# Patient Record
Sex: FEMALE | Race: WHITE | Employment: OTHER | URBAN - METROPOLITAN AREA
[De-identification: names, ages, dates, MRNs, and addresses within clinical notes are randomized per-mention and may not be internally consistent; named-entity substitution may affect disease eponyms.]

---

## 2018-03-21 ENCOUNTER — HOSPITAL ENCOUNTER (OUTPATIENT)
Dept: MAMMOGRAPHY | Age: 78
Discharge: HOME | End: 2018-03-21
Attending: FAMILY MEDICINE
Payer: MEDICARE

## 2018-03-21 DIAGNOSIS — Z12.31 VISIT FOR SCREENING MAMMOGRAM: ICD-10-CM

## 2018-03-21 PROCEDURE — 77067 SCR MAMMO BI INCL CAD: CPT

## 2018-03-28 ENCOUNTER — HOSPITAL ENCOUNTER (OUTPATIENT)
Dept: MAMMOGRAPHY | Age: 78
Discharge: HOME | End: 2018-03-28
Attending: FAMILY MEDICINE
Payer: MEDICARE

## 2018-03-28 DIAGNOSIS — Z85.3 HX OF BREAST CANCER: ICD-10-CM

## 2018-03-28 DIAGNOSIS — R92.8 ABNORMAL SCREENING MAMMOGRAM: ICD-10-CM

## 2018-03-28 PROCEDURE — 77065 DX MAMMO INCL CAD UNI: CPT

## 2019-04-22 ENCOUNTER — HOSPITAL ENCOUNTER (INPATIENT)
Age: 79
LOS: 8 days | Discharge: HOME HEALTH | DRG: 194 | End: 2019-04-30
Attending: EMERGENCY MEDICINE | Admitting: INTERNAL MEDICINE
Payer: MEDICARE

## 2019-04-22 ENCOUNTER — APPOINTMENT (OUTPATIENT)
Dept: GENERAL RADIOLOGY | Age: 79
DRG: 194 | End: 2019-04-22
Attending: EMERGENCY MEDICINE
Payer: MEDICARE

## 2019-04-22 DIAGNOSIS — N17.9 ACUTE KIDNEY INJURY (HCC): ICD-10-CM

## 2019-04-22 DIAGNOSIS — J18.9 COMMUNITY ACQUIRED PNEUMONIA, UNSPECIFIED LATERALITY: Primary | ICD-10-CM

## 2019-04-22 PROBLEM — A41.9 SEPSIS (HCC): Status: ACTIVE | Noted: 2019-04-22

## 2019-04-22 PROBLEM — J15.9 COMMUNITY ACQUIRED BACTERIAL PNEUMONIA: Status: ACTIVE | Noted: 2019-04-22

## 2019-04-22 PROBLEM — R94.31 PROLONGED Q-T INTERVAL ON ECG: Status: ACTIVE | Noted: 2019-04-22

## 2019-04-22 LAB
ALBUMIN SERPL-MCNC: 2.8 G/DL (ref 3.2–4.6)
ALBUMIN/GLOB SERPL: 0.6 {RATIO} (ref 1.2–3.5)
ALP SERPL-CCNC: 78 U/L (ref 50–136)
ALT SERPL-CCNC: 27 U/L (ref 12–65)
ANION GAP SERPL CALC-SCNC: 14 MMOL/L (ref 7–16)
AST SERPL-CCNC: 29 U/L (ref 15–37)
ATRIAL RATE: 88 BPM
BASOPHILS # BLD: 0.1 K/UL (ref 0–0.2)
BASOPHILS NFR BLD: 0 % (ref 0–2)
BILIRUB SERPL-MCNC: 0.7 MG/DL (ref 0.2–1.1)
BNP SERPL-MCNC: 25 PG/ML
BUN SERPL-MCNC: 73 MG/DL (ref 8–23)
CALCIUM SERPL-MCNC: 8.9 MG/DL (ref 8.3–10.4)
CALCULATED P AXIS, ECG09: 63 DEGREES
CALCULATED R AXIS, ECG10: 69 DEGREES
CALCULATED T AXIS, ECG11: -11 DEGREES
CHLORIDE SERPL-SCNC: 93 MMOL/L (ref 98–107)
CO2 SERPL-SCNC: 27 MMOL/L (ref 21–32)
CREAT SERPL-MCNC: 3.6 MG/DL (ref 0.6–1)
DIAGNOSIS, 93000: NORMAL
DIFFERENTIAL METHOD BLD: ABNORMAL
EOSINOPHIL # BLD: 0 K/UL (ref 0–0.8)
EOSINOPHIL NFR BLD: 0 % (ref 0.5–7.8)
ERYTHROCYTE [DISTWIDTH] IN BLOOD BY AUTOMATED COUNT: 13.8 % (ref 11.9–14.6)
GLOBULIN SER CALC-MCNC: 4.6 G/DL (ref 2.3–3.5)
GLUCOSE SERPL-MCNC: 117 MG/DL (ref 65–100)
HCT VFR BLD AUTO: 40.2 % (ref 35.8–46.3)
HGB BLD-MCNC: 13.7 G/DL (ref 11.7–15.4)
IMM GRANULOCYTES # BLD AUTO: 0.2 K/UL (ref 0–0.5)
IMM GRANULOCYTES NFR BLD AUTO: 1 % (ref 0–5)
LACTATE BLD-SCNC: 2.89 MMOL/L (ref 0.5–1.9)
LACTATE SERPL-SCNC: 2.3 MMOL/L (ref 0.4–2)
LYMPHOCYTES # BLD: 1.3 K/UL (ref 0.5–4.6)
LYMPHOCYTES NFR BLD: 7 % (ref 13–44)
MAGNESIUM SERPL-MCNC: 2.2 MG/DL (ref 1.8–2.4)
MAGNESIUM SERPL-MCNC: 2.4 MG/DL (ref 1.8–2.4)
MCH RBC QN AUTO: 30.7 PG (ref 26.1–32.9)
MCHC RBC AUTO-ENTMCNC: 34.1 G/DL (ref 31.4–35)
MCV RBC AUTO: 90.1 FL (ref 79.6–97.8)
MONOCYTES # BLD: 2.1 K/UL (ref 0.1–1.3)
MONOCYTES NFR BLD: 12 % (ref 4–12)
NEUTS SEG # BLD: 13.7 K/UL (ref 1.7–8.2)
NEUTS SEG NFR BLD: 79 % (ref 43–78)
NRBC # BLD: 0 K/UL (ref 0–0.2)
P-R INTERVAL, ECG05: 126 MS
PLATELET # BLD AUTO: 284 K/UL (ref 150–450)
PMV BLD AUTO: 13.2 FL (ref 9.4–12.3)
POTASSIUM SERPL-SCNC: 2.5 MMOL/L (ref 3.5–5.1)
POTASSIUM SERPL-SCNC: 2.7 MMOL/L (ref 3.5–5.1)
POTASSIUM SERPL-SCNC: 4.3 MMOL/L (ref 3.5–5.1)
PROCALCITONIN SERPL-MCNC: 0.5 NG/ML
PROT SERPL-MCNC: 7.4 G/DL (ref 6.3–8.2)
Q-T INTERVAL, ECG07: 468 MS
QRS DURATION, ECG06: 100 MS
QTC CALCULATION (BEZET), ECG08: 566 MS
RBC # BLD AUTO: 4.46 M/UL (ref 4.05–5.2)
SODIUM SERPL-SCNC: 134 MMOL/L (ref 136–145)
VENTRICULAR RATE, ECG03: 88 BPM
WBC # BLD AUTO: 17.3 K/UL (ref 4.3–11.1)

## 2019-04-22 PROCEDURE — 74011250637 HC RX REV CODE- 250/637: Performed by: EMERGENCY MEDICINE

## 2019-04-22 PROCEDURE — 83735 ASSAY OF MAGNESIUM: CPT

## 2019-04-22 PROCEDURE — 74011250636 HC RX REV CODE- 250/636: Performed by: EMERGENCY MEDICINE

## 2019-04-22 PROCEDURE — 99285 EMERGENCY DEPT VISIT HI MDM: CPT | Performed by: EMERGENCY MEDICINE

## 2019-04-22 PROCEDURE — 74011000250 HC RX REV CODE- 250: Performed by: EMERGENCY MEDICINE

## 2019-04-22 PROCEDURE — 83605 ASSAY OF LACTIC ACID: CPT

## 2019-04-22 PROCEDURE — 77030020255 HC SOL INJ LR 1000ML BG

## 2019-04-22 PROCEDURE — 77030020254 HC SOL INJ D5LR LACTATED RINGER

## 2019-04-22 PROCEDURE — 94640 AIRWAY INHALATION TREATMENT: CPT

## 2019-04-22 PROCEDURE — 71046 X-RAY EXAM CHEST 2 VIEWS: CPT

## 2019-04-22 PROCEDURE — 74011250637 HC RX REV CODE- 250/637: Performed by: INTERNAL MEDICINE

## 2019-04-22 PROCEDURE — 74011250636 HC RX REV CODE- 250/636: Performed by: INTERNAL MEDICINE

## 2019-04-22 PROCEDURE — 36415 COLL VENOUS BLD VENIPUNCTURE: CPT

## 2019-04-22 PROCEDURE — 96360 HYDRATION IV INFUSION INIT: CPT | Performed by: EMERGENCY MEDICINE

## 2019-04-22 PROCEDURE — 87040 BLOOD CULTURE FOR BACTERIA: CPT

## 2019-04-22 PROCEDURE — 93005 ELECTROCARDIOGRAM TRACING: CPT | Performed by: EMERGENCY MEDICINE

## 2019-04-22 PROCEDURE — 80053 COMPREHEN METABOLIC PANEL: CPT

## 2019-04-22 PROCEDURE — 74011000258 HC RX REV CODE- 258: Performed by: EMERGENCY MEDICINE

## 2019-04-22 PROCEDURE — 84132 ASSAY OF SERUM POTASSIUM: CPT

## 2019-04-22 PROCEDURE — 74011000302 HC RX REV CODE- 302: Performed by: INTERNAL MEDICINE

## 2019-04-22 PROCEDURE — 77030020256 HC SOL INJ NACL 0.9%  500ML

## 2019-04-22 PROCEDURE — 85025 COMPLETE CBC W/AUTO DIFF WBC: CPT

## 2019-04-22 PROCEDURE — 65660000000 HC RM CCU STEPDOWN

## 2019-04-22 PROCEDURE — 83880 ASSAY OF NATRIURETIC PEPTIDE: CPT

## 2019-04-22 PROCEDURE — 86580 TB INTRADERMAL TEST: CPT | Performed by: INTERNAL MEDICINE

## 2019-04-22 PROCEDURE — 84145 PROCALCITONIN (PCT): CPT

## 2019-04-22 RX ORDER — SODIUM CHLORIDE 0.9 % (FLUSH) 0.9 %
5-40 SYRINGE (ML) INJECTION EVERY 8 HOURS
Status: DISCONTINUED | OUTPATIENT
Start: 2019-04-22 | End: 2019-04-30 | Stop reason: HOSPADM

## 2019-04-22 RX ORDER — ALBUTEROL SULFATE 0.83 MG/ML
5 SOLUTION RESPIRATORY (INHALATION)
Status: COMPLETED | OUTPATIENT
Start: 2019-04-22 | End: 2019-04-22

## 2019-04-22 RX ORDER — AMOXICILLIN 250 MG
1 CAPSULE ORAL DAILY
Status: DISCONTINUED | OUTPATIENT
Start: 2019-04-23 | End: 2019-04-26

## 2019-04-22 RX ORDER — ROSUVASTATIN CALCIUM 5 MG/1
10 TABLET, COATED ORAL DAILY
Status: DISCONTINUED | OUTPATIENT
Start: 2019-04-23 | End: 2019-04-26

## 2019-04-22 RX ORDER — ASPIRIN 81 MG/1
81 TABLET ORAL DAILY
Status: DISCONTINUED | OUTPATIENT
Start: 2019-04-23 | End: 2019-04-30 | Stop reason: HOSPADM

## 2019-04-22 RX ORDER — SODIUM CHLORIDE 0.9 % (FLUSH) 0.9 %
5-40 SYRINGE (ML) INJECTION AS NEEDED
Status: DISCONTINUED | OUTPATIENT
Start: 2019-04-22 | End: 2019-04-30 | Stop reason: HOSPADM

## 2019-04-22 RX ORDER — POTASSIUM CHLORIDE 14.9 MG/ML
20 INJECTION INTRAVENOUS
Status: COMPLETED | OUTPATIENT
Start: 2019-04-22 | End: 2019-04-22

## 2019-04-22 RX ORDER — UREA 10 %
800 LOTION (ML) TOPICAL DAILY
Status: DISCONTINUED | OUTPATIENT
Start: 2019-04-23 | End: 2019-04-30 | Stop reason: HOSPADM

## 2019-04-22 RX ORDER — LANOLIN ALCOHOL/MO/W.PET/CERES
400 CREAM (GRAM) TOPICAL DAILY
Status: DISCONTINUED | OUTPATIENT
Start: 2019-04-22 | End: 2019-04-30 | Stop reason: HOSPADM

## 2019-04-22 RX ORDER — METHOTREXATE 2.5 MG/1
12.5 TABLET ORAL
Status: DISCONTINUED | OUTPATIENT
Start: 2019-04-28 | End: 2019-04-30 | Stop reason: HOSPADM

## 2019-04-22 RX ORDER — LEVOTHYROXINE SODIUM 50 UG/1
25 TABLET ORAL
Status: DISCONTINUED | OUTPATIENT
Start: 2019-04-23 | End: 2019-04-30 | Stop reason: HOSPADM

## 2019-04-22 RX ORDER — POTASSIUM CHLORIDE 29.8 MG/ML
20 INJECTION INTRAVENOUS
Status: DISCONTINUED | OUTPATIENT
Start: 2019-04-22 | End: 2019-04-22 | Stop reason: SDUPTHER

## 2019-04-22 RX ORDER — POTASSIUM CHLORIDE 20 MEQ/1
20 TABLET, EXTENDED RELEASE ORAL DAILY
Status: DISCONTINUED | OUTPATIENT
Start: 2019-04-23 | End: 2019-04-23

## 2019-04-22 RX ORDER — TRAMADOL HYDROCHLORIDE 50 MG/1
50 TABLET ORAL
COMMUNITY
End: 2021-03-19

## 2019-04-22 RX ORDER — POTASSIUM CHLORIDE 20 MEQ/1
40 TABLET, EXTENDED RELEASE ORAL
Status: COMPLETED | OUTPATIENT
Start: 2019-04-22 | End: 2019-04-22

## 2019-04-22 RX ORDER — METOPROLOL SUCCINATE 25 MG/1
25 TABLET, EXTENDED RELEASE ORAL DAILY
COMMUNITY
End: 2021-03-26

## 2019-04-22 RX ORDER — MELATONIN
1000 DAILY
COMMUNITY

## 2019-04-22 RX ORDER — POTASSIUM CHLORIDE 1.5 G/1.77G
40 POWDER, FOR SOLUTION ORAL
Status: COMPLETED | OUTPATIENT
Start: 2019-04-22 | End: 2019-04-22

## 2019-04-22 RX ORDER — SODIUM CHLORIDE, SODIUM LACTATE, POTASSIUM CHLORIDE, CALCIUM CHLORIDE 600; 310; 30; 20 MG/100ML; MG/100ML; MG/100ML; MG/100ML
125 INJECTION, SOLUTION INTRAVENOUS CONTINUOUS
Status: DISCONTINUED | OUTPATIENT
Start: 2019-04-22 | End: 2019-04-23

## 2019-04-22 RX ORDER — HEPARIN SODIUM 5000 [USP'U]/ML
5000 INJECTION, SOLUTION INTRAVENOUS; SUBCUTANEOUS EVERY 8 HOURS
Status: DISCONTINUED | OUTPATIENT
Start: 2019-04-22 | End: 2019-04-26

## 2019-04-22 RX ORDER — POTASSIUM CHLORIDE 20MEQ/15ML
40 LIQUID (ML) ORAL
Status: DISCONTINUED | OUTPATIENT
Start: 2019-04-22 | End: 2019-04-22 | Stop reason: SDUPTHER

## 2019-04-22 RX ADMIN — METHYLPREDNISOLONE SODIUM SUCCINATE 125 MG: 125 INJECTION, POWDER, FOR SOLUTION INTRAMUSCULAR; INTRAVENOUS at 14:47

## 2019-04-22 RX ADMIN — ALBUTEROL SULFATE 5 MG: 2.5 SOLUTION RESPIRATORY (INHALATION) at 14:22

## 2019-04-22 RX ADMIN — CEFTRIAXONE 2 G: 2 INJECTION, POWDER, FOR SOLUTION INTRAMUSCULAR; INTRAVENOUS at 14:47

## 2019-04-22 RX ADMIN — POTASSIUM CHLORIDE 40 MEQ: 1.5 POWDER, FOR SOLUTION ORAL at 14:47

## 2019-04-22 RX ADMIN — SODIUM CHLORIDE 1000 ML: 900 INJECTION, SOLUTION INTRAVENOUS at 14:07

## 2019-04-22 RX ADMIN — TUBERCULIN PURIFIED PROTEIN DERIVATIVE 5 UNITS: 5 INJECTION, SOLUTION INTRADERMAL at 22:04

## 2019-04-22 RX ADMIN — Medication 400 MG: at 14:47

## 2019-04-22 RX ADMIN — POTASSIUM CHLORIDE 20 MEQ: 200 INJECTION, SOLUTION INTRAVENOUS at 16:58

## 2019-04-22 RX ADMIN — HEPARIN SODIUM 5000 UNITS: 5000 INJECTION INTRAVENOUS; SUBCUTANEOUS at 22:04

## 2019-04-22 RX ADMIN — POTASSIUM CHLORIDE 40 MEQ: 20 TABLET, EXTENDED RELEASE ORAL at 17:39

## 2019-04-22 RX ADMIN — Medication 5 ML: at 22:06

## 2019-04-22 RX ADMIN — SODIUM CHLORIDE, SODIUM LACTATE, POTASSIUM CHLORIDE, AND CALCIUM CHLORIDE 125 ML/HR: 600; 310; 30; 20 INJECTION, SOLUTION INTRAVENOUS at 22:03

## 2019-04-22 NOTE — ED PROVIDER NOTES
Presents with cough and shortness of breath for 2 weeks. She went to her primary care doctor in the center here. She is given 4 breathing treatments and her O2 sats aashish room air of 60% initially and per EMS 88%. She does not have any home oxygen. She quit smoking a year ago. She denies any fever or chest pain. She's had no nausea or vomiting. Reports anorexia and not taking her meds. She does not have any inhalers at home. She denies any leg swelling. The history is provided by the patient. Shortness of Breath This is a new problem. The problem occurs continuously. The current episode started more than 1 week ago. The problem has been gradually worsening. Associated symptoms include cough, sputum production and wheezing. Pertinent negatives include no fever, no chest pain and no leg pain. She has tried nothing for the symptoms. Past Medical History:  
Diagnosis Date  Acquired hypothyroidism 1/16/2016  Breast cancer ([facility] [address] 2008  Depression 8/18/2016  Endocrine disease   
 hypothyroid  Fungal dermatitis 8/18/2016  Hyperlipidemia 1/16/2016  Hypertension, essential, benign 8/18/2016  Hypokalemia 8/18/2016  Inflamed sebaceous cyst 8/18/2016  Major depressive disorder, recurrent, moderate ([address] 8/18/2016  Nicotine dependence, cigarettes, uncomplicated 8/48/8422  Osteopenia 8/18/2016  Osteoporosis 8/18/2016  Radiation therapy complication 5461  Rheumatoid arthritis ([address] 1/16/2016  Right carotid bruit 1/16/2016  TIA (transient ischemic attack) 1/16/2016  Tobacco abuse 8/18/2016 Past Surgical History:  
Procedure Laterality Date  HX ADENOIDECTOMY  HX BREAST LUMPECTOMY Right 2008  
 with lymph nodes  HX TONSILLECTOMY Family History:  
Problem Relation Age of Onset  Stroke Mother  Cancer Father Brain  HIV/AIDS Brother Social History Socioeconomic History  Marital status:   
 Spouse name: Not on file  Number of children: Not on file  Years of education: Not on file  Highest education level: Not on file Occupational History  Not on file Social Needs  Financial resource strain: Not on file  Food insecurity:  
  Worry: Not on file Inability: Not on file  Transportation needs:  
  Medical: Not on file Non-medical: Not on file Tobacco Use  Smoking status: Current Some Day Smoker  Smokeless tobacco: Never Used  Tobacco comment: Only on pay day-1/2 pack Substance and Sexual Activity  Alcohol use: No  
 Drug use: No  
 Sexual activity: Not on file Lifestyle  Physical activity:  
  Days per week: Not on file Minutes per session: Not on file  Stress: Not on file Relationships  Social connections:  
  Talks on phone: Not on file Gets together: Not on file Attends Religion service: Not on file Active member of club or organization: Not on file Attends meetings of clubs or organizations: Not on file Relationship status: Not on file  Intimate partner violence:  
  Fear of current or ex partner: Not on file Emotionally abused: Not on file Physically abused: Not on file Forced sexual activity: Not on file Other Topics Concern  Not on file Social History Narrative  Not on file ALLERGIES: Patient has no known allergies. Review of Systems Constitutional: Negative for fever. Respiratory: Positive for cough, sputum production, shortness of breath and wheezing. Cardiovascular: Negative for chest pain. All other systems reviewed and are negative. Vitals:  
 04/22/19 1305 04/22/19 1331 04/22/19 1422 04/22/19 1449 BP:  109/59  97/52 Pulse:  81  89 Resp:  14  26 Temp:      
SpO2: 98% 91% 95% 96% Weight:      
Height:      
      
 
Physical Exam  
Constitutional: She is oriented to person, place, and time.  She appears well-developed and well-nourished. She appears ill. She appears distressed (mild respiratory). HENT:  
Head: Normocephalic and atraumatic. Eyes: Conjunctivae are normal. Right eye exhibits no discharge. Left eye exhibits no discharge. Neck: Normal range of motion. Neck supple. Cardiovascular: Normal rate and regular rhythm. Pulmonary/Chest: Effort normal. No respiratory distress. She has wheezes (occasional). Abdominal: Soft. She exhibits no distension. There is no tenderness. Musculoskeletal: Normal range of motion. She exhibits no edema. Neurological: She is alert and oriented to person, place, and time. No cranial nerve deficit. Skin: Skin is warm and dry. Capillary refill takes less than 2 seconds. She is not diaphoretic. Psychiatric: She has a normal mood and affect. Her behavior is normal.  
Nursing note and vitals reviewed. MDM Number of Diagnoses or Management Options Community acquired pneumonia, unspecified laterality:  
Diagnosis management comments: Given another albuterol treatment and Solu-Medrol. She has pneumonia on her chest x-ray and is given Rocephin and nasal throat. She is also given a liter of fluids. She'll be admitted to the hospitalist. 
 
  
Amount and/or Complexity of Data Reviewed Clinical lab tests: ordered and reviewed Review and summarize past medical records: yes Discuss the patient with other providers: yes Independent visualization of images, tracings, or specimens: yes (NSR no ST elevation ) Risk of Complications, Morbidity, and/or Mortality Presenting problems: high Diagnostic procedures: moderate Management options: high Patient Progress Patient progress: stable Procedures

## 2019-04-22 NOTE — PROGRESS NOTES
CM met with patient to discuss d/c plan. Patient alert and oriented x4. Patient verified demographic information no changes needed. Patient states she lives alone in a one level home with six steps to enter into the home. States she's independent with her ADL's and do has DME's (cane) at bedside. Patient states her PCP (RN) calls and checks on her and make sure she has her medication. Patient states she do plan on returning back home with no needs identified at this time. CM will continue to monitor and watch for any needs that may occur. Care Management Interventions PCP Verified by CM: Yes(Bruce, Caryle Must, ) Mode of Transport at Discharge: Other (see comment) Transition of Care Consult (CM Consult): Discharge Planning Discharge Durable Medical Equipment: No 
Physical Therapy Consult: No 
Occupational Therapy Consult: No 
Speech Therapy Consult: No 
Current Support Network: Own Home, Lives Alone Confirm Follow Up Transport: Family Plan discussed with Pt/Family/Caregiver: Yes Freedom of Choice Offered: Yes [employer] Information Provided?: No 
Discharge Location Discharge Placement: Home

## 2019-04-22 NOTE — PROGRESS NOTES
Patient is being admitted to floor from ER Patient was calm and in good spirits She was very thankful for the visit Assured her of our support and prayers Jac Quintanilla, staff [address], [phone] [facility] [place]  /   [email or web address]

## 2019-04-22 NOTE — ED NOTES
TRANSFER - OUT REPORT: 
 
Verbal report given to [employer], RN on Zi Estes  being transferred to [phone] for routine progression of care Report consisted of patients Situation, Background, Assessment and  
Recommendations(SBAR). Information from the following report(s) SBAR was reviewed with the receiving nurse. Lines:  
Peripheral IV 04/22/19 Left Antecubital (Active) Site Assessment Clean, dry, & intact 4/22/2019 12:34 PM  
Phlebitis Assessment 0 4/22/2019 12:34 PM  
Infiltration Assessment 0 4/22/2019 12:34 PM  
Dressing Status Clean, dry, & intact 4/22/2019 12:34 PM  
Dressing Type Transparent 4/22/2019 12:34 PM  
Hub Color/Line Status Pink 4/22/2019 12:34 PM  
  
 
Opportunity for questions and clarification was provided. Patient transported with: 
 O2 @ 4 liters

## 2019-04-22 NOTE — PROGRESS NOTES
Pt in room 819. Pt is stable. Pt is axo. Pt denies pain or discomfort. Respirations are even and unlabored at rest, labored when attempting to talk. No skin breakdown noted. Dual skin assessment performed with Suzanne Cerda RN. Safety measures are in place. Door is left open when unattended. Will continue to monitor.

## 2019-04-22 NOTE — H&P
HOSPITALIST H&P/CONSULTNAME:  Zi Estes Age:  70 y.o. 
:   1940 MRN:   [id number] PCP: Rose Busch MD 
Consulting MD: Treatment Team: Primary Nurse: Abhilash Brown RN 
HPI:  
69 yo CF with past history of hypothyroidism, HLD, HTN, chronic hypokalemia (uncertain etiology), past tobacco use, and TIA presents with worsening 2 week history of shortness of breath and cough. No known sick contacts, patient lives by herself at home. No associated chest pain, but with some fevers/chills and cough. No associated abdominal pain, nausea/vomiting, diarrhea, or constipation. She has never been diagnosed with any lung disorders. ED Course: CXR showing possible COPD changes and likely superimposed pneumonia. Serum potassium of 2.5 with SCr of 3.6 (baseline around 1.0-1.1). Procalcitonin of 0.5. WBC count of 17. EKG showing NSR with prolonged QT interval at 570. Blood cultures collected x2. Ceftriaxone and azithromycin ordered. Complete ROS done and is as stated in HPI or otherwise negative Past Medical History:  
Diagnosis Date  Acquired hypothyroidism 2016  Breast cancer ([facility] [address]   Depression 2016  Endocrine disease   
 hypothyroid  Fungal dermatitis 2016  Hyperlipidemia 2016  Hypertension, essential, benign 2016  Hypokalemia 2016  Inflamed sebaceous cyst 2016  Major depressive disorder, recurrent, moderate ([address] 2016  Nicotine dependence, cigarettes, uncomplicated 3/85/6214  Osteopenia 2016  Osteoporosis 2016  Radiation therapy complication 6801  Rheumatoid arthritis ([address] 2016  Right carotid bruit 2016  TIA (transient ischemic attack) 2016  Tobacco abuse 2016 Past Surgical History:  
Procedure Laterality Date  HX ADENOIDECTOMY  HX BREAST LUMPECTOMY Right   
 with lymph nodes  HX TONSILLECTOMY Prior to Admission Medications Prescriptions Last Dose Informant Patient Reported? Taking? DULoxetine (CYMBALTA) 60 mg capsule   No No  
Sig: Take 1 Cap by mouth daily. aspirin delayed-release 81 mg tablet   Yes No  
Sig: Take  by mouth daily. azithromycin (ZITHROMAX) 250 mg tablet   No No  
Sig: Take as directed on the package. clotrimazole-betamethasone (LOTRISONE) topical cream   No No  
Sig: Apply  to affected area two (2) times a day. fo 14 days. folic acid 430 mcg tablet   Yes No  
Sig: Take 800 mcg by mouth daily. furosemide (LASIX) 40 mg tablet   No No  
Sig: Take 1 Tab by mouth daily. levothyroxine (SYNTHROID) 25 mcg tablet   No No  
Sig: Take 1 Tab by mouth Daily (before breakfast). losartan-hydroCHLOROthiazide (HYZAAR) 100-12.5 mg per tablet   No No  
Sig: Take 1 Tab by mouth daily. methotrexate (RHEUMATREX) 2.5 mg tablet   No No  
Si tabs every   
potassium chloride (K-DUR, KLOR-CON) 20 mEq tablet   No No  
Sig: Take 1 Tab by mouth daily. Please cancel RX for Potassium 10meq and fill this instead. Dosage change. rosuvastatin (CRESTOR) 10 mg tablet   No No  
Sig: Take 1 Tab by mouth daily. Facility-Administered Medications: None No Known Allergies Social History Tobacco Use  Smoking status: Current Some Day Smoker  Smokeless tobacco: Never Used  Tobacco comment: Only on pay day-1/2 pack Substance Use Topics  Alcohol use: No  
  
Family History Problem Relation Age of Onset  Stroke Mother  Cancer Father Brain  HIV/AIDS Brother Objective:  
 
Visit Vitals BP 97/52 Pulse 89 Temp 97.2 °F (36.2 °C) Resp 26 Ht 5' (1.524 m) Wt 64 kg (141 lb) SpO2 96% BMI 27.54 kg/m² Temp (24hrs), Av.2 °F (36.2 °C), Min:97.2 °F (36.2 °C), Max:97.2 °F (36.2 °C) Oxygen Therapy O2 Sat (%): 96 % (19 1449) Pulse via Oximetry: 87 beats per minute (19 1449) O2 Device: Nasal cannula (19 2277) O2 Flow Rate (L/min): 4 l/min (04/22/19 1422) Physical Exam: 
General:    Alert, cooperative, no distress, appears stated age. Head:   Normocephalic, without obvious abnormality, atraumatic. Nose:  Nares normal. No drainage or sinus tenderness. Lungs:   Clear to auscultation bilaterally. No Wheezing or Rhonchi. No rales. Heart:   Regular rate and rhythm,  no murmur, rub or gallop. Abdomen:   Soft, non-tender. Not distended. Bowel sounds normal.  
Extremities: No cyanosis. No edema. No clubbing Skin:     Texture, turgor normal. No rashes or lesions. Not Jaundiced Neurologic: Alert and oriented x 3, no focal deficits Data Review:  
Recent Results (from the past 24 hour(s)) CBC WITH AUTOMATED DIFF Collection Time: 04/22/19 12:28 PM  
Result Value Ref Range WBC 17.3 (H) 4.3 - 11.1 K/uL  
 RBC 4.46 4.05 - 5.2 M/uL  
 HGB 13.7 11.7 - 15.4 g/dL HCT 40.2 35.8 - 46.3 % MCV 90.1 79.6 - 97.8 FL  
 MCH 30.7 26.1 - 32.9 PG  
 MCHC 34.1 31.4 - 35.0 g/dL  
 RDW 13.8 11.9 - 14.6 % PLATELET 370 189 - 471 K/uL MPV 13.2 (H) 9.4 - 12.3 FL ABSOLUTE NRBC 0.00 0.0 - 0.2 K/uL  
 DF AUTOMATED NEUTROPHILS 79 (H) 43 - 78 % LYMPHOCYTES 7 (L) 13 - 44 % MONOCYTES 12 4.0 - 12.0 % EOSINOPHILS 0 (L) 0.5 - 7.8 % BASOPHILS 0 0.0 - 2.0 % IMMATURE GRANULOCYTES 1 0.0 - 5.0 %  
 ABS. NEUTROPHILS 13.7 (H) 1.7 - 8.2 K/UL  
 ABS. LYMPHOCYTES 1.3 0.5 - 4.6 K/UL  
 ABS. MONOCYTES 2.1 (H) 0.1 - 1.3 K/UL  
 ABS. EOSINOPHILS 0.0 0.0 - 0.8 K/UL  
 ABS. BASOPHILS 0.1 0.0 - 0.2 K/UL  
 ABS. IMM. GRANS. 0.2 0.0 - 0.5 K/UL METABOLIC PANEL, COMPREHENSIVE Collection Time: 04/22/19 12:28 PM  
Result Value Ref Range Sodium 134 (L) 136 - 145 mmol/L Potassium 2.5 (LL) 3.5 - 5.1 mmol/L Chloride 93 (L) 98 - 107 mmol/L  
 CO2 27 21 - 32 mmol/L Anion gap 14 7 - 16 mmol/L Glucose 117 (H) 65 - 100 mg/dL BUN 73 (H) 8 - 23 MG/DL  Creatinine 3.60 (H) 0.6 - 1.0 MG/DL  
 GFR est AA 16 (L) >60 ml/min/1.73m2 GFR est non-AA 13 (L) >60 ml/min/1.73m2 Calcium 8.9 8.3 - 10.4 MG/DL Bilirubin, total 0.7 0.2 - 1.1 MG/DL  
 ALT (SGPT) 27 12 - 65 U/L  
 AST (SGOT) 29 15 - 37 U/L Alk. phosphatase 78 50 - 136 U/L Protein, total 7.4 6.3 - 8.2 g/dL Albumin 2.8 (L) 3.2 - 4.6 g/dL Globulin 4.6 (H) 2.3 - 3.5 g/dL A-G Ratio 0.6 (L) 1.2 - 3.5 BNP Collection Time: 04/22/19 12:28 PM  
Result Value Ref Range BNP 25 (H) 0 pg/mL PROCALCITONIN Collection Time: 04/22/19 12:28 PM  
Result Value Ref Range Procalcitonin 0.5 ng/mL MAGNESIUM Collection Time: 04/22/19 12:28 PM  
Result Value Ref Range Magnesium 2.4 1.8 - 2.4 mg/dL POC LACTIC ACID Collection Time: 04/22/19 12:34 PM  
Result Value Ref Range Lactic Acid (POC) 2.89 (H) 0.5 - 1.9 mmol/L  
EKG, 12 LEAD, INITIAL Collection Time: 04/22/19  1:11 PM  
Result Value Ref Range Ventricular Rate 88 BPM  
 Atrial Rate 88 BPM  
 P-R Interval 126 ms  
 QRS Duration 100 ms Q-T Interval 468 ms QTC Calculation (Bezet) 566 ms Calculated P Axis 63 degrees Calculated R Axis 69 degrees Calculated T Axis -11 degrees Diagnosis    
  !! AGE AND GENDER SPECIFIC ECG ANALYSIS !! Normal sinus rhythm ST & T wave abnormality, consider inferolateral ischemia Prolonged QT Abnormal ECG When compared with ECG of 16-JAN-2016 19:25, 
T wave inversion now evident in Inferior leads Inverted T waves have replaced nonspecific T wave abnormality in Anterolateral leads QT has lengthened Confirmed by Alexandria Florentino MD (), Laisha Morris ([id number]) on 4/22/2019 2:27:02 PM 
  
 
Imaging Rubina Londono Assessment and Plan: Active Hospital Problems Diagnosis Date Noted  Acute renal failure (ARF) ([facility] [address] 04/22/2019  Sepsis ([facility] [address] 04/22/2019  Community acquired bacterial pneumonia 04/22/2019  Prolonged Q-T interval on ECG 04/22/2019  Hypokalemia 08/18/2016  Acquired hypothyroidism 01/16/2016 PLAN # Sepsis secondary to CAP 
- blood cultures collected x2 
- discontinue azithromycin due to QT prolongation 
- continue with ceftriaxone 
- add doxycycline 
- continue with Solumedrol and jet nebulizers # Hypoxic respiratory failure due to CAP 
- not on supplemental oxygen at home 
- wean O2 as tolerated - patient likely has undiagnosed obstructive lung disease and warrants PFTs as outpatient # Acute renal failure, likely prerenal due to poor po intake 
- will given small fluid bolus and then maintenance fluids 
- avoid nephrotoxic meds 
- serial BMPs 
- hold Lasix, thiazides, and ACE/ARB # Prolonged QT interval on EKG 
- likely due to underlying acute on chronic hypokalemia - check Mg 
- potassium repletion 
- avoid any potential precipitating meds (quinolones/azithro, antiemetics, antipsychotics, etc.) # Acute on chronic hypokalemia 
- uncertain etiology, patient without any obvious GI symptoms and takes potassium on long-term basis - will order urine lytes (K, Cl, Na, pH, osmolarity) 
- hold home Lasix # Hypothyroidism 
- continue with home levothyroxine F/E/N: fluids as above, replete electrolytes as needed, cardiac diet Ppx: heparin SQ for VTE Code Status: FULL CODE Disposition: Admit to Inpatient. Workup as above. PT/OT consults. PPD ordered. All questions answered. Signed By: Brad Valdez DO April 22, 2019

## 2019-04-22 NOTE — ED TRIAGE NOTES
Pt arrived via EMS from PCP, VIA [facility] with c/o Shob. PCP states PO2 60% on RA. EMS states 88% on RA. 95% on [facility] states she is NSR 60 but having episodes of Afib 130's at times. No hx of afib.  EMS gave 300cc of NS

## 2019-04-22 NOTE — PROGRESS NOTES
TRANSFER - IN REPORT: 
 
Verbal report received from Oli Bergman RN (name) on Colleen Cruz  being received from ED (unit) for routine progression of care Report consisted of patients Situation, Background, Assessment and  
Recommendations(SBAR). Information from the following report(s) SBAR and Kardex was reviewed with the receiving nurse. Opportunity for questions and clarification was provided. Assessment completed upon patients arrival to unit and care assumed. SBAR given to primary receiving RNJigar.

## 2019-04-23 ENCOUNTER — APPOINTMENT (OUTPATIENT)
Dept: ULTRASOUND IMAGING | Age: 79
DRG: 194 | End: 2019-04-23
Attending: INTERNAL MEDICINE
Payer: MEDICARE

## 2019-04-23 ENCOUNTER — APPOINTMENT (OUTPATIENT)
Dept: GENERAL RADIOLOGY | Age: 79
DRG: 194 | End: 2019-04-23
Attending: INTERNAL MEDICINE
Payer: MEDICARE

## 2019-04-23 LAB
ANION GAP SERPL CALC-SCNC: 9 MMOL/L (ref 7–16)
APPEARANCE UR: NORMAL
ATRIAL RATE: 73 BPM
BASOPHILS # BLD: 0.1 K/UL (ref 0–0.2)
BASOPHILS NFR BLD: 0 % (ref 0–2)
BILIRUB UR QL: NEGATIVE
BUN SERPL-MCNC: 69 MG/DL (ref 8–23)
CALCIUM SERPL-MCNC: 9.2 MG/DL (ref 8.3–10.4)
CALCULATED P AXIS, ECG09: 64 DEGREES
CALCULATED R AXIS, ECG10: 61 DEGREES
CALCULATED T AXIS, ECG11: 53 DEGREES
CHLORIDE SERPL-SCNC: 105 MMOL/L (ref 98–107)
CHLORIDE UR-SCNC: 22 MMOL/L
CO2 SERPL-SCNC: 27 MMOL/L (ref 21–32)
COLOR UR: YELLOW
CREAT SERPL-MCNC: 2.39 MG/DL (ref 0.6–1)
CREAT UR-MCNC: 125 MG/DL
CREAT UR-MCNC: 84.6 MG/DL
DIAGNOSIS, 93000: NORMAL
DIFFERENTIAL METHOD BLD: ABNORMAL
EOSINOPHIL # BLD: 0 K/UL (ref 0–0.8)
EOSINOPHIL NFR BLD: 0 % (ref 0.5–7.8)
ERYTHROCYTE [DISTWIDTH] IN BLOOD BY AUTOMATED COUNT: 14.1 % (ref 11.9–14.6)
GLUCOSE SERPL-MCNC: 185 MG/DL (ref 65–100)
GLUCOSE UR STRIP.AUTO-MCNC: NEGATIVE MG/DL
HCT VFR BLD AUTO: 39.4 % (ref 35.8–46.3)
HGB BLD-MCNC: 13.5 G/DL (ref 11.7–15.4)
HGB UR QL STRIP: NEGATIVE
IMM GRANULOCYTES # BLD AUTO: 0.2 K/UL (ref 0–0.5)
IMM GRANULOCYTES NFR BLD AUTO: 1 % (ref 0–5)
KETONES UR QL STRIP.AUTO: NEGATIVE MG/DL
LEUKOCYTE ESTERASE UR QL STRIP.AUTO: NEGATIVE
LYMPHOCYTES # BLD: 0.9 K/UL (ref 0.5–4.6)
LYMPHOCYTES NFR BLD: 5 % (ref 13–44)
MCH RBC QN AUTO: 31.8 PG (ref 26.1–32.9)
MCHC RBC AUTO-ENTMCNC: 34.3 G/DL (ref 31.4–35)
MCV RBC AUTO: 92.9 FL (ref 79.6–97.8)
MM INDURATION POC: 0 MM (ref 0–5)
MONOCYTES # BLD: 0.7 K/UL (ref 0.1–1.3)
MONOCYTES NFR BLD: 3 % (ref 4–12)
NEUTS SEG # BLD: 17.8 K/UL (ref 1.7–8.2)
NEUTS SEG NFR BLD: 91 % (ref 43–78)
NITRITE UR QL STRIP.AUTO: NEGATIVE
NRBC # BLD: 0 K/UL (ref 0–0.2)
OSMOLALITY UR: 431 MOSM/KG H2O (ref 50–1400)
P-R INTERVAL, ECG05: 156 MS
PH UR STRIP: 5.5 [PH] (ref 5–9)
PH UR STRIP: 6 [PH] (ref 5–9)
PLATELET # BLD AUTO: 273 K/UL (ref 150–450)
PMV BLD AUTO: 13 FL (ref 9.4–12.3)
POTASSIUM SERPL-SCNC: 3.3 MMOL/L (ref 3.5–5.1)
POTASSIUM UR-SCNC: 48 MMOL/L
PPD POC: NEGATIVE NEGATIVE
PROT UR STRIP-MCNC: NEGATIVE MG/DL
PROT UR-MCNC: 18 MG/DL
PROT UR-MCNC: 23 MG/DL
PROT/CREAT UR-RTO: 0.2
Q-T INTERVAL, ECG07: 450 MS
QRS DURATION, ECG06: 86 MS
QTC CALCULATION (BEZET), ECG08: 495 MS
RBC # BLD AUTO: 4.24 M/UL (ref 4.05–5.2)
SODIUM SERPL-SCNC: 141 MMOL/L (ref 136–145)
SODIUM UR-SCNC: 11 MMOL/L
SP GR UR REFRACTOMETRY: 1.02 (ref 1–1.02)
UROBILINOGEN UR QL STRIP.AUTO: 1 EU/DL (ref 0.2–1)
VENTRICULAR RATE, ECG03: 73 BPM
WBC # BLD AUTO: 19.7 K/UL (ref 4.3–11.1)

## 2019-04-23 PROCEDURE — 84133 ASSAY OF URINE POTASSIUM: CPT

## 2019-04-23 PROCEDURE — 84156 ASSAY OF PROTEIN URINE: CPT

## 2019-04-23 PROCEDURE — 97165 OT EVAL LOW COMPLEX 30 MIN: CPT

## 2019-04-23 PROCEDURE — 65270000029 HC RM PRIVATE

## 2019-04-23 PROCEDURE — 74011000258 HC RX REV CODE- 258: Performed by: INTERNAL MEDICINE

## 2019-04-23 PROCEDURE — 97530 THERAPEUTIC ACTIVITIES: CPT

## 2019-04-23 PROCEDURE — 74011250637 HC RX REV CODE- 250/637: Performed by: INTERNAL MEDICINE

## 2019-04-23 PROCEDURE — 77010033678 HC OXYGEN DAILY

## 2019-04-23 PROCEDURE — 94760 N-INVAS EAR/PLS OXIMETRY 1: CPT

## 2019-04-23 PROCEDURE — 77030020255 HC SOL INJ LR 1000ML BG

## 2019-04-23 PROCEDURE — 97161 PT EVAL LOW COMPLEX 20 MIN: CPT

## 2019-04-23 PROCEDURE — 83986 ASSAY PH BODY FLUID NOS: CPT

## 2019-04-23 PROCEDURE — 81003 URINALYSIS AUTO W/O SCOPE: CPT

## 2019-04-23 PROCEDURE — 93005 ELECTROCARDIOGRAM TRACING: CPT | Performed by: INTERNAL MEDICINE

## 2019-04-23 PROCEDURE — 74011250636 HC RX REV CODE- 250/636: Performed by: INTERNAL MEDICINE

## 2019-04-23 PROCEDURE — 83935 ASSAY OF URINE OSMOLALITY: CPT

## 2019-04-23 PROCEDURE — 71046 X-RAY EXAM CHEST 2 VIEWS: CPT

## 2019-04-23 PROCEDURE — 80048 BASIC METABOLIC PNL TOTAL CA: CPT

## 2019-04-23 PROCEDURE — 84300 ASSAY OF URINE SODIUM: CPT

## 2019-04-23 PROCEDURE — 82436 ASSAY OF URINE CHLORIDE: CPT

## 2019-04-23 PROCEDURE — 76775 US EXAM ABDO BACK WALL LIM: CPT

## 2019-04-23 PROCEDURE — 82570 ASSAY OF URINE CREATININE: CPT

## 2019-04-23 PROCEDURE — 85025 COMPLETE CBC W/AUTO DIFF WBC: CPT

## 2019-04-23 PROCEDURE — 65660000000 HC RM CCU STEPDOWN

## 2019-04-23 PROCEDURE — 36415 COLL VENOUS BLD VENIPUNCTURE: CPT

## 2019-04-23 RX ORDER — POTASSIUM CHLORIDE 20 MEQ/1
20 TABLET, EXTENDED RELEASE ORAL 2 TIMES DAILY
Status: DISCONTINUED | OUTPATIENT
Start: 2019-04-23 | End: 2019-04-26

## 2019-04-23 RX ORDER — POTASSIUM CHLORIDE 20 MEQ/1
40 TABLET, EXTENDED RELEASE ORAL DAILY
Status: DISCONTINUED | OUTPATIENT
Start: 2019-04-24 | End: 2019-04-23

## 2019-04-23 RX ADMIN — HEPARIN SODIUM 5000 UNITS: 5000 INJECTION INTRAVENOUS; SUBCUTANEOUS at 14:55

## 2019-04-23 RX ADMIN — CEFTRIAXONE 2 G: 2 INJECTION, POWDER, FOR SOLUTION INTRAMUSCULAR; INTRAVENOUS at 14:55

## 2019-04-23 RX ADMIN — Medication 10 ML: at 21:38

## 2019-04-23 RX ADMIN — DOXYCYCLINE 100 MG: 100 INJECTION, POWDER, LYOPHILIZED, FOR SOLUTION INTRAVENOUS at 03:41

## 2019-04-23 RX ADMIN — DOXYCYCLINE 100 MG: 100 INJECTION, POWDER, LYOPHILIZED, FOR SOLUTION INTRAVENOUS at 15:36

## 2019-04-23 RX ADMIN — Medication 5 ML: at 14:56

## 2019-04-23 RX ADMIN — Medication 5 ML: at 05:35

## 2019-04-23 RX ADMIN — POTASSIUM CHLORIDE 20 MEQ: 20 TABLET, EXTENDED RELEASE ORAL at 08:45

## 2019-04-23 RX ADMIN — HEPARIN SODIUM 5000 UNITS: 5000 INJECTION INTRAVENOUS; SUBCUTANEOUS at 05:35

## 2019-04-23 RX ADMIN — ASPIRIN 81 MG: 81 TABLET, COATED ORAL at 08:44

## 2019-04-23 RX ADMIN — POTASSIUM CHLORIDE 20 MEQ: 20 TABLET, EXTENDED RELEASE ORAL at 14:56

## 2019-04-23 RX ADMIN — SODIUM CHLORIDE, SODIUM LACTATE, POTASSIUM CHLORIDE, AND CALCIUM CHLORIDE 125 ML/HR: 600; 310; 30; 20 INJECTION, SOLUTION INTRAVENOUS at 08:41

## 2019-04-23 RX ADMIN — Medication 400 MG: at 08:45

## 2019-04-23 RX ADMIN — ROSUVASTATIN CALCIUM 10 MG: 5 TABLET, FILM COATED ORAL at 08:44

## 2019-04-23 RX ADMIN — LEVOTHYROXINE SODIUM 25 MCG: 50 TABLET ORAL at 05:35

## 2019-04-23 RX ADMIN — SENNOSIDES, DOCUSATE SODIUM 1 TABLET: 50; 8.6 TABLET, FILM COATED ORAL at 08:45

## 2019-04-23 RX ADMIN — HEPARIN SODIUM 5000 UNITS: 5000 INJECTION INTRAVENOUS; SUBCUTANEOUS at 21:38

## 2019-04-23 RX ADMIN — POTASSIUM CHLORIDE 20 MEQ: 20 TABLET, EXTENDED RELEASE ORAL at 21:37

## 2019-04-23 NOTE — PROGRESS NOTES
Problem: Self Care Deficits Care Plan (Adult) Goal: *Acute Goals and Plan of Care (Insert Text) Description 1. Patient will complete lower body bathing and dressing with modified independence and adaptive equipment as needed. 2. Patient will complete toileting with modified independence. 3. Patient will tolerate 25 minutes of OT treatment with 2-3 rest breaks to increase activity tolerance for ADLs. 4. Patient will complete functional transfers with modified independence and adaptive equipment as needed. 5. Patient will verbalize with independence 3 ways to complete energy conservation for ADL. 6. Patient will complete functional mobility for household distances with modified independence and good safety awareness. Timeframe: 7 visits Outcome: Progressing Towards Goal 
  
 
OCCUPATIONAL THERAPY: Initial Assessment, Daily Note and PM 4/23/2019 INPATIENT: OT Visit Days: 1 Payor: Cary Colon / Plan: [address] [place] [address] HMO / Product Type: [employer] Medicare /  
  
NAME/AGE/GENDER: Celsa Cole is a 70 y.o. female PRIMARY DIAGNOSIS:  Acute renal failure (ARF) (HCC) [N17.9] Acute renal failure (ARF) (HCC) Acute renal failure (ARF) (HCC) ICD-10: Treatment Diagnosis:  
 Generalized Muscle Weakness (M62.81) Other lack of cordination (R27.8) Precautions/Allergies: 
   Patient has no known allergies. ASSESSMENT:  
Ms. Silvano Nielson was admitted with acute renal failure and shortness of breath. Pt lives with alone in a mobile home with a tub/shower and is independent with ADL at baseline. Pt states she uses a cane for functional mobility and denies any falls in the past. Pt still drives and completes her own cooking/household chores. Pt is currently wearing nasal cannula but typically does not wear O2 at baseline. This session, pt presented supine in the bed.  Pt demonstrated deficits in strength, activity tolerance, shortness of breath, and dynamic standing balance impacting ADLs. Pt transferred to the edge of the bed with independence. Pt moves very quickly with bed mobility and needs cues for pacing with transitions. Pt reports feeling \"overheated\" in sitting with pt provided with cold cloth. Pt worked on sitting tolerance edge of the bed before finally standing with CGA and completing functional mobility in the room. Pt moves slowly and reports some dizziness. Pt is somewhat unsteady even with the use of her cane. Pt's O2 levels dropped to 86% with pt resting and working on pursed lip breathing with additional time to recover. Pt returned to supine but then reports she needed to use the bathroom. Pt completed toileting with CGA overall. At the end of the session, pt was left supine in bed with needs in reach. Pt presented below functional baseline and would benefit from skilled OT services to address deficits. This section established at most recent assessment PROBLEM LIST (Impairments causing functional limitations): 
Decreased Strength Decreased ADL/Functional Activities Decreased Transfer Abilities Decreased Ambulation Ability/Technique Decreased Balance Increased Pain Decreased Activity Tolerance Decreased Pacing Skills Decreased Work Simplification/Energy Conservation Techniques Increased Shortness of Breath Decreased Whitney with Home Exercise Program 
 INTERVENTIONS PLANNED: (Benefits and precautions of occupational therapy have been discussed with the patient.) Activities of daily living training Adaptive equipment training Balance training Clothing management Donning&doffing training Neuromuscular re-eduation Therapeutic activity Therapeutic exercise TREATMENT PLAN: Frequency/Duration: Follow patient 3 times per week to address above goals. Rehabilitation Potential For Stated Goals: Good REHAB RECOMMENDATIONS (at time of discharge pending progress):   
Placement: It is my opinion, based on this patient's performance to date, that Ms. Piero Harry may benefit from [address] after discharge due to her functional deficits (listed above) that are likely to improve with skilled rehabilitation because she has multiple medical issues that affect her functional mobility in the community. Equipment: TBD OCCUPATIONAL PROFILE AND HISTORY:  
History of Present Injury/Illness (Reason for Referral): 
See H&P Past Medical History/Comorbidities: Ms. Piero Harry  has a past medical history of Acquired hypothyroidism (1/16/2016), Breast cancer ([facility] (2008), Depression (8/18/2016), Endocrine disease, Fungal dermatitis (8/18/2016), Hyperlipidemia (1/16/2016), Hypertension, essential, benign (8/18/2016), Hypokalemia (8/18/2016), Inflamed sebaceous cyst (8/18/2016), Major depressive disorder, recurrent, moderate ([facility] (8/18/2016), Nicotine dependence, cigarettes, uncomplicated (4/40/3851), Osteopenia (8/18/2016), Osteoporosis (8/18/2016), Radiation therapy complication (1140), Rheumatoid arthritis ([facility] (1/16/2016), Right carotid bruit (1/16/2016), TIA (transient ischemic attack) (1/16/2016), and Tobacco abuse (8/18/2016). Ms. Piero Harry  has a past surgical history that includes hx tonsillectomy; hx adenoidectomy; and hx breast lumpectomy (Right, 2008). Social History/Living Environment:  
Home Environment: Trailer/mobile home # Steps to Enter: 6 Rails to Enter: Yes Hand Rails : Bilateral 
One/Two Story Residence: One story Living Alone: Yes Support Systems: None Patient Expects to be Discharged to[de-identified] Private residence Current DME Used/Available at Home: Cane, straight Tub or Shower Type: Tub/Shower combination Prior Level of Function/Work/Activity: 
Pt lives with alone in a mobile home with a tub/shower and is independent with ADL at baseline.   Pt states she uses a cane for functional mobility and denies any falls in the past. Pt still drives and completes her own cooking/household chores. Pt is currently wearing nasal cannula but typically does not wear O2 at baseline. Personal Factors:   
      Social Background:  lives alone Other factors that influence how disability is experienced by the patient:  multiple co-morbidities Number of Personal Factors/Comorbidities that affect the Plan of Care: Expanded review of therapy/medical records (1-2):  MODERATE COMPLEXITY ASSESSMENT OF OCCUPATIONAL PERFORMANCE[de-identified]  
Activities of Daily Living:  
Basic ADLs (From Assessment) Complex ADLs (From Assessment) Feeding: Setup Oral Facial Hygiene/Grooming: Stand-by assistance Bathing: Moderate assistance Upper Body Dressing: Minimum assistance Lower Body Dressing: Moderate assistance Toileting: Contact guard assistance Instrumental ADL Meal Preparation: Maximum assistance Homemaking: Total assistance Grooming/Bathing/Dressing Activities of Daily Living Cognitive Retraining Safety/Judgement: Awareness of environment; Fall prevention Functional Transfers Bathroom Mobility: Contact guard assistance Toilet Transfer : Contact guard assistance Bed/Mat Mobility Rolling: Independent Supine to Sit: Independent Sit to Supine: Independent Sit to Stand: Contact guard assistance Stand to Sit: Contact guard assistance Bed to Chair: Contact guard assistance Scooting: Independent Most Recent Physical Functioning:  
Gross Assessment: 
AROM: Within functional limits Strength: Generally decreased, functional 
         
  
Posture: 
Posture (WDL): Exceptions to [facility] Posture Assessment: Forward head, Rounded shoulders, Kyphosis Balance: 
Sitting: Intact Standing: Impaired Standing - Static: Good Standing - Dynamic : Fair Bed Mobility: 
Rolling: Independent Supine to Sit: Independent Sit to Supine: Independent Scooting: Independent Wheelchair Mobility: 
  
Transfers: 
Sit to Stand: Contact guard assistance Stand to Sit: Contact guard assistance Bed to Chair: Contact guard assistance Patient Vitals for the past 6 hrs: 
 BP SpO2 Pulse 19 1144 120/78 94 % 79  
19 1511 135/64 96 % 91 Mental Status Neurologic State: Alert Orientation Level: Oriented X4 Cognition: Follows commands Perception: Appears intact Perseveration: No perseveration noted Safety/Judgement: Awareness of environment, Fall prevention Physical Skills Involved: 
Balance Strength Activity Tolerance Cognitive Skills Affected (resulting in the inability to perform in a timely and safe manner): Executive Function Divided Attention Psychosocial Skills Affected: 
Habits/Routines Self-Awareness Number of elements that affect the Plan of Care: 5+:  HIGH COMPLEXITY CLINICAL DECISION MAKIN[address] [place] [address] AM-PAC? ?6 Clicks? Daily Activity Inpatient Short Form How much help from another person does the patient currently need. .. Total A Lot A Little None 1. Putting on and taking off regular lower body clothing? ? 1   ? 2   ? 3   ? 4  
2. Bathing (including washing, rinsing, drying)? ? 1   ? 2   ? 3   ? 4  
3. Toileting, which includes using toilet, bedpan or urinal?   ? 1   ? 2   ? 3   ? 4  
4. Putting on and taking off regular upper body clothing? ? 1   ? 2   ? 3   ? 4  
5. Taking care of personal grooming such as brushing teeth? ? 1   ? 2   ? 3   ? 4  
6. Eating meals? ? 1   ? 2   ? 3   ? 4  
© , Trustees of [address] [place] [address], under license to [employer]. All rights reserved Score:  Initial: 16 Most Recent: X (Date: -- ) Interpretation of Tool:  Represents activities that are increasingly more difficult (i.e. Bed mobility, Transfers, Gait). Medical Necessity:    
Patient demonstrates good 
 rehab potential due to higher previous functional level.  
Reason for Services/Other Comments: 
Patient continues to require skilled intervention due to decreased independence with ADL/functional transfers Farrah Galindo Use of outcome tool(s) and clinical judgement create a POC that gives a: LOW COMPLEXITY  
 
 
 
TREATMENT:  
(In addition to Assessment/Re-Assessment sessions the following treatments were rendered) Pre-treatment Symptoms/Complaints:   
Pain: Initial:  
Pain Intensity 1: 0  Post Session:  0/10 Therapeutic Activity: (    23 minutes): Therapeutic activities including Bed transfers, Chair transfers, Toilet transfers and Ambulation on level ground to improve mobility, strength, balance and coordination. Required minimal Verbal cues to promote dynamic balance in standing. N/a Braces/Orthotics/Lines/Etc:  
IV 
O2 Device: Nasal cannula Treatment/Session Assessment:   
Response to Treatment:  Pt tolerated with increased shortness of breath and dizziness. Interdisciplinary Collaboration: Occupational Therapist 
Registered Nurse After treatment position/precautions:  
Supine in bed Bed alarm/tab alert on Bed/Chair-wheels locked Call light within reach RN notified Compliance with Program/Exercises: Will assess as treatment progresses. Recommendations/Intent for next treatment session: \"Next visit will focus on advancements to more challenging activities and reduction in assistance provided\". Total Treatment Duration: OT Patient Time In/Time Out Time In: 9201 Time Out: 0916 Joycelyn Chacon OT

## 2019-04-23 NOTE — PROGRESS NOTES
Pt lying in bed on 4 L NC. Pt is alert and oriented with no complaints at this time. IV potassium running IV at this time. Fall precautions in place, call light in reach.

## 2019-04-23 NOTE — PROGRESS NOTES
Problem: Mobility Impaired (Adult and Pediatric) Goal: *Acute Goals and Plan of Care (Insert Text) Description LTG: 
(1.)Ms. Piero Harry will transfer from bed to chair and chair to bed with INDEPENDENT using the least restrictive/no device within 7 day(s). (2.)Ms. Almeida will ambulate with modified INDEPENDENCE for 300+ feet with spO2 >89% with the least restrictive/no device within 7 day(s). (3.)Ms. Piero Harry will perform standing static and dynamic balance activities x 8 minutes with SUPERVISION to improve safety within 7 day(s). (4.)Ms. Almeida will ascend and descend 4-6 stairs using one hand rail(s) with SUPERVISION to improve functional mobility and safety within 7 day(s). Outcome: Progressing Towards Goal 
  
PHYSICAL THERAPY: Initial Assessment, Daily Note and AM 4/23/2019 INPATIENT: PT Visit Days : 1 Payor: Lucius Taylor / Plan: [address] [place] [address] HMO / Product Type: [employer] Medicare /   
  
NAME/AGE/GENDER: Marifer Rutherford is a 70 y.o. female PRIMARY DIAGNOSIS: Acute renal failure (ARF) (HCC) [N17.9] Acute renal failure (ARF) (HCC) Acute renal failure (ARF) (HCC) ICD-10: Treatment Diagnosis:  
 Other abnormalities of gait and mobility (R26.89) Precaution/Allergies: 
Patient has no known allergies. ASSESSMENT:  
 
Ms. Piero Harry presents supine in bed on 4L O2 via nasal cannula, at baseline she uses none. She transitioned to sit independently in flat bed. She donned her socks independently sitting EOB. She stood with modified independence and ambulated 250' w/ RW and CGA. SpO2 77% on portable pulse ox after returning to room and patient dyspneic. Attempted to obtain reading on BP machine, but slow to obtain reading (94%) so not sure that original reading was accurate. Patient has experienced decline in functional mobility. Ms. Piero Harry would benefit from skilled physical therapy (medically necessary) to address her deficits and maximize her function. Initiated treatment to include ambulation into and out of restroom and transfers on and off commode. Patient again dyspneic and fatigued. No progress yet. This section established at most recent assessment PROBLEM LIST (Impairments causing functional limitations): 
Decreased Transfer Abilities Decreased Ambulation Ability/Technique Decreased Activity Tolerance Decreased Pacing Skills Increased Fatigue Increased Shortness of Breath INTERVENTIONS PLANNED: (Benefits and precautions of physical therapy have been discussed with the patient.) Gait Training Therapeutic Activites Therapeutic Exercise/Strengthening Transfer Training 
education TREATMENT PLAN: Frequency/Duration: 3 times a week for duration of hospital stay Rehabilitation Potential For Stated Goals: Excellent REHAB RECOMMENDATIONS (at time of discharge pending progress):   
Placement: It is my opinion, based on this patient's performance to date, that Ms. Axel Matta may benefit from [address] after discharge due to her functional deficits (listed above) that are likely to improve with skilled rehabilitation because she has multiple medical issues that affect her functional mobility in the community. May have no needs at time of discharge. Equipment:  
None at this time HISTORY:  
History of Present Injury/Illness (Reason for Referral): 
Per MD note, \"73 yo CF with past history of hypothyroidism, HLD, HTN, chronic hypokalemia (uncertain etiology), past tobacco use, and TIA presents with worsening 2 week history of shortness of breath and cough. No known sick contacts, patient lives by herself at home. No associated chest pain, but with some fevers/chills and cough. No associated abdominal pain, nausea/vomiting, diarrhea, or constipation. She has never been diagnosed with any lung disorders.   
  
ED Course: CXR showing possible COPD changes and likely superimposed pneumonia. Serum potassium of 2.5 with SCr of 3.6 (baseline around 1.0-1.1). Procalcitonin of 0.5. WBC count of 17. EKG showing NSR with prolonged QT interval at 570. Blood cultures collected x2. Ceftriaxone and azithromycin ordered. \" 
Past Medical History/Comorbidities: Ms. Chandni Alonzo  has a past medical history of Acquired hypothyroidism (1/16/2016), [facility] ([facility] (2008), Depression (8/18/2016), Endocrine disease, Fungal dermatitis (8/18/2016), Hyperlipidemia (1/16/2016), Hypertension, essential, benign (8/18/2016), Hypokalemia (8/18/2016), Inflamed sebaceous cyst (8/18/2016), Major depressive disorder, recurrent, moderate ([facility] (8/18/2016), Nicotine dependence, cigarettes, uncomplicated (2/84/1654), Osteopenia (8/18/2016), Osteoporosis (8/18/2016), Radiation therapy complication (2765), Rheumatoid arthritis ([facility] (1/16/2016), Right carotid bruit (1/16/2016), TIA (transient ischemic attack) (1/16/2016), and Tobacco abuse (8/18/2016). Ms. Chandni Alonzo  has a past surgical history that includes hx tonsillectomy; hx adenoidectomy; and hx breast lumpectomy (Right, 2008). Social History/Living Environment:  
Home Environment: Private residence # Steps to Enter: 6 Rails to Enter: Yes Hand Rails : Bilateral 
One/Two Story Residence: One story Living Alone: Yes Support Systems: None Patient Expects to be Discharged to[de-identified] Private residence Current DME Used/Available at Home: Cane, straight Prior Level of Function/Work/Activity: 
Lives alone, independent in home and community. Uses cane occasionally. Number of Personal Factors/Comorbidities that affect the Plan of Care: 3+: HIGH COMPLEXITY EXAMINATION:  
Most Recent Physical Functioning:  
Gross Assessment: 
AROM: Generally decreased, functional 
Strength: Generally decreased, functional 
         
  
Posture: 
Posture (WDL): Exceptions to [facility] Posture Assessment: Forward head, Rounded shoulders, Kyphosis Balance: 
Sitting: Intact Standing: Impaired Standing - Static: Good Standing - Dynamic : Fair Bed Mobility: 
Rolling: Independent Supine to Sit: Independent Scooting: Independent Wheelchair Mobility: 
  
Transfers: 
Sit to Stand: Modified independent Stand to Sit: Modified independent Bed to Chair: Contact guard assistance Gait: 
  
Base of Support: Widened Speed/Kelsea: Slow Step Length: Right shortened;Left shortened Gait Abnormalities: Decreased step clearance Distance (ft): 250 Feet (ft) Assistive Device: Walker, rolling Ambulation - Level of Assistance: Contact guard assistance Interventions: Verbal cues Body Structures Involved: 
Lungs Metabolic Body Functions Affected: 
Respiratory Movement Related Metobolic/Endocrine Activities and Participation Affected: Mobility Self Care Domestic Life Community, Social and Farson Sapello Number of elements that affect the Plan of Care: 4+: HIGH COMPLEXITY CLINICAL PRESENTATION:  
Presentation: Stable and uncomplicated: LOW COMPLEXITY CLINICAL DECISION MAKIN[address] [place] [id number] AM-PAC? ?6 Clicks? Basic Mobility Inpatient Short Form How much difficulty does the patient currently have. .. Unable A Lot A Little None 1. Turning over in bed (including adjusting bedclothes, sheets and blankets)? ? 1   ? 2   ? 3   ? 4  
2. Sitting down on and standing up from a chair with arms ( e.g., wheelchair, bedside commode, etc.)   ? 1   ? 2   ? 3   ? 4  
3. Moving from lying on back to sitting on the side of the bed?   ? 1   ? 2   ? 3   ? 4 How much help from another person does the patient currently need. .. Total A Lot A Little None 4. Moving to and from a bed to a chair (including a wheelchair)? ? 1   ? 2   ? 3   ? 4  
5. Need to walk in hospital room? ? 1   ? 2   ? 3   ? 4  
6. Climbing 3-5 steps with a railing? ? 1   ? 2   ? 3   ? 4  
© 2007, Trustees of [address] [place] [id number], under license to [employer]. All rights reserved Score:  Initial: 21 Most Recent: X (Date: -- ) Interpretation of Tool:  Represents activities that are increasingly more difficult (i.e. Bed mobility, Transfers, Gait). Medical Necessity:    
Patient is expected to demonstrate progress in functional technique and activity tolerance. to increase independence with   and improve safety during all functional mobility. . 
Reason for Services/Other Comments: 
Patient continues to require skilled intervention due to medical complications Ramóns Mihai Use of outcome tool(s) and clinical judgement create a POC that gives a: Clear prediction of patient's progress: LOW COMPLEXITY  
  
 
 
 
TREATMENT:  
(In addition to Assessment/Re-Assessment sessions the following treatments were rendered) Pre-treatment Symptoms/Complaints:  none. Pain: Initial:  
Pain Intensity 1: 0  Post Session:  0 Therapeutic Activity: (    10 minutes): Therapeutic activities including Toilet transfers and Ambulation on level ground to improve mobility and balance. Required minimal Verbal cues to promote correct breathing technique . Braces/Orthotics/Lines/Etc:  
IV 
O2 Device: Nasal cannula Treatment/Session Assessment:   
Response to Treatment:  pleasant and cooperative. Interdisciplinary Collaboration:  
Physical Therapist 
Registered Nurse Physician  After treatment position/precautions:  
Up in chair Bed alarm/tab alert on Bed/Chair-wheels locked Call light within reach RN notified Compliance with Program/Exercises: Will assess as treatment progresses Recommendations/Intent for next treatment session: \"Next visit will focus on advancements to more challenging activities and reduction in assistance provided\". Total Treatment Duration: PT Patient Time In/Time Out Time In: 9448 Time Out: 1005 Farhat Pulliam, PT, DPT

## 2019-04-23 NOTE — CONSULTS
[facility]        Subjective: NAZIA and Hypokalemia   Renal consult dictated # [id number]    Review of Systems -   General ROS: negative for - fever, chills  Respiratory ROS: some SOB& cough, LAI  Cardiovascular ROS: no CP, palpitations  Gastrointestinal ROS: no N&V, abdominal pain, diarrhea  Genito-Urinary ROS: no difficulty voiding, dysuria  Neurological ROS: no seizures, focal weekness        Objective:    Vitals:    04/22/19 2337 04/23/19 0404 04/23/19 0426 04/23/19 0739   BP: 111/61 114/56  155/88   Pulse: 87 72  83   Resp: 18 17  18   Temp: 98 °F (36.7 °C) 98.1 °F (36.7 °C)  98.1 °F (36.7 °C)   SpO2: 94% 95%  95%   Weight:   73.8 kg (162 lb 11.2 oz)    Height:           PE  Gen: in no acute distress  CV:reg rate  Chest:clear  Abd: soft  Ext/Access: no edema       . LAB  Recent Labs     04/23/19  0503 04/22/19  1228   WBC 19.7* 17.3*   HGB 13.5 13.7   HCT 39.4 40.2    284     Recent Labs     04/23/19  0503 04/22/19  1930 04/22/19  1633 04/22/19  1228     --   --  134*   K 3.3* 4.3 2.7* 2.5*     --   --  93*   CO2 27  --   --  27   *  --   --  117*   BUN 69*  --   --  73*   CREA 2.39*  --   --  3.60*   MG  --   --  2.2 2.4   CA 9.2  --   --  8.9   ALB  --   --   --  2.8*   TBILI  --   --   --  0.7   ALT  --   --   --  27   SGOT  --   --   --  29           Radiology    A/P:   Patient Active Problem List   Diagnosis Code    Acquired hypothyroidism E03.9    TIA (transient ischemic attack) G45.9    Hyperlipidemia E78.5    Rheumatoid arthritis ([facility] M06.9    Right carotid bruit R09.89    Hypertension, essential, benign I10    Osteopenia M85.80    Major depressive disorder, recurrent, moderate (HCC) F33.1    Nicotine dependence, cigarettes, uncomplicated C93.180    Osteoporosis M81.0    Depression F32.9    Hypokalemia E87.6    Tobacco abuse Z72.0    Acute renal failure (ARF) ([facility] N17.9    Sepsis ([facility]) A41.9    Community acquired bacterial pneumonia J15.9    Prolonged Q-T interval on ECG R94.31       A on CKD - her creatinine has been elevated since 2017, baseline arune `1.02 to 1.3. She seems to have developed a pre-renal component due to poor PO intake prior to admission. Will get a renal US and UA to help evaluate the chronic component. Hypokalemia - probably due to poor PO intake PTA along with lasix and HCTZ (Hyzaar) therapy. If she has recurrent hypokalemia after NAZIA resolves, will look for other causes at that time.     COPD/ pneumonia    Diastolic CHF        Stu Blount MD

## 2019-04-23 NOTE — CDMP QUERY
Patient admitted with pneumonia noted to have acute on chronic renal failure. If possible, please document in progress notes and d/c summary if you are evaluating and/or treating any of the following: - CKD Stage 1 GFR>90 
- CKD Stage 2 GFR 60-90 
- CKD Stage 3 GFR 30-59 
- CKD Stage 4 GFR 15-29 
- CKD Stage 5 GFR<15 
- - Other, please specify 
- Unable to Determine The medical record reflects the following: 
  Risk Factors: HTN Clinical Indicators: GFR on this admission 13-21 Thank you. Adryan Burr RN CDS Compliant Documentation Management Program

## 2019-04-23 NOTE — PROGRESS NOTES
Nutrition Reason for assessment: BPA - Malnutrition Screening Tool positive for unintended weight loss (14-23#). Assessment:  
Pt admitted with sepsis secondary to CAP, hypoxic respiratory failure, A on CKD, hypokalemia, diastolic CHF. PMH remarkable for hypothyroidism, HLD, HTN, chronic hypokalemia, TIA. Food/Nutrition Patient History:  Sitting up in chair at RD visit. Ensure in front of her with ~50% consumed. Pt c/o poor appetite and not eating at home for \"2 weeks. \"  She indicates she continued to drinks fluids to include splash, citrus diet tea and coffee. She is limited historian at best and unable to provide a quantifiable nutrition history. Her main concern is that she not be served tea as she only likes one brand. DIET CARDIAC Regular DIET NUTRITIONAL SUPPLEMENTS All Meals; Ensure [employer] Anthropometrics:Height: 5' (152.4 cm),  Weight: 73.8 kg (162 lb 11.2 oz), Weight Source: Bed, Body mass index is 31.78 kg/m². BMI class of overweight. WT / BMI 4/23/2019 2/5/2018 8/2/2017 WEIGHT 162 lb 11.2 oz 158 lb 9.6 oz 158 lb 6.4 oz Pt reports UBW of \"holding steady\" at 156#. Per EMR of weights and pt reported wt history it does not appear she has experienced any significant weight loss. Macronutrient needs: 45 kg IBW 
EER:  0266-0543 kcal /day (23-35 kcal/kg) EPR:  45-54 grams protein/day (1-1.2 grams/kg) Intake/Comparative Standards: Recorded meal(s): 75% x 1 meal. Insufficient data to assess actual intake as pt reports she hasn't eaten in 2 weeks. Nutrition Diagnosis: Predicted inadequate oral intake related to poor appetite as evidenced by self report of barrier with indication that she hasn't eaten in 2 weeks. Intervention: 
Meals and snacks: Continue current diet. Nutrition supplement therapy: Change to Ensure High Protein TID. If she consumes >50% of meals decrease frequency. Discharge Nutrition Plan: Too soon to determine.   
 
Vickie Munguia MA, RD, LD 
 [phone]

## 2019-04-23 NOTE — PROGRESS NOTES
Severe dyspnea noted on 2L N/C while ambulating to BR. Right LL crackles noted. Call placed to Dr Duncan Teixeira and new order received.

## 2019-04-23 NOTE — PROGRESS NOTES
Hospitalist Progress Note 2019 Admit Date: 2019 12:11 PM  
NAME: Yin Chow :  1940 MRN:  [id number] Attending: Rock Crowder DO 
PCP:  Pooja Pineda MD 
 
SUBJECTIVE:  
69 yo CF with past history of hypothyroidism, HLD, HTN, chronic hypokalemia (uncertain etiology), past tobacco use, and TIA presents with worsening 2 week history of shortness of breath and cough. No known sick contacts, patient lives by herself at home. No associated chest pain, but with some fevers/chills and cough. No associated abdominal pain, nausea/vomiting, diarrhea, or constipation. She has never been diagnosed with any lung disorders.  
  
ED Course: CXR showing possible COPD changes and likely superimposed pneumonia. Serum potassium of 2.5 with SCr of 3.6 (baseline around 1.0-1.1). Procalcitonin of 0.5. WBC count of 17. EKG showing NSR with prolonged QT interval at 570. Blood cultures collected x2. Ceftriaxone and azithromycin ordered. Interval History (): patient examined at bedside. Breathing doing better. No acute events overnight. Feels a little bit stronger overall. No chest pain, changes in baseline shortness of breath, nausea/vomiting, diarrhea, or abdominal pain. Wants to go home. Review of Systems negative with exception of pertinent positives noted above PHYSICAL EXAM  
 
Visit Vitals /78 Pulse 79 Temp 98.1 °F (36.7 °C) Resp 17 Ht 5' (1.524 m) Wt 73.8 kg (162 lb 11.2 oz) SpO2 94% BMI 31.78 kg/m² Temp (24hrs), Av.1 °F (36.7 °C), Min:98 °F (36.7 °C), Max:98.1 °F (36.7 °C) Oxygen Therapy O2 Sat (%): 94 % (19 1144) Pulse via Oximetry: 87 beats per minute (19 1449) O2 Device: Nasal cannula (19 1636) O2 Flow Rate (L/min): 3 l/min (19 6699) Intake/Output Summary (Last 24 hours) at 2019 1346 Last data filed at 2019 7298 Gross per 24 hour Intake 480 ml Output 700 ml Net -220 ml  
  
 General:          Alert, cooperative, no distress, appears stated age. Head:               Normocephalic, without obvious abnormality, atraumatic. Nose:               Nares normal. No drainage or sinus tenderness. Lungs:             Clear to auscultation bilaterally. No Wheezing or Rhonchi. No rales. Heart:              Regular rate and rhythm,  no murmur, rub or gallop. Abdomen:        Soft, non-tender. Not distended. Bowel sounds normal.  
Extremities:     No cyanosis. No edema. No clubbing Skin:                Texture, turgor normal. No rashes or lesions. Not Jaundiced Neurologic:      Alert and oriented x 3, no focal deficits ASSESSMENT Active Hospital Problems Diagnosis Date Noted  Acute renal failure (ARF) ([facility] 04/22/2019  Sepsis ([facility] 04/22/2019  Community acquired bacterial pneumonia 04/22/2019  Prolonged Q-T interval on ECG 04/22/2019  Hypokalemia 08/18/2016  Acquired hypothyroidism 01/16/2016 Plan: # Sepsis secondary to CAP 
- blood cultures collected x2 
- discontinue azithromycin due to QT prolongation 
- continue with ceftriaxone and doxycycline 
- continue with Solumedrol and jet nebulizers 
  
# Hypoxic respiratory failure due to CAP 
- not on supplemental oxygen at home 
- wean O2 as tolerated - patient likely has undiagnosed obstructive lung disease and warrants PFTs as outpatient 
  
# Acute renal failure, likely prerenal due to poor po intake 
- interval improvement 
- discontinue fluids 
- avoid nephrotoxic meds 
- serial BMPs 
- hold Lasix, thiazides, and ACE/ARB 
  
# Prolonged QT interval on EKG 
- continue daily Mg and K supplementation with serial checks 
- avoid any potential precipitating meds (quinolones/azithro, antiemetics, antipsychotics, etc.) 
  
# Acute on chronic hypokalemia 
- uncertain etiology, patient without any obvious GI symptoms and takes potassium on long-term basis 
- urine lytes collected yesterday - nephrology consult, appreciate recs 
- hold home Lasix and ACE/ARBs and thiazide 
  
# Hypothyroidism 
- continue with home levothyroxine 
  
F/E/N: discontinue fluids, replete electrolytes as needed, cardiac diet 
  
Ppx: heparin SQ for VTE 
  
Code Status: FULL CODE 
  
Disposition: Pending clinical improvement with plan as above, likely home after hospital discharge. PT/OT consults. PPD ordered. All questions answered. Signed By: Shirley Zelaya DO April 23, 2019

## 2019-04-24 LAB
ALBUMIN SERPL-MCNC: 2.5 G/DL (ref 3.2–4.6)
ANION GAP SERPL CALC-SCNC: 8 MMOL/L (ref 7–16)
BASOPHILS # BLD: 0.1 K/UL (ref 0–0.2)
BASOPHILS NFR BLD: 0 % (ref 0–2)
BUN SERPL-MCNC: 57 MG/DL (ref 8–23)
CALCIUM SERPL-MCNC: 9.4 MG/DL (ref 8.3–10.4)
CHLORIDE SERPL-SCNC: 109 MMOL/L (ref 98–107)
CO2 SERPL-SCNC: 29 MMOL/L (ref 21–32)
CREAT SERPL-MCNC: 1.73 MG/DL (ref 0.6–1)
DIFFERENTIAL METHOD BLD: ABNORMAL
EOSINOPHIL # BLD: 0 K/UL (ref 0–0.8)
EOSINOPHIL NFR BLD: 0 % (ref 0.5–7.8)
ERYTHROCYTE [DISTWIDTH] IN BLOOD BY AUTOMATED COUNT: 14.4 % (ref 11.9–14.6)
GLUCOSE SERPL-MCNC: 130 MG/DL (ref 65–100)
HCT VFR BLD AUTO: 39 % (ref 35.8–46.3)
HGB BLD-MCNC: 12.6 G/DL (ref 11.7–15.4)
IMM GRANULOCYTES # BLD AUTO: 0.2 K/UL (ref 0–0.5)
IMM GRANULOCYTES NFR BLD AUTO: 1 % (ref 0–5)
LYMPHOCYTES # BLD: 2.2 K/UL (ref 0.5–4.6)
LYMPHOCYTES NFR BLD: 11 % (ref 13–44)
MCH RBC QN AUTO: 30.8 PG (ref 26.1–32.9)
MCHC RBC AUTO-ENTMCNC: 32.3 G/DL (ref 31.4–35)
MCV RBC AUTO: 95.4 FL (ref 79.6–97.8)
MM INDURATION POC: 0 MM (ref 0–5)
MONOCYTES # BLD: 1.6 K/UL (ref 0.1–1.3)
MONOCYTES NFR BLD: 8 % (ref 4–12)
NEUTS SEG # BLD: 16 K/UL (ref 1.7–8.2)
NEUTS SEG NFR BLD: 80 % (ref 43–78)
NRBC # BLD: 0 K/UL (ref 0–0.2)
PHOSPHATE SERPL-MCNC: 2.4 MG/DL (ref 2.3–3.7)
PLATELET # BLD AUTO: 346 K/UL (ref 150–450)
PMV BLD AUTO: 12.3 FL (ref 9.4–12.3)
POTASSIUM SERPL-SCNC: 4.2 MMOL/L (ref 3.5–5.1)
PPD POC: NEGATIVE NEGATIVE
RBC # BLD AUTO: 4.09 M/UL (ref 4.05–5.2)
SODIUM SERPL-SCNC: 146 MMOL/L (ref 136–145)
WBC # BLD AUTO: 20.2 K/UL (ref 4.3–11.1)

## 2019-04-24 PROCEDURE — 97530 THERAPEUTIC ACTIVITIES: CPT

## 2019-04-24 PROCEDURE — 80069 RENAL FUNCTION PANEL: CPT

## 2019-04-24 PROCEDURE — 74011000258 HC RX REV CODE- 258: Performed by: INTERNAL MEDICINE

## 2019-04-24 PROCEDURE — 77010033678 HC OXYGEN DAILY

## 2019-04-24 PROCEDURE — 74011250636 HC RX REV CODE- 250/636: Performed by: INTERNAL MEDICINE

## 2019-04-24 PROCEDURE — 94760 N-INVAS EAR/PLS OXIMETRY 1: CPT

## 2019-04-24 PROCEDURE — 65270000029 HC RM PRIVATE

## 2019-04-24 PROCEDURE — 36415 COLL VENOUS BLD VENIPUNCTURE: CPT

## 2019-04-24 PROCEDURE — 74011250637 HC RX REV CODE- 250/637: Performed by: INTERNAL MEDICINE

## 2019-04-24 PROCEDURE — 85025 COMPLETE CBC W/AUTO DIFF WBC: CPT

## 2019-04-24 PROCEDURE — 65660000000 HC RM CCU STEPDOWN

## 2019-04-24 PROCEDURE — 76937 US GUIDE VASCULAR ACCESS: CPT

## 2019-04-24 RX ORDER — DOXYCYCLINE 100 MG/1
100 CAPSULE ORAL EVERY 12 HOURS
Status: DISCONTINUED | OUTPATIENT
Start: 2019-04-24 | End: 2019-04-28

## 2019-04-24 RX ADMIN — POTASSIUM CHLORIDE 20 MEQ: 20 TABLET, EXTENDED RELEASE ORAL at 17:13

## 2019-04-24 RX ADMIN — METHYLPREDNISOLONE SODIUM SUCCINATE 40 MG: 40 INJECTION, POWDER, FOR SOLUTION INTRAMUSCULAR; INTRAVENOUS at 21:28

## 2019-04-24 RX ADMIN — Medication 10 ML: at 14:19

## 2019-04-24 RX ADMIN — Medication 10 ML: at 21:29

## 2019-04-24 RX ADMIN — DOXYCYCLINE 100 MG: 100 INJECTION, POWDER, LYOPHILIZED, FOR SOLUTION INTRAVENOUS at 04:14

## 2019-04-24 RX ADMIN — METHYLPREDNISOLONE SODIUM SUCCINATE 40 MG: 40 INJECTION, POWDER, FOR SOLUTION INTRAMUSCULAR; INTRAVENOUS at 17:14

## 2019-04-24 RX ADMIN — LEVOTHYROXINE SODIUM 25 MCG: 50 TABLET ORAL at 05:45

## 2019-04-24 RX ADMIN — HEPARIN SODIUM 5000 UNITS: 5000 INJECTION INTRAVENOUS; SUBCUTANEOUS at 05:44

## 2019-04-24 RX ADMIN — Medication 400 MG: at 08:40

## 2019-04-24 RX ADMIN — POTASSIUM CHLORIDE 20 MEQ: 20 TABLET, EXTENDED RELEASE ORAL at 08:40

## 2019-04-24 RX ADMIN — DOXYCYCLINE HYCLATE 100 MG: 100 CAPSULE ORAL at 17:13

## 2019-04-24 RX ADMIN — HEPARIN SODIUM 5000 UNITS: 5000 INJECTION INTRAVENOUS; SUBCUTANEOUS at 14:15

## 2019-04-24 RX ADMIN — ROSUVASTATIN CALCIUM 10 MG: 5 TABLET, FILM COATED ORAL at 08:40

## 2019-04-24 RX ADMIN — HEPARIN SODIUM 5000 UNITS: 5000 INJECTION INTRAVENOUS; SUBCUTANEOUS at 21:28

## 2019-04-24 RX ADMIN — CEFTRIAXONE 2 G: 2 INJECTION, POWDER, FOR SOLUTION INTRAMUSCULAR; INTRAVENOUS at 14:12

## 2019-04-24 RX ADMIN — SENNOSIDES, DOCUSATE SODIUM 1 TABLET: 50; 8.6 TABLET, FILM COATED ORAL at 08:40

## 2019-04-24 RX ADMIN — ASPIRIN 81 MG: 81 TABLET, COATED ORAL at 08:40

## 2019-04-24 NOTE — PROGRESS NOTES
Problem: Mobility Impaired (Adult and Pediatric) Goal: *Acute Goals and Plan of Care (Insert Text) Description LTG: 
(1.)Ms. Piero Harry will transfer from bed to chair and chair to bed with INDEPENDENT using the least restrictive/no device within 7 day(s). (2.)Ms. Almeida will ambulate with modified INDEPENDENCE for 300+ feet with spO2 >89% with the least restrictive/no device within 7 day(s). (3.)Ms. Piero Harry will perform standing static and dynamic balance activities x 8 minutes with SUPERVISION to improve safety within 7 day(s). (4.)Ms. Almeida will ascend and descend 4-6 stairs using one hand rail(s) with SUPERVISION to improve functional mobility and safety within 7 day(s). Outcome: Progressing Towards Goal 
  
PHYSICAL THERAPY: Daily Note and AM 4/24/2019 INPATIENT: PT Visit Days : 2 Payor: Lucius Taylor / Plan: [address] [place] [address] HMO / Product Type: [employer] Medicare /   
  
NAME/AGE/GENDER: Marifer Rutherford is a 70 y.o. female PRIMARY DIAGNOSIS: Acute renal failure (ARF) (HCC) [N17.9] Acute renal failure (ARF) (HCC) Acute renal failure (ARF) (HCC) ICD-10: Treatment Diagnosis:  
 · Other abnormalities of gait and mobility (R26.89) Precaution/Allergies: 
Patient has no known allergies. ASSESSMENT:  
Ms. Piero Harry is sitting EOB upon contact and reporting need to get to bathroom to get cleaned up as pt had BM in brief. Pt performed STS with CGA-SBA and ambulated 10 ft into bathroom. Pt voided and required assist with toilet hygiene to get fully cleaned up. Pt tolerated standing for several minutes to allow for PT to assist with hygiene. Pt ambulated out of bathroom with CGA-SBA and stood at sink to wash hands. Pt then ambulated another 10 ft back to bed with CGA-SBA. Pt stood in order to alivia clean brief. Pt appears dyspneic with O2 sat reading 70% with portable O2.  Educated pt on pursed lip breathing and read O2 on vitals machine with O2 sat at 85% and quickly returning to 94%. Pt left sitting up EOB with all needs met and within reach. Pt is making slow steady progress towards goals. Mainly limited by respiratory status at this time. This section established at most recent assessment PROBLEM LIST (Impairments causing functional limitations): 1. Decreased Transfer Abilities 2. Decreased Ambulation Ability/Technique 3. Decreased Activity Tolerance 4. Decreased Pacing Skills 5. Increased Fatigue 6. Increased Shortness of Breath INTERVENTIONS PLANNED: (Benefits and precautions of physical therapy have been discussed with the patient.) 1. Gait Training 2. Therapeutic Activites 3. Therapeutic Exercise/Strengthening 4. Transfer Training 5. education TREATMENT PLAN: Frequency/Duration: 3 times a week for duration of hospital stay Rehabilitation Potential For Stated Goals: Excellent REHAB RECOMMENDATIONS (at time of discharge pending progress):   
Placement: It is my opinion, based on this patient's performance to date, that Ms. Isidoro Hilton may benefit from [address] after discharge due to her functional deficits (listed above) that are likely to improve with skilled rehabilitation because she has multiple medical issues that affect her functional mobility in the community. May have no needs at time of discharge. Equipment:  
? None at this time HISTORY:  
History of Present Injury/Illness (Reason for Referral): 
Per MD note, \"73 yo CF with past history of hypothyroidism, HLD, HTN, chronic hypokalemia (uncertain etiology), past tobacco use, and TIA presents with worsening 2 week history of shortness of breath and cough. No known sick contacts, patient lives by herself at home. No associated chest pain, but with some fevers/chills and cough. No associated abdominal pain, nausea/vomiting, diarrhea, or constipation. She has never been diagnosed with any lung disorders. ED Course: CXR showing possible COPD changes and likely superimposed pneumonia. Serum potassium of 2.5 with SCr of 3.6 (baseline around 1.0-1.1). Procalcitonin of 0.5. WBC count of 17. EKG showing NSR with prolonged QT interval at 570. Blood cultures collected x2. Ceftriaxone and azithromycin ordered. \" 
Past Medical History/Comorbidities: Ms. Derl Kayser  has a past medical history of Acquired hypothyroidism (1/16/2016), Breast cancer ([facility] (2008), Depression (8/18/2016), Endocrine disease, Fungal dermatitis (8/18/2016), Hyperlipidemia (1/16/2016), Hypertension, essential, benign (8/18/2016), Hypokalemia (8/18/2016), Inflamed sebaceous cyst (8/18/2016), Major depressive disorder, recurrent, moderate ([facility] (8/18/2016), Nicotine dependence, cigarettes, uncomplicated (5/51/7343), Osteopenia (8/18/2016), Osteoporosis (8/18/2016), Radiation therapy complication (3335), Rheumatoid arthritis ([facility] (1/16/2016), Right carotid bruit (1/16/2016), TIA (transient ischemic attack) (1/16/2016), and Tobacco abuse (8/18/2016). Ms. Derl Kayser  has a past surgical history that includes hx tonsillectomy; hx adenoidectomy; and hx breast lumpectomy (Right, 2008). Social History/Living Environment:  
Home Environment: Trailer/mobile home # Steps to Enter: 6 Rails to Enter: Yes Hand Rails : Bilateral 
One/Two Story Residence: One story Living Alone: Yes Support Systems: None Patient Expects to be Discharged to[de-identified] Private residence Current DME Used/Available at Home: Cane, straight Tub or Shower Type: Tub/Shower combination Prior Level of Function/Work/Activity: 
Lives alone, independent in home and community. Uses cane occasionally. Number of Personal Factors/Comorbidities that affect the Plan of Care: 3+: HIGH COMPLEXITY EXAMINATION:  
Most Recent Physical Functioning:  
Gross Assessment: 
  
         
  
Posture: 
  
Balance: 
Standing - Static: Good Standing - Dynamic : Fair Bed Mobility: 
  
Wheelchair Mobility: Transfers: 
Sit to Stand: Stand-by assistance Stand to Sit: Stand-by assistance Gait: 
  
Base of Support: Widened Speed/Kelsea: Shuffled; Slow Step Length: Left shortened;Right shortened Gait Abnormalities: Decreased step clearance;Shuffling gait Distance (ft): 10 Feet (ft)(X2) 
Assistive Device: (none) Ambulation - Level of Assistance: Contact guard assistance;Stand-by assistance Interventions: Safety awareness training; Tactile cues; Verbal cues Body Structures Involved: 1. Lungs 2. Metabolic Body Functions Affected: 1. Respiratory 2. Movement Related 3. Metobolic/Endocrine Activities and Participation Affected: 1. Mobility 2. Self Care 3. Domestic Life 4. Community, Social and [place] Number of elements that affect the Plan of Care: 4+: HIGH COMPLEXITY CLINICAL PRESENTATION:  
Presentation: Stable and uncomplicated: LOW COMPLEXITY CLINICAL DECISION MAKING:  
[employer] AM-PAC 6 Clicks Basic Mobility Inpatient Short Form How much difficulty does the patient currently have. .. Unable A Lot A Little None 1. Turning over in bed (including adjusting bedclothes, sheets and blankets)? ? 1   ? 2   ? 3   ? 4  
2. Sitting down on and standing up from a chair with arms ( e.g., wheelchair, bedside commode, etc.)   ? 1   ? 2   ? 3   ? 4  
3. Moving from lying on back to sitting on the side of the bed?   ? 1   ? 2   ? 3   ? 4 How much help from another person does the patient currently need. .. Total A Lot A Little None 4. Moving to and from a bed to a chair (including a wheelchair)? ? 1   ? 2   ? 3   ? 4  
5. Need to walk in hospital room? ? 1   ? 2   ? 3   ? 4  
6. Climbing 3-5 steps with a railing? ? 1   ? 2   ? 3   ? 4  
© 2007, Trustees of [facility] [place] [employer], under license to [employer]. All rights reserved Score:  Initial: 21 Most Recent: X (Date: -- ) Interpretation of Tool:  Represents activities that are increasingly more difficult (i.e. Bed mobility, Transfers, Gait). Medical Necessity:    
· Patient is expected to demonstrate progress in functional technique and activity tolerance. ·  to increase independence with   and improve safety during all functional mobility. · . Reason for Services/Other Comments: 
· Patient continues to require skilled intervention due to medical complications · . Use of outcome tool(s) and clinical judgement create a POC that gives a: Clear prediction of patient's progress: LOW COMPLEXITY  
  
 
 
 
TREATMENT:  
(In addition to Assessment/Re-Assessment sessions the following treatments were rendered) Pre-treatment Symptoms/Complaints:  none. Pain: Initial:  
Pain Intensity 1: 0  Post Session:  0 Therapeutic Activity: (    23 minutes): Therapeutic activities including Toilet transfers and Ambulation on level ground to improve mobility and balance. Required CGA- SBA dSafety awareness training; Tactile cues; Verbal cues to promote correct breathing technique . Braces/Orthotics/Lines/Etc:  
· O2 Device: Nasal cannula Treatment/Session Assessment:   
· Response to Treatment:  pleasant and cooperative. · Interdisciplinary Collaboration:  
o Physical Therapist 
o Registered Nurse · After treatment position/precautions:  
o Bed/Chair-wheels locked 
o Call light within reach 
o RN notified 
o sitting EOB · Compliance with Program/Exercises: Will assess as treatment progresses · Recommendations/Intent for next treatment session: \"Next visit will focus on advancements to more challenging activities and reduction in assistance provided\". Total Treatment Duration: PT Patient Time In/Time Out Time In: 1411 Time Out: 1013 Lorraine Van

## 2019-04-24 NOTE — PROGRESS NOTES
Interdisciplinary team rounds were held 4/24/2019 with the following team members:Care Management, Nursing, Physical Therapy, Physician and Clinical Coordinator and the patient

## 2019-04-24 NOTE — PROGRESS NOTES
Patient in bed at this time. Respirations even and unlabored. Alert and oriented. No current complaints. No distress noted. Call light within reach.

## 2019-04-24 NOTE — PROGRESS NOTES
[facility] Admission Date: 4/22/2019 Renal Daily Progress Note: 4/24/2019 The patient's chart is reviewed and the patient is discussed with the staff. Follow up NAZIA Subjective:  
Patient seen and examined on rounds friend at the bedside, pt is sitting up on edge of the bed reports SOB with exertion, good UOP and denies urinary or uremic symptoms. ROS: 
General: no fever/ chills, appetite ok Lung: + SOB 
CV: No CP 
GI: no N/V, + loose stool Ext: no edema Current Facility-Administered Medications Medication Dose Route Frequency  doxycycline (VIBRAMYCIN) capsule 100 mg  100 mg Oral Q12H  potassium chloride (K-DUR, KLOR-CON) SR tablet 20 mEq  20 mEq Oral BID  cefTRIAXone (ROCEPHIN) 2 g in 0.9% sodium chloride (MBP/ADV) 50 mL  2 g IntraVENous Q24H  
 magnesium oxide (MAG-OX) tablet 400 mg  400 mg Oral DAILY  aspirin delayed-release tablet 81 mg  81 mg Oral DAILY  folic acid tab 355 mcg (Patient Supplied)  800 mcg Oral DAILY  levothyroxine (SYNTHROID) tablet 25 mcg  25 mcg Oral ACB  [START ON 4/28/2019] methotrexate (RHEUMATREX) tablet 12.5 mg  12.5 mg Oral every Sunday  rosuvastatin (CRESTOR) tablet 10 mg  10 mg Oral DAILY  sodium chloride (NS) flush 5-40 mL  5-40 mL IntraVENous Q8H  
 sodium chloride (NS) flush 5-40 mL  5-40 mL IntraVENous PRN  
 senna-docusate (PERICOLACE) 8.6-50 mg per tablet 1 Tab  1 Tab Oral DAILY  heparin (porcine) injection 5,000 Units  5,000 Units SubCUTAneous Q8H Objective:  
 
Vitals:  
 04/24/19 0411 04/24/19 0705 04/24/19 1116 04/24/19 1223 BP:  144/74 125/70 Pulse:  82 87 Resp:  19 18 Temp:  97.6 °F (36.4 °C) 97.7 °F (36.5 °C) SpO2:  98% 98% 97% Weight: 72 kg (158 lb 11.7 oz) Height:      
 
Intake and Output:  
04/22 1901 - 04/24 0700 In: 720 [P.O.:720] Out: 1300 [Urine:1300] 04/24 0701 - 04/24 1900 In: 240 [P.O.:240] Out: - Physical Exam: Constitutional:  the patient is well developed and in no acute distress HEENT:  Sclera clear, pupils equal, oral mucosa moist 
Lungs: Diminished bilaterally with expiratory wheezes Cardiovascular:  RRR without Rub Abd/GI: soft and non-tender; with positive bowel sounds. Ext: warm without cyanosis. There is no lower leg edema. Skin:  no jaundice or rashes Neuro: no gross neuro deficits Psychiatric: Calm. LAB Recent Labs  
  04/24/19 
0403 04/23/19 
0503 04/22/19 
1228 WBC 20.2* 19.7* 17.3* HGB 12.6 13.5 13.7 HCT 39.0 39.4 40.2  273 284 Recent Labs  
  04/24/19 
0403 04/23/19 
0503 04/22/19 
1930 04/22/19 
1633 04/22/19 
1228 * 141  --   --  134* K 4.2 3.3* 4.3 2.7* 2.5*  
* 105  --   --  93* CO2 29 27  --   --  27 * 185*  --   --  117* BUN 57* 69*  --   --  73* CREA 1.73* 2.39*  --   --  3.60* MG  --   --   --  2.2 2.4 PHOS 2.4  --   --   --   --   
ALB 2.5*  --   --   --  2.8* SGOT  --   --   --   --  29 No results for input(s): PH, PCO2, PO2, HCO3 in the last 72 hours. Assessment:  (Medical Decision Making) Hospital Problems  Date Reviewed: 1/16/2016 Codes Class Noted POA * (Principal) Acute renal failure (ARF) ([facility] ICD-10-CM: N17.9 ICD-9-CM: 584.9  4/22/2019 Unknown Sepsis ([facility] ICD-10-CM: A41.9 ICD-9-CM: 038.9, 995.91  4/22/2019 Unknown Community acquired bacterial pneumonia ICD-10-CM: J15.9 ICD-9-CM: 482.9  4/22/2019 Unknown Prolonged Q-T interval on ECG ICD-10-CM: R94.31 
ICD-9-CM: 794.31  4/22/2019 Unknown Hypokalemia ICD-10-CM: E87.6 ICD-9-CM: 276.8  8/18/2016 Yes Acquired hypothyroidism ICD-10-CM: E03.9 ICD-9-CM: 244.9  1/16/2016 Yes Plan:  (Medical Decision Making) 1. NAZIA 2/2 prerenal component with low urine sodium, creatinine continues to improved with IVF, good PO intake Cr 1.73 today non oliguria. 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 UA bland 
-holding ARB, and lasix 
-continue to trend renal function and UOP 2. Hypokalemia improved 3. Pneumonia  
-on ceftriaxone IV and PO doxycycline 4. COPD 5. Leukocytosis 
-BC NTD Maci Seth NP

## 2019-04-24 NOTE — PROGRESS NOTES
Pt lying in bed on 3 L Nc. Pt is alert and oriented with no complaints at this time. Pt is instructed to call for assistance, call light in reach. Fall precautions in place.

## 2019-04-24 NOTE — CDMP QUERY
Patient admitted with pneumonia. Noted documentation of Sepsis in H&P. Please document in progress notes clinical indicators (such as the ones below) to support this diagnosis or if Sepsis may have been ruled out after study. 1. At least 2 SIRS Criteria (Systemic Inflammatory Response Syndrome) (CMS) 
-Temp > 100.9 or < 96.8 
-HR > 90 
-RR > 20  
-WBC > 12,000 or < 4,000 or > 10% bands 2. Documented suspected or confirmed source of infection. (CMS) 3. Documented Organ Dysfunction by any ONE of the following. (CMS) 
     
 the CMS guidelines 
-AMS (from baseline if known) -SBP<90 or MAP<65 
-Documentation of respiratory failure and use of either invasive mechanical ventilation (intubation) or non-invasive mechanical ventilation (CPAP/BIPAP) -Creat. > 2.0 (with no history of Chronic Kidney Disease) -Bilirubin > 2 mg / dl (with no history of liver disease) -PLT < 100,000 (with no history of thrombocytopenia) -INR > 1.5 or aPTT > 60 sec (for patients not on Warfarin therapy) -Lactic Acid > 2 mmol/ L The medical record reflects the following: 
   Risk Factors: Pneumonia Clinical Indicators: WBC 20.2. Plt count WNL. Afebrile. Pulse rate less than 90 Treatment: Rocephin and Vibramycin Thank You. Faye Goodpasture, RN CDS Compliant Documentation Management Program

## 2019-04-24 NOTE — PROGRESS NOTES
Pt back in room from xray. Pt is on 2 L NC with no acute distress. Pt is alert and oriented with no complaints at this time. Pt instructed to call for assistance. Call light in reach

## 2019-04-24 NOTE — PROGRESS NOTES
Patient in bed at this time. Respirations even and unlabored. No current complaints. No distress noted. Call light within reach.

## 2019-04-24 NOTE — PROGRESS NOTES
Hospitalist Progress Note 2019 Admit Date: 2019 12:11 PM  
NAME: Ama Murillo :  1940 MRN:  [id number] Attending: Szuy Oneal DO 
PCP:  Mian Holland MD 
 
SUBJECTIVE:  
71 yo CF with past history of hypothyroidism, HLD, HTN, chronic hypokalemia (uncertain etiology), past tobacco use, and TIA presents with worsening 2 week history of shortness of breath and cough. No known sick contacts, patient lives by herself at home. No associated chest pain, but with some fevers/chills and cough. No associated abdominal pain, nausea/vomiting, diarrhea, or constipation. She has never been diagnosed with any lung disorders.  
  
ED Course: CXR showing possible COPD changes and likely superimposed pneumonia. Serum potassium of 2.5 with SCr of 3.6 (baseline around 1.0-1.1). Procalcitonin of 0.5. WBC count of 17. EKG showing NSR with prolonged QT interval at 570. Blood cultures collected x2. Ceftriaxone and azithromycin ordered. Interval History (): patient examined at bedside. No acute overnight events. Breathing feeling \"not any better today\". No chest pain, nausea/vomiting, diarrhea, or abdominal pain. Wants to go home. Review of Systems negative with exception of pertinent positives noted above PHYSICAL EXAM  
 
Visit Vitals /73 Pulse 89 Temp 97.9 °F (36.6 °C) Resp 19 Ht 5' (1.524 m) Wt 72 kg (158 lb 11.7 oz) SpO2 97% BMI 31.00 kg/m² Temp (24hrs), Av.7 °F (36.5 °C), Min:97.6 °F (36.4 °C), Max:98 °F (36.7 °C) Oxygen Therapy O2 Sat (%): 97 % (19 1502) Pulse via Oximetry: 87 beats per minute (19 1449) O2 Device: Nasal cannula (19 1425) O2 Flow Rate (L/min): 2 l/min (19 1425) Intake/Output Summary (Last 24 hours) at 2019 1506 Last data filed at 2019 0900 Gross per 24 hour Intake 480 ml Output 600 ml Net -120 ml General:          Alert, cooperative, no distress, appears stated age. Head:               Normocephalic, without obvious abnormality, atraumatic. Nose:               Nares normal. No drainage or sinus tenderness. Lungs:             Clear to auscultation bilaterally. No Wheezing or Rhonchi. No rales. Visibly tachypneic. Heart:              Regular rate and rhythm,  no murmur, rub or gallop. Abdomen:        Soft, non-tender. Not distended. Bowel sounds normal.  
Extremities:     No cyanosis. No edema. No clubbing Skin:                Texture, turgor normal. No rashes or lesions. Not Jaundiced Neurologic:      Alert and oriented x 3, no focal deficits ASSESSMENT Active Hospital Problems Diagnosis Date Noted  Acute renal failure (ARF) ([facility] [address] 04/22/2019  Sepsis ([facility] [address] 04/22/2019  Community acquired bacterial pneumonia 04/22/2019  Prolonged Q-T interval on ECG 04/22/2019  Hypokalemia 08/18/2016  Acquired hypothyroidism 01/16/2016 Plan: # Sepsis secondary to CAP 
- blood cultures collected x2 
- discontinue and avoid azithromycin due to QT prolongation 
- continue with ceftriaxone and doxycycline 
- continue with Solumedrol and jet nebulizers 
  
# Hypoxic respiratory failure due to CAP 
- not on supplemental oxygen at home 
- wean O2 as tolerated - patient likely has undiagnosed obstructive lung disease and warrants PFTs as outpatient 
- added scheduled Solumedrol today 
  
# Acute renal failure, likely prerenal due to poor po intake 
- interval improvement 
- discontinue fluids 
- avoid nephrotoxic meds 
- serial BMPs 
- hold Lasix, thiazides, and ACE/ARB 
  
# Prolonged QT interval on EKG 
- continue daily Mg and K supplementation with serial checks 
- avoid any potential precipitating meds (quinolones/azithro, antiemetics, antipsychotics, etc.) 
- recheck EKG tomorrow 
  
# Acute on chronic hypokalemia 
- resolving 
- uncertain etiology, patient without any obvious GI symptoms and takes potassium on long-term basis (?related to thiazides?) - nephrology consult, appreciate recs 
- hold home Lasix and ACE/ARBs and thiazide 
  
# Hypothyroidism 
- continue with home levothyroxine 
  
F/E/N: discontinue fluids, replete electrolytes as needed, cardiac diet 
  
Ppx: heparin SQ for VTE 
  
Code Status: FULL CODE 
  
Disposition: Pending clinical improvement with plan as above, likely home after hospital discharge. PT/OT consults. PPD ordered. All questions answered. Signed By: Mague Cordoba DO April 24, 2019

## 2019-04-24 NOTE — PROGRESS NOTES
Oxygen Qualifier Room air: SpO2 with O2 and liter flow Resting SpO2  89%  97% on 3L, weaned to 2 Ambulating SpO2 85% 87% on 1L, 91% on 2L Completed by: 
 
Justina Bedoya, RT

## 2019-04-24 NOTE — PROGRESS NOTES
04/24/19 1223 Oxygen Therapy O2 Sat (%) 97 % O2 Device Nasal cannula O2 Flow Rate (L/min) 3 l/min 
(weaned to 2)

## 2019-04-25 LAB
ANION GAP SERPL CALC-SCNC: 8 MMOL/L (ref 7–16)
ATRIAL RATE: 84 BPM
BASOPHILS # BLD: 0 K/UL (ref 0–0.2)
BASOPHILS NFR BLD: 0 % (ref 0–2)
BUN SERPL-MCNC: 41 MG/DL (ref 8–23)
CALCIUM SERPL-MCNC: 8.8 MG/DL (ref 8.3–10.4)
CALCULATED P AXIS, ECG09: 67 DEGREES
CALCULATED R AXIS, ECG10: 63 DEGREES
CALCULATED T AXIS, ECG11: -11 DEGREES
CHLORIDE SERPL-SCNC: 109 MMOL/L (ref 98–107)
CO2 SERPL-SCNC: 27 MMOL/L (ref 21–32)
CREAT SERPL-MCNC: 1.26 MG/DL (ref 0.6–1)
DIAGNOSIS, 93000: NORMAL
DIFFERENTIAL METHOD BLD: ABNORMAL
EOSINOPHIL # BLD: 0 K/UL (ref 0–0.8)
EOSINOPHIL NFR BLD: 0 % (ref 0.5–7.8)
ERYTHROCYTE [DISTWIDTH] IN BLOOD BY AUTOMATED COUNT: 14.5 % (ref 11.9–14.6)
GLUCOSE SERPL-MCNC: 154 MG/DL (ref 65–100)
HCT VFR BLD AUTO: 40.1 % (ref 35.8–46.3)
HGB BLD-MCNC: 12.7 G/DL (ref 11.7–15.4)
IMM GRANULOCYTES # BLD AUTO: 0.1 K/UL (ref 0–0.5)
IMM GRANULOCYTES NFR BLD AUTO: 1 % (ref 0–5)
LYMPHOCYTES # BLD: 0.9 K/UL (ref 0.5–4.6)
LYMPHOCYTES NFR BLD: 8 % (ref 13–44)
MCH RBC QN AUTO: 30.7 PG (ref 26.1–32.9)
MCHC RBC AUTO-ENTMCNC: 31.7 G/DL (ref 31.4–35)
MCV RBC AUTO: 96.9 FL (ref 79.6–97.8)
MONOCYTES # BLD: 0.2 K/UL (ref 0.1–1.3)
MONOCYTES NFR BLD: 2 % (ref 4–12)
NEUTS SEG # BLD: 9.2 K/UL (ref 1.7–8.2)
NEUTS SEG NFR BLD: 89 % (ref 43–78)
NRBC # BLD: 0 K/UL (ref 0–0.2)
P-R INTERVAL, ECG05: 140 MS
PLATELET # BLD AUTO: 366 K/UL (ref 150–450)
PMV BLD AUTO: 11.9 FL (ref 9.4–12.3)
POTASSIUM SERPL-SCNC: 4.8 MMOL/L (ref 3.5–5.1)
Q-T INTERVAL, ECG07: 372 MS
QRS DURATION, ECG06: 82 MS
QTC CALCULATION (BEZET), ECG08: 439 MS
RBC # BLD AUTO: 4.14 M/UL (ref 4.05–5.2)
SODIUM SERPL-SCNC: 144 MMOL/L (ref 136–145)
VENTRICULAR RATE, ECG03: 84 BPM
WBC # BLD AUTO: 10.4 K/UL (ref 4.3–11.1)

## 2019-04-25 PROCEDURE — 74011250636 HC RX REV CODE- 250/636: Performed by: FAMILY MEDICINE

## 2019-04-25 PROCEDURE — 97530 THERAPEUTIC ACTIVITIES: CPT

## 2019-04-25 PROCEDURE — 74011250636 HC RX REV CODE- 250/636: Performed by: INTERNAL MEDICINE

## 2019-04-25 PROCEDURE — 80048 BASIC METABOLIC PNL TOTAL CA: CPT

## 2019-04-25 PROCEDURE — 97110 THERAPEUTIC EXERCISES: CPT

## 2019-04-25 PROCEDURE — 74011250637 HC RX REV CODE- 250/637: Performed by: INTERNAL MEDICINE

## 2019-04-25 PROCEDURE — 85025 COMPLETE CBC W/AUTO DIFF WBC: CPT

## 2019-04-25 PROCEDURE — 74011000258 HC RX REV CODE- 258: Performed by: INTERNAL MEDICINE

## 2019-04-25 PROCEDURE — 65660000000 HC RM CCU STEPDOWN

## 2019-04-25 PROCEDURE — 36415 COLL VENOUS BLD VENIPUNCTURE: CPT

## 2019-04-25 PROCEDURE — 65270000029 HC RM PRIVATE

## 2019-04-25 PROCEDURE — 93005 ELECTROCARDIOGRAM TRACING: CPT | Performed by: INTERNAL MEDICINE

## 2019-04-25 RX ADMIN — HEPARIN SODIUM 5000 UNITS: 5000 INJECTION INTRAVENOUS; SUBCUTANEOUS at 06:25

## 2019-04-25 RX ADMIN — DOXYCYCLINE HYCLATE 100 MG: 100 CAPSULE ORAL at 16:46

## 2019-04-25 RX ADMIN — POTASSIUM CHLORIDE 20 MEQ: 20 TABLET, EXTENDED RELEASE ORAL at 16:46

## 2019-04-25 RX ADMIN — METHYLPREDNISOLONE SODIUM SUCCINATE 40 MG: 40 INJECTION, POWDER, FOR SOLUTION INTRAMUSCULAR; INTRAVENOUS at 21:35

## 2019-04-25 RX ADMIN — DOXYCYCLINE HYCLATE 100 MG: 100 CAPSULE ORAL at 06:25

## 2019-04-25 RX ADMIN — CEFTRIAXONE 2 G: 2 INJECTION, POWDER, FOR SOLUTION INTRAMUSCULAR; INTRAVENOUS at 16:17

## 2019-04-25 RX ADMIN — Medication 10 ML: at 16:17

## 2019-04-25 RX ADMIN — ASPIRIN 81 MG: 81 TABLET, COATED ORAL at 09:51

## 2019-04-25 RX ADMIN — METHYLPREDNISOLONE SODIUM SUCCINATE 40 MG: 40 INJECTION, POWDER, FOR SOLUTION INTRAMUSCULAR; INTRAVENOUS at 06:25

## 2019-04-25 RX ADMIN — LEVOTHYROXINE SODIUM 25 MCG: 50 TABLET ORAL at 06:26

## 2019-04-25 RX ADMIN — Medication 10 ML: at 06:25

## 2019-04-25 RX ADMIN — HEPARIN SODIUM 5000 UNITS: 5000 INJECTION INTRAVENOUS; SUBCUTANEOUS at 21:34

## 2019-04-25 RX ADMIN — Medication 400 MG: at 09:51

## 2019-04-25 RX ADMIN — ROSUVASTATIN CALCIUM 10 MG: 5 TABLET, FILM COATED ORAL at 09:51

## 2019-04-25 RX ADMIN — POTASSIUM CHLORIDE 20 MEQ: 20 TABLET, EXTENDED RELEASE ORAL at 09:51

## 2019-04-25 RX ADMIN — Medication 10 ML: at 21:34

## 2019-04-25 RX ADMIN — HEPARIN SODIUM 5000 UNITS: 5000 INJECTION INTRAVENOUS; SUBCUTANEOUS at 16:17

## 2019-04-25 NOTE — PROGRESS NOTES
Pt resting quietly in bed, on 3 L NC. Pt awakens easily with no complaints at this time. Call light in reach

## 2019-04-25 NOTE — PROGRESS NOTES
Bedside shift report given to [facility]. Pt remains stable at this time with no complaints.

## 2019-04-25 NOTE — PROGRESS NOTES
Problem: Mobility Impaired (Adult and Pediatric) Goal: *Acute Goals and Plan of Care (Insert Text) Description LTG: 
(1.)Ms. Karon Diaz will transfer from bed to chair and chair to bed with INDEPENDENT using the least restrictive/no device within 7 day(s). (2.)Ms. Almeida will ambulate with modified INDEPENDENCE for 300+ feet with spO2 >89% with the least restrictive/no device within 7 day(s). (3.)Ms. Karon Diaz will perform standing static and dynamic balance activities x 8 minutes with SUPERVISION to improve safety within 7 day(s). (4.)Ms. Almeida will ascend and descend 4-6 stairs using one hand rail(s) with SUPERVISION to improve functional mobility and safety within 7 day(s). Outcome: Progressing Towards Goal 
  
PHYSICAL THERAPY: Daily Note and PM 4/25/2019 INPATIENT: PT Visit Days : 3 Payor: Sanna Rondon / Plan: [address] [place] [address] HMO / Product Type: [employer] /   
  
NAME/AGE/GENDER: Rehana Lund is a 70 y.o. female PRIMARY DIAGNOSIS: Acute renal failure (ARF) (HCC) [N17.9] Acute renal failure (ARF) (HCC) Acute renal failure (ARF) (HCC) ICD-10: Treatment Diagnosis:  
 · Other abnormalities of gait and mobility (R26.89) Precaution/Allergies: 
Patient has no known allergies. ASSESSMENT:  
Ms. Karon Diaz was supine upon contact and agreeable to PT. Patient performs supine to sit with mod I and transfer to standing with SBA. Once standing patient ambulates 250' with rolling walker, 3L O2, CGA and occasional cues for sequencing with rolling walker. Patient returns to recliner chair where she then participates in therapeutic strengthening exercises to improve functional strength for transfers, gait and overall mobility. Patient requires cues to perform exercises correctly. Overall slow progress towards physical therapy goals. Patient's goals listed above are still appropriate. Will continue skilled PT to address remaining deficits. This section established at most recent assessment PROBLEM LIST (Impairments causing functional limitations): 1. Decreased Transfer Abilities 2. Decreased Ambulation Ability/Technique 3. Decreased Activity Tolerance 4. Decreased Pacing Skills 5. Increased Fatigue 6. Increased Shortness of Breath INTERVENTIONS PLANNED: (Benefits and precautions of physical therapy have been discussed with the patient.) 1. Gait Training 2. Therapeutic Activites 3. Therapeutic Exercise/Strengthening 4. Transfer Training 5. education TREATMENT PLAN: Frequency/Duration: 3 times a week for duration of hospital stay Rehabilitation Potential For Stated Goals: Excellent REHAB RECOMMENDATIONS (at time of discharge pending progress):   
Placement: It is my opinion, based on this patient's performance to date, that Ms. Edilberto Christensen may benefit from [address] after discharge due to her functional deficits (listed above) that are likely to improve with skilled rehabilitation because she has multiple medical issues that affect her functional mobility in the community. May have no needs at time of discharge. Equipment:  
? None at this time HISTORY:  
History of Present Injury/Illness (Reason for Referral): 
Per MD note, \"73 yo CF with past history of hypothyroidism, HLD, HTN, chronic hypokalemia (uncertain etiology), past tobacco use, and TIA presents with worsening 2 week history of shortness of breath and cough. No known sick contacts, patient lives by herself at home. No associated chest pain, but with some fevers/chills and cough. No associated abdominal pain, nausea/vomiting, diarrhea, or constipation. She has never been diagnosed with any lung disorders. ED Course: CXR showing possible COPD changes and likely superimposed pneumonia. Serum potassium of 2.5 with SCr of 3.6 (baseline around 1.0-1.1). Procalcitonin of 0.5. WBC count of 17.  EKG showing NSR with prolonged QT interval at 570. Blood cultures collected x2. Ceftriaxone and azithromycin ordered. \" 
Past Medical History/Comorbidities: Ms. Nneka Garcia  has a past medical history of Acquired hypothyroidism (1/16/2016), Breast cancer ([facility] [address] (2008), Depression (8/18/2016), Endocrine disease, Fungal dermatitis (8/18/2016), Hyperlipidemia (1/16/2016), Hypertension, essential, benign (8/18/2016), Hypokalemia (8/18/2016), Inflamed sebaceous cyst (8/18/2016), Major depressive disorder, recurrent, moderate ([facility] [address] (8/18/2016), Nicotine dependence, cigarettes, uncomplicated (4/13/6525), Osteopenia (8/18/2016), Osteoporosis (8/18/2016), Radiation therapy complication (6268), Rheumatoid arthritis ([facility] [address] (1/16/2016), Right carotid bruit (1/16/2016), TIA (transient ischemic attack) (1/16/2016), and Tobacco abuse (8/18/2016). Ms. Nneka Garcia  has a past surgical history that includes hx tonsillectomy; hx adenoidectomy; and hx breast lumpectomy (Right, 2008). Social History/Living Environment:  
Home Environment: Trailer/mobile home # Steps to Enter: 6 Rails to Enter: Yes Hand Rails : Bilateral 
One/Two Story Residence: One story Living Alone: Yes Support Systems: None Patient Expects to be Discharged to[de-identified] Private residence Current DME Used/Available at Home: Cane, straight Tub or Shower Type: Tub/Shower combination Prior Level of Function/Work/Activity: 
Lives alone, independent in home and community. Uses cane occasionally. Number of Personal Factors/Comorbidities that affect the Plan of Care: 3+: HIGH COMPLEXITY EXAMINATION:  
Most Recent Physical Functioning:  
Gross Assessment: 
  
         
  
Posture: 
  
Balance: 
Sitting: Intact Standing: Impaired Standing - Static: Good Standing - Dynamic : Fair Bed Mobility: 
Supine to Sit: Modified independent Wheelchair Mobility: 
  
Transfers: 
Sit to Stand: Stand-by assistance Stand to Sit: Stand-by assistance Gait: 
  
Base of Support: Widened Speed/Kelsea: Shuffled; Slow Step Length: Left shortened;Right shortened Gait Abnormalities: Decreased step clearance;Shuffling gait Distance (ft): 250 Feet (ft) Assistive Device: (None) Ambulation - Level of Assistance: Contact guard assistance;Stand-by assistance Interventions: Safety awareness training;Verbal cues Body Structures Involved: 1. Lungs 2. Metabolic Body Functions Affected: 1. Respiratory 2. Movement Related 3. Metobolic/Endocrine Activities and Participation Affected: 1. Mobility 2. Self Care 3. Domestic Life 4. Community, Social and [place] Number of elements that affect the Plan of Care: 4+: HIGH COMPLEXITY CLINICAL PRESENTATION:  
Presentation: Stable and uncomplicated: LOW COMPLEXITY CLINICAL DECISION MAKING:  
[facility] AM-PAC 6 Clicks Basic Mobility Inpatient Short Form How much difficulty does the patient currently have. .. Unable A Lot A Little None 1. Turning over in bed (including adjusting bedclothes, sheets and blankets)? ? 1   ? 2   ? 3   ? 4  
2. Sitting down on and standing up from a chair with arms ( e.g., wheelchair, bedside commode, etc.)   ? 1   ? 2   ? 3   ? 4  
3. Moving from lying on back to sitting on the side of the bed?   ? 1   ? 2   ? 3   ? 4 How much help from another person does the patient currently need. .. Total A Lot A Little None 4. Moving to and from a bed to a chair (including a wheelchair)? ? 1   ? 2   ? 3   ? 4  
5. Need to walk in hospital room? ? 1   ? 2   ? 3   ? 4  
6. Climbing 3-5 steps with a railing? ? 1   ? 2   ? 3   ? 4  
© 2007, Trustees of [facility] [employer], under license to [employer]. All rights reserved Score:  Initial: 21 Most Recent: X (Date: -- ) Interpretation of Tool:  Represents activities that are increasingly more difficult (i.e. Bed mobility, Transfers, Gait).  
 
Medical Necessity:    
· Patient is expected to demonstrate progress in functional technique and activity tolerance. ·  to increase independence with   and improve safety during all functional mobility. · . Reason for Services/Other Comments: 
· Patient continues to require skilled intervention due to medical complications · . Use of outcome tool(s) and clinical judgement create a POC that gives a: Clear prediction of patient's progress: LOW COMPLEXITY  
  
 
 
 
TREATMENT:  
(In addition to Assessment/Re-Assessment sessions the following treatments were rendered) Pre-treatment Symptoms/Complaints:  none. Pain: Initial:  
Pain Intensity 1: 0  Post Session:  0 Therapeutic Activity: (    10 minutes): Therapeutic activities including bed mobility training, transfer training, ambulation on level ground, static/dynamic standing balance, instruction in sequencing with rolling walker, posture training, and patient education to improve mobility, strength and balance. Required minimal Safety awareness training;Verbal cues to promote static and dynamic balance in standing, promote coordination of bilateral, lower extremity(s) and to promote improved pursed lipped breathing. Therapeutic Exercise: (  15 Minutes):  Exercises per grid below to improve mobility, strength and balance. Required moderate visual and verbal cues to promote proper body alignment, promote proper body posture and promote proper body mechanics. Progressed range, repetitions and complexity of movement as indicated. Date: 
4/25/19 Date: 
 Date: 
  
ACTIVITY/EXERCISE AM PM AM PM AM PM  
Ambulation:           Distance Device Duration Seated Heel Raises 2x15B A Seated Toe Raises 2x15B A Seated Long Arc Quads 2x15B A Seated Marching 2x15B A Seated Hip Abduction 2x15B A       
        
B = bilateral; AA = active assistive; A = active; P = passive Braces/Orthotics/Lines/Etc:  
· O2 Device: Nasal cannula Treatment/Session Assessment:   
· Response to Treatment:  See above · Interdisciplinary Collaboration:  
o Physical Therapy Assistant 
o Registered Nurse · After treatment position/precautions:  
o Up in chair 
o Bed/Chair-wheels locked 
o Call light within reach 
o RN notified · Compliance with Program/Exercises: Will assess as treatment progresses · Recommendations/Intent for next treatment session: \"Next visit will focus on advancements to more challenging activities and reduction in assistance provided\". Total Treatment Duration: PT Patient Time In/Time Out Time In: 1002 Time Out: 1110 Shilpi Yung, PTA

## 2019-04-25 NOTE — PROGRESS NOTES
Discharge plans discussed during IDT rounds. Patient was out working with PT at the time of our visit. He is functioning fairly well, but still below his baseline per PT/OT. He is functioning too well for short-term rehab, but will likely benefit from home health at discharge. Case Management will continue to follow for discharge planning. Care Management Interventions PCP Verified by CM: Yes(Eleno Will, ) Mode of Transport at Discharge: Other (see comment) Transition of Care Consult (CM Consult): Discharge Planning Discharge Durable Medical Equipment: No 
Physical Therapy Consult: No 
Occupational Therapy Consult: No 
Speech Therapy Consult: No 
Current Support Network: Own Home, Lives Alone Confirm Follow Up Transport: Family Plan discussed with Pt/Family/Caregiver: Yes Freedom of Choice Offered: Yes [employer] Information Provided?: No 
Discharge Location Discharge Placement: Home with home health

## 2019-04-25 NOTE — PROGRESS NOTES
Beside shift report received from [address] [place] [address]  Patient lying in bed  Respirations present  No signs of distress  No needs expressed at this time  Safety measures in place

## 2019-04-25 NOTE — PROGRESS NOTES
Hospitalist Progress Note 2019 Admit Date: 2019 12:11 PM  
NAME: Colleen Cruz :  1940 MRN:  [id number] Attending: Jeanie Bermudez DO 
PCP:  Guero Stroud MD 
 
SUBJECTIVE:  
69 yo CF with past history of hypothyroidism, HLD, HTN, chronic hypokalemia (uncertain etiology), past tobacco use, and TIA presents with worsening 2 week history of shortness of breath and cough. No known sick contacts, patient lives by herself at home. No associated chest pain, but with some fevers/chills and cough. No associated abdominal pain, nausea/vomiting, diarrhea, or constipation. She has never been diagnosed with any lung disorders.  
  
ED Course: CXR showing possible COPD changes and likely superimposed pneumonia. Serum potassium of 2.5 with SCr of 3.6 (baseline around 1.0-1.1). Procalcitonin of 0.5. WBC count of 17. EKG showing NSR with prolonged QT interval at 570. Blood cultures collected x2. Ceftriaxone and azithromycin ordered. Interval History (): patient examined at bedside. No acute overnight events. Breathing stable, still short when moving. No chest pain, nausea/vomiting, diarrhea, or abdominal pain. Review of Systems negative with exception of pertinent positives noted above PHYSICAL EXAM  
 
Visit Vitals /86 Pulse 77 Temp 98.1 °F (36.7 °C) Resp 19 Ht 5' (1.524 m) Wt 78.9 kg (173 lb 15.1 oz) SpO2 98% BMI 33.97 kg/m² Temp (24hrs), Av.9 °F (36.6 °C), Min:97.6 °F (36.4 °C), Max:98.2 °F (36.8 °C) Oxygen Therapy O2 Sat (%): 98 % (19 0740) Pulse via Oximetry: 87 beats per minute (19 1449) O2 Device: Nasal cannula (19 1425) O2 Flow Rate (L/min): 2 l/min (19 1425) Intake/Output Summary (Last 24 hours) at 2019 1053 Last data filed at 2019 3690 Gross per 24 hour Intake 410 ml Output 500 ml Net -90 ml General:          Alert, cooperative, no distress, appears stated age. Head:               Normocephalic, without obvious abnormality, atraumatic. Nose:               Nares normal. No drainage or sinus tenderness. Lungs:             Clear to auscultation bilaterally. No Wheezing or Rhonchi. No rales. Conversational dyspnea Heart:              Regular rate and rhythm,  no murmur, rub or gallop. Abdomen:        Soft, non-tender. Not distended. Bowel sounds normal.  
Extremities:     No cyanosis. No edema. No clubbing Skin:                Texture, turgor normal. No rashes or lesions. Not Jaundiced Neurologic:      Alert and oriented x 3, no focal deficits ASSESSMENT Active Hospital Problems Diagnosis Date Noted  Acute renal failure (ARF) ([facility] [address] 04/22/2019  Sepsis ([facility] [address] 04/22/2019  Community acquired bacterial pneumonia 04/22/2019  Prolonged Q-T interval on ECG 04/22/2019  Hypokalemia 08/18/2016  Acquired hypothyroidism 01/16/2016 Plan: # Sepsis secondary to CAP 
- vitals improved, BCx NGTD 
- discontinue and avoid azithromycin due to QT prolongation 
- continue with ceftriaxone and doxycycline 
- continue with Solumedrol and jet nebulizers 
  
# Hypoxic respiratory failure due to CAP 
- not on supplemental oxygen at home 
- wean O2 as tolerated - patient likely has undiagnosed obstructive lung disease and warrants PFTs as outpatient 
- wean Solumedrol  
  
# Acute renal failure, likely prerenal due to poor po intake 
- resolved 
- avoid nephrotoxic meds 
  
# Prolonged QT interval on EKG 
- resolved 
- avoid any potential precipitating meds (quinolones/azithro, antiemetics, antipsychotics, etc.) 
  
# Acute on chronic hypokalemia 
- resolving 
- uncertain etiology, patient without any obvious GI symptoms and takes potassium on long-term basis (?related to thiazides?) - nephrology consult, appreciate recs 
- hold home Lasix and ACE/ARBs and thiazide 
  
# Hypothyroidism 
- continue with home levothyroxine 
  
 F/E/N: discontinue fluids, replete electrolytes as needed, cardiac diet 
  
Ppx: heparin SQ for VTE 
  
Code Status: FULL CODE 
  
Disposition: Pending clinical improvement with plan as above, likely home after hospital discharge. PT/OT consults. PPD ordered. All questions answered. Signed By: Marvin Armijo MD   
 April 25, 2019

## 2019-04-26 LAB
ANION GAP SERPL CALC-SCNC: 5 MMOL/L (ref 7–16)
BUN SERPL-MCNC: 43 MG/DL (ref 8–23)
CALCIUM SERPL-MCNC: 9.8 MG/DL (ref 8.3–10.4)
CHLORIDE SERPL-SCNC: 108 MMOL/L (ref 98–107)
CO2 SERPL-SCNC: 30 MMOL/L (ref 21–32)
CREAT SERPL-MCNC: 1.24 MG/DL (ref 0.6–1)
ERYTHROCYTE [DISTWIDTH] IN BLOOD BY AUTOMATED COUNT: 14.9 % (ref 11.9–14.6)
GLUCOSE SERPL-MCNC: 130 MG/DL (ref 65–100)
HCT VFR BLD AUTO: 46 % (ref 35.8–46.3)
HGB BLD-MCNC: 14.3 G/DL (ref 11.7–15.4)
MCH RBC QN AUTO: 31.3 PG (ref 26.1–32.9)
MCHC RBC AUTO-ENTMCNC: 31.1 G/DL (ref 31.4–35)
MCV RBC AUTO: 100.7 FL (ref 79.6–97.8)
NRBC # BLD: 0 K/UL (ref 0–0.2)
PLATELET # BLD AUTO: 410 K/UL (ref 150–450)
PMV BLD AUTO: 11.8 FL (ref 9.4–12.3)
POTASSIUM SERPL-SCNC: 5.5 MMOL/L (ref 3.5–5.1)
RBC # BLD AUTO: 4.57 M/UL (ref 4.05–5.2)
SODIUM SERPL-SCNC: 143 MMOL/L (ref 136–145)
WBC # BLD AUTO: 14.4 K/UL (ref 4.3–11.1)

## 2019-04-26 PROCEDURE — 74011250636 HC RX REV CODE- 250/636: Performed by: INTERNAL MEDICINE

## 2019-04-26 PROCEDURE — 65270000029 HC RM PRIVATE

## 2019-04-26 PROCEDURE — 74011250636 HC RX REV CODE- 250/636: Performed by: HOSPITALIST

## 2019-04-26 PROCEDURE — 74011250637 HC RX REV CODE- 250/637: Performed by: HOSPITALIST

## 2019-04-26 PROCEDURE — 74011250637 HC RX REV CODE- 250/637: Performed by: INTERNAL MEDICINE

## 2019-04-26 PROCEDURE — 97530 THERAPEUTIC ACTIVITIES: CPT

## 2019-04-26 PROCEDURE — 74011250636 HC RX REV CODE- 250/636: Performed by: FAMILY MEDICINE

## 2019-04-26 PROCEDURE — 74011000258 HC RX REV CODE- 258: Performed by: INTERNAL MEDICINE

## 2019-04-26 PROCEDURE — 80048 BASIC METABOLIC PNL TOTAL CA: CPT

## 2019-04-26 PROCEDURE — 85027 COMPLETE CBC AUTOMATED: CPT

## 2019-04-26 PROCEDURE — 36415 COLL VENOUS BLD VENIPUNCTURE: CPT

## 2019-04-26 PROCEDURE — 97110 THERAPEUTIC EXERCISES: CPT

## 2019-04-26 RX ORDER — ENOXAPARIN SODIUM 100 MG/ML
40 INJECTION SUBCUTANEOUS EVERY 24 HOURS
Status: DISCONTINUED | OUTPATIENT
Start: 2019-04-26 | End: 2019-04-30 | Stop reason: HOSPADM

## 2019-04-26 RX ORDER — CODEINE PHOSPHATE AND GUAIFENESIN 10; 100 MG/5ML; MG/5ML
10 SOLUTION ORAL 3 TIMES DAILY
Status: DISCONTINUED | OUTPATIENT
Start: 2019-04-26 | End: 2019-04-28

## 2019-04-26 RX ORDER — AMOXICILLIN 250 MG
1 CAPSULE ORAL
Status: DISCONTINUED | OUTPATIENT
Start: 2019-04-26 | End: 2019-04-30 | Stop reason: HOSPADM

## 2019-04-26 RX ADMIN — GUAIFENESIN AND CODEINE PHOSPHATE 10 ML: 10; 100 LIQUID ORAL at 11:44

## 2019-04-26 RX ADMIN — GUAIFENESIN AND CODEINE PHOSPHATE 10 ML: 10; 100 LIQUID ORAL at 16:19

## 2019-04-26 RX ADMIN — METHYLPREDNISOLONE SODIUM SUCCINATE 40 MG: 40 INJECTION, POWDER, FOR SOLUTION INTRAMUSCULAR; INTRAVENOUS at 10:00

## 2019-04-26 RX ADMIN — Medication 10 ML: at 05:21

## 2019-04-26 RX ADMIN — DOXYCYCLINE HYCLATE 100 MG: 100 CAPSULE ORAL at 05:20

## 2019-04-26 RX ADMIN — CEFTRIAXONE 2 G: 2 INJECTION, POWDER, FOR SOLUTION INTRAMUSCULAR; INTRAVENOUS at 14:02

## 2019-04-26 RX ADMIN — DOXYCYCLINE HYCLATE 100 MG: 100 CAPSULE ORAL at 16:19

## 2019-04-26 RX ADMIN — Medication 10 ML: at 22:19

## 2019-04-26 RX ADMIN — LEVOTHYROXINE SODIUM 25 MCG: 50 TABLET ORAL at 05:20

## 2019-04-26 RX ADMIN — ASPIRIN 81 MG: 81 TABLET, COATED ORAL at 10:00

## 2019-04-26 RX ADMIN — GUAIFENESIN AND CODEINE PHOSPHATE 10 ML: 10; 100 LIQUID ORAL at 21:22

## 2019-04-26 RX ADMIN — ENOXAPARIN SODIUM 40 MG: 40 INJECTION SUBCUTANEOUS at 14:02

## 2019-04-26 RX ADMIN — Medication 400 MG: at 10:00

## 2019-04-26 RX ADMIN — METHYLPREDNISOLONE SODIUM SUCCINATE 40 MG: 40 INJECTION, POWDER, FOR SOLUTION INTRAMUSCULAR; INTRAVENOUS at 21:21

## 2019-04-26 RX ADMIN — Medication 10 ML: at 14:03

## 2019-04-26 RX ADMIN — HEPARIN SODIUM 5000 UNITS: 5000 INJECTION INTRAVENOUS; SUBCUTANEOUS at 05:20

## 2019-04-26 NOTE — PROGRESS NOTES
Problem: Self Care Deficits Care Plan (Adult) Goal: *Acute Goals and Plan of Care (Insert Text) Description 1. Patient will complete lower body bathing and dressing with modified independence and adaptive equipment as needed. 2. Patient will complete toileting with modified independence. 3. Patient will tolerate 25 minutes of OT treatment with 2-3 rest breaks to increase activity tolerance for ADLs. 4. Patient will complete functional transfers with modified independence and adaptive equipment as needed. 5. Patient will verbalize with independence 3 ways to complete energy conservation for ADL. 6. Patient will complete functional mobility for household distances with modified independence and good safety awareness. Timeframe: 7 visits Outcome: Progressing Towards Goal 
  
 
OCCUPATIONAL THERAPY: Daily Note and AM  
 4/26/2019 INPATIENT: OT Visit Days: 2 Payor: Aly Moseley / Plan: [address] [place] [address] HMO / Product Type: Managed Care Medicare /  
  
NAME/AGE/GENDER: Mgiuel A Tadeo is a 70 y.o. female PRIMARY DIAGNOSIS:  Acute renal failure (ARF) (HCC) [N17.9] Acute renal failure (ARF) (HCC) Acute renal failure (ARF) (HCC) ICD-10: Treatment Diagnosis:  
 · Generalized Muscle Weakness (M62.81) · Other lack of cordination (R27.8) Precautions/Allergies: 
   Patient has no known allergies. ASSESSMENT:  
Ms. Mayra Dempsey was admitted with acute renal failure and shortness of breath. Pt lives with alone in a mobile home with a tub/shower and is independent with ADL at baseline. Pt states she uses a cane for functional mobility and denies any falls in the past. Pt still drives and completes her own cooking/household chores. Pt is currently wearing nasal cannula but typically does not wear O2 at baseline. 4/26/2019 Pt presents up in the chair upon arrival. Pt agreeable to treatment and completed sit to stand with SBA and a rolling walker.  Pt completed functional mobility in room and into the hallway with a rolling walker and SBA. Pt returned to her room and was given a rest break and then participated in exercises listed in the following grid. Pt left up in the chair with belongings in reach. Good effort. Continue OT POC. This section established at most recent assessment PROBLEM LIST (Impairments causing functional limitations): 1. Decreased Strength 2. Decreased ADL/Functional Activities 3. Decreased Transfer Abilities 4. Decreased Ambulation Ability/Technique 5. Decreased Balance 6. Increased Pain 7. Decreased Activity Tolerance 8. Decreased Pacing Skills 9. Decreased Work Simplification/Energy Conservation Techniques 10. Increased Shortness of Breath 11. Decreased [place] with Home Exercise Program 
 INTERVENTIONS PLANNED: (Benefits and precautions of occupational therapy have been discussed with the patient.) 1. Activities of daily living training 2. Adaptive equipment training 3. Balance training 4. Clothing management 5. Donning&doffing training 6. Neuromuscular re-eduation 7. Therapeutic activity 8. Therapeutic exercise TREATMENT PLAN: Frequency/Duration: Follow patient 3 times per week to address above goals. Rehabilitation Potential For Stated Goals: Good REHAB RECOMMENDATIONS (at time of discharge pending progress):   
Placement: It is my opinion, based on this patient's performance to date, that [facility] due to her functional deficits (listed above) that are likely to improve with skilled rehabilitation because she has multiple medical issues that affect her functional mobility in the community. Equipment: ? TBD   
    
 
 
 
OCCUPATIONAL PROFILE AND HISTORY:  
History of Present Injury/Illness (Reason for Referral): 
See H&P Past Medical History/Comorbidities: Ms. Sana Garsia  has a past medical history of Acquired hypothyroidism (1/16/2016), Breast cancer ([facility] [address] (2008), Depression (8/18/2016), Endocrine disease, Fungal dermatitis (8/18/2016), Hyperlipidemia (1/16/2016), Hypertension, essential, benign (8/18/2016), Hypokalemia (8/18/2016), Inflamed sebaceous cyst (8/18/2016), Major depressive disorder, recurrent, moderate ([facility] [address] (8/18/2016), Nicotine dependence, cigarettes, uncomplicated (0/24/4869), Osteopenia (8/18/2016), Osteoporosis (8/18/2016), Radiation therapy complication (8569), Rheumatoid arthritis ([facility] (1/16/2016), Right carotid bruit (1/16/2016), TIA (transient ischemic attack) (1/16/2016), and Tobacco abuse (8/18/2016). Ms. Benita Kam  has a past surgical history that includes hx tonsillectomy; hx adenoidectomy; and hx breast lumpectomy (Right, 2008). Social History/Living Environment:  
Home Environment: Trailer/mobile home # Steps to Enter: 6 Rails to Enter: Yes Hand Rails : Bilateral 
One/Two Story Residence: One story Living Alone: Yes Support Systems: None Patient Expects to be Discharged to[de-identified] Private residence Current DME Used/Available at Home: Cane, straight Tub or Shower Type: Tub/Shower combination Prior Level of Function/Work/Activity: 
Pt lives with alone in a mobile home with a tub/shower and is independent with ADL at baseline. Pt states she uses a cane for functional mobility and denies any falls in the past. Pt still drives and completes her own cooking/household chores. Pt is currently wearing nasal cannula but typically does not wear O2 at baseline. Personal Factors:   
      Social Background:  lives alone Other factors that influence how disability is experienced by the patient:  multiple co-morbidities Number of Personal Factors/Comorbidities that affect the Plan of Care: Expanded review of therapy/medical records (1-2):  MODERATE COMPLEXITY ASSESSMENT OF OCCUPATIONAL PERFORMANCE[de-identified]  
Activities of Daily Living:  
Basic ADLs (From Assessment) Complex ADLs (From Assessment) Feeding: Setup Oral Facial Hygiene/Grooming: Stand-by assistance Bathing: Moderate assistance Upper Body Dressing: Minimum assistance Lower Body Dressing: Moderate assistance Toileting: Contact guard assistance Instrumental ADL Meal Preparation: Maximum assistance Homemaking: Total assistance Grooming/Bathing/Dressing Activities of Daily Living Bed/Mat Mobility Sit to Stand: Stand-by assistance Stand to Sit: Stand-by assistance Most Recent Physical Functioning:  
Gross Assessment: 
  
         
  
Posture: 
Posture (WDL): Exceptions to [facility] Posture Assessment: Forward head, Rounded shoulders, Kyphosis Balance: 
Sitting: Intact Standing: Impaired Standing - Static: Good Standing - Dynamic : Fair Bed Mobility: 
  
Wheelchair Mobility: 
  
Transfers: 
Sit to Stand: Stand-by assistance Stand to Sit: Stand-by assistance Patient Vitals for the past 6 hrs: 
 BP SpO2 Pulse 19 0726 147/75 99 % 80  
19 1118 159/78 94 % (!) 111 Mental Status Neurologic State: Alert Orientation Level: Appropriate for age Cognition: Appropriate decision making Perception: Appears intact Perseveration: No perseveration noted Safety/Judgement: Awareness of environment, Fall prevention Physical Skills Involved: 
1. Balance 2. Strength 3. Activity Tolerance Cognitive Skills Affected (resulting in the inability to perform in a timely and safe manner): 1. Executive Function 2. Divided Attention Psychosocial Skills Affected: 1. Habits/Routines 2. Self-Awareness Number of elements that affect the Plan of Care: 5+:  HIGH COMPLEXITY CLINICAL DECISION MAKIN [facility] [address] [address] AM-PAC 6 Clicks Daily Activity Inpatient Short Form How much help from another person does the patient currently need. .. Total A Lot A Little None 1. Putting on and taking off regular lower body clothing?    ? 1   ? 2   ? 3   ? 4  
 2.  Bathing (including washing, rinsing, drying)? ? 1   ? 2   ? 3   ? 4  
3. Toileting, which includes using toilet, bedpan or urinal?   ? 1   ? 2   ? 3   ? 4  
4. Putting on and taking off regular upper body clothing? ? 1   ? 2   ? 3   ? 4  
5. Taking care of personal grooming such as brushing teeth? ? 1   ? 2   ? 3   ? 4  
6. Eating meals? ? 1   ? 2   ? 3   ? 4  
© 2007, Trustees of [facility] [employer], under license to [employer]. All rights reserved Score:  Initial: 16 Most Recent: X (Date: -- ) Interpretation of Tool:  Represents activities that are increasingly more difficult (i.e. Bed mobility, Transfers, Gait). Medical Necessity:    
· Patient demonstrates good ·  rehab potential due to higher previous functional level. Reason for Services/Other Comments: 
· Patient continues to require skilled intervention due to decreased independence with ADL/functional transfers · . Use of outcome tool(s) and clinical judgement create a POC that gives a: LOW COMPLEXITY  
 
 
 
TREATMENT:  
(In addition to Assessment/Re-Assessment sessions the following treatments were rendered) Pre-treatment Symptoms/Complaints:   
Pain: Initial:  
Pain Intensity 1: 0  Post Session:  0/10 Therapeutic Activity: (10 minutes): Therapeutic activities including Ambulation on level ground to improve mobility, strength and balance. Required SBA  to promote static and dynamic balance in standing. Therapeutic Exercise: (13 minutes):  Exercises per grid below to improve mobility, strength and dynamic movement of arm - bilateral to improve functional bending, lifting and reaching. Required minimal visual and verbal cues to promote proper body posture and promote proper body mechanics. Progressed resistance, range and repetitions as indicated. Date: 
4/26/19 Date: 
 Date: Activity/Exercise Parameters Parameters Parameters Shoulder flexion/extension 2 sets of 15 reps with red therbob Shoulder horizontal add/abb 2 sets of 15 reps with red theraband Punches 2 sets of 15 reps with red theraband Elbow flexion/extension 2 sets of 15 reps with red theraband Tricep extension 2 sets of 15 reps with red theraband Braces/Orthotics/Lines/Etc:  
· IV 
· O2 Device: Nasal cannula Treatment/Session Assessment:   
· Response to Treatment:  Pt tolerated well. · Interdisciplinary Collaboration:  
o Certified Occupational Therapy Assistant 
o Registered Nurse · After treatment position/precautions:  
o Up in chair 
o Bed alarm/tab alert on 
o Bed/Chair-wheels locked 
o Call light within reach 
o RN notified · Compliance with Program/Exercises: Will assess as treatment progresses. · Recommendations/Intent for next treatment session: \"Next visit will focus on advancements to more challenging activities and reduction in assistance provided\". Total Treatment Duration: OT Patient Time In/Time Out Time In: 0359 Time Out: 6175 Toma White

## 2019-04-26 NOTE — PROGRESS NOTES
Patient resting in bed, alert and oriented, breathing even and unlabored, safety measures in place, call light within reach.

## 2019-04-26 NOTE — CDMP QUERY
Patient admitted with pneumonia. Noted documentation of \" respiratory failure\" in H&P on progress notes. Please document in progress notes clinical indicators (such as the ones below) to support this diagnosis. - Respirations <12 or >25 - Air hunger/gasping - Use of accessory muscles of respiration/increased work of breathing - Sternal or intercostal retractions - Stridor - Inability to speak in full sentences - Cyanosis - Pulse ox <90% RA or <95% on O2  
- pH <7.35 or >7.45  
- pO2 < 60 mm Hg (or 10mm below COPD patient's baseline) - pCO2 >50mm Hg (or 10mm above COPD patient's baseline) The medical record reflects the following: 
   Risk Factors: 78yof with pneumonia Clinical Indicators:  No documentation of \"work of breathing\", No ABGS completed Treatment: supplemental O2 2l via NC Thank You Reuben Tolentino, HUMBERTO CDS Compliant Documentation Management Program

## 2019-04-26 NOTE — PROGRESS NOTES
Beside shift report received from [facility]  Patient lying in bed  Respirations present  No signs of distress  No needs expressed at this time  Safety measures in place

## 2019-04-26 NOTE — PROGRESS NOTES
Hospitalist Progress Note 2019 Admit Date: 2019 12:11 PM  
NAME: Yin Chow :  1940 MRN:  [id number] Attending: Rock Crowder DO 
PCP:  Pooja Pineda MD 
 
SUBJECTIVE:  
 
71 yo CF with past history of hypothyroidism, HLD, HTN, chronic hypokalemia (uncertain etiology), past tobacco use, and TIA presents with worsening 2 week history of shortness of breath and cough. No known sick contacts, patient lives by herself at home. No associated chest pain, but with some fevers/chills and cough. No associated abdominal pain, nausea/vomiting, diarrhea, or constipation. She has never been diagnosed with any lung disorders. ED Course: CXR showing possible COPD changes and likely superimposed pneumonia. Serum potassium of 2.5 with SCr of 3.6 (baseline around 1.0-1.1). Procalcitonin of 0.5. WBC count of 17. EKG showing NSR with prolonged QT interval at 570. Blood cultures collected x2. Ceftriaxone and azithromycin ordered. Today, feels some improvement, continued cough, on 3L PHYSICAL EXAM  
 
Visit Vitals /79 Pulse 90 Temp 97.7 °F (36.5 °C) Resp 18 Ht 5' (1.524 m) Wt 70.1 kg (154 lb 9.6 oz) SpO2 95% BMI 30.19 kg/m² Temp (24hrs), Av.1 °F (36.7 °C), Min:97.7 °F (36.5 °C), Max:98.6 °F (37 °C) Oxygen Therapy O2 Sat (%): 95 % (19 1516) Pulse via Oximetry: 87 beats per minute (19 1449) O2 Device: Nasal cannula (19 1425) O2 Flow Rate (L/min): 2 l/min (19 1425) Intake/Output Summary (Last 24 hours) at 2019 1711 Last data filed at 2019 1349 Gross per 24 hour Intake 1808 ml Output 300 ml Net 1508 ml General:          Alert, cooperative, moderate distress, appears stated age. Lungs:             diffusely mildly diminished Heart:              Regular rate and rhythm,  no murmur, rub or gallop. Abdomen:        Soft, non-tender. Not distended.   Bowel sounds normal.  
 Extremities:     No cyanosis. No edema. No clubbing Skin:                Texture, turgor normal. No rashes or lesions. Not Jaundiced Neurologic:      Alert and oriented x 3, no focal deficits ASSESSMENT Active Hospital Problems Diagnosis Date Noted  Acute renal failure (ARF) ([facility] [address] 04/22/2019  Sepsis ([facility] [address] 04/22/2019  Community acquired bacterial pneumonia 04/22/2019  Prolonged Q-T interval on ECG 04/22/2019  Hypokalemia 08/18/2016  Acquired hypothyroidism 01/16/2016 Plan: 
 
Cont current rx for CAP and possibly undiagnosed copd Titrate O2 to off  As tolerated Gokul resolved # Sepsis secondary to CAP 
- vitals improved, BCx NGTD 
- discontinue and avoid azithromycin due to QT prolongation 
- continue with ceftriaxone and doxycycline 
- continue with Solumedrol and jet nebulizers 
  
# Hypoxic respiratory failure due to CAP 
- not on supplemental oxygen at home 
- wean O2 as tolerated - patient likely has undiagnosed obstructive lung disease and warrants PFTs as outpatient 
- wean Solumedrol  
  
# Acute renal failure, likely prerenal due to poor po intake 
- resolved 
- avoid nephrotoxic meds 
  
# Prolonged QT interval on EKG 
- resolved 
- avoid any potential precipitating meds (quinolones/azithro, antiemetics, antipsychotics, etc.) 
  
# Acute on chronic hypokalemia 
- resolving 
- uncertain etiology, patient without any obvious GI symptoms and takes potassium on long-term basis (?related to thiazides?) - nephrology consult, appreciate recs 
- hold home Lasix and ACE/ARBs and thiazide 
  
# Hypothyroidism 
- continue with home levothyroxine 
  
F/E/N: discontinue fluids, replete electrolytes as needed, cardiac diet 
  
Ppx: heparin SQ for VTE 
  
Code Status: FULL CODE 
  
Disposition: Pending clinical improvement with plan as above, likely home after hospital discharge. PT/OT consults. PPD ordered. All questions answered.   
 
Signed By: Elijah Moya MD   
 April 26, 2019

## 2019-04-27 ENCOUNTER — APPOINTMENT (OUTPATIENT)
Dept: GENERAL RADIOLOGY | Age: 79
DRG: 194 | End: 2019-04-27
Attending: HOSPITALIST
Payer: MEDICARE

## 2019-04-27 LAB
BACTERIA SPEC CULT: NORMAL
BACTERIA SPEC CULT: NORMAL
POTASSIUM SERPL-SCNC: 5.2 MMOL/L (ref 3.5–5.1)
SERVICE CMNT-IMP: NORMAL
SERVICE CMNT-IMP: NORMAL

## 2019-04-27 PROCEDURE — 84132 ASSAY OF SERUM POTASSIUM: CPT

## 2019-04-27 PROCEDURE — 74011250636 HC RX REV CODE- 250/636: Performed by: HOSPITALIST

## 2019-04-27 PROCEDURE — 74011000258 HC RX REV CODE- 258: Performed by: INTERNAL MEDICINE

## 2019-04-27 PROCEDURE — 74011250637 HC RX REV CODE- 250/637: Performed by: HOSPITALIST

## 2019-04-27 PROCEDURE — 71045 X-RAY EXAM CHEST 1 VIEW: CPT

## 2019-04-27 PROCEDURE — 74011250637 HC RX REV CODE- 250/637: Performed by: INTERNAL MEDICINE

## 2019-04-27 PROCEDURE — 74011250637 HC RX REV CODE- 250/637: Performed by: FAMILY MEDICINE

## 2019-04-27 PROCEDURE — 65270000029 HC RM PRIVATE

## 2019-04-27 PROCEDURE — 74011250636 HC RX REV CODE- 250/636: Performed by: FAMILY MEDICINE

## 2019-04-27 PROCEDURE — 74011250636 HC RX REV CODE- 250/636: Performed by: INTERNAL MEDICINE

## 2019-04-27 PROCEDURE — 74011636637 HC RX REV CODE- 636/637: Performed by: HOSPITALIST

## 2019-04-27 PROCEDURE — 36415 COLL VENOUS BLD VENIPUNCTURE: CPT

## 2019-04-27 RX ORDER — FUROSEMIDE 10 MG/ML
40 INJECTION INTRAMUSCULAR; INTRAVENOUS ONCE
Status: COMPLETED | OUTPATIENT
Start: 2019-04-27 | End: 2019-04-27

## 2019-04-27 RX ORDER — PREDNISONE 20 MG/1
40 TABLET ORAL DAILY
Status: DISCONTINUED | OUTPATIENT
Start: 2019-04-27 | End: 2019-04-29

## 2019-04-27 RX ORDER — PREDNISONE 20 MG/1
20 TABLET ORAL DAILY
Status: DISCONTINUED | OUTPATIENT
Start: 2019-04-27 | End: 2019-04-27

## 2019-04-27 RX ADMIN — Medication 400 MG: at 08:28

## 2019-04-27 RX ADMIN — DOXYCYCLINE HYCLATE 100 MG: 100 CAPSULE ORAL at 05:28

## 2019-04-27 RX ADMIN — Medication 10 ML: at 05:29

## 2019-04-27 RX ADMIN — METHYLPREDNISOLONE SODIUM SUCCINATE 40 MG: 40 INJECTION, POWDER, FOR SOLUTION INTRAMUSCULAR; INTRAVENOUS at 08:28

## 2019-04-27 RX ADMIN — ENOXAPARIN SODIUM 40 MG: 40 INJECTION SUBCUTANEOUS at 15:27

## 2019-04-27 RX ADMIN — Medication 10 ML: at 08:28

## 2019-04-27 RX ADMIN — Medication 10 ML: at 21:15

## 2019-04-27 RX ADMIN — GUAIFENESIN AND CODEINE PHOSPHATE 10 ML: 10; 100 LIQUID ORAL at 21:15

## 2019-04-27 RX ADMIN — CEFTRIAXONE 2 G: 2 INJECTION, POWDER, FOR SOLUTION INTRAMUSCULAR; INTRAVENOUS at 15:29

## 2019-04-27 RX ADMIN — FUROSEMIDE 40 MG: 10 INJECTION, SOLUTION INTRAMUSCULAR; INTRAVENOUS at 15:28

## 2019-04-27 RX ADMIN — PREDNISONE 40 MG: 20 TABLET ORAL at 17:04

## 2019-04-27 RX ADMIN — GUAIFENESIN AND CODEINE PHOSPHATE 10 ML: 10; 100 LIQUID ORAL at 08:28

## 2019-04-27 RX ADMIN — GUAIFENESIN AND CODEINE PHOSPHATE 10 ML: 10; 100 LIQUID ORAL at 15:28

## 2019-04-27 RX ADMIN — HYDRALAZINE HYDROCHLORIDE 60 MG: 50 TABLET, FILM COATED ORAL at 04:00

## 2019-04-27 RX ADMIN — ASPIRIN 81 MG: 81 TABLET, COATED ORAL at 08:28

## 2019-04-27 RX ADMIN — LEVOTHYROXINE SODIUM 25 MCG: 50 TABLET ORAL at 05:28

## 2019-04-27 RX ADMIN — DOXYCYCLINE HYCLATE 100 MG: 100 CAPSULE ORAL at 17:04

## 2019-04-27 NOTE — PROGRESS NOTES
Problem: Pressure Injury - Risk of 
Goal: *Prevention of pressure injury Description Document Billy Scale and appropriate interventions in the flowsheet. Outcome: Progressing Towards Goal 
Note:  
Pressure Injury Interventions: Activity Interventions: Pressure redistribution bed/mattress(bed type) Mobility Interventions: Pressure redistribution bed/mattress (bed type) Nutrition Interventions: Document food/fluid/supplement intake Problem: Patient Education: Go to Patient Education Activity Goal: Patient/Family Education Outcome: Progressing Towards Goal

## 2019-04-27 NOTE — PROGRESS NOTES
Hospitalist Progress Note 2019 Admit Date: 2019 12:11 PM  
NAME: Nidia Zimmer :  1940 MRN:  [id number] Attending: Marie Fonseca DO 
PCP:  Razia Vallecillo MD 
 
SUBJECTIVE:  
 
69 yo CF with past history of hypothyroidism, HLD, HTN, chronic hypokalemia (uncertain etiology), past tobacco use, and TIA presents with worsening 2 week history of shortness of breath and cough. No known sick contacts, patient lives by herself at home. No associated chest pain, but with some fevers/chills and cough. No associated abdominal pain, nausea/vomiting, diarrhea, or constipation. She has never been diagnosed with any lung disorders. ED Course: CXR showing possible COPD changes and likely superimposed pneumonia. Serum potassium of 2.5 with SCr of 3.6 (baseline around 1.0-1.1). Procalcitonin of 0.5. WBC count of 17. EKG showing NSR with prolonged QT interval at 570. Blood cultures collected x2. Ceftriaxone and azithromycin ordered. Today, feels some improvement, less cough, still need 3L PHYSICAL EXAM  
 
Visit Vitals /73 Pulse 90 Temp 97.8 °F (36.6 °C) Resp 17 Ht 5' (1.524 m) Wt 69.8 kg (153 lb 14.1 oz) SpO2 91% BMI 30.05 kg/m² Temp (24hrs), Av.8 °F (36.6 °C), Min:97.4 °F (36.3 °C), Max:98.1 °F (36.7 °C) Oxygen Therapy O2 Sat (%): 91 % (19 1111) Pulse via Oximetry: 105 beats per minute (19 0939) O2 Device: Nasal cannula (19 09) O2 Flow Rate (L/min): 3 l/min (19 0939) Intake/Output Summary (Last 24 hours) at 2019 1446 Last data filed at 2019 1358 Gross per 24 hour Intake 1601 ml Output  Net 1601 ml General:          Alert, cooperative, moderate distress, appears stated age. Lungs:             diffusely mildly diminished Heart:              Regular rate and rhythm,  no murmur, rub or gallop. Abdomen:        Soft, non-tender. Not distended.   Bowel sounds normal.  
 Extremities:     No cyanosis. No edema. No clubbing Skin:                Texture, turgor normal. No rashes or lesions. Not Jaundiced Neurologic:      Alert and oriented x 3, no focal deficits ASSESSMENT Active Hospital Problems Diagnosis Date Noted  Acute renal failure (ARF) ([facility] [address] 04/22/2019  Sepsis ([facility] [address] 04/22/2019  Community acquired bacterial pneumonia 04/22/2019  Prolonged Q-T interval on ECG 04/22/2019  Hypokalemia 08/18/2016  Acquired hypothyroidism 01/16/2016 Plan: 
 
Cont Abx and prednisone for CAP and possibly undiagnosed copd Titrate O2 to off  As tolerated Gokul resolved Monitor chronic hypokalemia Dispo: Home w/ home health when off O2 # Sepsis secondary to CAP 
- vitals improved, BCx NGTD 
- discontinue and avoid azithromycin due to QT prolongation 
- continue with ceftriaxone and doxycycline 
- continue with Solumedrol and jet nebulizers 
  
# Hypoxic respiratory failure due to CAP 
- not on supplemental oxygen at home 
- wean O2 as tolerated - patient likely has undiagnosed obstructive lung disease and warrants PFTs as outpatient 
- wean Solumedrol  
  
# Acute renal failure, likely prerenal due to poor po intake 
- resolved 
- avoid nephrotoxic meds 
  
# Prolonged QT interval on EKG 
- resolved 
- avoid any potential precipitating meds (quinolones/azithro, antiemetics, antipsychotics, etc.) 
  
# Acute on chronic hypokalemia 
- resolving 
- uncertain etiology, patient without any obvious GI symptoms and takes potassium on long-term basis (?related to thiazides?) - nephrology consult, appreciate recs 
- hold home Lasix and ACE/ARBs and thiazide 
  
# Hypothyroidism 
- continue with home levothyroxine 
  
F/E/N: discontinue fluids, replete electrolytes as needed, cardiac diet 
  
Ppx: heparin SQ for VTE 
  
Code Status: FULL CODE 
  
Disposition: Pending clinical improvement with plan as above, likely home after hospital discharge. PT/OT consults. PPD ordered. All questions answered. Signed By: Ravinder Kuo MD   
 April 27, 2019

## 2019-04-27 NOTE — PROGRESS NOTES
Patient resting in bed, alert and oriented, cooperative with care, no visual S/S of pain or distress, eyes protruding, safety measures in place, call light within reach.

## 2019-04-27 NOTE — PROGRESS NOTES
End of shift report PM handoff RN: Hattie Councilman Pain:  No c/o pain during shift Neuro: A/Ox3. No c/o numbness or tingling Cardio:  S1/S2  WNL for specified parameters. Resp: 3LNC. Scattered wheezing bilaterally; diminshed in bases. Symmetric chest wall excursion. LAI  
 
GI/:  Voiding. Urine is yellow, clear. Last BM [id number] Diet: Cardiac regular. Tolerating diet, see I&O's for details. Ambulation:  Patient ambulates with assistance x 1. No falls during shift. Linen change: [id number] Additional info: Patient requires [facility] d/t LAI. Patient does not wear OXYGEN at home. Patient now a Daily weight and I&O's for IV lasix monitoring. Patient OOB in chair this shift.   
  
 
EOS handoff RN: Hattie Councilman

## 2019-04-27 NOTE — PROGRESS NOTES
Patient's blood pressure above normal parameters see MAR. Md Davis 18 paged and notified. Md orders \" give Hydralazine PO 60 mg for systolic blood pressure above 160'.

## 2019-04-27 NOTE — PROGRESS NOTES
Assumed care of patient. Assessment completed and documented, see docflow. Patient awake, sitting upright in bedside recliner. Patient A/Ox3, [place]. NAD noted at present. Respirations even and non labored on 3LNC. Verbalized understanding to call for needs. Call light within reach. Will continue to monitor.

## 2019-04-28 PROCEDURE — 65270000029 HC RM PRIVATE

## 2019-04-28 PROCEDURE — 74011250637 HC RX REV CODE- 250/637: Performed by: INTERNAL MEDICINE

## 2019-04-28 PROCEDURE — 77010033678 HC OXYGEN DAILY

## 2019-04-28 PROCEDURE — 74011636637 HC RX REV CODE- 636/637: Performed by: HOSPITALIST

## 2019-04-28 PROCEDURE — 74011000258 HC RX REV CODE- 258: Performed by: HOSPITALIST

## 2019-04-28 PROCEDURE — 74011250636 HC RX REV CODE- 250/636: Performed by: HOSPITALIST

## 2019-04-28 PROCEDURE — 94760 N-INVAS EAR/PLS OXIMETRY 1: CPT

## 2019-04-28 PROCEDURE — 74011250636 HC RX REV CODE- 250/636: Performed by: INTERNAL MEDICINE

## 2019-04-28 PROCEDURE — 77030027138 HC INCENT SPIROMETER -A

## 2019-04-28 PROCEDURE — 74011250637 HC RX REV CODE- 250/637: Performed by: HOSPITALIST

## 2019-04-28 RX ORDER — DOXYCYCLINE 100 MG/1
100 CAPSULE ORAL EVERY 12 HOURS
Status: DISCONTINUED | OUTPATIENT
Start: 2019-04-28 | End: 2019-04-30 | Stop reason: HOSPADM

## 2019-04-28 RX ORDER — CODEINE PHOSPHATE AND GUAIFENESIN 10; 100 MG/5ML; MG/5ML
10 SOLUTION ORAL
Status: DISCONTINUED | OUTPATIENT
Start: 2019-04-28 | End: 2019-04-30 | Stop reason: HOSPADM

## 2019-04-28 RX ADMIN — LEVOTHYROXINE SODIUM 25 MCG: 50 TABLET ORAL at 06:00

## 2019-04-28 RX ADMIN — GUAIFENESIN AND CODEINE PHOSPHATE 10 ML: 10; 100 LIQUID ORAL at 08:12

## 2019-04-28 RX ADMIN — PREDNISONE 40 MG: 20 TABLET ORAL at 08:13

## 2019-04-28 RX ADMIN — Medication 10 ML: at 22:22

## 2019-04-28 RX ADMIN — METHOTREXATE 12.5 MG: 2.5 TABLET ORAL at 08:26

## 2019-04-28 RX ADMIN — CEFTRIAXONE 2 G: 2 INJECTION, POWDER, FOR SOLUTION INTRAMUSCULAR; INTRAVENOUS at 15:12

## 2019-04-28 RX ADMIN — Medication 5 ML: at 15:13

## 2019-04-28 RX ADMIN — DOXYCYCLINE HYCLATE 100 MG: 100 CAPSULE ORAL at 17:20

## 2019-04-28 RX ADMIN — ENOXAPARIN SODIUM 40 MG: 40 INJECTION SUBCUTANEOUS at 15:12

## 2019-04-28 RX ADMIN — ASPIRIN 81 MG: 81 TABLET, COATED ORAL at 08:13

## 2019-04-28 RX ADMIN — Medication 10 ML: at 06:00

## 2019-04-28 RX ADMIN — Medication 400 MG: at 08:13

## 2019-04-28 RX ADMIN — DOXYCYCLINE HYCLATE 100 MG: 100 CAPSULE ORAL at 06:00

## 2019-04-28 NOTE — PROGRESS NOTES
Patient given Incentive Spirometer; educated and instructed. Teach back performed. Patient tolerated well.

## 2019-04-28 NOTE — PROGRESS NOTES
End of shift report PM handoff RN: Lesli Alejandra Pain:  No c/o pain during shift Neuro: A/Ox3. No c/o numbness or tingling Cardio:  S1/S2  WNL for specified parameters. Resp: 3LNC. Diminished with faint wheezing bilaterally. Symmetric chest wall excursion. LAI  
 
GI/:  Voiding. Urine is yellow, clear. Last BM [id number] Diet: Cardiac regular. Tolerating diet, see I&O's for details. Ambulation:  Patient ambulates with assistance x 1. No falls during shift. Linen change: [id number] Additional info:  Continue POC 
 
EOS handoff RN: Avril Kirkland

## 2019-04-28 NOTE — PROGRESS NOTES
Problem: Pressure Injury - Risk of 
Goal: *Prevention of pressure injury Description Document Billy Scale and appropriate interventions in the flowsheet. Outcome: Progressing Towards Goal 
Note:  
Pressure Injury Interventions: Activity Interventions: Increase time out of bed, Pressure redistribution bed/mattress(bed type), PT/OT evaluation Mobility Interventions: HOB 30 degrees or less, Pressure redistribution bed/mattress (bed type), PT/OT evaluation Nutrition Interventions: Document food/fluid/supplement intake Problem: Patient Education: Go to Patient Education Activity Goal: Patient/Family Education Outcome: Progressing Towards Goal

## 2019-04-28 NOTE — PROGRESS NOTES
Hospitalist Progress Note 2019 Admit Date: 2019 12:11 PM  
NAME: Colleen Cruz :  1940 MRN:  [id number] Attending: Jeanie Bermudez DO 
PCP:  Guero Stroud MD 
 
SUBJECTIVE:  
 
69 yo CF with past history of hypothyroidism, HLD, HTN, chronic hypokalemia (uncertain etiology), past tobacco use, and TIA presents with worsening 2 week history of shortness of breath and cough. No known sick contacts, patient lives by herself at home. No associated chest pain, but with some fevers/chills and cough. No associated abdominal pain, nausea/vomiting, diarrhea, or constipation. She has never been diagnosed with any lung disorders. ED Course: CXR showing possible COPD changes and likely superimposed pneumonia. Serum potassium of 2.5 with SCr of 3.6 (baseline around 1.0-1.1). Procalcitonin of 0.5. WBC count of 17. EKG showing NSR with prolonged QT interval at 570. Blood cultures collected x2. Ceftriaxone and azithromycin ordered. Today, feels some improvement, less cough, still need 3L, no ac events PHYSICAL EXAM  
 
Visit Vitals /73 Pulse 82 Temp 97.9 °F (36.6 °C) Resp 20 Ht 5' (1.524 m) Wt 78.9 kg (173 lb 15.1 oz) SpO2 97% BMI 33.97 kg/m² Temp (24hrs), Av.7 °F (36.5 °C), Min:97.5 °F (36.4 °C), Max:97.9 °F (36.6 °C) Oxygen Therapy O2 Sat (%): 97 % (19 1633) Pulse via Oximetry: 95 beats per minute (19 1633) O2 Device: Nasal cannula (19 1633) O2 Flow Rate (L/min): 3 l/min (19 1633) Intake/Output Summary (Last 24 hours) at 2019 1728 Last data filed at 2019 1344 Gross per 24 hour Intake 1304 ml Output 800 ml Net 504 ml General:          Alert, cooperative, moderate distress, appears stated age. Lungs:             diffusely mildly diminished Heart:              Regular rate and rhythm,  no murmur, rub or gallop. Abdomen:        Soft, non-tender. Not distended.   Bowel sounds normal.  
 Extremities:     No cyanosis. No edema. No clubbing Skin:                Texture, turgor normal. No rashes or lesions. Not Jaundiced Neurologic:      Alert and oriented x 3, no focal deficits ASSESSMENT Active Hospital Problems Diagnosis Date Noted  Acute renal failure (ARF) ([facility] 04/22/2019  Sepsis ([facility] 04/22/2019  Community acquired bacterial pneumonia 04/22/2019  Prolonged Q-T interval on ECG 04/22/2019  Hypokalemia 08/18/2016  Acquired hypothyroidism 01/16/2016 Plan: 
 
Cont Abx and prednisone for CAP and possibly undiagnosed copd Titrate O2 to off  As tolerated- do not use O2 at home, still on 3L today Gokul resolved, on CKD stage 2 Sepsis on admission, due to PNA, resolved Monitor chronic hypokalemia Dispo: Home w/ home health when off O2 Signed By: Angy Rodrigues MD   
 April 28, 2019

## 2019-04-28 NOTE — PROGRESS NOTES
Assumed care of patient. Assessment completed and documented, see docflow. Patient awake in bed. A/Ox3. LAI to bathroom. Bilateral wheezing on 3LNC. Encouraged to call for needs. Call light within reach. Will continue to monitor.

## 2019-04-28 NOTE — PROGRESS NOTES
Patient resting in bed, alert and oriented, cooperative with care, patient denies pain or distress, no SOB noted at this time, safety measures in place, call light within reach.

## 2019-04-29 LAB — POTASSIUM SERPL-SCNC: 4.4 MMOL/L (ref 3.5–5.1)

## 2019-04-29 PROCEDURE — 74011250637 HC RX REV CODE- 250/637: Performed by: INTERNAL MEDICINE

## 2019-04-29 PROCEDURE — 84132 ASSAY OF SERUM POTASSIUM: CPT

## 2019-04-29 PROCEDURE — 97110 THERAPEUTIC EXERCISES: CPT

## 2019-04-29 PROCEDURE — 97530 THERAPEUTIC ACTIVITIES: CPT

## 2019-04-29 PROCEDURE — 65270000029 HC RM PRIVATE

## 2019-04-29 PROCEDURE — 74011000258 HC RX REV CODE- 258: Performed by: HOSPITALIST

## 2019-04-29 PROCEDURE — 74011250636 HC RX REV CODE- 250/636: Performed by: HOSPITALIST

## 2019-04-29 PROCEDURE — 36415 COLL VENOUS BLD VENIPUNCTURE: CPT

## 2019-04-29 PROCEDURE — 74011636637 HC RX REV CODE- 636/637: Performed by: HOSPITALIST

## 2019-04-29 PROCEDURE — 74011250637 HC RX REV CODE- 250/637: Performed by: HOSPITALIST

## 2019-04-29 RX ORDER — PREDNISONE 20 MG/1
20 TABLET ORAL DAILY
Status: DISCONTINUED | OUTPATIENT
Start: 2019-04-30 | End: 2019-04-30 | Stop reason: HOSPADM

## 2019-04-29 RX ADMIN — Medication 10 ML: at 05:30

## 2019-04-29 RX ADMIN — ASPIRIN 81 MG: 81 TABLET, COATED ORAL at 08:38

## 2019-04-29 RX ADMIN — CEFTRIAXONE 2 G: 2 INJECTION, POWDER, FOR SOLUTION INTRAMUSCULAR; INTRAVENOUS at 15:52

## 2019-04-29 RX ADMIN — GUAIFENESIN AND CODEINE PHOSPHATE 10 ML: 10; 100 LIQUID ORAL at 08:40

## 2019-04-29 RX ADMIN — ENOXAPARIN SODIUM 40 MG: 40 INJECTION SUBCUTANEOUS at 14:46

## 2019-04-29 RX ADMIN — Medication 5 ML: at 14:46

## 2019-04-29 RX ADMIN — DOXYCYCLINE HYCLATE 100 MG: 100 CAPSULE ORAL at 05:29

## 2019-04-29 RX ADMIN — Medication 5 ML: at 22:14

## 2019-04-29 RX ADMIN — LEVOTHYROXINE SODIUM 25 MCG: 50 TABLET ORAL at 05:29

## 2019-04-29 RX ADMIN — PREDNISONE 40 MG: 20 TABLET ORAL at 08:38

## 2019-04-29 RX ADMIN — DOXYCYCLINE HYCLATE 100 MG: 100 CAPSULE ORAL at 17:23

## 2019-04-29 RX ADMIN — Medication 400 MG: at 08:38

## 2019-04-29 NOTE — PROGRESS NOTES
Pt lying in bed on 3 L NC. Pt is alert and oriented with no complaints at this time. Pt instructed to call for assistance, call light in reach

## 2019-04-29 NOTE — PROGRESS NOTES
Problem: Mobility Impaired (Adult and Pediatric) Goal: *Acute Goals and Plan of Care (Insert Text) Description LTG: 
(1.)Ms. Karon Diaz will transfer from bed to chair and chair to bed with INDEPENDENT using the least restrictive/no device within 7 day(s). (2.)Ms. Almeida will ambulate with modified INDEPENDENCE for 300+ feet with spO2 >89% with the least restrictive/no device within 7 day(s). (3.)Ms. Karon Diaz will perform standing static and dynamic balance activities x 8 minutes with SUPERVISION to improve safety within 7 day(s). (4.)Ms. Almeida will ascend and descend 4-6 stairs using one hand rail(s) with SUPERVISION to improve functional mobility and safety within 7 day(s). Outcome: Progressing Towards Goal 
  
PHYSICAL THERAPY: Daily Note and AM 4/29/2019 INPATIENT: PT Visit Days : 4 Payor: Sanna Rondon / Plan: [address] [place] [address] HMO / Product Type: [employer] Medicare /   
  
NAME/AGE/GENDER: Rehana Lund is a 70 y.o. female PRIMARY DIAGNOSIS: Acute renal failure (ARF) (HCC) [N17.9] Acute renal failure (ARF) (HCC) Acute renal failure (ARF) (HCC) ICD-10: Treatment Diagnosis:  
 · Other abnormalities of gait and mobility (R26.89) Precaution/Allergies: 
Patient has no known allergies. ASSESSMENT:  
Ms. Karon Diaz was sitting up in recliner chair upon contact and agreeable to PT. Patient transfers to standing with SBA-supervision. Patient is very talkative who jokes a lot throughout treatment almost to the point of being inappropriate (stating \"titties\" loudly in the hallway). Educated patient to lower voice as people were attempting to rest in the hospital. Once standing patient ambulates 250' with rolling walker, 3L O2, CGA and occasional cues for sequencing with rolling walker. O2 sats maintained 99% on 3L RN notified.  Patient returns to recliner chair where she then participates in therapeutic strengthening exercises to improve functional strength for transfers, gait and overall mobility. Patient requires cues to perform exercises correctly. Overall slow progress towards physical therapy goals. Patient's goals listed above are still appropriate. Will continue skilled PT to address remaining deficits. This section established at most recent assessment PROBLEM LIST (Impairments causing functional limitations): 1. Decreased Transfer Abilities 2. Decreased Ambulation Ability/Technique 3. Decreased Activity Tolerance 4. Decreased Pacing Skills 5. Increased Fatigue 6. Increased Shortness of Breath INTERVENTIONS PLANNED: (Benefits and precautions of physical therapy have been discussed with the patient.) 1. Gait Training 2. Therapeutic Activites 3. Therapeutic Exercise/Strengthening 4. Transfer Training 5. education TREATMENT PLAN: Frequency/Duration: 3 times a week for duration of hospital stay Rehabilitation Potential For Stated Goals: Excellent REHAB RECOMMENDATIONS (at time of discharge pending progress):   
Placement: It is my opinion, based on this patient's performance to date, that Ms. David Olsen may benefit from [address] after discharge due to her functional deficits (listed above) that are likely to improve with skilled rehabilitation because she has multiple medical issues that affect her functional mobility in the community. May have no needs at time of discharge. Equipment:  
? None at this time HISTORY:  
History of Present Injury/Illness (Reason for Referral): 
Per MD note, \"75 yo CF with past history of hypothyroidism, HLD, HTN, chronic hypokalemia (uncertain etiology), past tobacco use, and TIA presents with worsening 2 week history of shortness of breath and cough. No known sick contacts, patient lives by herself at home. No associated chest pain, but with some fevers/chills and cough. No associated abdominal pain, nausea/vomiting, diarrhea, or constipation.  She has never been diagnosed with any lung disorders. ED Course: CXR showing possible COPD changes and likely superimposed pneumonia. Serum potassium of 2.5 with SCr of 3.6 (baseline around 1.0-1.1). Procalcitonin of 0.5. WBC count of 17. EKG showing NSR with prolonged QT interval at 570. Blood cultures collected x2. Ceftriaxone and azithromycin ordered. \" 
Past Medical History/Comorbidities: Ms. Trina Beverly  has a past medical history of Acquired hypothyroidism (1/16/2016), [facility] (2008), Depression (8/18/2016), Endocrine disease, Fungal dermatitis (8/18/2016), Hyperlipidemia (1/16/2016), Hypertension, essential, benign (8/18/2016), Hypokalemia (8/18/2016), Inflamed sebaceous cyst (8/18/2016), Major depressive disorder, recurrent, moderate ([facility] (8/18/2016), Nicotine dependence, cigarettes, uncomplicated (8/01/0440), Osteopenia (8/18/2016), Osteoporosis (8/18/2016), Radiation therapy complication (0645), Rheumatoid arthritis ([facility] (1/16/2016), Right carotid bruit (1/16/2016), TIA (transient ischemic attack) (1/16/2016), and Tobacco abuse (8/18/2016). Ms. Trina Beverly  has a past surgical history that includes hx tonsillectomy; hx adenoidectomy; and hx breast lumpectomy (Right, 2008). Social History/Living Environment:  
Home Environment: Trailer/mobile home # Steps to Enter: 6 Rails to Enter: Yes Hand Rails : Bilateral 
One/Two Story Residence: One story Living Alone: Yes Support Systems: None Patient Expects to be Discharged to[de-identified] Private residence Current DME Used/Available at Home: Cane, straight Tub or Shower Type: Tub/Shower combination Prior Level of Function/Work/Activity: 
Lives alone, independent in home and community. Uses cane occasionally. Number of Personal Factors/Comorbidities that affect the Plan of Care: 3+: HIGH COMPLEXITY EXAMINATION:  
Most Recent Physical Functioning:  
Gross Assessment: 
  
         
  
Posture: 
  
Balance: 
Sitting: Intact Standing: Impaired Standing - Static: Good Standing - Dynamic : Fair Bed Mobility: 
  
Wheelchair Mobility: 
  
Transfers: 
Sit to Stand: Stand-by assistance;Supervision Stand to Sit: Stand-by assistance;Supervision Gait: 
  
Base of Support: Widened Speed/Kelsea: Shuffled; Slow Step Length: Left shortened;Right shortened Gait Abnormalities: Decreased step clearance Distance (ft): 250 Feet (ft) Assistive Device: Walker, rolling Ambulation - Level of Assistance: Contact guard assistance Interventions: Safety awareness training; Tactile cues; Verbal cues Body Structures Involved: 1. Lungs 2. Metabolic Body Functions Affected: 1. Respiratory 2. Movement Related 3. Metobolic/Endocrine Activities and Participation Affected: 1. Mobility 2. Self Care 3. Domestic Life 4. Community, Social and [place] Number of elements that affect the Plan of Care: 4+: HIGH COMPLEXITY CLINICAL PRESENTATION:  
Presentation: Stable and uncomplicated: LOW COMPLEXITY CLINICAL DECISION MAKING:  
[employer] 6 Clicks Basic Mobility Inpatient Short Form How much difficulty does the patient currently have. .. Unable A Lot A Little None 1. Turning over in bed (including adjusting bedclothes, sheets and blankets)? ? 1   ? 2   ? 3   ? 4  
2. Sitting down on and standing up from a chair with arms ( e.g., wheelchair, bedside commode, etc.)   ? 1   ? 2   ? 3   ? 4  
3. Moving from lying on back to sitting on the side of the bed?   ? 1   ? 2   ? 3   ? 4 How much help from another person does the patient currently need. .. Total A Lot A Little None 4. Moving to and from a bed to a chair (including a wheelchair)? ? 1   ? 2   ? 3   ? 4  
5. Need to walk in hospital room? ? 1   ? 2   ? 3   ? 4  
6. Climbing 3-5 steps with a railing? ? 1   ? 2   ? 3   ? 4  
© 2007, Trustees of [facility] [employer], under license to [employer]. All rights reserved Score:  Initial: 21 Most Recent: X (Date: -- ) Interpretation of Tool:  Represents activities that are increasingly more difficult (i.e. Bed mobility, Transfers, Gait). Medical Necessity:    
· Patient is expected to demonstrate progress in functional technique and activity tolerance. ·  to increase independence with   and improve safety during all functional mobility. · . Reason for Services/Other Comments: 
· Patient continues to require skilled intervention due to medical complications · . Use of outcome tool(s) and clinical judgement create a POC that gives a: Clear prediction of patient's progress: LOW COMPLEXITY  
  
 
 
 
TREATMENT:  
(In addition to Assessment/Re-Assessment sessions the following treatments were rendered) Pre-treatment Symptoms/Complaints:  none. Pain: Initial:  
Pain Intensity 1: 0  Post Session:  0 Therapeutic Activity: (    10 minutes): Therapeutic activities including  transfer training, ambulation on level ground, static/dynamic standing balance, instruction in sequencing with rolling walker, posture training, and patient education to improve mobility, strength and balance. Required minimal Safety awareness training; Tactile cues; Verbal cues to promote static and dynamic balance in standing, promote coordination of bilateral, lower extremity(s) and to promote improved pursed lipped breathing. Therapeutic Exercise: (  15 Minutes):  Exercises per grid below to improve mobility, strength and balance. Required moderate visual and verbal cues to promote proper body alignment, promote proper body posture and promote proper body mechanics. Progressed range, repetitions and complexity of movement as indicated. Date: 
4/25/19 Date: 
4/29/19 Date: 
  
ACTIVITY/EXERCISE AM PM AM PM AM PM  
Ambulation:           Distance Device Duration Seated Heel Raises 2x15B A  2x15B A Seated Toe Raises 2x15B A  2x15B A Seated Long Arc Quads 2x15B A  2x15B A Seated Marching 2x15B A  2x15B A     
 Seated Hip Abduction 2x15B A  2x15B A     
        
B = bilateral; AA = active assistive; A = active; P = passive Braces/Orthotics/Lines/Etc:  
· O2 Device: Nasal cannula Treatment/Session Assessment:   
· Response to Treatment:  See above · Interdisciplinary Collaboration:  
o Physical Therapy Assistant 
o Registered Nurse · After treatment position/precautions:  
o Up in chair 
o Bed/Chair-wheels locked 
o Call light within reach 
o RN notified 
o Visitors at bedside · Compliance with Program/Exercises: Will assess as treatment progresses · Recommendations/Intent for next treatment session: \"Next visit will focus on advancements to more challenging activities and reduction in assistance provided\". Total Treatment Duration: PT Patient Time In/Time Out Time In: 1109 Time Out: 1132 Lucinda Yung, PTA

## 2019-04-29 NOTE — PROGRESS NOTES
Hospitalist Progress Note 2019 Admit Date: 2019 12:11 PM  
NAME: Ivanna Rod :  1940 MRN:  [id number] Attending: Mil Baez DO 
PCP:  Marianna Castro MD 
 
SUBJECTIVE:  
 
69 yo CF with past history of hypothyroidism, HLD, HTN, chronic hypokalemia (uncertain etiology), past tobacco use, and TIA presents with worsening 2 week history of shortness of breath and cough. No known sick contacts, patient lives by herself at home. No associated chest pain, but with some fevers/chills and cough. No associated abdominal pain, nausea/vomiting, diarrhea, or constipation. She has never been diagnosed with any lung disorders. ED Course: CXR showing possible COPD changes and likely superimposed pneumonia. Serum potassium of 2.5 with SCr of 3.6 (baseline around 1.0-1.1). Procalcitonin of 0.5. WBC count of 17. EKG showing NSR with prolonged QT interval at 570. Blood cultures collected x2. Ceftriaxone and azithromycin ordered. : Joaquim Cornejo, still on 2L but oxygen levels in high 90s, no shortness of breath. Cough improving. PHYSICAL EXAM  
 
Visit Vitals /76 Pulse 80 Temp 98.3 °F (36.8 °C) Resp 19 Ht 5' (1.524 m) Wt 81.7 kg (180 lb 1.9 oz) SpO2 97% BMI 35.18 kg/m² Temp (24hrs), Av.2 °F (36.8 °C), Min:97.8 °F (36.6 °C), Max:98.4 °F (36.9 °C) Oxygen Therapy O2 Sat (%): 97 % (19 1212) Pulse via Oximetry: 95 beats per minute (19 1633) O2 Device: Nasal cannula(weaned from 3L to [facility]) (19 1212) O2 Flow Rate (L/min): 2 l/min (19 1212) Intake/Output Summary (Last 24 hours) at 2019 1221 Last data filed at 2019 6792 Gross per 24 hour Intake 1190 ml Output 600 ml Net 590 ml General:          Alert, cooperative, no distress, appears stated age. HEENT:  exophthalmos Lungs:             mildly diminished, no wheeze/crackle Heart:              Regular rate and rhythm,  no murmur, rub or gallop. Abdomen:        Soft, non-tender. Not distended. Bowel sounds normal.  
Extremities:     No cyanosis. No edema. No clubbing Skin:                Texture, turgor normal. No rashes or lesions. Not Jaundiced Neurologic:      Alert and oriented x 3, no focal deficits ASSESSMENT Active Hospital Problems Diagnosis Date Noted  Acute renal failure (ARF) ([facility] 04/22/2019  Sepsis ([facility] 04/22/2019  Community acquired bacterial pneumonia 04/22/2019  Prolonged Q-T interval on ECG 04/22/2019  Hypokalemia 08/18/2016  Acquired hypothyroidism 01/16/2016 Plan: 
 
Finished 7d doxy/rocephin for CAP Wean steroids for likely undiagnosed copd Wean oxygen as tolerated- does not use O2 at home Gokul resolved, on CKD stage 2 Sepsis on admission, due to PNA, resolved Monitor chronic hypokalemia Checking TSH for exophthalmos and hypomanic affect Dispo: Home w/ home health when off O2 Signed By: Magy Banks MD   
 April 29, 2019

## 2019-04-29 NOTE — PROGRESS NOTES
Problem: Pressure Injury - Risk of 
Goal: *Prevention of pressure injury Description Document Billy Scale and appropriate interventions in the flowsheet. Outcome: Progressing Towards Goal 
Note:  
Pressure Injury Interventions: Activity Interventions: Increase time out of bed, Pressure redistribution bed/mattress(bed type), PT/OT evaluation Mobility Interventions: HOB 30 degrees or less, Pressure redistribution bed/mattress (bed type), PT/OT evaluation Nutrition Interventions: Document food/fluid/supplement intake

## 2019-04-29 NOTE — PROGRESS NOTES
Pt up in chair, watching tv. Pt slept well throughout the night, with no complaints at this time. Pt still on 3 L NC, NAD. Call light in reach

## 2019-04-29 NOTE — INTERDISCIPLINARY ROUNDS
Interdisciplinary team rounds were held 4/29/2019 with the following team members:Care Management, Physical Therapy, Physician and Clinical Coordinator and the patient. Plan of care discussed. See clinical pathway and/or care plan for interventions and desired outcomes.

## 2019-04-29 NOTE — PROGRESS NOTES
Assumed care of patient. Assessment completed and documented, see docflow. Patient awake, sitting upright in bedside recliner, watching TV. NAD noted at present. Respirations even and non labored on 3LNC. Verbalized understanding to call for needs. Call light within reach. Will continue to monitor.

## 2019-04-29 NOTE — PROGRESS NOTES
Anticipating discharge home with home health. Case Management will continue to follow. Care Management Interventions PCP Verified by CM: Yes(Alondra Will, ) Mode of Transport at Discharge: Other (see comment) Transition of Care Consult (CM Consult): Discharge Planning Discharge Durable Medical Equipment: No 
Physical Therapy Consult: No 
Occupational Therapy Consult: No 
Speech Therapy Consult: No 
Current Support Network: Own Home, Lives Alone Confirm Follow Up Transport: Family Plan discussed with Pt/Family/Caregiver: Yes Freedom of Choice Offered: Yes [employer] Information Provided?: No 
Discharge Location Discharge Placement: Home with home health

## 2019-04-30 VITALS
SYSTOLIC BLOOD PRESSURE: 149 MMHG | OXYGEN SATURATION: 98 % | HEIGHT: 60 IN | WEIGHT: 182.32 LBS | DIASTOLIC BLOOD PRESSURE: 62 MMHG | BODY MASS INDEX: 35.79 KG/M2 | RESPIRATION RATE: 20 BRPM | HEART RATE: 99 BPM | TEMPERATURE: 98.6 F

## 2019-04-30 LAB
ANION GAP SERPL CALC-SCNC: 7 MMOL/L (ref 7–16)
BUN SERPL-MCNC: 36 MG/DL (ref 8–23)
CALCIUM SERPL-MCNC: 8.7 MG/DL (ref 8.3–10.4)
CHLORIDE SERPL-SCNC: 110 MMOL/L (ref 98–107)
CO2 SERPL-SCNC: 26 MMOL/L (ref 21–32)
CREAT SERPL-MCNC: 1.07 MG/DL (ref 0.6–1)
ERYTHROCYTE [DISTWIDTH] IN BLOOD BY AUTOMATED COUNT: 14.9 % (ref 11.9–14.6)
GLUCOSE SERPL-MCNC: 105 MG/DL (ref 65–100)
HCT VFR BLD AUTO: 42.1 % (ref 35.8–46.3)
HGB BLD-MCNC: 12.5 G/DL (ref 11.7–15.4)
MCH RBC QN AUTO: 31.2 PG (ref 26.1–32.9)
MCHC RBC AUTO-ENTMCNC: 29.7 G/DL (ref 31.4–35)
MCV RBC AUTO: 105 FL (ref 79.6–97.8)
NRBC # BLD: 0 K/UL (ref 0–0.2)
PLATELET # BLD AUTO: 263 K/UL (ref 150–450)
PMV BLD AUTO: 11.2 FL (ref 9.4–12.3)
POTASSIUM SERPL-SCNC: 4.5 MMOL/L (ref 3.5–5.1)
RBC # BLD AUTO: 4.01 M/UL (ref 4.05–5.2)
SODIUM SERPL-SCNC: 143 MMOL/L (ref 136–145)
TSH SERPL DL<=0.005 MIU/L-ACNC: 5.13 UIU/ML (ref 0.36–3.74)
WBC # BLD AUTO: 12.1 K/UL (ref 4.3–11.1)

## 2019-04-30 PROCEDURE — 80048 BASIC METABOLIC PNL TOTAL CA: CPT

## 2019-04-30 PROCEDURE — 36415 COLL VENOUS BLD VENIPUNCTURE: CPT

## 2019-04-30 PROCEDURE — 84443 ASSAY THYROID STIM HORMONE: CPT

## 2019-04-30 PROCEDURE — 94761 N-INVAS EAR/PLS OXIMETRY MLT: CPT

## 2019-04-30 PROCEDURE — 85027 COMPLETE CBC AUTOMATED: CPT

## 2019-04-30 PROCEDURE — 74011250637 HC RX REV CODE- 250/637: Performed by: INTERNAL MEDICINE

## 2019-04-30 PROCEDURE — 74011636637 HC RX REV CODE- 636/637: Performed by: FAMILY MEDICINE

## 2019-04-30 PROCEDURE — 97110 THERAPEUTIC EXERCISES: CPT

## 2019-04-30 PROCEDURE — 74011250637 HC RX REV CODE- 250/637: Performed by: HOSPITALIST

## 2019-04-30 PROCEDURE — 97530 THERAPEUTIC ACTIVITIES: CPT

## 2019-04-30 RX ORDER — PREDNISONE 10 MG/1
20 TABLET ORAL DAILY
Qty: 4 TAB | Refills: 0 | Status: SHIPPED | OUTPATIENT
Start: 2019-05-01 | End: 2021-03-19

## 2019-04-30 RX ADMIN — PREDNISONE 20 MG: 20 TABLET ORAL at 09:53

## 2019-04-30 RX ADMIN — ASPIRIN 81 MG: 81 TABLET, COATED ORAL at 09:53

## 2019-04-30 RX ADMIN — Medication 10 ML: at 06:24

## 2019-04-30 RX ADMIN — Medication 400 MG: at 09:53

## 2019-04-30 RX ADMIN — LEVOTHYROXINE SODIUM 25 MCG: 50 TABLET ORAL at 06:23

## 2019-04-30 RX ADMIN — DOXYCYCLINE HYCLATE 100 MG: 100 CAPSULE ORAL at 06:23

## 2019-04-30 NOTE — PROGRESS NOTES
Patient is discharging home today. She agrees to follow up by [facility] for RN and PT. She ambulates with a cane PRN. Patient denies any other discharge planning needs. Case Management will remain available to assist as needed. Care Management Interventions PCP Verified by CM: Yes(Emerita Will, ) Mode of Transport at Discharge: Other (see comment) Transition of Care Consult (CM Consult): Discharge Planning Discharge Durable Medical Equipment: No 
Physical Therapy Consult: No 
Occupational Therapy Consult: No 
Speech Therapy Consult: No 
Current Support Network: Own Home, Lives Alone Confirm Follow Up Transport: Family Plan discussed with Pt/Family/Caregiver: Yes Freedom of Choice Offered: Yes [employer] Information Provided?: No 
Discharge Location Discharge Placement: Home with home health

## 2019-04-30 NOTE — DISCHARGE INSTRUCTIONS
Patient Education        Pneumonia: Care Instructions  Your Care Instructions    Pneumonia is an infection of the lungs. Most cases are caused by infections from bacteria or viruses. Pneumonia may be mild or very severe. If it is caused by bacteria, you will be treated with antibiotics. It may take a few weeks to a few months to recover fully from pneumonia, depending on how sick you were and whether your overall health is good. Follow-up care is a key part of your treatment and safety. Be sure to make and go to all appointments, and call your doctor if you are having problems. It's also a good idea to know your test results and keep a list of the medicines you take. How can you care for yourself at home? · Take your antibiotics exactly as directed. Do not stop taking the medicine just because you are feeling better. You need to take the full course of antibiotics. · Take your medicines exactly as prescribed. Call your doctor if you think you are having a problem with your medicine. · Get plenty of rest and sleep. You may feel weak and tired for a while, but your energy level will improve with time. · To prevent dehydration, drink plenty of fluids, enough so that your urine is light yellow or clear like water. Choose water and other caffeine-free clear liquids until you feel better. If you have kidney, heart, or liver disease and have to limit fluids, talk with your doctor before you increase the amount of fluids you drink. · Take care of your cough so you can rest. A cough that brings up mucus from your lungs is common with pneumonia. It is one way your body gets rid of the infection. But if coughing keeps you from resting or causes severe fatigue and chest-wall pain, talk to your doctor. He or she may suggest that you take a medicine to reduce the cough. · Use a vaporizer or humidifier to add moisture to your bedroom. Follow the directions for cleaning the machine.   · Do not smoke or allow others to smoke around you. Smoke will make your cough last longer. If you need help quitting, talk to your doctor about stop-smoking programs and medicines. These can increase your chances of quitting for good. · Take an over-the-counter pain medicine, such as acetaminophen (Tylenol), ibuprofen (Advil, Motrin), or naproxen (Aleve). Read and follow all instructions on the label. · Do not take two or more pain medicines at the same time unless the doctor told you to. Many pain medicines have acetaminophen, which is Tylenol. Too much acetaminophen (Tylenol) can be harmful. · If you were given a spirometer to measure how well your lungs are working, use it as instructed. This can help your doctor tell how your recovery is going. · To prevent pneumonia in the future, talk to your doctor about getting a flu vaccine (once a year) and a pneumococcal vaccine (one time only for most people). When should you call for help? Call 911 anytime you think you may need emergency care. For example, call if:    · You have severe trouble breathing.    Call your doctor now or seek immediate medical care if:    · You cough up dark brown or bloody mucus (sputum).     · You have new or worse trouble breathing.     · You are dizzy or lightheaded, or you feel like you may faint.    Watch closely for changes in your health, and be sure to contact your doctor if:    · You have a new or higher fever.     · You are coughing more deeply or more often.     · You are not getting better after 2 days (48 hours).     · You do not get better as expected. Where can you learn more? Go to http://zachariah-betty.info/. Enter [id number] in the search box to learn more about \"Pneumonia: Care Instructions. \"  Current as of: September 5, 2018  Content Version: 11.9  © 0159-7503 [employer]. Care instructions adapted under license by [employer] (which disclaims liability or warranty for this information).  If you have questions about a medical condition or this instruction, always ask your healthcare professional. [place] [address] any warranty or liability for your use of this information. Patient Education        Acute Kidney Injury: Care Instructions  Your Care Instructions    Acute kidney injury (NAZIA) is a sudden decrease in kidney function. This can happen over a period of hours, days or, in some cases, weeks. NAZIA used to be called acute renal failure, but kidney failure doesn't always happen with NAZIA. Common causes of NAZIA are dehydration, blood loss, and medicines. When NAZIA happens, the kidneys have trouble removing waste and excess fluids from the body. The waste and fluids build up and become harmful. Kidney function may return to normal if the cause of NAZIA is treated quickly. Your chance of a full recovery depends on what caused the problem, how quickly the cause was treated, and what other medical problems you have. A machine may be used to help your kidneys remove waste and fluids for a short period of time. This is called dialysis. Follow-up care is a key part of your treatment and safety. Be sure to make and go to all appointments, and call your doctor if you are having problems. It's also a good idea to know your test results and keep a list of the medicines you take. How can you care for yourself at home? · Talk to your doctor about how much fluid you should drink. · Eat a balanced diet. Talk to your doctor or a dietitian about what type of diet may be best for you. You may need to limit sodium, potassium, and phosphorus. · If you need dialysis, follow the instructions and schedule for dialysis that your doctor gives you. · Do not smoke. Smoking can make your condition worse. If you need help quitting, talk to your doctor about stop-smoking programs and medicines. These can increase your chances of quitting for good. · Do not drink alcohol. · Review all of your medicines with your doctor.  Do not take any medicines, including nonsteroidal anti-inflammatory drugs (NSAIDs) such as ibuprofen (Advil, Motrin) or naproxen (Aleve), unless your doctor says it is safe for you to do so. · Make sure that anyone treating you for any health problem knows that you have had NAZIA. When should you call for help? Call 911 anytime you think you may need emergency care. For example, call if:    · You passed out (lost consciousness).    Call your doctor now or seek immediate medical care if:    · You have new or worse nausea and vomiting.     · You have much less urine than normal, or you have no urine.     · You are feeling confused or cannot think clearly.     · You have new or more blood in your urine.     · You have new swelling.     · You are dizzy or lightheaded, or feel like you may faint.    Watch closely for changes in your health, and be sure to contact your doctor if:    · You do not get better as expected. Where can you learn more? Go to http://zachariah-betty.info/. Enter X206 in the search box to learn more about \"Acute Kidney Injury: Care Instructions. \"  Current as of: March 14, 2018  Content Version: 11.9  © 1893-7677 [employer]. Care instructions adapted under license by [employer] (which disclaims liability or warranty for this information). If you have questions about a medical condition or this instruction, always ask your healthcare professional. [place] [address] any warranty or liability for your use of this information. Patient Education        Sepsis: Care Instructions  Your Care Instructions    Sepsis is an intense reaction to an infection. It can cause deadly damage to the body and lead to a dangerously low blood pressure. You may have inflammation across large areas of your body. It can damage tissue and even go deep into your organs. Infections that can lead to sepsis include:  · A skin infection such as from a cut.   · A lung infection like pneumonia. · A kidney infection. · A gut infection such as E. coli. It's important to care for yourself and try to avoid infections so that you don't get sepsis again. Follow-up care is a key part of your treatment and safety. Be sure to make and go to all appointments, and call your doctor if you are having problems. It's also a good idea to know your test results and keep a list of the medicines you take. How can you care for yourself at home? · If your doctor prescribed antibiotics, take them as directed. Do not stop taking them just because you feel better. You need to take the full course of antibiotics. · Help prevent infections that could lead to sepsis:  ? Try to avoid colds and flu. If you must be around people who have a cold or the flu, wash your hands often. And get a flu vaccine every year. ? Get a pneumococcal vaccine shot (to prevent pneumonia, meningitis, and other infections). If you have had one before, ask your doctor if you need another dose. ? Clean any wounds or scrapes. · Do not smoke or use other tobacco products. When you quit smoking, you are less likely to get a cold, the flu, bronchitis, and pneumonia. If you need help quitting, talk to your doctor about stop-smoking programs and medicines. These can increase your chances of quitting for good. · To prevent dehydration, drink plenty of fluids. Choose water and other caffeine-free clear liquids until you feel better. If you have kidney, heart, or liver disease and have to limit fluids, talk with your doctor before you increase the amount of fluids you drink. · Eat a healthy diet. Include fruits, vegetables, and whole grains in your diet every day. · If your doctor recommends it, try doing some physical activity. Walking is a good choice. Bit by bit, increase the amount you walk every day. When should you call for help? [id number] anytime you think you may need emergency care.  For example, call if:    · You passed out (lost consciousness).    Call your doctor now or seek immediate medical care if:    · You have symptoms of sepsis. These may include:  ? Shortness of breath. ? A fast heart rate. ? Cool, pale, or clammy skin. ? Feeling confused.     · You are dizzy or lightheaded, or you feel like you may faint.     · You have a fever or chills.    Watch closely for changes in your health, and be sure to contact your doctor if:    · You do not get better as expected. Where can you learn more? Go to http://zachariah-betty.info/. Enter I618 in the search box to learn more about \"Sepsis: Care Instructions. \"  Current as of: September 23, 2018  Content Version: 11.9  © 6069-6008 [employer]. Care instructions adapted under license by [employer] (which disclaims liability or warranty for this information). If you have questions about a medical condition or this instruction, always ask your healthcare professional. [place] [address] any warranty or liability for your use of this information. DISCHARGE SUMMARY from Nurse    PATIENT INSTRUCTIONS:    After general anesthesia or intravenous sedation, for 24 hours or while taking prescription Narcotics:  · Limit your activities  · Do not drive and operate hazardous machinery  · Do not make important personal or business decisions  · Do  not drink alcoholic beverages  · If you have not urinated within 8 hours after discharge, please contact your surgeon on call.     Report the following to your surgeon:  · Excessive pain, swelling, redness or odor of or around the surgical area  · Temperature over 100.5  · Nausea and vomiting lasting longer than 4 hours or if unable to take medications  · Any signs of decreased circulation or nerve impairment to extremity: change in color, persistent  numbness, tingling, coldness or increase pain  · Any questions    What to do at Home:        *  Please give a list of your current medications to your Primary Care Provider. *  Please update this list whenever your medications are discontinued, doses are      changed, or new medications (including over-the-counter products) are added. *  Please carry medication information at all times in case of emergency situations. These are general instructions for a healthy lifestyle:    No smoking/ No tobacco products/ Avoid exposure to second hand smoke  Surgeon General's Warning:  Quitting smoking now greatly reduces serious risk to your health. Obesity, smoking, and sedentary lifestyle greatly increases your risk for illness    A healthy diet, regular physical exercise & weight monitoring are important for maintaining a healthy lifestyle    You may be retaining fluid if you have a history of heart failure or if you experience any of the following symptoms:  Weight gain of 3 pounds or more overnight or 5 pounds in a week, increased swelling in our hands or feet or shortness of breath while lying flat in bed. Please call your doctor as soon as you notice any of these symptoms; do not wait until your next office visit. Recognize signs and symptoms of STROKE:    F-face looks uneven    A-arms unable to move or move unevenly    S-speech slurred or non-existent    T-time-call 911 as soon as signs and symptoms begin-DO NOT go       Back to bed or wait to see if you get better-TIME IS BRAIN. Warning Signs of HEART ATTACK     Call 911 if you have these symptoms:   Chest discomfort. Most heart attacks involve discomfort in the center of the chest that lasts more than a few minutes, or that goes away and comes back. It can feel like uncomfortable pressure, squeezing, fullness, or pain.  Discomfort in other areas of the upper body. Symptoms can include pain or discomfort in one or both arms, the back, neck, jaw, or stomach.  Shortness of breath with or without chest discomfort.    Other signs may include breaking out in a cold sweat, nausea, or lightheadedness. Don't wait more than five minutes to call 911 - MINUTES MATTER! Fast action can save your life. Calling 911 is almost always the fastest way to get lifesaving treatment. Emergency Medical Services staff can begin treatment when they arrive -- up to an hour sooner than if someone gets to the hospital by car. The discharge information has been reviewed with the patient. The patient verbalized understanding. Discharge medications reviewed with the patient and appropriate educational materials and side effects teaching were provided.   ___________________________________________________________________________________________________________________________________

## 2019-04-30 NOTE — DISCHARGE SUMMARY
Hospitalist Discharge Summary     Patient ID:  Garo Acuna  [id number]  34 y.o.  1940  Admit date: 4/22/2019 12:11 PM  Discharge date and time: 4/30/2019  Attending: Jac Morales DO  PCP:  Sudhir Haro MD  Treatment Team: Attending Provider: Jac Morales DO; Consulting Provider: Sara Carias MD; Utilization Review: Stephen Sales RN; Care Manager: Vinod Bruno RN; Occupational Therapy Assistant: Simone Villarreal    Principal Diagnosis Acute renal failure (ARF) [facility])   Principal Problem:    Acute renal failure (ARF) ([facility] [address] [address] (4/22/2019)    Active Problems:    Acquired hypothyroidism (1/16/2016)      Hypokalemia (8/18/2016)      Sepsis ([facility] (4/22/2019)      Community acquired bacterial pneumonia (4/22/2019)      Prolonged Q-T interval on ECG (4/22/2019)             Hospital Course:  Please refer to the admission H&P for details of presentation. In summary, the patient is a 69 yo CF with past history of HTN, HFpEF, past tobacco use, and TIA presents with worsening 2 week history of shortness of breath and cough. She denies prior pulm diagnoses. CXR showing possible COPD changes and likely superimposed pneumonia. Serum potassium of 2.5 with SCr of 3.6 (baseline around 1.0-1.1). Procalcitonin of 0.5. WBC count of 17. EKG showing NSR with prolonged QT interval at 570. Pt initially received rocephin and azithro for CAP, changed to rocephin and doxy due to prolonged qt. EKG checked serially and qt normalized. Pt received 7d abx while inpatient and symptoms improved, weaned back to room air. The patient was also started on steroids for possible undiagnosed obstructive disease and further workup should be considered when acute illness resolved. Home lasix held, AoCKD2 and hypokalemia resolved with fluids. Okay to restart home antihypertensives and diuretics at discharge.   Should follow-up with PCP to recheck labs, will follow-up with nephrology in 1 month.      Significant Diagnostic Studies:   XR CHEST SNGL V   Final Result   IMPRESSION:  NO SIGNIFICANT INTERVAL CHANGE.      US RETROPERITONEUM LTD   Final Result   IMPRESSION: Unremarkable right renal ultrasound      XR CHEST PA LAT   Final Result   IMPRESSION: No acute findings in the chest         XR CHEST PA LAT   Preliminary Result   IMPRESSION: COPD changes with what may be some superimposed infiltrates. Follow-up to assure complete clearing recommended. Labs: Results:       Chemistry Recent Labs     04/30/19  0443 04/29/19  0716   *  --      --    K 4.5 4.4   *  --    CO2 26  --    BUN 36*  --    CREA 1.07*  --    CA 8.7  --    AGAP 7  --       CBC w/Diff Recent Labs     04/30/19  0443   WBC 12.1*   RBC 4.01*   HGB 12.5   HCT 42.1         Cardiac Enzymes No results for input(s): CPK, CKND1, MELISSA in the last 72 hours. No lab exists for component: CKRMB, TROIP   Coagulation No results for input(s): PTP, INR, APTT in the last 72 hours. No lab exists for component: INREXT    Lipid Panel Lab Results   Component Value Date/Time    Cholesterol, total 140 02/05/2018 01:20 PM    HDL Cholesterol 45 02/05/2018 01:20 PM    LDL, calculated 72 02/05/2018 01:20 PM    VLDL, calculated 23 02/05/2018 01:20 PM    Triglyceride 113 02/05/2018 01:20 PM    CHOL/HDL Ratio 3.3 01/17/2016 03:54 AM      BNP No results for input(s): BNPP in the last 72 hours. Liver Enzymes No results for input(s): TP, ALB, TBIL, AP, SGOT, GPT in the last 72 hours. No lab exists for component: DBIL   Thyroid Studies Lab Results   Component Value Date/Time    TSH 5.130 (H) 04/30/2019 04:43 AM            Discharge Exam:  Visit Vitals  /70 (BP 1 Location: Left arm, BP Patient Position: Sitting)   Pulse 83   Temp 97.8 °F (36.6 °C)   Resp 18   Ht 5' (1.524 m)   Wt 82.7 kg (182 lb 5.1 oz)   SpO2 100%   BMI 35.61 kg/m²       General:          Alert, cooperative, no distress, appears stated age.     HEENT:           exophthalmos   Lungs:             mildly diminished, no wheeze/crackle  Heart:              Regular rate and rhythm,  no murmur, rub or gallop. Abdomen:        Soft, non-tender. Not distended.  Bowel sounds normal.   Extremities:     No cyanosis.  No edema. No clubbing  Skin:                Texture, turgor normal. No rashes or lesions.  Not Jaundiced  Neurologic:      Alert and oriented x 3, no focal deficits         Disposition: home  Discharge Condition: stable    Current Discharge Medication List      START taking these medications    Details   predniSONE (DELTASONE) 10 mg tablet Take 20 mg by mouth daily. Qty: 4 Tab, Refills: 0         CONTINUE these medications which have NOT CHANGED    Details   cholecalciferol (VITAMIN D3) 1,000 unit tablet Take 1,000 Units by mouth daily. traMADol (ULTRAM) 50 mg tablet Take 50 mg by mouth every six (6) hours as needed for Pain.      metoprolol succinate (TOPROL-XL) 25 mg XL tablet Take 25 mg by mouth daily. potassium chloride (K-DUR, KLOR-CON) 20 mEq tablet Take 1 Tab by mouth daily. Please cancel RX for Potassium 10meq and fill this instead. Dosage change. Qty: 90 Tab, Refills: 3    Associated Diagnoses: Hypokalemia      rosuvastatin (CRESTOR) 10 mg tablet Take 1 Tab by mouth daily. Qty: 90 Tab, Refills: 2    Associated Diagnoses: Mixed hyperlipidemia      DULoxetine (CYMBALTA) 60 mg capsule Take 1 Cap by mouth daily. Qty: 90 Cap, Refills: 2    Associated Diagnoses: Idiopathic peripheral neuropathy; Major depressive disorder with single episode, in remission ([facility])      furosemide (LASIX) 40 mg tablet Take 1 Tab by mouth daily. Qty: 90 Tab, Refills: 2    Associated Diagnoses: Hypertension, essential, benign      levothyroxine (SYNTHROID) 25 mcg tablet Take 1 Tab by mouth Daily (before breakfast).   Qty: 90 Tab, Refills: 2    Associated Diagnoses: Acquired hypothyroidism      losartan-hydroCHLOROthiazide (HYZAAR) 100-12.5 mg per tablet Take 1 Tab by mouth daily. Qty: 90 Tab, Refills: 2    Associated Diagnoses: Hypertension, essential, benign      methotrexate (RHEUMATREX) 2.5 mg tablet 5 tabs every Sunday  Qty: 60 Tab, Refills: 2    Associated Diagnoses: Rheumatoid arthritis, involving unspecified site, unspecified rheumatoid factor presence (HCC)      aspirin delayed-release 81 mg tablet Take  by mouth daily. folic acid 387 mcg tablet Take 800 mcg by mouth daily. clotrimazole-betamethasone (LOTRISONE) topical cream Apply  to affected area two (2) times a day. fo 14 days.   Qty: 30 g, Refills: 0    Associated Diagnoses: Rash             Activity: Activity as tolerated  Diet: Resume previous diet    Follow-up:     Follow-up Appointments   Procedures    FOLLOW UP VISIT Appointment in: One Month Please set up 1 month Hospital follow up with labs prior- BMP and CBC w/o diff with [facility] [phone]     Please set up 1 month Hospital follow up with labs prior- BMP and CBC w/o diff with [facility]   [phone]     Standing Status:   Standing     Number of Occurrences:   1     Order Specific Question:   Appointment in     Answer:   One Month           Time spent to discharge patient 35 minutes  Signed:  Radha Henley MD  4/30/2019  11:19 AM

## 2019-04-30 NOTE — PROGRESS NOTES
Pt sitting up in chair on 2L NC, watching tv. Pt states feels \"a lot better\" and is hopeful to get home soon. Call light in reach

## 2019-04-30 NOTE — PROGRESS NOTES
[facility] - INPATIENT Face to Face Encounter Patients Name: Samantha Martinez    YOB: 1940 Ordering Physician: Xavier Gore MD 
 
Primary Diagnosis: Acute renal [facility] ([facility]) ([facility] [N17.9] Date of Face to Face:   4/30/2019 Face to Face Encounter findings are related to primary reason for home care:   yes. 1. I certify that the patient needs intermittent care as follows: skilled nursing care:  skilled observation/assessment, patient education, complex care plan management and rehabilitative nursing 
physical therapy: strengthening, stretching/ROM, gait/stair training, balance training and pt/caregiver education 2. I certify that this patient is homebound, that is: 1) patient requires the use of a cane device, special transportation, or assistance of another to leave the home; or 2) patient's condition makes leaving the home medically contraindicated; and 3) patient has a normal inability to leave the home and leaving the home requires considerable and taxing effort. Patient may leave the home for infrequent and short duration for medical reasons, and occasional absences for non-medical reasons. Homebound status is due to the following functional limitations: Patient with increased shortness of breath and elevated heart rate with ambulation greater than 20 feet limiting patient's ability to ambulate safely within the community. Patient with strength deficits limiting the performance of all ADL's without caregiver assistance or the use of an assistive device. Patient with poor safety awareness and is at risk for falls without assistance of another person and the use of an assistive device. Patient with poor ambulation endurance limiting their safe ability to ascend/descend the required number of steps to leave the home.  
 
3. I certify that this patient is under my care and that I, or a nurse practitioner or  [id number], or clinical nurse specialist, or certified nurse midwife, working with me, had a Face-to-Face Encounter that meets the physician Face-to-Face Encounter requirements. The following are the clinical findings from the [address] [place] [address] encounter that support the need for skilled services and is a summary of the encounter: Medical Record See discharge summary and summary of the patient's illness Daly Bledsoe RN 
4/30/2019 THE FOLLOWING TO BE COMPLETED BY THE COMMUNITY PHYSICIAN: 
 
I concur with the findings described above from the F2F encounter that this patient is homebound and in need of a skilled service. Certifying Physician: _____________________________________ Printed Certifying Physician Name: _____________________________________ Date: _________________

## 2019-04-30 NOTE — PROGRESS NOTES
Pt sitting up in bed, on 2 L NC. Pt is alert and oriented with no complaints. Pt is instructed to call for assistance, call light is within reach.

## 2019-04-30 NOTE — PROGRESS NOTES
Beside shift report received from [facility]  Patient lying in bed  Respirations present  No signs of distress  No needs expressed at this time  Safety measures in place

## 2019-04-30 NOTE — PROGRESS NOTES
Pt lying in bed resting on 2 L NC. Pt is alert and oriented with no complaints of pain at this time. Pt instructed to call for assistance, call light in reach

## 2019-04-30 NOTE — PROGRESS NOTES
Oxygen Qualifier Room air: SpO2 with O2 and liter flow Resting SpO2  97% Ambulating SpO2  96% Completed by: 
 
Kaelyn Bowers, RT

## 2019-05-01 ENCOUNTER — PATIENT OUTREACH (OUTPATIENT)
Dept: CASE MANAGEMENT | Age: 79
End: 2019-05-01

## 2019-05-01 ENCOUNTER — HOME HEALTH ADMISSION (OUTPATIENT)
Dept: HOME HEALTH SERVICES | Facility: HOME HEALTH | Age: 79
End: 2019-05-01

## 2019-05-01 NOTE — PROGRESS NOTES
This note will not be viewable in [address] Transition of Care Discharge Follow-up Questionnaire Date/Time of Call: 
 5/1/19 
 1016am  
What was the patient hospitalized for? Acute renal failure (ARF) Does the patient understand his/her diagnosis and/or treatment and what happened during the hospitalization? Yes, spoke with patient, she states understanding of diagnosis and treatment; and is agreeable to call. Patient states she is doing well, cooked herself breakfast  
Did the patient receive discharge instructions? Yes   
CM Assessed Risk for Readmission:  
 
 
Patient stated Risk for Readmission:  
 
 low r/t diagnosis and/or comorbidities None stated Review any discharge instructions (see discharge instructions/AVS in [facility]). Ask patient if they understand these. Do they have any questions? Reviewed, understanding is stated, no questions at this time Were home services ordered (nursing, PT, OT, ST, etc.)? Yes, [facility] RN/PT If so, has the first visit occurred? If not, why? (Assist with coordination of services if necessary.) Waiting on call to schedule Referral not noted in system, reached out to IP CM, Emaline Boxer, RN CM, referral was resent Was any DME ordered? No  
Patient has cane if needed If so, has it been received? If not, why?  (Assist patient in obtaining DME orders &/or equipment if necessary.) N/A Complete a review of all medications (new, continued and discontinued meds per the D/C instructions and medication tab in [address] [facility]). Completed START taking: 
predniSONE 10 mg tablet (DELTASONE) Were all new prescriptions filled? If not, why?  (Assist patient in obtaining medications if necessary  escalate for CCM &/or SW if ongoing issues are verbalized by pt or anticipated) Yes Does the patient understand the purpose and dosing instructions for all medications? (If patient has questions, provide explanation and education.) Yes Does the patient have any problems in performing ADLs? (If patient is unable to perform ADLs  what is the limiting factor(s)? Do they have a support system that can assist? If no support system is present, discuss possible assistance that they may be able to obtain. Escalate for CCM/SW if ongoing issues are verbalized by pt or anticipated) Independent with ADLs Does the patient have all follow-up appointments scheduled? 7 day f/up with PCP?  
(f/up with PCP may be w/in 14 days if patient has a f/up with their specialist w/in 7 days) 7-14 day f/up with specialist?  
(or per discharge instructions) If f/up has not been made  what actions has the care coordinator made to accomplish this? Has transportation been arranged? Yes Dr. Brooke Garcia 5/3/19 [facility] 5/23/19 labs; 5/30/19 office visit Yes, no transportation needs identified at this time. Any other questions or concerns expressed by the patient? No other needs or concerns identified. Patient states her gratitude for follow up. Contact information for [facility] was given, instructed to call with new questions or concerns. Schedule next appointment with ADALBERTO MEDELLIN Coordinator or refer to RN Case Manager/ per the workflow guidelines. When is care coordinators next follow-up call scheduled? If referred for CCM  what RN care manager was the referral assigned? Community Care Coordinator will follow per workflow guidelines Within 30 days GENEVIEVE Call Completed By: Miriam Molina LPN Community Care Coordinator

## 2019-05-15 ENCOUNTER — HOSPITAL ENCOUNTER (OUTPATIENT)
Dept: MAMMOGRAPHY | Age: 79
Discharge: HOME | End: 2019-05-15
Attending: FAMILY MEDICINE
Payer: MEDICARE

## 2019-05-15 DIAGNOSIS — Z12.39 SCREENING BREAST EXAMINATION: ICD-10-CM

## 2019-05-15 PROCEDURE — 77067 SCR MAMMO BI INCL CAD: CPT

## 2019-05-29 ENCOUNTER — PATIENT OUTREACH (OUTPATIENT)
Dept: CASE MANAGEMENT | Age: 79
End: 2019-05-29

## 2019-05-29 NOTE — PROGRESS NOTES
This note will not be viewable in [address] Attempted outreach follow up without success, left message. Per [facility] patient is noted to follow given plan of care. She did attend follow up with Dr. Christophe Lee. Patient is graduated from [employer] program.  Will reopen if call is returned.

## 2020-09-05 ENCOUNTER — HOSPITAL ENCOUNTER (OUTPATIENT)
Dept: MAMMOGRAPHY | Age: 80
Discharge: HOME | End: 2020-09-05
Attending: FAMILY MEDICINE
Payer: MEDICARE

## 2020-09-05 DIAGNOSIS — Z12.31 SCREENING MAMMOGRAM, ENCOUNTER FOR: ICD-10-CM

## 2020-09-05 PROCEDURE — 77067 SCR MAMMO BI INCL CAD: CPT

## 2021-03-19 ENCOUNTER — APPOINTMENT (OUTPATIENT)
Dept: CT IMAGING | Age: 81
DRG: 871 | End: 2021-03-19
Attending: HOSPITALIST
Payer: MEDICARE

## 2021-03-19 ENCOUNTER — APPOINTMENT (OUTPATIENT)
Dept: GENERAL RADIOLOGY | Age: 81
DRG: 871 | End: 2021-03-19
Attending: EMERGENCY MEDICINE
Payer: MEDICARE

## 2021-03-19 ENCOUNTER — APPOINTMENT (OUTPATIENT)
Dept: CT IMAGING | Age: 81
DRG: 871 | End: 2021-03-19
Attending: EMERGENCY MEDICINE
Payer: MEDICARE

## 2021-03-19 ENCOUNTER — APPOINTMENT (OUTPATIENT)
Dept: ULTRASOUND IMAGING | Age: 81
DRG: 871 | End: 2021-03-19
Attending: HOSPITALIST
Payer: MEDICARE

## 2021-03-19 ENCOUNTER — HOSPITAL ENCOUNTER (INPATIENT)
Age: 81
LOS: 7 days | Discharge: HOME | DRG: 871 | End: 2021-03-26
Attending: EMERGENCY MEDICINE | Admitting: HOSPITALIST
Payer: MEDICARE

## 2021-03-19 DIAGNOSIS — D72.829 LEUKOCYTOSIS, UNSPECIFIED TYPE: ICD-10-CM

## 2021-03-19 DIAGNOSIS — I95.9 HYPOTENSION, UNSPECIFIED HYPOTENSION TYPE: ICD-10-CM

## 2021-03-19 DIAGNOSIS — N17.9 AKI (ACUTE KIDNEY INJURY) (HCC): ICD-10-CM

## 2021-03-19 DIAGNOSIS — I31.39 PERICARDIAL EFFUSION: ICD-10-CM

## 2021-03-19 DIAGNOSIS — I48.91 ATRIAL FIBRILLATION WITH RVR (HCC): Primary | ICD-10-CM

## 2021-03-19 DIAGNOSIS — J15.9 COMMUNITY ACQUIRED BACTERIAL PNEUMONIA: ICD-10-CM

## 2021-03-19 LAB
ALBUMIN SERPL-MCNC: 2.7 G/DL (ref 3.2–4.6)
ALBUMIN/GLOB SERPL: 0.6 {RATIO} (ref 1.2–3.5)
ALP SERPL-CCNC: 70 U/L (ref 50–136)
ALT SERPL-CCNC: 18 U/L (ref 12–65)
AMORPH CRY URNS QL MICRO: NORMAL
ANION GAP SERPL CALC-SCNC: 11 MMOL/L (ref 7–16)
APTT PPP: 26.8 SEC (ref 24.1–35.1)
AST SERPL-CCNC: 12 U/L (ref 15–37)
ATRIAL RATE: 340 BPM
BACTERIA URNS QL MICRO: 0 /HPF
BASOPHILS # BLD: 0.1 K/UL (ref 0–0.2)
BASOPHILS NFR BLD: 1 % (ref 0–2)
BILIRUB SERPL-MCNC: 0.5 MG/DL (ref 0.2–1.1)
BUN SERPL-MCNC: 58 MG/DL (ref 8–23)
CALCIUM SERPL-MCNC: 9.4 MG/DL (ref 8.3–10.4)
CALCULATED R AXIS, ECG10: 74 DEGREES
CALCULATED T AXIS, ECG11: 58 DEGREES
CASTS URNS QL MICRO: NORMAL /LPF
CHLORIDE SERPL-SCNC: 97 MMOL/L (ref 98–107)
CO2 SERPL-SCNC: 25 MMOL/L (ref 21–32)
COVID-19 RAPID TEST, COVR: NOT DETECTED
CREAT SERPL-MCNC: 3.31 MG/DL (ref 0.6–1)
CRYSTALS URNS QL MICRO: 0 /LPF
DIAGNOSIS, 93000: NORMAL
DIFFERENTIAL METHOD BLD: ABNORMAL
EOSINOPHIL # BLD: 0 K/UL (ref 0–0.8)
EOSINOPHIL NFR BLD: 0 % (ref 0.5–7.8)
EPI CELLS #/AREA URNS HPF: NORMAL /HPF
ERYTHROCYTE [DISTWIDTH] IN BLOOD BY AUTOMATED COUNT: 13.6 % (ref 11.9–14.6)
GLOBULIN SER CALC-MCNC: 4.2 G/DL (ref 2.3–3.5)
GLUCOSE SERPL-MCNC: 136 MG/DL (ref 65–100)
HCT VFR BLD AUTO: 35.5 % (ref 35.8–46.3)
HGB BLD-MCNC: 11.9 G/DL (ref 11.7–15.4)
IMM GRANULOCYTES # BLD AUTO: 0.1 K/UL (ref 0–0.5)
IMM GRANULOCYTES NFR BLD AUTO: 1 % (ref 0–5)
LACTATE SERPL-SCNC: 2 MMOL/L (ref 0.4–2)
LACTATE SERPL-SCNC: 3.6 MMOL/L (ref 0.4–2)
LYMPHOCYTES # BLD: 0.9 K/UL (ref 0.5–4.6)
LYMPHOCYTES NFR BLD: 7 % (ref 13–44)
MAGNESIUM SERPL-MCNC: 2.2 MG/DL (ref 1.8–2.4)
MCH RBC QN AUTO: 32.2 PG (ref 26.1–32.9)
MCHC RBC AUTO-ENTMCNC: 33.5 G/DL (ref 31.4–35)
MCV RBC AUTO: 96.2 FL (ref 79.6–97.8)
MONOCYTES # BLD: 1.1 K/UL (ref 0.1–1.3)
MONOCYTES NFR BLD: 9 % (ref 4–12)
MUCOUS THREADS URNS QL MICRO: 0 /LPF
NEUTS SEG # BLD: 9.8 K/UL (ref 1.7–8.2)
NEUTS SEG NFR BLD: 82 % (ref 43–78)
NRBC # BLD: 0 K/UL (ref 0–0.2)
OTHER OBSERVATIONS,UCOM: NORMAL
PLATELET # BLD AUTO: 361 K/UL (ref 150–450)
PMV BLD AUTO: 11.5 FL (ref 9.4–12.3)
POTASSIUM SERPL-SCNC: 3.5 MMOL/L (ref 3.5–5.1)
PROCALCITONIN SERPL-MCNC: 0.93 NG/ML
PROT SERPL-MCNC: 6.9 G/DL (ref 6.3–8.2)
Q-T INTERVAL, ECG07: 324 MS
QRS DURATION, ECG06: 114 MS
QTC CALCULATION (BEZET), ECG08: 478 MS
RBC # BLD AUTO: 3.69 M/UL (ref 4.05–5.2)
RBC #/AREA URNS HPF: NORMAL /HPF
SARS-COV-2, COV2: NORMAL
SODIUM SERPL-SCNC: 133 MMOL/L (ref 136–145)
SOURCE, COVRS: NORMAL
TROPONIN-HIGH SENSITIVITY: 14.2 PG/ML (ref 0–14)
TROPONIN-HIGH SENSITIVITY: 17.5 PG/ML (ref 0–14)
TSH SERPL DL<=0.005 MIU/L-ACNC: 2.23 UIU/ML (ref 0.36–3.74)
VENTRICULAR RATE, ECG03: 131 BPM
WBC # BLD AUTO: 11.9 K/UL (ref 4.3–11.1)
WBC URNS QL MICRO: NORMAL /HPF

## 2021-03-19 PROCEDURE — P9047 ALBUMIN (HUMAN), 25%, 50ML: HCPCS | Performed by: HOSPITALIST

## 2021-03-19 PROCEDURE — 81015 MICROSCOPIC EXAM OF URINE: CPT

## 2021-03-19 PROCEDURE — U0005 INFEC AGEN DETEC AMPLI PROBE: HCPCS

## 2021-03-19 PROCEDURE — 74011250637 HC RX REV CODE- 250/637: Performed by: HOSPITALIST

## 2021-03-19 PROCEDURE — 96367 TX/PROPH/DG ADDL SEQ IV INF: CPT

## 2021-03-19 PROCEDURE — 99223 1ST HOSP IP/OBS HIGH 75: CPT | Performed by: INTERNAL MEDICINE

## 2021-03-19 PROCEDURE — 96375 TX/PRO/DX INJ NEW DRUG ADDON: CPT

## 2021-03-19 PROCEDURE — 93005 ELECTROCARDIOGRAM TRACING: CPT | Performed by: EMERGENCY MEDICINE

## 2021-03-19 PROCEDURE — 71250 CT THORAX DX C-: CPT

## 2021-03-19 PROCEDURE — 76775 US EXAM ABDO BACK WALL LIM: CPT

## 2021-03-19 PROCEDURE — 36415 COLL VENOUS BLD VENIPUNCTURE: CPT

## 2021-03-19 PROCEDURE — 74011000258 HC RX REV CODE- 258: Performed by: EMERGENCY MEDICINE

## 2021-03-19 PROCEDURE — 96366 THER/PROPH/DIAG IV INF ADDON: CPT

## 2021-03-19 PROCEDURE — 96376 TX/PRO/DX INJ SAME DRUG ADON: CPT

## 2021-03-19 PROCEDURE — 85025 COMPLETE CBC W/AUTO DIFF WBC: CPT

## 2021-03-19 PROCEDURE — 84443 ASSAY THYROID STIM HORMONE: CPT

## 2021-03-19 PROCEDURE — 65270000029 HC RM PRIVATE

## 2021-03-19 PROCEDURE — 87040 BLOOD CULTURE FOR BACTERIA: CPT

## 2021-03-19 PROCEDURE — 81003 URINALYSIS AUTO W/O SCOPE: CPT

## 2021-03-19 PROCEDURE — 96365 THER/PROPH/DIAG IV INF INIT: CPT

## 2021-03-19 PROCEDURE — 84484 ASSAY OF TROPONIN QUANT: CPT

## 2021-03-19 PROCEDURE — 84145 PROCALCITONIN (PCT): CPT

## 2021-03-19 PROCEDURE — 85730 THROMBOPLASTIN TIME PARTIAL: CPT

## 2021-03-19 PROCEDURE — 80053 COMPREHEN METABOLIC PANEL: CPT

## 2021-03-19 PROCEDURE — 71045 X-RAY EXAM CHEST 1 VIEW: CPT

## 2021-03-19 PROCEDURE — 74176 CT ABD & PELVIS W/O CONTRAST: CPT

## 2021-03-19 PROCEDURE — 83735 ASSAY OF MAGNESIUM: CPT

## 2021-03-19 PROCEDURE — 74011250636 HC RX REV CODE- 250/636: Performed by: EMERGENCY MEDICINE

## 2021-03-19 PROCEDURE — 82306 VITAMIN D 25 HYDROXY: CPT

## 2021-03-19 PROCEDURE — 74011250636 HC RX REV CODE- 250/636: Performed by: PHYSICIAN ASSISTANT

## 2021-03-19 PROCEDURE — 74011000258 HC RX REV CODE- 258: Performed by: HOSPITALIST

## 2021-03-19 PROCEDURE — 96374 THER/PROPH/DIAG INJ IV PUSH: CPT

## 2021-03-19 PROCEDURE — 74011250636 HC RX REV CODE- 250/636: Performed by: HOSPITALIST

## 2021-03-19 PROCEDURE — 87635 SARS-COV-2 COVID-19 AMP PRB: CPT

## 2021-03-19 PROCEDURE — 99285 EMERGENCY DEPT VISIT HI MDM: CPT

## 2021-03-19 PROCEDURE — 83605 ASSAY OF LACTIC ACID: CPT

## 2021-03-19 RX ORDER — ADHESIVE BANDAGE
30 BANDAGE TOPICAL DAILY PRN
Status: DISCONTINUED | OUTPATIENT
Start: 2021-03-19 | End: 2021-03-26 | Stop reason: HOSPADM

## 2021-03-19 RX ORDER — SODIUM CHLORIDE 0.9 % (FLUSH) 0.9 %
5-40 SYRINGE (ML) INJECTION EVERY 8 HOURS
Status: DISCONTINUED | OUTPATIENT
Start: 2021-03-19 | End: 2021-03-26 | Stop reason: HOSPADM

## 2021-03-19 RX ORDER — PREDNISONE 10 MG/1
20 TABLET ORAL DAILY
Status: DISCONTINUED | OUTPATIENT
Start: 2021-03-20 | End: 2021-03-19

## 2021-03-19 RX ORDER — METOPROLOL SUCCINATE 25 MG/1
25 TABLET, EXTENDED RELEASE ORAL DAILY
Status: DISCONTINUED | OUTPATIENT
Start: 2021-03-19 | End: 2021-03-25

## 2021-03-19 RX ORDER — ROSUVASTATIN CALCIUM 10 MG/1
10 TABLET, COATED ORAL
Status: DISCONTINUED | OUTPATIENT
Start: 2021-03-19 | End: 2021-03-26 | Stop reason: HOSPADM

## 2021-03-19 RX ORDER — POLYETHYLENE GLYCOL 3350 17 G/17G
17 POWDER, FOR SOLUTION ORAL DAILY
Status: DISCONTINUED | OUTPATIENT
Start: 2021-03-20 | End: 2021-03-26 | Stop reason: HOSPADM

## 2021-03-19 RX ORDER — ONDANSETRON 2 MG/ML
4 INJECTION INTRAMUSCULAR; INTRAVENOUS
Status: DISCONTINUED | OUTPATIENT
Start: 2021-03-19 | End: 2021-03-26 | Stop reason: HOSPADM

## 2021-03-19 RX ORDER — METOPROLOL SUCCINATE 50 MG/1
25 TABLET, EXTENDED RELEASE ORAL DAILY
Status: DISCONTINUED | OUTPATIENT
Start: 2021-03-20 | End: 2021-03-19

## 2021-03-19 RX ORDER — ACETAMINOPHEN 650 MG/1
650 SUPPOSITORY RECTAL
Status: DISCONTINUED | OUTPATIENT
Start: 2021-03-19 | End: 2021-03-26 | Stop reason: HOSPADM

## 2021-03-19 RX ORDER — SPIRONOLACTONE 25 MG/1
25 TABLET ORAL DAILY
COMMUNITY
End: 2021-03-26

## 2021-03-19 RX ORDER — HEPARIN SODIUM 5000 [USP'U]/100ML
12-25 INJECTION, SOLUTION INTRAVENOUS
Status: DISCONTINUED | OUTPATIENT
Start: 2021-03-19 | End: 2021-03-20

## 2021-03-19 RX ORDER — ALBUMIN HUMAN 250 G/1000ML
25 SOLUTION INTRAVENOUS ONCE
Status: COMPLETED | OUTPATIENT
Start: 2021-03-19 | End: 2021-03-19

## 2021-03-19 RX ORDER — ASPIRIN 81 MG/1
81 TABLET ORAL DAILY
Status: DISCONTINUED | OUTPATIENT
Start: 2021-03-20 | End: 2021-03-25

## 2021-03-19 RX ORDER — TRAMADOL HYDROCHLORIDE 50 MG/1
50 TABLET ORAL
Status: DISCONTINUED | OUTPATIENT
Start: 2021-03-19 | End: 2021-03-19

## 2021-03-19 RX ORDER — FAMOTIDINE 20 MG/1
20 TABLET, FILM COATED ORAL 2 TIMES DAILY
Status: DISCONTINUED | OUTPATIENT
Start: 2021-03-19 | End: 2021-03-19 | Stop reason: DRUGHIGH

## 2021-03-19 RX ORDER — ROSUVASTATIN CALCIUM 10 MG/1
10 TABLET, COATED ORAL
COMMUNITY
End: 2021-03-26

## 2021-03-19 RX ORDER — LEVOTHYROXINE SODIUM 50 UG/1
25 TABLET ORAL
Status: DISCONTINUED | OUTPATIENT
Start: 2021-03-20 | End: 2021-03-26 | Stop reason: HOSPADM

## 2021-03-19 RX ORDER — MELATONIN
1000 DAILY
Status: DISCONTINUED | OUTPATIENT
Start: 2021-03-20 | End: 2021-03-26 | Stop reason: HOSPADM

## 2021-03-19 RX ORDER — FOLIC ACID 1 MG/1
1 TABLET ORAL DAILY
Status: DISCONTINUED | OUTPATIENT
Start: 2021-03-20 | End: 2021-03-26 | Stop reason: HOSPADM

## 2021-03-19 RX ORDER — HEPARIN SODIUM 5000 [USP'U]/ML
60 INJECTION, SOLUTION INTRAVENOUS; SUBCUTANEOUS ONCE
Status: COMPLETED | OUTPATIENT
Start: 2021-03-19 | End: 2021-03-19

## 2021-03-19 RX ORDER — METHOTREXATE 2.5 MG/1
12.5 TABLET ORAL
Status: DISCONTINUED | OUTPATIENT
Start: 2021-03-21 | End: 2021-03-26 | Stop reason: HOSPADM

## 2021-03-19 RX ORDER — ONDANSETRON 4 MG/1
4 TABLET, ORALLY DISINTEGRATING ORAL
Status: DISCONTINUED | OUTPATIENT
Start: 2021-03-19 | End: 2021-03-26 | Stop reason: HOSPADM

## 2021-03-19 RX ORDER — POTASSIUM CHLORIDE 20 MEQ/1
20 TABLET, EXTENDED RELEASE ORAL DAILY
COMMUNITY
End: 2021-03-26

## 2021-03-19 RX ORDER — ACETAMINOPHEN 325 MG/1
650 TABLET ORAL
Status: DISCONTINUED | OUTPATIENT
Start: 2021-03-19 | End: 2021-03-26 | Stop reason: HOSPADM

## 2021-03-19 RX ORDER — FACIAL-BODY WIPES
10 EACH TOPICAL DAILY PRN
Status: DISCONTINUED | OUTPATIENT
Start: 2021-03-19 | End: 2021-03-26 | Stop reason: HOSPADM

## 2021-03-19 RX ORDER — CLOTRIMAZOLE AND BETAMETHASONE DIPROPIONATE 10; .64 MG/G; MG/G
CREAM TOPICAL 2 TIMES DAILY
Status: DISCONTINUED | OUTPATIENT
Start: 2021-03-19 | End: 2021-03-19

## 2021-03-19 RX ORDER — SODIUM CHLORIDE 0.9 % (FLUSH) 0.9 %
5-40 SYRINGE (ML) INJECTION AS NEEDED
Status: DISCONTINUED | OUTPATIENT
Start: 2021-03-19 | End: 2021-03-26 | Stop reason: HOSPADM

## 2021-03-19 RX ORDER — FAMOTIDINE 20 MG/1
20 TABLET, FILM COATED ORAL DAILY
Status: DISCONTINUED | OUTPATIENT
Start: 2021-03-20 | End: 2021-03-26 | Stop reason: HOSPADM

## 2021-03-19 RX ORDER — DULOXETIN HYDROCHLORIDE 60 MG/1
60 CAPSULE, DELAYED RELEASE ORAL DAILY
Status: DISCONTINUED | OUTPATIENT
Start: 2021-03-20 | End: 2021-03-26 | Stop reason: HOSPADM

## 2021-03-19 RX ORDER — AMOXICILLIN 250 MG
1 CAPSULE ORAL 2 TIMES DAILY
Status: DISCONTINUED | OUTPATIENT
Start: 2021-03-19 | End: 2021-03-26 | Stop reason: HOSPADM

## 2021-03-19 RX ADMIN — ROSUVASTATIN 10 MG: 10 TABLET, FILM COATED ORAL at 21:31

## 2021-03-19 RX ADMIN — SENNOSIDES AND DOCUSATE SODIUM 1 TABLET: 8.6; 5 TABLET ORAL at 21:31

## 2021-03-19 RX ADMIN — SODIUM CHLORIDE 1000 ML: 900 INJECTION, SOLUTION INTRAVENOUS at 14:33

## 2021-03-19 RX ADMIN — ALBUMIN (HUMAN) 25 G: 0.25 INJECTION, SOLUTION INTRAVENOUS at 20:22

## 2021-03-19 RX ADMIN — SODIUM CHLORIDE 1000 ML: 900 INJECTION, SOLUTION INTRAVENOUS at 15:36

## 2021-03-19 RX ADMIN — CEFTRIAXONE 2 G: 2 INJECTION, POWDER, FOR SOLUTION INTRAMUSCULAR; INTRAVENOUS at 15:40

## 2021-03-19 RX ADMIN — SODIUM CHLORIDE 1000 ML: 900 INJECTION, SOLUTION INTRAVENOUS at 13:37

## 2021-03-19 RX ADMIN — METOPROLOL SUCCINATE 25 MG: 50 TABLET, EXTENDED RELEASE ORAL at 20:26

## 2021-03-19 RX ADMIN — AMIODARONE HYDROCHLORIDE 150 MG: 50 INJECTION, SOLUTION INTRAVENOUS at 20:49

## 2021-03-19 RX ADMIN — AZITHROMYCIN MONOHYDRATE 500 MG: 500 INJECTION, POWDER, LYOPHILIZED, FOR SOLUTION INTRAVENOUS at 17:50

## 2021-03-19 RX ADMIN — HEPARIN SODIUM 4200 UNITS: 5000 INJECTION INTRAVENOUS; SUBCUTANEOUS at 17:49

## 2021-03-19 RX ADMIN — AMIODARONE HYDROCHLORIDE 1 MG/MIN: 50 INJECTION, SOLUTION INTRAVENOUS at 21:15

## 2021-03-19 RX ADMIN — HEPARIN SODIUM 12 UNITS/KG/HR: 5000 INJECTION, SOLUTION INTRAVENOUS at 17:50

## 2021-03-19 NOTE — Clinical Note
Status[de-identified] INPATIENT [101]   Type of Bed: Telemetry [19]   Inpatient Hospitalization Certified Necessary for the Following Reasons: 3.  Patient receiving treatment that can only be provided in an inpatient setting (further clarification in H&P documentation)   Admitting Diagnosis: Atrial fibrillation with rapid ventricular response [facility]) [[id number]]   Admitting Physician: Bing Olivas [[id number]]   Attending Physician: Bing Olivas [[id number]]   Estimated Length of Stay: 2 Midnights   Discharge Plan[de-identified] Home with Office Follow-up

## 2021-03-19 NOTE — H&P
[facility] HOSPITALIST H&P/CONSULT  NAME:  Harsh Flores   Age:  68 y.o.  :   1940   MRN:   [id number]  PCP: Imtiaz Raza MD  Consulting MD:  Treatment Team: Attending Provider: Brooke Cordoba DO; Primary Nurse: Jose Luis Higgins RN; Consulting Provider: Gilda Ibrahim MD  HPI:   Patient 70-year-old female, history significant for breast cancer status post radiations, rheumatoid arthritis on chronic methotrexate, chronic kidney disease, former smoker,    Patient developed progressive shortness of breath, over the last 2 weeks, started close outpatient pneumonia treatments by PCP, did not improve, shortness of breath worsened this morning, patient also felt the new onset of palpitation EMS was called, patient found to have significant hypotension systolic of 69, with heart rate greater than 140, consistent with atrial fibrillation rapid medical response, seen by cardiology, amiodarone and heparin drip was started    Patient denies sick contacts, denies recent travels, denies prior cardiac history, patient was told that she has a single  kidney but denies kidney disease  Complete ROS done and is as stated in HPI or otherwise negative  Past Medical History:   Diagnosis Date    Acquired hypothyroidism 2016    Breast cancer ([facility] 2008    Depression 2016    Endocrine disease     hypothyroid    Fungal dermatitis 2016    Hyperlipidemia 2016    Hypertension, essential, benign 2016    Hypokalemia 2016    Inflamed sebaceous cyst 2016    Major depressive disorder, recurrent, moderate ([address] 2016    Nicotine dependence, cigarettes, uncomplicated     Osteopenia 2016    Osteoporosis 2016    Radiation therapy complication 6114    Rheumatoid arthritis ([address] 2016    Right carotid bruit 2016    TIA (transient ischemic attack) 2016    Tobacco abuse 2016      Past Surgical History:   Procedure Laterality Date    HX ADENOIDECTOMY  HX BREAST LUMPECTOMY Right     with lymph nodes    HX TONSILLECTOMY         Prior to Admission Medications   Prescriptions Last Dose Informant Patient Reported? Taking? DULoxetine (CYMBALTA) 60 mg capsule   No No   Sig: Take 1 Cap by mouth daily. aspirin delayed-release 81 mg tablet   Yes No   Sig: Take  by mouth daily. cholecalciferol (VITAMIN D3) 1,000 unit tablet   Yes No   Sig: Take 1,000 Units by mouth daily. clotrimazole-betamethasone (LOTRISONE) topical cream   No No   Sig: Apply  to affected area two (2) times a day. fo 14 days. folic acid 704 mcg tablet   Yes No   Sig: Take 800 mcg by mouth daily. furosemide (LASIX) 40 mg tablet   No No   Sig: Take 1 Tab by mouth daily. levothyroxine (SYNTHROID) 25 mcg tablet   No No   Sig: Take 1 Tab by mouth Daily (before breakfast). losartan-hydroCHLOROthiazide (HYZAAR) 100-12.5 mg per tablet   No No   Sig: Take 1 Tab by mouth daily. methotrexate (RHEUMATREX) 2.5 mg tablet   No No   Si tabs every    metoprolol succinate (TOPROL-XL) 25 mg XL tablet   Yes No   Sig: Take 25 mg by mouth daily. potassium chloride (K-DUR, KLOR-CON) 20 mEq tablet   No No   Sig: Take 1 Tab by mouth daily. Please cancel RX for Potassium 10meq and fill this instead. Dosage change. predniSONE (DELTASONE) 10 mg tablet   No No   Sig: Take 20 mg by mouth daily. rosuvastatin (CRESTOR) 10 mg tablet   No No   Sig: Take 1 Tab by mouth daily. traMADol (ULTRAM) 50 mg tablet   Yes No   Sig: Take 50 mg by mouth every six (6) hours as needed for Pain.       Facility-Administered Medications: None     No Known Allergies   Social History     Tobacco Use    Smoking status: Current Some Day Smoker    Smokeless tobacco: Never Used    Tobacco comment: Only on pay day-1/2 pack   Substance Use Topics    Alcohol use: No      Family History   Problem Relation Age of Onset    Stroke Mother     Cancer Father         Brain    HIV/AIDS Brother        Objective:     Visit Vitals  BP 98/72   Pulse (!) 133   Temp 97.7 °F (36.5 °C)   Resp 26   Ht 5' (1.524 m)   Wt 70.3 kg (155 lb)   SpO2 95%   BMI 30.27 kg/m²      Temp (24hrs), Av.7 °F (36.5 °C), Min:97.7 °F (36.5 °C), Max:97.7 °F (36.5 °C)    Oxygen Therapy  O2 Sat (%): 95 % (21 1354)  Pulse via Oximetry: 127 beats per minute (21 1601)  O2 Device: Room air (21 1318)  Physical Exam:  General:    Alert, cooperative, no distress, appears stated age. Head:   Normocephalic, without obvious abnormality, atraumatic. Nose:  Nares normal. No drainage or sinus tenderness. Lungs:   Clear to auscultation bilaterally. No Wheezing or Rhonchi. No rales. Heart:   IRRegular rate and rhythm,  no murmur, rub or gallop. Abdomen:   Soft, non-tender. Not distended. Bowel sounds normal.   Extremities: No cyanosis. No edema. No clubbing  Skin:     Texture, turgor normal. No rashes or lesions. Not Jaundiced  Neurologic: Alert and oriented x 3, no focal deficits   Data Review:   Recent Results (from the past 24 hour(s))   CBC WITH AUTOMATED DIFF    Collection Time: 21  1:38 PM   Result Value Ref Range    WBC 11.9 (H) 4.3 - 11.1 K/uL    RBC 3.69 (L) 4.05 - 5.2 M/uL    HGB 11.9 11.7 - 15.4 g/dL    HCT 35.5 (L) 35.8 - 46.3 %    MCV 96.2 79.6 - 97.8 FL    MCH 32.2 26.1 - 32.9 PG    MCHC 33.5 31.4 - 35.0 g/dL    RDW 13.6 11.9 - 14.6 %    PLATELET 629 384 - 136 K/uL    MPV 11.5 9.4 - 12.3 FL    ABSOLUTE NRBC 0.00 0.0 - 0.2 K/uL    DF AUTOMATED      NEUTROPHILS 82 (H) 43 - 78 %    LYMPHOCYTES 7 (L) 13 - 44 %    MONOCYTES 9 4.0 - 12.0 %    EOSINOPHILS 0 (L) 0.5 - 7.8 %    BASOPHILS 1 0.0 - 2.0 %    IMMATURE GRANULOCYTES 1 0.0 - 5.0 %    ABS. NEUTROPHILS 9.8 (H) 1.7 - 8.2 K/UL    ABS. LYMPHOCYTES 0.9 0.5 - 4.6 K/UL    ABS. MONOCYTES 1.1 0.1 - 1.3 K/UL    ABS. EOSINOPHILS 0.0 0.0 - 0.8 K/UL    ABS. BASOPHILS 0.1 0.0 - 0.2 K/UL    ABS. IMM.  GRANS. 0.1 0.0 - 0.5 K/UL   METABOLIC PANEL, COMPREHENSIVE    Collection Time: 21  1:38 PM   Result Value Ref Range    Sodium 133 (L) 136 - 145 mmol/L    Potassium 3.5 3.5 - 5.1 mmol/L    Chloride 97 (L) 98 - 107 mmol/L    CO2 25 21 - 32 mmol/L    Anion gap 11 7 - 16 mmol/L    Glucose 136 (H) 65 - 100 mg/dL    BUN 58 (H) 8 - 23 MG/DL    Creatinine 3.31 (H) 0.6 - 1.0 MG/DL    GFR est AA 17 (L) >60 ml/min/1.73m2    GFR est non-AA 14 (L) >60 ml/min/1.73m2    Calcium 9.4 8.3 - 10.4 MG/DL    Bilirubin, total 0.5 0.2 - 1.1 MG/DL    ALT (SGPT) 18 12 - 65 U/L    AST (SGOT) 12 (L) 15 - 37 U/L    Alk. phosphatase 70 50 - 136 U/L    Protein, total 6.9 6.3 - 8.2 g/dL    Albumin 2.7 (L) 3.2 - 4.6 g/dL    Globulin 4.2 (H) 2.3 - 3.5 g/dL    A-G Ratio 0.6 (L) 1.2 - 3.5     MAGNESIUM    Collection Time: 03/19/21  1:38 PM   Result Value Ref Range    Magnesium 2.2 1.8 - 2.4 mg/dL   TROPONIN-HIGH SENSITIVITY    Collection Time: 03/19/21  1:38 PM   Result Value Ref Range    Troponin-High Sensitivity 17.5 (H) 0 - 14 pg/mL   TSH 3RD GENERATION    Collection Time: 03/19/21  1:38 PM   Result Value Ref Range    TSH 2.230 0.358 - 3.740 uIU/mL   PROCALCITONIN    Collection Time: 03/19/21  1:38 PM   Result Value Ref Range    Procalcitonin 0.93 ng/mL   LACTIC ACID    Collection Time: 03/19/21  1:39 PM   Result Value Ref Range    Lactic acid 3.6 (HH) 0.4 - 2.0 MMOL/L   EKG, 12 LEAD, INITIAL    Collection Time: 03/19/21  1:39 PM   Result Value Ref Range    Ventricular Rate 131 BPM    Atrial Rate 340 BPM    QRS Duration 114 ms    Q-T Interval 324 ms    QTC Calculation (Bezet) 478 ms    Calculated R Axis 74 degrees    Calculated T Axis 58 degrees    Diagnosis       !! AGE AND GENDER SPECIFIC ECG ANALYSIS !! Atrial fibrillation with rapid ventricular response  Low voltage QRS  Incomplete right bundle branch block  Nonspecific ST and T wave abnormality , probably digitalis effect  Abnormal ECG  When compared with ECG of 25-APR-2019 06:39,  Atrial fibrillation has replaced Sinus rhythm  Vent.  rate has increased BY  47 BPM  Incomplete right bundle branch block is now Present     TROPONIN-HIGH SENSITIVITY    Collection Time: 03/19/21  3:30 PM   Result Value Ref Range    Troponin-High Sensitivity 14.2 (H) 0 - 14 pg/mL   LACTIC ACID    Collection Time: 03/19/21  3:30 PM   Result Value Ref Range    Lactic acid 2.0 0.4 - 2.0 MMOL/L   URINE MICROSCOPIC    Collection Time: 03/19/21  3:37 PM   Result Value Ref Range    WBC 0-3 0 /hpf    RBC 0-3 0 /hpf    Epithelial cells 0-3 0 /hpf    Bacteria 0 0 /hpf    Casts 10-20 0 /lpf    Crystals, urine 0 0 /LPF    Amorphous Crystals TRACE 0      Mucus 0 0 /lpf    Other observations RESULTS VERIFIED MANUALLY       Imaging /Procedures /Studies   CT UROGRAM WO CONT   Final Result   1. No evidence of urinary tract calculi or hydronephrosis. 2. Atrophic left kidney. 3. Sigmoid diverticulosis. 4. Partially imaged small left pleural effusion and adjacent   atelectasis/infiltrate. XR CHEST PORT   Final Result   Unremarkable portable chest radiograph. Assessment and Plan:      Active Hospital Problems    Diagnosis Date Noted    Atrial fibrillation with rapid ventricular response ([address] 03/19/2021   Acute hypoxic respiratory distressplaced on room air, on 2 L nasal cannula, wean as tolerated    Severe sepsisetiology unclear, given history of cough and shortness of breath, possibly pulmonary,  - follow-up CT chest without contrast, Covid screen, procalcitonin, strep pneumo and mycoplasma antigen   continue empiric Rocephin and azithromycin,    Atrial fibrillation rapid ventricular responseappreciate cardiology's consultation, continue amiodarone, will resume home low-dose beta-blocker, continue heparin drip for secondary stroke prevention  Follow-up TSH 2.3 wnnl, echocardiogram      elevated troponin- likely demand ischemia secondary to above  , continue to trend troponins, telemetry monitoring    Acute kidney injurysuspect ATN from hypotension   last known creatinine 1 6, on admission 3.3, CT venogram shows left atrophic kidney, no renal calculi or hydronephrosis, likely prerenal due to dehydration and hypotension  Status post 3 L of NS bolus in the emergency room, follow urine electrolytes, continue IV fluids, monitor volume status      Hypertensionhold home medication due to hypotension, resume if indicated    History of rheumatoid arthritis, major depressions, former smoker, hyperlipidemia, history of TIA, hypothyroidism on Synthroid, left atrophic kidney,  -resume home medications if indicated ,and monitor clinically while inpatient, currently stable co morbidities add to patient's case complexity    DVT PPx: lovenox  Code Status: full     Anticipated discharge: 2-4 days, depending on patient's progression    Signed By: Sugey Hess DO     March 19, 2021

## 2021-03-19 NOTE — H&P
[address] Cardiology Initial Cardiac Evaluation      Date of  Admission: 3/19/2021  1:13 PM     Primary Care Physician: Dr Gisell Byers  Primary Cardiologist: None  Referring Physician: Dr Zeina Seaman  Supervising Physician: Dr Levon Diamond    CC/Reason for evaluation: Atrial fibrillation    HPI:  David Gimenez is a 68 y.o. female with PMH of HTN, HLD, hypothyroidism, TIA, depression, breast cancer s/p radiation, Rheumatoid arthritis, CKD, and former smoker who presented to [facility] ED on 3/19 via EMS with complaint of SOB. Patient reports feeling unwell for the past two weeks. States she was treated for pneumonia a couple weeks ago by PCP. Has continued to feel unwell with SOB, cough, and intermittent fever and chills. Felt SOB worsened this morning therefore called EMS. Upon arrival to ED, patient was hypotensive with BP 69/46 and tachycardic at 142. EKG showed atrial fibrillation with RVR. Labs showed WBC 11.9, H/H 11.9/35, BUN/Cr 58/3.31, procalcitonin 0.93, lactic acid 3.6, and Hs-trop 17.5. CXR was unremarkable. Patient given 2L fluid bolus and started on Rocephin. Cardiology consulted for further evaluation of atrial fibrillation. Patient denies history of irregular heart rhythm.       Past Medical History:   Diagnosis Date    Acquired hypothyroidism 1/16/2016    Breast cancer ([facility] [address] 2008    Depression 8/18/2016    Endocrine disease     hypothyroid    Fungal dermatitis 8/18/2016    Hyperlipidemia 1/16/2016    Hypertension, essential, benign 8/18/2016    Hypokalemia 8/18/2016    Inflamed sebaceous cyst 8/18/2016    Major depressive disorder, recurrent, moderate ([facility] [address] 8/18/2016    Nicotine dependence, cigarettes, uncomplicated 5/47/6101    Osteopenia 8/18/2016    Osteoporosis 8/18/2016    Radiation therapy complication 5483    Rheumatoid arthritis ([facility] [address] 1/16/2016    Right carotid bruit 1/16/2016    TIA (transient ischemic attack) 1/16/2016    Tobacco abuse 8/18/2016      Past Surgical History:   Procedure Laterality Date    HX ADENOIDECTOMY      HX BREAST LUMPECTOMY Right 2008    with lymph nodes    HX TONSILLECTOMY         No Known Allergies   Social History     Socioeconomic History    Marital status:      Spouse name: Not on file    Number of children: Not on file    Years of education: Not on file    Highest education level: Not on file   Occupational History    Not on file   Social Needs    Financial resource strain: Not on file    Food insecurity     Worry: Not on file     Inability: Not on file    Transportation needs     Medical: Not on file     Non-medical: Not on file   Tobacco Use    Smoking status: Current Some Day Smoker    Smokeless tobacco: Never Used    Tobacco comment: Only on pay day-1/2 pack   Substance and Sexual Activity    Alcohol use: No    Drug use: No    Sexual activity: Not on file   Lifestyle    Physical activity     Days per week: Not on file     Minutes per session: Not on file    Stress: Not on file   Relationships    Social connections     Talks on phone: Not on file     Gets together: Not on file     Attends Tenriism service: Not on file     Active member of club or organization: Not on file     Attends meetings of clubs or organizations: Not on file     Relationship status: Not on file    Intimate partner violence     Fear of current or ex partner: Not on file     Emotionally abused: Not on file     Physically abused: Not on file     Forced sexual activity: Not on file   Other Topics Concern    Not on file   Social History Narrative    Not on file     Family History   Problem Relation Age of Onset    Stroke Mother     Cancer Father         Brain    HIV/AIDS Brother         No current facility-administered medications for this encounter. Current Outpatient Medications   Medication Sig    predniSONE (DELTASONE) 10 mg tablet Take 20 mg by mouth daily.  cholecalciferol (VITAMIN D3) 1,000 unit tablet Take 1,000 Units by mouth daily.     traMADol (ULTRAM) 50 mg tablet Take 50 mg by mouth every six (6) hours as needed for Pain.  metoprolol succinate (TOPROL-XL) 25 mg XL tablet Take 25 mg by mouth daily.  potassium chloride (K-DUR, KLOR-CON) 20 mEq tablet Take 1 Tab by mouth daily. Please cancel RX for Potassium 10meq and fill this instead. Dosage change.  rosuvastatin (CRESTOR) 10 mg tablet Take 1 Tab by mouth daily.  DULoxetine (CYMBALTA) 60 mg capsule Take 1 Cap by mouth daily.  furosemide (LASIX) 40 mg tablet Take 1 Tab by mouth daily.  levothyroxine (SYNTHROID) 25 mcg tablet Take 1 Tab by mouth Daily (before breakfast).  losartan-hydroCHLOROthiazide (HYZAAR) 100-12.5 mg per tablet Take 1 Tab by mouth daily.  methotrexate (RHEUMATREX) 2.5 mg tablet 5 tabs every Sunday    clotrimazole-betamethasone (LOTRISONE) topical cream Apply  to affected area two (2) times a day. fo 14 days.  aspirin delayed-release 81 mg tablet Take  by mouth daily.  folic acid 176 mcg tablet Take 800 mcg by mouth daily. Review of Symptoms:  General: Positive for generalized weakness and fever/chills. No weight changes  Skin: no rashes, lumps, or other skin changes  HEENT: no headache, dizziness, lightheadedness, vision changes, hearing changes, tinnitus, vertigo, sinus pressure/pain, bleeding gums, sore throat, or hoarseness  Neck: no swollen glands, goiter, pain or stiffness  Respiratory: Positive for cough, dyspnea. No sputum, hemoptysis, wheezing  Cardiovascular: + as per HPI  Gastrointestinal: no GERD, constipation, diarrhea, liver problems, or h/o GI bleed  Urinary: no frequency, urgency , hematuria, burning/pain with urination, recent flank pain, polyuria, nocturia, or difficulty urinating  Peripheral Vascular: no claudication, leg cramps, prior DVTs, swelling of calves, legs, or feet, color change, or swelling with redness or tenderness  Musculoskeletal: Positive for rheumatoid arthritis.    Psychiatric: no depression or excessive stress  Neurological: no sensory or motor loss, seizures, syncope, tremors, numbness, no dementia  Hematologic: no anemia, easy bruising or bleeding  Endocrine: Positive for thyroid problems. No heat or cold intolerance, excessive sweating, polyuria, polydipsia, diabetes. Subjective:     Physical Exam:    Vitals:    03/19/21 1521 03/19/21 1526 03/19/21 1536 03/19/21 1601   BP: (!) 101/53 (!) 102/58 (!) 93/50 90/65   Pulse: (!) 122 (!) 120 (!) 118 (!) 113   Resp: 20 16 18 17   Temp:       SpO2:       Weight:       Height:           General: Well Developed, Well Nourished, No Acute Distress  HEENT: pupils equal and round, no abnormalities noted  Neck: supple, no JVD, no carotid bruits  Heart: IRR without murmurs or gallops  Lungs: Clear throughout auscultation bilaterally without adventitious sounds  Abd: soft, nontender, nondistended, with good bowel sounds  Ext: warm, no edema, calves supple/nontender, pulses 2+ bilaterally  Skin: warm and dry  Psychiatric: Normal mood and affect  Neurologic: Alert and oriented X 3    Cardiographics    Telemetry: AFIB  ECG: atrial fibrillation, rate 131  Echocardiogram: not done    Labs:   Recent Results (from the past 24 hour(s))   CBC WITH AUTOMATED DIFF    Collection Time: 03/19/21  1:38 PM   Result Value Ref Range    WBC 11.9 (H) 4.3 - 11.1 K/uL    RBC 3.69 (L) 4.05 - 5.2 M/uL    HGB 11.9 11.7 - 15.4 g/dL    HCT 35.5 (L) 35.8 - 46.3 %    MCV 96.2 79.6 - 97.8 FL    MCH 32.2 26.1 - 32.9 PG    MCHC 33.5 31.4 - 35.0 g/dL    RDW 13.6 11.9 - 14.6 %    PLATELET 118 655 - 953 K/uL    MPV 11.5 9.4 - 12.3 FL    ABSOLUTE NRBC 0.00 0.0 - 0.2 K/uL    DF AUTOMATED      NEUTROPHILS 82 (H) 43 - 78 %    LYMPHOCYTES 7 (L) 13 - 44 %    MONOCYTES 9 4.0 - 12.0 %    EOSINOPHILS 0 (L) 0.5 - 7.8 %    BASOPHILS 1 0.0 - 2.0 %    IMMATURE GRANULOCYTES 1 0.0 - 5.0 %    ABS. NEUTROPHILS 9.8 (H) 1.7 - 8.2 K/UL    ABS. LYMPHOCYTES 0.9 0.5 - 4.6 K/UL    ABS. MONOCYTES 1.1 0.1 - 1.3 K/UL    ABS. EOSINOPHILS 0.0 0.0 - 0.8 K/UL    ABS. BASOPHILS 0.1 0.0 - 0.2 K/UL    ABS. IMM. GRANS. 0.1 0.0 - 0.5 K/UL   METABOLIC PANEL, COMPREHENSIVE    Collection Time: 03/19/21  1:38 PM   Result Value Ref Range    Sodium 133 (L) 136 - 145 mmol/L    Potassium 3.5 3.5 - 5.1 mmol/L    Chloride 97 (L) 98 - 107 mmol/L    CO2 25 21 - 32 mmol/L    Anion gap 11 7 - 16 mmol/L    Glucose 136 (H) 65 - 100 mg/dL    BUN 58 (H) 8 - 23 MG/DL    Creatinine 3.31 (H) 0.6 - 1.0 MG/DL    GFR est AA 17 (L) >60 ml/min/1.73m2    GFR est non-AA 14 (L) >60 ml/min/1.73m2    Calcium 9.4 8.3 - 10.4 MG/DL    Bilirubin, total 0.5 0.2 - 1.1 MG/DL    ALT (SGPT) 18 12 - 65 U/L    AST (SGOT) 12 (L) 15 - 37 U/L    Alk. phosphatase 70 50 - 136 U/L    Protein, total 6.9 6.3 - 8.2 g/dL    Albumin 2.7 (L) 3.2 - 4.6 g/dL    Globulin 4.2 (H) 2.3 - 3.5 g/dL    A-G Ratio 0.6 (L) 1.2 - 3.5     MAGNESIUM    Collection Time: 03/19/21  1:38 PM   Result Value Ref Range    Magnesium 2.2 1.8 - 2.4 mg/dL   TROPONIN-HIGH SENSITIVITY    Collection Time: 03/19/21  1:38 PM   Result Value Ref Range    Troponin-High Sensitivity 17.5 (H) 0 - 14 pg/mL   TSH 3RD GENERATION    Collection Time: 03/19/21  1:38 PM   Result Value Ref Range    TSH 2.230 0.358 - 3.740 uIU/mL   PROCALCITONIN    Collection Time: 03/19/21  1:38 PM   Result Value Ref Range    Procalcitonin 0.93 ng/mL   LACTIC ACID    Collection Time: 03/19/21  1:39 PM   Result Value Ref Range    Lactic acid 3.6 (HH) 0.4 - 2.0 MMOL/L   EKG, 12 LEAD, INITIAL    Collection Time: 03/19/21  1:39 PM   Result Value Ref Range    Ventricular Rate 131 BPM    Atrial Rate 340 BPM    QRS Duration 114 ms    Q-T Interval 324 ms    QTC Calculation (Bezet) 478 ms    Calculated R Axis 74 degrees    Calculated T Axis 58 degrees    Diagnosis       !! AGE AND GENDER SPECIFIC ECG ANALYSIS !!   Atrial fibrillation with rapid ventricular response  Low voltage QRS  Incomplete right bundle branch block  Nonspecific ST and T wave abnormality , probably digitalis effect  Abnormal ECG  When compared with ECG of 25-APR-2019 06:39,  Atrial fibrillation has replaced Sinus rhythm  Vent. rate has increased BY  47 BPM  Incomplete right bundle branch block is now Present     TROPONIN-HIGH SENSITIVITY    Collection Time: 03/19/21  3:30 PM   Result Value Ref Range    Troponin-High Sensitivity 14.2 (H) 0 - 14 pg/mL   LACTIC ACID    Collection Time: 03/19/21  3:30 PM   Result Value Ref Range    Lactic acid 2.0 0.4 - 2.0 MMOL/L     Pt has been seen and examined by Dr. Birder Fothergill. He agrees with the following assessment and plan. Assessment/Plan:      Atrial fibrillation with RVR, New onset-  Rate control with amiodarone secondary to hypotensive and unable to give diltiazem, start heparin gtt, consider COVID rule out    Sepsis- s/p 2L bolus, BC order, 2 g IV Rocephin given, per primary    LAI- check ECHO once COVID rule out    ARF on CKD- per primary     HLD- cont statin    Hypothryoidism- cont synthroid       Thank you for requesting cardiac evaluation and allowing us to participate in the care of this patient. We will continue to follow along with you.       Cristine Hood PA-C  Supervising Physician: Dr Birder Fothergill

## 2021-03-19 NOTE — ED PROVIDER NOTES
79-year-old female with history of TIA, tobacco abuse, hypothyroidism, hypertension, hypokalemia presents via EMS with complaint of palpitations and shortness of breath that she says has been intermittent over the past 2 weeks. States symptoms worsened today. Patient denies chest pain, cough, hemoptysis, nausea, vomiting, diarrhea, abdominal pain, dysuria, hematuria, headache. Patient denies history of atrial fibrillation. The history is provided by the patient. No  was used. Palpitations   This is a new problem. The current episode started more than 2 days ago. The problem has not changed since onset. The problem occurs daily. The problem is associated with nothing. Associated symptoms include malaise/fatigue, irregular heartbeat and shortness of breath. Pertinent negatives include no diaphoresis, no fever, no numbness, no chest pain, no chest pressure, no claudication, no exertional chest pressure, no near-syncope, no orthopnea, no PND, no syncope, no abdominal pain, no nausea, no vomiting, no headaches, no back pain, no leg pain, no lower extremity edema, no dizziness, no weakness, no cough and no sputum production. Risk factors include hypertension and obesity.         Past Medical History:   Diagnosis Date    Acquired hypothyroidism 1/16/2016    Breast cancer ([facility] [address] 2008    Depression 8/18/2016    Endocrine disease     hypothyroid    Fungal dermatitis 8/18/2016    Hyperlipidemia 1/16/2016    Hypertension, essential, benign 8/18/2016    Hypokalemia 8/18/2016    Inflamed sebaceous cyst 8/18/2016    Major depressive disorder, recurrent, moderate ([address] 8/18/2016    Nicotine dependence, cigarettes, uncomplicated 8/27/6595    Osteopenia 8/18/2016    Osteoporosis 8/18/2016    Radiation therapy complication 1760    Rheumatoid arthritis ([address] 1/16/2016    Right carotid bruit 1/16/2016    TIA (transient ischemic attack) 1/16/2016    Tobacco abuse 8/18/2016       Past Surgical History:   Procedure Laterality Date    HX ADENOIDECTOMY      HX BREAST LUMPECTOMY Right 2008    with lymph nodes    HX TONSILLECTOMY           Family History:   Problem Relation Age of Onset    Stroke Mother     Cancer Father         Brain    HIV/AIDS Brother        Social History     Socioeconomic History    Marital status:      Spouse name: Not on file    Number of children: Not on file    Years of education: Not on file    Highest education level: Not on file   Occupational History    Not on file   Social Needs    Financial resource strain: Not on file    Food insecurity     Worry: Not on file     Inability: Not on file    Transportation needs     Medical: Not on file     Non-medical: Not on file   Tobacco Use    Smoking status: Current Some Day Smoker    Smokeless tobacco: Never Used    Tobacco comment: Only on pay day-1/2 pack   Substance and Sexual Activity    Alcohol use: No    Drug use: No    Sexual activity: Not on file   Lifestyle    Physical activity     Days per week: Not on file     Minutes per session: Not on file    Stress: Not on file   Relationships    Social connections     Talks on phone: Not on file     Gets together: Not on file     Attends [employer] service: Not on file     Active member of club or organization: Not on file     Attends meetings of clubs or organizations: Not on file     Relationship status: Not on file    Intimate partner violence     Fear of current or ex partner: Not on file     Emotionally abused: Not on file     Physically abused: Not on file     Forced sexual activity: Not on file   Other Topics Concern    Not on file   Social History Narrative    Not on file         ALLERGIES: Patient has no known allergies. Review of Systems   Constitutional: Positive for fatigue and malaise/fatigue. Negative for diaphoresis and fever. HENT: Negative for congestion, rhinorrhea and sore throat. Respiratory: Positive for shortness of breath. Negative for cough, sputum production and wheezing. Cardiovascular: Positive for palpitations. Negative for chest pain, orthopnea, claudication, syncope, PND and near-syncope. Gastrointestinal: Negative for abdominal pain, diarrhea, nausea and vomiting. Genitourinary: Negative for dysuria, flank pain, pelvic pain, vaginal bleeding and vaginal discharge. Musculoskeletal: Negative for back pain and myalgias. Skin: Negative for color change and rash. Neurological: Negative for dizziness, syncope, weakness, light-headedness, numbness and headaches. Vitals:    03/19/21 1318   BP: (!) 69/46   Pulse: (!) 142   Resp: 24   Temp: 97.7 °F (36.5 °C)   Weight: 70.3 kg (155 lb)   Height: 5' (1.524 m)            Physical Exam  Vitals signs and nursing note reviewed. Constitutional:       Appearance: Normal appearance. HENT:      Head: Normocephalic. Mouth/Throat:      Mouth: Mucous membranes are moist.   Eyes:      Extraocular Movements: Extraocular movements intact. Pupils: Pupils are equal, round, and reactive to light. Cardiovascular:      Rate and Rhythm: Tachycardia present. Rhythm irregular. Pulses: Normal pulses. Heart sounds: Normal heart sounds. Pulmonary:      Effort: Pulmonary effort is normal.      Breath sounds: Normal breath sounds. Comments: CTAB. Abdominal:      General: Bowel sounds are normal.      Palpations: Abdomen is soft. Tenderness: There is no abdominal tenderness. There is no guarding or rebound. Comments: Soft, nontender, nondistended. No rebound or guarding. No peritoneal signs. Musculoskeletal: Normal range of motion. General: No swelling. Skin:     Findings: No erythema or rash. Neurological:      General: No focal deficit present. Mental Status: She is alert and oriented to person, place, and time. Motor: No weakness.           MDM  Number of Diagnoses or Management Options  NAZIA (acute kidney injury) ([facility] [address]): new and requires workup  Atrial fibrillation with RVR ([facility] [address]): new and requires workup  Hypotension, unspecified hypotension type: new and requires workup  Leukocytosis, unspecified type: new and requires workup  Diagnosis management comments: Patient initially hypotensive on arrival. BPs 69/46, 72/38, 63/40. Placed 1 L normal saline IV fluid bolus. Slightly improvement after 1L. Additional 2L NS IVF boluses required. EKG with A. fib with RVR. Heart rate in the 130s to 140s. Patient given total of 3 L NS due to hypotension. Significant NAZIA noted with creatinine today of 3.31. Mild leukocytosis. Initial lactic acid 3.6. Concern for possible UTI. Order placed for Rocephin. Lactic acid 2.0. Blood cultures obtained. Chest x-ray with no acute or concerning finding noted. CT urogram with atrophic left kidney, no evidence urinary tract calculi or hydronephrosis. Sigmoid diverticulosis. Partially imaged left pleural effusion and adjacent atelectasis/infiltrate. Zithromax 500 mg IV added on. Cards consulted and following. Recommend Amiodarone + heparin gtt. Hospitalist consulted for admission. Amount and/or Complexity of Data Reviewed  Clinical lab tests: ordered and reviewed  Tests in the radiology section of CPT®: ordered and reviewed  Tests in the medicine section of CPT®: reviewed and ordered  Review and summarize past medical records: yes (Echocardiogram 2/16/2016:  SUMMARY:     -  Left ventricle: Systolic function was vigorous. Ejection fraction was estimated in the range of 70 % to 75 %. There were no regional wall motion abnormalities. Features were consistent with a pseudonormal left ventricular filling pattern, with concomitant abnormal relaxation and increased filling pressure (grade 2 diastolic dysfunction).    -  Left atrium: The atrium was mildly to moderately dilated.     -  Atrial septum: The septum bows from left to right, consistent with  Increased left atrial pressure.  Agitated saline contrast injection (bubble study) was performed. There was no right-to-left shunt, at rest or induced by the  Valsalva maneuver.     -  Mitral valve: There was mild regurgitation.   )  Discuss the patient with other providers: yes  Independent visualization of images, tracings, or specimens: yes    Risk of Complications, Morbidity, and/or Mortality  Presenting problems: high  Diagnostic procedures: high  Management options: high    Patient Progress  Patient progress: improved    ED Course as of Mar 19 1646   Fri Mar 19, 2021   1408 CXR IMPRESSION:  Unremarkable portable chest radiograph.        [DF]   1645 CT urogram IMPRESSION  1. No evidence of urinary tract calculi or hydronephrosis. 2. Atrophic left kidney. 3. Sigmoid diverticulosis. 4. Partially imaged small left pleural effusion and adjacent atelectasis/infiltrate. [DF]      ED Course User Index  [DF] Kalin Sanchez MD       EKG    Date/Time: 3/19/2021 1:42 PM  Performed by: Kalin Sanchez MD  Authorized by: Kalin Sanchez MD     ECG reviewed by ED Physician in the absence of a cardiologist: yes    Rate:     ECG rate:  131    ECG rate assessment: tachycardic    Rhythm:     Rhythm: atrial fibrillation    Ectopy:     Ectopy: none    QRS:     QRS axis:  Normal    QRS intervals:  Normal  Conduction:     Conduction: normal    ST segments:     ST segments:  Normal  T waves:     T waves: normal      Critical Care  Performed by: Kalin Sanchez MD  Authorized by:  Kalin Sanchez MD     Critical care provider statement:     Critical care time (minutes):  35    Critical care time was exclusive of:  Separately billable procedures and treating other patients    Critical care was necessary to treat or prevent imminent or life-threatening deterioration of the following conditions:  Cardiac failure, circulatory failure, dehydration, sepsis, renal failure, respiratory failure and shock    Critical care was time spent personally by me on the following activities:  Blood draw for specimens, development of treatment plan with patient or surrogate, discussions with consultants, discussions with primary provider, evaluation of patient's response to treatment, examination of patient, obtaining history from patient or surrogate, pulse oximetry, re-evaluation of patient's condition, review of old charts, ordering and review of radiographic studies, ordering and review of laboratory studies and ordering and performing treatments and interventions    I assumed direction of critical care for this patient from another provider in my specialty: yes    Comments:      Pt critically ill with new onset afib (palpitations for ~2 weeks) w/ hypotension and NAZIA. Pt requiring 3L NS IVF with improvement in BP. Cards recommend Amiodarone gtt (as BP not stable at this time for Diltiazem) + Heparin gtt. Covered with Rocephin 2g + Zithromax 500 mg for possible PNA. Results Include:    Recent Results (from the past 24 hour(s))   CBC WITH AUTOMATED DIFF    Collection Time: 03/19/21  1:38 PM   Result Value Ref Range    WBC 11.9 (H) 4.3 - 11.1 K/uL    RBC 3.69 (L) 4.05 - 5.2 M/uL    HGB 11.9 11.7 - 15.4 g/dL    HCT 35.5 (L) 35.8 - 46.3 %    MCV 96.2 79.6 - 97.8 FL    MCH 32.2 26.1 - 32.9 PG    MCHC 33.5 31.4 - 35.0 g/dL    RDW 13.6 11.9 - 14.6 %    PLATELET 449 598 - 142 K/uL    MPV 11.5 9.4 - 12.3 FL    ABSOLUTE NRBC 0.00 0.0 - 0.2 K/uL    DF AUTOMATED      NEUTROPHILS 82 (H) 43 - 78 %    LYMPHOCYTES 7 (L) 13 - 44 %    MONOCYTES 9 4.0 - 12.0 %    EOSINOPHILS 0 (L) 0.5 - 7.8 %    BASOPHILS 1 0.0 - 2.0 %    IMMATURE GRANULOCYTES 1 0.0 - 5.0 %    ABS. NEUTROPHILS 9.8 (H) 1.7 - 8.2 K/UL    ABS. LYMPHOCYTES 0.9 0.5 - 4.6 K/UL    ABS. MONOCYTES 1.1 0.1 - 1.3 K/UL    ABS. EOSINOPHILS 0.0 0.0 - 0.8 K/UL    ABS. BASOPHILS 0.1 0.0 - 0.2 K/UL    ABS. IMM.  GRANS. 0.1 0.0 - 0.5 K/UL   METABOLIC PANEL, COMPREHENSIVE    Collection Time: 03/19/21 1:38 PM   Result Value Ref Range    Sodium 133 (L) 136 - 145 mmol/L    Potassium 3.5 3.5 - 5.1 mmol/L    Chloride 97 (L) 98 - 107 mmol/L    CO2 25 21 - 32 mmol/L    Anion gap 11 7 - 16 mmol/L    Glucose 136 (H) 65 - 100 mg/dL    BUN 58 (H) 8 - 23 MG/DL    Creatinine 3.31 (H) 0.6 - 1.0 MG/DL    GFR est AA 17 (L) >60 ml/min/1.73m2    GFR est non-AA 14 (L) >60 ml/min/1.73m2    Calcium 9.4 8.3 - 10.4 MG/DL    Bilirubin, total 0.5 0.2 - 1.1 MG/DL    ALT (SGPT) 18 12 - 65 U/L    AST (SGOT) 12 (L) 15 - 37 U/L    Alk. phosphatase 70 50 - 136 U/L    Protein, total 6.9 6.3 - 8.2 g/dL    Albumin 2.7 (L) 3.2 - 4.6 g/dL    Globulin 4.2 (H) 2.3 - 3.5 g/dL    A-G Ratio 0.6 (L) 1.2 - 3.5     MAGNESIUM    Collection Time: 03/19/21  1:38 PM   Result Value Ref Range    Magnesium 2.2 1.8 - 2.4 mg/dL   TROPONIN-HIGH SENSITIVITY    Collection Time: 03/19/21  1:38 PM   Result Value Ref Range    Troponin-High Sensitivity 17.5 (H) 0 - 14 pg/mL   TSH 3RD GENERATION    Collection Time: 03/19/21  1:38 PM   Result Value Ref Range    TSH 2.230 0.358 - 3.740 uIU/mL   PROCALCITONIN    Collection Time: 03/19/21  1:38 PM   Result Value Ref Range    Procalcitonin 0.93 ng/mL   LACTIC ACID    Collection Time: 03/19/21  1:39 PM   Result Value Ref Range    Lactic acid 3.6 (HH) 0.4 - 2.0 MMOL/L   EKG, 12 LEAD, INITIAL    Collection Time: 03/19/21  1:39 PM   Result Value Ref Range    Ventricular Rate 131 BPM    Atrial Rate 340 BPM    QRS Duration 114 ms    Q-T Interval 324 ms    QTC Calculation (Bezet) 478 ms    Calculated R Axis 74 degrees    Calculated T Axis 58 degrees    Diagnosis       !! AGE AND GENDER SPECIFIC ECG ANALYSIS !! Atrial fibrillation with rapid ventricular response  Low voltage QRS  Incomplete right bundle branch block  Nonspecific ST and T wave abnormality , probably digitalis effect  Abnormal ECG  When compared with ECG of 25-APR-2019 06:39,  Atrial fibrillation has replaced Sinus rhythm  Vent.  rate has increased BY  47 BPM  Incomplete right bundle branch block is now Present     TROPONIN-HIGH SENSITIVITY    Collection Time: 03/19/21  3:30 PM   Result Value Ref Range    Troponin-High Sensitivity 14.2 (H) 0 - 14 pg/mL   LACTIC ACID    Collection Time: 03/19/21  3:30 PM   Result Value Ref Range    Lactic acid 2.0 0.4 - 2.0 MMOL/L   URINE MICROSCOPIC    Collection Time: 03/19/21  3:37 PM   Result Value Ref Range    WBC 0-3 0 /hpf    RBC 0-3 0 /hpf    Epithelial cells 0-3 0 /hpf    Bacteria 0 0 /hpf    Casts 10-20 0 /lpf    Crystals, urine 0 0 /LPF    Amorphous Crystals TRACE 0      Mucus 0 0 /lpf    Other observations RESULTS VERIFIED MANUALLY                Johnny Sanchez MD; 3/19/2021 @1:38 PM Voice dictation software was used during the making of this note. This software is not perfect and grammatical and other typographical errors may be present.   This note has not been proofread for errors.  ===================================================================

## 2021-03-20 LAB
25(OH)D3 SERPL-MCNC: 21.5 NG/ML (ref 30–100)
ALBUMIN SERPL-MCNC: 2.9 G/DL (ref 3.2–4.6)
ALBUMIN/GLOB SERPL: 0.8 {RATIO} (ref 1.2–3.5)
ALP SERPL-CCNC: 61 U/L (ref 50–136)
ALT SERPL-CCNC: 31 U/L (ref 12–65)
ANION GAP SERPL CALC-SCNC: 7 MMOL/L (ref 7–16)
AST SERPL-CCNC: 29 U/L (ref 15–37)
ATRIAL RATE: 71 BPM
BASOPHILS # BLD: 0.1 K/UL (ref 0–0.2)
BASOPHILS # BLD: 0.1 K/UL (ref 0–0.2)
BASOPHILS NFR BLD: 1 % (ref 0–2)
BASOPHILS NFR BLD: 1 % (ref 0–2)
BILIRUB SERPL-MCNC: 0.4 MG/DL (ref 0.2–1.1)
BNP SERPL-MCNC: 3245 PG/ML
BUN SERPL-MCNC: 47 MG/DL (ref 8–23)
CALCIUM SERPL-MCNC: 8.8 MG/DL (ref 8.3–10.4)
CALCULATED P AXIS, ECG09: 67 DEGREES
CALCULATED R AXIS, ECG10: 65 DEGREES
CALCULATED T AXIS, ECG11: 67 DEGREES
CHLORIDE SERPL-SCNC: 104 MMOL/L (ref 98–107)
CO2 SERPL-SCNC: 24 MMOL/L (ref 21–32)
CREAT SERPL-MCNC: 2.13 MG/DL (ref 0.6–1)
CRP SERPL HS-MCNC: 175 MG/L
DIAGNOSIS, 93000: NORMAL
DIFFERENTIAL METHOD BLD: ABNORMAL
DIFFERENTIAL METHOD BLD: ABNORMAL
EOSINOPHIL # BLD: 0.1 K/UL (ref 0–0.8)
EOSINOPHIL # BLD: 0.1 K/UL (ref 0–0.8)
EOSINOPHIL NFR BLD: 1 % (ref 0.5–7.8)
EOSINOPHIL NFR BLD: 1 % (ref 0.5–7.8)
ERYTHROCYTE [DISTWIDTH] IN BLOOD BY AUTOMATED COUNT: 13.7 % (ref 11.9–14.6)
ERYTHROCYTE [DISTWIDTH] IN BLOOD BY AUTOMATED COUNT: 13.8 % (ref 11.9–14.6)
GLOBULIN SER CALC-MCNC: 3.5 G/DL (ref 2.3–3.5)
GLUCOSE SERPL-MCNC: 118 MG/DL (ref 65–100)
HCT VFR BLD AUTO: 29.8 % (ref 35.8–46.3)
HCT VFR BLD AUTO: 30.9 % (ref 35.8–46.3)
HGB BLD-MCNC: 10.1 G/DL (ref 11.7–15.4)
HGB BLD-MCNC: 9.9 G/DL (ref 11.7–15.4)
IMM GRANULOCYTES # BLD AUTO: 0.1 K/UL (ref 0–0.5)
IMM GRANULOCYTES # BLD AUTO: 0.1 K/UL (ref 0–0.5)
IMM GRANULOCYTES NFR BLD AUTO: 0 % (ref 0–5)
IMM GRANULOCYTES NFR BLD AUTO: 0 % (ref 0–5)
LYMPHOCYTES # BLD: 1 K/UL (ref 0.5–4.6)
LYMPHOCYTES # BLD: 1 K/UL (ref 0.5–4.6)
LYMPHOCYTES NFR BLD: 8 % (ref 13–44)
LYMPHOCYTES NFR BLD: 9 % (ref 13–44)
MAGNESIUM SERPL-MCNC: 1.9 MG/DL (ref 1.8–2.4)
MCH RBC QN AUTO: 31.6 PG (ref 26.1–32.9)
MCH RBC QN AUTO: 33.1 PG (ref 26.1–32.9)
MCHC RBC AUTO-ENTMCNC: 32 G/DL (ref 31.4–35)
MCHC RBC AUTO-ENTMCNC: 33.9 G/DL (ref 31.4–35)
MCV RBC AUTO: 97.7 FL (ref 79.6–97.8)
MCV RBC AUTO: 98.7 FL (ref 79.6–97.8)
MONOCYTES # BLD: 1.5 K/UL (ref 0.1–1.3)
MONOCYTES # BLD: 1.5 K/UL (ref 0.1–1.3)
MONOCYTES NFR BLD: 13 % (ref 4–12)
MONOCYTES NFR BLD: 13 % (ref 4–12)
NEUTS SEG # BLD: 8.7 K/UL (ref 1.7–8.2)
NEUTS SEG # BLD: 9.4 K/UL (ref 1.7–8.2)
NEUTS SEG NFR BLD: 77 % (ref 43–78)
NEUTS SEG NFR BLD: 78 % (ref 43–78)
NRBC # BLD: 0 K/UL (ref 0–0.2)
NRBC # BLD: 0 K/UL (ref 0–0.2)
P-R INTERVAL, ECG05: 168 MS
PLATELET # BLD AUTO: 304 K/UL (ref 150–450)
PLATELET # BLD AUTO: 323 K/UL (ref 150–450)
PMV BLD AUTO: 11.1 FL (ref 9.4–12.3)
PMV BLD AUTO: 11.2 FL (ref 9.4–12.3)
POTASSIUM SERPL-SCNC: 3.4 MMOL/L (ref 3.5–5.1)
PROT SERPL-MCNC: 6.4 G/DL (ref 6.3–8.2)
Q-T INTERVAL, ECG07: 384 MS
QRS DURATION, ECG06: 70 MS
QTC CALCULATION (BEZET), ECG08: 405 MS
RBC # BLD AUTO: 3.05 M/UL (ref 4.05–5.2)
RBC # BLD AUTO: 3.13 M/UL (ref 4.05–5.2)
SARS-COV-2, COV2: NOT DETECTED
SODIUM SERPL-SCNC: 135 MMOL/L (ref 136–145)
SPECIMEN SOURCE, FCOV2M: NORMAL
UFH PPP CHRO-ACNC: 0.19 IU/ML (ref 0.3–0.7)
UFH PPP CHRO-ACNC: <0.1 IU/ML (ref 0.3–0.7)
VENTRICULAR RATE, ECG03: 67 BPM
WBC # BLD AUTO: 11.4 K/UL (ref 4.3–11.1)
WBC # BLD AUTO: 12.1 K/UL (ref 4.3–11.1)

## 2021-03-20 PROCEDURE — 99233 SBSQ HOSP IP/OBS HIGH 50: CPT | Performed by: INTERNAL MEDICINE

## 2021-03-20 PROCEDURE — 83735 ASSAY OF MAGNESIUM: CPT

## 2021-03-20 PROCEDURE — 74011000258 HC RX REV CODE- 258: Performed by: HOSPITALIST

## 2021-03-20 PROCEDURE — 36415 COLL VENOUS BLD VENIPUNCTURE: CPT

## 2021-03-20 PROCEDURE — 85520 HEPARIN ASSAY: CPT

## 2021-03-20 PROCEDURE — 97165 OT EVAL LOW COMPLEX 30 MIN: CPT

## 2021-03-20 PROCEDURE — 83880 ASSAY OF NATRIURETIC PEPTIDE: CPT

## 2021-03-20 PROCEDURE — 97530 THERAPEUTIC ACTIVITIES: CPT

## 2021-03-20 PROCEDURE — 2709999900 HC NON-CHARGEABLE SUPPLY

## 2021-03-20 PROCEDURE — 97161 PT EVAL LOW COMPLEX 20 MIN: CPT

## 2021-03-20 PROCEDURE — 74011250637 HC RX REV CODE- 250/637: Performed by: INTERNAL MEDICINE

## 2021-03-20 PROCEDURE — 74011250637 HC RX REV CODE- 250/637: Performed by: HOSPITALIST

## 2021-03-20 PROCEDURE — 86141 C-REACTIVE PROTEIN HS: CPT

## 2021-03-20 PROCEDURE — 85025 COMPLETE CBC W/AUTO DIFF WBC: CPT

## 2021-03-20 PROCEDURE — 93306 TTE W/DOPPLER COMPLETE: CPT

## 2021-03-20 PROCEDURE — 80053 COMPREHEN METABOLIC PANEL: CPT

## 2021-03-20 PROCEDURE — 93005 ELECTROCARDIOGRAM TRACING: CPT | Performed by: INTERNAL MEDICINE

## 2021-03-20 PROCEDURE — 65270000029 HC RM PRIVATE

## 2021-03-20 PROCEDURE — 74011250636 HC RX REV CODE- 250/636: Performed by: HOSPITALIST

## 2021-03-20 RX ORDER — HEPARIN SODIUM 5000 [USP'U]/ML
20 INJECTION, SOLUTION INTRAVENOUS; SUBCUTANEOUS ONCE
Status: COMPLETED | OUTPATIENT
Start: 2021-03-20 | End: 2021-03-20

## 2021-03-20 RX ORDER — AMIODARONE HYDROCHLORIDE 200 MG/1
400 TABLET ORAL EVERY 8 HOURS
Status: DISCONTINUED | OUTPATIENT
Start: 2021-03-20 | End: 2021-03-25

## 2021-03-20 RX ORDER — BENZONATATE 100 MG/1
100 CAPSULE ORAL
Status: DISCONTINUED | OUTPATIENT
Start: 2021-03-20 | End: 2021-03-26 | Stop reason: HOSPADM

## 2021-03-20 RX ADMIN — Medication 10 ML: at 22:13

## 2021-03-20 RX ADMIN — SENNOSIDES AND DOCUSATE SODIUM 1 TABLET: 8.6; 5 TABLET ORAL at 22:12

## 2021-03-20 RX ADMIN — CEFTRIAXONE 2 G: 2 INJECTION, POWDER, FOR SOLUTION INTRAMUSCULAR; INTRAVENOUS at 16:35

## 2021-03-20 RX ADMIN — ROSUVASTATIN 10 MG: 10 TABLET, FILM COATED ORAL at 22:13

## 2021-03-20 RX ADMIN — HEPARIN SODIUM 1400 UNITS: 5000 INJECTION INTRAVENOUS; SUBCUTANEOUS at 01:54

## 2021-03-20 RX ADMIN — AMIODARONE HYDROCHLORIDE 400 MG: 200 TABLET ORAL at 16:35

## 2021-03-20 RX ADMIN — FAMOTIDINE 20 MG: 20 TABLET, FILM COATED ORAL at 11:35

## 2021-03-20 RX ADMIN — AZITHROMYCIN MONOHYDRATE 500 MG: 500 INJECTION, POWDER, LYOPHILIZED, FOR SOLUTION INTRAVENOUS at 17:55

## 2021-03-20 RX ADMIN — CHOLECALCIFEROL TAB 25 MCG (1000 UNIT) 1000 UNITS: 25 TAB at 11:35

## 2021-03-20 RX ADMIN — POLYETHYLENE GLYCOL 3350 17 G: 17 POWDER, FOR SOLUTION ORAL at 11:34

## 2021-03-20 RX ADMIN — BENZONATATE 100 MG: 100 CAPSULE ORAL at 22:12

## 2021-03-20 RX ADMIN — FOLIC ACID 1 MG: 1 TABLET ORAL at 11:35

## 2021-03-20 RX ADMIN — LEVOTHYROXINE SODIUM 25 MCG: 50 TABLET ORAL at 11:35

## 2021-03-20 RX ADMIN — DULOXETINE 60 MG: 60 CAPSULE, DELAYED RELEASE ORAL at 11:35

## 2021-03-20 NOTE — PROGRESS NOTES
Problem: Patient Education: Go to Patient Education Activity  Goal: Patient/Family Education  3/20/2021 1830 by Patricia Alexis RN  Outcome: Progressing Towards Goal  3/20/2021 [address] by Patricia Alexis RN  Outcome: Progressing Towards Goal     Problem: Falls - Risk of  Goal: *Absence of Falls  Description: Document Bard  Fall Risk and appropriate interventions in the flowsheet.   3/20/2021 1830 by Patricia Alexis RN  Outcome: Progressing Towards Goal  Note: Fall Risk Interventions:  Mobility Interventions: Patient to call before getting OOB                          3/20/2021 [address] by Patricia Alexis RN  Outcome: Progressing Towards Goal  Note: Fall Risk Interventions:  Mobility Interventions: Patient to call before getting OOB                             Problem: Patient Education: Go to Patient Education Activity  Goal: Patient/Family Education  3/20/2021 1830 by Patricia Alexis RN  Outcome: Progressing Towards Goal  3/20/2021 [address] by Patricia Alexis RN  Outcome: Progressing Towards Goal     Problem: Breathing Pattern - Ineffective  Goal: *Absence of hypoxia  3/20/2021 1830 by Patricia Alexis RN  Outcome: Progressing Towards Goal  3/20/2021 1829 by Patricia Alexis RN  Outcome: Progressing Towards Goal  Goal: *Use of effective breathing techniques  3/20/2021 1830 by Patricia Alexis RN  Outcome: Progressing Towards Goal  3/20/2021 [address] by Patricia Alexis RN  Outcome: Progressing Towards Goal     Problem: Patient Education: Go to Patient Education Activity  Goal: Patient/Family Education  3/20/2021 1830 by Patricia Alexis RN  Outcome: Progressing Towards Goal  3/20/2021 [address] by Patricia Alexis RN  Outcome: Progressing Towards Goal     Problem: Heart Failure: Day 1  Goal: Diagnostic Test/Procedures  Outcome: Progressing Towards Goal  Goal: Nutrition/Diet  Outcome: Progressing Towards Goal  Goal: Discharge Planning  Outcome: Progressing Towards Goal     Problem: Nutrition Deficit  Goal: *Optimize nutritional status  Outcome: Progressing Towards Goal     Problem: Patient Education: Go to Patient Education Activity  Goal: Patient/Family Education  Outcome: Progressing Towards Goal

## 2021-03-20 NOTE — PROGRESS NOTES
ACUTE PHYSICAL THERAPY GOALS:  (Developed with and agreed upon by patient and/or caregiver.)  STG:  (1.)Ms. Sandra Peterson will move from supine to sit and sit to supine , scoot up and down and roll side to side with STAND BY ASSIST within 4 treatment day(s). (2.)Ms. Sandra Peterson will transfer from bed to chair and chair to bed with STAND BY ASSIST using the least restrictive device within 4 treatment day(s). (3.)Ms. Almeida will ambulate with STAND BY ASSIST for 15 feet with the least restrictive device within 4 treatment day(s). LTG:  (1.)Ms. Sandra Peterson will move from supine to sit and sit to supine , scoot up and down and roll side to side in bed with MODIFIED INDEPENDENCE within 7 treatment day(s). (2.)Ms. Sandra Peterson will transfer from bed to chair and chair to bed with MODIFIED INDEPENDENCE using the least restrictive device within 7 treatment day(s). (3.)Ms. Almeida will ambulate with MODIFIED INDEPENDENCE for 30 feet with the least restrictive device within 7 treatment day(s). ________________________________________________________________________________________________      PHYSICAL THERAPY ASSESSMENT: Initial Assessment PT Treatment Day # 1      Marine Valdes is a 68 y.o. female   PRIMARY DIAGNOSIS: Atrial fibrillation with rapid ventricular response (HCC)  Atrial fibrillation with rapid ventricular response (HCC) [I48.91]       Reason for Referral:    ICD-10: Treatment Diagnosis: Generalized Muscle Weakness (M62.81)  Other lack of cordination (R27.8)  Difficulty in walking, Not elsewhere classified (R26.2)  Other abnormalities of gait and mobility (R26.89)  INPATIENT: Payor: [facility] MEDICARE / Plan: [address] / Product Type: Managed Care Medicare /     ASSESSMENT:     REHAB RECOMMENDATIONS:   Recommendation to date pending progress:  Setting:   to be determined. Home Health versus Short Term Rehab.      Equipment:    To Be Determined     PRIOR LEVEL OF FUNCTION:  (Prior to Hospitalization) INITIAL/CURRENT LEVEL OF FUNCTION:  (Most Recently Demonstrated)   Bed Mobility:   Independent  Sit to Stand:   Independent  Transfers:   Independent  Gait/Mobility:   Independent Bed Mobility:   Contact Guard Assistance  Sit to Stand:  [address] (see note)   Transfers:   Contact Guard Assistance  Gait/Mobility:   Contact Guard Assistance  (see note)      ASSESSMENT:  Ms. Terrance Byrne is a pleasant frail elderly female with above diagnosis who demonstrates with decreased transfers, ambulation and mobility below her prior functional baseline. All transfers are currently limited at [facility] x 1 out of bed to sit and unable to stand and she is ER and gurney is to high to step down from and no step down is available in the room. Will confirm CGA x 1 status next visit. Skilled PT is indicated for this patient's functional mobility deficits. SUBJECTIVE:   Ms. Terrance Byrne states, \"I am ready to get up, but this beds too high. \"    SOCIAL HISTORY/LIVING ENVIRONMENT:   Home Environment: Trailer/mobile home  One/Two Story Residence: One story  Living Alone: Yes  Support Systems: Friends \ neighbors  OBJECTIVE:     PAIN: VITAL SIGNS: LINES/DRAINS:   Pre Treatment: Pain Screen  Pain Scale 1: Numeric (0 - 10)  Pain Intensity 1: 0  Post Treatment: 0/10 no pain reported.            IV and Purewick  O2 Device: Nasal cannula     GROSS EVALUATION:   Within Functional Limits Abnormal/ Functional Abnormal/ Non-Functional (see comments) Not Tested Comments:   AROM [] [x] [] []    PROM [] [x] [] []    Strength [] [x] [] []    Balance [] [x] [] []    Posture [] [x] [] []    Sensation [] [x] [] []    Coordination [] [x] [] []    Tone [] [x] [] []    Edema [] [x] [] []    Activity Tolerance [] [x] [] []     [] [] [] []      COGNITION/  PERCEPTION: Intact Impaired   (see comments) Comments:   Orientation [x] []    Vision [x] []    Hearing [x] []    Command Following [x] []    Safety Awareness [x] []     [] []      MOBILITY: I Mod I S SBA CGA Min Mod Max Total  NT x2 Comments:   Bed Mobility    Rolling [] [] [] [] [x] [] [] [] [] [] []    Supine to Sit [] [] [] [] [x] [] [] [] [] [] []    Scooting [] [] [] [] [x] [] [] [] [] [] []    Sit to Supine [] [] [] [] [x] [] [] [] [] [] []    Transfers    Sit to Stand [] [] [] [] [] [] [] [] [] [x] [] Christy Kales too high    Bed to Chair [] [] [] [] [] [] [] [] [] [x] []    Stand to Sit [] [] [] [] [] [] [] [] [] [x] []    I=Independent, Mod I=Modified Independent, S=Supervision, SBA=Standby Assistance, CGA=Contact Guard Assistance,   Min=Minimal Assistance, Mod=Moderate Assistance, Max=Maximal Assistance, Total=Total Assistance, NT=Not Tested  GAIT: I Mod I S SBA CGA Min Mod Max Total  NT x2 Comments:   Level of Assistance [] [] [] [] [] [] [] [] [] [x] [] Christy Kales too high and no stepdown. Distance     DME N/A    Gait Quality none    Weightbearing Status WBAT     I=Independent, Mod I=Modified Independent, S=Supervision, SBA=Standby Assistance, CGA=Contact Guard Assistance,   Min=Minimal Assistance, Mod=Moderate Assistance, Max=Maximal Assistance, Total=Total Assistance, NT=Not Tested    [address] [facility] [address] [id number] AM-[facility] Form       How much difficulty does the patient currently have. .. Unable A Lot A Little None   1. Turning over in bed (including adjusting bedclothes, sheets and blankets)? [] 1   [] 2   [x] 3   [] 4   2. Sitting down on and standing up from a chair with arms ( e.g., wheelchair, bedside commode, etc.)   [] 1   [] 2   [x] 3   [] 4   3. Moving from lying on back to sitting on the side of the bed? [] 1   [] 2   [x] 3   [] 4   How much help from another person does the patient currently need. .. Total A Lot A Little None   4. Moving to and from a bed to a chair (including a wheelchair)? [] 1   [] 2   [x] 3   [] 4   5. Need to walk in hospital room? [] 1   [] 2   [x] 3   [] 4   6.   Climbing 3-5 steps with a railing? [] 1   [x] 2   [] 3   [] 4   © 2007, Trustees of [facility] [employer], under license to [employer]. All rights reserved     Score:  Initial: 17 Most Recent: X (Date: -- )    Interpretation of Tool:  Represents activities that are increasingly more difficult (i.e. Bed mobility, Transfers, Gait). PLAN:   FREQUENCY/DURATION: PT Plan of Care: 3 times/week for duration of hospital stay or until stated goals are met, whichever comes first.    PROBLEM LIST:   (Skilled intervention is medically necessary to address:)  1. Decreased ADL/Functional Activities  2. Decreased Activity Tolerance  3. Decreased AROM/PROM  4. Decreased Balance  5. Decreased Cognition  6. Decreased Coordination  7. Decreased Gait Ability  8. Decreased Strength   INTERVENTIONS PLANNED:   (Benefits and precautions of physical therapy have been discussed with the patient.)  1. Therapeutic Activity  2. Therapeutic Exercise/HEP  3. Neuromuscular Re-education  4. Gait Training  5. Manual Therapy  6. Education     TREATMENT:     EVALUATION: Low Complexity : (Untimed Charge)    TREATMENT:   ($$ Therapeutic Activity: 23-37 mins    )  Therapeutic Activity (23 Minutes): Therapeutic activity included Rolling, Supine to Sit, Sit to Supine, Scooting, Lateral Scooting, Transfer Training, Ambulation on level ground, Sitting balance  and Standing balance to improve functional Mobility, Strength, ROM and Activity tolerance.     TREATMENT GRID:   Date:   Date:   Date:     Activity/Exercise Parameters Parameters Parameters                                                   AFTER TREATMENT POSITION/PRECAUTIONS:  Bed, Needs within reach and RN notified    INTERDISCIPLINARY COLLABORATION:  RN/PCT    TOTAL TREATMENT DURATION:  PT Patient Time In/Time Out  Time In: 1510  Time Out: [address], [place]

## 2021-03-20 NOTE — PROGRESS NOTES
ACUTE OT GOALS:  (Developed with and agreed upon by patient and/or caregiver.)  1. Patient will complete lower body bathing and dressing with modified independence and adaptive equipment as needed. 2. Patient will complete toileting with modified independence. 3. Patient will tolerate 25 minutes of OT treatment with 1-2 rest breaks to increase activity tolerance for ADLs. 4. Patient will complete functional transfers with modified independence and adaptive equipment as needed. 5. Patient will tolerate 10 minutes BUE exercises to increase strength for safe, functional transfers.      Timeframe: 7 visits     OCCUPATIONAL THERAPY ASSESSMENT: Initial Assessment and Daily Note OT Treatment Day # 1    Adriel Burton is a 68 y.o. female   PRIMARY DIAGNOSIS: Atrial fibrillation with rapid ventricular response (HCC)  Atrial fibrillation with rapid ventricular response (HCC) [I48.91]       Reason for Referral:    ICD-10: Treatment Diagnosis: Generalized Muscle Weakness (M62.81)  Other lack of cordination (R27.8)  Difficulty in walking, Not elsewhere classified (R26.2)  INPATIENT: Payor: [facility] MEDICARE / Plan: [address] / Product Type: [employer] Medicare /   ASSESSMENT:     REHAB RECOMMENDATIONS:   Recommendation to date pending progress:  Setting:   No further skilled therapy   Equipment:    None pt has all needs at home     PRIOR LEVEL OF FUNCTION:  (Prior to Hospitalization)  INITIAL/CURRENT LEVEL OF FUNCTION:  (Based on today's evaluation)   Bathing:   Modified Independent  Dressing:   Independent  Feeding/Grooming:   Independent  Toileting:   Modified Independent  Functional Mobility:   Modified Independent Bathing:   Contact Guard Assistance  Dressing:   Contact Guard Assistance  Feeding/Grooming:   Set Up  Toileting:   Contact Guard Assistance  Functional Mobility:   Contact Guard Assistance     ASSESSMENT:  Ms. Maribell Banegas is a 67 y/o female presents with atrial fibrillation. Pt lives alone in a one story home with 3 RAMON and a tub shower with SC. Pt is mod I-I all ADLs. Pt currently on 3L O2 NC sats 89-97% with activity but resolved quickly with pursed lip breathing. Pt reported no recent falls. Pt uses a SPC for ambulation. Pt with decreased functional ADLs, mobility and strength. Pt completed bed mobility with CGA and unable to attempt sit<>stand due to ER stretcher height too tall at its lowest point for pt to attempt standing, RN notified. Pt is currently functioning close to baseline and would benefit from 1-2 skilled OT treatments to address deficits and goals. Rec HHOT vs. No d/c needs at d/c. Need a more extensive evaluation to determine need for further skilled therapy. SUBJECTIVE:   Ms. Radha Kyle states, \"I'm short of breath these days. \"    SOCIAL HISTORY/LIVING ENVIRONMENT: Pt lives alone in a one story home with 3 RAMON and a tub shower with SC. Pt is mod I-I all ADLs.        OBJECTIVE:     PAIN: VITAL SIGNS: LINES/DRAINS:   Pre Treatment: Pain Screen  Pain Scale 1: Numeric (0 - 10)  Pain Intensity 1: 0  Post Treatment: 0 Vital Signs  O2 Sat (%): 93 %  O2 Device: Nasal cannula  O2 Flow Rate (L/min): 3 l/min Purewick  O2 Device: Nasal cannula     GROSS EVALUATION:  BUE Within Functional Limits Abnormal/ Functional Abnormal/ Non-Functional (see comments) Not Tested Comments:   AROM [x] [] [] []    PROM [] [] [] []    Strength [] [x] [] []    Balance [x] [] [] []    Posture [] [x] [] [] Sitting EOB   Sensation [x] [] [] []    Coordination [] [x] [] []    Tone [x] [] [] []    Edema [x] [] [] []    Activity Tolerance [] [x] [] [] 3L O2 NC     [] [] [] []      COGNITION/  PERCEPTION: Intact Impaired   (see comments) Comments:   Orientation [x] []    Vision [] [x] Wears glasses   Hearing [x] []    Judgment/ Insight [x] []    Attention [x] []    Memory [x] []    Command Following [x] []    Emotional Regulation [x] []     [] []      ACTIVITIES OF DAILY LIVING: I Mod I S SBA CGA Min Mod Max Total NT Comments   BASIC ADLs:              Bathing/ Showering [] [] [] [] [] [] [] [] [] []    Toileting [] [] [] [] [] [] [] [] [] []    Dressing [] [] [] [] [] [] [] [] [] []    Feeding [] [] [] [] [] [] [] [] [] []    Grooming [] [] [x] [] [] [] [] [] [] [] Washing face    Personal Device Care [] [] [] [] [] [] [] [] [] []    Functional Mobility [] [] [] [] [x] [] [] [] [] []    I=Independent, Mod I=Modified Independent, S=Supervision, SBA=Standby Assistance, CGA=Contact Guard Assistance,   Min=Minimal Assistance, Mod=Moderate Assistance, Max=Maximal Assistance, Total=Total Assistance, NT=Not Tested    MOBILITY: I Mod I S SBA CGA Min Mod Max Total  NT x2 Comments:   Supine to sit [] [] [] [] [x] [] [] [] [] [] []    Sit to supine [] [] [] [] [x] [] [] [] [] [] []    Sit to stand [] [] [] [] [] [] [] [] [] [] [] Unable to be tested   Bed to chair [] [] [] [] [] [] [] [] [] [] []    I=Independent, Mod I=Modified Independent, S=Supervision, SBA=Standby Assistance, CGA=Contact Guard Assistance,   Min=Minimal Assistance, Mod=Moderate Assistance, Max=Maximal Assistance, Total=Total Assistance, NT=Not Tested    [facility] AM-PAC™ “6 Clicks”   Daily Activity Inpatient Short Form        How much help from another person does the patient currently need... Total A Lot A Little None   1.  Putting on and taking off regular lower body clothing?   [] 1   [] 2   [x] 3   [] 4   2.  Bathing (including washing, rinsing, drying)?   [] 1   [] 2   [x] 3   [] 4   3.  Toileting, which includes using toilet, bedpan or urinal?   [] 1   [] 2   [x] 3   [] 4   4.  Putting on and taking off regular upper body clothing?   [] 1   [] 2   [] 3   [x] 4   5.  Taking care of personal grooming such as brushing teeth?   [] 1   [] 2   [] 3   [x] 4   6.  Eating meals?   [] 1   [] 2   [] 3   [x] 4   © 2007, Trustees of [facility], under license to [employer]. All rights reserved     Score:  Initial: 21 Most  Recent: X (Date: -- )   Interpretation of Tool:  Represents activities that are increasingly more difficult (i.e. Bed mobility, Transfers, Gait). PLAN:   FREQUENCY/DURATION:   for duration of hospital stay or until stated goals are met, whichever comes first.    PROBLEM LIST:   (Skilled intervention is medically necessary to address:)  1. Decreased ADL/Functional Activities  2. Decreased Activity Tolerance  3. Decreased AROM/PROM  4. Decreased Balance  5. Decreased Coordination  6. Decreased Gait Ability  7. Decreased Strength  8. Decreased Transfer Abilities   INTERVENTIONS PLANNED:   (Benefits and precautions of occupational therapy have been discussed with the patient.)  1. Self Care Training  2. Therapeutic Activity  3. Therapeutic Exercise/HEP  4. Neuromuscular Re-education  5. Education     TREATMENT:     EVALUATION: Low Complexity : (Untimed Charge)    TREATMENT:   ( $$ Therapeutic Activity: 8-22 mins   )  Therapeutic Activity (11 Minutes): Therapeutic activity included Supine to Sit, Sit to Supine, Scooting and Sitting balance  to improve functional Mobility, Strength and Activity tolerance.     TREATMENT GRID:  N/A    AFTER TREATMENT POSITION/PRECAUTIONS:  Bed, Needs within reach and RN notified    INTERDISCIPLINARY COLLABORATION:  RN/PCT and OT/SCHROEDER    TOTAL TREATMENT DURATION:  OT Patient Time In/Time Out  Time In: 1300  Time Out: [address], OT

## 2021-03-20 NOTE — PROGRESS NOTES
[facility] CARDIOLOGY PROGRESS NOTE           3/20/2021 10:46 AM    Admit Date: 3/19/2021    Admit Diagnosis: Atrial fibrillation with rapid ventricular response (HCC) [I48.91]      Subjective:   Remains SOB, no chest pain, palpitations    Interval History: (History of pertinent interval events obtained from nursing staff)  In NSR this AM    ROS:  GEN:  No fever or chills  Cardiovascular:  As noted above  Pulmonary:  As noted above  Neuro:  No new focal motor or sensory loss      Objective:     Vitals:    03/20/21 0836 03/20/21 0851 03/20/21 0906 03/20/21 0935   BP: (!) 117/59 (!) 111/58 121/80 137/65   Pulse: 72 73 69 68   Resp: 18 17 13 12   Temp:       SpO2: 100% 100% 100% 94%   Weight:       Height:           Physical Exam:  General-Well Developed, Well Nourished, No Acute Distress, Alert & Oriented x 3, appropriate mood. Neck- supple, no JVD  CV- regular rate and rhythm, distant S1, S2  Lung- crackles B/L  Abd- soft, nontender, nondistended  Ext- race edema bilaterally.   Skin- warm and dry    Current Facility-Administered Medications   Medication Dose Route Frequency    amiodarone (CORDARONE) 450 mg in dextrose 5% 250 mL infusion  0.5-1 mg/min IntraVENous CONTINUOUS    [Held by provider] aspirin delayed-release tablet 81 mg  81 mg Oral DAILY    cholecalciferol (VITAMIN D3) (1000 Units /25 mcg) tablet 1,000 Units  1,000 Units Oral DAILY    DULoxetine (CYMBALTA) capsule 60 mg  60 mg Oral DAILY    folic acid (FOLVITE) tablet 1 mg  1 mg Oral DAILY    levothyroxine (SYNTHROID) tablet 25 mcg  25 mcg Oral ACB    [START ON 3/21/2021] methotrexate (RHEUMATREX) tablet 12.5 mg  12.5 mg Oral every Sunday    rosuvastatin (CRESTOR) tablet 10 mg  10 mg Oral QHS    sodium chloride (NS) flush 5-40 mL  5-40 mL IntraVENous Q8H    sodium chloride (NS) flush 5-40 mL  5-40 mL IntraVENous PRN    acetaminophen (TYLENOL) tablet 650 mg  650 mg Oral Q6H PRN    Or    acetaminophen (TYLENOL) suppository 650 mg  650 mg Rectal Q6H PRN    polyethylene glycol (MIRALAX) packet 17 g  17 g Oral DAILY    senna-docusate (PERICOLACE) 8.6-50 mg per tablet 1 Tab  1 Tab Oral BID    magnesium hydroxide (MILK OF MAGNESIA) 400 mg/5 mL oral suspension 30 mL  30 mL Oral DAILY PRN    bisacodyL (DULCOLAX) suppository 10 mg  10 mg Rectal DAILY PRN    ondansetron (ZOFRAN ODT) tablet 4 mg  4 mg Oral Q8H PRN    Or    ondansetron (ZOFRAN) injection 4 mg  4 mg IntraVENous Q6H PRN    cefTRIAXone (ROCEPHIN) 2 g in 0.9% sodium chloride (MBP/ADV) 50 mL MBP  2 g IntraVENous Q24H    azithromycin (ZITHROMAX) 500 mg in 0.9% sodium chloride 250 mL (VIAL-MATE)  500 mg IntraVENous Q24H    famotidine (PEPCID) tablet 20 mg  20 mg Oral DAILY    [Held by provider] metoprolol succinate (TOPROL-XL) XL tablet 25 mg  25 mg Oral DAILY     Current Outpatient Medications   Medication Sig    potassium chloride (K-DUR, KLOR-CON) 20 mEq tablet Take 20 mEq by mouth daily.  rosuvastatin (CRESTOR) 10 mg tablet Take 10 mg by mouth nightly.  spironolactone (ALDACTONE) 25 mg tablet Take 25 mg by mouth daily.  cholecalciferol (VITAMIN D3) 1,000 unit tablet Take 1,000 Units by mouth daily.  metoprolol succinate (TOPROL-XL) 25 mg XL tablet Take 25 mg by mouth daily.  DULoxetine (CYMBALTA) 60 mg capsule Take 1 Cap by mouth daily.  furosemide (LASIX) 40 mg tablet Take 1 Tab by mouth daily.  levothyroxine (SYNTHROID) 25 mcg tablet Take 1 Tab by mouth Daily (before breakfast).  losartan-hydroCHLOROthiazide (HYZAAR) 100-12.5 mg per tablet Take 1 Tab by mouth daily.  aspirin delayed-release 81 mg tablet Take 81 mg by mouth daily.  folic acid 132 mcg tablet Take 800 mcg by mouth daily.     methotrexate (RHEUMATREX) 2.5 mg tablet 5 tabs every Sunday     Data Review:   Recent Results (from the past 24 hour(s))   CBC WITH AUTOMATED DIFF    Collection Time: 03/19/21  1:38 PM   Result Value Ref Range    WBC 11.9 (H) 4.3 - 11.1 K/uL    RBC 3.69 (L) 4.05 - 5.2 M/uL    HGB 11.9 11.7 - 15.4 g/dL    HCT 35.5 (L) 35.8 - 46.3 %    MCV 96.2 79.6 - 97.8 FL    MCH 32.2 26.1 - 32.9 PG    MCHC 33.5 31.4 - 35.0 g/dL    RDW 13.6 11.9 - 14.6 %    PLATELET 725 011 - 814 K/uL    MPV 11.5 9.4 - 12.3 FL    ABSOLUTE NRBC 0.00 0.0 - 0.2 K/uL    DF AUTOMATED      NEUTROPHILS 82 (H) 43 - 78 %    LYMPHOCYTES 7 (L) 13 - 44 %    MONOCYTES 9 4.0 - 12.0 %    EOSINOPHILS 0 (L) 0.5 - 7.8 %    BASOPHILS 1 0.0 - 2.0 %    IMMATURE GRANULOCYTES 1 0.0 - 5.0 %    ABS. NEUTROPHILS 9.8 (H) 1.7 - 8.2 K/UL    ABS. LYMPHOCYTES 0.9 0.5 - 4.6 K/UL    ABS. MONOCYTES 1.1 0.1 - 1.3 K/UL    ABS. EOSINOPHILS 0.0 0.0 - 0.8 K/UL    ABS. BASOPHILS 0.1 0.0 - 0.2 K/UL    ABS. IMM. GRANS. 0.1 0.0 - 0.5 K/UL   METABOLIC PANEL, COMPREHENSIVE    Collection Time: 03/19/21  1:38 PM   Result Value Ref Range    Sodium 133 (L) 136 - 145 mmol/L    Potassium 3.5 3.5 - 5.1 mmol/L    Chloride 97 (L) 98 - 107 mmol/L    CO2 25 21 - 32 mmol/L    Anion gap 11 7 - 16 mmol/L    Glucose 136 (H) 65 - 100 mg/dL    BUN 58 (H) 8 - 23 MG/DL    Creatinine 3.31 (H) 0.6 - 1.0 MG/DL    GFR est AA 17 (L) >60 ml/min/1.73m2    GFR est non-AA 14 (L) >60 ml/min/1.73m2    Calcium 9.4 8.3 - 10.4 MG/DL    Bilirubin, total 0.5 0.2 - 1.1 MG/DL    ALT (SGPT) 18 12 - 65 U/L    AST (SGOT) 12 (L) 15 - 37 U/L    Alk.  phosphatase 70 50 - 136 U/L    Protein, total 6.9 6.3 - 8.2 g/dL    Albumin 2.7 (L) 3.2 - 4.6 g/dL    Globulin 4.2 (H) 2.3 - 3.5 g/dL    A-G Ratio 0.6 (L) 1.2 - 3.5     MAGNESIUM    Collection Time: 03/19/21  1:38 PM   Result Value Ref Range    Magnesium 2.2 1.8 - 2.4 mg/dL   TROPONIN-HIGH SENSITIVITY    Collection Time: 03/19/21  1:38 PM   Result Value Ref Range    Troponin-High Sensitivity 17.5 (H) 0 - 14 pg/mL   TSH 3RD GENERATION    Collection Time: 03/19/21  1:38 PM   Result Value Ref Range    TSH 2.230 0.358 - 3.740 uIU/mL   PROCALCITONIN    Collection Time: 03/19/21  1:38 PM   Result Value Ref Range    Procalcitonin 0.93 ng/mL VITAMIN D, 25 HYDROXY    Collection Time: 03/19/21  1:38 PM   Result Value Ref Range    Vitamin D 25-Hydroxy 21.5 (L) 30.0 - 100.0 ng/mL   LACTIC ACID    Collection Time: 03/19/21  1:39 PM   Result Value Ref Range    Lactic acid 3.6 (HH) 0.4 - 2.0 MMOL/L   EKG, 12 LEAD, INITIAL    Collection Time: 03/19/21  1:39 PM   Result Value Ref Range    Ventricular Rate 131 BPM    Atrial Rate 340 BPM    QRS Duration 114 ms    Q-T Interval 324 ms    QTC Calculation (Bezet) 478 ms    Calculated R Axis 74 degrees    Calculated T Axis 58 degrees    Diagnosis       !! AGE AND GENDER SPECIFIC ECG ANALYSIS !!   Atrial fibrillation with rapid ventricular response  Low voltage QRS  Incomplete right bundle branch block  Nonspecific ST and T wave abnormality  Abnormal ECG    Confirmed by ST PATTI HINDS MD (), NITIN MATHEW ([id number]) on 3/19/2021 7:22:31 PM     TROPONIN-HIGH SENSITIVITY    Collection Time: 03/19/21  3:30 PM   Result Value Ref Range    Troponin-High Sensitivity 14.2 (H) 0 - 14 pg/mL   LACTIC ACID    Collection Time: 03/19/21  3:30 PM   Result Value Ref Range    Lactic acid 2.0 0.4 - 2.0 MMOL/L   URINE MICROSCOPIC    Collection Time: 03/19/21  3:37 PM   Result Value Ref Range    WBC 0-3 0 /hpf    RBC 0-3 0 /hpf    Epithelial cells 0-3 0 /hpf    Bacteria 0 0 /hpf    Casts 10-20 0 /lpf    Crystals, urine 0 0 /LPF    Amorphous Crystals TRACE 0      Mucus 0 0 /lpf    Other observations RESULTS VERIFIED MANUALLY     PTT    Collection Time: 03/19/21  5:38 PM   Result Value Ref Range    aPTT 26.8 24.1 - 35.1 SEC   SARS-COV-2    Collection Time: 03/19/21  6:15 PM   Result Value Ref Range    SARS-CoV-2 Please find results under separate order     COVID-19 RAPID TEST    Collection Time: 03/19/21  6:15 PM   Result Value Ref Range    Specimen source Nasopharyngeal      COVID-19 rapid test Not detected NOTD     CBC WITH AUTOMATED DIFF    Collection Time: 03/20/21  1:04 AM   Result Value Ref Range    WBC 12.1 (H) 4.3 - 11.1 K/uL    RBC 3.05 (L) 4.05 - 5.2 M/uL    HGB 10.1 (L) 11.7 - 15.4 g/dL    HCT 29.8 (L) 35.8 - 46.3 %    MCV 97.7 79.6 - 97.8 FL    MCH 33.1 (H) 26.1 - 32.9 PG    MCHC 33.9 31.4 - 35.0 g/dL    RDW 13.8 11.9 - 14.6 %    PLATELET 073 128 - 083 K/uL    MPV 11.1 9.4 - 12.3 FL    ABSOLUTE NRBC 0.00 0.0 - 0.2 K/uL    DF AUTOMATED      NEUTROPHILS 78 43 - 78 %    LYMPHOCYTES 8 (L) 13 - 44 %    MONOCYTES 13 (H) 4.0 - 12.0 %    EOSINOPHILS 1 0.5 - 7.8 %    BASOPHILS 1 0.0 - 2.0 %    IMMATURE GRANULOCYTES 0 0.0 - 5.0 %    ABS. NEUTROPHILS 9.4 (H) 1.7 - 8.2 K/UL    ABS. LYMPHOCYTES 1.0 0.5 - 4.6 K/UL    ABS. MONOCYTES 1.5 (H) 0.1 - 1.3 K/UL    ABS. EOSINOPHILS 0.1 0.0 - 0.8 K/UL    ABS. BASOPHILS 0.1 0.0 - 0.2 K/UL    ABS. IMM. GRANS. 0.1 0.0 - 0.5 K/UL   HEPARIN XA UFH    Collection Time: 03/20/21  1:04 AM   Result Value Ref Range    Heparin Xa UFH 0.19 (L) 0.3 - 0.7 IU/mL   CBC WITH AUTOMATED DIFF    Collection Time: 03/20/21  3:37 AM   Result Value Ref Range    WBC 11.4 (H) 4.3 - 11.1 K/uL    RBC 3.13 (L) 4.05 - 5.2 M/uL    HGB 9.9 (L) 11.7 - 15.4 g/dL    HCT 30.9 (L) 35.8 - 46.3 %    MCV 98.7 (H) 79.6 - 97.8 FL    MCH 31.6 26.1 - 32.9 PG    MCHC 32.0 31.4 - 35.0 g/dL    RDW 13.7 11.9 - 14.6 %    PLATELET 105 117 - 027 K/uL    MPV 11.2 9.4 - 12.3 FL    ABSOLUTE NRBC 0.00 0.0 - 0.2 K/uL    DF AUTOMATED      NEUTROPHILS 77 43 - 78 %    LYMPHOCYTES 9 (L) 13 - 44 %    MONOCYTES 13 (H) 4.0 - 12.0 %    EOSINOPHILS 1 0.5 - 7.8 %    BASOPHILS 1 0.0 - 2.0 %    IMMATURE GRANULOCYTES 0 0.0 - 5.0 %    ABS. NEUTROPHILS 8.7 (H) 1.7 - 8.2 K/UL    ABS. LYMPHOCYTES 1.0 0.5 - 4.6 K/UL    ABS. MONOCYTES 1.5 (H) 0.1 - 1.3 K/UL    ABS. EOSINOPHILS 0.1 0.0 - 0.8 K/UL    ABS. BASOPHILS 0.1 0.0 - 0.2 K/UL    ABS. IMM.  GRANS. 0.1 0.0 - 0.5 K/UL   METABOLIC PANEL, COMPREHENSIVE    Collection Time: 03/20/21  3:37 AM   Result Value Ref Range    Sodium 135 (L) 136 - 145 mmol/L    Potassium 3.4 (L) 3.5 - 5.1 mmol/L    Chloride 104 98 - 107 mmol/L    CO2 24 21 - 32 mmol/L Anion gap 7 7 - 16 mmol/L    Glucose 118 (H) 65 - 100 mg/dL    BUN 47 (H) 8 - 23 MG/DL    Creatinine 2.13 (H) 0.6 - 1.0 MG/DL    GFR est AA 29 (L) >60 ml/min/1.73m2    GFR est non-AA 24 (L) >60 ml/min/1.73m2    Calcium 8.8 8.3 - 10.4 MG/DL    Bilirubin, total 0.4 0.2 - 1.1 MG/DL    ALT (SGPT) 31 12 - 65 U/L    AST (SGOT) 29 15 - 37 U/L    Alk. phosphatase 61 50 - 136 U/L    Protein, total 6.4 6.3 - 8.2 g/dL    Albumin 2.9 (L) 3.2 - 4.6 g/dL    Globulin 3.5 2.3 - 3.5 g/dL    A-G Ratio 0.8 (L) 1.2 - 3.5     MAGNESIUM    Collection Time: 03/20/21  3:37 AM   Result Value Ref Range    Magnesium 1.9 1.8 - 2.4 mg/dL   NT-PRO BNP    Collection Time: 03/20/21  3:37 AM   Result Value Ref Range    NT pro-BNP 3,245 (H) <450 PG/ML   CRP, HIGH SENSITIVITY    Collection Time: 03/20/21  3:37 AM   Result Value Ref Range    CRP, High sensitivity 175.0 mg/L   EKG, 12 LEAD, SUBSEQUENT    Collection Time: 03/20/21  8:52 AM   Result Value Ref Range    Ventricular Rate 67 BPM    Atrial Rate 71 BPM    P-R Interval 168 ms    QRS Duration 70 ms    Q-T Interval 384 ms    QTC Calculation (Bezet) 405 ms    Calculated P Axis 67 degrees    Calculated R Axis 65 degrees    Calculated T Axis 67 degrees    Diagnosis       !! AGE AND GENDER SPECIFIC ECG ANALYSIS !! Normal sinus rhythm  Low voltage QRS  Nonspecific ST and T wave abnormality  Abnormal ECG  When compared with ECG of 19-MAR-2021 13:39,  Sinus rhythm has replaced Atrial fibrillation  Vent. rate has decreased BY  64 BPM  Incomplete right bundle branch block is no longer Present         EKG:  (EKG has been independently visualized by me with interpretation below)  Assessment:     Principal Problem:    Atrial fibrillation with rapid ventricular response ([address] (3/19/2021)    80 yom presented to her with cough, fever and SOB for two weeks, found to be in afib with RVR. Has NAZIA likely dehydration. BP is low. Plan:   1.  Afib: now NSR on amiodarone, will transition to PO amiodarone, cont telemetry, echo with normal EF, moderate effusion as noted below  2. Sepsis: per primary team, broad spectrum antibiotics  3. Mod pericardial effusion: noted on chest CT, echo with evidence of moderate effusion, no clear hemodynamic compromise, cont to monitor  4. NAZIA: improving, likely 2/2 sepsis, hypotension  5. CVA protection: heparin    Dempsey Leon Jones MD  Cardiology/Electrophysiology

## 2021-03-20 NOTE — ED NOTES
TRANSFER - OUT REPORT:    Verbal report given to HUMBERTO Calvin on [employer]  being transferred to 5th floor for routine progression of care       Report consisted of patients Situation, Background, Assessment and   Recommendations(SBAR). Information from the following report(s) SBAR, ED Summary and MAR was reviewed with the receiving nurse. Lines:   Peripheral IV 03/19/21 Left Antecubital (Active)       Peripheral IV 03/19/21 Left Wrist (Active)        Opportunity for questions and clarification was provided.       Patient transported with:   O2 @ 3 liters

## 2021-03-20 NOTE — PROGRESS NOTES
03/20/21 1725   Dual Skin Pressure Injury Assessment   Dual Skin Pressure Injury Assessment WDL   Second Care Provider (Based on [address] [place] [address]) Tabatha FULLERRN   Skin Integumentary   Skin Integumentary (WDL) WDL    Pressure  Injury Documentation No Pressure Injury Noted-Pressure Ulcer Prevention Initiated

## 2021-03-20 NOTE — PROGRESS NOTES
Hospitalist Note     Admit Date:  3/19/2021  1:13 PM   Name:  Ana Castillo   Age:  68 y.o.  :  1940   MRN:  [id number]   PCP:  Didier Peterson MD  Treatment Team: Attending Provider: Thuy Mitchell MD; Consulting Provider: Lavelle Goodpasture, MD; Hospitalist: Thuy Mitchell MD; Physical Therapist: Colton Light PT; Primary Nurse: Patricia Alexis RN    HPI/Subjective:     Patient 74-year-old female, history significant for breast cancer status post radiations, rheumatoid arthritis on chronic methotrexate, chronic kidney disease, former smoker,    Patient developed progressive shortness of breath, over the last 2 weeks, started close outpatient pneumonia treatments by PCP, did not improve, shortness of breath worsened this morning, patient also felt the new onset of palpitation EMS was called, patient found to have significant hypotension systolic of 69, with heart rate greater than 140, consistent with atrial fibrillation rapid medical response, seen by cardiology, amiodarone and heparin drip was started     Patient denies sick contacts, denies recent travels, denies prior cardiac history, patient was told that she has a single  kidney but denies kidney disease     3/20  -Moderate- large pericardial effusion on CAT scan yesterday  -Echo with moderate pericardial effusion, mild RA inversion, no clear evidence of RV diastolic collapse or evidence of hemodynamic compromise.   -Heparin drip was held after discussion with cardiology  -Patient is in sinus rhythm after starting amiodarone drip  Complete ROS done and is as stated in HPI or otherwise negative  No other complaints  Objective:     Patient Vitals for the past 24 hrs:   Temp Pulse Resp BP SpO2   21 1728 97.5 °F (36.4 °C) 80 16 108/71 90 %   21 1635  78 14 124/61 (!) 89 %   21 1633  77 14  100 %   21 1621  75 11 129/63 98 %   21 1606  80 13 131/60 98 %   21 1553  78 10  100 %   21 1552    (!) 150/70    03/20/21 1537  75 13 (!) 143/63 100 %   03/20/21 1521  75 14 123/65 100 %   03/20/21 1506  77 12 128/63 100 %   03/20/21 1452  79 14 131/61 100 %   03/20/21 1436  77 18 132/70 100 %   03/20/21 1421  78 13 120/65 100 %   03/20/21 1406  76 14 118/61 100 %   03/20/21 1351  78 16 135/60 100 %   03/20/21 1337   21     03/20/21 1336  76  (!) 141/67 100 %   03/20/21 1326     100 %   03/20/21 1322  76 15 131/62    03/20/21 1306  74 18 105/61 98 %   03/20/21 1300     93 %   03/20/21 1251  75 15 122/62 93 %   03/20/21 1236  74 14 135/75 100 %   03/20/21 1221  73 12 134/68 99 %   03/20/21 0935  68 12 137/65 94 %   03/20/21 0906  69 13 121/80 100 %   03/20/21 0851  73 17 (!) 111/58 100 %   03/20/21 0836  72 18 (!) 117/59 100 %   03/20/21 0821  72 21 131/62 99 %   03/20/21 0721  72 22 (!) 129/58 100 %   03/20/21 0707  69 17 136/71 100 %   03/20/21 0651  72 15 (!) 110/56 100 %   03/20/21 0635  71 14 124/62 99 %   03/20/21 0621  72 18 131/64 100 %   03/20/21 0606  72 13 118/63 100 %   03/20/21 0551  73 14 123/67 100 %   03/20/21 0546  73 21  100 %   03/20/21 0535  71 16 120/62 100 %   03/20/21 0450  72 14 (!) 110/59 100 %   03/20/21 0435  72 24 (!) 104/52 99 %   03/20/21 0421  72 16 (!) 116/56 100 %   03/20/21 0407  67 17  99 %   03/20/21 0406  78 18 (!) 107/58    03/20/21 0340  73 16  100 %   03/20/21 0321  72 22 (!) 97/56 100 %   03/20/21 0306  78 15 (!) 97/55 100 %   03/20/21 0251  74 21 (!) 92/53 100 %   03/20/21 0250  73 16  100 %   03/20/21 0235  74 19 (!) 107/57 99 %   03/20/21 0221  75 18 (!) 109/58 100 %   03/20/21 0206  77 16 (!) 113/56 100 %   03/20/21 0150  68 15 (!) 115/57 100 %   03/20/21 0121  75 23 (!) 113/54 98 %   03/20/21 0106  75 24 (!) 91/55    03/20/21 0105  75 19  95 %   03/20/21 0051  78 18 (!) 105/55 100 %   03/20/21 0027  78 18 (!) 88/55 97 %   03/20/21 0006  76 16 (!) 89/53 98 %   03/19/21 2350  77  (!) 92/57 98 % 03/19/21 2335  78 23 (!) 84/56 98 %   03/19/21 2321  78 14 (!) 100/47 100 %   03/19/21 2252  80 19  100 %   03/19/21 2251  79 17 (!) 105/49    03/19/21 2236  82 22 (!) 118/55 94 %   03/19/21 2216  77 19  100 %   03/19/21 2206  76 16 109/63 100 %   03/19/21 2151  79 17 (!) 115/59 100 %   03/19/21 2145  78 18  100 %   03/19/21 2141  78 18  100 %   03/19/21 2136  79 15  100 %   03/19/21 2135  80  (!) 97/54 90 %   03/19/21 2121  79 18 (!) 91/53 100 %   03/19/21 2118  78 17 (!) 89/53 99 %   03/19/21 2101  83 18  99 %   03/19/21 2059  84 18  99 %   03/19/21 2022  (!) 138  114/82    03/19/21 2012  (!) 121 15  97 %   03/19/21 2008  (!) 116 17  99 %   03/19/21 2006  (!) 124 23 (!) 169/63    03/19/21 1956  (!) 120 18  98 %   03/19/21 1950  (!) 118 21       Oxygen Therapy  O2 Sat (%): 90 % (03/20/21 1728)  Pulse via Oximetry: 77 beats per minute (03/20/21 1635)  O2 Device: Nasal cannula (03/20/21 1715)  Skin Assessment: Clean, dry, & intact (03/20/21 1715)  O2 Flow Rate (L/min): 3 l/min (03/20/21 1715)    Estimated body mass index is 30.27 kg/m² as calculated from the following:    Height as of this encounter: 5' (1.524 m). Weight as of this encounter: 70.3 kg (155 lb). Intake/Output Summary (Last 24 hours) at 3/20/2021 1839  Last data filed at 3/19/2021 2114  Gross per 24 hour   Intake 1350 ml   Output    Net 1350 ml       *Note that automatically entered I/Os may not be accurate; dependent on patient compliance with collection and accurate  by techs. General:          Alert, cooperative, no distress, appears stated age. Head:               Normocephalic, without obvious abnormality, atraumatic. Nose:               Nares normal. No drainage or sinus tenderness. Lungs:             Clear to auscultation bilaterally. No Wheezing or Rhonchi. No rales. Heart:              Regular rate and rhythm,  no murmur, rub or gallop. Abdomen:        Soft, non-tender.  Not distended. Bowel sounds normal.   Extremities:     No cyanosis. No edema. No clubbing  Skin:                Texture, turgor normal. No rashes or lesions. Not Jaundiced  Neurologic:      Alert and oriented x 3, no focal deficits     Data Review:  I have reviewed all labs, meds, and studies from the last 24 hours:    Recent Results (from the past 24 hour(s))   CBC WITH AUTOMATED DIFF    Collection Time: 03/20/21  1:04 AM   Result Value Ref Range    WBC 12.1 (H) 4.3 - 11.1 K/uL    RBC 3.05 (L) 4.05 - 5.2 M/uL    HGB 10.1 (L) 11.7 - 15.4 g/dL    HCT 29.8 (L) 35.8 - 46.3 %    MCV 97.7 79.6 - 97.8 FL    MCH 33.1 (H) 26.1 - 32.9 PG    MCHC 33.9 31.4 - 35.0 g/dL    RDW 13.8 11.9 - 14.6 %    PLATELET 261 374 - 314 K/uL    MPV 11.1 9.4 - 12.3 FL    ABSOLUTE NRBC 0.00 0.0 - 0.2 K/uL    DF AUTOMATED      NEUTROPHILS 78 43 - 78 %    LYMPHOCYTES 8 (L) 13 - 44 %    MONOCYTES 13 (H) 4.0 - 12.0 %    EOSINOPHILS 1 0.5 - 7.8 %    BASOPHILS 1 0.0 - 2.0 %    IMMATURE GRANULOCYTES 0 0.0 - 5.0 %    ABS. NEUTROPHILS 9.4 (H) 1.7 - 8.2 K/UL    ABS. LYMPHOCYTES 1.0 0.5 - 4.6 K/UL    ABS. MONOCYTES 1.5 (H) 0.1 - 1.3 K/UL    ABS. EOSINOPHILS 0.1 0.0 - 0.8 K/UL    ABS. BASOPHILS 0.1 0.0 - 0.2 K/UL    ABS. IMM.  GRANS. 0.1 0.0 - 0.5 K/UL   HEPARIN XA UFH    Collection Time: 03/20/21  1:04 AM   Result Value Ref Range    Heparin Xa UFH 0.19 (L) 0.3 - 0.7 IU/mL   CBC WITH AUTOMATED DIFF    Collection Time: 03/20/21  3:37 AM   Result Value Ref Range    WBC 11.4 (H) 4.3 - 11.1 K/uL    RBC 3.13 (L) 4.05 - 5.2 M/uL    HGB 9.9 (L) 11.7 - 15.4 g/dL    HCT 30.9 (L) 35.8 - 46.3 %    MCV 98.7 (H) 79.6 - 97.8 FL    MCH 31.6 26.1 - 32.9 PG    MCHC 32.0 31.4 - 35.0 g/dL    RDW 13.7 11.9 - 14.6 %    PLATELET 899 691 - 538 K/uL    MPV 11.2 9.4 - 12.3 FL    ABSOLUTE NRBC 0.00 0.0 - 0.2 K/uL    DF AUTOMATED      NEUTROPHILS 77 43 - 78 %    LYMPHOCYTES 9 (L) 13 - 44 %    MONOCYTES 13 (H) 4.0 - 12.0 %    EOSINOPHILS 1 0.5 - 7.8 %    BASOPHILS 1 0.0 - 2.0 % IMMATURE GRANULOCYTES 0 0.0 - 5.0 %    ABS. NEUTROPHILS 8.7 (H) 1.7 - 8.2 K/UL    ABS. LYMPHOCYTES 1.0 0.5 - 4.6 K/UL    ABS. MONOCYTES 1.5 (H) 0.1 - 1.3 K/UL    ABS. EOSINOPHILS 0.1 0.0 - 0.8 K/UL    ABS. BASOPHILS 0.1 0.0 - 0.2 K/UL    ABS. IMM. GRANS. 0.1 0.0 - 0.5 K/UL   METABOLIC PANEL, COMPREHENSIVE    Collection Time: 03/20/21  3:37 AM   Result Value Ref Range    Sodium 135 (L) 136 - 145 mmol/L    Potassium 3.4 (L) 3.5 - 5.1 mmol/L    Chloride 104 98 - 107 mmol/L    CO2 24 21 - 32 mmol/L    Anion gap 7 7 - 16 mmol/L    Glucose 118 (H) 65 - 100 mg/dL    BUN 47 (H) 8 - 23 MG/DL    Creatinine 2.13 (H) 0.6 - 1.0 MG/DL    GFR est AA 29 (L) >60 ml/min/1.73m2    GFR est non-AA 24 (L) >60 ml/min/1.73m2    Calcium 8.8 8.3 - 10.4 MG/DL    Bilirubin, total 0.4 0.2 - 1.1 MG/DL    ALT (SGPT) 31 12 - 65 U/L    AST (SGOT) 29 15 - 37 U/L    Alk. phosphatase 61 50 - 136 U/L    Protein, total 6.4 6.3 - 8.2 g/dL    Albumin 2.9 (L) 3.2 - 4.6 g/dL    Globulin 3.5 2.3 - 3.5 g/dL    A-G Ratio 0.8 (L) 1.2 - 3.5     MAGNESIUM    Collection Time: 03/20/21  3:37 AM   Result Value Ref Range    Magnesium 1.9 1.8 - 2.4 mg/dL   NT-PRO BNP    Collection Time: 03/20/21  3:37 AM   Result Value Ref Range    NT pro-BNP 3,245 (H) <450 PG/ML   CRP, HIGH SENSITIVITY    Collection Time: 03/20/21  3:37 AM   Result Value Ref Range    CRP, High sensitivity 175.0 mg/L   EKG, 12 LEAD, SUBSEQUENT    Collection Time: 03/20/21  8:52 AM   Result Value Ref Range    Ventricular Rate 67 BPM    Atrial Rate 71 BPM    P-R Interval 168 ms    QRS Duration 70 ms    Q-T Interval 384 ms    QTC Calculation (Bezet) 405 ms    Calculated P Axis 67 degrees    Calculated R Axis 65 degrees    Calculated T Axis 67 degrees    Diagnosis       Normal sinus rhythm  Low voltage QRS  Nonspecific ST and T wave abnormality  When compared with ECG of 19-MAR-2021 13:39,  Sinus rhythm has replaced Atrial fibrillation  Vent.  rate has decreased BY  64 BPM  Incomplete right bundle branch block is no longer Present  Confirmed by Chelsea Hemphill ([id number]) on 3/20/2021 11:01:19 AM     HEPARIN XA UFH    Collection Time: 03/20/21 10:37 AM   Result Value Ref Range    Heparin Xa UFH <0.10 (L) 0.3 - 0.7 IU/mL        All Micro Results     Procedure Component Value Units Date/Time    SARS-COV-2, PCR [[id number]] Collected: 03/19/21 1815    Order Status: Completed Specimen: Nasopharyngeal Updated: 03/20/21 1250     Specimen source Nasopharyngeal        SARS-CoV-2 Not detected        Comment:      The specimen is NEGATIVE for SARS-CoV-2, the novel coronavirus associated with COVID-19. A negative result does not rule out COVID-19. This test has been authorized by the FDA under an Emergency Use Authorization (EUA) for use by authorized laboratories. Fact sheet for Healthcare Providers: ConventionUpdate.co.nz  Fact sheet for Patients: https://fda.gov/media/767944/download       Methodology: RT-PCR         BLOOD CULTURE [[id number]] Collected: 03/19/21 1339    Order Status: Completed Specimen: Blood Updated: 03/20/21 1119     Special Requests: --        LEFT  Antecubital       Culture result: NO GROWTH AFTER 21 HOURS       BLOOD CULTURE [[id number]] Collected: 03/19/21 1530    Order Status: Completed Specimen: Blood Updated: 03/20/21 1119     Special Requests: --        LEFT  HAND       Culture result: NO GROWTH AFTER 19 HOURS       COVID-19 RAPID TEST [[id number]] Collected: 03/19/21 1815    Order Status: Completed Specimen: Nasopharyngeal Updated: 03/19/21 1913     Specimen source Nasopharyngeal        COVID-19 rapid test Not detected        Comment:      The specimen is NEGATIVE for SARS-CoV-2, the novel coronavirus associated with COVID-19. A negative result does not rule out COVID-19. This test has been authorized by the FDA under an Emergency Use Authorization (EUA) for use by authorized laboratories.         Fact sheet for Healthcare Providers: ConventionUpdate.co.nz  Fact sheet for Patients: ConventionUpdate.co.nz       Methodology: Isothermal Nucleic Acid Amplification               Current Meds:  Current Facility-Administered Medications   Medication Dose Route Frequency    amiodarone (CORDARONE) tablet 400 mg  400 mg Oral Q8H    [Held by provider] aspirin delayed-release tablet 81 mg  81 mg Oral DAILY    cholecalciferol (VITAMIN D3) (1000 Units /25 mcg) tablet 1,000 Units  1,000 Units Oral DAILY    DULoxetine (CYMBALTA) capsule 60 mg  60 mg Oral DAILY    folic acid (FOLVITE) tablet 1 mg  1 mg Oral DAILY    levothyroxine (SYNTHROID) tablet 25 mcg  25 mcg Oral ACB    [START ON 3/21/2021] methotrexate (RHEUMATREX) tablet 12.5 mg  12.5 mg Oral every Sunday    rosuvastatin (CRESTOR) tablet 10 mg  10 mg Oral QHS    sodium chloride (NS) flush 5-40 mL  5-40 mL IntraVENous Q8H    sodium chloride (NS) flush 5-40 mL  5-40 mL IntraVENous PRN    acetaminophen (TYLENOL) tablet 650 mg  650 mg Oral Q6H PRN    Or    acetaminophen (TYLENOL) suppository 650 mg  650 mg Rectal Q6H PRN    polyethylene glycol (MIRALAX) packet 17 g  17 g Oral DAILY    senna-docusate (PERICOLACE) 8.6-50 mg per tablet 1 Tab  1 Tab Oral BID    magnesium hydroxide (MILK OF MAGNESIA) 400 mg/5 mL oral suspension 30 mL  30 mL Oral DAILY PRN    bisacodyL (DULCOLAX) suppository 10 mg  10 mg Rectal DAILY PRN    ondansetron (ZOFRAN ODT) tablet 4 mg  4 mg Oral Q8H PRN    Or    ondansetron (ZOFRAN) injection 4 mg  4 mg IntraVENous Q6H PRN    cefTRIAXone (ROCEPHIN) 2 g in 0.9% sodium chloride (MBP/ADV) 50 mL MBP  2 g IntraVENous Q24H    azithromycin (ZITHROMAX) 500 mg in 0.9% sodium chloride 250 mL (VIAL-MATE)  500 mg IntraVENous Q24H    famotidine (PEPCID) tablet 20 mg  20 mg Oral DAILY    [Held by provider] metoprolol succinate (TOPROL-XL) XL tablet 25 mg  25 mg Oral DAILY       Other Studies:  Results for orders placed or performed during the hospital encounter of 21   2D ECHO COMPLETE ADULT (TTE) W OR WO CONTR    Narrative    [facility] [address], [address] [facility]  [phone]    Transthoracic Echocardiogram  2D, M-mode, Doppler, and Color Doppler    Patient: Jillian Henson  MR #: [id number]  : [id number]  Age: 68 years  Gender: Female  Study date: 20-Mar-2021  Account #: [de-identified]  Height: 60 in  Weight: 154.7 lb  BSA: 1.67 mï¾²  Status:Stat  Location: ER3  BP: 137/ 65    Allergies: NO KNOWN ALLERGENS    Sonographer:  SIMRAN Reyes  Group:  [facility]  Reading Physician:  Tung Summers. MD Karen    INDICATIONS: Cardiac Arrhythmia, Pericardial Effusion    PROCEDURE: This was a stat study. A transthoracic echocardiogram was   performed. The study included complete 2D imaging, M-mode, complete spectral Doppler,   and  color Doppler. Image quality was adequate. LEFT VENTRICLE: Size was normal. Systolic function was normal. Ejection  fraction was estimated in the range of 55 % to 60 %. There were no regional  wall motion abnormalities. Wall thickness was normal. Left ventricular  diastolic function is indeterminate based on assessment criteria. Avg E/e':  14.2. RIGHT VENTRICLE: The size was normal. Systolic function was mildly reduced by  TAPSE assessment. The tricuspid jet envelope definition was inadequate for  estimation of RV systolic pressure. LEFT ATRIUM: Size was normal.    RIGHT ATRIUM: Size was normal.    SYSTEMIC VEINS: IVC: The inferior vena cava was normal in size and course. The  respirophasic change in diameter was more than 50%. AORTIC VALVE: The valve was trileaflet. Leaflets exhibited mild sclerosis. There was no evidence for stenosis. There was no insufficiency. MITRAL VALVE: There was mild annular calcification. There was no evidence for  stenosis. There was mild regurgitation.     TRICUSPID VALVE: The valve structure was normal. There was no evidence for  stenosis. There was mild regurgitation. PULMONIC VALVE: Not well visualized. There was no evidence for stenosis. There  was no insufficiency. PERICARDIUM: A moderate pericardial effusion was identified circumferential   to  the heart. There was mild RA inversion, no clear evidence of RV diastolic  collapse or clear evidence of hemodynamic compromise. Clinical correlation  required. AORTA: The root exhibited normal size. SUMMARY:    -  Left ventricle: Systolic function was normal. Ejection fraction was  estimated in the range of 55 % to 60 %. There were no regional wall motion  abnormalities. -  Right ventricle: Systolic function was mildly reduced by TAPSE assessment. -  Inferior vena cava, hepatic veins: The respirophasic change in diameter   was  more than 50%. -  Mitral valve: There was mild annular calcification. There was mild  regurgitation.    -  Tricuspid valve: There was mild regurgitation.    -  Pericardium: A moderate pericardial effusion was identified   circumferential  to the heart. There was mild RA inversion, no clear evidence of RV diastolic  collapse or clear evidence of hemodynamic compromise. Clinical correlation  required. SYSTEM MEASUREMENT TABLES    2D  Ao Diam: 2.6 cm  LA Diam: 4.3 cm  LAEDV Index (A-L): 26.5 ml/m2  %FS: 35.9 %  IVSd: 0.7 cm  LVIDd: 4.8 cm  LVIDs: 3.1 cm  LVOT Diam: 1.7 cm  LVPWd: 0.8 cm    PW  E/E' Av.2  E/E' Lat: 14.9  E/E' Sept: 13.6    Prepared and signed by    Neal Rosas. Tej Macedo MD  Signed 20-Mar-2021 11:10:09         Ct Chest Wo Cont    Result Date: 3/19/2021  CT OF THE CHEST WITHOUT CONTRAST HISTORY: Sepsis, hypoxia. COMPARISON: None. TECHNIQUE: A helical acquisition was performed through the chest utilizing 5.5MU slice thickness without intravenous contrast. Coronal and sagittal reformats were obtained. Dose reduction techniques used:  Automated exposure control, adjustment of the mAs and/or kVp according to patient's size, and iterative reconstruction techniques. FINDINGS: *  PLEURA/PERICARDIUM: Moderate to large pericardial effusion with Hounsfield units equal to 14. Small left pleural effusion. *  LUNGS: 5 mm nodule in the right upper lobe on image number 27. 1 mm subpleural nodule in the right major fissure on image number 26. Tree-in-bud appearing infiltrate in the right lung apex. *  LONNIE/MEDIASTINUM: Within normal limits. *  TRACHEOBRONCHIAL TREE: Within normal limits. *  AORTA/PULMONARY VESSELS: Within normal limits. *  CORONARY ARTERIES: Mild coronary artery calcification is seen. *  CHEST WALL/AXILLA: Within normal limits. *  VISUALIZED UPPER ABDOMEN: Within normal limits. *  SPINE / BONES: Within normal limits. *  ADDITIONAL COMMENTS: Aortic valvular calcification. The most prominent finding is a moderate to large pericardial effusion. Small left pleural effusion. Left lower lobe atelectasis. Benign-appearing pulmonary nodules. Infectious bronchiolitis of the right upper lobe which is mild in degree. Coronary artery disease. Aortic valvular calcification. Date of Dictation: 3/19/2021 7:45 PM     [employer]    Result Date: 3/19/2021  RENAL / BLADDER ULTRASOUND HISTORY: Acute renal failure COMPARISON: None. TECHNIQUE: Sonographic imaging of both kidneys and the urinary bladder was performed. FINDINGS: *  RIGHT KIDNEY: 11.7 cm in size. No mass, hydronephrosis or calculi. Normal cortical thickness and echogenicity. *  LEFT KIDNEY: Congenital agenesis. in size. No mass, hydronephrosis or calculi. Normal cortical thickness and echogenicity. *  INTERNAL APPEARANCE: No wall thickening, mass or calculi seen. *  OTHER FINDINGS: None     Normal right kidney. Congenital agenesis of the left kidney.  Date Of Dictation: 3/19/2021 7:34 PM      Assessment and Plan:     Hospital Problems as of 3/20/2021 Date Reviewed: 1/16/2016          Codes Class Noted - Resolved POA    * (Principal) Atrial fibrillation with rapid ventricular response (HCC) ICD-10-CM: I48.91  ICD-9-CM: 427.31  3/19/2021 - Present Unknown            Severe sepsisetiology unclear, given history of cough and shortness of breath, possibly pulmonary,  - follow-up CT chest without contrast, Covid screen, procalcitonin, strep pneumo and mycoplasma antigen   continue empiric Rocephin and azithromycin,     Atrial fibrillation rapid ventricular response  appreciate cardiology's consultation  -back to SR after amio gtt; start PO amio per cardiology  -heparin gtt and PTA eliquis on hold in light of effusion  -tele monitor       elevated troponin- likely demand ischemia secondary to above  , continue to trend troponins, telemetry monitoring    Moderate pericardial effusionunclear etiologymonitor for hypotension  -Possibly related to rheumatoid arthritis versus anticoagulation  -Rheumatology consult  -cardiology recs appreciated     Acute kidney injury?  ATN from hypotension   last known creatinine 1 6, on admission 3.3, CT urogram shows left atrophic kidney, no renal calculi or hydronephrosis, likely prerenal due to dehydration and hypotension  Status post 3 L of NS bolus in the emergency room, follow urine electrolytes, continue IV fluids, monitor volume status        Hypertensionhold home medication due to hypotension, resume if indicated     History of rheumatoid arthritis, major depressions, former smoker, hyperlipidemia, history of TIA, hypothyroidism on Synthroid, left atrophic kidney,  -resume home medications if indicated ,and monitor clinically while inpatient, currently stable co morbidities add to patient's case complexity     DVT PPx: lovenox  Code Status: full      Anticipated discharge: 2-4 days, depending on patient's progression     Signed:  Karina Rodriguez MD

## 2021-03-20 NOTE — PROGRESS NOTES
1637: TRANSFER - IN REPORT:    Verbal report received from Tenzin Elise Links  being received from ER for routine progression of care      Report consisted of patients Situation, Background, Assessment and   Recommendations(SBAR). Information from the following report(s) SBAR was reviewed with the receiving nurse. Opportunity for questions and clarification was provided. Assessment completed upon patients arrival to unit and care assumed.

## 2021-03-21 ENCOUNTER — APPOINTMENT (OUTPATIENT)
Dept: ULTRASOUND IMAGING | Age: 81
DRG: 871 | End: 2021-03-21
Attending: INTERNAL MEDICINE
Payer: MEDICARE

## 2021-03-21 LAB
ALBUMIN SERPL-MCNC: 2.6 G/DL (ref 3.2–4.6)
ALBUMIN/GLOB SERPL: 0.7 {RATIO} (ref 1.2–3.5)
ALP SERPL-CCNC: 60 U/L (ref 50–136)
ALT SERPL-CCNC: 33 U/L (ref 12–65)
ANION GAP SERPL CALC-SCNC: 5 MMOL/L (ref 7–16)
APTT PPP: 28.1 SEC (ref 24.1–35.1)
AST SERPL-CCNC: 28 U/L (ref 15–37)
BASOPHILS # BLD: 0.1 K/UL (ref 0–0.2)
BASOPHILS # BLD: 0.1 K/UL (ref 0–0.2)
BASOPHILS NFR BLD: 0 % (ref 0–2)
BASOPHILS NFR BLD: 1 % (ref 0–2)
BILIRUB SERPL-MCNC: 0.2 MG/DL (ref 0.2–1.1)
BUN SERPL-MCNC: 38 MG/DL (ref 8–23)
CALCIUM SERPL-MCNC: 9.1 MG/DL (ref 8.3–10.4)
CHLORIDE SERPL-SCNC: 103 MMOL/L (ref 98–107)
CO2 SERPL-SCNC: 28 MMOL/L (ref 21–32)
CREAT SERPL-MCNC: 1.65 MG/DL (ref 0.6–1)
CREAT UR-MCNC: 59.4 MG/DL
D DIMER PPP FEU-MCNC: 3.98 UG/ML(FEU)
DIFFERENTIAL METHOD BLD: ABNORMAL
DIFFERENTIAL METHOD BLD: ABNORMAL
EOSINOPHIL # BLD: 0.2 K/UL (ref 0–0.8)
EOSINOPHIL # BLD: 0.3 K/UL (ref 0–0.8)
EOSINOPHIL NFR BLD: 2 % (ref 0.5–7.8)
EOSINOPHIL NFR BLD: 3 % (ref 0.5–7.8)
ERYTHROCYTE [DISTWIDTH] IN BLOOD BY AUTOMATED COUNT: 13.5 % (ref 11.9–14.6)
ERYTHROCYTE [DISTWIDTH] IN BLOOD BY AUTOMATED COUNT: 13.6 % (ref 11.9–14.6)
GLOBULIN SER CALC-MCNC: 3.5 G/DL (ref 2.3–3.5)
GLUCOSE SERPL-MCNC: 87 MG/DL (ref 65–100)
HCT VFR BLD AUTO: 30 % (ref 35.8–46.3)
HCT VFR BLD AUTO: 32.3 % (ref 35.8–46.3)
HGB BLD-MCNC: 10.7 G/DL (ref 11.7–15.4)
HGB BLD-MCNC: 9.6 G/DL (ref 11.7–15.4)
IMM GRANULOCYTES # BLD AUTO: 0.1 K/UL (ref 0–0.5)
IMM GRANULOCYTES # BLD AUTO: 0.1 K/UL (ref 0–0.5)
IMM GRANULOCYTES NFR BLD AUTO: 0 % (ref 0–5)
IMM GRANULOCYTES NFR BLD AUTO: 1 % (ref 0–5)
LYMPHOCYTES # BLD: 0.9 K/UL (ref 0.5–4.6)
LYMPHOCYTES # BLD: 1 K/UL (ref 0.5–4.6)
LYMPHOCYTES NFR BLD: 7 % (ref 13–44)
LYMPHOCYTES NFR BLD: 8 % (ref 13–44)
MCH RBC QN AUTO: 31.7 PG (ref 26.1–32.9)
MCH RBC QN AUTO: 32.4 PG (ref 26.1–32.9)
MCHC RBC AUTO-ENTMCNC: 32 G/DL (ref 31.4–35)
MCHC RBC AUTO-ENTMCNC: 33.1 G/DL (ref 31.4–35)
MCV RBC AUTO: 97.9 FL (ref 79.6–97.8)
MCV RBC AUTO: 99 FL (ref 79.6–97.8)
MONOCYTES # BLD: 1.5 K/UL (ref 0.1–1.3)
MONOCYTES # BLD: 1.6 K/UL (ref 0.1–1.3)
MONOCYTES NFR BLD: 12 % (ref 4–12)
MONOCYTES NFR BLD: 12 % (ref 4–12)
NEUTS SEG # BLD: 10.4 K/UL (ref 1.7–8.2)
NEUTS SEG # BLD: 9.6 K/UL (ref 1.7–8.2)
NEUTS SEG NFR BLD: 77 % (ref 43–78)
NEUTS SEG NFR BLD: 78 % (ref 43–78)
NRBC # BLD: 0 K/UL (ref 0–0.2)
NRBC # BLD: 0 K/UL (ref 0–0.2)
PLATELET # BLD AUTO: 350 K/UL (ref 150–450)
PLATELET # BLD AUTO: 366 K/UL (ref 150–450)
PMV BLD AUTO: 11.1 FL (ref 9.4–12.3)
PMV BLD AUTO: 11.2 FL (ref 9.4–12.3)
POTASSIUM SERPL-SCNC: 3.6 MMOL/L (ref 3.5–5.1)
PROCALCITONIN SERPL-MCNC: 0.32 NG/ML
PROT SERPL-MCNC: 6.1 G/DL (ref 6.3–8.2)
PROT UR-MCNC: 31 MG/DL
RBC # BLD AUTO: 3.03 M/UL (ref 4.05–5.2)
RBC # BLD AUTO: 3.3 M/UL (ref 4.05–5.2)
SODIUM SERPL-SCNC: 136 MMOL/L (ref 136–145)
SODIUM UR-SCNC: 76 MMOL/L
WBC # BLD AUTO: 12.3 K/UL (ref 4.3–11.1)
WBC # BLD AUTO: 13.5 K/UL (ref 4.3–11.1)

## 2021-03-21 PROCEDURE — 77010033678 HC OXYGEN DAILY

## 2021-03-21 PROCEDURE — 74011250636 HC RX REV CODE- 250/636: Performed by: HOSPITALIST

## 2021-03-21 PROCEDURE — 80053 COMPREHEN METABOLIC PANEL: CPT

## 2021-03-21 PROCEDURE — 84145 PROCALCITONIN (PCT): CPT

## 2021-03-21 PROCEDURE — 74011250637 HC RX REV CODE- 250/637: Performed by: FAMILY MEDICINE

## 2021-03-21 PROCEDURE — 36415 COLL VENOUS BLD VENIPUNCTURE: CPT

## 2021-03-21 PROCEDURE — 84300 ASSAY OF URINE SODIUM: CPT

## 2021-03-21 PROCEDURE — 94760 N-INVAS EAR/PLS OXIMETRY 1: CPT

## 2021-03-21 PROCEDURE — 93970 EXTREMITY STUDY: CPT

## 2021-03-21 PROCEDURE — 85025 COMPLETE CBC W/AUTO DIFF WBC: CPT

## 2021-03-21 PROCEDURE — 85379 FIBRIN DEGRADATION QUANT: CPT

## 2021-03-21 PROCEDURE — 74011250636 HC RX REV CODE- 250/636: Performed by: INTERNAL MEDICINE

## 2021-03-21 PROCEDURE — 65270000029 HC RM PRIVATE

## 2021-03-21 PROCEDURE — 74011250637 HC RX REV CODE- 250/637: Performed by: INTERNAL MEDICINE

## 2021-03-21 PROCEDURE — 99232 SBSQ HOSP IP/OBS MODERATE 35: CPT | Performed by: INTERNAL MEDICINE

## 2021-03-21 PROCEDURE — 65660000000 HC RM CCU STEPDOWN

## 2021-03-21 PROCEDURE — 74011250637 HC RX REV CODE- 250/637: Performed by: HOSPITALIST

## 2021-03-21 PROCEDURE — 85520 HEPARIN ASSAY: CPT

## 2021-03-21 PROCEDURE — 84156 ASSAY OF PROTEIN URINE: CPT

## 2021-03-21 PROCEDURE — 82570 ASSAY OF URINE CREATININE: CPT

## 2021-03-21 PROCEDURE — 85730 THROMBOPLASTIN TIME PARTIAL: CPT

## 2021-03-21 PROCEDURE — 74011000258 HC RX REV CODE- 258: Performed by: HOSPITALIST

## 2021-03-21 RX ORDER — LEVALBUTEROL INHALATION SOLUTION 1.25 MG/3ML
1.25 SOLUTION RESPIRATORY (INHALATION)
Status: DISCONTINUED | OUTPATIENT
Start: 2021-03-21 | End: 2021-03-26 | Stop reason: HOSPADM

## 2021-03-21 RX ORDER — HYDROCODONE BITARTRATE AND HOMATROPINE METHYLBROMIDE 1.5; 5 MG/5ML; MG/5ML
5 SYRUP ORAL
Status: DISCONTINUED | OUTPATIENT
Start: 2021-03-21 | End: 2021-03-26 | Stop reason: HOSPADM

## 2021-03-21 RX ORDER — HEPARIN SODIUM 5000 [USP'U]/100ML
12-25 INJECTION, SOLUTION INTRAVENOUS
Status: DISCONTINUED | OUTPATIENT
Start: 2021-03-21 | End: 2021-03-24

## 2021-03-21 RX ADMIN — AMIODARONE HYDROCHLORIDE 400 MG: 200 TABLET ORAL at 16:30

## 2021-03-21 RX ADMIN — Medication 10 ML: at 04:20

## 2021-03-21 RX ADMIN — CHOLECALCIFEROL TAB 25 MCG (1000 UNIT) 1000 UNITS: 25 TAB at 08:03

## 2021-03-21 RX ADMIN — AMIODARONE HYDROCHLORIDE 400 MG: 200 TABLET ORAL at 23:00

## 2021-03-21 RX ADMIN — CEFTRIAXONE 2 G: 2 INJECTION, POWDER, FOR SOLUTION INTRAMUSCULAR; INTRAVENOUS at 14:42

## 2021-03-21 RX ADMIN — AMIODARONE HYDROCHLORIDE 400 MG: 200 TABLET ORAL at 00:10

## 2021-03-21 RX ADMIN — ROSUVASTATIN 10 MG: 10 TABLET, FILM COATED ORAL at 22:03

## 2021-03-21 RX ADMIN — HEPARIN SODIUM AND DEXTROSE 12 UNITS/KG/HR: 5000; 5 INJECTION INTRAVENOUS at 16:43

## 2021-03-21 RX ADMIN — SENNOSIDES AND DOCUSATE SODIUM 1 TABLET: 8.6; 5 TABLET ORAL at 08:03

## 2021-03-21 RX ADMIN — HYDROCODONE BITARTRATE AND HOMATROPINE METHYLBROMIDE 5 ML: 5; 1.5 SOLUTION ORAL at 04:00

## 2021-03-21 RX ADMIN — DULOXETINE 60 MG: 60 CAPSULE, DELAYED RELEASE ORAL at 08:03

## 2021-03-21 RX ADMIN — AZITHROMYCIN MONOHYDRATE 500 MG: 500 INJECTION, POWDER, LYOPHILIZED, FOR SOLUTION INTRAVENOUS at 16:30

## 2021-03-21 RX ADMIN — POLYETHYLENE GLYCOL 3350 17 G: 17 POWDER, FOR SOLUTION ORAL at 08:03

## 2021-03-21 RX ADMIN — SENNOSIDES AND DOCUSATE SODIUM 1 TABLET: 8.6; 5 TABLET ORAL at 22:03

## 2021-03-21 RX ADMIN — METHOTREXATE SODIUM 12.5 MG: 2.5 TABLET ORAL at 07:00

## 2021-03-21 RX ADMIN — Medication 10 ML: at 14:00

## 2021-03-21 RX ADMIN — LEVOTHYROXINE SODIUM 25 MCG: 50 TABLET ORAL at 06:08

## 2021-03-21 RX ADMIN — FAMOTIDINE 20 MG: 20 TABLET, FILM COATED ORAL at 08:02

## 2021-03-21 RX ADMIN — FOLIC ACID 1 MG: 1 TABLET ORAL at 08:02

## 2021-03-21 NOTE — PROGRESS NOTES
Awake with persistent, dry, non-productive cough; Hycodan 5ml given PO. Up to bedside commode with asst, voiding without difficulty, clear, yellow urine, to bed, specimen to lab, per orders.

## 2021-03-21 NOTE — PROGRESS NOTES
Hospitalist Note     Admit Date:  3/19/2021  1:13 PM   Name:  Edris Severs   Age:  68 y.o.  :  1940   MRN:  [id number]   PCP:  Arely Wharton MD  Treatment Team: Attending Provider: Grant Palacio MD; Consulting Provider: Jennifer Todd MD; Hospitalist: Grant Palacio MD; Consulting Provider: Tamiko Gomez MD; Consulting Provider: Carlos Higgins MD    HPI/Subjective:     Patient 74-year-old female, history significant for breast cancer status post radiations, rheumatoid arthritis on chronic methotrexate, chronic kidney disease, former smoker,    Patient developed progressive shortness of breath, over the last 2 weeks, started close outpatient pneumonia treatments by PCP, did not improve, shortness of breath worsened this morning, patient also felt the new onset of palpitation EMS was called, patient found to have significant hypotension systolic of 69, with heart rate greater than 140, consistent with atrial fibrillation rapid medical response, seen by cardiology, amiodarone and heparin drip was started     Patient denies sick contacts, denies recent travels, denies prior cardiac history, patient was told that she has a single  kidney but denies kidney disease     3/21  -Patient feeling better today, leukocytosis is increased, no evidence of worsening infection  -Urinalysis/urine cultures were not done on admission, will do now  -Blood cultures thus far negative  -Moderate- large pericardial effusion on CAT scan yesterday  -Echo with moderate pericardial effusion, mild RA inversion, no clear evidence of RV diastolic collapse or evidence of hemodynamic compromise.     Complete ROS done and is as stated in HPI or otherwise negative  No other complaints  Objective:     Patient Vitals for the past 24 hrs:   Temp Pulse Resp BP SpO2   21 1120 97.4 °F (36.3 °C) 90 18 124/67 97 %   21 0750 98.1 °F (36.7 °C) 73 18 103/89 90 %   21 0749     91 %   21 0330 98.6 °F (37 °C) 83 16 105/60 99 %   03/21/21 0010  80  110/68    03/20/21 2339 98.3 °F (36.8 °C) 80 16 110/68 98 %   03/20/21 2031 97.3 °F (36.3 °C) 80 16 105/72 98 %   03/20/21 1728 97.5 °F (36.4 °C) 80 16 108/71 90 %   03/20/21 1635  78 14 124/61 (!) 89 %   03/20/21 1633  77 14  100 %   03/20/21 1621  75 11 129/63 98 %   03/20/21 1606  80 13 131/60 98 %   03/20/21 1553  78 10  100 %   03/20/21 1552    (!) 150/70    03/20/21 1537  75 13 (!) 143/63 100 %   03/20/21 1521  75 14 123/65 100 %   03/20/21 1506  77 12 128/63 100 %   03/20/21 1452  79 14 131/61 100 %   03/20/21 1436  77 18 132/70 100 %   03/20/21 1421  78 13 120/65 100 %   03/20/21 1406  76 14 118/61 100 %   03/20/21 1351  78 16 135/60 100 %   03/20/21 1337   21     03/20/21 1336  76  (!) 141/67 100 %   03/20/21 1326     100 %   03/20/21 1322  76 15 131/62    03/20/21 1306  74 18 105/61 98 %   03/20/21 1300     93 %   03/20/21 1251  75 15 122/62 93 %   03/20/21 1236  74 14 135/75 100 %   03/20/21 1221  73 12 134/68 99 %     Oxygen Therapy  O2 Sat (%): 97 % (03/21/21 1120)  Pulse via Oximetry: 73 beats per minute (03/21/21 0749)  O2 Device: Nasal cannula (03/21/21 0749)  Skin Assessment: Clean, dry, & intact (03/20/21 1715)  O2 Flow Rate (L/min): 1.5 l/min (03/21/21 0749)    Estimated body mass index is 30.1 kg/m² as calculated from the following:    Height as of this encounter: 5' (1.524 m). Weight as of this encounter: 69.9 kg (154 lb 1.6 oz). Intake/Output Summary (Last 24 hours) at 3/21/2021 1216  Last data filed at 3/21/2021 0415  Gross per 24 hour   Intake    Output 300 ml   Net -300 ml       *Note that automatically entered I/Os may not be accurate; dependent on patient compliance with collection and accurate  by techs. General:          Alert, cooperative, no distress, appears stated age. Head:               Normocephalic, without obvious abnormality, atraumatic.   Nose:               Nares normal. No drainage or sinus tenderness. Lungs:             Clear to auscultation bilaterally. No Wheezing or Rhonchi. No rales. Heart:              Regular rate and rhythm,  no murmur, rub or gallop. Abdomen:        Soft, non-tender. Not distended. Bowel sounds normal.   Extremities:     No cyanosis. No edema. No clubbing  Skin:                Texture, turgor normal. No rashes or lesions. Not Jaundiced  Neurologic:      Alert and oriented x 3, no focal deficits     Data Review:  I have reviewed all labs, meds, and studies from the last 24 hours:    Recent Results (from the past 24 hour(s))   D DIMER    Collection Time: 03/21/21 12:44 AM   Result Value Ref Range    D DIMER 3.98 (H) <0.56 ug/ml(FEU)   CBC WITH AUTOMATED DIFF    Collection Time: 03/21/21 12:44 AM   Result Value Ref Range    WBC 13.5 (H) 4.3 - 11.1 K/uL    RBC 3.03 (L) 4.05 - 5.2 M/uL    HGB 9.6 (L) 11.7 - 15.4 g/dL    HCT 30.0 (L) 35.8 - 46.3 %    MCV 99.0 (H) 79.6 - 97.8 FL    MCH 31.7 26.1 - 32.9 PG    MCHC 32.0 31.4 - 35.0 g/dL    RDW 13.6 11.9 - 14.6 %    PLATELET 131 041 - 194 K/uL    MPV 11.2 9.4 - 12.3 FL    ABSOLUTE NRBC 0.00 0.0 - 0.2 K/uL    DF AUTOMATED      NEUTROPHILS 77 43 - 78 %    LYMPHOCYTES 8 (L) 13 - 44 %    MONOCYTES 12 4.0 - 12.0 %    EOSINOPHILS 3 0.5 - 7.8 %    BASOPHILS 1 0.0 - 2.0 %    IMMATURE GRANULOCYTES 1 0.0 - 5.0 %    ABS. NEUTROPHILS 10.4 (H) 1.7 - 8.2 K/UL    ABS. LYMPHOCYTES 1.0 0.5 - 4.6 K/UL    ABS. MONOCYTES 1.6 (H) 0.1 - 1.3 K/UL    ABS. EOSINOPHILS 0.3 0.0 - 0.8 K/UL    ABS. BASOPHILS 0.1 0.0 - 0.2 K/UL    ABS. IMM.  GRANS. 0.1 0.0 - 0.5 K/UL   METABOLIC PANEL, COMPREHENSIVE    Collection Time: 03/21/21 12:44 AM   Result Value Ref Range    Sodium 136 136 - 145 mmol/L    Potassium 3.6 3.5 - 5.1 mmol/L    Chloride 103 98 - 107 mmol/L    CO2 28 21 - 32 mmol/L    Anion gap 5 (L) 7 - 16 mmol/L    Glucose 87 65 - 100 mg/dL    BUN 38 (H) 8 - 23 MG/DL    Creatinine 1.65 (H) 0.6 - 1.0 MG/DL    GFR est AA 39 (L) >60 ml/min/1.73m2    GFR est non-AA 32 (L) >60 ml/min/1.73m2    Calcium 9.1 8.3 - 10.4 MG/DL    Bilirubin, total 0.2 0.2 - 1.1 MG/DL    ALT (SGPT) 33 12 - 65 U/L    AST (SGOT) 28 15 - 37 U/L    Alk. phosphatase 60 50 - 136 U/L    Protein, total 6.1 (L) 6.3 - 8.2 g/dL    Albumin 2.6 (L) 3.2 - 4.6 g/dL    Globulin 3.5 2.3 - 3.5 g/dL    A-G Ratio 0.7 (L) 1.2 - 3.5     CREATININE, UR, RANDOM    Collection Time: 03/21/21  4:20 AM   Result Value Ref Range    Creatinine, urine 59.40 mg/dL   SODIUM, UR, RANDOM    Collection Time: 03/21/21  4:20 AM   Result Value Ref Range    Sodium,urine random 76 MMOL/L   PROTEIN URINE, RANDOM    Collection Time: 03/21/21  4:20 AM   Result Value Ref Range    Protein, urine random 31 (H) <11.9 mg/dL        All Micro Results     Procedure Component Value Units Date/Time    CULTURE, URINE [[id number]]     Order Status: Sent Specimen: Urine from Clean catch     BLOOD CULTURE [[id number]] Collected: 03/19/21 1339    Order Status: Completed Specimen: Blood Updated: 03/21/21 1156     Special Requests: --        LEFT  Antecubital       Culture result: NO GROWTH 2 DAYS       BLOOD CULTURE [[id number]] Collected: 03/19/21 1530    Order Status: Completed Specimen: Blood Updated: 03/21/21 1156     Special Requests: --        LEFT  HAND       Culture result: NO GROWTH 2 DAYS       SARS-COV-2, PCR [[id number]] Collected: 03/19/21 1815    Order Status: Completed Specimen: Nasopharyngeal Updated: 03/20/21 1250     Specimen source Nasopharyngeal        SARS-CoV-2 Not detected        Comment:      The specimen is NEGATIVE for SARS-CoV-2, the novel coronavirus associated with COVID-19. A negative result does not rule out COVID-19. This test has been authorized by the FDA under an Emergency Use Authorization (EUA) for use by authorized laboratories.         Fact sheet for Healthcare Providers: ConventionUpdate.co.nz  Fact sheet for Patients: https://fda.gov/media/538585/download Methodology: RT-PCR         COVID-19 RAPID TEST [[id number]] Collected: 03/19/21 1815    Order Status: Completed Specimen: Nasopharyngeal Updated: 03/19/21 1913     Specimen source Nasopharyngeal        COVID-19 rapid test Not detected        Comment:      The specimen is NEGATIVE for SARS-CoV-2, the novel coronavirus associated with COVID-19. A negative result does not rule out COVID-19. This test has been authorized by the FDA under an Emergency Use Authorization (EUA) for use by authorized laboratories.         Fact sheet for Healthcare Providers: ConventionNantWorksdate.co.nz  Fact sheet for Patients: [employer].nz       Methodology: Isothermal Nucleic Acid Amplification               Current Meds:  Current Facility-Administered Medications   Medication Dose Route Frequency    HYDROcodone-homatropine (HYCODAN) 5-1.5 mg/5 mL (5 mL) oral solution 5 mL  5 mL Oral Q4H PRN    amiodarone (CORDARONE) tablet 400 mg  400 mg Oral Q8H    benzonatate (TESSALON) capsule 100 mg  100 mg Oral TID PRN    [Held by provider] aspirin delayed-release tablet 81 mg  81 mg Oral DAILY    cholecalciferol (VITAMIN D3) (1000 Units /25 mcg) tablet 1,000 Units  1,000 Units Oral DAILY    DULoxetine (CYMBALTA) capsule 60 mg  60 mg Oral DAILY    folic acid (FOLVITE) tablet 1 mg  1 mg Oral DAILY    levothyroxine (SYNTHROID) tablet 25 mcg  25 mcg Oral ACB    methotrexate (RHEUMATREX) tablet 12.5 mg  12.5 mg Oral every Sunday    rosuvastatin (CRESTOR) tablet 10 mg  10 mg Oral QHS    sodium chloride (NS) flush 5-40 mL  5-40 mL IntraVENous Q8H    sodium chloride (NS) flush 5-40 mL  5-40 mL IntraVENous PRN    acetaminophen (TYLENOL) tablet 650 mg  650 mg Oral Q6H PRN    Or    acetaminophen (TYLENOL) suppository 650 mg  650 mg Rectal Q6H PRN    polyethylene glycol (MIRALAX) packet 17 g  17 g Oral DAILY    senna-docusate (PERICOLACE) 8.6-50 mg per tablet 1 Tab  1 Tab Oral BID    magnesium hydroxide (MILK OF MAGNESIA) 400 mg/5 mL oral suspension 30 mL  30 mL Oral DAILY PRN    bisacodyL (DULCOLAX) suppository 10 mg  10 mg Rectal DAILY PRN    ondansetron (ZOFRAN ODT) tablet 4 mg  4 mg Oral Q8H PRN    Or    ondansetron (ZOFRAN) injection 4 mg  4 mg IntraVENous Q6H PRN    cefTRIAXone (ROCEPHIN) 2 g in 0.9% sodium chloride (MBP/ADV) 50 mL MBP  2 g IntraVENous Q24H    azithromycin (ZITHROMAX) 500 mg in 0.9% sodium chloride 250 mL (VIAL-MATE)  500 mg IntraVENous Q24H    famotidine (PEPCID) tablet 20 mg  20 mg Oral DAILY    [Held by provider] metoprolol succinate (TOPROL-XL) XL tablet 25 mg  25 mg Oral DAILY       Other Studies:  Results for orders placed or performed during the hospital encounter of 21   2D ECHO COMPLETE ADULT (TTE) W OR WO CONTR    Narrative    [address] [place] [address]  [phone]    Transthoracic Echocardiogram  2D, M-mode, Doppler, and Color Doppler    Patient: Issa Bustilloai  MR #: [id number]  : [id number]  Age: 68 years  Gender: Female  Study date: 20-Mar-2021  Account #: [de-identified]  Height: 60 in  Weight: 154.7 lb  BSA: 1.67 mï¾²  Status:Stat  Location: ER3  BP: 137/ 65    Allergies: NO KNOWN ALLERGENS    Sonographer:  SIMRAN Kruger  Group:  [facility]  Reading Physician:  Mio Ricks. MD Karen    INDICATIONS: Cardiac Arrhythmia, Pericardial Effusion    PROCEDURE: This was a stat study. A transthoracic echocardiogram was   performed. The study included complete 2D imaging, M-mode, complete spectral Doppler,   and  color Doppler. Image quality was adequate. LEFT VENTRICLE: Size was normal. Systolic function was normal. Ejection  fraction was estimated in the range of 55 % to 60 %. There were no regional  wall motion abnormalities. Wall thickness was normal. Left ventricular  diastolic function is indeterminate based on assessment criteria. Avg E/e':  14.2.     RIGHT VENTRICLE: The size was normal. Systolic function was mildly reduced by  TAPSE assessment. The tricuspid jet envelope definition was inadequate for  estimation of RV systolic pressure. LEFT ATRIUM: Size was normal.    RIGHT ATRIUM: Size was normal.    SYSTEMIC VEINS: IVC: The inferior vena cava was normal in size and course. The  respirophasic change in diameter was more than 50%. AORTIC VALVE: The valve was trileaflet. Leaflets exhibited mild sclerosis. There was no evidence for stenosis. There was no insufficiency. MITRAL VALVE: There was mild annular calcification. There was no evidence for  stenosis. There was mild regurgitation. TRICUSPID VALVE: The valve structure was normal. There was no evidence for  stenosis. There was mild regurgitation. PULMONIC VALVE: Not well visualized. There was no evidence for stenosis. There  was no insufficiency. PERICARDIUM: A moderate pericardial effusion was identified circumferential   to  the heart. There was mild RA inversion, no clear evidence of RV diastolic  collapse or clear evidence of hemodynamic compromise. Clinical correlation  required. AORTA: The root exhibited normal size. SUMMARY:    -  Left ventricle: Systolic function was normal. Ejection fraction was  estimated in the range of 55 % to 60 %. There were no regional wall motion  abnormalities. -  Right ventricle: Systolic function was mildly reduced by TAPSE assessment. -  Inferior vena cava, hepatic veins: The respirophasic change in diameter   was  more than 50%. -  Mitral valve: There was mild annular calcification. There was mild  regurgitation.    -  Tricuspid valve: There was mild regurgitation.    -  Pericardium: A moderate pericardial effusion was identified   circumferential  to the heart. There was mild RA inversion, no clear evidence of RV diastolic  collapse or clear evidence of hemodynamic compromise. Clinical correlation  required.     SYSTEM MEASUREMENT TABLES    2D  Ao Diam: 2.6 cm  LA Diam: 4.3 cm  LAEDV Index (A-L): 26.5 ml/m2  %FS: 35.9 %  IVSd: 0.7 cm  LVIDd: 4.8 cm  LVIDs: 3.1 cm  LVOT Diam: 1.7 cm  LVPWd: 0.8 cm    PW  E/E' Av.2  E/E' Lat: 14.9  E/E' Sept: 13.6    Prepared and signed by    Rachel Han. Klever Narayanan MD  Signed 20-Mar-2021 11:10:09         Duplex Lower Ext Venous Bilat    Result Date: 3/21/2021  BILATERAL LOWER EXTREMITY VENOUS DUPLEX ULTRASOUND. HISTORY:  Pain. TECHNIQUE: Direct skin-contact high resolution grayscale images, color-flow Doppler and duplex waveform analysis. FINDINGS: There is compressibility, color-flow assignment and augmentation of the venous waveform with calf compression in the common femoral, superficial femoral and popliteal veins. Upper calf veins are unremarkable     Negative for sonographic evidence of deep venous thrombosis bilateral lower extremity.        Assessment and Plan:     Hospital Problems as of 3/21/2021 Date Reviewed: 2016          Codes Class Noted - Resolved POA    * (Principal) Atrial fibrillation with rapid ventricular response [facility]) ICD-10-CM: I48.91  ICD-9-CM: 427.31  3/19/2021 - Present Unknown            Severe sepsisetiology unclear, likely multifactorial.  Patient may have been hypotensive in the setting of moderate pericardial effusion effusion and dehydration, which led to severe hypotension on admission  -Hypotension on admission was improved with IV hydration  -Blood cultures negative, check UA urine cultures, trend pro calcitonin  -Continue with antibiotics as ordered     Atrial fibrillation rapid ventricular response  appreciate cardiology's consultation  -back to SR after amio gtt; start PO amio per cardiology  -heparin gtt and PTA eliquis were on hold in light of effusion  -tele monitor  -Cardiology recommends restarting heparin drip and monitoring for increase in pericardial effusion size; no objections to this plan, will restart and monitor closely       elevated troponin- likely demand ischemia secondary to above  , continue to trend troponins, telemetry monitoring    Moderate pericardial effusionunclear etiologymonitor for hypotension  -Possibly related to rheumatoid arthritis versus anticoagulation  -Rheumatology consult  -cardiology recs appreciated     Acute kidney injury from hypotension   last known creatinine 1 6, on admission 3.3, CT urogram shows left atrophic kidney, no renal calculi or hydronephrosis, likely prerenal due to dehydration and hypotension  Status post 3 L of NS bolus in the emergency room, follow urine electrolytes, continue IV fluids, monitor volume status  -3/21: improving Cr        Hypertensionhold home medication due to hypotension, resume if indicated     History of rheumatoid arthritis, major depressions, former smoker, hyperlipidemia, history of TIA, hypothyroidism on Synthroid, left atrophic kidney,  -resume home medications if indicated ,and monitor clinically while inpatient, currently stable co morbidities add to patient's case complexity     DVT PPx: heparin gtt  Code Status: full      Anticipated discharge: 2-4 days, depending on patient's progression     Signed:  Maggie Alejandra MD

## 2021-03-21 NOTE — ROUTINE PROCESS
Respiratory Care Services Policy Number: [id number] Title: Oxygen Protocol Effective Date: 01/1996 Revised Date: 06/2013, 02/29/2016, 4/2018, 7/2019 Reviewed Date: 05/2014, 03/2015, 06/2017, 11/2020 I. Policy: The Oxygen Protocol will be initiated for all patients upon written order from physician for administration of oxygen therapy or if a patient is found to have an oxygen saturation of 88% or less. Special consideration: the goal of oxygen therapy for COPD patients is to maintain oxygen saturation between 88% - 92% to comply with GOLD Guidelines. II. Purpose: To provide protocol driven respiratory therapy for the administration of oxygen at concentrations greater than that in ambient air with the intent of treating or preventing the symptoms and manifestations of hypoxia. III. Responsibility: Director Respiratory Care Services, all Respiratory Care Practitioners IV. Indications: A. Implement this protocol for patients when physician orders oxygen to be administered or when patient is found to have an oxygen saturation of 88% or less. B. To assure routine monitoring of patient's oxygen saturation b.i.d. and to make appropriate adjustments in accordance with ordered oxygen saturation parameters. C. To assure continuity of respiratory care that meets [facility] Clinical Practice Guidelines and GOLD Guidelines. D. Hb < 8 
E. Sickle Cell anemia crisis V. Assessment:  Assess the following parameters to determine the need to adjust oxygen: A. Measurement of patient's oxygen saturation via pulse oximetry. B. Observation of patient's color, respiratory effort, and responsiveness. C. Measurement of heart rate and respiratory rate. D. Complete a three-step ambulatory oxygen saturation when ordered. VI. Initiation:  Upon receipt of an order for oxygen, the RCP will: A. Verify order in the patient's EMR, which should include the desired oxygen saturation to be maintained. B. The patient shall be placed on oxygen with humidity (except for those oxygen delivery devices that do not require humidity, i.e. venturi masks and non-rebreather masks) as ordered by the physician to achieve the prescribed oxygen saturation. C. In the event that no saturation is specified, a saturation of 90% will be maintained. D. Patients, who are found to have a SaO2 of 88% or less, may be started on supplemental oxygen as described above. E. Patients admitted with cardiac problems/disease shall be maintained at 92% per Chest Committee recommendation. F. The patient will be informed of the \"no smoking policy\" and instructed in the proper use of oxygen therapy. G. Once desired oxygen saturation has been achieved, the RCP will document FIO2 and oxygen saturation in the respiratory section of the patient's EMR. VII. Maintenance: A. 30-second oxygen saturation check will be taken to maintain the saturation ordered by the physician each day. B. Patients will be assessed each shift and as needed by pulse oximetry to determine if oxygen needs to be decreased, increased or discontinued. C. If changes in FIO2 are indicated, all changes will be documented in the respiratory section of the patient's EMR. D. If no changes in FIO2 are required, the patient's oxygen flow rate and saturation will be recorded in the respiratory section of the patient's EMR. E. Per [facility], patients who are receiving oxygen therapy but are not on oxygen at home, should be weaned off oxygen as soon as possible or when anticipated discharge becomes evident. Betty Hilton will be discontinued after oxygen saturation has been maintained for 24 hours on room air and documented in the patient's EMR. G. Patients on the Inpatient Rehabilitation area on 9th floor will be exempt from having their oxygen discontinued per protocol.  Oxygen may be weaned but will be changed to prn to meet the needs of the patient when exercising and participating in therapy. H. The goal of oxygen therapy is to maintain patients with a diagnosis of COPD at oxygen saturation between 88% - 92% to comply with GOLD Guidelines. VIII. Safety: RCP will address the following safety issues: A. Identify patient using the two patient identifiers name and birth date via ID bracelet. B. Perform hand hygiene per hospital policy utilizing Standard Precautions for all patients and following transmission-based isolation as indicated per hospital policy. C. Cardiac patients will be maintained at an oxygen saturation of 92%. D. If a patient's FIO2 requirements necessitate changing oxygen delivery devices to a high concentration of oxygen, documentation indicating the change must be included in the progress notes, as well as in the respiratory flowsheet. E. If a patient has a hemoglobin level <8 mg. RCP will consult physician before discontinuing oxygen. IX. Interventions: A. RCP will assess patient for signs of respiratory distress or suspicion of CO2 retention. B. An ABG may be obtained for patients exhibiting respiratory distress or sickle cell crisis. C. An order should be entered into patient's EMR for ABG under per protocol. X. Documentation A. Document assessment findings in the respiratory section of the patient's EMR. B. Document changes in therapy per protocol in the respiratory orders section and in the care plan section of the patient's EMR. C. Document patient education in the patient education section of the patient's EMR. XI. Reportable Conditions:  Report to the physician immediately: A. Acute changes in patient's respiratory status. B. An oxygen saturation <85%. C. A change in oxygen delivery device to provide a high concentration of oxygen. XII. Patient Instructions: Review with Patient A. Purpose of oxygen therapy B. Proper technique for using oxygen C. No smoking policy Approval: Pulmonary Committee (1-25-96) Revision: Chest Committee (4-28-05) L - Respiratory Care Department Policy, Procedure and Protocol Guideline Manual, 1995, DAVID Dow. L - Therapist Driven Respiratory Care Protocols  A Practitioner's Guide for Criteria-Based Respiratory Care by Maria Elena Charles M.D., and DAVID Ramos, RRT. L - The rationale for therapist-driven protocols: an update. Respiratory Care 1998. N  [facility] Clinical Practice Guidelines.

## 2021-03-21 NOTE — PROGRESS NOTES
Tessalon Perles 100 mg given PO for persistent, dry cough. AP reg, lungs sounds with faint expiratory wheezes auscultated, lower diminished. Up to bsc with asst, voiding without difficulty and back to bed. Generalized weakness noted. Resp labore on exertion with O2, improved at rest. Assessment completed. No c/o. No distress.

## 2021-03-21 NOTE — PROGRESS NOTES
[facility] CARDIOLOGY PROGRESS NOTE           3/21/2021 8:54 AM    Admit Date: 3/19/2021    Admit Diagnosis: Atrial fibrillation with rapid ventricular response (HCC) [I48.91]      Subjective:   No complaints this AM, no chest pain or shortness of breath    Interval History: (History of pertinent interval events obtained from nursing staff)    ROS:  GEN:  No fever or chills  Cardiovascular:  As noted above  Pulmonary:  As noted above  Neuro:  No new focal motor or sensory loss      Objective:     Vitals:    03/21/21 0010 03/21/21 0330 03/21/21 0749 03/21/21 0750   BP: 110/68 105/60  103/89   Pulse: 80 83  73   Resp:  16  18   Temp:  98.6 °F (37 °C)  98.1 °F (36.7 °C)   SpO2:  99% 91% 90%   Weight:       Height:           Physical Exam:  General-Well Developed, Well Nourished, No Acute Distress, Alert & Oriented x 3, appropriate mood. Neck- supple, no JVD  CV- regular rate and rhythm no MRG  Lung- clear bilaterally  Abd- soft, nontender, nondistended  Ext- no edema bilaterally.   Skin- warm and dry    Current Facility-Administered Medications   Medication Dose Route Frequency    HYDROcodone-homatropine (HYCODAN) 5-1.5 mg/5 mL (5 mL) oral solution 5 mL  5 mL Oral Q4H PRN    amiodarone (CORDARONE) tablet 400 mg  400 mg Oral Q8H    benzonatate (TESSALON) capsule 100 mg  100 mg Oral TID PRN    [Held by provider] aspirin delayed-release tablet 81 mg  81 mg Oral DAILY    cholecalciferol (VITAMIN D3) (1000 Units /25 mcg) tablet 1,000 Units  1,000 Units Oral DAILY    DULoxetine (CYMBALTA) capsule 60 mg  60 mg Oral DAILY    folic acid (FOLVITE) tablet 1 mg  1 mg Oral DAILY    levothyroxine (SYNTHROID) tablet 25 mcg  25 mcg Oral ACB    methotrexate (RHEUMATREX) tablet 12.5 mg  12.5 mg Oral every Sunday    rosuvastatin (CRESTOR) tablet 10 mg  10 mg Oral QHS    sodium chloride (NS) flush 5-40 mL  5-40 mL IntraVENous Q8H    sodium chloride (NS) flush 5-40 mL  5-40 mL IntraVENous PRN    acetaminophen (TYLENOL) tablet 650 mg  650 mg Oral Q6H PRN    Or    acetaminophen (TYLENOL) suppository 650 mg  650 mg Rectal Q6H PRN    polyethylene glycol (MIRALAX) packet 17 g  17 g Oral DAILY    senna-docusate (PERICOLACE) 8.6-50 mg per tablet 1 Tab  1 Tab Oral BID    magnesium hydroxide (MILK OF MAGNESIA) 400 mg/5 mL oral suspension 30 mL  30 mL Oral DAILY PRN    bisacodyL (DULCOLAX) suppository 10 mg  10 mg Rectal DAILY PRN    ondansetron (ZOFRAN ODT) tablet 4 mg  4 mg Oral Q8H PRN    Or    ondansetron (ZOFRAN) injection 4 mg  4 mg IntraVENous Q6H PRN    cefTRIAXone (ROCEPHIN) 2 g in 0.9% sodium chloride (MBP/ADV) 50 mL MBP  2 g IntraVENous Q24H    azithromycin (ZITHROMAX) 500 mg in 0.9% sodium chloride 250 mL (VIAL-MATE)  500 mg IntraVENous Q24H    famotidine (PEPCID) tablet 20 mg  20 mg Oral DAILY    [Held by provider] metoprolol succinate (TOPROL-XL) XL tablet 25 mg  25 mg Oral DAILY     Data Review:   Recent Results (from the past 24 hour(s))   HEPARIN XA UFH    Collection Time: 03/20/21 10:37 AM   Result Value Ref Range    Heparin Xa UFH <0.10 (L) 0.3 - 0.7 IU/mL   D DIMER    Collection Time: 03/21/21 12:44 AM   Result Value Ref Range    D DIMER 3.98 (H) <0.56 ug/ml(FEU)   CBC WITH AUTOMATED DIFF    Collection Time: 03/21/21 12:44 AM   Result Value Ref Range    WBC 13.5 (H) 4.3 - 11.1 K/uL    RBC 3.03 (L) 4.05 - 5.2 M/uL    HGB 9.6 (L) 11.7 - 15.4 g/dL    HCT 30.0 (L) 35.8 - 46.3 %    MCV 99.0 (H) 79.6 - 97.8 FL    MCH 31.7 26.1 - 32.9 PG    MCHC 32.0 31.4 - 35.0 g/dL    RDW 13.6 11.9 - 14.6 %    PLATELET 131 278 - 401 K/uL    MPV 11.2 9.4 - 12.3 FL    ABSOLUTE NRBC 0.00 0.0 - 0.2 K/uL    DF AUTOMATED      NEUTROPHILS 77 43 - 78 %    LYMPHOCYTES 8 (L) 13 - 44 %    MONOCYTES 12 4.0 - 12.0 %    EOSINOPHILS 3 0.5 - 7.8 %    BASOPHILS 1 0.0 - 2.0 %    IMMATURE GRANULOCYTES 1 0.0 - 5.0 %    ABS. NEUTROPHILS 10.4 (H) 1.7 - 8.2 K/UL    ABS. LYMPHOCYTES 1.0 0.5 - 4.6 K/UL    ABS.  MONOCYTES 1.6 (H) 0.1 - 1.3 K/UL    ABS. EOSINOPHILS 0.3 0.0 - 0.8 K/UL    ABS. BASOPHILS 0.1 0.0 - 0.2 K/UL    ABS. IMM. GRANS. 0.1 0.0 - 0.5 K/UL   METABOLIC PANEL, COMPREHENSIVE    Collection Time: 03/21/21 12:44 AM   Result Value Ref Range    Sodium 136 136 - 145 mmol/L    Potassium 3.6 3.5 - 5.1 mmol/L    Chloride 103 98 - 107 mmol/L    CO2 28 21 - 32 mmol/L    Anion gap 5 (L) 7 - 16 mmol/L    Glucose 87 65 - 100 mg/dL    BUN 38 (H) 8 - 23 MG/DL    Creatinine 1.65 (H) 0.6 - 1.0 MG/DL    GFR est AA 39 (L) >60 ml/min/1.73m2    GFR est non-AA 32 (L) >60 ml/min/1.73m2    Calcium 9.1 8.3 - 10.4 MG/DL    Bilirubin, total 0.2 0.2 - 1.1 MG/DL    ALT (SGPT) 33 12 - 65 U/L    AST (SGOT) 28 15 - 37 U/L    Alk. phosphatase 60 50 - 136 U/L    Protein, total 6.1 (L) 6.3 - 8.2 g/dL    Albumin 2.6 (L) 3.2 - 4.6 g/dL    Globulin 3.5 2.3 - 3.5 g/dL    A-G Ratio 0.7 (L) 1.2 - 3.5     CREATININE, UR, RANDOM    Collection Time: 03/21/21  4:20 AM   Result Value Ref Range    Creatinine, urine 59.40 mg/dL   SODIUM, UR, RANDOM    Collection Time: 03/21/21  4:20 AM   Result Value Ref Range    Sodium,urine random 76 MMOL/L   PROTEIN URINE, RANDOM    Collection Time: 03/21/21  4:20 AM   Result Value Ref Range    Protein, urine random 31 (H) <11.9 mg/dL       EKG:  (EKG has been independently visualized by me with interpretation below)  Assessment:     Principal Problem:    Atrial fibrillation with rapid ventricular response (HCC) (3/19/2021)      Plan:   1. Afib: now NSR on amiodarone, cont PO amiodarone, cont telemetry, echo with normal EF, moderate effusion as noted below, repeat limited echo tomorrow  2. Sepsis: per primary team, broad spectrum antibiotics  3. Mod pericardial effusion: noted on chest CT, echo with evidence of moderate effusion, no clear hemodynamic compromise, cont to monitor  4. NAZIA: improving, likely 2/2 sepsis, hypotension  5. CVA protection: recommend restarting heparin, monitor closely, repeat limited echo in AM    Andrei Jones  MD  Cardiology/Electrophysiology

## 2021-03-22 LAB
ALBUMIN SERPL-MCNC: 2.3 G/DL (ref 3.2–4.6)
ALBUMIN/GLOB SERPL: 0.6 {RATIO} (ref 1.2–3.5)
ALP SERPL-CCNC: 128 U/L (ref 50–136)
ALT SERPL-CCNC: 38 U/L (ref 12–65)
ANION GAP SERPL CALC-SCNC: 8 MMOL/L (ref 7–16)
AST SERPL-CCNC: 22 U/L (ref 15–37)
ATRIAL RATE: 90 BPM
BASOPHILS # BLD: 0.1 K/UL (ref 0–0.2)
BASOPHILS NFR BLD: 0 % (ref 0–2)
BILIRUB SERPL-MCNC: 0.3 MG/DL (ref 0.2–1.1)
BUN SERPL-MCNC: 32 MG/DL (ref 8–23)
CALCIUM SERPL-MCNC: 9 MG/DL (ref 8.3–10.4)
CALCULATED P AXIS, ECG09: 25 DEGREES
CALCULATED R AXIS, ECG10: 54 DEGREES
CALCULATED T AXIS, ECG11: 6 DEGREES
CHLORIDE SERPL-SCNC: 101 MMOL/L (ref 98–107)
CO2 SERPL-SCNC: 25 MMOL/L (ref 21–32)
CREAT SERPL-MCNC: 1.54 MG/DL (ref 0.6–1)
CRP SERPL HS-MCNC: 148 MG/L
DIAGNOSIS, 93000: NORMAL
DIFFERENTIAL METHOD BLD: ABNORMAL
EOSINOPHIL # BLD: 0.2 K/UL (ref 0–0.8)
EOSINOPHIL NFR BLD: 2 % (ref 0.5–7.8)
ERYTHROCYTE [DISTWIDTH] IN BLOOD BY AUTOMATED COUNT: 13.3 % (ref 11.9–14.6)
GLOBULIN SER CALC-MCNC: 3.9 G/DL (ref 2.3–3.5)
GLUCOSE SERPL-MCNC: 115 MG/DL (ref 65–100)
HCT VFR BLD AUTO: 32 % (ref 35.8–46.3)
HEMOCCULT STL QL: NEGATIVE
HGB BLD-MCNC: 10.4 G/DL (ref 11.7–15.4)
IMM GRANULOCYTES # BLD AUTO: 0.1 K/UL (ref 0–0.5)
IMM GRANULOCYTES NFR BLD AUTO: 0 % (ref 0–5)
LYMPHOCYTES # BLD: 1 K/UL (ref 0.5–4.6)
LYMPHOCYTES NFR BLD: 8 % (ref 13–44)
MCH RBC QN AUTO: 31.8 PG (ref 26.1–32.9)
MCHC RBC AUTO-ENTMCNC: 32.5 G/DL (ref 31.4–35)
MCV RBC AUTO: 97.9 FL (ref 79.6–97.8)
MONOCYTES # BLD: 1.2 K/UL (ref 0.1–1.3)
MONOCYTES NFR BLD: 10 % (ref 4–12)
NEUTS SEG # BLD: 9.6 K/UL (ref 1.7–8.2)
NEUTS SEG NFR BLD: 79 % (ref 43–78)
NRBC # BLD: 0 K/UL (ref 0–0.2)
P-R INTERVAL, ECG05: 154 MS
PLATELET # BLD AUTO: 375 K/UL (ref 150–450)
PMV BLD AUTO: 11.1 FL (ref 9.4–12.3)
POTASSIUM SERPL-SCNC: 3.4 MMOL/L (ref 3.5–5.1)
PROT SERPL-MCNC: 6.2 G/DL (ref 6.3–8.2)
Q-T INTERVAL, ECG07: 422 MS
QRS DURATION, ECG06: 124 MS
QTC CALCULATION (BEZET), ECG08: 516 MS
RBC # BLD AUTO: 3.27 M/UL (ref 4.05–5.2)
SODIUM SERPL-SCNC: 134 MMOL/L (ref 136–145)
UFH PPP CHRO-ACNC: 0.26 IU/ML (ref 0.3–0.7)
UFH PPP CHRO-ACNC: 0.53 IU/ML (ref 0.3–0.7)
UFH PPP CHRO-ACNC: <0.1 IU/ML (ref 0.3–0.7)
VENTRICULAR RATE, ECG03: 90 BPM
WBC # BLD AUTO: 12 K/UL (ref 4.3–11.1)

## 2021-03-22 PROCEDURE — 77010033678 HC OXYGEN DAILY

## 2021-03-22 PROCEDURE — 93308 TTE F-UP OR LMTD: CPT

## 2021-03-22 PROCEDURE — 74011250637 HC RX REV CODE- 250/637: Performed by: HOSPITALIST

## 2021-03-22 PROCEDURE — 74011250636 HC RX REV CODE- 250/636: Performed by: INTERNAL MEDICINE

## 2021-03-22 PROCEDURE — 85520 HEPARIN ASSAY: CPT

## 2021-03-22 PROCEDURE — 85025 COMPLETE CBC W/AUTO DIFF WBC: CPT

## 2021-03-22 PROCEDURE — 93005 ELECTROCARDIOGRAM TRACING: CPT | Performed by: INTERNAL MEDICINE

## 2021-03-22 PROCEDURE — 74011250637 HC RX REV CODE- 250/637: Performed by: INTERNAL MEDICINE

## 2021-03-22 PROCEDURE — 2709999900 HC NON-CHARGEABLE SUPPLY

## 2021-03-22 PROCEDURE — 74011000258 HC RX REV CODE- 258: Performed by: INTERNAL MEDICINE

## 2021-03-22 PROCEDURE — 86141 C-REACTIVE PROTEIN HS: CPT

## 2021-03-22 PROCEDURE — 74011000250 HC RX REV CODE- 250: Performed by: INTERNAL MEDICINE

## 2021-03-22 PROCEDURE — 94760 N-INVAS EAR/PLS OXIMETRY 1: CPT

## 2021-03-22 PROCEDURE — 94640 AIRWAY INHALATION TREATMENT: CPT

## 2021-03-22 PROCEDURE — 87106 FUNGI IDENTIFICATION YEAST: CPT

## 2021-03-22 PROCEDURE — 36415 COLL VENOUS BLD VENIPUNCTURE: CPT

## 2021-03-22 PROCEDURE — 65270000029 HC RM PRIVATE

## 2021-03-22 PROCEDURE — 65660000000 HC RM CCU STEPDOWN

## 2021-03-22 PROCEDURE — 87086 URINE CULTURE/COLONY COUNT: CPT

## 2021-03-22 PROCEDURE — 80053 COMPREHEN METABOLIC PANEL: CPT

## 2021-03-22 PROCEDURE — 81003 URINALYSIS AUTO W/O SCOPE: CPT

## 2021-03-22 PROCEDURE — 82272 OCCULT BLD FECES 1-3 TESTS: CPT

## 2021-03-22 RX ORDER — HEPARIN SODIUM 5000 [USP'U]/ML
40 INJECTION, SOLUTION INTRAVENOUS; SUBCUTANEOUS ONCE
Status: COMPLETED | OUTPATIENT
Start: 2021-03-22 | End: 2021-03-22

## 2021-03-22 RX ORDER — AZITHROMYCIN 250 MG/1
500 TABLET, FILM COATED ORAL EVERY 24 HOURS
Status: COMPLETED | OUTPATIENT
Start: 2021-03-22 | End: 2021-03-24

## 2021-03-22 RX ORDER — HEPARIN SODIUM 5000 [USP'U]/ML
20 INJECTION, SOLUTION INTRAVENOUS; SUBCUTANEOUS ONCE
Status: COMPLETED | OUTPATIENT
Start: 2021-03-22 | End: 2021-03-22

## 2021-03-22 RX ADMIN — FAMOTIDINE 20 MG: 20 TABLET, FILM COATED ORAL at 08:38

## 2021-03-22 RX ADMIN — CHOLECALCIFEROL TAB 25 MCG (1000 UNIT) 1000 UNITS: 25 TAB at 08:44

## 2021-03-22 RX ADMIN — AZITHROMYCIN MONOHYDRATE 500 MG: 250 TABLET ORAL at 15:51

## 2021-03-22 RX ADMIN — LEVOTHYROXINE SODIUM 25 MCG: 50 TABLET ORAL at 05:05

## 2021-03-22 RX ADMIN — FOLIC ACID 1 MG: 1 TABLET ORAL at 08:38

## 2021-03-22 RX ADMIN — HEPARIN SODIUM 1300 UNITS: 5000 INJECTION INTRAVENOUS; SUBCUTANEOUS at 22:19

## 2021-03-22 RX ADMIN — AMIODARONE HYDROCHLORIDE 400 MG: 200 TABLET ORAL at 08:36

## 2021-03-22 RX ADMIN — DULOXETINE 60 MG: 60 CAPSULE, DELAYED RELEASE ORAL at 08:37

## 2021-03-22 RX ADMIN — Medication 10 ML: at 05:04

## 2021-03-22 RX ADMIN — CEFTRIAXONE SODIUM 1 G: 1 INJECTION, POWDER, FOR SOLUTION INTRAMUSCULAR; INTRAVENOUS at 15:51

## 2021-03-22 RX ADMIN — HEPARIN SODIUM AND DEXTROSE 16 UNITS/KG/HR: 5000; 5 INJECTION INTRAVENOUS at 19:20

## 2021-03-22 RX ADMIN — ROSUVASTATIN 10 MG: 10 TABLET, FILM COATED ORAL at 21:22

## 2021-03-22 RX ADMIN — POLYETHYLENE GLYCOL 3350 17 G: 17 POWDER, FOR SOLUTION ORAL at 08:40

## 2021-03-22 RX ADMIN — HEPARIN SODIUM 2650 UNITS: 5000 INJECTION INTRAVENOUS; SUBCUTANEOUS at 11:30

## 2021-03-22 RX ADMIN — Medication 5 ML: at 14:25

## 2021-03-22 RX ADMIN — AMIODARONE HYDROCHLORIDE 400 MG: 200 TABLET ORAL at 15:51

## 2021-03-22 RX ADMIN — SENNOSIDES AND DOCUSATE SODIUM 1 TABLET: 8.6; 5 TABLET ORAL at 21:22

## 2021-03-22 RX ADMIN — Medication 10 ML: at 21:23

## 2021-03-22 RX ADMIN — LEVALBUTEROL HYDROCHLORIDE 1.25 MG: 1.25 SOLUTION RESPIRATORY (INHALATION) at 00:16

## 2021-03-22 RX ADMIN — SENNOSIDES AND DOCUSATE SODIUM 1 TABLET: 8.6; 5 TABLET ORAL at 08:39

## 2021-03-22 NOTE — PROGRESS NOTES
TRANSFER - IN REPORT:    Verbal report received from [facility] on Kimberlyn Snyder  being received from [facility] for routine progression of care      Report consisted of patients Situation, Background, Assessment and   Recommendations(SBAR). Information from the following report(s) SBAR, ED Summary, [facility] and Recent Results was reviewed with the receiving nurse. Opportunity for questions and clarification was provided. Assessment completed upon patients arrival to unit and care assumed.

## 2021-03-22 NOTE — CONSULTS
Called for consult to evaluate pt w/ h/o RA admitted w/ pericardial effusion. Echo being performed during pt eval. Pt unable to verify hx but was sitting upright in bed, appearing comfortable, nonlabored breathing, and laughing. Chart review shows complex medical h/o of RA on MTX, breast Ca, respiratory infections, w/ findings of mod-large sized pericardial effusion on CT. Recs:  1. Cont supportive care  2. Diagnostic and therapeutic percardiocentesis if hemodynamically compromised  3. R/o infectious or neoplastic etiologies  4. NSAID contraindicated due to renal insufficiency. Anti-inflammatories w/ colchicine 0.6mg/d or steroids at no more than 0.2-0.5mg/kg/d if needed for underlying pericarditis.

## 2021-03-22 NOTE — PROGRESS NOTES
Resting quietly, awake, resp labored on exertion, improved at rest with O2 at 3L N/C. Skin warm, dry. AP 96, irreg, lungs sounds with expiratory wheezes, diminished in bases. Assessment completed. No c/o. No distress.

## 2021-03-22 NOTE — PROGRESS NOTES
TRANSFER - OUT REPORT:    Verbal report given to Bell Parekh RN on Vonda Woodson  being transferred to 6th Floor, room [id number] for routine progression of care       Report consisted of patients Situation, Background, Assessment and   Recommendations(SBAR). Information from the following report(s) SBAR, Kardex, [facility] and Cardiac Rhythm A-Fib was reviewed with the receiving nurse. Lines:   Peripheral IV 03/19/21 Left Wrist (Active)   Site Assessment Clean, dry, & intact 03/21/21 0752   Phlebitis Assessment 0 03/21/21 0752   Infiltration Assessment 0 03/21/21 0752   Dressing Status Clean, dry, & intact 03/21/21 0752   Dressing Type 4 X 4;Tape;Transparent 03/21/21 0752   Hub Color/Line Status Capped 03/20/21 2212       Peripheral IV 03/21/21 Left Antecubital (Active)   Site Assessment Clean, dry, & intact 03/21/21 1943   Phlebitis Assessment 0 03/21/21 1943   Infiltration Assessment 0 03/21/21 1943   Dressing Status Clean, dry, & intact 03/21/21 1943   Dressing Type Transparent 03/21/21 1943   Hub Color/Line Status Infusing 03/21/21 1943        Opportunity for questions and clarification was provided.       Patient transported with:   O2 @ 3L N/C liters

## 2021-03-22 NOTE — PROGRESS NOTES
CM met with patient to complete assessment. Patient presented alert and oriented. Demographic information verified by the patient. The patient lives alone in a mobile home with 4 steps at the entrance. Patient confirmed prior to this hospital visit, the patient is independent with completing all ADL's and drives. DME: Cane  Patient uses cane when needed. Patient receives her prescription medications from [facility] in [place]. Patient voiced no difficulty with obtaining medications in the community. Discharge planning: PT/OT has been consulted. Patient denies any history of REHAB. Patient [facility]. Per PT/OT eval 3/20, HH vs STR. Therapy needs TBD at this time. CM will continue to monitor plan of care and therapy's recommendations, to assist further with discharge planning. No needs voiced at this time. Please consult or notify CM if any needs shall arise. Care Management Interventions  PCP Verified by CM: Yes  Mode of Transport at Discharge: Other (see comment)(TBD: based upon need. )  Transition of Care Consult (CM Consult): Discharge Planning  Discharge Durable Medical Equipment: No  Physical Therapy Consult: Yes  Occupational Therapy Consult: Yes  Speech Therapy Consult: No  Current Support Network: Lives Alone, Own Home  Confirm Follow Up Transport: Self  Name of the Patient Representative Who was Provided with a Choice of Provider and Agrees with the Discharge Plan: Patient.     Resource Information Provided?: No  Discharge Location  Discharge Placement: Unable to determine at this time

## 2021-03-22 NOTE — PROGRESS NOTES
Transported via wheelchair with asst by KRISTAN He to 6th floor, after notifying Gorge Muller in [address] of transfer, who reports current rhythm is Atrial Fib, pulse 119. Pt with no c/o. No distress.

## 2021-03-22 NOTE — PROGRESS NOTES
03/21/21 2226   EENT   Left Eye Protruding   Right Eye Protruding   Dual Skin Pressure Injury Assessment   Dual Skin Pressure Injury Assessment WDL   Second Care Provider (Based on Facility Policy) HUMBERTO Miranda   Skin Integumentary   Skin Color Ecchymosis (comment)   Skin Integrity Tattoos (comment)     Redness noted to R breast.

## 2021-03-22 NOTE — PROGRESS NOTES
Hospitalist Note     Admit Date:  3/19/2021  1:13 PM   Name:  Edris Severs   Age:  68 y.o.  :  1940   MRN:  [id number]   PCP:  Arely Wharton MD  Treatment Team: Attending Provider: Grant Palacio MD; Consulting Provider: Jennifer Todd MD; Hospitalist: Grant Palacio MD; Consulting Provider: Tamiko Gomez MD; Consulting Provider: Carlos Higgins MD; Primary Nurse: Joanna Olivarez RN; Charge Nurse: Billie Dubin; Utilization Review: Chepe Rich    HPI/Subjective:     Patient 68-year-old female, history significant for breast cancer status post radiations, rheumatoid arthritis on chronic methotrexate, chronic kidney disease, former smoker,    Patient developed progressive shortness of breath, over the last 2 weeks, started close outpatient pneumonia treatments by PCP, did not improve, shortness of breath worsened this morning, patient also felt the new onset of palpitation EMS was called, patient found to have significant hypotension systolic of 69, with heart rate greater than 140, consistent with atrial fibrillation rapid medical response, seen by cardiology, amiodarone and heparin drip was started     Patient denies sick contacts, denies recent travels, denies prior cardiac history, patient was told that she has a single  kidney but denies kidney disease     3/22  -Patient feeling better today,cr down trending 1.54 (1.07 was baseline )  -chest pain resolved  -pt told me she was getting remicade in past for RA but insurance stopped paying for this; she told me she had RT for her breast cancer in ~, now only getting screening mammograms  -Blood cultures thus far negative   -rpt echo with persistent moderate effusion  -she is still tachycardic      Complete ROS done and is as stated in HPI or otherwise negative  No other complaints  Objective:     Patient Vitals for the past 24 hrs:   Temp Pulse Resp BP SpO2   21 1124 97.4 °F (36.3 °C) (!) 107 17 120/69 97 % 03/22/21 0809 98.1 °F (36.7 °C) 84 17 115/64 94 %   03/22/21 0507 97.6 °F (36.4 °C) (!) 106 17 113/66 97 %   03/22/21 0100  (!) 114      03/22/21 0016     97 %   03/21/21 2247  (!) 120      03/21/21 2229 97.8 °F (36.6 °C) 66 18 111/68 97 %   03/21/21 1915 99 °F (37.2 °C) 100 18 120/60 91 %   03/21/21 1600 97.7 °F (36.5 °C) (!) 120 18 105/81 91 %     Oxygen Therapy  O2 Sat (%): 97 % (03/22/21 1124)  Pulse via Oximetry: 87 beats per minute (03/22/21 0016)  O2 Device: Nasal cannula (03/22/21 1124)  Skin Assessment: Clean, dry, & intact (03/20/21 1715)  O2 Flow Rate (L/min): 2 l/min (03/22/21 1124)    Estimated body mass index is 28.28 kg/m² as calculated from the following:    Height as of this encounter: 5' (1.524 m). Weight as of this encounter: 65.7 kg (144 lb 12.8 oz). Intake/Output Summary (Last 24 hours) at 3/22/2021 1445  Last data filed at 3/22/2021 2052  Gross per 24 hour   Intake 400 ml   Output 550 ml   Net -150 ml       *Note that automatically entered I/Os may not be accurate; dependent on patient compliance with collection and accurate  by hybris. General:          Alert, cooperative, no distress, appears stated age. Head:               Normocephalic, without obvious abnormality, atraumatic. +exophtlamos   Nose:               Nares normal. No drainage or sinus tenderness. Lungs:             Clear to auscultation bilaterally. No Wheezing or Rhonchi. No rales. Heart:              Regular rate and rhythm,  no murmur, rub or gallop. Abdomen:        Soft, non-tender. Not distended. Bowel sounds normal.   Extremities:     No cyanosis. No edema. No clubbing  Skin:                Texture, turgor normal. No rashes or lesions.   Not Jaundiced  Neurologic:      Alert and oriented x 3, no focal deficits     Data Review:  I have reviewed all labs, meds, and studies from the last 24 hours:    Recent Results (from the past 24 hour(s))   PROCALCITONIN    Collection Time: 03/21/21  3:01 PM   Result Value Ref Range    Procalcitonin 0.32 ng/mL   PTT    Collection Time: 03/21/21  3:01 PM   Result Value Ref Range    aPTT 28.1 24.1 - 35.1 SEC   CBC WITH AUTOMATED DIFF    Collection Time: 03/21/21  3:01 PM   Result Value Ref Range    WBC 12.3 (H) 4.3 - 11.1 K/uL    RBC 3.30 (L) 4.05 - 5.2 M/uL    HGB 10.7 (L) 11.7 - 15.4 g/dL    HCT 32.3 (L) 35.8 - 46.3 %    MCV 97.9 (H) 79.6 - 97.8 FL    MCH 32.4 26.1 - 32.9 PG    MCHC 33.1 31.4 - 35.0 g/dL    RDW 13.5 11.9 - 14.6 %    PLATELET 310 864 - 689 K/uL    MPV 11.1 9.4 - 12.3 FL    ABSOLUTE NRBC 0.00 0.0 - 0.2 K/uL    DF AUTOMATED      NEUTROPHILS 78 43 - 78 %    LYMPHOCYTES 7 (L) 13 - 44 %    MONOCYTES 12 4.0 - 12.0 %    EOSINOPHILS 2 0.5 - 7.8 %    BASOPHILS 0 0.0 - 2.0 %    IMMATURE GRANULOCYTES 0 0.0 - 5.0 %    ABS. NEUTROPHILS 9.6 (H) 1.7 - 8.2 K/UL    ABS. LYMPHOCYTES 0.9 0.5 - 4.6 K/UL    ABS. MONOCYTES 1.5 (H) 0.1 - 1.3 K/UL    ABS. EOSINOPHILS 0.2 0.0 - 0.8 K/UL    ABS. BASOPHILS 0.1 0.0 - 0.2 K/UL    ABS. IMM. GRANS. 0.1 0.0 - 0.5 K/UL   CBC WITH AUTOMATED DIFF    Collection Time: 03/22/21  5:56 AM   Result Value Ref Range    WBC 12.0 (H) 4.3 - 11.1 K/uL    RBC 3.27 (L) 4.05 - 5.2 M/uL    HGB 10.4 (L) 11.7 - 15.4 g/dL    HCT 32.0 (L) 35.8 - 46.3 %    MCV 97.9 (H) 79.6 - 97.8 FL    MCH 31.8 26.1 - 32.9 PG    MCHC 32.5 31.4 - 35.0 g/dL    RDW 13.3 11.9 - 14.6 %    PLATELET 282 253 - 086 K/uL    MPV 11.1 9.4 - 12.3 FL    ABSOLUTE NRBC 0.00 0.0 - 0.2 K/uL    DF AUTOMATED      NEUTROPHILS 79 (H) 43 - 78 %    LYMPHOCYTES 8 (L) 13 - 44 %    MONOCYTES 10 4.0 - 12.0 %    EOSINOPHILS 2 0.5 - 7.8 %    BASOPHILS 0 0.0 - 2.0 %    IMMATURE GRANULOCYTES 0 0.0 - 5.0 %    ABS. NEUTROPHILS 9.6 (H) 1.7 - 8.2 K/UL    ABS. LYMPHOCYTES 1.0 0.5 - 4.6 K/UL    ABS. MONOCYTES 1.2 0.1 - 1.3 K/UL    ABS. EOSINOPHILS 0.2 0.0 - 0.8 K/UL    ABS. BASOPHILS 0.1 0.0 - 0.2 K/UL    ABS. IMM.  GRANS. 0.1 0.0 - 0.5 K/UL   METABOLIC PANEL, COMPREHENSIVE    Collection Time: 03/22/21  5:56 AM   Result Value Ref Range    Sodium 134 (L) 136 - 145 mmol/L    Potassium 3.4 (L) 3.5 - 5.1 mmol/L    Chloride 101 98 - 107 mmol/L    CO2 25 21 - 32 mmol/L    Anion gap 8 7 - 16 mmol/L    Glucose 115 (H) 65 - 100 mg/dL    BUN 32 (H) 8 - 23 MG/DL    Creatinine 1.54 (H) 0.6 - 1.0 MG/DL    GFR est AA 42 (L) >60 ml/min/1.73m2    GFR est non-AA 35 (L) >60 ml/min/1.73m2    Calcium 9.0 8.3 - 10.4 MG/DL    Bilirubin, total 0.3 0.2 - 1.1 MG/DL    ALT (SGPT) 38 12 - 65 U/L    AST (SGOT) 22 15 - 37 U/L    Alk.  phosphatase 128 50 - 136 U/L    Protein, total 6.2 (L) 6.3 - 8.2 g/dL    Albumin 2.3 (L) 3.2 - 4.6 g/dL    Globulin 3.9 (H) 2.3 - 3.5 g/dL    A-G Ratio 0.6 (L) 1.2 - 3.5     URINALYSIS W/ RFLX MICROSCOPIC    Collection Time: 03/22/21  6:01 AM   Result Value Ref Range    Color YELLOW      Appearance CLEAR      Specific gravity 1.013 1.001 - 1.023      pH (UA) 6.0 5.0 - 9.0      Protein Negative NEG mg/dL    Glucose Negative NEG mg/dL    Ketone Negative NEG mg/dL    Bilirubin Negative NEG      Blood Negative NEG      Urobilinogen 0.2 0.2 - 1.0 EU/dL    Nitrites Negative NEG      Leukocyte Esterase Negative NEG      Bacteria 0 0 /hpf   HEPARIN XA UFH    Collection Time: 03/22/21  9:36 AM   Result Value Ref Range    Heparin Xa UFH <0.10 (L) 0.3 - 0.7 IU/mL        All Micro Results     Procedure Component Value Units Date/Time    CULTURE, URINE [[id number]] Collected: 03/22/21 0600    Order Status: Completed Specimen: Urine from Clean catch Updated: 03/22/21 0909    BLOOD CULTURE [[id number]] Collected: 03/19/21 1339    Order Status: Completed Specimen: Blood Updated: 03/21/21 1156     Special Requests: --        LEFT  Antecubital       Culture result: NO GROWTH 2 DAYS       BLOOD CULTURE [[id number]] Collected: 03/19/21 1530    Order Status: Completed Specimen: Blood Updated: 03/21/21 1156     Special Requests: --        LEFT  HAND       Culture result: NO GROWTH 2 DAYS       SARS-COV-2, PCR [[id number]] Collected: 03/19/21 1815    Order Status: Completed Specimen: Nasopharyngeal Updated: 03/20/21 1250     Specimen source Nasopharyngeal        SARS-CoV-2 Not detected        Comment:      The specimen is NEGATIVE for SARS-CoV-2, the novel coronavirus associated with COVID-19. A negative result does not rule out COVID-19. This test has been authorized by the FDA under an Emergency Use Authorization (EUA) for use by authorized laboratories. Fact sheet for Healthcare Providers: [employer]  Fact sheet for Patients: https://fda.gov/media/101560/download       Methodology: RT-PCR         COVID-19 RAPID TEST [[id number]] Collected: 03/19/21 1815    Order Status: Completed Specimen: Nasopharyngeal Updated: 03/19/21 1913     Specimen source Nasopharyngeal        COVID-19 rapid test Not detected        Comment:      The specimen is NEGATIVE for SARS-CoV-2, the novel coronavirus associated with COVID-19. A negative result does not rule out COVID-19. This test has been authorized by the FDA under an Emergency Use Authorization (EUA) for use by authorized laboratories.         Fact sheet for Healthcare Providers: [employer]  Fact sheet for Patients: [employer]       Methodology: Isothermal Nucleic Acid Amplification               Current Meds:  Current Facility-Administered Medications   Medication Dose Route Frequency    azithromycin (ZITHROMAX) tablet 500 mg  500 mg Oral Q24H    cefTRIAXone (ROCEPHIN) 1 g in 0.9% sodium chloride (MBP/ADV) 50 mL MBP  1 g IntraVENous Q24H    HYDROcodone-homatropine (HYCODAN) 5-1.5 mg/5 mL (5 mL) oral solution 5 mL  5 mL Oral Q4H PRN    heparin 25,000 units in dextrose 500 mL infusion  12-25 Units/kg/hr IntraVENous TITRATE    levalbuterol (XOPENEX) nebulizer soln 1.25 mg/3 mL  1.25 mg Nebulization Q4H PRN    amiodarone (CORDARONE) tablet 400 mg  400 mg Oral Q8H    benzonatate (TESSALON) capsule 100 mg  100 mg Oral TID PRN    [Held by provider] aspirin delayed-release tablet 81 mg  81 mg Oral DAILY    cholecalciferol (VITAMIN D3) (1000 Units /25 mcg) tablet 1,000 Units  1,000 Units Oral DAILY    DULoxetine (CYMBALTA) capsule 60 mg  60 mg Oral DAILY    folic acid (FOLVITE) tablet 1 mg  1 mg Oral DAILY    levothyroxine (SYNTHROID) tablet 25 mcg  25 mcg Oral ACB    methotrexate (RHEUMATREX) tablet 12.5 mg  12.5 mg Oral every     rosuvastatin (CRESTOR) tablet 10 mg  10 mg Oral QHS    sodium chloride (NS) flush 5-40 mL  5-40 mL IntraVENous Q8H    sodium chloride (NS) flush 5-40 mL  5-40 mL IntraVENous PRN    acetaminophen (TYLENOL) tablet 650 mg  650 mg Oral Q6H PRN    Or    acetaminophen (TYLENOL) suppository 650 mg  650 mg Rectal Q6H PRN    polyethylene glycol (MIRALAX) packet 17 g  17 g Oral DAILY    senna-docusate (PERICOLACE) 8.6-50 mg per tablet 1 Tab  1 Tab Oral BID    magnesium hydroxide (MILK OF MAGNESIA) 400 mg/5 mL oral suspension 30 mL  30 mL Oral DAILY PRN    bisacodyL (DULCOLAX) suppository 10 mg  10 mg Rectal DAILY PRN    ondansetron (ZOFRAN ODT) tablet 4 mg  4 mg Oral Q8H PRN    Or    ondansetron (ZOFRAN) injection 4 mg  4 mg IntraVENous Q6H PRN    famotidine (PEPCID) tablet 20 mg  20 mg Oral DAILY    [Held by provider] metoprolol succinate (TOPROL-XL) XL tablet 25 mg  25 mg Oral DAILY       Other Studies:  Results for orders placed or performed during the hospital encounter of 21   2D ECHO COMPLETE ADULT (TTE) W OR WO CONTR    Narrative    AliyahMidlandkatarzyna  [facility] [address] [facility], [facility] [address] [facility]  [phone]    Transthoracic Echocardiogram  2D, M-mode, Doppler, and Color Doppler    Patient: Beth Marino  MR #: [id number]  : [id number]  Age: 68 years  Gender: Female  Study date: 20-Mar-2021  Account #: [de-identified]  Height: 60 in  Weight: 154.7 lb  BSA: 1.67 mï¾²  Status:Stat  Location: 3  BP: 137/ 65    Allergies: NO KNOWN ALLERGENS    Sonographer:  Samantha Rowell, [facility]  Group:  [id number]  Reading Physician:  Maine Giles. MD Karen    INDICATIONS: Cardiac Arrhythmia, Pericardial Effusion    PROCEDURE: This was a stat study. A transthoracic echocardiogram was   performed. The study included complete 2D imaging, M-mode, complete spectral Doppler,   and  color Doppler. Image quality was adequate. LEFT VENTRICLE: Size was normal. Systolic function was normal. Ejection  fraction was estimated in the range of 55 % to 60 %. There were no regional  wall motion abnormalities. Wall thickness was normal. Left ventricular  diastolic function is indeterminate based on assessment criteria. Avg E/e':  14.2. RIGHT VENTRICLE: The size was normal. Systolic function was mildly reduced by  TAPSE assessment. The tricuspid jet envelope definition was inadequate for  estimation of RV systolic pressure. LEFT ATRIUM: Size was normal.    RIGHT ATRIUM: Size was normal.    SYSTEMIC VEINS: IVC: The inferior vena cava was normal in size and course. The  respirophasic change in diameter was more than 50%. AORTIC VALVE: The valve was trileaflet. Leaflets exhibited mild sclerosis. There was no evidence for stenosis. There was no insufficiency. MITRAL VALVE: There was mild annular calcification. There was no evidence for  stenosis. There was mild regurgitation. TRICUSPID VALVE: The valve structure was normal. There was no evidence for  stenosis. There was mild regurgitation. PULMONIC VALVE: Not well visualized. There was no evidence for stenosis. There  was no insufficiency. PERICARDIUM: A moderate pericardial effusion was identified circumferential   to  the heart. There was mild RA inversion, no clear evidence of RV diastolic  collapse or clear evidence of hemodynamic compromise. Clinical correlation  required. AORTA: The root exhibited normal size.     SUMMARY:    -  Left ventricle: Systolic function was normal. Ejection fraction was  estimated in the range of 55 % to 60 %. There were no regional wall motion  abnormalities. -  Right ventricle: Systolic function was mildly reduced by TAPSE assessment. -  Inferior vena cava, hepatic veins: The respirophasic change in diameter   was  more than 50%. -  Mitral valve: There was mild annular calcification. There was mild  regurgitation.    -  Tricuspid valve: There was mild regurgitation.    -  Pericardium: A moderate pericardial effusion was identified   circumferential  to the heart. There was mild RA inversion, no clear evidence of RV diastolic  collapse or clear evidence of hemodynamic compromise. Clinical correlation  required. SYSTEM MEASUREMENT TABLES    2D  Ao Diam: 2.6 cm  LA Diam: 4.3 cm  LAEDV Index (A-L): 26.5 ml/m2  %FS: 35.9 %  IVSd: 0.7 cm  LVIDd: 4.8 cm  LVIDs: 3.1 cm  LVOT Diam: 1.7 cm  LVPWd: 0.8 cm    PW  E/E' Av.2  E/E' Lat: 14.9  E/E' Sept: 13.6    Prepared and signed by    Maame Park. Elysia Woo MD  Signed 20-Mar-2021 11:10:09     2D ECHO LIMTED ADULT W OR WO CONTR    Narrative    [address] [place], [facility] [address] [facility]  [phone]    Transthoracic Echocardiogram  2D and Doppler    Patient: aMlika Willson  MR #: [id number]  : [id number]  Age: 68 years  Gender: Female  Study date: 22-Mar-2021  Account #: [de-identified]  Height: 60 in  Weight: 153.8 lb  BSA: 1.67 mï¾²  Status:Routine  Location: [facility]  BP: 103/ 89    Allergies: NO KNOWN ALLERGENS    Sonographer:  Beryl Quiroz [facility]  Group:  [id number] Cardiology  Referring Physician:  Maame Park. Elysia Woo MD  Reading Physician:  Molly Neff MD    INDICATIONS: Pericardial Effusion. PROCEDURE: This was a routine study. A transthoracic echocardiogram was  performed. The study included limited 2D imaging and limited spectral   Doppler. Image quality was adequate.     LEFT VENTRICLE: Size was normal. Systolic function was normal. Ejection  fraction was estimated in the range of 65 % to 70 %. SYSTEMIC VEINS: IVC: The inferior vena cava was normal in size. The  respirophasic change in diameter was more than 50%. PERICARDIUM: A moderate pericardial effusion was identified circumferential   to  the heart. There was overlying fibrinous appearing material noted on the RV  free wall. There was no evidence of hemodynamic significance. SUMMARY:    -  Left ventricle: Systolic function was normal. Ejection fraction was  estimated in the range of 65 % to 70 %. -  Pericardium: A moderate pericardial effusion was identified   circumferential  to the heart. There was overlying fibrinous appearing material noted on the   RV  free wall. There was no evidence of hemodynamic significance. Prepared and signed by    Caity Silva MD  Signed 22-Mar-2021 11:56:39         No results found.     Assessment and Plan:     Hospital Problems as of 3/22/2021 Date Reviewed: 1/16/2016          Codes Class Noted - Resolved POA    * (Principal) Atrial fibrillation with rapid ventricular response [facility]) ICD-10-CM: I48.91  ICD-9-CM: 427.31  3/19/2021 - Present Unknown            Severe sepsisetiology unclear, likely multifactorial.  Patient may have been hypotensive in the setting of moderate pericardial effusion effusion and dehydration, which led to severe hypotension on admission  -Hypotension on admission was improved with IV hydration  -Blood cultures negative, check UA urine cultures, trend pro calcitonin  -Continue with antibiotics as ordered for bronchiolitis on CT   -COVID negative     Atrial fibrillation rapid ventricular response,resolved  appreciate cardiology's consultation  -back to SR after amio gtt; start PO amio per cardiology  -heparin gtt and PTA eliquis were on hold in light of effusion  -tele monitor  -Cardiology recommends restarting heparin drip and monitoring for increase in pericardial effusion size; no objections to this plan, will restart and monitor closely       Moderate pericardial effusion  -extremely elevated high sensitivity CRP suggests this may be pericarditis effusion; her RA symptoms dont seem bad enough to explain this  -she had chest pain and afib on admission  -may benefit from cardiac MRI to clarify but probably not available  -Rheumatology consult appreciated  -cardiology recs appreciated     Acute kidney injury from hypotension   last known creatinine 1.07, on admission 3.3, CT urogram shows left atrophic kidney, no renal calculi or hydronephrosis, likely prerenal due to dehydration and hypotension  Status post 3 L of NS bolus in the emergency room, follow urine electrolytes, continue IV fluids, monitor volume status  -3/21: improving Cr  -3/22 improving cr        Hypertensionhold home medication due to severer hypotension, resume if indicated     History of rheumatoid arthritis, HTN, major depressions, former smoker, hyperlipidemia, history of TIA, Graves disease and ?hypothyroidism on Synthroid (TSH WNL), left atrophic kidney,  -resume home medications if indicated ,and monitor clinically while inpatient, currently stable co morbidities add to patient's case complexity     DVT PPx: heparin gtt  Code Status: full      Anticipated discharge: 2-4 days, depending on patient's progression     Signed:  Bruna Reyes MD

## 2021-03-22 NOTE — PROGRESS NOTES
Hourly rounds done. Pt denies pain, nausea, vomiting. UOP yellow/straw/clear. UA/UC obtained and sent. Remains on 3 L O2, sats 91-97%. Expiratory wheezing noted upon admission, MD ordered PRN Xopenex. Cardiac monitor running Afib . Converted to NSR this am 0614. Hep gtt running at 12 units/kg. All needs met at this time.

## 2021-03-23 PROBLEM — I31.9 PERICARDITIS WITH EFFUSION: Status: ACTIVE | Noted: 2021-03-23

## 2021-03-23 LAB
ALBUMIN SERPL-MCNC: 2.3 G/DL (ref 3.2–4.6)
ALBUMIN/GLOB SERPL: 0.6 {RATIO} (ref 1.2–3.5)
ALP SERPL-CCNC: 62 U/L (ref 50–136)
ALT SERPL-CCNC: 38 U/L (ref 12–65)
ANION GAP SERPL CALC-SCNC: 7 MMOL/L (ref 7–16)
APPEARANCE UR: CLEAR
AST SERPL-CCNC: 18 U/L (ref 15–37)
BACTERIA URNS QL MICRO: 0 /HPF
BASOPHILS # BLD: 0.1 K/UL (ref 0–0.2)
BASOPHILS NFR BLD: 1 % (ref 0–2)
BILIRUB SERPL-MCNC: 0.2 MG/DL (ref 0.2–1.1)
BILIRUB UR QL: NEGATIVE
BUN SERPL-MCNC: 31 MG/DL (ref 8–23)
CALCIUM SERPL-MCNC: 9.1 MG/DL (ref 8.3–10.4)
CHLORIDE SERPL-SCNC: 102 MMOL/L (ref 98–107)
CO2 SERPL-SCNC: 28 MMOL/L (ref 21–32)
COLOR UR: YELLOW
CREAT SERPL-MCNC: 1.64 MG/DL (ref 0.6–1)
CRP SERPL HS-MCNC: 128 MG/L
DIFFERENTIAL METHOD BLD: ABNORMAL
EOSINOPHIL # BLD: 0.3 K/UL (ref 0–0.8)
EOSINOPHIL NFR BLD: 3 % (ref 0.5–7.8)
ERYTHROCYTE [DISTWIDTH] IN BLOOD BY AUTOMATED COUNT: 13.7 % (ref 11.9–14.6)
GLOBULIN SER CALC-MCNC: 3.7 G/DL (ref 2.3–3.5)
GLUCOSE BLD STRIP.AUTO-MCNC: 173 MG/DL (ref 65–100)
GLUCOSE SERPL-MCNC: 106 MG/DL (ref 65–100)
GLUCOSE UR STRIP.AUTO-MCNC: NEGATIVE MG/DL
HCT VFR BLD AUTO: 29.5 % (ref 35.8–46.3)
HGB BLD-MCNC: 9.8 G/DL (ref 11.7–15.4)
HGB UR QL STRIP: NEGATIVE
IMM GRANULOCYTES # BLD AUTO: 0.1 K/UL (ref 0–0.5)
IMM GRANULOCYTES NFR BLD AUTO: 1 % (ref 0–5)
KETONES UR QL STRIP.AUTO: NEGATIVE MG/DL
LEUKOCYTE ESTERASE UR QL STRIP.AUTO: NEGATIVE
LYMPHOCYTES # BLD: 1.2 K/UL (ref 0.5–4.6)
LYMPHOCYTES NFR BLD: 11 % (ref 13–44)
MCH RBC QN AUTO: 32.2 PG (ref 26.1–32.9)
MCHC RBC AUTO-ENTMCNC: 33.2 G/DL (ref 31.4–35)
MCV RBC AUTO: 97 FL (ref 79.6–97.8)
MONOCYTES # BLD: 0.9 K/UL (ref 0.1–1.3)
MONOCYTES NFR BLD: 9 % (ref 4–12)
NEUTS SEG # BLD: 8 K/UL (ref 1.7–8.2)
NEUTS SEG NFR BLD: 76 % (ref 43–78)
NITRITE UR QL STRIP.AUTO: NEGATIVE
NRBC # BLD: 0 K/UL (ref 0–0.2)
PH UR STRIP: 6 [PH] (ref 5–9)
PLATELET # BLD AUTO: 416 K/UL (ref 150–450)
PMV BLD AUTO: 11 FL (ref 9.4–12.3)
POTASSIUM SERPL-SCNC: 3.9 MMOL/L (ref 3.5–5.1)
PROT SERPL-MCNC: 6 G/DL (ref 6.3–8.2)
PROT UR STRIP-MCNC: NEGATIVE MG/DL
RBC # BLD AUTO: 3.04 M/UL (ref 4.05–5.2)
SERVICE CMNT-IMP: ABNORMAL
SODIUM SERPL-SCNC: 137 MMOL/L (ref 136–145)
SP GR UR REFRACTOMETRY: 1.01 (ref 1–1.02)
UFH PPP CHRO-ACNC: 0.37 IU/ML (ref 0.3–0.7)
UFH PPP CHRO-ACNC: 0.45 IU/ML (ref 0.3–0.7)
UROBILINOGEN UR QL STRIP.AUTO: 0.2 EU/DL (ref 0.2–1)
WBC # BLD AUTO: 10.5 K/UL (ref 4.3–11.1)

## 2021-03-23 PROCEDURE — 65270000029 HC RM PRIVATE

## 2021-03-23 PROCEDURE — 74011250637 HC RX REV CODE- 250/637: Performed by: INTERNAL MEDICINE

## 2021-03-23 PROCEDURE — 97110 THERAPEUTIC EXERCISES: CPT

## 2021-03-23 PROCEDURE — 36415 COLL VENOUS BLD VENIPUNCTURE: CPT

## 2021-03-23 PROCEDURE — 85520 HEPARIN ASSAY: CPT

## 2021-03-23 PROCEDURE — 74011250636 HC RX REV CODE- 250/636: Performed by: INTERNAL MEDICINE

## 2021-03-23 PROCEDURE — 86141 C-REACTIVE PROTEIN HS: CPT

## 2021-03-23 PROCEDURE — 97530 THERAPEUTIC ACTIVITIES: CPT

## 2021-03-23 PROCEDURE — 2709999900 HC NON-CHARGEABLE SUPPLY

## 2021-03-23 PROCEDURE — 74011000258 HC RX REV CODE- 258: Performed by: INTERNAL MEDICINE

## 2021-03-23 PROCEDURE — 74011636637 HC RX REV CODE- 636/637: Performed by: HOSPITALIST

## 2021-03-23 PROCEDURE — 82962 GLUCOSE BLOOD TEST: CPT

## 2021-03-23 PROCEDURE — 74011250637 HC RX REV CODE- 250/637: Performed by: HOSPITALIST

## 2021-03-23 PROCEDURE — 94760 N-INVAS EAR/PLS OXIMETRY 1: CPT

## 2021-03-23 PROCEDURE — 80053 COMPREHEN METABOLIC PANEL: CPT

## 2021-03-23 PROCEDURE — 99232 SBSQ HOSP IP/OBS MODERATE 35: CPT | Performed by: INTERNAL MEDICINE

## 2021-03-23 PROCEDURE — 65660000000 HC RM CCU STEPDOWN

## 2021-03-23 PROCEDURE — 85025 COMPLETE CBC W/AUTO DIFF WBC: CPT

## 2021-03-23 RX ADMIN — DULOXETINE 60 MG: 60 CAPSULE, DELAYED RELEASE ORAL at 09:00

## 2021-03-23 RX ADMIN — LEVOTHYROXINE SODIUM 25 MCG: 50 TABLET ORAL at 05:27

## 2021-03-23 RX ADMIN — Medication 10 ML: at 14:00

## 2021-03-23 RX ADMIN — SENNOSIDES AND DOCUSATE SODIUM 1 TABLET: 8.6; 5 TABLET ORAL at 09:24

## 2021-03-23 RX ADMIN — Medication 10 ML: at 05:27

## 2021-03-23 RX ADMIN — PREDNISONE 30 MG: 20 TABLET ORAL at 09:24

## 2021-03-23 RX ADMIN — AMIODARONE HYDROCHLORIDE 400 MG: 200 TABLET ORAL at 00:42

## 2021-03-23 RX ADMIN — CEFTRIAXONE SODIUM 1 G: 1 INJECTION, POWDER, FOR SOLUTION INTRAMUSCULAR; INTRAVENOUS at 15:48

## 2021-03-23 RX ADMIN — Medication 10 ML: at 21:40

## 2021-03-23 RX ADMIN — CHOLECALCIFEROL TAB 25 MCG (1000 UNIT) 1000 UNITS: 25 TAB at 09:00

## 2021-03-23 RX ADMIN — FAMOTIDINE 20 MG: 20 TABLET, FILM COATED ORAL at 09:24

## 2021-03-23 RX ADMIN — AMIODARONE HYDROCHLORIDE 400 MG: 200 TABLET ORAL at 15:49

## 2021-03-23 RX ADMIN — ROSUVASTATIN 10 MG: 10 TABLET, FILM COATED ORAL at 21:40

## 2021-03-23 RX ADMIN — FOLIC ACID 1 MG: 1 TABLET ORAL at 09:24

## 2021-03-23 RX ADMIN — AMIODARONE HYDROCHLORIDE 400 MG: 200 TABLET ORAL at 09:24

## 2021-03-23 RX ADMIN — HEPARIN SODIUM AND DEXTROSE 18 UNITS/KG/HR: 5000; 5 INJECTION INTRAVENOUS at 15:20

## 2021-03-23 RX ADMIN — AZITHROMYCIN MONOHYDRATE 500 MG: 250 TABLET ORAL at 15:48

## 2021-03-23 RX ADMIN — SENNOSIDES AND DOCUSATE SODIUM 1 TABLET: 8.6; 5 TABLET ORAL at 21:40

## 2021-03-23 NOTE — PROGRESS NOTES
CM discussed discharge planning with patient this day. Per therapy evaluation this day, the patient has been recommended for HH therapy. Patient declined HH therapy at this time and anticipates good support upon discharge, to assist with care needs, if needed. CM was receptive. Please consult or notify CM if any needs shall arise. CM remains available.

## 2021-03-23 NOTE — PROGRESS NOTES
ACUTE PHYSICAL THERAPY GOALS:  (Developed with and agreed upon by patient and/or caregiver.)  STG:  (1.)Ms. Sandra Peterson will move from supine to sit and sit to supine , scoot up and down and roll side to side with STAND BY ASSIST within 4 treatment day(s). (2.)Ms. Sandra Peterson will transfer from bed to chair and chair to bed with STAND BY ASSIST using the least restrictive device within 4 treatment day(s). (3.)Ms. Almeida will ambulate with STAND BY ASSIST for 15 feet with the least restrictive device within 4 treatment day(s).      LTG:  (1.)Ms. Sandra Peterson will move from supine to sit and sit to supine , scoot up and down and roll side to side in bed with MODIFIED INDEPENDENCE within 7 treatment day(s). (2.)Ms. Sandra Peterson will transfer from bed to chair and chair to bed with MODIFIED INDEPENDENCE using the least restrictive device within 7 treatment day(s). (3.)Ms. Almeida will ambulate with MODIFIED INDEPENDENCE for 30 feet with the least restrictive device within 7 treatment day(s). PHYSICAL THERAPY: Daily Note Treatment Day # 2    Marine Valdes is a 68 y.o. female   PRIMARY DIAGNOSIS: Atrial fibrillation with rapid ventricular response (HCC)  Atrial fibrillation with rapid ventricular response (HCC) [I48.91]         ASSESSMENT:     REHAB RECOMMENDATIONS: CURRENT LEVEL OF FUNCTION:  (Most Recently Demonstrated)   Recommendation to date pending progress:  [address] [place] [address] Therapy  Equipment:    None Bed Mobility:   Supervision  Sit to Stand:   Standby Assistance  Transfers:   Standby Assistance  Gait/Mobility:   Contact Guard Assistance     ASSESSMENT:  Ms. Sandra Peterson has greatly improved with her ambulation distance. She was slightly unsteady but was able to correct without assist. She required 2 standing rest breaks due to SOB. Back in room O2 was checked with sats in the mid 90s after sitting for a few minutes. She was on RA during treatment. Exercises performed while sitting in chair.       SUBJECTIVE:   Ms. Jose Maura states, \"I'm determined to do this. \"    SOCIAL HISTORY/ LIVING ENVIRONMENT:   Home Environment: Trailer/mobile home  One/Two Story Residence: One story  Living Alone: Yes  Support Systems: Friends \ neighbors  OBJECTIVE:     PAIN: VITAL SIGNS: LINES/DRAINS:   Pre Treatment:   none  Post Treatment: none   IV  O2 Device: Nasal cannula     MOBILITY: I Mod I S SBA CGA Min Mod Max Total  NT x2 Comments:   Bed Mobility    Rolling [] [] [x] [] [] [] [] [] [] [] []    Supine to Sit [] [] [x] [] [] [] [] [] [] [] []    Scooting [] [] [x] [] [] [] [] [] [] [] []    Sit to Supine [] [] [] [] [] [] [] [] [] [x] []    Transfers    Sit to Stand [] [] [] [x] [] [] [] [] [] [] []    Bed to Chair [] [] [] [x] [] [] [] [] [] [] []    Stand to Sit [] [] [] [x] [] [] [] [] [] [] []    I=Independent, Mod I=Modified Independent, S=Supervision, SBA=Standby Assistance, CGA=Contact Guard Assistance,   Min=Minimal Assistance, Mod=Moderate Assistance, Max=Maximal Assistance, Total=Total Assistance, NT=Not Tested    BALANCE: Good Fair+ Fair Fair- Poor NT Comments   Sitting Static [x] [] [] [] [] []    Sitting Dynamic [x] [] [] [] [] []              Standing Static [] [x] [] [] [] []    Standing Dynamic [] [x] [] [] [] []      GAIT: I Mod I S SBA CGA Min Mod Max Total  NT x2 Comments:   Level of Assistance [] [] [] [x] [x] [] [] [] [] [] []    Distance 250'    DME SPC    Gait Quality Slightly unsteady    Weightbearing  Status N/A     I=Independent, Mod I=Modified Independent, S=Supervision, SBA=Standby Assistance, CGA=Contact Guard Assistance,   Min=Minimal Assistance, Mod=Moderate Assistance, Max=Maximal Assistance, Total=Total Assistance, NT=Not Tested    PLAN:   FREQUENCY/DURATION: PT Plan of Care: 3 times/week for duration of hospital stay or until stated goals are met, whichever comes first.  TREATMENT:     TREATMENT:   ($$ Therapeutic Activity: 8-22 mins  $$ Therapeutic Exercises: 8-22 mins    )  Therapeutic Activity (15 Minutes): Therapeutic activity included Supine to Sit, Ambulation on level ground, Sitting balance  and Standing balance to improve functional Mobility, Strength and Activity tolerance. Therapeutic Exercise (15 Minutes): Therapeutic exercises noted below to improve functional activity tolerance, AROM, strength and mobility.      TREATMENT GRID:   Date:  3/23/21 Date:   Date:     Activity/Exercise Parameters Parameters Parameters   Heel/toe taps 15     LAQs 15     marching 15     Hip ABD/ADD 15                         AFTER TREATMENT POSITION/PRECAUTIONS:  Chair and Needs within reach    INTERDISCIPLINARY COLLABORATION:  RN/PCT and RN Case Manager/     TOTAL TREATMENT DURATION:  PT Patient Time In/Time Out  Time In: 1125  Time Out: 2 [facility], [facility]

## 2021-03-23 NOTE — PROGRESS NOTES
Patient up to restroom, returned to bed with wheezing and coarse breathing. RT called for PRN nebulizer treatment.

## 2021-03-23 NOTE — PROGRESS NOTES
Hospitalist Note     Admit Date:  3/19/2021  1:13 PM   Name:  Vonda Woodson   Age:  68 y.o.  :  1940   MRN:  [id number]   PCP:  Madhuri Miller MD  Treatment Team: Attending Provider: Rosa Abdul MD; Consulting Provider: Shazia Cheema MD; Consulting Provider: Ana Doyle MD; Consulting Provider: Stephane Hawkins MD; Utilization Review: Daly Ma; Primary Nurse: Dmitriy Acuna RN; Care Manager: Joe Higgins; Physical Therapy Assistant: Blank Beltre PTA; Occupational Therapy Assistant: Mikel Graham    HPI/Subjective:     Patient 53-year-old female, history significant for breast cancer status post radiations, rheumatoid arthritis on chronic methotrexate, chronic kidney disease, former smoker,    Patient developed progressive shortness of breath, over the last 2 weeks, started close outpatient pneumonia treatments by PCP, did not improve, shortness of breath worsened this morning, patient also felt the new onset of palpitation EMS was called, patient found to have significant hypotension systolic of 69, with heart rate greater than 140, consistent with atrial fibrillation rapid medical response, seen by cardiology, amiodarone and heparin drip was started     Patient denies sick contacts, denies recent travels, denies prior cardiac history, patient was told that she has a single  kidney but denies kidney disease     3/22  -Patient feeling better today,cr down trending 1.54 (1.07 was baseline )  -chest pain resolved  -pt told me she was getting remicade in past for RA but insurance stopped paying for this; she told me she had RT for her breast cancer in ~, now only getting screening mammograms  -Blood cultures thus far negative   -rpt echo with persistent moderate effusion  -she is still tachycardic    3/23 Patient is doing well this morning. No chest pain. Tachycardia improved. No fever no chills. No nausea no vomiting. No shortness of breath.        Complete ROS done and is as stated in HPI or otherwise negative  No other complaints  Objective:     Patient Vitals for the past 24 hrs:   Temp Pulse Resp BP SpO2   03/23/21 1048 98.8 °F (37.1 °C) 80 16 119/60 95 %   03/23/21 0715 98.5 °F (36.9 °C) 75 16 113/62 93 %   03/23/21 0428 98.4 °F (36.9 °C) 77 17 110/63 92 %   03/22/21 2358 98.5 °F (36.9 °C) 82 17 104/62 98 %   03/22/21 2024 98.3 °F (36.8 °C) 88 17 104/65 96 %   03/22/21 1652 98.6 °F (37 °C) 89 17 103/61 98 %     Oxygen Therapy  O2 Sat (%): 95 % (03/23/21 1048)  Pulse via Oximetry: 87 beats per minute (03/22/21 0016)  O2 Device: Nasal cannula (03/22/21 1124)  Skin Assessment: Clean, dry, & intact (03/20/21 1715)  O2 Flow Rate (L/min): 2 l/min (03/22/21 1124)    Estimated body mass index is 28.28 kg/m² as calculated from the following:    Height as of this encounter: 5' (1.524 m). Weight as of this encounter: 65.7 kg (144 lb 12.8 oz). Intake/Output Summary (Last 24 hours) at 3/23/2021 1501  Last data filed at 3/23/2021 1259  Gross per 24 hour   Intake 720 ml   Output 600 ml   Net 120 ml       *Note that automatically entered I/Os may not be accurate; dependent on patient compliance with collection and accurate  by [employer]. General:          Alert, cooperative, no distress, appears stated age. Head:               Normocephalic, without obvious abnormality, atraumatic. Nose:               Nares normal. No drainage or sinus tenderness. Lungs:             Clear to auscultation bilaterally. No Wheezing or Rhonchi. No rales. Heart:              Regular rate and rhythm,  no murmur, rub or gallop. Abdomen:        Soft, non-tender. Not distended. Bowel sounds normal.   Extremities:     No cyanosis. No edema. No clubbing  Skin:                Texture, turgor normal. No rashes or lesions.   Not Jaundiced  Neurologic:      Alert and oriented x 3, no focal deficits     Data Review:  I have reviewed all labs, meds, and studies from the last 24 hours:    Recent Results (from the past 24 hour(s))   EKG, 12 LEAD, SUBSEQUENT    Collection Time: 03/22/21  3:08 PM   Result Value Ref Range    Ventricular Rate 90 BPM    Atrial Rate 90 BPM    P-R Interval 154 ms    QRS Duration 124 ms    Q-T Interval 422 ms    QTC Calculation (Bezet) 516 ms    Calculated P Axis 25 degrees    Calculated R Axis 54 degrees    Calculated T Axis 6 degrees    Diagnosis       Normal sinus rhythm  Right bundle branch block  Abnormal ECG  When compared with ECG of 20-MAR-2021 08:52,  Right bundle branch block is now Present  Confirmed by Dahlia Dhillon ([id number]) on 3/22/2021 8:40:44 PM     HEPARIN XA UFH    Collection Time: 03/22/21  3:21 PM   Result Value Ref Range    Heparin Xa UFH 0.53 0.3 - 0.7 IU/mL   CRP, HIGH SENSITIVITY    Collection Time: 03/22/21  3:21 PM   Result Value Ref Range    CRP, High sensitivity 148.0 mg/L   OCCULT BLOOD, STOOL    Collection Time: 03/22/21  7:20 PM   Result Value Ref Range    Occult blood, stool Negative NEG     HEPARIN XA UFH    Collection Time: 03/22/21  9:08 PM   Result Value Ref Range    Heparin Xa UFH 0.26 (L) 0.3 - 0.7 IU/mL   CBC WITH AUTOMATED DIFF    Collection Time: 03/23/21  4:42 AM   Result Value Ref Range    WBC 10.5 4.3 - 11.1 K/uL    RBC 3.04 (L) 4.05 - 5.2 M/uL    HGB 9.8 (L) 11.7 - 15.4 g/dL    HCT 29.5 (L) 35.8 - 46.3 %    MCV 97.0 79.6 - 97.8 FL    MCH 32.2 26.1 - 32.9 PG    MCHC 33.2 31.4 - 35.0 g/dL    RDW 13.7 11.9 - 14.6 %    PLATELET 870 989 - 296 K/uL    MPV 11.0 9.4 - 12.3 FL    ABSOLUTE NRBC 0.00 0.0 - 0.2 K/uL    DF AUTOMATED      NEUTROPHILS 76 43 - 78 %    LYMPHOCYTES 11 (L) 13 - 44 %    MONOCYTES 9 4.0 - 12.0 %    EOSINOPHILS 3 0.5 - 7.8 %    BASOPHILS 1 0.0 - 2.0 %    IMMATURE GRANULOCYTES 1 0.0 - 5.0 %    ABS. NEUTROPHILS 8.0 1.7 - 8.2 K/UL    ABS. LYMPHOCYTES 1.2 0.5 - 4.6 K/UL    ABS. MONOCYTES 0.9 0.1 - 1.3 K/UL    ABS. EOSINOPHILS 0.3 0.0 - 0.8 K/UL    ABS. BASOPHILS 0.1 0.0 - 0.2 K/UL    ABS. IMM.  GRANS. 0.1 0.0 - 0.5 K/UL   METABOLIC PANEL, COMPREHENSIVE    Collection Time: 03/23/21  4:42 AM   Result Value Ref Range    Sodium 137 136 - 145 mmol/L    Potassium 3.9 3.5 - 5.1 mmol/L    Chloride 102 98 - 107 mmol/L    CO2 28 21 - 32 mmol/L    Anion gap 7 7 - 16 mmol/L    Glucose 106 (H) 65 - 100 mg/dL    BUN 31 (H) 8 - 23 MG/DL    Creatinine 1.64 (H) 0.6 - 1.0 MG/DL    GFR est AA 39 (L) >60 ml/min/1.73m2    GFR est non-AA 32 (L) >60 ml/min/1.73m2    Calcium 9.1 8.3 - 10.4 MG/DL    Bilirubin, total 0.2 0.2 - 1.1 MG/DL    ALT (SGPT) 38 12 - 65 U/L    AST (SGOT) 18 15 - 37 U/L    Alk.  phosphatase 62 50 - 136 U/L    Protein, total 6.0 (L) 6.3 - 8.2 g/dL    Albumin 2.3 (L) 3.2 - 4.6 g/dL    Globulin 3.7 (H) 2.3 - 3.5 g/dL    A-G Ratio 0.6 (L) 1.2 - 3.5     CRP, HIGH SENSITIVITY    Collection Time: 03/23/21  4:42 AM   Result Value Ref Range    CRP, High sensitivity 128.0 mg/L   HEPARIN XA UFH    Collection Time: 03/23/21  4:42 AM   Result Value Ref Range    Heparin Xa UFH 0.45 0.3 - 0.7 IU/mL        All Micro Results     Procedure Component Value Units Date/Time    BLOOD CULTURE [[id number]] Collected: 03/19/21 1339    Order Status: Completed Specimen: Blood Updated: 03/23/21 1107     Special Requests: --        LEFT  Antecubital       Culture result: NO GROWTH 4 DAYS       BLOOD CULTURE [[id number]] Collected: 03/19/21 1530    Order Status: Completed Specimen: Blood Updated: 03/23/21 1107     Special Requests: --        LEFT  HAND       Culture result: NO GROWTH 4 DAYS       CULTURE, URINE [[id number]]  (Abnormal) Collected: 03/22/21 0600    Order Status: Completed Specimen: Urine from Clean catch Updated: 03/23/21 0977     Special Requests: NO SPECIAL REQUESTS        Culture result:       >100,000 COLONIES/mL YEAST SUBCULTURE IN PROGRESS          SARS-COV-2, PCR [[id number]] Collected: 03/19/21 1815    Order Status: Completed Specimen: Nasopharyngeal Updated: 03/20/21 1250     Specimen source Nasopharyngeal        SARS-CoV-2 Not detected Comment:      The specimen is NEGATIVE for SARS-CoV-2, the novel coronavirus associated with COVID-19. A negative result does not rule out COVID-19. This test has been authorized by the FDA under an Emergency Use Authorization (EUA) for use by authorized laboratories. Fact sheet for Healthcare Providers: [employer]  Fact sheet for Patients: https://fda.gov/media/766349/download       Methodology: RT-PCR         COVID-19 RAPID TEST [[id number]] Collected: 03/19/21 1815    Order Status: Completed Specimen: Nasopharyngeal Updated: 03/19/21 1913     Specimen source Nasopharyngeal        COVID-19 rapid test Not detected        Comment:      The specimen is NEGATIVE for SARS-CoV-2, the novel coronavirus associated with COVID-19. A negative result does not rule out COVID-19. This test has been authorized by the FDA under an Emergency Use Authorization (EUA) for use by authorized laboratories.         Fact sheet for Healthcare Providers: [employer]  Fact sheet for Patients: [employer]       Methodology: Isothermal Nucleic Acid Amplification               Current Meds:  Current Facility-Administered Medications   Medication Dose Route Frequency    predniSONE (DELTASONE) tablet 30 mg  30 mg Oral DAILY WITH BREAKFAST    azithromycin (ZITHROMAX) tablet 500 mg  500 mg Oral Q24H    cefTRIAXone (ROCEPHIN) 1 g in 0.9% sodium chloride (MBP/ADV) 50 mL MBP  1 g IntraVENous Q24H    HYDROcodone-homatropine (HYCODAN) 5-1.5 mg/5 mL (5 mL) oral solution 5 mL  5 mL Oral Q4H PRN    heparin 25,000 units in dextrose 500 mL infusion  12-25 Units/kg/hr IntraVENous TITRATE    levalbuterol (XOPENEX) nebulizer soln 1.25 mg/3 mL  1.25 mg Nebulization Q4H PRN    amiodarone (CORDARONE) tablet 400 mg  400 mg Oral Q8H    benzonatate (TESSALON) capsule 100 mg  100 mg Oral TID PRN    [Held by provider] aspirin delayed-release tablet 81 mg  81 mg Oral DAILY    cholecalciferol (VITAMIN D3) (1000 Units /25 mcg) tablet 1,000 Units  1,000 Units Oral DAILY    DULoxetine (CYMBALTA) capsule 60 mg  60 mg Oral DAILY    folic acid (FOLVITE) tablet 1 mg  1 mg Oral DAILY    levothyroxine (SYNTHROID) tablet 25 mcg  25 mcg Oral ACB    methotrexate (RHEUMATREX) tablet 12.5 mg  12.5 mg Oral every     rosuvastatin (CRESTOR) tablet 10 mg  10 mg Oral QHS    sodium chloride (NS) flush 5-40 mL  5-40 mL IntraVENous Q8H    sodium chloride (NS) flush 5-40 mL  5-40 mL IntraVENous PRN    acetaminophen (TYLENOL) tablet 650 mg  650 mg Oral Q6H PRN    Or    acetaminophen (TYLENOL) suppository 650 mg  650 mg Rectal Q6H PRN    polyethylene glycol (MIRALAX) packet 17 g  17 g Oral DAILY    senna-docusate (PERICOLACE) 8.6-50 mg per tablet 1 Tab  1 Tab Oral BID    magnesium hydroxide (MILK OF MAGNESIA) 400 mg/5 mL oral suspension 30 mL  30 mL Oral DAILY PRN    bisacodyL (DULCOLAX) suppository 10 mg  10 mg Rectal DAILY PRN    ondansetron (ZOFRAN ODT) tablet 4 mg  4 mg Oral Q8H PRN    Or    ondansetron (ZOFRAN) injection 4 mg  4 mg IntraVENous Q6H PRN    famotidine (PEPCID) tablet 20 mg  20 mg Oral DAILY    [Held by provider] metoprolol succinate (TOPROL-XL) XL tablet 25 mg  25 mg Oral DAILY       Other Studies:  Results for orders placed or performed during the hospital encounter of 21   2D ECHO COMPLETE ADULT (TTE) W OR WO CONTR    Narrative    [place]  [facility] [address] [facility], [facility] [address] [facility]  [phone]    Transthoracic Echocardiogram  2D, M-mode, Doppler, and Color Doppler    Patient: Beth Marino  MR #: [id number]  : [id number]  Age: 68 years  Gender: Female  Study date: 20-Mar-2021  Account #: [de-identified]  Height: 60 in  Weight: 154.7 lb  BSA: 1.67 mï¾²  Status:Stat  Location: ER3  BP: 137/ 65    Allergies: NO KNOWN ALLERGENS    Sonographer:  SIMRAN Coyle:  [address] Card[address]  Reading Physician:  Mary Pitt. MD Karen    INDICATIONS: Cardiac Arrhythmia, Pericardial Effusion    PROCEDURE: This was a stat study. A transthoracic echocardiogram was   performed. The study included complete 2D imaging, M-mode, complete spectral Doppler,   and  color Doppler. Image quality was adequate. LEFT VENTRICLE: Size was normal. Systolic function was normal. Ejection  fraction was estimated in the range of 55 % to 60 %. There were no regional  wall motion abnormalities. Wall thickness was normal. Left ventricular  diastolic function is indeterminate based on assessment criteria. Avg E/e':  14.2. RIGHT VENTRICLE: The size was normal. Systolic function was mildly reduced by  TAPSE assessment. The tricuspid jet envelope definition was inadequate for  estimation of RV systolic pressure. LEFT ATRIUM: Size was normal.    RIGHT ATRIUM: Size was normal.    SYSTEMIC VEINS: IVC: The inferior vena cava was normal in size and course. The  respirophasic change in diameter was more than 50%. AORTIC VALVE: The valve was trileaflet. Leaflets exhibited mild sclerosis. There was no evidence for stenosis. There was no insufficiency. MITRAL VALVE: There was mild annular calcification. There was no evidence for  stenosis. There was mild regurgitation. TRICUSPID VALVE: The valve structure was normal. There was no evidence for  stenosis. There was mild regurgitation. PULMONIC VALVE: Not well visualized. There was no evidence for stenosis. There  was no insufficiency. PERICARDIUM: A moderate pericardial effusion was identified circumferential   to  the heart. There was mild RA inversion, no clear evidence of RV diastolic  collapse or clear evidence of hemodynamic compromise. Clinical correlation  required. AORTA: The root exhibited normal size. SUMMARY:    -  Left ventricle: Systolic function was normal. Ejection fraction was  estimated in the range of 55 % to 60 %.  There were no regional wall motion  abnormalities. -  Right ventricle: Systolic function was mildly reduced by TAPSE assessment. -  Inferior vena cava, hepatic veins: The respirophasic change in diameter   was  more than 50%. -  Mitral valve: There was mild annular calcification. There was mild  regurgitation.    -  Tricuspid valve: There was mild regurgitation.    -  Pericardium: A moderate pericardial effusion was identified   circumferential  to the heart. There was mild RA inversion, no clear evidence of RV diastolic  collapse or clear evidence of hemodynamic compromise. Clinical correlation  required. SYSTEM MEASUREMENT TABLES    2D  Ao Diam: 2.6 cm  LA Diam: 4.3 cm  LAEDV Index (A-L): 26.5 ml/m2  %FS: 35.9 %  IVSd: 0.7 cm  LVIDd: 4.8 cm  LVIDs: 3.1 cm  LVOT Diam: 1.7 cm  LVPWd: 0.8 cm    PW  E/E' Av.2  E/E' Lat: 14.9  E/E' Sept: 13.6    Prepared and signed by    Madalyn Bell. Nu Gaming MD  Signed 20-Mar-2021 11:10:09     2D ECHO LIMTED ADULT W OR WO CONTR    Narrative    [facility]  [address] [place] [address] [address] [place], [address] [facility]  [phone]    Transthoracic Echocardiogram  2D and Doppler    Patient: Terrence De La Cruz  MR #: [id number]  : [id number]  Age: 68 years  Gender: Female  Study date: 22-Mar-2021  Account #: [de-identified]  Height: 60 in  Weight: 153.8 lb  BSA: 1.67 mï¾²  Status:Routine  Location: [facility]  BP: 103/ 89    Allergies: NO KNOWN ALLERGENS    Sonographer:  Stefania Burt RD  Group:  [id number] Cardiology  Referring Physician:  Madalyn Bell. Nu Gaming MD  Reading Physician:  Kaity Mae MD    INDICATIONS: Pericardial Effusion. PROCEDURE: This was a routine study. A transthoracic echocardiogram was  performed. The study included limited 2D imaging and limited spectral   Doppler. Image quality was adequate. LEFT VENTRICLE: Size was normal. Systolic function was normal. Ejection  fraction was estimated in the range of 65 % to 70 %.     SYSTEMIC VEINS: IVC: The inferior vena cava was normal in size. The  respirophasic change in diameter was more than 50%. PERICARDIUM: A moderate pericardial effusion was identified circumferential   to  the heart. There was overlying fibrinous appearing material noted on the RV  free wall. There was no evidence of hemodynamic significance. SUMMARY:    -  Left ventricle: Systolic function was normal. Ejection fraction was  estimated in the range of 65 % to 70 %. -  Pericardium: A moderate pericardial effusion was identified   circumferential  to the heart. There was overlying fibrinous appearing material noted on the   RV  free wall. There was no evidence of hemodynamic significance. Prepared and signed by    Flaquita Srinivasan MD  Signed 22-Mar-2021 11:56:39         No results found. Assessment and Plan:     Hospital Problems as of 3/23/2021 Date Reviewed: 1/16/2016          Codes Class Noted - Resolved POA    * (Principal) Atrial fibrillation with rapid ventricular response [facility]) ICD-10-CM: I48.91  ICD-9-CM: 427.31  3/19/2021 - Present Unknown            Severe sepsisetiology unclear, likely multifactorial.  Patient may have been hypotensive in the setting of moderate pericardial effusion effusion and dehydration, which led to severe hypotension on admission  -Hypotension on admission was improved with IV hydration  -Blood cultures negative, check UA urine cultures, trend pro calcitonin  -Continue with antibiotics as ordered for bronchiolitis on CT   -COVID negative     Atrial fibrillation rapid ventricular response,resolved  appreciate cardiology's consultation  -back to SR after amio gtt; continue PO amio per cardiology  -heparin gtt and PTA eliquis were on hold in light of effusion  -tele monitor  -Cardiology recommends restarting heparin drip and monitoring for increase in pericardial effusion size; no objections to this plan, will restart and monitor closely. Repeat ECHO shows stable findings with moderate pericardial effusion.      Moderate pericardial effusion  -extremely elevated high sensitivity CRP suggests this may be pericarditis effusion; her RA symptoms dont seem bad enough to explain this  -she had chest pain and afib on admission  -Rheumatology consult appreciated. recommends colchicine or prednisone. Due to drug interaction of colchicine with amiodarone, patient started on prednisone.   -cardiology recs appreciated     Acute kidney injury from hypotension   last known creatinine 1.07, on admission 3.3, CT urogram shows left atrophic kidney, no renal calculi or hydronephrosis, likely prerenal due to dehydration and hypotension  Status post 3 L of NS bolus in the emergency room, follow urine electrolytes, continue IV fluids, monitor volume status  -3/23 Cr slightly worse at 1.6 from 1.5 yesterday.       Hypertension  BP stable at 119/60.   hold home medication due to severer hypotension, resume if indicated     History of rheumatoid arthritis, HTN, major depressions, former smoker, hyperlipidemia, history of TIA, Graves disease and ?hypothyroidism on Synthroid (TSH WNL), left atrophic kidney,  -resume home medications if indicated ,and monitor clinically while inpatient, currently stable co morbidities add to patient's case complexity     DVT PPx: heparin gtt    Code Status: full      Anticipated discharge: 48 to 72 hours, depending on patient's progression.  PT/OT eval.      Signed:  Michel Armstrong MD

## 2021-03-23 NOTE — ACP (ADVANCE CARE PLANNING)
Advance Care Planning     Advance Care Planning Activator (Inpatient)  Conversation Note      Date of ACP Conversation: 03/23/21     Conversation Conducted with:   Patient with Decision Making Capacity    ACP Activator: Marcelo Decision Maker:    Current Designated Health Care Decision Maker:     Primary Decision Maker: Charla Gray - Other Non-relative - [phone]    Secondary Decision Maker: Andre Angel - Other Non-relative - [phone]  Click here to complete [address] including selection of the Healthcare Decision Maker Relationship (ie \"Primary\")  Today we documented desired Decision Maker(s), who is (are) NOT Legal Next of [facility] Sarahy. ACP documents are required for decision maker authority. Care Preferences    Ventilation: \"If you were in your present state of health and suddenly became very ill and were unable to breathe on your own, what would your preference be about the use of a ventilator (breathing machine) if it were available to you? \"      If patient would desire the use of a ventilator (breathing machine), answer \"yes\", if not \"no\":no    \"If your health worsens and it becomes clear that your chance of recovery is unlikely, what would your preference be about the use of a ventilator (breathing machine) if it were available to you? \"     Would the patient desire the use of a ventilator (breathing machine)? NO      Resuscitation  \"CPR works best to restart the heart when there is a sudden event, like a heart attack, in someone who is otherwise healthy. Unfortunately, CPR does not typically restart the heart for people who have serious health conditions or who are very sick. \"    \"In the event your heart stopped as a result of an underlying serious health condition, would you want attempts to be made to restart your heart (answer \"yes\" for attempt to resuscitate) or would you prefer a natural death (answer \"no\" for do not attempt to resuscitate)? \" yes      [x] Yes  [] No   Educated Patient / Florene Billing regarding differences between Advance Directives and portable DNR orders.     Length of ACP Conversation in minutes: 15    Conversation Outcomes:  [x] ACP discussion completed  [] Existing advance directive reviewed with patient; no changes to patient's previously recorded wishes     [] New Advance Directive completed   [] Portable Do Not Resuscitate prepared for Provider review and signature  [] POLST/POST/MOLST/MOST prepared for Provider review and signature      Follow-up plan:    [x] Schedule follow-up conversation to continue planning  [] Referred individual to Provider for additional questions/concerns   [] Advised patient/agent/surrogate to review completed ACP document and update if needed with changes in condition, patient preferences or care setting     [x] This note routed to one or more involved healthcare providers

## 2021-03-23 NOTE — PROGRESS NOTES
ACUTE OT GOALS:  (Developed with and agreed upon by patient and/or caregiver.)    1. Patient will complete lower body bathing and dressing with modified independence and adaptive equipment as needed. 2. Patient will complete toileting with modified independence. 3. Patient will tolerate 25 minutes of OT treatment with 1-2 rest breaks to increase activity tolerance for ADLs. 4. Patient will complete functional transfers with modified independence and adaptive equipment as needed. 5. Patient will tolerate 10 minutes BUE exercises to increase strength for safe, functional transfers. OCCUPATIONAL THERAPY: Daily Note OT Treatment Day # 2    Ashok Zuniga is a 68 y.o. female   PRIMARY DIAGNOSIS: Atrial fibrillation with rapid ventricular response (HCC)  Atrial fibrillation with rapid ventricular response (HCC) [I48.91]       Payor: UNITED HEALTHCARE MEDICARE / Plan: [address] / Product Type: Managed Care Medicare /   ASSESSMENT:     REHAB RECOMMENDATIONS: CURRENT LEVEL OF FUNCTION:  (Most Recently Demonstrated)   Recommendation to date pending progress:  Setting:   Short-term Rehab  Equipment:    Rolling Walker Bathing:   Not tested  Dressing:   Not tested  Feeding/Grooming:   Not tested  Toileting:   Not tested  Functional Mobility:   Contact Guard Assistance     ASSESSMENT:  Ms. Joy Stephens presents with decreased activity tolerance and shortness of breath during prolonged mobility. Pt returned to her room and given a rest break and then completed exercises. Good effort. Continue POC. SUBJECTIVE:   Ms. Joy Stephens states, \"I want to go home. \"    SOCIAL HISTORY/LIVING ENVIRONMENT:   Home Environment: Trailer/mobile home  One/Two Story Residence: One story  Living Alone: Yes  Support Systems: Friends \ neighbors    OBJECTIVE:     PAIN: VITAL SIGNS: LINES/DRAINS:   Pre Treatment: Pain Screen  Pain Scale 1: Numeric (0 - 10)  Pain Intensity 1: 0  Post Treatment: 0   IV  O2 Device: Nasal cannula     ACTIVITIES OF DAILY LIVING: I Mod I S SBA CGA Min Mod Max Total NT Comments   BASIC ADLs:              Bathing/ Showering [] [] [] [] [] [] [] [] [] [x]    Toileting [] [] [] [] [] [] [] [] [] [x]    Dressing [] [] [] [] [] [] [] [] [] [x]    Feeding [] [] [] [] [] [] [] [] [] [x]    Grooming [] [] [] [] [] [] [] [] [] [x]    Personal Device Care [] [] [] [] [] [] [] [] [] [x]    Functional Mobility [] [] [] [] [x] [] [] [] [] []    I=Independent, Mod I=Modified Independent, S=Supervision, SBA=Standby Assistance, CGA=Contact Guard Assistance,   Min=Minimal Assistance, Mod=Moderate Assistance, Max=Maximal Assistance, Total=Total Assistance, NT=Not Tested    MOBILITY: I Mod I S SBA CGA Min Mod Max Total  NT x2 Comments:   Supine to sit [] [] [] [] [x] [] [] [] [] [] []    Sit to supine [] [] [] [] [x] [] [] [] [] [] []    Sit to stand [] [] [] [] [x] [] [] [] [] [] []    Bed to chair [] [] [] [] [] [] [] [] [] [x] []    I=Independent, Mod I=Modified Independent, S=Supervision, SBA=Standby Assistance, CGA=Contact Guard Assistance,   Min=Minimal Assistance, Mod=Moderate Assistance, Max=Maximal Assistance, Total=Total Assistance, NT=Not Tested    BALANCE: Good Fair+ Fair Fair- Poor NT Comments   Sitting Static [x] [] [] [] [] []    Sitting Dynamic [x] [] [] [] [] []              Standing Static [x] [] [] [] [] []    Standing Dynamic [] [x] [] [] [] []      PLAN:   FREQUENCY/DURATION: OT Plan of Care: 3 times/week for duration of hospital stay or until stated goals are met, whichever comes first.    TREATMENT:   TREATMENT:   ( $$ Therapeutic Activity: 8-22 mins  $$ Therapeutic Exercises: 8-22 mins   )  Therapeutic Activity (14 Minutes): Therapeutic activity included Supine to Sit, Sit to Supine, Scooting, Lateral Scooting, Ambulation on level ground and Standing balance to improve functional Mobility, Strength, ROM and Activity tolerance. Therapeutic Exercise (10 Minutes):  Therapeutic exercises noted below to improve functional activity tolerance, AROM, strength and mobility.      TREATMENT GRID:   Date:  3/23/21 Date:   Date:     Activity/Exercise Parameters Parameters Parameters   Shoulder flexion 10/1     Elbow flexion 10/1     Tricep extensions 10/1     Horizontal add/abd 10/1                                 AFTER TREATMENT POSITION/PRECAUTIONS:  Bed, Needs within reach and RN notified    INTERDISCIPLINARY COLLABORATION:  RN/PCT and OT/SCHROEDER    TOTAL TREATMENT DURATION:  OT Patient Time In/Time Out  Time In: 1405  Time Out: 100 [employer] [facility]

## 2021-03-23 NOTE — PROGRESS NOTES
[facility] CARDIOLOGY PROGRESS NOTE           3/23/2021 5:27 PM    Admit Date: 3/19/2021         Subjective: Echo yesterday shows stable effusion no signs of tamponade phys. ROS:  Cardiovascular:  As noted above    Objective:      Vitals:    03/23/21 0428 03/23/21 0715 03/23/21 1048 03/23/21 1616   BP: 110/63 113/62 119/60 116/81   Pulse: 77 75 80 88   Resp: 17 16 16 16   Temp: 98.4 °F (36.9 °C) 98.5 °F (36.9 °C) 98.8 °F (37.1 °C) 98.1 °F (36.7 °C)   SpO2: 92% 93% 95% 95%   Weight:       Height:           Physical Exam:  General: Well Developed, Well Nourished, No Acute Distress, Alert & Oriented x 3, Appropriate mood  Neck: supple, no JVD  Heart: S1S2 with RRR without murmurs or gallops  Lungs: Clear throughout auscultation bilaterally without adventitious sounds  Abd: soft, nontender, nondistended, with good bowel sounds  Ext: no edema bilaterally  Skin: warm and dry      Data Review:   Recent Labs     03/23/21  0442 03/22/21  0556    134*   K 3.9 3.4*   BUN 31* 32*   CREA 1.64* 1.54*   * 115*   WBC 10.5 12.0*   HGB 9.8* 10.4*   HCT 29.5* 32.0*    375       No results for input(s): TNIPOC, TROIQ in the last 72 hours. Assessment/Plan:     Principal Problem:    Atrial fibrillation with rapid ventricular response ([address] (3/19/2021)    Active Problems:    Acquired hypothyroidism (1/16/2016)      Rheumatoid arthritis ([address] (1/16/2016)      Hypertension, essential, benign (8/18/2016)      Acute renal failure (ARF) ([address] (4/22/2019)      Sepsis ([address] (4/22/2019)      Pericarditis with effusion (3/23/2021)    A/P  1) Afib - in SR on po amiodarone, on heparin gtt  2) Effusion - stable, no further action  3) NAZIA - worse today ?  Related to ABX  4) RA - rheum is following    Chantelle Bowers MD  3/23/2021 5:27 PM

## 2021-03-23 NOTE — INTERDISCIPLINARY ROUNDS
Interdisciplinary team rounds were held 3/23/2021 with the following team members:Care Management, Nursing, Physical Therapy and Physician and the patient. Plan of care discussed. See clinical pathway and/or care plan for interventions and desired outcomes. OT/PT farzana recommends HH on discharge. Plan for discharge tomorrow. Pt remains on remote tele.

## 2021-03-24 LAB
ANION GAP SERPL CALC-SCNC: 6 MMOL/L (ref 7–16)
BACTERIA SPEC CULT: NORMAL
BACTERIA SPEC CULT: NORMAL
BASOPHILS # BLD: 0 K/UL (ref 0–0.2)
BASOPHILS NFR BLD: 0 % (ref 0–2)
BUN SERPL-MCNC: 33 MG/DL (ref 8–23)
CALCIUM SERPL-MCNC: 9.1 MG/DL (ref 8.3–10.4)
CHLORIDE SERPL-SCNC: 101 MMOL/L (ref 98–107)
CO2 SERPL-SCNC: 28 MMOL/L (ref 21–32)
CREAT SERPL-MCNC: 1.63 MG/DL (ref 0.6–1)
DIFFERENTIAL METHOD BLD: ABNORMAL
EOSINOPHIL # BLD: 0 K/UL (ref 0–0.8)
EOSINOPHIL NFR BLD: 0 % (ref 0.5–7.8)
ERYTHROCYTE [DISTWIDTH] IN BLOOD BY AUTOMATED COUNT: 13.5 % (ref 11.9–14.6)
GLUCOSE SERPL-MCNC: 116 MG/DL (ref 65–100)
HCT VFR BLD AUTO: 28.7 % (ref 35.8–46.3)
HGB BLD-MCNC: 9.5 G/DL (ref 11.7–15.4)
IMM GRANULOCYTES # BLD AUTO: 0.1 K/UL (ref 0–0.5)
IMM GRANULOCYTES NFR BLD AUTO: 1 % (ref 0–5)
LYMPHOCYTES # BLD: 0.9 K/UL (ref 0.5–4.6)
LYMPHOCYTES NFR BLD: 5 % (ref 13–44)
MCH RBC QN AUTO: 32.1 PG (ref 26.1–32.9)
MCHC RBC AUTO-ENTMCNC: 33.1 G/DL (ref 31.4–35)
MCV RBC AUTO: 97 FL (ref 79.6–97.8)
MONOCYTES # BLD: 1.1 K/UL (ref 0.1–1.3)
MONOCYTES NFR BLD: 6 % (ref 4–12)
NEUTS SEG # BLD: 15 K/UL (ref 1.7–8.2)
NEUTS SEG NFR BLD: 88 % (ref 43–78)
NRBC # BLD: 0 K/UL (ref 0–0.2)
PLATELET # BLD AUTO: 438 K/UL (ref 150–450)
PMV BLD AUTO: 10.3 FL (ref 9.4–12.3)
POTASSIUM SERPL-SCNC: 4.6 MMOL/L (ref 3.5–5.1)
RBC # BLD AUTO: 2.96 M/UL (ref 4.05–5.2)
SERVICE CMNT-IMP: NORMAL
SERVICE CMNT-IMP: NORMAL
SODIUM SERPL-SCNC: 135 MMOL/L (ref 136–145)
UFH PPP CHRO-ACNC: 0.55 IU/ML (ref 0.3–0.7)
UFH PPP CHRO-ACNC: 0.73 IU/ML (ref 0.3–0.7)
WBC # BLD AUTO: 17.1 K/UL (ref 4.3–11.1)

## 2021-03-24 PROCEDURE — 99231 SBSQ HOSP IP/OBS SF/LOW 25: CPT | Performed by: INTERNAL MEDICINE

## 2021-03-24 PROCEDURE — 97110 THERAPEUTIC EXERCISES: CPT | Performed by: PHYSICAL THERAPIST

## 2021-03-24 PROCEDURE — 97530 THERAPEUTIC ACTIVITIES: CPT

## 2021-03-24 PROCEDURE — 77030027138 HC INCENT SPIROMETER -A

## 2021-03-24 PROCEDURE — 85520 HEPARIN ASSAY: CPT

## 2021-03-24 PROCEDURE — 97110 THERAPEUTIC EXERCISES: CPT

## 2021-03-24 PROCEDURE — 74011636637 HC RX REV CODE- 636/637: Performed by: HOSPITALIST

## 2021-03-24 PROCEDURE — 74011250637 HC RX REV CODE- 250/637: Performed by: INTERNAL MEDICINE

## 2021-03-24 PROCEDURE — 74011000258 HC RX REV CODE- 258: Performed by: INTERNAL MEDICINE

## 2021-03-24 PROCEDURE — 74011250637 HC RX REV CODE- 250/637: Performed by: HOSPITALIST

## 2021-03-24 PROCEDURE — 85025 COMPLETE CBC W/AUTO DIFF WBC: CPT

## 2021-03-24 PROCEDURE — 80048 BASIC METABOLIC PNL TOTAL CA: CPT

## 2021-03-24 PROCEDURE — 74011250636 HC RX REV CODE- 250/636: Performed by: INTERNAL MEDICINE

## 2021-03-24 PROCEDURE — 65660000000 HC RM CCU STEPDOWN

## 2021-03-24 PROCEDURE — 36415 COLL VENOUS BLD VENIPUNCTURE: CPT

## 2021-03-24 RX ADMIN — HEPARIN SODIUM AND DEXTROSE 17 UNITS/KG/HR: 5000; 5 INJECTION INTRAVENOUS at 12:31

## 2021-03-24 RX ADMIN — Medication 10 ML: at 14:30

## 2021-03-24 RX ADMIN — CHOLECALCIFEROL TAB 25 MCG (1000 UNIT) 1000 UNITS: 25 TAB at 10:00

## 2021-03-24 RX ADMIN — ROSUVASTATIN 10 MG: 10 TABLET, FILM COATED ORAL at 21:28

## 2021-03-24 RX ADMIN — PREDNISONE 30 MG: 20 TABLET ORAL at 10:00

## 2021-03-24 RX ADMIN — CEFTRIAXONE SODIUM 1 G: 1 INJECTION, POWDER, FOR SOLUTION INTRAMUSCULAR; INTRAVENOUS at 16:54

## 2021-03-24 RX ADMIN — Medication 10 ML: at 21:29

## 2021-03-24 RX ADMIN — Medication 10 ML: at 05:31

## 2021-03-24 RX ADMIN — AMIODARONE HYDROCHLORIDE 400 MG: 200 TABLET ORAL at 10:00

## 2021-03-24 RX ADMIN — FAMOTIDINE 20 MG: 20 TABLET, FILM COATED ORAL at 10:00

## 2021-03-24 RX ADMIN — FOLIC ACID 1 MG: 1 TABLET ORAL at 10:00

## 2021-03-24 RX ADMIN — SENNOSIDES AND DOCUSATE SODIUM 1 TABLET: 8.6; 5 TABLET ORAL at 21:28

## 2021-03-24 RX ADMIN — AZITHROMYCIN MONOHYDRATE 500 MG: 250 TABLET ORAL at 16:54

## 2021-03-24 RX ADMIN — DULOXETINE 60 MG: 60 CAPSULE, DELAYED RELEASE ORAL at 10:00

## 2021-03-24 RX ADMIN — AMIODARONE HYDROCHLORIDE 400 MG: 200 TABLET ORAL at 00:15

## 2021-03-24 RX ADMIN — AMIODARONE HYDROCHLORIDE 400 MG: 200 TABLET ORAL at 16:54

## 2021-03-24 RX ADMIN — APIXABAN 5 MG: 5 TABLET, FILM COATED ORAL at 16:54

## 2021-03-24 RX ADMIN — LEVOTHYROXINE SODIUM 25 MCG: 50 TABLET ORAL at 05:31

## 2021-03-24 NOTE — PROGRESS NOTES
Problem: Falls - Risk of  Goal: *Absence of Falls  Description: Document Safia Onofre Fall Risk and appropriate interventions in the flowsheet.   Outcome: Progressing Towards Goal  Note: Fall Risk Interventions:  Mobility Interventions: Bed/chair exit alarm, Patient to call before getting OOB         Medication Interventions: Bed/chair exit alarm, Patient to call before getting OOB    Elimination Interventions: Call light in reach              Problem: Patient Education: Go to Patient Education Activity  Goal: Patient/Family Education  Outcome: Progressing Towards Goal     Problem: Breathing Pattern - Ineffective  Goal: *Absence of hypoxia  Outcome: Progressing Towards Goal  Goal: *Use of effective breathing techniques  Outcome: Progressing Towards Goal     Problem: Patient Education: Go to Patient Education Activity  Goal: Patient/Family Education  Outcome: Progressing Towards Goal

## 2021-03-24 NOTE — PROGRESS NOTES
ACUTE OT GOALS:  (Developed with and agreed upon by patient and/or caregiver.)    1. Patient will complete lower body bathing and dressing with modified independence and adaptive equipment as needed. 2. Patient will complete toileting with modified independence. 3. Patient will tolerate 25 minutes of OT treatment with 1-2 rest breaks to increase activity tolerance for ADLs. 4. Patient will complete functional transfers with modified independence and adaptive equipment as needed. 5. Patient will tolerate 10 minutes BUE exercises to increase strength for safe, functional transfers. OCCUPATIONAL THERAPY: Daily Note OT Treatment Day # 3    Gus Plascencia is a 68 y.o. female   PRIMARY DIAGNOSIS: Atrial fibrillation with rapid ventricular response (HCC)  Atrial fibrillation with rapid ventricular response (HCC) [I48.91]       Payor: UNITED HEALTHCARE MEDICARE / Plan: [address] / Product Type: Managed Care Medicare /   ASSESSMENT:     REHAB RECOMMENDATIONS: CURRENT LEVEL OF FUNCTION:  (Most Recently Demonstrated)   Recommendation to date pending progress:  Setting:   Short-term Rehab  Equipment:    Rolling Walker Bathing:   Not tested  Dressing:   Not tested  Feeding/Grooming:   Not tested  Toileting:   Not tested  Functional Mobility:   Contact Guard Assistance     ASSESSMENT:  Ms. Dany Dubose presents with decreased activity tolerance and shortness of breath during functional  Mobility and exercises. Pt practiced relaxation breathing throughout session. Good effort. Continue POC. SUBJECTIVE:   Ms. Dany Dubose states, \"I didn't sleep well last night. \"    SOCIAL HISTORY/LIVING ENVIRONMENT:   Home Environment: Trailer/mobile home  One/Two Story Residence: One story  Living Alone: Yes  Support Systems: Friends \ neighbors    OBJECTIVE:     PAIN: VITAL SIGNS: LINES/DRAINS:   Pre Treatment: Pain Screen  Pain Scale 1: Numeric (0 - 10)  Pain Intensity 1: 0  Post Treatment: 0   IV  O2 Device: Room air     ACTIVITIES OF DAILY LIVING: I Mod I S SBA CGA Min Mod Max Total NT Comments   BASIC ADLs:              Bathing/ Showering [] [] [] [] [] [] [] [] [] [x]    Toileting [] [] [] [] [] [] [] [] [] [x]    Dressing [] [] [] [] [] [] [] [] [] [x]    Feeding [] [] [] [] [] [] [] [] [] [x]    Grooming [] [] [] [] [] [] [] [] [] [x]    Personal Device Care [] [] [] [] [] [] [] [] [] [x]    Functional Mobility [] [] [] [] [x] [] [] [] [] []    I=Independent, Mod I=Modified Independent, S=Supervision, SBA=Standby Assistance, CGA=Contact Guard Assistance,   Min=Minimal Assistance, Mod=Moderate Assistance, Max=Maximal Assistance, Total=Total Assistance, NT=Not Tested    MOBILITY: I Mod I S SBA CGA Min Mod Max Total  NT x2 Comments:   Supine to sit [] [] [] [] [x] [] [] [] [] [] []    Sit to supine [] [] [] [] [x] [] [] [] [] [] []    Sit to stand [] [] [] [] [x] [] [] [] [] [] []    Bed to chair [] [] [] [] [] [] [] [] [] [x] []    I=Independent, Mod I=Modified Independent, S=Supervision, SBA=Standby Assistance, CGA=Contact Guard Assistance,   Min=Minimal Assistance, Mod=Moderate Assistance, Max=Maximal Assistance, Total=Total Assistance, NT=Not Tested    BALANCE: Good Fair+ Fair Fair- Poor NT Comments   Sitting Static [x] [] [] [] [] []    Sitting Dynamic [x] [] [] [] [] []              Standing Static [x] [] [] [] [] []    Standing Dynamic [] [x] [] [] [] []      PLAN:   FREQUENCY/DURATION: OT Plan of Care: 3 times/week for duration of hospital stay or until stated goals are met, whichever comes first.    TREATMENT:   TREATMENT:   ( $$ Therapeutic Activity: 8-22 mins  $$ Therapeutic Exercises: 8-22 mins   )  Therapeutic Activity (15 Minutes): Therapeutic activity included Supine to Sit, Sit to Supine, Scooting, Lateral Scooting, Ambulation on level ground and Standing balance to improve functional Mobility, Strength, ROM and Activity tolerance. Therapeutic Exercise (10 Minutes):  Therapeutic exercises noted below to improve functional activity tolerance, AROM, strength and mobility.      TREATMENT GRID:   Date:  3/23/21 Date:  3/24/21 Date:     Activity/Exercise Parameters Parameters Parameters   Shoulder flexion 10/1 10/1    Elbow flexion 10/1 10/1    Tricep extensions 10/1 10/1    Horizontal add/abd 10/1 10/1                                AFTER TREATMENT POSITION/PRECAUTIONS:  Bed, Needs within reach and RN notified    INTERDISCIPLINARY COLLABORATION:  RN/PCT and OT/SCHROEDER    TOTAL TREATMENT DURATION:  OT Patient Time In/Time Out  Time In: 9[address]  Time Out: 100 [facility]

## 2021-03-24 NOTE — INTERDISCIPLINARY ROUNDS
Interdisciplinary team rounds were held 3/24/2021 with the following team members:Care Management, Nursing, Physical Therapy and Physician and the patient. Plan of care discussed. See clinical pathway and/or care plan for interventions and desired outcomes. Cardiac monitoring  today. Pt walked with PT with cane today. Currently on heparin gtt and switch to eliquis when able. Pt declined [place] stating she has extensive \"Christianity family\" that can assist upon discharge.

## 2021-03-24 NOTE — PROGRESS NOTES
Patient showed run VTach per monitor room, checked on patient who was up to restroom back down to 80s NSR.

## 2021-03-24 NOTE — PROGRESS NOTES
Hourly rounds completed with bed in low/locked position and call light within reach. Patient has rested this shift this shift without any changes. Patient with Hep Drip continuous and therapeutic. Patient up to restroom without help, tele monitor in place with patient in NSR. All needs met at this time with patient resting in bed will give report to oncoming RN.

## 2021-03-24 NOTE — ACP (ADVANCE CARE PLANNING)
Late entry for visit on 3/23/2021.  assisted patient with completing [place] healthcare power of  paperwork. A copy was placed in the patient's chart.     Louis LORA

## 2021-03-24 NOTE — PROGRESS NOTES
ACUTE PHYSICAL THERAPY GOALS:  (Developed with and agreed upon by patient and/or caregiver.)  STG:  (1.)Ms. Hung Dunaway will move from supine to sit and sit to supine , scoot up and down and roll side to side with STAND BY ASSIST within 4 treatment day(s). (2.)Ms. Hung Dunaway will transfer from bed to chair and chair to bed with STAND BY ASSIST using the least restrictive device within 4 treatment day(s). (3.)Ms. Almeida will ambulate with STAND BY ASSIST for 15 feet with the least restrictive device within 4 treatment day(s).      LTG:  (1.)Ms. Hung Dunaway will move from supine to sit and sit to supine , scoot up and down and roll side to side in bed with MODIFIED INDEPENDENCE within 7 treatment day(s). (2.)Ms. Hung Dunaway will transfer from bed to chair and chair to bed with MODIFIED INDEPENDENCE using the least restrictive device within 7 treatment day(s). (3.)Ms. Almeida will ambulate with MODIFIED INDEPENDENCE for 30 feet with the least restrictive device within 7 treatment day(s). PHYSICAL THERAPY: Daily Note Treatment Day # 3    Jose Jacobson is a 68 y.o. female   PRIMARY DIAGNOSIS: Atrial fibrillation with rapid ventricular response (HCC)  Atrial fibrillation with rapid ventricular response (HCC) [I48.91]         ASSESSMENT:     REHAB RECOMMENDATIONS: CURRENT LEVEL OF FUNCTION:  (Most Recently Demonstrated)   Recommendation to date pending progress:  [address] [place] [address] Therapy  Equipment:    None Bed Mobility:   Supervision  Sit to Stand:   Standby Assistance  Transfers:   Standby Assistance  Gait/Mobility:   Contact Guard Assistance     ASSESSMENT:  Ms. Hung Dunaway   Upon entering, Pnt is supine agreeable to PT treatment. she reports no pain in her chest at rest. Pnt performed supine > sit with supervision, sitting EOB with good static sitting balance control. Sit > stand with supervision using SPC. Gait 2x 250 ft with SPC, cues for posture and step clearance.   Gait is noted to be slow and shuffled, as well as poor SPC use (pnt does not actually bear weight through cane but simply picks it up and sets is down, but was not receptive to cues to correct this). Thoughout gait, pnt required frequent multi-modal breathing mechanics cues. Stand > sit with CGA, followed by positioning for comfort. At end of session pt supine in bed (declined chair) with all needs within reach, alarm activated for safety, RN notified. Overall, great progress today as pnt improved in ambulation tolerance/distance. Pnt continues to present as functioning below her baseline, with deficits in mobility including transfers, gait, balance, and activity tolerance. Pt will continue to benefit from skilled therapy services to address stated deficits to promote return to highest level of function, independence, and safety. Will continue to follow. SUBJECTIVE:   Ms. Terrance Byrne states, \"Why do I wheeze? \"    SOCIAL HISTORY/ LIVING ENVIRONMENT:   Home Environment: Trailer/mobile home  One/Two Story Residence: One story  Living Alone: Yes  Support Systems: Friends \ neighbors  OBJECTIVE:     PAIN: VITAL SIGNS: LINES/DRAINS:   Pre Treatment:   none  Post Treatment: none   IV  O2 Device: Room air     MOBILITY: I Mod I S SBA CGA Min Mod Max Total  NT x2 Comments:   Bed Mobility    Rolling [] [] [x] [] [] [] [] [] [] [] []    Supine to Sit [] [] [x] [] [] [] [] [] [] [] []    Scooting [] [] [x] [] [] [] [] [] [] [] []    Sit to Supine [] [] [] [] [] [] [] [] [] [x] []    Transfers    Sit to Stand [] [] [] [x] [] [] [] [] [] [] []    Bed to Chair [] [] [] [x] [] [] [] [] [] [] []    Stand to Sit [] [] [] [x] [] [] [] [] [] [] []    I=Independent, Mod I=Modified Independent, S=Supervision, SBA=Standby Assistance, CGA=Contact Guard Assistance,   Min=Minimal Assistance, Mod=Moderate Assistance, Max=Maximal Assistance, Total=Total Assistance, NT=Not Tested    BALANCE: Good Fair+ Fair Fair- Poor NT Comments   Sitting Static [x] [] [] [] [] []    Sitting Dynamic [x] [] [] [] [] []              Standing Static [] [x] [] [] [] []    Standing Dynamic [] [x] [] [] [] []      GAIT: I Mod I S SBA CGA Min Mod Max Total  NT x2 Comments:   Level of Assistance [] [] [] [] [x] [] [] [] [] [] []    Distance 2x250'    DME SPC and Gait Belt    Gait Quality Slightly unsteady, flexed, shuffled, poor SPC use    Weightbearing  Status N/A     I=Independent, Mod I=Modified Independent, S=Supervision, SBA=Standby Assistance, CGA=Contact Guard Assistance,   Min=Minimal Assistance, Mod=Moderate Assistance, Max=Maximal Assistance, Total=Total Assistance, NT=Not Tested    PLAN:   FREQUENCY/DURATION: PT Plan of Care: 3 times/week for duration of hospital stay or until stated goals are met, whichever comes first.  TREATMENT:     TREATMENT:   ($$ Therapeutic Exercises: 23-37 mins    )  Therapeutic Exercise (27 Minutes): Therapeutic exercises noted below to improve functional mobility and (therex included level ground ambulation).      TREATMENT GRID:   Date:  3/23/21 Date:   Date:     Activity/Exercise Parameters Parameters Parameters   Heel/toe taps 15     LAQs 15     marching 15     Hip ABD/ADD 15                         AFTER TREATMENT POSITION/PRECAUTIONS:  Bed, Needs within reach and RN notified    INTERDISCIPLINARY COLLABORATION:  RN/PCT and PT/PTA    TOTAL TREATMENT DURATION:  PT Patient Time In/Time Out  Time In: 1110  Time Out: 283 [employer]

## 2021-03-24 NOTE — PROGRESS NOTES
[facility] CARDIOLOGY PROGRESS NOTE           3/24/2021 1:44 PM    Admit Date: 3/19/2021         Subjective: Doing well remained in SR    ROS:  Cardiovascular:  As noted above    Objective:      Vitals:    03/24/21 0016 03/24/21 0429 03/24/21 0718 03/24/21 1055   BP: 112/68 122/68 125/63 102/74   Pulse: 84 80 79 85   Resp: 16 16 18 18   Temp: 98.4 °F (36.9 °C) 98.2 °F (36.8 °C) 98 °F (36.7 °C) 98.2 °F (36.8 °C)   SpO2: 96% 94% 95% 94%   Weight:       Height:             Physical Exam:  General: Well Developed, Well Nourished, No Acute Distress, Alert & Oriented x 3, Appropriate mood  Neck: supple, no JVD  Heart: S1S2 with RRR without murmurs or gallops  Lungs: Clear throughout auscultation bilaterally without adventitious sounds  Abd: soft, nontender, nondistended, with good bowel sounds  Ext: no edema bilaterally  Skin: warm and dry      Data Review:   Recent Labs     03/24/21  0400 03/23/21  0442   * 137   K 4.6 3.9   BUN 33* 31*   CREA 1.63* 1.64*   * 106*   WBC 17.1* 10.5   HGB 9.5* 9.8*   HCT 28.7* 29.5*    416       No results for input(s): TNIPOC, TROIQ in the last 72 hours. Assessment/Plan:     Principal Problem:    Atrial fibrillation with rapid ventricular response ([address] (3/19/2021)    Active Problems:    Acquired hypothyroidism (1/16/2016)      Rheumatoid arthritis ([address] (1/16/2016)      Hypertension, essential, benign (8/18/2016)      Acute renal failure (ARF) ([address] (4/22/2019)      Sepsis ([address] (4/22/2019)      Pericarditis with effusion (3/23/2021)    A/P  1) Afib - in SR on po amiodarone, on heparin gtt, transition to [facility] once primary team feels it is appropriate.   2) Effusion - stable, no further action  3) NAZIA - stable  4) RA - rheum is following now on steroids      Kendra Lim MD  3/24/2021 1:44 PM

## 2021-03-25 LAB
ANION GAP SERPL CALC-SCNC: 5 MMOL/L (ref 7–16)
BACTERIA SPEC CULT: ABNORMAL
BASOPHILS # BLD: 0 K/UL (ref 0–0.2)
BASOPHILS NFR BLD: 0 % (ref 0–2)
BUN SERPL-MCNC: 31 MG/DL (ref 8–23)
CALCIUM SERPL-MCNC: 9.3 MG/DL (ref 8.3–10.4)
CHLORIDE SERPL-SCNC: 102 MMOL/L (ref 98–107)
CO2 SERPL-SCNC: 29 MMOL/L (ref 21–32)
CREAT SERPL-MCNC: 1.76 MG/DL (ref 0.6–1)
DIFFERENTIAL METHOD BLD: ABNORMAL
EOSINOPHIL # BLD: 0 K/UL (ref 0–0.8)
EOSINOPHIL NFR BLD: 0 % (ref 0.5–7.8)
ERYTHROCYTE [DISTWIDTH] IN BLOOD BY AUTOMATED COUNT: 13.7 % (ref 11.9–14.6)
GLUCOSE SERPL-MCNC: 103 MG/DL (ref 65–100)
HCT VFR BLD AUTO: 29.2 % (ref 35.8–46.3)
HGB BLD-MCNC: 9.8 G/DL (ref 11.7–15.4)
IMM GRANULOCYTES # BLD AUTO: 0.1 K/UL (ref 0–0.5)
IMM GRANULOCYTES NFR BLD AUTO: 1 % (ref 0–5)
LYMPHOCYTES # BLD: 1.3 K/UL (ref 0.5–4.6)
LYMPHOCYTES NFR BLD: 7 % (ref 13–44)
MCH RBC QN AUTO: 32.5 PG (ref 26.1–32.9)
MCHC RBC AUTO-ENTMCNC: 33.6 G/DL (ref 31.4–35)
MCV RBC AUTO: 96.7 FL (ref 79.6–97.8)
MONOCYTES # BLD: 1 K/UL (ref 0.1–1.3)
MONOCYTES NFR BLD: 6 % (ref 4–12)
NEUTS SEG # BLD: 15.8 K/UL (ref 1.7–8.2)
NEUTS SEG NFR BLD: 86 % (ref 43–78)
NRBC # BLD: 0 K/UL (ref 0–0.2)
PLATELET # BLD AUTO: 468 K/UL (ref 150–450)
PMV BLD AUTO: 10.1 FL (ref 9.4–12.3)
POTASSIUM SERPL-SCNC: 4.5 MMOL/L (ref 3.5–5.1)
RBC # BLD AUTO: 3.02 M/UL (ref 4.05–5.2)
SERVICE CMNT-IMP: ABNORMAL
SODIUM SERPL-SCNC: 136 MMOL/L (ref 136–145)
WBC # BLD AUTO: 18.3 K/UL (ref 4.3–11.1)

## 2021-03-25 PROCEDURE — 2709999900 HC NON-CHARGEABLE SUPPLY

## 2021-03-25 PROCEDURE — 97110 THERAPEUTIC EXERCISES: CPT

## 2021-03-25 PROCEDURE — 85025 COMPLETE CBC W/AUTO DIFF WBC: CPT

## 2021-03-25 PROCEDURE — 74011250636 HC RX REV CODE- 250/636: Performed by: INTERNAL MEDICINE

## 2021-03-25 PROCEDURE — 74011250636 HC RX REV CODE- 250/636: Performed by: HOSPITALIST

## 2021-03-25 PROCEDURE — 74011636637 HC RX REV CODE- 636/637: Performed by: HOSPITALIST

## 2021-03-25 PROCEDURE — 74011000258 HC RX REV CODE- 258: Performed by: INTERNAL MEDICINE

## 2021-03-25 PROCEDURE — 97530 THERAPEUTIC ACTIVITIES: CPT

## 2021-03-25 PROCEDURE — 94761 N-INVAS EAR/PLS OXIMETRY MLT: CPT

## 2021-03-25 PROCEDURE — 99232 SBSQ HOSP IP/OBS MODERATE 35: CPT | Performed by: INTERNAL MEDICINE

## 2021-03-25 PROCEDURE — 74011250637 HC RX REV CODE- 250/637: Performed by: INTERNAL MEDICINE

## 2021-03-25 PROCEDURE — 74011250637 HC RX REV CODE- 250/637: Performed by: HOSPITALIST

## 2021-03-25 PROCEDURE — 80048 BASIC METABOLIC PNL TOTAL CA: CPT

## 2021-03-25 PROCEDURE — 36415 COLL VENOUS BLD VENIPUNCTURE: CPT

## 2021-03-25 PROCEDURE — 65270000029 HC RM PRIVATE

## 2021-03-25 RX ORDER — AMIODARONE HYDROCHLORIDE 200 MG/1
400 TABLET ORAL DAILY
Status: DISCONTINUED | OUTPATIENT
Start: 2021-03-26 | End: 2021-03-26 | Stop reason: HOSPADM

## 2021-03-25 RX ORDER — SODIUM CHLORIDE 9 MG/ML
125 INJECTION, SOLUTION INTRAVENOUS CONTINUOUS
Status: DISPENSED | OUTPATIENT
Start: 2021-03-25 | End: 2021-03-26

## 2021-03-25 RX ADMIN — PREDNISONE 30 MG: 20 TABLET ORAL at 09:58

## 2021-03-25 RX ADMIN — LEVOTHYROXINE SODIUM 25 MCG: 50 TABLET ORAL at 05:27

## 2021-03-25 RX ADMIN — APIXABAN 5 MG: 5 TABLET, FILM COATED ORAL at 09:58

## 2021-03-25 RX ADMIN — Medication 10 ML: at 21:46

## 2021-03-25 RX ADMIN — CHOLECALCIFEROL TAB 25 MCG (1000 UNIT) 1000 UNITS: 25 TAB at 09:58

## 2021-03-25 RX ADMIN — ROSUVASTATIN 10 MG: 10 TABLET, FILM COATED ORAL at 21:46

## 2021-03-25 RX ADMIN — CEFTRIAXONE SODIUM 1 G: 1 INJECTION, POWDER, FOR SOLUTION INTRAMUSCULAR; INTRAVENOUS at 15:12

## 2021-03-25 RX ADMIN — SENNOSIDES AND DOCUSATE SODIUM 1 TABLET: 8.6; 5 TABLET ORAL at 21:46

## 2021-03-25 RX ADMIN — AMIODARONE HYDROCHLORIDE 400 MG: 200 TABLET ORAL at 09:58

## 2021-03-25 RX ADMIN — AMIODARONE HYDROCHLORIDE 400 MG: 200 TABLET ORAL at 00:21

## 2021-03-25 RX ADMIN — APIXABAN 5 MG: 5 TABLET, FILM COATED ORAL at 17:12

## 2021-03-25 RX ADMIN — Medication 10 ML: at 14:30

## 2021-03-25 RX ADMIN — Medication 10 ML: at 05:28

## 2021-03-25 RX ADMIN — FAMOTIDINE 20 MG: 20 TABLET, FILM COATED ORAL at 09:58

## 2021-03-25 RX ADMIN — FOLIC ACID 1 MG: 1 TABLET ORAL at 09:58

## 2021-03-25 RX ADMIN — SODIUM CHLORIDE 125 ML/HR: 900 INJECTION, SOLUTION INTRAVENOUS at 10:00

## 2021-03-25 RX ADMIN — DULOXETINE 60 MG: 60 CAPSULE, DELAYED RELEASE ORAL at 09:58

## 2021-03-25 RX ADMIN — AMIODARONE HYDROCHLORIDE 400 MG: 200 TABLET ORAL at 15:13

## 2021-03-25 NOTE — PROGRESS NOTES
Physician Progress Note      Erik Donahue  CSN #:                  [id number]  :                       1940  ADMIT DATE:       3/19/2021 1:13 PM  DISCH DATE:  RESPONDING  PROVIDER #:        Nirmal Duke MD          QUERY TEXT:    Pt admitted with AF with RVR and Severe Sepsis. Pt noted to have been treated for PNA as Outpatient. If possible, please document in the progress notes and discharge summary if you are evaluating and/or treating any of the following:      Note: CAP and HCAP indicate where the pneumonia was acquired, not a specific type. The medical record reflects the following:  Risk Factors: Treated OP PNA without improvement, Sever Sepsis, former smoker,  Clinical Indicators: SOB, cough, intermittent fever, chills 3/19 WBC 11.9 increased 17.1 on 3./24, Lactate 3/19 3.6---2.0, Proacal 0.93---0.32 tachycardia, hypoxemia, hypotension  3/19 HP : Has continued to feel unwell with SOB, cough, and intermittent fever and chills. 3/19 CT Chest Tree-in-bud appearing infiltrate in the right lung apex. 3/19 CT Urogram Partially imaged small left pleural effusion and adjacent  atelectasis/infilt  3/24 PN progressive shortness of breath, over the last 2 weeks, started close outpatient pneumonia treatments by PCP, did not improve, shortness of breath worsened this morning, patient also felt the new onset of palpitation  Treatment: Monitoring,  3 L NS Bolus. Zithromax IV, IV Rocephin    Thank you,  Suzette Ashraf RN,C BSN  Clinical   [email or web address]  Options provided:  -- Bacterial pneumonia  -- Viral pneumonia  -- Aspiration pneumonia  -- Other - I will add my own diagnosis  -- Disagree - Not applicable / Not valid  -- Disagree - Clinically unable to determine / Unknown  -- Refer to Clinical Documentation Reviewer    PROVIDER RESPONSE TEXT:    This patient has bacterial pneumonia. Query created by:  Odette Sharma on 3/24/2021 6:03 PM      Electronically signed by:  Leidy Madrigal MD 3/25/2021 11:39 AM

## 2021-03-25 NOTE — PROGRESS NOTES
Hourly rounds completed with bed in low/locked position and call light within reach. Patient has rested this shift, w/o acute changes. Patient with telemetry monitoring in place up ad sukhdeep to restroom. Patient currently resting in bed with all needs met at this time. Will give report to oncoming RN.

## 2021-03-25 NOTE — PROGRESS NOTES
[facility] CARDIOLOGY PROGRESS NOTE           3/25/2021 5:15 PM    Admit Date: 3/19/2021      Subjective:   No cp or inc sob      Objective:      Vitals:    03/25/21 0723 03/25/21 0811 03/25/21 1056 03/25/21 1545   BP: 127/75  96/79 125/65   Pulse: 73  74 76   Resp: 18  18 18   Temp: 97.6 °F (36.4 °C)  97.9 °F (36.6 °C) 97.9 °F (36.6 °C)   SpO2: 99% 98% 96% 96%   Weight:       Height:           Physical Exam:  General-No Acute Distress  Neck- supple, no JVD  CV- regular rate and rhythm no MRG  Lung- clear bilaterally  Abd- soft, nontender, nondistended  Ext- no edema bilaterally. Skin- warm and dry    Data Review:   Recent Labs     03/25/21  0527 03/24/21  0400    135*   K 4.5 4.6   BUN 31* 33*   CREA 1.76* 1.63*   * 116*   WBC 18.3* 17.1*   HGB 9.8* 9.5*   HCT 29.2* 28.7*   * 438       Assessment/Plan:     Principal Problem:    Atrial fibrillation with rapid ventricular response (HCC) (3/19/2021)        Active Problems:    Acquired hypothyroidism (1/16/2016)          Rheumatoid arthritis ([facility] [address] (1/16/2016)          Hypertension, essential, benign (8/18/2016)          Acute renal failure (ARF) (HCC) (4/22/2019)          Sepsis ([facility] [address] (4/22/2019)          Pericarditis with effusion (3/23/2021)      ////    Dec dose of amiodarone.           Bradley Crews MD  3/25/2021 5:15 PM

## 2021-03-25 NOTE — INTERDISCIPLINARY ROUNDS
Interdisciplinary team rounds were held 3/25/2021 with the following team members:Care Management, Nursing, Physical Therapy and Physician and the patient. Plan of care discussed. See clinical pathway and/or care plan for interventions and desired outcomes. Cardiac monitoring discontinued. Pt does not require O2 to ambulate. Plan to discharge tomorrow. Pt has declined PT / HH.

## 2021-03-25 NOTE — PROGRESS NOTES
Hospitalist Note     Admit Date:  3/19/2021  1:13 PM   Name:  Harsh Flores   Age:  68 y.o.  :  1940   MRN:  [id number]   PCP:  Imtiaz Raza MD  Treatment Team: Attending Provider: Ambrose Golden MD; Consulting Provider: Gilda Ibrahim MD; Consulting Provider: Candelaria Lewis MD; Consulting Provider: Latonya Carey MD; Utilization Review: Chata Chisholm; Care Manager: Sandi Calvo; Occupational Therapy Assistant: Lc Sterling    HPI/Subjective:     Patient 81-year-old female, history significant for breast cancer status post radiations, rheumatoid arthritis on chronic methotrexate, chronic kidney disease, former smoker,    Patient developed progressive shortness of breath, over the last 2 weeks, started close outpatient pneumonia treatments by PCP, did not improve, shortness of breath worsened this morning, patient also felt the new onset of palpitation EMS was called, patient found to have significant hypotension systolic of 69, with heart rate greater than 140, consistent with atrial fibrillation rapid medical response, seen by cardiology, amiodarone and heparin drip was started     Patient denies sick contacts, denies recent travels, denies prior cardiac history, patient was told that she has a single  kidney but denies kidney disease     3/22  -Patient feeling better today,cr down trending 1.54 (1.07 was baseline )  -chest pain resolved  -pt told me she was getting remicade in past for RA but insurance stopped paying for this; she told me she had RT for her breast cancer in ~, now only getting screening mammograms  -Blood cultures thus far negative   -rpt echo with persistent moderate effusion  -she is still tachycardic    3/23 Patient is doing well this morning. No chest pain. Tachycardia improved. No fever no chills. No nausea no vomiting. No shortness of breath.     3/24 patient reports she has shortness of breath with ambulation. No fever no chills.   No chest pain.  No bleeding per rectum or evidence of external bleeding. 3/25 Pt had home Oxygen screen done this morning and she did not qualify for Oxygen. Shortness of breath improved. No fever. No chills. No chest pain. Complete ROS done and is as stated in HPI or otherwise negative  No other complaints  Objective:     Patient Vitals for the past 24 hrs:   Temp Pulse Resp BP SpO2   03/25/21 1056 97.9 °F (36.6 °C) 74 18 96/79 96 %   03/25/21 0811     98 %   03/25/21 0723 97.6 °F (36.4 °C) 73 18 127/75 99 %   03/25/21 0351 97.6 °F (36.4 °C) 78 18 131/71 94 %   03/25/21 0009 98.2 °F (36.8 °C) 77 18 131/67 94 %   03/24/21 1937 98.4 °F (36.9 °C) 78 18 106/69 95 %   03/24/21 1542 98.1 °F (36.7 °C) 81 18 121/66 95 %     Oxygen Therapy  O2 Sat (%): 96 % (03/25/21 1056)  Pulse via Oximetry: 87 beats per minute (03/22/21 0016)  O2 Device: Room air (03/25/21 5418)  Skin Assessment: Clean, dry, & intact (03/23/21 0520)  O2 Flow Rate (L/min): 0 l/min (03/25/21 0811)    Estimated body mass index is 28.28 kg/m² as calculated from the following:    Height as of this encounter: 5' (1.524 m). Weight as of this encounter: 65.7 kg (144 lb 12.8 oz). Intake/Output Summary (Last 24 hours) at 3/25/2021 1319  Last data filed at 3/24/2021 1819  Gross per 24 hour   Intake 480 ml   Output    Net 480 ml       *Note that automatically entered I/Os may not be accurate; dependent on patient compliance with collection and accurate  by [facility]. General:          Alert, chronically ill appearing, cooperative, no distress, appears stated age. Head:               Normocephalic, without obvious abnormality, atraumatic. Nose:               Nares normal. No drainage or sinus tenderness. Lungs:             Clear to auscultation bilaterally. No Wheezing or Rhonchi. No rales. Heart:              Regular rate and rhythm,  no murmur, rub or gallop. Abdomen:        Soft, non-tender. Not distended.   Bowel sounds normal. Extremities:     No cyanosis. No edema. No clubbing  Skin:                Texture, turgor normal. No rashes or lesions. Not Jaundiced  Neurologic:      Alert and oriented x 3, no focal deficits     Data Review:  I have reviewed all labs, meds, and studies from the last 24 hours:    Recent Results (from the past 24 hour(s))   CBC WITH AUTOMATED DIFF    Collection Time: 03/25/21  5:27 AM   Result Value Ref Range    WBC 18.3 (H) 4.3 - 11.1 K/uL    RBC 3.02 (L) 4.05 - 5.2 M/uL    HGB 9.8 (L) 11.7 - 15.4 g/dL    HCT 29.2 (L) 35.8 - 46.3 %    MCV 96.7 79.6 - 97.8 FL    MCH 32.5 26.1 - 32.9 PG    MCHC 33.6 31.4 - 35.0 g/dL    RDW 13.7 11.9 - 14.6 %    PLATELET 518 (H) 527 - 450 K/uL    MPV 10.1 9.4 - 12.3 FL    ABSOLUTE NRBC 0.00 0.0 - 0.2 K/uL    DF AUTOMATED      NEUTROPHILS 86 (H) 43 - 78 %    LYMPHOCYTES 7 (L) 13 - 44 %    MONOCYTES 6 4.0 - 12.0 %    EOSINOPHILS 0 (L) 0.5 - 7.8 %    BASOPHILS 0 0.0 - 2.0 %    IMMATURE GRANULOCYTES 1 0.0 - 5.0 %    ABS. NEUTROPHILS 15.8 (H) 1.7 - 8.2 K/UL    ABS. LYMPHOCYTES 1.3 0.5 - 4.6 K/UL    ABS. MONOCYTES 1.0 0.1 - 1.3 K/UL    ABS. EOSINOPHILS 0.0 0.0 - 0.8 K/UL    ABS. BASOPHILS 0.0 0.0 - 0.2 K/UL    ABS. IMM.  GRANS. 0.1 0.0 - 0.5 K/UL   METABOLIC PANEL, BASIC    Collection Time: 03/25/21  5:27 AM   Result Value Ref Range    Sodium 136 136 - 145 mmol/L    Potassium 4.5 3.5 - 5.1 mmol/L    Chloride 102 98 - 107 mmol/L    CO2 29 21 - 32 mmol/L    Anion gap 5 (L) 7 - 16 mmol/L    Glucose 103 (H) 65 - 100 mg/dL    BUN 31 (H) 8 - 23 MG/DL    Creatinine 1.76 (H) 0.6 - 1.0 MG/DL    GFR est AA 36 (L) >60 ml/min/1.73m2    GFR est non-AA 30 (L) >60 ml/min/1.73m2    Calcium 9.3 8.3 - 10.4 MG/DL        All Micro Results     Procedure Component Value Units Date/Time    CULTURE, URINE [[id number]]  (Abnormal) Collected: 03/22/21 0600    Order Status: Completed Specimen: Urine from Clean catch Updated: 03/25/21 0986     Special Requests: NO SPECIAL REQUESTS        Culture result: >100,000 COLONIES/mL ELIF GLABRATA          BLOOD CULTURE [[id number]] Collected: 03/19/21 1339    Order Status: Completed Specimen: Blood Updated: 03/24/21 1055     Special Requests: --        LEFT  Antecubital       Culture result: NO GROWTH 5 DAYS       BLOOD CULTURE [[id number]] Collected: 03/19/21 1530    Order Status: Completed Specimen: Blood Updated: 03/24/21 1055     Special Requests: --        LEFT  HAND       Culture result: NO GROWTH 5 DAYS       SARS-COV-2, PCR [[id number]] Collected: 03/19/21 1815    Order Status: Completed Specimen: Nasopharyngeal Updated: 03/20/21 1250     Specimen source Nasopharyngeal        SARS-CoV-2 Not detected        Comment:      The specimen is NEGATIVE for SARS-CoV-2, the novel coronavirus associated with COVID-19. A negative result does not rule out COVID-19. This test has been authorized by the FDA under an Emergency Use Authorization (EUA) for use by authorized laboratories. Fact sheet for Healthcare Providers: [employer]  Fact sheet for Patients: https://fda.gov/media/270929/download       Methodology: RT-PCR         COVID-19 RAPID TEST [[id number]] Collected: 03/19/21 1815    Order Status: Completed Specimen: Nasopharyngeal Updated: 03/19/21 1913     Specimen source Nasopharyngeal        COVID-19 rapid test Not detected        Comment:      The specimen is NEGATIVE for SARS-CoV-2, the novel coronavirus associated with COVID-19. A negative result does not rule out COVID-19. This test has been authorized by the FDA under an Emergency Use Authorization (EUA) for use by authorized laboratories.         Fact sheet for Healthcare Providers: [employer]  Fact sheet for Patients: [employer]       Methodology: Isothermal Nucleic Acid Amplification               Current Meds:  Current Facility-Administered Medications   Medication Dose Route Frequency    0.9% sodium chloride infusion  125 mL/hr IntraVENous CONTINUOUS    apixaban (ELIQUIS) tablet 5 mg  5 mg Oral BID    predniSONE (DELTASONE) tablet 30 mg  30 mg Oral DAILY WITH BREAKFAST    cefTRIAXone (ROCEPHIN) 1 g in 0.9% sodium chloride (MBP/ADV) 50 mL MBP  1 g IntraVENous Q24H    HYDROcodone-homatropine (HYCODAN) 5-1.5 mg/5 mL (5 mL) oral solution 5 mL  5 mL Oral Q4H PRN    levalbuterol (XOPENEX) nebulizer soln 1.25 mg/3 mL  1.25 mg Nebulization Q4H PRN    amiodarone (CORDARONE) tablet 400 mg  400 mg Oral Q8H    benzonatate (TESSALON) capsule 100 mg  100 mg Oral TID PRN    [Held by provider] aspirin delayed-release tablet 81 mg  81 mg Oral DAILY    cholecalciferol (VITAMIN D3) (1000 Units /25 mcg) tablet 1,000 Units  1,000 Units Oral DAILY    DULoxetine (CYMBALTA) capsule 60 mg  60 mg Oral DAILY    folic acid (FOLVITE) tablet 1 mg  1 mg Oral DAILY    levothyroxine (SYNTHROID) tablet 25 mcg  25 mcg Oral ACB    methotrexate (RHEUMATREX) tablet 12.5 mg  12.5 mg Oral every Sunday    rosuvastatin (CRESTOR) tablet 10 mg  10 mg Oral QHS    sodium chloride (NS) flush 5-40 mL  5-40 mL IntraVENous Q8H    sodium chloride (NS) flush 5-40 mL  5-40 mL IntraVENous PRN    acetaminophen (TYLENOL) tablet 650 mg  650 mg Oral Q6H PRN    Or    acetaminophen (TYLENOL) suppository 650 mg  650 mg Rectal Q6H PRN    polyethylene glycol (MIRALAX) packet 17 g  17 g Oral DAILY    senna-docusate (PERICOLACE) 8.6-50 mg per tablet 1 Tab  1 Tab Oral BID    magnesium hydroxide (MILK OF MAGNESIA) 400 mg/5 mL oral suspension 30 mL  30 mL Oral DAILY PRN    bisacodyL (DULCOLAX) suppository 10 mg  10 mg Rectal DAILY PRN    ondansetron (ZOFRAN ODT) tablet 4 mg  4 mg Oral Q8H PRN    Or    ondansetron (ZOFRAN) injection 4 mg  4 mg IntraVENous Q6H PRN    famotidine (PEPCID) tablet 20 mg  20 mg Oral DAILY    [Held by provider] metoprolol succinate (TOPROL-XL) XL tablet 25 mg  25 mg Oral DAILY       Other Studies:  Results for orders placed or performed during the hospital encounter of 21   2D ECHO COMPLETE ADULT (TTE) W OR [address] [facility]  [address], [address] [facility]  [phone]    Transthoracic Echocardiogram  2D, M-mode, Doppler, and Color Doppler    Patient: Jay Hardy  MR #: [id number]  : [id number]  Age: 68 years  Gender: Female  Study date: 20-Mar-2021  Account #: [de-identified]  Height: 60 in  Weight: 154.7 lb  BSA: 1.67 mï¾²  Status:Stat  Location: ER3  BP: 137/ 65    Allergies: NO KNOWN ALLERGENS    Sonographer:  SIMRAN Noriega Ala  Group:  [facility]  Reading Physician:  Neal Rosas. MD Karen    INDICATIONS: Cardiac Arrhythmia, Pericardial Effusion    PROCEDURE: This was a stat study. A transthoracic echocardiogram was   performed. The study included complete 2D imaging, M-mode, complete spectral Doppler,   and  color Doppler. Image quality was adequate. LEFT VENTRICLE: Size was normal. Systolic function was normal. Ejection  fraction was estimated in the range of 55 % to 60 %. There were no regional  wall motion abnormalities. Wall thickness was normal. Left ventricular  diastolic function is indeterminate based on assessment criteria. Avg E/e':  14.2. RIGHT VENTRICLE: The size was normal. Systolic function was mildly reduced by  TAPSE assessment. The tricuspid jet envelope definition was inadequate for  estimation of RV systolic pressure. LEFT ATRIUM: Size was normal.    RIGHT ATRIUM: Size was normal.    SYSTEMIC VEINS: IVC: The inferior vena cava was normal in size and course. The  respirophasic change in diameter was more than 50%. AORTIC VALVE: The valve was trileaflet. Leaflets exhibited mild sclerosis. There was no evidence for stenosis. There was no insufficiency. MITRAL VALVE: There was mild annular calcification. There was no evidence for  stenosis. There was mild regurgitation.     TRICUSPID VALVE: The valve structure was normal. There was no evidence for  stenosis. There was mild regurgitation. PULMONIC VALVE: Not well visualized. There was no evidence for stenosis. There  was no insufficiency. PERICARDIUM: A moderate pericardial effusion was identified circumferential   to  the heart. There was mild RA inversion, no clear evidence of RV diastolic  collapse or clear evidence of hemodynamic compromise. Clinical correlation  required. AORTA: The root exhibited normal size. SUMMARY:    -  Left ventricle: Systolic function was normal. Ejection fraction was  estimated in the range of 55 % to 60 %. There were no regional wall motion  abnormalities. -  Right ventricle: Systolic function was mildly reduced by TAPSE assessment. -  Inferior vena cava, hepatic veins: The respirophasic change in diameter   was  more than 50%. -  Mitral valve: There was mild annular calcification. There was mild  regurgitation.    -  Tricuspid valve: There was mild regurgitation.    -  Pericardium: A moderate pericardial effusion was identified   circumferential  to the heart. There was mild RA inversion, no clear evidence of RV diastolic  collapse or clear evidence of hemodynamic compromise. Clinical correlation  required. SYSTEM MEASUREMENT TABLES    2D  Ao Diam: 2.6 cm  LA Diam: 4.3 cm  LAEDV Index (A-L): 26.5 ml/m2  %FS: 35.9 %  IVSd: 0.7 cm  LVIDd: 4.8 cm  LVIDs: 3.1 cm  LVOT Diam: 1.7 cm  LVPWd: 0.8 cm    PW  E/E' Av.2  E/E' Lat: 14.9  E/E' Sept: 13.6    Prepared and signed by    Carlyle Duverney.  Edward Hernandez MD  Signed 20-Mar-2021 11:10:09     2D ECHO LIMTED ADULT W OR WO CONTR    Narrative    [address] [place], [address] [place] [address]  [phone]    Transthoracic Echocardiogram  2D and Doppler    Patient: Beryle Brock  MR #: [id number]  : [id number]  Age: 68 years  Gender: Female  Study date: 22-Mar-2021  Account #: [de-identified]  Height: 60 in  Weight: 153.8 lb  BSA: 1.67 mï¾²  Status:Routine  Location: 625  BP: 103/ 89    Allergies: NO KNOWN ALLERGENS    Sonographer:  Lori Fitzgerald [facility]  Group:  [facility]  Referring Physician:  Mio Ricks. Hernandez Duke MD  Reading Physician:  Joanna Chopra MD    INDICATIONS: Pericardial Effusion. PROCEDURE: This was a routine study. A transthoracic echocardiogram was  performed. The study included limited 2D imaging and limited spectral   Doppler. Image quality was adequate. LEFT VENTRICLE: Size was normal. Systolic function was normal. Ejection  fraction was estimated in the range of 65 % to 70 %. SYSTEMIC VEINS: IVC: The inferior vena cava was normal in size. The  respirophasic change in diameter was more than 50%. PERICARDIUM: A moderate pericardial effusion was identified circumferential   to  the heart. There was overlying fibrinous appearing material noted on the RV  free wall. There was no evidence of hemodynamic significance. SUMMARY:    -  Left ventricle: Systolic function was normal. Ejection fraction was  estimated in the range of 65 % to 70 %. -  Pericardium: A moderate pericardial effusion was identified   circumferential  to the heart. There was overlying fibrinous appearing material noted on the   RV  free wall. There was no evidence of hemodynamic significance. Prepared and signed by    Joanna Chopra MD  Signed 22-Mar-2021 11:56:39         No results found.     Assessment and Plan:     Hospital Problems as of 3/25/2021 Date Reviewed: 1/16/2016          Codes Class Noted - Resolved POA    Pericarditis with effusion ICD-10-CM: I31.9  ICD-9-CM: 423.9  3/23/2021 - Present Unknown        * (Principal) Atrial fibrillation with rapid ventricular response (HCC) ICD-10-CM: I48.91  ICD-9-CM: 427.31  3/19/2021 - Present Unknown        Acute renal failure (ARF) (HCC) ICD-10-CM: N17.9  ICD-9-CM: 584.9  4/22/2019 - Present Yes        Sepsis ([facility] [address] ICD-10-CM: A41.9  ICD-9-CM: 038.9, 995.91  4/22/2019 - Present Yes Hypertension, essential, benign ICD-10-CM: I10  ICD-9-CM: 401.1  8/18/2016 - Present Yes        Acquired hypothyroidism ICD-10-CM: E03.9  ICD-9-CM: 244.9  1/16/2016 - Present Yes        Rheumatoid arthritis ([facility] [address] ICD-10-CM: M06.9  ICD-9-CM: 714.0  1/16/2016 - Present Yes            Hospital course  Patient is a 62-year-old female admitted for severe sepsis on admission secondary to pneumonia. She was hydrated with IV fluids and treated with ceftriaxone and azithromycin. Covid test on admission negative. She has new onset A. fib RVR and moderate pericardial effusion for which cardiology was consulted. She is currently on amiodarone and converted to normal sinus rhythm. Repeat echocardiogram was done on 3/22 which showed stable moderate pericardial effusion without any evidence of hemodynamic compromise. Patient was initially on heparin drip subsequently switched to Eliquis on 3/24. Rheumatology was also consulted and recommendations for colchicine or prednisone. However due to drug interaction with colchicine and amiodarone, she was started on steroids. She had home oxygen screen done today and did not need any oxygen with ambulation. She has acute kidney injury versus CKD stage III with creatinine of3.3 on admission. She was hydrated with IV fluids and renal function improved. Creatinine has been 1.5-1.7. Severe sepsis POA from Pneumonia  Patient may have been hypotensive in the setting of moderate pericardial effusion effusion and dehydration, which led to severe hypotension on admission  -Hypotension on admission was improved with IV hydration  -Blood cultures negative, check UA urine cultures, trend pro calcitonin  - Pt to complete Ceftriaxone today. Completed Azithromycin.    -COVID negative    CAP:  Rocephin, Azithromycin.      Atrial fibrillation rapid ventricular response,resolved  appreciate cardiology's consultation  -back to SR after amio gtt; continue PO amio per cardiology  -tele monitor  Repeat ECHO shows stable findings with moderate pericardial effusion. Heparin drip switched to Eliquis 3/24. Moderate pericardial effusion  -extremely elevated high sensitivity CRP suggests this may be pericarditis effusion;   -Rheumatology consult appreciated. recommends colchicine or prednisone. Due to drug interaction of colchicine with amiodarone, patient started on prednisone.   -cardiology recs appreciated. Repeat ECHO shows stable findings with moderate pericardial effusion.      Acute kidney injuryvs CKD 3  Pt denies any prior chronic kidney disease. ? Pre-renal  last known creatinine 1.07, on admission 3.3, CT urogram shows left atrophic kidney, no renal calculi or hydronephrosis, likely prerenal due to dehydration and hypotension  Status post 3 L of NS bolus in the emergency room, follow urine electrolytes. 3/25 Cr 1.7 this am. Gentle IV fluids today. Recheck BMP in am.      Hypertension  BP stable at 120/60.   hold home medication due to well controlled BP.      History of rheumatoid arthritis, HTN, major depressions, former smoker, hyperlipidemia, history of TIA, Graves disease and ?hypothyroidism on Synthroid (TSH WNL), left atrophic kidney,  -currently stable co morbidities add to patient's case complexity     DVT PPx: Eliquis     Code Status: Full      Anticipated discharge: Home tomorrow if renal function improved.  PT/OT eval recommends home health, but patient does not want it.     Signed:  Delia Cordoba MD

## 2021-03-25 NOTE — PROGRESS NOTES
Oxygen Qualifier       Room air: SpO2 with O2 and liter flow   Resting SpO2  98%     Ambulating SpO2  93%        Completed by:    Hari Cm, RT

## 2021-03-25 NOTE — PROGRESS NOTES
Problem: Patient Education: Go to Patient Education Activity  Goal: Patient/Family Education  Outcome: Progressing Towards Goal     Problem: Falls - Risk of  Goal: *Absence of Falls  Description: Document [place] Fall Risk and appropriate interventions in the flowsheet.   Outcome: Progressing Towards Goal  Note: Fall Risk Interventions:  Mobility Interventions: Assess mobility with egress test         Medication Interventions: Bed/chair exit alarm, Patient to call before getting OOB    Elimination Interventions: Bed/chair exit alarm              Problem: Patient Education: Go to Patient Education Activity  Goal: Patient/Family Education  Outcome: Progressing Towards Goal

## 2021-03-25 NOTE — PROGRESS NOTES
ACUTE OT GOALS:  (Developed with and agreed upon by patient and/or caregiver.)    1. Patient will complete lower body bathing and dressing with modified independence and adaptive equipment as needed. 2. Patient will complete toileting with modified independence. 3. Patient will tolerate 25 minutes of OT treatment with 1-2 rest breaks to increase activity tolerance for ADLs. 4. Patient will complete functional transfers with modified independence and adaptive equipment as needed. 5. Patient will tolerate 10 minutes BUE exercises to increase strength for safe, functional transfers. OCCUPATIONAL THERAPY: Daily Note OT Treatment Day # 4    Kimberlyn Snyder is a 68 y.o. female   PRIMARY DIAGNOSIS: Atrial fibrillation with rapid ventricular response (HCC)  Atrial fibrillation with rapid ventricular response (HCC) [I48.91]       Payor: [employer] MEDICARE / Plan: [address] / Product Type: Managed Care Medicare /   ASSESSMENT:     REHAB RECOMMENDATIONS: CURRENT LEVEL OF FUNCTION:  (Most Recently Demonstrated)   Recommendation to date pending progress:  Setting:   Short-term Rehab  Equipment:    Rolling Walker Bathing:   Not tested  Dressing:   Not tested  Feeding/Grooming:   Not tested  Toileting:   Not tested  Functional Mobility:   Contact Guard Assistance     ASSESSMENT:  Ms. Brad Jean Baptiste completed ambulation and exercises with less wheezing today. Pt's balance was better as well. Good effort as usual. Continue  POC. SUBJECTIVE:   Ms. Brad Jean Baptiste states, \"I'm not wheezing yet. \"    SOCIAL HISTORY/LIVING ENVIRONMENT:   Home Environment: Trailer/mobile home  One/Two Story Residence: One story  Living Alone: Yes  Support Systems: Friends \ neighbors    OBJECTIVE:     PAIN: VITAL SIGNS: LINES/DRAINS:   Pre Treatment: Pain Screen  Pain Scale 1: Numeric (0 - 10)  Pain Intensity 1: 0  Post Treatment: 0   IV  O2 Device: Room air     ACTIVITIES OF DAILY LIVING: I Mod I S SBA CGA Min Mod Max Total NT Comments   BASIC ADLs:              Bathing/ Showering [] [] [] [] [] [] [] [] [] [x]    Toileting [] [] [] [] [] [] [] [] [] [x]    Dressing [] [] [] [] [] [] [] [] [] [x]    Feeding [] [] [] [] [] [] [] [] [] [x]    Grooming [] [] [] [] [] [] [] [] [] [x]    Personal Device Care [] [] [] [] [] [] [] [] [] [x]    Functional Mobility [] [] [] [x] [] [] [] [] [] []    I=Independent, Mod I=Modified Independent, S=Supervision, SBA=Standby Assistance, CGA=Contact Guard Assistance,   Min=Minimal Assistance, Mod=Moderate Assistance, Max=Maximal Assistance, Total=Total Assistance, NT=Not Tested    MOBILITY: I Mod I S SBA CGA Min Mod Max Total  NT x2 Comments:   Supine to sit [] [] [] [x] [] [] [] [] [] [] []    Sit to supine [] [] [] [x] [] [] [] [] [] [] []    Sit to stand [] [] [] [x] [] [] [] [] [] [] []    Bed to chair [] [] [] [] [] [] [] [] [] [x] []    I=Independent, Mod I=Modified Independent, S=Supervision, SBA=Standby Assistance, CGA=Contact Guard Assistance,   Min=Minimal Assistance, Mod=Moderate Assistance, Max=Maximal Assistance, Total=Total Assistance, NT=Not Tested    BALANCE: Good Fair+ Fair Fair- Poor NT Comments   Sitting Static [x] [] [] [] [] []    Sitting Dynamic [x] [] [] [] [] []              Standing Static [x] [] [] [] [] []    Standing Dynamic [] [x] [] [] [] []      PLAN:   FREQUENCY/DURATION: OT Plan of Care: 3 times/week for duration of hospital stay or until stated goals are met, whichever comes first.    TREATMENT:   TREATMENT:   ( $$ Therapeutic Activity: 8-22 mins  $$ Therapeutic Exercises: 8-22 mins   )  Therapeutic Activity (15 Minutes): Therapeutic activity included Supine to Sit, Sit to Supine, Scooting, Lateral Scooting, Ambulation on level ground and Standing balance to improve functional Mobility, Strength, ROM and Activity tolerance. Therapeutic Exercise (10 Minutes):  Therapeutic exercises noted below to improve functional activity tolerance, AROM, strength and mobility.      TREATMENT GRID:   Date:  3/23/21 Date:  3/24/21 Date:  3/25/21   Activity/Exercise Parameters Parameters Parameters   Shoulder flexion 10/1 10/1 10/1   Elbow flexion 10/1 10/1 10/1   Tricep extensions 10/1 10/1 10/1   Horizontal add/abd 10/1 10/1 10/1   Straight leg raises   10/1   Knee flexion/extension   10/1                   AFTER TREATMENT POSITION/PRECAUTIONS:  Bed, Needs within reach and RN notified    INTERDISCIPLINARY COLLABORATION:  RN/PCT and OT/SCHROEDER    TOTAL TREATMENT DURATION:  OT Patient Time In/Time Out  Time In: 0940  Time Out: 1235 [facility]

## 2021-03-26 VITALS
SYSTOLIC BLOOD PRESSURE: 121 MMHG | OXYGEN SATURATION: 94 % | HEART RATE: 74 BPM | BODY MASS INDEX: 28.43 KG/M2 | TEMPERATURE: 98.3 F | HEIGHT: 60 IN | DIASTOLIC BLOOD PRESSURE: 64 MMHG | WEIGHT: 144.8 LBS | RESPIRATION RATE: 18 BRPM

## 2021-03-26 LAB
ANION GAP SERPL CALC-SCNC: 5 MMOL/L (ref 7–16)
BASOPHILS # BLD: 0 K/UL (ref 0–0.2)
BASOPHILS NFR BLD: 0 % (ref 0–2)
BUN SERPL-MCNC: 29 MG/DL (ref 8–23)
CALCIUM SERPL-MCNC: 8.9 MG/DL (ref 8.3–10.4)
CHLORIDE SERPL-SCNC: 105 MMOL/L (ref 98–107)
CO2 SERPL-SCNC: 27 MMOL/L (ref 21–32)
CREAT SERPL-MCNC: 1.68 MG/DL (ref 0.6–1)
DIFFERENTIAL METHOD BLD: ABNORMAL
EOSINOPHIL # BLD: 0 K/UL (ref 0–0.8)
EOSINOPHIL NFR BLD: 0 % (ref 0.5–7.8)
ERYTHROCYTE [DISTWIDTH] IN BLOOD BY AUTOMATED COUNT: 13.8 % (ref 11.9–14.6)
GLUCOSE SERPL-MCNC: 98 MG/DL (ref 65–100)
HCT VFR BLD AUTO: 28.2 % (ref 35.8–46.3)
HGB BLD-MCNC: 9.1 G/DL (ref 11.7–15.4)
IMM GRANULOCYTES # BLD AUTO: 0.2 K/UL (ref 0–0.5)
IMM GRANULOCYTES NFR BLD AUTO: 1 % (ref 0–5)
LYMPHOCYTES # BLD: 1.2 K/UL (ref 0.5–4.6)
LYMPHOCYTES NFR BLD: 7 % (ref 13–44)
MCH RBC QN AUTO: 31.7 PG (ref 26.1–32.9)
MCHC RBC AUTO-ENTMCNC: 32.3 G/DL (ref 31.4–35)
MCV RBC AUTO: 98.3 FL (ref 79.6–97.8)
MONOCYTES # BLD: 1.7 K/UL (ref 0.1–1.3)
MONOCYTES NFR BLD: 10 % (ref 4–12)
NEUTS SEG # BLD: 14.6 K/UL (ref 1.7–8.2)
NEUTS SEG NFR BLD: 82 % (ref 43–78)
NRBC # BLD: 0 K/UL (ref 0–0.2)
PLATELET # BLD AUTO: 454 K/UL (ref 150–450)
PMV BLD AUTO: 10.1 FL (ref 9.4–12.3)
POTASSIUM SERPL-SCNC: 4.6 MMOL/L (ref 3.5–5.1)
RBC # BLD AUTO: 2.87 M/UL (ref 4.05–5.2)
SODIUM SERPL-SCNC: 137 MMOL/L (ref 136–145)
WBC # BLD AUTO: 17.8 K/UL (ref 4.3–11.1)

## 2021-03-26 PROCEDURE — 97110 THERAPEUTIC EXERCISES: CPT | Performed by: PHYSICAL THERAPIST

## 2021-03-26 PROCEDURE — 74011636637 HC RX REV CODE- 636/637: Performed by: HOSPITALIST

## 2021-03-26 PROCEDURE — 80048 BASIC METABOLIC PNL TOTAL CA: CPT

## 2021-03-26 PROCEDURE — 99232 SBSQ HOSP IP/OBS MODERATE 35: CPT | Performed by: INTERNAL MEDICINE

## 2021-03-26 PROCEDURE — 36415 COLL VENOUS BLD VENIPUNCTURE: CPT

## 2021-03-26 PROCEDURE — 74011250637 HC RX REV CODE- 250/637: Performed by: HOSPITALIST

## 2021-03-26 PROCEDURE — 85025 COMPLETE CBC W/AUTO DIFF WBC: CPT

## 2021-03-26 PROCEDURE — 74011250636 HC RX REV CODE- 250/636: Performed by: HOSPITALIST

## 2021-03-26 PROCEDURE — 74011250637 HC RX REV CODE- 250/637: Performed by: INTERNAL MEDICINE

## 2021-03-26 RX ORDER — FLUCONAZOLE 100 MG/1
100 TABLET ORAL DAILY
Qty: 7 TAB | Refills: 0 | Status: SHIPPED | OUTPATIENT
Start: 2021-03-26 | End: 2021-04-02

## 2021-03-26 RX ORDER — AMIODARONE HYDROCHLORIDE 400 MG/1
400 TABLET ORAL DAILY
Qty: 14 TAB | Refills: 2 | Status: SHIPPED | OUTPATIENT
Start: 2021-03-27 | End: 2021-04-13 | Stop reason: SDUPTHER

## 2021-03-26 RX ORDER — ONDANSETRON 4 MG/1
4 TABLET, ORALLY DISINTEGRATING ORAL
Qty: 20 TAB | Refills: 1 | Status: SHIPPED | OUTPATIENT
Start: 2021-03-26

## 2021-03-26 RX ORDER — FAMOTIDINE 20 MG/1
20 TABLET, FILM COATED ORAL DAILY
Qty: 30 TAB | Refills: 1 | Status: SHIPPED | OUTPATIENT
Start: 2021-03-27 | End: 2021-05-24

## 2021-03-26 RX ORDER — BENZONATATE 100 MG/1
100 CAPSULE ORAL
Qty: 14 CAP | Refills: 1 | Status: SHIPPED | OUTPATIENT
Start: 2021-03-26 | End: 2021-04-02

## 2021-03-26 RX ORDER — ROSUVASTATIN CALCIUM 10 MG/1
10 TABLET, COATED ORAL
Qty: 30 TAB | Refills: 1 | Status: SHIPPED | OUTPATIENT
Start: 2021-03-26

## 2021-03-26 RX ORDER — PREDNISONE 10 MG/1
30 TABLET ORAL
Qty: 30 TAB | Refills: 0 | Status: SHIPPED | OUTPATIENT
Start: 2021-03-27 | End: 2021-05-24

## 2021-03-26 RX ADMIN — SENNOSIDES AND DOCUSATE SODIUM 1 TABLET: 8.6; 5 TABLET ORAL at 09:38

## 2021-03-26 RX ADMIN — PREDNISONE 30 MG: 20 TABLET ORAL at 09:49

## 2021-03-26 RX ADMIN — POLYETHYLENE GLYCOL 3350 17 G: 17 POWDER, FOR SOLUTION ORAL at 09:38

## 2021-03-26 RX ADMIN — DULOXETINE 60 MG: 60 CAPSULE, DELAYED RELEASE ORAL at 09:38

## 2021-03-26 RX ADMIN — FAMOTIDINE 20 MG: 20 TABLET, FILM COATED ORAL at 09:38

## 2021-03-26 RX ADMIN — AMIODARONE HYDROCHLORIDE 400 MG: 200 TABLET ORAL at 09:38

## 2021-03-26 RX ADMIN — Medication 10 ML: at 05:23

## 2021-03-26 RX ADMIN — APIXABAN 5 MG: 5 TABLET, FILM COATED ORAL at 09:38

## 2021-03-26 RX ADMIN — LEVOTHYROXINE SODIUM 25 MCG: 50 TABLET ORAL at 05:23

## 2021-03-26 RX ADMIN — CHOLECALCIFEROL TAB 25 MCG (1000 UNIT) 1000 UNITS: 25 TAB at 09:49

## 2021-03-26 RX ADMIN — SODIUM CHLORIDE 125 ML/HR: 900 INJECTION, SOLUTION INTRAVENOUS at 02:05

## 2021-03-26 RX ADMIN — FOLIC ACID 1 MG: 1 TABLET ORAL at 09:38

## 2021-03-26 NOTE — PROGRESS NOTES
Discharge instructions reviewed with patient, verbalized understanding. Denies any further questions or concerns. AVS & scripts given. Patient discharged to home with family. Haider Magallon arrived and sent with patient.

## 2021-03-26 NOTE — PROGRESS NOTES
Comprehensive Nutrition Assessment    Type and Reason for Visit: Initial, RD nutrition re-screen/LOS  LOS Day 6    Nutrition Recommendations/Plan:    Continue with current diet   Add ensure enlive twice per day     Malnutrition Assessment:  Malnutrition Status: At risk for malnutrition (specify)  Context: Acute illness  Findings of clinical characteristics of malnutrition:   Energy Intake:  Mild decrease in energy intake (specify)  Weight Loss:  (unable to confirm weight loss but possible 10 lbs over 2-3 weeks 6% significant)       Nutrition Assessment:   Nutrition History: 2 weeks prior to admission decrease in appetite and intake of only soup and peanut butter crackers, possible weight loss of 10 lbs per pt. Nutrition Background: Patient with history of HTN, acute renal failure; presented with a fib with RVR and severe sepsis. Daily Update:  Patient reports that she is eating much better now than when she first arrived. She reports feeling so sick that she had no appetite and barely could eat soup. She reports she had some mild N/V at home but none since. Her appetite she says is better. She states her normal weight at about 155 lbs, but unable to provide further details. She would appreciate ensure enlive. Nutrition Related Findings:   None observed or identified Wound Type: None    Current Nutrition Therapies:  DIET REGULAR    Current Intake:   Average Meal Intake: % Average Supplement Intake: None ordered      Anthropometric Measures:  Height: 5' (152.4 cm)  Current Body Wt: 65.7 kg (144 lb 13.5 oz)(3/22), Weight source: Bed scale  BMI: 28.3, Overweight (BMI 25.0-29. 9)     Ideal Body Weight (lbs) (Calculated): 100 lbs (45 kg), 144.8 %  Usual Body Wt: 70.3 kg (155 lb)(per patient), Percent weight change: -6.6          Edema: No data recorded   Estimated Daily Nutrient Needs:  Energy (kcal/day): 6394-8912 (Kcal/kg(20-25), Weight Used: Current(65.7 kg))  Protein (g/day): 66-82 (1-1.25 g/kg) Weight Used: (Current)  Fluid (ml/day):   (1 ml/kcal)    Nutrition Diagnosis:   · Inadequate oral intake related to (lack of appetite) as evidenced by poor intake prior to admission, weight loss    Nutrition Interventions:   Food and/or Nutrient Delivery: Continue current diet, Start oral nutrition supplement     Coordination of Nutrition Care: Continue to monitor while inpatient    Goals: Active Goal: Intake >75% of nutritional needs within 7 days    Nutrition Monitoring and Evaluation:      Food/Nutrient Intake Outcomes: Food and nutrient intake, Supplement intake  Physical Signs/Symptoms Outcomes: Biochemical data    Discharge Planning:     Too soon to determine    Electronically signed by Anusha Machado MS, RD, LD on 3/26/2021 at 10:46 AM.  Contact: [phone]

## 2021-03-26 NOTE — DISCHARGE INSTRUCTIONS
Patient Education        Learning About Atrial Fibrillation  What is atrial fibrillation? Atrial fibrillation (say \"AY-tree-idalia egk-mmxq-UPL-shun\") is the most common type of irregular heartbeat (arrhythmia). Normally, the heart beats in a strong, steady rhythm. In atrial fibrillation, a problem with the heart's electrical system causes the two upper parts of the heart (the atria) to quiver, or fibrillate. Your heart rate also may be faster than normal.  Atrial fibrillation can be dangerous because if the heartbeat isn't strong and steady, blood can collect, or pool, in the atria. And pooled blood is more likely to form clots. Clots can travel to the brain, block blood flow, and cause a stroke. Atrial fibrillation can also lead to heart failure. Treatment for atrial fibrillation helps prevent stroke and heart failure. It also helps relieve symptoms. Atrial fibrillation is often caused by another heart problem. It may happen after heart surgery. It may also be caused by other problems, such as an overactive thyroid gland or lung disease. Many people with atrial fibrillation are able to live full and active lives. What are the symptoms? Some people feel symptoms when they have episodes of atrial fibrillation. But other people don't notice any symptoms. If you have symptoms, you may feel:  · A fluttering, racing, or pounding feeling in your chest called palpitations. · Weak or tired. · Dizzy or lightheaded. · Short of breath. · Chest pain. · Confused. You may notice signs of atrial fibrillation when you check your pulse. Your pulse may seem uneven or fast.  What can you expect when you have atrial fibrillation? At first, spells of atrial fibrillation may come on suddenly and last a short time. It may go away on its own or it goes away after treatment. This is called paroxysmal atrial fibrillation. Over time, the spells may last longer and occur more often. They often don't go away on their own.   How is it treated? Treatments can help you feel better and prevent future problems, especially stroke and heart failure. The main types of treatment slow the heart rate, control the heart rhythm, and help prevent stroke. Your treatment will depend on the cause of your atrial fibrillation, your symptoms, and your risk for stroke. · Heart rate treatment. Medicine may be used to slow your heart rate. Your heartbeat may still be irregular. But these medicines keep your heart from beating too fast. They may also help relieve your symptoms. · Heart rhythm treatment. Different treatments may be used to try to stop atrial fibrillation and keep it from returning. They can also relieve symptoms. These treatments include medicine, electrical cardioversion to shock the heart back to a normal rhythm, a procedure called catheter ablation, and heart surgery. · Stroke prevention. You and your doctor can decide how to lower your risk. You may decide to take a blood-thinning medicine called an anticoagulant. How can you live well with it? You can live well and help manage atrial fibrillation by having a heart-healthy lifestyle. This lifestyle may help reduce how often you have episodes of atrial fibrillation. If you are overweight, losing weight can help relieve symptoms. To have a heart-healthy lifestyle:  · Don't smoke. · Eat heart-healthy foods. · Be active. Talk to your doctor about what type and level of exercise is safe for you. · Stay at a healthy weight. Lose weight if you need to. · Avoid alcohol if it triggers symptoms. · Manage other health problems such as high blood pressure, high cholesterol, and diabetes. · Avoid getting sick from the flu. Get a flu shot every year. · Manage stress. Where can you learn more? Go to http://www.gray.com/  Enter L274 in the search box to learn more about \"Learning About Atrial Fibrillation. \"  Current as of: December 16, 2019               Content Version: 12.6  © 2267-6482 [employer]. Care instructions adapted under license by [employer] (which disclaims liability or warranty for this information). If you have questions about a medical condition or this instruction, always ask your healthcare professional. [address] any warranty or liability for your use of this information. Patient Education        Pneumonia: Care Instructions  Your Care Instructions     Pneumonia is an infection of the lungs. Most cases are caused by infections from bacteria or viruses. Pneumonia may be mild or very severe. If it is caused by bacteria, you will be treated with antibiotics. It may take a few weeks to a few months to recover fully from pneumonia, depending on how sick you were and whether your overall health is good. Follow-up care is a key part of your treatment and safety. Be sure to make and go to all appointments, and call your doctor if you are having problems. It's also a good idea to know your test results and keep a list of the medicines you take. How can you care for yourself at home? · Take your antibiotics exactly as directed. Do not stop taking the medicine just because you are feeling better. You need to take the full course of antibiotics. · Take your medicines exactly as prescribed. Call your doctor if you think you are having a problem with your medicine. · Get plenty of rest and sleep. You may feel weak and tired for a while, but your energy level will improve with time. · To prevent dehydration, drink plenty of fluids, enough so that your urine is light yellow or clear like water. Choose water and other caffeine-free clear liquids until you feel better. If you have kidney, heart, or liver disease and have to limit fluids, talk with your doctor before you increase the amount of fluids you drink.   · Take care of your cough so you can rest. A cough that brings up mucus from your lungs is common with pneumonia. It is one way your body gets rid of the infection. But if coughing keeps you from resting or causes severe fatigue and chest-wall pain, talk to your doctor. He or she may suggest that you take a medicine to reduce the cough.  · Use a vaporizer or humidifier to add moisture to your bedroom. Follow the directions for cleaning the machine.  · Do not smoke or allow others to smoke around you. Smoke will make your cough last longer. If you need help quitting, talk to your doctor about stop-smoking programs and medicines. These can increase your chances of quitting for good.  · Take an over-the-counter pain medicine, such as acetaminophen (Tylenol), ibuprofen (Advil, Motrin), or naproxen (Aleve). Read and follow all instructions on the label.  · Do not take two or more pain medicines at the same time unless the doctor told you to. Many pain medicines have acetaminophen, which is Tylenol. Too much acetaminophen (Tylenol) can be harmful.  · If you were given a spirometer to measure how well your lungs are working, use it as instructed. This can help your doctor tell how your recovery is going.  · To prevent pneumonia in the future, talk to your doctor about getting a flu vaccine (once a year) and a pneumococcal vaccine (one time only for most people).  When should you call for help?   Call 911 anytime you think you may need emergency care. For example, call if:    · You have severe trouble breathing.   Call your doctor now or seek immediate medical care if:    · You cough up dark brown or bloody mucus (sputum).     · You have new or worse trouble breathing.     · You are dizzy or lightheaded, or you feel like you may faint.   Watch closely for changes in your health, and be sure to contact your doctor if:    · You have a new or higher fever.     · You are coughing more deeply or more often.     · You are not getting better after 2 days (48 hours).     · You do not get better as expected.   Where can you  learn more? Go to http://www.[employer].com/  Enter D336 in the search box to learn more about \"Pneumonia: Care Instructions. \"  Current as of: February 24, 2020               Content Version: 12.6  © 4160-9281 [employer]. Care instructions adapted under license by [employer] (which disclaims liability or warranty for this information). If you have questions about a medical condition or this instruction, always ask your healthcare professional. [place] [address] any warranty or liability for your use of this information. DISCHARGE SUMMARY from Nurse    PATIENT INSTRUCTIONS:    After general anesthesia or intravenous sedation, for 24 hours or while taking prescription Narcotics:  · Limit your activities  · Do not drive and operate hazardous machinery  · Do not make important personal or business decisions  · Do  not drink alcoholic beverages  · If you have not urinated within 8 hours after discharge, please contact your surgeon on call. Report the following to your surgeon:  · Excessive pain, swelling, redness or odor of or around the surgical area  · Temperature over 100.5  · Nausea and vomiting lasting longer than 4 hours or if unable to take medications  · Any signs of decreased circulation or nerve impairment to extremity: change in color, persistent  numbness, tingling, coldness or increase pain  · Any questions    What to do at Home:  Recommended activity: Activity as tolerated, use walker    If you experience any of the following symptoms increased Shortness of breath, please follow up with MD.    *  Please give a list of your current medications to your Primary Care Provider. *  Please update this list whenever your medications are discontinued, doses are      changed, or new medications (including over-the-counter products) are added. *  Please carry medication information at all times in case of emergency situations.     These are general instructions for a healthy lifestyle:    No smoking/ No tobacco products/ Avoid exposure to second hand smoke  Surgeon General's Warning:  Quitting smoking now greatly reduces serious risk to your health. Obesity, smoking, and sedentary lifestyle greatly increases your risk for illness    A healthy diet, regular physical exercise & weight monitoring are important for maintaining a healthy lifestyle    You may be retaining fluid if you have a history of heart failure or if you experience any of the following symptoms:  Weight gain of 3 pounds or more overnight or 5 pounds in a week, increased swelling in our hands or feet or shortness of breath while lying flat in bed. Please call your doctor as soon as you notice any of these symptoms; do not wait until your next office visit. The discharge information has been reviewed with the patient. The patient verbalized understanding. Discharge medications reviewed with the patient and appropriate educational materials and side effects teaching were provided.   ___________________________________________________________________________________________________________________________________

## 2021-03-26 NOTE — PROGRESS NOTES
ACUTE PHYSICAL THERAPY GOALS:  (Developed with and agreed upon by patient and/or caregiver.)  STG:  (1.)Ms. Jamil Steiner will move from supine to sit and sit to supine , scoot up and down and roll side to side with STAND BY ASSIST within 4 treatment day(s). (2.)Ms. Jamil Steiner will transfer from bed to chair and chair to bed with STAND BY ASSIST using the least restrictive device within 4 treatment day(s). (3.)Ms. Almeida will ambulate with STAND BY ASSIST for 15 feet with the least restrictive device within 4 treatment day(s).      LTG:  (1.)Ms. Jamil Steiner will move from supine to sit and sit to supine , scoot up and down and roll side to side in bed with MODIFIED INDEPENDENCE within 7 treatment day(s). (2.)Ms. Jamil Steiner will transfer from bed to chair and chair to bed with MODIFIED INDEPENDENCE using the least restrictive device within 7 treatment day(s). (3.)Ms. Almeida will ambulate with MODIFIED INDEPENDENCE for 30 feet with the least restrictive device within 7 treatment day(s). PHYSICAL THERAPY: Daily Note Treatment Day # 4    Jennifer Pate is a 68 y.o. female   PRIMARY DIAGNOSIS: Atrial fibrillation with rapid ventricular response (HCC)  Atrial fibrillation with rapid ventricular response (HCC) [I48.91]         ASSESSMENT:     REHAB RECOMMENDATIONS: CURRENT LEVEL OF FUNCTION:  (Most Recently Demonstrated)   Recommendation to date pending progress:  [address] [place] [address] Therapy  Equipment:    Rolling Walker Bed Mobility:   Supervision  Sit to Stand:  [employer] Assistance  Transfers:   Standby Assistance  Gait/Mobility:   Contact Guard Assistance     ASSESSMENT:  Ms. Jamil Steiner   Upon entering, Pnt is supine agreeable to PT treatment. she reports no pain in her chest at rest. Pnt performed supine > sit with supervision, sitting EOB with good static sitting balance control. Sit > stand with supervision using RW (pnt persuaded to try RW due to unsteady gait w/ just [facility]).  Gait 3x 250 ft with RW, cues for posture and step clearance. Gait is noted to be much faster and with a longer step length with the RW today. Thoughout gait, pnt required frequent multi-modal breathing mechanics cues. Stand > sit with CGA, followed by positioning for comfort. At end of session pt supine in bed (declined chair) with all needs within reach, alarm activated for safety, RN notified. Overall, great progress today as pnt improved in ambulation tolerance/distance. Pnt continues to present as functioning below her baseline, with deficits in mobility including transfers, gait, balance, and activity tolerance. Pt will continue to benefit from skilled therapy services to address stated deficits to promote return to highest level of function, independence, and safety. Will continue to follow.          SUBJECTIVE:   Ms. Taryn Pate states, \"I guess I need a RW\"    SOCIAL HISTORY/ LIVING ENVIRONMENT:   Home Environment: Trailer/mobile home  One/Two Story Residence: One story  Living Alone: Yes  Support Systems: Friends \ neighbors  OBJECTIVE:     PAIN: VITAL SIGNS: LINES/DRAINS:   Pre Treatment: Pain Screen  Pain Scale 1: Numeric (0 - 10)  Pain Intensity 1: 0 none  Post Treatment: none   IV  O2 Device: Room air     MOBILITY: I Mod I S SBA CGA Min Mod Max Total  NT x2 Comments:   Bed Mobility    Rolling [] [] [x] [] [] [] [] [] [] [] []    Supine to Sit [] [] [x] [] [] [] [] [] [] [] []    Scooting [] [] [x] [] [] [] [] [] [] [] []    Sit to Supine [] [] [] [] [] [] [] [] [] [x] []    Transfers    Sit to Stand [] [] [] [x] [] [] [] [] [] [] []    Bed to Chair [] [] [] [x] [] [] [] [] [] [] []    Stand to Sit [] [] [] [x] [] [] [] [] [] [] []    I=Independent, Mod I=Modified Independent, S=Supervision, SBA=Standby Assistance, CGA=Contact Guard Assistance,   Min=Minimal Assistance, Mod=Moderate Assistance, Max=Maximal Assistance, Total=Total Assistance, NT=Not Tested    BALANCE: Good Fair+ Fair Fair- Poor NT Comments   Sitting Static [x] [] [] [] [] [] Sitting Dynamic [x] [] [] [] [] []              Standing Static [] [x] [] [] [] []    Standing Dynamic [] [x] [] [] [] []      GAIT: I Mod I S SBA CGA Min Mod Max Total  NT x2 Comments:   Level of Assistance [] [] [] [] [x] [] [] [] [] [] []    Distance 3x250'    DME Rolling Walker and Gait Belt    Gait Quality Slightly unsteady, flexed, shuffled, poor SPC use    Weightbearing  Status N/A     I=Independent, Mod I=Modified Independent, S=Supervision, SBA=Standby Assistance, CGA=Contact Guard Assistance,   Min=Minimal Assistance, Mod=Moderate Assistance, Max=Maximal Assistance, Total=Total Assistance, NT=Not Tested    PLAN:   FREQUENCY/DURATION: PT Plan of Care: 3 times/week for duration of hospital stay or until stated goals are met, whichever comes first.  TREATMENT:     TREATMENT:   ($$ Therapeutic Exercises: 38-52 mins    )  Therapeutic Exercise (40 Minutes): Therapeutic exercises noted below to improve functional mobility and (therex included level ground ambulation).      TREATMENT GRID:   Date:  3/23/21 Date:   Date:     Activity/Exercise Parameters Parameters Parameters   Heel/toe taps 15     LAQs 15     marching 15     Hip ABD/ADD 15                         AFTER TREATMENT POSITION/PRECAUTIONS:  Bed, Needs within reach and RN notified    INTERDISCIPLINARY COLLABORATION:  RN/PCT and PT/PTA    TOTAL TREATMENT DURATION:  PT Patient Time In/Time Out  Time In: 0946  Time Out: [address]

## 2021-03-26 NOTE — PROGRESS NOTES
Problem: Falls - Risk of  Goal: *Absence of Falls  Description: Document Yoav Kelley Fall Risk and appropriate interventions in the flowsheet.   Outcome: Progressing Towards Goal  Note: Fall Risk Interventions:  Mobility Interventions: Patient to call before getting OOB         Medication Interventions: Patient to call before getting OOB, Teach patient to arise slowly    Elimination Interventions: Call light in reach              Problem: Patient Education: Go to Patient Education Activity  Goal: Patient/Family Education  Outcome: Progressing Towards Goal

## 2021-03-26 NOTE — DISCHARGE SUMMARY
Admit date: 3/19/2021   Admitting Provider: Chandu Hager DO    Discharge date: 3/26/2021  Discharging Provider: Nat Torres MD      * Admission Diagnoses: Atrial fibrillation with rapid ventricular response [facility]) [I48.91]    * Discharge Diagnoses:    Hospital Problems as of 3/26/2021 Date Reviewed: 1/16/2016          Codes Class Noted - Resolved POA    Pericarditis with effusion ICD-10-CM: I31.9  ICD-9-CM: 423.9  3/23/2021 - Present Unknown        * (Principal) Atrial fibrillation with rapid ventricular response ([facility] ICD-10-CM: I48.91  ICD-9-CM: 427.31  3/19/2021 - Present Unknown        Acute renal failure (ARF) ([facility] ICD-10-CM: N17.9  ICD-9-CM: 584.9  4/22/2019 - Present Yes        Sepsis ([facility] ICD-10-CM: A41.9  ICD-9-CM: 038.9, 995.91  4/22/2019 - Present Yes        Hypertension, essential, benign ICD-10-CM: I10  ICD-9-CM: 401.1  8/18/2016 - Present Yes        Acquired hypothyroidism ICD-10-CM: E03.9  ICD-9-CM: 244.9  1/16/2016 - Present Yes        Rheumatoid arthritis ([facility] ICD-10-CM: M06.9  ICD-9-CM: 714.0  1/16/2016 - Present Yes              * Hospital Course: Hospital course  Patient is a 71-year-old female admitted for severe sepsis on admission secondary to pneumonia. She was hydrated with IV fluids and treated with ceftriaxone and azithromycin. Covid test on admission negative. She has new onset A. fib RVR and moderate pericardial effusion for which cardiology was consulted. She is currently on amiodarone and converted to normal sinus rhythm. Repeat echocardiogram was done on 3/22 which showed stable moderate pericardial effusion without any evidence of hemodynamic compromise. Patient was initially on heparin drip subsequently switched to Eliquis on 3/24. Rheumatology was also consulted and recommendations for colchicine or prednisone. However due to drug interaction with colchicine and amiodarone, she was started on steroids.   She had home oxygen screen done today and did not need any oxygen with ambulation. She has acute kidney injury versus CKD stage III with creatinine of3.3 on admission. She was hydrated with IV fluids and renal function improved. Creatinine has been 1.5-1.7. Severe sepsis POA from Pneumonia  Patient may have been hypotensive in the setting of moderate pericardial effusion effusion and dehydration, which led to severe hypotension on admission  -Hypotension on admission was improved with IV hydration  -Blood cultures negative, check UA urine cultures, trend pro calcitonin  - Pt to complete Ceftriaxone today. Completed Azithromycin. -COVID negative     CAP:  Rocephin, Azithromycin. Atrial fibrillation rapid ventricular response,resolved  appreciate cardiology's consultation  -back to SR after amio gtt; continue PO amio per cardiology  -tele monitor  Repeat ECHO shows stable findings with moderate pericardial effusion. Heparin drip switched to Eliquis 3/24. Moderate pericardial effusion  -extremely elevated high sensitivity CRP suggests this may be pericarditis effusion;   -Rheumatology consult appreciated. recommends colchicine or prednisone. Due to drug interaction of colchicine with amiodarone, patient started on prednisone.   -cardiology recs appreciated. Repeat ECHO shows stable findings with moderate pericardial effusion. Acute kidney injuryvs CKD 3  Pt denies any prior chronic kidney disease. ? Pre-renal  last known creatinine 1.07, on admission 3.3, CT urogram shows left atrophic kidney, no renal calculi or hydronephrosis, likely prerenal due to dehydration and hypotension  Status post 3 L of NS bolus in the emergency room, follow urine electrolytes. 3/25 Cr 1.7 this am. Gentle IV fluids today. Recheck BMP in am.      Hypertension  BP stable at 120/60.   hold home medication due to well controlled BP.       History of rheumatoid arthritis, HTN, major depressions, former smoker, hyperlipidemia, history of TIA, Graves disease and ?hypothyroidism on Synthroid (TSH WNL), left atrophic kidney,  -currently stable co morbidities add to patient's case complexity     DVT PPx: Eliquis      Code Status: Full       * Procedures: SUMMARY:     -  Left ventricle: Systolic function was normal. Ejection fraction was  estimated in the range of 55 % to 60 %. There were no regional wall motion  abnormalities. -  Right ventricle: Systolic function was mildly reduced by TAPSE assessment. -  Inferior vena cava, hepatic veins: The respirophasic change in diameter   was  more than 50%. -  Mitral valve: There was mild annular calcification. There was mild  regurgitation.     -  Tricuspid valve: There was mild regurgitation.     -  Pericardium: A moderate pericardial effusion was identified   circumferential  to the heart. There was mild RA inversion, no clear evidence of RV diastolic  collapse or clear evidence of hemodynamic compromise. Clinical correlation  required.     * No surgery found *      Consults: cardiology    Significant Diagnostic Studies:   CULTURE, URINE [[id number]]  (Abnormal) Collected: 03/22/21 0600      Order Status: Completed Specimen: Urine from Clean catch Updated: 03/25/21 0715       Special Requests: NO SPECIAL REQUESTS           Culture result:           >100,000 COLONIES/mL CANDIDA GLABRATA             BLOOD CULTURE [[id number]] Collected: 03/19/21 1339     Order Status: Completed Specimen: Blood Updated: 03/24/21 1055       Special Requests: --           LEFT  Antecubital          Culture result: NO GROWTH 5 DAYS         BLOOD CULTURE [[id number]] Collected: 03/19/21 1530     Order Status: Completed Specimen: Blood Updated: 03/24/21 1055       Special Requests: --           LEFT  HAND          Culture result: NO GROWTH 5 DAYS         SARS-COV-2, PCR [[id number]] Collected: 03/19/21 1815     Order Status: Completed Specimen: Nasopharyngeal Updated: 03/20/21 1250       Specimen source Nasopharyngeal           SARS-CoV-2 Not detected Comment:      The specimen is NEGATIVE for SARS-CoV-2, the novel coronavirus associated with COVID-19. A negative result does not rule out COVID-19. This test has been authorized by the FDA under an Emergency Use Authorization (EUA) for use by authorized laboratories. Fact sheet for Healthcare Providers: [employer]  Fact sheet for Patients: https://fda.gov/media/896481/download       Methodology: RT-PCR           COVID-19 RAPID TEST [[id number]] Collected: 03/19/21 1815     Order Status: Completed Specimen: Nasopharyngeal Updated: 03/19/21 1913       Specimen source Nasopharyngeal           COVID-19 rapid test Not detected           Comment:      The specimen is NEGATIVE for SARS-CoV-2, the novel coronavirus associated with COVID-19. A negative result does not rule out COVID-19. This test has been authorized by the FDA under an Emergency Use Authorization (EUA) for use by authorized laboratories. Fact sheet for Healthcare Providers: [employer]  Fact sheet for Patients: [employer].       Methodology: Isothermal Nucleic Acid Amplification                   Discharge Exam:  Visit Vitals  /64 (BP 1 Location: Left upper arm, BP Patient Position: At rest)   Pulse 74   Temp 98.3 °F (36.8 °C)   Resp 18   Ht 5' (1.524 m)   Wt 65.7 kg (144 lb 12.8 oz)   SpO2 94%   BMI 28.28 kg/m²     General:  Alert, cooperative, no distress, appears stated age. Head:  Normocephalic, without obvious abnormality, atraumatic. Eyes:  Conjunctivae/corneas clear. PERRL, EOMs intact. Fundi benign. Ears:  Normal TMs and external ear canals both ears. Nose: Nares normal. Septum midline. Mucosa normal. No drainage or sinus tenderness.    Throat: Lips, mucosa, and tongue normal. Teeth and gums normal.   Neck: Supple, symmetrical, trachea midline, no adenopathy, thyroid: no enlargement/tenderness/nodules, no carotid bruit and no JVD. Back:   Symmetric, no curvature. ROM normal. No CVA tenderness. Lungs:   Clear to auscultation bilaterally. Chest wall:  No tenderness or deformity. Heart:  Regular rate and rhythm, S1, S2 normal, no murmur, click, rub or gallop. Breast Exam:  No tenderness, masses, or nipple abnormality. Abdomen:   Soft, non-tender. Bowel sounds normal. No masses,  No organomegaly. Genitalia:  Normal female without lesion, discharge or tenderness. Rectal:  Normal tone,  no masses or tenderness  Guaiac negative stool. Extremities: Extremities normal, atraumatic, no cyanosis or edema. Pulses: 2+ and symmetric all extremities. Skin: Skin color, texture, turgor normal. No rashes or lesions. Lymph nodes: Cervical, supraclavicular, and axillary nodes normal.   Neurologic: CNII-XII intact. Normal strength, sensation and reflexes throughout. * Discharge Condition: stable  * Disposition: home with home health    Discharge Medications:  Current Discharge Medication List      START taking these medications    Details   amiodarone (PACERONE) 400 mg tablet Take 1 Tab by mouth daily. Qty: 14 Tab, Refills: 2      apixaban (ELIQUIS) 5 mg tablet Take 1 Tab by mouth two (2) times a day. Qty: 90 Tab, Refills: 3      benzonatate (TESSALON) 100 mg capsule Take 1 Cap by mouth three (3) times daily as needed for Cough for up to 7 days. Qty: 14 Cap, Refills: 1      famotidine (PEPCID) 20 mg tablet Take 1 Tab by mouth daily. Qty: 30 Tab, Refills: 1      predniSONE (DELTASONE) 10 mg tablet Take 30 mg by mouth daily (with breakfast). Qty: 30 Tab, Refills: 0    Comments: 30 mg PO daily X 4 days, than 20 mg PO daily X 4 days, than 10 mg PO daily X 4 days, than STOP      ondansetron (ZOFRAN ODT) 4 mg disintegrating tablet Take 1 Tab by mouth every eight (8) hours as needed for Nausea or Vomiting.   Qty: 20 Tab, Refills: 1         CONTINUE these medications which have CHANGED    Details   rosuvastatin (Crestor) 10 mg tablet Take 1 Tab by mouth nightly. Qty: 30 Tab, Refills: 1         CONTINUE these medications which have NOT CHANGED    Details   cholecalciferol (VITAMIN D3) 1,000 unit tablet Take 1,000 Units by mouth daily. DULoxetine (CYMBALTA) 60 mg capsule Take 1 Cap by mouth daily. Qty: 90 Cap, Refills: 2    Associated Diagnoses: Idiopathic peripheral neuropathy; Major depressive disorder with single episode, in remission (HCC)      levothyroxine (SYNTHROID) 25 mcg tablet Take 1 Tab by mouth Daily (before breakfast). Qty: 90 Tab, Refills: 2    Associated Diagnoses: Acquired hypothyroidism      folic acid 879 mcg tablet Take 800 mcg by mouth daily. methotrexate (RHEUMATREX) 2.5 mg tablet 5 tabs every Sunday  Qty: 60 Tab, Refills: 2    Associated Diagnoses: Rheumatoid arthritis, involving unspecified site, unspecified rheumatoid factor presence         STOP taking these medications       potassium chloride (K-DUR, KLOR-CON) 20 mEq tablet Comments:   Reason for Stopping:         spironolactone (ALDACTONE) 25 mg tablet Comments:   Reason for Stopping:         metoprolol succinate (TOPROL-XL) 25 mg XL tablet Comments:   Reason for Stopping:         losartan-hydroCHLOROthiazide (HYZAAR) 100-12.5 mg per tablet Comments:   Reason for Stopping:         aspirin delayed-release 81 mg tablet Comments:   Reason for Stopping:               * Follow-up Care/Patient Instructions:   Activity: as tollerated  Diet: cardiac  Wound Care: none    Follow-up Information     Follow up With Specialties Details Why Contact Info    Arely Wharton, [address]   [address] [place] [address]  [phone]        patient is also to follow with Dr. Erinn Cohen, cardiology in 2 weeks to consider reduction of the Amiodaron Dose    Patient started on Diflucan 100 mg PO daily X 7 days for Candida UTI    Total time spent - 36 min    Signed:  Victor Manuel Evangelista MD  3/26/2021  12:01 PM

## 2021-03-26 NOTE — PROGRESS NOTES
Hourly rounds completed with bed in low/locked position and call light within reach. Patient has rested this shift with IVF continuous, no acute changes. Patient currently resting in bed with all needs met at this time will give report to oncoming RN.

## 2021-03-26 NOTE — PROGRESS NOTES
[facility] CARDIOLOGY PROGRESS NOTE    3/26/2021 6:01 PM    Admit Date: 3/19/2021        Subjective:   Stable overnight without angina, CHF, or palpitations. Vitals stable and controlled. No other complaints overnight. Tolerating meds well. Objective:      Vitals:    03/26/21 0412 03/26/21 0743 03/26/21 1038 03/26/21 1155   BP: 139/71 125/67  121/64   Pulse: 79 73  74   Resp: 18 18  18   Temp: 97.4 °F (36.3 °C) 97.8 °F (36.6 °C)  98.3 °F (36.8 °C)   SpO2: 99% 98%  94%   Weight:       Height:   5' (1.524 m)        Physical Exam:  Neck- supple, no JVD  CV- irregular rate and rhythm no MRG  Lung- clear bilaterally, decreased bibasilar  Abd- soft, nontender, nondistended  Ext- no edema  Skin- warm and dry    Data Review:   Recent Labs     03/26/21  0601 03/25/21  0527    136   K 4.6 4.5   BUN 29* 31*   CREA 1.68* 1.76*   GLU 98 103*   WBC 17.8* 18.3*   HGB 9.1* 9.8*   HCT 28.2* 29.2*   * 468*       Assessment and Plan:     Principal Problem:    Atrial fibrillation with rapid ventricular response (HCC) (3/19/2021)- stable, continue meds, titrate rate slowing meds as needed- continue amiodarone, follow up in office in 1-2 weeks. Active Problems:    Acquired hypothyroidism (1/16/2016)      Rheumatoid arthritis ([address] (1/16/2016)      Hypertension, essential, benign (8/18/2016)- stable, continue meds      Acute renal failure (ARF) ([address] (4/22/2019)- improving, BMP 1 week      Sepsis ([address] (4/22/2019)      Pericarditis with effusion (3/23/2021)- non-hemodynamically significant, continue to follow in outpatient setting.          Sugey Mas MD  [facility] Cardiology  Pager [phone]

## 2021-03-26 NOTE — PROGRESS NOTES
Discharge orders have been placed. CM met with patient to discuss discharge needs. Patient denies any supportive care needs at this time. [facility] Patient to discharge home with support provided by her [employer] family. CM provided assistance with DME ([facility]) as PT requested. Order faxed to [facility]  DME Order provided to patient at bedside, from [facility] No additional needs voiced. Please consult or notify CM if any needs shall arise. CM remains available. Care Management Interventions  PCP Verified by CM: Yes  Mode of Transport at Discharge: Other (see comment)(Patient's Analisa Campos. )  Transition of Care Consult (CM Consult): Discharge Planning  Discharge Durable Medical Equipment: Yes(RW ordered with [employer]. )  Physical Therapy Consult: Yes  Occupational Therapy Consult: Yes  Speech Therapy Consult: No  Current Support Network: Lives Alone, Own Home  Confirm Follow Up Transport: Self  The Plan for Transition of Care is Related to the Following Treatment Goals : Return to Baseline. The Patient and/or Patient Representative was Provided with a Choice of Provider and Agrees with the Discharge Plan?: Yes  Name of the Patient Representative Who was Provided with a Choice of Provider and Agrees with the Discharge Plan: Patient.    [place] Resource Information Provided?: No  Discharge Location  Discharge Placement: Home

## 2021-03-29 NOTE — ADT AUTH CERT NOTES
Utilization Reviews 
 
  
Atrial Fibrillation - Care Day 7 (3/25/2021) by Murali Patiño 
 
  
Review Entered Review Status 3/25/2021 12:15 Completed  
  
Criteria Review Care Day: 7 Care Date: 3/25/2021 Level of Care: Inpatient Floor Guideline Day 2 Level Of Care (X) ICU, intermediate care, or telemetry to discharge 3/25/2021 12:15:25 EDT by Fernando Ventura Clinical Status   
(X) * Hemodynamic stability 3/25/2021 12:15:25 EDT by Murali Patiño   
  127/75-  97.6-  73-  18-  99%RA (X) * Sinus rhythm or acceptable ventricular rate 3/25/2021 12:15:25 EDT by Latoya Quijano Doing well remained in SR   
(X) * No evidence of myocardial ischemia 3/25/2021 12:15:25 EDT by Murali Patiño   
  no evidence of MI   
(X) * Mental status at baseline   
(X) * Tachypnea absent 3/25/2021 12:15:25 EDT by Murali Patiño   
  tachypnea absent   
(X) * Hypoxemia absent 3/25/2021 12:15:25 EDT by Murali Patiño   
  no hypoxemia   
(X) * Anticoagulants regimen for next level of care established 3/25/2021 12:15:25 EDT by Latoya Quijano is on po AC   
( ) * Discharge plans and education understood Activity   
(X) * Ambulatory or acceptable for next level of care 3/25/2021 12:15:25 EDT by Latoya Quijano as tolerated Routes   
(X) * Oral hydration, medications, and diet 3/25/2021 12:15:25 EDT by Murali Patiño   
  Meds: Ns at 125cc/hr, cordarone 400 mg po q 8 hr, Eliquis 5 mg po qd, vit d3 1000 u qd po, Cymbalta 60 mg po qd, Pepcid 20 mg po qd, folic acid 1 mg op qd, synthorid 25 mcg po qd,  prednisone 30 mg po qd Zithromax IV, IV Rocephin Interventions (X) Cardiac monitoring 3/25/2021 12:15:25 EDT by Latoya Quijano continuous Medications (X) Anticoagulants [F]   
3/25/2021 12:15:25 EDT by Murali cuello 5 mg (X) Possible rate and rhythm control medications 3/25/2021 12:15:25 EDT by Murali tineo, * Milestone Additional Notes 3/25/21 PN progressive shortness of breath, over the last 2 weeks, started close outpatient pneumonia treatments by PCP, did not improve, shortness of breath worsened this morning, patient also felt the new onset of palpitation Treatment: Monitoring,  3 L NS Bolus. Zithromax IV, IV Rocephin  
   
Thank you, Maryam Prado RN,C BSN Clinical   
[email or web address]  
Options provided:  
-- Bacterial pneumonia -- Viral pneumonia  
-- Aspiration pneumonia  
-- Other - I will add my own diagnosis -- Disagree - Not applicable / Not valid -- Disagree - Clinically unable to determine / Unknown  
-- Refer to Clinical Documentation Reviewer  
   
PROVIDER RESPONSE TEXT:  
   
This patient has bacterial pneumonia. 127/75-  97.6-  73-  18-  99%RA Meds: Ns at 125cc/hr, cordarone 400 mg po q 8 hr, Eliquis 5 mg po qd, vit d3 1000 u qd po, Cymbalta 60 mg po qd, Pepcid 20 mg po qd, folic acid 1 mg op qd, synthorid 25 mcg po qd,  prednisone 30 mg po qd, Ref. Range 3/25/2021 05:27 WBC Latest Ref Range: 4.3 - 11.1 K/uL 18.3 (H) RBC Latest Ref Range: 4.05 - 5.2 M/uL 3.02 (L) HGB Latest Ref Range: 11.7 - 15.4 g/dL 9.8 (L) HCT Latest Ref Range: 35.8 - 46.3 % 29.2 (L) MCV Latest Ref Range: 79.6 - 97.8 FL 96.7 MCH Latest Ref Range: 26.1 - 32.9 PG 32.5 MCHC Latest Ref Range: 31.4 - 35.0 g/dL 33.6 RDW Latest Ref Range: 11.9 - 14.6 % 13.7 PLATELET Latest Ref Range: 150 - 450 K/uL 468 (H) MPV Latest Ref Range: 9.4 - 12.3 FL 10.1 NEUTROPHILS Latest Ref Range: 43 - 78 % 86 (H) LYMPHOCYTES Latest Ref Range: 13 - 44 % 7 (L) MONOCYTES Latest Ref Range: 4.0 - 12.0 % 6 EOSINOPHILS Latest Ref Range: 0.5 - 7.8 % 0 (L) BASOPHILS Latest Ref Range: 0.0 - 2.0 % 0 IMMATURE GRANULOCYTES Latest Ref Range: 0.0 - 5.0 % 1 DF Latest Units:   AUTOMATED ABSOLUTE NRBC Latest Ref Range: 0.0 - 0.2 K/uL 0.00  
ABS.  NEUTROPHILS Latest Ref Range: 1.7 - 8.2 K/UL 15.8 (H)  
ABS. IMM. GRANS. Latest Ref Range: 0.0 - 0.5 K/UL 0.1 ABS. LYMPHOCYTES Latest Ref Range: 0.5 - 4.6 K/UL 1.3  
ABS. MONOCYTES Latest Ref Range: 0.1 - 1.3 K/UL 1.0  
ABS. EOSINOPHILS Latest Ref Range: 0.0 - 0.8 K/UL 0.0  
ABS. BASOPHILS Latest Ref Range: 0.0 - 0.2 K/UL 0.0 Sodium Latest Ref Range: 136 - 145 mmol/L 136 Potassium Latest Ref Range: 3.5 - 5.1 mmol/L 4.5 Chloride Latest Ref Range: 98 - 107 mmol/L 102 CO2 Latest Ref Range: 21 - 32 mmol/L 29 Anion gap Latest Ref Range: 7 - 16 mmol/L 5 (L) Glucose Latest Ref Range: 65 - 100 mg/dL 103 (H) BUN Latest Ref Range: 8 - 23 MG/DL 31 (H) Creatinine Latest Ref Range: 0.6 - 1.0 MG/DL 1.76 (H) Calcium Latest Ref Range: 8.3 - 10.4 MG/DL 9.3 GFR est non-AA Latest Ref Range: >60 ml/min/1.73m2 30 (L) GFR est AA Latest Ref Range: >60 ml/min/1.73m2 36 (L)  
  
  
Atrial Fibrillation - Care Day 6 (3/24/2021) by Chata Chisholm 
 
  
Review Entered Review Status 3/24/2021 16:34 Completed  
  
Criteria Review Care Day: 6 Care Date: 3/24/2021 Level of Care: Inpatient Floor Guideline Day 2 Level Of Care (X) ICU, intermediate care, or telemetry to discharge 3/24/2021 16:34:13 EDT by Liza Kearney Clinical Status   
(X) * Hemodynamic stability 3/24/2021 16:34:13 EDT by [address]-  98.4-  96%   
(X) * Sinus rhythm or acceptable ventricular rate 3/24/2021 16:34:13 EDT by Chata Chisholm   
  Afib - in SR on po amiodarone, on heparin gtt, transition to [address] [place] [address] once primary team feels it is appropriate.   
(X) * No evidence of myocardial ischemia 3/24/2021 16:34:13 EDT by Chata Chisholm   
  no evidence of MI   
(X) * Mental status at baseline 3/24/2021 16:34:13 EDT by Chata Chisholm   
  aaox 3   
(X) * Tachypnea absent 3/24/2021 16:34:13 EDT by Chata Chisholm   
  16-18   
(X) * Hypoxemia absent 3/24/2021 16:34:13 EDT by Chata Chisholm   
  94-96%   
( ) * Anticoagulants regimen for next level of care established ( ) * Discharge plans and education understood Activity   
(X) * Ambulatory or acceptable for next level of care 3/24/2021 16:34:13 EDT by Sandie Lewis   
  performed supine > sit with supervision, sitting EOB with good static sitting balance control. Sit > stand with supervision using SPC. Gait 2x 250 ft with SPC, cues for posture and step clearance Routes   
(X) * Oral hydration, medications, and diet 3/24/2021 16:34:13 EDT by Sandie Lewis   
  meds in summary Interventions (X) Cardiac monitoring 3/24/2021 16:34:13 EDT by Harinder Butler (X) Possible PT/PTT if anticoagulated 3/24/2021 16:34:13 EDT by Abisai Ellington in summary Medications (X) Anticoagulants [F]   
3/24/2021 16:34:13 EDT by Sandie Lewis   
  heparin gtt,   
(X) Possible rate and rhythm control medications 3/24/2021 16:34:13 EDT by Sandie Lewis   
  amiodarone, * Milestone Additional Notes 3/24/21 Subjective: Doing well remained in SR  
   
ROS:  
Cardiovascular:  As noted above  
   
Objective:  
   
  
Vitals:  
  03/24/21 0016 03/24/21 0429 03/24/21 0718 03/24/21 1055 BP: 112/68 122/68 125/63 102/74 Pulse: 84 80 79 85 Resp: 16 16 18 18 Temp: 98.4 °F (36.9 °C) 98.2 °F (36.8 °C) 98 °F (36.7 °C) 98.2 °F (36.8 °C) SpO2: 96% 94% 95% 94% Weight:   
Height:   
   
   
   
Physical Exam:  
General: Well Developed, Well Nourished, No Acute Distress, Alert & Oriented x 3, Appropriate mood Neck: supple, no JVD Heart: S1S2 with RRR without murmurs or gallops Lungs: Clear throughout auscultation bilaterally without adventitious sounds Abd: soft, nontender, nondistended, with good bowel sounds Ext: no edema bilaterally Skin: warm and dry  
   
   
  03/24/21  
0400 * K 4.6 BUN 33* CREA 1.63* * WBC 17.1* HGB 9.5* HCT 28.7*  Assessment/Plan:  
   
Principal Problem:  
Mena Hernandez fibrillation with rapid ventricular response ([facility] (3/19/2021)  
  Active Problems:  
  Acquired hypothyroidism (1/16/2016)    
  Rheumatoid arthritis ([facility] (1/16/2016)    
  Hypertension, essential, benign (8/18/2016)    
  Acute renal failure (ARF) ([facility] (4/22/2019)    
  Sepsis ([facility] (4/22/2019)    
  Pericarditis with effusion (3/23/2021)  
  
  
   
A/P  
1) Afib - in SR on po amiodarone, on heparin gtt, transition to [address] [place] [address] once primary team feels it is appropriate. 2) Effusion - stable, no further action 3) NAZIA - stable 4) RA - rheum is following now on steroids  
   
   
o predniSONE (DELTASONE) tablet 30 mg 30 mg Oral DAILY WITH BREAKFAST  
o azithromycin (ZITHROMAX) tablet 500 mg 500 mg Oral Q24H  
o cefTRIAXone (ROCEPHIN) 1 g in 0.9% sodium chloride (MBP/ADV) 50 mL MBP 1 g IntraVENous Q24H  
o HYDROcodone-homatropine (HYCODAN) 5-1.5 mg/5 mL (5 mL) oral solution 5 mL 5 mL Oral Q4H PRN  
o heparin 25,000 units in dextrose 500 mL infusion 12-25 Units/kg/hr IntraVENous TITRATE  
o levalbuterol (XOPENEX) nebulizer soln 1.25 mg/3 mL 1.25 mg Nebulization Q4H PRN  
o amiodarone (CORDARONE) tablet 400 mg 400 mg Oral Q8H  
o benzonatate (TESSALON) capsule 100 mg 100 mg Oral TID PRN  
o [Held by provider] aspirin delayed-release tablet 81 mg 81 mg Oral DAILY  
o cholecalciferol (VITAMIN D3) (1000 Units /25 mcg) tablet 1,000 Units 1,000 Units Oral DAILY  
o DULoxetine (CYMBALTA) capsule 60 mg 60 mg Oral DAILY  
o folic acid (FOLVITE) tablet 1 mg 1 mg Oral DAILY  
o levothyroxine (SYNTHROID) tablet 25 mcg 25 mcg Oral ACB  
o methotrexate (RHEUMATREX) tablet 12.5 mg 12.5 mg Oral every Sunday  
o rosuvastatin (CRESTOR) tablet 10 mg 10 mg Oral QHS  
o sodium chloride (NS) flush 5-40 mL 5-40 mL IntraVENous Q8H  
o sodium chloride (NS) flush 5-40 mL 5-40 mL IntraVENous PRN  
o acetaminophen (TYLENOL) tablet 650 mg 650 mg Oral Q6H PRN  
  Or  
o acetaminophen (TYLENOL) suppository 650 mg 650 mg Rectal Q6H PRN o polyethylene glycol (MIRALAX) packet 17 g 17 g Oral DAILY  
o senna-docusate (PERICOLACE) 8.6-50 mg per tablet 1 Tab 1 Tab Oral BID  
o magnesium hydroxide (MILK OF MAGNESIA) 400 mg/5 mL oral suspension 30 mL 30 mL Oral DAILY PRN  
o bisacodyL (DULCOLAX) suppository 10 mg 10 mg Rectal DAILY PRN  
o ondansetron (ZOFRAN ODT) tablet 4 mg 4 mg Oral Q8H PRN  
  Or  
o ondansetron (ZOFRAN) injection 4 mg 4 mg IntraVENous Q6H PRN  
o famotidine (PEPCID) tablet 20 mg 20 mg Oral DAILY  
o [Held by provider] metoprolol succinate (TOPROL-XL) XL tablet 25 mg 25 mg Oral DAILY  
  
  
  
  
  
Atrial Fibrillation - Care Day 5 (3/23/2021) by Espinoza Conde 
 
  
Review Entered Review Status 3/24/2021 15:27 Completed  
  
Criteria Review Care Day: 5 Care Date: 3/23/2021 Level of Care: Inpatient Floor Guideline Day 2 Level Of Care (X) ICU, intermediate care, or telemetry to discharge 3/24/2021 15:27:38 EDT by Kristie Ernst Clinical Status   
(X) * Hemodynamic stability 3/24/2021 15:27:38 EDT by Naun [address] [place] [address] 101. 5-  75-  16-  93% RA   
(X) * Sinus rhythm or acceptable ventricular rate   
(X) * No evidence of myocardial ischemia   
(X) * Mental status at baseline 3/24/2021 15:27:38 EDT by Espinoza Conde   
  AAO   
(X) * Tachypnea absent 3/24/2021 15:27:38 EDT by Espinoza Conde   
  16-17   
(X) * Hypoxemia absent 3/24/2021 15:27:38 EDT by Espinoza Conde   
  93% ra ( ) * Anticoagulants regimen for next level of care established ( ) * Discharge plans and education understood Activity   
(X) * Ambulatory or acceptable for next level of care 3/24/2021 15:27:38 EDT by Francois Hendrickson She was slightly unsteady but was able to correct without assist. She required 2 standing rest breaks due to SOB. Back in room O2 was checked with sats in the mid 90s after sitting for a few minut Routes   
(X) * Oral hydration, medications, and diet 3/24/2021 15:27:38 EDT by Cody Tran in summary Interventions (X) Cardiac monitoring 3/24/2021 15:27:38 EDT by Cody Tran contiunuous cardiac monitoring (X) Possible PT/PTT if anticoagulated 3/24/2021 15:27:38 EDT by Cody Tran in summary Medications (X) Anticoagulants [F]   
3/24/2021 15:27:38 EDT by Isabela Fuentes   
  heprain gtt   
(X) Possible rate and rhythm control medications 3/24/2021 15:27:38 EDT by Isabela Fuentes   
  amiodarone po   
* Milestone Additional Notes 3/23/21  
   
3/23 Patient is doing well this morning.  No chest pain.  Tachycardia improved.  No fever no chills.  No nausea no vomiting. No shortness of breath.   
   
   
Complete ROS done and is as stated in HPI or otherwise negative No other complaints Objective:  
   
Patient Vitals for the past 24 hrs:  
  Temp Pulse Resp BP SpO2  
03/23/21 1048 98.8 °F (37.1 °C) 80 16 119/60 95 % Oxygen Therapy O2 Sat (%): 95 % (03/23/21 1048) Pulse via Oximetry: 87 beats per minute (03/22/21 0016) O2 Device: Nasal cannula (03/22/21 1124) Skin Assessment: Clean, dry, & intact (03/20/21 1715) O2 Flow Rate (L/min): 2 l/min (03/22/21 1124) Current Facility-Administered Medications Medication Dose Route Frequency  
o predniSONE (DELTASONE) tablet 30 mg 30 mg Oral DAILY WITH BREAKFAST  
o azithromycin (ZITHROMAX) tablet 500 mg 500 mg Oral Q24H  
o cefTRIAXone (ROCEPHIN) 1 g in 0.9% sodium chloride (MBP/ADV) 50 mL MBP 1 g IntraVENous Q24H  
o HYDROcodone-homatropine (HYCODAN) 5-1.5 mg/5 mL (5 mL) oral solution 5 mL 5 mL Oral Q4H PRN  
o heparin 25,000 units in dextrose 500 mL infusion 12-25 Units/kg/hr IntraVENous TITRATE  
o levalbuterol (XOPENEX) nebulizer soln 1.25 mg/3 mL 1.25 mg Nebulization Q4H PRN  
o amiodarone (CORDARONE) tablet 400 mg 400 mg Oral Q8H  
o benzonatate (TESSALON) capsule 100 mg 100 mg Oral TID PRN  
o [Held by provider] aspirin delayed-release tablet 81 mg 81 mg Oral DAILY  
o cholecalciferol (VITAMIN D3) (1000 Units /25 mcg) tablet 1,000 Units 1,000 Units Oral DAILY  
o DULoxetine (CYMBALTA) capsule 60 mg 60 mg Oral DAILY  
o folic acid (FOLVITE) tablet 1 mg 1 mg Oral DAILY  
o levothyroxine (SYNTHROID) tablet 25 mcg 25 mcg Oral ACB  
o methotrexate (RHEUMATREX) tablet 12.5 mg 12.5 mg Oral every Sunday  
o rosuvastatin (CRESTOR) tablet 10 mg 10 mg Oral QHS  
o sodium chloride (NS) flush 5-40 mL 5-40 mL IntraVENous Q8H  
o sodium chloride (NS) flush 5-40 mL 5-40 mL IntraVENous PRN  
o acetaminophen (TYLENOL) tablet 650 mg 650 mg Oral Q6H PRN  
  Or  
o acetaminophen (TYLENOL) suppository 650 mg 650 mg Rectal Q6H PRN  
o polyethylene glycol (MIRALAX) packet 17 g 17 g Oral DAILY  
o senna-docusate (PERICOLACE) 8.6-50 mg per tablet 1 Tab 1 Tab Oral BID  
o magnesium hydroxide (MILK OF MAGNESIA) 400 mg/5 mL oral suspension 30 mL 30 mL Oral DAILY PRN  
o bisacodyL (DULCOLAX) suppository 10 mg 10 mg Rectal DAILY PRN  
o ondansetron (ZOFRAN ODT) tablet 4 mg 4 mg Oral Q8H PRN  
  Or  
o ondansetron (ZOFRAN) injection 4 mg 4 mg IntraVENous Q6H PRN  
o famotidine (PEPCID) tablet 20 mg 20 mg Oral DAILY  
o [Held by provider] metoprolol succinate (TOPROL-XL) XL tablet 25 mg 25 mg Oral DAILY Severe sepsis-etiology unclear, likely multifactorial.  Patient may have been hypotensive in the setting of moderate pericardial effusion effusion and dehydration, which led to severe hypotension on admission -Hypotension on admission was improved with IV hydration  
-Blood cultures negative, check UA urine cultures, trend pro calcitonin  
-Continue with antibiotics as ordered for bronchiolitis on CT   
-COVID negative  
   
Atrial fibrillation rapid ventricular response,resolved -appreciate cardiology's consultation  
-back to SR after amio gtt; continue PO amio per cardiology  
-heparin gtt and PTA eliquis were on hold in light of effusion  
-tele monitor  
-Cardiology recommends restarting heparin drip and monitoring for increase in pericardial effusion size; no objections to this plan, will restart and monitor closely. Repeat ECHO shows stable findings with moderate pericardial effusion.   
   
   
Moderate pericardial effusion  
-extremely elevated high sensitivity CRP suggests this may be pericarditis effusion; her RA symptoms dont seem bad enough to explain this  
-she had chest pain and afib on admission  
-Rheumatology consult appreciated. recommends colchicine or prednisone.  Due to drug interaction of colchicine with amiodarone, patient started on prednisone.   
-cardiology recs appreciated  
   
Acute kidney injury- from hypotension   
last known creatinine 1.07, on admission 3.3, CT urogram shows left atrophic kidney, no renal calculi or hydronephrosis, likely prerenal due to dehydration and hypotension  
-Status post 3 L of NS bolus in the emergency room, follow urine electrolytes, continue IV fluids, monitor volume status  
-3/23 Cr slightly worse at 1.6 from 1.5 yesterday.   
   
 Hypertension-  
BP stable at 119/60.   
hold home medication due to severer hypotension, resume if indicated  
   
History of rheumatoid arthritis, HTN, major depressions, former smoker, hyperlipidemia, history of TIA, Graves disease and ?hypothyroidism on Synthroid (TSH WNL), left atrophic kidney,  
-resume home medications if indicated ,and monitor clinically while inpatient, currently stable co morbidities add to patient's case complexity Ref. Range 3/23/2021 04:42 WBC Latest Ref Range: 4.3 - 11.1 K/uL 10.5 RBC Latest Ref Range: 4.05 - 5.2 M/uL 3.04 (L) HGB Latest Ref Range: 11.7 - 15.4 g/dL 9.8 (L) HCT Latest Ref Range: 35.8 - 46.3 % 29.5 (L) MCV Latest Ref Range: 79.6 - 97.8 FL 97.0 MCH Latest Ref Range: 26.1 - 32.9 PG 32.2 MCHC Latest Ref Range: 31.4 - 35.0 g/dL 33.2 RDW Latest Ref Range: 11.9 - 14.6 % 13.7 PLATELET Latest Ref Range: 150 - 450 K/uL 416 MPV Latest Ref Range: 9.4 - 12.3 FL 11.0 NEUTROPHILS Latest Ref Range: 43 - 78 % 76 LYMPHOCYTES Latest Ref Range: 13 - 44 % 11 (L) MONOCYTES Latest Ref Range: 4.0 - 12.0 % 9 EOSINOPHILS Latest Ref Range: 0.5 - 7.8 % 3  
BASOPHILS Latest Ref Range: 0.0 - 2.0 % 1 IMMATURE GRANULOCYTES Latest Ref Range: 0.0 - 5.0 % 1 DF Latest Units:   AUTOMATED ABSOLUTE NRBC Latest Ref Range: 0.0 - 0.2 K/uL 0.00  
ABS. NEUTROPHILS Latest Ref Range: 1.7 - 8.2 K/UL 8.0  
ABS. IMM. GRANS. Latest Ref Range: 0.0 - 0.5 K/UL 0.1 ABS. LYMPHOCYTES Latest Ref Range: 0.5 - 4.6 K/UL 1.2  
ABS. MONOCYTES Latest Ref Range: 0.1 - 1.3 K/UL 0.9  
ABS. EOSINOPHILS Latest Ref Range: 0.0 - 0.8 K/UL 0.3  
ABS. BASOPHILS Latest Ref Range: 0.0 - 0.2 K/UL 0.1 Sodium Latest Ref Range: 136 - 145 mmol/L 137 Potassium Latest Ref Range: 3.5 - 5.1 mmol/L 3.9 Chloride Latest Ref Range: 98 - 107 mmol/L 102 CO2 Latest Ref Range: 21 - 32 mmol/L 28 Anion gap Latest Ref Range: 7 - 16 mmol/L 7 Glucose Latest Ref Range: 65 - 100 mg/dL 106 (H) BUN Latest Ref Range: 8 - 23 MG/DL 31 (H) Creatinine Latest Ref Range: 0.6 - 1.0 MG/DL 1.64 (H) Calcium Latest Ref Range: 8.3 - 10.4 MG/DL 9.1 GFR est non-AA Latest Ref Range: >60 ml/min/1.73m2 32 (L)  
GFR est AA Latest Ref Range: >60 ml/min/1.73m2 39 (L) Bilirubin, total Latest Ref Range: 0.2 - 1.1 MG/DL 0.2 Protein, total Latest Ref Range: 6.3 - 8.2 g/dL 6.0 (L) Albumin Latest Ref Range: 3.2 - 4.6 g/dL 2.3 (L) Globulin Latest Ref Range: 2.3 - 3.5 g/dL 3.7 (H) A-G Ratio Latest Ref Range: 1.2 - 3.5   0.6 (L) ALT Latest Ref Range: 12 - 65 U/L 38 AST Latest Ref Range: 15 - 37 U/L 18 Alk. phosphatase Latest Ref Range: 50 - 136 U/L 62 CRP, High sensitivity Latest Units: mg/L 128.0  
CRP, HIGH SENSITIVITY Unknown Rpt HEPARIN XA UFH Unknown Rpt  
  
  
  
  
  
Atrial Fibrillation - Care Day 4 (3/22/2021) by Fredis Martin 
 
  
Review Entered Review Status 3/24/2021 15:25 Completed  
  
Criteria Review Care Day: 4 Care Date: 3/22/2021 Level of Care: Inpatient Floor Guideline Day 2 Level Of Care (X) ICU, intermediate care, or telemetry to discharge 3/24/2021 15:25:12 EDT by Rodriguez Cherry   
  telemetry Clinical Status   
(X) * Hemodynamic stability 3/24/2021 15:25:12 EDT by Rodriguez Cherry   
  120/69-  114- 120s-  17-  91%  on 2L   
(X) * Sinus rhythm or acceptable ventricular rate 3/24/2021 15:25:12 EDT by Rodriguez Cherry   
  tachycardic   
(X) * No evidence of myocardial ischemia   
(X) * Mental status at baseline 3/24/2021 15:25:12 EDT by Rodriguez Cherry   
  AAO   
(X) * Tachypnea absent 3/24/2021 15:25:12 EDT by Rodriguez Cherry   
  16-18   
(X) * Hypoxemia absent 3/24/2021 15:25:12 EDT by Rodriguez Cherry   
  93% 2L   
( ) * Anticoagulants regimen for next level of care established ( ) * Discharge plans and education understood Activity   
(X) * Ambulatory or acceptable for next level of care 3/24/2021 15:25:12 EDT by Bjorn Goncalves ambulating with PT. Routes   
(X) * Oral hydration, medications, and diet 3/24/2021 15:25:12 EDT by Rodriguez Cherry   
  meds in summary Interventions (X) Cardiac monitoring 3/24/2021 15:25:12 EDT by Rodriguez Cherry   
  continuous cardia monitoring (X) Possible PT/PTT if anticoagulated 3/24/2021 15:25:12 EDT by Bjorn Goncalves labs in summary Medications (X) Anticoagulants [F]   
3/24/2021 15:25:12 EDT by Rodriguez Cherry   
  heparin gtt   
(X) Possible rate and rhythm control medications 3/24/2021 15:25:12 EDT by Rodriguez Cherry   
  amiodarone po   
* Milestone Additional Notes 3/22/21  
3/22  
-Patient feeling better today,cr down trending 1.54 (1.07 was baseline 2019)  
-chest pain resolved  
-pt told me she was getting remicade in past for RA but insurance stopped paying for this; she told me she had RT for her breast cancer in ~2005, now only getting screening mammograms  
-Blood cultures thus far negative -rpt echo with persistent moderate effusion  
-she is still tachycardic  
  
120/69-  114-  120s-   17-  91% Oxygen Therapy O2 Sat (%): 97 % (03/22/21 1124) Pulse via Oximetry: 87 beats per minute (03/22/21 0016) O2 Device: Nasal cannula (03/22/21 1124) Skin Assessment: Clean, dry, & intact (03/20/21 1715) O2 Flow Rate (L/min): 2 l/min (03/22/21 1124) Current Facility-Administered Medications Medication Dose Route Frequency  
o azithromycin (ZITHROMAX) tablet 500 mg 500 mg Oral Q24H  
o cefTRIAXone (ROCEPHIN) 1 g in 0.9% sodium chloride (MBP/ADV) 50 mL MBP 1 g IntraVENous Q24H  
o HYDROcodone-homatropine (HYCODAN) 5-1.5 mg/5 mL (5 mL) oral solution 5 mL 5 mL Oral Q4H PRN  
o heparin 25,000 units in dextrose 500 mL infusion 12-25 Units/kg/hr IntraVENous TITRATE  
o levalbuterol (XOPENEX) nebulizer soln 1.25 mg/3 mL 1.25 mg Nebulization Q4H PRN  
o amiodarone (CORDARONE) tablet 400 mg 400 mg Oral Q8H  
o benzonatate (TESSALON) capsule 100 mg 100 mg Oral TID PRN  
o [Held by provider] aspirin delayed-release tablet 81 mg 81 mg Oral DAILY  
o cholecalciferol (VITAMIN D3) (1000 Units /25 mcg) tablet 1,000 Units 1,000 Units Oral DAILY  
o DULoxetine (CYMBALTA) capsule 60 mg 60 mg Oral DAILY  
o folic acid (FOLVITE) tablet 1 mg 1 mg Oral DAILY  
o levothyroxine (SYNTHROID) tablet 25 mcg 25 mcg Oral ACB  
o methotrexate (RHEUMATREX) tablet 12.5 mg 12.5 mg Oral every Sunday  
o rosuvastatin (CRESTOR) tablet 10 mg 10 mg Oral QHS  
o sodium chloride (NS) flush 5-40 mL 5-40 mL IntraVENous Q8H  
o sodium chloride (NS) flush 5-40 mL 5-40 mL IntraVENous PRN  
o acetaminophen (TYLENOL) tablet 650 mg 650 mg Oral Q6H PRN  
  Or  
o acetaminophen (TYLENOL) suppository 650 mg 650 mg Rectal Q6H PRN  
o polyethylene glycol (MIRALAX) packet 17 g 17 g Oral DAILY  
o senna-docusate (PERICOLACE) 8.6-50 mg per tablet 1 Tab 1 Tab Oral BID  
o magnesium hydroxide (MILK OF MAGNESIA) 400 mg/5 mL oral suspension 30 mL 30 mL Oral DAILY PRN  
o bisacodyL (DULCOLAX) suppository 10 mg 10 mg Rectal DAILY PRN  
o ondansetron (ZOFRAN ODT) tablet 4 mg 4 mg Oral Q8H PRN  
  Or  
o ondansetron (ZOFRAN) injection 4 mg 4 mg IntraVENous Q6H PRN  
o famotidine (PEPCID) tablet 20 mg 20 mg Oral DAILY  
o [Held by provider] metoprolol succinate (TOPROL-XL) XL tablet 25 mg 25 mg Oral DAILY  
   
Ref. Range 3/22/2021 05:56 WBC Latest Ref Range: 4.3 - 11.1 K/uL 12.0 (H) RBC Latest Ref Range: 4.05 - 5.2 M/uL 3.27 (L) HGB Latest Ref Range: 11.7 - 15.4 g/dL 10.4 (L) HCT Latest Ref Range: 35.8 - 46.3 % 32.0 (L) MCV Latest Ref Range: 79.6 - 97.8 FL 97.9 (H) MCH Latest Ref Range: 26.1 - 32.9 PG 31.8 MCHC Latest Ref Range: 31.4 - 35.0 g/dL 32.5 RDW Latest Ref Range: 11.9 - 14.6 % 13.3 PLATELET Latest Ref Range: 150 - 450 K/uL 375 MPV Latest Ref Range: 9.4 - 12.3 FL 11.1 NEUTROPHILS Latest Ref Range: 43 - 78 % 79 (H) LYMPHOCYTES Latest Ref Range: 13 - 44 % 8 (L) MONOCYTES Latest Ref Range: 4.0 - 12.0 % 10 EOSINOPHILS Latest Ref Range: 0.5 - 7.8 % 2  
BASOPHILS Latest Ref Range: 0.0 - 2.0 % 0 IMMATURE GRANULOCYTES Latest Ref Range: 0.0 - 5.0 % 0  
DF Latest Units:   AUTOMATED ABSOLUTE NRBC Latest Ref Range: 0.0 - 0.2 K/uL 0.00  
ABS. NEUTROPHILS Latest Ref Range: 1.7 - 8.2 K/UL 9.6 (H)  
ABS. IMM. GRANS. Latest Ref Range: 0.0 - 0.5 K/UL 0.1 ABS. LYMPHOCYTES Latest Ref Range: 0.5 - 4.6 K/UL 1.0  
ABS. MONOCYTES Latest Ref Range: 0.1 - 1.3 K/UL 1.2  
ABS. EOSINOPHILS Latest Ref Range: 0.0 - 0.8 K/UL 0.2  
ABS. BASOPHILS Latest Ref Range: 0.0 - 0.2 K/UL 0.1 Sodium Latest Ref Range: 136 - 145 mmol/L 134 (L) Potassium Latest Ref Range: 3.5 - 5.1 mmol/L 3.4 (L) Chloride Latest Ref Range: 98 - 107 mmol/L 101 CO2 Latest Ref Range: 21 - 32 mmol/L 25 Anion gap Latest Ref Range: 7 - 16 mmol/L 8 Glucose Latest Ref Range: 65 - 100 mg/dL 115 (H) BUN Latest Ref Range: 8 - 23 MG/DL 32 (H) Creatinine Latest Ref Range: 0.6 - 1.0 MG/DL 1.54 (H) Calcium Latest Ref Range: 8.3 - 10.4 MG/DL 9.0  
GFR est non-AA Latest Ref Range: >60 ml/min/1.73m2 35 (L)  
GFR est AA Latest Ref Range: >60 ml/min/1.73m2 42 (L) Bilirubin, total Latest Ref Range: 0.2 - 1.1 MG/DL 0.3 Protein, total Latest Ref Range: 6.3 - 8.2 g/dL 6.2 (L) Albumin Latest Ref Range: 3.2 - 4.6 g/dL 2.3 (L) Globulin Latest Ref Range: 2.3 - 3.5 g/dL 3.9 (H) A-G Ratio Latest Ref Range: 1.2 - 3.5   0.6 (L) ALT Latest Ref Range: 12 - 65 U/L 38 AST Latest Ref Range: 15 - 37 U/L 22 Alk. phosphatase Latest Ref Range: 50 - 136 U/L 128 Normal sinus rhythm Right bundle branch block Abnormal ECG When compared Culture result: Abnormal Preliminary >100,000 COLONIES/mL YEAST IDENTIFICATION TO FOLLOW

## 2021-04-08 ENCOUNTER — HOSPITAL ENCOUNTER (EMERGENCY)
Age: 81
Discharge: HOME | End: 2021-04-08
Attending: EMERGENCY MEDICINE
Payer: MEDICARE

## 2021-04-08 VITALS
BODY MASS INDEX: 29.45 KG/M2 | HEART RATE: 78 BPM | HEIGHT: 60 IN | DIASTOLIC BLOOD PRESSURE: 69 MMHG | TEMPERATURE: 99 F | RESPIRATION RATE: 18 BRPM | SYSTOLIC BLOOD PRESSURE: 151 MMHG | OXYGEN SATURATION: 98 % | WEIGHT: 150 LBS

## 2021-04-08 DIAGNOSIS — L03.115 CELLULITIS OF RIGHT LEG: Primary | ICD-10-CM

## 2021-04-08 LAB
ALBUMIN SERPL-MCNC: 2.9 G/DL (ref 3.2–4.6)
ALBUMIN/GLOB SERPL: 0.7 {RATIO} (ref 1.2–3.5)
ALP SERPL-CCNC: 69 U/L (ref 50–136)
ALT SERPL-CCNC: 26 U/L (ref 12–65)
ANION GAP SERPL CALC-SCNC: 6 MMOL/L (ref 7–16)
AST SERPL-CCNC: 17 U/L (ref 15–37)
BASOPHILS # BLD: 0 K/UL (ref 0–0.2)
BASOPHILS NFR BLD: 0 % (ref 0–2)
BILIRUB SERPL-MCNC: 0.4 MG/DL (ref 0.2–1.1)
BNP SERPL-MCNC: 3213 PG/ML
BUN SERPL-MCNC: 33 MG/DL (ref 8–23)
CALCIUM SERPL-MCNC: 9.4 MG/DL (ref 8.3–10.4)
CHLORIDE SERPL-SCNC: 95 MMOL/L (ref 98–107)
CO2 SERPL-SCNC: 34 MMOL/L (ref 21–32)
CREAT SERPL-MCNC: 2.04 MG/DL (ref 0.6–1)
DIFFERENTIAL METHOD BLD: ABNORMAL
EOSINOPHIL # BLD: 0 K/UL (ref 0–0.8)
EOSINOPHIL NFR BLD: 0 % (ref 0.5–7.8)
ERYTHROCYTE [DISTWIDTH] IN BLOOD BY AUTOMATED COUNT: 14.8 % (ref 11.9–14.6)
GLOBULIN SER CALC-MCNC: 4 G/DL (ref 2.3–3.5)
GLUCOSE SERPL-MCNC: 97 MG/DL (ref 65–100)
HCT VFR BLD AUTO: 31.7 % (ref 35.8–46.3)
HGB BLD-MCNC: 10.3 G/DL (ref 11.7–15.4)
IMM GRANULOCYTES # BLD AUTO: 0.1 K/UL (ref 0–0.5)
IMM GRANULOCYTES NFR BLD AUTO: 1 % (ref 0–5)
LYMPHOCYTES # BLD: 1.1 K/UL (ref 0.5–4.6)
LYMPHOCYTES NFR BLD: 9 % (ref 13–44)
MCH RBC QN AUTO: 31.5 PG (ref 26.1–32.9)
MCHC RBC AUTO-ENTMCNC: 32.5 G/DL (ref 31.4–35)
MCV RBC AUTO: 96.9 FL (ref 79.6–97.8)
MONOCYTES # BLD: 0.9 K/UL (ref 0.1–1.3)
MONOCYTES NFR BLD: 8 % (ref 4–12)
NEUTS SEG # BLD: 9.9 K/UL (ref 1.7–8.2)
NEUTS SEG NFR BLD: 82 % (ref 43–78)
NRBC # BLD: 0 K/UL (ref 0–0.2)
PLATELET # BLD AUTO: 354 K/UL (ref 150–450)
PMV BLD AUTO: 11.4 FL (ref 9.4–12.3)
POTASSIUM SERPL-SCNC: 4.2 MMOL/L (ref 3.5–5.1)
PROT SERPL-MCNC: 6.9 G/DL (ref 6.3–8.2)
RBC # BLD AUTO: 3.27 M/UL (ref 4.05–5.2)
SODIUM SERPL-SCNC: 135 MMOL/L (ref 136–145)
TROPONIN-HIGH SENSITIVITY: 33.2 PG/ML (ref 0–14)
WBC # BLD AUTO: 12.1 K/UL (ref 4.3–11.1)

## 2021-04-08 PROCEDURE — 96365 THER/PROPH/DIAG IV INF INIT: CPT

## 2021-04-08 PROCEDURE — 99284 EMERGENCY DEPT VISIT MOD MDM: CPT

## 2021-04-08 PROCEDURE — 83880 ASSAY OF NATRIURETIC PEPTIDE: CPT

## 2021-04-08 PROCEDURE — 93005 ELECTROCARDIOGRAM TRACING: CPT

## 2021-04-08 PROCEDURE — 74011000258 HC RX REV CODE- 258: Performed by: EMERGENCY MEDICINE

## 2021-04-08 PROCEDURE — 74011250636 HC RX REV CODE- 250/636: Performed by: EMERGENCY MEDICINE

## 2021-04-08 PROCEDURE — 84484 ASSAY OF TROPONIN QUANT: CPT

## 2021-04-08 PROCEDURE — 80053 COMPREHEN METABOLIC PANEL: CPT

## 2021-04-08 PROCEDURE — 85025 COMPLETE CBC W/AUTO DIFF WBC: CPT

## 2021-04-08 RX ORDER — CEPHALEXIN 500 MG/1
500 CAPSULE ORAL 3 TIMES DAILY
Qty: 21 CAP | Refills: 0 | Status: SHIPPED | OUTPATIENT
Start: 2021-04-08 | End: 2021-04-15

## 2021-04-08 RX ADMIN — CEFTRIAXONE SODIUM 1 G: 1 INJECTION, POWDER, FOR SOLUTION INTRAMUSCULAR; INTRAVENOUS at 16:57

## 2021-04-08 NOTE — ED TRIAGE NOTES
Pt arrives via EMS wearing mask from home. Pt reports BLE that began 3/25 while still in the hospital from previous admission. Reports increasing edema since being home. Redness to RLE x 2 days and warm. BLE are +3 edema. During hospital stay pt was taken off of lasix, was restated on Friday without any relief. Denies SOB and CP.

## 2021-04-08 NOTE — ED PROVIDER NOTES
70-year-old white female recently admitted to the hospital due to new onset A. fib with RVR with pneumonia and sepsis. The A. fib resolved with amiodarone. She was discharged home on Eliquis. She reports that she did develop some lower extremity swelling while in the hospital and over the past 3 to 4 days has noticed redness to her right shin. No fever, vomiting, chest pain or shortness of breath. She is not currently on antibiotics. The history is provided by the patient. Leg Swelling   Pertinent negatives include no back pain.         Past Medical History:   Diagnosis Date    Acquired hypothyroidism 1/16/2016    Breast cancer ([facility] [address] 2008    Depression 8/18/2016    Endocrine disease     hypothyroid    Fungal dermatitis 8/18/2016    Hyperlipidemia 1/16/2016    Hypertension, essential, benign 8/18/2016    Hypokalemia 8/18/2016    Inflamed sebaceous cyst 8/18/2016    Major depressive disorder, recurrent, moderate ([address] 8/18/2016    Nicotine dependence, cigarettes, uncomplicated 7/32/3159    Osteopenia 8/18/2016    Osteoporosis 8/18/2016    Radiation therapy complication 3131    Rheumatoid arthritis ([facility] [address] 1/16/2016    Right carotid bruit 1/16/2016    TIA (transient ischemic attack) 1/16/2016    Tobacco abuse 8/18/2016       Past Surgical History:   Procedure Laterality Date    HX ADENOIDECTOMY      HX BREAST LUMPECTOMY Right 2008    with lymph nodes    HX TONSILLECTOMY           Family History:   Problem Relation Age of Onset    Stroke Mother     Cancer Father         Brain    HIV/AIDS Brother        Social History     Socioeconomic History    Marital status:      Spouse name: Not on file    Number of children: Not on file    Years of education: Not on file    Highest education level: Not on file   Occupational History    Not on file   Social Needs    Financial resource strain: Not on file    Food insecurity     Worry: Not on file     Inability: Not on file   [facility] Transportation needs     Medical: Not on file     Non-medical: Not on file   Tobacco Use    Smoking status: Current Some Day Smoker    Smokeless tobacco: Never Used    Tobacco comment: Only on pay day-1/2 pack   Substance and Sexual Activity    Alcohol use: No    Drug use: No    Sexual activity: Not on file   Lifestyle    Physical activity     Days per week: Not on file     Minutes per session: Not on file    Stress: Not on file   Relationships    Social connections     Talks on phone: Not on file     Gets together: Not on file     Attends Buddhism service: Not on file     Active member of club or organization: Not on file     Attends meetings of clubs or organizations: Not on file     Relationship status: Not on file    Intimate partner violence     Fear of current or ex partner: Not on file     Emotionally abused: Not on file     Physically abused: Not on file     Forced sexual activity: Not on file   Other Topics Concern    Not on file   Social History Narrative    Not on file         ALLERGIES: Patient has no known allergies. Review of Systems   Constitutional: Negative for fever. HENT: Negative for congestion. Respiratory: Negative for cough and shortness of breath. Cardiovascular: Negative for chest pain. Gastrointestinal: Negative for abdominal pain and vomiting. Genitourinary: Negative for dysuria. Musculoskeletal: Negative for back pain. Skin: Positive for rash. Neurological: Negative for headaches. Vitals:    04/08/21 1523 04/08/21 1620 04/08/21 1641   BP: 136/70 (!) 153/69 (!) 144/66   Pulse: 81 76 77   Resp: 18     Temp: 99 °F (37.2 °C)     SpO2: 99% 100% 100%   Weight: 68 kg (150 lb)     Height: 5' (1.524 m)              Physical Exam  Vitals signs and nursing note reviewed. Constitutional:       General: She is not in acute distress. Appearance: Normal appearance. She is not toxic-appearing. HENT:      Head: Normocephalic and atraumatic.       Nose: Nose normal.      Mouth/Throat:      Mouth: Mucous membranes are moist.      Pharynx: Oropharynx is clear. Eyes:      Conjunctiva/sclera: Conjunctivae normal.      Pupils: Pupils are equal, round, and reactive to light. Neck:      Musculoskeletal: Normal range of motion and neck supple. Cardiovascular:      Rate and Rhythm: Normal rate and regular rhythm. Pulmonary:      Effort: Pulmonary effort is normal.      Breath sounds: Normal breath sounds. No wheezing or rales. Abdominal:      Palpations: Abdomen is soft. Musculoskeletal:      Comments: Symmetric lower extremity swelling with diffuse erythema and warmth to the anterior aspect of her right shin. Skin:     General: Skin is warm and dry. Neurological:      Mental Status: She is alert and oriented to person, place, and time. Psychiatric:         Mood and Affect: Mood normal.         Behavior: Behavior normal.          MDM  Number of Diagnoses or Management Options  Diagnosis management comments: Lab work shows mild leukocytosis 12.1, renal insufficiency with creatinine 2.04 BUN 33. Troponin slightly elevated 33.2 likely due to renal insufficiency. BNP 3213. EKG was obtained in triage due to protocol for edema. This does show some newly inverted T waves in the anterior lateral leads. No ST elevation. EKG was reviewed with  [address] of cardiology and since patient has no complaints of chest pain or shortness of breath that the changes are not significant and likely related to recent A. fib with RVR and pericardial effusion. Patient was treated with Rocephin for cellulitis. She is nontoxic and appears safe for outpatient treatment of cellulitis.        Amount and/or Complexity of Data Reviewed  Clinical lab tests: ordered and reviewed  Tests in the medicine section of CPT®: ordered and reviewed    Risk of Complications, Morbidity, and/or Mortality  Presenting problems: moderate  Diagnostic procedures: moderate  Management options: moderate Procedures

## 2021-04-08 NOTE — ED NOTES
I have reviewed discharge instructions with the patient. The patient verbalized understanding. Patient left ED via Discharge Method: ambulatory to Home with family    Opportunity for questions and clarification provided. Patient given 1 scripts. To continue your aftercare when you leave the hospital, you may receive an automated call from our care team to check in on how you are doing. This is a free service and part of our promise to provide the best care and service to meet your aftercare needs.  If you have questions, or wish to unsubscribe from this service please call [phone]. Thank you for Choosing our [facility] Emergency Department.

## 2021-04-09 LAB
ATRIAL RATE: 83 BPM
CALCULATED P AXIS, ECG09: 53 DEGREES
CALCULATED R AXIS, ECG10: 80 DEGREES
CALCULATED T AXIS, ECG11: 124 DEGREES
DIAGNOSIS, 93000: NORMAL
P-R INTERVAL, ECG05: 156 MS
Q-T INTERVAL, ECG07: 422 MS
QRS DURATION, ECG06: 86 MS
QTC CALCULATION (BEZET), ECG08: 495 MS
VENTRICULAR RATE, ECG03: 83 BPM

## 2021-05-12 ENCOUNTER — APPOINTMENT (OUTPATIENT)
Dept: CT IMAGING | Age: 81
DRG: 808 | End: 2021-05-12
Attending: STUDENT IN AN ORGANIZED HEALTH CARE EDUCATION/TRAINING PROGRAM
Payer: MEDICARE

## 2021-05-12 ENCOUNTER — APPOINTMENT (OUTPATIENT)
Dept: GENERAL RADIOLOGY | Age: 81
DRG: 808 | End: 2021-05-12
Attending: STUDENT IN AN ORGANIZED HEALTH CARE EDUCATION/TRAINING PROGRAM
Payer: MEDICARE

## 2021-05-12 ENCOUNTER — HOSPITAL ENCOUNTER (INPATIENT)
Age: 81
LOS: 12 days | Discharge: NURSING, REHAB OR LONG-TERM CARE | DRG: 808 | End: 2021-05-24
Attending: STUDENT IN AN ORGANIZED HEALTH CARE EDUCATION/TRAINING PROGRAM | Admitting: INTERNAL MEDICINE
Payer: MEDICARE

## 2021-05-12 DIAGNOSIS — D64.9 SEVERE ANEMIA: ICD-10-CM

## 2021-05-12 DIAGNOSIS — S22.41XA CLOSED FRACTURE OF MULTIPLE RIBS OF RIGHT SIDE, INITIAL ENCOUNTER: ICD-10-CM

## 2021-05-12 DIAGNOSIS — N17.9 AKI (ACUTE KIDNEY INJURY) (HCC): ICD-10-CM

## 2021-05-12 DIAGNOSIS — D50.0 BLOOD LOSS ANEMIA: ICD-10-CM

## 2021-05-12 DIAGNOSIS — D72.819 LEUKOPENIA, UNSPECIFIED TYPE: ICD-10-CM

## 2021-05-12 DIAGNOSIS — D69.6 THROMBOCYTOPENIA (HCC): ICD-10-CM

## 2021-05-12 DIAGNOSIS — D70.9 FEBRILE NEUTROPENIA (HCC): ICD-10-CM

## 2021-05-12 DIAGNOSIS — K92.2 UGIB (UPPER GASTROINTESTINAL BLEED): Primary | ICD-10-CM

## 2021-05-12 DIAGNOSIS — D61.818 PANCYTOPENIA (HCC): ICD-10-CM

## 2021-05-12 DIAGNOSIS — R50.81 FEBRILE NEUTROPENIA (HCC): ICD-10-CM

## 2021-05-12 PROBLEM — S22.39XA RIB FRACTURE: Status: ACTIVE | Noted: 2021-05-12

## 2021-05-12 LAB
ALBUMIN SERPL-MCNC: 2.5 G/DL (ref 3.2–4.6)
ALBUMIN/GLOB SERPL: 0.8 {RATIO} (ref 1.2–3.5)
ALP SERPL-CCNC: 78 U/L (ref 50–136)
ALT SERPL-CCNC: 68 U/L (ref 12–65)
ANION GAP SERPL CALC-SCNC: 8 MMOL/L (ref 7–16)
AST SERPL-CCNC: 45 U/L (ref 15–37)
ATRIAL RATE: 78 BPM
BASOPHILS # BLD: 0 K/UL (ref 0–0.2)
BASOPHILS NFR BLD: 0 % (ref 0–2)
BILIRUB SERPL-MCNC: 0.8 MG/DL (ref 0.2–1.1)
BNP SERPL-MCNC: 1399 PG/ML
BUN SERPL-MCNC: 29 MG/DL (ref 8–23)
CALCIUM SERPL-MCNC: 8 MG/DL (ref 8.3–10.4)
CALCULATED P AXIS, ECG09: 63 DEGREES
CALCULATED R AXIS, ECG10: 76 DEGREES
CALCULATED T AXIS, ECG11: 79 DEGREES
CHLORIDE SERPL-SCNC: 101 MMOL/L (ref 98–107)
CO2 SERPL-SCNC: 27 MMOL/L (ref 21–32)
CREAT SERPL-MCNC: 2.61 MG/DL (ref 0.6–1)
DIAGNOSIS, 93000: NORMAL
DIFFERENTIAL METHOD BLD: ABNORMAL
DIFFERENTIAL METHOD BLD: NORMAL
EOSINOPHIL # BLD: 0.1 K/UL (ref 0–0.8)
EOSINOPHIL NFR BLD MANUAL: 2 % (ref 1–8)
EOSINOPHIL NFR BLD: 5 % (ref 0.5–7.8)
ERYTHROCYTE [DISTWIDTH] IN BLOOD BY AUTOMATED COUNT: 20.9 % (ref 11.9–14.6)
ERYTHROCYTE [DISTWIDTH] IN BLOOD BY AUTOMATED COUNT: 21 % (ref 11.9–14.6)
FERRITIN SERPL-MCNC: 63 NG/ML (ref 8–388)
FOLATE SERPL-MCNC: 13.6 NG/ML (ref 3.1–17.5)
GLOBULIN SER CALC-MCNC: 3.2 G/DL (ref 2.3–3.5)
GLUCOSE SERPL-MCNC: 89 MG/DL (ref 65–100)
HCT VFR BLD AUTO: 12.4 % (ref 35.8–46.3)
HCT VFR BLD AUTO: 14.7 % (ref 35.8–46.3)
HCT VFR BLD AUTO: 14.7 % (ref 35.8–46.3)
HCT VFR BLD AUTO: 20.7 % (ref 35.8–46.3)
HEMOCCULT STL QL: POSITIVE
HGB BLD-MCNC: 3.7 G/DL (ref 11.7–15.4)
HGB BLD-MCNC: 4.3 G/DL (ref 11.7–15.4)
HGB BLD-MCNC: 4.4 G/DL (ref 11.7–15.4)
HGB BLD-MCNC: 6.6 G/DL (ref 11.7–15.4)
HISTORY CHECKED?,CKHIST: NORMAL
HISTORY CHECKED?,CKHIST: NORMAL
IMM GRANULOCYTES # BLD AUTO: 0.1 K/UL (ref 0–0.5)
IMM GRANULOCYTES NFR BLD AUTO: 5 % (ref 0–5)
INR PPP: 1.4
IRON SATN MFR SERPL: 48 %
IRON SERPL-MCNC: 124 UG/DL (ref 35–150)
LYMPHOCYTES # BLD: 0.4 K/UL (ref 0.5–4.6)
LYMPHOCYTES NFR BLD MANUAL: 35 % (ref 16–44)
LYMPHOCYTES NFR BLD: 37 % (ref 13–44)
MCH RBC QN AUTO: 29.7 PG (ref 26.1–32.9)
MCH RBC QN AUTO: 29.8 PG (ref 26.1–32.9)
MCHC RBC AUTO-ENTMCNC: 29.8 G/DL (ref 31.4–35)
MCHC RBC AUTO-ENTMCNC: 29.9 G/DL (ref 31.4–35)
MCV RBC AUTO: 100 FL (ref 79.6–97.8)
MCV RBC AUTO: 99.3 FL (ref 79.6–97.8)
METAMYELOCYTES NFR BLD MANUAL: 1 %
MONOCYTES # BLD: 0.1 K/UL (ref 0.1–1.3)
MONOCYTES NFR BLD: 5 % (ref 4–12)
NEUTS BAND NFR BLD MANUAL: 1 % (ref 0–10)
NEUTS SEG # BLD: 0.4 K/UL (ref 1.7–8.2)
NEUTS SEG NFR BLD MANUAL: 61 % (ref 47–75)
NEUTS SEG NFR BLD: 48 % (ref 43–78)
NRBC # BLD: 0 K/UL (ref 0–0.2)
NRBC # BLD: 0.02 K/UL (ref 0–0.2)
P-R INTERVAL, ECG05: 166 MS
PLATELET # BLD AUTO: 206 K/UL (ref 150–450)
PLATELET # BLD AUTO: 215 K/UL (ref 150–450)
PLATELET COMMENTS,PCOM: ADEQUATE
PLATELET COMMENTS,PCOM: ADEQUATE
PMV BLD AUTO: 11.2 FL (ref 9.4–12.3)
PMV BLD AUTO: 11.3 FL (ref 9.4–12.3)
POTASSIUM SERPL-SCNC: 4 MMOL/L (ref 3.5–5.1)
PROT SERPL-MCNC: 5.7 G/DL (ref 6.3–8.2)
PROTHROMBIN TIME: 17.5 SEC (ref 12.5–14.7)
Q-T INTERVAL, ECG07: 422 MS
QRS DURATION, ECG06: 132 MS
QTC CALCULATION (BEZET), ECG08: 481 MS
RBC # BLD AUTO: 1.24 M/UL (ref 4.05–5.2)
RBC # BLD AUTO: 1.48 M/UL (ref 4.05–5.2)
RBC MORPH BLD: ABNORMAL
RBC MORPH BLD: NORMAL
SODIUM SERPL-SCNC: 136 MMOL/L (ref 136–145)
TIBC SERPL-MCNC: 260 UG/DL (ref 250–450)
TROPONIN-HIGH SENSITIVITY: 22 PG/ML (ref 0–14)
TROPONIN-HIGH SENSITIVITY: 27.3 PG/ML (ref 0–14)
TSH SERPL DL<=0.005 MIU/L-ACNC: 4.39 UIU/ML (ref 0.36–3.74)
VENTRICULAR RATE, ECG03: 78 BPM
VIT B12 SERPL-MCNC: 594 PG/ML (ref 193–986)
WBC # BLD AUTO: 0.8 K/UL (ref 4.3–11.1)
WBC # BLD AUTO: 1.1 K/UL (ref 4.3–11.1)
WBC MORPH BLD: ABNORMAL
WBC MORPH BLD: NORMAL

## 2021-05-12 PROCEDURE — 80053 COMPREHEN METABOLIC PANEL: CPT

## 2021-05-12 PROCEDURE — 30233P1 TRANSFUSION OF NONAUTOLOGOUS FROZEN RED CELLS INTO PERIPHERAL VEIN, PERCUTANEOUS APPROACH: ICD-10-PCS | Performed by: INTERNAL MEDICINE

## 2021-05-12 PROCEDURE — 99285 EMERGENCY DEPT VISIT HI MDM: CPT

## 2021-05-12 PROCEDURE — 93005 ELECTROCARDIOGRAM TRACING: CPT | Performed by: STUDENT IN AN ORGANIZED HEALTH CARE EDUCATION/TRAINING PROGRAM

## 2021-05-12 PROCEDURE — 96374 THER/PROPH/DIAG INJ IV PUSH: CPT

## 2021-05-12 PROCEDURE — 86580 TB INTRADERMAL TEST: CPT | Performed by: INTERNAL MEDICINE

## 2021-05-12 PROCEDURE — 71100 X-RAY EXAM RIBS UNI 2 VIEWS: CPT

## 2021-05-12 PROCEDURE — 74011250636 HC RX REV CODE- 250/636: Performed by: STUDENT IN AN ORGANIZED HEALTH CARE EDUCATION/TRAINING PROGRAM

## 2021-05-12 PROCEDURE — 71045 X-RAY EXAM CHEST 1 VIEW: CPT

## 2021-05-12 PROCEDURE — 83880 ASSAY OF NATRIURETIC PEPTIDE: CPT

## 2021-05-12 PROCEDURE — 36415 COLL VENOUS BLD VENIPUNCTURE: CPT

## 2021-05-12 PROCEDURE — 85610 PROTHROMBIN TIME: CPT

## 2021-05-12 PROCEDURE — 2709999900 HC NON-CHARGEABLE SUPPLY

## 2021-05-12 PROCEDURE — 86901 BLOOD TYPING SEROLOGIC RH(D): CPT

## 2021-05-12 PROCEDURE — 82607 VITAMIN B-12: CPT

## 2021-05-12 PROCEDURE — 65270000029 HC RM PRIVATE

## 2021-05-12 PROCEDURE — 74011250637 HC RX REV CODE- 250/637: Performed by: INTERNAL MEDICINE

## 2021-05-12 PROCEDURE — C9113 INJ PANTOPRAZOLE SODIUM, VIA: HCPCS | Performed by: STUDENT IN AN ORGANIZED HEALTH CARE EDUCATION/TRAINING PROGRAM

## 2021-05-12 PROCEDURE — 74011000250 HC RX REV CODE- 250: Performed by: STUDENT IN AN ORGANIZED HEALTH CARE EDUCATION/TRAINING PROGRAM

## 2021-05-12 PROCEDURE — 85027 COMPLETE CBC AUTOMATED: CPT

## 2021-05-12 PROCEDURE — 83655 ASSAY OF LEAD: CPT

## 2021-05-12 PROCEDURE — 84443 ASSAY THYROID STIM HORMONE: CPT

## 2021-05-12 PROCEDURE — 36430 TRANSFUSION BLD/BLD COMPNT: CPT

## 2021-05-12 PROCEDURE — 74011000302 HC RX REV CODE- 302: Performed by: INTERNAL MEDICINE

## 2021-05-12 PROCEDURE — 82270 OCCULT BLOOD FECES: CPT

## 2021-05-12 PROCEDURE — 85014 HEMATOCRIT: CPT

## 2021-05-12 PROCEDURE — 77030040361 HC SLV COMPR DVT MDII -B

## 2021-05-12 PROCEDURE — P9016 RBC LEUKOCYTES REDUCED: HCPCS

## 2021-05-12 PROCEDURE — 83540 ASSAY OF IRON: CPT

## 2021-05-12 PROCEDURE — 82746 ASSAY OF FOLIC ACID SERUM: CPT

## 2021-05-12 PROCEDURE — 84484 ASSAY OF TROPONIN QUANT: CPT

## 2021-05-12 PROCEDURE — 85025 COMPLETE CBC W/AUTO DIFF WBC: CPT

## 2021-05-12 PROCEDURE — 70450 CT HEAD/BRAIN W/O DYE: CPT

## 2021-05-12 PROCEDURE — 82728 ASSAY OF FERRITIN: CPT

## 2021-05-12 PROCEDURE — 86923 COMPATIBILITY TEST ELECTRIC: CPT

## 2021-05-12 RX ORDER — TRAMADOL HYDROCHLORIDE 50 MG/1
50 TABLET ORAL
Status: DISCONTINUED | OUTPATIENT
Start: 2021-05-12 | End: 2021-05-24 | Stop reason: HOSPADM

## 2021-05-12 RX ORDER — SODIUM CHLORIDE 9 MG/ML
75 INJECTION, SOLUTION INTRAVENOUS CONTINUOUS
Status: DISCONTINUED | OUTPATIENT
Start: 2021-05-12 | End: 2021-05-14

## 2021-05-12 RX ORDER — DULOXETIN HYDROCHLORIDE 60 MG/1
60 CAPSULE, DELAYED RELEASE ORAL DAILY
Status: DISCONTINUED | OUTPATIENT
Start: 2021-05-13 | End: 2021-05-24 | Stop reason: HOSPADM

## 2021-05-12 RX ORDER — ONDANSETRON 2 MG/ML
4 INJECTION INTRAMUSCULAR; INTRAVENOUS
Status: DISCONTINUED | OUTPATIENT
Start: 2021-05-12 | End: 2021-05-24 | Stop reason: HOSPADM

## 2021-05-12 RX ORDER — SODIUM CHLORIDE 9 MG/ML
250 INJECTION, SOLUTION INTRAVENOUS AS NEEDED
Status: DISCONTINUED | OUTPATIENT
Start: 2021-05-12 | End: 2021-05-21

## 2021-05-12 RX ORDER — AMIODARONE HYDROCHLORIDE 200 MG/1
200 TABLET ORAL DAILY
Status: DISCONTINUED | OUTPATIENT
Start: 2021-05-13 | End: 2021-05-24 | Stop reason: HOSPADM

## 2021-05-12 RX ORDER — ACETAMINOPHEN 325 MG/1
650 TABLET ORAL
Status: DISCONTINUED | OUTPATIENT
Start: 2021-05-12 | End: 2021-05-24 | Stop reason: HOSPADM

## 2021-05-12 RX ORDER — SODIUM CHLORIDE 0.9 % (FLUSH) 0.9 %
5-40 SYRINGE (ML) INJECTION AS NEEDED
Status: DISCONTINUED | OUTPATIENT
Start: 2021-05-12 | End: 2021-05-24 | Stop reason: HOSPADM

## 2021-05-12 RX ORDER — PROMETHAZINE HYDROCHLORIDE 25 MG/1
12.5 TABLET ORAL
Status: DISCONTINUED | OUTPATIENT
Start: 2021-05-12 | End: 2021-05-24 | Stop reason: HOSPADM

## 2021-05-12 RX ORDER — POLYETHYLENE GLYCOL 3350 17 G/17G
17 POWDER, FOR SOLUTION ORAL DAILY PRN
Status: DISCONTINUED | OUTPATIENT
Start: 2021-05-12 | End: 2021-05-24 | Stop reason: HOSPADM

## 2021-05-12 RX ORDER — ROSUVASTATIN CALCIUM 5 MG/1
10 TABLET, COATED ORAL
Status: DISCONTINUED | OUTPATIENT
Start: 2021-05-12 | End: 2021-05-24 | Stop reason: HOSPADM

## 2021-05-12 RX ORDER — ACETAMINOPHEN 650 MG/1
650 SUPPOSITORY RECTAL
Status: DISCONTINUED | OUTPATIENT
Start: 2021-05-12 | End: 2021-05-24 | Stop reason: HOSPADM

## 2021-05-12 RX ORDER — TRAMADOL HYDROCHLORIDE 50 MG/1
50 TABLET ORAL
Status: DISCONTINUED | OUTPATIENT
Start: 2021-05-12 | End: 2021-05-12 | Stop reason: SDUPTHER

## 2021-05-12 RX ORDER — SODIUM CHLORIDE 0.9 % (FLUSH) 0.9 %
5-40 SYRINGE (ML) INJECTION EVERY 8 HOURS
Status: DISCONTINUED | OUTPATIENT
Start: 2021-05-12 | End: 2021-05-24 | Stop reason: HOSPADM

## 2021-05-12 RX ORDER — DIPHENHYDRAMINE HCL 25 MG
25 CAPSULE ORAL
Status: DISCONTINUED | OUTPATIENT
Start: 2021-05-12 | End: 2021-05-24 | Stop reason: HOSPADM

## 2021-05-12 RX ORDER — LEVOTHYROXINE SODIUM 50 UG/1
25 TABLET ORAL
Status: DISCONTINUED | OUTPATIENT
Start: 2021-05-13 | End: 2021-05-24 | Stop reason: HOSPADM

## 2021-05-12 RX ADMIN — Medication 10 ML: at 21:31

## 2021-05-12 RX ADMIN — TUBERCULIN PURIFIED PROTEIN DERIVATIVE 5 UNITS: 5 INJECTION, SOLUTION INTRADERMAL at 19:20

## 2021-05-12 RX ADMIN — PANTOPRAZOLE SODIUM 40 MG: 40 INJECTION, POWDER, FOR SOLUTION INTRAVENOUS at 15:38

## 2021-05-12 RX ADMIN — SODIUM CHLORIDE 500 ML: 900 INJECTION, SOLUTION INTRAVENOUS at 13:21

## 2021-05-12 RX ADMIN — ROSUVASTATIN CALCIUM 10 MG: 5 TABLET, FILM COATED ORAL at 21:30

## 2021-05-12 NOTE — PROGRESS NOTES
TRANSFER - IN REPORT:    Verbal report received from Jeri Mars RN on Moraima Malone  being received from Emergency Department for routine progression of care. Report consisted of patients Situation, Background, Assessment and Recommendations(SBAR). Information from the following report(s) SBAR, Kardex, ED Summary, [facility] and Recent Results was reviewed with the receiving nurse. Opportunity for questions and clarification was provided. Assessment will be completed upon patients arrival to unit and care assumed.

## 2021-05-12 NOTE — ED TRIAGE NOTES
Pt arrived via EMS from home MD office. Pt reports recent dx of CHF in April. Pt reports 2 syncopal episodes. EMS reports near syncope at the MD office. Pt fell a week ago and reports right rib pain.  /50 HR 75 SpO 98% Temp 98.3

## 2021-05-12 NOTE — PROGRESS NOTES
Patient arrived to floor on stretcher via RN transport. Patient alert and oriented with respirations even and unlabored on 2L NC. Patient oriented to unit, call light, and surroundings. Instructed patient to call for assistance and to not ambulate without assistance. Patient verbalized understanding. Bed low and locked. Bedside table, personal belongings and call light within reach. Unit of pRBCs infusing at 200mL/hr, as this pRBC transfusion was started in ED.

## 2021-05-12 NOTE — ED PROVIDER NOTES
72-year-old female presents to the emergency department for recurrent episodes of lightheadedness and falls. States her most recent fall was approximately 1 week ago when she fell onto her back. States she felt lightheaded causing her to fall over backwards. Patient feels that she was presyncopal but is unsure if she complete loss conscious. States she was coherent upon hitting the ground. Reports continued pain to her right posterior back. She is on blood thinners. Patient reports continued lightheadedness and balance abnormalities which been ongoing for the last few weeks. She was seen at family physician office this morning with borderline low blood pressure was advised to come to the emergency department get checked out. Patient denies recent fever, chills, chest pain or shortness of breath. Does endorse pain to her posterior right ribs. Patient reports she has chronic pain in her lower extremities and pain with ambulation so she has not been consuming amount of fluids that she needs to.            Past Medical History:   Diagnosis Date    Acquired hypothyroidism 1/16/2016    Breast cancer ([address] 2008    Depression 8/18/2016    Endocrine disease     hypothyroid    Fungal dermatitis 8/18/2016    Hyperlipidemia 1/16/2016    Hypertension, essential, benign 8/18/2016    Hypokalemia 8/18/2016    Inflamed sebaceous cyst 8/18/2016    Major depressive disorder, recurrent, moderate ([address] 8/18/2016    Nicotine dependence, cigarettes, uncomplicated 7/10/7111    Osteopenia 8/18/2016    Osteoporosis 8/18/2016    Radiation therapy complication 6447    Rheumatoid arthritis ([address] 1/16/2016    Right carotid bruit 1/16/2016    TIA (transient ischemic attack) 1/16/2016    Tobacco abuse 8/18/2016       Past Surgical History:   Procedure Laterality Date    HX ADENOIDECTOMY      HX BREAST LUMPECTOMY Right 2008    with lymph nodes    HX TONSILLECTOMY           Family History:   Problem Relation Age of Onset    Stroke Mother     Cancer Father         Brain    HIV/AIDS Brother        Social History     Socioeconomic History    Marital status:      Spouse name: Not on file    Number of children: Not on file    Years of education: Not on file    Highest education level: Not on file   Occupational History    Not on file   Social Needs    Financial resource strain: Not on file    Food insecurity     Worry: Not on file     Inability: Not on file   [employer] needs     Medical: Not on file     Non-medical: Not on file   Tobacco Use    Smoking status: Current Some Day Smoker    Smokeless tobacco: Never Used    Tobacco comment: Only on pay day-1/2 pack   Substance and Sexual Activity    Alcohol use: No    Drug use: No    Sexual activity: Not on file   Lifestyle    Physical activity     Days per week: Not on file     Minutes per session: Not on file    Stress: Not on file   Relationships    Social connections     Talks on phone: Not on file     Gets together: Not on file     Attends [employer] service: Not on file     Active member of club or organization: Not on file     Attends meetings of clubs or organizations: Not on file     Relationship status: Not on file    Intimate partner violence     Fear of current or ex partner: Not on file     Emotionally abused: Not on file     Physically abused: Not on file     Forced sexual activity: Not on file   Other Topics Concern    Not on file   Social History Narrative    Not on file         ALLERGIES: Patient has no known allergies. Review of Systems   Constitutional: Negative for chills and fever. HENT: Negative for sinus pressure and sore throat. Eyes: Negative for visual disturbance. Respiratory: Negative for cough and shortness of breath. Cardiovascular: Negative for chest pain. Gastrointestinal: Negative for abdominal pain, diarrhea, nausea and vomiting. Endocrine: Negative for polyuria.    Genitourinary: Negative for difficulty urinating and dysuria. Musculoskeletal: Positive for back pain. Negative for neck pain and neck stiffness. Skin: Negative for rash. Neurological: Positive for light-headedness. Negative for weakness and headaches. Psychiatric/Behavioral: Negative for confusion. All other systems reviewed and are negative. Vitals:    05/12/21 1029   BP: 102/62   Pulse: 68   Resp: 16   Temp: 98.5 °F (36.9 °C)   SpO2: 100%   Weight: 68 kg (150 lb)   Height: 5' (1.524 m)            Physical Exam  Vitals signs and nursing note reviewed. Constitutional:       Appearance: Normal appearance. She is not ill-appearing or toxic-appearing. HENT:      Head: Normocephalic and atraumatic. Nose: Nose normal.      Mouth/Throat:      Mouth: Mucous membranes are moist.   Eyes:      Extraocular Movements: Extraocular movements intact. Pupils: Pupils are equal, round, and reactive to light. Neck:      Musculoskeletal: Normal range of motion. No neck rigidity. Cardiovascular:      Rate and Rhythm: Normal rate and regular rhythm. Pulses: Normal pulses. Heart sounds: Normal heart sounds. Pulmonary:      Effort: Pulmonary effort is normal. No respiratory distress. Breath sounds: Normal breath sounds. Abdominal:      General: Abdomen is flat. There is no distension. Palpations: Abdomen is soft. Tenderness: There is no abdominal tenderness. Musculoskeletal: Normal range of motion. General: Tenderness and signs of injury present. Comments: Right posterior rib: large area of ecchymosis and tenderness to right posterior lateral ribs, tender to palpation, no crepitus noted. Skin:     General: Skin is warm and dry. Neurological:      General: No focal deficit present. Mental Status: She is alert and oriented to person, place, and time.    Psychiatric:         Mood and Affect: Mood normal.          MDM  Number of Diagnoses or Management Options  Blood loss anemia  Closed fracture of multiple ribs of right side, initial encounter  UGIB (upper gastrointestinal bleed)  Diagnosis management comments: 29-year-old female presents to the ER with recent falls. Borderline low blood pressure at primary care physician's office. Will obtain orthostatic blood pressures. Labs show normal electrolytes, slightly worse than baseline kidney function, creatinine 2.61, GFR 19, patient had downtrending kidney function. Patient will be given 500 cc bolus IV fluid. BNP 1400, troponin 27.3, chest ray shows right lower rib fracture without pneumothorax. Right fifth, sixth and seventh ribs are fractured. Head CT did not reveal any emergent findings. Patient CBC eventually resulted showing hemoglobin of 3.7, white count was 1.1, is of unclear etiology, repeat H&H obtained to verify. This was 4.3 hemoglobin. Patient be typed and crossed, given 1 unit PRBC. Hemoccult positive, brown stool, will give hemograms IV Protonix. I then spoke with hospitalist who agreed to admit this patient for continued evaluation and treatment. Patient voiced understanding agreement this plan.        Amount and/or Complexity of Data Reviewed  Clinical lab tests: ordered and reviewed  Tests in the radiology section of CPT®: reviewed  Discussion of test results with the performing providers: yes  Discuss the patient with other providers: yes    Risk of Complications, Morbidity, and/or Mortality  Presenting problems: moderate  Diagnostic procedures: moderate  Management options: moderate           Procedures

## 2021-05-12 NOTE — H&P
[facility] History and Physical       Name:  Isreal Simmons  Age:80 y.o. Sex:female   :  1940    MRN:  [id number]   PCP:  Maribel Tyler MD      Admit Date:  2021 10:41 AM   Chief Complaint: falls    Reason for Admission:   Anemia [D64.9]    Assessment & Plan:     Acute anemia  Concern for GI bleed given pt's report of dark stools starting after initiation of Eliquis and nausea. May also be some contribution from R flank hematoma. Only NSAIDs are Diclofenac BID  - PPI IV BID  - transfuse total 3 units pRBCs  - check INR  - add on iron studies, B12, folate  - follow hgb q8  - GI consult  - CLD for now, NPO after midnight  - hematology consulted as below    Leukopenia  In setting of anemia with teardrop cells and basophilic stippling seen on smear  - TSH, B12, folate, peripheral smear  - hematology consult  - lead level    NAZIA on CKD 3  Baseline Cr ~1.6-1.7. Cr up to 2.61 on admit. Likely prerenal in setting of nausea, decreased PO, diuretic use. Denies NSAID use aside from Diclofenac BID. - hold aldactone, diclofenac  - IVFs  - follow BMP    Mild transaminitis  No abdominal pain, diarrhea. + mild nausea. - if uptrending, consider hepatitis panel    Rib fractures  R flank hematoma  - IS  - pain control    Frequent falls  Pt reports long standing falls related to medications per her report.  - PT/OT    Afib  - hold Eliquis  - cont Amio    RA  - MTX every     MDD  - cont home meds    Disposition/Expected LOS: > 2 midnights, pt previously living at home alone, PPD, PT/OT  Diet: DIET CLEAR LIQUID  DIET NPO  VTE ppx: SCDs  GI ppx: PPI BID  Code status: full code  Surrogate decision-maker: friend Rehan Kelly      History of Presenting Illness:     Isreal Simmons is a 68 y.o. female with medical history of recently dx'd Afib (on Eliquis), CKD3, RA, h/o breast CA , hypothyroidism who presented to ED with falls, weakness, nausea.     Pt reports not feeling well since she was discharged March 2021 after being diagnosed with Afib and started on Eliquis. She endorses increased fatigue, generalized weakness. Endorses frequent falls even prior to last hospitalization which has continued, with a fall approx 1-2 weeks ago, landing on R side and bruising R flank and face. States stools have been \"dark\" since she was started on \"that new medicine\" which I believe is the Eliquis. She also reports nausea with occasional emesis, nonbloody, for about 1 week. Decreased appetite, keeping down liquids but very little food. Denies weight loss. She ultimately saw her PCP today who directed her to the ED for further evaluation. In ED, VSS, afebrile. Hgb 3.7, confirmed on repeat. WBC 1.1, Cr 2.61. She was heme + in ED. She was transfused 1 unit pRBCs. XR revealed R 5,6,7 rib fracture. Cased discussed with Dr. Machelle Light. Review of Systems:  A 14 point review of systems was taken and pertinent positive as per HPI.         Past Medical History:   Diagnosis Date    Acquired hypothyroidism 1/16/2016    Breast cancer ([facility] [address] 2008    Depression 8/18/2016    Endocrine disease     hypothyroid    Fungal dermatitis 8/18/2016    Hyperlipidemia 1/16/2016    Hypertension, essential, benign 8/18/2016    Hypokalemia 8/18/2016    Inflamed sebaceous cyst 8/18/2016    Major depressive disorder, recurrent, moderate ([address] 8/18/2016    Nicotine dependence, cigarettes, uncomplicated 6/17/5873    Osteopenia 8/18/2016    Osteoporosis 8/18/2016    Radiation therapy complication 6528    Rheumatoid arthritis ([address] 1/16/2016    Right carotid bruit 1/16/2016    TIA (transient ischemic attack) 1/16/2016    Tobacco abuse 8/18/2016       Past Surgical History:   Procedure Laterality Date    HX ADENOIDECTOMY      HX BREAST LUMPECTOMY Right 2008    with lymph nodes    HX TONSILLECTOMY         Family History : reviewed  Family History   Problem Relation Age of Onset    Stroke Mother     Cancer Father         Brain    HIV/AIDS Brother         Social History     Tobacco Use    Smoking status: Current Some Day Smoker    Smokeless tobacco: Never Used    Tobacco comment: Only on pay day-1/2 pack   Substance Use Topics    Alcohol use: No       No Known Allergies    Immunization History   Administered Date(s) Administered    Influenza High Dose Vaccine PF 10/28/2015, 09/21/2016, 09/15/2017    Pneumococcal Polysaccharide (PPSV-23) 01/18/2016    TB Skin Test (PPD) Intradermal 04/22/2019         PTA Medications:  Current Outpatient Medications   Medication Instructions    amiodarone (CORDARONE) 200 mg, Oral, DAILY    apixaban (ELIQUIS) 5 mg, Oral, 2 TIMES DAILY    cholecalciferol (VITAMIN D3) 1,000 Units, Oral, DAILY    DULoxetine (CYMBALTA) 60 mg, Oral, DAILY    famotidine (PEPCID) 20 mg, Oral, DAILY    folic acid (FOLVITE) 936 mcg, Oral, DAILY    levothyroxine (SYNTHROID) 25 mcg, Oral, DAILY BEFORE BREAKFAST    methotrexate (RHEUMATREX) 2.5 mg tablet 5 tabs every Sunday    ondansetron (ZOFRAN ODT) 4 mg, Oral, EVERY 8 HOURS AS NEEDED    predniSONE (DELTASONE) 30 mg, Oral, DAILY WITH BREAKFAST    rosuvastatin (CRESTOR) 10 mg, Oral, EVERY BEDTIME       Objective:     Patient Vitals for the past 24 hrs:   Temp Pulse Resp BP SpO2   05/12/21 1635 98.1 °F (36.7 °C) 74 21 (!) 140/63 100 %   05/12/21 1620  80 21 (!) 111/53 100 %   05/12/21 1617 97.8 °F (36.6 °C) 74 21 (!) 111/53 98 %   05/12/21 1534  75 17  100 %   05/12/21 1507  75 20  100 %   05/12/21 1439  (!) 0 26  (!) 77 %   05/12/21 1418  79  135/63    05/12/21 1411  82   97 %   05/12/21 1404  84 (!) 66 (!) 104/51    05/12/21 1403  80 27 (!) 123/51 97 %   05/12/21 1401  82  (!) 123/51    05/12/21 1358  84  (!) 114/56 95 %   05/12/21 1357  88      05/12/21 1341    (!) 114/56    05/12/21 1339    111/61    05/12/21 1029 98.5 °F (36.9 °C) 68 16 102/62 100 %       Oxygen Therapy  O2 Sat (%): 100 % (05/12/21 1635)  Pulse via Oximetry: 81 beats per minute (05/12/21 1620)  O2 Device: None (Room air) (05/12/21 1029)    Body mass index is 29.29 kg/m². Physical Exam:    General:  No acute distress, speaking in full sentences, pale  HEENT:  Pupils equal and reactive to light and accommodation, oropharynx is clear, resolving hematoma to R eye  Neck:   Supple, no lymphadenopathy, no JVD   Lungs:  Clear to auscultation bilaterally   CV:   Distant HS, Regular rate and rhythm with normal S1 and S2   Abdomen:  Soft, nontender, nondistended, normoactive bowel sounds   Extremities:  Trace edema to BL LE, chronic per pt  Skin:  Large hematoma to R flank  Neuro:  Nonfocal, A&O x3   Psych:  Normal mood and affect       Data Reviewed: I have reviewed all labs, meds, and studies.       Recent Results (from the past 24 hour(s))   EKG, 12 LEAD, INITIAL    Collection Time: 05/12/21 10:32 AM   Result Value Ref Range    Ventricular Rate 78 BPM    Atrial Rate 78 BPM    P-R Interval 166 ms    QRS Duration 132 ms    Q-T Interval 422 ms    QTC Calculation (Bezet) 481 ms    Calculated P Axis 63 degrees    Calculated R Axis 76 degrees    Calculated T Axis 79 degrees    Diagnosis       Normal sinus rhythm  Right bundle branch block  T wave abnormality, consider inferolateral ischemia  Abnormal ECG  When compared with ECG of 08-APR-2021 15:29,  Right bundle branch block is now Present  Confirmed by [facility]  MD ()KATIA ([id number]) on 5/12/2021 1:32:48 PM     NT-PRO BNP    Collection Time: 05/12/21 10:33 AM   Result Value Ref Range    NT pro-BNP 1,399 (H) <450 PG/ML   TROPONIN-HIGH SENSITIVITY    Collection Time: 05/12/21 10:33 AM   Result Value Ref Range    Troponin-High Sensitivity 27.3 (H) 0 - 14 pg/mL   METABOLIC PANEL, COMPREHENSIVE    Collection Time: 05/12/21 10:34 AM   Result Value Ref Range    Sodium 136 136 - 145 mmol/L    Potassium 4.0 3.5 - 5.1 mmol/L    Chloride 101 98 - 107 mmol/L    CO2 27 21 - 32 mmol/L    Anion gap 8 7 - 16 mmol/L    Glucose 89 65 - 100 mg/dL    BUN 29 (H) 8 - 23 MG/DL    Creatinine 2.61 (H) 0.6 - 1.0 MG/DL    GFR est AA 23 (L) >60 ml/min/1.73m2    GFR est non-AA 19 (L) >60 ml/min/1.73m2    Calcium 8.0 (L) 8.3 - 10.4 MG/DL    Bilirubin, total 0.8 0.2 - 1.1 MG/DL    ALT (SGPT) 68 (H) 12 - 65 U/L    AST (SGOT) 45 (H) 15 - 37 U/L    Alk. phosphatase 78 50 - 136 U/L    Protein, total 5.7 (L) 6.3 - 8.2 g/dL    Albumin 2.5 (L) 3.2 - 4.6 g/dL    Globulin 3.2 2.3 - 3.5 g/dL    A-G Ratio 0.8 (L) 1.2 - 3.5     TRANSFERRIN SATURATION    Collection Time: 05/12/21  1:46 PM   Result Value Ref Range    Iron 124 35 - 150 ug/dL    TIBC 260 250 - 450 ug/dL    Transferrin Saturation 48 >20 %   CBC WITH AUTOMATED DIFF    Collection Time: 05/12/21  1:47 PM   Result Value Ref Range    WBC 1.1 (LL) 4.3 - 11.1 K/uL    RBC 1.24 (L) 4.05 - 5.2 M/uL    HGB 3.7 (LL) 11.7 - 15.4 g/dL    HCT 12.4 (L) 35.8 - 46.3 %    .0 (H) 79.6 - 97.8 FL    MCH 29.8 26.1 - 32.9 PG    MCHC 29.8 (L) 31.4 - 35.0 g/dL    RDW 20.9 (H) 11.9 - 14.6 %    PLATELET 436 670 - 309 K/uL    MPV 11.3 9.4 - 12.3 FL    ABSOLUTE NRBC 0.00 0.0 - 0.2 K/uL    NEUTROPHILS 48 43 - 78 %    LYMPHOCYTES 37 13 - 44 %    MONOCYTES 5 4.0 - 12.0 %    EOSINOPHILS 5 0.5 - 7.8 %    BASOPHILS 0 0.0 - 2.0 %    IMMATURE GRANULOCYTES 5 0.0 - 5.0 %    ABS. NEUTROPHILS 0.4 (L) 1.7 - 8.2 K/UL    ABS. LYMPHOCYTES 0.4 (L) 0.5 - 4.6 K/UL    ABS. MONOCYTES 0.1 0.1 - 1.3 K/UL    ABS. EOSINOPHILS 0.1 0.0 - 0.8 K/UL    ABS. BASOPHILS 0.0 0.0 - 0.2 K/UL    ABS. IMM.  GRANS. 0.1 0.0 - 0.5 K/UL    RBC COMMENTS SLIGHT  ANISOCYTOSIS + POIKILOCYTOSIS        RBC COMMENTS SLIGHT  MACROCYTOSIS        RBC COMMENTS OCCASIONAL  TEARDROP CELLS        RBC COMMENTS OCCASIONAL  BASOPHILIC STIPPLING        WBC COMMENTS Result Confirmed By Smear      PLATELET COMMENTS ADEQUATE      DF AUTOMATED     TROPONIN-HIGH SENSITIVITY    Collection Time: 05/12/21  1:47 PM   Result Value Ref Range    Troponin-High Sensitivity 22.0 (H) 0 - 14 pg/mL   RBC, ALLOCATE    Collection Time: 05/12/21  3:00 PM   Result Value Ref Range    HISTORY CHECKED?  Historical check performed    TYPE & SCREEN    Collection Time: 05/12/21  3:04 PM   Result Value Ref Range    Crossmatch Expiration 05/15/2021,2359     ABO/Rh(D) A POSITIVE     Antibody screen NEG     Unit number [id number]     Blood component type RC LR     Unit division 00     Status of unit ISSUED     Crossmatch result Compatible    HGB & HCT    Collection Time: 05/12/21  3:04 PM   Result Value Ref Range    HGB 4.3 (LL) 11.7 - 15.4 g/dL    HCT 14.7 (L) 35.8 - 46.3 %   CBC W/O DIFF    Collection Time: 05/12/21  3:04 PM   Result Value Ref Range    WBC 0.8 (LL) 4.3 - 11.1 K/uL    RBC 1.48 (L) 4.05 - 5.2 M/uL    HGB 4.4 (LL) 11.7 - 15.4 g/dL    HCT 14.7 (L) 35.8 - 46.3 %    MCV 99.3 (H) 79.6 - 97.8 FL    MCH 29.7 26.1 - 32.9 PG    MCHC 29.9 (L) 31.4 - 35.0 g/dL    RDW 21.0 (H) 11.9 - 14.6 %    PLATELET 360 134 - 812 K/uL    MPV 11.2 9.4 - 12.3 FL    ABSOLUTE NRBC 0.02 0.0 - 0.2 K/uL   POC FECAL OCCULT BLOOD    Collection Time: 05/12/21  3:47 PM   Result Value Ref Range    Occult blood, stool (POC) Positive (A) NEG         EKG Results     Procedure 720 Value Units Date/Time    EKG [[id number]] Collected: 05/12/21 1032    Order Status: Completed Updated: 05/12/21 1333     Ventricular Rate 78 BPM      Atrial Rate 78 BPM      P-R Interval 166 ms      QRS Duration 132 ms      Q-T Interval 422 ms      QTC Calculation (Bezet) 481 ms      Calculated P Axis 63 degrees      Calculated R Axis 76 degrees      Calculated T Axis 79 degrees      Diagnosis --     Normal sinus rhythm  Right bundle branch block  T wave abnormality, consider inferolateral ischemia  Abnormal ECG  When compared with ECG of 08-APR-2021 15:29,  Right bundle branch block is now Present  Confirmed by Cassidy Sanchez MD (KATIA Croft ([id number]) on 5/12/2021 1:32:48 PM            All Micro Results     None          Other Studies:  Xr Ribs Rt Uni 2 V    Result Date: 5/12/2021  Right ribs INDICATION: Rib pain after fall 5 views of the right ribs were obtained FINDINGS: There are mildly displaced rib fractures of the lateral right fifth, sixth, seventh ribs. No pneumothorax. Clear right lung. Surgical clips project over the right axilla. Mildly displaced fractures of the lateral right fifth, sixth, seventh ribs    Ct Head Wo Cont    Result Date: 5/12/2021  EXAMINATION: CT HEAD WO CONT 5/12/2021 1:25 PM INDICATION: 2 syncopal episodes with fall one week ago COMPARISON: CT head January 16, 2016 TECHNIQUE: Multiple-row detector helical CT examination of the head without intravenous contrast. Radiation dose reduction techniques were used for this study. Our CT scanners use one or all of the following: Automated exposure control, adjustment of the mA and/or kV according to patient size, iterative reconstruction. FINDINGS: No acute intracranial hemorrhage. Multiple incidental cortical veins are noted along the cerebral Normal size of ventricles and extra-axial spaces for age. No perceptible loss of gray-white differentiation. No space-occupying lesion. No calvarial abnormality is seen. Essentially clear paranasal sinuses and mastoid air cells. Prior bilateral lens replacements. No abnormality of extracranial soft tissues. Convexities. No acute abnormality. Xr Chest Port    Result Date: 5/12/2021  EXAMINATION: CHEST RADIOGRAPH 5/12/2021 12:18 PM INDICATION: Cough after fall COMPARISON: Chest radiograph March 19, 2021 and same day right rib films TECHNIQUE: A single view of the chest was obtained. FINDINGS: Support Devices: None Osseous : Redemonstrated lateral right lateral rib fractures. Cardiomediastinal Silhouette: Normal in size. Lungs: Clear lungs. Normal pulmonary vascularity. Pleura: No pleural fluid or pneumothorax. Upper Abdomen: No abnormality. Redemonstrated acute right lateral rib fractures. No pneumothorax or acute cardiopulmonary abnormality.          Medications:  Medications Administered Medications Administered     pantoprazole (PROTONIX) 40 mg in 0.9% sodium chloride 10 mL injection     Admin Date  05/12/2021 Action  Given Dose  40 mg Route  IntraVENous Administered By  Marty Ramirez RN          sodium chloride 0.9 % bolus infusion 500 mL     Admin Date  05/12/2021 Action  New Bag Dose  500 mL Rate  500 mL/hr Route  IntraVENous Administered By  Marty Ramirez RN                      Problem List:     Hospital Problems as of 5/12/2021 Date Reviewed: 4/13/2021          Codes Class Noted - Resolved POA    Anemia ICD-10-CM: D64.9  ICD-9-CM: 285.9  5/12/2021 - Present Unknown                 Signed By: Lenny Smalls MD   [employer] [facility]    May 12, 2021  4:22 PM

## 2021-05-12 NOTE — ED NOTES
TRANSFER - OUT REPORT: 
 
Verbal report given to Neisha(name) on Jarrell Warren  being transferred to 825(unit) for routine progression of care Report consisted of patients Situation, Background, Assessment and  
Recommendations(SBAR). Information from the following report(s) SBAR, ED Summary, Intake/Output and MAR was reviewed with the receiving nurse. Lines:  
Peripheral IV 05/12/21 Left Antecubital (Active) Peripheral IV 05/12/21 Right Antecubital (Active) Site Assessment Clean, dry, & intact 05/12/21 1507 Phlebitis Assessment 0 05/12/21 1507 Infiltration Assessment 0 05/12/21 1507 Dressing Status Clean, dry, & intact 05/12/21 1507 Alcohol Cap Used No 05/12/21 1507 Opportunity for questions and clarification was provided. Patient transported with: 
 O2 @ 2 liters Registered Nurse [place] O2 
20 bilat ACs

## 2021-05-12 NOTE — PROGRESS NOTES
Bedside shift report completed with oncoming nurse, Sade Salazar. Patient resting quietly in bed at this time, awake, respirations even and unlabored on 2L NC. Denies needs at this time. Bed low and locked. Bedside table, personal belongings and call light within reach.

## 2021-05-13 ENCOUNTER — APPOINTMENT (OUTPATIENT)
Dept: ULTRASOUND IMAGING | Age: 81
DRG: 808 | End: 2021-05-13
Attending: INTERNAL MEDICINE
Payer: MEDICARE

## 2021-05-13 LAB
ABO + RH BLD: NORMAL
ALBUMIN SERPL-MCNC: 2.5 G/DL (ref 3.2–4.6)
ALBUMIN/GLOB SERPL: 0.7 {RATIO} (ref 1.2–3.5)
ALP SERPL-CCNC: 84 U/L (ref 50–136)
ALT SERPL-CCNC: 60 U/L (ref 12–65)
ANION GAP SERPL CALC-SCNC: 6 MMOL/L (ref 7–16)
APTT PPP: 30.4 SEC (ref 24.1–35.1)
AST SERPL-CCNC: 41 U/L (ref 15–37)
BASOPHILS # BLD: 0 K/UL (ref 0–0.2)
BASOPHILS NFR BLD: 1 % (ref 0–2)
BILIRUB DIRECT SERPL-MCNC: 0.4 MG/DL
BILIRUB SERPL-MCNC: 1.6 MG/DL (ref 0.2–1.1)
BILIRUB SERPL-MCNC: 1.8 MG/DL (ref 0.2–1.1)
BLD PROD TYP BPU: NORMAL
BLOOD GROUP ANTIBODIES SERPL: NORMAL
BPU ID: NORMAL
BUN SERPL-MCNC: 24 MG/DL (ref 8–23)
CALCIUM SERPL-MCNC: 8.2 MG/DL (ref 8.3–10.4)
CHLORIDE SERPL-SCNC: 107 MMOL/L (ref 98–107)
CO2 SERPL-SCNC: 26 MMOL/L (ref 21–32)
CREAT SERPL-MCNC: 2.23 MG/DL (ref 0.6–1)
CROSSMATCH RESULT,%XM: NORMAL
D DIMER PPP FEU-MCNC: 1.14 UG/ML(FEU)
DIFFERENTIAL METHOD BLD: ABNORMAL
EOSINOPHIL # BLD: 0.1 K/UL (ref 0–0.8)
EOSINOPHIL NFR BLD: 12 % (ref 0.5–7.8)
ERYTHROCYTE [DISTWIDTH] IN BLOOD BY AUTOMATED COUNT: 17.4 % (ref 11.9–14.6)
FIBRINOGEN PPP-MCNC: 660 MG/DL (ref 190–501)
GLOBULIN SER CALC-MCNC: 3.7 G/DL (ref 2.3–3.5)
GLUCOSE SERPL-MCNC: 86 MG/DL (ref 65–100)
HAPTOGLOB SERPL-MCNC: 212 MG/DL (ref 30–200)
HCT VFR BLD AUTO: 28.3 % (ref 35.8–46.3)
HCT VFR BLD AUTO: 31.2 % (ref 35.8–46.3)
HCT VFR BLD AUTO: 32.6 % (ref 35.8–46.3)
HGB BLD-MCNC: 10.1 G/DL (ref 11.7–15.4)
HGB BLD-MCNC: 10.4 G/DL (ref 11.7–15.4)
HGB BLD-MCNC: 9.6 G/DL (ref 11.7–15.4)
HGB RETIC QN AUTO: 35 PG (ref 29–35)
IMM GRANULOCYTES # BLD AUTO: 0 K/UL (ref 0–0.5)
IMM GRANULOCYTES NFR BLD AUTO: 3 % (ref 0–5)
IMM RETICS NFR: 6.7 % (ref 3–15.9)
LDH SERPL L TO P-CCNC: 249 U/L (ref 110–210)
LYMPHOCYTES # BLD: 0.3 K/UL (ref 0.5–4.6)
LYMPHOCYTES NFR BLD: 39 % (ref 13–44)
MCH RBC QN AUTO: 30 PG (ref 26.1–32.9)
MCHC RBC AUTO-ENTMCNC: 32.4 G/DL (ref 31.4–35)
MCV RBC AUTO: 92.6 FL (ref 79.6–97.8)
MM INDURATION POC: 0 MM (ref 0–5)
MONOCYTES # BLD: 0 K/UL (ref 0.1–1.3)
MONOCYTES NFR BLD: 4 % (ref 4–12)
NEUTS SEG # BLD: 0.3 K/UL (ref 1.7–8.2)
NEUTS SEG NFR BLD: 41 % (ref 43–78)
NRBC # BLD: 0 K/UL (ref 0–0.2)
PATH REV BLD -IMP: NORMAL
PLATELET # BLD AUTO: 157 K/UL (ref 150–450)
PLATELET COMMENTS,PCOM: SLIGHT
PMV BLD AUTO: 10.8 FL (ref 9.4–12.3)
POTASSIUM SERPL-SCNC: 4.2 MMOL/L (ref 3.5–5.1)
PPD POC: NEGATIVE NEGATIVE
PROCALCITONIN SERPL-MCNC: 0.19 NG/ML
PROT SERPL-MCNC: 6.2 G/DL (ref 6.3–8.2)
RBC # BLD AUTO: 3.37 M/UL (ref 4.05–5.2)
RBC MORPH BLD: ABNORMAL
RETICS # AUTO: 0.04 M/UL (ref 0.03–0.1)
RETICS/RBC NFR AUTO: 1.3 % (ref 0.3–2)
SODIUM SERPL-SCNC: 139 MMOL/L (ref 136–145)
SPECIMEN EXP DATE BLD: NORMAL
STATUS OF UNIT,%ST: NORMAL
UNIT DIVISION, %UDIV: 0
WBC # BLD AUTO: 0.7 K/UL (ref 4.3–11.1)
WBC MORPH BLD: ABNORMAL

## 2021-05-13 PROCEDURE — 36415 COLL VENOUS BLD VENIPUNCTURE: CPT

## 2021-05-13 PROCEDURE — 77010033678 HC OXYGEN DAILY

## 2021-05-13 PROCEDURE — 76775 US EXAM ABDO BACK WALL LIM: CPT

## 2021-05-13 PROCEDURE — 83010 ASSAY OF HAPTOGLOBIN QUANT: CPT

## 2021-05-13 PROCEDURE — 74011250636 HC RX REV CODE- 250/636: Performed by: INTERNAL MEDICINE

## 2021-05-13 PROCEDURE — C9113 INJ PANTOPRAZOLE SODIUM, VIA: HCPCS | Performed by: INTERNAL MEDICINE

## 2021-05-13 PROCEDURE — 85384 FIBRINOGEN ACTIVITY: CPT

## 2021-05-13 PROCEDURE — 74011250636 HC RX REV CODE- 250/636: Performed by: EMERGENCY MEDICINE

## 2021-05-13 PROCEDURE — 99222 1ST HOSP IP/OBS MODERATE 55: CPT | Performed by: INTERNAL MEDICINE

## 2021-05-13 PROCEDURE — 97535 SELF CARE MNGMENT TRAINING: CPT

## 2021-05-13 PROCEDURE — 2709999900 HC NON-CHARGEABLE SUPPLY

## 2021-05-13 PROCEDURE — 85046 RETICYTE/HGB CONCENTRATE: CPT

## 2021-05-13 PROCEDURE — 97165 OT EVAL LOW COMPLEX 30 MIN: CPT

## 2021-05-13 PROCEDURE — 84145 PROCALCITONIN (PCT): CPT

## 2021-05-13 PROCEDURE — 83615 LACTATE (LD) (LDH) ENZYME: CPT

## 2021-05-13 PROCEDURE — 85014 HEMATOCRIT: CPT

## 2021-05-13 PROCEDURE — 85379 FIBRIN DEGRADATION QUANT: CPT

## 2021-05-13 PROCEDURE — 85730 THROMBOPLASTIN TIME PARTIAL: CPT

## 2021-05-13 PROCEDURE — 85025 COMPLETE CBC W/AUTO DIFF WBC: CPT

## 2021-05-13 PROCEDURE — 94760 N-INVAS EAR/PLS OXIMETRY 1: CPT

## 2021-05-13 PROCEDURE — 77030027138 HC INCENT SPIROMETER -A

## 2021-05-13 PROCEDURE — 80053 COMPREHEN METABOLIC PANEL: CPT

## 2021-05-13 PROCEDURE — 87040 BLOOD CULTURE FOR BACTERIA: CPT

## 2021-05-13 PROCEDURE — 97116 GAIT TRAINING THERAPY: CPT | Performed by: PHYSICAL THERAPIST

## 2021-05-13 PROCEDURE — 82248 BILIRUBIN DIRECT: CPT

## 2021-05-13 PROCEDURE — 65270000029 HC RM PRIVATE

## 2021-05-13 PROCEDURE — 97161 PT EVAL LOW COMPLEX 20 MIN: CPT | Performed by: PHYSICAL THERAPIST

## 2021-05-13 PROCEDURE — 82247 BILIRUBIN TOTAL: CPT

## 2021-05-13 PROCEDURE — 74011000250 HC RX REV CODE- 250: Performed by: INTERNAL MEDICINE

## 2021-05-13 PROCEDURE — APPSS180 APP SPLIT SHARED TIME > 60 MINUTES: Performed by: NURSE PRACTITIONER

## 2021-05-13 PROCEDURE — 74011250637 HC RX REV CODE- 250/637: Performed by: INTERNAL MEDICINE

## 2021-05-13 RX ORDER — SPIRONOLACTONE 25 MG/1
25 TABLET ORAL DAILY
COMMUNITY
End: 2021-06-24

## 2021-05-13 RX ORDER — HYDRALAZINE HYDROCHLORIDE 20 MG/ML
10 INJECTION INTRAMUSCULAR; INTRAVENOUS
Status: DISCONTINUED | OUTPATIENT
Start: 2021-05-13 | End: 2021-05-24 | Stop reason: HOSPADM

## 2021-05-13 RX ORDER — LABETALOL HYDROCHLORIDE 5 MG/ML
10 INJECTION, SOLUTION INTRAVENOUS
Status: DISCONTINUED | OUTPATIENT
Start: 2021-05-13 | End: 2021-05-24 | Stop reason: HOSPADM

## 2021-05-13 RX ORDER — FOLIC ACID 1 MG/1
1 TABLET ORAL DAILY
Status: DISCONTINUED | OUTPATIENT
Start: 2021-05-14 | End: 2021-05-24 | Stop reason: HOSPADM

## 2021-05-13 RX ORDER — LIDOCAINE 4 G/100G
1 PATCH TOPICAL EVERY 24 HOURS
Status: DISCONTINUED | OUTPATIENT
Start: 2021-05-13 | End: 2021-05-24 | Stop reason: HOSPADM

## 2021-05-13 RX ORDER — HEPARIN 100 UNIT/ML
10 SYRINGE INTRAVENOUS ONCE
Status: COMPLETED | OUTPATIENT
Start: 2021-05-13 | End: 2021-05-13

## 2021-05-13 RX ORDER — FOLIC ACID 1 MG/1
1 TABLET ORAL DAILY
COMMUNITY

## 2021-05-13 RX ORDER — DICLOFENAC SODIUM 75 MG/1
75 TABLET, DELAYED RELEASE ORAL 2 TIMES DAILY
COMMUNITY
End: 2021-05-24

## 2021-05-13 RX ADMIN — AMIODARONE HYDROCHLORIDE 200 MG: 200 TABLET ORAL at 08:50

## 2021-05-13 RX ADMIN — HEPARIN SODIUM (PORCINE) LOCK FLUSH IV SOLN 100 UNIT/ML 10 UNITS: 100 SOLUTION at 14:24

## 2021-05-13 RX ADMIN — HYDRALAZINE HYDROCHLORIDE 10 MG: 20 INJECTION, SOLUTION INTRAMUSCULAR; INTRAVENOUS at 23:35

## 2021-05-13 RX ADMIN — LEVOTHYROXINE SODIUM 25 MCG: 0.05 TABLET ORAL at 06:01

## 2021-05-13 RX ADMIN — PANTOPRAZOLE SODIUM 40 MG: 40 INJECTION, POWDER, FOR SOLUTION INTRAVENOUS at 22:09

## 2021-05-13 RX ADMIN — ROSUVASTATIN CALCIUM 10 MG: 5 TABLET, FILM COATED ORAL at 22:09

## 2021-05-13 RX ADMIN — SODIUM CHLORIDE 10 ML: 9 INJECTION, SOLUTION INTRAMUSCULAR; INTRAVENOUS; SUBCUTANEOUS at 06:01

## 2021-05-13 RX ADMIN — PANTOPRAZOLE SODIUM 40 MG: 40 INJECTION, POWDER, FOR SOLUTION INTRAVENOUS at 08:50

## 2021-05-13 RX ADMIN — SODIUM CHLORIDE 75 ML/HR: 900 INJECTION, SOLUTION INTRAVENOUS at 02:53

## 2021-05-13 RX ADMIN — SODIUM CHLORIDE 10 ML: 9 INJECTION, SOLUTION INTRAMUSCULAR; INTRAVENOUS; SUBCUTANEOUS at 22:10

## 2021-05-13 RX ADMIN — DULOXETINE HYDROCHLORIDE 60 MG: 60 CAPSULE, DELAYED RELEASE ORAL at 08:50

## 2021-05-13 RX ADMIN — PANTOPRAZOLE SODIUM 40 MG: 40 INJECTION, POWDER, FOR SOLUTION INTRAVENOUS at 02:50

## 2021-05-13 NOTE — PROGRESS NOTES
Dr. Mabel Johnson notified that multiple RN's were unable to start a second IV. As per Dr. Mabel Johnson, give IV protonix after third unit of blood has been transfused and before starting IV fluids.

## 2021-05-13 NOTE — CONSULTS
[facility] Consult Note           Referring Provider:  Najma Phillips MD    Consult Date:  5/12/2021    Admit Date:  5/12/2021    Chief Complaint:  weakness    Subjective:     History of Present Illness:  Patient is a 68 y.o. female with PMH  including but not limited to Atrial fibrillation diagnosed 4/21 , who is seen in consultation at the request of Dr. Didier Barrientos for GI bleeding for. She was started on Eliquis during her last hospitalization . Since her discharge she has endorsed a daily \"dark and tarry\" BM. She has had nausea and scant emesis as well. She denies hematemesis or CG emesis. She denies NSAIDs; although her home meds include Diclofenac and Prednisone    PMH:  Past Medical History:   Diagnosis Date    Acquired hypothyroidism 1/16/2016    Breast cancer ([facility] [address] 2008    Depression 8/18/2016    Endocrine disease     hypothyroid    Fungal dermatitis 8/18/2016    Hyperlipidemia 1/16/2016    Hypertension, essential, benign 8/18/2016    Hypokalemia 8/18/2016    Inflamed sebaceous cyst 8/18/2016    Major depressive disorder, recurrent, moderate ([address] 8/18/2016    Nicotine dependence, cigarettes, uncomplicated 5/61/6008    Osteopenia 8/18/2016    Osteoporosis 8/18/2016    Radiation therapy complication 9478    Rheumatoid arthritis ([facility] [address] 1/16/2016    Right carotid bruit 1/16/2016    TIA (transient ischemic attack) 1/16/2016    Tobacco abuse 8/18/2016       PSH:  Past Surgical History:   Procedure Laterality Date    HX ADENOIDECTOMY      HX BREAST LUMPECTOMY Right 2008    with lymph nodes    HX TONSILLECTOMY         Allergies:  No Known Allergies    Home Medications:  Prior to Admission medications    Medication Sig Start Date End Date Taking? Authorizing Provider   amiodarone (CORDARONE) 200 mg tablet Take 1 Tab by mouth daily. 4/13/21   Jennifer Goss MD   rosuvastatin (Crestor) 10 mg tablet Take 1 Tab by mouth nightly.  3/26/21   Sister, Carla Lawson MD   apixaban (ELIQUIS) 5 mg tablet Take 1 Tab by mouth two (2) times a day. 3/26/21   Sister, Jessica Natarajan MD   famotidine (PEPCID) 20 mg tablet Take 1 Tab by mouth daily. 3/27/21   Sister, Jessica Natarajan MD   predniSONE (DELTASONE) 10 mg tablet Take 30 mg by mouth daily (with breakfast). 3/27/21   SisterJessica MD   ondansetron (ZOFRAN ODT) 4 mg disintegrating tablet Take 1 Tab by mouth every eight (8) hours as needed for Nausea or Vomiting. 3/26/21   Sister, Jessica Natarajan MD   cholecalciferol (VITAMIN D3) 1,000 unit tablet Take 1,000 Units by mouth daily. Provider, Historical   DULoxetine (CYMBALTA) 60 mg capsule Take 1 Cap by mouth daily. 2/6/18   Sigrid Contreras MD   levothyroxine (SYNTHROID) 25 mcg tablet Take 1 Tab by mouth Daily (before breakfast). 2/6/18   Sigrid Contreras MD   methotrexate (RHEUMATREX) 2.5 mg tablet 5 tabs every Sunday 8/2/17   Sigrid Contreras MD   folic acid 216 mcg tablet Take 800 mcg by mouth daily.     Provider, Historical       Hospital Medications:  Current Facility-Administered Medications   Medication Dose Route Frequency    0.9% sodium chloride infusion 250 mL  250 mL IntraVENous PRN    [START ON 5/13/2021] amiodarone (CORDARONE) tablet 200 mg  200 mg Oral DAILY    [START ON 5/13/2021] DULoxetine (CYMBALTA) capsule 60 mg  60 mg Oral DAILY    [START ON 5/13/2021] levothyroxine (SYNTHROID) tablet 25 mcg  25 mcg Oral ACB    rosuvastatin (CRESTOR) tablet 10 mg  10 mg Oral QHS    sodium chloride (NS) flush 5-40 mL  5-40 mL IntraVENous Q8H    sodium chloride (NS) flush 5-40 mL  5-40 mL IntraVENous PRN    acetaminophen (TYLENOL) tablet 650 mg  650 mg Oral Q6H PRN    Or    acetaminophen (TYLENOL) suppository 650 mg  650 mg Rectal Q6H PRN    polyethylene glycol (MIRALAX) packet 17 g  17 g Oral DAILY PRN    promethazine (PHENERGAN) tablet 12.5 mg  12.5 mg Oral Q6H PRN    Or    ondansetron (ZOFRAN) injection 4 mg  4 mg IntraVENous Q6H PRN    pantoprazole (PROTONIX) 40 mg in 0.9% sodium chloride 10 mL injection  40 mg IntraVENous Q12H    0.9% sodium chloride infusion  75 mL/hr IntraVENous CONTINUOUS    0.9% sodium chloride infusion 250 mL  250 mL IntraVENous PRN    diphenhydrAMINE (BENADRYL) capsule 25 mg  25 mg Oral Q6H PRN    tuberculin injection 5 Units  5 Units IntraDERMal ONCE       Social History:  Social History     Tobacco Use    Smoking status: Current Some Day Smoker    Smokeless tobacco: Never Used    Tobacco comment: Only on pay day-1/2 pack   Substance Use Topics    Alcohol use: No       Pt denies any history of drug use, blood transfusions, or tattoos. Family History:  Family History   Problem Relation Age of Onset    Stroke Mother     Cancer Father         Brain    HIV/AIDS Brother        Review of Systems:  A detailed 10 system ROS is obtained, with pertinent positives as listed above. All others are negative. Diet:      Objective:     Physical Exam:  Vitals:  Visit Vitals  BP (!) 134/54 (BP 1 Location: Left upper arm, BP Patient Position: At rest)   Pulse 73   Temp 98.8 °F (37.1 °C)   Resp 18   Ht 5' (1.524 m)   Wt 68 kg (150 lb)   SpO2 100%   BMI 29.29 kg/m²     Gen:  Pt is alert, cooperative, no acute distress  Skin:  Extremities and face reveal no rashes. HEENT: Sclerae anicteric. Extra-occular muscles are intact. No oral ulcers. No abnormal pigmentation of the lips. The neck is supple. Cardiovascular: Regular rate and rhythm. No murmurs, gallops, or rubs. Respiratory:  Comfortable breathing with no accessory muscle use. Clear breath sounds anteriorly with no wheezes, rales, or rhonchi. GI:  Abdomen nondistended, soft, and nontender. Normal active bowel sounds. No enlargement of the liver or spleen. No masses palpable. Rectal:  Deferred  Musculoskeletal:  No pitting edema of the lower legs. Neurological:  Gross memory appears intact. Patient is alert and oriented. Psychiatric:  Mood appears appropriate with judgement intact.   Lymphatic:  No cervical or supraclavicular adenopathy. Laboratory:    Recent Labs     05/12/21  1504 05/12/21  1347 05/12/21  1034 05/12/21  1033   WBC 0.8* 1.1*  --   --    HGB 4.4*  4.3* 3.7*  --   --    HCT 14.7*  14.7* 12.4*  --   --     206  --   --    MCV 99.3* 100.0*  --   --    NA  --   --  136  --    K  --   --  4.0  --    CL  --   --  101  --    CO2  --   --  27  --    BUN  --   --  29*  --    CREA  --   --  2.61*  --    CA  --   --  8.0*  --    GLU  --   --  89  --    AP  --   --  78  --    AST  --   --  45*  --    ALT  --   --  68*  --    TBILI  --   --  0.8  --    ALB  --   --  2.5*  --    TP  --   --  5.7*  --    PTP  --   --   --  17.5*   INR  --   --   --  1.4          Assessment:     Principal Problem:    Anemia (5/12/2021)    Active Problems:    Acquired hypothyroidism (1/16/2016)      Hyperlipidemia (1/16/2016)      Rheumatoid arthritis ([facility] [address] (1/16/2016)      Hypertension, essential, benign (8/18/2016)      Major depressive disorder, recurrent, moderate (HCC) (8/18/2016)      Leukopenia (5/12/2021)      NAZIA (acute kidney injury) ([facility] [address] (5/12/2021)      Rib fracture (5/12/2021)        Plan:     Anemia- In the setting of anticoagulation, NSAIDs, and corticosteroids   I am concerned about the multiple abnormalities in her smear that suggest a hematologic etiology. We will plan for EGD in the am given the degree of her anemia as well as the NSAIDs.

## 2021-05-13 NOTE — PROGRESS NOTES
ACUTE PHYSICAL THERAPY GOALS:  (Developed with and agreed upon by patient and/or caregiver. )    LTG:  (1.)Ms. Liliana Valdivia will move from supine to sit and sit to supine , scoot up and down and roll side to side in bed with INDEPENDENT within 7 treatment day(s). (2.)Ms. Liliana Valdivia will transfer from bed to chair and chair to bed with CONTACT GUARD ASSIST using the least restrictive device within 7 treatment day(s). (3.)Ms. Almeida will ambulate with CONTACT GUARD ASSIST for 50 feet with the least restrictive device within 7 treatment day(s). (4.)Ms. Liliana Valdivia will tolerate at least 23 min of dynamic standing activity to assist standing ADLs with the least restrictive device within 7 treatment days. (5.)Ms. Liliana Valdivia will climb at least 5 steps with MODERATE ASSIST with the least restrictive device within 7 treatment days.     ________________________________________________________________________________________________        PHYSICAL THERAPY ASSESSMENT: Initial Assessment, Daily Note and AM PT Treatment Day # 1      Pavithra Piña is a 68 y.o. female   PRIMARY DIAGNOSIS: Anemia  Anemia [D64.9]       Reason for Referral:    ICD-10: Treatment Diagnosis: Generalized Muscle Weakness (M62.81)  Other lack of cordination (R27.8)  Difficulty in walking, Not elsewhere classified (R26.2)  Unspecified Lack of Coordination (R27.9)  INPATIENT: Payor: [facility] MEDICARE COMPLETE / Plan: [facility] MEDICARE COMPLETE / Product Type: Managed Care Medicare /     ASSESSMENT:     REHAB RECOMMENDATIONS:   Recommendation to date pending progress:  Setting:   HH vs STR  Equipment:    None   already has RW     PRIOR LEVEL OF FUNCTION:  (Prior to Hospitalization) INITIAL/CURRENT LEVEL OF FUNCTION:  (Most Recently Demonstrated)   Bed Mobility:   Independent  Sit to Stand:   Modified Independent  Transfers:   Modified Independent  Gait/Mobility:   Modified Independent Bed Mobility:   Standby Assistance  Sit to Stand:   Minimal Assistance  Transfers:   Minimal Assistance  Gait/Mobility:   Minimal Assistance     ASSESSMENT:  Ms. Cameron Black presents in supine. She is mod I w/ a RW at baseline w/ 2 recent falls. Upon entering, Pnt is agreeable to PT treatment. she reports no pain in her back at rest. Pnt performed supine > sit with SBA and extra time, sitting EOB with good static sitting balance control. Sit > stand with Min A using RW. Gait 4x 8 ft with min A and RW, cues for step clearance and keeping close proximity to RW. Gait is noted to be very slow and shuffled, but pnt does not de-saturate. Stand > sit with min A, followed by positioning for comfort. At end of session pt supine in bed with all needs within reach, alarm activated for safety, RN notified. Overall, pnt presents as functioning below her baseline, with deficits in mobility including transfers, gait, balance, and activity tolerance. Pt will continue to benefit from skilled therapy services to address stated deficits to promote return to highest level of function, independence, and safety. Will continue to follow. SUBJECTIVE:   Ms. Cameron Black states, \"Very nice to meet you, honey. \"    SOCIAL HISTORY/LIVING ENVIRONMENT:   Home Environment: Trailer/mobile home  # Steps to Enter: 5  One/Two Story Residence: One story  Living Alone: Yes  Support Systems: Friends \ neighbors, [employer] Specter / yao community  OBJECTIVE:     PAIN: VITAL SIGNS: LINES/DRAINS:   Pre Treatment:   0  Post Treatment: 0   IV  O2 Device: Nasal cannula     GROSS EVALUATION:   Within Functional Limits Abnormal/ Functional Abnormal/ Non-Functional (see comments) Not Tested Comments:   AROM [] [x] [] [] Decreased bilateral hip extension and dorsiflexion   PROM [] [x] [] []    Strength [] [x] [] []    Balance [] [x] [] [] Trunk sway increased   Posture [] [x] [] [] kyphotic   Sensation [] [x] [] []    Coordination [] [x] [] []    Tone [x] [] [] []    Edema [x] [] [] []    Activity Tolerance [] [x] [] [] Deconditioned     [] [] [] []      COGNITION/  PERCEPTION: Intact Impaired   (see comments) Comments:   Orientation [x] []    Vision [x] []    Hearing [x] []    Command Following [x] []    Safety Awareness [x] []     [] []      MOBILITY: I Mod I S SBA CGA Min Mod Max Total  NT x2 Comments:   Bed Mobility    Rolling [] [] [] [x] [] [] [] [] [] [] []    Supine to Sit [] [] [] [x] [] [] [] [] [] [] []    Scooting [] [] [] [x] [] [] [] [] [] [] []    Sit to Supine [] [] [] [] [] [] [] [] [] [] []    Transfers    Sit to Stand [] [] [] [] [] [x] [] [] [] [] []    Bed to Chair [] [] [] [] [] [] [] [] [] [x] []    Stand to Sit [] [] [] [] [] [x] [] [] [] [] []    I=Independent, Mod I=Modified Independent, S=Supervision, SBA=Standby Assistance, CGA=Contact Guard Assistance,   Min=Minimal Assistance, Mod=Moderate Assistance, Max=Maximal Assistance, Total=Total Assistance, NT=Not Tested  GAIT: I Mod I S SBA CGA Min Mod Max Total  NT x2 Comments:   Level of Assistance [] [] [] [] [] [x] [] [] [] [] []    Distance 4x8'    DME Rolling Walker and Gait Belt    Gait Quality Shuffled, slow    Weightbearing Status N/A     I=Independent, Mod I=Modified Independent, S=Supervision, SBA=Standby Assistance, CGA=Contact Guard Assistance,   Min=Minimal Assistance, Mod=Moderate Assistance, Max=Maximal Assistance, Total=Total Assistance, NT=Not Tested    [address] [facility] [address] [id number] AM-[facility] Form       How much difficulty does the patient currently have. .. Unable A Lot A Little None   1. Turning over in bed (including adjusting bedclothes, sheets and blankets)? [] 1   [] 2   [x] 3   [] 4   2. Sitting down on and standing up from a chair with arms ( e.g., wheelchair, bedside commode, etc.)   [] 1   [] 2   [x] 3   [] 4   3. Moving from lying on back to sitting on the side of the bed? [] 1   [] 2   [x] 3   [] 4   How much help from another person does the patient currently need. ..  Total A Lot A Little None 4. Moving to and from a bed to a chair (including a wheelchair)? [] 1   [] 2   [x] 3   [] 4   5. Need to walk in hospital room? [] 1   [x] 2   [] 3   [] 4   6. Climbing 3-5 steps with a railing? [] 1   [x] 2   [] 3   [] 4   © , Trustees of [address] [place] [address], under license to [employer]. All rights reserved     Score:  Initial: 16 Most Recent: X (Date: -- )    Interpretation of Tool:  Represents activities that are increasingly more difficult (i.e. Bed mobility, Transfers, Gait). PLAN:   FREQUENCY/DURATION: PT Plan of Care: 3 times/week for duration of hospital stay or until stated goals are met, whichever comes first.    PROBLEM LIST:   (Skilled intervention is medically necessary to address:)  1. Decreased ADL/Functional Activities  2. Decreased Activity Tolerance  3. Decreased AROM/PROM  4. Decreased Balance  5. Decreased Coordination  6. Decreased Gait Ability  7. Decreased Strength  8. Decreased Transfer Abilities   INTERVENTIONS PLANNED:   (Benefits and precautions of physical therapy have been discussed with the patient.)  1. Therapeutic Activity  2. Therapeutic Exercise/HEP  3. Neuromuscular Re-education  4. Gait Training  5. Manual Therapy  6. Education     TREATMENT:     EVALUATION: Low Complexity : (Untimed Charge)    TREATMENT:   ($$ Gait Trainin-22 mins    )  Gait Training (9 Minutes): Gait training for 4x8 feet utilizing Rolling Walker and [employer]. Patient required Manual, Tactile, Verbal and Visual cueing to improve Activity Pacing, Assistive Device Utilization, Dynamic Standing Balance and Gait Mechanics.      TREATMENT GRID:  N/A    AFTER TREATMENT POSITION/PRECAUTIONS:  Bed, Needs within reach and RN notified    INTERDISCIPLINARY COLLABORATION:  RN/PCT and PT/PTA    TOTAL TREATMENT DURATION:  PT Patient Time In/Time Out  Time In: Lisha 178  Time Out: [facility] [place]

## 2021-05-13 NOTE — PROGRESS NOTES
NI from [place], Count includes the [facility]. Patient in stable condition with resps even/unlabored. NAD noted. Patient on oxygen to nasal cannula. Patient has been NPO since MN. Safety measures noted. Will continue to monitor per policy.

## 2021-05-13 NOTE — PROGRESS NOTES
Spoke with Miesha Tatum NP via [employer]. Will not perform EGD today. Clear Liquid diet ordered. Dr. Nabeel Giang with Hem/Onc at the bedside at this time.

## 2021-05-13 NOTE — PROGRESS NOTES
Patient has excoriation and open wounds to abdominal fold with scant bloody drainage. Patient also endorses pain to this area. Notified Dr. Dorita Miller of the above stated at 2037 via [employer]. Dr. Dorita Miller informed this RN at 2052 via [employer] that she placed an order for wound consult and also ordered ultram PRN for pain. Primary RN, Dany Mario, notified by this RN of all of the above stated.

## 2021-05-13 NOTE — PROGRESS NOTES
ACUTE OT GOALS:  (Developed with and agreed upon by patient and/or caregiver.)  1. Patient will bathe and dress total body with modified independence and adaptive device as needed. 2. Patient will toilet with modified independence and adaptive device as needed. 3. Patient will tolerate 30 minutes of OT treatment with up to 2 rest breaks to increase activity tolerance for ADLs. 4. Patient will complete functional transfers with modified independence. 5. Patient will complete functional mobility for ADLs with modified independence. 6. Patient will demonstrate modified independence with therapeutic exercise HEP to increase strength in BUEs for increased safety and independence with functional tasks. Timeframe: 7 visits    OCCUPATIONAL THERAPY ASSESSMENT: Initial Assessment and Daily Note OT Treatment Day # 1    Carlos Miller is a 68 y.o. female   PRIMARY DIAGNOSIS: Anemia  Anemia [D64.9]       Reason for Referral:  Weakness  ICD-10: Treatment Diagnosis: Generalized Muscle Weakness (M62.81)  History of falling (Z91.81)  INPATIENT: Payor: [employer] MEDICARE COMPLETE / Plan: [employer] / Product Type: [employer] Care Medicare /   ASSESSMENT:     REHAB RECOMMENDATIONS:   Recommendation to date pending progress:  Setting:   Short-term Rehab  Equipment:    To Be Determined     PRIOR LEVEL OF FUNCTION:  (Prior to Hospitalization)  INITIAL/CURRENT LEVEL OF FUNCTION:  (Based on today's evaluation)   Bathing:   Modified Independent  Dressing:   Independent  Feeding/Grooming:   Independent  Toileting:   Independent  Functional Mobility:   Modified Independent -RW in home, cane out Bathing:   Moderate Assistance  Dressing:   Minimal Assistance  Feeding/Grooming:   Set Up  Toileting:   Contact Guard Assistance  Functional Mobility:   Contact Guard Assistance     ASSESSMENT:  Ms. Marissa Olivarez presents with anemia, falls with recent R rib fxs. Pt undergoing oncology work up.  At baseline pt lives alone, completes I/ADLs, drives, and ambulates with Jem using RW in home, cane out. Pt with deficits in strength, balance, and endurance impacting mobility and ADLs. Pt practiced bed mobility, functional tranfers, and ambulation with CGA/cane. Pt fatigues quickly, is dyspneic with minimal activity. Practiced grooming in sitting with set up, bathing activity with Dilcia 2° dyspnea. Pt is functioning below baseline and would benefit from continued OT to increase safety and independence. SUBJECTIVE:   Ms. Betsy Knight states, \"I'm just tired. \"    SOCIAL HISTORY/LIVING ENVIRONMENT: At baseline pt lives alone, completes I/ADLs, drives, and ambulates with Jem using RW in home, cane out. Completes own grocery shopping/cooking etc. No supplemental O2 use. 2 recent falls.   Home Environment: Trailer/mobile home  # Steps to Enter: 5  One/Two Story Residence: One story  Living Alone: Yes  Support Systems: Friends \ neighbors, [facility] / yao community    OBJECTIVE:     PAIN: VITAL SIGNS: LINES/DRAINS:   Pre Treatment: Pain Screen  Pain Scale 1: Numeric (0 - 10)  Pain Intensity 1: 0  Post Treatment: same Vital Signs  O2 Sat (%): 100 %  O2 Device: Nasal cannula  O2 Flow Rate (L/min): 2 l/min   O2 Device: Nasal cannula     GROSS EVALUATION:  BUEs Within Functional Limits Abnormal/ Functional Abnormal/ Non-Functional (see comments) Not Tested Comments:   AROM [x] [] [] []    PROM [] [] [] []    Strength [] [x] [] []    Balance [] [x] [] []    Posture [] [x] [] []    Sensation [] [] [] []    Coordination [] [x] [] []    Tone [] [] [] []    Edema [x] [] [] []    Activity Tolerance [] [x] [] []     [] [] [] []      COGNITION/  PERCEPTION: Intact Impaired   (see comments) Comments:   Orientation [x] []    Vision [x] []    Hearing [] [x] [place]   Judgment/ Insight [x] []    Attention [x] []    Memory [] []    Command Following [x] []    Emotional Regulation [x] []     [] []      ACTIVITIES OF DAILY LIVING: I Mod I S SBA CGA Min Mod Max Total NT Comments   BASIC ADLs:              Bathing/ Showering [] [] [] [] [] [x] [] [] [] [] For lower BLEs   Toileting [] [] [] [] [] [] [] [] [] [x]    Dressing [] [] [] [] [] [] [] [] [] [] TA socks   Feeding [] [] [] [] [] [] [] [] [] [x]    Grooming [] [] [x] [] [] [] [] [] [] [] Brush dentures in sitting    Personal Device Care [] [] [x] [] [] [] [] [] [] [] dentures   Functional Mobility [] [] [] [] [x] [] [] [] [] [] cane   I=Independent, Mod I=Modified Independent, S=Supervision, SBA=Standby Assistance, CGA=Contact Guard Assistance,   Min=Minimal Assistance, Mod=Moderate Assistance, Max=Maximal Assistance, Total=Total Assistance, NT=Not Tested    MOBILITY: I Mod I S SBA CGA Min Mod Max Total  NT x2 Comments:   Supine to sit [] [] [] [x] [] [] [] [] [] [] []    Sit to supine [] [] [] [x] [] [] [] [] [] [] []    Sit to stand [] [] [] [] [x] [] [] [] [] [] []    Bed to chair [] [] [] [] [x] [] [] [] [] [] [] cane   I=Independent, Mod I=Modified Independent, S=Supervision, SBA=Standby Assistance, CGA=Contact Guard Assistance,   Min=Minimal Assistance, Mod=Moderate Assistance, Max=Maximal Assistance, Total=Total Assistance, NT=Not Tested    [address] [facility] [place] [address] [id number] AM-PAC 6 Clicks   Daily Activity Inpatient Short Form        How much help from another person does the patient currently need. .. Total A Lot A Little None   1. Putting on and taking off regular lower body clothing? [] 1   [] 2   [x] 3   [] 4   2. Bathing (including washing, rinsing, drying)? [] 1   [] 2   [x] 3   [] 4   3. Toileting, which includes using toilet, bedpan or urinal?   [] 1   [] 2   [x] 3   [] 4   4. Putting on and taking off regular upper body clothing? [] 1   [] 2   [x] 3   [] 4   5. Taking care of personal grooming such as brushing teeth? [] 1   [] 2   [x] 3   [] 4   6. Eating meals? [] 1   [] 2   [] 3   [x] 4   © 2007, Trustees of [address] [place] [address] [address] [id number], under license to [facility].  All rights reserved     Score:  Initial: 19 5/13/21 Most Recent: X (Date: -- )   Interpretation of Tool:  Represents activities that are increasingly more difficult (i.e. Bed mobility, Transfers, Gait). PLAN:   FREQUENCY/DURATION: OT Plan of Care: 3 times/week for duration of hospital stay or until stated goals are met, whichever comes first.    PROBLEM LIST:   (Skilled intervention is medically necessary to address:)  1. Decreased ADL/Functional Activities  2. Decreased Activity Tolerance  3. Decreased Balance  4. Decreased Coordination  5. Decreased Gait Ability  6. Decreased Strength  7. Decreased Transfer Abilities   INTERVENTIONS PLANNED:   (Benefits and precautions of occupational therapy have been discussed with the patient.)  1. Self Care Training  2. Therapeutic Activity  3. Therapeutic Exercise/HEP  4. Neuromuscular Re-education  5. Education     TREATMENT:     EVALUATION: Low Complexity : (Untimed Charge)    TREATMENT:   ($$ Self Care/Home Management: 8-22 mins    )  Self Care (10 Minutes): Self care including Lower Body Bathing, Lower Body Dressing, Personal Device Care and Grooming to increase independence and decrease level of assistance required.     TREATMENT GRID:  N/A    AFTER TREATMENT POSITION/PRECAUTIONS:  Bed and Needs within reach    INTERDISCIPLINARY COLLABORATION:  MD/PA/NP, RN/PCT and OT/SCHROEDER    TOTAL TREATMENT DURATION:  OT Patient Time In/Time Out  Time In: 1125  Time Out: Dulce 119, OTR/L

## 2021-05-13 NOTE — PROGRESS NOTES
Erick Barrera NP with [facility] here. Requests Neutropenic Precautions due to WBC 0.7 and abs. Neutrophil of 0.3. Will reevaluate when Hem/Onc sees patient.

## 2021-05-13 NOTE — MANAGEMENT PLAN
[facility]        Inpatient Hematology / Oncology Plan of Care    Reason for Consult:  Anemia [D64.9]  Referring Physician:  Abbi Linares MD    History of Present Illness:  Ms. Liliana Valdivia is a 68 y.o. female admitted on 5/12/2021 with a primary diagnosis of The primary encounter diagnosis was UGIB (upper gastrointestinal bleed). Diagnoses of Blood loss anemia and Closed fracture of multiple ribs of right side, initial encounter were also pertinent to this visit. Rolly Piña is a 68 y.o. female with medical history of recently dx'd Afib started on Eliquis in March 2021,  CKD3, RA (MTX every Sunday), h/o breast CA 2008, hypothyroidism who presented to ED with falls, weakness, nausea. Pt reported not feeling well since she was discharged March 2021 after being diagnosed with Afib and started on Eliquis. She endorsed increased fatigue, generalized weakness. Endorsed frequent falls even prior to last hospitalization which has continued, with a fall approx 1-2 weeks ago, landing on R side and bruising R flank and face. She reports dark stools since starting Eliquis. She ultimately saw her PCP on day of admission who directed her to the ED for further evaluation.     In ED, VSS, afebrile. Hgb 3.7, confirmed on repeat. WBC 1.1, Cr 2.61. She was heme + in ED. XR revealed R 5,6,7 rib fracture. CBC also revealed occasional teardrop cells and occasional basophilic stippling. We are consulted for recommendations.          No Known Allergies  Past Medical History:   Diagnosis Date    Acquired hypothyroidism 1/16/2016    Breast cancer ([facility] 2008    Depression 8/18/2016    Endocrine disease     hypothyroid    Fungal dermatitis 8/18/2016    Hyperlipidemia 1/16/2016    Hypertension, essential, benign 8/18/2016    Hypokalemia 8/18/2016    Inflamed sebaceous cyst 8/18/2016    Major depressive disorder, recurrent, moderate ([address] 8/18/2016    Nicotine dependence, cigarettes, uncomplicated 8/18/2016    Osteopenia 8/18/2016    Osteoporosis 8/18/2016    Radiation therapy complication 3169    Rheumatoid arthritis ([facility] [address] 1/16/2016    Right carotid bruit 1/16/2016    TIA (transient ischemic attack) 1/16/2016    Tobacco abuse 8/18/2016     Past Surgical History:   Procedure Laterality Date    HX ADENOIDECTOMY      HX BREAST LUMPECTOMY Right 2008    with lymph nodes    HX TONSILLECTOMY       Family History   Problem Relation Age of Onset    Stroke Mother     Cancer Father         Brain    HIV/AIDS Brother      Social History     Socioeconomic History    Marital status:      Spouse name: Not on file    Number of children: Not on file    Years of education: Not on file    Highest education level: Not on file   Occupational History    Not on file   Social Needs    Financial resource strain: Not on file    Food insecurity     Worry: Not on file     Inability: Not on file    Transportation needs     Medical: Not on file     Non-medical: Not on file   Tobacco Use    Smoking status: Current Some Day Smoker    Smokeless tobacco: Never Used    Tobacco comment: Only on pay day-1/2 pack   Substance and Sexual Activity    Alcohol use: No    Drug use: No    Sexual activity: Not on file   Lifestyle    Physical activity     Days per week: Not on file     Minutes per session: Not on file    Stress: Not on file   Relationships    Social connections     Talks on phone: Not on file     Gets together: Not on file     Attends Rastafarian service: Not on file     Active member of club or organization: Not on file     Attends meetings of clubs or organizations: Not on file     Relationship status: Not on file    Intimate partner violence     Fear of current or ex partner: Not on file     Emotionally abused: Not on file     Physically abused: Not on file     Forced sexual activity: Not on file   Other Topics Concern    Not on file   Social History Narrative    Not on file     Current Facility-Administered Medications   Medication Dose Route Frequency Provider Last Rate Last Admin    0.9% sodium chloride infusion 250 mL  250 mL IntraVENous PRN Sam Davis DO        amiodarone (CORDARONE) tablet 200 mg  200 mg Oral DAILY Tavia Encarnacion MD   200 mg at 05/13/21 0850    DULoxetine (CYMBALTA) capsule 60 mg  60 mg Oral DAILY Tavia Encarnacion MD   60 mg at 05/13/21 0850    levothyroxine (SYNTHROID) tablet 25 mcg  25 mcg Oral ACB Tavia Encarnacion MD   25 mcg at 05/13/21 0601    rosuvastatin (CRESTOR) tablet 10 mg  10 mg Oral QHS Tavia Encarnacion MD   10 mg at 05/12/21 2130    sodium chloride (NS) flush 5-40 mL  5-40 mL IntraVENous Q8H Tavia Encarnacion MD   10 mL at 05/13/21 0601    sodium chloride (NS) flush 5-40 mL  5-40 mL IntraVENous PRN Tavia Encarnacion MD   10 mL at 05/12/21 2131    acetaminophen (TYLENOL) tablet 650 mg  650 mg Oral Q6H PRN Tavia Encarnacion MD        Or   Lorenzo Meehan acetaminophen (TYLENOL) suppository 650 mg  650 mg Rectal Q6H PRN Tavia Encarnacion MD        polyethylene glycol (MIRALAX) packet 17 g  17 g Oral DAILY PRN Tavia Encarnacion MD        promethazine (PHENERGAN) tablet 12.5 mg  12.5 mg Oral Q6H PRN Tavia Encarnacion MD        Or    ondansetron (ZOFRAN) injection 4 mg  4 mg IntraVENous Q6H PRN Tavia Encarnacion MD        pantoprazole (PROTONIX) 40 mg in 0.9% sodium chloride 10 mL injection  40 mg IntraVENous Q12H Tavia Encarnacion MD   40 mg at 05/13/21 0850    0.9% sodium chloride infusion  75 mL/hr IntraVENous CONTINUOUS Tavia Encarnacion MD 75 mL/hr at 05/13/21 0253 75 mL/hr at 05/13/21 0253    0.9% sodium chloride infusion 250 mL  250 mL IntraVENous PRN Tavia Encarnacion MD        diphenhydrAMINE (BENADRYL) capsule 25 mg  25 mg Oral Q6H PRN Tavia Encarnacion MD        tuberculin injection 5 Units  5 Units IntraDERMal ONCE Apple, Tavia Matute MD   5 Units at 05/12/21 1920    traMADoL (ULTRAM) tablet 50 mg  50 mg Oral Q6H PRN Venita Mauricio MD           OBJECTIVE:  Patient Vitals for the past 8 hrs:   BP Temp Pulse Resp SpO2   21 1151     100 %   21 1056 138/68 98 °F (36.7 °C) (!) 108 18 92 %   21 0738 134/62 98 °F (36.7 °C) 74 18 99 %     Temp (24hrs), Av.4 °F (36.9 °C), Min:97.3 °F (36.3 °C), Max:99.3 °F (37.4 °C)     07 -  190  In: 381.3 [I.V.:381.3]  Out: -     Physical Exam:  Constitutional: Well developed,female in no acute distress, sitting comfortably in the hospital bed. HEENT: Normocephalic and atraumatic. Oropharynx is clear, mucous membranes are moist.  Pupils are equal, round, and reactive to light. Extraocular muscles are intact. Sclerae anicteric. Skin Warm and dry. No bruising and no rash noted. No erythema. No pallor. Hematoma right flank. Respiratory Lungs are clear to auscultation bilaterally without wheezes, rales or rhonchi, normal air exchange without accessory muscle use. CVS Normal rate, regular rhythm and normal S1 and S2. No murmurs, gallops, or rubs. Abdomen Soft, nontender and nondistended, normoactive bowel sounds. No palpable mass. No hepatosplenomegaly. Neuro Grossly nonfocal with no obvious sensory or motor deficits. MSK Normal range of motion in general.  Trace BLE   Psych Appropriate mood and affect.         Labs:    Recent Results (from the past 24 hour(s))   TRANSFERRIN SATURATION    Collection Time: 21  1:46 PM   Result Value Ref Range    Iron 124 35 - 150 ug/dL    TIBC 260 250 - 450 ug/dL    Transferrin Saturation 48 >20 %   FERRITIN    Collection Time: 21  1:46 PM   Result Value Ref Range    Ferritin 63 8 - 388 NG/ML   VITAMIN B12    Collection Time: 21  1:46 PM   Result Value Ref Range    Vitamin B12 594 193 - 986 pg/mL   FOLATE    Collection Time: 21  1:46 PM   Result Value Ref Range    Folate 13.6 3.1 - 17.5 ng/mL   TSH 3RD GENERATION    Collection Time: 21  1:46 PM   Result Value Ref Range    TSH 4.390 (H) 0.358 - 3.740 uIU/mL   CBC WITH AUTOMATED DIFF Collection Time: 05/12/21  1:47 PM   Result Value Ref Range    WBC 1.1 (LL) 4.3 - 11.1 K/uL    RBC 1.24 (L) 4.05 - 5.2 M/uL    HGB 3.7 (LL) 11.7 - 15.4 g/dL    HCT 12.4 (L) 35.8 - 46.3 %    .0 (H) 79.6 - 97.8 FL    MCH 29.8 26.1 - 32.9 PG    MCHC 29.8 (L) 31.4 - 35.0 g/dL    RDW 20.9 (H) 11.9 - 14.6 %    PLATELET 944 642 - 384 K/uL    MPV 11.3 9.4 - 12.3 FL    ABSOLUTE NRBC 0.00 0.0 - 0.2 K/uL    NEUTROPHILS 48 43 - 78 %    LYMPHOCYTES 37 13 - 44 %    MONOCYTES 5 4.0 - 12.0 %    EOSINOPHILS 5 0.5 - 7.8 %    BASOPHILS 0 0.0 - 2.0 %    IMMATURE GRANULOCYTES 5 0.0 - 5.0 %    ABS. NEUTROPHILS 0.4 (L) 1.7 - 8.2 K/UL    ABS. LYMPHOCYTES 0.4 (L) 0.5 - 4.6 K/UL    ABS. MONOCYTES 0.1 0.1 - 1.3 K/UL    ABS. EOSINOPHILS 0.1 0.0 - 0.8 K/UL    ABS. BASOPHILS 0.0 0.0 - 0.2 K/UL    ABS. IMM. GRANS. 0.1 0.0 - 0.5 K/UL    RBC COMMENTS SLIGHT  ANISOCYTOSIS + POIKILOCYTOSIS        RBC COMMENTS SLIGHT  MACROCYTOSIS        RBC COMMENTS OCCASIONAL  TEARDROP CELLS        RBC COMMENTS OCCASIONAL  BASOPHILIC STIPPLING        WBC COMMENTS Result Confirmed By Smear      PLATELET COMMENTS ADEQUATE      DF AUTOMATED     TROPONIN-HIGH SENSITIVITY    Collection Time: 05/12/21  1:47 PM   Result Value Ref Range    Troponin-High Sensitivity 22.0 (H) 0 - 14 pg/mL   RBC, ALLOCATE    Collection Time: 05/12/21  3:00 PM   Result Value Ref Range    HISTORY CHECKED?  Historical check performed    TYPE & SCREEN    Collection Time: 05/12/21  3:04 PM   Result Value Ref Range    Crossmatch Expiration 05/15/2021,2359     ABO/Rh(D) A POSITIVE     Antibody screen NEG     Unit number [id number]     Blood component type  LR     Unit division 00     Status of unit TRANSFUSED     Crossmatch result Compatible     Unit number [id number]     Blood component type  LR     Unit division 00     Status of unit TRANSFUSED     Crossmatch result Compatible     Unit number [id number]     Blood component type  LR     Unit division 00     Status of unit TRANSFUSED     Crossmatch result Compatible    HGB & HCT    Collection Time: 05/12/21  3:04 PM   Result Value Ref Range    HGB 4.3 (LL) 11.7 - 15.4 g/dL    HCT 14.7 (L) 35.8 - 46.3 %   CBC W/O DIFF    Collection Time: 05/12/21  3:04 PM   Result Value Ref Range    WBC 0.8 (LL) 4.3 - 11.1 K/uL    RBC 1.48 (L) 4.05 - 5.2 M/uL    HGB 4.4 (LL) 11.7 - 15.4 g/dL    HCT 14.7 (L) 35.8 - 46.3 %    MCV 99.3 (H) 79.6 - 97.8 FL    MCH 29.7 26.1 - 32.9 PG    MCHC 29.9 (L) 31.4 - 35.0 g/dL    RDW 21.0 (H) 11.9 - 14.6 %    PLATELET 599 405 - 789 K/uL    MPV 11.2 9.4 - 12.3 FL    ABSOLUTE NRBC 0.02 0.0 - 0.2 K/uL   PATHOLOGIST REVIEW SMEARS    Collection Time: 05/12/21  3:04 PM   Result Value Ref Range    PATHOLOGIST REVIEW PENDING    DIFFERENTIAL, AUTO    Collection Time: 05/12/21  3:04 PM   Result Value Ref Range    NEUTROPHILS 61 47 - 75 %    BAND NEUTROPHILS 1 0 - 10 %    LYMPHOCYTES 35 16 - 44 %    EOSINOPHILS 2 1 - 8 %    METAMYELOCYTES 1 %    RBC COMMENTS SLIGHT  ANISOCYTOSIS + POIKILOCYTOSIS        RBC COMMENTS SLIGHT  MACROCYTOSIS        RBC COMMENTS SLIGHT  HYPOCHROMIA        RBC COMMENTS OCCASIONAL  BASOPHILIC STIPPLING        RBC COMMENTS OCCASIONAL  TARGET CELLS        RBC COMMENTS OCCASIONAL  TEARDROP CELLS        WBC COMMENTS Result Confirmed By Smear      PLATELET COMMENTS ADEQUATE      DF MANUAL     POC FECAL OCCULT BLOOD    Collection Time: 05/12/21  3:47 PM   Result Value Ref Range    Occult blood, stool (POC) Positive (A) NEG     RBC, ALLOCATE    Collection Time: 05/12/21  4:45 PM   Result Value Ref Range    HISTORY CHECKED?  Historical check performed    HGB & HCT    Collection Time: 05/12/21  8:12 PM   Result Value Ref Range    HGB 6.6 (LL) 11.7 - 15.4 g/dL    HCT 20.7 (L) 35.8 - 46.3 %   CBC WITH AUTOMATED DIFF    Collection Time: 05/13/21  4:34 AM   Result Value Ref Range    WBC 0.7 (LL) 4.3 - 11.1 K/uL    RBC 3.37 (L) 4.05 - 5.2 M/uL    HGB 10.1 (L) 11.7 - 15.4 g/dL    HCT 31.2 (L) 35.8 - 46.3 %    MCV 92.6 79.6 - 97.8 FL    MCH 30.0 26.1 - 32.9 PG    MCHC 32.4 31.4 - 35.0 g/dL    RDW 17.4 (H) 11.9 - 14.6 %    PLATELET 484 308 - 747 K/uL    MPV 10.8 9.4 - 12.3 FL    ABSOLUTE NRBC 0.00 0.0 - 0.2 K/uL    NEUTROPHILS 41 (L) 43 - 78 %    LYMPHOCYTES 39 13 - 44 %    MONOCYTES 4 4.0 - 12.0 %    EOSINOPHILS 12 (H) 0.5 - 7.8 %    BASOPHILS 1 0.0 - 2.0 %    IMMATURE GRANULOCYTES 3 0.0 - 5.0 %    ABS. NEUTROPHILS 0.3 (L) 1.7 - 8.2 K/UL    ABS. LYMPHOCYTES 0.3 (L) 0.5 - 4.6 K/UL    ABS. MONOCYTES 0.0 (L) 0.1 - 1.3 K/UL    ABS. EOSINOPHILS 0.1 0.0 - 0.8 K/UL    ABS. BASOPHILS 0.0 0.0 - 0.2 K/UL    ABS. IMM. GRANS. 0.0 0.0 - 0.5 K/UL    RBC COMMENTS MODERATE  ANISOCYTOSIS + POIKILOCYTOSIS        RBC COMMENTS MODERATE  HYPOCHROMIA        RBC COMMENTS DIMORPHIC POPULATION      WBC COMMENTS Result Confirmed By Smear      PLATELET COMMENTS SLIGHT      DF AUTOMATED     METABOLIC PANEL, COMPREHENSIVE    Collection Time: 05/13/21  4:34 AM   Result Value Ref Range    Sodium 139 136 - 145 mmol/L    Potassium 4.2 3.5 - 5.1 mmol/L    Chloride 107 98 - 107 mmol/L    CO2 26 21 - 32 mmol/L    Anion gap 6 (L) 7 - 16 mmol/L    Glucose 86 65 - 100 mg/dL    BUN 24 (H) 8 - 23 MG/DL    Creatinine 2.23 (H) 0.6 - 1.0 MG/DL    GFR est AA 27 (L) >60 ml/min/1.73m2    GFR est non-AA 23 (L) >60 ml/min/1.73m2    Calcium 8.2 (L) 8.3 - 10.4 MG/DL    Bilirubin, total 1.8 (H) 0.2 - 1.1 MG/DL    ALT (SGPT) 60 12 - 65 U/L    AST (SGOT) 41 (H) 15 - 37 U/L    Alk.  phosphatase 84 50 - 136 U/L    Protein, total 6.2 (L) 6.3 - 8.2 g/dL    Albumin 2.5 (L) 3.2 - 4.6 g/dL    Globulin 3.7 (H) 2.3 - 3.5 g/dL    A-G Ratio 0.7 (L) 1.2 - 3.5     LD    Collection Time: 05/13/21  7:19 AM   Result Value Ref Range     (H) 110 - 210 U/L   BILIRUBIN, TOTAL    Collection Time: 05/13/21  7:19 AM   Result Value Ref Range    Bilirubin, total 1.6 (H) 0.2 - 1.1 MG/DL   PTT    Collection Time: 05/13/21  8:25 AM   Result Value Ref Range    aPTT 30.4 24.1 - 35.1 SEC   FIBRINOGEN Collection Time: 05/13/21  8:25 AM   Result Value Ref Range    Fibrinogen 660 (H) 190 - 501 mg/dL   HAPTOGLOBIN    Collection Time: 05/13/21  8:25 AM   Result Value Ref Range    Haptoglobin 212 (H) 30 - 200 mg/dL   D DIMER    Collection Time: 05/13/21  8:25 AM   Result Value Ref Range    D DIMER 1.14 (H) <0.56 ug/ml(FEU)   RETICULOCYTE COUNT    Collection Time: 05/13/21  8:25 AM   Result Value Ref Range    Reticulocyte count 1.3 0.3 - 2.0 %    Absolute Retic Cnt. 0.0402 0.026 - 0.095 M/ul    Immature Retic Fraction 6.7 3.0 - 15.9 %    Retic Hgb Conc. 35 29 - 35 pg       Imaging:  [unfilled]    ASSESSMENT:  Problem List  Date Reviewed: 4/13/2021          Codes Class Noted    * (Principal) Anemia ICD-10-CM: D64.9  ICD-9-CM: 285.9  5/12/2021        Leukopenia ICD-10-CM: H63.360  ICD-9-CM: 288.50  5/12/2021        NAZIA (acute kidney injury) ([facility] ICD-10-CM: N17.9  ICD-9-CM: 584.9  5/12/2021        Rib fracture ICD-10-CM: S22.39XA  ICD-9-CM: 807.00  5/12/2021        Pericarditis with effusion ICD-10-CM: I31.9  ICD-9-CM: 423.9  3/23/2021        Atrial fibrillation with rapid ventricular response (HCC) ICD-10-CM: I48.91  ICD-9-CM: 427.31  3/19/2021        Acute renal failure (ARF) ([facility] ICD-10-CM: N17.9  ICD-9-CM: 584.9  4/22/2019        Sepsis ([facility] ICD-10-CM: A41.9  ICD-9-CM: 038.9, 995.91  4/22/2019        Community acquired bacterial pneumonia ICD-10-CM: J15.9  ICD-9-CM: 482.9  4/22/2019        Prolonged Q-T interval on ECG ICD-10-CM: R94.31  ICD-9-CM: 794.31  4/22/2019        Hypertension, essential, benign ICD-10-CM: I10  ICD-9-CM: 401.1  8/18/2016        Osteopenia ICD-10-CM: M85.80  ICD-9-CM: 733.90  8/18/2016        Major depressive disorder, recurrent, moderate (HCC) ICD-10-CM: F33.1  ICD-9-CM: 296.32  8/18/2016        Nicotine dependence, cigarettes, uncomplicated EPS-05-QW: S13.069  ICD-9-CM: 305.1  8/18/2016        Osteoporosis ICD-10-CM: M81.0  ICD-9-CM: 733.00  8/18/2016        Depression ICD-10-CM: F32.9  ICD-9-CM: 564  8/18/2016        Hypokalemia ICD-10-CM: E87.6  ICD-9-CM: 276.8  8/18/2016        Tobacco abuse ICD-10-CM: Z72.0  ICD-9-CM: 305.1  8/18/2016        Acquired hypothyroidism ICD-10-CM: E03.9  ICD-9-CM: 244.9  1/16/2016        TIA (transient ischemic attack) ICD-10-CM: G45.9  ICD-9-CM: 435.9  1/16/2016        Hyperlipidemia ICD-10-CM: E78.5  ICD-9-CM: 272.4  1/16/2016        Rheumatoid arthritis ([facility] [address] ICD-10-CM: M06.9  ICD-9-CM: 714.0  1/16/2016        Right carotid bruit ICD-10-CM: R09.89  ICD-9-CM: 785.9  1/16/2016                RECOMMENDATIONS:  Anemia/Leukopenia  -Occasion teardrop cells/basophilic stippling-lead level pending  -Smear pending  -Check DIC, Hemolysis labs  -IR consulted for BMbx  -Medications reviewed     Stool heme positive  -GI consulted    RA  -Methotrexate every Sunday    CKD  -Renal US: Increased cortical echogenicity involving the single right kidney compatible with chronic medical renal disease. No hydronephrosis. Absent left kidney? .    Afib  -new diagnosis recently started Eliquis currently on hold      Lab studies and imaging studies were personally reviewed. Pertinent old records were reviewed. Patient has not received COVID vaccine. Thank you for allowing us to participate in the care of Ms. Justyn Bynum. Formal consult note by Dr. Bernadette Bustamante to follow.          Mark Molina NP   [facility]  [address] [address]  Office : [phone]  Fax : [phone]

## 2021-05-13 NOTE — PROGRESS NOTES
[facility] Progress Note         Admit Date:  5/12/2021    Today's Date:  5/13/2021    CC: Anemia    Subjective:     Patient admitted by the hospitalist service with dizziness and falls and found to have severe anemia, leukocytopenia. She denies stool since admission. Denies overt bleeding or melena. Morning labs pending after PRBC transfusion in the night. Has bruising to right flank but not extensive. Medications:   Current Facility-Administered Medications   Medication Dose Route Frequency    0.9% sodium chloride infusion 250 mL  250 mL IntraVENous PRN    amiodarone (CORDARONE) tablet 200 mg  200 mg Oral DAILY    DULoxetine (CYMBALTA) capsule 60 mg  60 mg Oral DAILY    levothyroxine (SYNTHROID) tablet 25 mcg  25 mcg Oral ACB    rosuvastatin (CRESTOR) tablet 10 mg  10 mg Oral QHS    sodium chloride (NS) flush 5-40 mL  5-40 mL IntraVENous Q8H    sodium chloride (NS) flush 5-40 mL  5-40 mL IntraVENous PRN    acetaminophen (TYLENOL) tablet 650 mg  650 mg Oral Q6H PRN    Or    acetaminophen (TYLENOL) suppository 650 mg  650 mg Rectal Q6H PRN    polyethylene glycol (MIRALAX) packet 17 g  17 g Oral DAILY PRN    promethazine (PHENERGAN) tablet 12.5 mg  12.5 mg Oral Q6H PRN    Or    ondansetron (ZOFRAN) injection 4 mg  4 mg IntraVENous Q6H PRN    pantoprazole (PROTONIX) 40 mg in 0.9% sodium chloride 10 mL injection  40 mg IntraVENous Q12H    0.9% sodium chloride infusion  75 mL/hr IntraVENous CONTINUOUS    0.9% sodium chloride infusion 250 mL  250 mL IntraVENous PRN    diphenhydrAMINE (BENADRYL) capsule 25 mg  25 mg Oral Q6H PRN    tuberculin injection 5 Units  5 Units IntraDERMal ONCE    traMADoL (ULTRAM) tablet 50 mg  50 mg Oral Q6H PRN       Review of Systems:  ROS was obtained, with pertinent positives as listed above. No chest pain or SOB.     Diet:  NPO    Objective:   Vitals:  Visit Vitals  /62 (BP 1 Location: Left upper arm, BP Patient Position: At rest)   Pulse 74   Temp 98 °F (36.7 °C)   Resp 18   Ht 5' (1.524 m)   Wt 71.1 kg (156 lb 11.2 oz)   SpO2 99%   Breastfeeding No   BMI 30.60 kg/m²     Intake/Output:  No intake/output data recorded. 05/11 1901 - 05/13 0700  In: 1080 [P.O.:210]  Out: 350 [Urine:350]  Exam:  General appearance: alert, cooperative, no distress  Skin: bruise to right flank, under right eye  Lungs: clear to auscultation bilaterally anteriorly  Heart: regular rate and rhythm  Abdomen: soft, non-tender.  Bowel sounds normal. No masses, no organomegaly  Extremities: extremities normal, atraumatic, no cyanosis or edema  Neuro:  alert and oriented    Data Review (Labs):    Recent Labs     05/13/21  0719 05/13/21  0434 05/12/21 2012 05/12/21  1504 05/12/21  1347 05/12/21  1034 05/12/21  1033   WBC  --  0.7*  --  0.8* 1.1*  --   --    HGB  --  10.1* 6.6* 4.4*  4.3* 3.7*  --   --    HCT  --  31.2* 20.7* 14.7*  14.7* 12.4*  --   --    PLT  --  157  --  215 206  --   --    MCV  --  92.6  --  99.3* 100.0*  --   --    NA  --  139  --   --   --  136  --    K  --  4.2  --   --   --  4.0  --    CL  --  107  --   --   --  101  --    CO2  --  26  --   --   --  27  --    BUN  --  24*  --   --   --  29*  --    CREA  --  2.23*  --   --   --  2.61*  --    CA  --  8.2*  --   --   --  8.0*  --    GLU  --  86  --   --   --  89  --    AP  --  84  --   --   --  78  --    AST  --  41*  --   --   --  45*  --    ALT  --  60  --   --   --  68*  --    TBILI 1.6* 1.8*  --   --   --  0.8  --    ALB  --  2.5*  --   --   --  2.5*  --    TP  --  6.2*  --   --   --  5.7*  --    PTP  --   --   --   --   --   --  17.5*   INR  --   --   --   --   --   --  1.4       Assessment:     Principal Problem:    Anemia (5/12/2021)    Active Problems:    Acquired hypothyroidism (1/16/2016)      Hyperlipidemia (1/16/2016)      Rheumatoid arthritis ([facility] (1/16/2016)      Hypertension, essential, benign (8/18/2016)      Major depressive disorder, recurrent, moderate (HCC) (8/18/2016)      Leukopenia (5/12/2021)      NAZIA (acute kidney injury) ([facility] (5/12/2021)      Rib fracture (5/12/2021)        Plan:     69 yo female with atrial fibrillation on Eliquis admitted after presenting to the ED with dizziness and falls. Noted severely anemic and leukopenic in the ED. Recent fall with bruising and rib fractures while on anti-coagulation. Stool heme + in the ED but without gross blood. She has been transfused 3 units PRBC and hematology consulted is pending for leukopenia. Morning labs pending. 1.  Protonix IV  2. Follow hgb, transfuse per admitting service  3. Retroperitoneal U/S pending  4. Heme/Onc consult pending  5. Eliquis on hold  6. Plan endoscopic evaluation when stable for sedation; NPO for now     Patient is seen and examined in collaboration with Dr. Blake Moreno. Assessment and plan as per Dr. Blake Moreno.   Mya Gentile NP

## 2021-05-13 NOTE — PROGRESS NOTES
Problem: Falls - Risk of  Goal: *Absence of Falls  Description: Document Prasanth Pretty Fall Risk and appropriate interventions in the flowsheet.   Outcome: Progressing Towards Goal  Note: Fall Risk Interventions:  Mobility Interventions: Bed/chair exit alarm, Communicate number of staff needed for ambulation/transfer, Patient to call before getting OOB         Medication Interventions: Bed/chair exit alarm, Patient to call before getting OOB, Teach patient to arise slowly    Elimination Interventions: Patient to call for help with toileting needs, Call light in reach, Bed/chair exit alarm, Stay With Me (per policy), Toilet paper/wipes in reach    History of Falls Interventions: Bed/chair exit alarm, Door open when patient unattended, Room close to nurse's station         Problem: Patient Education: Go to Patient Education Activity  Goal: Patient/Family Education  Outcome: Progressing Towards Goal     Problem: Anemia Care Plan (Adult and Pediatric)  Goal: *Labs within defined limits  Outcome: Progressing Towards Goal  Goal: *Tolerates increased activity  Outcome: Progressing Towards Goal     Problem: Patient Education: Go to Patient Education Activity  Goal: Patient/Family Education  Outcome: Progressing Towards Goal

## 2021-05-13 NOTE — PROGRESS NOTES
Care Management Interventions  PCP Verified by CM: Yes  Mode of Transport at Discharge: Other (see comment)  Discharge Durable Medical Equipment: No(patient has walker and cane. )  Current Support Network: Lives Alone  Confirm Follow Up Transport: Friends  Case Management assessment completed with patient at bedside. Patient states she lives alone in a mobile home, who friends at [employer] owns. She does not have children but her friends Taisha Carl and Jyotsna Benavides check on her and assist her in her care. Her [employer] family checks on her and assist her. She did state that she still drives. She said she does her own cooking when her legs have the strength to stand at the counter/stove. She confirmed her PCP is Dr. Elle Hansen and she uses [facility] in [place]. Patient states her goal is to go home at discharge. CM will continue to follow and provide assistance with discharge planning.

## 2021-05-13 NOTE — WOUND CARE
Patient seen for left side of panus fold. Area 10x5 cm of redness and peeling skin (denuded in areas). Fold is moist and patient is incontinent. Consistent with intertrigo but may have a fungal component. No satellite lesions but has fungal odor. Started antifungal cream and ABD to tuck into fold. Discussed with patient and primary nurse. Wound team will monitor and adjust treatment as needed.

## 2021-05-13 NOTE — PROGRESS NOTES
Comprehensive Nutrition Assessment    Type and Reason for Visit: Initial, Positive nutrition screen  Best Practice Alert for Malnutrition Screening Tool: Recently Lost Weight Without Trying: Unsure, If Yes, How Much Weight Loss: Unsure, Eating Poorly Due to Decreased Appetite: Yes    Nutrition Recommendations/Plan:    Advance diet as tolerated/medically appropriate   Start Ensure Clear TID while on CLQ diet     Malnutrition Assessment:  Malnutrition Status: At risk for malnutrition (specify)(pt reports poor po intake PTA)    Nutrition Assessment:   Nutrition History: Pt reports eating 3 bowls of chicken noodle soup for the past 2 weeks due to not feeling hungry. Pt reports 150lbs 1 month ago. Nutrition Background: Pt presents after fall, weakness, and nausea. She reports dark stools since starting Eliquis. Pt noted to be severely anemic, leukopenic, and stool heme + in ED. Pt admitted for anemia with concerns for GI bleed. PMH notable for a.fib, CKD III, RA, hx of breast cancer 2008, an dhypothyroidism. Daily Update:  Observed all of lunch tray consumed except tea. EGD deferred to tomorrow per GI note. IR consulted for BMbx per oncology note. Noted congenital agensis of left kidney per MD note. She is agreeable to drink Ensure Clear TID.     Nutrition Related Findings:   No muscle or subcutaneous fat loss noted     Current Nutrition Therapies:  DIET CLEAR LIQUID  DIET NPO    Current Intake:   Average Meal Intake: %(CLQ diet) Average Supplement Intake: None ordered      Anthropometric Measures:  Height: 5' (152.4 cm)  Current Body Wt: 71.1 kg (156 lb 12 oz)(5/13), Weight source: Not specified  BMI: 30.6, Obese class 1 (BMI 30.0-34.9)     Ideal Body Weight (lbs) (Calculated): 100 lbs (45 kg), 156.7 %          Edema: LLE: Trace (5/13/2021  7:58 AM)  RLE: Trace (5/13/2021  7:58 AM)     Estimated Daily Nutrient Needs:  Energy (kcal/day): 9116-5237 (Kcal/kg(16-20), Weight Used: Current(71.1kg))  Protein (g/day): 57-71 Weight Used: (Other (specify)(20% kcal))  Fluid (ml/day):   (1 ml/kcal)    Nutrition Diagnosis:   · Inadequate oral intake related to altered GI function(loss of appetite) as evidenced by NPO or clear liquid status due to medical condition(pt reported barrier to intake)    Nutrition Interventions:   Food and/or Nutrient Delivery: Continue current diet, Start oral nutrition supplement     Coordination of Nutrition Care: Continue to monitor while inpatient  Plan of Care discussed with Rika Perry RN    Goals: Active Goal: Meet >75% estimated nutrition needs within 5 days    Nutrition Monitoring and Evaluation:      Food/Nutrient Intake Outcomes: Food and nutrient intake, Supplement intake       Discharge Planning:     Too soon to determine    Electronically signed by Lala Norton MS, RDN, LD 5/13/2021 at 4:30 PM  Contact: [phone]

## 2021-05-13 NOTE — PROGRESS NOTES
Patient remains in stable condition with respirations even/unlabored. No acute distress noted. No needs noted or voiced at this time. Safety measures in place. Patient on oxygen to nasal cannula. IVF infusing. Call light remains within reach. Preparing to give report to oncoming shift.

## 2021-05-13 NOTE — PROGRESS NOTES
Name:  Melissa Koehler  Age:80 y.o. Sex:female   :  1940    MRN:  [id number]   PCP:  Marj Blanca MD      Admit Date:  2021 10:41 AM   Chief Complaint: falls    Reason for Admission:   Anemia [D64.9]    Assessment & Plan:     Acute anemia  -Significant drop in hemoglobin and WBC in last 1 month. Status post 1 unit PRBC. Holding anticoagulation and antiplatelet therapy. Gastroenterology and hematology on board. Work-up for anemia and leukopenia ordered. Appreciate subspecialty recommendation. Neutropenic precaution and fall precautions. Leukopenia  In setting of anemia with teardrop cells and basophilic stippling seen on smear  -As patient is on methotrexate at home so started patient on folic acid. Appreciate hematology-recommendation.  -We will check procalcitonin and blood cultures. NAZIA on CKD 3  Baseline Cr ~1.6-1.7. Cr up to 2.61 on adm. avoid nephrotoxic agent. Will monitor. Patient has congenital agenesis of left kidney. Mild transaminitis  -improving. Rib fractures  R flank hematoma  - IS  - pain control    Frequent falls  Pt reports long standing falls related to medications per her report.  - PT/OT    Afib  - hold Eliquis  - cont Amio    RA  - MTX every     MDD  - cont home meds    Disposition/Expected LOS: > 2 midnights, pt previously living at home alone, PPD, PT/OT  Diet: DIET CLEAR LIQUID  DIET NPO  VTE ppx: SCDs  GI ppx: PPI BID  Code status: full code  Surrogate decision-maker: friend Richelle Schmidt    Have called patient friend Richelle Schmidt and updated him about patient condition. Both patient and her friend who is power of  verbalized understanding that patient condition may deteriorate in spite of treatment. History of Presenting Illness:     Melissa Koehler is a 68 y.o. female with medical history of recently dx'd Afib (on Eliquis), CKD3, RA, h/o breast CA , hypothyroidism who presented to ED with falls, weakness, nausea.     Pt reports not feeling well since she was discharged March 2021 after being diagnosed with Afib and started on Eliquis. She endorses increased fatigue, generalized weakness. Endorses frequent falls even prior to last hospitalization which has continued, with a fall approx 1-2 weeks ago, landing on R side and bruising R flank and face. States stools have been \"dark\" since she was started on \"that new medicine\" which I believe is the Eliquis. She also reports nausea with occasional emesis, nonbloody, for about 1 week. Decreased appetite, keeping down liquids but very little food. Denies weight loss. She ultimately saw her PCP today who directed her to the ED for further evaluation. In ED, VSS, afebrile. Hgb 3.7, confirmed on repeat. WBC 1.1, Cr 2.61. She was heme + in ED. She was transfused 1 unit pRBCs. XR revealed R 5,6,7 rib fracture. Cased discussed with Dr. Eula Alfaro. May 13: As per patient she is feeling all right. Denies any pain anywhere. Denies any shortness of breath, palpitations. She is feeling generalized weakness. Review of Systems:  A 14 point review of systems was taken and pertinent positive as per HPI.         Past Medical History:   Diagnosis Date    Acquired hypothyroidism 1/16/2016    Breast cancer ([facility] 2008    Depression 8/18/2016    Endocrine disease     hypothyroid    Fungal dermatitis 8/18/2016    Hyperlipidemia 1/16/2016    Hypertension, essential, benign 8/18/2016    Hypokalemia 8/18/2016    Inflamed sebaceous cyst 8/18/2016    Major depressive disorder, recurrent, moderate ([address] 8/18/2016    Nicotine dependence, cigarettes, uncomplicated 7/33/6524    Osteopenia 8/18/2016    Osteoporosis 8/18/2016    Radiation therapy complication 4504    Rheumatoid arthritis ([address] 1/16/2016    Right carotid bruit 1/16/2016    TIA (transient ischemic attack) 1/16/2016    Tobacco abuse 8/18/2016       Past Surgical History:   Procedure Laterality Date    HX ADENOIDECTOMY      HX BREAST LUMPECTOMY Right 2008    with lymph nodes    HX TONSILLECTOMY         Family History : reviewed  Family History   Problem Relation Age of Onset    Stroke Mother     Cancer Father         Brain    HIV/AIDS Brother         Social History     Tobacco Use    Smoking status: Current Some Day Smoker    Smokeless tobacco: Never Used    Tobacco comment: Only on pay day-1/2 pack   Substance Use Topics    Alcohol use: No       No Known Allergies    Immunization History   Administered Date(s) Administered    Influenza High Dose Vaccine PF 10/28/2015, 09/21/2016, 09/15/2017    Pneumococcal Polysaccharide (PPSV-23) 01/18/2016    TB Skin Test (PPD) Intradermal 04/22/2019, 05/12/2021         PTA Medications:  Current Outpatient Medications   Medication Instructions    amiodarone (CORDARONE) 200 mg, Oral, DAILY    apixaban (ELIQUIS) 5 mg, Oral, 2 TIMES DAILY    cholecalciferol (VITAMIN D3) 1,000 Units, Oral, DAILY    DULoxetine (CYMBALTA) 60 mg, Oral, DAILY    famotidine (PEPCID) 20 mg, Oral, DAILY    folic acid (FOLVITE) 055 mcg, Oral, DAILY    levothyroxine (SYNTHROID) 25 mcg, Oral, DAILY BEFORE BREAKFAST    methotrexate (RHEUMATREX) 2.5 mg tablet 5 tabs every Sunday    ondansetron (ZOFRAN ODT) 4 mg, Oral, EVERY 8 HOURS AS NEEDED    predniSONE (DELTASONE) 30 mg, Oral, DAILY WITH BREAKFAST    rosuvastatin (CRESTOR) 10 mg, Oral, EVERY BEDTIME       Objective:     Patient Vitals for the past 24 hrs:   Temp Pulse Resp BP SpO2   05/13/21 1151     100 %   05/13/21 1056 98 °F (36.7 °C) (!) 108 18 138/68 92 %   05/13/21 0738 98 °F (36.7 °C) 74 18 134/62 99 %   05/13/21 0412 98.4 °F (36.9 °C) 75 18 (!) 152/67 98 %   05/13/21 0231 98.6 °F (37 °C) 76 20 (!) 146/81 100 %   05/13/21 0156 97.3 °F (36.3 °C) 79 20 (!) 144/71 100 %   05/13/21 0056 98.8 °F (37.1 °C) 82 20 133/66 99 %   05/12/21 2356 98.4 °F (36.9 °C) 78 18 (!) 145/70 100 %   05/12/21 2341 98.5 °F (36.9 °C) 82 18 139/63 100 %   05/12/21 2339 98.5 °F (36.9 °C) 82 18 139/63 100 %   05/12/21 2258 98.7 °F (37.1 °C)  18 (!) 142/59 100 %   05/12/21 2238 98.7 °F (37.1 °C)  18 (!) 142/59 100 %   05/12/21 2231 99.3 °F (37.4 °C) 79 18 (!) 144/66 98 %   05/12/21 2138 98.6 °F (37 °C) 78 18 131/71 100 %   05/12/21 2038 98.8 °F (37.1 °C) 73 18 (!) 134/54 100 %   05/12/21 2019 98.3 °F (36.8 °C) 77 17 136/65 97 %   05/12/21 1925 98.6 °F (37 °C) 76 16 134/66 99 %   05/12/21 1839 98.3 °F (36.8 °C) 81 16 132/65 100 %   05/12/21 1800  71 14 (!) 160/70 100 %   05/12/21 1729  76 14 (!) 154/67 100 %   05/12/21 1700  79 16 (!) 150/63 100 %   05/12/21 1636  75 16 (!) 140/63 100 %   05/12/21 1635 98.1 °F (36.7 °C) 74 21 (!) 140/63 100 %   05/12/21 1629  77 19 (!) 164/61 100 %   05/12/21 1620  80 21 (!) 111/53 100 %   05/12/21 1617 97.8 °F (36.6 °C) 74 21 (!) 111/53 98 %   05/12/21 1534  75 17  100 %   05/12/21 1507  75 20  100 %   05/12/21 1439  (!) 0 26  (!) 77 %       Oxygen Therapy  O2 Sat (%): 100 % (05/13/21 1151)  Pulse via Oximetry: 72 beats per minute (05/12/21 1800)  O2 Device: Nasal cannula (05/13/21 1353)  Skin Assessment: Clean, dry, & intact (05/12/21 2019)  Skin Protection for O2 Device: No (05/12/21 2019)  O2 Flow Rate (L/min): 2 l/min (05/13/21 1353)    Body mass index is 30.6 kg/m². Physical Exam:    General:  No acute distress, speaking in full sentences,   HEENT:  EOMI, oropharynx is clear, resolving hematoma to R eye  Neck:   Supple, no lymphadenopathy, no JVD   Lungs:  Clear to auscultation bilaterally   CV:   Distant HS, Regular rate and rhythm with normal S1 and S2   Abdomen:  Soft, nontender, nondistended, normoactive bowel sounds   Extremities:  Trace edema to BL LE, chronic per pt  Skin:  Hematoma face and abdomen  Neuro:  AOx3, generalized weakness, moving all 4 extremities, speech normal  Psych:  Normal mood and affect       Data Reviewed: I have reviewed all labs, meds, and studies.       Recent Results (from the past 24 hour(s))   RBC, ALLOCATE    Collection Time: 05/12/21  3:00 PM   Result Value Ref Range    HISTORY CHECKED?  Historical check performed    TYPE & SCREEN    Collection Time: 05/12/21  3:04 PM   Result Value Ref Range    Crossmatch Expiration 05/15/2021,2359     ABO/Rh(D) A POSITIVE     Antibody screen NEG     Unit number [id number]     Blood component type  LR     Unit division 00     Status of unit TRANSFUSED     Crossmatch result Compatible     Unit number [id number]     Blood component type  LR     Unit division 00     Status of unit TRANSFUSED     Crossmatch result Compatible     Unit number [id number]     Blood component type  LR     Unit division 00     Status of unit TRANSFUSED     Crossmatch result Compatible    HGB & HCT    Collection Time: 05/12/21  3:04 PM   Result Value Ref Range    HGB 4.3 (LL) 11.7 - 15.4 g/dL    HCT 14.7 (L) 35.8 - 46.3 %   CBC W/O DIFF    Collection Time: 05/12/21  3:04 PM   Result Value Ref Range    WBC 0.8 (LL) 4.3 - 11.1 K/uL    RBC 1.48 (L) 4.05 - 5.2 M/uL    HGB 4.4 (LL) 11.7 - 15.4 g/dL    HCT 14.7 (L) 35.8 - 46.3 %    MCV 99.3 (H) 79.6 - 97.8 FL    MCH 29.7 26.1 - 32.9 PG    MCHC 29.9 (L) 31.4 - 35.0 g/dL    RDW 21.0 (H) 11.9 - 14.6 %    PLATELET 616 814 - 963 K/uL    MPV 11.2 9.4 - 12.3 FL    ABSOLUTE NRBC 0.02 0.0 - 0.2 K/uL   PATHOLOGIST REVIEW SMEARS    Collection Time: 05/12/21  3:04 PM   Result Value Ref Range    PATHOLOGIST REVIEW PENDING    DIFFERENTIAL, AUTO    Collection Time: 05/12/21  3:04 PM   Result Value Ref Range    NEUTROPHILS 61 47 - 75 %    BAND NEUTROPHILS 1 0 - 10 %    LYMPHOCYTES 35 16 - 44 %    EOSINOPHILS 2 1 - 8 %    METAMYELOCYTES 1 %    RBC COMMENTS SLIGHT  ANISOCYTOSIS + POIKILOCYTOSIS        RBC COMMENTS SLIGHT  MACROCYTOSIS        RBC COMMENTS SLIGHT  HYPOCHROMIA        RBC COMMENTS OCCASIONAL  BASOPHILIC STIPPLING        RBC COMMENTS OCCASIONAL  TARGET CELLS        RBC COMMENTS OCCASIONAL  TEARDROP CELLS        WBC COMMENTS Result Confirmed By Smear PLATELET COMMENTS ADEQUATE      DF MANUAL     POC FECAL OCCULT BLOOD    Collection Time: 05/12/21  3:47 PM   Result Value Ref Range    Occult blood, stool (POC) Positive (A) NEG     RBC, ALLOCATE    Collection Time: 05/12/21  4:45 PM   Result Value Ref Range    HISTORY CHECKED? Historical check performed    HGB & HCT    Collection Time: 05/12/21  8:12 PM   Result Value Ref Range    HGB 6.6 (LL) 11.7 - 15.4 g/dL    HCT 20.7 (L) 35.8 - 46.3 %   CBC WITH AUTOMATED DIFF    Collection Time: 05/13/21  4:34 AM   Result Value Ref Range    WBC 0.7 (LL) 4.3 - 11.1 K/uL    RBC 3.37 (L) 4.05 - 5.2 M/uL    HGB 10.1 (L) 11.7 - 15.4 g/dL    HCT 31.2 (L) 35.8 - 46.3 %    MCV 92.6 79.6 - 97.8 FL    MCH 30.0 26.1 - 32.9 PG    MCHC 32.4 31.4 - 35.0 g/dL    RDW 17.4 (H) 11.9 - 14.6 %    PLATELET 985 760 - 697 K/uL    MPV 10.8 9.4 - 12.3 FL    ABSOLUTE NRBC 0.00 0.0 - 0.2 K/uL    NEUTROPHILS 41 (L) 43 - 78 %    LYMPHOCYTES 39 13 - 44 %    MONOCYTES 4 4.0 - 12.0 %    EOSINOPHILS 12 (H) 0.5 - 7.8 %    BASOPHILS 1 0.0 - 2.0 %    IMMATURE GRANULOCYTES 3 0.0 - 5.0 %    ABS. NEUTROPHILS 0.3 (L) 1.7 - 8.2 K/UL    ABS. LYMPHOCYTES 0.3 (L) 0.5 - 4.6 K/UL    ABS. MONOCYTES 0.0 (L) 0.1 - 1.3 K/UL    ABS. EOSINOPHILS 0.1 0.0 - 0.8 K/UL    ABS. BASOPHILS 0.0 0.0 - 0.2 K/UL    ABS. IMM.  GRANS. 0.0 0.0 - 0.5 K/UL    RBC COMMENTS MODERATE  ANISOCYTOSIS + POIKILOCYTOSIS        RBC COMMENTS MODERATE  HYPOCHROMIA        RBC COMMENTS DIMORPHIC POPULATION      WBC COMMENTS Result Confirmed By Smear      PLATELET COMMENTS SLIGHT      DF AUTOMATED     METABOLIC PANEL, COMPREHENSIVE    Collection Time: 05/13/21  4:34 AM   Result Value Ref Range    Sodium 139 136 - 145 mmol/L    Potassium 4.2 3.5 - 5.1 mmol/L    Chloride 107 98 - 107 mmol/L    CO2 26 21 - 32 mmol/L    Anion gap 6 (L) 7 - 16 mmol/L    Glucose 86 65 - 100 mg/dL    BUN 24 (H) 8 - 23 MG/DL    Creatinine 2.23 (H) 0.6 - 1.0 MG/DL    GFR est AA 27 (L) >60 ml/min/1.73m2    GFR est non-AA 23 (L) >60 ml/min/1.73m2    Calcium 8.2 (L) 8.3 - 10.4 MG/DL    Bilirubin, total 1.8 (H) 0.2 - 1.1 MG/DL    ALT (SGPT) 60 12 - 65 U/L    AST (SGOT) 41 (H) 15 - 37 U/L    Alk.  phosphatase 84 50 - 136 U/L    Protein, total 6.2 (L) 6.3 - 8.2 g/dL    Albumin 2.5 (L) 3.2 - 4.6 g/dL    Globulin 3.7 (H) 2.3 - 3.5 g/dL    A-G Ratio 0.7 (L) 1.2 - 3.5     LD    Collection Time: 05/13/21  7:19 AM   Result Value Ref Range     (H) 110 - 210 U/L   BILIRUBIN, TOTAL    Collection Time: 05/13/21  7:19 AM   Result Value Ref Range    Bilirubin, total 1.6 (H) 0.2 - 1.1 MG/DL   PTT    Collection Time: 05/13/21  8:25 AM   Result Value Ref Range    aPTT 30.4 24.1 - 35.1 SEC   FIBRINOGEN    Collection Time: 05/13/21  8:25 AM   Result Value Ref Range    Fibrinogen 660 (H) 190 - 501 mg/dL   HAPTOGLOBIN    Collection Time: 05/13/21  8:25 AM   Result Value Ref Range    Haptoglobin 212 (H) 30 - 200 mg/dL   D DIMER    Collection Time: 05/13/21  8:25 AM   Result Value Ref Range    D DIMER 1.14 (H) <0.56 ug/ml(FEU)   RETICULOCYTE COUNT    Collection Time: 05/13/21  8:25 AM   Result Value Ref Range    Reticulocyte count 1.3 0.3 - 2.0 %    Absolute Retic Cnt. 0.0402 0.026 - 0.095 M/ul    Immature Retic Fraction 6.7 3.0 - 15.9 %    Retic Hgb Conc. 35 29 - 35 pg       EKG Results     Procedure 720 Value Units Date/Time    EKG [[id number]] Collected: 05/12/21 1032    Order Status: Completed Updated: 05/12/21 1333     Ventricular Rate 78 BPM      Atrial Rate 78 BPM      P-R Interval 166 ms      QRS Duration 132 ms      Q-T Interval 422 ms      QTC Calculation (Bezet) 481 ms      Calculated P Axis 63 degrees      Calculated R Axis 76 degrees      Calculated T Axis 79 degrees      Diagnosis --     Normal sinus rhythm  Right bundle branch block  T wave abnormality, consider inferolateral ischemia  Abnormal ECG  When compared with ECG of 08-APR-2021 15:29,  Right bundle branch block is now Present  Confirmed by Ruth Patel MD (), [place] ([id number]) on 5/12/2021 1:32:48 PM            All Micro Results     None          Other Studies:  [employer]    Result Date: 5/13/2021  Exam: Renal ultrasound. INDICATION: Acute kidney injury. Comparison: Left renal ultrasound, 3/19/2021 Real-time sonography of the kidneys, retroperitoneum and bladder was performed with multiple static images obtained. Findings: Right kidney: The right kidney is increased in cortical echogenicity, measuring 11.9 cm. No contour deforming mass. No hydronephrosis or perinephric fluid. Left kidney: Not visualized Bladder: The bladder is normal. Aorta: No evidence of aneurysm in the imaged portion. IVC: Patent. 1.  Increased cortical echogenicity involving the single right kidney compatible with chronic medical renal disease. No hydronephrosis. 2. Absent left kidney.          Medications:  Medications Administered      Medications Administered     pantoprazole (PROTONIX) 40 mg in 0.9% sodium chloride 10 mL injection     Admin Date  05/12/2021 Action  Given Dose  40 mg Route  IntraVENous Administered By  Shira Concepcion RN          sodium chloride 0.9 % bolus infusion 500 mL     Admin Date  05/12/2021 Action  New Bag Dose  500 mL Rate  500 mL/hr Route  IntraVENous Administered By  Shira Concepcion RN                      Problem List:     Hospital Problems as of 5/13/2021 Date Reviewed: 4/13/2021          Codes Class Noted - Resolved POA    * (Principal) Anemia ICD-10-CM: D64.9  ICD-9-CM: 285.9  5/12/2021 - Present Unknown        Leukopenia ICD-10-CM: D72.819  ICD-9-CM: 288.50  5/12/2021 - Present Unknown        NAZIA (acute kidney injury) ([facility] ICD-10-CM: N17.9  ICD-9-CM: 584.9  5/12/2021 - Present Unknown        Rib fracture ICD-10-CM: S22.39XA  ICD-9-CM: 807.00  5/12/2021 - Present Unknown        Hypertension, essential, benign ICD-10-CM: I10  ICD-9-CM: 401.1  8/18/2016 - Present Yes        Major depressive disorder, recurrent, moderate ([facility] ICD-10-CM: F33.1  ICD-9-CM: 296.32 8/18/2016 - Present Yes        Acquired hypothyroidism ICD-10-CM: E03.9  ICD-9-CM: 244.9  1/16/2016 - Present Yes        Hyperlipidemia ICD-10-CM: E78.5  ICD-9-CM: 272.4  1/16/2016 - Present Yes        Rheumatoid arthritis ([facility] ICD-10-CM: M06.9  ICD-9-CM: 714.0  1/16/2016 - Present Yes                 Signed By: Lex Schmidt MD   [employer] [facility]    May 13, 2021

## 2021-05-13 NOTE — CONSULTS
[facility] Hematology & Oncology: In Patient Hematology / Oncology Consult Note    Reason for Consult: Acute pancytopenia acute pancytopenia  Referring Physician:  Minal Jacob MD    History of Present Illness:  32-year-old female admitted on 5/12/2021 for upper GI bleeding and acute anemia. Her past medical history include recently diagnosed atrial fibrillation and started Eliquis in March 2021, chronic renal insufficiency stage III, rheumatoid arthritis with weekly methotrexate, remote history of breast cancer in 2008 that was reportedly treated with surgery and radiation but no systemic therapy, hypothyroidism. She presented to ER with falls, weakness, nausea, reportedly had not been feeling well since she was discharged in March 2021 on Eliquis. She reports dark stools since starting Eliquis and saw PCP then directed to the ER to be admitted for hemoglobin of 3.7, WBC 1.1, platelet 016 nonetheless all had dramatic drop from a month ago, x-ray showed right rib fracture of the 5, 6 and 7. CBC reported occasional teardrop cells and basophilic stippling. Hematology is consulted. Review of Systems:  Constitutional  week. Denies fever or chills. Denies weight loss or appetite changes. HEENT  very hard of hearing. Denies trauma, bluring vision, ear pain, nosebleeds, sore throat, neck pain and ear discharge. Skin Denies lesions or rashes. Lungs Denies shortness of breath, cough, sputum production or hemoptysis. Cardiovascular Denies chest pain, palpitations, orthopnea, claudication and leg swelling. Gastrointestinal  dark stool. Denies nausea, vomiting, bowel changes. Denies abdominal pain.  Denies dysuria, frequency or hesitancy of urination   Neuro Denies headaches, visual changes or ataxia. Denies dizziness, tingling, tremors, sensory change, speech change, focal weakness and headaches.      Hematology Denies nasal/gum bleeding, denies easy bruise   Endo Denies heat/cold intolerance, denies diabetes. MSK Denies back pain, swollen legs, myalgias and falls. Psychiatric/Behavioral Denies depression and substance abuse. The patient is not nervous/anxious.        No Known Allergies  Past Medical History:   Diagnosis Date    Acquired hypothyroidism 1/16/2016    Breast cancer ([facility] 2008    Depression 8/18/2016    Endocrine disease     hypothyroid    Fungal dermatitis 8/18/2016    Hyperlipidemia 1/16/2016    Hypertension, essential, benign 8/18/2016    Hypokalemia 8/18/2016    Inflamed sebaceous cyst 8/18/2016    Major depressive disorder, recurrent, moderate ([facility] 8/18/2016    Nicotine dependence, cigarettes, uncomplicated 8/11/6394    Osteopenia 8/18/2016    Osteoporosis 8/18/2016    Radiation therapy complication 8900    Rheumatoid arthritis ([facility] 1/16/2016    Right carotid bruit 1/16/2016    TIA (transient ischemic attack) 1/16/2016    Tobacco abuse 8/18/2016     Past Surgical History:   Procedure Laterality Date    HX ADENOIDECTOMY      HX BREAST LUMPECTOMY Right 2008    with lymph nodes    HX TONSILLECTOMY       Family History   Problem Relation Age of Onset    Stroke Mother     Cancer Father         Brain    HIV/AIDS Brother      Social History     Socioeconomic History    Marital status:      Spouse name: Not on file    Number of children: Not on file    Years of education: Not on file    Highest education level: Not on file   Occupational History    Not on file   Social Needs    Financial resource strain: Not on file    Food insecurity     Worry: Not on file     Inability: Not on file    Transportation needs     Medical: Not on file     Non-medical: Not on file   Tobacco Use    Smoking status: Current Some Day Smoker    Smokeless tobacco: Never Used    Tobacco comment: Only on pay day-1/2 pack   Substance and Sexual Activity    Alcohol use: No    Drug use: No    Sexual activity: Not on file   Lifestyle    Physical activity     Days per week: Not on file     Minutes per session: Not on file    Stress: Not on file   Relationships    Social connections     Talks on phone: Not on file     Gets together: Not on file     Attends Scientologist service: Not on file     Active member of club or organization: Not on file     Attends meetings of clubs or organizations: Not on file     Relationship status: Not on file    Intimate partner violence     Fear of current or ex partner: Not on file     Emotionally abused: Not on file     Physically abused: Not on file     Forced sexual activity: Not on file   Other Topics Concern    Not on file   Social History Narrative    Not on file     Current Facility-Administered Medications   Medication Dose Route Frequency Provider Last Rate Last Hemanth Baca ON 3/45/6710] folic acid (FOLVITE) tablet 1 mg  1 mg Oral DAILY Moody Rubio MD        labetaloL (NORMODYNE;TRANDATE) injection 10 mg  10 mg IntraVENous Q4H PRN Moody Rubio MD        lidocaine 4 % patch 1 Patch  1 Patch TransDERmal Q24H Moody Rubio MD   1 Patch at 05/13/21 1549    0.9% sodium chloride infusion 250 mL  250 mL IntraVENous PRN Sam Davis DO        amiodarone (CORDARONE) tablet 200 mg  200 mg Oral DAILY Jalyn Encarnacion MD   200 mg at 05/13/21 0850    DULoxetine (CYMBALTA) capsule 60 mg  60 mg Oral DAILY Jalyn Encarnacion MD   60 mg at 05/13/21 0850    levothyroxine (SYNTHROID) tablet 25 mcg  25 mcg Oral ACB Jalyn Encarnacion MD   25 mcg at 05/13/21 0601    rosuvastatin (CRESTOR) tablet 10 mg  10 mg Oral QHS Jalyn Encarnacion MD   10 mg at 05/12/21 2130    sodium chloride (NS) flush 5-40 mL  5-40 mL IntraVENous Q8H Jalyn Encarnacion MD   Stopped at 05/13/21 1353    sodium chloride (NS) flush 5-40 mL  5-40 mL IntraVENous PRN Jalyn Encarnacion MD   10 mL at 05/12/21 2131    acetaminophen (TYLENOL) tablet 650 mg  650 mg Oral Q6H PRN Jalyn Encarnacion MD        Or    acetaminophen (TYLENOL) suppository 650 mg  650 mg Rectal Q6H PRN Jalyn Encarnacion Shaheed, MD  polyethylene glycol (MIRALAX) packet 17 g  17 g Oral DAILY PRN Augustus Encarnacion MD        promethazine (PHENERGAN) tablet 12.5 mg  12.5 mg Oral Q6H PRN Augustus Encarnacion MD        Or    ondansetron (ZOFRAN) injection 4 mg  4 mg IntraVENous Q6H PRN Augustus Encarnacion MD        pantoprazole (PROTONIX) 40 mg in 0.9% sodium chloride 10 mL injection  40 mg IntraVENous Q12H Augustus Encarnacion MD   40 mg at 21 0850    0.9% sodium chloride infusion  75 mL/hr IntraVENous CONTINUOUS Augustus Encarnacion MD 75 mL/hr at 21 1700 75 mL/hr at 21 1700    0.9% sodium chloride infusion 250 mL  250 mL IntraVENous PRN Augustus Encarnacion MD        diphenhydrAMINE (BENADRYL) capsule 25 mg  25 mg Oral Q6H PRN Augustus Encarnacion MD        tuberculin injection 5 Units  5 Units IntraDERMal ONCE Augustus Encarnacion MD   5 Units at 21 1920    traMADoL (ULTRAM) tablet 50 mg  50 mg Oral Q6H PRN Chen Alva MD           OBJECTIVE:  Patient Vitals for the past 8 hrs:   BP Temp Pulse Resp SpO2 Height   21 1606      5' (1.524 m)   21 1508 132/63 98.4 °F (36.9 °C) 77 18 100 %    21 1151     100 %    21 1056 138/68 98 °F (36.7 °C) (!) 108 18 92 %      Temp (24hrs), Av.5 °F (36.9 °C), Min:97.3 °F (36.3 °C), Max:99.3 °F (37.4 °C)     07 -  1900  In: 1065 [P.O.:240; I.V.:825]  Out: -     Physical Exam:  Constitutional: Oriented to person, place, and time. Well-developed and well-nourished. HEENT:  Very hard of hearing. Normocephalic and atraumatic. Oropharynx is clear and moist.   Conjunctivae and EOM are normal. Pupils are equal, round, and reactive to light. No scleral icterus. Neck supple. No JVD present. No tracheal deviation present. No thyromegaly present. Lymph node   No palpable submandibular, cervical, supraclavicular, axillary and inguinal lymph nodes. Skin Warm and dry. No bruising and no rash noted. No erythema. No pallor.     Respiratory Effort normal and breath sounds normal.  No respiratory distress. No wheezes. No rales. No tenderness. CVS Normal rate, regular rhythm and normal heart sounds. Exam reveals no gallop, no friction and no rub. No murmur heard. Abdomen Soft. Bowel sounds are normal. Exhibits no distension. There is no tenderness. There is no rebound and no guarding. Neuro Normal reflexes. No cranial nerve deficit. Exhibits normal muscle tone, 5 of 5 strength of all extremities. MSK Normal range of motion. No edema and no tenderness.    Psych Normal mood, affect, behavior, judgment and thought content      Labs:    Recent Labs     05/13/21  1338 05/13/21  0434 05/12/21 2012 05/12/21  1504 05/12/21  1347   WBC  --  0.7*  --  0.8* 1.1*   RBC  --  3.37*  --  1.48* 1.24*   HGB 9.6* 10.1* 6.6* 4.4*  4.3* 3.7*   HCT 28.3* 31.2* 20.7* 14.7*  14.7* 12.4*   MCV  --  92.6  --  99.3* 100.0*   MCH  --  30.0  --  29.7 29.8   MCHC  --  32.4  --  29.9* 29.8*   RDW  --  17.4*  --  21.0* 20.9*   PLT  --  157  --  215 206   GRANS  --  41*  --  61 48   LYMPH  --  39  --  35 37   MONOS  --  4  --   --  5   EOS  --  12*  --  2 5   BASOS  --  1  --   --  0   IG  --  3  --   --  5   DF  --  AUTOMATED  --  MANUAL AUTOMATED   ANEU  --  0.3*  --   --  0.4*   ABL  --  0.3*  --   --  0.4*   ABM  --  0.0*  --   --  0.1   JOSE G  --  0.1  --   --  0.1   ABB  --  0.0  --   --  0.0   AIG  --  0.0  --   --  0.1      Recent Labs     05/13/21  0434 05/12/21  1034    136   K 4.2 4.0    101   CO2 26 27   AGAP 6* 8   GLU 86 89   BUN 24* 29*   CREA 2.23* 2.61*   GFRAA 27* 23*   GFRNA 23* 19*   CA 8.2* 8.0*   AP 84 78   TP 6.2* 5.7*   ALB 2.5* 2.5*   GLOB 3.7* 3.2   AGRAT 0.7* 0.8*       ASSESSMENT/RECOMMENDATION:    Principal Problem:    Anemia (5/12/2021)    Active Problems:    Acquired hypothyroidism (1/16/2016)      Hyperlipidemia (1/16/2016)      Rheumatoid arthritis ([facility] (1/16/2016)      Hypertension, essential, benign (8/18/2016)      Major depressive disorder, recurrent, moderate ([facility] (8/18/2016)      Leukopenia (5/12/2021)      NAZIA (acute kidney injury) ([facility] (5/12/2021)      Rib fracture (5/12/2021)    68 y.o. F with a recent diagnosis of A. fib and a started Eliquis since March 2021 with prolonged melena and severe anemia of hemoglobin 3.7, responded to blood transfusion, however the acute pancytopenia would not be typical for dilutional effect as such, will arrange systemic anemia work-up and blood smear however would pursue a bone marrow biopsy to rule out blood malignancy, will follow. Thank you for allowing me to participate in the care of Ms. Aarti Lozano. Ciera Leblanc M.D.   [address]  Office : [phone]  Fax : [phone]

## 2021-05-14 ENCOUNTER — APPOINTMENT (OUTPATIENT)
Dept: OTHER | Age: 81
DRG: 808 | End: 2021-05-14
Attending: INTERNAL MEDICINE
Payer: MEDICARE

## 2021-05-14 ENCOUNTER — APPOINTMENT (OUTPATIENT)
Dept: OTHER | Age: 81
DRG: 808 | End: 2021-05-14
Attending: NURSE PRACTITIONER
Payer: MEDICARE

## 2021-05-14 LAB
ALBUMIN SERPL-MCNC: 1.9 G/DL (ref 3.2–4.6)
ALBUMIN/GLOB SERPL: 0.6 {RATIO} (ref 1.2–3.5)
ALP SERPL-CCNC: 65 U/L (ref 50–136)
ALT SERPL-CCNC: 40 U/L (ref 12–65)
ANION GAP SERPL CALC-SCNC: 8 MMOL/L (ref 7–16)
AST SERPL-CCNC: 31 U/L (ref 15–37)
BASOPHILS # BLD: 0 K/UL (ref 0–0.2)
BASOPHILS NFR BLD: 2 % (ref 0–2)
BILIRUB SERPL-MCNC: 1 MG/DL (ref 0.2–1.1)
BONE MARROW PREP & W,BMA: NORMAL
BUN SERPL-MCNC: 17 MG/DL (ref 8–23)
CALCIUM SERPL-MCNC: 7.8 MG/DL (ref 8.3–10.4)
CHLORIDE SERPL-SCNC: 111 MMOL/L (ref 98–107)
CO2 SERPL-SCNC: 21 MMOL/L (ref 21–32)
CREAT SERPL-MCNC: 1.44 MG/DL (ref 0.6–1)
DIFFERENTIAL METHOD BLD: ABNORMAL
EOSINOPHIL # BLD: 0 K/UL (ref 0–0.8)
EOSINOPHIL NFR BLD: 8 % (ref 0.5–7.8)
ERYTHROCYTE [DISTWIDTH] IN BLOOD BY AUTOMATED COUNT: 17.1 % (ref 11.9–14.6)
GLOBULIN SER CALC-MCNC: 3.1 G/DL (ref 2.3–3.5)
GLUCOSE SERPL-MCNC: 86 MG/DL (ref 65–100)
HCT VFR BLD AUTO: 27.3 % (ref 35.8–46.3)
HCT VFR BLD AUTO: 27.6 % (ref 35.8–46.3)
HGB BLD-MCNC: 8.7 G/DL (ref 11.7–15.4)
HGB BLD-MCNC: 9.1 G/DL (ref 11.7–15.4)
HISPANIC, LDP2T: NO
IMM GRANULOCYTES # BLD AUTO: 0.1 K/UL (ref 0–0.5)
IMM GRANULOCYTES NFR BLD AUTO: 10 % (ref 0–5)
LEAD BLD-MCNC: <1 UG/DL (ref 0–4)
LYMPHOCYTES # BLD: 0.3 K/UL (ref 0.5–4.6)
LYMPHOCYTES NFR BLD: 51 % (ref 13–44)
MCH RBC QN AUTO: 30.7 PG (ref 26.1–32.9)
MCHC RBC AUTO-ENTMCNC: 33 G/DL (ref 31.4–35)
MCV RBC AUTO: 93.2 FL (ref 79.6–97.8)
MONOCYTES # BLD: 0 K/UL (ref 0.1–1.3)
MONOCYTES NFR BLD: 6 % (ref 4–12)
NEUTS SEG # BLD: 0.1 K/UL (ref 1.7–8.2)
NEUTS SEG NFR BLD: 23 % (ref 43–78)
NRBC # BLD: 0.02 K/UL (ref 0–0.2)
PLATELET # BLD AUTO: 119 K/UL (ref 150–450)
PLATELET COMMENTS,PCOM: SLIGHT
PMV BLD AUTO: 11.6 FL (ref 9.4–12.3)
POTASSIUM SERPL-SCNC: 4.3 MMOL/L (ref 3.5–5.1)
PROT SERPL-MCNC: 5 G/DL (ref 6.3–8.2)
RACE, 017371: NORMAL
RBC # BLD AUTO: 2.96 M/UL (ref 4.05–5.2)
RBC MORPH BLD: ABNORMAL
RBC MORPH BLD: ABNORMAL
SODIUM SERPL-SCNC: 140 MMOL/L (ref 136–145)
SPECIMEN SOURCE: NORMAL
TEST PURPOSE, LDP4T: NORMAL
WBC # BLD AUTO: 0.5 K/UL (ref 4.3–11.1)
WBC MORPH BLD: ABNORMAL

## 2021-05-14 PROCEDURE — 77030010507 HC ADH SKN DERMBND J&J -B

## 2021-05-14 PROCEDURE — 74011250637 HC RX REV CODE- 250/637: Performed by: EMERGENCY MEDICINE

## 2021-05-14 PROCEDURE — C1894 INTRO/SHEATH, NON-LASER: HCPCS

## 2021-05-14 PROCEDURE — 83883 ASSAY NEPHELOMETRY NOT SPEC: CPT

## 2021-05-14 PROCEDURE — 85025 COMPLETE CBC W/AUTO DIFF WBC: CPT

## 2021-05-14 PROCEDURE — 07DR3ZX EXTRACTION OF ILIAC BONE MARROW, PERCUTANEOUS APPROACH, DIAGNOSTIC: ICD-10-PCS | Performed by: RADIOLOGY

## 2021-05-14 PROCEDURE — 88185 FLOWCYTOMETRY/TC ADD-ON: CPT

## 2021-05-14 PROCEDURE — 88264 CHROMOSOME ANALYSIS 20-25: CPT

## 2021-05-14 PROCEDURE — 77030002916 HC SUT ETHLN J&J -A

## 2021-05-14 PROCEDURE — 82784 ASSAY IGA/IGD/IGG/IGM EACH: CPT

## 2021-05-14 PROCEDURE — 88237 TISSUE CULTURE BONE MARROW: CPT

## 2021-05-14 PROCEDURE — 80053 COMPREHEN METABOLIC PANEL: CPT

## 2021-05-14 PROCEDURE — 88313 SPECIAL STAINS GROUP 2: CPT

## 2021-05-14 PROCEDURE — C1751 CATH, INF, PER/CENT/MIDLINE: HCPCS

## 2021-05-14 PROCEDURE — 74011250637 HC RX REV CODE- 250/637: Performed by: INTERNAL MEDICINE

## 2021-05-14 PROCEDURE — 74011250636 HC RX REV CODE- 250/636: Performed by: RADIOLOGY

## 2021-05-14 PROCEDURE — 74011000250 HC RX REV CODE- 250: Performed by: INTERNAL MEDICINE

## 2021-05-14 PROCEDURE — 85018 HEMOGLOBIN: CPT

## 2021-05-14 PROCEDURE — 88342 IMHCHEM/IMCYTCHM 1ST ANTB: CPT

## 2021-05-14 PROCEDURE — 77030028872 HC BN BIOP NDL ON CNTRL TY TELE -C

## 2021-05-14 PROCEDURE — 88374 M/PHMTRC ALYS ISHQUANT/SEMIQ: CPT

## 2021-05-14 PROCEDURE — 77010033678 HC OXYGEN DAILY

## 2021-05-14 PROCEDURE — 36415 COLL VENOUS BLD VENIPUNCTURE: CPT

## 2021-05-14 PROCEDURE — 77030003666 HC NDL SPINAL BD -A

## 2021-05-14 PROCEDURE — 36556 INSERT NON-TUNNEL CV CATH: CPT

## 2021-05-14 PROCEDURE — C9113 INJ PANTOPRAZOLE SODIUM, VIA: HCPCS | Performed by: INTERNAL MEDICINE

## 2021-05-14 PROCEDURE — 88184 FLOWCYTOMETRY/ TC 1 MARKER: CPT

## 2021-05-14 PROCEDURE — 74011250637 HC RX REV CODE- 250/637: Performed by: HOSPITALIST

## 2021-05-14 PROCEDURE — 38221 DX BONE MARROW BIOPSIES: CPT

## 2021-05-14 PROCEDURE — 2709999900 HC NON-CHARGEABLE SUPPLY

## 2021-05-14 PROCEDURE — 86334 IMMUNOFIX E-PHORESIS SERUM: CPT

## 2021-05-14 PROCEDURE — 74011000250 HC RX REV CODE- 250: Performed by: PHYSICIAN ASSISTANT

## 2021-05-14 PROCEDURE — 02H633Z INSERTION OF INFUSION DEVICE INTO RIGHT ATRIUM, PERCUTANEOUS APPROACH: ICD-10-PCS | Performed by: RADIOLOGY

## 2021-05-14 PROCEDURE — 65270000029 HC RM PRIVATE

## 2021-05-14 PROCEDURE — 88341 IMHCHEM/IMCYTCHM EA ADD ANTB: CPT

## 2021-05-14 PROCEDURE — 81450 HL NEO GSAP 5-50DNA/DNA&RNA: CPT

## 2021-05-14 PROCEDURE — 88305 TISSUE EXAM BY PATHOLOGIST: CPT

## 2021-05-14 PROCEDURE — 74011250636 HC RX REV CODE- 250/636: Performed by: INTERNAL MEDICINE

## 2021-05-14 PROCEDURE — 88311 DECALCIFY TISSUE: CPT

## 2021-05-14 PROCEDURE — 94760 N-INVAS EAR/PLS OXIMETRY 1: CPT

## 2021-05-14 RX ORDER — LIDOCAINE HYDROCHLORIDE 20 MG/ML
2-20 INJECTION, SOLUTION INFILTRATION; PERINEURAL ONCE
Status: COMPLETED | OUTPATIENT
Start: 2021-05-14 | End: 2021-05-14

## 2021-05-14 RX ORDER — SODIUM CHLORIDE 9 MG/ML
25 INJECTION, SOLUTION INTRAVENOUS CONTINUOUS
Status: DISCONTINUED | OUTPATIENT
Start: 2021-05-14 | End: 2021-05-14

## 2021-05-14 RX ORDER — FENTANYL CITRATE 50 UG/ML
25-50 INJECTION, SOLUTION INTRAMUSCULAR; INTRAVENOUS
Status: DISCONTINUED | OUTPATIENT
Start: 2021-05-14 | End: 2021-05-14

## 2021-05-14 RX ORDER — MIDAZOLAM HYDROCHLORIDE 1 MG/ML
.5-2 INJECTION, SOLUTION INTRAMUSCULAR; INTRAVENOUS
Status: DISCONTINUED | OUTPATIENT
Start: 2021-05-14 | End: 2021-05-14

## 2021-05-14 RX ADMIN — MIDAZOLAM 0.5 MG: 1 INJECTION INTRAMUSCULAR; INTRAVENOUS at 15:29

## 2021-05-14 RX ADMIN — SODIUM CHLORIDE 10 ML: 9 INJECTION, SOLUTION INTRAMUSCULAR; INTRAVENOUS; SUBCUTANEOUS at 22:00

## 2021-05-14 RX ADMIN — LEVOTHYROXINE SODIUM 25 MCG: 0.05 TABLET ORAL at 05:57

## 2021-05-14 RX ADMIN — FENTANYL CITRATE 25 MCG: 50 INJECTION INTRAMUSCULAR; INTRAVENOUS at 15:29

## 2021-05-14 RX ADMIN — DULOXETINE HYDROCHLORIDE 60 MG: 60 CAPSULE, DELAYED RELEASE ORAL at 08:39

## 2021-05-14 RX ADMIN — ROSUVASTATIN CALCIUM 10 MG: 5 TABLET, FILM COATED ORAL at 22:05

## 2021-05-14 RX ADMIN — LIDOCAINE HYDROCHLORIDE 150 MG: 20 INJECTION, SOLUTION INFILTRATION; PERINEURAL at 15:30

## 2021-05-14 RX ADMIN — TRAMADOL HYDROCHLORIDE 50 MG: 50 TABLET, FILM COATED ORAL at 01:45

## 2021-05-14 RX ADMIN — SODIUM CHLORIDE 5 ML: 9 INJECTION, SOLUTION INTRAMUSCULAR; INTRAVENOUS; SUBCUTANEOUS at 16:44

## 2021-05-14 RX ADMIN — LIDOCAINE HYDROCHLORIDE 150 MG: 20 INJECTION, SOLUTION INFILTRATION; PERINEURAL at 15:10

## 2021-05-14 RX ADMIN — SODIUM CHLORIDE 5 ML: 9 INJECTION, SOLUTION INTRAMUSCULAR; INTRAVENOUS; SUBCUTANEOUS at 08:40

## 2021-05-14 RX ADMIN — AMIODARONE HYDROCHLORIDE 200 MG: 200 TABLET ORAL at 08:39

## 2021-05-14 RX ADMIN — FOLIC ACID 1 MG: 1 TABLET ORAL at 08:39

## 2021-05-14 RX ADMIN — TRAMADOL HYDROCHLORIDE 50 MG: 50 TABLET, FILM COATED ORAL at 22:06

## 2021-05-14 RX ADMIN — PANTOPRAZOLE SODIUM 40 MG: 40 INJECTION, POWDER, FOR SOLUTION INTRAVENOUS at 22:04

## 2021-05-14 RX ADMIN — SODIUM CHLORIDE 75 ML/HR: 900 INJECTION, SOLUTION INTRAVENOUS at 03:26

## 2021-05-14 RX ADMIN — PANTOPRAZOLE SODIUM 40 MG: 40 INJECTION, POWDER, FOR SOLUTION INTRAVENOUS at 09:00

## 2021-05-14 RX ADMIN — Medication 1 EACH: at 00:57

## 2021-05-14 RX ADMIN — Medication 10 ML: at 05:57

## 2021-05-14 NOTE — PROGRESS NOTES
Occupational Therapy Note:    Patient off the floor at time of attempt. Will return for OT treatment as schedule permits and patient is able.      Tara Darnell, OTR/L

## 2021-05-14 NOTE — PROGRESS NOTES
SBAR from [place], [facility]. Patient in stable condition with resps even/unlabored. NAD noted. Patient on oxygen to nasal cannula. Safety measures noted. Will continue to monitor per policy.

## 2021-05-14 NOTE — PROGRESS NOTES
TRANSFER - IN REPORT:    Verbal report received from Kianna Gerard RN on Carlos Miller  being received from IR for routine progression of care      Report consisted of patients Situation, Background, Assessment and   Recommendations(SBAR). Information from the following report(s) Procedure Summary and MAR was reviewed with the receiving nurse. Opportunity for questions and clarification was provided. Assessment completed upon patients arrival to unit and care assumed.

## 2021-05-14 NOTE — PROGRESS NOTES
Pt in bed resting on 1 L NC. Pt in no apparent distress, all needs addressed. Bed in lowest, locked position call light in reach. SBAR report given to oncoming nurse.

## 2021-05-14 NOTE — PROGRESS NOTES
Physician Progress Note      Glenys Griffin  CSN #:                  [id number]  :                       1940  ADMIT DATE:       2021 10:41 AM  DISCH DATE:  RESPONDING  PROVIDER #:        Miguel Rojas MD          QUERY TEXT:    Pt admitted with HGB of 4.4 and has Blood loss  anemia documented in ER. If possible, please document in progress notes and discharge summary further specificity regarding the acuity and type of anemia:    The medical record reflects the following:  Risk Factors: GI bleeding  Clinical Indicators: HGB 4.4 GI bleeding documented in H&P, R flank hematoma , stool occult blood positive  Treatment: Blood transfusion      Thank you. Robbie Coyle RN CDI, CCS  My office phone [phone]  Options provided:  -- Anemia due to acute blood loss  -- Anemia due to chronic blood loss  -- Anemia due to acute on chronic blood loss  -- Anemia due to iron deficiency  -- Anemia due to CKD  -- Dilutional anemia  -- Other - I will add my own diagnosis  -- Disagree - Not applicable / Not valid  -- Disagree - Clinically unable to determine / Unknown  -- Refer to Clinical Documentation Reviewer    PROVIDER RESPONSE TEXT:    This patient has acute blood loss anemia.     Query created by: Eleni Sykes on 2021 10:39 AM      Electronically signed by:  Miguel Rojas MD 2021 12:24 PM

## 2021-05-14 NOTE — PROGRESS NOTES
TRANSFER - OUT REPORT:    Verbal report given to Dorota Antoine RN on [facility]  being transferred to 8th floor for routine post - op       Report consisted of patients Situation, Background, Assessment and   Recommendations(SBAR). Information from the following report(s) SBAR, Kardex, Procedure Summary and MAR was reviewed with the receiving nurse. Lines:   Triple Lumen 05/14/21 Anterior;Right Neck (Active)       Peripheral IV 05/13/21 Anterior; Left Other(comment) (Active)   Site Assessment Clean, dry, & intact 05/14/21 0725   Phlebitis Assessment 0 05/14/21 0725   Infiltration Assessment 0 05/14/21 0725   Dressing Status Clean, dry, & intact 05/14/21 0725   Dressing Type Transparent;Tape 05/14/21 0725   Hub Color/Line Status Patent 05/14/21 0725   Alcohol Cap Used No 05/14/21 0725       Peripheral IV 05/14/21 Anterior; Left Antecubital (Active)   Site Assessment Clean, dry, & intact 05/14/21 0557   Phlebitis Assessment 0 05/14/21 0557   Infiltration Assessment 0 05/14/21 0557   Dressing Status Clean, dry, & intact 05/14/21 0557   Dressing Type Transparent;Tape 05/14/21 0557   Hub Color/Line Status Infusing 05/14/21 0557   Alcohol Cap Used No 05/14/21 0557        Opportunity for questions and clarification was provided. Central line is OK to be used.

## 2021-05-14 NOTE — PROGRESS NOTES
Notified MD via [employer] and face to face of critical WBC count of 0.5. No new orders. GI also aware and will not perform EGD today. Okay, per Zandra Skiff, NP to place patient on Clear Liquid diet but will wait for IR to confirm bone marrow biopsy.

## 2021-05-14 NOTE — PROGRESS NOTES
PT Note    Attempted to see pnt this PM for therapy, but she is currently off the floor. Will continue to follow.      Thank you,  Ysabel Figueroa, PT, DPT

## 2021-05-14 NOTE — PROGRESS NOTES
[facility] Progress Note         Admit Date:  5/12/2021    Today's Date:  5/14/2021    CC: Anemia    Subjective:     Patient with stable hgb overnight. Hematology plans for bone marrow bx noted. No overt bleeding or melena reported. One stool documented yesterday. Medications:   Current Facility-Administered Medications   Medication Dose Route Frequency    folic acid (FOLVITE) tablet 1 mg  1 mg Oral DAILY    labetaloL (NORMODYNE;TRANDATE) injection 10 mg  10 mg IntraVENous Q4H PRN    lidocaine 4 % patch 1 Patch  1 Patch TransDERmal Q24H    hydrALAZINE (APRESOLINE) 20 mg/mL injection 10 mg  10 mg IntraVENous Q6H PRN    lip protectant (BLISTEX) ointment 1 Each  1 Each Topical PRN    0.9% sodium chloride infusion 250 mL  250 mL IntraVENous PRN    amiodarone (CORDARONE) tablet 200 mg  200 mg Oral DAILY    DULoxetine (CYMBALTA) capsule 60 mg  60 mg Oral DAILY    levothyroxine (SYNTHROID) tablet 25 mcg  25 mcg Oral ACB    rosuvastatin (CRESTOR) tablet 10 mg  10 mg Oral QHS    sodium chloride (NS) flush 5-40 mL  5-40 mL IntraVENous Q8H    sodium chloride (NS) flush 5-40 mL  5-40 mL IntraVENous PRN    acetaminophen (TYLENOL) tablet 650 mg  650 mg Oral Q6H PRN    Or    acetaminophen (TYLENOL) suppository 650 mg  650 mg Rectal Q6H PRN    polyethylene glycol (MIRALAX) packet 17 g  17 g Oral DAILY PRN    promethazine (PHENERGAN) tablet 12.5 mg  12.5 mg Oral Q6H PRN    Or    ondansetron (ZOFRAN) injection 4 mg  4 mg IntraVENous Q6H PRN    pantoprazole (PROTONIX) 40 mg in 0.9% sodium chloride 10 mL injection  40 mg IntraVENous Q12H    0.9% sodium chloride infusion  75 mL/hr IntraVENous CONTINUOUS    0.9% sodium chloride infusion 250 mL  250 mL IntraVENous PRN    diphenhydrAMINE (BENADRYL) capsule 25 mg  25 mg Oral Q6H PRN    traMADoL (ULTRAM) tablet 50 mg  50 mg Oral Q6H PRN       Review of Systems:  ROS was obtained, with pertinent positives as listed above.   No chest pain or SOB.    Diet:  NPO for biopsy    Objective:   Vitals:  Visit Vitals  BP (!) 126/53 (BP 1 Location: Right upper arm, BP Patient Position: At rest)   Pulse 77   Temp 98.4 °F (36.9 °C)   Resp 18   Ht 5' (1.524 m)   Wt 69.2 kg (152 lb 9.6 oz)   SpO2 94%   Breastfeeding No   BMI 29.80 kg/m²     Intake/Output:  No intake/output data recorded. 05/12 1901 - 05/14 0700  In: 3116.3 [P.O.:450; I.V.:1796.3]  Out: 350 [Urine:350]  Exam:  General appearance: alert, cooperative, no distress  Skin: bruise to right flank, under right eye  Lungs: clear to auscultation bilaterally anteriorly  Heart: regular rate and rhythm  Abdomen: soft, non-tender.  Bowel sounds normal. No masses, no organomegaly  Extremities: extremities normal, atraumatic, no cyanosis or edema  Neuro:  alert and oriented    Data Review (Labs):    Recent Labs     05/14/21  0705 05/13/21  2202 05/13/21  1957 05/13/21  1338 05/13/21  0825 05/13/21  0719 05/13/21  0434 05/12/21  2012 05/12/21  1504 05/12/21  1347 05/12/21  1034 05/12/21  1033   WBC 0.5*  --   --   --   --   --  0.7*  --  0.8* 1.1*  --   --    HGB 9.1* 10.4*  --  9.6*  --   --  10.1* 6.6* 4.4*  4.3* 3.7*  --   --    HCT 27.6* 32.6*  --  28.3*  --   --  31.2* 20.7* 14.7*  14.7* 12.4*  --   --    *  --   --   --   --   --  157  --  215 206  --   --    MCV 93.2  --   --   --   --   --  92.6  --  99.3* 100.0*  --   --      --   --   --   --   --  139  --   --   --  136  --    K 4.3  --   --   --   --   --  4.2  --   --   --  4.0  --    *  --   --   --   --   --  107  --   --   --  101  --    CO2 21  --   --   --   --   --  26  --   --   --  27  --    BUN 17  --   --   --   --   --  24*  --   --   --  29*  --    CREA 1.44*  --   --   --   --   --  2.23*  --   --   --  2.61*  --    CA 7.8*  --   --   --   --   --  8.2*  --   --   --  8.0*  --    GLU 86  --   --   --   --   --  86  --   --   --  89  --    AP 65  --   --   --   --   --  84  --   --   --  78  --    AST 31  --   --   --   -- --  41*  --   --   --  45*  --    ALT 40  --   --   --   --   --  60  --   --   --  68*  --    TBILI 1.0  --   --   --   --  1.6* 1.8*  --   --   --  0.8  --    CBIL  --   --  0.4*  --   --   --   --   --   --   --   --   --    ALB 1.9*  --   --   --   --   --  2.5*  --   --   --  2.5*  --    TP 5.0*  --   --   --   --   --  6.2*  --   --   --  5.7*  --    PTP  --   --   --   --   --   --   --   --   --   --   --  17.5*   INR  --   --   --   --   --   --   --   --   --   --   --  1.4   APTT  --   --   --   --  30.4  --   --   --   --   --   --   --      Renal U/S 5/13/21  IMPRESSION  1. Increased cortical echogenicity involving the single right kidney compatible  with chronic medical renal disease. No hydronephrosis. 2. Absent left kidney. Assessment:     Principal Problem:    Anemia (5/12/2021)    Active Problems:    Acquired hypothyroidism (1/16/2016)      Hyperlipidemia (1/16/2016)      Rheumatoid arthritis ([facility] (1/16/2016)      Hypertension, essential, benign (8/18/2016)      Major depressive disorder, recurrent, moderate (HCC) (8/18/2016)      Leukopenia (5/12/2021)      NAZIA (acute kidney injury) ([facility] (5/12/2021)      Rib fracture (5/12/2021)        Plan:     67 yo female with atrial fibrillation on Eliquis admitted after presenting to the ED with dizziness and falls. Noted severely anemic and leukopenic in the ED. Recent fall with bruising and rib fractures while on anti-coagulation. Stool heme + in the ED but without gross blood. She has been transfused 3 units PRBC and hematology consulted is pending for leukopenia. Morning labs with persistent leukopenia but improvement in hgb. 1.  Protonix IV  2. Follow hgb, stable for now with only one stool reported yesterday - unclear if blood or melena noted service  3. Heme/Onc with plans for bone marrow bx in IR today  4. Eliquis on hold  5.   Plan endoscopic evaluation when leukopenia improved (as hgb has improved and she is without overt bleeding/melena)  6. [id number] Sheri Downing for diet from gi standpoint    Patient is seen and examined in collaboration with Dr. Lori Painting. Assessment and plan as per Dr. Lori Painting.   Karie De Jesus NP

## 2021-05-14 NOTE — PROGRESS NOTES
Attempted to see pt today--however off of the floor for bone marrow biopsy. Chart reviewed with Dr. Melinda Bright and given increased rouleaux noted on peripheral smear will order SPEP/FLC, okay to draw with next routine blood draw. We will continue to follow.

## 2021-05-14 NOTE — PROCEDURES
Department of Interventional Radiology  [phone]        Interventional Radiology Brief Procedure Note    Patient: Tess Quiroz MRN: [id number]  SSN: [id number]    YOB: 1940  Age: 68 y.o. Sex: female      Date of Procedure: 5/14/2021    Pre-Procedure Diagnosis: anemia, poor IV access, NAZIA    Post-Procedure Diagnosis: SAME    Procedure(s): Image Guided Biopsy  Temporary Central Venous Catheter    Brief Description of Procedure: triple lumen CVC placed, right iliac bone marrow biopsy performed. Performed By: Livier Knight PA-C     Assistants: None    Anesthesia:Moderate Sedation per MIGUEL Head MD    Estimated Blood Loss: Less than 10ml    Specimens:  core and aspirate    Implants:  Temporary Central Venous Catheter    Findings: catheter tip in right atrium.   No post bx bleeding    Complications: None    Recommendations: ok to use catheter     Follow Up: prn    Signed By: Livier Knight PA-C     May 14, 2021

## 2021-05-14 NOTE — PROGRESS NOTES
CM went to talk to patient about rehab options but she's in a procedure. CM will meet with the patient when she returns.

## 2021-05-15 ENCOUNTER — APPOINTMENT (OUTPATIENT)
Dept: GENERAL RADIOLOGY | Age: 81
DRG: 808 | End: 2021-05-15
Attending: INTERNAL MEDICINE
Payer: MEDICARE

## 2021-05-15 PROBLEM — D70.9 FEBRILE NEUTROPENIA (HCC): Status: ACTIVE | Noted: 2021-05-15

## 2021-05-15 PROBLEM — R50.81 FEBRILE NEUTROPENIA (HCC): Status: ACTIVE | Noted: 2021-05-15

## 2021-05-15 PROBLEM — J18.9 RIGHT UPPER LOBE PNEUMONIA: Status: ACTIVE | Noted: 2021-05-15

## 2021-05-15 LAB
ALBUMIN SERPL-MCNC: 1.9 G/DL (ref 3.2–4.6)
ALBUMIN/GLOB SERPL: 0.6 {RATIO} (ref 1.2–3.5)
ALP SERPL-CCNC: 65 U/L (ref 50–136)
ALT SERPL-CCNC: 29 U/L (ref 12–65)
ANION GAP SERPL CALC-SCNC: 8 MMOL/L (ref 7–16)
AST SERPL-CCNC: 15 U/L (ref 15–37)
BILIRUB SERPL-MCNC: 1 MG/DL (ref 0.2–1.1)
BUN SERPL-MCNC: 15 MG/DL (ref 8–23)
CALCIUM SERPL-MCNC: 7.9 MG/DL (ref 8.3–10.4)
CHLORIDE SERPL-SCNC: 109 MMOL/L (ref 98–107)
CO2 SERPL-SCNC: 22 MMOL/L (ref 21–32)
CREAT SERPL-MCNC: 1.55 MG/DL (ref 0.6–1)
ERYTHROCYTE [DISTWIDTH] IN BLOOD BY AUTOMATED COUNT: 16.5 % (ref 11.9–14.6)
GLOBULIN SER CALC-MCNC: 3.1 G/DL (ref 2.3–3.5)
GLUCOSE SERPL-MCNC: 131 MG/DL (ref 65–100)
HCT VFR BLD AUTO: 25.1 % (ref 35.8–46.3)
HGB BLD-MCNC: 8 G/DL (ref 11.7–15.4)
MCH RBC QN AUTO: 30.2 PG (ref 26.1–32.9)
MCHC RBC AUTO-ENTMCNC: 31.9 G/DL (ref 31.4–35)
MCV RBC AUTO: 94.7 FL (ref 79.6–97.8)
NRBC # BLD: 0 K/UL (ref 0–0.2)
PLATELET # BLD AUTO: 88 K/UL (ref 150–450)
PMV BLD AUTO: 11.1 FL (ref 9.4–12.3)
POTASSIUM SERPL-SCNC: 3.8 MMOL/L (ref 3.5–5.1)
PROCALCITONIN SERPL-MCNC: 0.47 NG/ML
PROT SERPL-MCNC: 5 G/DL (ref 6.3–8.2)
RBC # BLD AUTO: 2.65 M/UL (ref 4.05–5.2)
SODIUM SERPL-SCNC: 139 MMOL/L (ref 136–145)
WBC # BLD AUTO: 0.3 K/UL (ref 4.3–11.1)

## 2021-05-15 PROCEDURE — 87040 BLOOD CULTURE FOR BACTERIA: CPT

## 2021-05-15 PROCEDURE — 74011250637 HC RX REV CODE- 250/637: Performed by: INTERNAL MEDICINE

## 2021-05-15 PROCEDURE — 80053 COMPREHEN METABOLIC PANEL: CPT

## 2021-05-15 PROCEDURE — 74011250636 HC RX REV CODE- 250/636: Performed by: INTERNAL MEDICINE

## 2021-05-15 PROCEDURE — C9113 INJ PANTOPRAZOLE SODIUM, VIA: HCPCS | Performed by: INTERNAL MEDICINE

## 2021-05-15 PROCEDURE — 99232 SBSQ HOSP IP/OBS MODERATE 35: CPT | Performed by: INTERNAL MEDICINE

## 2021-05-15 PROCEDURE — 74011000250 HC RX REV CODE- 250: Performed by: INTERNAL MEDICINE

## 2021-05-15 PROCEDURE — 74011000258 HC RX REV CODE- 258: Performed by: INTERNAL MEDICINE

## 2021-05-15 PROCEDURE — 97535 SELF CARE MNGMENT TRAINING: CPT

## 2021-05-15 PROCEDURE — 65270000029 HC RM PRIVATE

## 2021-05-15 PROCEDURE — 74011250637 HC RX REV CODE- 250/637: Performed by: FAMILY MEDICINE

## 2021-05-15 PROCEDURE — 36415 COLL VENOUS BLD VENIPUNCTURE: CPT

## 2021-05-15 PROCEDURE — 97530 THERAPEUTIC ACTIVITIES: CPT

## 2021-05-15 PROCEDURE — 85025 COMPLETE CBC W/AUTO DIFF WBC: CPT

## 2021-05-15 PROCEDURE — 84145 PROCALCITONIN (PCT): CPT

## 2021-05-15 PROCEDURE — 71045 X-RAY EXAM CHEST 1 VIEW: CPT

## 2021-05-15 PROCEDURE — 2709999900 HC NON-CHARGEABLE SUPPLY

## 2021-05-15 RX ORDER — VANCOMYCIN 1.75 GRAM/500 ML IN 0.9 % SODIUM CHLORIDE INTRAVENOUS
1750 ONCE
Status: COMPLETED | OUTPATIENT
Start: 2021-05-15 | End: 2021-05-15

## 2021-05-15 RX ORDER — NYSTATIN 100000 [USP'U]/ML
500000 SUSPENSION ORAL 4 TIMES DAILY
Status: DISCONTINUED | OUTPATIENT
Start: 2021-05-15 | End: 2021-05-24 | Stop reason: HOSPADM

## 2021-05-15 RX ORDER — SODIUM CHLORIDE, SODIUM LACTATE, POTASSIUM CHLORIDE, CALCIUM CHLORIDE 600; 310; 30; 20 MG/100ML; MG/100ML; MG/100ML; MG/100ML
75 INJECTION, SOLUTION INTRAVENOUS CONTINUOUS
Status: DISCONTINUED | OUTPATIENT
Start: 2021-05-15 | End: 2021-05-19

## 2021-05-15 RX ADMIN — PIPERACILLIN AND TAZOBACTAM 4.5 G: 4; .5 INJECTION, POWDER, FOR SOLUTION INTRAVENOUS at 15:41

## 2021-05-15 RX ADMIN — AMIODARONE HYDROCHLORIDE 200 MG: 200 TABLET ORAL at 08:36

## 2021-05-15 RX ADMIN — NYSTATIN 500000 UNITS: 100000 SUSPENSION ORAL at 22:00

## 2021-05-15 RX ADMIN — SODIUM CHLORIDE 10 ML: 9 INJECTION, SOLUTION INTRAMUSCULAR; INTRAVENOUS; SUBCUTANEOUS at 22:00

## 2021-05-15 RX ADMIN — DOXYCYCLINE 100 MG: 100 INJECTION, POWDER, LYOPHILIZED, FOR SOLUTION INTRAVENOUS at 08:24

## 2021-05-15 RX ADMIN — DULOXETINE HYDROCHLORIDE 60 MG: 60 CAPSULE, DELAYED RELEASE ORAL at 08:36

## 2021-05-15 RX ADMIN — NYSTATIN 500000 UNITS: 100000 SUSPENSION ORAL at 08:44

## 2021-05-15 RX ADMIN — PANTOPRAZOLE SODIUM 40 MG: 40 INJECTION, POWDER, FOR SOLUTION INTRAVENOUS at 08:44

## 2021-05-15 RX ADMIN — FOLIC ACID 1 MG: 1 TABLET ORAL at 08:36

## 2021-05-15 RX ADMIN — NYSTATIN 500000 UNITS: 100000 SUSPENSION ORAL at 12:13

## 2021-05-15 RX ADMIN — ROSUVASTATIN CALCIUM 10 MG: 5 TABLET, FILM COATED ORAL at 22:00

## 2021-05-15 RX ADMIN — SODIUM CHLORIDE 5 ML: 9 INJECTION, SOLUTION INTRAMUSCULAR; INTRAVENOUS; SUBCUTANEOUS at 15:18

## 2021-05-15 RX ADMIN — SODIUM CHLORIDE, SODIUM LACTATE, POTASSIUM CHLORIDE, AND CALCIUM CHLORIDE 75 ML/HR: 600; 310; 30; 20 INJECTION, SOLUTION INTRAVENOUS at 08:23

## 2021-05-15 RX ADMIN — PANTOPRAZOLE SODIUM 40 MG: 40 INJECTION, POWDER, FOR SOLUTION INTRAVENOUS at 20:44

## 2021-05-15 RX ADMIN — VANCOMYCIN HYDROCHLORIDE 1750 MG: 10 INJECTION, POWDER, LYOPHILIZED, FOR SOLUTION INTRAVENOUS at 09:40

## 2021-05-15 RX ADMIN — LEVOTHYROXINE SODIUM 25 MCG: 0.05 TABLET ORAL at 06:25

## 2021-05-15 RX ADMIN — SODIUM CHLORIDE 10 ML: 9 INJECTION, SOLUTION INTRAMUSCULAR; INTRAVENOUS; SUBCUTANEOUS at 06:00

## 2021-05-15 RX ADMIN — DOXYCYCLINE 100 MG: 100 INJECTION, POWDER, LYOPHILIZED, FOR SOLUTION INTRAVENOUS at 20:43

## 2021-05-15 RX ADMIN — NYSTATIN 500000 UNITS: 100000 SUSPENSION ORAL at 17:40

## 2021-05-15 RX ADMIN — PIPERACILLIN AND TAZOBACTAM 4.5 G: 4; .5 INJECTION, POWDER, FOR SOLUTION INTRAVENOUS at 08:23

## 2021-05-15 NOTE — PROGRESS NOTES
This nurse took old CVC off and cleansed area with cleanser and patient has her mask on with head turned away from site. With sterile gloves this nurse applied adhesive solution and then applied bio-patch to the CVC lines then applied tegaderm to area. This nurse put date and initial to dressing. Patient bed alarm on with call light in reach.

## 2021-05-15 NOTE — PROGRESS NOTES
Name:  Fernanda Justice  Age:80 y.o. Sex:female   :  1940    MRN:  [id number]   PCP:  Gm Woods MD      Admit Date:  2021 10:41 AM   Chief Complaint: falls    Reason for Admission:   Anemia [D64.9]    Assessment & Plan:     Fever in setting of leukopenia/RUL pneumonia: We will treat patient has febrile neutropenia. Started patient on vancomycin, Zosyn and doxycycline to cover a MRSA, Pseudomonas and atypical infection. Panculture ordered. Slow IV hydration. Will monitor. X-ray showed right upper lobe pneumonia. Acute anemia  -Significant drop in hemoglobin and WBC in last 1 month. Status post 1 unit PRBC. Holding anticoagulation and antiplatelet therapy. Gastroenterology and hematology on board. Work-up for anemia and leukopenia ordered. Appreciate subspecialty recommendation. Neutropenic precaution and fall precautions. May 14: Hemoglobin stable. May 15: No active bleeding. Will monitor. On clear liquid diet. GI on board. Leukopenia  In setting of anemia with teardrop cells and basophilic stippling seen on smear  -As patient is on methotrexate at home so started patient on folic acid. Appreciate hematology-recommendation.  -We will check procalcitonin and blood cultures. May 14: Procalcitonin negative. Follow-up culture. Appreciate hematology recommendation. Plan for bone marrow biopsy today. NAZIA on CKD 3  Baseline Cr ~1.6-1.7. Cr up to 2.61 on adm. avoid nephrotoxic agent. Will monitor. Patient has congenital agenesis of left kidney. May 14: Creatinine 1.4. Will monitor. Mild transaminitis  -improving. May 14: Resolved.       Rib fractures  R flank hematoma  - IS  - pain control    Frequent falls  Pt reports long standing falls related to medications per her report.  - PT/OT    Afib  - hold Eliquis  - cont Amio    RA  - MTX every     MDD  - cont home meds    Disposition/Expected LOS: > 2 midnights, pt previously living at home alone, PPD, PT/OT  Diet: DIET NUTRITIONAL SUPPLEMENTS  DIET CLEAR LIQUID  VTE ppx: SCDs  GI ppx: PPI BID  Code status: full code  Surrogate decision-maker: friend Hamilton Beck    patient is AOx3. Called Hamilton Beck who is power of . Updated patient's status. Patient and his PA both verbalized understanding that patient condition meditated in spite of treatment. History of Presenting Illness:     Ronda Martínez is a 68 y.o. female with medical history of recently dx'd Afib (on Eliquis), CKD3, RA, h/o breast CA 2008, hypothyroidism who presented to ED with falls, weakness, nausea. Pt reports not feeling well since she was discharged March 2021 after being diagnosed with Afib and started on Eliquis. She endorses increased fatigue, generalized weakness. Endorses frequent falls even prior to last hospitalization which has continued, with a fall approx 1-2 weeks ago, landing on R side and bruising R flank and face. States stools have been \"dark\" since she was started on \"that new medicine\" which I believe is the Eliquis. She also reports nausea with occasional emesis, nonbloody, for about 1 week. Decreased appetite, keeping down liquids but very little food. Denies weight loss. She ultimately saw her PCP today who directed her to the ED for further evaluation. In ED, VSS, afebrile. Hgb 3.7, confirmed on repeat. WBC 1.1, Cr 2.61. She was heme + in ED. She was transfused 1 unit pRBCs. XR revealed R 5,6,7 rib fracture. Cased discussed with Dr. Anna Buckner. May 13: As per patient she is feeling all right. Denies any pain anywhere. Denies any shortness of breath, palpitations. She is feeling generalized weakness. May 14: Patient is AOx3. As per patient she is feeling better and wants to know what test she will get it done today. As any chest pain, shortness of breath, nausea or vomiting. May 15: Patient had fever overnight. Patient denies any chest pain, shortness of breath.   Denies any nausea or vomiting. I have started patient on vancomycin, Zosyn and azithromycin given fever in setting of neutropenia and also panculture ordered. Review of Systems:  A 14 point review of systems was taken and pertinent positive as per HPI.         Past Medical History:   Diagnosis Date    Acquired hypothyroidism 1/16/2016    [facility] 2008    Depression 8/18/2016    Endocrine disease     hypothyroid    Fungal dermatitis 8/18/2016    Hyperlipidemia 1/16/2016    Hypertension, essential, benign 8/18/2016    Hypokalemia 8/18/2016    Inflamed sebaceous cyst 8/18/2016    Major depressive disorder, recurrent, moderate (HCC) 8/18/2016    Nicotine dependence, cigarettes, uncomplicated 1/14/5952    Osteopenia 8/18/2016    Osteoporosis 8/18/2016    Radiation therapy complication 9179    Rheumatoid arthritis ([facility] 1/16/2016    Right carotid bruit 1/16/2016    TIA (transient ischemic attack) 1/16/2016    Tobacco abuse 8/18/2016       Past Surgical History:   Procedure Laterality Date    HX ADENOIDECTOMY      HX BREAST LUMPECTOMY Right 2008    with lymph nodes    HX TONSILLECTOMY         Family History : reviewed  Family History   Problem Relation Age of Onset    Stroke Mother     Cancer Father         Brain    HIV/AIDS Brother         Social History     Tobacco Use    Smoking status: Current Some Day Smoker    Smokeless tobacco: Never Used    Tobacco comment: Only on pay day-1/2 pack   Substance Use Topics    Alcohol use: No       No Known Allergies    Immunization History   Administered Date(s) Administered    Influenza High Dose Vaccine PF 10/28/2015, 09/21/2016, 09/15/2017    Pneumococcal Polysaccharide (PPSV-23) 01/18/2016    TB Skin Test (PPD) Intradermal 04/22/2019, 05/12/2021         PTA Medications:  Current Outpatient Medications   Medication Instructions    amiodarone (CORDARONE) 200 mg, Oral, DAILY    apixaban (ELIQUIS) 5 mg, Oral, 2 TIMES DAILY    cholecalciferol (VITAMIN D3) 1,000 Units, Oral, DAILY    diclofenac EC (VOLTAREN) 75 mg, Oral, 2 TIMES DAILY    DULoxetine (CYMBALTA) 60 mg, Oral, DAILY    famotidine (PEPCID) 20 mg, Oral, DAILY    folic acid (FOLVITE) 1 mg, Oral, DAILY    levothyroxine (SYNTHROID) 25 mcg, Oral, DAILY BEFORE BREAKFAST    methotrexate (RHEUMATREX) 2.5 mg tablet 5 tabs every Sunday    ondansetron (ZOFRAN ODT) 4 mg, Oral, EVERY 8 HOURS AS NEEDED    predniSONE (DELTASONE) 30 mg, Oral, DAILY WITH BREAKFAST    rosuvastatin (CRESTOR) 10 mg, Oral, EVERY BEDTIME    spironolactone (ALDACTONE) 25 mg, Oral, DAILY       Objective:     Patient Vitals for the past 24 hrs:   Temp Pulse Resp BP SpO2   05/15/21 1100 99.6 °F (37.6 °C) 87 18 (!) 115/57 98 %   05/15/21 0700 100 °F (37.8 °C) 88 18 (!) 123/53 98 %   05/15/21 0407 99.7 °F (37.6 °C) 84 17 (!) 115/55 96 %   05/14/21 2314 (!) 100.5 °F (38.1 °C) 85 18 118/61 100 %   05/14/21 1941 99 °F (37.2 °C) 86 18 120/61 100 %   05/14/21 1655 98.9 °F (37.2 °C) 83 16 (!) 140/65 97 %   05/14/21 1620   16 (!) 173/73 100 %   05/14/21 1605   16 (!) 161/69 100 %   05/14/21 1550   16 (!) 161/71 99 %   05/14/21 1532  83 14 (!) 146/65 98 %   05/14/21 1531  84 14 (!) 156/70 98 %   05/14/21 1528  86 14 (!) 167/73 100 %   05/14/21 1514  82      05/14/21 1353 98.2 °F (36.8 °C) 81 16 (!) 147/65 100 %       Oxygen Therapy  O2 Sat (%): 98 % (05/15/21 1100)  Pulse via Oximetry: 81 beats per minute (05/14/21 1620)  O2 Device: None (Room air) (05/15/21 2740)  Skin Assessment: Clean, dry, & intact (05/14/21 3508)  Skin Protection for O2 Device: No (05/14/21 5271)  O2 Flow Rate (L/min): 3 l/min (05/14/21 1532)    Body mass index is 29.69 kg/m². Physical Exam:    General:  No acute distress, speaking in full sentences,   HEENT:  EOMI, oropharynx is clear, resolving hematoma to R eye  Neck:   Supple, no lymphadenopathy, no JVD   Lungs:  Clear to auscultation bilaterally with crackles right upper lobe.   CV:   Distant HS, Regular rate and rhythm with normal S1 and S2   Abdomen:  Soft, nontender, nondistended, normoactive bowel sounds   Extremities:  Trace edema to BL LE, chronic per pt  Skin:  Hematoma face and abdomen/back  Neuro:  AOx3, generalized weakness, moving all 4 extremities, speech normal  Psych:  Normal mood and affect       Data Reviewed: I have reviewed all labs, meds, and studies. Recent Results (from the past 24 hour(s))   BONE MARROW PREP & COTO STAIN    Collection Time: 05/14/21  3:29 PM   Result Value Ref Range    Bone Marrow Prep & Genella Bees Stain FOR HEMATOLOGY SERVICES RENDERED     HGB & HCT    Collection Time: 05/14/21  4:58 PM   Result Value Ref Range    HGB 8.7 (L) 11.7 - 15.4 g/dL    HCT 27.3 (L) 35.8 - 46.3 %   PROTEIN ELEC WITH ALEXANDRU, SERUM    Collection Time: 05/14/21  4:58 PM   Result Value Ref Range    Protein, total 4.7 (L) 6.3 - 8.2 g/dL    A-G Ratio PENDING      ALBUMIN PENDING g/dL    Alpha-1 globulin PENDING g/dL    ALPHA 2 PENDING g/dL    Beta-globulin PENDING g/dL    Gamma-globulin PENDING 10 - 12 g/dL    M-Kameron PENDING 0 g/dL    Immunoglobulin G PENDING mg/dL    Immunoglobulin A PENDING mg/dL    Immunoglobulin M PENDING mg/dL    PEP Interpretation PENDING     ALEXANDRU Interpretation PENDING    METABOLIC PANEL, COMPREHENSIVE    Collection Time: 05/15/21  4:37 AM   Result Value Ref Range    Sodium 139 136 - 145 mmol/L    Potassium 3.8 3.5 - 5.1 mmol/L    Chloride 109 (H) 98 - 107 mmol/L    CO2 22 21 - 32 mmol/L    Anion gap 8 7 - 16 mmol/L    Glucose 131 (H) 65 - 100 mg/dL    BUN 15 8 - 23 MG/DL    Creatinine 1.55 (H) 0.6 - 1.0 MG/DL    GFR est AA 42 (L) >60 ml/min/1.73m2    GFR est non-AA 34 (L) >60 ml/min/1.73m2    Calcium 7.9 (L) 8.3 - 10.4 MG/DL    Bilirubin, total 1.0 0.2 - 1.1 MG/DL    ALT (SGPT) 29 12 - 65 U/L    AST (SGOT) 15 15 - 37 U/L    Alk.  phosphatase 65 50 - 136 U/L    Protein, total 5.0 (L) 6.3 - 8.2 g/dL    Albumin 1.9 (L) 3.2 - 4.6 g/dL    Globulin 3.1 2.3 - 3.5 g/dL    A-G Ratio 0.6 (L) 1.2 - 3.5     CBC WITH AUTOMATED DIFF    Collection Time: 05/15/21  4:37 AM   Result Value Ref Range    WBC 0.3 (LL) 4.3 - 11.1 K/uL    RBC 2.65 (L) 4.05 - 5.2 M/uL    HGB 8.0 (L) 11.7 - 15.4 g/dL    HCT 25.1 (L) 35.8 - 46.3 %    MCV 94.7 79.6 - 97.8 FL    MCH 30.2 26.1 - 32.9 PG    MCHC 31.9 31.4 - 35.0 g/dL    RDW 16.5 (H) 11.9 - 14.6 %    PLATELET 88 (L) 357 - 450 K/uL    MPV 11.1 9.4 - 12.3 FL    ABSOLUTE NRBC 0.00 0.0 - 0.2 K/uL       EKG Results     Procedure 720 Value Units Date/Time    EKG [[id number]] Collected: 05/12/21 1032    Order Status: Completed Updated: 05/12/21 1333     Ventricular Rate 78 BPM      Atrial Rate 78 BPM      P-R Interval 166 ms      QRS Duration 132 ms      Q-T Interval 422 ms      QTC Calculation (Bezet) 481 ms      Calculated P Axis 63 degrees      Calculated R Axis 76 degrees      Calculated T Axis 79 degrees      Diagnosis --     Normal sinus rhythm  Right bundle branch block  T wave abnormality, consider inferolateral ischemia  Abnormal ECG  When compared with ECG of 08-APR-2021 15:29,  Right bundle branch block is now Present  Confirmed by Shena Lira MD (), KATIA HILL ([id number]) on 5/12/2021 1:32:48 PM            All Micro Results     Procedure Component Value Units Date/Time    CULTURE, BLOOD [[id number]] Collected: 05/15/21 0937    Order Status: Completed Specimen: Blood Updated: 05/15/21 1038    CULTURE, BLOOD [[id number]] Collected: 05/15/21 0756    Order Status: Completed Specimen: Blood Updated: 05/15/21 1038    CULTURE, URINE [[id number]]     Order Status: Sent Specimen: Urine from Clean catch     CULTURE, BLOOD [[id number]] Collected: 05/13/21 1613    Order Status: Completed Specimen: Blood Updated: 05/15/21 0646     Special Requests: --        LEFT  Antecubital       Culture result: NO GROWTH 2 DAYS       CULTURE, BLOOD [[id number]] Collected: 05/13/21 1616    Order Status: Completed Specimen: Blood Updated: 05/15/21 0646     Special Requests: --        LEFT  HAND       Culture result: NO GROWTH 2 DAYS             Other Studies:  Xr Chest Sngl V    Result Date: 5/15/2021  Portable AP upright view  Date:  5/15/2021  at 0757 hours comparison chest x-ray : 5/12/2021 Clinical Information: Fever Findings: Heart is enlarged and mediastinum unremarkable. Vascularity does not appear congested. There is ill-defined density right upper lobe near the apex suspicious for infiltrate. No pleural effusion.      Right upper lobe infiltrate         Medications:  Medications Administered      Medications Administered     pantoprazole (PROTONIX) 40 mg in 0.9% sodium chloride 10 mL injection     Admin Date  05/12/2021 Action  Given Dose  40 mg Route  IntraVENous Administered By  Lydia Gillespie RN          sodium chloride 0.9 % bolus infusion 500 mL     Admin Date  05/12/2021 Action  New Bag Dose  500 mL Rate  500 mL/hr Route  IntraVENous Administered By  Lydia Gillespie RN                      Problem List:     Hospital Problems as of 5/15/2021 Date Reviewed: 4/13/2021          Codes Class Noted - Resolved POA    * (Principal) Anemia ICD-10-CM: D64.9  ICD-9-CM: 285.9  5/12/2021 - Present Unknown        Leukopenia ICD-10-CM: D72.819  ICD-9-CM: 288.50  5/12/2021 - Present Unknown        NAZIA (acute kidney [facility]) [facility] ICD-10-CM: N17.9  ICD-9-CM: 584.9  5/12/2021 - Present Unknown        Rib fracture ICD-10-CM: S22.39XA  ICD-9-CM: 807.00  5/12/2021 - Present Unknown        Hypertension, essential, benign ICD-10-CM: I10  ICD-9-CM: 401.1  8/18/2016 - Present Yes        Major depressive disorder, recurrent, moderate ([facility] ICD-10-CM: F33.1  ICD-9-CM: 296.32  8/18/2016 - Present Yes        Acquired hypothyroidism ICD-10-CM: E03.9  ICD-9-CM: 244.9  1/16/2016 - Present Yes        Hyperlipidemia ICD-10-CM: E78.5  ICD-9-CM: 272.4  1/16/2016 - Present Yes        Rheumatoid arthritis ([facility] ICD-10-CM: M06.9  ICD-9-CM: 714.0  1/16/2016 - Present Yes                 Signed By: Celi Gore MD   [facility] Hospitalist Service May 15, 2021

## 2021-05-15 NOTE — PROGRESS NOTES
Laboratory notified this nurse that patient WBC is 0.3 and this nurse notified Hospitalist (Dr. Ubaldo Uriostegui) at this time. Patient coughing blood tinged mucus and this nurse also notified MD about the blood. Will await for response.  Patient has bed alarm on and call light in reach

## 2021-05-15 NOTE — PROGRESS NOTES
Pharmacokinetic Consult to Pharmacist    Flora Elizondo is a 68 y.o. female being treated for febrile neutropenia with vancomycin. Height: 5' (152.4 cm)  Weight: 68.9 kg (152 lb)  Lab Results   Component Value Date/Time    BUN 15 05/15/2021 04:37 AM    Creatinine 1.55 (H) 05/15/2021 04:37 AM    WBC 0.3 (LL) 05/15/2021 04:37 AM    Procalcitonin 0.19 05/13/2021 07:57 PM    Lactic acid 2.0 03/19/2021 03:30 PM    Lactic Acid (POC) 2.89 (H) 04/22/2019 12:34 PM      Estimated Creatinine Clearance: 25.9 mL/min (A) (based on SCr of 1.55 mg/dL (H)). CULTURES:  Results     Procedure Component Value Units Date/Time    CULTURE, URINE [[id number]]     Order Status: Sent Specimen: Urine from Clean catch     CULTURE, BLOOD [[id number]]     Order Status: Sent Specimen: Blood     CULTURE, BLOOD [[id number]]     Order Status: Sent Specimen: Blood     CULTURE, BLOOD [[id number]] Collected: 05/13/21 1616    Order Status: Completed Specimen: Blood Updated: 05/15/21 0646     Special Requests: --        LEFT  HAND       Culture result: NO GROWTH 2 DAYS       CULTURE, BLOOD [[id number]] Collected: 05/13/21 1613    Order Status: Completed Specimen: Blood Updated: 05/15/21 0646     Special Requests: --        LEFT  Antecubital       Culture result: NO GROWTH 2 DAYS               Day 1 of vancomycin. Goal trough is 15-20. Vancomycin dose initiated at 1750mg x 1 dose. Will dose per random levels for now. Will check a 24 hour random level tomorrow and redose if necessary. Will continue to follow patient and order levels when clinically indicated. Thank you,  Jaron Feliciano.  Sol Hull, PharmD, [facility]  Clinical Pharmacist

## 2021-05-15 NOTE — PROGRESS NOTES
[facility]: In Patient Hematology / Oncology Progress Note    Reason for Consult: Acute pancytopenia acute pancytopenia  Referring Physician:  Aminta Wright MD    History of Present Illness:  24-year-old female admitted on 5/12/2021 for upper GI bleeding and acute anemia. Her past medical history include recently diagnosed atrial fibrillation and started Eliquis in March 2021, chronic renal insufficiency stage III, rheumatoid arthritis with weekly methotrexate, remote history of breast cancer in 2008 that was reportedly treated with surgery and radiation but no systemic therapy, hypothyroidism. She presented to ER with falls, weakness, nausea, reportedly had not been feeling well since she was discharged in March 2021 on Eliquis. She reports dark stools since starting Eliquis and saw PCP then directed to the ER to be admitted for hemoglobin of 3.7, WBC 1.1, platelet 991 nonetheless all had dramatic drop from a month ago, x-ray showed right rib fracture of the 5, 6 and 7. CBC reported occasional teardrop cells and basophilic stippling. Hematology is consulted. 24 hours event: Received a bone marrow biopsy, CBC still trends down      Review of Systems:  Constitutional  week. Denies fever or chills. Denies weight loss or appetite changes. HEENT  very hard of hearing. Denies trauma, bluring vision, ear pain, nosebleeds, sore throat, neck pain and ear discharge. Skin Denies lesions or rashes. Lungs Denies shortness of breath, cough, sputum production or hemoptysis. Cardiovascular Denies chest pain, palpitations, orthopnea, claudication and leg swelling. Gastrointestinal  dark stool. Denies nausea, vomiting, bowel changes. Denies abdominal pain.  Denies dysuria, frequency or hesitancy of urination   Neuro Denies headaches, visual changes or ataxia. Denies dizziness, tingling, tremors, sensory change, speech change, focal weakness and headaches.      Hematology Denies nasal/gum bleeding, denies easy bruise   Endo Denies heat/cold intolerance, denies diabetes. MSK Denies back pain, swollen legs, myalgias and falls. Psychiatric/Behavioral Denies depression and substance abuse. The patient is not nervous/anxious.        No Known Allergies  Past Medical History:   Diagnosis Date    Acquired hypothyroidism 1/16/2016    Breast cancer ([facility] 2008    Depression 8/18/2016    Endocrine disease     hypothyroid    Fungal dermatitis 8/18/2016    Hyperlipidemia 1/16/2016    Hypertension, essential, benign 8/18/2016    Hypokalemia 8/18/2016    Inflamed sebaceous cyst 8/18/2016    Major depressive disorder, recurrent, moderate ([facility] 8/18/2016    Nicotine dependence, cigarettes, uncomplicated 7/18/7243    Osteopenia 8/18/2016    Osteoporosis 8/18/2016    Radiation therapy complication 4486    [facility] 1/16/2016    Right carotid bruit 1/16/2016    TIA (transient ischemic attack) 1/16/2016    Tobacco abuse 8/18/2016     Past Surgical History:   Procedure Laterality Date    HX ADENOIDECTOMY      HX BREAST LUMPECTOMY Right 2008    with lymph nodes    HX TONSILLECTOMY       Family History   Problem Relation Age of Onset    Stroke Mother     Cancer Father         Brain    HIV/AIDS Brother      Social History     Socioeconomic History    Marital status:      Spouse name: Not on file    Number of children: Not on file    Years of education: Not on file    Highest education level: Not on file   Occupational History    Not on file   Social Needs    Financial resource strain: Not on file    Food insecurity     Worry: Not on file     Inability: Not on file    Transportation needs     Medical: Not on file     Non-medical: Not on file   Tobacco Use    Smoking status: Current Some Day Smoker    Smokeless tobacco: Never Used    Tobacco comment: Only on pay day-1/2 pack   Substance and Sexual Activity    Alcohol use: No    Drug use: No    Sexual activity: Not on file   Lifestyle    Physical activity     Days per week: Not on file     Minutes per session: Not on file    Stress: Not on file   Relationships    Social connections     Talks on phone: Not on file     Gets together: Not on file     Attends [employer] service: Not on file     Active member of club or organization: Not on file     Attends meetings of clubs or organizations: Not on file     Relationship status: Not on file    Intimate partner violence     Fear of current or ex partner: Not on file     Emotionally abused: Not on file     Physically abused: Not on file     Forced sexual activity: Not on file   Other Topics Concern    Not on file   Social History Narrative    Not on file     Current Facility-Administered Medications   Medication Dose Route Frequency Provider Last Rate Last Admin    nystatin (MYCOSTATIN) 100,000 unit/mL oral suspension 500,000 Units  500,000 Units Oral QID Luis Osborn MD   500,000 Units at 05/15/21 1213    piperacillin-tazobactam (ZOSYN) 4.5 g in 0.9% sodium chloride (MBP/ADV) 100 mL MBP  4.5 g IntraVENous Q8H Esvin Mott MD 25 mL/hr at 05/15/21 0823 4.5 g at 05/15/21 0823    doxycycline (VIBRAMYCIN) 100 mg in 0.9% sodium chloride (MBP/ADV) 100 mL MBP  100 mg IntraVENous Q12H Esvin Mott  mL/hr at 05/15/21 0824 100 mg at 05/15/21 5680    lactated Ringers infusion  75 mL/hr IntraVENous CONTINUOUS Moris Jensen MD 75 mL/hr at 05/15/21 0823 75 mL/hr at 05/15/21 [address] [facility] [facility] [address] [facility] [address]   Other Rx Dosing/Monitoring Esvin Mott MD        folic acid (FOLVITE) tablet 1 mg  1 mg Oral DAILY Esvin Mott MD   1 mg at 05/15/21 0836    labetaloL (NORMODYNE;TRANDATE) injection 10 mg  10 mg IntraVENous Q4H PRN Moris Jensen MD        lidocaine 4 % patch 1 Patch  1 Patch TransDERmal Q24H Moris Jensen MD   1 Patch at 05/15/21 1517    hydrALAZINE (APRESOLINE) 20 mg/mL injection 10 mg  10 mg IntraVENous Q6H PRN Osbaldo Leigh MD   10 mg at 21 2335    lip protectant (BLISTEX) ointment 1 Each  1 Each Topical PRN Felix Leblanc MD   1 Each at 21 0057    0.9% sodium chloride infusion 250 mL  250 mL IntraVENous PRN Sam Davis DO        amiodarone (CORDARONE) tablet 200 mg  200 mg Oral DAILY Evelyn Encarnacion MD   200 mg at 05/15/21 0836    DULoxetine (CYMBALTA) capsule 60 mg  60 mg Oral DAILY Evelyn Encarnacion MD   60 mg at 05/15/21 0836    levothyroxine (SYNTHROID) tablet 25 mcg  25 mcg Oral ACB Evelyn Encarnacion MD   25 mcg at 05/15/21 8004    rosuvastatin (CRESTOR) tablet 10 mg  10 mg Oral QHS Evelyn Encarnacion MD   10 mg at 21 2205    sodium chloride (NS) flush 5-40 mL  5-40 mL IntraVENous Q8H Evelyn Encarnacion MD   5 mL at 05/15/21 1518    sodium chloride (NS) flush 5-40 mL  5-40 mL IntraVENous PRN Evelyn Encarnacion MD   10 mL at 21 0557    acetaminophen (TYLENOL) tablet 650 mg  650 mg Oral Q6H PRN Evelyn Encarnacion MD        Or   Chapo Jimenez acetaminophen (TYLENOL) suppository 650 mg  650 mg Rectal Q6H PRN Evelyn Encarnacion MD        polyethylene glycol (MIRALAX) packet 17 g  17 g Oral DAILY PRN Evelyn Encarnacion MD        promethazine (PHENERGAN) tablet 12.5 mg  12.5 mg Oral Q6H PRN Evelyn Encarnacion MD        Or    ondansetron (ZOFRAN) injection 4 mg  4 mg IntraVENous Q6H PRN Evelyn Encarnacion MD        pantoprazole (PROTONIX) 40 mg in 0.9% sodium chloride 10 mL injection  40 mg IntraVENous Q12H Evelyn Encarnacion MD   40 mg at 05/15/21 0844    0.9% sodium chloride infusion 250 mL  250 mL IntraVENous PRN Evelyn Encarnacion MD        diphenhydrAMINE (BENADRYL) capsule 25 mg  25 mg Oral Q6H PRN Evelyn Encarnacion MD        traMADoL Prabha Alexis) tablet 50 mg  50 mg Oral Q6H PRN Peg MD Rajani   50 mg at 21 7131       OBJECTIVE:  Patient Vitals for the past 8 hrs:   BP Temp Pulse Resp SpO2   05/15/21 1518 104/83 98 °F (36.7 °C) 89 18 98 %   05/15/21 1100 (!) 115/57 99.6 °F (37.6 °C) 87 18 98 %     Temp (24hrs), Av.4 °F (37.4 °C), Min:98 °F (36.7 °C), Max:100.5 °F (38.1 °C)    05/15 0701 - 05/15 1900  In: 360 [P.O.:360]  Out: -     Physical Exam:  Constitutional: Oriented to person, place, and time. Well-developed and well-nourished. HEENT:  Very hard of hearing. Normocephalic and atraumatic. Oropharynx is clear and moist.   Conjunctivae and EOM are normal. Pupils are equal, round, and reactive to light. No scleral icterus. Neck supple. No JVD present. No tracheal deviation present. No thyromegaly present. Lymph node   No palpable submandibular, cervical, supraclavicular, axillary and inguinal lymph nodes. Skin Warm and dry. No bruising and no rash noted. No erythema. No pallor. Respiratory Effort normal and breath sounds normal.  No respiratory distress. No wheezes. No rales. No tenderness. CVS Normal rate, regular rhythm and normal heart sounds. Exam reveals no gallop, no friction and no rub. No murmur heard. Abdomen Soft. Bowel sounds are normal. Exhibits no distension. There is no tenderness. There is no rebound and no guarding. Neuro Normal reflexes. No cranial nerve deficit. Exhibits normal muscle tone, 5 of 5 strength of all extremities. MSK Normal range of motion. No edema and no tenderness.    Psych Normal mood, affect, behavior, judgment and thought content      Labs:    Recent Labs     05/15/21  0437 05/14/21  1658 05/14/21  0705 05/13/21  0434 05/13/21  0434   WBC 0.3*  --  0.5*  --  0.7*   RBC 2.65*  --  2.96*  --  3.37*   HGB 8.0* 8.7* 9.1*   < > 10.1*   HCT 25.1* 27.3* 27.6*   < > 31.2*   MCV 94.7  --  93.2  --  92.6   MCH 30.2  --  30.7  --  30.0   MCHC 31.9  --  33.0  --  32.4   RDW 16.5*  --  17.1*  --  17.4*   PLT 88*  --  119*  --  157   GRANS  --   --  23*  --  41*   LYMPH  --   --  51*  --  39   MONOS  --   --  6  --  4   EOS  --   --  8*  --  12*   BASOS  --   --  2  --  1   IG  --   --  10*  --  3   DF  --   --  AUTOMATED  --  AUTOMATED   ANEU  --   --  0.1*  --  0.3*   ABL  -- --  0.3*  --  0.3*   ABM  --   --  0.0*  --  0.0*   JOSE G  --   --  0.0  --  0.1   ABB  --   --  0.0  --  0.0   AIG  --   --  0.1  --  0.0    < > = values in this interval not displayed. Recent Labs     05/15/21  0437 05/14/21  1658 05/14/21  0705 05/13/21  0434     --  140 139   K 3.8  --  4.3 4.2   *  --  111* 107   CO2 22  --  21 26   AGAP 8  --  8 6*   *  --  86 86   BUN 15  --  17 24*   CREA 1.55*  --  1.44* 2.23*   GFRAA 42*  --  45* 27*   GFRNA 34*  --  37* 23*   CA 7.9*  --  7.8* 8.2*   AP 65  --  65 84   TP 5.0* 4.7* 5.0* 6.2*   ALB 1.9*  --  1.9* 2.5*   GLOB 3.1  --  3.1 3.7*   AGRAT 0.6* PENDING 0.6* 0.7*       ASSESSMENT/RECOMMENDATION:    Principal Problem:    Anemia (5/12/2021)    Active Problems:    Acquired hypothyroidism (1/16/2016)      Hyperlipidemia (1/16/2016)      R[facility]umat[facility] ([facility] (1/16/2016)      Hypertension, essential, benign (8/18/2016)      Major depressive disorder, recurrent, moderate (HCC) (8/18/2016)      Leukopenia (5/12/2021)      NAZIA (acute kidney injury) ([facility] (5/12/2021)      Rib fracture (5/12/2021)      Febrile neutropenia (HCC) (5/15/2021)      Right upper lobe pneumonia (5/15/2021)    68 y.o. F with a recent diagnosis of A. fib and a started Eliquis since March 2021 with prolonged melena and severe anemia of hemoglobin 3.7, responded to blood transfusion, however the acute pancytopenia would not be typical for dilutional effect as such, will arrange systemic anemia work-up unrevealing and blood smear showed rouleaux, pursue a bone marrow biopsy 5/14/2021 to rule out blood malignancy, report pending, CBC slowly trends down persistently, clinically stable no questions answered, will follow. Thank you for allowing me to participate in the care of Ms. Marissa Olivarez. Darline Sarkar M.D.   [facility] [address]  Office : [phone]  Fax : [phone]

## 2021-05-15 NOTE — PROGRESS NOTES
Assessment completed and patient having right flank pain with level of 6 and will provide pain management. Patient remains on RA with lungs even/unlabored.   Bed alarm on with call light in reach and will continue to assess

## 2021-05-15 NOTE — PROGRESS NOTES
Patient resting in bed with no pain or distress noted. Patient remains on RA with lungs even/unlabored. Bed alarm on with call light in reach. Will prepare bedside shift report for oncoming nurse.

## 2021-05-15 NOTE — PROGRESS NOTES
Patient resting in bed with no pain at this time. Bed in lowest with bed alarm on and call light in reach.

## 2021-05-15 NOTE — PROGRESS NOTES
Tramadol 50 mg po given for right flank pain with pain level at 7. Will assess for effectiveness.  Bed alarm on with call light in reach

## 2021-05-15 NOTE — PROGRESS NOTES
ACUTE OT GOALS:  (Developed with and agreed upon by patient and/or caregiver.)    1. Patient will bathe and dress total body with modified independence and adaptive device as needed. 2. Patient will toilet with modified independence and adaptive device as needed. 3. Patient will tolerate 30 minutes of OT treatment with up to 2 rest breaks to increase activity tolerance for ADLs. 4. Patient will complete functional transfers with modified independence. 5. Patient will complete functional mobility for ADLs with modified independence. 6. Patient will demonstrate modified independence with therapeutic exercise HEP to increase strength in BUEs for increased safety and independence with functional tasks. Timeframe: 7 visits  OCCUPATIONAL THERAPY: Daily Note OT Treatment Day # 2    Mary Bautista is a 68 y.o. female   PRIMARY DIAGNOSIS: Anemia  Anemia [D64.9]       Payor: [employer] MEDICARE COMPLETE / Plan: Charles Ying / Product Type: Managed Care Medicare /   ASSESSMENT:     REHAB RECOMMENDATIONS: CURRENT LEVEL OF FUNCTION:  (Most Recently Demonstrated)   Recommendation to date pending progress:  [address] [place] [address] Therapy  Equipment:    Rolling Walker Bathing:   Not tested  Dressing:   Not tested  Feeding/Grooming:  Castellano Omero Standby Assistance  Toileting:   Standby Assistance  Functional Mobility:   Contact Guard Assistance     ASSESSMENT:  Ms. Radha Buck presents with decreased activity tolerance and generalized weakness. Pt participated in transfers and mobility with SBA/CGA. Pt fatigues really fast. Continue POC to address above goals. SUBJECTIVE:   Ms. Radha Buck states, \"How was your wedding. \"    SOCIAL HISTORY/LIVING ENVIRONMENT: Home Environment: Trailer/mobile home  # Steps to Enter: 5  One/Two Story Residence: One story  Living Alone: Yes  Support Systems: Friends \ neighbors, Hinduism / yao community    OBJECTIVE:     PAIN: VITAL SIGNS: LINES/DRAINS:   Pre Treatment: Pain Screen  Pain Scale 1: Numeric (0 - 10)  Pain Intensity 1: 0  Post Treatment: none   none  O2 Device: None (Room air)     ACTIVITIES OF DAILY LIVING: I Mod I S SBA CGA Min Mod Max Total NT Comments   BASIC ADLs:              Bathing/ Showering [] [] [] [] [] [] [] [] [] []    Toileting [] [] [] [] [] [] [] [] [] []    Dressing [] [] [] [] [] [] [] [] [] []    Feeding [] [] [] [] [] [] [] [] [] []    Grooming [] [] [] [] [] [] [] [] [] []    Personal Device Care [] [] [] [] [] [] [] [] [] []    Functional Mobility [] [] [] [] [] [] [] [] [] []    I=Independent, Mod I=Modified Independent, S=Supervision, SBA=Standby Assistance, CGA=Contact Guard Assistance,   Min=Minimal Assistance, Mod=Moderate Assistance, Max=Maximal Assistance, Total=Total Assistance, NT=Not Tested    MOBILITY: I Mod I S SBA CGA Min Mod Max Total  NT x2 Comments:   Supine to sit [] [] [] [] [] [] [] [] [] [] []    Sit to supine [] [] [] [] [] [] [] [] [] [] []    Sit to stand [] [] [] [] [] [] [] [] [] [] []    Bed to chair [] [] [] [] [] [] [] [] [] [] []    I=Independent, Mod I=Modified Independent, S=Supervision, SBA=Standby Assistance, CGA=Contact Guard Assistance,   Min=Minimal Assistance, Mod=Moderate Assistance, Max=Maximal Assistance, Total=Total Assistance, NT=Not Tested    BALANCE: Good Fair+ Fair Fair- Poor NT Comments   Sitting Static [] [] [] [] [] []    Sitting Dynamic [] [] [] [] [] []              Standing Static [] [] [] [] [] []    Standing Dynamic [] [] [] [] [] []      PLAN:   FREQUENCY/DURATION: OT Plan of Care: 3 times/week for duration of hospital stay or until stated goals are met, whichever comes first.    TREATMENT:   TREATMENT:   ($$ Self Care/Home Management: 8-22 mins$$ Therapeutic Activity: 8-22 mins   )  Therapeutic Activity (14 Minutes):  Therapeutic activity included Rolling, Supine to Sit, Scooting, Transfer Training, Ambulation on level ground, Sitting balance  and Standing balance to improve functional Mobility, Strength, ROM and Activity tolerance. Self Care (10 minutes Minutes): Self care including Toileting and Grooming to increase independence and decrease level of assistance required.     TREATMENT GRID:  N/A    AFTER TREATMENT POSITION/PRECAUTIONS:  Chair, Needs within reach and RN notified    INTERDISCIPLINARY COLLABORATION:  RN/PCT and OT/SCHROEDER    TOTAL TREATMENT DURATION:  OT Patient Time In/Time Out  Time In: 0698  Time Out: [address] [employer] Eulogio Acuna

## 2021-05-16 LAB
ALBUMIN SERPL-MCNC: 1.7 G/DL (ref 3.2–4.6)
ALBUMIN/GLOB SERPL: 0.5 {RATIO} (ref 1.2–3.5)
ALP SERPL-CCNC: 62 U/L (ref 50–136)
ALT SERPL-CCNC: 25 U/L (ref 12–65)
ANION GAP SERPL CALC-SCNC: 8 MMOL/L (ref 7–16)
AST SERPL-CCNC: 16 U/L (ref 15–37)
BASOPHILS # BLD: 0 K/UL (ref 0–0.2)
BASOPHILS NFR BLD: 0 % (ref 0–2)
BILIRUB SERPL-MCNC: 1 MG/DL (ref 0.2–1.1)
BUN SERPL-MCNC: 13 MG/DL (ref 8–23)
CALCIUM SERPL-MCNC: 7.8 MG/DL (ref 8.3–10.4)
CHLORIDE SERPL-SCNC: 105 MMOL/L (ref 98–107)
CO2 SERPL-SCNC: 23 MMOL/L (ref 21–32)
CREAT SERPL-MCNC: 1.44 MG/DL (ref 0.6–1)
DIFFERENTIAL METHOD BLD: ABNORMAL
EOSINOPHIL # BLD: 0.1 K/UL (ref 0–0.8)
EOSINOPHIL NFR BLD: 14 % (ref 0.5–7.8)
ERYTHROCYTE [DISTWIDTH] IN BLOOD BY AUTOMATED COUNT: 15.9 % (ref 11.9–14.6)
GLOBULIN SER CALC-MCNC: 3.1 G/DL (ref 2.3–3.5)
GLUCOSE BLD STRIP.AUTO-MCNC: 121 MG/DL (ref 65–100)
GLUCOSE SERPL-MCNC: 102 MG/DL (ref 65–100)
HCT VFR BLD AUTO: 22.6 % (ref 35.8–46.3)
HGB BLD-MCNC: 7.2 G/DL (ref 11.7–15.4)
IMM GRANULOCYTES # BLD AUTO: 0 K/UL (ref 0–0.5)
IMM GRANULOCYTES NFR BLD AUTO: 0 % (ref 0–5)
LYMPHOCYTES # BLD: 0.2 K/UL (ref 0.5–4.6)
LYMPHOCYTES NFR BLD: 50 % (ref 13–44)
MCH RBC QN AUTO: 30 PG (ref 26.1–32.9)
MCHC RBC AUTO-ENTMCNC: 31.9 G/DL (ref 31.4–35)
MCV RBC AUTO: 94.2 FL (ref 79.6–97.8)
MONOCYTES # BLD: 0 K/UL (ref 0.1–1.3)
MONOCYTES NFR BLD: 11 % (ref 4–12)
NEUTS SEG # BLD: 0.1 K/UL (ref 1.7–8.2)
NEUTS SEG NFR BLD: 25 % (ref 43–78)
NRBC # BLD: 0.03 K/UL (ref 0–0.2)
PLATELET # BLD AUTO: 41 K/UL (ref 150–450)
PLATELET COMMENTS,PCOM: ABNORMAL
PMV BLD AUTO: 11.1 FL (ref 9.4–12.3)
POTASSIUM SERPL-SCNC: 3.4 MMOL/L (ref 3.5–5.1)
PROT SERPL-MCNC: 4.8 G/DL (ref 6.3–8.2)
RBC # BLD AUTO: 2.4 M/UL (ref 4.05–5.2)
RBC MORPH BLD: ABNORMAL
RBC MORPH BLD: ABNORMAL
SERVICE CMNT-IMP: ABNORMAL
SODIUM SERPL-SCNC: 136 MMOL/L (ref 136–145)
VANCOMYCIN SERPL-MCNC: 12.3 UG/ML
WBC # BLD AUTO: 0.4 K/UL (ref 4.3–11.1)
WBC MORPH BLD: ABNORMAL

## 2021-05-16 PROCEDURE — 74011000258 HC RX REV CODE- 258: Performed by: INTERNAL MEDICINE

## 2021-05-16 PROCEDURE — C9113 INJ PANTOPRAZOLE SODIUM, VIA: HCPCS | Performed by: INTERNAL MEDICINE

## 2021-05-16 PROCEDURE — 99232 SBSQ HOSP IP/OBS MODERATE 35: CPT | Performed by: INTERNAL MEDICINE

## 2021-05-16 PROCEDURE — 2709999900 HC NON-CHARGEABLE SUPPLY

## 2021-05-16 PROCEDURE — 74011250637 HC RX REV CODE- 250/637: Performed by: FAMILY MEDICINE

## 2021-05-16 PROCEDURE — 85025 COMPLETE CBC W/AUTO DIFF WBC: CPT

## 2021-05-16 PROCEDURE — 74011250636 HC RX REV CODE- 250/636: Performed by: INTERNAL MEDICINE

## 2021-05-16 PROCEDURE — 74011000250 HC RX REV CODE- 250: Performed by: INTERNAL MEDICINE

## 2021-05-16 PROCEDURE — 74011250637 HC RX REV CODE- 250/637: Performed by: INTERNAL MEDICINE

## 2021-05-16 PROCEDURE — 65270000029 HC RM PRIVATE

## 2021-05-16 PROCEDURE — 80202 ASSAY OF VANCOMYCIN: CPT

## 2021-05-16 PROCEDURE — 82962 GLUCOSE BLOOD TEST: CPT

## 2021-05-16 PROCEDURE — 80053 COMPREHEN METABOLIC PANEL: CPT

## 2021-05-16 RX ORDER — UREA 10 %
1 LOTION (ML) TOPICAL DAILY
Status: DISCONTINUED | OUTPATIENT
Start: 2021-05-16 | End: 2021-05-24 | Stop reason: HOSPADM

## 2021-05-16 RX ADMIN — NYSTATIN 500000 UNITS: 100000 SUSPENSION ORAL at 10:35

## 2021-05-16 RX ADMIN — NYSTATIN 500000 UNITS: 100000 SUSPENSION ORAL at 17:40

## 2021-05-16 RX ADMIN — SODIUM CHLORIDE, SODIUM LACTATE, POTASSIUM CHLORIDE, AND CALCIUM CHLORIDE 75 ML/HR: 600; 310; 30; 20 INJECTION, SOLUTION INTRAVENOUS at 11:05

## 2021-05-16 RX ADMIN — DOXYCYCLINE 100 MG: 100 INJECTION, POWDER, LYOPHILIZED, FOR SOLUTION INTRAVENOUS at 11:08

## 2021-05-16 RX ADMIN — AMIODARONE HYDROCHLORIDE 200 MG: 200 TABLET ORAL at 10:34

## 2021-05-16 RX ADMIN — PIPERACILLIN AND TAZOBACTAM 4.5 G: 4; .5 INJECTION, POWDER, FOR SOLUTION INTRAVENOUS at 00:00

## 2021-05-16 RX ADMIN — PANTOPRAZOLE SODIUM 40 MG: 40 INJECTION, POWDER, FOR SOLUTION INTRAVENOUS at 10:36

## 2021-05-16 RX ADMIN — VANCOMYCIN HYDROCHLORIDE 500 MG: 500 INJECTION, POWDER, LYOPHILIZED, FOR SOLUTION INTRAVENOUS at 14:10

## 2021-05-16 RX ADMIN — SODIUM CHLORIDE, SODIUM LACTATE, POTASSIUM CHLORIDE, AND CALCIUM CHLORIDE 75 ML/HR: 600; 310; 30; 20 INJECTION, SOLUTION INTRAVENOUS at 01:52

## 2021-05-16 RX ADMIN — DULOXETINE HYDROCHLORIDE 60 MG: 60 CAPSULE, DELAYED RELEASE ORAL at 10:34

## 2021-05-16 RX ADMIN — NYSTATIN 500000 UNITS: 100000 SUSPENSION ORAL at 22:20

## 2021-05-16 RX ADMIN — DOXYCYCLINE 100 MG: 100 INJECTION, POWDER, LYOPHILIZED, FOR SOLUTION INTRAVENOUS at 22:05

## 2021-05-16 RX ADMIN — ROSUVASTATIN CALCIUM 10 MG: 5 TABLET, FILM COATED ORAL at 22:04

## 2021-05-16 RX ADMIN — SODIUM CHLORIDE 10 ML: 9 INJECTION, SOLUTION INTRAMUSCULAR; INTRAVENOUS; SUBCUTANEOUS at 06:18

## 2021-05-16 RX ADMIN — NYSTATIN 500000 UNITS: 100000 SUSPENSION ORAL at 14:10

## 2021-05-16 RX ADMIN — LEVOTHYROXINE SODIUM 25 MCG: 0.05 TABLET ORAL at 06:19

## 2021-05-16 RX ADMIN — FOLIC ACID 1 MG: 1 TABLET ORAL at 10:34

## 2021-05-16 RX ADMIN — SODIUM CHLORIDE 5 ML: 9 INJECTION, SOLUTION INTRAMUSCULAR; INTRAVENOUS; SUBCUTANEOUS at 13:57

## 2021-05-16 RX ADMIN — LACTOBACILLUS TAB 1 TABLET: TAB at 17:40

## 2021-05-16 RX ADMIN — PIPERACILLIN AND TAZOBACTAM 4.5 G: 4; .5 INJECTION, POWDER, FOR SOLUTION INTRAVENOUS at 11:07

## 2021-05-16 RX ADMIN — PIPERACILLIN AND TAZOBACTAM 4.5 G: 4; .5 INJECTION, POWDER, FOR SOLUTION INTRAVENOUS at 18:00

## 2021-05-16 RX ADMIN — SODIUM CHLORIDE, SODIUM LACTATE, POTASSIUM CHLORIDE, AND CALCIUM CHLORIDE 75 ML/HR: 600; 310; 30; 20 INJECTION, SOLUTION INTRAVENOUS at 23:28

## 2021-05-16 RX ADMIN — PANTOPRAZOLE SODIUM 40 MG: 40 INJECTION, POWDER, FOR SOLUTION INTRAVENOUS at 22:05

## 2021-05-16 RX ADMIN — SODIUM CHLORIDE 10 ML: 9 INJECTION, SOLUTION INTRAMUSCULAR; INTRAVENOUS; SUBCUTANEOUS at 22:06

## 2021-05-16 NOTE — PROGRESS NOTES
Reviewed notes for special concerns   noted bone biopsy done  Will continue to follow family closely

## 2021-05-16 NOTE — PROGRESS NOTES
Name:  Ronda Martínez  Age:80 y.o. Sex:female   :  1940    MRN:  [id number]   PCP:  Ashly Crews MD      Admit Date:  2021 10:41 AM   Chief Complaint: falls    Reason for Admission:   Anemia [D64.9]    Assessment & Plan:     Fever in setting of leukopenia/RUL pneumonia: We will treat patient has febrile neutropenia. Started patient on vancomycin, Zosyn and doxycycline to cover a MRSA, Pseudomonas and atypical infection. Panculture ordered. Slow IV hydration. Will monitor. X-ray showed right upper lobe pneumonia. May 16: Still have some fever but patient looking and feeling much better. Continue current antibiotics. Acute anemia  -Significant drop in hemoglobin and WBC in last 1 month. Status post 1 unit PRBC. Holding anticoagulation and antiplatelet therapy. Gastroenterology and hematology on board. Work-up for anemia and leukopenia ordered. Appreciate subspecialty recommendation. Neutropenic precaution and fall precautions. May 14: Hemoglobin stable. May 15: No active bleeding. Will monitor. On clear liquid diet. GI on board. May 16: Denies any active bleeding. Trending hemoglobin. Leukopenia  In setting of anemia with teardrop cells and basophilic stippling seen on smear  -As patient is on methotrexate at home so started patient on folic acid. Appreciate hematology-recommendation.  -We will check procalcitonin and blood cultures. May 14: Procalcitonin negative. Follow-up culture. Appreciate hematology recommendation. Plan for bone marrow biopsy today. NAZIA on CKD 3  Baseline Cr ~1.6-1.7. Cr up to 2.61 on adm. avoid nephrotoxic agent. Will monitor. Patient has congenital agenesis of left kidney. May 14: Creatinine 1.4. Will monitor. Mild transaminitis  -improving. May 14: Resolved.       Rib fractures  R flank hematoma  - IS  - pain control    Frequent falls  Pt reports long standing falls related to medications per her report.  - PT/OT    Afib  - hold Eliquis  - cont Amio    RA  - MTX every Sunday    MDD  - cont home meds    Disposition/Expected LOS: > 2 midnights, pt previously living at home alone, PPD, PT/OT  Diet: DIET NUTRITIONAL SUPPLEMENTS  DIET CLEAR LIQUID  VTE ppx: SCDs given anemia and GI bleed. GI ppx: PPI BID  Code status: full code  Surrogate decision-maker: friend Frida Rodríguez    patient is AOx3. I have updated patient's power of  Frida Rodríguez yesterday in detail. Today I offered but patient would like to talk to Frida Rodríguez by herself. I encouraged her to ask the nurse to notify me if she or her POA is any questions. Patient verbalized understanding that her condition meditated in spite of treatment. History of Presenting Illness:     Juanis Bobo is a 68 y.o. female with medical history of recently dx'd Afib (on Eliquis), CKD3, RA, h/o breast CA 2008, hypothyroidism who presented to ED with falls, weakness, nausea. Pt reports not feeling well since she was discharged March 2021 after being diagnosed with Afib and started on Eliquis. She endorses increased fatigue, generalized weakness. Endorses frequent falls even prior to last hospitalization which has continued, with a fall approx 1-2 weeks ago, landing on R side and bruising R flank and face. States stools have been \"dark\" since she was started on \"that new medicine\" which I believe is the Eliquis. She also reports nausea with occasional emesis, nonbloody, for about 1 week. Decreased appetite, keeping down liquids but very little food. Denies weight loss. She ultimately saw her PCP today who directed her to the ED for further evaluation. In ED, VSS, afebrile. Hgb 3.7, confirmed on repeat. WBC 1.1, Cr 2.61. She was heme + in ED. She was transfused 1 unit pRBCs. XR revealed R 5,6,7 rib fracture. Cased discussed with Dr. Janice Nageotte. May 13: As per patient she is feeling all right. Denies any pain anywhere.   Denies any shortness of breath, palpitations. She is feeling generalized weakness. May 14: Patient is AOx3. As per patient she is feeling better and wants to know what test she will get it done today. As any chest pain, shortness of breath, nausea or vomiting. May 15: Patient had fever overnight. Patient denies any chest pain, shortness of breath. Denies any nausea or vomiting. I have started patient on vancomycin, Zosyn and azithromycin given fever in setting of neutropenia and also panculture ordered. May 16: Patient looking much better. As per patient she is feeling better. Denies any shortness of breath, chest pain, nausea or vomiting. Complaining of weakness which is ongoing for a long time. Review of Systems:  A 14 point review of systems was taken and pertinent positive as per HPI.         Past Medical History:   Diagnosis Date    Acquired hypothyroidism 1/16/2016    Breast cancer ([facility] 2008    Depression 8/18/2016    Endocrine disease     hypothyroid    Fungal dermatitis 8/18/2016    Hyperlipidemia 1/16/2016    Hypertension, essential, benign 8/18/2016    Hypokalemia 8/18/2016    Inflamed sebaceous cyst 8/18/2016    Major depressive disorder, recurrent, moderate ([address] 8/18/2016    Nicotine dependence, cigarettes, uncomplicated 7/14/6064    Osteopenia 8/18/2016    Osteoporosis 8/18/2016    Radiation therapy complication 4230    Rheumatoid arthritis ([facility] [address] 1/16/2016    Right carotid bruit 1/16/2016    TIA (transient ischemic attack) 1/16/2016    Tobacco abuse 8/18/2016       Past Surgical History:   Procedure Laterality Date    HX ADENOIDECTOMY      HX BREAST LUMPECTOMY Right 2008    with lymph nodes    HX TONSILLECTOMY         Family History : reviewed  Family History   Problem Relation Age of Onset    Stroke Mother     Cancer Father         Brain    HIV/AIDS Brother         Social History     Tobacco Use    Smoking status: Current Some Day Smoker    Smokeless tobacco: Never Used    Tobacco comment: Only on pay day-1/2 pack   Substance Use Topics    Alcohol use: No       No Known Allergies    Immunization History   Administered Date(s) Administered    Influenza High Dose Vaccine PF 10/28/2015, 09/21/2016, 09/15/2017    Pneumococcal Polysaccharide (PPSV-23) 01/18/2016    TB Skin Test (PPD) Intradermal 04/22/2019, 05/12/2021         PTA Medications:  Current Outpatient Medications   Medication Instructions    amiodarone (CORDARONE) 200 mg, Oral, DAILY    apixaban (ELIQUIS) 5 mg, Oral, 2 TIMES DAILY    cholecalciferol (VITAMIN D3) 1,000 Units, Oral, DAILY    diclofenac EC (VOLTAREN) 75 mg, Oral, 2 TIMES DAILY    DULoxetine (CYMBALTA) 60 mg, Oral, DAILY    famotidine (PEPCID) 20 mg, Oral, DAILY    folic acid (FOLVITE) 1 mg, Oral, DAILY    levothyroxine (SYNTHROID) 25 mcg, Oral, DAILY BEFORE BREAKFAST    methotrexate (RHEUMATREX) 2.5 mg tablet 5 tabs every Sunday    ondansetron (ZOFRAN ODT) 4 mg, Oral, EVERY 8 HOURS AS NEEDED    predniSONE (DELTASONE) 30 mg, Oral, DAILY WITH BREAKFAST    rosuvastatin (CRESTOR) 10 mg, Oral, EVERY BEDTIME    spironolactone (ALDACTONE) 25 mg, Oral, DAILY       Objective:     Patient Vitals for the past 24 hrs:   Temp Pulse Resp BP SpO2   05/16/21 0721 100.3 °F (37.9 °C) 91 16 134/63 96 %   05/16/21 0343 (!) 100.7 °F (38.2 °C) 92 18 132/68 98 %   05/15/21 2303 99.3 °F (37.4 °C) 87 18 122/63 95 %   05/15/21 1951 99.4 °F (37.4 °C) 89 18 132/63 97 %   05/15/21 1518 98 °F (36.7 °C) 89 18 104/83 98 %   05/15/21 1100 99.6 °F (37.6 °C) 87 18 (!) 115/57 98 %       Oxygen Therapy  O2 Sat (%): 96 % (05/16/21 0721)  Pulse via Oximetry: 81 beats per minute (05/14/21 1620)  O2 Device: None (Room air) (05/15/21 0754)  Skin Assessment: Clean, dry, & intact (05/14/21 2928)  Skin Protection for O2 Device: No (05/14/21 7345)  O2 Flow Rate (L/min): 3 l/min (05/14/21 0252)    Body mass index is 29.69 kg/m².     Physical Exam:    General:  No acute distress, speaking in full sentences,   HEENT:  EOMI, oropharynx is clear, resolving hematoma to R eye  Neck:   Supple, no lymphadenopathy, no JVD   Lungs:  Clear to auscultation bilaterally with crackles right upper lobe. CV:   Distant HS, Regular rate and rhythm with normal S1 and S2   Abdomen:  Soft, nontender, nondistended, normoactive bowel sounds   Extremities:  Trace edema to BL LE, chronic per pt  Skin:  Hematoma face and abdomen/back  Neuro:  AOx3, generalized weakness, moving all 4 extremities, speech normal  Psych:  Normal mood and affect       Data Reviewed: I have reviewed all labs, meds, and studies.       Recent Results (from the past 24 hour(s))   PROCALCITONIN    Collection Time: 05/15/21  9:52 PM   Result Value Ref Range    Procalcitonin 0.47 ng/mL   GLUCOSE, POC    Collection Time: 05/16/21  6:00 AM   Result Value Ref Range    Glucose (POC) 121 (H) 65 - 100 mg/dL    [facility] by [facility]        EKG Results     Procedure 720 Value Units Date/Time    EKG [[id number]] Collected: 05/12/21 1032    Order Status: Completed Updated: 05/12/21 1333     Ventricular Rate 78 BPM      Atrial Rate 78 BPM      P-R Interval 166 ms      QRS Duration 132 ms      Q-T Interval 422 ms      QTC Calculation (Bezet) 481 ms      Calculated P Axis 63 degrees      Calculated R Axis 76 degrees      Calculated T Axis 79 degrees      Diagnosis --     Normal sinus rhythm  Right bundle branch block  T wave abnormality, consider inferolateral ischemia  Abnormal ECG  When compared with ECG of 08-APR-2021 15:29,  Right bundle branch block is now Present  Confirmed by [facility]  MD ()KATIA ([id number]) on 5/12/2021 1:32:48 PM            All Micro Results     Procedure Component Value Units Date/Time    CULTURE, BLOOD [[id number]] Collected: 05/15/21 0756    Order Status: Completed Specimen: Blood Updated: 05/16/21 0705     Special Requests: --        LEFT  HAND       Culture result: NO GROWTH AFTER 20 HOURS       CULTURE, BLOOD [[id number]] Collected: 05/15/21 2612    Order Status: Completed Specimen: Blood Updated: 05/16/21 0705     Special Requests: --        LEFT  HAND       Culture result: NO GROWTH AFTER 20 HOURS       CULTURE, BLOOD [[id number]] Collected: 05/13/21 1613    Order Status: Completed Specimen: Blood Updated: 05/16/21 0705     Special Requests: --        LEFT  Antecubital       Culture result: NO GROWTH 3 DAYS       CULTURE, BLOOD [[id number]] Collected: 05/13/21 1616    Order Status: Completed Specimen: Blood Updated: 05/16/21 0705     Special Requests: --        LEFT  HAND       Culture result: NO GROWTH 3 DAYS       CULTURE, URINE [[id number]]     Order Status: Sent Specimen: Urine from Clean catch           Other Studies:  No results found.       Medications:  Medications Administered      Medications Administered     pantoprazole (PROTONIX) 40 mg in 0.9% sodium chloride 10 mL injection     Admin Date  05/12/2021 Action  Given Dose  40 mg Route  IntraVENous Administered By  Brie Kong RN          sodium chloride 0.9 % bolus infusion 500 mL     Admin Date  05/12/2021 Action  New Bag Dose  500 mL Rate  500 mL/hr Route  IntraVENous Administered By  Brie Kong RN                      Problem List:     Hospital Problems as of 5/16/2021 Date Reviewed: 4/13/2021          Codes Class Noted - Resolved POA    Febrile neutropenia ([facility] ICD-10-CM: D70.9, R50.81  ICD-9-CM: 288.00, 780.61  5/15/2021 - Present Unknown        Right upper lobe pneumonia ICD-10-CM: J18.9  ICD-9-CM: 186  5/15/2021 - Present Unknown        * (Principal) Anemia ICD-10-CM: D64.9  ICD-9-CM: 285.9  5/12/2021 - Present Unknown        Leukopenia ICD-10-CM: D72.819  ICD-9-CM: 288.50  5/12/2021 - Present Unknown        NAZIA (acute kidney injury) ([facility] ICD-10-CM: N17.9  ICD-9-CM: 584.9  5/12/2021 - Present Unknown        Rib fracture ICD-10-CM: S22.39XA  ICD-9-CM: 807.00  5/12/2021 - Present Unknown        Hypertension, essential, benign ICD-10-CM: I10  ICD-9-CM: 401.1  8/18/2016 - Present Yes        Major depressive disorder, recurrent, moderate (HCC) ICD-10-CM: F33.1  ICD-9-CM: 296.32  8/18/2016 - Present Yes        Acquired hypothyroidism ICD-10-CM: E03.9  ICD-9-CM: 244.9  1/16/2016 - Present Yes        Hyperlipidemia ICD-10-CM: E78.5  ICD-9-CM: 272.4  1/16/2016 - Present Yes        Rheumatoid arthritis ([facility] ICD-10-CM: M06.9  ICD-9-CM: 714.0  1/16/2016 - Present Yes                 Signed By: Ira Hood MD   [employer] [facility]    May 16, 2021

## 2021-05-16 NOTE — PROGRESS NOTES
Pharmacokinetic Consult to Pharmacist    Batsheva Wright is a 68 y.o. female being treated with Vancomycin. Height: 5' (152.4 cm)  Weight: 68.9 kg (152 lb)  Lab Results   Component Value Date/Time    BUN 13 05/16/2021 10:54 AM    Creatinine 1.44 (H) 05/16/2021 10:54 AM    WBC 0.4 (LL) 05/16/2021 10:54 AM    Procalcitonin 0.47 05/15/2021 09:52 PM    Lactic acid 2.0 03/19/2021 03:30 PM    Lactic Acid (POC) 2.89 (H) 04/22/2019 12:34 PM      Estimated Creatinine Clearance: 27.9 mL/min (A) (based on SCr of 1.44 mg/dL (H)). Lab Results   Component Value Date/Time    Vancomycin, random 12.3 05/16/2021 10:54 AM           Day 2 of Vancomycin. Goal Trough 15-20. Patient with therapeutic random ~26h post load. Will order dose of 500mg IV x1 today. Will continue to follow patient and order levels when clinically indicated.     Thanks,   Neisha Nolan, PharmD  PGY1 Pharmacy Resident  [phone]

## 2021-05-16 NOTE — PROGRESS NOTES
Resting quietly at present. NAD noted. Safety maintained through out the shift. Appetite fair. Remains on CLD. Bouts of nausea at interval. No emesis noted. Loose stools X3 during this. Probiotics started per new order per Dr. Shaun Renteria. To report off to on coming nurse.

## 2021-05-16 NOTE — PROGRESS NOTES
NOTED:    NO FRANCINE PREFERENCE    FULL CODE    ACP ON FILE    MAJOR DEPRESSION    MEDIUM RISK FOR DETERIORATION THIS ADMISSION    HIGH RISK FOR READMISSION X 12 MONTHS    MEDIUM RISK FOR EOL  X  12 MONTHS      Will continue to assess how to best serve this family

## 2021-05-16 NOTE — PROGRESS NOTES
[facility] Hematology & Oncology: In Patient Hematology / Oncology Progress Note    Reason for Consult: Acute pancytopenia acute pancytopenia  Referring Physician:  Ted Rubio MD    History of Present Illness:  21-year-old female admitted on 5/12/2021 for upper GI bleeding and acute anemia. Her past medical history include recently diagnosed atrial fibrillation and started Eliquis in March 2021, chronic renal insufficiency stage III, rheumatoid arthritis with weekly methotrexate, remote history of breast cancer in 2008 that was reportedly treated with surgery and radiation but no systemic therapy, hypothyroidism. She presented to ER with falls, weakness, nausea, reportedly had not been feeling well since she was discharged in March 2021 on Eliquis. She reports dark stools since starting Eliquis and saw PCP then directed to the ER to be admitted for hemoglobin of 3.7, WBC 1.1, platelet 905 nonetheless all had dramatic drop from a month ago, x-ray showed right rib fracture of the 5, 6 and 7. CBC reported occasional teardrop cells and basophilic stippling. Hematology is consulted. 24 hours event: Received a bone marrow biopsy, CBC still trends down      Review of Systems:  Constitutional  weak. Denies fever or chills. Denies weight loss or appetite changes. HEENT  very hard of hearing. Denies trauma, bluring vision, ear pain, nosebleeds, sore throat, neck pain and ear discharge. Skin Denies lesions or rashes. Lungs Denies shortness of breath, cough, sputum production or hemoptysis. Cardiovascular Denies chest pain, palpitations, orthopnea, claudication and leg swelling. Gastrointestinal  dark stool. Denies nausea, vomiting, bowel changes. Denies abdominal pain.  Denies dysuria, frequency or hesitancy of urination   Neuro Denies headaches, visual changes or ataxia. Denies dizziness, tingling, tremors, sensory change, speech change, focal weakness and headaches.      Hematology Denies nasal/gum bleeding, denies easy bruise   Endo Denies heat/cold intolerance, denies diabetes. MSK Denies back pain, swollen legs, myalgias and falls. Psychiatric/Behavioral Denies depression and substance abuse. The patient is not nervous/anxious.        No Known Allergies  Past Medical History:   Diagnosis Date    Acquired hypothyroidism 1/16/2016    Breast cancer ([facility] 2008    Depression 8/18/2016    Endocrine disease     hypothyroid    Fungal dermatitis 8/18/2016    Hyperlipidemia 1/16/2016    Hypertension, essential, benign 8/18/2016    Hypokalemia 8/18/2016    Inflamed sebaceous cyst 8/18/2016    Major depressive disorder, recurrent, moderate ([facility] 8/18/2016    Nicotine dependence, cigarettes, uncomplicated 1/77/8603    Osteopenia 8/18/2016    Osteoporosis 8/18/2016    Radiation therapy complication 7935    [facility] 1/16/2016    Right carotid bruit 1/16/2016    TIA (transient ischemic attack) 1/16/2016    Tobacco abuse 8/18/2016     Past Surgical History:   Procedure Laterality Date    HX ADENOIDECTOMY      HX BREAST LUMPECTOMY Right 2008    with lymph nodes    HX TONSILLECTOMY       Family History   Problem Relation Age of Onset    Stroke Mother     Cancer Father         Brain    HIV/AIDS Brother      Social History     Socioeconomic History    Marital status:      Spouse name: Not on file    Number of children: Not on file    Years of education: Not on file    Highest education level: Not on file   Occupational History    Not on file   Social Needs    Financial resource strain: Not on file    Food insecurity     Worry: Not on file     Inability: Not on file    Transportation needs     Medical: Not on file     Non-medical: Not on file   Tobacco Use    Smoking status: Current Some Day Smoker    Smokeless tobacco: Never Used    Tobacco comment: Only on pay day-1/2 pack   Substance and Sexual Activity    Alcohol use: No    Drug use: No    Sexual activity: Not on file   Lifestyle    Physical activity     Days per week: Not on file     Minutes per session: Not on file    Stress: Not on file   Relationships    Social connections     Talks on phone: Not on file     Gets together: Not on file     Attends Restoration service: Not on file     Active member of club or organization: Not on file     Attends meetings of clubs or organizations: Not on file     Relationship status: Not on file    Intimate partner violence     Fear of current or ex partner: Not on file     Emotionally abused: Not on file     Physically abused: Not on file     Forced sexual activity: Not on file   Other Topics Concern    Not on file   Social History Narrative    Not on file     Current Facility-Administered Medications   Medication Dose Route Frequency Provider Last Rate Last Admin    vancomycin (VANCOCIN) 500 mg in 0.9% sodium chloride (MBP/ADV) 100 mL MBP  500 mg IntraVENous NOW Karine Rosenthal MD        nystatin (MYCOSTATIN) 100,000 unit/mL oral suspension 500,000 Units  500,000 Units Oral QID Roxy Khan MD   500,000 Units at 05/16/21 1035    piperacillin-tazobactam (ZOSYN) 4.5 g in 0.9% sodium chloride (MBP/ADV) 100 mL MBP  4.5 g IntraVENous Q8H Karine Rosenthal MD 25 mL/hr at 05/16/21 1107 4.5 g at 05/16/21 1107    doxycycline (VIBRAMYCIN) 100 mg in 0.9% sodium chloride (MBP/ADV) 100 mL MBP  100 mg IntraVENous Q12H Moris Simms  mL/hr at 05/16/21 1108 100 mg at 05/16/21 1108    lactated Ringers infusion  75 mL/hr IntraVENous CONTINUOUS Moris Blanchard MD 75 mL/hr at 05/16/21 1105 75 mL/hr at 05/16/21 1015 Dimple Sanders   Other Rx Dosing/Monitoring Karine Rosenthal MD        folic acid (FOLVITE) tablet 1 mg  1 mg Oral DAILY Karine Rosenthal MD   1 mg at 05/16/21 1034    labetaloL (NORMODYNE;TRANDATE) injection 10 mg  10 mg IntraVENous Q4H PRN Moris Blanchard MD        lidocaine 4 % patch 1 Patch  1 Patch TransDERmal Q24H Karine Rosenthal MD   1 Patch at 05/15/21 1517    hydrALAZINE (APRESOLINE) 20 mg/mL injection 10 mg  10 mg IntraVENous Q6H PRN Shelly Price MD   10 mg at 05/13/21 2335    lip protectant (BLISTEX) ointment 1 Each  1 Each Topical PRN Shelly Price MD   1 Each at 05/14/21 0057    0.9% sodium chloride infusion 250 mL  250 mL IntraVENous PRN Sam Davis DO        amiodarone (CORDARONE) tablet 200 mg  200 mg Oral DAILY Apple, Leonora Harada, MD   200 mg at 05/16/21 1034    DULoxetine (CYMBALTA) capsule 60 mg  60 mg Oral DAILY Apple, Leonora Harada, MD   60 mg at 05/16/21 1034    levothyroxine (SYNTHROID) tablet 25 mcg  25 mcg Oral ACB Apple, Leonora Harada, MD   25 mcg at 05/16/21 2064    rosuvastatin (CRESTOR) tablet 10 mg  10 mg Oral QHS Apple, Leonora Harada, MD   10 mg at 05/15/21 2200    sodium chloride (NS) flush 5-40 mL  5-40 mL IntraVENous Q8H Apple, Leonora Harada, MD   10 mL at 05/16/21 0618    sodium chloride (NS) flush 5-40 mL  5-40 mL IntraVENous PRN Apple, Leonora Harada, MD   10 mL at 05/14/21 0557    acetaminophen (TYLENOL) tablet 650 mg  650 mg Oral Q6H PRN Apple, Leonora Harada, MD        Or   Malu Bustamante acetaminophen (TYLENOL) suppository 650 mg  650 mg Rectal Q6H PRN Apple, Leonora Harada, MD        polyethylene glycol (MIRALAX) packet 17 g  17 g Oral DAILY PRN Apple, Leonora Harada, MD        promethazine (PHENERGAN) tablet 12.5 mg  12.5 mg Oral Q6H PRN Apple, Leonora Harada, MD        Or    ondansetron (ZOFRAN) injection 4 mg  4 mg IntraVENous Q6H PRN Apple, Leonora Harada, MD        pantoprazole (PROTONIX) 40 mg in 0.9% sodium chloride 10 mL injection  40 mg IntraVENous Q12H Apple, Leonora Harada, MD   40 mg at 05/16/21 1036    0.9% sodium chloride infusion 250 mL  250 mL IntraVENous PRN Apple, Leonora Harada, MD        diphenhydrAMINE (BENADRYL) capsule 25 mg  25 mg Oral Q6H PRN Apple, Leonora Harada, MD        traMADoL Ova Dawes) tablet 50 mg  50 mg Oral Q6H PRN Alexys Daniel MD   50 mg at 05/14/21 2206       OBJECTIVE:  Patient Vitals for the past 8 hrs:   BP Temp Pulse Resp SpO2   05/16/21 1118 (!) 132/59 99.6 °F (37.6 °C) 87 16 95 %   21 0721 134/63 100.3 °F (37.9 °C) 91 16 96 %     Temp (24hrs), Av.6 °F (37.6 °C), Min:98 °F (36.7 °C), Max:100.7 °F (38.2 °C)     07 -  1900  In: 240 [P.O.:240]  Out: 300 [Urine:300]    Physical Exam:  Constitutional: Oriented to person, place, and time. Well-developed and well-nourished. HEENT:  Very hard of hearing. Normocephalic and atraumatic. Oropharynx is clear and moist.   Conjunctivae and EOM are normal. Pupils are equal, round, and reactive to light. No scleral icterus. Neck supple. No JVD present. No tracheal deviation present. No thyromegaly present. Lymph node   No palpable submandibular, cervical, supraclavicular, axillary and inguinal lymph nodes. Skin Warm and dry. No bruising and no rash noted. No erythema. No pallor. Respiratory Effort normal and breath sounds normal.  No respiratory distress. No wheezes. No rales. No tenderness. CVS Normal rate, regular rhythm and normal heart sounds. Exam reveals no gallop, no friction and no rub. No murmur heard. Abdomen Soft. Bowel sounds are normal. Exhibits no distension. There is no tenderness. There is no rebound and no guarding. Neuro Normal reflexes. No cranial nerve deficit. Exhibits normal muscle tone, 5 of 5 strength of all extremities. MSK Normal range of motion. No edema and no tenderness.    Psych Normal mood, affect, behavior, judgment and thought content      Labs:    Recent Labs     21  1054 05/15/21  0437 21  1658 21  0705   WBC 0.4* 0.3*  --  0.5*   RBC 2.40* 2.65*  --  2.96*   HGB 7.2* 8.0* 8.7* 9.1*   HCT 22.6* 25.1* 27.3* 27.6*   MCV 94.2 94.7  --  93.2   MCH 30.0 30.2  --  30.7   MCHC 31.9 31.9  --  33.0   RDW 15.9* 16.5*  --  17.1*   PLT 41* 88*  --  119*   GRANS 25*  --   --  23*   LYMPH 50*  --   --  51*   MONOS 11  --   --  6   EOS 14*  --   --  8*   BASOS 0  --   --  2   IG 0  --   --  10*   DF AUTOMATED  --   -- AUTOMATED   ANEU 0.1*  --   --  0.1*   ABL 0.2*  --   --  0.3*   ABM 0.0*  --   --  0.0*   JOSE G 0.1  --   --  0.0   ABB 0.0  --   --  0.0   AIG 0.0  --   --  0.1      Recent Labs     05/16/21  1054 05/15/21  0437 05/14/21  1658 05/14/21  0705    139  --  140   K 3.4* 3.8  --  4.3    109*  --  111*   CO2 23 22  --  21   AGAP 8 8  --  8   * 131*  --  86   BUN 13 15  --  17   CREA 1.44* 1.55*  --  1.44*   GFRAA 45* 42*  --  45*   GFRNA 37* 34*  --  37*   CA 7.8* 7.9*  --  7.8*   AP 62 65  --  65   TP 4.8* 5.0* 4.7* 5.0*   ALB 1.7* 1.9*  --  1.9*   GLOB 3.1 3.1  --  3.1   AGRAT 0.5* 0.6* PENDING 0.6*       ASSESSMENT/RECOMMENDATION:    Principal Problem:    Anemia (5/12/2021)    Active Problems:    Acquired hypothyroidism (1/16/2016)      Hyperlipidemia (1/16/2016)      Rheumatoid arthritis ([facility] (1/16/2016)      Hypertension, essential, benign (8/18/2016)      Major depressive disorder, recurrent, moderate (HCC) (8/18/2016)      Leukopenia (5/12/2021)      NAZIA (acute kidney injury) ([facility] (5/12/2021)      Rib fracture (5/12/2021)      Febrile neutropenia (HCC) (5/15/2021)      Right upper lobe pneumonia (5/15/2021)    68 y.o. F with a recent diagnosis of A. fib and a started Eliquis since March 2021 with prolonged melena and severe anemia of hemoglobin 3.7, responded to blood transfusion, however the acute pancytopenia would not be typical for dilutional effect as such, will arrange systemic anemia work-up unrevealing and blood smear showed rouleaux, pursue a bone marrow biopsy 5/14/2021 to rule out blood malignancy, report pending, CBC still trending down persistently, clinically stable, probably need blood transfusion tomorrow, all questions answered, will follow. Thank you for allowing me to participate in the care of Ms. Stephanie Ortiz. Gregory Heard M.D.   [place] [address]  Office : [phone]  Fax : [phone]

## 2021-05-17 LAB
ALBUMIN SERPL ELPH-MCNC: 2.16 G/DL (ref 3.2–5.6)
ALBUMIN/GLOB SERPL: 0.9 {RATIO}
ALPHA1 GLOB SERPL ELPH-MCNC: 0.54 G/DL (ref 0.1–0.4)
ALPHA2 GLOB SERPL ELPH-MCNC: 0.83 G/DL (ref 0.4–1.2)
ANION GAP SERPL CALC-SCNC: 8 MMOL/L (ref 7–16)
APPEARANCE UR: ABNORMAL
B-GLOBULIN SERPL QL ELPH: 0.76 G/DL (ref 0.6–1.3)
BACTERIA URNS QL MICRO: ABNORMAL /HPF
BASOPHILS # BLD: 0 K/UL (ref 0–0.2)
BASOPHILS NFR BLD: 0 % (ref 0–2)
BILIRUB UR QL: NEGATIVE
BUN SERPL-MCNC: 11 MG/DL (ref 8–23)
CALCIUM SERPL-MCNC: 7.6 MG/DL (ref 8.3–10.4)
CHLORIDE SERPL-SCNC: 107 MMOL/L (ref 98–107)
CO2 SERPL-SCNC: 21 MMOL/L (ref 21–32)
COLOR UR: ABNORMAL
CREAT SERPL-MCNC: 1.34 MG/DL (ref 0.6–1)
DIFFERENTIAL METHOD BLD: ABNORMAL
EOSINOPHIL # BLD: 0.3 K/UL (ref 0–0.8)
EOSINOPHIL NFR BLD: 35 % (ref 0.5–7.8)
EPI CELLS #/AREA URNS HPF: ABNORMAL /HPF
ERYTHROCYTE [DISTWIDTH] IN BLOOD BY AUTOMATED COUNT: 15.7 % (ref 11.9–14.6)
GAMMA GLOB MFR SERPL ELPH: 0.4 G/DL (ref 0.5–1.6)
GLUCOSE SERPL-MCNC: 81 MG/DL (ref 65–100)
GLUCOSE UR STRIP.AUTO-MCNC: NEGATIVE MG/DL
HCT VFR BLD AUTO: 20.8 % (ref 35.8–46.3)
HGB BLD-MCNC: 6.7 G/DL (ref 11.7–15.4)
HGB UR QL STRIP: NEGATIVE
HISTORY CHECKED?,CKHIST: NORMAL
IGA SERPL-MCNC: 125 MG/DL (ref 85–499)
IGG SERPL-MCNC: 411 MG/DL (ref 610–1616)
IGM SERPL-MCNC: 49 MG/DL (ref 35–242)
IMM GRANULOCYTES # BLD AUTO: 0 K/UL (ref 0–0.5)
IMM GRANULOCYTES NFR BLD AUTO: 0 % (ref 0–5)
KAPPA LC FREE SER-MCNC: 35.46 MG/L (ref 3.3–19.4)
KAPPA LC FREE/LAMBDA FREE SER: 1.42 {RATIO} (ref 0.26–1.65)
KETONES UR QL STRIP.AUTO: NEGATIVE MG/DL
LAMBDA LC FREE SERPL-MCNC: 25.05 MG/L (ref 5.71–26.3)
LEUKOCYTE ESTERASE UR QL STRIP.AUTO: ABNORMAL
LYMPHOCYTES # BLD: 0.3 K/UL (ref 0.5–4.6)
LYMPHOCYTES NFR BLD: 44 % (ref 13–44)
M PROTEIN SERPL ELPH-MCNC: ABNORMAL G/DL
MAGNESIUM SERPL-MCNC: 1.4 MG/DL (ref 1.8–2.4)
MCH RBC QN AUTO: 30 PG (ref 26.1–32.9)
MCHC RBC AUTO-ENTMCNC: 32.2 G/DL (ref 31.4–35)
MCV RBC AUTO: 93.3 FL (ref 79.6–97.8)
MONOCYTES # BLD: 0.1 K/UL (ref 0.1–1.3)
MONOCYTES NFR BLD: 8 % (ref 4–12)
NEUTS SEG # BLD: 0.1 K/UL (ref 1.7–8.2)
NEUTS SEG NFR BLD: 13 % (ref 43–78)
NITRITE UR QL STRIP.AUTO: NEGATIVE
NRBC # BLD: 0.02 K/UL (ref 0–0.2)
OTHER OBSERVATIONS,UCOM: ABNORMAL
PH UR STRIP: 5.5 [PH] (ref 5–9)
PHOSPHATE SERPL-MCNC: 2.2 MG/DL (ref 2.3–3.7)
PLATELET # BLD AUTO: 28 K/UL (ref 150–450)
PLATELET COMMENTS,PCOM: ABNORMAL
PMV BLD AUTO: 12 FL (ref 9.4–12.3)
POTASSIUM SERPL-SCNC: 3 MMOL/L (ref 3.5–5.1)
PROT PATTERN SERPL ELPH-IMP: ABNORMAL
PROT PATTERN SPEC IFE-IMP: ABNORMAL
PROT SERPL-MCNC: 4.7 G/DL (ref 6.3–8.2)
PROT UR STRIP-MCNC: 100 MG/DL
RBC # BLD AUTO: 2.23 M/UL (ref 4.05–5.2)
RBC #/AREA URNS HPF: ABNORMAL /HPF
RBC MORPH BLD: ABNORMAL
RBC MORPH BLD: ABNORMAL
SODIUM SERPL-SCNC: 136 MMOL/L (ref 136–145)
SP GR UR REFRACTOMETRY: 1.02 (ref 1–1.02)
UROBILINOGEN UR QL STRIP.AUTO: 1 EU/DL (ref 0.2–1)
VANCOMYCIN SERPL-MCNC: 9.5 UG/ML
WBC # BLD AUTO: 0.8 K/UL (ref 4.3–11.1)
WBC MORPH BLD: ABNORMAL
WBC URNS QL MICRO: ABNORMAL /HPF
YEAST URNS QL MICRO: ABNORMAL

## 2021-05-17 PROCEDURE — 97110 THERAPEUTIC EXERCISES: CPT

## 2021-05-17 PROCEDURE — 80048 BASIC METABOLIC PNL TOTAL CA: CPT

## 2021-05-17 PROCEDURE — 2709999900 HC NON-CHARGEABLE SUPPLY

## 2021-05-17 PROCEDURE — 74011250636 HC RX REV CODE- 250/636: Performed by: INTERNAL MEDICINE

## 2021-05-17 PROCEDURE — 74011250637 HC RX REV CODE- 250/637: Performed by: INTERNAL MEDICINE

## 2021-05-17 PROCEDURE — 74011250637 HC RX REV CODE- 250/637: Performed by: FAMILY MEDICINE

## 2021-05-17 PROCEDURE — 84100 ASSAY OF PHOSPHORUS: CPT

## 2021-05-17 PROCEDURE — 85025 COMPLETE CBC W/AUTO DIFF WBC: CPT

## 2021-05-17 PROCEDURE — C9113 INJ PANTOPRAZOLE SODIUM, VIA: HCPCS | Performed by: INTERNAL MEDICINE

## 2021-05-17 PROCEDURE — P9016 RBC LEUKOCYTES REDUCED: HCPCS

## 2021-05-17 PROCEDURE — 87106 FUNGI IDENTIFICATION YEAST: CPT

## 2021-05-17 PROCEDURE — 87186 SC STD MICRODIL/AGAR DIL: CPT

## 2021-05-17 PROCEDURE — P9040 RBC LEUKOREDUCED IRRADIATED: HCPCS

## 2021-05-17 PROCEDURE — 80202 ASSAY OF VANCOMYCIN: CPT

## 2021-05-17 PROCEDURE — 86901 BLOOD TYPING SEROLOGIC RH(D): CPT

## 2021-05-17 PROCEDURE — 74011000250 HC RX REV CODE- 250: Performed by: INTERNAL MEDICINE

## 2021-05-17 PROCEDURE — 99233 SBSQ HOSP IP/OBS HIGH 50: CPT | Performed by: INTERNAL MEDICINE

## 2021-05-17 PROCEDURE — 36415 COLL VENOUS BLD VENIPUNCTURE: CPT

## 2021-05-17 PROCEDURE — 86923 COMPATIBILITY TEST ELECTRIC: CPT

## 2021-05-17 PROCEDURE — 36430 TRANSFUSION BLD/BLD COMPNT: CPT

## 2021-05-17 PROCEDURE — 83735 ASSAY OF MAGNESIUM: CPT

## 2021-05-17 PROCEDURE — 87088 URINE BACTERIA CULTURE: CPT

## 2021-05-17 PROCEDURE — APPSS45 APP SPLIT SHARED TIME 31-45 MINUTES: Performed by: NURSE PRACTITIONER

## 2021-05-17 PROCEDURE — 65270000029 HC RM PRIVATE

## 2021-05-17 PROCEDURE — 81001 URINALYSIS AUTO W/SCOPE: CPT

## 2021-05-17 PROCEDURE — 97530 THERAPEUTIC ACTIVITIES: CPT

## 2021-05-17 PROCEDURE — 74011000258 HC RX REV CODE- 258: Performed by: INTERNAL MEDICINE

## 2021-05-17 PROCEDURE — 87086 URINE CULTURE/COLONY COUNT: CPT

## 2021-05-17 RX ORDER — SODIUM CHLORIDE 9 MG/ML
250 INJECTION, SOLUTION INTRAVENOUS AS NEEDED
Status: DISCONTINUED | OUTPATIENT
Start: 2021-05-17 | End: 2021-05-24 | Stop reason: HOSPADM

## 2021-05-17 RX ORDER — FUROSEMIDE 10 MG/ML
20 INJECTION INTRAMUSCULAR; INTRAVENOUS
Status: COMPLETED | OUTPATIENT
Start: 2021-05-17 | End: 2021-05-17

## 2021-05-17 RX ORDER — IPRATROPIUM BROMIDE AND ALBUTEROL SULFATE 2.5; .5 MG/3ML; MG/3ML
3 SOLUTION RESPIRATORY (INHALATION)
Status: DISCONTINUED | OUTPATIENT
Start: 2021-05-17 | End: 2021-05-24 | Stop reason: HOSPADM

## 2021-05-17 RX ORDER — MAGNESIUM SULFATE HEPTAHYDRATE 40 MG/ML
2 INJECTION, SOLUTION INTRAVENOUS ONCE
Status: COMPLETED | OUTPATIENT
Start: 2021-05-17 | End: 2021-05-17

## 2021-05-17 RX ORDER — POTASSIUM CHLORIDE 14.9 MG/ML
20 INJECTION INTRAVENOUS
Status: DISCONTINUED | OUTPATIENT
Start: 2021-05-17 | End: 2021-05-17

## 2021-05-17 RX ORDER — POTASSIUM CHLORIDE 14.9 MG/ML
20 INJECTION INTRAVENOUS
Status: COMPLETED | OUTPATIENT
Start: 2021-05-17 | End: 2021-05-17

## 2021-05-17 RX ADMIN — PIPERACILLIN AND TAZOBACTAM 4.5 G: 4; .5 INJECTION, POWDER, FOR SOLUTION INTRAVENOUS at 02:54

## 2021-05-17 RX ADMIN — SODIUM CHLORIDE 10 ML: 9 INJECTION, SOLUTION INTRAMUSCULAR; INTRAVENOUS; SUBCUTANEOUS at 06:12

## 2021-05-17 RX ADMIN — DOXYCYCLINE 100 MG: 100 INJECTION, POWDER, LYOPHILIZED, FOR SOLUTION INTRAVENOUS at 10:45

## 2021-05-17 RX ADMIN — FUROSEMIDE 20 MG: 10 INJECTION, SOLUTION INTRAMUSCULAR; INTRAVENOUS at 20:17

## 2021-05-17 RX ADMIN — NYSTATIN 500000 UNITS: 100000 SUSPENSION ORAL at 11:00

## 2021-05-17 RX ADMIN — FOLIC ACID 1 MG: 1 TABLET ORAL at 10:46

## 2021-05-17 RX ADMIN — SODIUM CHLORIDE 10 ML: 9 INJECTION, SOLUTION INTRAMUSCULAR; INTRAVENOUS; SUBCUTANEOUS at 18:43

## 2021-05-17 RX ADMIN — PANTOPRAZOLE SODIUM 40 MG: 40 INJECTION, POWDER, FOR SOLUTION INTRAVENOUS at 10:46

## 2021-05-17 RX ADMIN — ROSUVASTATIN CALCIUM 10 MG: 5 TABLET, FILM COATED ORAL at 23:36

## 2021-05-17 RX ADMIN — POTASSIUM CHLORIDE 20 MEQ: 14.9 INJECTION, SOLUTION INTRAVENOUS at 14:03

## 2021-05-17 RX ADMIN — VANCOMYCIN HYDROCHLORIDE 1000 MG: 1 INJECTION, POWDER, LYOPHILIZED, FOR SOLUTION INTRAVENOUS at 14:17

## 2021-05-17 RX ADMIN — NYSTATIN 500000 UNITS: 100000 SUSPENSION ORAL at 23:36

## 2021-05-17 RX ADMIN — AMIODARONE HYDROCHLORIDE 200 MG: 200 TABLET ORAL at 10:46

## 2021-05-17 RX ADMIN — MAGNESIUM SULFATE HEPTAHYDRATE 2 G: 40 INJECTION, SOLUTION INTRAVENOUS at 11:58

## 2021-05-17 RX ADMIN — SODIUM CHLORIDE 10 ML: 9 INJECTION, SOLUTION INTRAMUSCULAR; INTRAVENOUS; SUBCUTANEOUS at 23:35

## 2021-05-17 RX ADMIN — LEVOTHYROXINE SODIUM 25 MCG: 0.05 TABLET ORAL at 06:12

## 2021-05-17 RX ADMIN — DULOXETINE HYDROCHLORIDE 60 MG: 60 CAPSULE, DELAYED RELEASE ORAL at 10:46

## 2021-05-17 RX ADMIN — POTASSIUM CHLORIDE 20 MEQ: 14.9 INJECTION, SOLUTION INTRAVENOUS at 11:59

## 2021-05-17 RX ADMIN — LACTOBACILLUS TAB 1 TABLET: TAB at 10:46

## 2021-05-17 RX ADMIN — SODIUM CHLORIDE, SODIUM LACTATE, POTASSIUM CHLORIDE, AND CALCIUM CHLORIDE 75 ML/HR: 600; 310; 30; 20 INJECTION, SOLUTION INTRAVENOUS at 10:49

## 2021-05-17 RX ADMIN — PIPERACILLIN AND TAZOBACTAM 4.5 G: 4; .5 INJECTION, POWDER, FOR SOLUTION INTRAVENOUS at 14:04

## 2021-05-17 RX ADMIN — SODIUM CHLORIDE 250 ML: 900 INJECTION, SOLUTION INTRAVENOUS at 10:49

## 2021-05-17 RX ADMIN — NYSTATIN 500000 UNITS: 100000 SUSPENSION ORAL at 17:22

## 2021-05-17 NOTE — PROGRESS NOTES
Pharmacokinetic Consult to Pharmacist    Mary Michele is a 68 y.o. female being treated for febrile neutropenia with vancomycin. Height: 5' (152.4 cm)  Weight: 68.9 kg (152 lb)  Lab Results   Component Value Date/Time    BUN 11 05/17/2021 05:35 AM    Creatinine 1.34 (H) 05/17/2021 05:35 AM    WBC 0.8 (LL) 05/17/2021 05:35 AM    Procalcitonin 0.47 05/15/2021 09:52 PM    Lactic acid 2.0 03/19/2021 03:30 PM    Lactic Acid (POC) 2.89 (H) 04/22/2019 12:34 PM      Estimated Creatinine Clearance: 30 mL/min (A) (based on SCr of 1.34 mg/dL (H)). CULTURES:  Results     Procedure Component Value Units Date/Time    CULTURE, URINE [[id number]] Collected: 05/17/21 0934    Order Status: Completed Specimen: Urine from Clean catch Updated: 05/17/21 1003    CULTURE, BLOOD [[id number]] Collected: 05/15/21 0937    Order Status: Completed Specimen: Blood Updated: 05/17/21 1222     Special Requests: --        LEFT  HAND       Culture result: NO GROWTH 2 DAYS       CULTURE, BLOOD [[id number]] Collected: 05/15/21 0756    Order Status: Completed Specimen: Blood Updated: 05/17/21 1222     Special Requests: --        LEFT  HAND       Culture result: NO GROWTH 2 DAYS       CULTURE, BLOOD [[id number]] Collected: 05/13/21 1616    Order Status: Completed Specimen: Blood Updated: 05/17/21 1221     Special Requests: --        LEFT  HAND       Culture result: NO GROWTH 4 DAYS       CULTURE, BLOOD [[id number]] Collected: 05/13/21 1613    Order Status: Completed Specimen: Blood Updated: 05/17/21 1221     Special Requests: --        LEFT  Antecubital       Culture result: NO GROWTH 4 DAYS               Day 3 of vancomycin. Goal trough is 15-20. Vancomycin random obtained this AM was subtherapeutic ~22 hours after the last dose. Will schedule 1000 mg every 24 hours for now. Will continue to follow patient and order levels when clinically indicated.     Thank you,  Renetta Marie, PharmD, [address] [facility]  Clinical Pharmacy Specialist  [phone]

## 2021-05-17 NOTE — PROGRESS NOTES
Bedside report received from night nurse Dio Dove. Assessment done as noted  Respiration even and unlabored 20/min; denies pain or nausea at present. Reports loose stools continues. Encouraged to call with needs.

## 2021-05-17 NOTE — PROGRESS NOTES
[facility] Hematology & Oncology: In Patient Hematology / Oncology Progress Note    Reason for Consult: Acute pancytopenia acute pancytopenia  Referring Physician:  Rasheeda Ty MD    24 Hour Events:  VSS, afebrile  Feeling ok - mouth sores and diarrhea  BMBx 5/14 - path pending  PRBCs today for Hgb of 6.7       ROS:  Constitutional: +fatigue. Negative for fever, chills, weakness, malaise. CV: Megative for chest pain, palpitations, edema. Respiratory: Negative for dyspnea, cough, wheezing. GI: +diarrhea. Negative for nausea, abdominal pain. 10 point review of systems is otherwise negative with the exception of the elements mentioned above in the HPI.            No Known Allergies  Past Medical History:   Diagnosis Date    Acquired hypothyroidism 1/16/2016    Breast cancer ([facility] [address] 2008    Depression 8/18/2016    Endocrine disease     hypothyroid    Fungal dermatitis 8/18/2016    Hyperlipidemia 1/16/2016    Hypertension, essential, benign 8/18/2016    Hypokalemia 8/18/2016    Inflamed sebaceous cyst 8/18/2016    Major depressive disorder, recurrent, moderate (HCC) 8/18/2016    Nicotine dependence, cigarettes, uncomplicated 3/93/9071    Osteopenia 8/18/2016    Osteoporosis 8/18/2016    Radiation therapy complication 5052    Rheumatoid arthritis ([facility] [address] 1/16/2016    Right carotid bruit 1/16/2016    TIA (transient ischemic attack) 1/16/2016    Tobacco abuse 8/18/2016     Past Surgical History:   Procedure Laterality Date    HX ADENOIDECTOMY      HX BREAST LUMPECTOMY Right 2008    with lymph nodes    HX TONSILLECTOMY       Family History   Problem Relation Age of Onset    Stroke Mother     Cancer Father         Brain    HIV/AIDS Brother      Social History     Socioeconomic History    Marital status:      Spouse name: Not on file    Number of children: Not on file    Years of education: Not on file    Highest education level: Not on file   Occupational History    Not on file   Social Needs    Financial resource strain: Not on file    Food insecurity     Worry: Not on file     Inability: Not on file    Transportation needs     Medical: Not on file     Non-medical: Not on file   Tobacco Use    Smoking status: Current Some Day Smoker    Smokeless tobacco: Never Used    Tobacco comment: Only on pay day-1/2 pack   Substance and Sexual Activity    Alcohol use: No    Drug use: No    Sexual activity: Not on file   Lifestyle    Physical activity     Days per week: Not on file     Minutes per session: Not on file    Stress: Not on file   Relationships    Social connections     Talks on phone: Not on file     Gets together: Not on file     Attends Congregation service: Not on file     Active member of club or organization: Not on file     Attends meetings of clubs or organizations: Not on file     Relationship status: Not on file    Intimate partner violence     Fear of current or ex partner: Not on file     Emotionally abused: Not on file     Physically abused: Not on file     Forced sexual activity: Not on file   Other Topics Concern    Not on file   Social History Narrative    Not on file     Current Facility-Administered Medications   Medication Dose Route Frequency Provider Last Rate Last Admin    0.9% sodium chloride infusion 250 mL  250 mL IntraVENous PRN Mi Llanes MD        furosemide (LASIX) injection 20 mg  20 mg IntraVENous ONCE PRN Mi Llanes MD        potassium chloride 20 mEq in 100 ml IVPB  20 mEq IntraVENous Q2H Mi Llanes MD        magnesium sulfate 2 g/50 ml IVPB (premix or compounded)  2 g IntraVENous ONCE Mi Llanes MD        Lactobacillus Acidoph & Bulame CRE[facility]WO[facility]) tablet 1 Tab  1 Tab Oral DAILY Mi Llanes MD   1 Tab at 05/16/21 1740    nystatin (MYCOSTATIN) 100,000 unit/mL oral suspension 500,000 Units  500,000 Units Oral QID Rupal Lazo MD   500,000 Units at 05/16/21 2220    piperacillin-tazobactam (ZOSYN) 4.5 g in 0.9% sodium chloride (MBP/ADV) 100 mL MBP  4.5 g IntraVENous Q8H Moris Simms MD 25 mL/hr at 05/17/21 0254 4.5 g at 05/17/21 0254    doxycycline (VIBRAMYCIN) 100 mg in 0.9% sodium chloride (MBP/ADV) 100 mL MBP  100 mg IntraVENous Q12H Moris Simms  mL/hr at 05/16/21 2205 100 mg at 05/16/21 2205    lactated Ringers infusion  75 mL/hr IntraVENous CONTINUOUS Moris Morales MD 75 mL/hr at 05/16/21 2328 75 mL/hr at 05/16/21 500 [facility]   Other Rx Dosing/Monitoring Mee Roland MD        folic acid (FOLVITE) tablet 1 mg  1 mg Oral DAILY Moris Morales MD   1 mg at 05/16/21 1034    labetaloL (NORMODYNE;TRANDATE) injection 10 mg  10 mg IntraVENous Q4H PRN Moris Morales MD        lidocaine 4 % patch 1 Patch  1 Patch TransDERmal Q24H Moris Morales MD   1 Patch at 05/16/21 1409    hydrALAZINE (APRESOLINE) 20 mg/mL injection 10 mg  10 mg IntraVENous Q6H PRN Juany De Oliveira MD   10 mg at 05/13/21 2335    lip protectant (BLISTEX) ointment 1 Each  1 Each Topical PRN uJany De Oliveira MD   1 Each at 05/14/21 0057    0.9% sodium chloride infusion 250 mL  250 mL IntraVENous PRN Sam Davis DO        amiodarone (CORDARONE) tablet 200 mg  200 mg Oral DAILY Kei Encarnacion MD   200 mg at 05/16/21 1034    DULoxetine (CYMBALTA) capsule 60 mg  60 mg Oral DAILY Kei Encarnacion MD   60 mg at 05/16/21 1034    levothyroxine (SYNTHROID) tablet 25 mcg  25 mcg Oral ACB Kei Encarnacion MD   25 mcg at 05/17/21 0612    rosuvastatin (CRESTOR) tablet 10 mg  10 mg Oral QHS Kei Encarnacion MD   10 mg at 05/16/21 2204    sodium chloride (NS) flush 5-40 mL  5-40 mL IntraVENous Q8H Kei Encarnacion MD   10 mL at 05/17/21 0612    sodium chloride (NS) flush 5-40 mL  5-40 mL IntraVENous PRN Kei Encarnacion MD   10 mL at 05/14/21 0557    acetaminophen (TYLENOL) tablet 650 mg  650 mg Oral Q6H PRN Kei Encarnacion MD        Or    acetaminophen (TYLENOL) suppository 650 mg  650 mg Rectal Q6H PRN Alysha, Kei Webster MD       [employer] polyethylene glycol (MIRALAX) packet 17 g  17 g Oral DAILY PRN Andre Encarnacion MD        promethazine (PHENERGAN) tablet 12.5 mg  12.5 mg Oral Q6H PRN Andre Encarnacion MD        Or    ondansetron (ZOFRAN) injection 4 mg  4 mg IntraVENous Q6H PRN Andre Encarnacion MD        pantoprazole (PROTONIX) 40 mg in 0.9% sodium chloride 10 mL injection  40 mg IntraVENous Q12H Andre Encarnacion MD   40 mg at 21 220    0.9% sodium chloride infusion 250 mL  250 mL IntraVENous PRN Andre Encarnacion MD        diphenhydrAMINE (BENADRYL) capsule 25 mg  25 mg Oral Q6H PRN Andre Encarnacion MD        traMADoL Venita Oneida) tablet 50 mg  50 mg Oral Q6H PRN Jori Vallecillo MD   50 mg at 21       OBJECTIVE:  Patient Vitals for the past 8 hrs:   BP Temp Pulse Resp SpO2   21 0746 120/67 98.7 °F (37.1 °C) 80 20 97 %   21 0336 129/60 98.9 °F (37.2 °C) 86 16 98 %     Temp (24hrs), Av.1 °F (37.3 °C), Min:98.5 °F (36.9 °C), Max:99.6 °F (37.6 °C)     0701 -  1900  In: 400 [P.O.:400]  Out: -     Physical Exam:  Constitutional: Oriented to person, place, and time. Well-developed and well-nourished. HEENT: Very hard of hearing. Normocephalic and atraumatic. Oropharynx is clear and moist +buccal lesion. +Exophthalmos. Conjunctivae and EOM are normal. No scleral icterus. Neck supple. No JVD present. No tracheal deviation present. No thyromegaly present. Skin Warm and dry. No bruising and no rash noted. No erythema. No pallor. Respiratory Effort normal and breath sounds normal.  No respiratory distress. No wheezes. No rales. No tenderness. CVS Normal rate, regular rhythm and normal heart sounds. Exam reveals no gallop, no friction and no rub. No murmur heard. Abdomen Soft. Bowel sounds are normal. Exhibits no distension. There is no tenderness. There is no rebound and no guarding. Neuro Normal reflexes. No cranial nerve deficit.   Exhibits normal muscle tone, 5 of 5 strength of all extremities. MSK Normal range of motion. No edema and no tenderness.    Psych Normal mood, affect, behavior, judgment and thought content      Labs:    Recent Labs     05/17/21  0535 05/16/21  1054 05/15/21  0437   WBC 0.8* 0.4* 0.3*   RBC 2.23* 2.40* 2.65*   HGB 6.7* 7.2* 8.0*   HCT 20.8* 22.6* 25.1*   MCV 93.3 94.2 94.7   MCH 30.0 30.0 30.2   MCHC 32.2 31.9 31.9   RDW 15.7* 15.9* 16.5*   PLT 28* 41* 88*   GRANS 13* 25*  --    LYMPH 44 50*  --    MONOS 8 11  --    EOS 35* 14*  --    BASOS 0 0  --    IG 0 0  --    DF AUTOMATED AUTOMATED  --    ANEU 0.1* 0.1*  --    ABL 0.3* 0.2*  --    ABM 0.1 0.0*  --    JOSE G 0.3 0.1  --    ABB 0.0 0.0  --    AIG 0.0 0.0  --       Recent Labs     05/17/21  0535 05/16/21  1054 05/15/21  0437 05/14/21  1658    136 139  --    K 3.0* 3.4* 3.8  --     105 109*  --    CO2 21 23 22  --    AGAP 8 8 8  --    GLU 81 102* 131*  --    BUN 11 13 15  --    CREA 1.34* 1.44* 1.55*  --    GFRAA 49* 45* 42*  --    GFRNA 41* 37* 34*  --    CA 7.6* 7.8* 7.9*  --    AP  --  62 65  --    TP  --  4.8* 5.0* 4.7*   ALB  --  1.7* 1.9*  --    GLOB  --  3.1 3.1  --    AGRAT  --  0.5* 0.6* PENDING   MG 1.4*  --   --   --    PHOS 2.2*  --   --   --        ASSESSMENT/RECOMMENDATION:    Principal Problem:    Anemia (5/12/2021)    Active Problems:    Acquired hypothyroidism (1/16/2016)      Hyperlipidemia (1/16/2016)      Rheumatoid arthritis ([facility] [address] (1/16/2016)      Hypertension, essential, benign (8/18/2016)      Major depressive disorder, recurrent, moderate (HCC) (8/18/2016)      Leukopenia (5/12/2021)      [facility] (5/12/2021)      Rib fracture (5/12/2021)      Febrile neutropenia (HCC) (5/15/2021)      Right upper lobe pneumonia (5/15/2021)    68 y.o. F with a recent diagnosis of A. fib and a started Eliquis since March 2021 with prolonged melena and severe anemia of hemoglobin 3.7, responded to blood transfusion, however the acute pancytopenia would not be typical for dilutional effect as such, will arrange systemic anemia work-up unrevealing and blood smear showed rouleaux, pursue a bone marrow biopsy 5/14/2021 to rule out blood malignancy, report pending, CBC still trending down persistently, clinically stable, probably need blood transfusion tomorrow, all questions answered, will follow. Pancytopenia  - Occasion teardrop cells/basophilic stippling-lead level pending  - Smear with increased rouleaux  - Check DIC, Hemolysis labs  - IR consulted for BMbx  - Medications reviewed   5/17 BMBx 5/14 pending. SPEP/FLC pending. Counts continue to trend down. PRBCs today for Hgb of 6.7. Reviewed medications with Dr Almas Collins - has been on MTX for 10-15 years and recently started amiodarone at previous admission for Afib (around 3/2021). Possible interaction between MTX and amiodarone causing myelosuppression (also c/o mouth sores and diarrhea). May need to discuss alternative to amiodarone with cardiology if BMbx results are unrevealing. Febrile neutropenia  5/17 Tmax 100.7 - D3 Zosyn/Doxycycline per primary. CXR with RUL infiltrate. BC NGTD. Will start Granix.      Stool heme positive  - GI consulted  5/17 Pancytopenia w/u in progress prior to GI eval.      RA  - Methotrexate every Sunday (held for admission)     CKD  - Renal US: Increased cortical echogenicity involving the single right kidney compatible with chronic medical renal disease. No hydronephrosis. Absent left kidney? 5/17 Cr stable at 1.34     Afib  - New diagnosis recently started amiodarone and Eliquis (currently on hold)        Lab studies and imaging studies were personally reviewed. Pertinent old records were reviewed. Patient has not received COVID vaccine.     Thank you for allowing me to participate in the care of Ms. Antoni Dalal.              NYLA Snider. [address] Hematology & Oncology  [id number] [place] [address]  Office : [phone]  Fax : [phone]

## 2021-05-17 NOTE — PROGRESS NOTES
Mag and Potassium boluses and Vanco IV completed. Zosyn transfusing at present. Will start blood transfusion when Zosyn is completed. Denies needs at present. Will continue to monitor.

## 2021-05-17 NOTE — PROGRESS NOTES
Resting quietly at present. NAD noted. Safety maintained through out the shift. Zosyn continues to transfuse. To report off to on coming nurse.

## 2021-05-17 NOTE — PROGRESS NOTES
ACUTE OT GOALS:  (Developed with and agreed upon by patient and/or caregiver.)    1. Patient will bathe and dress total body with modified independence and adaptive device as needed. 2. Patient will toilet with modified independence and adaptive device as needed. 3. Patient will tolerate 30 minutes of OT treatment with up to 2 rest breaks to increase activity tolerance for ADLs. 4. Patient will complete functional transfers with modified independence. 5. Patient will complete functional mobility for ADLs with modified independence. 6. Patient will demonstrate modified independence with therapeutic exercise HEP to increase strength in BUEs for increased safety and independence with functional tasks. Timeframe: 7 visits  OCCUPATIONAL THERAPY: Daily Note OT Treatment Day # 3    Juanis Bobo is a 68 y.o. female   PRIMARY DIAGNOSIS: Anemia  Anemia [D64.9]       Payor: [employer] MEDICARE COMPLETE / Plan: Leidy Estrada MEDICARE COMPLETE / Product Type: Managed Care Medicare /   ASSESSMENT:     REHAB RECOMMENDATIONS: CURRENT LEVEL OF FUNCTION:  (Most Recently Demonstrated)   Recommendation to date pending progress:  Setting:   Short-term Rehab  Equipment:    Rolling Walker Bathing:   Not tested  Dressing:   Not tested  Feeding/Grooming:   Not tested  Toileting:   Not tested  Functional Mobility:   Not tested     ASSESSMENT:  Ms. Fannie Watts is doing fair today. Pt HGB was low today was only performed UE exercises while sitting up in bed. Pt tolerated exercises well with rest breaks in between. Pt educated on energy conservation techniques as well. Pt verbalized understanding. Pt making some progress with goals. Will continue to benefit from skilled OT during stay.      SUBJECTIVE:   Ms. Fannie Watts states, \"[facility] has a bad reputation\"    SOCIAL HISTORY/LIVING ENVIRONMENT: Home Environment: Trailer/mobile home  # Steps to Enter: 5  One/Two Story Residence: One story  Living Alone: Yes  Support Systems: Friends \ [facility] / [facility]    OBJECTIVE:     PAIN: VITAL SIGNS: LINES/DRAINS:   Pre Treatment: Pain Screen  Pain Scale 1: Numeric (0 - 10)  Pain Intensity 1: 0  Post Treatment: none   none  O2 Device: None (Room air)     ACTIVITIES OF DAILY LIVING: I Mod I S SBA CGA Min Mod Max Total NT Comments   BASIC ADLs:              Bathing/ Showering [] [] [] [] [] [] [] [] [] [x]    Toileting [] [] [] [] [] [] [] [] [] [x]    Dressing [] [] [] [] [] [] [] [] [] [x]    Feeding [] [] [] [] [] [] [] [] [] [x]    Grooming [] [] [] [] [] [] [] [] [] [x]    Personal Device Care [] [] [] [] [] [] [] [] [] [x]    Functional Mobility [] [] [] [] [] [] [] [] [] [x]    I=Independent, Mod I=Modified Independent, S=Supervision, SBA=Standby Assistance, CGA=Contact Guard Assistance,   Min=Minimal Assistance, Mod=Moderate Assistance, Max=Maximal Assistance, Total=Total Assistance, NT=Not Tested    MOBILITY: I Mod I S SBA CGA Min Mod Max Total  NT x2 Comments:   Supine to sit [] [] [] [] [] [] [] [] [] [x] []    Sit to supine [] [] [] [] [] [] [] [] [] [x] []    Sit to stand [] [] [] [] [] [] [] [] [] [x] []    Bed to chair [] [] [] [] [] [] [] [] [] [x] []    I=Independent, Mod I=Modified Independent, S=Supervision, SBA=Standby Assistance, CGA=Contact Guard Assistance,   Min=Minimal Assistance, Mod=Moderate Assistance, Max=Maximal Assistance, Total=Total Assistance, NT=Not Tested    BALANCE: Good Fair+ Fair Fair- Poor NT Comments   Sitting Static [] [] [x] [] [] []    Sitting Dynamic [] [] [x] [] [] []              Standing Static [] [] [] [] [] [x]    Standing Dynamic [] [] [] [] [] [x]      PLAN:   FREQUENCY/DURATION: OT Plan of Care: 3 times/week for duration of hospital stay or until stated goals are met, whichever comes first.    TREATMENT:   TREATMENT:   ( $$ Therapeutic Exercises: 8-22 mins   )  Therapeutic Exercise (15 Minutes):  Therapeutic exercises noted below to improve functional activity tolerance, strength and mobility.      TREATMENT GRID:   Date:  5/17/21 Date:   Date:     Activity/Exercise Parameters Parameters Parameters   Shoulder Abd/Adduction 10 reps     Shoulder Flexion (AROM) 10 reps     Elbow Flexion 10 reps     Punches 10 reps     Shoulder Shrugs 10 reps                   AFTER TREATMENT POSITION/PRECAUTIONS:  Bed, Needs within reach and RN notified    INTERDISCIPLINARY COLLABORATION:  RN/PCT and OT/SCHROEDER    TOTAL TREATMENT DURATION:  OT Patient Time In/Time Out  Time In: 1340  Time Out: 61 [facility]

## 2021-05-17 NOTE — PROGRESS NOTES
Pt in be resting on RA. Pt in no apparent distress, all needs addressed, bed in lowest, locked position, call light in reach. SBAR report given to oncoming RN.

## 2021-05-17 NOTE — PROGRESS NOTES
Name:  Lorenzo Harris  Age:80 y.o. Sex:female   :  1940    MRN:  [id number]   PCP:  Bozena Munoz MD      Admit Date:  2021 10:41 AM   Chief Complaint: falls    Reason for Admission:   Anemia [D64.9]    Assessment & Plan:     Fever in setting of leukopenia/RUL pneumonia: We will treat patient has febrile neutropenia. Started patient on vancomycin, Zosyn and doxycycline to cover a MRSA, Pseudomonas and atypical infection. Panculture ordered. Slow IV hydration. Will monitor. X-ray showed right upper lobe pneumonia. May 16: Still have some fever but patient looking and feeling much better. Continue current antibiotics. May 17: Continue antibiotics for now. Cultures negative for 20 hours. Once reported for 48 hours then we will stop vancomycin. Acute anemia  -Significant drop in hemoglobin and WBC in last 1 month. Status post 3 unit PRBC. Holding anticoagulation and antiplatelet therapy. Gastroenterology and hematology on board. Work-up for anemia and leukopenia ordered. Appreciate subspecialty recommendation. Neutropenic precaution and fall precautions. May 14: Hemoglobin stable. May 15: No active bleeding. Will monitor. On clear liquid diet. GI on board. May 16: Denies any active bleeding. Trending hemoglobin. May 17: No acute bleeding. Hemoglobin less than 7.  1 unit PRBC ordered with Lasix. Leukopenia  In setting of anemia with teardrop cells and basophilic stippling seen on smear  -As patient is on methotrexate at home so started patient on folic acid. Appreciate hematology-recommendation.  -We will check procalcitonin and blood cultures. May 14: Procalcitonin negative. Follow-up culture. Appreciate hematology recommendation. Plan for bone marrow biopsy today. NAZIA on CKD 3  Baseline Cr ~1.6-1.7. Cr up to 2.61 on adm. avoid nephrotoxic agent. Will monitor. Patient has congenital agenesis of left kidney. May 14: Creatinine 1.4. Will monitor. May 17: Potassium repleted. Mild transaminitis  -improving. May 14: Resolved. Rib fractures  R flank hematoma  - IS  - pain control    Frequent falls  Pt reports long standing falls related to medications per her report.  - PT/OT    Afib  - hold Eliquis  - cont Amio    RA  - MTX every Sunday    MDD  - cont home meds    Disposition/Expected LOS: > 2 midnights, pt previously living at home alone, PPD, PT/OT  Diet: DIET NUTRITIONAL SUPPLEMENTS  DIET CLEAR LIQUID  VTE ppx: SCDs given anemia and GI bleed. GI ppx: PPI BID  Code status: full code  Surrogate decision-maker: friend Jeri Mars    Again, patient is AOx3. I have updated patient's power of  Jeri Mars multiple times. Today I offered but patient would like to talk to Jeri Mars by herself. I encouraged her to ask the nurse to notify me if she or her POA is any questions. Patient verbalized understanding that her condition meditated in spite of treatment. History of Presenting Illness:     Moraima Malone is a 68 y.o. female with medical history of recently dx'd Afib (on Eliquis), CKD3, RA, h/o breast CA 2008, hypothyroidism who presented to ED with falls, weakness, nausea. Pt reports not feeling well since she was discharged March 2021 after being diagnosed with Afib and started on Eliquis. Patient presented with fatigue and fall. Found to have rib fracture with some ecchymosis. Her hemoglobin was low and she received 3 unit of PRBC. She had significant leukopenia and ultimately underwent bone marrow biopsy with results pending as of May 17. Also, she developed fever during hospitalization started on vancomycin, Zosyn and doxycycline. Her chest x-ray showed pneumonia. There is also concern that she had melena and GI is on board but given leukopenia, EGD is on hold. Her hemoglobin dropped again on May 17 and she was given 1 unit of PRBC. May 17: Patient looking much better and as per her feeling better.   Denies any shortness of breath, chest pain, nausea or vomiting. Complaining of weakness which is ongoing for a long time. Review of Systems:  A 14 point review of systems was taken and pertinent positive as per HPI.         Past Medical History:   Diagnosis Date    Acquired hypothyroidism 1/16/2016    Breast cancer ([facility] 2008    Depression 8/18/2016    Endocrine disease     hypothyroid    Fungal dermatitis 8/18/2016    Hyperlipidemia 1/16/2016    Hypertension, essential, benign 8/18/2016    Hypokalemia 8/18/2016    Inflamed sebaceous cyst 8/18/2016    Major depressive disorder, recurrent, moderate (HCC) 8/18/2016    Nicotine dependence, cigarettes, uncomplicated 4/90/4175    Osteopenia 8/18/2016    Osteoporosis 8/18/2016    Radiation therapy complication 2347    Rheumatoid arthritis ([facility] 1/16/2016    Right carotid bruit 1/16/2016    TIA (transient ischemic attack) 1/16/2016    Tobacco abuse 8/18/2016       Past Surgical History:   Procedure Laterality Date    HX ADENOIDECTOMY      HX BREAST LUMPECTOMY Right 2008    with lymph nodes    HX TONSILLECTOMY         Family History : reviewed  Family History   Problem Relation Age of Onset    Stroke Mother     Cancer Father         Brain    HIV/AIDS Brother         Social History     Tobacco Use    Smoking status: Current Some Day Smoker    Smokeless tobacco: Never Used    Tobacco comment: Only on pay day-1/2 pack   Substance Use Topics    Alcohol use: No       No Known Allergies    Immunization History   Administered Date(s) Administered    Influenza High Dose Vaccine PF 10/28/2015, 09/21/2016, 09/15/2017    Pneumococcal Polysaccharide (PPSV-23) 01/18/2016    TB Skin Test (PPD) Intradermal 04/22/2019, 05/12/2021         PTA Medications:  Current Outpatient Medications   Medication Instructions    amiodarone (CORDARONE) 200 mg, Oral, DAILY    apixaban (ELIQUIS) 5 mg, Oral, 2 TIMES DAILY    cholecalciferol (VITAMIN D3) 1,000 Units, Oral, DAILY  diclofenac EC (VOLTAREN) 75 mg, Oral, 2 TIMES DAILY    DULoxetine (CYMBALTA) 60 mg, Oral, DAILY    famotidine (PEPCID) 20 mg, Oral, DAILY    folic acid (FOLVITE) 1 mg, Oral, DAILY    levothyroxine (SYNTHROID) 25 mcg, Oral, DAILY BEFORE BREAKFAST    methotrexate (RHEUMATREX) 2.5 mg tablet 5 tabs every Sunday    ondansetron (ZOFRAN ODT) 4 mg, Oral, EVERY 8 HOURS AS NEEDED    predniSONE (DELTASONE) 30 mg, Oral, DAILY WITH BREAKFAST    rosuvastatin (CRESTOR) 10 mg, Oral, EVERY BEDTIME    spironolactone (ALDACTONE) 25 mg, Oral, DAILY       Objective:     Patient Vitals for the past 24 hrs:   Temp Pulse Resp BP SpO2   05/17/21 0746 98.7 °F (37.1 °C) 80 20 120/67 97 %   05/17/21 0336 98.9 °F (37.2 °C) 86 16 129/60 98 %   05/16/21 2309 98.5 °F (36.9 °C) 88 16 112/61 98 %   05/16/21 2038 99.3 °F (37.4 °C) 90 18 138/66 94 %   05/16/21 1519 99.4 °F (37.4 °C) 89 16 129/63 100 %   05/16/21 1118 99.6 °F (37.6 °C) 87 16 (!) 132/59 95 %       Oxygen Therapy  O2 Sat (%): 97 % (05/17/21 0746)  Pulse via Oximetry: 81 beats per minute (05/14/21 1620)  O2 Device: None (Room air) (05/17/21 0340)  Skin Assessment: Clean, dry, & intact (05/14/21 8639)  Skin Protection for O2 Device: No (05/14/21 9936)  O2 Flow Rate (L/min): 3 l/min (05/14/21 1532)    Body mass index is 29.69 kg/m². Physical Exam:    General:  No acute distress, speaking in full sentences,   HEENT:  EOMI, oropharynx is clear, resolving hematoma to R eye  Neck:   Supple, no lymphadenopathy, no JVD   Lungs:  Clear to auscultation bilaterally with crackles right upper lobe.   CV:   Distant HS, Regular rate and rhythm with normal S1 and S2   Abdomen:  Soft, nontender, nondistended, normoactive bowel sounds   Extremities:  Trace edema to BL LE, chronic per pt  Skin:  Hematoma face and abdomen/back  Neuro:  AOx3, generalized weakness, moving all 4 extremities, speech normal  Psych:  Normal mood and affect       Data Reviewed: I have reviewed all labs, meds, and studies.   Procedures done this admission:  * No surgery found *    All Micro Results     Procedure Component Value Units Date/Time    CULTURE, URINE [[id number]] Collected: 05/17/21 0934    Order Status: Completed Specimen: Urine from Clean catch Updated: 05/17/21 1003    CULTURE, BLOOD [[id number]] Collected: 05/15/21 0756    Order Status: Completed Specimen: Blood Updated: 05/16/21 0705     Special Requests: --        LEFT  HAND       Culture result: NO GROWTH AFTER 20 HOURS       CULTURE, BLOOD [[id number]] Collected: 05/15/21 0937    Order Status: Completed Specimen: Blood Updated: 05/16/21 0705     Special Requests: --        LEFT  HAND       Culture result: NO GROWTH AFTER 20 HOURS       CULTURE, BLOOD [[id number]] Collected: 05/13/21 1613    Order Status: Completed Specimen: Blood Updated: 05/16/21 0705     Special Requests: --        LEFT  Antecubital       Culture result: NO GROWTH 3 DAYS       CULTURE, BLOOD [[id number]] Collected: 05/13/21 1616    Order Status: Completed Specimen: Blood Updated: 05/16/21 0705     Special Requests: --        LEFT  HAND       Culture result: NO GROWTH 3 DAYS             SARS-CoV-2 Lab Results  \"Novel Coronavirus\" Test: No results found for: COV2NT   \"Emergent Disease\" Test: No results found for: EDPR  \"SARS-COV-2\" Test: No results found for: XGCOVT  \"Precision Labs\" Test: No results found for: RSLT  Rapid Test:   Lab Results   Component Value Date/Time    COVR Not detected 03/19/2021 06:15 PM            Labs: Results:       BMP, Mg, Phos Recent Labs     05/17/21  0535 05/16/21  1054 05/15/21  0437    136 139   K 3.0* 3.4* 3.8    105 109*   CO2 21 23 22   AGAP 8 8 8   BUN 11 13 15   CREA 1.34* 1.44* 1.55*   CA 7.6* 7.8* 7.9*   GLU 81 102* 131*   MG 1.4*  --   --    PHOS 2.2*  --   --       CBC Recent Labs     05/17/21 0535 05/16/21  1054 05/15/21  0437   WBC 0.8* 0.4* 0.3*   RBC 2.23* 2.40* 2.65*   HGB 6.7* 7.2* 8.0*   HCT 20.8* 22.6* 25.1*   PLT 28* 41* 88*   JOHNNIE 13* 25*  --    LYMPH 44 50*  --    EOS 35* 14*  --    MONOS 8 11  --    BASOS 0 0  --    IG 0 0  --    ANEU 0.1* 0.1*  --    ABL 0.3* 0.2*  --    JOSE G 0.3 0.1  --    ABM 0.1 0.0*  --    ABB 0.0 0.0  --    AIG 0.0 0.0  --       LFT Recent Labs     05/16/21  1054 05/15/21  0437 05/14/21  1658   ALT 25 29  --    AP 62 65  --    TP 4.8* 5.0* 4.7*   ALB 1.7* 1.9*  --    GLOB 3.1 3.1  --    AGRAT 0.5* 0.6* PENDING      Cardiac Testing Lab Results   Component Value Date/Time    BNP 25 (H) 04/22/2019 12:28 PM    Troponin-I, Qt. <0.02 (L) 01/16/2016 06:55 PM      Coagulation Tests Lab Results   Component Value Date/Time    Prothrombin time 17.5 (H) 05/12/2021 10:33 AM    Prothrombin time 9.9 01/16/2016 06:55 PM    INR 1.4 05/12/2021 10:33 AM    INR 0.9 01/16/2016 06:55 PM    aPTT 30.4 05/13/2021 08:25 AM    aPTT 28.1 03/21/2021 03:01 PM    aPTT 26.8 03/19/2021 05:38 PM      A1c Lab Results   Component Value Date/Time    Hemoglobin A1c 5.2 01/17/2016 03:54 AM      Lipid Panel Lab Results   Component Value Date/Time    Cholesterol, total 140 02/05/2018 01:20 PM    HDL Cholesterol 45 02/05/2018 01:20 PM    LDL, calculated 72 02/05/2018 01:20 PM    VLDL, calculated 23 02/05/2018 01:20 PM    Triglyceride 113 02/05/2018 01:20 PM    CHOL/HDL Ratio 3.3 01/17/2016 03:54 AM      Thyroid Panel Lab Results   Component Value Date/Time    TSH 4.390 (H) 05/12/2021 01:46 PM    TSH 2.230 03/19/2021 01:38 PM        Most Recent UA Lab Results   Component Value Date/Time    Color GENNY 05/17/2021 09:46 AM    Appearance CLOUDY 05/17/2021 09:46 AM    Specific gravity 1.016 04/23/2019 08:35 PM    pH (UA) 5.5 05/17/2021 09:46 AM    Protein 100 (A) 05/17/2021 09:46 AM    Glucose Negative 05/17/2021 09:46 AM    Ketone Negative 05/17/2021 09:46 AM    Bilirubin Negative 05/17/2021 09:46 AM    Blood Negative 05/17/2021 09:46 AM    Urobilinogen 1.0 05/17/2021 09:46 AM    Nitrites Negative 05/17/2021 09:46 AM    Leukocyte Esterase SMALL (A) 05/17/2021 09:46 AM    WBC 0-3 05/17/2021 09:46 AM    RBC 0-3 05/17/2021 09:46 AM    Epithelial cells 0-3 05/17/2021 09:46 AM    Bacteria TRACE 05/17/2021 09:46 AM    Casts 10-20 03/19/2021 03:37 PM    Crystals, urine 0 03/19/2021 03:37 PM    Mucus 0 03/19/2021 03:37 PM    Other observations RESULTS VERIFIED MANUALLY 05/17/2021 09:46 AM                Problem List:     Hospital Problems as of 5/17/2021 Date Reviewed: 4/13/2021          Codes Class Noted - Resolved POA    Febrile neutropenia (HCC) ICD-10-CM: D70.9, R50.81  ICD-9-CM: 288.00, 780.61  5/15/2021 - Present Unknown        Right upper lobe pneumonia ICD-10-CM: J18.9  ICD-9-CM: 433  5/15/2021 - Present Unknown        * (Principal) Anemia ICD-10-CM: D64.9  ICD-9-CM: 285.9  5/12/2021 - Present Unknown        Leukopenia ICD-10-CM: D72.819  ICD-9-CM: 288.50  5/12/2021 - Present Unknown        NAZIA (acute kidney injury) ([facility] ICD-10-CM: N17.9  ICD-9-CM: 584.9  5/12/2021 - Present Unknown        Rib fracture ICD-10-CM: S22.39XA  ICD-9-CM: 807.00  5/12/2021 - Present Unknown        Hypertension, essential, benign ICD-10-CM: I10  ICD-9-CM: 401.1  8/18/2016 - Present Yes        Major depressive disorder, recurrent, moderate ([facility] ICD-10-CM: F33.1  ICD-9-CM: 296.32  8/18/2016 - Present Yes        Acquired hypothyroidism ICD-10-CM: E03.9  ICD-9-CM: 244.9  1/16/2016 - Present Yes        Hyperlipidemia ICD-10-CM: E78.5  ICD-9-CM: 272.4  1/16/2016 - Present Yes        Rheumatoid arthritis ([facility] ICD-10-CM: M06.9  ICD-9-CM: 714.0  1/16/2016 - Present Yes                 Signed By: Esperanza Lopez MD   [facility]    May 17, 2021

## 2021-05-17 NOTE — PROGRESS NOTES
Critical labs received at 0619: WBC 0.8, Hbg 6.7 and platelets of 28. Messaged Dr. Leonie Saenz via [employer] at [phone], message read at [phone]. No new orders received. Critical labs reported to oncoming nurse.

## 2021-05-17 NOTE — PROGRESS NOTES
CM met with the patient to discuss discharge plans. Patient uses a walker and cane at home. She has had [facility] services in the past but has never been to rehab. Patient lives alone. CM talked to patient about discharging to rehab. Patient isn't sure if she wants to go to rehab just yet. She said she would see how she was doing later and then decide. CM left a list of rehabs with patient.

## 2021-05-17 NOTE — PROGRESS NOTES
ACUTE PHYSICAL THERAPY GOALS:  (Developed with and agreed upon by patient and/or caregiver. )  LTG:  (1.)Ms. Betsy Knight will move from supine to sit and sit to supine , scoot up and down and roll side to side in bed with INDEPENDENT within 7 treatment day(s). (2.)Ms. Betsy Knight will transfer from bed to chair and chair to bed with CONTACT GUARD ASSIST using the least restrictive device within 7 treatment day(s). (3.)Ms. Almeida will ambulate with CONTACT GUARD ASSIST for 50 feet with the least restrictive device within 7 treatment day(s). (4.)Ms. Betsy Knight will tolerate at least 23 min of dynamic standing activity to assist standing ADLs with the least restrictive device within 7 treatment days. (5.)Ms. Betsy Knight will climb at least 5 steps with MODERATE ASSIST with the least restrictive device within 7 treatment days. PHYSICAL THERAPY: Daily Note and AM Treatment Day # 2    Arley Pacheco is a 68 y.o. female   PRIMARY DIAGNOSIS: Anemia  Anemia [D64.9]         ASSESSMENT:     REHAB RECOMMENDATIONS: CURRENT LEVEL OF FUNCTION:  (Most Recently Demonstrated)   Recommendation to date pending progress:  Setting:   Short-term Rehab  Equipment:    To Be Determined Bed Mobility:   Minimal Assistance  Sit to Stand:   Minimal Assistance  Transfers:   Minimal Assistance  Gait/Mobility:   Minimal Assistance     ASSESSMENT:  Ms. Betsy Knight is making slow, steady progress towards PT goals. She needed min assist for bed mobility, transfers and ambulation. Patient ambulated 8' x 2 with HHA and unsteady gait noted. She felt too short of breath to attempt further ambulation and kept asking for a \"patch\" on her side. RN notified. Patient then participated in exercises with good ability. Will continue efforts. SUBJECTIVE:   Ms. Btesy Knight states, \"I need to breath better. \"    SOCIAL HISTORY/ LIVING ENVIRONMENT: see initial evaluation  Home Environment: Trailer/mobile home  # Steps to Enter: 5  One/Two Story Residence: One story  Living Alone: Yes  Support Systems: Friends \ neighbors, Herb  / yao community  OBJECTIVE:     PAIN: VITAL SIGNS: LINES/DRAINS:   Pre Treatment: Pain Screen  Pain Scale 1: FLACC  Pain Intensity 1: 0  Post Treatment: 0   None  O2 Device: None (Room air)     MOBILITY: I Mod I S SBA CGA Min Mod Max Total  NT x2 Comments:   Bed Mobility    Rolling [] [] [] [] [] [] [] [] [] [] []    Supine to Sit [] [] [] [] [] [x] [] [] [] [] []    Scooting [] [] [] [] [] [] [] [] [] [] []    Sit to Supine [] [] [] [] [] [] [] [] [] [] []    Transfers    Sit to Stand [] [] [] [] [] [x] [] [] [] [] []    Bed to Chair [] [] [] [] [] [x] [] [] [] [] []    Stand to Sit [] [] [] [] [] [x] [] [] [] [] []    I=Independent, Mod I=Modified Independent, S=Supervision, SBA=Standby Assistance, CGA=Contact Guard Assistance,   Min=Minimal Assistance, Mod=Moderate Assistance, Max=Maximal Assistance, Total=Total Assistance, NT=Not Tested    BALANCE: Good Fair+ Fair Fair- Poor NT Comments   Sitting Static [x] [] [] [] [] []    Sitting Dynamic [] [x] [] [] [] []              Standing Static [] [] [x] [] [] []    Standing Dynamic [] [] [] [] [] []      GAIT: I Mod I S SBA CGA Min Mod Max Total  NT x2 Comments:   Level of Assistance [] [] [] [] [] [x] [] [] [] [] []    Distance 8' x 2    DME HHA    Gait Quality Unsteady    Weightbearing  Status N/A     I=Independent, Mod I=Modified Independent, S=Supervision, SBA=Standby Assistance, CGA=Contact Guard Assistance,   Min=Minimal Assistance, Mod=Moderate Assistance, Max=Maximal Assistance, Total=Total Assistance, NT=Not Tested    PLAN:   FREQUENCY/DURATION: PT Plan of Care: 3 times/week for duration of hospital stay or until stated goals are met, whichever comes first.  TREATMENT:     TREATMENT:   ($$ Therapeutic Activity: 23-37 mins    )  Therapeutic Activity (24 Minutes):  Therapeutic activity included Supine to Sit, Scooting, Transfer Training, Ambulation on level ground, Sitting balance , Standing balance and LE exercises to improve functional Mobility, Strength and Activity tolerance.     TREATMENT GRID:  N/A    AFTER TREATMENT POSITION/PRECAUTIONS:  Chair, Needs within reach and RN notified    INTERDISCIPLINARY COLLABORATION:  RN/PCT and PT/PTA    TOTAL TREATMENT DURATION:  PT Patient Time In/Time Out  Time In: 0845  Time Out: 23500  [address] CHEMO Yung

## 2021-05-18 LAB
25(OH)D3 SERPL-MCNC: 19.4 NG/ML (ref 30–100)
ABO + RH BLD: NORMAL
ANION GAP SERPL CALC-SCNC: 8 MMOL/L (ref 7–16)
ARTERIAL PATENCY WRIST A: POSITIVE
BACTERIA SPEC CULT: NORMAL
BACTERIA SPEC CULT: NORMAL
BASE DEFICIT BLD-SCNC: 7.3 MMOL/L
BASOPHILS # BLD: 0 K/UL (ref 0–0.2)
BASOPHILS NFR BLD: 0 % (ref 0–2)
BDY SITE: ABNORMAL
BLD PROD TYP BPU: NORMAL
BLOOD GROUP ANTIBODIES SERPL: NORMAL
BPU ID: NORMAL
BUN SERPL-MCNC: 10 MG/DL (ref 8–23)
CALCIUM SERPL-MCNC: 7.3 MG/DL (ref 8.3–10.4)
CHLORIDE SERPL-SCNC: 107 MMOL/L (ref 98–107)
CO2 SERPL-SCNC: 23 MMOL/L (ref 21–32)
CREAT SERPL-MCNC: 1.34 MG/DL (ref 0.6–1)
CROSSMATCH RESULT,%XM: NORMAL
DIFFERENTIAL METHOD BLD: ABNORMAL
EOSINOPHIL # BLD: 0.2 K/UL (ref 0–0.8)
EOSINOPHIL NFR BLD: 23 % (ref 0.5–7.8)
ERYTHROCYTE [DISTWIDTH] IN BLOOD BY AUTOMATED COUNT: 15.4 % (ref 11.9–14.6)
FIO2, L/MIN - FIO2P: 15
GAS FLOW.O2 O2 DELIVERY SYS: ABNORMAL L/MIN
GLUCOSE SERPL-MCNC: 97 MG/DL (ref 65–100)
HCO3 BLD-SCNC: 16.9 MMOL/L (ref 22–26)
HCT VFR BLD AUTO: 23.6 % (ref 35.8–46.3)
HCT VFR BLD AUTO: 24.3 % (ref 35.8–46.3)
HGB BLD-MCNC: 7.7 G/DL (ref 11.7–15.4)
HGB BLD-MCNC: 7.9 G/DL (ref 11.7–15.4)
IMM GRANULOCYTES # BLD AUTO: 0 K/UL (ref 0–0.5)
IMM GRANULOCYTES NFR BLD AUTO: 1 % (ref 0–5)
LYMPHOCYTES # BLD: 0.4 K/UL (ref 0.5–4.6)
LYMPHOCYTES NFR BLD: 38 % (ref 13–44)
MCH RBC QN AUTO: 30 PG (ref 26.1–32.9)
MCHC RBC AUTO-ENTMCNC: 32.6 G/DL (ref 31.4–35)
MCV RBC AUTO: 91.8 FL (ref 79.6–97.8)
MONOCYTES # BLD: 0.3 K/UL (ref 0.1–1.3)
MONOCYTES NFR BLD: 26 % (ref 4–12)
NEUTS SEG # BLD: 0.1 K/UL (ref 1.7–8.2)
NEUTS SEG NFR BLD: 12 % (ref 43–78)
NRBC # BLD: 0.04 K/UL (ref 0–0.2)
PCO2 BLD: 28.6 MMHG (ref 35–45)
PH BLD: 7.38 [PH] (ref 7.35–7.45)
PLATELET # BLD AUTO: 15 K/UL (ref 150–450)
PLATELET COMMENTS,PCOM: ABNORMAL
PMV BLD AUTO: 13.3 FL (ref 9.4–12.3)
PO2 BLD: 109 MMHG (ref 75–100)
POTASSIUM SERPL-SCNC: 2.8 MMOL/L (ref 3.5–5.1)
RBC # BLD AUTO: 2.57 M/UL (ref 4.05–5.2)
RBC MORPH BLD: ABNORMAL
RBC MORPH BLD: ABNORMAL
SAO2 % BLD: 98.3 % (ref 95–98)
SERVICE CMNT-IMP: ABNORMAL
SERVICE CMNT-IMP: NORMAL
SERVICE CMNT-IMP: NORMAL
SODIUM SERPL-SCNC: 138 MMOL/L (ref 136–145)
SPECIMEN EXP DATE BLD: NORMAL
SPECIMEN TYPE: ABNORMAL
STATUS OF UNIT,%ST: NORMAL
UNIT DIVISION, %UDIV: 0
WBC # BLD AUTO: 1 K/UL (ref 4.3–11.1)
WBC MORPH BLD: ABNORMAL

## 2021-05-18 PROCEDURE — 85025 COMPLETE CBC W/AUTO DIFF WBC: CPT

## 2021-05-18 PROCEDURE — 94760 N-INVAS EAR/PLS OXIMETRY 1: CPT

## 2021-05-18 PROCEDURE — 74011250637 HC RX REV CODE- 250/637: Performed by: INTERNAL MEDICINE

## 2021-05-18 PROCEDURE — 36600 WITHDRAWAL OF ARTERIAL BLOOD: CPT

## 2021-05-18 PROCEDURE — C9113 INJ PANTOPRAZOLE SODIUM, VIA: HCPCS | Performed by: INTERNAL MEDICINE

## 2021-05-18 PROCEDURE — 74011250636 HC RX REV CODE- 250/636: Performed by: INTERNAL MEDICINE

## 2021-05-18 PROCEDURE — 94640 AIRWAY INHALATION TREATMENT: CPT

## 2021-05-18 PROCEDURE — 74011250636 HC RX REV CODE- 250/636: Performed by: NURSE PRACTITIONER

## 2021-05-18 PROCEDURE — 2709999900 HC NON-CHARGEABLE SUPPLY

## 2021-05-18 PROCEDURE — 74011000250 HC RX REV CODE- 250: Performed by: INTERNAL MEDICINE

## 2021-05-18 PROCEDURE — 94664 DEMO&/EVAL PT USE INHALER: CPT

## 2021-05-18 PROCEDURE — 80048 BASIC METABOLIC PNL TOTAL CA: CPT

## 2021-05-18 PROCEDURE — 77010033678 HC OXYGEN DAILY

## 2021-05-18 PROCEDURE — 97530 THERAPEUTIC ACTIVITIES: CPT

## 2021-05-18 PROCEDURE — APPSS45 APP SPLIT SHARED TIME 31-45 MINUTES: Performed by: NURSE PRACTITIONER

## 2021-05-18 PROCEDURE — 99232 SBSQ HOSP IP/OBS MODERATE 35: CPT | Performed by: INTERNAL MEDICINE

## 2021-05-18 PROCEDURE — 74011000250 HC RX REV CODE- 250: Performed by: HOSPITALIST

## 2021-05-18 PROCEDURE — 82306 VITAMIN D 25 HYDROXY: CPT

## 2021-05-18 PROCEDURE — 88185 FLOWCYTOMETRY/TC ADD-ON: CPT

## 2021-05-18 PROCEDURE — 65270000029 HC RM PRIVATE

## 2021-05-18 PROCEDURE — 97110 THERAPEUTIC EXERCISES: CPT

## 2021-05-18 PROCEDURE — 74011250637 HC RX REV CODE- 250/637: Performed by: FAMILY MEDICINE

## 2021-05-18 PROCEDURE — 74011000258 HC RX REV CODE- 258: Performed by: INTERNAL MEDICINE

## 2021-05-18 PROCEDURE — 85018 HEMOGLOBIN: CPT

## 2021-05-18 PROCEDURE — 82803 BLOOD GASES ANY COMBINATION: CPT

## 2021-05-18 RX ORDER — POTASSIUM CHLORIDE 14.9 MG/ML
20 INJECTION INTRAVENOUS
Status: COMPLETED | OUTPATIENT
Start: 2021-05-18 | End: 2021-05-18

## 2021-05-18 RX ADMIN — SODIUM CHLORIDE 5 ML: 9 INJECTION, SOLUTION INTRAMUSCULAR; INTRAVENOUS; SUBCUTANEOUS at 13:00

## 2021-05-18 RX ADMIN — PANTOPRAZOLE SODIUM 40 MG: 40 INJECTION, POWDER, FOR SOLUTION INTRAVENOUS at 23:31

## 2021-05-18 RX ADMIN — DULOXETINE HYDROCHLORIDE 60 MG: 60 CAPSULE, DELAYED RELEASE ORAL at 09:17

## 2021-05-18 RX ADMIN — SODIUM CHLORIDE, SODIUM LACTATE, POTASSIUM CHLORIDE, AND CALCIUM CHLORIDE 75 ML/HR: 600; 310; 30; 20 INJECTION, SOLUTION INTRAVENOUS at 04:52

## 2021-05-18 RX ADMIN — PANTOPRAZOLE SODIUM 40 MG: 40 INJECTION, POWDER, FOR SOLUTION INTRAVENOUS at 09:18

## 2021-05-18 RX ADMIN — ROSUVASTATIN CALCIUM 10 MG: 5 TABLET, FILM COATED ORAL at 23:29

## 2021-05-18 RX ADMIN — NYSTATIN 500000 UNITS: 100000 SUSPENSION ORAL at 12:58

## 2021-05-18 RX ADMIN — DOXYCYCLINE 100 MG: 100 INJECTION, POWDER, LYOPHILIZED, FOR SOLUTION INTRAVENOUS at 01:06

## 2021-05-18 RX ADMIN — SODIUM CHLORIDE 20 ML: 9 INJECTION, SOLUTION INTRAMUSCULAR; INTRAVENOUS; SUBCUTANEOUS at 23:30

## 2021-05-18 RX ADMIN — AMIODARONE HYDROCHLORIDE 200 MG: 200 TABLET ORAL at 09:17

## 2021-05-18 RX ADMIN — PANTOPRAZOLE SODIUM 40 MG: 40 INJECTION, POWDER, FOR SOLUTION INTRAVENOUS at 01:07

## 2021-05-18 RX ADMIN — LEVOTHYROXINE SODIUM 25 MCG: 0.05 TABLET ORAL at 06:21

## 2021-05-18 RX ADMIN — NYSTATIN 500000 UNITS: 100000 SUSPENSION ORAL at 09:17

## 2021-05-18 RX ADMIN — SODIUM CHLORIDE 10 ML: 9 INJECTION, SOLUTION INTRAMUSCULAR; INTRAVENOUS; SUBCUTANEOUS at 06:21

## 2021-05-18 RX ADMIN — TBO-FILGRASTIM 300 MCG: 300 INJECTION, SOLUTION SUBCUTANEOUS at 23:29

## 2021-05-18 RX ADMIN — POTASSIUM CHLORIDE 20 MEQ: 14.9 INJECTION, SOLUTION INTRAVENOUS at 12:50

## 2021-05-18 RX ADMIN — TBO-FILGRASTIM 300 MCG: 300 INJECTION, SOLUTION SUBCUTANEOUS at 01:32

## 2021-05-18 RX ADMIN — NYSTATIN 500000 UNITS: 100000 SUSPENSION ORAL at 23:31

## 2021-05-18 RX ADMIN — NYSTATIN 500000 UNITS: 100000 SUSPENSION ORAL at 17:16

## 2021-05-18 RX ADMIN — POTASSIUM CHLORIDE 20 MEQ: 14.9 INJECTION, SOLUTION INTRAVENOUS at 10:44

## 2021-05-18 RX ADMIN — DOXYCYCLINE 100 MG: 100 INJECTION, POWDER, LYOPHILIZED, FOR SOLUTION INTRAVENOUS at 09:19

## 2021-05-18 RX ADMIN — LACTOBACILLUS TAB 1 TABLET: TAB at 09:17

## 2021-05-18 RX ADMIN — SODIUM CHLORIDE, SODIUM LACTATE, POTASSIUM CHLORIDE, AND CALCIUM CHLORIDE 75 ML/HR: 600; 310; 30; 20 INJECTION, SOLUTION INTRAVENOUS at 17:19

## 2021-05-18 RX ADMIN — IPRATROPIUM BROMIDE AND ALBUTEROL SULFATE 3 ML: .5; 3 SOLUTION RESPIRATORY (INHALATION) at 22:35

## 2021-05-18 RX ADMIN — POTASSIUM CHLORIDE 20 MEQ: 14.9 INJECTION, SOLUTION INTRAVENOUS at 09:18

## 2021-05-18 RX ADMIN — IPRATROPIUM BROMIDE AND ALBUTEROL SULFATE 3 ML: .5; 3 SOLUTION RESPIRATORY (INHALATION) at 00:09

## 2021-05-18 RX ADMIN — FOLIC ACID 1 MG: 1 TABLET ORAL at 09:17

## 2021-05-18 NOTE — PROGRESS NOTES
Critical received from Jenny Jimenes from the lab at [place] [address]. WBC count 1.0, platelets of 15. Messaged Dr. Luna Stinson at [phone], read at [phone]. No new orders received.

## 2021-05-18 NOTE — PROGRESS NOTES
Comprehensive Nutrition Assessment    Type and Reason for Visit: Reassess    Nutrition Recommendations/Plan:    Advance diet as tolerated: Starting with full liquids. If pt tolerates diet at dinner ok to advance to GI soft.  Add Ensure Enlive TID with meals. Malnutrition Assessment:  Malnutrition Status: At risk for malnutrition (specify)(pt reports poor po intake PTA)    Nutrition Assessment:   Nutrition History: Pt reports eating 3 bowls of chicken noodle soup for the past 2 weeks due to not feeling hungry. Pt reports 150lbs 1 month ago. Nutrition Background: Pt presents after fall, weakness, and nausea. She reports dark stools since starting Eliquis. Pt noted to be severely anemic, leukopenic, and stool heme + in ED. Pt admitted for anemia with concerns for GI bleed. PMH notable for a.fib, CKD III, RA, hx of breast cancer 2008, an dhypothyroidism. Daily Update:  Pt seen with lunch tray on bedside table. Pt reports eating most to all of meals, but does feel nausea sometimes while eating. Pt denies vomiting. Noted pt's diet is still CLQ. GI note on 5/14 states if she has signs of bleeding then recommend EGD. Ok to be on diet from GI standpoint per note. MD is agreeable to have diet advanced as tolerated. Pt is very excited about diet advancement. Discussed advancing with RN after dinner. Nutrition Related Findings:   No muscle or subcutaneous fat loss noted      Current Nutrition Therapies:  DIET NUTRITIONAL SUPPLEMENTS All Meals; Ensure Clear ( )  DIET CLEAR LIQUID    Current Intake:   Average Meal Intake: % Average Supplement Intake: 1-25%      Anthropometric Measures:  Height: 5' (152.4 cm)  Current Body Wt: 68.9 kg (151 lb 14.4 oz)(5/14), Weight source: Not specified  BMI: 29.7, Overweight (BMI 25.0-29. 9)     Ideal Body Weight (lbs) (Calculated): 100 lbs (45 kg), 156.7 %          Edema: LLE: Trace (5/18/2021  7:54 AM)  RLE: Trace (5/18/2021  7:54 AM)     Estimated Daily Nutrient Needs:  Energy (kcal/day): 9100-5718 (Kcal/kg(16-20), Weight Used: Current(71.1kg))  Protein (g/day): 57-71 Weight Used: (Other (specify)(20% kcal))  Fluid (ml/day):   (1 ml/kcal)    Nutrition Diagnosis:   · Inadequate oral intake related to altered GI function(loss of appetite) as evidenced by NPO or clear liquid status due to medical condition(pt reported barrier to intake)    Nutrition Interventions:   Food and/or Nutrient Delivery: Modify current diet, Start oral nutrition supplement     Coordination of Nutrition Care: Continue to monitor while inpatient  Plan of Care discussed with Dr. Estella Lewis via [employer] RN    Goals:   Previous Goal Met: Progressing toward goal(s)  Active Goal: Meet >75% estimated nutrition needs wtihin 7 days    Nutrition Monitoring and Evaluation:      Food/Nutrient Intake Outcomes: Food and nutrient intake, Supplement intake, Diet advancement/tolerance       Discharge Planning:     Too soon to determine    Electronically signed by Anthony Paz MS, RDN, LD 5/18/2021 at 3:23 PM  Contact: [phone]

## 2021-05-18 NOTE — PROGRESS NOTES
Pt in bed resting on RA. Pt in no apparent distress, call light in reach, bed in lowest, locked position. Report given to oncoming RN.

## 2021-05-18 NOTE — PROGRESS NOTES
ACUTE OT GOALS:  (Developed with and agreed upon by patient and/or caregiver.)    1. Patient will bathe and dress total body with modified independence and adaptive device as needed. 2. Patient will toilet with modified independence and adaptive device as needed. 3. Patient will tolerate 30 minutes of OT treatment with up to 2 rest breaks to increase activity tolerance for ADLs. 4. Patient will complete functional transfers with modified independence. 5. Patient will complete functional mobility for ADLs with modified independence. 6. Patient will demonstrate modified independence with therapeutic exercise HEP to increase strength in BUEs for increased safety and independence with functional tasks. Timeframe: 7 visits  OCCUPATIONAL THERAPY: Daily Note OT Treatment Day # 4    Pavithra Piña is a 68 y.o. female   PRIMARY DIAGNOSIS: Anemia  Anemia [D64.9]       Payor: [employer] MEDICARE COMPLETE / Plan: Isabel Nguyen MEDICARE COMPLETE / Product Type: Managed Care Medicare /   ASSESSMENT:     REHAB RECOMMENDATIONS: CURRENT LEVEL OF FUNCTION:  (Most Recently Demonstrated)   Recommendation to date pending progress:  Setting:   Short-term Rehab  Equipment:    Rolling Walker Bathing:   Not tested  Dressing:   Not tested  Feeding/Grooming:   Not tested  Toileting:   Not tested  Functional Mobility:   Not tested     ASSESSMENT:  Ms. Liliana Valdivia is doing fair today. Pt performed UE exercises while sitting up in bed. Pt tolerated exercises well with rest breaks in between. Pt continue to be educated on energy conservation techniques as well. Pt verbalized understanding. Pt making some progress with goals. Will continue to benefit from skilled OT during stay.      SUBJECTIVE:   Ms. Liliana Valdivia states, \"I been up in that chair today\"    SOCIAL HISTORY/LIVING ENVIRONMENT: Home Environment: Trailer/mobile home  # Steps to Enter: 5  One/Two Story Residence: One story  Living Alone: Yes  Support Systems: Friends \ neighbors, Theta Norm / [facility]    OBJECTIVE:     PAIN: VITAL SIGNS: LINES/DRAINS:   Pre Treatment:    Post Treatment: none   none  O2 Device: None (Room air)     ACTIVITIES OF DAILY LIVING: I Mod I S SBA CGA Min Mod Max Total NT Comments   BASIC ADLs:              Bathing/ Showering [] [] [] [] [] [] [] [] [] [x]    Toileting [] [] [] [] [] [] [] [] [] [x]    Dressing [] [] [] [] [] [] [] [] [] [x]    Feeding [] [] [] [] [] [] [] [] [] [x]    Grooming [] [] [] [] [] [] [] [] [] [x]    Personal Device Care [] [] [] [] [] [] [] [] [] [x]    Functional Mobility [] [] [] [] [] [] [] [] [] [x]    I=Independent, Mod I=Modified Independent, S=Supervision, SBA=Standby Assistance, CGA=Contact Guard Assistance,   Min=Minimal Assistance, Mod=Moderate Assistance, Max=Maximal Assistance, Total=Total Assistance, NT=Not Tested    MOBILITY: I Mod I S SBA CGA Min Mod Max Total  NT x2 Comments:   Supine to sit [] [] [] [] [] [] [] [] [] [x] []    Sit to supine [] [] [] [] [] [] [] [] [] [x] []    Sit to stand [] [] [] [] [] [] [] [] [] [x] []    Bed to chair [] [] [] [] [] [] [] [] [] [x] []    I=Independent, Mod I=Modified Independent, S=Supervision, SBA=Standby Assistance, CGA=Contact Guard Assistance,   Min=Minimal Assistance, Mod=Moderate Assistance, Max=Maximal Assistance, Total=Total Assistance, NT=Not Tested    BALANCE: Good Fair+ Fair Fair- Poor NT Comments   Sitting Static [] [] [x] [] [] []    Sitting Dynamic [] [] [x] [] [] []              Standing Static [] [] [] [] [] [x]    Standing Dynamic [] [] [] [] [] [x]      PLAN:   FREQUENCY/DURATION: OT Plan of Care: 3 times/week for duration of hospital stay or until stated goals are met, whichever comes first.    TREATMENT:   TREATMENT:   ( $$ Therapeutic Exercises: 8-22 mins   )  Therapeutic Exercise (15 Minutes): Therapeutic exercises noted below to improve functional activity tolerance, strength and mobility.      TREATMENT GRID:   Date:  5/17/21 Date:  5/18/21  AROM exercises only today Date:     Activity/Exercise Parameters Parameters Parameters   Shoulder Abd/Adduction 10 reps 5 reps    Shoulder Flexion (AROM) 10 reps 2 sets 10 reps with cane    Elbow Flexion 10 reps 15 reps with cane    Punches 10 reps 10 reps chest press    Shoulder Shrugs 10 reps 10 reps    Pro/Supination  15 reps            AFTER TREATMENT POSITION/PRECAUTIONS:  Bed, Needs within reach and RN notified    INTERDISCIPLINARY COLLABORATION:  RN/PCT and OT/SCHROEDER    TOTAL TREATMENT DURATION:  OT Patient Time In/Time Out  Time In: 9238  Time Out: 201 [facility]    Sascha De Los Santos

## 2021-05-18 NOTE — PROGRESS NOTES
Met with the patient to discuss rehab. Patient requested a bed at [facility]. Referral placed in CC. Awaiting response.

## 2021-05-18 NOTE — WOUND CARE
Patient seen for panus follow up today. Noted area that was denuded is 70-75% resurfaced with epithelial cells. Recommend use of the soft ABD pads in fold to help manage moisture and less abrasive then the elastic disposable brief rubbing on area. Patient updated. New ABD placed at this time. Will follow loosely, please call if needed.

## 2021-05-18 NOTE — PROGRESS NOTES
ACUTE PHYSICAL THERAPY GOALS:  (Developed with and agreed upon by patient and/or caregiver. )  LTG:  (1.)Ms. Radha Blue will move from supine to sit and sit to supine , scoot up and down and roll side to side in bed with INDEPENDENT within 7 treatment day(s). (2.)Ms. Radha Blue will transfer from bed to chair and chair to bed with CONTACT GUARD ASSIST using the least restrictive device within 7 treatment day(s). (3.)Ms. Almeida will ambulate with CONTACT GUARD ASSIST for 50 feet with the least restrictive device within 7 treatment day(s). (4.)Ms. Radha Blue will tolerate at least 23 min of dynamic standing activity to assist standing ADLs with the least restrictive device within 7 treatment days. (5.)Ms. Radha Blue will climb at least 5 steps with MODERATE ASSIST with the least restrictive device within 7 treatment days. PHYSICAL THERAPY: Daily Note and AM Treatment Day # 3    Alvarado Watson is a 68 y.o. female   PRIMARY DIAGNOSIS: Anemia  Anemia [D64.9]         ASSESSMENT:     REHAB RECOMMENDATIONS: CURRENT LEVEL OF FUNCTION:  (Most Recently Demonstrated)   Recommendation to date pending progress:  Setting:   Short-term Rehab  Equipment:    To Be Determined Bed Mobility:   Minimal Assistance  Sit to Stand:   Minimal Assistance  Transfers:   Minimal Assistance  Gait/Mobility:   Minimal Assistance     ASSESSMENT:  Ms. Radha Blue is making slow, steady progress towards PT goals. She needed min assist for bed mobility, transfers and ambulation. Patient ambulated 125' with rolling walker, chair follow and several short standing rest breaks due to fatigue/shortness of breath. Patient returns to recliner chair where she participates in LE exercises with good ability. Patient lives alone and would benefit from rehab prior to d/c home. Will continue efforts. SUBJECTIVE:   Ms. Radha Blue states, \"I'm used to this swelling. \"    SOCIAL HISTORY/ LIVING ENVIRONMENT: see initial evaluation  Home Environment: Trailer/mobile home  # Steps to Enter: 5  One/Two Story Residence: One story  Living Alone: Yes  Support Systems: Friends \ neighbors, [facility] / [facility] [facility]  OBJECTIVE:     PAIN: VITAL SIGNS: LINES/DRAINS:   Pre Treatment: Pain Screen  Pain Scale 1: FLACC  Pain Intensity 1: 0  Post Treatment: 0   None  O2 Device: None (Room air)     MOBILITY: I Mod I S SBA CGA Min Mod Max Total  NT x2 Comments:   Bed Mobility    Rolling [] [] [] [] [] [] [] [] [] [] []    Supine to Sit [] [] [] [] [] [x] [] [] [] [] []    Scooting [] [] [] [] [] [] [] [] [] [] []    Sit to Supine [] [] [] [] [] [x] [] [] [] [] []    Transfers    Sit to Stand [] [] [] [] [] [x] [] [] [] [] []    Bed to Chair [] [] [] [] [] [x] [] [] [] [] []    Stand to Sit [] [] [] [] [] [x] [] [] [] [] []    I=Independent, Mod I=Modified Independent, S=Supervision, SBA=Standby Assistance, CGA=Contact Guard Assistance,   Min=Minimal Assistance, Mod=Moderate Assistance, Max=Maximal Assistance, Total=Total Assistance, NT=Not Tested    BALANCE: Good Fair+ Fair Fair- Poor NT Comments   Sitting Static [x] [] [] [] [] []    Sitting Dynamic [] [x] [] [] [] []              Standing Static [] [x] [] [] [] [] With support from RW   Standing Dynamic [] [x] [] [] [] [] With support from RW     GAIT: I Mod I S SBA CGA Min Mod Max Total  NT x2 Comments:   Level of Assistance [] [] [] [] [] [x] [] [] [] [] []    Distance 125'    DME Rolling Walker    Gait Quality Chair follow for safety    Weightbearing  Status N/A     I=Independent, Mod I=Modified Independent, S=Supervision, SBA=Standby Assistance, CGA=Contact Guard Assistance,   Min=Minimal Assistance, Mod=Moderate Assistance, Max=Maximal Assistance, Total=Total Assistance, NT=Not Tested    PLAN:   FREQUENCY/DURATION: PT Plan of Care: 3 times/week for duration of hospital stay or until stated goals are met, whichever comes first.  TREATMENT:     TREATMENT:   ($$ Therapeutic Activity: 23-37 mins    )  Therapeutic Activity (23 Minutes):  Therapeutic activity included Supine to Sit, Sit to Supine, Scooting, Transfer Training, Ambulation on level ground, Sitting balance , Standing balance and LE exercises to improve functional Mobility, Strength and Activity tolerance.     TREATMENT GRID:  N/A    AFTER TREATMENT POSITION/PRECAUTIONS:  Bed, Needs within reach and RN notified    INTERDISCIPLINARY COLLABORATION:  RN/PCT, PT/PTA and Rehab Attendant     TOTAL TREATMENT DURATION:  PT Patient Time In/Time Out  Time In: 1017  Time Out: 1800 [facility]

## 2021-05-18 NOTE — PROGRESS NOTES
[facility]: In Patient Hematology / Oncology Progress Note    Reason for Consult: Acute pancytopenia acute pancytopenia  Referring Physician:  Elroy Bumpers, MD    24 Hour Events:  VSS, afebrile  Feeling improved  BMBx 5/14 - path pending but prelim suggestive of hypocellularity  Counts stable       ROS:  Constitutional: +fatigue. Negative for fever, chills, weakness, malaise. CV: Megative for chest pain, palpitations, edema. Respiratory: Negative for dyspnea, cough, wheezing. GI: +diarrhea (resolved). Negative for nausea, abdominal pain. 10 point review of systems is otherwise negative with the exception of the elements mentioned above in the HPI.            No Known Allergies  Past Medical History:   Diagnosis Date    Acquired hypothyroidism 1/16/2016    Breast cancer ([facility] [address] 2008    Depression 8/18/2016    Endocrine disease     hypothyroid    Fungal dermatitis 8/18/2016    Hyperlipidemia 1/16/2016    Hypertension, essential, benign 8/18/2016    Hypokalemia 8/18/2016    Inflamed sebaceous cyst 8/18/2016    Major depressive disorder, recurrent, moderate (HCC) 8/18/2016    Nicotine dependence, cigarettes, uncomplicated 9/17/4872    Osteopenia 8/18/2016    Osteoporosis 8/18/2016    Radiation therapy complication 5331    Rheumatoid arthritis ([facility] [address] 1/16/2016    Right carotid bruit 1/16/2016    TIA (transient ischemic attack) 1/16/2016    Tobacco abuse 8/18/2016     Past Surgical History:   Procedure Laterality Date    HX ADENOIDECTOMY      HX BREAST LUMPECTOMY Right 2008    with lymph nodes    HX TONSILLECTOMY      IR BX BONE MARROW DIAGNOSTIC  5/14/2021    IR INSERT NON TUNL CVC OVER 5 YRS  5/14/2021     Family History   Problem Relation Age of Onset    Stroke Mother     Cancer Father         Brain    HIV/AIDS Brother      Social History     Socioeconomic History    Marital status:      Spouse name: Not on file    Number of children: Not on file    Years of education: Not on file    Highest education level: Not on file   Occupational History    Not on file   Social Needs    Financial resource strain: Not on file    Food insecurity     Worry: Not on file     Inability: Not on file    Transportation needs     Medical: Not on file     Non-medical: Not on file   Tobacco Use    Smoking status: Current Some Day Smoker    Smokeless tobacco: Never Used    Tobacco comment: Only on pay day-1/2 pack   Substance and Sexual Activity    Alcohol use: No    Drug use: No    Sexual activity: Not on file   Lifestyle    Physical activity     Days per week: Not on file     Minutes per session: Not on file    Stress: Not on file   Relationships    Social connections     Talks on phone: Not on file     Gets together: Not on file     Attends Anabaptist service: Not on file     Active member of club or organization: Not on file     Attends meetings of clubs or organizations: Not on file     Relationship status: Not on file    Intimate partner violence     Fear of current or ex partner: Not on file     Emotionally abused: Not on file     Physically abused: Not on file     Forced sexual activity: Not on file   Other Topics Concern    Not on file   Social History Narrative    Not on file     Current Facility-Administered Medications   Medication Dose Route Frequency Provider Last Rate Last Admin    potassium chloride 20 mEq in 100 ml IVPB  20 mEq IntraVENous Q2H Levi Cerda MD 50 mL/hr at 05/18/21 1250 20 mEq at 05/18/21 1250    tbo-filgrastim (GRANIX) injection 300 mcg  300 mcg SubCUTAneous QHS Rai Gelnelson, FNP        0.9% sodium chloride infusion 250 mL  250 mL IntraVENous PRN Levi Cerda MD 15 mL/hr at 05/17/21 1049 250 mL at 05/17/21 1049    piperacillin-tazobactam (ZOSYN) 4.5 g in 0.9% sodium chloride (MBP/ADV) 100 mL MBP  4.5 g IntraVENous Q8H Moris Simms MD        albuterol-ipratropium (DUO-NEB) 2.5 MG-0.5 MG/3 ML  3 mL Nebulization Q4H PRN Susan Tracy MD   3 mL at 05/18/21 0009    Lactobacillus Acidoph & Marygar CRESTWOOD [facility]) tablet 1 Tab  1 Tab Oral DAILY Mee Roland MD   1 Tab at 05/18/21 0917    nystatin (MYCOSTATIN) 100,000 unit/mL oral suspension 500,000 Units  500,000 Units Oral QID Gladys Hendrix MD   500,000 Units at 05/18/21 1258    doxycycline (VIBRAMYCIN) 100 mg in 0.9% sodium chloride (MBP/ADV) 100 mL MBP  100 mg IntraVENous Q12H Moris Simms  mL/hr at 05/18/21 0919 100 mg at 05/18/21 0919    lactated Ringers infusion  75 mL/hr IntraVENous CONTINUOUS Moris Morales MD 75 mL/hr at 05/18/21 0452 75 mL/hr at 12/53/31 7809    folic acid (FOLVITE) tablet 1 mg  1 mg Oral DAILY Moris Morales MD   1 mg at 05/18/21 0917    labetaloL (NORMODYNE;TRANDATE) injection 10 mg  10 mg IntraVENous Q4H PRN Moris Morales MD        lidocaine 4 % patch 1 Patch  1 Patch TransDERmal Q24H Mee Roland MD   1 Patch at 05/17/21 1404    hydrALAZINE (APRESOLINE) 20 mg/mL injection 10 mg  10 mg IntraVENous Q6H PRN Juany De Oliveira MD   10 mg at 05/13/21 2335    lip protectant (BLISTEX) ointment 1 Each  1 Each Topical PRN Juany De Oliveira MD   1 Each at 05/14/21 0057    0.9% sodium chloride infusion 250 mL  250 mL IntraVENous PRN Sam Davis DO        amiodarone (CORDARONE) tablet 200 mg  200 mg Oral DAILY Kei Encarnacion MD   200 mg at 05/18/21 0917    DULoxetine (CYMBALTA) capsule 60 mg  60 mg Oral DAILY Kei Encarnacion MD   60 mg at 05/18/21 0917    levothyroxine (SYNTHROID) tablet 25 mcg  25 mcg Oral ACB Kei Encarnacion MD   25 mcg at 05/18/21 3014    rosuvastatin (CRESTOR) tablet 10 mg  10 mg Oral QHS Kei Encarnacion MD   10 mg at 05/17/21 2336    sodium chloride (NS) flush 5-40 mL  5-40 mL IntraVENous Q8H Kei Encarnacion MD   5 mL at 05/18/21 1300    sodium chloride (NS) flush 5-40 mL  5-40 mL IntraVENous PRN Kei Encarnacion MD   10 mL at 05/14/21 0557    acetaminophen (TYLENOL) tablet 650 mg  650 mg Oral Q6H PRN Kei Encarnacion MD Or    acetaminophen (TYLENOL) suppository 650 mg  650 mg Rectal Q6H PRN Tatiana Encarnacion MD        polyethylene glycol (MIRALAX) packet 17 g  17 g Oral DAILY PRN Tatiana Encarnacion MD        promethazine (PHENERGAN) tablet 12.5 mg  12.5 mg Oral Q6H PRN Tatiana Encarnacion MD        Or    ondansetron (ZOFRAN) injection 4 mg  4 mg IntraVENous Q6H PRN Tatiana Encarnacion MD        pantoprazole (PROTONIX) 40 mg in 0.9% sodium chloride 10 mL injection  40 mg IntraVENous Q12H Tatiana Encarnacion MD   40 mg at 21 0918    0.9% sodium chloride infusion 250 mL  250 mL IntraVENous PRN Tatiana Encarnacion MD        diphenhydrAMINE (BENADRYL) capsule 25 mg  25 mg Oral Q6H PRN Tatiana Encarnacion MD        traMADoL Erby Damme) tablet 50 mg  50 mg Oral Q6H PRN Ravi Hartman MD   50 mg at 21 2206       OBJECTIVE:  Patient Vitals for the past 8 hrs:   BP Temp Pulse Resp SpO2   21 1132 (!) 151/75 98.9 °F (37.2 °C) 85 18 99 %   21 0741 110/62 99 °F (37.2 °C) 83 19 96 %     Temp (24hrs), Av.7 °F (37.1 °C), Min:98.2 °F (36.8 °C), Max:99 °F (37.2 °C)     07 -  1900  In: 6722 [I.V.:7392]  Out: -     Physical Exam:  Constitutional: Oriented to person, place, and time. Well-developed and well-nourished. HEENT: Very hard of hearing. Normocephalic and atraumatic. Oropharynx is clear and moist +buccal lesion. +Exophthalmos. Conjunctivae and EOM are normal. No scleral icterus. Neck supple. No JVD present. No tracheal deviation present. No thyromegaly present. Skin Warm and dry. No bruising and no rash noted. No erythema. No pallor. Respiratory Effort normal and breath sounds normal.  No respiratory distress. No wheezes. No rales. No tenderness. CVS Normal rate, regular rhythm and normal heart sounds. Exam reveals no gallop, no friction and no rub. No murmur heard. Abdomen Soft. Bowel sounds are normal. Exhibits no distension. There is no tenderness. There is no rebound and no guarding. Neuro Normal reflexes. No cranial nerve deficit. Exhibits normal muscle tone, 5 of 5 strength of all extremities. MSK Normal range of motion. No edema and no tenderness.    Psych Normal mood, affect, behavior, judgment and thought content      Labs:    Recent Labs     05/18/21 0522 05/18/21  0147 05/17/21  0535 05/16/21  1054   WBC 1.0*  --  0.8* 0.4*   RBC 2.57*  --  2.23* 2.40*   HGB 7.7* 7.9* 6.7* 7.2*   HCT 23.6* 24.3* 20.8* 22.6*   MCV 91.8  --  93.3 94.2   MCH 30.0  --  30.0 30.0   MCHC 32.6  --  32.2 31.9   RDW 15.4*  --  15.7* 15.9*   PLT 15*  --  28* 41*   GRANS 12*  --  13* 25*   LYMPH 38  --  44 50*   MONOS 26*  --  8 11   EOS 23*  --  35* 14*   BASOS 0  --  0 0   IG 1  --  0 0   DF AUTOMATED  --  AUTOMATED AUTOMATED   ANEU 0.1*  --  0.1* 0.1*   ABL 0.4*  --  0.3* 0.2*   ABM 0.3  --  0.1 0.0*   JOSE G 0.2  --  0.3 0.1   ABB 0.0  --  0.0 0.0   AIG 0.0  --  0.0 0.0      Recent Labs     05/18/21 0522 05/17/21  0535 05/16/21  1054    136 136   K 2.8* 3.0* 3.4*    107 105   CO2 23 21 23   AGAP 8 8 8   GLU 97 81 102*   BUN 10 11 13   CREA 1.34* 1.34* 1.44*   GFRAA 49* 49* 45*   GFRNA 41* 41* 37*   CA 7.3* 7.6* 7.8*   AP  --   --  62   TP  --   --  4.8*   ALB  --   --  1.7*   GLOB  --   --  3.1   AGRAT  --   --  0.5*   MG  --  1.4*  --    PHOS  --  2.2*  --        ASSESSMENT/RECOMMENDATION:    Principal Problem:    Anemia (5/12/2021)    Active Problems:    Acquired hypothyroidism (1/16/2016)      Hyperlipidemia (1/16/2016)      Rheumatoid arthritis ([facility] [address] (1/16/2016)      Hypertension, essential, benign (8/18/2016)      Major depressive disorder, recurrent, moderate (HCC) (8/18/2016)      Leukopenia (5/12/2021)      NAZIA (acute kidney injury) ([facility] [address] (5/12/2021)      Rib fracture (5/12/2021)      Febrile neutropenia ([facility] [address] (5/15/2021)      Right upper lobe pneumonia (5/15/2021)    68 y.o. F with a recent diagnosis of A. fib and a started Eliquis since March 2021 with prolonged melena and severe anemia of hemoglobin 3.7, responded to blood transfusion, however the acute pancytopenia would not be typical for dilutional effect as such, will arrange systemic anemia work-up unrevealing and blood smear showed rouleaux, pursue a bone marrow biopsy 5/14/2021 to rule out blood malignancy, report pending, CBC still trending down persistently, clinically stable, probably need blood transfusion tomorrow, all questions answered, will follow. Pancytopenia  - Occasion teardrop cells/basophilic stippling-lead level pending  - Smear with increased rouleaux  - Check DIC, Hemolysis labs  - IR consulted for BMbx  - Medications reviewed   5/17 BMBx 5/14 pending. SPEP/FLC pending. Counts continue to trend down. PRBCs today for Hgb of 6.7. Reviewed medications with Dr Oh Sanchez - has been on MTX for 10-15 years and recently started amiodarone at previous admission for Afib (around 3/2021). Possible interaction between MTX and amiodarone causing myelosuppression (also c/o mouth sores and diarrhea). May need to discuss alternative to amiodarone with cardiology if BMbx results are unrevealing. 5/18 Counts stable after PRBC transfusion. BMBx prelim shows hypocellularity - adding on PNH to flow cytometry. Febrile neutropenia  5/17 Tmax 100.7 - D3 Zosyn/Doxycycline per primary. CXR with RUL infiltrate. BC NGTD. Will start Granix. 5/18 Afebrile. D4 antibiotics. ANC remains at 100 - continue Granix.      Stool heme positive  - GI consulted  5/17 Pancytopenia w/u in progress prior to GI eval.      RA  - Methotrexate every Sunday (held for admission)     CKD  - Renal US: Increased cortical echogenicity involving the single right kidney compatible with chronic medical renal disease. No hydronephrosis. Absent left kidney?   5/17 Cr stable at 1.34     Afib  - New diagnosis recently started amiodarone and Eliquis (currently on hold)  5/18 Will discuss with cardiology if amiodarone can be switched to another agent in light of potential interaction with MTX.        Lab studies and imaging studies were personally reviewed. Pertinent old records were reviewed. Patient has not received COVID vaccine.     Thank you for allowing me to participate in the care of Ms. Antoni Dalal.              NYLA Snider. [address] Hematology & Oncology  [id number] [place] [address]  Office : [phone]  Fax : [phone]

## 2021-05-18 NOTE — PROGRESS NOTES
Name:  Pavithra Piña  Age:80 y.o. Sex:female   :  1940    MRN:  [id number]   PCP:  Laay Nogueira MD      Admit Date:  2021 10:41 AM   Chief Complaint: falls    Reason for Admission:   Anemia [D64.9]    Assessment & Plan:     Fever in setting of leukopenia/RUL pneumonia: We will treat patient has febrile neutropenia. Started patient on vancomycin, Zosyn and doxycycline to cover a MRSA, Pseudomonas and atypical infection. Panculture ordered. Slow IV hydration. Will monitor. X-ray showed right upper lobe pneumonia. May 16: Still have some fever but patient looking and feeling much better. Continue current antibiotics. May 17: Continue antibiotics for now. Cultures negative for 20 hours. Once reported for 48 hours then we will stop vancomycin. May 17: We will DC vancomycin as cultures negative for 48 hours. Continue Zosyn and doxycycline. Some abrasion on abdomen secondary to moisture. Wound care. Acute anemia  -Significant drop in hemoglobin and WBC in last 1 month. Status post 3 unit PRBC. Holding anticoagulation and antiplatelet therapy. Gastroenterology and hematology on board. Work-up for anemia and leukopenia ordered. Appreciate subspecialty recommendation. Neutropenic precaution and fall precautions. May 14: Hemoglobin stable. May 15: No active bleeding. Will monitor. On clear liquid diet. GI on board. May 16: Denies any active bleeding. Trending hemoglobin. May 17: No acute bleeding. Hemoglobin less than 7.  1 unit PRBC ordered with Lasix. May 18: Hemoglobin stable now. Leukopenia  In setting of anemia with teardrop cells and basophilic stippling seen on smear  -As patient is on methotrexate at home so started patient on folic acid. Appreciate hematology-recommendation.  -We will check procalcitonin and blood cultures. May 14: Procalcitonin negative. Follow-up culture. Appreciate hematology recommendation.   Plan for bone marrow biopsy today. NAZIA on CKD 3  Baseline Cr ~1.6-1.7. Cr up to 2.61 on adm. avoid nephrotoxic agent. Will monitor. Patient has congenital agenesis of left kidney. May 14: Creatinine 1.4. Will monitor. May 17: Potassium repleted. May 18: IV potassium repleted. Mild transaminitis  -improving. May 14: Resolved. Rib fractures  R flank hematoma  - IS  - pain control    Frequent falls  Pt reports long standing falls related to medications per her report.  - PT/OT    Afib  - hold Eliquis  - cont Amio    RA  - MTX every Sunday    MDD  - cont home meds    Disposition/Expected LOS: > 2 midnights, pt previously living at home alone, PPD, PT/OT  Diet: DIET NUTRITIONAL SUPPLEMENTS  DIET CLEAR LIQUID  VTE ppx: SCDs given anemia and GI bleed. GI ppx: PPI BID  Code status: full code  Surrogate decision-maker: friend Marge Anamika    Again, patient is AOx3. I have updated patient's power of  Marge Willson multiple times. Today I offered but patient would like to talk to Marge Willson by herself. I encouraged her to ask the nurse to notify me if she or her POA is any questions. Patient verbalized understanding that her condition meditated in spite of treatment. History of Presenting Illness:     Carlos Miller is a 68 y.o. female with medical history of recently dx'd Afib (on Eliquis), CKD3, RA, h/o breast CA 2008, hypothyroidism who presented to ED with falls, weakness, nausea. Pt reports not feeling well since she was discharged March 2021 after being diagnosed with Afib and started on Eliquis. Patient presented with fatigue and fall. Found to have rib fracture with some ecchymosis. Her hemoglobin was low and she received 3 unit of PRBC. She had significant leukopenia and ultimately underwent bone marrow biopsy with results pending as of May 17. Also, she developed fever during hospitalization started on vancomycin, Zosyn and doxycycline. Her chest x-ray showed pneumonia.   There is also concern that she had melena and GI is on board but given leukopenia, EGD is on hold. Her hemoglobin dropped again on May 17 and she was given 1 unit of PRBC. May 17: Patient looking much better and as per her feeling better. Denies any shortness of breath, chest pain, nausea or vomiting. Complaining of weakness which is ongoing for a long time. May 18: As per patient she is feeling much better. Her voice is stronger. Per patient she has some shortness of breath last night which was momentarily and she is back to baseline. Denies any nausea, vomiting, chest pain palpitations. Review of Systems:  A 14 point review of systems was taken and pertinent positive as per HPI.         Past Medical History:   Diagnosis Date    Acquired hypothyroidism 1/16/2016    Breast cancer ([facility] [address] 2008    Depression 8/18/2016    Endocrine disease     hypothyroid    Fungal dermatitis 8/18/2016    Hyperlipidemia 1/16/2016    Hypertension, essential, benign 8/18/2016    Hypokalemia 8/18/2016    Inflamed sebaceous cyst 8/18/2016    Major depressive disorder, recurrent, moderate (HCC) 8/18/2016    Nicotine dependence, cigarettes, uncomplicated 2/85/9065    Osteopenia 8/18/2016    Osteoporosis 8/18/2016    Radiation therapy complication 3903    Rheumatoid arthritis ([facility] [address] 1/16/2016    Right carotid bruit 1/16/2016    TIA (transient ischemic attack) 1/16/2016    Tobacco abuse 8/18/2016       Past Surgical History:   Procedure Laterality Date    HX ADENOIDECTOMY      HX BREAST LUMPECTOMY Right 2008    with lymph nodes    HX TONSILLECTOMY      IR BX BONE MARROW DIAGNOSTIC  5/14/2021    IR INSERT NON TUNL CVC OVER 5 YRS  5/14/2021       Family History : reviewed  Family History   Problem Relation Age of Onset    Stroke Mother     Cancer Father         Brain    HIV/AIDS Brother         Social History     Tobacco Use    Smoking status: Current Some Day Smoker    Smokeless tobacco: Never Used    Tobacco comment: Only on pay day-1/2 pack   Substance Use Topics    Alcohol use: No       No Known Allergies    Immunization History   Administered Date(s) Administered    Influenza High Dose Vaccine PF 10/28/2015, 09/21/2016, 09/15/2017    Pneumococcal Polysaccharide (PPSV-23) 01/18/2016    TB Skin Test (PPD) Intradermal 04/22/2019, 05/12/2021         PTA Medications:  Current Outpatient Medications   Medication Instructions    amiodarone (CORDARONE) 200 mg, Oral, DAILY    apixaban (ELIQUIS) 5 mg, Oral, 2 TIMES DAILY    cholecalciferol (VITAMIN D3) 1,000 Units, Oral, DAILY    diclofenac EC (VOLTAREN) 75 mg, Oral, 2 TIMES DAILY    DULoxetine (CYMBALTA) 60 mg, Oral, DAILY    famotidine (PEPCID) 20 mg, Oral, DAILY    folic acid (FOLVITE) 1 mg, Oral, DAILY    levothyroxine (SYNTHROID) 25 mcg, Oral, DAILY BEFORE BREAKFAST    methotrexate (RHEUMATREX) 2.5 mg tablet 5 tabs every Sunday    ondansetron (ZOFRAN ODT) 4 mg, Oral, EVERY 8 HOURS AS NEEDED    predniSONE (DELTASONE) 30 mg, Oral, DAILY WITH BREAKFAST    rosuvastatin (CRESTOR) 10 mg, Oral, EVERY BEDTIME    spironolactone (ALDACTONE) 25 mg, Oral, DAILY       Objective:     Patient Vitals for the past 24 hrs:   Temp Pulse Resp BP SpO2   05/18/21 1132 98.9 °F (37.2 °C) 85 18 (!) 151/75 99 %   05/18/21 0741 99 °F (37.2 °C) 83 19 110/62 96 %   05/18/21 0352 98.2 °F (36.8 °C) 81 20 (!) 114/54 96 %   05/18/21 0040 98.7 °F (37.1 °C) 90  (!) 124/56 96 %   05/18/21 0020 98.8 °F (37.1 °C) 91 22 137/73 97 %   05/18/21 0009     98 %   05/17/21 2224 99 °F (37.2 °C) 82 20 (!) 157/78 97 %   05/17/21 2123 99 °F (37.2 °C) 83 20 135/69 97 %   05/17/21 2108 98.5 °F (36.9 °C) 88 20 (!) 145/75 99 %   05/17/21 2037 98.4 °F (36.9 °C) 86 19 132/74 96 %   05/17/21 1953 98.6 °F (37 °C) 87 20 127/76 97 %   05/17/21 1454 98.2 °F (36.8 °C) 87 20 136/67 98 %       Oxygen Therapy  O2 Sat (%): 99 % (05/18/21 1132)  Pulse via Oximetry: 82 beats per minute (05/18/21 0009)  O2 Device: None (Room air) (05/18/21 0754)  Skin Assessment: Clean, dry, & intact (05/18/21 0009)  Skin Protection for O2 Device: No (05/14/21 0339)  O2 Flow Rate (L/min): 3 l/min (05/14/21 1532)    Body mass index is 29.69 kg/m². Physical Exam:    General:  No acute distress, speaking in full sentences,   HEENT:  EOMI, oropharynx is clear, resolving hematoma to R eye  Neck:   Supple, no lymphadenopathy, no JVD   Lungs:  Clear to auscultation bilaterally with crackles right upper lobe. CV:   Distant HS, Regular rate and rhythm with normal S1 and S2   Abdomen:  Soft, nontender, nondistended, normoactive bowel sounds. Area of superficial abrasion in the area of moisture    extremities:  Trace edema to BL LE, chronic per pt  Skin:  Hematoma face and abdomen/back  Neuro:  AOx3, generalized weakness, moving all 4 extremities, speech normal  Psych:  Normal mood and affect       Data Reviewed: I have reviewed all labs, meds, and studies.   Procedures done this admission:  * No surgery found *    All Micro Results     Procedure Component Value Units Date/Time    CULTURE, URINE [[id number]]  (Abnormal) Collected: 05/17/21 0934    Order Status: Completed Specimen: Urine from Clean catch Updated: 05/18/21 0812     Special Requests: NO SPECIAL REQUESTS        Culture result:       >100,000 COLONIES/mL YEAST SUBCULTURE IN PROGRESS                  10,000 to 50,000 COLONIES/mL MIXED SKIN MARC ISOLATED                  CULTURE IN PROGRESS,FURTHER UPDATES TO FOLLOW          CULTURE, BLOOD [[id number]] Collected: 05/15/21 0756    Order Status: Completed Specimen: Blood Updated: 05/18/21 0749     Special Requests: --        LEFT  HAND       Culture result: NO GROWTH 3 DAYS       CULTURE, BLOOD [[id number]] Collected: 05/15/21 0937    Order Status: Completed Specimen: Blood Updated: 05/18/21 0749     Special Requests: --        LEFT  HAND       Culture result: NO GROWTH 3 DAYS       CULTURE, BLOOD [[id number]] Collected: 05/13/21 1616    Order Status: Completed Specimen: Blood Updated: 05/18/21 0749     Special Requests: --        LEFT  HAND       Culture result: NO GROWTH 5 DAYS       CULTURE, BLOOD [[id number]] Collected: 05/13/21 1613    Order Status: Completed Specimen: Blood Updated: 05/18/21 0749     Special Requests: --        LEFT  Antecubital       Culture result: NO GROWTH 5 DAYS             SARS-CoV-2 Lab Results  \"Novel Coronavirus\" Test: No results found for: COV2NT   \"Emergent Disease\" Test: No results found for: EDPR  \"SARS-COV-2\" Test: No results found for: XGCOVT  \"Precision Labs\" Test: No results found for: RSLT  Rapid Test:   Lab Results   Component Value Date/Time    COVR Not detected 03/19/2021 06:15 PM            Labs: Results:       BMP, Mg, Phos Recent Labs     05/18/21 0522 05/17/21  0535 05/16/21  1054    136 136   K 2.8* 3.0* 3.4*    107 105   CO2 23 21 23   AGAP 8 8 8   BUN 10 11 13   CREA 1.34* 1.34* 1.44*   CA 7.3* 7.6* 7.8*   GLU 97 81 102*   MG  --  1.4*  --    PHOS  --  2.2*  --       CBC Recent Labs     05/18/21 0522 05/18/21 0147 05/17/21 0535 05/16/21  1054   WBC 1.0*  --  0.8* 0.4*   RBC 2.57*  --  2.23* 2.40*   HGB 7.7* 7.9* 6.7* 7.2*   HCT 23.6* 24.3* 20.8* 22.6*   PLT 15*  --  28* 41*   GRANS 12*  --  13* 25*   LYMPH 38  --  44 50*   EOS 23*  --  35* 14*   MONOS 26*  --  8 11   BASOS 0  --  0 0   IG 1  --  0 0   ANEU 0.1*  --  0.1* 0.1*   ABL 0.4*  --  0.3* 0.2*   JOSE G 0.2  --  0.3 0.1   ABM 0.3  --  0.1 0.0*   ABB 0.0  --  0.0 0.0   AIG 0.0  --  0.0 0.0      LFT Recent Labs     05/16/21  1054   ALT 25   AP 62   TP 4.8*   ALB 1.7*   GLOB 3.1   AGRAT 0.5*      Cardiac Testing Lab Results   Component Value Date/Time    BNP 25 (H) 04/22/2019 12:28 PM    Troponin-I, Qt. <0.02 (L) 01/16/2016 06:55 PM      Coagulation Tests Lab Results   Component Value Date/Time    Prothrombin time 17.5 (H) 05/12/2021 10:33 AM    Prothrombin time 9.9 01/16/2016 06:55 PM    INR 1.4 05/12/2021 10:33 AM    INR 0.9 01/16/2016 06:55 PM    aPTT 30.4 05/13/2021 08:25 AM    aPTT 28.1 03/21/2021 03:01 PM    aPTT 26.8 03/19/2021 05:38 PM      A1c Lab Results   Component Value Date/Time    Hemoglobin A1c 5.2 01/17/2016 03:54 AM      Lipid Panel Lab Results   Component Value Date/Time    Cholesterol, total 140 02/05/2018 01:20 PM    HDL Cholesterol 45 02/05/2018 01:20 PM    LDL, calculated 72 02/05/2018 01:20 PM    VLDL, calculated 23 02/05/2018 01:20 PM    Triglyceride 113 02/05/2018 01:20 PM    CHOL/HDL Ratio 3.3 01/17/2016 03:54 AM      Thyroid Panel Lab Results   Component Value Date/Time    TSH 4.390 (H) 05/12/2021 01:46 PM    TSH 2.230 03/19/2021 01:38 PM        Most Recent UA Lab Results   Component Value Date/Time    Color GENNY 05/17/2021 09:46 AM    Appearance CLOUDY 05/17/2021 09:46 AM    Specific gravity 1.016 04/23/2019 08:35 PM    pH (UA) 5.5 05/17/2021 09:46 AM    Protein 100 (A) 05/17/2021 09:46 AM    Glucose Negative 05/17/2021 09:46 AM    Ketone Negative 05/17/2021 09:46 AM    Bilirubin Negative 05/17/2021 09:46 AM    Blood Negative 05/17/2021 09:46 AM    Urobilinogen 1.0 05/17/2021 09:46 AM    Nitrites Negative 05/17/2021 09:46 AM    Leukocyte Esterase SMALL (A) 05/17/2021 09:46 AM    WBC 0-3 05/17/2021 09:46 AM    RBC 0-3 05/17/2021 09:46 AM    Epithelial cells 0-3 05/17/2021 09:46 AM    Bacteria TRACE 05/17/2021 09:46 AM    Casts 10-20 03/19/2021 03:37 PM    Crystals, urine 0 03/19/2021 03:37 PM    Mucus 0 03/19/2021 03:37 PM    Other observations RESULTS VERIFIED MANUALLY 05/17/2021 09:46 AM                Problem List:     Hospital Problems as of 5/18/2021 Date Reviewed: 4/13/2021          Codes Class Noted - Resolved POA    Febrile neutropenia ([facility] [address] ICD-10-CM: D70.9, R50.81  ICD-9-CM: 288.00, 780.61  5/15/2021 - Present Unknown        Right upper lobe pneumonia ICD-10-CM: J18.9  ICD-9-CM: 296  5/15/2021 - Present Unknown        * (Principal) Anemia ICD-10-CM: D64.9  ICD-9-CM: 285.9  5/12/2021 - Present Unknown Leukopenia ICD-10-CM: D72.819  ICD-9-CM: 288.50  5/12/2021 - Present Unknown        NAZIA (acute kidney injury) ([facility] ICD-10-CM: N17.9  ICD-9-CM: 584.9  5/12/2021 - Present Unknown        Rib fracture ICD-10-CM: S22.39XA  ICD-9-CM: 807.00  5/12/2021 - Present Unknown        Hypertension, essential, benign ICD-10-CM: I10  ICD-9-CM: 401.1  8/18/2016 - Present Yes        Major depressive disorder, recurrent, moderate (HCC) ICD-10-CM: F33.1  ICD-9-CM: 296.32  8/18/2016 - Present Yes        Acquired hypothyroidism ICD-10-CM: E03.9  ICD-9-CM: 244.9  1/16/2016 - Present Yes        Hyperlipidemia ICD-10-CM: E78.5  ICD-9-CM: 272.4  1/16/2016 - Present Yes        Rheumatoid arthritis ([facility] ICD-10-CM: M06.9  ICD-9-CM: 714.0  1/16/2016 - Present Yes                 Signed By: Hanas Sanchez MD   [employer] [facility]    May 18, 2021

## 2021-05-19 LAB
ANION GAP SERPL CALC-SCNC: 8 MMOL/L (ref 7–16)
BASOPHILS # BLD: 0 K/UL (ref 0–0.2)
BASOPHILS NFR BLD: 1 % (ref 0–2)
BUN SERPL-MCNC: 11 MG/DL (ref 8–23)
CALCIUM SERPL-MCNC: 7.7 MG/DL (ref 8.3–10.4)
CHLORIDE SERPL-SCNC: 112 MMOL/L (ref 98–107)
CO2 SERPL-SCNC: 21 MMOL/L (ref 21–32)
CREAT SERPL-MCNC: 1.35 MG/DL (ref 0.6–1)
DIFFERENTIAL METHOD BLD: ABNORMAL
EOSINOPHIL # BLD: 0.4 K/UL (ref 0–0.8)
EOSINOPHIL NFR BLD: 9 % (ref 0.5–7.8)
ERYTHROCYTE [DISTWIDTH] IN BLOOD BY AUTOMATED COUNT: 16.2 % (ref 11.9–14.6)
GLUCOSE BLD STRIP.AUTO-MCNC: 106 MG/DL (ref 65–100)
GLUCOSE SERPL-MCNC: 119 MG/DL (ref 65–100)
HCT VFR BLD AUTO: 24.7 % (ref 35.8–46.3)
HGB BLD-MCNC: 8 G/DL (ref 11.7–15.4)
IMM GRANULOCYTES # BLD AUTO: 1 K/UL (ref 0–0.5)
IMM GRANULOCYTES NFR BLD AUTO: 25 % (ref 0–5)
LYMPHOCYTES # BLD: 0.9 K/UL (ref 0.5–4.6)
LYMPHOCYTES NFR BLD: 23 % (ref 13–44)
MCH RBC QN AUTO: 29.7 PG (ref 26.1–32.9)
MCHC RBC AUTO-ENTMCNC: 32.4 G/DL (ref 31.4–35)
MCV RBC AUTO: 91.8 FL (ref 79.6–97.8)
MONOCYTES # BLD: 0.7 K/UL (ref 0.1–1.3)
MONOCYTES NFR BLD: 18 % (ref 4–12)
NEUTS SEG # BLD: 1.1 K/UL (ref 1.7–8.2)
NEUTS SEG NFR BLD: 24 % (ref 43–78)
NRBC # BLD: 0.16 K/UL (ref 0–0.2)
PLATELET # BLD AUTO: 15 K/UL (ref 150–450)
PLATELET COMMENTS,PCOM: ABNORMAL
PMV BLD AUTO: ABNORMAL FL (ref 9.4–12.3)
POTASSIUM SERPL-SCNC: 3.3 MMOL/L (ref 3.5–5.1)
RBC # BLD AUTO: 2.69 M/UL (ref 4.05–5.2)
RBC MORPH BLD: ABNORMAL
RBC MORPH BLD: ABNORMAL
SERVICE CMNT-IMP: ABNORMAL
SODIUM SERPL-SCNC: 141 MMOL/L (ref 136–145)
WBC # BLD AUTO: 4.1 K/UL (ref 4.3–11.1)
WBC MORPH BLD: ABNORMAL

## 2021-05-19 PROCEDURE — 74011250636 HC RX REV CODE- 250/636: Performed by: NURSE PRACTITIONER

## 2021-05-19 PROCEDURE — 80048 BASIC METABOLIC PNL TOTAL CA: CPT

## 2021-05-19 PROCEDURE — 74011250637 HC RX REV CODE- 250/637: Performed by: INTERNAL MEDICINE

## 2021-05-19 PROCEDURE — 97530 THERAPEUTIC ACTIVITIES: CPT

## 2021-05-19 PROCEDURE — 77010033678 HC OXYGEN DAILY

## 2021-05-19 PROCEDURE — 65270000029 HC RM PRIVATE

## 2021-05-19 PROCEDURE — C9113 INJ PANTOPRAZOLE SODIUM, VIA: HCPCS | Performed by: INTERNAL MEDICINE

## 2021-05-19 PROCEDURE — 74011250637 HC RX REV CODE- 250/637: Performed by: FAMILY MEDICINE

## 2021-05-19 PROCEDURE — 82962 GLUCOSE BLOOD TEST: CPT

## 2021-05-19 PROCEDURE — 85025 COMPLETE CBC W/AUTO DIFF WBC: CPT

## 2021-05-19 PROCEDURE — 74011250636 HC RX REV CODE- 250/636: Performed by: INTERNAL MEDICINE

## 2021-05-19 PROCEDURE — 74011000250 HC RX REV CODE- 250: Performed by: INTERNAL MEDICINE

## 2021-05-19 PROCEDURE — 94760 N-INVAS EAR/PLS OXIMETRY 1: CPT

## 2021-05-19 PROCEDURE — 74011000258 HC RX REV CODE- 258: Performed by: INTERNAL MEDICINE

## 2021-05-19 RX ORDER — POTASSIUM CHLORIDE 20 MEQ/1
40 TABLET, EXTENDED RELEASE ORAL
Status: COMPLETED | OUTPATIENT
Start: 2021-05-19 | End: 2021-05-19

## 2021-05-19 RX ORDER — HYDROXYZINE 25 MG/1
25 TABLET, FILM COATED ORAL
Status: DISCONTINUED | OUTPATIENT
Start: 2021-05-19 | End: 2021-05-24 | Stop reason: HOSPADM

## 2021-05-19 RX ADMIN — LEVOTHYROXINE SODIUM 25 MCG: 0.05 TABLET ORAL at 06:49

## 2021-05-19 RX ADMIN — DOXYCYCLINE 100 MG: 100 INJECTION, POWDER, LYOPHILIZED, FOR SOLUTION INTRAVENOUS at 10:46

## 2021-05-19 RX ADMIN — DOXYCYCLINE 100 MG: 100 INJECTION, POWDER, LYOPHILIZED, FOR SOLUTION INTRAVENOUS at 00:04

## 2021-05-19 RX ADMIN — PANTOPRAZOLE SODIUM 40 MG: 40 INJECTION, POWDER, FOR SOLUTION INTRAVENOUS at 10:50

## 2021-05-19 RX ADMIN — NYSTATIN 500000 UNITS: 100000 SUSPENSION ORAL at 09:52

## 2021-05-19 RX ADMIN — NYSTATIN 500000 UNITS: 100000 SUSPENSION ORAL at 22:31

## 2021-05-19 RX ADMIN — SODIUM CHLORIDE 5 ML: 9 INJECTION, SOLUTION INTRAMUSCULAR; INTRAVENOUS; SUBCUTANEOUS at 13:06

## 2021-05-19 RX ADMIN — SODIUM CHLORIDE 5 ML: 9 INJECTION, SOLUTION INTRAMUSCULAR; INTRAVENOUS; SUBCUTANEOUS at 22:51

## 2021-05-19 RX ADMIN — PIPERACILLIN SODIUM AND TAZOBACTAM SODIUM 4.5 G: 4; .5 INJECTION, POWDER, LYOPHILIZED, FOR SOLUTION INTRAVENOUS at 06:53

## 2021-05-19 RX ADMIN — POTASSIUM CHLORIDE 40 MEQ: 20 TABLET, EXTENDED RELEASE ORAL at 16:45

## 2021-05-19 RX ADMIN — FOLIC ACID 1 MG: 1 TABLET ORAL at 09:51

## 2021-05-19 RX ADMIN — TBO-FILGRASTIM 300 MCG: 300 INJECTION, SOLUTION SUBCUTANEOUS at 23:02

## 2021-05-19 RX ADMIN — DULOXETINE HYDROCHLORIDE 60 MG: 60 CAPSULE, DELAYED RELEASE ORAL at 09:51

## 2021-05-19 RX ADMIN — ROSUVASTATIN CALCIUM 10 MG: 5 TABLET, FILM COATED ORAL at 22:28

## 2021-05-19 RX ADMIN — PIPERACILLIN SODIUM AND TAZOBACTAM SODIUM 4.5 G: 4; .5 INJECTION, POWDER, LYOPHILIZED, FOR SOLUTION INTRAVENOUS at 22:49

## 2021-05-19 RX ADMIN — SODIUM CHLORIDE 5 ML: 9 INJECTION, SOLUTION INTRAMUSCULAR; INTRAVENOUS; SUBCUTANEOUS at 06:48

## 2021-05-19 RX ADMIN — PIPERACILLIN SODIUM AND TAZOBACTAM SODIUM 4.5 G: 4; .5 INJECTION, POWDER, LYOPHILIZED, FOR SOLUTION INTRAVENOUS at 00:20

## 2021-05-19 RX ADMIN — PANTOPRAZOLE SODIUM 40 MG: 40 INJECTION, POWDER, FOR SOLUTION INTRAVENOUS at 22:00

## 2021-05-19 RX ADMIN — DOXYCYCLINE 100 MG: 100 INJECTION, POWDER, LYOPHILIZED, FOR SOLUTION INTRAVENOUS at 21:50

## 2021-05-19 RX ADMIN — LACTOBACILLUS TAB 1 TABLET: TAB at 09:51

## 2021-05-19 RX ADMIN — NYSTATIN 500000 UNITS: 100000 SUSPENSION ORAL at 16:47

## 2021-05-19 RX ADMIN — AMIODARONE HYDROCHLORIDE 200 MG: 200 TABLET ORAL at 09:51

## 2021-05-19 RX ADMIN — NYSTATIN 500000 UNITS: 100000 SUSPENSION ORAL at 12:50

## 2021-05-19 RX ADMIN — PIPERACILLIN SODIUM AND TAZOBACTAM SODIUM 4.5 G: 4; .5 INJECTION, POWDER, LYOPHILIZED, FOR SOLUTION INTRAVENOUS at 13:03

## 2021-05-19 NOTE — PROGRESS NOTES
Report received from April, [facility]    Pt alert resting in bed w/ stable respirations on RA. No pain or distress expressed at this time. LR infusing at 75ml/h.

## 2021-05-19 NOTE — PROGRESS NOTES
Name:  Dong Issa  Age:80 y.o. Sex:female   :  1940    MRN:  [id number]   PCP:  Carol Ambrose MD      Admit Date:  2021 10:41 AM   Chief Complaint: falls    Reason for Admission:   Anemia [D64.9]    Assessment & Plan:     Fever in setting of leukopenia/RUL pneumonia: We will treat patient has febrile neutropenia. Started patient on vancomycin, Zosyn and doxycycline to cover a MRSA, Pseudomonas and atypical infection. Panculture ordered. Slow IV hydration. Will monitor. X-ray showed right upper lobe pneumonia. May 16: Still have some fever but patient looking and feeling much better. Continue current antibiotics. May 17: Continue antibiotics for now. Cultures negative for 20 hours. Once reported for 48 hours then we will stop vancomycin. May 18: We will DC vancomycin as cultures negative for 48 hours. Continue Zosyn and doxycycline. Some abrasion on abdomen secondary to moisture. Wound care. May 19: Continue Zosyn and doxycycline for possible gram-negative pneumonia. Will treat for total 7 days with end date on May 22, 2021    Acute anemia  -Significant drop in hemoglobin and WBC in last 1 month. Status post 3 unit PRBC. Holding anticoagulation and antiplatelet therapy. Gastroenterology and hematology on board. Work-up for anemia and leukopenia ordered. Appreciate subspecialty recommendation. Neutropenic precaution and fall precautions. May 14: Hemoglobin stable. May 15: No active bleeding. Will monitor. On clear liquid diet. GI on board. May 16: Denies any active bleeding. Trending hemoglobin. May 17: No acute bleeding. Hemoglobin less than 7.  1 unit PRBC ordered with Lasix. May 18: Hemoglobin stable now. May 19: Labs pending. Nurse to page on-call provider once labs resulted. No active bleeding.     Leukopenia   In setting of anemia with teardrop cells and basophilic stippling seen on smear  -As patient is on methotrexate at home so started patient on folic acid. Appreciate hematology-recommendation.  -We will check procalcitonin and blood cultures. May 14: Procalcitonin negative. Follow-up culture. Appreciate hematology recommendation. Plan for bone marrow biopsy today. NAZIA on CKD 3  Baseline Cr ~1.6-1.7. Cr up to 2.61 on adm. avoid nephrotoxic agent. Will monitor. Patient has congenital agenesis of left kidney. May 14: Creatinine 1.4. Will monitor. May 17: Potassium repleted. May 18: IV potassium repleted. Mild transaminitis  -improving. May 14: Resolved. Rib fractures  R flank hematoma  - IS  - pain control    Frequent falls  Pt reports long standing falls related to medications per her report.  - PT/OT    Afib  - hold Eliquis  - cont Amio    RA  - MTX every Sunday    MDD  - cont home meds    Disposition/Expected LOS: > 2 midnights, pt previously living at home alone, PPD, PT/OT  Diet: DIET NUTRITIONAL SUPPLEMENTS  DIET GI SOFT  VTE ppx: SCDs given anemia and GI bleed. GI ppx: PPI BID  Code status: full code  Surrogate decision-maker: friend Dhruv Kelly    Again, patient is AOx3. I have updated patient's power of  Dhruv Robin multiple times. Today I offered but patient would like to talk to Dhruv Kelly by herself. I encouraged her to ask the nurse to notify me if she or her POA is any questions. Patient verbalized understanding that her condition meditated in spite of treatment. Possible discharge in next 24 to 48 hours based on clinical course and availability of rehab bed. History of Presenting Illness:     Batsheva Wright is a 68 y.o. female with medical history of recently dx'd Afib (on Eliquis), CKD3, RA, h/o breast CA 2008, hypothyroidism who presented to ED with falls, weakness, nausea. Pt reports not feeling well since she was discharged March 2021 after being diagnosed with Afib and started on Eliquis. Patient presented with fatigue and fall.   Found to have rib fracture with some ecchymosis. Her hemoglobin was low and she received 3 unit of PRBC. She had significant leukopenia and ultimately underwent bone marrow biopsy with results pending as of May 17. Also, she developed fever during hospitalization started on vancomycin, Zosyn and doxycycline. Her chest x-ray showed pneumonia. There is also concern that she had melena and GI is on board but given leukopenia, EGD is on hold. Her hemoglobin dropped again on May 17 and she was given 1 unit of PRBC. May 17: Patient looking much better and as per her feeling better. Denies any shortness of breath, chest pain, nausea or vomiting. Complaining of weakness which is ongoing for a long time. May 18: As per patient she is feeling much better. Her voice is stronger. Per patient she has some shortness of breath last night which was momentarily and she is back to baseline. Denies any nausea, vomiting, chest pain palpitations. Review of Systems:  A 14 point review of systems was taken and pertinent positive as per HPI.         Past Medical History:   Diagnosis Date    Acquired hypothyroidism 1/16/2016    Breast cancer ([facility] 2008    Depression 8/18/2016    Endocrine disease     hypothyroid    Fungal dermatitis 8/18/2016    Hyperlipidemia 1/16/2016    Hypertension, essential, benign 8/18/2016    Hypokalemia 8/18/2016    Inflamed sebaceous cyst 8/18/2016    Major depressive disorder, recurrent, moderate ([address] 8/18/2016    Nicotine dependence, cigarettes, uncomplicated 9/72/0171    Osteopenia 8/18/2016    Osteoporosis 8/18/2016    Radiation therapy complication 9763    Rheumatoid arthritis ([facility] 1/16/2016    Right carotid bruit 1/16/2016    TIA (transient ischemic attack) 1/16/2016    Tobacco abuse 8/18/2016       Past Surgical History:   Procedure Laterality Date    HX ADENOIDECTOMY      HX BREAST LUMPECTOMY Right 2008    with lymph nodes    HX TONSILLECTOMY      IR BX BONE MARROW DIAGNOSTIC  5/14/2021    IR INSERT NON TUNL CVC OVER 5 YRS  5/14/2021       Family History : reviewed  Family History   Problem Relation Age of Onset    Stroke Mother     Cancer Father         Brain    HIV/AIDS Brother         Social History     Tobacco Use    Smoking status: Current Some Day Smoker    Smokeless tobacco: Never Used    Tobacco comment: Only on pay day-1/2 pack   Substance Use Topics    Alcohol use: No       No Known Allergies    Immunization History   Administered Date(s) Administered    Influenza High Dose Vaccine PF 10/28/2015, 09/21/2016, 09/15/2017    Pneumococcal Polysaccharide (PPSV-23) 01/18/2016    TB Skin Test (PPD) Intradermal 04/22/2019, 05/12/2021         PTA Medications:  Current Outpatient Medications   Medication Instructions    amiodarone (CORDARONE) 200 mg, Oral, DAILY    apixaban (ELIQUIS) 5 mg, Oral, 2 TIMES DAILY    cholecalciferol (VITAMIN D3) 1,000 Units, Oral, DAILY    diclofenac EC (VOLTAREN) 75 mg, Oral, 2 TIMES DAILY    DULoxetine (CYMBALTA) 60 mg, Oral, DAILY    famotidine (PEPCID) 20 mg, Oral, DAILY    folic acid (FOLVITE) 1 mg, Oral, DAILY    levothyroxine (SYNTHROID) 25 mcg, Oral, DAILY BEFORE BREAKFAST    methotrexate (RHEUMATREX) 2.5 mg tablet 5 tabs every Sunday    ondansetron (ZOFRAN ODT) 4 mg, Oral, EVERY 8 HOURS AS NEEDED    predniSONE (DELTASONE) 30 mg, Oral, DAILY WITH BREAKFAST    rosuvastatin (CRESTOR) 10 mg, Oral, EVERY BEDTIME    spironolactone (ALDACTONE) 25 mg, Oral, DAILY       Objective:     Patient Vitals for the past 24 hrs:   Temp Pulse Resp BP SpO2   05/19/21 1151     100 %   05/19/21 1107 98.6 °F (37 °C) 77 18 131/70 100 %   05/19/21 0738 98.6 °F (37 °C) 82 18 (!) 143/67 100 %   05/18/21 2300 98.6 °F (37 °C) 81 18 138/76 100 %   05/18/21 2042     96 %   05/18/21 1820 98.4 °F (36.9 °C) 84 18 (!) 144/73 98 %   05/18/21 1514 98.8 °F (37.1 °C) 85 18 131/60 100 %       Oxygen Therapy  O2 Sat (%): 100 % (05/19/21 1151)  Pulse via Oximetry: 84 beats per minute (05/19/21 1151)  O2 Device: Hi flow nasal cannula (05/19/21 1151)  Skin Assessment: Clean, dry, & intact (05/19/21 0744)  Skin Protection for O2 Device: No (05/19/21 0744)  O2 Flow Rate (L/min): 5 l/min (05/19/21 1152)    Body mass index is 29.69 kg/m². Physical Exam:    General:  No acute distress, speaking in full sentences,   HEENT:  EOMI, oropharynx is clear, resolving hematoma to R eye  Neck:   Supple, no lymphadenopathy, no JVD   Lungs:  Clear to auscultation bilaterally with crackles right upper lobe. CV:   Distant HS, Regular rate and rhythm with normal S1 and S2   Abdomen:  Soft, nontender, nondistended, normoactive bowel sounds. Area of superficial abrasion in the area of moisture    extremities:  Trace edema to BL LE, chronic per pt  Skin:  Hematoma face and abdomen/back  Neuro:  AOx3, generalized weakness, moving all 4 extremities, speech normal  Psych:  Normal mood and affect       Data Reviewed: I have reviewed all labs, meds, and studies.   Procedures done this admission:  * No surgery found *    All Micro Results     Procedure Component Value Units Date/Time    CULTURE, URINE [[id number]]  (Abnormal) Collected: 05/17/21 0934    Order Status: Completed Specimen: Urine from Clean catch Updated: 05/19/21 0731     Special Requests: NO SPECIAL REQUESTS        Culture result:       >100,000 COLONIES/mL YEAST IDENTIFICATION TO FOLLOW                  10,000 to 50,000 COLONIES/mL PRESUMPTIVE ENTEROCOCCUS SPECIES IDENTIFICATION AND SUSCEPTIBILITY TO FOLLOW                  <10,000 COLONIES/mL NORMAL SKIN MARC ISOLATED          CULTURE, BLOOD [[id number]] Collected: 05/15/21 0756    Order Status: Completed Specimen: Blood Updated: 05/19/21 0709     Special Requests: --        LEFT  HAND       Culture result: NO GROWTH 4 DAYS       CULTURE, BLOOD [[id number]] Collected: 05/15/21 0937    Order Status: Completed Specimen: Blood Updated: 05/19/21 0709     Special Requests: --        LEFT  HAND Culture result: NO GROWTH 4 DAYS       CULTURE, BLOOD [[id number]] Collected: 05/13/21 1616    Order Status: Completed Specimen: Blood Updated: 05/18/21 0749     Special Requests: --        LEFT  HAND       Culture result: NO GROWTH 5 DAYS       CULTURE, BLOOD [[id number]] Collected: 05/13/21 1613    Order Status: Completed Specimen: Blood Updated: 05/18/21 0749     Special Requests: --        LEFT  Antecubital       Culture result: NO GROWTH 5 DAYS             SARS-CoV-2 Lab Results  \"Novel Coronavirus\" Test: No results found for: COV2NT   \"Emergent Disease\" Test: No results found for: EDPR  \"SARS-COV-2\" Test: No results found for: XGCOVT  \"Precision Labs\" Test: No results found for: RSLT  Rapid Test:   Lab Results   Component Value Date/Time    COVR Not detected 03/19/2021 06:15 PM            Labs: Results:       BMP, Mg, Phos Recent Labs     05/18/21  0522 05/17/21  0535    136   K 2.8* 3.0*    107   CO2 23 21   AGAP 8 8   BUN 10 11   CREA 1.34* 1.34*   CA 7.3* 7.6*   GLU 97 81   MG  --  1.4*   PHOS  --  2.2*      CBC Recent Labs     05/18/21  0522 05/18/21  0147 05/17/21  0535   WBC 1.0*  --  0.8*   RBC 2.57*  --  2.23*   HGB 7.7* 7.9* 6.7*   HCT 23.6* 24.3* 20.8*   PLT 15*  --  28*   GRANS 12*  --  13*   LYMPH 38  --  44   EOS 23*  --  35*   MONOS 26*  --  8   BASOS 0  --  0   IG 1  --  0   ANEU 0.1*  --  0.1*   ABL 0.4*  --  0.3*   JOSE G 0.2  --  0.3   ABM 0.3  --  0.1   ABB 0.0  --  0.0   AIG 0.0  --  0.0      LFT No results for input(s): ALT, TBIL, AP, TP, ALB, GLOB, AGRAT in the last 72 hours.     No lab exists for component: SGOT, GPT   Cardiac Testing Lab Results   Component Value Date/Time    BNP 25 (H) 04/22/2019 12:28 PM    Troponin-I, Qt. <0.02 (L) 01/16/2016 06:55 PM      Coagulation Tests Lab Results   Component Value Date/Time    Prothrombin time 17.5 (H) 05/12/2021 10:33 AM    Prothrombin time 9.9 01/16/2016 06:55 PM    INR 1.4 05/12/2021 10:33 AM    INR 0.9 01/16/2016 06:55 PM    aPTT 30.4 05/13/2021 08:25 AM    aPTT 28.1 03/21/2021 03:01 PM    aPTT 26.8 03/19/2021 05:38 PM      A1c Lab Results   Component Value Date/Time    Hemoglobin A1c 5.2 01/17/2016 03:54 AM      Lipid Panel Lab Results   Component Value Date/Time    Cholesterol, total 140 02/05/2018 01:20 PM    HDL Cholesterol 45 02/05/2018 01:20 PM    LDL, calculated 72 02/05/2018 01:20 PM    VLDL, calculated 23 02/05/2018 01:20 PM    Triglyceride 113 02/05/2018 01:20 PM    CHOL/HDL Ratio 3.3 01/17/2016 03:54 AM      Thyroid Panel Lab Results   Component Value Date/Time    TSH 4.390 (H) 05/12/2021 01:46 PM    TSH 2.230 03/19/2021 01:38 PM        Most Recent UA Lab Results   Component Value Date/Time    Color GENNY 05/17/2021 09:46 AM    Appearance CLOUDY 05/17/2021 09:46 AM    Specific gravity 1.016 04/23/2019 08:35 PM    pH (UA) 5.5 05/17/2021 09:46 AM    Protein 100 (A) 05/17/2021 09:46 AM    Glucose Negative 05/17/2021 09:46 AM    Ketone Negative 05/17/2021 09:46 AM    Bilirubin Negative 05/17/2021 09:46 AM    Blood Negative 05/17/2021 09:46 AM    Urobilinogen 1.0 05/17/2021 09:46 AM    Nitrites Negative 05/17/2021 09:46 AM    Leukocyte Esterase SMALL (A) 05/17/2021 09:46 AM    WBC 0-3 05/17/2021 09:46 AM    RBC 0-3 05/17/2021 09:46 AM    Epithelial cells 0-3 05/17/2021 09:46 AM    Bacteria TRACE 05/17/2021 09:46 AM    Casts 10-20 03/19/2021 03:37 PM    Crystals, urine 0 03/19/2021 03:37 PM    Mucus 0 03/19/2021 03:37 PM    Other observations RESULTS VERIFIED MANUALLY 05/17/2021 09:46 AM                Problem List:     Hospital Problems as of 5/19/2021 Date Reviewed: 4/13/2021        Codes Class Noted - Resolved POA    Febrile neutropenia ([address] ICD-10-CM: D70.9, R50.81  ICD-9-CM: 288.00, 780.61  5/15/2021 - Present Unknown        Right upper lobe pneumonia ICD-10-CM: J18.9  ICD-9-CM: 818  5/15/2021 - Present Unknown        * (Principal) Anemia ICD-10-CM: D64.9  ICD-9-CM: 285.9  5/12/2021 - Present Unknown        Leukopenia ICD-10-CM: F56.937  ICD-9-CM: 288.50  5/12/2021 - Present Unknown        NAZIA (acute kidney injury) ([facility] ICD-10-CM: N17.9  ICD-9-CM: 584.9  5/12/2021 - Present Unknown        Rib fracture ICD-10-CM: S22.39XA  ICD-9-CM: 807.00  5/12/2021 - Present Unknown        Hypertension, essential, benign ICD-10-CM: I10  ICD-9-CM: 401.1  8/18/2016 - Present Yes        Major depressive disorder, recurrent, moderate (HCC) ICD-10-CM: F33.1  ICD-9-CM: 296.32  8/18/2016 - Present Yes        Acquired hypothyroidism ICD-10-CM: E03.9  ICD-9-CM: 244.9  1/16/2016 - Present Yes        Hyperlipidemia ICD-10-CM: E78.5  ICD-9-CM: 272.4  1/16/2016 - Present Yes        Rheumatoid arthritis ([facility] ICD-10-CM: M06.9  ICD-9-CM: 714.0  1/16/2016 - Present Yes                 Signed By: Sadaf Whiteside MD   [employer] [facility]    May 19, 2021

## 2021-05-19 NOTE — PROGRESS NOTES
ACUTE PHYSICAL THERAPY GOALS:  (Developed with and agreed upon by patient and/or caregiver. )  LTG:  (1.)Ms. Roge Coleman will move from supine to sit and sit to supine , scoot up and down and roll side to side in bed with INDEPENDENT within 7 treatment day(s). (2.)Ms. Roge Coleman will transfer from bed to chair and chair to bed with CONTACT GUARD ASSIST using the least restrictive device within 7 treatment day(s). (3.)Ms. Almeida will ambulate with CONTACT GUARD ASSIST for 50 feet with the least restrictive device within 7 treatment day(s). (4.)Ms. Roge Coleman will tolerate at least 23 min of dynamic standing activity to assist standing ADLs with the least restrictive device within 7 treatment days. (5.)Ms. Roge Coleman will climb at least 5 steps with MODERATE ASSIST with the least restrictive device within 7 treatment days. PHYSICAL THERAPY: Daily Note and AM Treatment Day # 4    Tyler Tran is a 68 y.o. female   PRIMARY DIAGNOSIS: Anemia  Anemia [D64.9]         ASSESSMENT:     REHAB RECOMMENDATIONS: CURRENT LEVEL OF FUNCTION:  (Most Recently Demonstrated)   Recommendation to date pending progress:  Setting:   Short-term Rehab  Equipment:    To Be Determined Bed Mobility:   Minimal Assistance  Sit to Stand:   Minimal Assistance  Transfers:   Minimal Assistance  Gait/Mobility:   Minimal Assistance     ASSESSMENT:  Ms. Roge Coleman is making slow, steady progress towards PT goals. Patient presents on 10L and reports feeling more short of breath. O2 sats maintained above 90%. Patient ambulated 48' with rolling walker, CGA and chair follow for safety. Patient then participates in LE exercises with good ability. Patient lives alone and would benefit from rehab prior to d/c home. Will continue efforts. SUBJECTIVE:   Ms. Roge Coleman states, \"Oh man. \"    SOCIAL HISTORY/ LIVING ENVIRONMENT: see initial evaluation  Home Environment: Trailer/mobile home  # Steps to Enter: 5  One/Two Story Residence: One story  Living Alone: Yes  Support Systems: Friends \ neighbors, [employer] / [employer] community  OBJECTIVE:     PAIN: VITAL SIGNS: LINES/DRAINS:   Pre Treatment: Pain Screen  Pain Scale 1: FLACC  Pain Intensity 1: 0  Post Treatment: 0   None  O2 Device: Hi flow nasal cannula     MOBILITY: I Mod I S SBA CGA Min Mod Max Total  NT x2 Comments:   Bed Mobility    Rolling [] [] [] [] [] [] [] [] [] [] []    Supine to Sit [] [] [] [] [] [x] [] [] [] [] []    Scooting [] [] [] [] [] [] [] [] [] [] []    Sit to Supine [] [] [] [] [] [x] [] [] [] [] []    Transfers    Sit to Stand [] [] [] [] [] [x] [] [] [] [] []    Bed to Chair [] [] [] [] [] [x] [] [] [] [] []    Stand to Sit [] [] [] [] [] [x] [] [] [] [] []    I=Independent, Mod I=Modified Independent, S=Supervision, SBA=Standby Assistance, CGA=Contact Guard Assistance,   Min=Minimal Assistance, Mod=Moderate Assistance, Max=Maximal Assistance, Total=Total Assistance, NT=Not Tested    BALANCE: Good Fair+ Fair Fair- Poor NT Comments   Sitting Static [x] [] [] [] [] []    Sitting Dynamic [] [x] [] [] [] []              Standing Static [] [x] [] [] [] [] With support from RW   Standing Dynamic [] [x] [] [] [] [] With support from RW     GAIT: I Mod I S SBA CGA Min Mod Max Total  NT x2 Comments:   Level of Assistance [] [] [] [] [] [x] [] [] [] [] []    Distance 48'    DME Rolling Walker    Gait Quality Chair follow for safety    Weightbearing  Status N/A     I=Independent, Mod I=Modified Independent, S=Supervision, SBA=Standby Assistance, CGA=Contact Guard Assistance,   Min=Minimal Assistance, Mod=Moderate Assistance, Max=Maximal Assistance, Total=Total Assistance, NT=Not Tested    PLAN:   FREQUENCY/DURATION: PT Plan of Care: 3 times/week for duration of hospital stay or until stated goals are met, whichever comes first.  TREATMENT:     TREATMENT:   ($$ Therapeutic Activity: 23-37 mins    )  Therapeutic Activity (25 Minutes):  Therapeutic activity included Supine to Sit, Scooting, Transfer Training, Ambulation on level ground, Sitting balance , Standing balance and LE exercises to improve functional Mobility, Strength and Activity tolerance.     TREATMENT GRID:  N/A    AFTER TREATMENT POSITION/PRECAUTIONS:  Bed, Needs within reach and RN notified    INTERDISCIPLINARY COLLABORATION:  RN/PCT, PT/PTA and Rehab Attendant     TOTAL TREATMENT DURATION:  PT Patient Time In/Time Out  Time In: 1045  Time Out: [address] Osborne PTA

## 2021-05-19 NOTE — PROGRESS NOTES
05/19/21 1431   Oxygen Therapy   O2 Sat (%) 100 %   Pulse via Oximetry 84 beats per minute   O2 Device Hi flow nasal cannula   O2 Flow Rate (L/min) 7 l/min  (Weaned to 5L)

## 2021-05-20 PROBLEM — D61.818 PANCYTOPENIA (HCC): Status: ACTIVE | Noted: 2021-05-12

## 2021-05-20 PROBLEM — R50.81 FEBRILE NEUTROPENIA (HCC): Status: RESOLVED | Noted: 2021-05-15 | Resolved: 2021-05-20

## 2021-05-20 PROBLEM — N17.9 AKI (ACUTE KIDNEY INJURY) (HCC): Status: RESOLVED | Noted: 2021-05-12 | Resolved: 2021-05-20

## 2021-05-20 PROBLEM — I48.0 PAROXYSMAL ATRIAL FIBRILLATION (HCC): Chronic | Status: ACTIVE | Noted: 2021-03-19

## 2021-05-20 PROBLEM — N18.30 STAGE 3 CHRONIC KIDNEY DISEASE (HCC): Status: ACTIVE | Noted: 2021-05-20

## 2021-05-20 PROBLEM — D70.9 FEBRILE NEUTROPENIA (HCC): Status: RESOLVED | Noted: 2021-05-15 | Resolved: 2021-05-20

## 2021-05-20 PROBLEM — D72.819 LEUKOPENIA: Status: RESOLVED | Noted: 2021-05-12 | Resolved: 2021-05-20

## 2021-05-20 LAB
ANION GAP SERPL CALC-SCNC: 6 MMOL/L (ref 7–16)
BACTERIA SPEC CULT: ABNORMAL
BACTERIA SPEC CULT: NORMAL
BACTERIA SPEC CULT: NORMAL
BASOPHILS # BLD: 0.2 K/UL (ref 0–0.2)
BASOPHILS NFR BLD: 1 % (ref 0–2)
BUN SERPL-MCNC: 11 MG/DL (ref 8–23)
CALCIUM SERPL-MCNC: 7.9 MG/DL (ref 8.3–10.4)
CHLORIDE SERPL-SCNC: 109 MMOL/L (ref 98–107)
CO2 SERPL-SCNC: 24 MMOL/L (ref 21–32)
CREAT SERPL-MCNC: 1.32 MG/DL (ref 0.6–1)
DIFFERENTIAL METHOD BLD: ABNORMAL
EOSINOPHIL # BLD: 0.6 K/UL (ref 0–0.8)
EOSINOPHIL NFR BLD: 4 % (ref 0.5–7.8)
ERYTHROCYTE [DISTWIDTH] IN BLOOD BY AUTOMATED COUNT: 16.4 % (ref 11.9–14.6)
GLUCOSE BLD STRIP.AUTO-MCNC: 111 MG/DL (ref 65–100)
GLUCOSE BLD STRIP.AUTO-MCNC: 117 MG/DL (ref 65–100)
GLUCOSE SERPL-MCNC: 96 MG/DL (ref 65–100)
HCT VFR BLD AUTO: 23.6 % (ref 35.8–46.3)
HGB BLD-MCNC: 7.6 G/DL (ref 11.7–15.4)
IMM GRANULOCYTES # BLD AUTO: 1.7 K/UL (ref 0–0.5)
IMM GRANULOCYTES NFR BLD AUTO: 11 % (ref 0–5)
LIPASE SERPL-CCNC: 67 U/L (ref 73–393)
LYMPHOCYTES # BLD: 2 K/UL (ref 0.5–4.6)
LYMPHOCYTES NFR BLD: 13 % (ref 13–44)
MCH RBC QN AUTO: 29.8 PG (ref 26.1–32.9)
MCHC RBC AUTO-ENTMCNC: 32.2 G/DL (ref 31.4–35)
MCV RBC AUTO: 92.5 FL (ref 79.6–97.8)
MONOCYTES # BLD: 1.8 K/UL (ref 0.1–1.3)
MONOCYTES NFR BLD: 12 % (ref 4–12)
NEUTS SEG # BLD: 8.8 K/UL (ref 1.7–8.2)
NEUTS SEG NFR BLD: 59 % (ref 43–78)
NRBC # BLD: 0.7 K/UL (ref 0–0.2)
PLATELET # BLD AUTO: 32 K/UL (ref 150–450)
PLATELET COMMENTS,PCOM: ABNORMAL
PMV BLD AUTO: ABNORMAL FL (ref 9.4–12.3)
POTASSIUM SERPL-SCNC: 3.5 MMOL/L (ref 3.5–5.1)
RBC # BLD AUTO: 2.55 M/UL (ref 4.05–5.2)
RBC MORPH BLD: ABNORMAL
RBC MORPH BLD: ABNORMAL
SERVICE CMNT-IMP: ABNORMAL
SERVICE CMNT-IMP: NORMAL
SERVICE CMNT-IMP: NORMAL
SODIUM SERPL-SCNC: 139 MMOL/L (ref 136–145)
WBC # BLD AUTO: 15.1 K/UL (ref 4.3–11.1)
WBC MORPH BLD: ABNORMAL

## 2021-05-20 PROCEDURE — APPSS45 APP SPLIT SHARED TIME 31-45 MINUTES: Performed by: NURSE PRACTITIONER

## 2021-05-20 PROCEDURE — 74011250636 HC RX REV CODE- 250/636: Performed by: INTERNAL MEDICINE

## 2021-05-20 PROCEDURE — C9113 INJ PANTOPRAZOLE SODIUM, VIA: HCPCS | Performed by: INTERNAL MEDICINE

## 2021-05-20 PROCEDURE — 80048 BASIC METABOLIC PNL TOTAL CA: CPT

## 2021-05-20 PROCEDURE — 99223 1ST HOSP IP/OBS HIGH 75: CPT | Performed by: INTERNAL MEDICINE

## 2021-05-20 PROCEDURE — 99232 SBSQ HOSP IP/OBS MODERATE 35: CPT | Performed by: INTERNAL MEDICINE

## 2021-05-20 PROCEDURE — 74011250637 HC RX REV CODE- 250/637: Performed by: INTERNAL MEDICINE

## 2021-05-20 PROCEDURE — 94640 AIRWAY INHALATION TREATMENT: CPT

## 2021-05-20 PROCEDURE — 74011250637 HC RX REV CODE- 250/637: Performed by: HOSPITALIST

## 2021-05-20 PROCEDURE — 74011000250 HC RX REV CODE- 250: Performed by: INTERNAL MEDICINE

## 2021-05-20 PROCEDURE — 74011000250 HC RX REV CODE- 250: Performed by: HOSPITALIST

## 2021-05-20 PROCEDURE — 65270000029 HC RM PRIVATE

## 2021-05-20 PROCEDURE — 94760 N-INVAS EAR/PLS OXIMETRY 1: CPT

## 2021-05-20 PROCEDURE — 82962 GLUCOSE BLOOD TEST: CPT

## 2021-05-20 PROCEDURE — 93308 TTE F-UP OR LMTD: CPT

## 2021-05-20 PROCEDURE — 74011000258 HC RX REV CODE- 258: Performed by: INTERNAL MEDICINE

## 2021-05-20 PROCEDURE — 77010033678 HC OXYGEN DAILY

## 2021-05-20 PROCEDURE — 74011250637 HC RX REV CODE- 250/637: Performed by: FAMILY MEDICINE

## 2021-05-20 PROCEDURE — 83690 ASSAY OF LIPASE: CPT

## 2021-05-20 PROCEDURE — 85025 COMPLETE CBC W/AUTO DIFF WBC: CPT

## 2021-05-20 RX ORDER — PANTOPRAZOLE SODIUM 40 MG/1
40 TABLET, DELAYED RELEASE ORAL
Status: DISCONTINUED | OUTPATIENT
Start: 2021-05-20 | End: 2021-05-20

## 2021-05-20 RX ORDER — PANTOPRAZOLE SODIUM 40 MG/1
40 TABLET, DELAYED RELEASE ORAL
Status: DISCONTINUED | OUTPATIENT
Start: 2021-05-20 | End: 2021-05-24 | Stop reason: HOSPADM

## 2021-05-20 RX ORDER — MAG HYDROX/ALUMINUM HYD/SIMETH 200-200-20
30 SUSPENSION, ORAL (FINAL DOSE FORM) ORAL
Status: DISCONTINUED | OUTPATIENT
Start: 2021-05-20 | End: 2021-05-24 | Stop reason: HOSPADM

## 2021-05-20 RX ORDER — DOXYCYCLINE 100 MG/1
100 CAPSULE ORAL EVERY 12 HOURS
Status: DISCONTINUED | OUTPATIENT
Start: 2021-05-20 | End: 2021-05-24 | Stop reason: HOSPADM

## 2021-05-20 RX ORDER — FUROSEMIDE 10 MG/ML
40 INJECTION INTRAMUSCULAR; INTRAVENOUS ONCE
Status: COMPLETED | OUTPATIENT
Start: 2021-05-20 | End: 2021-05-20

## 2021-05-20 RX ORDER — AMOXICILLIN AND CLAVULANATE POTASSIUM 875; 125 MG/1; MG/1
1 TABLET, FILM COATED ORAL EVERY 12 HOURS
Status: DISCONTINUED | OUTPATIENT
Start: 2021-05-20 | End: 2021-05-21

## 2021-05-20 RX ORDER — SPIRONOLACTONE 25 MG/1
25 TABLET ORAL DAILY
Status: DISCONTINUED | OUTPATIENT
Start: 2021-05-20 | End: 2021-05-24 | Stop reason: HOSPADM

## 2021-05-20 RX ADMIN — AMIODARONE HYDROCHLORIDE 200 MG: 200 TABLET ORAL at 08:17

## 2021-05-20 RX ADMIN — SODIUM CHLORIDE 10 ML: 9 INJECTION, SOLUTION INTRAMUSCULAR; INTRAVENOUS; SUBCUTANEOUS at 22:39

## 2021-05-20 RX ADMIN — NYSTATIN 500000 UNITS: 100000 SUSPENSION ORAL at 15:25

## 2021-05-20 RX ADMIN — DOXYCYCLINE HYCLATE 100 MG: 100 CAPSULE ORAL at 21:56

## 2021-05-20 RX ADMIN — FUROSEMIDE 40 MG: 10 INJECTION INTRAMUSCULAR; INTRAVENOUS at 09:31

## 2021-05-20 RX ADMIN — SPIRONOLACTONE 25 MG: 25 TABLET ORAL at 09:31

## 2021-05-20 RX ADMIN — AMOXICILLIN AND CLAVULANATE POTASSIUM 1 TABLET: 875; 125 TABLET, FILM COATED ORAL at 21:56

## 2021-05-20 RX ADMIN — NYSTATIN 500000 UNITS: 100000 SUSPENSION ORAL at 08:18

## 2021-05-20 RX ADMIN — SODIUM CHLORIDE 10 ML: 9 INJECTION, SOLUTION INTRAMUSCULAR; INTRAVENOUS; SUBCUTANEOUS at 14:00

## 2021-05-20 RX ADMIN — NYSTATIN 500000 UNITS: 100000 SUSPENSION ORAL at 17:18

## 2021-05-20 RX ADMIN — SODIUM CHLORIDE 10 ML: 9 INJECTION, SOLUTION INTRAMUSCULAR; INTRAVENOUS; SUBCUTANEOUS at 06:22

## 2021-05-20 RX ADMIN — FOLIC ACID 1 MG: 1 TABLET ORAL at 08:17

## 2021-05-20 RX ADMIN — TRAMADOL HYDROCHLORIDE 50 MG: 50 TABLET, FILM COATED ORAL at 09:31

## 2021-05-20 RX ADMIN — PANTOPRAZOLE SODIUM 40 MG: 40 INJECTION, POWDER, FOR SOLUTION INTRAVENOUS at 08:18

## 2021-05-20 RX ADMIN — DOXYCYCLINE 100 MG: 100 INJECTION, POWDER, LYOPHILIZED, FOR SOLUTION INTRAVENOUS at 08:17

## 2021-05-20 RX ADMIN — LEVOTHYROXINE SODIUM 25 MCG: 0.05 TABLET ORAL at 06:09

## 2021-05-20 RX ADMIN — AMOXICILLIN AND CLAVULANATE POTASSIUM 1 TABLET: 875; 125 TABLET, FILM COATED ORAL at 09:31

## 2021-05-20 RX ADMIN — PANTOPRAZOLE SODIUM 40 MG: 40 TABLET, DELAYED RELEASE ORAL at 15:28

## 2021-05-20 RX ADMIN — ROSUVASTATIN CALCIUM 10 MG: 5 TABLET, FILM COATED ORAL at 21:56

## 2021-05-20 RX ADMIN — IPRATROPIUM BROMIDE AND ALBUTEROL SULFATE 3 ML: .5; 3 SOLUTION RESPIRATORY (INHALATION) at 01:04

## 2021-05-20 RX ADMIN — PIPERACILLIN SODIUM AND TAZOBACTAM SODIUM 4.5 G: 4; .5 INJECTION, POWDER, LYOPHILIZED, FOR SOLUTION INTRAVENOUS at 06:09

## 2021-05-20 RX ADMIN — DULOXETINE HYDROCHLORIDE 60 MG: 60 CAPSULE, DELAYED RELEASE ORAL at 08:17

## 2021-05-20 RX ADMIN — LACTOBACILLUS TAB 1 TABLET: TAB at 08:18

## 2021-05-20 RX ADMIN — NYSTATIN 500000 UNITS: 100000 SUSPENSION ORAL at 21:56

## 2021-05-20 NOTE — DISCHARGE INSTRUCTIONS
DISCHARGE SUMMARY from Nurse    PATIENT INSTRUCTIONS:    After general anesthesia or intravenous sedation, for 24 hours or while taking prescription Narcotics:  · Limit your activities  · Do not drive and operate hazardous machinery  · Do not make important personal or business decisions  · Do  not drink alcoholic beverages  · If you have not urinated within 8 hours after discharge, please contact your surgeon on call. What to do at Home:  Recommended activity: Activity as tolerated. If you experience any of the following symptoms worsening cough or wheezing, increased swelling of extremities, shortness of breath or fatigue not relieved with rest, please follow up with MD.    *  Please give a list of your current medications to your Primary Care Provider. *  Please update this list whenever your medications are discontinued, doses are      changed, or new medications (including over-the-counter products) are added. *  Please carry medication information at all times in case of emergency situations. These are general instructions for a healthy lifestyle:    No smoking/ No tobacco products/ Avoid exposure to second hand smoke  Surgeon General's Warning:  Quitting smoking now greatly reduces serious risk to your health. Obesity, smoking, and sedentary lifestyle greatly increases your risk for illness    A healthy diet, regular physical exercise & weight monitoring are important for maintaining a healthy lifestyle    You may be retaining fluid if you have a history of heart failure or if you experience any of the following symptoms:  Weight gain of 3 pounds or more overnight or 5 pounds in a week, increased swelling in our hands or feet or shortness of breath while lying flat in bed. Please call your doctor as soon as you notice any of these symptoms; do not wait until your next office visit. The discharge information has been reviewed with the patient and caregiver.   The patient and caregiver verbalized understanding. Discharge medications reviewed with the patient and caregiver and appropriate educational materials and side effects teaching were provided. Patient Education        Pneumonia: Care Instructions  Overview     Pneumonia is an infection of the lungs. Most cases are caused by infections from bacteria or viruses. Pneumonia may be mild or very severe. If it is caused by bacteria, you will be treated with antibiotics. It may take a few weeks to a few months to recover fully from pneumonia, depending on how sick you were and whether your overall health is good. Follow-up care is a key part of your treatment and safety. Be sure to make and go to all appointments, and call your doctor if you are having problems. It's also a good idea to know your test results and keep a list of the medicines you take. How can you care for yourself at home? · Take your antibiotics exactly as directed. Do not stop taking the medicine just because you are feeling better. You need to take the full course of antibiotics. · Take your medicines exactly as prescribed. Call your doctor if you think you are having a problem with your medicine. · Get plenty of rest and sleep. You may feel weak and tired for a while, but your energy level will improve with time. · To prevent dehydration, drink plenty of fluids, enough so that your urine is light yellow or clear like water. Choose water and other caffeine-free clear liquids until you feel better. If you have kidney, heart, or liver disease and have to limit fluids, talk with your doctor before you increase the amount of fluids you drink. · Take care of your cough so you can rest. A cough that brings up mucus from your lungs is common with pneumonia. It is one way your body gets rid of the infection. But if coughing keeps you from resting or causes severe fatigue and chest-wall pain, talk to your doctor.  Your doctor may suggest that you take a medicine to reduce the cough. · Use a vaporizer or humidifier to add moisture to your bedroom. Follow the directions for cleaning the machine. · Do not smoke or allow others to smoke around you. Smoke will make your cough last longer. If you need help quitting, talk to your doctor about stop-smoking programs and medicines. These can increase your chances of quitting for good. · Take an over-the-counter pain medicine, such as acetaminophen (Tylenol), ibuprofen (Advil, Motrin), or naproxen (Aleve). Read and follow all instructions on the label. · Do not take two or more pain medicines at the same time unless the doctor told you to. Many pain medicines have acetaminophen, which is Tylenol. Too much acetaminophen (Tylenol) can be harmful. · If you were given a spirometer to measure how well your lungs are working, use it as instructed. This can help your doctor tell how your recovery is going. · To prevent pneumonia in the future, talk to your doctor about getting a flu vaccine (once a year) and a pneumococcal vaccine (one time only for most people). When should you call for help? Call 911 anytime you think you may need emergency care. For example, call if:    · You have severe trouble breathing. Call your doctor now or seek immediate medical care if:    · You cough up dark brown or bloody mucus (sputum).     · You have new or worse trouble breathing.     · You are dizzy or lightheaded, or you feel like you may faint. Watch closely for changes in your health, and be sure to contact your doctor if:    · You have a new or higher fever.     · You are coughing more deeply or more often.     · You are not getting better after 2 days (48 hours).     · You do not get better as expected. Where can you learn more? Go to http://www.[employer].com/  Enter D336 in the search box to learn more about \"Pneumonia: Care Instructions. \"  Current as of: October 26, 2020               Content Version: 12.8  © 6419-6896 [employer]. Care instructions adapted under license by [employer] (which disclaims liability or warranty for this information). If you have questions about a medical condition or this instruction, always ask your healthcare professional. [facility] [address] any warranty or liability for your use of this information. Patient Education        Urinary Tract Infection (UTI) in Women: Care Instructions  Overview     A urinary tract infection, or UTI, is a general term for an infection anywhere between the kidneys and the urethra (where urine comes out). Most UTIs are bladder infections. They often cause pain or burning when you urinate. UTIs are caused by bacteria and can be cured with antibiotics. Be sure to complete your treatment so that the infection does not get worse. Follow-up care is a key part of your treatment and safety. Be sure to make and go to all appointments, and call your doctor if you are having problems. It's also a good idea to know your test results and keep a list of the medicines you take. How can you care for yourself at home? · Take your antibiotics as directed. Do not stop taking them just because you feel better. You need to take the full course of antibiotics. · Drink extra water and other fluids for the next day or two. This will help make the urine less concentrated and help wash out the bacteria that are causing the infection. (If you have kidney, heart, or liver disease and have to limit fluids, talk with your doctor before you increase the amount of fluids you drink.)  · Avoid drinks that are carbonated or have caffeine. They can irritate the bladder. · Urinate often. Try to empty your bladder each time. · To relieve pain, take a hot bath or lay a heating pad set on low over your lower belly or genital area. Never go to sleep with a heating pad in place. To prevent UTIs  · Drink plenty of water each day.  This helps you urinate often, which clears bacteria from your system. (If you have kidney, heart, or liver disease and have to limit fluids, talk with your doctor before you increase the amount of fluids you drink.)  · Urinate when you need to. · If you are sexually active, urinate right after you have sex. · Change sanitary pads often. · Avoid douches, bubble baths, feminine hygiene sprays, and other feminine hygiene products that have deodorants. · After going to the bathroom, wipe from front to back. When should you call for help? Call your doctor now or seek immediate medical care if:    · Symptoms such as fever, chills, nausea, or vomiting get worse or appear for the first time.     · You have new pain in your back just below your rib cage. This is called flank pain.     · There is new blood or pus in your urine.     · You have any problems with your antibiotic medicine. Watch closely for changes in your health, and be sure to contact your doctor if:    · You are not getting better after taking an antibiotic for 2 days.     · Your symptoms go away but then come back. Where can you learn more? Go to http://www.gray.com/  Enter B395 in the search box to learn more about \"Urinary Tract Infection (UTI) in Women: Care Instructions. \"  Current as of: June 29, 2020               Content Version: 12.8  © 2006-2021 [employer]. Care instructions adapted under license by [employer] (which disclaims liability or warranty for this information). If you have questions about a medical condition or this instruction, always ask your healthcare professional. [address] any warranty or liability for your use of this information.        ___________________________________________________________________________________________________________________________________

## 2021-05-20 NOTE — PROGRESS NOTES
OT Note:    OT attempted to see patient this afternoon for therapy. Pt having procedure at bedside done at this time. OT will re-attempt to see patient at a later date/time.     Thanks,  Sascha Salas

## 2021-05-20 NOTE — PROGRESS NOTES
Patient received insurance auth for rehab. Patient is currently on 5 liters of 02 and not medically stable for discharge.

## 2021-05-20 NOTE — PROGRESS NOTES
Pt in bed semi sleeping. 0600 am IV Zosyn infusing. Pt respirations stable on 5L high flow NC. Pt alert and oriented x4. Slept through the night w/ out distress, except a mild SOB attack during perineal care. No pain of discomfort verbally expressed. No concerns at this time. . Preparing to report off to oncoming RN.

## 2021-05-20 NOTE — PROGRESS NOTES
Problem: Falls - Risk of  Goal: *Absence of Falls  Description: Document Vladislav Dean Fall Risk and appropriate interventions in the flowsheet. Outcome: Progressing Towards Goal  Note: Fall Risk Interventions:  Mobility Interventions: Patient to call before getting OOB         Medication Interventions: Teach patient to arise slowly    Elimination Interventions: Patient to call for help with toileting needs    History of Falls Interventions: Bed/chair exit alarm         Problem: Patient Education: Go to Patient Education Activity  Goal: Patient/Family Education  Outcome: Progressing Towards Goal     Problem: Anemia Care Plan (Adult and Pediatric)  Goal: *Labs within defined limits  Outcome: Progressing Towards Goal  Goal: *Tolerates increased activity  Outcome: Progressing Towards Goal     Problem: Patient Education: Go to Patient Education Activity  Goal: Patient/Family Education  Outcome: Progressing Towards Goal     Problem: Patient Education: Go to Patient Education Activity  Goal: Patient/Family Education  Outcome: Progressing Towards Goal     Problem: Pressure Injury - Risk of  Goal: *Prevention of pressure injury  Description: Document Billy Scale and appropriate interventions in the flowsheet.   Outcome: Progressing Towards Goal  Note: Pressure Injury Interventions:  Sensory Interventions: Assess changes in LOC    Moisture Interventions: Absorbent underpads    Activity Interventions: Pressure redistribution bed/mattress(bed type)    Mobility Interventions: Pressure redistribution bed/mattress (bed type)    Nutrition Interventions: Document food/fluid/supplement intake    Friction and Shear Interventions: Lift sheet, Foam dressings/transparent film/skin sealants                Problem: Patient Education: Go to Patient Education Activity  Goal: Patient/Family Education  Outcome: Progressing Towards Goal

## 2021-05-20 NOTE — H&P
[facility] Cardiology Initial Cardiac Evaluation      Date of  Admission: 5/12/2021 10:41 AM     Primary Care Physician: Robbie Lane MD  Primary Cardiologist: Dr Josr Ruiz  Referring Physician: Dr Susan Mac  Supervising Physician: Dr Josr Ruiz    CC/Reason for evaluation: methotrexate toxicity, suspect interaction between amiodarone and methotrexate    HPI:  Arley Pacheco is a 68 y.o. female with PMH of paroxysmal atrial fibrillation on amiodarone and Eliquis (dx 3/2021), pericardial effusion, HTN, HLD, hypothyroidism, TIA, depression, breast cancer s/p radiation, rheumatoid arthritis, CKD, and former smoker who presented to [facility] ED via EMS on 5/12 from MD office with complaints of near syncope. Pt reports not feeling well since she was discharged March 2021 after being diagnosed with Afib and started on Eliquis. Upon evaluation in ED, labs showed WBC 1.4, H/H 3.7/12.4, ptl 206, , K 4.0, BUN/Cr 29/2.61, hs trop 27.3-22.0, and pBNP 1399. Found to have rib fracture with some ecchymosis. Pt was admitted to hospitalist [facility] for acute anemia, leukopenia, and NAZIA/CKD. There was concern for melena and GI consulted but given leukopenia, EGD held. She received 4 unit of PRBC.  She had significant leukopenia and ultimately underwent bone marrow biopsy May 17 showing hypocellularity. Pt  developed fever during hospitalization started on abx for RUL PNA on CXR.  Cardiology was consulted for consideration of alternative therapy due to suspected interaction between methotrexate and amiodarone.      Past Medical History:   Diagnosis Date    Acquired hypothyroidism 1/16/2016    Breast cancer ([facility] [address] 2008    Depression 8/18/2016    Endocrine disease     hypothyroid    Fungal dermatitis 8/18/2016    Hyperlipidemia 1/16/2016    Hypertension, essential, benign 8/18/2016    Hypokalemia 8/18/2016    Inflamed sebaceous cyst 8/18/2016    Major depressive disorder, recurrent, moderate ([address] 8/18/2016    Nicotine dependence, cigarettes, uncomplicated 2/59/1519    Osteopenia 8/18/2016    Osteoporosis 8/18/2016    Radiation therapy complication 7839    Rheumatoid arthritis ([facility] [address] 1/16/2016    Right carotid bruit 1/16/2016    TIA (transient ischemic attack) 1/16/2016    Tobacco abuse 8/18/2016      Past Surgical History:   Procedure Laterality Date    HX ADENOIDECTOMY      HX BREAST LUMPECTOMY Right 2008    with lymph nodes    HX TONSILLECTOMY      IR BX BONE MARROW DIAGNOSTIC  5/14/2021    IR INSERT NON TUNL CVC OVER 5 YRS  5/14/2021       No Known Allergies   Social History     Socioeconomic History    Marital status:      Spouse name: Not on file    Number of children: Not on file    Years of education: Not on file    Highest education level: Not on file   Occupational History    Not on file   Tobacco Use    Smoking status: Current Some Day Smoker    Smokeless tobacco: Never Used    Tobacco comment: Only on pay day-1/2 pack   Substance and Sexual Activity    Alcohol use: No    Drug use: No    Sexual activity: Not on file   Other Topics Concern    Not on file   Social History Narrative    Not on file     Social Determinants of Health     Financial Resource Strain:     Difficulty of Paying Living Expenses:    Food Insecurity:     Worried About Running Out of Food in the Last Year:     Ran Out of Food in the Last Year:    Transportation Needs:     Lack of Transportation (Medical):      Lack of Transportation (Non-Medical):    Physical Activity:     Days of Exercise per Week:     Minutes of Exercise per Session:    Stress:     Feeling of Stress :    Social Connections:     Frequency of Communication with Friends and Family:     Frequency of Social Gatherings with Friends and Family:     Attends [employer] Services:     Active Member of Clubs or Organizations:     Attends Club or Organization Meetings:     Marital Status:    Intimate Partner Violence:     Fear of Current or Ex-Partner:     Emotionally Abused:     Physically Abused:     Sexually Abused:      Family History   Problem Relation Age of Onset    Stroke Mother     Cancer Father         Brain    HIV/AIDS Brother         Current Facility-Administered Medications   Medication Dose Route Frequency    amoxicillin-clavulanate (AUGMENTIN) 875-125 mg per tablet 1 Tablet  1 Tablet Oral Q12H    doxycycline (VIBRAMYCIN) capsule 100 mg  100 mg Oral Q12H    pantoprazole (PROTONIX) tablet 40 mg  40 mg Oral ACB&D    spironolactone (ALDACTONE) tablet 25 mg  25 mg Oral DAILY    alum-mag hydroxide-simeth (MYLANTA) oral suspension 30 mL  30 mL Oral Q4H PRN    hydrOXYzine HCL (ATARAX) tablet 25 mg  25 mg Oral TID PRN    0.9% sodium chloride infusion 250 mL  250 mL IntraVENous PRN    albuterol-ipratropium (DUO-NEB) 2.5 MG-0.5 MG/3 ML  3 mL Nebulization Q4H PRN    Lactobacillus Acidoph & Bulgar (FLORANEX) tablet 1 Tab  1 Tablet Oral DAILY    nystatin (MYCOSTATIN) 100,000 unit/mL oral suspension 500,000 Units  500,000 Units Oral QID    folic acid (FOLVITE) tablet 1 mg  1 mg Oral DAILY    labetaloL (NORMODYNE;TRANDATE) injection 10 mg  10 mg IntraVENous Q4H PRN    lidocaine 4 % patch 1 Patch  1 Patch TransDERmal Q24H    hydrALAZINE (APRESOLINE) 20 mg/mL injection 10 mg  10 mg IntraVENous Q6H PRN    lip protectant (BLISTEX) ointment 1 Each  1 Each Topical PRN    0.9% sodium chloride infusion 250 mL  250 mL IntraVENous PRN    amiodarone (CORDARONE) tablet 200 mg  200 mg Oral DAILY    DULoxetine (CYMBALTA) capsule 60 mg  60 mg Oral DAILY    levothyroxine (SYNTHROID) tablet 25 mcg  25 mcg Oral ACB    rosuvastatin (CRESTOR) tablet 10 mg  10 mg Oral QHS    sodium chloride (NS) flush 5-40 mL  5-40 mL IntraVENous Q8H    sodium chloride (NS) flush 5-40 mL  5-40 mL IntraVENous PRN    acetaminophen (TYLENOL) tablet 650 mg  650 mg Oral Q6H PRN    Or    acetaminophen (TYLENOL) suppository 650 mg  650 mg Rectal Q6H PRN    polyethylene glycol (MIRALAX) packet 17 g  17 g Oral DAILY PRN    promethazine (PHENERGAN) tablet 12.5 mg  12.5 mg Oral Q6H PRN    Or    ondansetron (ZOFRAN) injection 4 mg  4 mg IntraVENous Q6H PRN    0.9% sodium chloride infusion 250 mL  250 mL IntraVENous PRN    diphenhydrAMINE (BENADRYL) capsule 25 mg  25 mg Oral Q6H PRN    traMADoL (ULTRAM) tablet 50 mg  50 mg Oral Q6H PRN       Review of Symptoms:    General: Positive for increased fatigue/weakness and decreased appetite. No weight changes, fever or chills  Skin: no rashes, lumps, or other skin changes  HEENT: no headache, dizziness, lightheadedness, vision changes, hearing changes, tinnitus, vertigo, sinus pressure/pain, bleeding gums, sore throat, or hoarseness  Neck: no swollen glands, goiter, pain or stiffness  Respiratory: Nocough, sputum, hemoptysis, dyspnea, wheezing  Cardiovascular: + as per HPI  Gastrointestinal: Positive for nausea/vomtiing, melena. No constipation, diarrhea, liver problems, or h/o GI bleed  Urinary: no frequency, urgency , hematuria, burning/pain with urination, recent flank pain, polyuria, nocturia, or difficulty urinating  Peripheral Vascular: no claudication, leg cramps, prior DVTs, swelling of calves, legs, or feet, color change, or swelling with redness or tenderness  Musculoskeletal: Positive for RA  Psychiatric: no depression or excessive stress  Neurological: no sensory or motor loss, seizures, syncope, tremors, numbness, no dementia  Hematologic: no anemia, easy bruising or bleeding  Endocrine: No thyroid problems, heat or cold intolerance, excessive sweating, polyuria, polydipsia, diabetes.        Subjective:     Physical Exam:    Vitals:    05/20/21 0104 05/20/21 0324 05/20/21 0719 05/20/21 0908   BP:  120/67  128/60   Pulse:  90  90   Resp:  18  20   Temp:  98.3 °F (36.8 °C)  96.8 °F (36 °C)   SpO2: 99% 98% 94% 95%   Weight:       Height:           General: Well Developed, Well Nourished, No Acute Distress  HEENT: pupils equal and round, right eye hematoma  Neck: supple, no JVD, no carotid bruits  Heart: S1S2 with RRR without murmurs or gallops  Lungs: Clear throughout auscultation bilaterally without adventitious sounds  Abd: soft, nontender, nondistended, with good bowel sounds  Ext: warm, trace edema, calves supple/nontender, pulses 2+ bilaterally  Skin: warm and dry  Psychiatric: Normal mood and affect  Neurologic: Alert and oriented X 3    Cardiographics    Telemetry: not on telemetetry  ECG: normal sinus rhythm  Echocardiogram:   3/20/21  LEFT VENTRICLE: Size was normal. Systolic function was normal. Ejection fraction was estimated in the range of 55 % to 60 %. There were no regional wall motion abnormalities. Wall thickness was normal. Left ventricular diastolic function is indeterminate based on assessment criteria. Avg E/e': 14.2. RIGHT VENTRICLE: The size was normal. Systolic function was mildly reduced by TAPSE assessment. The tricuspid jet envelope definition was inadequate for estimation of RV systolic pressure. LEFT ATRIUM: Size was normal.  RIGHT ATRIUM: Size was normal.  SYSTEMIC VEINS: IVC: The inferior vena cava was normal in size and course. The respirophasic change in diameter was more than 50%. AORTIC VALVE: The valve was trileaflet. Leaflets exhibited mild sclerosis. There was no evidence for stenosis. There was no insufficiency. MITRAL VALVE: There was mild annular calcification. There was no evidence for  stenosis. There was mild regurgitation. TRICUSPID VALVE: The valve structure was normal. There was no evidence for  stenosis. There was mild regurgitation. PULMONIC VALVE: Not well visualized. There was no evidence for stenosis. There was no insufficiency. PERICARDIUM: A moderate pericardial effusion was identified circumferential to the heart. There was mild RA inversion, no clear evidence of RV diastolic collapse or clear evidence of hemodynamic compromise.  Clinical correlation required   AORTA: The root exhibited normal size. Limited ECHO 5/5/2021  Preserved LV systolic function   No pericardial effusion        Labs:   Recent Results (from the past 24 hour(s))   CBC WITH AUTOMATED DIFF    Collection Time: 05/19/21  1:45 PM   Result Value Ref Range    WBC 4.1 (L) 4.3 - 11.1 K/uL    RBC 2.69 (L) 4.05 - 5.2 M/uL    HGB 8.0 (L) 11.7 - 15.4 g/dL    HCT 24.7 (L) 35.8 - 46.3 %    MCV 91.8 79.6 - 97.8 FL    MCH 29.7 26.1 - 32.9 PG    MCHC 32.4 31.4 - 35.0 g/dL    RDW 16.2 (H) 11.9 - 14.6 %    PLATELET 15 (LL) 381 - 450 K/uL    MPV Unable to calculate. Recommend adding IPF. 9.4 - 12.3 FL    ABSOLUTE NRBC 0.16 0.0 - 0.2 K/uL    NEUTROPHILS 24 (L) 43 - 78 %    LYMPHOCYTES 23 13 - 44 %    MONOCYTES 18 (H) 4.0 - 12.0 %    EOSINOPHILS 9 (H) 0.5 - 7.8 %    BASOPHILS 1 0.0 - 2.0 %    IMMATURE GRANULOCYTES 25 (H) 0.0 - 5.0 %    ABS. NEUTROPHILS 1.1 (L) 1.7 - 8.2 K/UL    ABS. LYMPHOCYTES 0.9 0.5 - 4.6 K/UL    ABS. MONOCYTES 0.7 0.1 - 1.3 K/UL    ABS. EOSINOPHILS 0.4 0.0 - 0.8 K/UL    ABS. BASOPHILS 0.0 0.0 - 0.2 K/UL    ABS. IMM. GRANS.  1.0 (H) 0.0 - 0.5 K/UL    RBC COMMENTS SLIGHT  ANISOCYTOSIS + POIKILOCYTOSIS        RBC COMMENTS OCCASIONAL  MACROCYTOSIS        WBC COMMENTS Result Confirmed By Smear      PLATELET COMMENTS MARKED      DF AUTOMATED     METABOLIC PANEL, BASIC    Collection Time: 05/19/21  1:45 PM   Result Value Ref Range    Sodium 141 136 - 145 mmol/L    Potassium 3.3 (L) 3.5 - 5.1 mmol/L    Chloride 112 (H) 98 - 107 mmol/L    CO2 21 21 - 32 mmol/L    Anion gap 8 7 - 16 mmol/L    Glucose 119 (H) 65 - 100 mg/dL    BUN 11 8 - 23 MG/DL    Creatinine 1.35 (H) 0.6 - 1.0 MG/DL    GFR est AA 49 (L) >60 ml/min/1.73m2    GFR est non-AA 40 (L) >60 ml/min/1.73m2    Calcium 7.7 (L) 8.3 - 10.4 MG/DL   GLUCOSE, POC    Collection Time: 05/19/21  8:38 PM   Result Value Ref Range    Glucose (POC) 106 (H) 65 - 100 mg/dL    Performed by Robert    GLUCOSE, POC    Collection Time: 05/20/21  5:26 AM   Result Value Ref Range Glucose (POC) 117 (H) 65 - 100 mg/dL    [facility]    CBC WITH AUTOMATED DIFF    Collection Time: 05/20/21  8:27 AM   Result Value Ref Range    WBC 15.1 (H) 4.3 - 11.1 K/uL    RBC 2.55 (L) 4.05 - 5.2 M/uL    HGB 7.6 (L) 11.7 - 15.4 g/dL    HCT 23.6 (L) 35.8 - 46.3 %    MCV 92.5 79.6 - 97.8 FL    MCH 29.8 26.1 - 32.9 PG    MCHC 32.2 31.4 - 35.0 g/dL    RDW 16.4 (H) 11.9 - 14.6 %    PLATELET 32 (L) 483 - 450 K/uL    MPV Unable to calculate. Recommend adding IPF. 9.4 - 12.3 FL    ABSOLUTE NRBC 0.70 (H) 0.0 - 0.2 K/uL    NEUTROPHILS 59 43 - 78 %    LYMPHOCYTES 13 13 - 44 %    MONOCYTES 12 4.0 - 12.0 %    EOSINOPHILS 4 0.5 - 7.8 %    BASOPHILS 1 0.0 - 2.0 %    IMMATURE GRANULOCYTES 11 (H) 0.0 - 5.0 %    ABS. NEUTROPHILS 8.8 (H) 1.7 - 8.2 K/UL    ABS. LYMPHOCYTES 2.0 0.5 - 4.6 K/UL    ABS. MONOCYTES 1.8 (H) 0.1 - 1.3 K/UL    ABS. EOSINOPHILS 0.6 0.0 - 0.8 K/UL    ABS. BASOPHILS 0.2 0.0 - 0.2 K/UL    ABS. IMM. GRANS. 1.7 (H) 0.0 - 0.5 K/UL    RBC COMMENTS SLIGHT  ANISOCYTOSIS + POIKILOCYTOSIS        RBC COMMENTS SLIGHT  HYPOCHROMIA        WBC COMMENTS Result Confirmed By Smear      PLATELET COMMENTS MARKED      DF AUTOMATED     METABOLIC PANEL, BASIC    Collection Time: 05/20/21  8:27 AM   Result Value Ref Range    Sodium 139 136 - 145 mmol/L    Potassium 3.5 3.5 - 5.1 mmol/L    Chloride 109 (H) 98 - 107 mmol/L    CO2 24 21 - 32 mmol/L    Anion gap 6 (L) 7 - 16 mmol/L    Glucose 96 65 - 100 mg/dL    BUN 11 8 - 23 MG/DL    Creatinine 1.32 (H) 0.6 - 1.0 MG/DL    GFR est AA 50 (L) >60 ml/min/1.73m2    GFR est non-AA 41 (L) >60 ml/min/1.73m2    Calcium 7.9 (L) 8.3 - 10.4 MG/DL   LIPASE    Collection Time: 05/20/21  8:27 AM   Result Value Ref Range    Lipase 67 (L) 73 - 393 U/L     Pt has been seen and eamined by Dr. Peyman Lynn. He agrees with the following assessment and plan.      Assessment/Plan:          Acute Anemia (5/12/2021)  - s/p transfusion PRBC x 4 units  - GI consulted- deferred EGD secondary to leukopenia  -  Per primary       Acquired hypothyroidism (1/16/2016)  - on synthroid       Hyperlipidemia (1/16/2016)  - on statin       Rheumatoid arthritis ([address] (1/16/2016)  - per primary       Hypertension, essential, benign (8/18/2016)  - controlled      Major depressive disorder, recurrent, moderate ([address] (8/18/2016)  - home meds per primary       Paroxysmal atrial fibrillation ([address] (3/19/2021)  - Eliquis on held secondary to severe anemia s/p multiple transfusions/?GI bleed, would recommend restarting when ok with oncology and GI  - unknown interaction between methotrexate and amiodarone, according to note there is no plan to restart methotrexate on discharge. Recommend continuing amiodarone at this time. Pancytopenia ([address] (5/12/2021)  - improved   - heme/onc following       Rib fracture (5/12/2021)  - per primary       Right upper lobe pneumonia (5/15/2021)  - on antibiotics  - per primary       Stage 3 chronic kidney disease ([address] (5/20/2021)  - per primary       Thank you for requesting cardiac evaluation and allowing us to participate in the care of this patient. We will continue to follow along with you.       Marylee Abed, PA-C  Supervising Physician: Dr Aline Ferris

## 2021-05-20 NOTE — PROGRESS NOTES
[facility] Hematology & Oncology: In Patient Hematology / Oncology Progress Note    Reason for Consult: Acute pancytopenia acute pancytopenia  Referring Physician:  Stone Cuevas MD    Interval hx:  VSS, afebrile  Feeling improved  BMBx 5/14 - path pending but prelim suggestive of hypocellularity  Hgb stable  Plts and WBC improved - DC Granix      ROS:  Constitutional: +fatigue. Negative for fever, chills, weakness, malaise. CV: Megative for chest pain, palpitations, edema. Respiratory: Negative for dyspnea, cough, wheezing. GI: +diarrhea (resolved). Negative for nausea, abdominal pain. 10 point review of systems is otherwise negative with the exception of the elements mentioned above in the HPI.            No Known Allergies  Past Medical History:   Diagnosis Date    Acquired hypothyroidism 1/16/2016    Breast cancer ([facility] [address] 2008    Depression 8/18/2016    Endocrine disease     hypothyroid    Fungal dermatitis 8/18/2016    Hyperlipidemia 1/16/2016    Hypertension, essential, benign 8/18/2016    Hypokalemia 8/18/2016    Inflamed sebaceous cyst 8/18/2016    Major depressive disorder, recurrent, moderate (HCC) 8/18/2016    Nicotine dependence, cigarettes, uncomplicated 3/72/5865    Osteopenia 8/18/2016    Osteoporosis 8/18/2016    Radiation therapy complication 7378    Rheumatoid arthritis ([facility] [address] 1/16/2016    Right carotid bruit 1/16/2016    TIA (transient ischemic attack) 1/16/2016    Tobacco abuse 8/18/2016     Past Surgical History:   Procedure Laterality Date    HX ADENOIDECTOMY      HX BREAST LUMPECTOMY Right 2008    with lymph nodes    HX TONSILLECTOMY      IR BX BONE MARROW DIAGNOSTIC  5/14/2021    IR INSERT NON TUNL CVC OVER 5 YRS  5/14/2021     Family History   Problem Relation Age of Onset    Stroke Mother     Cancer Father         Brain    HIV/AIDS Brother      Social History     Socioeconomic History    Marital status:      Spouse name: Not on file    Number of children: Not on file    Years of education: Not on file    Highest education level: Not on file   Occupational History    Not on file   Tobacco Use    Smoking status: Current Some Day Smoker    Smokeless tobacco: Never Used    Tobacco comment: Only on pay day-1/2 pack   Substance and Sexual Activity    Alcohol use: No    Drug use: No    Sexual activity: Not on file   Other Topics Concern    Not on file   Social History Narrative    Not on file     Social Determinants of Health     Financial Resource Strain:     Difficulty of Paying Living Expenses:    Food Insecurity:     Worried About Running Out of Food in the Last Year:     [address] in the Last Year:    Transportation Needs:     Lack of Transportation (Medical):      Lack of Transportation (Non-Medical):    Physical Activity:     Days of Exercise per Week:     Minutes of Exercise per Session:    Stress:     Feeling of Stress :    Social Connections:     Frequency of Communication with Friends and Family:     Frequency of Social Gatherings with Friends and Family:     Attends Congregation Services:     Active Member of Clubs or Organizations:     Attends Club or Organization Meetings:     Marital Status:    Intimate Partner Violence:     Fear of Current or Ex-Partner:     Emotionally Abused:     Physically Abused:     Sexually Abused:      Current Facility-Administered Medications   Medication Dose Route Frequency Provider Last Rate Last Admin    amoxicillin-clavulanate (AUGMENTIN) 875-125 mg per tablet 1 Tablet  1 Tablet Oral Q12H Viet Richmond MD   1 Tablet at 05/20/21 0931    doxycycline (VIBRAMYCIN) capsule 100 mg  100 mg Oral Q12H Viet Richmond MD        pantoprazole (PROTONIX) tablet 40 mg  40 mg Oral ACB&D Viet Richmond MD        spironolactone (ALDACTONE) tablet 25 mg  25 mg Oral DAILY Viet Richmond MD   25 mg at 05/20/21 0931    alum-mag hydroxide-simeth (MYLANTA) oral suspension 30 mL 30 mL Oral Q4H PRN Gay Phillip MD        hydrOXYzine HCL (ATARAX) tablet 25 mg  25 mg Oral TID PRN Aminta Wright MD        0.9% sodium chloride infusion 250 mL  250 mL IntraVENous PRN Aminta Wright MD 15 mL/hr at 05/17/21 1049 250 mL at 05/17/21 1049    albuterol-ipratropium (DUO-NEB) 2.5 MG-0.5 MG/3 ML  3 mL Nebulization Q4H PRN Luana Magallon MD   3 mL at 05/20/21 0104    Lactobacillus Acidoph & Bulgar [facility]) tablet 1 Tab  1 Tablet Oral DAILY Aminta Wright MD   1 Tablet at 05/20/21 0818    nystatin (MYCOSTATIN) 100,000 unit/mL oral suspension 500,000 Units  500,000 Units Oral QID Emily Saab MD   500,000 Units at 66/46/68 8674    folic acid (FOLVITE) tablet 1 mg  1 mg Oral DAILY Moris Fischer MD   1 mg at 05/20/21 0817    labetaloL (NORMODYNE;TRANDATE) injection 10 mg  10 mg IntraVENous Q4H PRN Aminta Wright MD        lidocaine 4 % patch 1 Patch  1 Patch TransDERmal Q24H Aminta Wright MD   1 Patch at 05/19/21 1501    hydrALAZINE (APRESOLINE) 20 mg/mL injection 10 mg  10 mg IntraVENous Q6H PRN Tameka Cr MD   10 mg at 05/13/21 2335    lip protectant (BLISTEX) ointment 1 Each  1 Each Topical PRN Tameka Cr MD   1 Each at 05/14/21 0057    0.9% sodium chloride infusion 250 mL  250 mL IntraVENous PRN Sam Davis DO        amiodarone (CORDARONE) tablet 200 mg  200 mg Oral DAILY Radha Encarnacion MD   200 mg at 05/20/21 0817    DULoxetine (CYMBALTA) capsule 60 mg  60 mg Oral DAILY Radha Encarnacion MD   60 mg at 05/20/21 0817    levothyroxine (SYNTHROID) tablet 25 mcg  25 mcg Oral ACB Radha Encarnacion MD   25 mcg at 05/20/21 6021    rosuvastatin (CRESTOR) tablet 10 mg  10 mg Oral QHS Radha Encarnacion MD   10 mg at 05/19/21 2228    sodium chloride (NS) flush 5-40 mL  5-40 mL IntraVENous Q8H Radha Encarnacion MD   10 mL at 05/20/21 1400    sodium chloride (NS) flush 5-40 mL  5-40 mL IntraVENous PRN Radha Encarnacion MD   10 mL at 05/14/21 0557    acetaminophen (TYLENOL) tablet 650 mg  650 mg Oral Q6H PRN Apple, Leonora Harada, MD        Or    acetaminophen (TYLENOL) suppository 650 mg  650 mg Rectal Q6H PRN Apple, Leonora Harada, MD        polyethylene glycol (MIRALAX) packet 17 g  17 g Oral DAILY PRN Apple, Leonora Harada, MD        promethazine (PHENERGAN) tablet 12.5 mg  12.5 mg Oral Q6H PRN Apple, Leonora Harada, MD        Or    ondansetron (ZOFRAN) injection 4 mg  4 mg IntraVENous Q6H PRN Apple, Leonora Harada, MD        0.9% sodium chloride infusion 250 mL  250 mL IntraVENous PRN Apple, Leonora Harada, MD        diphenhydrAMINE (BENADRYL) capsule 25 mg  25 mg Oral Q6H PRN Apple, Leonora Harada, MD        traMADoL Ova Octavio) tablet 50 mg  50 mg Oral Q6H PRN Alexys Daniel MD   50 mg at 21 0931       OBJECTIVE:  Patient Vitals for the past 8 hrs:   BP Temp Pulse Resp SpO2   21 0908 128/60 96.8 °F (36 °C) 90 20 95 %   21 0719     94 %     Temp (24hrs), Av.1 °F (36.7 °C), Min:96.8 °F (36 °C), Max:98.5 °F (36.9 °C)    No intake/output data recorded. Physical Exam:  Constitutional: Oriented to person, place, and time. Well-developed and well-nourished. HEENT: Very hard of hearing. Normocephalic and atraumatic. Oropharynx is clear and moist +buccal lesion. +Exophthalmos. Conjunctivae and EOM are normal. No scleral icterus. Neck supple. No JVD present. No tracheal deviation present. No thyromegaly present. Skin Warm and dry. No bruising and no rash noted. No erythema. No pallor. Respiratory Effort normal and breath sounds normal.  No respiratory distress. No wheezes. No rales. No tenderness. CVS Normal rate, regular rhythm and normal heart sounds. Exam reveals no gallop, no friction and no rub. No murmur heard. Abdomen Soft. Bowel sounds are normal. Exhibits no distension. There is no tenderness. There is no rebound and no guarding. Neuro Normal reflexes. No cranial nerve deficit. Exhibits normal muscle tone, 5 of 5 strength of all extremities.    MSK Normal range of motion. No edema and no tenderness.    Psych Normal mood, affect, behavior, judgment and thought content      Labs:    Recent Labs     05/20/21  0827 05/19/21  1345 05/18/21  0522   WBC 15.1* 4.1* 1.0*   RBC 2.55* 2.69* 2.57*   HGB 7.6* 8.0* 7.7*   HCT 23.6* 24.7* 23.6*   MCV 92.5 91.8 91.8   MCH 29.8 29.7 30.0   MCHC 32.2 32.4 32.6   RDW 16.4* 16.2* 15.4*   PLT 32* 15* 15*   GRANS 59 24* 12*   LYMPH 13 23 38   MONOS 12 18* 26*   EOS 4 9* 23*   BASOS 1 1 0   IG 11* 25* 1   DF AUTOMATED AUTOMATED AUTOMATED   ANEU 8.8* 1.1* 0.1*   ABL 2.0 0.9 0.4*   ABM 1.8* 0.7 0.3   JSOE G 0.6 0.4 0.2   ABB 0.2 0.0 0.0   AIG 1.7* 1.0* 0.0      Recent Labs     05/20/21  0827 05/19/21  1345 05/18/21  0522    141 138   K 3.5 3.3* 2.8*   * 112* 107   CO2 24 21 23   AGAP 6* 8 8   GLU 96 119* 97   BUN 11 11 10   CREA 1.32* 1.35* 1.34*   GFRAA 50* 49* 49*   GFRNA 41* 40* 41*   CA 7.9* 7.7* 7.3*       ASSESSMENT/RECOMMENDATION:    Principal Problem:    Anemia (5/12/2021)    Active Problems:    Acquired hypothyroidism (1/16/2016)      Hyperlipidemia (1/16/2016)      Rheumatoid arthritis ([facility] (1/16/2016)      Hypertension, essential, benign (8/18/2016)      Major depressive disorder, recurrent, moderate (HCC) (8/18/2016)      Paroxysmal atrial fibrillation (HCC) (3/19/2021)      Pancytopenia (HCC) (5/12/2021)      Rib fracture (5/12/2021)      Right upper lobe pneumonia (5/15/2021)      Stage 3 chronic kidney disease ([address] (5/20/2021)    68 y.o. F with a recent diagnosis of A. fib and a started Eliquis since March 2021 with prolonged melena and severe anemia of hemoglobin 3.7, responded to blood transfusion, however the acute pancytopenia would not be typical for dilutional effect as such, will arrange systemic anemia work-up unrevealing and blood smear showed rouleaux, pursue a bone marrow biopsy 5/14/2021 to rule out blood malignancy, report pending, CBC still trending down persistently, clinically stable, probably need blood transfusion tomorrow, all questions answered, will follow. Pancytopenia  - Occasion teardrop cells/basophilic stippling-lead level pending  - Smear with increased rouleaux  - Check DIC, Hemolysis labs  - IR consulted for BMbx  - Medications reviewed   5/17 BMBx 5/14 pending. SPEP/FLC pending. Counts continue to trend down. PRBCs today for Hgb of 6.7. Reviewed medications with Dr Michael Bowman - has been on MTX for 10-15 years and recently started amiodarone at previous admission for Afib (around 3/2021). Possible interaction between MTX and amiodarone causing myelosuppression (also c/o mouth sores and diarrhea). May need to discuss alternative to amiodarone with cardiology if BMbx results are unrevealing. 5/18 Counts stable after PRBC transfusion. BMBx prelim shows hypocellularity - adding on PNH to flow cytometry. 5/20 WBC up to 15.1 - DC Granix. Plts up to 32k from 15k yesterday. Hgb stable. Awaiting final path from BMBx and PNH but possibly related to MTX toxicity from MTX and amiodarone as counts appear to be slowly improving. Febrile neutropenia  5/17 Tmax 100.7 - D3 Zosyn/Doxycycline per primary. CXR with RUL infiltrate. BC NGTD. Will start Granix. 5/18 Afebrile. D4 antibiotics. ANC remains at 100 - continue Granix. 5/20 Afebrile. ANC improved - DC Granix. RESOLVED     Stool heme positive  - GI consulted  5/17 Pancytopenia w/u in progress prior to GI eval.      RA  - Methotrexate every Sunday (held for admission)  5/20 Reports she has seen rheumatology in the past but was prescribed by Dr Lis Morse and now she reports she \"just keeps getting refills from my online pharmacy. \"      CKD  - Renal US: Increased cortical echogenicity involving the single right kidney compatible with chronic medical renal disease. No hydronephrosis. Absent left kidney?   5/17 Cr stable at 1.34     Afib  - New diagnosis recently started amiodarone and Eliquis (currently on hold)  5/18 Will discuss with cardiology if amiodarone can be switched to another agent in light of potential interaction with MTX.        Lab studies and imaging studies were personally reviewed. Pertinent old records were reviewed.     Thank you for allowing me to participate in the care of Ms. Joy Gonzalez.              NYLA Marion. [address] Hematology & Oncology  [id number] [place] [address]  Office : [phone]  Fax : [phone]

## 2021-05-20 NOTE — PROGRESS NOTES
Pt resting in bed w/ no distress or complains at this time. Report received from Roberto Sadler [facility]. Pt's respirations stable on 5L high flow NC. Pt alert and oriented.

## 2021-05-20 NOTE — PROGRESS NOTES
Problem: Falls - Risk of  Goal: *Absence of Falls  Description: Document Grahambobo Horner Fall Risk and appropriate interventions in the flowsheet.   Outcome: Progressing Towards Goal  Note: Fall Risk Interventions:  Mobility Interventions: Patient to call before getting OOB         Medication Interventions: Patient to call before getting OOB    Elimination Interventions: Patient to call for help with toileting needs    History of Falls Interventions: Bed/chair exit alarm

## 2021-05-20 NOTE — PROGRESS NOTES
Hospitalist Progress Note     Admit Date:  2021 10:41 AM   Name:  Carlos Miller   Age:  68 y.o.  :  1940   MRN:  [id number]   PCP:  Tyler Colorado MD  Presenting Complaint: Fall    Initial Admission Diagnosis: Anemia [D64.9]     Assessment and Plan:     Hospital Problems as of 2021 Date Reviewed: 2021        Codes Class Noted - Resolved POA    Stage 3 chronic kidney disease ([facility] ICD-10-CM: N18.30  ICD-9-CM: 585.3  2021 - Present Yes        Febrile neutropenia ([facility] ICD-10-CM: D70.9, R50.81  ICD-9-CM: 288.00, 780.61  5/15/2021 - Present Yes        Right upper lobe pneumonia ICD-10-CM: J18.9  ICD-9-CM: 460  5/15/2021 - Present Yes        * (Principal) Anemia ICD-10-CM: D64.9  ICD-9-CM: 285.9  2021 - Present Yes        Pancytopenia ([facility] ICD-10-CM: U75.349  ICD-9-CM: 284.19  2021 - Present Yes        Rib fracture ICD-10-CM: S22.39XA  ICD-9-CM: 807.00  2021 - Present Yes        Hypertension, essential, benign (Chronic) ICD-10-CM: I10  ICD-9-CM: 401.1  2016 - Present Yes        Major depressive disorder, recurrent, moderate (HCC) (Chronic) ICD-10-CM: F33.1  ICD-9-CM: 296.32  2016 - Present Yes        Acquired hypothyroidism ICD-10-CM: E03.9  ICD-9-CM: 244.9  2016 - Present Yes        Hyperlipidemia (Chronic) ICD-10-CM: E78.5  ICD-9-CM: 272.4  2016 - Present Yes        Rheumatoid arthritis ([facility] (Chronic) ICD-10-CM: M06.9  ICD-9-CM: 714.0  2016 - Present Yes        RESOLVED: NAZIA (acute kidney injury) ([address] ICD-10-CM: N17.9  ICD-9-CM: 584.9  2021 - 2021 Yes              Plan:  RUL bacterial PNA  21 -change abx to augmentin and doxy  -blood cx negative    Edema  21 - prob from fluid and PRBCs. give lasix 40mg x1 today. Resumed home aldactone.  Monitor BMP and BP.    -normal echo 21    Urine cx with candida and enterococcal UTI  -not clear candida is true infection  -on augmentin    Resp failure with hypoxia  -wean o2 as able    Pancytopenia  05/20/21 - awaiting labs today  -mostly improved. Platelets still low  -appreciate heme recs  -do not plan to resume methotrexate at discharge    Melena, anemia  -GI deferred EGD. Can follow up outpt as doesn't appear to be actively bleeding  -PPI BID    CKD  -appears to be stable at baseline  -monitor BMP    DC planning:    -therapy rec SNF. CM working on placement    Other listed chronic conditions stable, continue current management. Diet:  DIET NUTRITIONAL SUPPLEMENTS  DIET GI SOFT  DVT ppx:  SCDs    Hospital Course:   Jarrell Warren is a 68 y.o. female with medical history of recently dx'd Afib (on Eliquis), CKD3, RA, h/o breast CA 2008, hypothyroidism who presented to ED with falls, weakness, nausea.   Pt reports not feeling well since she was discharged March 2021 after being diagnosed with Afib and started on Eliquis. Patient presented with fatigue and fall. Found to have rib fracture with some ecchymosis. Her hemoglobin was low and she received 3 unit of PRBC. She had significant leukopenia and ultimately underwent bone marrow biopsy May 17 showing hypocellularity. Suspected methotrexate toxicity from amiodarone interaction. she developed fever during hospitalization started on abx for RUL PNA on CXR. There is also concern for melena and GI consulted but given leukopenia, EGD held. Her hemoglobin dropped again on May 17 and she was given 1 unit of PRBC. 24hr Events/Subjective:   Feeling well, better. Less SOB but still on 5L.   No pain, melena, BRBPR, cough, fevers    No other complaints  Objective:     Patient Vitals for the past 24 hrs:   Temp Pulse Resp BP SpO2   05/20/21 0719     94 %   05/20/21 0324 98.3 °F (36.8 °C) 90 18 120/67 98 %   05/20/21 0104     99 %   05/19/21 2322 98.5 °F (36.9 °C) 90 18 136/69 100 %   05/19/21 1912 98.5 °F (36.9 °C) 86 18 (!) 158/74 97 %   05/19/21 1510 98.2 °F (36.8 °C) 82 18 118/62 100 %   05/19/21 1431     100 % 05/19/21 1151     100 %   05/19/21 1107 98.6 °F (37 °C) 77 18 131/70 100 %     Oxygen Therapy  O2 Sat (%): 94 % (05/20/21 0719)  Pulse via Oximetry: 113 beats per minute (05/20/21 0719)  O2 Device: Hi flow nasal cannula (05/20/21 0719)  Skin Assessment: Clean, dry, & intact (05/19/21 1956)  Skin Protection for O2 Device: No (05/19/21 1956)  O2 Flow Rate (L/min): 5 l/min (05/20/21 0719)    Estimated body mass index is 33.2 kg/m² as calculated from the following:    Height as of this encounter: 5' (1.524 m). Weight as of this encounter: 77.1 kg (170 lb). Intake/Output Summary (Last 24 hours) at 5/20/2021 0843  Last data filed at 5/19/2021 2322  Gross per 24 hour   Intake 480 ml   Output 1150 ml   Net -670 ml       *Note that automatically entered I/Os may not be accurate; dependent on patient compliance with collection and accurate  by [employer]. General:    Well nourished. No overt distress  CV:   RRR. Generalized edema 2-3+ all extremities. No JVD  Lungs:   CTAB, diminished R base. Unlabored  Abdomen:   nondistended. Extremities: Warm and dry. No cyanosis   Skin:     No rashes. Normal coloration  Neuro:  No gross focal deficits. I have reviewed all labs, meds, and other studies shown below:  Last 24hr Labs:  Recent Results (from the past 24 hour(s))   CBC WITH AUTOMATED DIFF    Collection Time: 05/19/21  1:45 PM   Result Value Ref Range    WBC 4.1 (L) 4.3 - 11.1 K/uL    RBC 2.69 (L) 4.05 - 5.2 M/uL    HGB 8.0 (L) 11.7 - 15.4 g/dL    HCT 24.7 (L) 35.8 - 46.3 %    MCV 91.8 79.6 - 97.8 FL    MCH 29.7 26.1 - 32.9 PG    MCHC 32.4 31.4 - 35.0 g/dL    RDW 16.2 (H) 11.9 - 14.6 %    PLATELET 15 (LL) 874 - 450 K/uL    MPV Unable to calculate. Recommend adding IPF.  9.4 - 12.3 FL    ABSOLUTE NRBC 0.16 0.0 - 0.2 K/uL    NEUTROPHILS 24 (L) 43 - 78 %    LYMPHOCYTES 23 13 - 44 %    MONOCYTES 18 (H) 4.0 - 12.0 %    EOSINOPHILS 9 (H) 0.5 - 7.8 %    BASOPHILS 1 0.0 - 2.0 %    IMMATURE GRANULOCYTES 25 (H) 0.0 - 5.0 %    ABS. NEUTROPHILS 1.1 (L) 1.7 - 8.2 K/UL    ABS. LYMPHOCYTES 0.9 0.5 - 4.6 K/UL    ABS. MONOCYTES 0.7 0.1 - 1.3 K/UL    ABS. EOSINOPHILS 0.4 0.0 - 0.8 K/UL    ABS. BASOPHILS 0.0 0.0 - 0.2 K/UL    ABS. IMM. GRANS.  1.0 (H) 0.0 - 0.5 K/UL    RBC COMMENTS SLIGHT  ANISOCYTOSIS + POIKILOCYTOSIS        RBC COMMENTS OCCASIONAL  MACROCYTOSIS        WBC COMMENTS Result Confirmed By Smear      PLATELET COMMENTS MARKED      DF AUTOMATED     METABOLIC PANEL, BASIC    Collection Time: 05/19/21  1:45 PM   Result Value Ref Range    Sodium 141 136 - 145 mmol/L    Potassium 3.3 (L) 3.5 - 5.1 mmol/L    Chloride 112 (H) 98 - 107 mmol/L    CO2 21 21 - 32 mmol/L    Anion gap 8 7 - 16 mmol/L    Glucose 119 (H) 65 - 100 mg/dL    BUN 11 8 - 23 MG/DL    Creatinine 1.35 (H) 0.6 - 1.0 MG/DL    GFR est AA 49 (L) >60 ml/min/1.73m2    GFR est non-AA 40 (L) >60 ml/min/1.73m2    Calcium 7.7 (L) 8.3 - 10.4 MG/DL   GLUCOSE, POC    Collection Time: 05/19/21  8:38 PM   Result Value Ref Range    Glucose (POC) 106 (H) 65 - 100 mg/dL    Performed by [facility]    GLUCOSE, POC    Collection Time: 05/20/21  5:26 AM   Result Value Ref Range    Glucose (POC) 117 (H) 65 - 100 mg/dL    Performed by [facility]        All Micro Results     Procedure Component Value Units Date/Time    CULTURE, BLOOD [[id number]] Collected: 05/15/21 0937    Order Status: Completed Specimen: Blood Updated: 05/20/21 0654     Special Requests: --        LEFT  HAND       Culture result: NO GROWTH 5 DAYS       CULTURE, BLOOD [[id number]] Collected: 05/15/21 0756    Order Status: Completed Specimen: Blood Updated: 05/20/21 0654     Special Requests: --        LEFT  HAND       Culture result: NO GROWTH 5 DAYS       CULTURE, URINE [[id number]]  (Abnormal)  (Susceptibility) Collected: 05/17/21 0934    Order Status: Completed Specimen: Urine from Clean catch Updated: 05/20/21 0639     Special Requests: NO SPECIAL REQUESTS        Culture result:       >100,000 COLONIES/mL ELIF GLABRATA                  10,000 to 50,000 COLONIES/mL ENTEROCOCCUS FAECIUM GROUP D                  <10,000 COLONIES/mL NORMAL SKIN MARC ISOLATED          CULTURE, BLOOD [[id number]] Collected: 05/13/21 1616    Order Status: Completed Specimen: Blood Updated: 05/18/21 0749     Special Requests: --        LEFT  HAND       Culture result: NO GROWTH 5 DAYS       CULTURE, BLOOD [[id number]] Collected: 05/13/21 1613    Order Status: Completed Specimen: Blood Updated: 05/18/21 0749     Special Requests: --        LEFT  Antecubital       Culture result: NO GROWTH 5 DAYS             SARS-CoV-2 Lab Results  \"Novel Coronavirus\" Test: No results found for: COV2NT   \"Emergent Disease\" Test: No results found for: EDPR  \"SARS-COV-2\" Test: No results found for: XGCOVT  Rapid Test:   Lab Results   Component Value Date/Time    COVR Not detected 03/19/2021 06:15 PM            Current Meds:  Current Facility-Administered Medications   Medication Dose Route Frequency    amoxicillin-clavulanate (AUGMENTIN) 875-125 mg per tablet 1 Tablet  1 Tablet Oral Q12H    doxycycline (VIBRAMYCIN) capsule 100 mg  100 mg Oral Q12H    pantoprazole (PROTONIX) tablet 40 mg  40 mg Oral ACB    hydrOXYzine HCL (ATARAX) tablet 25 mg  25 mg Oral TID PRN    tbo-filgrastim (GRANIX) injection 300 mcg  300 mcg SubCUTAneous QHS    0.9% sodium chloride infusion 250 mL  250 mL IntraVENous PRN    albuterol-ipratropium (DUO-NEB) 2.5 MG-0.5 MG/3 ML  3 mL Nebulization Q4H PRN    Lactobacillus Acidoph & Bulgar (FLORANEX) tablet 1 Tab  1 Tablet Oral DAILY    nystatin (MYCOSTATIN) 100,000 unit/mL oral suspension 500,000 Units  500,000 Units Oral QID    folic acid (FOLVITE) tablet 1 mg  1 mg Oral DAILY    labetaloL (NORMODYNE;TRANDATE) injection 10 mg  10 mg IntraVENous Q4H PRN    lidocaine 4 % patch 1 Patch  1 Patch TransDERmal Q24H    hydrALAZINE (APRESOLINE) 20 mg/mL injection 10 mg  10 mg IntraVENous Q6H PRN    lip protectant (BLISTEX) ointment 1 Each  1 Each Topical PRN    0.9% sodium chloride infusion 250 mL  250 mL IntraVENous PRN    amiodarone (CORDARONE) tablet 200 mg  200 mg Oral DAILY    DULoxetine (CYMBALTA) capsule 60 mg  60 mg Oral DAILY    levothyroxine (SYNTHROID) tablet 25 mcg  25 mcg Oral ACB    rosuvastatin (CRESTOR) tablet 10 mg  10 mg Oral QHS    sodium chloride (NS) flush 5-40 mL  5-40 mL IntraVENous Q8H    sodium chloride (NS) flush 5-40 mL  5-40 mL IntraVENous PRN    acetaminophen (TYLENOL) tablet 650 mg  650 mg Oral Q6H PRN    Or    acetaminophen (TYLENOL) suppository 650 mg  650 mg Rectal Q6H PRN    polyethylene glycol (MIRALAX) packet 17 g  17 g Oral DAILY PRN    promethazine (PHENERGAN) tablet 12.5 mg  12.5 mg Oral Q6H PRN    Or    ondansetron (ZOFRAN) injection 4 mg  4 mg IntraVENous Q6H PRN    0.9% sodium chloride infusion 250 mL  250 mL IntraVENous PRN    diphenhydrAMINE (BENADRYL) capsule 25 mg  25 mg Oral Q6H PRN    traMADoL (ULTRAM) tablet 50 mg  50 mg Oral Q6H PRN       Other Studies:  No results found for this visit on 05/12/21. No results found.     Signed:  Anila Lewis MD

## 2021-05-20 NOTE — PROGRESS NOTES
PT Daily Note:  Attempted to see patient for physical therapy this morning but she presents moaning stating she doesn't feel good and requested to rest at this time. Will check back on patient at a later date/time if schedule permits.   Thank you,  Savi Frey, PTA

## 2021-05-21 ENCOUNTER — APPOINTMENT (OUTPATIENT)
Dept: GENERAL RADIOLOGY | Age: 81
DRG: 808 | End: 2021-05-21
Attending: INTERNAL MEDICINE
Payer: MEDICARE

## 2021-05-21 PROBLEM — N18.30 STAGE 3 CHRONIC KIDNEY DISEASE (HCC): Chronic | Status: ACTIVE | Noted: 2021-05-20

## 2021-05-21 LAB
ANION GAP SERPL CALC-SCNC: 7 MMOL/L (ref 7–16)
BASOPHILS # BLD: 0 K/UL (ref 0–0.2)
BASOPHILS NFR BLD: 0 % (ref 0–2)
BNP SERPL-MCNC: 3025 PG/ML
BUN SERPL-MCNC: 16 MG/DL (ref 8–23)
CALCIUM SERPL-MCNC: 8.5 MG/DL (ref 8.3–10.4)
CD59 CELLS BLD QL: NORMAL
CHLORIDE SERPL-SCNC: 106 MMOL/L (ref 98–107)
CO2 SERPL-SCNC: 24 MMOL/L (ref 21–32)
CREAT SERPL-MCNC: 1.69 MG/DL (ref 0.6–1)
DIFFERENTIAL METHOD BLD: ABNORMAL
EOSINOPHIL # BLD: 0.3 K/UL (ref 0–0.8)
EOSINOPHIL NFR BLD: 1 % (ref 0.5–7.8)
ERYTHROCYTE [DISTWIDTH] IN BLOOD BY AUTOMATED COUNT: 16.5 % (ref 11.9–14.6)
FLOW CYTOMETRY PNH, 502265: NORMAL
GLUCOSE SERPL-MCNC: 127 MG/DL (ref 65–100)
HCT VFR BLD AUTO: 25.3 % (ref 35.8–46.3)
HGB BLD-MCNC: 8.1 G/DL (ref 11.7–15.4)
IMM GRANULOCYTES # BLD AUTO: 6.6 K/UL (ref 0–0.5)
IMM GRANULOCYTES NFR BLD AUTO: 21 % (ref 0–5)
LYMPHOCYTES # BLD: 2.8 K/UL (ref 0.5–4.6)
LYMPHOCYTES NFR BLD: 9 % (ref 13–44)
MAGNESIUM SERPL-MCNC: 1.5 MG/DL (ref 1.8–2.4)
MCH RBC QN AUTO: 29.5 PG (ref 26.1–32.9)
MCHC RBC AUTO-ENTMCNC: 32 G/DL (ref 31.4–35)
MCV RBC AUTO: 92 FL (ref 79.6–97.8)
MONOCYTES # BLD: 2.5 K/UL (ref 0.1–1.3)
MONOCYTES NFR BLD: 8 % (ref 4–12)
NEUTS SEG # BLD: 19 K/UL (ref 1.7–8.2)
NEUTS SEG NFR BLD: 61 % (ref 43–78)
NRBC # BLD: 1 K/UL (ref 0–0.2)
PHOSPHATE SERPL-MCNC: 2.8 MG/DL (ref 2.3–3.7)
PLATELET # BLD AUTO: 67 K/UL (ref 150–450)
PLATELET COMMENTS,PCOM: ABNORMAL
PMV BLD AUTO: 13.7 FL (ref 9.4–12.3)
POTASSIUM SERPL-SCNC: 3.3 MMOL/L (ref 3.5–5.1)
RBC # BLD AUTO: 2.75 M/UL (ref 4.05–5.2)
RBC MORPH BLD: ABNORMAL
SODIUM SERPL-SCNC: 137 MMOL/L (ref 136–145)
WBC # BLD AUTO: 31.2 K/UL (ref 4.3–11.1)
WBC MORPH BLD: ABNORMAL

## 2021-05-21 PROCEDURE — 74011250637 HC RX REV CODE- 250/637: Performed by: INTERNAL MEDICINE

## 2021-05-21 PROCEDURE — 74011250636 HC RX REV CODE- 250/636: Performed by: INTERNAL MEDICINE

## 2021-05-21 PROCEDURE — 65270000029 HC RM PRIVATE

## 2021-05-21 PROCEDURE — 83735 ASSAY OF MAGNESIUM: CPT

## 2021-05-21 PROCEDURE — 2709999900 HC NON-CHARGEABLE SUPPLY

## 2021-05-21 PROCEDURE — 71045 X-RAY EXAM CHEST 1 VIEW: CPT

## 2021-05-21 PROCEDURE — 97530 THERAPEUTIC ACTIVITIES: CPT

## 2021-05-21 PROCEDURE — 83880 ASSAY OF NATRIURETIC PEPTIDE: CPT

## 2021-05-21 PROCEDURE — 74011250637 HC RX REV CODE- 250/637: Performed by: FAMILY MEDICINE

## 2021-05-21 PROCEDURE — 85025 COMPLETE CBC W/AUTO DIFF WBC: CPT

## 2021-05-21 PROCEDURE — 99232 SBSQ HOSP IP/OBS MODERATE 35: CPT | Performed by: INTERNAL MEDICINE

## 2021-05-21 PROCEDURE — 97535 SELF CARE MNGMENT TRAINING: CPT

## 2021-05-21 PROCEDURE — 80048 BASIC METABOLIC PNL TOTAL CA: CPT

## 2021-05-21 PROCEDURE — 74011000250 HC RX REV CODE- 250: Performed by: INTERNAL MEDICINE

## 2021-05-21 PROCEDURE — 84100 ASSAY OF PHOSPHORUS: CPT

## 2021-05-21 PROCEDURE — 77030038269 HC DRN EXT URIN PURWCK BARD -A

## 2021-05-21 RX ORDER — AMOXICILLIN AND CLAVULANATE POTASSIUM 500; 125 MG/1; MG/1
1 TABLET, FILM COATED ORAL EVERY 12 HOURS
Status: DISCONTINUED | OUTPATIENT
Start: 2021-05-21 | End: 2021-05-24 | Stop reason: HOSPADM

## 2021-05-21 RX ORDER — POTASSIUM CHLORIDE 20 MEQ/1
40 TABLET, EXTENDED RELEASE ORAL
Status: COMPLETED | OUTPATIENT
Start: 2021-05-21 | End: 2021-05-21

## 2021-05-21 RX ORDER — AMOXICILLIN AND CLAVULANATE POTASSIUM 875; 125 MG/1; MG/1
1 TABLET, FILM COATED ORAL EVERY 12 HOURS
Status: DISCONTINUED | OUTPATIENT
Start: 2021-05-21 | End: 2021-05-21 | Stop reason: DRUGHIGH

## 2021-05-21 RX ORDER — MAGNESIUM SULFATE HEPTAHYDRATE 40 MG/ML
2 INJECTION, SOLUTION INTRAVENOUS ONCE
Status: COMPLETED | OUTPATIENT
Start: 2021-05-21 | End: 2021-05-21

## 2021-05-21 RX ORDER — SODIUM,POTASSIUM PHOSPHATES 280-250MG
1 POWDER IN PACKET (EA) ORAL AS NEEDED
Status: DISCONTINUED | OUTPATIENT
Start: 2021-05-21 | End: 2021-05-24 | Stop reason: HOSPADM

## 2021-05-21 RX ORDER — AMOXICILLIN AND CLAVULANATE POTASSIUM 500; 125 MG/1; MG/1
1 TABLET, FILM COATED ORAL 2 TIMES DAILY WITH MEALS
Status: DISCONTINUED | OUTPATIENT
Start: 2021-05-21 | End: 2021-05-21

## 2021-05-21 RX ORDER — POTASSIUM CHLORIDE 14.9 MG/ML
20 INJECTION INTRAVENOUS
Status: DISCONTINUED | OUTPATIENT
Start: 2021-05-21 | End: 2021-05-24 | Stop reason: HOSPADM

## 2021-05-21 RX ORDER — MAGNESIUM SULFATE HEPTAHYDRATE 40 MG/ML
2 INJECTION, SOLUTION INTRAVENOUS
Status: DISCONTINUED | OUTPATIENT
Start: 2021-05-21 | End: 2021-05-24 | Stop reason: HOSPADM

## 2021-05-21 RX ORDER — MAGNESIUM SULFATE HEPTAHYDRATE 40 MG/ML
4 INJECTION, SOLUTION INTRAVENOUS
Status: DISCONTINUED | OUTPATIENT
Start: 2021-05-21 | End: 2021-05-24 | Stop reason: HOSPADM

## 2021-05-21 RX ORDER — GUAIFENESIN 600 MG/1
1200 TABLET, EXTENDED RELEASE ORAL 2 TIMES DAILY
Status: DISCONTINUED | OUTPATIENT
Start: 2021-05-21 | End: 2021-05-23

## 2021-05-21 RX ADMIN — ROSUVASTATIN CALCIUM 10 MG: 5 TABLET, FILM COATED ORAL at 21:32

## 2021-05-21 RX ADMIN — ONDANSETRON 4 MG: 2 INJECTION INTRAMUSCULAR; INTRAVENOUS at 09:54

## 2021-05-21 RX ADMIN — PANTOPRAZOLE SODIUM 40 MG: 40 TABLET, DELAYED RELEASE ORAL at 06:20

## 2021-05-21 RX ADMIN — SODIUM CHLORIDE 10 ML: 9 INJECTION, SOLUTION INTRAMUSCULAR; INTRAVENOUS; SUBCUTANEOUS at 21:32

## 2021-05-21 RX ADMIN — AMIODARONE HYDROCHLORIDE 200 MG: 200 TABLET ORAL at 08:52

## 2021-05-21 RX ADMIN — NYSTATIN 500000 UNITS: 100000 SUSPENSION ORAL at 21:32

## 2021-05-21 RX ADMIN — LEVOTHYROXINE SODIUM 25 MCG: 0.05 TABLET ORAL at 06:20

## 2021-05-21 RX ADMIN — MAGNESIUM SULFATE HEPTAHYDRATE 2 G: 40 INJECTION, SOLUTION INTRAVENOUS at 13:28

## 2021-05-21 RX ADMIN — NYSTATIN 500000 UNITS: 100000 SUSPENSION ORAL at 08:53

## 2021-05-21 RX ADMIN — GUAIFENESIN 1200 MG: 600 TABLET ORAL at 17:03

## 2021-05-21 RX ADMIN — DOXYCYCLINE HYCLATE 100 MG: 100 CAPSULE ORAL at 08:52

## 2021-05-21 RX ADMIN — DOXYCYCLINE HYCLATE 100 MG: 100 CAPSULE ORAL at 21:32

## 2021-05-21 RX ADMIN — POTASSIUM CHLORIDE 40 MEQ: 1500 TABLET, EXTENDED RELEASE ORAL at 13:29

## 2021-05-21 RX ADMIN — AMOXICILLIN AND CLAVULANATE POTASSIUM 1 TABLET: 875; 125 TABLET, FILM COATED ORAL at 08:53

## 2021-05-21 RX ADMIN — NYSTATIN 500000 UNITS: 100000 SUSPENSION ORAL at 17:04

## 2021-05-21 RX ADMIN — AMOXICILLIN AND CLAVULANATE POTASSIUM 1 TABLET: 500; 125 TABLET, FILM COATED ORAL at 21:32

## 2021-05-21 RX ADMIN — NYSTATIN 500000 UNITS: 100000 SUSPENSION ORAL at 12:45

## 2021-05-21 RX ADMIN — SPIRONOLACTONE 25 MG: 25 TABLET ORAL at 08:52

## 2021-05-21 RX ADMIN — FOLIC ACID 1 MG: 1 TABLET ORAL at 08:53

## 2021-05-21 RX ADMIN — SODIUM CHLORIDE 10 ML: 9 INJECTION, SOLUTION INTRAMUSCULAR; INTRAVENOUS; SUBCUTANEOUS at 05:12

## 2021-05-21 RX ADMIN — PANTOPRAZOLE SODIUM 40 MG: 40 TABLET, DELAYED RELEASE ORAL at 17:04

## 2021-05-21 RX ADMIN — DULOXETINE HYDROCHLORIDE 60 MG: 60 CAPSULE, DELAYED RELEASE ORAL at 08:53

## 2021-05-21 RX ADMIN — GUAIFENESIN 1200 MG: 600 TABLET ORAL at 13:29

## 2021-05-21 RX ADMIN — SODIUM CHLORIDE 5 ML: 9 INJECTION, SOLUTION INTRAMUSCULAR; INTRAVENOUS; SUBCUTANEOUS at 12:48

## 2021-05-21 RX ADMIN — LACTOBACILLUS TAB 1 TABLET: TAB at 08:52

## 2021-05-21 NOTE — PROGRESS NOTES
Received report from April, [facility]. Patient resting quietly in bed. Respirations present. On 7L NC. Purewick in place and draining. No signs of distress. No needs expressed. Bed low and locked. Call light within reach. Will continue to monitor.

## 2021-05-21 NOTE — PROGRESS NOTES
Pt was sleeping. Awoken for 0600 scheduled meds. Returned to sleep. Slept w/ out distress through the night. Respirations stable on 7L. Preparing to report off to oncoming RN.

## 2021-05-21 NOTE — PROGRESS NOTES
Hospitalist Progress Note     Admit Date:  2021 10:41 AM   Name:  Omar Kowalski   Age:  68 y.o.  :  1940   MRN:  [id number]   PCP:  August Gomes MD  Presenting Complaint: Fall    Initial Admission Diagnosis: Anemia [D64.9]     Assessment and Plan:     Hospital Problems as of 2021 Date Reviewed: 2021        Codes Class Noted - Resolved POA    Stage 3 chronic kidney disease ([facility] (Chronic) ICD-10-CM: N18.30  ICD-9-CM: 585.3  2021 - Present Yes        Right upper lobe pneumonia ICD-10-CM: J18.9  ICD-9-CM: 860  5/15/2021 - Present Yes        Anemia ICD-10-CM: D64.9  ICD-9-CM: 285.9  2021 - Present Yes        * (Principal) Pancytopenia ([facility] ICD-10-CM: G56.555  ICD-9-CM: 284.19  2021 - Present Yes        Rib fracture ICD-10-CM: S22.39XA  ICD-9-CM: 807.00  2021 - Present Yes        Paroxysmal atrial fibrillation (HCC) (Chronic) ICD-10-CM: I48.0  ICD-9-CM: 427.31  3/19/2021 - Present Yes        Hypertension, essential, benign (Chronic) ICD-10-CM: I10  ICD-9-CM: 401.1  2016 - Present Yes        Major depressive disorder, recurrent, moderate (HCC) (Chronic) ICD-10-CM: F33.1  ICD-9-CM: 296.32  2016 - Present Yes        Acquired hypothyroidism (Chronic) ICD-10-CM: E03.9  ICD-9-CM: 244.9  2016 - Present Yes        Hyperlipidemia (Chronic) ICD-10-CM: E78.5  ICD-9-CM: 272.4  2016 - Present Yes        Rheumatoid arthritis ([facility] (Chronic) ICD-10-CM: M06.9  ICD-9-CM: 714.0  2016 - Present Yes        RESOLVED: Febrile neutropenia ([facility] ICD-10-CM: D70.9, R50.81  ICD-9-CM: 288.00, 780.61  5/15/2021 - 2021 Yes        RESOLVED: NAZIA (acute kidney injury) ([facility] ICD-10-CM: N17.9  ICD-9-CM: 584.9  2021 - 2021 Yes              Plan:  Bacterial PNA  21 -WBC worse. No fevers though. Contrary to what chart says, pt is on room air now so I doubt worsening PNA. Keep abx augmentin and doxy.   Added muxinex today  -blood cx were negative    Edema, pleural effusions  05/21/21 - CXR reviewed, Right pleural effusion looks worse than previous. May need to give lasix again today but noted that her Cr went up after lasix yesterday so check BNP first  -normal echo 5/5/21    Pancytopenia  05/21/21 - WBC elevated. Stopped granix yesterday. Recheck tomorrow. Platelets improving also  -daily CBC  -appreciate heme recs    Urine cx with candida and enterococcal UTI  -not clear candida is true infection  -completed course of abx for enterococcus    Resp failure with hypoxia  -wean o2 as able    RA  --do not plan to resume methotrexate at discharge. Will order follow up with rheumatology    Melena, anemia  -GI deferred EGD. Can follow up outpt as doesn't appear to be actively bleeding  -PPI BID    CKD  -baseline Cr 1.3-1.5  -daily BMP    DC planning:    -therapy rec SNF. CM working on placement    Other listed chronic conditions stable, continue current management. Diet:  DIET NUTRITIONAL SUPPLEMENTS  DIET GI SOFT  DVT ppx:  SCDs    Hospital Course:   Hugh Lorenzana is a 68 y.o. female with medical history of recently dx'd Afib (on Eliquis), CKD3, RA, h/o breast CA 2008, hypothyroidism who presented to ED with falls, weakness, nausea.   Pt reports not feeling well since she was discharged March 2021 after being diagnosed with Afib and started on Eliquis. Patient presented with fatigue and fall. Found to have rib fracture with some ecchymosis. Her hemoglobin was low and she received 3 unit of PRBC. She had significant leukopenia and ultimately underwent bone marrow biopsy May 17 showing hypocellularity. Suspected methotrexate toxicity from amiodarone interaction. she developed fever during hospitalization started on abx for RUL PNA on CXR. There is also concern for melena and GI consulted but given leukopenia, EGD held. Her hemoglobin dropped again on May 17 and she was given 1 unit of PRBC. 24hr Events/Subjective:   Feeling well, better.   Still with significant extremity edema x4. Still with productive cough. No fevers, SOB, CP    No other complaints  Objective:     Patient Vitals for the past 24 hrs:   Temp Pulse Resp BP SpO2   05/21/21 0756 98.4 °F (36.9 °C) 82 20 (!) 140/91 100 %   05/21/21 0344 97.3 °F (36.3 °C) 84 18 (!) 149/70 99 %   05/20/21 2337 97.7 °F (36.5 °C) 88 20 (!) 142/70 98 %   05/20/21 1937 98.5 °F (36.9 °C) 89 20 126/77 100 %   05/20/21 1814 98.5 °F (36.9 °C) 90 20 124/64 99 %   05/20/21 1400 98.3 °F (36.8 °C) 91 20 124/61 93 %     Oxygen Therapy  O2 Sat (%): 100 % (05/21/21 0756)  Pulse via Oximetry: 113 beats per minute (05/20/21 0719)  O2 Device: Hi flow nasal cannula (05/21/21 0920)  Skin Assessment: Clean, dry, & intact (05/21/21 0745)  Skin Protection for O2 Device: No (05/21/21 0745)  O2 Flow Rate (L/min): 7 l/min (05/21/21 0920)    Estimated body mass index is 34.37 kg/m² as calculated from the following:    Height as of this encounter: 5' (1.524 m). Weight as of this encounter: 79.8 kg (176 lb). Intake/Output Summary (Last 24 hours) at 5/21/2021 1226  Last data filed at 5/21/2021 0745  Gross per 24 hour   Intake 360 ml   Output 1350 ml   Net -990 ml       *Note that automatically entered I/Os may not be accurate; dependent on patient compliance with collection and accurate  by [employer]. General:    Well nourished. No overt distress  CV:   RRR. Generalized edema 2-3+ all extremities. No JVD  Lungs:   CTAB, diminished R base. Unlabored  Abdomen:   nondistended. Extremities: Warm and dry. No cyanosis   Skin:     No rashes. Normal coloration  Neuro:  No gross focal deficits.      I have reviewed all labs, meds, and other studies shown below:  Last 24hr Labs:  Recent Results (from the past 24 hour(s))   GLUCOSE, POC    Collection Time: 05/20/21  8:44 PM   Result Value Ref Range    Glucose (POC) 111 (H) 65 - 100 mg/dL    Performed by Robert    CBC WITH AUTOMATED DIFF    Collection Time: 05/21/21  9:49 AM   Result Value Ref Range    WBC 31.2 (H) 4.3 - 11.1 K/uL    RBC 2.75 (L) 4.05 - 5.2 M/uL    HGB 8.1 (L) 11.7 - 15.4 g/dL    HCT 25.3 (L) 35.8 - 46.3 %    MCV 92.0 79.6 - 97.8 FL    MCH 29.5 26.1 - 32.9 PG    MCHC 32.0 31.4 - 35.0 g/dL    RDW 16.5 (H) 11.9 - 14.6 %    PLATELET 67 (L) 849 - 450 K/uL    MPV 13.7 (H) 9.4 - 12.3 FL    ABSOLUTE NRBC 1.00 (H) 0.0 - 0.2 K/uL    NEUTROPHILS 61 43 - 78 %    LYMPHOCYTES 9 (L) 13 - 44 %    MONOCYTES 8 4.0 - 12.0 %    EOSINOPHILS 1 0.5 - 7.8 %    BASOPHILS 0 0.0 - 2.0 %    IMMATURE GRANULOCYTES 21 (H) 0.0 - 5.0 %    ABS. NEUTROPHILS 19.0 (H) 1.7 - 8.2 K/UL    ABS. LYMPHOCYTES 2.8 0.5 - 4.6 K/UL    ABS. MONOCYTES 2.5 (H) 0.1 - 1.3 K/UL    ABS. EOSINOPHILS 0.3 0.0 - 0.8 K/UL    ABS. BASOPHILS 0.0 0.0 - 0.2 K/UL    ABS. IMM. GRANS.  6.6 (H) 0.0 - 0.5 K/UL    RBC COMMENTS SLIGHT  ANISOCYTOSIS + POIKILOCYTOSIS        RBC COMMENTS OCCASIONAL  BASOPHILIC STIPPLING        RBC COMMENTS OCCASIONAL  POLYCHROMASIA        WBC COMMENTS Result Confirmed By Smear      PLATELET COMMENTS DECREASED      DF AUTOMATED     METABOLIC PANEL, BASIC    Collection Time: 05/21/21  9:49 AM   Result Value Ref Range    Sodium 137 136 - 145 mmol/L    Potassium 3.3 (L) 3.5 - 5.1 mmol/L    Chloride 106 98 - 107 mmol/L    CO2 24 21 - 32 mmol/L    Anion gap 7 7 - 16 mmol/L    Glucose 127 (H) 65 - 100 mg/dL    BUN 16 8 - 23 MG/DL    Creatinine 1.69 (H) 0.6 - 1.0 MG/DL    GFR est AA 38 (L) >60 ml/min/1.73m2    GFR est non-AA 31 (L) >60 ml/min/1.73m2    Calcium 8.5 8.3 - 10.4 MG/DL   MAGNESIUM    Collection Time: 05/21/21  9:49 AM   Result Value Ref Range    Magnesium 1.5 (L) 1.8 - 2.4 mg/dL   PHOSPHORUS    Collection Time: 05/21/21  9:49 AM   Result Value Ref Range    Phosphorus 2.8 2.3 - 3.7 MG/DL   PATHOLOGIST REVIEW SMEARS    Collection Time: 05/21/21  9:49 AM   Result Value Ref Range    PATHOLOGIST REVIEW PENDING        All Micro Results     Procedure Component Value Units Date/Time    CULTURE, BLOOD [[id number]] Collected: 05/15/21 0937    Order Status: Completed Specimen: Blood Updated: 05/20/21 0654     Special Requests: --        LEFT  HAND       Culture result: NO GROWTH 5 DAYS       CULTURE, BLOOD [[id number]] Collected: 05/15/21 0756    Order Status: Completed Specimen: Blood Updated: 05/20/21 0654     Special Requests: --        LEFT  HAND       Culture result: NO GROWTH 5 DAYS       CULTURE, URINE [[id number]]  (Abnormal)  (Susceptibility) Collected: 05/17/21 0934    Order Status: Completed Specimen: Urine from Clean catch Updated: 05/20/21 0639     Special Requests: NO SPECIAL REQUESTS        Culture result:       >100,000 COLONIES/mL CANDIDA GLABRATA                  10,000 to 50,000 COLONIES/mL ENTEROCOCCUS FAECIUM GROUP D                  <10,000 COLONIES/mL NORMAL SKIN MARC ISOLATED          CULTURE, BLOOD [[id number]] Collected: 05/13/21 1616    Order Status: Completed Specimen: Blood Updated: 05/18/21 0749     Special Requests: --        LEFT  HAND       Culture result: NO GROWTH 5 DAYS       CULTURE, BLOOD [[id number]] Collected: 05/13/21 1613    Order Status: Completed Specimen: Blood Updated: 05/18/21 0749     Special Requests: --        LEFT  Antecubital       Culture result: NO GROWTH 5 DAYS             SARS-CoV-2 Lab Results  \"Novel Coronavirus\" Test: No results found for: COV2NT   \"Emergent Disease\" Test: No results found for: EDPR  \"SARS-COV-2\" Test: No results found for: XGCOVT  Rapid Test:   Lab Results   Component Value Date/Time    COVR Not detected 03/19/2021 06:15 PM            Current Meds:  Current Facility-Administered Medications   Medication Dose Route Frequency    amoxicillin-clavulanate (AUGMENTIN) 500-125 mg per tablet 1 Tablet  1 Tablet Oral BID WITH MEALS    doxycycline (VIBRAMYCIN) capsule 100 mg  100 mg Oral Q12H    pantoprazole (PROTONIX) tablet 40 mg  40 mg Oral ACB&D    spironolactone (ALDACTONE) tablet 25 mg  25 mg Oral DAILY    alum-mag hydroxide-simeth (MYLANTA) oral suspension 30 mL  30 mL Oral Q4H PRN    hydrOXYzine HCL (ATARAX) tablet 25 mg  25 mg Oral TID PRN    0.9% sodium chloride infusion 250 mL  250 mL IntraVENous PRN    albuterol-ipratropium (DUO-NEB) 2.5 MG-0.5 MG/3 ML  3 mL Nebulization Q4H PRN    Lactobacillus Acidoph & Bulgar (FLORANEX) tablet 1 Tab  1 Tablet Oral DAILY    nystatin (MYCOSTATIN) 100,000 unit/mL oral suspension 500,000 Units  500,000 Units Oral QID    folic acid (FOLVITE) tablet 1 mg  1 mg Oral DAILY    labetaloL (NORMODYNE;TRANDATE) injection 10 mg  10 mg IntraVENous Q4H PRN    lidocaine 4 % patch 1 Patch  1 Patch TransDERmal Q24H    hydrALAZINE (APRESOLINE) 20 mg/mL injection 10 mg  10 mg IntraVENous Q6H PRN    lip protectant (BLISTEX) ointment 1 Each  1 Each Topical PRN    amiodarone (CORDARONE) tablet 200 mg  200 mg Oral DAILY    DULoxetine (CYMBALTA) capsule 60 mg  60 mg Oral DAILY    levothyroxine (SYNTHROID) tablet 25 mcg  25 mcg Oral ACB    rosuvastatin (CRESTOR) tablet 10 mg  10 mg Oral QHS    sodium chloride (NS) flush 5-40 mL  5-40 mL IntraVENous Q8H    sodium chloride (NS) flush 5-40 mL  5-40 mL IntraVENous PRN    acetaminophen (TYLENOL) tablet 650 mg  650 mg Oral Q6H PRN    Or    acetaminophen (TYLENOL) suppository 650 mg  650 mg Rectal Q6H PRN    polyethylene glycol (MIRALAX) packet 17 g  17 g Oral DAILY PRN    promethazine (PHENERGAN) tablet 12.5 mg  12.5 mg Oral Q6H PRN    Or    ondansetron (ZOFRAN) injection 4 mg  4 mg IntraVENous Q6H PRN    diphenhydrAMINE (BENADRYL) capsule 25 mg  25 mg Oral Q6H PRN    traMADoL (ULTRAM) tablet 50 mg  50 mg Oral Q6H PRN       Other Studies:  Results for orders placed or performed during the hospital encounter of 21   2D ECHO LIMTED ADULT W OR WO CONTR    Narrative    [facility] [address] [facility], [facility] [address] [facility]  [phone]    Transthoracic Echocardiogram  2D and Doppler    Patient: Lucinda Ramos  MR #: [id number]  : [address]  Age: 68 years  Gender: Female  Study date: 20-May-2021  Account #: [de-identified]  Height: 60 in  Weight: 169.6 lb  BSA: 1.74 mï¾²  Status:Routine  Location: 825  BP: 128/ 60    Allergies: NO KNOWN ALLERGENS    Sonographer:  Rebecca Conley Technologist  Group:  [facility]  Referring Physician:  Royce Fothergill, MD  Reading Physician:  Royce Fothergill, MD    INDICATIONS: R/O effusion. PROCEDURE: This was a routine study. A transthoracic echocardiogram was  performed. The study included limited 2D imaging and limited spectral   Doppler. Images were limited from the parasternal and apical acoustic windows were off  axis. Technically difficult study due to respiratory interference. This was a  technically difficult study. LEFT VENTRICLE: Size was normal. Systolic function was normal. Ejection  fraction was estimated in the range of 55 % to 60 %. There were no regional  wall motion abnormalities. Wall thickness was normal.    RIGHT VENTRICLE: The size was normal. Systolic function was normal.    LEFT ATRIUM: The atrium was mildly dilated. RIGHT ATRIUM: Size was normal.    SYSTEMIC VEINS: IVC: The inferior vena cava was normal in size and course. AORTIC VALVE: The valve was trileaflet. Leaflets exhibited slight  calcification. There was no evidence for stenosis. MITRAL VALVE: There was mild annular calcification. There was no evidence for  stenosis. TRICUSPID VALVE: The valve structure was normal. There was no evidence for  stenosis. PULMONIC VALVE: Not well visualized. PERICARDIUM: There was no pericardial effusion. A pericardial fat pad was  present. AORTA: The root exhibited normal size. SUMMARY:    -  Left ventricle: Systolic function was normal. Ejection fraction was  estimated in the range of 55 % to 60 %. There were no regional wall motion  abnormalities. -  Left atrium: The atrium was mildly dilated. -  Mitral valve: There was mild annular calcification.     - Pericardium: There was no pericardial effusion. SYSTEM MEASUREMENT TABLES    2D mode  LA Dimension (2D): 4.3 cm    Unspecified Scan Mode  Peak Grad; Mean; Antegrade Flow: 16 mm[Hg]  Vmax; Antegrade Flow: 201 cm/s    Prepared and signed by    Christina Dove MD  Signed 20-May-2021 16:34:24         XR CHEST SNGL V    Result Date: 5/21/2021  Portable chest x-ray CLINICAL INDICATION: Worsening hypoxia FINDINGS: Single AP view of the chest compared to a similar exam dated 5/15/2021 shows airspace opacity in the bilateral lower lungs with larger bilateral pleural effusions. The cardiac silhouette and mediastinum are stable. There is no pneumothorax. Bilateral pleural effusions with bibasilar airspace opacity.       Signed:  Omar Grant MD

## 2021-05-21 NOTE — PROGRESS NOTES
ACUTE OT GOALS:  (Developed with and agreed upon by patient and/or caregiver.)    1. Patient will bathe and dress total body with modified independence and adaptive device as needed. 2. Patient will toilet with modified independence and adaptive device as needed. 3. Patient will tolerate 30 minutes of OT treatment with up to 2 rest breaks to increase activity tolerance for ADLs. 4. Patient will complete functional transfers with modified independence. 5. Patient will complete functional mobility for ADLs with modified independence. 6. Patient will demonstrate modified independence with therapeutic exercise HEP to increase strength in BUEs for increased safety and independence with functional tasks. Timeframe: 7 visits  OCCUPATIONAL THERAPY: Daily Note OT Treatment Day # 5    Carlos Miller is a 68 y.o. female   PRIMARY DIAGNOSIS: Pancytopenia ([facility]  Anemia [D64.9]       Payor: [employer] MEDICARE COMPLETE / Plan: Cathryn Reas / Product Type: [employer] Medicare /   ASSESSMENT:     REHAB RECOMMENDATIONS: CURRENT LEVEL OF FUNCTION:  (Most Recently Demonstrated)   Recommendation to date pending progress:  Setting:   Short-term Rehab  Equipment:    Rolling Walker Bathing:   Not tested  Dressing:   Not tested  Feeding/Grooming:   Not tested  Toileting:   Total Assistance  Functional Mobility:   Contact Guard Assistance     ASSESSMENT:  Ms. Marissa Olivarez is doing well today. Pt assisted back to bed with CGA and found to have soiled brief. Pt is dependent for bowel hygiene and brief change. Pt did well with rolling and bridging for brief change. Pt making some progress with goals. Will continue to benefit from skilled OT during stay.      SUBJECTIVE:   Ms. Marissa Olivarez states, \"My behind is sore\"    SOCIAL HISTORY/LIVING ENVIRONMENT: Home Environment: Trailer/mobile home  # Steps to Enter: 5  One/Two Story Residence: One story  Living Alone: Yes  Support Systems: Friends \ neighbors, [place] / [place] community    OBJECTIVE:     PAIN: VITAL SIGNS: LINES/DRAINS:   Pre Treatment: Pain Screen  Pain Scale 1: Numeric (0 - 10)  Pain Intensity 1: 0  Post Treatment: none   none  O2 Device: None (Room air)     ACTIVITIES OF DAILY LIVING: I Mod I S SBA CGA Min Mod Max Total NT Comments   BASIC ADLs:              Bathing/ Showering [] [] [] [] [] [] [] [] [] [x]    Toileting [] [] [] [] [] [] [] [] [x] []    Dressing [] [] [] [] [] [] [] [] [] [x]    Feeding [] [] [] [] [] [] [] [] [] [x]    Grooming [] [] [] [] [] [] [] [] [] [x]    Personal Device Care [] [] [] [] [] [] [] [] [] [x]    Functional Mobility [] [] [] [] [x] [] [] [] [] []    I=Independent, Mod I=Modified Independent, S=Supervision, SBA=Standby Assistance, CGA=Contact Guard Assistance,   Min=Minimal Assistance, Mod=Moderate Assistance, Max=Maximal Assistance, Total=Total Assistance, NT=Not Tested    MOBILITY: I Mod I S SBA CGA Min Mod Max Total  NT x2 Comments:   Supine to sit [] [] [] [] [] [] [] [] [] [x] []    Sit to supine [] [] [] [] [] [x] [] [] [] [] []    Sit to stand [] [] [] [x] [x] [] [] [] [] [x] []    Bed to chair [] [] [] [] [x] [] [] [] [] [] [] Chair to bed   I=Independent, Mod I=Modified Independent, S=Supervision, SBA=Standby Assistance, CGA=Contact Guard Assistance,   Min=Minimal Assistance, Mod=Moderate Assistance, Max=Maximal Assistance, Total=Total Assistance, NT=Not Tested    BALANCE: Good Fair+ Fair Fair- Poor NT Comments   Sitting Static [] [] [x] [] [] []    Sitting Dynamic [] [] [x] [] [] []              Standing Static [] [] [x] [] [] []    Standing Dynamic [] [] [x] [] [] []      PLAN:   FREQUENCY/DURATION: OT Plan of Care: 3 times/week for duration of hospital stay or until stated goals are met, whichever comes first.    TREATMENT:   TREATMENT:   ($$ Self Care/Home Management: 8-22 mins$$ Therapeutic Activity: 8-22 mins   )  Therapeutic Activity (15 Minutes):  Therapeutic activity included Rolling, Sit to Supine, Transfer Training, Sitting balance  and Standing balance to improve functional Mobility, Strength and Activity tolerance. Self Care (10 Minutes): Self care including Toileting to increase independence and decrease level of assistance required.     TREATMENT GRID:   Date:  5/17/21 Date:  5/18/21  AROM exercises only today Date:     Activity/Exercise Parameters Parameters Parameters   Shoulder Abd/Adduction 10 reps 5 reps    Shoulder Flexion (AROM) 10 reps 2 sets 10 reps with cane    Elbow Flexion 10 reps 15 reps with cane    Punches 10 reps 10 reps chest press    Shoulder Shrugs 10 reps 10 reps    Pro/Supination  15 reps            AFTER TREATMENT POSITION/PRECAUTIONS:  Bed, Needs within reach and RN notified    INTERDISCIPLINARY COLLABORATION:  RN/PCT and OT/SCHROEDER    TOTAL TREATMENT DURATION:  OT Patient Time In/Time Out  Time In: 1515  Time Out: 1540    HENRI Arriola

## 2021-05-21 NOTE — PROGRESS NOTES
ACUTE PHYSICAL THERAPY GOALS:  (Developed with and agreed upon by patient and/or caregiver. )  LTG:  (1.)Ms. Bonnie Go will move from supine to sit and sit to supine , scoot up and down and roll side to side in bed with INDEPENDENT within 7 treatment day(s). (2.)Ms. Bonnie Go will transfer from bed to chair and chair to bed with CONTACT GUARD ASSIST using the least restrictive device within 7 treatment day(s). (3.)Ms. Almeida will ambulate with CONTACT GUARD ASSIST for 50 feet with the least restrictive device within 7 treatment day(s). (4.)Ms. Bonnie Go will tolerate at least 23 min of dynamic standing activity to assist standing ADLs with the least restrictive device within 7 treatment days. (5.)Ms. Bonnie Go will climb at least 5 steps with MODERATE ASSIST with the least restrictive device within 7 treatment days. PHYSICAL THERAPY: Daily Note and AM Treatment Day # 5    Hugh Lorenzana is a 68 y.o. female   PRIMARY DIAGNOSIS: Pancytopenia ([facility] [address]  Anemia [D64.9]         ASSESSMENT:     REHAB RECOMMENDATIONS: CURRENT LEVEL OF FUNCTION:  (Most Recently Demonstrated)   Recommendation to date pending progress:  Setting:   Short-term Rehab  Equipment:    To Be Determined Bed Mobility:   Minimal Assistance x 2  Sit to Stand:   Minimal Assistance  Transfers:   Minimal Assistance  Gait/Mobility:   Contact Guard Assistance - minimal assistance     ASSESSMENT:  Ms. Bonnie Go continues to make good progress towards therapy goals; was able to greatly increase gait distance compared to prior session and requiring less oxygen. Performs ambulation in hallway slowly and with cueing for breathing. Returned to room and up to chair after activity for short break, then performs below LE exercises with good participation. Patient lives alone and would benefit from rehab prior to d/c home. Will continue efforts.      SUBJECTIVE:   Ms. Bonnie Go states, \"I'll just go slowly\"    SOCIAL HISTORY/ LIVING ENVIRONMENT: see initial evaluation  Home Environment: Trailer/mobile home  # Steps to Enter: 5  One/Two Story Residence: One story  Living Alone: Yes  Support Systems: Friends \ neighbors, [employer] / [employer] community  OBJECTIVE:     PAIN: VITAL SIGNS: LINES/DRAINS:   Pre Treatment: Pain Screen  Pain Scale 1: Numeric (0 - 10)  Pain Intensity 1: 0  Post Treatment: 0 Vital Signs  O2 Device: Hi flow nasal cannula  O2 Flow Rate (L/min): 7 l/min None  O2 Device: Hi flow nasal cannula     MOBILITY: I Mod I S SBA CGA Min Mod Max Total  NT x2 Comments:   Bed Mobility    Rolling [] [] [] [] [] [] [] [] [] [] []    Supine to Sit [] [] [] [] [] [x] [] [] [] [] [x]    Scooting [] [] [] [] [] [] [] [] [] [] []    Sit to Supine [] [] [] [] [] [x] [] [] [] [] [x]    Transfers    Sit to Stand [] [] [] [] [] [x] [] [] [] [] []    Bed to Chair [] [] [] [] [] [x] [] [] [] [] []    Stand to Sit [] [] [] [] [] [x] [] [] [] [] []    I=Independent, Mod I=Modified Independent, S=Supervision, SBA=Standby Assistance, CGA=Contact Guard Assistance,   Min=Minimal Assistance, Mod=Moderate Assistance, Max=Maximal Assistance, Total=Total Assistance, NT=Not Tested    BALANCE: Good Fair+ Fair Fair- Poor NT Comments   Sitting Static [x] [] [] [] [] []    Sitting Dynamic [] [x] [] [] [] []              Standing Static [] [x] [] [] [] [] With support from RW   Standing Dynamic [] [x] [] [] [] [] With support from RW     GAIT: I Mod I S SBA CGA Min Mod Max Total  NT x2 Comments:   Level of Assistance [] [] [] [] [x] [x] [] [] [] [] []    Distance 175'    DME Rolling Walker    Gait Quality Chair follow for safety, slow pace    Weightbearing  Status N/A     I=Independent, Mod I=Modified Independent, S=Supervision, SBA=Standby Assistance, CGA=Contact Guard Assistance,   Min=Minimal Assistance, Mod=Moderate Assistance, Max=Maximal Assistance, Total=Total Assistance, NT=Not Tested    PLAN:   FREQUENCY/DURATION: PT Plan of Care: 3 times/week for duration of hospital stay or until stated goals are met, whichever comes first.  TREATMENT:     TREATMENT:   ($$ Therapeutic Activity: 23-37 mins    )  Therapeutic Activity (27 Minutes): Therapeutic activity included Supine to Sit, Scooting, Transfer Training, Ambulation on level ground, Sitting balance , Standing balance and LE exercises to improve functional Mobility, Strength, Activity tolerance and balance, transfer technique, breathing.     TREATMENT GRID:  N/A    AFTER TREATMENT POSITION/PRECAUTIONS:  Chair, Needs within reach and RN notified    INTERDISCIPLINARY COLLABORATION:  RN/PCT, PT/PTA and Rehab Attendant     TOTAL TREATMENT DURATION:  PT Patient Time In/Time Out  Time In: 0920  Time Out: 671 Meghana Espinoza DPT

## 2021-05-21 NOTE — PROGRESS NOTES
am  5/21/2021 7:09 AM    Admit Date: 5/12/2021    Admit Diagnosis: Anemia [D64.9]      Subjective:    Patient : Remains stable does not appear to have any acute or dynamic cardiac issues at this point    Objective:      Visit Vitals  BP (!) 149/70 (BP 1 Location: Left upper arm, BP Patient Position: At rest)   Pulse 84   Temp 97.3 °F (36.3 °C)   Resp 18   Ht 5' (1.524 m)   Wt 176 lb (79.8 kg)   SpO2 99%   Breastfeeding No   BMI 34.37 kg/m²       ROS:  General ROS: negative for - chills  Hematological and Lymphatic ROS: negative for - blood clots or jaundice  Respiratory ROS: no cough, shortness of breath, or wheezing  Cardiovascular ROS: no chest pain or dyspnea on exertion  Gastrointestinal ROS: no abdominal pain, change in bowel habits, or black or bloody stools  Neurological ROS: no TIA or stroke symptoms    Physical Exam:      Physical Examination: General appearance - Appearance: alert, well appearing, and in no distress.    Neck/lymph - supple, no significant adenopathy  Chest/CV - clear to auscultation, no wheezes, rales or rhonchi, symmetric air entry  Heart - normal rate, regular rhythm, normal S1, S2, no murmurs, rubs, clicks or gallops  Abdomen/GI - soft, nontender, nondistended, no masses or organomegaly   Musculoskeletal - no joint tenderness, deformity or swelling  Extremities - peripheral pulses normal, no pedal edema, no clubbing or cyanosis  Skin - normal coloration and turgor, no rashes, no suspicious skin lesions noted    Current Facility-Administered Medications   Medication Dose Route Frequency    amoxicillin-clavulanate (AUGMENTIN) 875-125 mg per tablet 1 Tablet  1 Tablet Oral Q12H    doxycycline (VIBRAMYCIN) capsule 100 mg  100 mg Oral Q12H    pantoprazole (PROTONIX) tablet 40 mg  40 mg Oral ACB&D    spironolactone (ALDACTONE) tablet 25 mg  25 mg Oral DAILY    alum-mag hydroxide-simeth (MYLANTA) oral suspension 30 mL  30 mL Oral Q4H PRN    hydrOXYzine HCL (ATARAX) tablet 25 mg  25 mg Oral TID PRN    0.9% sodium chloride infusion 250 mL  250 mL IntraVENous PRN    albuterol-ipratropium (DUO-NEB) 2.5 MG-0.5 MG/3 ML  3 mL Nebulization Q4H PRN    Lactobacillus Acidoph & Bulgar (FLORANEX) tablet 1 Tab  1 Tablet Oral DAILY    nystatin (MYCOSTATIN) 100,000 unit/mL oral suspension 500,000 Units  500,000 Units Oral QID    folic acid (FOLVITE) tablet 1 mg  1 mg Oral DAILY    labetaloL (NORMODYNE;TRANDATE) injection 10 mg  10 mg IntraVENous Q4H PRN    lidocaine 4 % patch 1 Patch  1 Patch TransDERmal Q24H    hydrALAZINE (APRESOLINE) 20 mg/mL injection 10 mg  10 mg IntraVENous Q6H PRN    lip protectant (BLISTEX) ointment 1 Each  1 Each Topical PRN    0.9% sodium chloride infusion 250 mL  250 mL IntraVENous PRN    amiodarone (CORDARONE) tablet 200 mg  200 mg Oral DAILY    DULoxetine (CYMBALTA) capsule 60 mg  60 mg Oral DAILY    levothyroxine (SYNTHROID) tablet 25 mcg  25 mcg Oral ACB    rosuvastatin (CRESTOR) tablet 10 mg  10 mg Oral QHS    sodium chloride (NS) flush 5-40 mL  5-40 mL IntraVENous Q8H    sodium chloride (NS) flush 5-40 mL  5-40 mL IntraVENous PRN    acetaminophen (TYLENOL) tablet 650 mg  650 mg Oral Q6H PRN    Or    acetaminophen (TYLENOL) suppository 650 mg  650 mg Rectal Q6H PRN    polyethylene glycol (MIRALAX) packet 17 g  17 g Oral DAILY PRN    promethazine (PHENERGAN) tablet 12.5 mg  12.5 mg Oral Q6H PRN    Or    ondansetron (ZOFRAN) injection 4 mg  4 mg IntraVENous Q6H PRN    0.9% sodium chloride infusion 250 mL  250 mL IntraVENous PRN    diphenhydrAMINE (BENADRYL) capsule 25 mg  25 mg Oral Q6H PRN    traMADoL (ULTRAM) tablet 50 mg  50 mg Oral Q6H PRN       Data Review: data included in this note has been independently reviewed by the author   CMP:   Lab Results   Component Value Date/Time     05/20/2021 08:27 AM    K 3.5 05/20/2021 08:27 AM     (H) 05/20/2021 08:27 AM    CO2 24 05/20/2021 08:27 AM    AGAP 6 (L) 05/20/2021 08:27 AM    GLU 96 05/20/2021 08:27 AM    BUN 11 05/20/2021 08:27 AM    CREA 1.32 (H) 05/20/2021 08:27 AM    GFRAA 50 (L) 05/20/2021 08:27 AM    GFRNA 41 (L) 05/20/2021 08:27 AM    CA 7.9 (L) 05/20/2021 08:27 AM     CBC:   Lab Results   Component Value Date/Time    WBC 15.1 (H) 05/20/2021 08:27 AM    HGB 7.6 (L) 05/20/2021 08:27 AM    HCT 23.6 (L) 05/20/2021 08:27 AM    PLT 32 (L) 05/20/2021 08:27 AM        TELEMETRY: nsr    Assessment/Plan:     Principal Problem:    Anemia (5/12/2021)  Work-up per primary team    Active Problems:    Acquired hypothyroidism (1/16/2016)  Continue medication      Hyperlipidemia (1/16/2016)  Continue medications      Rheumatoid arthritis ([address] (1/16/2016)           Hypertension, essential, benign (8/18/2016)  Controlled continue current regimen      Major depressive disorder, recurrent, moderate ([address] (8/18/2016)           Paroxysmal atrial fibrillation ([address] (3/19/2021)  Continue Pacerone      Pancytopenia ([address] (5/12/2021)           Rib fracture (5/12/2021)           Right upper lobe pneumonia (5/15/2021)           Stage 3 chronic kidney disease ([address] (5/20/2021)      Estimated Creatinine Clearance: 32.8 mL/min (A) (based on SCr of 1.32 mg/dL (H)).            We will be available on standby if any further questions arise thank you    Tommie Preston MD

## 2021-05-21 NOTE — PROGRESS NOTES
Comprehensive Nutrition Assessment    Type and Reason for Visit: Reassess    Nutrition Recommendations/Plan:    Continue current diet   Continue Ensure Enlive TID with meals     Malnutrition Assessment:  Malnutrition Status: At risk for malnutrition (specify) (pt reports poor po intake PTA)    Nutrition Assessment:   Nutrition History: Pt reports eating 3 bowls of chicken noodle soup for the past 2 weeks due to not feeling hungry. Pt reports 150lbs 1 month ago. Nutrition Background: Pt presents after fall, weakness, and nausea. She reports dark stools since starting Eliquis. Pt noted to be severely anemic, leukopenic, and stool heme + in ED. Pt admitted for anemia with concerns for GI bleed. PMH notable for a.fib, CKD III, RA, hx of breast cancer 2008, an dhypothyroidism. Daily Update:  Pt seen watching TV in chair at time of visit. She reports enjoying having regular food now, but does get foods she doesn't care for and will usually eat 25-50% due to food preferences. She reports having one occurrence of emesis after a meals. She states she drinks 2 Ensure Enlive per day. Encouraged pt to drink Ensure between meals and try to eat what she can from her meals first. Discussed asking for different food options with dining assistants as well in order to get foods she prefers at meal times. Nutrition Related Findings:   No muscle or subcutaneous fat loss noted      Current Nutrition Therapies:  DIET NUTRITIONAL SUPPLEMENTS All Meals; Ensure Enlive ( )  DIET GI SOFT No options chosen    Current Intake:   Average Meal Intake: 26-50% Average Supplement Intake: 51-75%      Anthropometric Measures:  Height: 5' (152.4 cm)  Current Body Wt: 79.8 kg (175 lb 14.8 oz) (5/21), Weight source: Bed scale  BMI: 34.4, Overweight (BMI 25.0-29. 9)  Ideal Body Weight (lbs) (Calculated): 100 lbs (45 kg), 156.7 %          Edema: LLE: Trace (5/21/2021  7:45 AM)  RLE: Trace (5/21/2021  7:45 AM)     Estimated Daily Nutrient Needs:  Energy (kcal/day): 1347-0016 (Kcal/kg (16-20), Weight Used: Current (71.1kg))  Protein (g/day): 57-71 Weight Used: (Other (specify) (20% kcal))  Fluid (ml/day):   (1 ml/kcal)    Nutrition Diagnosis:   · Inadequate oral intake related to  (food preferences) as evidenced by  (pt states barrier to intake)    Nutrition Interventions:   Food and/or Nutrient Delivery: Continue current diet, Continue oral nutrition supplement     Coordination of Nutrition Care: Continue to monitor while inpatient    Goals:   Previous Goal Met: Progressing toward goal(s)  Active Goal: Meet >75% estimated nutrition needs wtihin 7 days    Nutrition Monitoring and Evaluation:      Food/Nutrient Intake Outcomes: Food and nutrient intake, Supplement intake       Discharge Planning:     Too soon to determine    Electronically signed by Quyen Brumfield MS, RDN, LD 5/21/2021 at 3:06 PM  Contact: [phone]

## 2021-05-22 PROBLEM — E88.09 HYPOALBUMINEMIA DUE TO PROTEIN-CALORIE MALNUTRITION (HCC): Status: ACTIVE | Noted: 2021-05-22

## 2021-05-22 PROBLEM — J15.9 BACTERIAL PNEUMONIA: Status: ACTIVE | Noted: 2021-05-22

## 2021-05-22 PROBLEM — E46 HYPOALBUMINEMIA DUE TO PROTEIN-CALORIE MALNUTRITION (HCC): Status: ACTIVE | Noted: 2021-05-22

## 2021-05-22 LAB
ANION GAP SERPL CALC-SCNC: 5 MMOL/L (ref 7–16)
BASOPHILS # BLD: 0 K/UL (ref 0–0.2)
BASOPHILS NFR BLD: 0 % (ref 0–2)
BUN SERPL-MCNC: 19 MG/DL (ref 8–23)
CALCIUM SERPL-MCNC: 8.2 MG/DL (ref 8.3–10.4)
CHLORIDE SERPL-SCNC: 108 MMOL/L (ref 98–107)
CO2 SERPL-SCNC: 25 MMOL/L (ref 21–32)
CREAT SERPL-MCNC: 1.52 MG/DL (ref 0.6–1)
DIFFERENTIAL METHOD BLD: ABNORMAL
EOSINOPHIL # BLD: 0.4 K/UL (ref 0–0.8)
EOSINOPHIL NFR BLD: 1 % (ref 0.5–7.8)
ERYTHROCYTE [DISTWIDTH] IN BLOOD BY AUTOMATED COUNT: 16.4 % (ref 11.9–14.6)
GLUCOSE SERPL-MCNC: 133 MG/DL (ref 65–100)
HCT VFR BLD AUTO: 23.5 % (ref 35.8–46.3)
HGB BLD-MCNC: 7.5 G/DL (ref 11.7–15.4)
IMM GRANULOCYTES # BLD AUTO: 8.9 K/UL (ref 0–0.5)
IMM GRANULOCYTES NFR BLD AUTO: 22 % (ref 0–5)
LYMPHOCYTES # BLD: 3.6 K/UL (ref 0.5–4.6)
LYMPHOCYTES NFR BLD: 9 % (ref 13–44)
MCH RBC QN AUTO: 29.8 PG (ref 26.1–32.9)
MCHC RBC AUTO-ENTMCNC: 31.9 G/DL (ref 31.4–35)
MCV RBC AUTO: 93.3 FL (ref 79.6–97.8)
MONOCYTES # BLD: 3.6 K/UL (ref 0.1–1.3)
MONOCYTES NFR BLD: 9 % (ref 4–12)
NEUTS SEG # BLD: 23.9 K/UL (ref 1.7–8.2)
NEUTS SEG NFR BLD: 59 % (ref 43–78)
NRBC # BLD: 1.25 K/UL (ref 0–0.2)
PLATELET # BLD AUTO: 78 K/UL (ref 150–450)
PLATELET COMMENTS,PCOM: ABNORMAL
PMV BLD AUTO: 13.3 FL (ref 9.4–12.3)
POTASSIUM SERPL-SCNC: 3.7 MMOL/L (ref 3.5–5.1)
RBC # BLD AUTO: 2.52 M/UL (ref 4.05–5.2)
RBC MORPH BLD: ABNORMAL
SODIUM SERPL-SCNC: 138 MMOL/L (ref 136–145)
WBC # BLD AUTO: 40.4 K/UL (ref 4.3–11.1)
WBC MORPH BLD: ABNORMAL

## 2021-05-22 PROCEDURE — 2709999900 HC NON-CHARGEABLE SUPPLY

## 2021-05-22 PROCEDURE — 74011000250 HC RX REV CODE- 250: Performed by: INTERNAL MEDICINE

## 2021-05-22 PROCEDURE — 80048 BASIC METABOLIC PNL TOTAL CA: CPT

## 2021-05-22 PROCEDURE — 65270000029 HC RM PRIVATE

## 2021-05-22 PROCEDURE — 74011250636 HC RX REV CODE- 250/636: Performed by: INTERNAL MEDICINE

## 2021-05-22 PROCEDURE — 99231 SBSQ HOSP IP/OBS SF/LOW 25: CPT | Performed by: INTERNAL MEDICINE

## 2021-05-22 PROCEDURE — 85025 COMPLETE CBC W/AUTO DIFF WBC: CPT

## 2021-05-22 PROCEDURE — 74011250637 HC RX REV CODE- 250/637: Performed by: INTERNAL MEDICINE

## 2021-05-22 PROCEDURE — 74011250637 HC RX REV CODE- 250/637: Performed by: FAMILY MEDICINE

## 2021-05-22 RX ORDER — FUROSEMIDE 10 MG/ML
40 INJECTION INTRAMUSCULAR; INTRAVENOUS DAILY
Status: DISCONTINUED | OUTPATIENT
Start: 2021-05-22 | End: 2021-05-23

## 2021-05-22 RX ADMIN — FUROSEMIDE 40 MG: 10 INJECTION INTRAMUSCULAR; INTRAVENOUS at 08:54

## 2021-05-22 RX ADMIN — GUAIFENESIN 1200 MG: 600 TABLET ORAL at 17:54

## 2021-05-22 RX ADMIN — SODIUM CHLORIDE 10 ML: 9 INJECTION, SOLUTION INTRAMUSCULAR; INTRAVENOUS; SUBCUTANEOUS at 21:26

## 2021-05-22 RX ADMIN — NYSTATIN 500000 UNITS: 100000 SUSPENSION ORAL at 13:31

## 2021-05-22 RX ADMIN — NYSTATIN 500000 UNITS: 100000 SUSPENSION ORAL at 21:24

## 2021-05-22 RX ADMIN — AMOXICILLIN AND CLAVULANATE POTASSIUM 1 TABLET: 500; 125 TABLET, FILM COATED ORAL at 21:24

## 2021-05-22 RX ADMIN — LEVOTHYROXINE SODIUM 25 MCG: 0.05 TABLET ORAL at 06:08

## 2021-05-22 RX ADMIN — NYSTATIN 500000 UNITS: 100000 SUSPENSION ORAL at 17:54

## 2021-05-22 RX ADMIN — DOXYCYCLINE HYCLATE 100 MG: 100 CAPSULE ORAL at 08:26

## 2021-05-22 RX ADMIN — DULOXETINE HYDROCHLORIDE 60 MG: 60 CAPSULE, DELAYED RELEASE ORAL at 08:26

## 2021-05-22 RX ADMIN — DOXYCYCLINE HYCLATE 100 MG: 100 CAPSULE ORAL at 21:25

## 2021-05-22 RX ADMIN — SODIUM CHLORIDE 5 ML: 9 INJECTION, SOLUTION INTRAMUSCULAR; INTRAVENOUS; SUBCUTANEOUS at 13:31

## 2021-05-22 RX ADMIN — NYSTATIN 500000 UNITS: 100000 SUSPENSION ORAL at 08:26

## 2021-05-22 RX ADMIN — FOLIC ACID 1 MG: 1 TABLET ORAL at 08:27

## 2021-05-22 RX ADMIN — PANTOPRAZOLE SODIUM 40 MG: 40 TABLET, DELAYED RELEASE ORAL at 15:48

## 2021-05-22 RX ADMIN — PANTOPRAZOLE SODIUM 40 MG: 40 TABLET, DELAYED RELEASE ORAL at 06:08

## 2021-05-22 RX ADMIN — AMOXICILLIN AND CLAVULANATE POTASSIUM 1 TABLET: 500; 125 TABLET, FILM COATED ORAL at 08:26

## 2021-05-22 RX ADMIN — SODIUM CHLORIDE 10 ML: 9 INJECTION, SOLUTION INTRAMUSCULAR; INTRAVENOUS; SUBCUTANEOUS at 06:07

## 2021-05-22 RX ADMIN — AMIODARONE HYDROCHLORIDE 200 MG: 200 TABLET ORAL at 08:27

## 2021-05-22 RX ADMIN — GUAIFENESIN 1200 MG: 600 TABLET ORAL at 08:27

## 2021-05-22 RX ADMIN — SPIRONOLACTONE 25 MG: 25 TABLET ORAL at 08:26

## 2021-05-22 RX ADMIN — ROSUVASTATIN CALCIUM 10 MG: 5 TABLET, FILM COATED ORAL at 21:26

## 2021-05-22 RX ADMIN — LACTOBACILLUS TAB 1 TABLET: TAB at 08:26

## 2021-05-22 NOTE — PROGRESS NOTES
[facility] Hematology & Oncology: In Patient Hematology / Oncology Progress Note    Reason for Consult: Acute pancytopenia acute pancytopenia  Referring Physician:  Radha Charles MD    Interval hx:  VSS, afebrile  Feeling slowly improved  BMBx 5/14 - path pending but prelim suggestive of hypocellularity  Her WBC are now quite high and Granix discontinued recently. Platelets continue to increase. HGB trailing behind but this is expected. ROS:  Constitutional: +fatigue. Negative for fever, chills, weakness, malaise. CV: Megative for chest pain, palpitations, edema. Respiratory: Negative for dyspnea, cough, wheezing. GI: +diarrhea (resolved). Negative for nausea, abdominal pain. 10 point review of systems is otherwise negative with the exception of the elements mentioned above in the HPI.            No Known Allergies  Past Medical History:   Diagnosis Date    Acquired hypothyroidism 1/16/2016    Breast cancer ([facility] [address] 2008    Depression 8/18/2016    Endocrine disease     hypothyroid    Fungal dermatitis 8/18/2016    Hyperlipidemia 1/16/2016    Hypertension, essential, benign 8/18/2016    Hypokalemia 8/18/2016    Inflamed sebaceous cyst 8/18/2016    Major depressive disorder, recurrent, moderate ([address] 8/18/2016    Nicotine dependence, cigarettes, uncomplicated 4/52/3431    Osteopenia 8/18/2016    Osteoporosis 8/18/2016    Radiation therapy complication 2630    Rheumatoid arthritis ([address] 1/16/2016    Right carotid bruit 1/16/2016    TIA (transient ischemic attack) 1/16/2016    Tobacco abuse 8/18/2016     Past Surgical History:   Procedure Laterality Date    HX ADENOIDECTOMY      HX BREAST LUMPECTOMY Right 2008    with lymph nodes    HX TONSILLECTOMY      IR BX BONE MARROW DIAGNOSTIC  5/14/2021    IR INSERT NON TUNL CVC OVER 5 YRS  5/14/2021     Family History   Problem Relation Age of Onset    Stroke Mother     Cancer Father         Brain    HIV/AIDS Brother      Social History     Socioeconomic History    Marital status:      Spouse name: Not on file    Number of children: Not on file    Years of education: Not on file    Highest education level: Not on file   Occupational History    Not on file   Tobacco Use    Smoking status: Current Some Day Smoker    Smokeless tobacco: Never Used    Tobacco comment: Only on pay day-1/2 pack   Substance and Sexual Activity    Alcohol use: No    Drug use: No    Sexual activity: Not on file   Other Topics Concern    Not on file   Social History Narrative    Not on file     Social Determinants of Health     Financial Resource Strain:     Difficulty of Paying Living Expenses:    Food Insecurity:     Worried About Running Out of Food in the Last Year:     [address] in the Last Year:    Transportation Needs:     Lack of Transportation (Medical):      Lack of Transportation (Non-Medical):    Physical Activity:     Days of Exercise per Week:     Minutes of Exercise per Session:    Stress:     Feeling of Stress :    Social Connections:     Frequency of Communication with Friends and Family:     Frequency of Social Gatherings with Friends and Family:     Attends [employer] Services:     Active Member of Clubs or Organizations:     Attends Club or Organization Meetings:     Marital Status:    Intimate Partner Violence:     Fear of Current or Ex-Partner:     Emotionally Abused:     Physically Abused:     Sexually Abused:      Current Facility-Administered Medications   Medication Dose Route Frequency Provider Last Rate Last Admin    furosemide (LASIX) injection 40 mg  40 mg IntraVENous DAILY Richelle Morales MD   40 mg at 05/22/21 0854    guaiFENesin ER ([address]) tablet 1,200 mg  1,200 mg Oral BID Richelle Morales MD   1,200 mg at 05/22/21 0827    amoxicillin-clavulanate (AUGMENTIN) 500-125 mg per tablet 1 Tablet  1 Tablet Oral Q12H Richelle Morales MD   1 Tablet at 05/22/21 0014    NUTRITIONAL SUPPORT ELECTROLYTE PRN ORDERS   Does Not Apply PRN Sohan Craig MD        potassium, sodium phosphates (NEUTRA-PHOS) packet 1 Packet  1 Packet Oral PRN Sohan Craig MD        potassium, sodium phosphates (NEUTRA-PHOS) packet 1 Packet  1 Packet Oral PRN Sohan Craig MD        magnesium sulfate 2 g/50 ml IVPB (premix or compounded)  2 g IntraVENous DAILY PRN Sohan Craig MD        magnesium sulfate 4 g/100 mL IVPB  4 g IntraVENous DAILY PRN Sohan Craig MD        potassium bicarb-citric acid (EFFER-K) tablet 40 mEq  40 mEq Oral DAILY PRN Sohan Craig MD        potassium chloride 20 mEq in 100 ml IVPB  20 mEq IntraVENous Q2H PRN Sohan Craig MD        doxycycline (VIBRAMYCIN) capsule 100 mg  100 mg Oral Q12H Sohan Craig MD   100 mg at 05/22/21 0272    pantoprazole (PROTONIX) tablet 40 mg  40 mg Oral ACB&D Sohan Craig MD   40 mg at 05/22/21 2437    spironolactone (ALDACTONE) tablet 25 mg  25 mg Oral DAILY Sohan Craig MD   25 mg at 05/22/21 0814    alum-mag hydroxide-simeth (MYLANTA) oral suspension 30 mL  30 mL Oral Q4H PRN Sohan Craig MD        hydrOXYzine HCL (ATARAX) tablet 25 mg  25 mg Oral TID PRN Maribell Singh MD        0.9% sodium chloride infusion 250 mL  250 mL IntraVENous PRN Maribell Singh MD 15 mL/hr at 05/17/21 1049 250 mL at 05/17/21 1049    albuterol-ipratropium (DUO-NEB) 2.5 MG-0.5 MG/3 ML  3 mL Nebulization Q4H PRN Janina Gaucher, MD   3 mL at 05/20/21 0104    Lactobacillus Acidoph & Bulgar [facility]) tablet 1 Tab  1 Tablet Oral DAILY Maribell Singh MD   1 Tablet at 05/22/21 0826    nystatin (MYCOSTATIN) 100,000 unit/mL oral suspension 500,000 Units  500,000 Units Oral QID Henderson Opitz, MD   500,000 Units at 29/61/84 1379    folic acid (FOLVITE) tablet 1 mg  1 mg Oral DAILY Moris Qureshi MD   1 mg at 05/22/21 0827    labetaloL (NORMODYNE;TRANDATE) injection 10 mg  10 mg IntraVENous Q4H PRN Levi Cerda MD        lidocaine 4 % patch 1 Patch  1 Patch TransDERmal Q24H Moris Santiago MD   1 Patch at 05/21/21 1401    hydrALAZINE (APRESOLINE) 20 mg/mL injection 10 mg  10 mg IntraVENous Q6H PRN Franci Nava MD   10 mg at 05/13/21 2335    lip protectant (BLISTEX) ointment 1 Each  1 Each Topical PRN Franci Nava MD   1 Each at 05/14/21 0057    amiodarone (CORDARONE) tablet 200 mg  200 mg Oral DAILY Willa Encarnacion MD   200 mg at 05/22/21 0827    DULoxetine (CYMBALTA) capsule 60 mg  60 mg Oral DAILY Willa Encarnacion MD   60 mg at 05/22/21 0826    levothyroxine (SYNTHROID) tablet 25 mcg  25 mcg Oral ACB Willa Encarnacion MD   25 mcg at 05/22/21 1010    rosuvastatin (CRESTOR) tablet 10 mg  10 mg Oral QHS Willa Encarnacion MD   10 mg at 05/21/21 2132    sodium chloride (NS) flush 5-40 mL  5-40 mL IntraVENous Q8H Willa Encarnacion MD   10 mL at 05/22/21 6427    sodium chloride (NS) flush 5-40 mL  5-40 mL IntraVENous PRN Willa Encarnacion MD   10 mL at 05/14/21 0557    acetaminophen (TYLENOL) tablet 650 mg  650 mg Oral Q6H PRN Willa Encarnacion MD        Or   Nic Aguirre acetaminophen (TYLENOL) suppository 650 mg  650 mg Rectal Q6H PRN Willa Encarnacion MD        polyethylene glycol (MIRALAX) packet 17 g  17 g Oral DAILY PRN Willa Encarnacion MD        promethazine (PHENERGAN) tablet 12.5 mg  12.5 mg Oral Q6H PRN Willa Encarnacion MD        Or    ondansetron (ZOFRAN) injection 4 mg  4 mg IntraVENous Q6H PRN Willa Encarnacion MD   4 mg at 05/21/21 0954    diphenhydrAMINE (BENADRYL) capsule 25 mg  25 mg Oral Q6H PRN Willa Encarnacion MD        traMADoL Lexuna Valdes) tablet 50 mg  50 mg Oral Q6H PRN Susan Tracy MD   50 mg at 05/20/21 0931       OBJECTIVE:  Patient Vitals for the past 8 hrs:   BP Temp Pulse Resp SpO2 Weight   05/22/21 0942     96 %    05/22/21 0739     94 %    05/22/21 0714 133/65 97.5 °F (36.4 °C) 86 20 100 %    05/22/21 0543      181 lb 4.8 oz (82.2 kg)   05/22/21 0304 (!) 145/70 97.9 °F (36.6 °C) 80 18 97 %      Temp (24hrs), Av °F (36.7 °C), Min:97.5 °F (36.4 °C), Max:98.2 °F (36.8 °C)    No intake/output data recorded. Physical Exam:  Constitutional: Oriented to person, place, and time. Well-developed and well-nourished. HEENT: Very hard of hearing. Normocephalic and atraumatic. Oropharynx is clear and moist +buccal lesion. +Exophthalmos. Conjunctivae and EOM are normal. No scleral icterus. Neck supple. No JVD present. No tracheal deviation present. No thyromegaly present. Skin Warm and dry. No bruising and no rash noted. No erythema. No pallor. Respiratory Effort normal and breath sounds normal.  No respiratory distress. No wheezes. No rales. No tenderness. CVS Normal rate, regular rhythm and normal heart sounds. Exam reveals no gallop, no friction and no rub. No murmur heard. Abdomen Soft. Bowel sounds are normal. Exhibits no distension. There is no tenderness. There is no rebound and no guarding. Neuro Normal reflexes. No cranial nerve deficit. Exhibits normal muscle tone, 5 of 5 strength of all extremities. MSK Normal range of motion. No edema and no tenderness.    Psych Normal mood, affect, behavior, judgment and thought content      Labs:    Recent Labs     21  0534 21  0949 21  0827 21  1345   WBC 40.4* 31.2* 15.1* 4.1*   RBC 2.52* 2.75* 2.55* 2.69*   HGB 7.5* 8.1* 7.6* 8.0*   HCT 23.5* 25.3* 23.6* 24.7*   MCV 93.3 92.0 92.5 91.8   MCH 29.8 29.5 29.8 29.7   MCHC 31.9 32.0 32.2 32.4   RDW 16.4* 16.5* 16.4* 16.2*   PLT 78* 67* 32* 15*   GRANS  --  61 59 24*   LYMPH  --  9* 13 23   MONOS  --  8 12 18*   EOS  --  1 4 9*   BASOS  --  0 1 1   IG  --  21* 11* 25*   DF PENDING AUTOMATED AUTOMATED AUTOMATED   ANEU  --  19.0* 8.8* 1.1*   ABL  --  2.8 2.0 0.9   ABM  --  2.5* 1.8* 0.7   JOSE G  --  0.3 0.6 0.4   ABB  --  0.0 0.2 0.0   AIG  --  6.6* 1.7* 1.0*      [facility] Labs     21  0534 21  8937 05/20/21  0827    137 139   K 3.7 3.3* 3.5   * 106 109*   CO2 25 24 24   AGAP 5* 7 6*   * 127* 96   BUN 19 16 11   CREA 1.52* 1.69* 1.32*   GFRAA 43* 38* 50*   GFRNA 35* 31* 41*   CA 8.2* 8.5 7.9*   MG  --  1.5*  --    PHOS  --  2.8  --        ASSESSMENT/RECOMMENDATION:    Principal Problem:    Pancytopenia ([facility] (5/12/2021)    Active Problems:    Acquired hypothyroidism (1/16/2016)      Hyperlipidemia (1/16/2016)      Rheumatoid arthritis ([facility] [address] (1/16/2016)      Hypertension, essential, benign (8/18/2016)      Major depressive disorder, recurrent, moderate (HCC) (8/18/2016)      Paroxysmal atrial fibrillation (HCC) (3/19/2021)      Anemia (5/12/2021)      Rib fracture (5/12/2021)      Right upper lobe pneumonia (5/15/2021)      Stage 3 chronic kidney disease ([facility] [address] (5/20/2021)    68 y.o. F with a recent diagnosis of A. fib and a started Eliquis since March 2021 with prolonged melena and severe anemia of hemoglobin 3.7, responded to blood transfusion, however the acute pancytopenia would not be typical for dilutional effect as such, will arrange systemic anemia work-up unrevealing and blood smear showed rouleaux, pursue a bone marrow biopsy 5/14/2021 to rule out blood malignancy, report pending, CBC still trending down persistently, clinically stable, probably need blood transfusion tomorrow, all questions answered, will follow. Pancytopenia  - Occasion teardrop cells/basophilic stippling-lead level pending  - Smear with increased rouleaux  - Check DIC, Hemolysis labs  - IR consulted for BMbx  - Medications reviewed   5/17 BMBx 5/14 pending. SPEP/FLC pending. Counts continue to trend down. PRBCs today for Hgb of 6.7. Reviewed medications with Dr Kelli Red - has been on MTX for 10-15 years and recently started amiodarone at previous admission for Afib (around 3/2021). Possible interaction between MTX and amiodarone causing myelosuppression (also c/o mouth sores and diarrhea).  May need to discuss alternative to amiodarone with cardiology if BMbx results are unrevealing. 5/18 Counts stable after PRBC transfusion. BMBx prelim shows hypocellularity - adding on PNH to flow cytometry. 5/20 WBC up to 15.1 - DC Granix. Plts up to 32k from 15k yesterday. Hgb stable. Awaiting final path from BMBx and PNH but possibly related to MTX toxicity from MTX and amiodarone as counts appear to be slowly improving.  5/22-She clearly has marrow recovery but awaiting HGB response which should lag a few days. Febrile neutropenia  5/17 Tmax 100.7 - D3 Zosyn/Doxycycline per primary. CXR with RUL infiltrate. BC NGTD. Will start Granix. 5/18 Afebrile. D4 antibiotics. ANC remains at 100 - continue Granix. 5/20 Afebrile. ANC improved - DC Granix. RESOLVED     Stool heme positive  - GI consulted  5/17 Pancytopenia w/u in progress prior to GI eval.      RA  - Methotrexate every Sunday (held for admission)  5/20 Reports she has seen rheumatology in the past but was prescribed by Dr Paris Bob and now she reports she \"just keeps getting refills from my online pharmacy. \"   5/22 Hold methotrexate at discharge     CKD  - Renal US: Increased cortical echogenicity involving the single right kidney compatible with chronic medical renal disease. No hydronephrosis. Absent left kidney? 5/17 Cr stable at 1.34     Afib  - New diagnosis recently started amiodarone and Eliquis (currently on hold)  5/18 Will discuss with cardiology if amiodarone can be switched to another agent in light of potential interaction with MTX.        Lab studies and imaging studies were personally reviewed. Pertinent old records were reviewed.  Terry Connell appears to be recovering sufficiently from our perspective to sign off. Please call if needed. Thank you for allowing me to participate in the care of Ms. Justyn Bynum.              Jude Burnham MD  [facility] [place] [facility]  [id number]  Office : [phone]  Fax : [phone]

## 2021-05-22 NOTE — PROGRESS NOTES
Hospitalist Progress Note     Admit Date:  2021 10:41 AM   Name:  Angie Ca   Age:  68 y.o.  :  1940   MRN:  [id number]   PCP:  Chaz Méndez MD  Presenting Complaint: Fall    Initial Admission Diagnosis: Anemia [D64.9]     Assessment and Plan:     Hospital Problems as of 2021 Date Reviewed: 2021        Codes Class Noted - Resolved POA    Solitary kidney (Chronic) ICD-10-CM: Q60.0  ICD-9-CM: 753.0  2021 - Present Yes        Bacterial pneumonia ICD-10-CM: J15.9  ICD-9-CM: 482.9  2021 - Present Yes        Hypoalbuminemia due to protein-calorie malnutrition ([facility] ICD-10-CM: E88.09, E46  ICD-9-CM: 273.8, 263.9  2021 - Present Yes        Stage 3 chronic kidney disease ([facility] (Chronic) ICD-10-CM: N18.30  ICD-9-CM: 585.3  2021 - Present Yes        Right upper lobe pneumonia ICD-10-CM: J18.9  ICD-9-CM: 186  5/15/2021 - Present Yes        Anemia ICD-10-CM: D64.9  ICD-9-CM: 285.9  2021 - Present Yes        * (Principal) Pancytopenia ([facility] ICD-10-CM: H11.577  ICD-9-CM: 284.19  2021 - Present Yes        Rib fracture ICD-10-CM: S22.39XA  ICD-9-CM: 807.00  2021 - Present Yes        Paroxysmal atrial fibrillation (HCC) (Chronic) ICD-10-CM: I48.0  ICD-9-CM: 427.31  3/19/2021 - Present Yes        Hypertension, essential, benign (Chronic) ICD-10-CM: I10  ICD-9-CM: 401.1  2016 - Present Yes        Major depressive disorder, recurrent, moderate (HCC) (Chronic) ICD-10-CM: F33.1  ICD-9-CM: 296.32  2016 - Present Yes        Acquired hypothyroidism (Chronic) ICD-10-CM: E03.9  ICD-9-CM: 244.9  2016 - Present Yes        Hyperlipidemia (Chronic) ICD-10-CM: E78.5  ICD-9-CM: 272.4  2016 - Present Yes        Rheumatoid arthritis ([facility] (Chronic) ICD-10-CM: M06.9  ICD-9-CM: 714.0  2016 - Present Yes        RESOLVED: Febrile neutropenia ([facility] ICD-10-CM: D70.9, R50.81  ICD-9-CM: 288.00, 780.61  5/15/2021 - 2021 Yes        RESOLVED: NAZIA (acute kidney injury) [facility]) ICD-10-CM: N17.9  ICD-9-CM: 584.9  5/12/2021 - 5/20/2021 Yes              Plan:  Bacterial PNA  05/22/21 -WBC worse. Suspect granix. No fevers or other symptoms. Pt on RA. Unclear why notes say she was on oxygen last night; nurse today said it wasn't needed, may have been applied in error.    -cont augmentin and doxy  -blood cx were negative    Edema, pleural effusions  05/22/21 - BNP elevated. Start IV lasix daily. Suspect some overload from IVF and transfusions. Albumin also low 1.7. CXRs reviewed, right pleural effusion looks worse than previous CXR  -normal echo 5/5/21    Pancytopenia  05/22/21 - improved. Trend CBC. Stopped granix 5/20. -methotrexate on hold; dont plan to resume. Rheum follow up  -appreciate heme recs    Urine cx with candida and enterococcal UTI  -not clear candida is true infection  -s/p course of abx for enterococcus    Resp failure with hypoxia  -wean o2 as able    RA  --do not plan to resume methotrexate at discharge. Will order follow up with rheumatology    Melena, anemia  -GI deferred EGD. Can follow up outpt as doesn't appear to be actively bleeding  -PPI BID    CKD  -baseline Cr 1.3-1.5  -daily BMP    DC planning:    -therapy rec SNF. CM working on placement    Other listed chronic conditions stable, continue current management. Diet:  DIET NUTRITIONAL SUPPLEMENTS  DIET GI SOFT  DVT ppx:  SCDs    Hospital Course:   Mary Bautista is a 68 y.o. female with medical history of recently dx'd Afib (on Eliquis), CKD3, RA, h/o breast CA 2008, hypothyroidism who presented to ED with falls, weakness, nausea.   Pt reports not feeling well since she was discharged March 2021 after being diagnosed with Afib and started on Eliquis. Patient presented with fatigue and fall. Found to have rib fracture with some ecchymosis. Her hemoglobin was low and she received 3 unit of PRBC.   She had significant leukopenia and ultimately underwent bone marrow biopsy May 17 showing hypocellularity. Suspected methotrexate toxicity from amiodarone interaction. she developed fever during hospitalization started on abx for RUL PNA on CXR. There is also concern for melena and GI consulted but given leukopenia, EGD held. Her hemoglobin dropped again on May 17 and she was given 1 unit of PRBC. 24hr Events/Subjective:   Extremity edema improving. No SOB, CP, fevers. On room air same as yesterday. No cough. No other complaints  Objective:     Patient Vitals for the past 24 hrs:   Temp Pulse Resp BP SpO2   05/22/21 0942     96 %   05/22/21 0739     94 %   05/22/21 0714 97.5 °F (36.4 °C) 86 20 133/65 100 %   05/22/21 0304 97.9 °F (36.6 °C) 80 18 (!) 145/70 97 %   05/22/21 0032 98.2 °F (36.8 °C) 77 18 (!) 151/73 100 %   05/21/21 1916 98 °F (36.7 °C) 78 19 (!) 150/70 100 %   05/21/21 1735 98.2 °F (36.8 °C) 80 20 133/65 95 %   05/21/21 1447 98.1 °F (36.7 °C) 85 20 113/80 95 %   05/21/21 1246     94 %     Oxygen Therapy  O2 Sat (%): 96 % (05/22/21 0942)  Pulse via Oximetry: 113 beats per minute (05/20/21 0719)  O2 Device: None (Room air) (05/22/21 6197)  Skin Assessment: Clean, dry, & intact (05/21/21 0745)  Skin Protection for O2 Device: No (05/21/21 0745)  O2 Flow Rate (L/min): 7 l/min (05/21/21 1953)    Estimated body mass index is 35.41 kg/m² as calculated from the following:    Height as of this encounter: 5' (1.524 m). Weight as of this encounter: 82.2 kg (181 lb 4.8 oz). Intake/Output Summary (Last 24 hours) at 5/22/2021 1056  Last data filed at 5/21/2021 1848  Gross per 24 hour   Intake 480 ml   Output 450 ml   Net 30 ml       *Note that automatically entered I/Os may not be accurate; dependent on patient compliance with collection and accurate  by techs. General:    Well nourished. No overt distress  CV:   RRR. Generalized edema 2-3+ all extremities. No JVD  Lungs:   CTABe. Unlabored  Abdomen:   nondistended. Extremities: Warm and dry.   No cyanosis   Skin:     No rashes. Normal coloration  Neuro:  No gross focal deficits.      I have reviewed all labs, meds, and other studies shown below:  Last 24hr Labs:  Recent Results (from the past 24 hour(s))   NT-PRO BNP    Collection Time: 05/21/21  5:07 PM   Result Value Ref Range    NT pro-BNP 3,025 (H) <450 PG/ML   CBC WITH AUTOMATED DIFF    Collection Time: 05/22/21  5:34 AM   Result Value Ref Range    WBC 40.4 (H) 4.3 - 11.1 K/uL    RBC 2.52 (L) 4.05 - 5.2 M/uL    HGB 7.5 (L) 11.7 - 15.4 g/dL    HCT 23.5 (L) 35.8 - 46.3 %    MCV 93.3 79.6 - 97.8 FL    MCH 29.8 26.1 - 32.9 PG    MCHC 31.9 31.4 - 35.0 g/dL    RDW 16.4 (H) 11.9 - 14.6 %    PLATELET 78 (L) 074 - 450 K/uL    MPV 13.3 (H) 9.4 - 12.3 FL    ABSOLUTE NRBC 1.25 (H) 0.0 - 0.2 K/uL    DF PENDING    METABOLIC PANEL, BASIC    Collection Time: 05/22/21  5:34 AM   Result Value Ref Range    Sodium 138 136 - 145 mmol/L    Potassium 3.7 3.5 - 5.1 mmol/L    Chloride 108 (H) 98 - 107 mmol/L    CO2 25 21 - 32 mmol/L    Anion gap 5 (L) 7 - 16 mmol/L    Glucose 133 (H) 65 - 100 mg/dL    BUN 19 8 - 23 MG/DL    Creatinine 1.52 (H) 0.6 - 1.0 MG/DL    GFR est AA 43 (L) >60 ml/min/1.73m2    GFR est non-AA 35 (L) >60 ml/min/1.73m2    Calcium 8.2 (L) 8.3 - 10.4 MG/DL       All Micro Results     Procedure Component Value Units Date/Time    CULTURE, BLOOD [[id number]] Collected: 05/15/21 0937    Order Status: Completed Specimen: Blood Updated: 05/20/21 0685     Special Requests: --        LEFT  HAND       Culture result: NO GROWTH 5 DAYS       CULTURE, BLOOD [[id number]] Collected: 05/15/21 0756    Order Status: Completed Specimen: Blood Updated: 05/20/21 0654     Special Requests: --        LEFT  HAND       Culture result: NO GROWTH 5 DAYS       CULTURE, URINE [[id number]]  (Abnormal)  (Susceptibility) Collected: 05/17/21 0934    Order Status: Completed Specimen: Urine from Clean catch Updated: 05/20/21 0639     Special Requests: NO SPECIAL REQUESTS        Culture result: >100,000 COLONIES/mL ELIF GLABRATA                  10,000 to 50,000 COLONIES/mL ENTEROCOCCUS FAECIUM GROUP D                  <10,000 COLONIES/mL NORMAL SKIN MARC ISOLATED          CULTURE, BLOOD [[id number]] Collected: 05/13/21 1616    Order Status: Completed Specimen: Blood Updated: 05/18/21 0749     Special Requests: --        LEFT  HAND       Culture result: NO GROWTH 5 DAYS       CULTURE, BLOOD [[id number]] Collected: 05/13/21 1613    Order Status: Completed Specimen: Blood Updated: 05/18/21 0749     Special Requests: --        LEFT  Antecubital       Culture result: NO GROWTH 5 DAYS             SARS-CoV-2 Lab Results  \"Novel Coronavirus\" Test: No results found for: COV2NT   \"Emergent Disease\" Test: No results found for: EDPR  \"SARS-COV-2\" Test: No results found for: XGCOVT  Rapid Test:   Lab Results   Component Value Date/Time    COVR Not detected 03/19/2021 06:15 PM            Current Meds:  Current Facility-Administered Medications   Medication Dose Route Frequency    furosemide (LASIX) injection 40 mg  40 mg IntraVENous DAILY    guaiFENesin ER (MUCINEX) tablet 1,200 mg  1,200 mg Oral BID    amoxicillin-clavulanate (AUGMENTIN) 500-125 mg per tablet 1 Tablet  1 Tablet Oral Q12H    NUTRITIONAL SUPPORT ELECTROLYTE PRN ORDERS   Does Not Apply PRN    potassium, sodium phosphates (NEUTRA-PHOS) packet 1 Packet  1 Packet Oral PRN    potassium, sodium phosphates (NEUTRA-PHOS) packet 1 Packet  1 Packet Oral PRN    magnesium sulfate 2 g/50 ml IVPB (premix or compounded)  2 g IntraVENous DAILY PRN    magnesium sulfate 4 g/100 mL IVPB  4 g IntraVENous DAILY PRN    potassium bicarb-citric acid (EFFER-K) tablet 40 mEq  40 mEq Oral DAILY PRN    potassium chloride 20 mEq in 100 ml IVPB  20 mEq IntraVENous Q2H PRN    doxycycline (VIBRAMYCIN) capsule 100 mg  100 mg Oral Q12H    pantoprazole (PROTONIX) tablet 40 mg  40 mg Oral ACB&D    spironolactone (ALDACTONE) tablet 25 mg  25 mg Oral DAILY    alum-mag hydroxide-simeth (MYLANTA) oral suspension 30 mL  30 mL Oral Q4H PRN    hydrOXYzine HCL (ATARAX) tablet 25 mg  25 mg Oral TID PRN    0.9% sodium chloride infusion 250 mL  250 mL IntraVENous PRN    albuterol-ipratropium (DUO-NEB) 2.5 MG-0.5 MG/3 ML  3 mL Nebulization Q4H PRN    Lactobacillus Acidoph & Bulgar (FLORANEX) tablet 1 Tab  1 Tablet Oral DAILY    nystatin (MYCOSTATIN) 100,000 unit/mL oral suspension 500,000 Units  500,000 Units Oral QID    folic acid (FOLVITE) tablet 1 mg  1 mg Oral DAILY    labetaloL (NORMODYNE;TRANDATE) injection 10 mg  10 mg IntraVENous Q4H PRN    lidocaine 4 % patch 1 Patch  1 Patch TransDERmal Q24H    hydrALAZINE (APRESOLINE) 20 mg/mL injection 10 mg  10 mg IntraVENous Q6H PRN    lip protectant (BLISTEX) ointment 1 Each  1 Each Topical PRN    amiodarone (CORDARONE) tablet 200 mg  200 mg Oral DAILY    DULoxetine (CYMBALTA) capsule 60 mg  60 mg Oral DAILY    levothyroxine (SYNTHROID) tablet 25 mcg  25 mcg Oral ACB    rosuvastatin (CRESTOR) tablet 10 mg  10 mg Oral QHS    sodium chloride (NS) flush 5-40 mL  5-40 mL IntraVENous Q8H    sodium chloride (NS) flush 5-40 mL  5-40 mL IntraVENous PRN    acetaminophen (TYLENOL) tablet 650 mg  650 mg Oral Q6H PRN    Or    acetaminophen (TYLENOL) suppository 650 mg  650 mg Rectal Q6H PRN    polyethylene glycol (MIRALAX) packet 17 g  17 g Oral DAILY PRN    promethazine (PHENERGAN) tablet 12.5 mg  12.5 mg Oral Q6H PRN    Or    ondansetron (ZOFRAN) injection 4 mg  4 mg IntraVENous Q6H PRN    diphenhydrAMINE (BENADRYL) capsule 25 mg  25 mg Oral Q6H PRN    traMADoL (ULTRAM) tablet 50 mg  50 mg Oral Q6H PRN       Other Studies:  Results for orders placed or performed during the hospital encounter of 05/12/21   2D ECHO LIMTED ADULT W OR WO CONTR    Narrative    Thanh  [facility] [address] [facility], [place] [facility] [address] [facility]  [phone]    Transthoracic Echocardiogram  2D and Doppler    Patient: Uma Ca Goe Mcadams  MR #: [id number]  : 1940  Age: 68 years  Gender: Female  Study date: 20-May-2021  Account #: [de-identified]  Height: 60 in  Weight: 169.6 lb  BSA: 1.74 mï¾²  Status:Routine  Location: 825  BP: 128/ 60    Allergies: NO KNOWN ALLERGENS    Sonographer:  Rekha Kelley, Technologist  Group:  [facility]  Referring Physician:  Marty Novoa MD  Reading Physician:  Marty Novoa MD    INDICATIONS: R/O effusion. PROCEDURE: This was a routine study. A transthoracic echocardiogram was  performed. The study included limited 2D imaging and limited spectral   Doppler. Images were limited from the parasternal and apical acoustic windows were off  axis. Technically difficult study due to respiratory interference. This was a  technically difficult study. LEFT VENTRICLE: Size was normal. Systolic function was normal. Ejection  fraction was estimated in the range of 55 % to 60 %. There were no regional  wall motion abnormalities. Wall thickness was normal.    RIGHT VENTRICLE: The size was normal. Systolic function was normal.    LEFT ATRIUM: The atrium was mildly dilated. RIGHT ATRIUM: Size was normal.    SYSTEMIC VEINS: IVC: The inferior vena cava was normal in size and course. AORTIC VALVE: The valve was trileaflet. Leaflets exhibited slight  calcification. There was no evidence for stenosis. MITRAL VALVE: There was mild annular calcification. There was no evidence for  stenosis. TRICUSPID VALVE: The valve structure was normal. There was no evidence for  stenosis. PULMONIC VALVE: Not well visualized. PERICARDIUM: There was no pericardial effusion. A pericardial fat pad was  present. AORTA: The root exhibited normal size. SUMMARY:    -  Left ventricle: Systolic function was normal. Ejection fraction was  estimated in the range of 55 % to 60 %. There were no regional wall motion  abnormalities. -  Left atrium: The atrium was mildly dilated. -  Mitral valve:  There was mild annular calcification.    -  Pericardium: There was no pericardial effusion. SYSTEM MEASUREMENT TABLES    2D mode  LA Dimension (2D): 4.3 cm    Unspecified Scan Mode  Peak Grad; Mean; Antegrade Flow: 16 mm[Hg]  Vmax; Antegrade Flow: 201 cm/s    Prepared and signed by    Vijaya Whitman MD  Signed [id number] 16:34:24         No results found.     Signed:  Andrea Bang MD

## 2021-05-22 NOTE — PROGRESS NOTES
Patient resting quietly in bed. Respirations even and unlabored. On 7L NC. No signs of distress. No needs expressed. To give report to oncoming RN.

## 2021-05-23 LAB
ANION GAP SERPL CALC-SCNC: 6 MMOL/L (ref 7–16)
BUN SERPL-MCNC: 22 MG/DL (ref 8–23)
CALCIUM SERPL-MCNC: 8.4 MG/DL (ref 8.3–10.4)
CHLORIDE SERPL-SCNC: 107 MMOL/L (ref 98–107)
CO2 SERPL-SCNC: 27 MMOL/L (ref 21–32)
CREAT SERPL-MCNC: 1.52 MG/DL (ref 0.6–1)
DIFFERENTIAL METHOD BLD: ABNORMAL
ERYTHROCYTE [DISTWIDTH] IN BLOOD BY AUTOMATED COUNT: 16.7 % (ref 11.9–14.6)
GLUCOSE SERPL-MCNC: 89 MG/DL (ref 65–100)
HCT VFR BLD AUTO: 24 % (ref 35.8–46.3)
HGB BLD-MCNC: 7.8 G/DL (ref 11.7–15.4)
LYMPHOCYTES # BLD: 2.9 K/UL (ref 0.5–4.6)
LYMPHOCYTES NFR BLD MANUAL: 6 % (ref 16–44)
MCH RBC QN AUTO: 29.5 PG (ref 26.1–32.9)
MCHC RBC AUTO-ENTMCNC: 32.5 G/DL (ref 31.4–35)
MCV RBC AUTO: 90.9 FL (ref 79.6–97.8)
METAMYELOCYTES NFR BLD MANUAL: 7 %
MONOCYTES # BLD: 3.4 K/UL (ref 0.1–1.3)
MONOCYTES NFR BLD MANUAL: 7 % (ref 3–9)
MYELOCYTES NFR BLD MANUAL: 5 %
NEUTS BAND NFR BLD MANUAL: 4 % (ref 0–10)
NEUTS SEG # BLD: 42.3 K/UL (ref 1.7–8.2)
NEUTS SEG NFR BLD MANUAL: 65 % (ref 47–75)
NRBC # BLD: 0.98 K/UL (ref 0–0.2)
NRBC BLD-RTO: 2 PER 100 WBC
PLATELET # BLD AUTO: 124 K/UL (ref 150–450)
PLATELET COMMENTS,PCOM: SLIGHT
PMV BLD AUTO: 13.1 FL (ref 9.4–12.3)
POTASSIUM SERPL-SCNC: 3.6 MMOL/L (ref 3.5–5.1)
PROMYELOCYTES NFR BLD MANUAL: 6 %
RBC # BLD AUTO: 2.64 M/UL (ref 4.05–5.2)
RBC MORPH BLD: ABNORMAL
SODIUM SERPL-SCNC: 140 MMOL/L (ref 136–145)
WBC # BLD AUTO: 48.6 K/UL (ref 4.3–11.1)
WBC MORPH BLD: ABNORMAL

## 2021-05-23 PROCEDURE — 2709999900 HC NON-CHARGEABLE SUPPLY

## 2021-05-23 PROCEDURE — 77030038269 HC DRN EXT URIN PURWCK BARD -A

## 2021-05-23 PROCEDURE — 74011000250 HC RX REV CODE- 250: Performed by: INTERNAL MEDICINE

## 2021-05-23 PROCEDURE — 65270000029 HC RM PRIVATE

## 2021-05-23 PROCEDURE — 74011250637 HC RX REV CODE- 250/637: Performed by: INTERNAL MEDICINE

## 2021-05-23 PROCEDURE — 74011250637 HC RX REV CODE- 250/637: Performed by: FAMILY MEDICINE

## 2021-05-23 PROCEDURE — 74011250636 HC RX REV CODE- 250/636: Performed by: INTERNAL MEDICINE

## 2021-05-23 PROCEDURE — 80048 BASIC METABOLIC PNL TOTAL CA: CPT

## 2021-05-23 PROCEDURE — 85025 COMPLETE CBC W/AUTO DIFF WBC: CPT

## 2021-05-23 RX ORDER — POTASSIUM CHLORIDE 20 MEQ/1
20 TABLET, EXTENDED RELEASE ORAL
Status: COMPLETED | OUTPATIENT
Start: 2021-05-23 | End: 2021-05-23

## 2021-05-23 RX ORDER — FUROSEMIDE 10 MG/ML
40 INJECTION INTRAMUSCULAR; INTRAVENOUS ONCE
Status: COMPLETED | OUTPATIENT
Start: 2021-05-23 | End: 2021-05-23

## 2021-05-23 RX ADMIN — NYSTATIN 500000 UNITS: 100000 SUSPENSION ORAL at 21:21

## 2021-05-23 RX ADMIN — LACTOBACILLUS TAB 1 TABLET: TAB at 10:42

## 2021-05-23 RX ADMIN — ROSUVASTATIN CALCIUM 10 MG: 5 TABLET, FILM COATED ORAL at 21:20

## 2021-05-23 RX ADMIN — PANTOPRAZOLE SODIUM 40 MG: 40 TABLET, DELAYED RELEASE ORAL at 16:27

## 2021-05-23 RX ADMIN — SODIUM CHLORIDE 30 ML: 9 INJECTION, SOLUTION INTRAMUSCULAR; INTRAVENOUS; SUBCUTANEOUS at 05:57

## 2021-05-23 RX ADMIN — AMOXICILLIN AND CLAVULANATE POTASSIUM 1 TABLET: 500; 125 TABLET, FILM COATED ORAL at 21:21

## 2021-05-23 RX ADMIN — FOLIC ACID 1 MG: 1 TABLET ORAL at 10:43

## 2021-05-23 RX ADMIN — LEVOTHYROXINE SODIUM 25 MCG: 0.05 TABLET ORAL at 05:56

## 2021-05-23 RX ADMIN — FUROSEMIDE 40 MG: 10 INJECTION INTRAMUSCULAR; INTRAVENOUS at 14:30

## 2021-05-23 RX ADMIN — NYSTATIN 500000 UNITS: 100000 SUSPENSION ORAL at 18:20

## 2021-05-23 RX ADMIN — AMOXICILLIN AND CLAVULANATE POTASSIUM 1 TABLET: 500; 125 TABLET, FILM COATED ORAL at 10:42

## 2021-05-23 RX ADMIN — SODIUM CHLORIDE 5 ML: 9 INJECTION, SOLUTION INTRAMUSCULAR; INTRAVENOUS; SUBCUTANEOUS at 21:25

## 2021-05-23 RX ADMIN — DOXYCYCLINE HYCLATE 100 MG: 100 CAPSULE ORAL at 10:43

## 2021-05-23 RX ADMIN — DULOXETINE HYDROCHLORIDE 60 MG: 60 CAPSULE, DELAYED RELEASE ORAL at 10:43

## 2021-05-23 RX ADMIN — NYSTATIN 500000 UNITS: 100000 SUSPENSION ORAL at 10:40

## 2021-05-23 RX ADMIN — NYSTATIN 500000 UNITS: 100000 SUSPENSION ORAL at 14:31

## 2021-05-23 RX ADMIN — SODIUM CHLORIDE 10 ML: 9 INJECTION, SOLUTION INTRAMUSCULAR; INTRAVENOUS; SUBCUTANEOUS at 14:00

## 2021-05-23 RX ADMIN — POTASSIUM CHLORIDE 20 MEQ: 1500 TABLET, EXTENDED RELEASE ORAL at 14:31

## 2021-05-23 RX ADMIN — SPIRONOLACTONE 25 MG: 25 TABLET ORAL at 09:00

## 2021-05-23 RX ADMIN — DOXYCYCLINE HYCLATE 100 MG: 100 CAPSULE ORAL at 21:21

## 2021-05-23 RX ADMIN — PANTOPRAZOLE SODIUM 40 MG: 40 TABLET, DELAYED RELEASE ORAL at 05:56

## 2021-05-23 RX ADMIN — AMIODARONE HYDROCHLORIDE 200 MG: 200 TABLET ORAL at 10:43

## 2021-05-23 NOTE — PROGRESS NOTES
Hospitalist Progress Note     Admit Date:  2021 10:41 AM   Name:  Angie Ca   Age:  68 y.o.  :  1940   MRN:  [id number]   PCP:  Chaz Méndez MD  Presenting Complaint: Fall    Initial Admission Diagnosis: Anemia [D64.9]     Assessment and Plan:     Hospital Problems as of 2021 Date Reviewed: 2021        Codes Class Noted - Resolved POA    Solitary kidney (Chronic) ICD-10-CM: Q60.0  ICD-9-CM: 753.0  2021 - Present Yes        Bacterial pneumonia ICD-10-CM: J15.9  ICD-9-CM: 482.9  2021 - Present Yes        Hypoalbuminemia due to protein-calorie malnutrition ([facility] ICD-10-CM: E88.09, E46  ICD-9-CM: 273.8, 263.9  2021 - Present Yes        Stage 3 chronic kidney disease ([facility] (Chronic) ICD-10-CM: N18.30  ICD-9-CM: 585.3  2021 - Present Yes        Right upper lobe pneumonia ICD-10-CM: J18.9  ICD-9-CM: 495  5/15/2021 - Present Yes        Anemia ICD-10-CM: D64.9  ICD-9-CM: 285.9  2021 - Present Yes        * (Principal) Pancytopenia ([facility] ICD-10-CM: P05.605  ICD-9-CM: 284.19  2021 - Present Yes        Rib fracture ICD-10-CM: S22.39XA  ICD-9-CM: 807.00  2021 - Present Yes        Paroxysmal atrial fibrillation (HCC) (Chronic) ICD-10-CM: I48.0  ICD-9-CM: 427.31  3/19/2021 - Present Yes        Hypertension, essential, benign (Chronic) ICD-10-CM: I10  ICD-9-CM: 401.1  2016 - Present Yes        Major depressive disorder, recurrent, moderate (HCC) (Chronic) ICD-10-CM: F33.1  ICD-9-CM: 296.32  2016 - Present Yes        Acquired hypothyroidism (Chronic) ICD-10-CM: E03.9  ICD-9-CM: 244.9  2016 - Present Yes        Hyperlipidemia (Chronic) ICD-10-CM: E78.5  ICD-9-CM: 272.4  2016 - Present Yes        Rheumatoid arthritis ([facility] (Chronic) ICD-10-CM: M06.9  ICD-9-CM: 714.0  2016 - Present Yes        RESOLVED: Febrile neutropenia ([facility] ICD-10-CM: D70.9, R50.81  ICD-9-CM: 288.00, 780.61  5/15/2021 - 2021 Yes        RESOLVED: NAZIA (acute kidney injury) [facility]) ICD-10-CM: N17.9  ICD-9-CM: 584.9  5/12/2021 - 5/20/2021 Yes              Plan:  Bacterial PNA  - No changes to plan today  -augmentin and doxy EOT 5/25  -blood cx were negative    Edema, pleural effusions  05/23/21 - give another dose of IV lasix 40mg today. Good response to dose yesterday. BMP stable, monitor  -normal echo 5/5/21    Pancytopenia  05/23/21 - improved. Stopped granix 5/20. -methotrexate on hold; dont plan to resume. Rheum follow up  -appreciate heme recs    Urine cx with candida and enterococcal UTI  -not clear candida is true infection. No symptoms  -s/p course of abx for enterococcus    Resp failure with hypoxia  -wean o2 as able    RA  --do not plan to resume methotrexate at discharge. Will order follow up with rheumatology    Melena, anemia  -GI deferred EGD. Can follow up outpt as doesn't appear to be actively bleeding  -daily hb  -PPI BID    CKD  -baseline Cr 1.3-1.5  -daily BMP    DC planning:    -therapy rec SNF. CM working on placement    Other listed chronic conditions stable, continue current management. Diet:  DIET NUTRITIONAL SUPPLEMENTS  DIET GI SOFT  DVT ppx:  SCDs    Hospital Course:   Reg Ulloa is a 68 y.o. female with medical history of recently dx'd Afib (on Eliquis), CKD3, RA, h/o breast CA 2008, hypothyroidism who presented to ED with falls, weakness, nausea.   Pt reports not feeling well since she was discharged March 2021 after being diagnosed with Afib and started on Eliquis. Patient presented with fatigue and fall. Found to have rib fracture with some ecchymosis. Her hemoglobin was low and she received 3 unit of PRBC. She had significant leukopenia and ultimately underwent bone marrow biopsy May 17 showing hypocellularity. Suspected methotrexate toxicity from amiodarone interaction. she developed fever during hospitalization started on abx for RUL PNA on CXR.  There is also concern for melena and GI consulted but given leukopenia, EGD held.  Her hemoglobin dropped again on May 17 and she was given 1 unit of PRBC. 24hr Events/Subjective:   Edema continues to improve after lasix. No SOB, CP, fevers. On room air same as yesterday. No cough. No other complaints  Objective:     Patient Vitals for the past 24 hrs:   Temp Pulse Resp BP SpO2   05/23/21 1141 98.1 °F (36.7 °C) 80 20 136/63 98 %   05/23/21 0751 97.9 °F (36.6 °C) 79 20 (!) 126/58 94 %   05/23/21 0404 97.9 °F (36.6 °C) 83 20 129/64 94 %   05/22/21 2256 98.5 °F (36.9 °C) 81 20 130/79 94 %   05/22/21 2012 98.4 °F (36.9 °C) 86 20 138/70 94 %   05/22/21 1559 98.3 °F (36.8 °C) 90 20 131/65 95 %   05/22/21 1202 98.2 °F (36.8 °C) 88 20 129/64 94 %     Oxygen Therapy  O2 Sat (%): 98 % (05/23/21 1141)  Pulse via Oximetry: 113 beats per minute (05/20/21 0719)  O2 Device: None (Room air) (05/23/21 0531)  Skin Assessment: Clean, dry, & intact (05/21/21 0745)  Skin Protection for O2 Device: No (05/21/21 0745)  O2 Flow Rate (L/min): 7 l/min (05/21/21 1953)    Estimated body mass index is 35.39 kg/m² as calculated from the following:    Height as of this encounter: 5' (1.524 m). Weight as of this encounter: 82.2 kg (181 lb 3.5 oz). Intake/Output Summary (Last 24 hours) at 5/23/2021 1156  Last data filed at 5/23/2021 0950  Gross per 24 hour   Intake 960 ml   Output 2200 ml   Net -1240 ml       *Note that automatically entered I/Os may not be accurate; dependent on patient compliance with collection and accurate  by techs. General:    Well nourished. No overt distress  CV:   RRR. Generalized edema 2+ all extremities. No JVD  Lungs:   Even, Unlabored  Abdomen:   nondistended. Extremities: Warm and dry. No cyanosis   Skin:     No rashes. Normal coloration  Neuro:  No gross focal deficits.      I have reviewed all labs, meds, and other studies shown below:  Last 24hr Labs:  Recent Results (from the past 24 hour(s))   CBC WITH AUTOMATED DIFF    Collection Time: 05/23/21  6:04 AM   Result Value Ref Range    WBC 48.6 (H) 4.3 - 11.1 K/uL    RBC 2.64 (L) 4.05 - 5.2 M/uL    HGB 7.8 (L) 11.7 - 15.4 g/dL    HCT 24.0 (L) 35.8 - 46.3 %    MCV 90.9 79.6 - 97.8 FL    MCH 29.5 26.1 - 32.9 PG    MCHC 32.5 31.4 - 35.0 g/dL    RDW 16.7 (H) 11.9 - 14.6 %    PLATELET 517 (L) 010 - 450 K/uL    MPV 13.1 (H) 9.4 - 12.3 FL    ABSOLUTE NRBC 0.98 (H) 0.0 - 0.2 K/uL    NEUTROPHILS 57 43 - 78 %    LYMPHOCYTES 7 (L) 13 - 44 %    MONOCYTES 9 4.0 - 12.0 %    EOSINOPHILS 0 (L) 0.5 - 7.8 %    BASOPHILS 0 0.0 - 2.0 %    IMMATURE GRANULOCYTES 27 (H) 0.0 - 5.0 %    ABS. NEUTROPHILS 27.7 (H) 1.7 - 8.2 K/UL    ABS. LYMPHOCYTES 3.4 0.5 - 4.6 K/UL    ABS. MONOCYTES 4.4 (H) 0.1 - 1.3 K/UL    ABS. EOSINOPHILS 0.0 0.0 - 0.8 K/UL    ABS. BASOPHILS 0.0 0.0 - 0.2 K/UL    ABS. IMM.  GRANS. 13.1 (H) 0.0 - 0.5 K/UL    RBC COMMENTS SLIGHT  ANISOCYTOSIS + POIKILOCYTOSIS        RBC COMMENTS SLIGHT  POLYCHROMASIA        RBC COMMENTS OCCASIONAL  BASOPHILIC STIPPLING        WBC COMMENTS SMUDGE CELLS      PLATELET COMMENTS ADEQUATE      DF AUTOMATED     METABOLIC PANEL, BASIC    Collection Time: 05/23/21  6:04 AM   Result Value Ref Range    Sodium 140 136 - 145 mmol/L    Potassium 3.6 3.5 - 5.1 mmol/L    Chloride 107 98 - 107 mmol/L    CO2 27 21 - 32 mmol/L    Anion gap 6 (L) 7 - 16 mmol/L    Glucose 89 65 - 100 mg/dL    BUN 22 8 - 23 MG/DL    Creatinine 1.52 (H) 0.6 - 1.0 MG/DL    GFR est AA 43 (L) >60 ml/min/1.73m2    GFR est non-AA 35 (L) >60 ml/min/1.73m2    Calcium 8.4 8.3 - 10.4 MG/DL       All Micro Results     Procedure Component Value Units Date/Time    CULTURE, BLOOD [[id number]] Collected: 05/15/21 0937    Order Status: Completed Specimen: Blood Updated: 05/20/21 0654     Special Requests: --        LEFT  HAND       Culture result: NO GROWTH 5 DAYS       CULTURE, BLOOD [[id number]] Collected: 05/15/21 0756    Order Status: Completed Specimen: Blood Updated: 05/20/21 0654     Special Requests: --        LEFT  HAND       Culture result: NO GROWTH 5 DAYS       CULTURE, URINE [[id number]]  (Abnormal)  (Susceptibility) Collected: 05/17/21 0934    Order Status: Completed Specimen: Urine from Clean catch Updated: 05/20/21 0639     Special Requests: NO SPECIAL REQUESTS        Culture result:       >100,000 COLONIES/mL CANDIDA GLABRATA                  10,000 to 50,000 COLONIES/mL ENTEROCOCCUS FAECIUM GROUP D                  <10,000 COLONIES/mL NORMAL SKIN MARC ISOLATED          CULTURE, BLOOD [[id number]] Collected: 05/13/21 1616    Order Status: Completed Specimen: Blood Updated: 05/18/21 0749     Special Requests: --        LEFT  HAND       Culture result: NO GROWTH 5 DAYS       CULTURE, BLOOD [[id number]] Collected: 05/13/21 1613    Order Status: Completed Specimen: Blood Updated: 05/18/21 0749     Special Requests: --        LEFT  Antecubital       Culture result: NO GROWTH 5 DAYS             SARS-CoV-2 Lab Results  \"Novel Coronavirus\" Test: No results found for: COV2NT   \"Emergent Disease\" Test: No results found for: EDPR  \"SARS-COV-2\" Test: No results found for: XGCOVT  Rapid Test:   Lab Results   Component Value Date/Time    COVR Not detected 03/19/2021 06:15 PM            Current Meds:  Current Facility-Administered Medications   Medication Dose Route Frequency    furosemide (LASIX) injection 40 mg  40 mg IntraVENous ONCE    amoxicillin-clavulanate (AUGMENTIN) 500-125 mg per tablet 1 Tablet  1 Tablet Oral Q12H    NUTRITIONAL SUPPORT ELECTROLYTE PRN ORDERS   Does Not Apply PRN    potassium, sodium phosphates (NEUTRA-PHOS) packet 1 Packet  1 Packet Oral PRN    potassium, sodium phosphates (NEUTRA-PHOS) packet 1 Packet  1 Packet Oral PRN    magnesium sulfate 2 g/50 ml IVPB (premix or compounded)  2 g IntraVENous DAILY PRN    magnesium sulfate 4 g/100 mL IVPB  4 g IntraVENous DAILY PRN    potassium bicarb-citric acid (EFFER-K) tablet 40 mEq  40 mEq Oral DAILY PRN    potassium chloride 20 mEq in 100 ml IVPB  20 mEq IntraVENous Q2H PRN  doxycycline (VIBRAMYCIN) capsule 100 mg  100 mg Oral Q12H    pantoprazole (PROTONIX) tablet 40 mg  40 mg Oral ACB&D    spironolactone (ALDACTONE) tablet 25 mg  25 mg Oral DAILY    alum-mag hydroxide-simeth (MYLANTA) oral suspension 30 mL  30 mL Oral Q4H PRN    hydrOXYzine HCL (ATARAX) tablet 25 mg  25 mg Oral TID PRN    0.9% sodium chloride infusion 250 mL  250 mL IntraVENous PRN    albuterol-ipratropium (DUO-NEB) 2.5 MG-0.5 MG/3 ML  3 mL Nebulization Q4H PRN    Lactobacillus Acidoph & Bulgar (FLORANEX) tablet 1 Tab  1 Tablet Oral DAILY    nystatin (MYCOSTATIN) 100,000 unit/mL oral suspension 500,000 Units  500,000 Units Oral QID    folic acid (FOLVITE) tablet 1 mg  1 mg Oral DAILY    labetaloL (NORMODYNE;TRANDATE) injection 10 mg  10 mg IntraVENous Q4H PRN    lidocaine 4 % patch 1 Patch  1 Patch TransDERmal Q24H    hydrALAZINE (APRESOLINE) 20 mg/mL injection 10 mg  10 mg IntraVENous Q6H PRN    lip protectant (BLISTEX) ointment 1 Each  1 Each Topical PRN    amiodarone (CORDARONE) tablet 200 mg  200 mg Oral DAILY    DULoxetine (CYMBALTA) capsule 60 mg  60 mg Oral DAILY    levothyroxine (SYNTHROID) tablet 25 mcg  25 mcg Oral ACB    rosuvastatin (CRESTOR) tablet 10 mg  10 mg Oral QHS    sodium chloride (NS) flush 5-40 mL  5-40 mL IntraVENous Q8H    sodium chloride (NS) flush 5-40 mL  5-40 mL IntraVENous PRN    acetaminophen (TYLENOL) tablet 650 mg  650 mg Oral Q6H PRN    Or    acetaminophen (TYLENOL) suppository 650 mg  650 mg Rectal Q6H PRN    polyethylene glycol (MIRALAX) packet 17 g  17 g Oral DAILY PRN    promethazine (PHENERGAN) tablet 12.5 mg  12.5 mg Oral Q6H PRN    Or    ondansetron (ZOFRAN) injection 4 mg  4 mg IntraVENous Q6H PRN    diphenhydrAMINE (BENADRYL) capsule 25 mg  25 mg Oral Q6H PRN    traMADoL (ULTRAM) tablet 50 mg  50 mg Oral Q6H PRN       Other Studies:  Results for orders placed or performed during the hospital encounter of 05/12/21   2D ECHO LIMTED ADULT W OR WO [address] [facility], [address] [facility]  [phone]    Transthoracic Echocardiogram  2D and Doppler    Patient: Good Mulligan  MR #: [id number]  : [id number]  Age: 68 years  Gender: Female  Study date: 20-May-2021  Account #: [de-identified]  Height: 60 in  Weight: 169.6 lb  BSA: 1.74 mï¾²  Status:Routine  Location: 825  BP: 128/ 60    Allergies: NO KNOWN ALLERGENS    Sonographer:  Katarina Dave, Technologist  Group:  [facility]  Referring Physician:  Margarita Ramirez MD  Reading Physician:  Margarita Ramirez MD    INDICATIONS: R/O effusion. PROCEDURE: This was a routine study. A transthoracic echocardiogram was  performed. The study included limited 2D imaging and limited spectral   Doppler. Images were limited from the parasternal and apical acoustic windows were off  axis. Technically difficult study due to respiratory interference. This was a  technically difficult study. LEFT VENTRICLE: Size was normal. Systolic function was normal. Ejection  fraction was estimated in the range of 55 % to 60 %. There were no regional  wall motion abnormalities. Wall thickness was normal.    RIGHT VENTRICLE: The size was normal. Systolic function was normal.    LEFT ATRIUM: The atrium was mildly dilated. RIGHT ATRIUM: Size was normal.    SYSTEMIC VEINS: IVC: The inferior vena cava was normal in size and course. AORTIC VALVE: The valve was trileaflet. Leaflets exhibited slight  calcification. There was no evidence for stenosis. MITRAL VALVE: There was mild annular calcification. There was no evidence for  stenosis. TRICUSPID VALVE: The valve structure was normal. There was no evidence for  stenosis. PULMONIC VALVE: Not well visualized. PERICARDIUM: There was no pericardial effusion. A pericardial fat pad was  present. AORTA: The root exhibited normal size.     SUMMARY:    -  Left ventricle: Systolic function was normal. Ejection fraction was  estimated in the range of 55 % to 60 %. There were no regional wall motion  abnormalities. -  Left atrium: The atrium was mildly dilated. -  Mitral valve: There was mild annular calcification.    -  Pericardium: There was no pericardial effusion. SYSTEM MEASUREMENT TABLES    2D mode  LA Dimension (2D): 4.3 cm    Unspecified Scan Mode  Peak Grad; Mean; Antegrade Flow: 16 mm[Hg]  Vmax; Antegrade Flow: 201 cm/s    Prepared and signed by    Siri Pineda MD  Signed [id number] 16:34:24         No results found.     Signed:  Rubi Funez MD

## 2021-05-23 NOTE — PROGRESS NOTES
Received bedside shift report from Rob Artis RN. Pt lying in bed. No apparent distress. Respirations even and unlabored. Instructed to call for assistance with needs, as they arise. Pt voiced understanding.

## 2021-05-23 NOTE — PROGRESS NOTES
No acute events overnight. Patient resting quietly in bed. Respirations even and unlabored on room air. No s/sx of distress. Safety measures in place, call light in reach.  BSR given to Raj Frazier

## 2021-05-24 VITALS
HEART RATE: 82 BPM | TEMPERATURE: 97.7 F | SYSTOLIC BLOOD PRESSURE: 146 MMHG | WEIGHT: 181.22 LBS | BODY MASS INDEX: 35.58 KG/M2 | RESPIRATION RATE: 18 BRPM | DIASTOLIC BLOOD PRESSURE: 74 MMHG | OXYGEN SATURATION: 95 % | HEIGHT: 60 IN

## 2021-05-24 PROBLEM — J18.9 RIGHT UPPER LOBE PNEUMONIA: Status: RESOLVED | Noted: 2021-05-15 | Resolved: 2021-05-24

## 2021-05-24 PROBLEM — D61.818 PANCYTOPENIA (HCC): Status: RESOLVED | Noted: 2021-05-12 | Resolved: 2021-05-24

## 2021-05-24 PROBLEM — J15.9 BACTERIAL PNEUMONIA: Status: RESOLVED | Noted: 2021-05-22 | Resolved: 2021-05-24

## 2021-05-24 PROBLEM — K92.1 MELENA: Status: ACTIVE | Noted: 2021-05-24

## 2021-05-24 PROBLEM — K92.1 MELENA: Status: RESOLVED | Noted: 2021-05-24 | Resolved: 2021-05-24

## 2021-05-24 LAB
ANION GAP SERPL CALC-SCNC: 7 MMOL/L (ref 7–16)
BASOPHILS # BLD: 0 K/UL (ref 0–0.2)
BASOPHILS NFR BLD: 0 % (ref 0–2)
BUN SERPL-MCNC: 25 MG/DL (ref 8–23)
CALCIUM SERPL-MCNC: 8.1 MG/DL (ref 8.3–10.4)
CHLORIDE SERPL-SCNC: 105 MMOL/L (ref 98–107)
CO2 SERPL-SCNC: 27 MMOL/L (ref 21–32)
COVID-19 RAPID TEST, COVR: NOT DETECTED
CREAT SERPL-MCNC: 1.55 MG/DL (ref 0.6–1)
DIFFERENTIAL METHOD BLD: ABNORMAL
EOSINOPHIL # BLD: 0 K/UL (ref 0–0.8)
EOSINOPHIL NFR BLD: 0 % (ref 0.5–7.8)
ERYTHROCYTE [DISTWIDTH] IN BLOOD BY AUTOMATED COUNT: 16.8 % (ref 11.9–14.6)
GLUCOSE SERPL-MCNC: 109 MG/DL (ref 65–100)
HCT VFR BLD AUTO: 23.7 % (ref 35.8–46.3)
HGB BLD-MCNC: 7.7 G/DL (ref 11.7–15.4)
IMM GRANULOCYTES # BLD AUTO: 14.4 K/UL (ref 0–0.5)
IMM GRANULOCYTES NFR BLD AUTO: 29 % (ref 0–5)
LYMPHOCYTES # BLD: 3.5 K/UL (ref 0.5–4.6)
LYMPHOCYTES NFR BLD: 7 % (ref 13–44)
MCH RBC QN AUTO: 29.7 PG (ref 26.1–32.9)
MCHC RBC AUTO-ENTMCNC: 32.5 G/DL (ref 31.4–35)
MCV RBC AUTO: 91.5 FL (ref 79.6–97.8)
MM INDURATION POC: 0 MM (ref 0–5)
MONOCYTES # BLD: 4.5 K/UL (ref 0.1–1.3)
MONOCYTES NFR BLD: 9 % (ref 4–12)
NEUTS SEG # BLD: 27.1 K/UL (ref 1.7–8.2)
NEUTS SEG NFR BLD: 55 % (ref 43–78)
NRBC # BLD: 0.82 K/UL (ref 0–0.2)
PLATELET # BLD AUTO: 153 K/UL (ref 150–450)
PLATELET COMMENTS,PCOM: ABNORMAL
PMV BLD AUTO: 11.9 FL (ref 9.4–12.3)
POTASSIUM SERPL-SCNC: 3.7 MMOL/L (ref 3.5–5.1)
PPD POC: NORMAL
RBC # BLD AUTO: 2.59 M/UL (ref 4.05–5.2)
RBC MORPH BLD: ABNORMAL
RBC MORPH BLD: ABNORMAL
SODIUM SERPL-SCNC: 139 MMOL/L (ref 136–145)
SOURCE, COVRS: NORMAL
WBC # BLD AUTO: 49.5 K/UL (ref 4.3–11.1)
WBC MORPH BLD: ABNORMAL

## 2021-05-24 PROCEDURE — 97530 THERAPEUTIC ACTIVITIES: CPT

## 2021-05-24 PROCEDURE — 74011250637 HC RX REV CODE- 250/637: Performed by: INTERNAL MEDICINE

## 2021-05-24 PROCEDURE — 80048 BASIC METABOLIC PNL TOTAL CA: CPT

## 2021-05-24 PROCEDURE — 87635 SARS-COV-2 COVID-19 AMP PRB: CPT

## 2021-05-24 PROCEDURE — 2709999900 HC NON-CHARGEABLE SUPPLY

## 2021-05-24 PROCEDURE — 85025 COMPLETE CBC W/AUTO DIFF WBC: CPT

## 2021-05-24 PROCEDURE — 74011250636 HC RX REV CODE- 250/636: Performed by: INTERNAL MEDICINE

## 2021-05-24 PROCEDURE — 74011250637 HC RX REV CODE- 250/637: Performed by: FAMILY MEDICINE

## 2021-05-24 PROCEDURE — 97535 SELF CARE MNGMENT TRAINING: CPT

## 2021-05-24 RX ORDER — FUROSEMIDE 10 MG/ML
40 INJECTION INTRAMUSCULAR; INTRAVENOUS ONCE
Status: COMPLETED | OUTPATIENT
Start: 2021-05-24 | End: 2021-05-24

## 2021-05-24 RX ORDER — DOXYCYCLINE 100 MG/1
100 CAPSULE ORAL EVERY 12 HOURS
Qty: 2 CAPSULE | Refills: 0 | Status: SHIPPED
Start: 2021-05-24 | End: 2021-05-25

## 2021-05-24 RX ORDER — AMOXICILLIN AND CLAVULANATE POTASSIUM 500; 125 MG/1; MG/1
1 TABLET, FILM COATED ORAL EVERY 12 HOURS
Qty: 2 TABLET | Refills: 0 | Status: SHIPPED
Start: 2021-05-24 | End: 2021-05-25

## 2021-05-24 RX ORDER — PANTOPRAZOLE SODIUM 40 MG/1
40 TABLET, DELAYED RELEASE ORAL
Qty: 60 TABLET | Refills: 0 | Status: SHIPPED | OUTPATIENT
Start: 2021-05-24

## 2021-05-24 RX ADMIN — NYSTATIN 500000 UNITS: 100000 SUSPENSION ORAL at 13:48

## 2021-05-24 RX ADMIN — NYSTATIN 500000 UNITS: 100000 SUSPENSION ORAL at 08:27

## 2021-05-24 RX ADMIN — LEVOTHYROXINE SODIUM 25 MCG: 0.05 TABLET ORAL at 05:45

## 2021-05-24 RX ADMIN — PANTOPRAZOLE SODIUM 40 MG: 40 TABLET, DELAYED RELEASE ORAL at 05:45

## 2021-05-24 RX ADMIN — SODIUM CHLORIDE 10 ML: 9 INJECTION, SOLUTION INTRAMUSCULAR; INTRAVENOUS; SUBCUTANEOUS at 05:46

## 2021-05-24 RX ADMIN — DULOXETINE HYDROCHLORIDE 60 MG: 60 CAPSULE, DELAYED RELEASE ORAL at 08:26

## 2021-05-24 RX ADMIN — FUROSEMIDE 40 MG: 10 INJECTION INTRAMUSCULAR; INTRAVENOUS at 10:09

## 2021-05-24 RX ADMIN — AMIODARONE HYDROCHLORIDE 200 MG: 200 TABLET ORAL at 08:26

## 2021-05-24 RX ADMIN — AMOXICILLIN AND CLAVULANATE POTASSIUM 1 TABLET: 500; 125 TABLET, FILM COATED ORAL at 08:27

## 2021-05-24 RX ADMIN — LACTOBACILLUS TAB 1 TABLET: TAB at 08:27

## 2021-05-24 RX ADMIN — SPIRONOLACTONE 25 MG: 25 TABLET ORAL at 08:27

## 2021-05-24 RX ADMIN — FOLIC ACID 1 MG: 1 TABLET ORAL at 08:27

## 2021-05-24 RX ADMIN — DOXYCYCLINE HYCLATE 100 MG: 100 CAPSULE ORAL at 08:27

## 2021-05-24 NOTE — PROGRESS NOTES
Patient has been accepted and approved by [facility]. Patient agreeable to d/c and provided room#216W / report# [phone]. [facility] will transport patient at 3pm. Patient has met all treatment goals / milestones. CM will continue to monitor and remain available for any needs or concerns that may occur. Care Management Interventions  PCP Verified by CM:  Yes Vu Wong MD)  Mode of Transport at Discharge: BLS ([facility])  Transition of Care Consult (CM Consult): Discharge Planning, SNF (SNF referrals made)  Discharge Durable Medical Equipment: No (patient has walker and cane. )  Physical Therapy Consult: Yes  Occupational Therapy Consult: Yes  Speech Therapy Consult: No  Current Support Network: Lives Alone, Own Home  Confirm Follow Up Transport: Other (see comment) [facility] )  The Patient and/or Patient Representative was Provided with a Choice of Provider and Agrees with the Discharge Plan?: Yes  Freedom of Choice List was Provided with Basic Dialogue that Supports the Patient's Individualized Plan of Care/Goals, Treatment Preferences and Shares the Quality Data Associated with the Providers?: Yes   Resource Information Provided?: No  Discharge Location  Discharge Placement: Skilled nursing facility (patient has been accepted and approved for [facility] )

## 2021-05-24 NOTE — PROGRESS NOTES
No acute events overnight. Patient resting quietly in bed. Respirations even and unlabored on room air. No s/sx of distress. No needs expressed at this time. Safety measures in place, call light in reach.  Report given to Erik Garza

## 2021-05-24 NOTE — PROGRESS NOTES
Attempted to call report to [facility] 3 times. 2 times phone rang without answer, third time was transferred and cut off. Pt discharged in stable condition, [employer] transporting.

## 2021-05-24 NOTE — PROGRESS NOTES
ACUTE OT GOALS:  (Developed with and agreed upon by patient and/or caregiver.)    1. Patient will bathe and dress total body with modified independence and adaptive device as needed. 2. Patient will toilet with modified independence and adaptive device as needed. 3. Patient will tolerate 30 minutes of OT treatment with up to 2 rest breaks to increase activity tolerance for ADLs. 4. Patient will complete functional transfers with modified independence. 5. Patient will complete functional mobility for ADLs with modified independence. 6. Patient will demonstrate modified independence with therapeutic exercise HEP to increase strength in BUEs for increased safety and independence with functional tasks. Timeframe: 7 visits  OCCUPATIONAL THERAPY: Daily Note OT Treatment Day # 6    Steven Paul is a 68 y.o. female   PRIMARY DIAGNOSIS: Pancytopenia ([facility]  Anemia [D64.9]       Payor: [employer] MEDICARE COMPLETE / Plan: Locately / Product Type: [employer] Care Medicare /   ASSESSMENT:     REHAB RECOMMENDATIONS: CURRENT LEVEL OF FUNCTION:  (Most Recently Demonstrated)   Recommendation to date pending progress:  Setting:   Short-term Rehab  Equipment:    Rolling Walker Bathing:   Not tested  Dressing:   Not tested  Feeding/Grooming:   Supervision for washing hair with shampoo cap  Toileting:   Maximal Assistance for bowel hygiene  Functional Mobility:   Contact Guard Assistance     ASSESSMENT:  Ms. Dave Pratt is doing well today. Pt found to have soiled brief. Pt required max assist for bowel hygiene and brief change. Pt did well with rolling and bridging for brief change and attempted to perform some pericare this session. Pt performed grooming at EOB with supervision. Pt assisted back to supine. Pt making some progress with goals. Pt is to be discharged to SNF today.      SUBJECTIVE:   Ms. Dave Pratt states, \"My right side is hurting\"    SOCIAL HISTORY/LIVING ENVIRONMENT: Home Environment: Trailer/mobile home  # Steps to Enter: 5  One/Two Story Residence: One story  Living Alone: Yes  Support Systems: Friends \ neighbors, [facility] / [employer] community    OBJECTIVE:     PAIN: VITAL SIGNS: LINES/DRAINS:   Pre Treatment: Pain Screen  Pain Scale 1: Numeric (0 - 10)  Pain Intensity 1: 0  Post Treatment: none   none  O2 Device: None (Room air)     ACTIVITIES OF DAILY LIVING: I Mod I S SBA CGA Min Mod Max Total NT Comments   BASIC ADLs:              Bathing/ Showering [] [] [] [] [] [] [] [] [] [x]    Toileting [] [] [] [] [] [] [] [x] [] []    Dressing [] [] [] [] [] [] [] [] [] [x]    Feeding [] [] [] [] [] [] [] [] [] [x]    Grooming [] [] [x] [] [] [] [] [] [] []    Personal Device Care [] [] [] [] [] [] [] [] [] [x]    Functional Mobility [] [] [] [] [x] [] [] [] [] []    I=Independent, Mod I=Modified Independent, S=Supervision, SBA=Standby Assistance, CGA=Contact Guard Assistance,   Min=Minimal Assistance, Mod=Moderate Assistance, Max=Maximal Assistance, Total=Total Assistance, NT=Not Tested    MOBILITY: I Mod I S SBA CGA Min Mod Max Total  NT x2 Comments:   Supine to sit [] [] [] [] [] [x] [] [] [] [] []    Sit to supine [] [] [] [] [] [x] [] [] [] [] []    Sit to stand [] [] [] [] [x] [] [] [] [] [x] []    Bed to chair [] [] [] [] [x] [] [] [] [] [] [] Chair to bed   I=Independent, Mod I=Modified Independent, S=Supervision, SBA=Standby Assistance, CGA=Contact Guard Assistance,   Min=Minimal Assistance, Mod=Moderate Assistance, Max=Maximal Assistance, Total=Total Assistance, NT=Not Tested    BALANCE: Good Fair+ Fair Fair- Poor NT Comments   Sitting Static [] [] [x] [] [] []    Sitting Dynamic [] [] [x] [] [] []              Standing Static [] [] [x] [] [] []    Standing Dynamic [] [] [x] [] [] []      PLAN:   FREQUENCY/DURATION: OT Plan of Care: 3 times/week for duration of hospital stay or until stated goals are met, whichever comes first.    TREATMENT:   TREATMENT:   ($$ Self Care/Home Management: 8-22 mins$$ Therapeutic Activity: 23-37 mins   )  Therapeutic Activity (25 Minutes): Therapeutic activity included Rolling, Supine to Sit, Sit to Supine, Transfer Training, Ambulation on level ground, Sitting balance  and Standing balance to improve functional Mobility, Strength and Activity tolerance. Self Care (15 Minutes): Self care including Toileting and Grooming to increase independence and decrease level of assistance required.     TREATMENT GRID:   Date:  5/17/21 Date:  5/18/21  AROM exercises only today Date:     Activity/Exercise Parameters Parameters Parameters   Shoulder Abd/Adduction 10 reps 5 reps    Shoulder Flexion (AROM) 10 reps 2 sets 10 reps with cane    Elbow Flexion 10 reps 15 reps with cane    Punches 10 reps 10 reps chest press    Shoulder Shrugs 10 reps 10 reps    Pro/Supination  15 reps            AFTER TREATMENT POSITION/PRECAUTIONS:  Bed, Needs within reach and RN notified    INTERDISCIPLINARY COLLABORATION:  RN/PCT and OT/SCHROEDER    TOTAL TREATMENT DURATION:  OT Patient Time In/Time Out  Time In: 1125  Time Out: HENRI Gama

## 2021-05-24 NOTE — DISCHARGE SUMMARY
Hospitalist Discharge Summary     Admit Date:  2021 10:41 AM   DC note date: 2021  Name:  Frederick Cast   Age:  68 y.o.  :  1940   MRN:  [id number]   PCP:  Suzy Sosa MD  Treatment Team: Attending Provider: Pedrito Whipple MD; Utilization Review: Ryan Boss; Care Manager: Loraine Marshall.; Occupational Therapy Assistant: HENRI Olmeod; Physical Therapist: Joselo Macias DPT  Presenting Complaint: Fall    Initial Admission Diagnosis: Anemia [D64.9]     Problem List for this Hospitalization:  Hospital Problems as of 2021 Date Reviewed: 2021        Codes Class Noted - Resolved POA    Solitary kidney (Chronic) ICD-10-CM: Q60.0  ICD-9-CM: 753.0  2021 - Present Yes        Hypoalbuminemia due to protein-calorie malnutrition ([facility] ICD-10-CM: E88.09, E46  ICD-9-CM: 273.8, 263.9  2021 - Present Yes        Stage 3 chronic kidney disease ([facility] (Chronic) ICD-10-CM: N18.30  ICD-9-CM: 585.3  2021 - Present Yes        Anemia ICD-10-CM: D64.9  ICD-9-CM: 285.9  2021 - Present Yes        Rib fracture ICD-10-CM: S22.39XA  ICD-9-CM: 807.00  2021 - Present Yes        Paroxysmal atrial fibrillation (HCC) (Chronic) ICD-10-CM: I48.0  ICD-9-CM: 427.31  3/19/2021 - Present Yes        Hypertension, essential, benign (Chronic) ICD-10-CM: I10  ICD-9-CM: 401.1  2016 - Present Yes        Major depressive disorder, recurrent, moderate (HCC) (Chronic) ICD-10-CM: F33.1  ICD-9-CM: 296.32  2016 - Present Yes        Acquired hypothyroidism (Chronic) ICD-10-CM: E03.9  ICD-9-CM: 244.9  2016 - Present Yes        Hyperlipidemia (Chronic) ICD-10-CM: E78.5  ICD-9-CM: 272.4  2016 - Present Yes        Rheumatoid arthritis ([facility] (Chronic) ICD-10-CM: M06.9  ICD-9-CM: 714.0  2016 - Present Yes        RESOLVED: Melena ICD-10-CM: K92.1  ICD-9-CM: 578.1  2021 - 2021 Yes        RESOLVED: Bacterial pneumonia ICD-10-CM: J15.9  ICD-9-CM: 482.9 5/22/2021 - 5/24/2021 Yes        RESOLVED: Febrile neutropenia (HCC) ICD-10-CM: D70.9, R50.81  ICD-9-CM: 288.00, 780.61  5/15/2021 - 5/20/2021 Yes        RESOLVED: Right upper lobe pneumonia ICD-10-CM: J18.9  ICD-9-CM: 165  5/15/2021 - 5/24/2021 Yes        * (Principal) RESOLVED: Pancytopenia ([facility] ICD-10-CM: E92.482  ICD-9-CM: 284.19  5/12/2021 - 5/24/2021 Yes        RESOLVED: NAZIA (acute kidney injury) ([facility] ICD-10-CM: N17.9  ICD-9-CM: 584.9  5/12/2021 - 5/20/2021 Yes                Admission HPI from 5/12/2021:    \" Jarrell Warren is a 68 y.o. female with medical history of recently dx'd Afib (on Eliquis), CKD3, RA, h/o breast CA 2008, hypothyroidism who presented to ED with falls, weakness, nausea.     Pt reports not feeling well since she was discharged March 2021 after being diagnosed with Afib and started on Eliquis. She endorses increased fatigue, generalized weakness. Endorses frequent falls even prior to last hospitalization which has continued, with a fall approx 1-2 weeks ago, landing on R side and bruising R flank and face. States stools have been \"dark\" since she was started on \"that new medicine\" which I believe is the Eliquis. She also reports nausea with occasional emesis, nonbloody, for about 1 week. Decreased appetite, keeping down liquids but very little food. Denies weight loss. She ultimately saw her PCP today who directed her to the ED for further evaluation.     In ED, VSS, afebrile. Hgb 3.7, confirmed on repeat. WBC 1.1, Cr 2.61. She was heme + in ED. She was transfused 1 unit pRBCs. XR revealed R 5,6,7 rib fracture. Cased discussed with Dr. Fox Ramesh. \"    Hospital Course:  Found to have rib fracture with some ecchymosis.  Her hemoglobin was low and she received 3 unit of PRBC.  She had significant leukopenia and ultimately underwent bone marrow biopsy May 17 showing hypocellularity. Suspected methotrexate toxicity.   She was given granix with improvement and heme signed off.     she developed fever during hospitalization started on abx for RUL PNA on CXR.   She is better and has 1 more day of abx. There is also concern for melena and GI consulted but given leukopenia, EGD held. May have had gastritis or other bleeding from thrombocytopenia, AC, and NSAID use.   Her hemoglobin dropped again on May 17 and she was given 1 unit of PRBC. Her hb has been stable. Heme expects hb improvement to lag behind platelets and WBC. She is not iron deficient on studies so no oral iron started. Given Hb stabilization, rising platelets, risk of CVA,  will resume eliquis cautiously and order GI follow up in 1-2 weeks. Monitor for bleednig and cont PPI BID. She had some overload, edema from IVF and transfusions. Better with IV lasix daily. The rest of her edema should resolve on its own with time but can consider giving PO lasix if needed, and monitoring kidney function. Melena, anemia  -GI deferred EGD. Can follow up outpt as doesn't appear to be actively bleeding. May have had gastritis or other bleeding from thrombocytopenia, AC, and NSAID use  -daily hb  -PPI BID    Aspiration vs other bacterial PNA  -augmentin and doxy EOT 5/25  -blood cx were negative    Arm and leg edema, pleural effusions  -improved with lasix.  give another dose of IV lasix 40mg today. Got 4u blood and IVF here, likely cause of overload. BMP stable, monitor  -cont home aldactone  -normal echo 5/5/21  -has been on RA last 4-5 days     Pancytopenia  -improved, appears to be leveling off. .  Stopped granix 5/20. Heme signed off 5/22. -methotrexate on hold; dont plan to resume. Rheum follow up     Urine cx with candida and enterococcal UTI  -not clear candida is true infection. No symptoms  -s/p course of abx for enterococcus     Resp failure with hypoxia  -has been on RA for days     RA  --do not resume methotrexate at discharge.   Pt says insurance dropped her rheumatologist. PCP can manage or she needs to find one in network.     CKD  -baseline Cr 1.3-1.5  -daily BMP    Disposition: Skilled Nursing Facility  Activity: Activity as tolerated  Diet: DIET NUTRITIONAL SUPPLEMENTS All Meals; Ensure Enlive ( )  DIET GI SOFT No options chosen  Code Status: Full Code    Follow Up Orders: Follow-up Appointments   Procedures    FOLLOW UP VISIT Appointment in: Other (Specify) 1-2 weeks with Gastroenterology to follow up GI bleednig     1-2 weeks with Gastroenterology to follow up GI bleednig     Standing Status:   Standing     Number of Occurrences:   1     Order Specific Question:   Appointment in     Answer: Other (Specify)       Follow-up Information     Follow up With Specialties Details Why Contact Info    [address] [facility] [address] [facility] today rehab [address] [address]  [phone]    [facility]   1-2 weeks for follow up. repeat hemoglobin and consider endoscopy to investigate possible bleed. [address] [facility] [address] [facility]  [address] [place] [address], [address] [facility]   [address] [place] [address]  [phone]            Time spent in patient discharge and coordination 33 minutes. Plan was discussed with pt. All questions answered. Patient was stable at time of discharge. Given instructions to call a physician or return if any concerns. Discharge summary and encounter summary was sent to PCP electronically via \"Comm Mgt\" link in [facility], if possible. Discharge Info:   Current Discharge Medication List      START taking these medications    Details   amoxicillin-clavulanate (AUGMENTIN) 500-125 mg per tablet Take 1 Tablet by mouth every twelve (12) hours for 1 day. Qty: 2 Tablet, Refills: 0  Start date: 5/24/2021, End date: 5/25/2021      doxycycline (VIBRAMYCIN) 100 mg capsule Take 1 Capsule by mouth every twelve (12) hours for 1 day.   Qty: 2 Capsule, Refills: 0  Start date: 5/24/2021, End date: 5/25/2021      pantoprazole (PROTONIX) 40 mg tablet Take 1 Tablet by mouth Before breakfast and dinner. Qty: 60 Tablet, Refills: 0  Start date: 5/24/2021         CONTINUE these medications which have NOT CHANGED    Details   folic acid (FOLVITE) 1 mg tablet Take 1 mg by mouth daily. spironolactone (Aldactone) 25 mg tablet Take 25 mg by mouth daily. amiodarone (CORDARONE) 200 mg tablet Take 1 Tab by mouth daily. Qty: 30 Tab, Refills: 11      rosuvastatin (Crestor) 10 mg tablet Take 1 Tab by mouth nightly. Qty: 30 Tab, Refills: 1      apixaban (ELIQUIS) 5 mg tablet Take 1 Tab by mouth two (2) times a day. Qty: 90 Tab, Refills: 3      ondansetron (ZOFRAN ODT) 4 mg disintegrating tablet Take 1 Tab by mouth every eight (8) hours as needed for Nausea or Vomiting. Qty: 20 Tab, Refills: 1      cholecalciferol (VITAMIN D3) 1,000 unit tablet Take 1,000 Units by mouth daily. DULoxetine (CYMBALTA) 60 mg capsule Take 1 Cap by mouth daily. Qty: 90 Cap, Refills: 2    Associated Diagnoses: Idiopathic peripheral neuropathy; Major depressive disorder with single episode, in remission (HCC)      levothyroxine (SYNTHROID) 25 mcg tablet Take 1 Tab by mouth Daily (before breakfast).   Qty: 90 Tab, Refills: 2    Associated Diagnoses: Acquired hypothyroidism         STOP taking these medications       diclofenac EC (VOLTAREN) 75 mg EC tablet Comments:   Reason for Stopping:         famotidine (PEPCID) 20 mg tablet Comments:   Reason for Stopping:         predniSONE (DELTASONE) 10 mg tablet Comments:   Reason for Stopping:         methotrexate (RHEUMATREX) 2.5 mg tablet Comments:   Reason for Stopping:               Procedures done this admission:  * No surgery found *    Consults this admission:  IP CONSULT TO GASTROENTEROLOGY  IP CONSULT TO HEMATOLOGY  IP CONSULT TO INTERVENTIONAL RADIOLOGY  IP CONSULT TO INTERVENTIONAL RADIOLOGY    Echocardiogram/EKG results:  Results for orders placed or performed during the hospital encounter of 21   2D ECHO LIMTED ADULT W OR WO CONTR    Narrative    [address] [address], [address] [facility]  [phone]    Transthoracic Echocardiogram  2D and Doppler    Patient: Augustina Heimlich  MR #: [id number]  : [id number]  Age: 68 years  Gender: Female  Study date: 20-May-2021  Account #: [de-identified]  Height: 60 in  Weight: 169.6 lb  BSA: 1.74 mï¾²  Status:Routine  Location: 825  BP: 128/ 60    Allergies: NO KNOWN ALLERGENS    Sonographer:  Joseph Roberson, Technologist  Group:  [facility]  Referring Physician:  Mary Young MD  Reading Physician:  Mary Young MD    INDICATIONS: R/O effusion. PROCEDURE: This was a routine study. A transthoracic echocardiogram was  performed. The study included limited 2D imaging and limited spectral   Doppler. Images were limited from the parasternal and apical acoustic windows were off  axis. Technically difficult study due to respiratory interference. This was a  technically difficult study. LEFT VENTRICLE: Size was normal. Systolic function was normal. Ejection  fraction was estimated in the range of 55 % to 60 %. There were no regional  wall motion abnormalities. Wall thickness was normal.    RIGHT VENTRICLE: The size was normal. Systolic function was normal.    LEFT ATRIUM: The atrium was mildly dilated. RIGHT ATRIUM: Size was normal.    SYSTEMIC VEINS: IVC: The inferior vena cava was normal in size and course. AORTIC VALVE: The valve was trileaflet. Leaflets exhibited slight  calcification. There was no evidence for stenosis. MITRAL VALVE: There was mild annular calcification. There was no evidence for  stenosis. TRICUSPID VALVE: The valve structure was normal. There was no evidence for  stenosis. PULMONIC VALVE: Not well visualized. PERICARDIUM: There was no pericardial effusion.  A pericardial fat pad was  present. AORTA: The root exhibited normal size. SUMMARY:    -  Left ventricle: Systolic function was normal. Ejection fraction was  estimated in the range of 55 % to 60 %. There were no regional wall motion  abnormalities. -  Left atrium: The atrium was mildly dilated. -  Mitral valve: There was mild annular calcification.    -  Pericardium: There was no pericardial effusion. SYSTEM MEASUREMENT TABLES    2D mode  LA Dimension (2D): 4.3 cm    Unspecified Scan Mode  Peak Grad; Mean; Antegrade Flow: 16 mm[Hg]  Vmax; Antegrade Flow: 201 cm/s    Prepared and signed by    Vijaya Whitman MD  Signed [id number] 16:34:24         Results for orders placed or performed during the hospital encounter of 05/12/21   EKG, 12 LEAD, INITIAL   Result Value Ref Range    Ventricular Rate 78 BPM    Atrial Rate 78 BPM    P-R Interval 166 ms    QRS Duration 132 ms    Q-T Interval 422 ms    QTC Calculation (Bezet) 481 ms    Calculated P Axis 63 degrees    Calculated R Axis 76 degrees    Calculated T Axis 79 degrees    Diagnosis       Normal sinus rhythm  Right bundle branch block  T wave abnormality, consider inferolateral ischemia  Abnormal ECG  When compared with ECG of 08-APR-2021 15:29,  Right bundle branch block is now Present  Confirmed by [facility]  MD ()KATIA ([id number]) on 5/12/2021 1:32:48 PM         Diagnostic Imaging/Tests:   XR CHEST SNGL V    Result Date: 5/21/2021  Portable chest x-ray CLINICAL INDICATION: Worsening hypoxia FINDINGS: Single AP view of the chest compared to a similar exam dated 5/15/2021 shows airspace opacity in the bilateral lower lungs with larger bilateral pleural effusions. The cardiac silhouette and mediastinum are stable. There is no pneumothorax. Bilateral pleural effusions with bibasilar airspace opacity.     XR CHEST SNGL V    Result Date: 5/15/2021  Portable AP upright view  Date:  5/15/2021  at 0757 hours comparison chest x-ray : 5/12/2021 Clinical Information: Fever Findings: Heart is enlarged and mediastinum unremarkable. Vascularity does not appear congested. There is ill-defined density right upper lobe near the apex suspicious for infiltrate. No pleural effusion. Right upper lobe infiltrate     XR RIBS RT UNI 2 V    Result Date: 5/12/2021  Right ribs INDICATION: Rib pain after fall 5 views of the right ribs were obtained FINDINGS: There are mildly displaced rib fractures of the lateral right fifth, sixth, seventh ribs. No pneumothorax. Clear right lung. Surgical clips project over the right axilla. Mildly displaced fractures of the lateral right fifth, sixth, seventh ribs    CT HEAD WO CONT    Result Date: 5/12/2021  EXAMINATION: CT HEAD WO CONT 5/12/2021 1:25 PM INDICATION: 2 syncopal episodes with fall one week ago COMPARISON: CT head January 16, 2016 TECHNIQUE: Multiple-row detector helical CT examination of the head without intravenous contrast. Radiation dose reduction techniques were used for this study. Our CT scanners use one or all of the following: Automated exposure control, adjustment of the mA and/or kV according to patient size, iterative reconstruction. FINDINGS: No acute intracranial hemorrhage. Multiple incidental cortical veins are noted along the cerebral Normal size of ventricles and extra-axial spaces for age. No perceptible loss of gray-white differentiation. No space-occupying lesion. No calvarial abnormality is seen. Essentially clear paranasal sinuses and mastoid air cells. Prior bilateral lens replacements. No abnormality of extracranial soft tissues. Convexities. No acute abnormality. US RETROPERITONEUM LTD    Result Date: 5/13/2021  Exam: Renal ultrasound. INDICATION: Acute kidney injury. Comparison: Left renal ultrasound, 3/19/2021 Real-time sonography of the kidneys, retroperitoneum and bladder was performed with multiple static images obtained.  Findings: Right kidney: The right kidney is increased in cortical echogenicity, measuring 11.9 cm. No contour deforming mass. No hydronephrosis or perinephric fluid. Left kidney: Not visualized Bladder: The bladder is normal. Aorta: No evidence of aneurysm in the imaged portion. IVC: Patent. 1.  Increased cortical echogenicity involving the single right kidney compatible with chronic medical renal disease. No hydronephrosis. 2. Absent left kidney. XR CHEST PORT    Result Date: 5/12/2021  EXAMINATION: CHEST RADIOGRAPH 5/12/2021 12:18 PM INDICATION: Cough after fall COMPARISON: Chest radiograph March 19, 2021 and same day right rib films TECHNIQUE: A single view of the chest was obtained. FINDINGS: Support Devices: None Osseous : Redemonstrated lateral right lateral rib fractures. Cardiomediastinal Silhouette: Normal in size. Lungs: Clear lungs. Normal pulmonary vascularity. Pleura: No pleural fluid or pneumothorax. Upper Abdomen: No abnormality. Redemonstrated acute right lateral rib fractures. No pneumothorax or acute cardiopulmonary abnormality. IR INSERT NON TUNL CVC OVER 5 YRS    Result Date: 5/17/2021  Title:  Temporary central venous catheter placement. Indication:  Anemia, poor IV access, acute kidney injury A temporary central venous catheter is placed for this patient through the right internal jugular vein using ultrasound and fluoroscopic guidance with maximum sterile barrier appropriately documented and fluoro time and images documented in the report :  Duane Alexandria, PA-C Supervising Physician: Ortega العلي M.D. Consent: Informed written and oral consent was obtained from the patient after explanation of benefits and risks (including, but not limited to: Infection, hemorrhage, pneumothorax). The patient's questions were answered to their satisfaction. The patient stated understanding and requested that we proceed. Procedure:  This central venous catheter was inserted with all elements of maximal sterile barrier technique and cap and mask and sterile gown and sterile gloves and sterile full-body drape and hand hygiene and 2% chlorhexidine for cutaneous antisepsis and sterile ultrasound gel and sterile ultrasound probe cover. Following routine prep and drape of the RIGHT neck, a local field block with lidocaine was achieved. Ultrasound evaluation of all potential access sites was performed due to lack of a palpable vein. No veins were palpable due to overlying adipose tissue. Using real-time ultrasound guidance, with appropriate image recording and visualization of vascular needle entry, the patent RIGHT internal jugular vein was accessed using micropuncture technique. Using fluoroscopy, a triple-lumen central venous catheter was advanced over the wire positioning the tip in the right atrium. All lumens aspirated easily and were filled with heparinized saline. The catheter was secured with nonabsorbable suture. Sterile dressings were applied. Complications: None. Radiation dose: Fluoroscopy time: 12 seconds. Reference air kerma (mGy): 2 Kerma area product (cGy.cm2):  0.25 Fluoroscopic images: 1 Contrast:  0 ml. Medications:  Lidocaine Findings:  Patent right internal jugular vein. Catheter tip in the mid right atrium. Technically successful temporary central venous catheter placement. Plan:  Catheter is ready for use. IR BX BONE MARROW DIAGNOSTIC    Result Date: 5/17/2021  Title: Fluoroscopic guided bone marrow biopsy. History: 68year old female with anemia, leukopenia. :  Shelia Vaughn PA-C Supervising Physician: Demetria Mcdonald M.D. Consent: Informed written and oral consent was obtained from the patient after explanation of benefits and risks (including, but not limited to: Infection, Hemorrhage, Visceral Injury). The patient's questions were answered to their satisfaction. The patient stated understanding and requested that we proceed. Procedure: Maximal sterile barrier technique was used.   With the patient prone the lower back/buttock was studied with fluoroscopy. Following routine prep and drape of the right buttock, a local field block with lidocaine was achieved. Using intermittent CT technique, a marrow aspirate followed by a core biopsy was obtained with the 11 gauge On Control biopsy device. The needle was removed. Hemostasis was achieved with manual compression. A bandage was applied. Radiation dose: Fluoroscopy time: 6 seconds. Reference air kerma (mGy): 1 Kerma area product (cGy.cm2):  0.25 Fluoroscopic images: 1 Complications: None. Medications: 16 minutes intraservice time during moderate sedation was provided under the direction and supervision of Jam Urena M.D. Using Versed 0.5 mg and fentanyl 25 mcg Continuous cardiopulmonary monitoring was provided by trained independent observer present. Findings: No post biopsy hemorrhage. Uncomplicated fluoroscopic guided bone marrow aspiration and core biopsy. Plan:  Bedrest observation for 30 minutes.         All Micro Results     Procedure Component Value Units Date/Time    COVID-19 RAPID TEST [[id number]] Collected: 05/24/21 1015    Order Status: Completed Updated: 05/24/21 1030    CULTURE, BLOOD [[id number]] Collected: 05/15/21 0937    Order Status: Completed Specimen: Blood Updated: 05/20/21 0654     Special Requests: --        LEFT  HAND       Culture result: NO GROWTH 5 DAYS       CULTURE, BLOOD [[id number]] Collected: 05/15/21 0756    Order Status: Completed Specimen: Blood Updated: 05/20/21 0654     Special Requests: --        LEFT  HAND       Culture result: NO GROWTH 5 DAYS       CULTURE, URINE [[id number]]  (Abnormal)  (Susceptibility) Collected: 05/17/21 0934    Order Status: Completed Specimen: Urine from Clean catch Updated: 05/20/21 0639     Special Requests: NO SPECIAL REQUESTS        Culture result:       >100,000 COLONIES/mL CANDIDA GLABRATA                  10,000 to 50,000 COLONIES/mL ENTEROCOCCUS FAECIUM GROUP D                  <10,000 COLONIES/mL NORMAL SKIN MARC ISOLATED          CULTURE, BLOOD [[id number]] Collected: 05/13/21 1616    Order Status: Completed Specimen: Blood Updated: 05/18/21 0749     Special Requests: --        LEFT  HAND       Culture result: NO GROWTH 5 DAYS       CULTURE, BLOOD [[id number]] Collected: 05/13/21 1613    Order Status: Completed Specimen: Blood Updated: 05/18/21 0749     Special Requests: --        LEFT  Antecubital       Culture result: NO GROWTH 5 DAYS             SARS-CoV-2 Lab Results  \"Novel Coronavirus\" Test: No results found for: COV2NT   \"Emergent Disease\" Test: No results found for: EDPR  \"SARS-COV-2\" Test: No results found for: XGCOVT  Rapid Test:   Lab Results   Component Value Date/Time    COVR Not detected 03/19/2021 06:15 PM            Labs: Results:       BMP, Mg, Phos Recent Labs     05/24/21  0554 05/23/21  0604 05/22/21  0534    140 138   K 3.7 3.6 3.7    107 108*   CO2 27 27 25   AGAP 7 6* 5*   BUN 25* 22 19   CREA 1.55* 1.52* 1.52*   CA 8.1* 8.4 8.2*   * 89 133*      CBC Recent Labs     05/24/21  0554 05/23/21  0604 05/22/21  0534   WBC 49.5* 48.6* 40.4*   RBC 2.59* 2.64* 2.52*   HGB 7.7* 7.8* 7.5*   HCT 23.7* 24.0* 23.5*    124* 78*   GRANS 55 65 59   LYMPH 7* 6* 9*   EOS 0*  --  1   MONOS 9 7 9   BASOS 0  --  0   IG 29*  --  22*   ANEU 27.1* 42.3* 23.9*   ABL 3.5 2.9 3.6   JOSE G 0.0  --  0.4   ABM 4.5* 3.4* 3.6*   ABB 0.0  --  0.0   AIG 14.4*  --  8.9*      LFT No results for input(s): ALT, TBIL, AP, TP, ALB, GLOB, AGRAT in the last 72 hours.     No lab exists for component: SGOT, GPT   Cardiac Testing Lab Results   Component Value Date/Time    BNP 25 (H) 04/22/2019 12:28 PM    Troponin-I, Qt. <0.02 (L) 01/16/2016 06:55 PM      Coagulation Tests Lab Results   Component Value Date/Time    Prothrombin time 17.5 (H) 05/12/2021 10:33 AM    Prothrombin time 9.9 01/16/2016 06:55 PM    INR 1.4 05/12/2021 10:33 AM    INR 0.9 01/16/2016 06:55 PM    aPTT 30.4 05/13/2021 08:25 AM aPTT 28.1 03/21/2021 03:01 PM    aPTT 26.8 03/19/2021 05:38 PM      A1c Lab Results   Component Value Date/Time    Hemoglobin A1c 5.2 01/17/2016 03:54 AM      Lipid Panel Lab Results   Component Value Date/Time    Cholesterol, total 140 02/05/2018 01:20 PM    HDL Cholesterol 45 02/05/2018 01:20 PM    LDL, calculated 72 02/05/2018 01:20 PM    VLDL, calculated 23 02/05/2018 01:20 PM    Triglyceride 113 02/05/2018 01:20 PM    CHOL/HDL Ratio 3.3 01/17/2016 03:54 AM      Thyroid Panel Lab Results   Component Value Date/Time    TSH 4.390 (H) 05/12/2021 01:46 PM    TSH 2.230 03/19/2021 01:38 PM        Most Recent UA Lab Results   Component Value Date/Time    Color GENNY 05/17/2021 09:46 AM    Appearance CLOUDY 05/17/2021 09:46 AM    Specific gravity 1.016 04/23/2019 08:35 PM    pH (UA) 5.5 05/17/2021 09:46 AM    Protein 100 (A) 05/17/2021 09:46 AM    Glucose Negative 05/17/2021 09:46 AM    Ketone Negative 05/17/2021 09:46 AM    Bilirubin Negative 05/17/2021 09:46 AM    Blood Negative 05/17/2021 09:46 AM    Urobilinogen 1.0 05/17/2021 09:46 AM    Nitrites Negative 05/17/2021 09:46 AM    Leukocyte Esterase SMALL (A) 05/17/2021 09:46 AM    WBC 0-3 05/17/2021 09:46 AM    RBC 0-3 05/17/2021 09:46 AM    Epithelial cells 0-3 05/17/2021 09:46 AM    Bacteria TRACE 05/17/2021 09:46 AM    Casts 10-20 03/19/2021 03:37 PM    Crystals, urine 0 03/19/2021 03:37 PM    Mucus 0 03/19/2021 03:37 PM    Other observations RESULTS VERIFIED MANUALLY 05/17/2021 09:46 AM          All Labs from Last 24 Hrs:  Recent Results (from the past 24 hour(s))   CBC WITH AUTOMATED DIFF    Collection Time: 05/24/21  5:54 AM   Result Value Ref Range    WBC 49.5 (H) 4.3 - 11.1 K/uL    RBC 2.59 (L) 4.05 - 5.2 M/uL    HGB 7.7 (L) 11.7 - 15.4 g/dL    HCT 23.7 (L) 35.8 - 46.3 %    MCV 91.5 79.6 - 97.8 FL    MCH 29.7 26.1 - 32.9 PG    MCHC 32.5 31.4 - 35.0 g/dL    RDW 16.8 (H) 11.9 - 14.6 %    PLATELET 758 333 - 734 K/uL    MPV 11.9 9.4 - 12.3 FL    ABSOLUTE NRBC 0.82 (H) 0.0 - 0.2 K/uL    NEUTROPHILS 55 43 - 78 %    LYMPHOCYTES 7 (L) 13 - 44 %    MONOCYTES 9 4.0 - 12.0 %    EOSINOPHILS 0 (L) 0.5 - 7.8 %    BASOPHILS 0 0.0 - 2.0 %    IMMATURE GRANULOCYTES 29 (H) 0.0 - 5.0 %    ABS. NEUTROPHILS 27.1 (H) 1.7 - 8.2 K/UL    ABS. LYMPHOCYTES 3.5 0.5 - 4.6 K/UL    ABS. MONOCYTES 4.5 (H) 0.1 - 1.3 K/UL    ABS. EOSINOPHILS 0.0 0.0 - 0.8 K/UL    ABS. BASOPHILS 0.0 0.0 - 0.2 K/UL    ABS. IMM.  GRANS. 14.4 (H) 0.0 - 0.5 K/UL    RBC COMMENTS SLIGHT  MICROCYTOSIS        RBC COMMENTS MODERATE  ANISOCYTOSIS + POIKILOCYTOSIS        WBC COMMENTS WBC'S APPEAR ABNORMAL/IMMATURE/ATYPICAL      PLATELET COMMENTS LARGE FORMS PRESENT      DF AUTOMATED     METABOLIC PANEL, BASIC    Collection Time: 05/24/21  5:54 AM   Result Value Ref Range    Sodium 139 136 - 145 mmol/L    Potassium 3.7 3.5 - 5.1 mmol/L    Chloride 105 98 - 107 mmol/L    CO2 27 21 - 32 mmol/L    Anion gap 7 7 - 16 mmol/L    Glucose 109 (H) 65 - 100 mg/dL    BUN 25 (H) 8 - 23 MG/DL    Creatinine 1.55 (H) 0.6 - 1.0 MG/DL    GFR est AA 42 (L) >60 ml/min/1.73m2    GFR est non-AA 34 (L) >60 ml/min/1.73m2    Calcium 8.1 (L) 8.3 - 10.4 MG/DL       Discharge Exam:  Patient Vitals for the past 24 hrs:   Temp Pulse Resp BP SpO2   05/24/21 0753 97.3 °F (36.3 °C) 83 14 122/62 94 %   05/24/21 0321 97.9 °F (36.6 °C) 83 20 138/61 93 %   05/24/21 0047 97.9 °F (36.6 °C) 85 20 (!) 129/57 93 %   05/23/21 2036 98.1 °F (36.7 °C) 86 20 137/62 99 %   05/23/21 1525 98.2 °F (36.8 °C) 86 20 139/63 99 %   05/23/21 1141 98.1 °F (36.7 °C) 80 20 136/63 98 %     Oxygen Therapy  O2 Sat (%): 94 % (05/24/21 0753)  Pulse via Oximetry: 113 beats per minute (05/20/21 0719)  O2 Device: None (Room air) (05/24/21 0753)  Skin Assessment: Clean, dry, & intact (05/21/21 0745)  Skin Protection for O2 Device: No (05/21/21 0745)  O2 Flow Rate (L/min): 7 l/min (05/21/21 1953)    Estimated body mass index is 35.39 kg/m² as calculated from the following:    Height as of this encounter: 5' (1.524 m). Weight as of this encounter: 82.2 kg (181 lb 3.5 oz). Intake/Output Summary (Last 24 hours) at 5/24/2021 1039  Last data filed at 5/24/2021 0900  Gross per 24 hour   Intake 480 ml   Output 900 ml   Net -420 ml       *Note that automatically entered I/Os may not be accurate; dependent on patient compliance with collection and accurate  by assistants. General:    Well nourished. No overt distress  Eyes:   Normal sclerae. Extraocular movements intact. ENT:  Normocephalic, atraumatic. Moist mucous membranes  CV:   Regular rate and rhythm. No m/r/g. Generalized edema 1-2+ all extremities. Lungs:  CTAB. No wheezing, rhonchi, or rales. Unlabored  Abdomen: Soft, nontender, nondistended. Extremities: Warm and dry. No cyanosis or clubbing  Neurologic: CN II-XII grossly intact. No gross focal deficits. Alert. Skin:     No rashes. No jaundice. Psych:  Normal mood and affect.     Current Med List in Hospital:   Current Facility-Administered Medications   Medication Dose Route Frequency    amoxicillin-clavulanate (AUGMENTIN) 500-125 mg per tablet 1 Tablet  1 Tablet Oral Q12H    NUTRITIONAL SUPPORT ELECTROLYTE PRN ORDERS   Does Not Apply PRN    potassium, sodium phosphates (NEUTRA-PHOS) packet 1 Packet  1 Packet Oral PRN    potassium, sodium phosphates (NEUTRA-PHOS) packet 1 Packet  1 Packet Oral PRN    magnesium sulfate 2 g/50 ml IVPB (premix or compounded)  2 g IntraVENous DAILY PRN    magnesium sulfate 4 g/100 mL IVPB  4 g IntraVENous DAILY PRN    potassium bicarb-citric acid (EFFER-K) tablet 40 mEq  40 mEq Oral DAILY PRN    potassium chloride 20 mEq in 100 ml IVPB  20 mEq IntraVENous Q2H PRN    doxycycline (VIBRAMYCIN) capsule 100 mg  100 mg Oral Q12H    pantoprazole (PROTONIX) tablet 40 mg  40 mg Oral ACB&D    spironolactone (ALDACTONE) tablet 25 mg  25 mg Oral DAILY    alum-mag hydroxide-simeth (MYLANTA) oral suspension 30 mL  30 mL Oral Q4H PRN    hydrOXYzine HCL (ATARAX) tablet 25 mg  25 mg Oral TID PRN    0.9% sodium chloride infusion 250 mL  250 mL IntraVENous PRN    albuterol-ipratropium (DUO-NEB) 2.5 MG-0.5 MG/3 ML  3 mL Nebulization Q4H PRN    Lactobacillus Acidoph & Bulgar (FLORANEX) tablet 1 Tab  1 Tablet Oral DAILY    nystatin (MYCOSTATIN) 100,000 unit/mL oral suspension 500,000 Units  500,000 Units Oral QID    folic acid (FOLVITE) tablet 1 mg  1 mg Oral DAILY    labetaloL (NORMODYNE;TRANDATE) injection 10 mg  10 mg IntraVENous Q4H PRN    lidocaine 4 % patch 1 Patch  1 Patch TransDERmal Q24H    hydrALAZINE (APRESOLINE) 20 mg/mL injection 10 mg  10 mg IntraVENous Q6H PRN    lip protectant (BLISTEX) ointment 1 Each  1 Each Topical PRN    amiodarone (CORDARONE) tablet 200 mg  200 mg Oral DAILY    DULoxetine (CYMBALTA) capsule 60 mg  60 mg Oral DAILY    levothyroxine (SYNTHROID) tablet 25 mcg  25 mcg Oral ACB    rosuvastatin (CRESTOR) tablet 10 mg  10 mg Oral QHS    sodium chloride (NS) flush 5-40 mL  5-40 mL IntraVENous Q8H    sodium chloride (NS) flush 5-40 mL  5-40 mL IntraVENous PRN    acetaminophen (TYLENOL) tablet 650 mg  650 mg Oral Q6H PRN    Or    acetaminophen (TYLENOL) suppository 650 mg  650 mg Rectal Q6H PRN    polyethylene glycol (MIRALAX) packet 17 g  17 g Oral DAILY PRN    promethazine (PHENERGAN) tablet 12.5 mg  12.5 mg Oral Q6H PRN    Or    ondansetron (ZOFRAN) injection 4 mg  4 mg IntraVENous Q6H PRN    diphenhydrAMINE (BENADRYL) capsule 25 mg  25 mg Oral Q6H PRN    traMADoL (ULTRAM) tablet 50 mg  50 mg Oral Q6H PRN       No Known Allergies  Immunization History   Administered Date(s) Administered    Influenza High Dose Vaccine PF 10/28/2015, 09/21/2016, 09/15/2017    Pneumococcal Polysaccharide (PPSV-23) 01/18/2016    TB Skin Test (PPD) Intradermal 04/22/2019, 05/12/2021       Signed:  Jamir Echeverria MD

## 2021-05-26 LAB
FLOW CYTOMETRY, FBTC1: NORMAL
PATH REV BLD -IMP: NORMAL
SPECIMEN SOURCE: NORMAL
TEST ORDERED:: NORMAL

## 2021-06-10 LAB
FLOW CYTOMETRY, FBTC1: NORMAL
SPECIMEN SOURCE: NORMAL
TEST ORDERED:: NORMAL

## 2021-06-13 ENCOUNTER — APPOINTMENT (OUTPATIENT)
Dept: GENERAL RADIOLOGY | Age: 81
DRG: 291 | End: 2021-06-13
Attending: EMERGENCY MEDICINE
Payer: MEDICARE

## 2021-06-13 ENCOUNTER — HOSPITAL ENCOUNTER (INPATIENT)
Age: 81
LOS: 11 days | Discharge: HOME HEALTH | DRG: 291 | End: 2021-06-24
Attending: STUDENT IN AN ORGANIZED HEALTH CARE EDUCATION/TRAINING PROGRAM | Admitting: HOSPITALIST
Payer: MEDICARE

## 2021-06-13 DIAGNOSIS — I50.30 HEART FAILURE WITH PRESERVED EJECTION FRACTION, UNSPECIFIED HF CHRONICITY (HCC): ICD-10-CM

## 2021-06-13 DIAGNOSIS — N17.9 AKI (ACUTE KIDNEY INJURY) (HCC): ICD-10-CM

## 2021-06-13 DIAGNOSIS — I50.9 CONGESTIVE HEART FAILURE, UNSPECIFIED HF CHRONICITY, UNSPECIFIED HEART FAILURE TYPE (HCC): ICD-10-CM

## 2021-06-13 DIAGNOSIS — N18.32 STAGE 3B CHRONIC KIDNEY DISEASE (HCC): Chronic | ICD-10-CM

## 2021-06-13 DIAGNOSIS — E78.00 PURE HYPERCHOLESTEROLEMIA: Chronic | ICD-10-CM

## 2021-06-13 DIAGNOSIS — I50.33 ACUTE ON CHRONIC DIASTOLIC CONGESTIVE HEART FAILURE (HCC): ICD-10-CM

## 2021-06-13 DIAGNOSIS — I10 HYPERTENSION, UNSPECIFIED TYPE: ICD-10-CM

## 2021-06-13 DIAGNOSIS — J96.91 RESPIRATORY FAILURE WITH HYPOXIA, UNSPECIFIED CHRONICITY (HCC): ICD-10-CM

## 2021-06-13 DIAGNOSIS — R09.02 HYPOXIA: Primary | ICD-10-CM

## 2021-06-13 DIAGNOSIS — I48.0 PAROXYSMAL ATRIAL FIBRILLATION (HCC): Chronic | ICD-10-CM

## 2021-06-13 LAB
ALBUMIN SERPL-MCNC: 2.4 G/DL (ref 3.2–4.6)
ALBUMIN/GLOB SERPL: 0.6 {RATIO} (ref 1.2–3.5)
ALP SERPL-CCNC: 87 U/L (ref 50–136)
ALT SERPL-CCNC: 15 U/L (ref 12–65)
ANION GAP SERPL CALC-SCNC: 8 MMOL/L (ref 7–16)
AST SERPL-CCNC: 19 U/L (ref 15–37)
BASOPHILS # BLD: 0.2 K/UL (ref 0–0.2)
BASOPHILS NFR BLD: 1 % (ref 0–2)
BILIRUB SERPL-MCNC: 0.7 MG/DL (ref 0.2–1.1)
BNP SERPL-MCNC: ABNORMAL PG/ML
BUN SERPL-MCNC: 13 MG/DL (ref 8–23)
CALCIUM SERPL-MCNC: 8.2 MG/DL (ref 8.3–10.4)
CHLORIDE SERPL-SCNC: 111 MMOL/L (ref 98–107)
CO2 SERPL-SCNC: 24 MMOL/L (ref 21–32)
CREAT SERPL-MCNC: 1.25 MG/DL (ref 0.6–1)
DIFFERENTIAL METHOD BLD: ABNORMAL
EOSINOPHIL # BLD: 0.2 K/UL (ref 0–0.8)
EOSINOPHIL NFR BLD: 1 % (ref 0.5–7.8)
ERYTHROCYTE [DISTWIDTH] IN BLOOD BY AUTOMATED COUNT: 18.6 % (ref 11.9–14.6)
GLOBULIN SER CALC-MCNC: 4.1 G/DL (ref 2.3–3.5)
GLUCOSE SERPL-MCNC: 95 MG/DL (ref 65–100)
HCT VFR BLD AUTO: 28.5 % (ref 35.8–46.3)
HGB BLD-MCNC: 9 G/DL (ref 11.7–15.4)
IMM GRANULOCYTES # BLD AUTO: 0.1 K/UL (ref 0–0.5)
IMM GRANULOCYTES NFR BLD AUTO: 1 % (ref 0–5)
LACTATE SERPL-SCNC: 2.7 MMOL/L (ref 0.4–2)
LYMPHOCYTES # BLD: 1.5 K/UL (ref 0.5–4.6)
LYMPHOCYTES NFR BLD: 9 % (ref 13–44)
MAGNESIUM SERPL-MCNC: 1.8 MG/DL (ref 1.8–2.4)
MCH RBC QN AUTO: 30.4 PG (ref 26.1–32.9)
MCHC RBC AUTO-ENTMCNC: 31.6 G/DL (ref 31.4–35)
MCV RBC AUTO: 96.3 FL (ref 79.6–97.8)
MONOCYTES # BLD: 2 K/UL (ref 0.1–1.3)
MONOCYTES NFR BLD: 12 % (ref 4–12)
NEUTS SEG # BLD: 13.3 K/UL (ref 1.7–8.2)
NEUTS SEG NFR BLD: 77 % (ref 43–78)
NRBC # BLD: 0 K/UL (ref 0–0.2)
PLATELET # BLD AUTO: 326 K/UL (ref 150–450)
PMV BLD AUTO: 10.8 FL (ref 9.4–12.3)
POTASSIUM SERPL-SCNC: 3.8 MMOL/L (ref 3.5–5.1)
PROCALCITONIN SERPL-MCNC: <0.05 NG/ML
PROT SERPL-MCNC: 6.5 G/DL (ref 6.3–8.2)
RBC # BLD AUTO: 2.96 M/UL (ref 4.05–5.2)
SODIUM SERPL-SCNC: 143 MMOL/L (ref 136–145)
TROPONIN-HIGH SENSITIVITY: 45.3 PG/ML (ref 0–14)
TROPONIN-HIGH SENSITIVITY: 56.9 PG/ML (ref 0–14)
WBC # BLD AUTO: 17.3 K/UL (ref 4.3–11.1)

## 2021-06-13 PROCEDURE — 74011250636 HC RX REV CODE- 250/636: Performed by: STUDENT IN AN ORGANIZED HEALTH CARE EDUCATION/TRAINING PROGRAM

## 2021-06-13 PROCEDURE — 80053 COMPREHEN METABOLIC PANEL: CPT

## 2021-06-13 PROCEDURE — 84145 PROCALCITONIN (PCT): CPT

## 2021-06-13 PROCEDURE — 96374 THER/PROPH/DIAG INJ IV PUSH: CPT

## 2021-06-13 PROCEDURE — 93005 ELECTROCARDIOGRAM TRACING: CPT | Performed by: STUDENT IN AN ORGANIZED HEALTH CARE EDUCATION/TRAINING PROGRAM

## 2021-06-13 PROCEDURE — 85025 COMPLETE CBC W/AUTO DIFF WBC: CPT

## 2021-06-13 PROCEDURE — 74011250637 HC RX REV CODE- 250/637: Performed by: HOSPITALIST

## 2021-06-13 PROCEDURE — 99285 EMERGENCY DEPT VISIT HI MDM: CPT

## 2021-06-13 PROCEDURE — 83880 ASSAY OF NATRIURETIC PEPTIDE: CPT

## 2021-06-13 PROCEDURE — 83605 ASSAY OF LACTIC ACID: CPT

## 2021-06-13 PROCEDURE — 94762 N-INVAS EAR/PLS OXIMTRY CONT: CPT

## 2021-06-13 PROCEDURE — 36415 COLL VENOUS BLD VENIPUNCTURE: CPT

## 2021-06-13 PROCEDURE — 2709999900 HC NON-CHARGEABLE SUPPLY

## 2021-06-13 PROCEDURE — 87040 BLOOD CULTURE FOR BACTERIA: CPT

## 2021-06-13 PROCEDURE — 71045 X-RAY EXAM CHEST 1 VIEW: CPT

## 2021-06-13 PROCEDURE — 65660000000 HC RM CCU STEPDOWN

## 2021-06-13 PROCEDURE — 83735 ASSAY OF MAGNESIUM: CPT

## 2021-06-13 PROCEDURE — 84484 ASSAY OF TROPONIN QUANT: CPT

## 2021-06-13 RX ORDER — ADHESIVE BANDAGE
30 BANDAGE TOPICAL DAILY PRN
Status: DISCONTINUED | OUTPATIENT
Start: 2021-06-13 | End: 2021-06-24 | Stop reason: HOSPADM

## 2021-06-13 RX ORDER — CAPTOPRIL 12.5 MG/1
12.5 TABLET ORAL
Status: DISCONTINUED | OUTPATIENT
Start: 2021-06-13 | End: 2021-06-24 | Stop reason: HOSPADM

## 2021-06-13 RX ORDER — FUROSEMIDE 10 MG/ML
40 INJECTION INTRAMUSCULAR; INTRAVENOUS 2 TIMES DAILY
Status: DISCONTINUED | OUTPATIENT
Start: 2021-06-14 | End: 2021-06-22

## 2021-06-13 RX ORDER — SPIRONOLACTONE 25 MG/1
25 TABLET ORAL DAILY
Status: DISCONTINUED | OUTPATIENT
Start: 2021-06-14 | End: 2021-06-24 | Stop reason: HOSPADM

## 2021-06-13 RX ORDER — POLYETHYLENE GLYCOL 3350 17 G/17G
17 POWDER, FOR SOLUTION ORAL DAILY
Status: DISCONTINUED | OUTPATIENT
Start: 2021-06-14 | End: 2021-06-24 | Stop reason: HOSPADM

## 2021-06-13 RX ORDER — FUROSEMIDE 10 MG/ML
60 INJECTION INTRAMUSCULAR; INTRAVENOUS
Status: COMPLETED | OUTPATIENT
Start: 2021-06-13 | End: 2021-06-13

## 2021-06-13 RX ORDER — SODIUM CHLORIDE 0.9 % (FLUSH) 0.9 %
5-40 SYRINGE (ML) INJECTION AS NEEDED
Status: DISCONTINUED | OUTPATIENT
Start: 2021-06-13 | End: 2021-06-24 | Stop reason: HOSPADM

## 2021-06-13 RX ORDER — SODIUM CHLORIDE 0.9 % (FLUSH) 0.9 %
5-10 SYRINGE (ML) INJECTION AS NEEDED
Status: DISCONTINUED | OUTPATIENT
Start: 2021-06-13 | End: 2021-06-24 | Stop reason: HOSPADM

## 2021-06-13 RX ORDER — LEVOTHYROXINE SODIUM 50 UG/1
25 TABLET ORAL
Status: DISCONTINUED | OUTPATIENT
Start: 2021-06-14 | End: 2021-06-24 | Stop reason: HOSPADM

## 2021-06-13 RX ORDER — ONDANSETRON 8 MG/1
4 TABLET, ORALLY DISINTEGRATING ORAL
Status: DISCONTINUED | OUTPATIENT
Start: 2021-06-13 | End: 2021-06-24 | Stop reason: HOSPADM

## 2021-06-13 RX ORDER — FACIAL-BODY WIPES
10 EACH TOPICAL DAILY PRN
Status: DISCONTINUED | OUTPATIENT
Start: 2021-06-13 | End: 2021-06-24 | Stop reason: HOSPADM

## 2021-06-13 RX ORDER — AMOXICILLIN 250 MG
1 CAPSULE ORAL 2 TIMES DAILY
Status: DISCONTINUED | OUTPATIENT
Start: 2021-06-13 | End: 2021-06-24 | Stop reason: HOSPADM

## 2021-06-13 RX ORDER — PANTOPRAZOLE SODIUM 40 MG/1
40 TABLET, DELAYED RELEASE ORAL
Status: DISCONTINUED | OUTPATIENT
Start: 2021-06-13 | End: 2021-06-24 | Stop reason: HOSPADM

## 2021-06-13 RX ORDER — FOLIC ACID 1 MG/1
1 TABLET ORAL DAILY
Status: DISCONTINUED | OUTPATIENT
Start: 2021-06-14 | End: 2021-06-24 | Stop reason: HOSPADM

## 2021-06-13 RX ORDER — ROSUVASTATIN CALCIUM 5 MG/1
10 TABLET, COATED ORAL
Status: DISCONTINUED | OUTPATIENT
Start: 2021-06-13 | End: 2021-06-24 | Stop reason: HOSPADM

## 2021-06-13 RX ORDER — SODIUM CHLORIDE 0.9 % (FLUSH) 0.9 %
5-40 SYRINGE (ML) INJECTION EVERY 8 HOURS
Status: DISCONTINUED | OUTPATIENT
Start: 2021-06-13 | End: 2021-06-24 | Stop reason: HOSPADM

## 2021-06-13 RX ORDER — MELATONIN
1000 DAILY
Status: DISCONTINUED | OUTPATIENT
Start: 2021-06-14 | End: 2021-06-24 | Stop reason: HOSPADM

## 2021-06-13 RX ORDER — ACETAMINOPHEN 325 MG/1
650 TABLET ORAL
Status: DISCONTINUED | OUTPATIENT
Start: 2021-06-13 | End: 2021-06-24 | Stop reason: HOSPADM

## 2021-06-13 RX ORDER — AMIODARONE HYDROCHLORIDE 200 MG/1
200 TABLET ORAL DAILY
Status: DISCONTINUED | OUTPATIENT
Start: 2021-06-14 | End: 2021-06-24 | Stop reason: HOSPADM

## 2021-06-13 RX ORDER — ACETAMINOPHEN 650 MG/1
650 SUPPOSITORY RECTAL
Status: DISCONTINUED | OUTPATIENT
Start: 2021-06-13 | End: 2021-06-24 | Stop reason: HOSPADM

## 2021-06-13 RX ORDER — DULOXETIN HYDROCHLORIDE 60 MG/1
60 CAPSULE, DELAYED RELEASE ORAL DAILY
Status: DISCONTINUED | OUTPATIENT
Start: 2021-06-14 | End: 2021-06-24 | Stop reason: HOSPADM

## 2021-06-13 RX ORDER — SODIUM CHLORIDE 0.9 % (FLUSH) 0.9 %
5-10 SYRINGE (ML) INJECTION EVERY 8 HOURS
Status: DISCONTINUED | OUTPATIENT
Start: 2021-06-13 | End: 2021-06-16

## 2021-06-13 RX ADMIN — Medication 10 ML: at 21:56

## 2021-06-13 RX ADMIN — FUROSEMIDE 60 MG: 10 INJECTION INTRAMUSCULAR; INTRAVENOUS at 15:54

## 2021-06-13 RX ADMIN — ROSUVASTATIN CALCIUM 10 MG: 5 TABLET, COATED ORAL at 21:23

## 2021-06-13 RX ADMIN — SENNOSIDES AND DOCUSATE SODIUM 1 TABLET: 8.6; 5 TABLET ORAL at 21:23

## 2021-06-13 RX ADMIN — Medication 5 ML: at 15:54

## 2021-06-13 RX ADMIN — PANTOPRAZOLE SODIUM 40 MG: 40 TABLET, DELAYED RELEASE ORAL at 21:23

## 2021-06-13 NOTE — PROGRESS NOTES
Pt resting quietly in bed. No complaints at this time. Will monitor and report to oncoming. Rounds completed. Bed low/locked, call light in reach, side rails up x2.

## 2021-06-13 NOTE — ED PROVIDER NOTES
73-year-old female patient presents to the ER via EMS with reports of worsening shortness of breath. Patient is noted worsening exertional dyspnea and specifically increased shortness of breath when lying flat at night to sleep. She states she was recently discharged from the hospital to rehab and has now returned home. Shortly after her return home, symptoms began. She does not normally require oxygen. She denies fever or chills and reports no chest pain pressure or tightness. Recent admission was secondary to a fall and multiple rib fractures. She has improved but continued pain over the right side of her chest wall consistent with her injury. Patient denies history of known heart failure. She reports lower extremity edema that has been present for several weeks. This is worsened per report.            Past Medical History:   Diagnosis Date    Acquired hypothyroidism 1/16/2016    Breast cancer ([facility] [address] 2008    Depression 8/18/2016    Endocrine disease     hypothyroid    Fungal dermatitis 8/18/2016    Hyperlipidemia 1/16/2016    Hypertension, essential, benign 8/18/2016    Hypokalemia 8/18/2016    Inflamed sebaceous cyst 8/18/2016    Major depressive disorder, recurrent, moderate ([address] 8/18/2016    Nicotine dependence, cigarettes, uncomplicated 6/75/6187    Osteopenia 8/18/2016    Osteoporosis 8/18/2016    Radiation therapy complication 9588    Rheumatoid arthritis ([address] 1/16/2016    Right carotid bruit 1/16/2016    TIA (transient ischemic attack) 1/16/2016    Tobacco abuse 8/18/2016       Past Surgical History:   Procedure Laterality Date    HX ADENOIDECTOMY      HX BREAST LUMPECTOMY Right 2008    with lymph nodes    HX TONSILLECTOMY      IR BX BONE MARROW DIAGNOSTIC  5/14/2021    IR INSERT NON TUNL CVC OVER 5 YRS  5/14/2021         Family History:   Problem Relation Age of Onset    Stroke Mother     Cancer Father         Brain    HIV/AIDS Brother        Social History Socioeconomic History    Marital status:      Spouse name: Not on file    Number of children: Not on file    Years of education: Not on file    Highest education level: Not on file   Occupational History    Not on file   Tobacco Use    Smoking status: Current Some Day Smoker    Smokeless tobacco: Never Used    Tobacco comment: Only on pay day-1/2 pack   Substance and Sexual Activity    Alcohol use: No    Drug use: No    Sexual activity: Not on file   Other Topics Concern    Not on file   Social History Narrative    Not on file     Social Determinants of Health     Financial Resource Strain:     Difficulty of Paying Living Expenses:    Food Insecurity:     Worried About Running Out of Food in the Last Year:     [address] in the Last Year:    Transportation Needs:     Lack of Transportation (Medical):  Lack of Transportation (Non-Medical):    Physical Activity:     Days of Exercise per Week:     Minutes of Exercise per Session:    Stress:     Feeling of Stress :    Social Connections:     Frequency of Communication with Friends and Family:     Frequency of Social Gatherings with Friends and Family:     Attends Confucianism Services:     Active Member of Clubs or Organizations:     Attends Club or Organization Meetings:     Marital Status:    Intimate Partner Violence:     Fear of Current or Ex-Partner:     Emotionally Abused:     Physically Abused:     Sexually Abused: ALLERGIES: Patient has no known allergies. Review of Systems   Constitutional: Negative for chills, diaphoresis and fever. HENT: Negative for congestion, sneezing and sore throat. Eyes: Negative for visual disturbance. Respiratory: Positive for shortness of breath. Negative for cough, chest tightness and wheezing. Cardiovascular: Positive for leg swelling. Negative for chest pain. Gastrointestinal: Negative for abdominal pain, blood in stool, diarrhea, nausea and vomiting. Endocrine: Negative for polyuria. Genitourinary: Negative for difficulty urinating, dysuria, flank pain, hematuria and urgency. Musculoskeletal: Negative for back pain, myalgias, neck pain and neck stiffness. Skin: Negative for color change and rash. Neurological: Negative for dizziness, syncope, speech difficulty, weakness, light-headedness, numbness and headaches. Psychiatric/Behavioral: Negative for behavioral problems. All other systems reviewed and are negative. Vitals:    06/13/21 1417   BP: (!) 145/74   Resp: 18   Temp: 97.8 °F (36.6 °C)   SpO2: 100%            Physical Exam  Vitals and nursing note reviewed. Constitutional:       General: She is not in acute distress. Appearance: She is well-developed. She is not diaphoretic. Comments: Alert and oriented to person place and time. No acute distress, speaks in clear, fluid sentences. HENT:      Head: Normocephalic and atraumatic. Right Ear: External ear normal.      Left Ear: External ear normal.      Nose: Nose normal.   Eyes:      Pupils: Pupils are equal, round, and reactive to light. Cardiovascular:      Rate and Rhythm: Normal rate and regular rhythm. Heart sounds: Normal heart sounds. No murmur heard. No friction rub. No gallop. Pulmonary:      Effort: Pulmonary effort is normal. Tachypnea present. No respiratory distress. Breath sounds: No stridor. Examination of the right-lower field reveals rales. Examination of the left-lower field reveals rales. Decreased breath sounds and rales present. No wheezing or rhonchi. Comments: Mild tachypnea, diminished breath sounds bilaterally with audible rales bilaterally in the lower lung fields. Chest:      Chest wall: No tenderness. Abdominal:      General: There is no distension. Palpations: Abdomen is soft. There is no mass. Tenderness: There is no abdominal tenderness. There is no guarding or rebound. Hernia: No hernia is present. Musculoskeletal:         General: No tenderness or deformity. Normal range of motion. Cervical back: Normal range of motion. Comments: Letter lower extremity edema legs just above the knee extending to the feet bilaterally. 3+ pitting   Skin:     General: Skin is warm and dry. Neurological:      Mental Status: She is alert and oriented to person, place, and time. Cranial Nerves: No cranial nerve deficit. MDM  Number of Diagnoses or Management Options  Congestive heart failure, unspecified HF chronicity, unspecified heart failure type [facility]): new and requires workup  Hypoxia: new and requires workup  Diagnosis management comments: X-ray imaging shows worsening pulmonary vascular congestion with bilateral pleural effusions. This is progressed from previous x-ray on file. Patient's BNP is over 10,000. Exam is consistent with fluid overload. Patient is afebrile, vital signs do not suggest sepsis. There is a leukocytosis present though x-ray imaging is more consistent with fluid overload. Lasix administered in this department. Patient is yet to begin diuresis. Exam and imaging consistent with fluid overload. Discussed case with hospitalist who agrees to evaluate for admission given patient's ongoing oxygen demand. Voice dictation software was used during the making of this note. This software is not perfect and grammatical and other typographical errors may be present. This note has been proofread, but may still contain errors.   [address] Doctor Patel Huerta [facility]; 6/13/2021 @6:38 PM   ===================================================================         Amount and/or Complexity of Data Reviewed  Clinical lab tests: ordered and reviewed  Tests in the radiology section of CPT®: ordered and reviewed  Tests in the medicine section of CPT®: ordered and reviewed  Review and summarize past medical records: yes ([place]  [address] [place] [id number]  [phone]     Transthoracic Echocardiogram  2D and Doppler     Patient: Jaycee Elizabeth  MR #: [id number]  : [id number]  Age: 68 years  Gender: Female  Study date: 20-May-2021  Account #: [de-identified]  Height: 60 in  Weight: 169.6 lb  BSA: 1.74 mï¾²  Status:Routine  Location: 825  BP: 128/ 60     Allergies: NO KNOWN ALLERGENS     Sonographer:  Yulia Lou, Technologist  Group:  [id number] [facility] [address] [facility]  Referring Physician:  Alessandra Wrigth MD  Reading Physician:  Alessandra Wright MD     INDICATIONS: R/O effusion.     PROCEDURE: This was a routine study. A transthoracic echocardiogram was  performed. The study included limited 2D imaging and limited spectral   Doppler. Images were limited from the parasternal and apical acoustic windows were off  axis. Technically difficult study due to respiratory interference. This was a  technically difficult study.     LEFT VENTRICLE: Size was normal. Systolic function was normal. Ejection  fraction was estimated in the range of 55 % to 60 %. There were no regional  wall motion abnormalities. Wall thickness was normal.     RIGHT VENTRICLE: The size was normal. Systolic function was normal.     LEFT ATRIUM: The atrium was mildly dilated.     RIGHT ATRIUM: Size was normal.     SYSTEMIC VEINS: IVC: The inferior vena cava was normal in size and course.     AORTIC VALVE: The valve was trileaflet. Leaflets exhibited slight  calcification. There was no evidence for stenosis.     MITRAL VALVE: There was mild annular calcification. There was no evidence for  stenosis.     TRICUSPID VALVE: The valve structure was normal. There was no evidence for  stenosis.     PULMONIC VALVE: Not well visualized.     PERICARDIUM: There was no pericardial effusion. A pericardial fat pad was  present.     AORTA: The root exhibited normal size.     SUMMARY:     -  Left ventricle: Systolic function was normal. Ejection fraction was  estimated in the range of 55 % to 60 %.  There were no regional wall motion  abnormalities.     -  Left atrium: The atrium was mildly dilated.     -  Mitral valve: There was mild annular calcification.     -  Pericardium: There was no pericardial effusion.     SYSTEM MEASUREMENT TABLES     2D mode  LA Dimension (2D): 4.3 cm     Unspecified Scan Mode  Peak Grad; Mean; Antegrade Flow: 16 mm(Hg)  Vmax;  Antegrade Flow: 201 cm/s     Prepared and signed by  Margot Kim MD  Signed [id number] 16:34:24)  Discuss the patient with other providers: yes  Independent visualization of images, tracings, or specimens: yes    Risk of Complications, Morbidity, and/or Mortality  Presenting problems: moderate  Diagnostic procedures: low  Management options: moderate    Patient Progress  Patient progress: stable         Procedures

## 2021-06-13 NOTE — H&P
[facility]  History and Physical    Patient ID:  Radha Magallanes  female  1940  [id number]    Admission Date: 6/13/2021  Chief Complaint: Shortness of breath and orthopnea  Reason for Admission: Congestive heart failure exacerbation    ASSESSMENT & PLAN:    Acute hypoxic respite distress, requiring 3 L nasal cannula on admissions  likely secondary to congestive heart failure exacerbation  Previous echocardiogram reasons, preserved EF, likely associated patient's atrial fibrillation's, will order limited echo to reevaluate LV functions,  Start Lasix IV twice daily, strict input and output, monitor renal function blood pressures,    History of melena and anemiahemoglobin night, stable compared to last admissions, EGD was deferred due to acute illness to last admissions, continue home PPI twice daily, monitor hemoglobin while admitted    Elevated troponinsmild, no evidence of ACS, likely due to demand ischemia secondary to congestive heart failure, repeat troponin therapy, rheumatology monitoring, monitor clinically    Leukocytosishistory of leukopenia, status post Granix May 20,  Awaiting procalcitonin's and UA, empiric Rocephin due to SIRS, follow-up cultures, de-escalate if indicated    Rheumatoid arthritishistory of suspected methotrexate toxicity, monitor clinically, pain control    Chronic kidney disease stage II baseline creatinine 1.3-1.5, at baseline, continue monitoring renal function while diuresis    History of generalized deconditioning frequent falls/follow-up physical therapy and Occupational Therapy    Obesity BMI of 35 due to excess calorieslifestyle education regarding diet given    Disposition: Mostly telemetry  Diet: Cardiac  VTE ppx: Home Eliquis  GI ppx: Home PPI  CODE STATUS: Full code  Surrogate decision-maker: toan Pete    HISTORY OF PRESENT ILLNESS:  Patient is a 68 y.o. female with medical h/o atrial fibrillation's, preserved EF, on Eliquis, and admissions last month for traumatic fall with rib fractures and ecchymosis, status post multiple units of transfusions, also has bone marrow biopsy for leukopenia suspected methotrexate toxicity, patient also has some volume overload, during her hospital course, status post IV Lasix, and was deemed euvolemic prior to discharge, with plans of Lasix as needed given impaired kidney function     Patient presented to [facility] ED with worsening shortness of breath, exertional dyspnea, orthopnea, was recently discharged from the rehab during the last hospital course, shortly arriving to home, all symptoms above occurred, patient also endorsed new onset of lower extremity edema that has been \"going on for several weeks, \"    Patient denies cough, denies fever, denies muscle aches, denies dysuria, denies abdominal pain, denies bowel movement changes,. No Known Allergies    Prior to Admission Medications   Prescriptions Last Dose Informant Patient Reported? Taking? DULoxetine (CYMBALTA) 60 mg capsule   No No   Sig: Take 1 Cap by mouth daily. amiodarone (CORDARONE) 200 mg tablet   No No   Sig: Take 1 Tab by mouth daily. apixaban (ELIQUIS) 5 mg tablet   No No   Sig: Take 1 Tab by mouth two (2) times a day. cholecalciferol (VITAMIN D3) 1,000 unit tablet   Yes No   Sig: Take 1,000 Units by mouth daily. folic acid (FOLVITE) 1 mg tablet   Yes No   Sig: Take 1 mg by mouth daily. levothyroxine (SYNTHROID) 25 mcg tablet   No No   Sig: Take 1 Tab by mouth Daily (before breakfast). ondansetron (ZOFRAN ODT) 4 mg disintegrating tablet   No No   Sig: Take 1 Tab by mouth every eight (8) hours as needed for Nausea or Vomiting. pantoprazole (PROTONIX) 40 mg tablet   No No   Sig: Take 1 Tablet by mouth Before breakfast and dinner. rosuvastatin (Crestor) 10 mg tablet   No No   Sig: Take 1 Tab by mouth nightly. spironolactone (Aldactone) 25 mg tablet   Yes No   Sig: Take 25 mg by mouth daily.       Facility-Administered Medications: None Past Medical History:   Diagnosis Date    Acquired hypothyroidism 1/16/2016    Breast cancer ([facility] 2008    Depression 8/18/2016    Endocrine disease     hypothyroid    Fungal dermatitis 8/18/2016    Hyperlipidemia 1/16/2016    Hypertension, essential, benign 8/18/2016    Hypokalemia 8/18/2016    Inflamed sebaceous cyst 8/18/2016    Major depressive disorder, recurrent, moderate ([facility] 8/18/2016    Nicotine dependence, cigarettes, uncomplicated 0/55/7852    Osteopenia 8/18/2016    Osteoporosis 8/18/2016    Radiation therapy complication 7709    Rheumatoid arthritis ([facility] 1/16/2016    Right carotid bruit 1/16/2016    TIA (transient ischemic attack) 1/16/2016    Tobacco abuse 8/18/2016     Past Surgical History:   Procedure Laterality Date    HX ADENOIDECTOMY      HX BREAST LUMPECTOMY Right 2008    with lymph nodes    HX TONSILLECTOMY      IR BX BONE MARROW DIAGNOSTIC  5/14/2021    IR INSERT NON TUNL CVC OVER 5 YRS  5/14/2021       Social History     Tobacco Use    Smoking status: Current Some Day Smoker    Smokeless tobacco: Never Used    Tobacco comment: Only on pay day-1/2 pack   Substance Use Topics    Alcohol use: No       FAMILY HISTORY:     Family History   Problem Relation Age of Onset    Stroke Mother     Cancer Father         Brain    HIV/AIDS Brother        REVIEW OF SYSTEMS:  A 14 point review of systems was taken and pertinent positive as per HPI.       PHYSICAL EXAM:    Visit Vitals  BP (!) 182/72   Pulse 90   Temp 97.8 °F (36.6 °C)   Resp 18   SpO2 100%       General: No acute distress, speaking in full sentences, no use of accessory muscles   HEENT: Pupils equal and reactive to light and accommodation, oropharynx is clear   Neck: Supple, no lymphadenopathy, no JVD   Lungs: Faint rales diffusely   cardiovascular: Regular rate and rhythm with normal S1 and S2   Abdomen: Soft, nontender, nondistended, normoactive bowel sounds   Extremities: 3+ pitting edema equally in lower extremities bilaterally   neuro: Nonfocal, A&O x3   Psych: Normal mood and affect     Intake/Output last 3 shifts:        Labs:  CMP:   Lab Results   Component Value Date/Time     06/13/2021 02:24 PM    K 3.8 06/13/2021 02:24 PM     (H) 06/13/2021 02:24 PM    CO2 24 06/13/2021 02:24 PM    AGAP 8 06/13/2021 02:24 PM    GLU 95 06/13/2021 02:24 PM    BUN 13 06/13/2021 02:24 PM    CREA 1.25 (H) 06/13/2021 02:24 PM    GFRAA 53 (L) 06/13/2021 02:24 PM    GFRNA 44 (L) 06/13/2021 02:24 PM    CA 8.2 (L) 06/13/2021 02:24 PM    MG 1.8 06/13/2021 02:24 PM    ALB 2.4 (L) 06/13/2021 02:24 PM    TBILI 0.7 06/13/2021 02:24 PM    TP 6.5 06/13/2021 02:24 PM    GLOB 4.1 (H) 06/13/2021 02:24 PM    AGRAT 0.6 (L) 06/13/2021 02:24 PM    ALT 15 06/13/2021 02:24 PM         CBC:    Lab Results   Component Value Date/Time    WBC 17.3 (H) 06/13/2021 02:24 PM    HGB 9.0 (L) 06/13/2021 02:24 PM    HCT 28.5 (L) 06/13/2021 02:24 PM     06/13/2021 02:24 PM       Lab Results   Component Value Date/Time    INR 1.4 05/12/2021 10:33 AM    INR 0.9 01/16/2016 06:55 PM    Prothrombin time 17.5 (H) 05/12/2021 10:33 AM    Prothrombin time 9.9 01/16/2016 06:55 PM       ABG:  No results found for: PH, PHI, PCO2, PCO2I, PO2, PO2I, HCO3, HCO3I, FIO2, FIO2I        Lab Results   Component Value Date/Time    Troponin-I, Qt. <0.02 (L) 01/16/2016 06:55 PM    BNP 25 (H) 04/22/2019 12:28 PM         Imaging & Other Studies:    XR Results (maximum last 3): Results from [facility] encounter on 06/13/21    XR CHEST PORT    Narrative  1 View portable chest x-ray  6/13/2021 2:42 PM    Indication: History of pneumonia, shortness of breath. Syncopal episode. Comparison: Prior studies-most recent 5/21/2021    Findings: This portable upright AP chest at 1437 shows the patient again rotated  somewhat toward the left. Cardiomediastinal silhouette stable. Vascular pedicle appearance probably slightly more pronounced today.   Mildly more progressed bibasilar hazy/veiling opacities which may represent  layering effusions compared to the most recent study. Compared to older studies  these are indolently progressing. Old right rib fractures again seen. Impression  Increased vascular congestion today, patient probably has  progressing basilar effusions-now of small/moderate volume. Appearance most  suggestive of progressing vascular congestion. Correlate correlate clinically,  consider serum BNP. Results from Hospital Encounter encounter on 05/12/21    XR CHEST SNGL V    Narrative  Portable chest x-ray    CLINICAL INDICATION: Worsening hypoxia    FINDINGS: Single AP view of the chest compared to a similar exam dated 5/15/2021  shows airspace opacity in the bilateral lower lungs with larger bilateral  pleural effusions. The cardiac silhouette and mediastinum are stable. There is  no pneumothorax. Impression  Bilateral pleural effusions with bibasilar airspace opacity. XR CHEST SNGL V    Narrative  Portable AP upright view    Date:  5/15/2021  at 0757 hours    comparison chest x-ray : 5/12/2021    Clinical Information: Fever    Findings: Heart is enlarged and mediastinum unremarkable. Vascularity does not  appear congested. There is ill-defined density right upper lobe near the apex  suspicious for infiltrate. No pleural effusion. Impression  Right upper lobe infiltrate      CT Results (maximum last 3): Results from [place] encounter on 05/12/21    CT HEAD WO CONT    Narrative  EXAMINATION: CT HEAD WO CONT 5/12/2021 1:25 PM    INDICATION: 2 syncopal episodes with fall one week ago    COMPARISON: CT head January 16, 2016    TECHNIQUE: Multiple-row detector helical CT examination of the head without  intravenous contrast.    Radiation dose reduction techniques were used for this study.  Our CT scanners  use one or all of the following: Automated exposure control, adjustment of the  mA and/or kV according to patient size, iterative reconstruction. FINDINGS:  No acute intracranial hemorrhage. Multiple incidental cortical veins are noted  along the cerebral Normal size of ventricles and extra-axial spaces for age. No  perceptible loss of gray-white differentiation. No space-occupying lesion. No  calvarial abnormality is seen. Essentially clear paranasal sinuses and mastoid  air cells. Prior bilateral lens replacements. No abnormality of extracranial  soft tissues. Convexities. Impression  No acute abnormality. Results from [place] encounter on 03/19/21    CT CHEST WO CONT    Narrative  CT OF THE CHEST WITHOUT CONTRAST    HISTORY: Sepsis, hypoxia. COMPARISON: None. TECHNIQUE: A helical acquisition was performed through the chest utilizing 0.5YW  slice thickness without intravenous contrast. Coronal and sagittal reformats  were obtained. Dose reduction techniques used: Automated exposure control, adjustment of the  mAs and/or kVp according to patient's size, and iterative reconstruction  techniques. FINDINGS:  *  PLEURA/PERICARDIUM: Moderate to large pericardial effusion with Hounsfield  units equal to 14. Small left pleural effusion. *  LUNGS: 5 mm nodule in the right upper lobe on image number 27. 1 mm  subpleural nodule in the right major fissure on image number 26. Tree-in-bud  appearing infiltrate in the right lung apex. *  LONNIE/MEDIASTINUM: Within normal limits. *  TRACHEOBRONCHIAL TREE: Within normal limits. *  AORTA/PULMONARY VESSELS: Within normal limits. *  CORONARY ARTERIES: Mild coronary artery calcification is seen. *  CHEST WALL/AXILLA: Within normal limits. *  VISUALIZED UPPER ABDOMEN: Within normal limits. *  SPINE / BONES: Within normal limits. *  ADDITIONAL COMMENTS: Aortic valvular calcification. Impression  The most prominent finding is a moderate to large pericardial effusion. Small left pleural effusion. Left lower lobe atelectasis.     Benign-appearing pulmonary nodules. Infectious bronchiolitis of the right upper lobe which is mild in degree. Coronary artery disease. Aortic valvular calcification. Date of Dictation: 3/19/2021 7:45 PM      CT UROGRAM WO CONT    Narrative  CT abdomen and pelvis without contrast (renal stone protocol)    History: Fatigue-NAZIA    Technique: Helically acquired images were obtained from the upper abdomen to the  ischial tuberosities reconstructed at 2.5mm intervals without oral or IV  contrast. Coronal reformatted images were submitted. Radiation dose reduction techniques were used for this study:  Our CT scanners  use one or all of the following: Automated exposure control, adjustment of the  mA and/or kVp according to patient's size, iterative reconstruction. Comparison: None    CT ABDOMEN: The lack of oral and IV contrast results in an incomplete assessment  of the solid and hollow abdominal viscera. There is partial imaged left basilar  opacity suggesting small left pleural effusion and atelectasis/infiltrate. There  is exclusion of the superior portions of the abdomen including portions of the  liver and spleen. The visualized portions of the liver and spleen  are  unremarkable on this noncontrast study. The pancreas and adrenal glands are  unremarkable on this noncontrast study. The left kidney is significantly atrophic. There is no hydronephrosis or renal  calculi. Atherosclerotic changes are present. No adenopathy or ascites is  present. There is moderate lumbar spondylosis and mild levocurvature. CT PELVIS: Bladder is moderately distended. There are several sigmoid  diverticula. No adenopathy or ascites is present. There are no inflammatory  changes. Impression  1. No evidence of urinary tract calculi or hydronephrosis. 2. Atrophic left kidney. 3. Sigmoid diverticulosis. 4. Partially imaged small left pleural effusion and adjacent  atelectasis/infiltrate. MRI Results (maximum last 3):   Results from Hospital Encounter encounter on 01/16/16    MRA BRAIN WO CONT    Narrative  Head MRA    History: TIA. Sequences: Contiguous axial 3D time-of-flight images of the head were used to  generate maximum intensity projection images of the [place] and  vertebrobasilar systems. Comparison: None    Findings: The signal within the petrous, cavernous and supraclinoid internal carotid  arteries is equal and symmetric. Normal signal is noted within the anterior  cerebral arteries and proximal middle cerebral arteries bilaterally. Some mild  luminal regularity within the middle cerebral artery bilaterally. Tiny outpouching of signal within the right cavernous internal carotid artery. This may represent a tiny aneurysm. Imaging of the posterior fossa vasculature demonstrates normal signal within the  vertebral arteries, basilar artery and posterior cerebral arteries. Summary:  1. Questionable tiny aneurysm within the right cavernous portion of the internal  carotid artery. 2. Mild luminal regularity within the distal middle cerebral artery suggestive  of atherosclerotic disease      MRI BRAIN WO CONT    Narrative  MRI BRAIN WITHOUT CONTRAST. HISTORY: Acute onset left upper extremity weakness. COMPARISON:  None. Recent head CT is reviewed. TECHNIQUE:  Sagittal T1, axial T1, T2, FLAIR, gradient echo, diffusion with ADC  map were and coronal FLAIR. Lawrance Potter FINDINGS:      Diffusion images do not demonstrate any areas of restricted  diffusion to suggest acute infarction. No midline shift, mass or mass effect. Gradient echo images are unremarkable. No evidence of acute hemorrhage. The  lateral ventricles are normal size. The pituitary, parasellar and midline  structures are unremarkable on the sagittal T1 images. There are normal T2  flow-voids in the major vessels. Posterior fossa structures are unremarkable. The basal ganglia appear symmetric.   Orbits are grossly normal.  Paranasal  sinuses are clear. Impression  : Negative. Nuclear Medicine Results (maximum last 3): No results found for this or any previous visit. US Results (maximum last 3): Results from [place] encounter on 05/12/21     [facility]    Narrative  Exam: Renal ultrasound. INDICATION: Acute kidney injury. Comparison: Left renal ultrasound, 3/19/2021    Real-time sonography of the kidneys, retroperitoneum and bladder was performed  with multiple static images obtained. Findings:  Right kidney: The right kidney is increased in cortical echogenicity, measuring  11.9 cm. No contour deforming mass. No hydronephrosis or perinephric fluid. Left kidney: Not visualized    Bladder: The bladder is normal.    Aorta: No evidence of aneurysm in the imaged portion. IVC: Patent. Impression  1. Increased cortical echogenicity involving the single right kidney compatible  with chronic medical renal disease. No hydronephrosis. 2. Absent left kidney. Results from [place] encounter on 03/19/21     [facility]    Narrative  RENAL / BLADDER ULTRASOUND    HISTORY: Acute renal failure    COMPARISON: None. TECHNIQUE: Sonographic imaging of both kidneys and the urinary bladder was  performed. FINDINGS:  *  RIGHT KIDNEY: 11.7 cm in size. No mass, hydronephrosis or calculi. Normal  cortical thickness and echogenicity. *  LEFT KIDNEY: Congenital agenesis. in size. No mass, hydronephrosis or  calculi. Normal cortical thickness and echogenicity. *  INTERNAL APPEARANCE: No wall thickening, mass or calculi seen. *  OTHER FINDINGS: None    Impression  Normal right kidney. Congenital agenesis of the left kidney. Date Of Dictation: 3/19/2021 7:34 PM      Results from Hospital Encounter encounter on 04/22/19     [facility]    Narrative  Renal ultrasound    INDICATION: Renal failure    FINDINGS: Right kidney measures 12.4 cm in length.   Echotexture is normal.  There is no hydronephrosis and no definite renal mass. Left kidney is never identified and is probably congenitally absent based on  history. Urinary bladder is unremarkable. There is no ascites. Impression  IMPRESSION: Unremarkable right renal ultrasound      DEXA Results (maximum last 3): No results found for this or any previous visit. [facility] Results (maximum last 3): Results from [place] encounter on 09/05/20    [facility] MAMMO BI SCREENING INCL CAD    Narrative  BILATERAL DIGITAL SCREENING MAMMOGRAM    INDICATION:  Screening;  right lumpectomy    TECHNIQUE:  Bilateral digital screening mammography was performed, and is  interpreted in conjunction with a computer assisted detection (CAD) system. COMPARISON:  May 15, 2019 and May 28, 2018    FINDINGS: The breast parenchyma is heterogeneously dense, which may limit  detection of small masses. A lumpectomy bed in the posterior upper right breast  is unchanged. There is a stable nodular asymmetry in the superior anterior left  breast. No suspicious masses, calcifications, or architectural distortion are  identified. There is no skin thickening or nipple retraction. Impression  IMPRESSION:  No mammographic evidence of malignancy. Recommend annual mammogram in one year. A reminder letter will be scheduled. BI-RADS Assessment Category 2: Benign finding. We are mailing breast density information to the patient along with the  mammogram report. BD3      Results from [place] encounter on 05/15/19    [facility] MAMMO BI SCREENING INCL CAD    Narrative  STUDY:  Bilateral digital screening mammogram    CLINICAL INDICATION:  Screening;  previous right lumpectomy for breast cancer in  2008 and radiation therapy    COMPARISON:  3/28/2018 and others back to February 15, 2010    FINDINGS: Bilateral digital screening mammography was performed, and is  interpreted in conjunction with a computer assisted detection (CAD) system.     The breast parenchyma is heterogeneously dense, which may limit detection of  small masses. No suspicious developing masses, calcifications, or architectural distortion are  identified. There is no increased skin thickening or nipple retraction. Impression  IMPRESSION:  No mammographic evidence of malignancy. Recommend annual mammogram in one year. A reminder letter will be scheduled. BI-RADS Assessment Category 1: Negative. BD3  We are mailing breast density information to the patient along with the  mammogram report. Results from [place] encounter on 03/28/18    [facility] MAMMO RT DX INCL CAD    Narrative  RIGHT DIAGNOSTIC DIGITAL MAMMOGRAPHY, March 28, 2028 2    CLINICAL HISTORY: Followup abnormal screening mammogram status post right  lumpectomy in 2008 with radiation therapy. FINDINGS: Magnification spot compression views are compared with March 21, 2018  and earlier studies. They demonstrate change in configuration of the focal  asymmetric density at the anterior margin of the lumpectomy bed, suggesting that  they represented summation shadows of the heterogeneously breast parenchyma and  postoperative scarring. Calcified fat necrosis is again noted just posteriorly  in the lumpectomy bed. No new discrete soft tissue mass, persistent  architectural distortion, or other definite evidence for malignancy is seen. Impression  IMPRESSION:    1. ADDITIONAL VIEWS FAIL TO CONFIRM A DISCRETE ABNORMALITY SUSPICIOUS FOR  MALIGNANCY. FOLLOWUP BILATERAL SCREENING MAMMOGRAPHY IS RECOMMENDED IN ONE  YEAR. 2.  HIGH -RISK SCREENING BREAST MRI SHOULD BE CONSIDERED AT THIS TIME FOR THIS  PATIENT WITH HETEROGENEOUSLY DENSE BREAST PARENCHYMA AND A PERSONAL HISTORY OF  BREAST CANCER. BI-RADS Assessment Category 2: Benign finding. IR Results (maximum last 3):   Results from [place] encounter on 05/12/21    IR INSERT NON TUNL CVC OVER 5 YRS    Narrative  Title:  Temporary central venous catheter placement. Indication:  Anemia, poor IV access, acute kidney injury    A temporary central venous catheter is placed for this patient through the right  internal jugular vein using ultrasound and fluoroscopic guidance with maximum  sterile barrier appropriately documented and fluoro time and images documented  in the report    :  Daniel Monae PA-C    Supervising Physician: Belle Bell M.D. Consent: Informed written and oral consent was obtained from the patient after  explanation of benefits and risks (including, but not limited to: Infection,  hemorrhage, pneumothorax). The patient's questions were answered to their  satisfaction. The patient stated understanding and requested that we proceed. Procedure: This central venous catheter was inserted with all elements of  maximal sterile barrier technique and cap and mask and sterile gown and sterile  gloves and sterile full-body drape and hand hygiene and 2% chlorhexidine for  cutaneous antisepsis and sterile ultrasound gel and sterile ultrasound probe  cover. Following routine prep and drape of the RIGHT neck, a local field block with  lidocaine was achieved. Ultrasound evaluation of all potential access sites was  performed due to lack of a palpable vein. No veins were palpable due to  overlying adipose tissue. Using real-time ultrasound guidance, with appropriate  image recording and visualization of vascular needle entry, the patent RIGHT  internal jugular vein was accessed using micropuncture technique. Using fluoroscopy, a triple-lumen central venous catheter was advanced over the  wire positioning the tip in the right atrium. All lumens aspirated easily and were filled with heparinized saline. The  catheter was secured with nonabsorbable suture. Sterile dressings were applied. Complications: None. Radiation dose:  Fluoroscopy time: 12 seconds.   Reference air kerma (mGy): 2  Kerma area product (cGy.cm2): 0.25  Fluoroscopic images: 1    Contrast:  0 ml. Medications:  Lidocaine    Findings:  Patent right internal jugular vein. Catheter tip in the mid right  atrium. Impression  Technically successful temporary central venous catheter placement. Plan:  Catheter is ready for use. IR BX BONE MARROW DIAGNOSTIC    Narrative  Title: Fluoroscopic guided bone marrow biopsy. History: 68year old female with anemia, leukopenia. :  Tay Villa PA-C    Supervising Physician: Selena Yañez M.D. Consent: Informed written and oral consent was obtained from the patient after  explanation of benefits and risks (including, but not limited to: Infection,  Hemorrhage, Visceral Injury). The patient's questions were answered to their  satisfaction. The patient stated understanding and requested that we proceed. Procedure: Maximal sterile barrier technique was used. With the patient prone  the lower back/buttock was studied with fluoroscopy. Following routine prep and drape of the right buttock, a local field block with  lidocaine was achieved. Using intermittent CT technique, a marrow aspirate followed by a core biopsy was  obtained with the 11 gauge On Control biopsy device. The needle was removed. Hemostasis was achieved with manual compression. A  bandage was applied. Radiation dose:  Fluoroscopy time: 6 seconds. Reference air kerma (mGy): 1  Kerma area product (cGy.cm2):  0.25  Fluoroscopic images: 1      Complications: None. Medications: 16 minutes intraservice time during moderate sedation was provided  under the direction and supervision of Selena Yañez M.D. Using Versed 0.5 mg  and fentanyl 25 mcg Continuous cardiopulmonary monitoring was provided by  trained independent observer present. Findings: No post biopsy hemorrhage. Impression  Uncomplicated fluoroscopic guided bone marrow aspiration and core  biopsy.     Plan:  Bedrest observation for 30 minutes. VAS/US Results (maximum last 3): Results from [place] encounter on 03/19/21    DUPLEX LOWER EXT VENOUS BILAT    Narrative  BILATERAL LOWER EXTREMITY VENOUS DUPLEX ULTRASOUND. HISTORY:  Pain. TECHNIQUE: Direct skin-contact high resolution grayscale images, color-flow  Doppler and duplex waveform analysis. FINDINGS: There is compressibility, color-flow assignment and augmentation of  the venous waveform with calf compression in the common femoral, superficial  femoral and popliteal veins. Upper calf veins are unremarkable    Impression  Negative for sonographic evidence of deep venous thrombosis  bilateral lower extremity. Results from [place] encounter on 01/16/16    DUPLEX CAROTID BILATERAL    Narrative  History: TIA and right carotid artery bruit    Sonographic evaluation of the carotid arteries was performed bilaterally      Grayscale imaging on the right demonstrates moderate to significant plaque  disease at the carotid bulb. The velocities and ratios are unremarkable. The  right vertebral artery demonstrates antegrade flow without evidence of steal.    Grayscale imaging on the left demonstrates mild plaque disease at the carotid  bulb. The velocities and ratios are unremarkable. The left vertebral artery  demonstrates antegrade flow without evidence of steal.    Impression:  1. Velocities are suggestive of a stenosis less than 50% however there does  appear to be fairly significant plaque disease at the right carotid bulb. A CT  angiogram of the neck could be performed to further evaluate. 2. Less than 50% stenosis of the left internal carotid artery. PET Results (maximum last 3): No results found for this or any previous visit.          EKG Results     Procedure 720 Value Units Date/Time    EKG [[id number]] Collected: 06/13/21 1426    Order Status: Completed Updated: 06/13/21 1619     Ventricular Rate 90 BPM      Atrial Rate 90 BPM      P-R Interval 160 ms      QRS Duration 90 ms      Q-T Interval 316 ms      QTC Calculation (Bezet) 386 ms      Calculated P Axis 55 degrees      Calculated R Axis 81 degrees      Calculated T Axis 24 degrees      Diagnosis --     Normal sinus rhythm  Nonspecific T wave abnormality  Abnormal ECG  When compared with ECG of 12-MAY-2021 10:32,  Right bundle branch block is no longer Present            Active Problems:  Patient Active Problem List    Diagnosis Date Noted    CHF (congestive heart failure) ([address] 06/13/2021    Solitary kidney 05/22/2021    Hypoalbuminemia due to protein-calorie malnutrition ([address] 05/22/2021    Stage 3 chronic kidney disease ([address] 05/20/2021    Anemia 05/12/2021    Rib fracture 05/12/2021    Pericarditis with effusion 03/23/2021    Paroxysmal atrial fibrillation ([address] 03/19/2021    Acute renal failure (ARF) ([address] 04/22/2019    Sepsis ([address] 04/22/2019    Community acquired bacterial pneumonia 04/22/2019    Prolonged Q-T interval on ECG 04/22/2019    Hypertension, essential, benign 08/18/2016    Osteopenia 08/18/2016    Major depressive disorder, recurrent, moderate (HCC) 08/18/2016    Nicotine dependence, cigarettes, uncomplicated 38/17/0470    Osteoporosis 08/18/2016    Depression 08/18/2016    Hypokalemia 08/18/2016    Tobacco abuse 08/18/2016    Acquired hypothyroidism 01/16/2016    TIA (transient ischemic attack) 01/16/2016    Hyperlipidemia 01/16/2016    Rheumatoid arthritis ([address] 01/16/2016    Right carotid bruit 01/16/2016         DO [facility]  6/13/2021 4:40 PM

## 2021-06-13 NOTE — PROGRESS NOTES
06/13/21 1800   Dual Skin Pressure Injury Assessment   Dual Skin Pressure Injury Assessment WDL   Second Care Provider (Based on [address] [place] [address]) Cassia PAUL   Skin Integumentary   Skin Integumentary (WDL) X    Pressure  Injury Documentation No Pressure Injury Noted-Pressure Ulcer Prevention Initiated   Skin Color Appropriate for ethnicity   Skin Condition/Temp Warm   Skin Integrity Intact; Other (comment)  (scattered bruising on BUE)   Turgor Epidermis thin w/ loss of subcut tissue   Hair Growth Sparce   Nails X   Varicosities Absent   Exceptions to WDL Thick

## 2021-06-13 NOTE — ED TRIAGE NOTES
Pt arrives via EMS from home. Pt seen 2 weeks ago after fall, broken ribs and pna. Pt went to rehab- d/c from rehab this week. Pt developed cellulitis while in rehab. Pt now has SOB on exertion and when lying flat. Pt states feels like SOB targeted to right side. Pt 88% RA- albuterol in route increased to 98% and is on 3LNC right now. VSS otherwise in route.

## 2021-06-13 NOTE — PROGRESS NOTES
TRANSFER - IN REPORT:    Verbal report received from [facility] on Tees Toh Neat  being received from ED(unit) for routine progression of care      Report consisted of patients Situation, Background, Assessment and   Recommendations(SBAR). Information from the following report(s) SBAR, Kardex, ED Summary, [facility] and Recent Results was reviewed with the receiving nurse. Opportunity for questions and clarification was provided. Assessment will be completed upon patients arrival to unit and care assumed. Waiting for patient to arrive with tech.

## 2021-06-13 NOTE — ED NOTES
TRANSFER - OUT REPORT:    Verbal report given to Tequila Rollins (name) on Pavithra Piña  being transferred to  (unit) for routine progression of care       Report consisted of patients Situation, Background, Assessment and   Recommendations(SBAR). Information from the following report(s) ED Summary was reviewed with the receiving nurse. Lines:   Peripheral IV 06/13/21 Right Hand (Active)        Opportunity for questions and clarification was provided.       Patient transported with:   O2 @ 3 liters

## 2021-06-13 NOTE — ACP (ADVANCE CARE PLANNING)
Advance care planninginitial encounter      Face to face time - 16 minutes face-to-face time spent discussion the care       Pt's decision making capacity   [x] Yes  [ ] NO    Names of POA/surrogate decision maker when patient is altered  : Marsha Will    Other members present in the meeting :      Patient / surrogate decision-maker ultimately chose. .. [x] Full code- full aggressive medical and surgical interventions, including intubations, resuscitations, pressors, artificial tube feeding  [ ] DO NOT RESUSCITATE -okay to intubate,  and other aggressive medical and surgical intervention   [ ] Not resuscitate, DO NOT INTUBATE, but okay for other aggressive medical and surgical intervention   [ ] DNR/DNI, hospice, comfort focus care to maximize patient's quality of life  [ ] DNR/DNI, strict comfort care ONLY        Further discussion regarding principle disease process. prognosis, plans of care, and treatment goals  : Diagnostic finding discussed, patient is being admitted for congestive heart failure exacerbation.   Therapeutic options discussed, full code confirmed, power of  /designated surrogate decision-maker confirmed, all questions answered

## 2021-06-14 PROBLEM — I50.33 ACUTE ON CHRONIC DIASTOLIC CONGESTIVE HEART FAILURE (HCC): Status: ACTIVE | Noted: 2021-06-13

## 2021-06-14 PROBLEM — J96.01 ACUTE RESPIRATORY FAILURE WITH HYPOXIA (HCC): Status: ACTIVE | Noted: 2021-06-14

## 2021-06-14 PROBLEM — I24.8 DEMAND ISCHEMIA (HCC): Status: ACTIVE | Noted: 2021-06-14

## 2021-06-14 PROBLEM — D72.829 LEUKOCYTOSIS: Status: ACTIVE | Noted: 2021-06-14

## 2021-06-14 PROBLEM — N18.2 CKD (CHRONIC KIDNEY DISEASE) STAGE 2, GFR 60-89 ML/MIN: Status: ACTIVE | Noted: 2021-06-14

## 2021-06-14 LAB
ALBUMIN SERPL-MCNC: 2.5 G/DL (ref 3.2–4.6)
ALBUMIN/GLOB SERPL: 0.7 {RATIO} (ref 1.2–3.5)
ALP SERPL-CCNC: 78 U/L (ref 50–136)
ALT SERPL-CCNC: 17 U/L (ref 12–65)
ANION GAP SERPL CALC-SCNC: 6 MMOL/L (ref 7–16)
AST SERPL-CCNC: 9 U/L (ref 15–37)
ATRIAL RATE: 90 BPM
BASOPHILS # BLD: 0.2 K/UL (ref 0–0.2)
BASOPHILS NFR BLD: 2 % (ref 0–2)
BILIRUB SERPL-MCNC: 0.9 MG/DL (ref 0.2–1.1)
BUN SERPL-MCNC: 14 MG/DL (ref 8–23)
CALCIUM SERPL-MCNC: 8.6 MG/DL (ref 8.3–10.4)
CALCULATED P AXIS, ECG09: 55 DEGREES
CALCULATED R AXIS, ECG10: 81 DEGREES
CALCULATED T AXIS, ECG11: 24 DEGREES
CHLORIDE SERPL-SCNC: 109 MMOL/L (ref 98–107)
CO2 SERPL-SCNC: 27 MMOL/L (ref 21–32)
CREAT SERPL-MCNC: 1.25 MG/DL (ref 0.6–1)
DIAGNOSIS, 93000: NORMAL
DIFFERENTIAL METHOD BLD: ABNORMAL
EOSINOPHIL # BLD: 0.6 K/UL (ref 0–0.8)
EOSINOPHIL NFR BLD: 5 % (ref 0.5–7.8)
ERYTHROCYTE [DISTWIDTH] IN BLOOD BY AUTOMATED COUNT: 18.3 % (ref 11.9–14.6)
GLOBULIN SER CALC-MCNC: 3.6 G/DL (ref 2.3–3.5)
GLUCOSE SERPL-MCNC: 76 MG/DL (ref 65–100)
HCT VFR BLD AUTO: 28.5 % (ref 35.8–46.3)
HGB BLD-MCNC: 9.1 G/DL (ref 11.7–15.4)
IMM GRANULOCYTES # BLD AUTO: 0 K/UL (ref 0–0.5)
IMM GRANULOCYTES NFR BLD AUTO: 0 % (ref 0–5)
LYMPHOCYTES # BLD: 1.9 K/UL (ref 0.5–4.6)
LYMPHOCYTES NFR BLD: 15 % (ref 13–44)
MAGNESIUM SERPL-MCNC: 1.8 MG/DL (ref 1.8–2.4)
MCH RBC QN AUTO: 30.5 PG (ref 26.1–32.9)
MCHC RBC AUTO-ENTMCNC: 31.9 G/DL (ref 31.4–35)
MCV RBC AUTO: 95.6 FL (ref 79.6–97.8)
MONOCYTES # BLD: 1.6 K/UL (ref 0.1–1.3)
MONOCYTES NFR BLD: 12 % (ref 4–12)
NEUTS SEG # BLD: 8.5 K/UL (ref 1.7–8.2)
NEUTS SEG NFR BLD: 66 % (ref 43–78)
NRBC # BLD: 0 K/UL (ref 0–0.2)
P-R INTERVAL, ECG05: 160 MS
PLATELET # BLD AUTO: 312 K/UL (ref 150–450)
PMV BLD AUTO: 11.3 FL (ref 9.4–12.3)
POTASSIUM SERPL-SCNC: 3.6 MMOL/L (ref 3.5–5.1)
PROT SERPL-MCNC: 6.1 G/DL (ref 6.3–8.2)
Q-T INTERVAL, ECG07: 316 MS
QRS DURATION, ECG06: 90 MS
QTC CALCULATION (BEZET), ECG08: 386 MS
RBC # BLD AUTO: 2.98 M/UL (ref 4.05–5.2)
SODIUM SERPL-SCNC: 142 MMOL/L (ref 136–145)
TROPONIN-HIGH SENSITIVITY: 33.1 PG/ML (ref 0–14)
VENTRICULAR RATE, ECG03: 90 BPM
WBC # BLD AUTO: 12.8 K/UL (ref 4.3–11.1)

## 2021-06-14 PROCEDURE — 36415 COLL VENOUS BLD VENIPUNCTURE: CPT

## 2021-06-14 PROCEDURE — 83735 ASSAY OF MAGNESIUM: CPT

## 2021-06-14 PROCEDURE — 97161 PT EVAL LOW COMPLEX 20 MIN: CPT

## 2021-06-14 PROCEDURE — 84484 ASSAY OF TROPONIN QUANT: CPT

## 2021-06-14 PROCEDURE — 65660000000 HC RM CCU STEPDOWN

## 2021-06-14 PROCEDURE — 74011250636 HC RX REV CODE- 250/636: Performed by: HOSPITALIST

## 2021-06-14 PROCEDURE — 85025 COMPLETE CBC W/AUTO DIFF WBC: CPT

## 2021-06-14 PROCEDURE — 97530 THERAPEUTIC ACTIVITIES: CPT

## 2021-06-14 PROCEDURE — 74011000258 HC RX REV CODE- 258: Performed by: HOSPITALIST

## 2021-06-14 PROCEDURE — 80053 COMPREHEN METABOLIC PANEL: CPT

## 2021-06-14 PROCEDURE — 99223 1ST HOSP IP/OBS HIGH 75: CPT | Performed by: INTERNAL MEDICINE

## 2021-06-14 PROCEDURE — 74011250637 HC RX REV CODE- 250/637: Performed by: HOSPITALIST

## 2021-06-14 RX ADMIN — Medication 10 ML: at 14:58

## 2021-06-14 RX ADMIN — CEFTRIAXONE 2 G: 2 INJECTION, POWDER, FOR SOLUTION INTRAMUSCULAR; INTRAVENOUS at 12:03

## 2021-06-14 RX ADMIN — Medication 10 ML: at 06:08

## 2021-06-14 RX ADMIN — Medication 10 ML: at 06:09

## 2021-06-14 RX ADMIN — POLYETHYLENE GLYCOL 3350 17 G: 17 POWDER, FOR SOLUTION ORAL at 09:08

## 2021-06-14 RX ADMIN — Medication 10 ML: at 14:59

## 2021-06-14 RX ADMIN — FOLIC ACID 1 MG: 1 TABLET ORAL at 09:08

## 2021-06-14 RX ADMIN — PANTOPRAZOLE SODIUM 40 MG: 40 TABLET, DELAYED RELEASE ORAL at 17:00

## 2021-06-14 RX ADMIN — LEVOTHYROXINE SODIUM 25 MCG: 0.05 TABLET ORAL at 06:08

## 2021-06-14 RX ADMIN — FUROSEMIDE 40 MG: 10 INJECTION INTRAMUSCULAR; INTRAVENOUS at 09:08

## 2021-06-14 RX ADMIN — ROSUVASTATIN CALCIUM 10 MG: 5 TABLET, COATED ORAL at 21:50

## 2021-06-14 RX ADMIN — VITAMIN D, TAB 1000IU (100/BT) 1000 UNITS: 25 TAB at 09:08

## 2021-06-14 RX ADMIN — SENNOSIDES AND DOCUSATE SODIUM 1 TABLET: 8.6; 5 TABLET ORAL at 09:08

## 2021-06-14 RX ADMIN — PANTOPRAZOLE SODIUM 40 MG: 40 TABLET, DELAYED RELEASE ORAL at 06:08

## 2021-06-14 RX ADMIN — DULOXETINE HYDROCHLORIDE 60 MG: 60 CAPSULE, DELAYED RELEASE ORAL at 09:08

## 2021-06-14 RX ADMIN — AMIODARONE HYDROCHLORIDE 200 MG: 200 TABLET ORAL at 09:08

## 2021-06-14 RX ADMIN — FUROSEMIDE 40 MG: 10 INJECTION INTRAMUSCULAR; INTRAVENOUS at 17:52

## 2021-06-14 RX ADMIN — Medication 10 ML: at 22:05

## 2021-06-14 RX ADMIN — SPIRONOLACTONE 25 MG: 25 TABLET ORAL at 09:08

## 2021-06-14 RX ADMIN — SENNOSIDES AND DOCUSATE SODIUM 1 TABLET: 8.6; 5 TABLET ORAL at 21:50

## 2021-06-14 NOTE — PROGRESS NOTES
Pt resting in bed watching TV. All needs met. Hourly rounds completed. Bed low/locked, side rails up x2 with call bell in reach. Report given to oncoming shift. All questions answered.

## 2021-06-14 NOTE — H&P
[facility] Initial Cardiac Evaluation                 Date of  Admission: 6/13/2021  2:50 PM     Primary Care Physician: Robbie Lane MD  Primary Cardiologist: Dr Josr Ruiz  Referring Physician: Dr Cherry Donovan  Supervising Physician: Dr Josr Ruiz    CC/Reason for consult:  Heart failure management      Arley Pacheco is a 68 y.o. female with PMH of paroxysmal atrial fibrillation on amiodarone and Eliquis (dx 3/2021), pericardial effusion, HTN, HLD, hypothyroidism, TIA, depression, breast cancer s/p radiation, rheumatoid arthritis, CKD, and former smoker who presented to [facility] ED with c/o shortness of breath. Patient was recently discharge to [facility] after being hospitalized with anemia, fractured ribs, and leukopenia. BMB on 5/17 showed hypocellularity. Methotrexate was stopped in light of likely methotrexate toxicity. During that hospital stay she received 4 U PRBCs and IVF, requiring IVP lasix for volume overload. Once stable she was discharged to [facility]. Patient notes acute onset of shortness of breath on Saturday. In the ED, labs showed WBC 17.3, H&H 9/28.5, plt 326, , K 3.6, bun 14, creatinine 1.25, hs trop 33/56/45, and pro BNP 17408. CXR with pulmonary edema. BP has been elevated with some as high as 182/72.       Past Medical History:   Diagnosis Date    Acquired hypothyroidism 1/16/2016    Breast cancer [facility] 2008    Depression 8/18/2016    Endocrine disease     hypothyroid    Fungal dermatitis 8/18/2016    Hyperlipidemia 1/16/2016    Hypertension, essential, benign 8/18/2016    Hypokalemia 8/18/2016    Inflamed sebaceous cyst 8/18/2016    Major depressive disorder, recurrent, moderate ([address] 8/18/2016    Nicotine dependence, cigarettes, uncomplicated 9/58/1096    Osteopenia 8/18/2016    Osteoporosis 8/18/2016    Radiation therapy complication 6801    Rheumatoid arthritis ([facility] 1/16/2016    Right carotid bruit 1/16/2016    TIA (transient ischemic attack) 1/16/2016    Tobacco abuse 8/18/2016 Past Surgical History:   Procedure Laterality Date    HX ADENOIDECTOMY      HX BREAST LUMPECTOMY Right 2008    with lymph nodes    HX TONSILLECTOMY      IR BX BONE MARROW DIAGNOSTIC  5/14/2021    IR INSERT NON TUNL CVC OVER 5 YRS  5/14/2021     No Known Allergies   Family History   Problem Relation Age of Onset    Stroke Mother     Cancer Father         Brain    HIV/AIDS Brother         Current Facility-Administered Medications   Medication Dose Route Frequency    sodium chloride (NS) flush 5-10 mL  5-10 mL IntraVENous Q8H    sodium chloride (NS) flush 5-10 mL  5-10 mL IntraVENous PRN    amiodarone (CORDARONE) tablet 200 mg  200 mg Oral DAILY    apixaban (ELIQUIS) tablet 5 mg  5 mg Oral BID    cholecalciferol (VITAMIN D3) (1000 Units /25 mcg) tablet 1,000 Units  1,000 Units Oral DAILY    DULoxetine (CYMBALTA) capsule 60 mg  60 mg Oral DAILY    levothyroxine (SYNTHROID) tablet 25 mcg  25 mcg Oral ACB    folic acid (FOLVITE) tablet 1 mg  1 mg Oral DAILY    ondansetron (ZOFRAN ODT) tablet 4 mg  4 mg Oral Q8H PRN    pantoprazole (PROTONIX) tablet 40 mg  40 mg Oral ACB&D    rosuvastatin (CRESTOR) tablet 10 mg  10 mg Oral QHS    spironolactone (ALDACTONE) tablet 25 mg  25 mg Oral DAILY    sodium chloride (NS) flush 5-40 mL  5-40 mL IntraVENous Q8H    sodium chloride (NS) flush 5-40 mL  5-40 mL IntraVENous PRN    acetaminophen (TYLENOL) tablet 650 mg  650 mg Oral Q6H PRN    Or    acetaminophen (TYLENOL) suppository 650 mg  650 mg Rectal Q6H PRN    polyethylene glycol (MIRALAX) packet 17 g  17 g Oral DAILY    senna-docusate (PERICOLACE) 8.6-50 mg per tablet 1 Tablet  1 Tablet Oral BID    magnesium hydroxide (MILK OF MAGNESIA) 400 mg/5 mL oral suspension 30 mL  30 mL Oral DAILY PRN    bisacodyL (DULCOLAX) suppository 10 mg  10 mg Rectal DAILY PRN    furosemide (LASIX) injection 40 mg  40 mg IntraVENous BID    cefTRIAXone (ROCEPHIN) 2 g in 0.9% sodium chloride (MBP/ADV) 50 mL MBP  2 g IntraVENous Q24H    captopriL (CAPOTEN) tablet 12.5 mg  12.5 mg Oral TID PRN       Review of Systems   Constitutional: Negative. HENT: Negative. Eyes: Negative. Respiratory: Positive for shortness of breath. Cardiovascular: Negative. Gastrointestinal: Negative. Genitourinary: Negative. Musculoskeletal: Negative. Skin: Negative. Neurological: Negative. Endo/Heme/Allergies: Negative. Psychiatric/Behavioral: Negative. Physical Exam  Vitals:    06/14/21 0355 06/14/21 0502 06/14/21 0706 06/14/21 0925   BP:  (!) 151/76 128/75    Pulse: (!) 114 (!) 102 82    Resp:  20 18    Temp:  98.1 °F (36.7 °C) 98.1 °F (36.7 °C)    SpO2:  94% 94% 98%   Weight:       Height:           Physical Exam:  Physical Exam  Eyes:      Pupils: Pupils are equal, round, and reactive to light. Cardiovascular:      Rate and Rhythm: Normal rate and regular rhythm. Pulmonary:      Effort: Pulmonary effort is normal.      Breath sounds: Normal breath sounds. Abdominal:      General: Abdomen is flat. Palpations: Abdomen is soft. Musculoskeletal:      Right lower leg: Edema present. Left lower leg: Edema present. Skin:     General: Skin is warm and dry. Neurological:      Mental Status: She is alert. Cardiographics    Telemetry: normal sinus rhythm  ECG: normal sinus rhythm, nonspecific ST and T waves changes  Echocardiogram:  5/2021    -  Left ventricle: Systolic function was normal. Ejection fraction was  estimated in the range of 55 % to 60 %. There were no regional wall motion  abnormalities.     -  Left atrium: The atrium was mildly dilated.     -  Mitral valve:  There was mild annular calcification.     -  Pericardium: There was no pericardial effusion    Labs:   Recent Labs     06/14/21  0443 06/13/21  1424    143   K 3.6 3.8   MG 1.8 1.8   BUN 14 13   CREA 1.25* 1.25*   GLU 76 95   WBC 12.8* 17.3*   HGB 9.1* 9.0*   HCT 28.5* 28.5*    326        Assessment/Plan: Assessment:      Principal Problem:    Acute on chronic diastolic congestive heart failure-- patient notes improved symptoms. Continue IVP lasix and aldactone. Transition to PO and plan to continue at discharge. Strict I/O and daily weights. Active Problems:    Hyperlipidemia -- on crestor       Hypertension, essential, benign -- improved. Continue to monitor      Depression -- on home meds      Paroxysmal atrial fibrillation -- in SR, refuses Eliquis, states severe side effects, feels it made her almost pass out. Stage 3 chronic kidney disease-- monitor renal function closely with IV diuresis. Thank you very much for this referral. We appreciate the opportunity to participate in this patient's care. We will follow along with above stated plan.     Krys Erwin NP  Attending MD: Diana Latham

## 2021-06-14 NOTE — PROGRESS NOTES
[facility] Hospitalist Progress Note     Name: Isreal Simmons   Age: 68 y.o. Sex: female  : 1940    MRN:     [id number]    Admit Date:  2021    Reason for Admission:  CHF (congestive heart failure) ([facility] [I50.9]    Assessment & Plan   Acute respiratory failure with hypoxia due to acute on chronic CHF exacerbation  Patient was noted to be saturating 88% on room air and requiring 3 L nasal cannula on arrival to the ED. Echocardiogram in 2021 with preserved EF of 55 to 60%. pBNP of 10k. CXR with progressive vascular congestion. Lasix 40 mg IV twice daily  Strict I's and O  Daily weights    Elevated troponins likely due to demand ischemia  Troponin has peaked at 56.9. Patient without any chest pains. EKG with normal sinus rhythm without any specific T wave and ST changes. Hypertension//hyperlipidemia//paroxysmal atrial fibrillation  On amiodarone, apixaban, Crestor, spironolactone. Leukocytosis with history of leukopenia status post Granix (in May 20)  Procalcitonin of less than 0.05, UA without any evidence of UTI. Chest x-ray without any definitive infiltrate or consolidation. WBC has improved. Monitor tomorrow with CBC    Hypothyroidism: synthroid    Anemia with Hx of melena  Hemoglobin appears to be stable at 9.0 on admission. Appears to be around 8-7 in prior admission. EGD was deferred during last admission due to acute illness  PPI twice daily  Monitor hemoglobin and transfuse as needed. CKD stage II   Baseline creatinine 1.3-1.5, at baseline  - continue monitoring renal function while diuresis    Depression: Continue with Cymbalta  GERD: PPI    Diet:  DIET ADULT  DVT PPx: eliquis  GI Ppx: PPI  Code: Full Code    Dispo / Discharge Planning: pending clinical course and PT/ [address] Course/Subjective:     Please refer to the admission H&P for details of presentation.     In summary, Isreal Simmons is a 68 y.o. 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 Patient was recently discharged from rehab after hospitalization in May for fall with rib fractures. Subjective/24 hr Events (06/14/21) :  Patient is seen and examined at bedside. No acute events reported overnight by nursing staff. Reports breaking has improved. On 3L O2 Nc without any respiratory distress. Patient denies fever, chills, chest pains, shortness of breath, n/v, abdominal pain. Tolerating diet and having BM. Review of Systems: 14 point review of systems is otherwise negative with the exception of the elements mentioned above.     Objective:     Patient Vitals for the past 24 hrs:   Temp Pulse Resp BP SpO2   06/14/21 1135 98.5 °F (36.9 °C) 62 18 122/62 96 %   06/14/21 0925     98 %   06/14/21 0706 98.1 °F (36.7 °C) 82 18 128/75 94 %   06/14/21 0502 98.1 °F (36.7 °C) (!) 102 20 (!) 151/76 94 %   06/14/21 0355  (!) 114      06/14/21 0000  83      06/13/21 2356 97.7 °F (36.5 °C) 83 20 132/73 96 %   06/13/21 2240 98.1 °F (36.7 °C) 87 20 (!) 149/82    06/13/21 2226     100 %   06/13/21 2000  87      06/13/21 1944 98.1 °F (36.7 °C) 87 20 (!) 149/82 100 %   06/13/21 1733 98.2 °F (36.8 °C) 97 24 131/77 100 %   06/13/21 1629  90 18 (!) 151/74 100 %   06/13/21 1559  89 14 (!) 166/88 100 %   06/13/21 1554  90  (!) 182/72    06/13/21 1528  89  (!) 182/72 100 %   06/13/21 1515  93  (!) 164/70 100 %   06/13/21 1417 97.8 °F (36.6 °C)  18 (!) 145/74 100 %     Oxygen Therapy  O2 Sat (%): 96 % (06/14/21 1135)  Pulse via Oximetry: 81 beats per minute (06/13/21 1629)  O2 Device: Nasal cannula (placed on RA for mobility, O2 89-90% p mob; 2 L in chair) (06/14/21 6761)  Skin Assessment: Clean, dry, & intact (06/13/21 2226)  O2 Flow Rate (L/min): 3 l/min (06/14/21 0925)    Body mass index is 29.98 kg/m². Physical Exam:   General:     alert, awake, no acute distress. HENT:   normocephalic, atraumatic. Eyes:   anicteric sclerae, normal conjunctiva, EOMI  Neck:    supple, non-tender. Trachea midline. Lungs:   CTAB, no wheezing, rhonchi, rales  Cardiac:   RRR, Normal S1 and S2  Abdomen:   Soft, non distended, nontender, +BS, no guarding/rebound  Extremities:   +2 pitting edema , pedal pulses present  Skin:   Warn, dry, normnal turgor and texture; no rash, ulcers   Neuro:  AAOx3. No gross focal neurological deficit  Psychiatric:  No anxiety, calm, cooperative    Data Review:  I have reviewed all labs, meds, and studies from the last 24 hours:    Labs:    Recent Results (from the past 24 hour(s))   CBC WITH AUTOMATED DIFF    Collection Time: 06/13/21  2:24 PM   Result Value Ref Range    WBC 17.3 (H) 4.3 - 11.1 K/uL    RBC 2.96 (L) 4.05 - 5.2 M/uL    HGB 9.0 (L) 11.7 - 15.4 g/dL    HCT 28.5 (L) 35.8 - 46.3 %    MCV 96.3 79.6 - 97.8 FL    MCH 30.4 26.1 - 32.9 PG    MCHC 31.6 31.4 - 35.0 g/dL    RDW 18.6 (H) 11.9 - 14.6 %    PLATELET 341 986 - 582 K/uL    MPV 10.8 9.4 - 12.3 FL    ABSOLUTE NRBC 0.00 0.0 - 0.2 K/uL    DF AUTOMATED      NEUTROPHILS 77 43 - 78 %    LYMPHOCYTES 9 (L) 13 - 44 %    MONOCYTES 12 4.0 - 12.0 %    EOSINOPHILS 1 0.5 - 7.8 %    BASOPHILS 1 0.0 - 2.0 %    IMMATURE GRANULOCYTES 1 0.0 - 5.0 %    ABS. NEUTROPHILS 13.3 (H) 1.7 - 8.2 K/UL    ABS. LYMPHOCYTES 1.5 0.5 - 4.6 K/UL    ABS. MONOCYTES 2.0 (H) 0.1 - 1.3 K/UL    ABS. EOSINOPHILS 0.2 0.0 - 0.8 K/UL    ABS. BASOPHILS 0.2 0.0 - 0.2 K/UL    ABS. IMM.  GRANS. 0.1 0.0 - 0.5 K/UL   METABOLIC PANEL, COMPREHENSIVE    Collection Time: 06/13/21  2:24 PM   Result Value Ref Range    Sodium 143 136 - 145 mmol/L    Potassium 3.8 3.5 - 5.1 mmol/L    Chloride 111 (H) 98 - 107 mmol/L    CO2 24 21 - 32 mmol/L    Anion gap 8 7 - 16 mmol/L    Glucose 95 65 - 100 mg/dL    BUN 13 8 - 23 MG/DL    Creatinine 1.25 (H) 0.6 - 1.0 MG/DL    GFR est AA 53 (L) >60 ml/min/1.73m2    GFR est non-AA 44 (L) >60 ml/min/1.73m2    Calcium 8.2 (L) 8.3 - 10.4 MG/DL    Bilirubin, total 0.7 0.2 - 1.1 MG/DL    ALT (SGPT) 15 12 - 65 U/L    AST (SGOT) 19 15 - 37 U/L    Alk.  phosphatase 87 50 - 136 U/L    Protein, total 6.5 6.3 - 8.2 g/dL    Albumin 2.4 (L) 3.2 - 4.6 g/dL    Globulin 4.1 (H) 2.3 - 3.5 g/dL    A-G Ratio 0.6 (L) 1.2 - 3.5     MAGNESIUM    Collection Time: 06/13/21  2:24 PM   Result Value Ref Range    Magnesium 1.8 1.8 - 2.4 mg/dL   NT-PRO BNP    Collection Time: 06/13/21  2:24 PM   Result Value Ref Range    NT pro-BNP 10,892 (H) <450 PG/ML   EKG, 12 LEAD, INITIAL    Collection Time: 06/13/21  2:26 PM   Result Value Ref Range    Ventricular Rate 90 BPM    Atrial Rate 90 BPM    P-R Interval 160 ms    QRS Duration 90 ms    Q-T Interval 316 ms    QTC Calculation (Bezet) 386 ms    Calculated P Axis 55 degrees    Calculated R Axis 81 degrees    Calculated T Axis 24 degrees    Diagnosis       Normal sinus rhythm  Nonspecific T wave abnormality  Abnormal ECG  When compared with ECG of 12-MAY-2021 10:32,  Right bundle branch block is no longer Present  Confirmed by SINDHU BENITEZ (), LIZETTE CROWE ([id number]) on 6/14/2021 7:05:02 AM     TROPONIN-HIGH SENSITIVITY    Collection Time: 06/13/21  3:43 PM   Result Value Ref Range    Troponin-High Sensitivity 45.3 (H) 0 - 14 pg/mL   TROPONIN-HIGH SENSITIVITY    Collection Time: 06/13/21  5:45 PM   Result Value Ref Range    Troponin-High Sensitivity 56.9 (H) 0 - 14 pg/mL   PROCALCITONIN    Collection Time: 06/13/21  5:46 PM   Result Value Ref Range    Procalcitonin <0.05 ng/mL   CULTURE, BLOOD    Collection Time: 06/13/21  5:46 PM    Specimen: Blood   Result Value Ref Range    Special Requests: LEFT  Antecubital        Culture result: NO GROWTH AFTER 12 HOURS     LACTIC ACID    Collection Time: 06/13/21  5:46 PM   Result Value Ref Range    Lactic acid 2.7 (H) 0.4 - 2.0 MMOL/L CULTURE, BLOOD    Collection Time: 06/13/21  5:49 PM    Specimen: Blood   Result Value Ref Range    Special Requests: LEFT  HAND        Culture result: NO GROWTH AFTER 12 HOURS     METABOLIC PANEL, COMPREHENSIVE    Collection Time: 06/14/21  4:43 AM   Result Value Ref Range    Sodium 142 136 - 145 mmol/L    Potassium 3.6 3.5 - 5.1 mmol/L    Chloride 109 (H) 98 - 107 mmol/L    CO2 27 21 - 32 mmol/L    Anion gap 6 (L) 7 - 16 mmol/L    Glucose 76 65 - 100 mg/dL    BUN 14 8 - 23 MG/DL    Creatinine 1.25 (H) 0.6 - 1.0 MG/DL    GFR est AA 53 (L) >60 ml/min/1.73m2    GFR est non-AA 44 (L) >60 ml/min/1.73m2    Calcium 8.6 8.3 - 10.4 MG/DL    Bilirubin, total 0.9 0.2 - 1.1 MG/DL    ALT (SGPT) 17 12 - 65 U/L    AST (SGOT) 9 (L) 15 - 37 U/L    Alk. phosphatase 78 50 - 136 U/L    Protein, total 6.1 (L) 6.3 - 8.2 g/dL    Albumin 2.5 (L) 3.2 - 4.6 g/dL    Globulin 3.6 (H) 2.3 - 3.5 g/dL    A-G Ratio 0.7 (L) 1.2 - 3.5     MAGNESIUM    Collection Time: 06/14/21  4:43 AM   Result Value Ref Range    Magnesium 1.8 1.8 - 2.4 mg/dL   CBC WITH AUTOMATED DIFF    Collection Time: 06/14/21  4:43 AM   Result Value Ref Range    WBC 12.8 (H) 4.3 - 11.1 K/uL    RBC 2.98 (L) 4.05 - 5.2 M/uL    HGB 9.1 (L) 11.7 - 15.4 g/dL    HCT 28.5 (L) 35.8 - 46.3 %    MCV 95.6 79.6 - 97.8 FL    MCH 30.5 26.1 - 32.9 PG    MCHC 31.9 31.4 - 35.0 g/dL    RDW 18.3 (H) 11.9 - 14.6 %    PLATELET 229 544 - 744 K/uL    MPV 11.3 9.4 - 12.3 FL    ABSOLUTE NRBC 0.00 0.0 - 0.2 K/uL    DF AUTOMATED      NEUTROPHILS 66 43 - 78 %    LYMPHOCYTES 15 13 - 44 %    MONOCYTES 12 4.0 - 12.0 %    EOSINOPHILS 5 0.5 - 7.8 %    BASOPHILS 2 0.0 - 2.0 %    IMMATURE GRANULOCYTES 0 0.0 - 5.0 %    ABS. NEUTROPHILS 8.5 (H) 1.7 - 8.2 K/UL    ABS. LYMPHOCYTES 1.9 0.5 - 4.6 K/UL    ABS. MONOCYTES 1.6 (H) 0.1 - 1.3 K/UL    ABS. EOSINOPHILS 0.6 0.0 - 0.8 K/UL    ABS. BASOPHILS 0.2 0.0 - 0.2 K/UL    ABS. IMM.  GRANS. 0.0 0.0 - 0.5 K/UL   TROPONIN-HIGH SENSITIVITY    Collection Time: 06/14/21 7:47 AM   Result Value Ref Range    Troponin-High Sensitivity 33.1 (H) 0 - 14 pg/mL       All Micro Results     Procedure Component Value Units Date/Time    CULTURE, BLOOD [[id number]] Collected: 06/13/21 1749    Order Status: Completed Specimen: Blood Updated: 06/14/21 0752     Special Requests: --        LEFT  HAND       Culture result: NO GROWTH AFTER 12 HOURS       CULTURE, BLOOD [[id number]] Collected: 06/13/21 1746    Order Status: Completed Specimen: Blood Updated: 06/14/21 0752     Special Requests: --        LEFT  Antecubital       Culture result: NO GROWTH AFTER 12 HOURS             EKG Results     Procedure 720 Value Units Date/Time    EKG [[id number]] Collected: 06/13/21 1426    Order Status: Completed Updated: 06/14/21 0705     Ventricular Rate 90 BPM      Atrial Rate 90 BPM      P-R Interval 160 ms      QRS Duration 90 ms      Q-T Interval 316 ms      QTC Calculation (Bezet) 386 ms      Calculated P Axis 55 degrees      Calculated R Axis 81 degrees      Calculated T Axis 24 degrees      Diagnosis --     Normal sinus rhythm  Nonspecific T wave abnormality  Abnormal ECG  When compared with ECG of 12-MAY-2021 10:32,  Right bundle branch block is no longer Present  Confirmed by Stacey Young MD (), Jorge Armendariz ([id number]) on 6/14/2021 7:05:02 AM            Other Studies:  XR CHEST PORT    Result Date: 6/13/2021  1 View portable chest x-ray 6/13/2021 2:42 PM Indication: History of pneumonia, shortness of breath. Syncopal episode. Comparison: Prior studies-most recent 5/21/2021 Findings: This portable upright AP chest at 1437 shows the patient again rotated somewhat toward the left. Cardiomediastinal silhouette stable. Vascular pedicle appearance probably slightly more pronounced today. Mildly more progressed bibasilar hazy/veiling opacities which may represent layering effusions compared to the most recent study. Compared to older studies these are indolently progressing. Old right rib fractures again seen.      Increased vascular congestion today, patient probably has progressing basilar effusions-now of small/moderate volume. Appearance most suggestive of progressing vascular congestion. Correlate correlate clinically, consider serum BNP.           Current Meds:   Current Facility-Administered Medications   Medication Dose Route Frequency    sodium chloride (NS) flush 5-10 mL  5-10 mL IntraVENous Q8H    sodium chloride (NS) flush 5-10 mL  5-10 mL IntraVENous PRN    amiodarone (CORDARONE) tablet 200 mg  200 mg Oral DAILY    apixaban (ELIQUIS) tablet 5 mg  5 mg Oral BID    cholecalciferol (VITAMIN D3) (1000 Units /25 mcg) tablet 1,000 Units  1,000 Units Oral DAILY    DULoxetine (CYMBALTA) capsule 60 mg  60 mg Oral DAILY    levothyroxine (SYNTHROID) tablet 25 mcg  25 mcg Oral ACB    folic acid (FOLVITE) tablet 1 mg  1 mg Oral DAILY    ondansetron (ZOFRAN ODT) tablet 4 mg  4 mg Oral Q8H PRN    pantoprazole (PROTONIX) tablet 40 mg  40 mg Oral ACB&D    rosuvastatin (CRESTOR) tablet 10 mg  10 mg Oral QHS    spironolactone (ALDACTONE) tablet 25 mg  25 mg Oral DAILY    sodium chloride (NS) flush 5-40 mL  5-40 mL IntraVENous Q8H    sodium chloride (NS) flush 5-40 mL  5-40 mL IntraVENous PRN    acetaminophen (TYLENOL) tablet 650 mg  650 mg Oral Q6H PRN    Or    acetaminophen (TYLENOL) suppository 650 mg  650 mg Rectal Q6H PRN    polyethylene glycol (MIRALAX) packet 17 g  17 g Oral DAILY    senna-docusate (PERICOLACE) 8.6-50 mg per tablet 1 Tablet  1 Tablet Oral BID    magnesium hydroxide (MILK OF MAGNESIA) 400 mg/5 mL oral suspension 30 mL  30 mL Oral DAILY PRN    bisacodyL (DULCOLAX) suppository 10 mg  10 mg Rectal DAILY PRN    furosemide (LASIX) injection 40 mg  40 mg IntraVENous BID    cefTRIAXone (ROCEPHIN) 2 g in 0.9% sodium chloride (MBP/ADV) 50 mL MBP  2 g IntraVENous Q24H    captopriL (CAPOTEN) tablet 12.5 mg  12.5 mg Oral TID PRN       Problem List:  Hospital Problems as of 6/14/2021 Date Reviewed: 4/13/2021 Codes Class Noted - Resolved POA    Acute respiratory failure with hypoxia (HCC) ICD-10-CM: J96.01  ICD-9-CM: 518.81  6/14/2021 - Present Yes        Demand ischemia ([facility] ICD-10-CM: I24.8  ICD-9-CM: 411.89  6/14/2021 - Present Yes        Leukocytosis ICD-10-CM: R77.665  ICD-9-CM: 288.60  6/14/2021 - Present Yes        * (Principal) Acute on chronic diastolic congestive heart failure (HCC) ICD-10-CM: I50.33  ICD-9-CM: 428.33, 428.0  6/13/2021 - Present Yes        Stage 3 chronic kidney disease ([facility] (Chronic) ICD-10-CM: N18.30  ICD-9-CM: 585.3  5/20/2021 - Present Yes        Paroxysmal atrial fibrillation (HCC) (Chronic) ICD-10-CM: I48.0  ICD-9-CM: 427.31  3/19/2021 - Present Yes        Hypertension, essential, benign (Chronic) ICD-10-CM: I10  ICD-9-CM: 401.1  8/18/2016 - Present Yes        Depression ICD-10-CM: F32.9  ICD-9-CM: 312  8/18/2016 - Present Yes        Hyperlipidemia (Chronic) ICD-10-CM: E78.5  ICD-9-CM: 272.4  1/16/2016 - Present Yes        Rheumatoid arthritis ([facility] (Chronic) ICD-10-CM: M06.9  ICD-9-CM: 714.0  1/16/2016 - Present Yes               Part of this note was written by using a voice dictation software and the note has been proof read but may still contain some grammatical/other typographical errors.     Signed By: Alexis Hutchinson MD   [employer] [facility]    June 14, 2021  12:57 PM

## 2021-06-14 NOTE — ROUTINE PROCESS
CHF teaching/ introduction held sleeping soundly, will follow post Cardiology assess ment. Cardiac diet/ FR Palliaitive care: ACP on file

## 2021-06-14 NOTE — PROGRESS NOTES
ACUTE PHYSICAL THERAPY GOALS:  (Developed with and agreed upon by patient and/or caregiver. )  LTG:  (1.)Ms. Marissa Olivarez will move from supine to sit and sit to supine , scoot up and down and roll side to side in bed with INDEPENDENT within 7 treatment day(s). (2.)Ms. Marissa Olivarez will transfer from bed to chair and chair to bed with MODIFIED INDEPENDENCE using the least restrictive device within 7 treatment day(s). (3.)Ms. Almeida will ambulate with MODIFIED INDEPENDENCE for 250+ feet with the least restrictive device within 7 treatment day(s) while maintaining normal vital signs. (4.)Ms. Marissa Olivarez will perform 5 stairs with HR and SBA within 7 treatment days for ascending and descending stairs for home.   ________________________________________________________________________________________________       PHYSICAL THERAPY ASSESSMENT: Initial Assessment and AM PT Treatment Day # 1      Carlos Miller is a 68 y.o. female   PRIMARY DIAGNOSIS: CHF (congestive heart failure) ([facility])  [facility]congest[facility] [I50.9]       Reason for Referral:    ICD-10: Treatment Diagnosis: Generalized Muscle Weakness (M62.81)  Difficulty in walking, Not elsewhere classified (R26.2)  History of falling (Z91.81)  INPATIENT: Payor: [employer] MEDICARE COMPLETE / Plan: Cathryn Godinez / Product Type: Managed Care Medicare /     ASSESSMENT:     REHAB RECOMMENDATIONS:   Recommendation to date pending progress:  [address] [place] [address]  Equipment:    To Be Determined     PRIOR LEVEL OF FUNCTION:  (Prior to Hospitalization) INITIAL/CURRENT LEVEL OF FUNCTION:  (Most Recently Demonstrated)   Bed Mobility:   Independent  Sit to Stand:   Independent  Transfers:   Modified Independent  Gait/Mobility:   Modified Independent Bed Mobility:   Contact Guard Assistance  Sit to Stand:   Contact Guard Assistance  Transfers:   Contact Guard Assistance  Gait/Mobility:   Contact Guard Assistance     ASSESSMENT:  Ms. Marissa Olivarez presents to hospital 6/13/21 from home with SOB. Pt with history of fall s/p 2 weeks prior, R rib fx and PNA, to STR from hospital, then home. Pt home x 4-5 days prior to this admission. Pt currently on 3 L O2 stats 99%, placed on RA, stats 94% at rest. Pt reports feeling better. Pt required CGA for bed mobility, transfers, ambulation with RW in hallway. Pt with 2 rest breaks with ambulation, one standing, one seated due to SOB/wheezing noted, O2 stats >90% with activity. Pt returned to room, seated in chair, O2 89-90%, placed on 2 L for comfort, stats 95%. Pt overall demonstrates decreased ambulation, transfers, general functional mobility. PT to follow for acute care needs. SUBJECTIVE:   Ms. Rolando Armenta states, \"I am feeling better. \"    SOCIAL HISTORY/LIVING ENVIRONMENT: lives alone, MOD I at baseline, friends assist as needed  Home Environment: Private residence  # Steps to Enter: 5  One/Two Story Residence: One story  Living Alone: Yes  Support Systems: Friends \ neighbors  OBJECTIVE:     PAIN: VITAL SIGNS: LINES/DRAINS:   Pre Treatment: Pain Screen  Pain Scale 1: Numeric (0 - 10)  Pain Intensity 1: 0  Post Treatment: 0 Vital Signs  O2 Sat (%): 98 %  O2 Device: Nasal cannula (placed on RA for mobility, O2 89-90% p mob; 2 L in chair)  O2 Flow Rate (L/min): 3 l/min none  O2 Device: Nasal cannula (placed on RA for mobility, O2 89-90% p mob; 2 L in chair)     GROSS EVALUATION:  B LE Within Functional Limits Abnormal/ Functional Abnormal/ Non-Functional (see comments) Not Tested Comments:   AROM [x] [] [] []    PROM [x] [] [] []    Strength [] [x] [] [] Grossly 4/5   Balance [x] [] [] []    Posture [] [x] [] [] Rounded shoulders   Sensation [x] [] [] []    Coordination [x] [] [] []    Tone [x] [] [] []    Edema [x] [] [] []    Activity Tolerance [] [x] [] []     [] [] [] []      COGNITION/  PERCEPTION: Intact Impaired   (see comments) Comments:   Orientation [x] []    Vision [x] []    Hearing [x] []    Command Following [x] []    Safety Awareness [x] []     [] []      MOBILITY: I Mod I S SBA CGA Min Mod Max Total  NT x2 Comments:   Bed Mobility    Rolling [] [] [] [] [x] [] [] [] [] [] []    Supine to Sit [] [] [] [] [x] [] [] [] [] [] []    Scooting [] [] [] [x] [] [] [] [] [] [] []    Sit to Supine [] [] [] [] [] [] [] [] [] [x] [] In chair   Transfers    Sit to Stand [] [] [] [] [x] [] [] [] [] [] []    Bed to Chair [] [] [] [] [x] [] [] [] [] [] []    Stand to Sit [] [] [] [] [x] [] [] [] [] [] []    I=Independent, Mod I=Modified Independent, S=Supervision, SBA=Standby Assistance, CGA=Contact Guard Assistance,   Min=Minimal Assistance, Mod=Moderate Assistance, Max=Maximal Assistance, Total=Total Assistance, NT=Not Tested  GAIT: I Mod I S SBA CGA Min Mod Max Total  NT x2 Comments:   Level of Assistance [] [] [] [] [x] [] [] [] [] [] [] Wheezing with activity   Distance 250    DME Rolling Walker    Gait Quality Slow tera, 2 rest breaks    Weightbearing Status N/A     I=Independent, Mod I=Modified Independent, S=Supervision, SBA=Standby Assistance, CGA=Contact Guard Assistance,   Min=Minimal Assistance, Mod=Moderate Assistance, Max=Maximal Assistance, Total=Total Assistance, NT=Not Tested    [facility] AM-PAC 6 Clicks   Basic Mobility Inpatient Short Form       How much difficulty does the patient currently have. .. Unable A Lot A Little None   1. Turning over in bed (including adjusting bedclothes, sheets and blankets)? [] 1   [] 2   [] 3   [x] 4   2. Sitting down on and standing up from a chair with arms ( e.g., wheelchair, bedside commode, etc.)   [] 1   [] 2   [x] 3   [] 4   3. Moving from lying on back to sitting on the side of the bed? [] 1   [] 2   [x] 3   [] 4   How much help from another person does the patient currently need. .. Total A Lot A Little None   4. Moving to and from a bed to a chair (including a wheelchair)? [] 1   [] 2   [x] 3   [] 4   5. Need to walk in hospital room?    [] 1   [] 2   [x] 3 [] 4   6. Climbing 3-5 steps with a railing? [] 1   [] 2   [x] 3   [] 4   © 2007, Trustees of [address] [place] [address] [address] [id number], under license to [employer]. All rights reserved     Score:  Initial: 19 Most Recent: X (Date: -- )    Interpretation of Tool:  Represents activities that are increasingly more difficult (i.e. Bed mobility, Transfers, Gait). PLAN:   FREQUENCY/DURATION: PT Plan of Care: 3 times/week for duration of hospital stay or until stated goals are met, whichever comes first.    PROBLEM LIST:   (Skilled intervention is medically necessary to address:)  1. Decreased ADL/Functional Activities  2. Decreased Activity Tolerance  3. Decreased Balance  4. Decreased Gait Ability  5. Decreased Strength  6. Decreased Transfer Abilities   INTERVENTIONS PLANNED:   (Benefits and precautions of physical therapy have been discussed with the patient.)  1. Therapeutic Activity  2. Therapeutic Exercise/HEP  3. Neuromuscular Re-education  4. Gait Training  5. Education     TREATMENT:     EVALUATION: Low Complexity : (Untimed Charge)    TREATMENT:   ($$ Therapeutic Activity: 8-22 mins    )  Therapeutic Activity (10 Minutes): Therapeutic activity included Supine to Sit, Scooting, Transfer Training, Ambulation on level ground, Sitting balance , Standing balance and walker safety, activity pacing to improve functional Mobility, Strength and Activity tolerance.     TREATMENT GRID:  N/A    AFTER TREATMENT POSITION/PRECAUTIONS:  Chair, Needs within reach and RN notified    INTERDISCIPLINARY COLLABORATION:  RN/PCT and PT/PTA    TOTAL TREATMENT DURATION:  PT Patient Time In/Time Out  Time In: 0896  Time Out: [address] PT

## 2021-06-14 NOTE — PROGRESS NOTES
CM spoke with patient at bedside for discharge planning. Demographics verified. Patient lives alone in mobile home with 5 steps to enter. She reports that she is independent with ADLs and ambulates with a RW. She has [place] friends that assist her with meals and transportation. Patient was recently discharged to [facility] after last admission and states she was discharged home last week. She is current with [facility] for RN/PT services. DME: cane, RW    Rx: [facility] [place]    Discharge plan: Patient currently planning to return home with [facility] at discharge. Resumption of care orders faxed to [facility]; will send discharge summary when available. Care Management Interventions  PCP Verified by CM:  Yes  Mode of Transport at Discharge: Self  Transition of Care Consult (CM Consult): [address]: No  Reason Outside [place]: Patient already serviced by other home care/hospice agency ([address])  Discharge Durable Medical Equipment: No  Physical Therapy Consult: Yes  Occupational Therapy Consult: No  Current Support Network: Own Home, Lives Alone  Confirm Follow Up Transport: Friends  Discharge Location  Discharge Placement: Home with home health

## 2021-06-14 NOTE — DISCHARGE INSTRUCTIONS
Patient Education        Heart Failure: Care Instructions  Your Care Instructions     Heart failure occurs when your heart does not pump as much blood as the body needs. Failure does not mean that the heart has stopped pumping but rather that it is not pumping as well as it should. Over time, this causes fluid buildup in your lungs and other parts of your body. Fluid buildup can cause shortness of breath, fatigue, swollen ankles, and other problems. By taking medicines regularly, reducing sodium (salt) in your diet, checking your weight every day, and making lifestyle changes, you can feel better and live longer. Follow-up care is a key part of your treatment and safety. Be sure to make and go to all appointments, and call your doctor if you are having problems. It's also a good idea to know your test results and keep a list of the medicines you take. How can you care for yourself at home? Medicines    · Be safe with medicines. Take your medicines exactly as prescribed. Call your doctor if you think you are having a problem with your medicine.     · Do not take any vitamins, over-the-counter medicine, or herbal products without talking to your doctor first. Reynaldo Sale not take ibuprofen (Advil or Motrin) and naproxen (Aleve) without talking to your doctor first. They could make your heart failure worse.     · You may take some of the following medicine. ? Angiotensin-converting enzyme inhibitors (ACEIs) or angiotensin II receptor blockers (ARBs) reduce the heart's workload, lower blood pressure, and reduce swelling. Taking an ACEI or ARB may lower your chance of needing to be hospitalized. ? Beta-blockers can slow heart rate, decrease blood pressure, and improve your condition. Taking a beta-blocker may lower your chance of needing to be hospitalized. ? Diuretics, also called water pills, reduce swelling. You will get more details on the specific medicines your doctor prescribes.   Diet    · Your doctor may suggest that you limit sodium. Your doctor can tell you how much sodium is right for you. An example is less than 3,000 mg a day. This includes all the salt you eat in cooking or in packaged foods. People get most of their sodium from processed foods. Fast food and restaurant meals also tend to be very high in sodium.     · Ask your doctor how much liquid you can drink each day. You may have to limit liquids. Weight    · Weigh yourself without clothing at the same time each day. Record your weight. Call your doctor if you have a sudden weight gain, such as more than 2 to 3 pounds in a day or 5 pounds in a week. (Your doctor may suggest a different range of weight gain.) A sudden weight gain may mean that your heart failure is getting worse. Activity level    · Start light exercise (if your doctor says it is okay). Even if you can only do a small amount, exercise will help you get stronger, have more energy, and manage your weight and your stress. Walking is an easy way to get exercise. Start out by walking a little more than you did before. Bit by bit, increase the amount you walk.     · When you exercise, watch for signs that your heart is working too hard. You are pushing yourself too hard if you cannot talk while you are exercising. If you become short of breath or dizzy or have chest pain, stop, sit down, and rest.     · If you feel \"wiped out\" the day after you exercise, walk slower or for a shorter distance until you can work up to a better pace.     · Get enough rest at night. Sleeping with 1 or 2 pillows under your upper body and head may help you breathe easier. Lifestyle changes    · Do not smoke. Smoking can make a heart condition worse. If you need help quitting, talk to your doctor about stop-smoking programs and medicines. These can increase your chances of quitting for good.  Quitting smoking may be the most important step you can take to protect your heart.     · Limit alcohol to 2 drinks a day for men and 1 drink a day for women. Too much alcohol can cause health problems.     · Avoid getting sick from colds and the flu. Get a pneumococcal vaccine shot. If you have had one before, ask your doctor whether you need another dose. Get a flu shot each year. If you must be around people with colds or the flu, wash your hands often. When should you call for help? Call 911 if you have symptoms of sudden heart failure such as:    · You have severe trouble breathing.     · You cough up pink, foamy mucus.     · You have a new irregular or rapid heartbeat. Call your doctor now or seek immediate medical care if:    · You have new or increased shortness of breath.     · You are dizzy or lightheaded, or you feel like you may faint.     · You have sudden weight gain, such as more than 2 to 3 pounds in a day or 5 pounds in a week. (Your doctor may suggest a different range of weight gain.)     · You have increased swelling in your legs, ankles, or feet.     · You are suddenly so tired or weak that you cannot do your usual activities. Watch closely for changes in your health, and be sure to contact your doctor if you develop new symptoms. Where can you learn more? Go to http://www.gray.com/  Enter C853 in the search box to learn more about \"Heart Failure: Care Instructions. \"  Current as of: August 31, 2020               Content Version: 12.8  © 3196-0365 [employer]. Care instructions adapted under license by [employer] (which disclaims liability or warranty for this information). If you have questions about a medical condition or this instruction, always ask your healthcare professional. [place] [address] any warranty or liability for your use of this information.

## 2021-06-15 LAB
ALBUMIN SERPL-MCNC: 2.5 G/DL (ref 3.2–4.6)
ALBUMIN/GLOB SERPL: 0.7 {RATIO} (ref 1.2–3.5)
ALP SERPL-CCNC: 77 U/L (ref 50–136)
ALT SERPL-CCNC: 18 U/L (ref 12–65)
ANION GAP SERPL CALC-SCNC: 7 MMOL/L (ref 7–16)
AST SERPL-CCNC: 11 U/L (ref 15–37)
BASOPHILS # BLD: 0.3 K/UL (ref 0–0.2)
BASOPHILS NFR BLD: 2 % (ref 0–2)
BILIRUB SERPL-MCNC: 0.4 MG/DL (ref 0.2–1.1)
BUN SERPL-MCNC: 17 MG/DL (ref 8–23)
CALCIUM SERPL-MCNC: 8.6 MG/DL (ref 8.3–10.4)
CHLORIDE SERPL-SCNC: 104 MMOL/L (ref 98–107)
CO2 SERPL-SCNC: 30 MMOL/L (ref 21–32)
CREAT SERPL-MCNC: 1.67 MG/DL (ref 0.6–1)
DIFFERENTIAL METHOD BLD: ABNORMAL
EOSINOPHIL # BLD: 1.2 K/UL (ref 0–0.8)
EOSINOPHIL NFR BLD: 9 % (ref 0.5–7.8)
ERYTHROCYTE [DISTWIDTH] IN BLOOD BY AUTOMATED COUNT: 18.1 % (ref 11.9–14.6)
GLOBULIN SER CALC-MCNC: 3.8 G/DL (ref 2.3–3.5)
GLUCOSE SERPL-MCNC: 91 MG/DL (ref 65–100)
HCT VFR BLD AUTO: 30.5 % (ref 35.8–46.3)
HGB BLD-MCNC: 9.4 G/DL (ref 11.7–15.4)
IMM GRANULOCYTES # BLD AUTO: 0 K/UL (ref 0–0.5)
IMM GRANULOCYTES NFR BLD AUTO: 0 % (ref 0–5)
LYMPHOCYTES # BLD: 2.1 K/UL (ref 0.5–4.6)
LYMPHOCYTES NFR BLD: 16 % (ref 13–44)
MCH RBC QN AUTO: 29.7 PG (ref 26.1–32.9)
MCHC RBC AUTO-ENTMCNC: 30.8 G/DL (ref 31.4–35)
MCV RBC AUTO: 96.5 FL (ref 79.6–97.8)
MONOCYTES # BLD: 1.6 K/UL (ref 0.1–1.3)
MONOCYTES NFR BLD: 12 % (ref 4–12)
NEUTS SEG # BLD: 7.9 K/UL (ref 1.7–8.2)
NEUTS SEG NFR BLD: 60 % (ref 43–78)
NRBC # BLD: 0 K/UL (ref 0–0.2)
PLATELET # BLD AUTO: 313 K/UL (ref 150–450)
PMV BLD AUTO: 11.7 FL (ref 9.4–12.3)
POTASSIUM SERPL-SCNC: 3.5 MMOL/L (ref 3.5–5.1)
PROT SERPL-MCNC: 6.3 G/DL (ref 6.3–8.2)
RBC # BLD AUTO: 3.16 M/UL (ref 4.05–5.2)
SODIUM SERPL-SCNC: 141 MMOL/L (ref 136–145)
WBC # BLD AUTO: 13.1 K/UL (ref 4.3–11.1)

## 2021-06-15 PROCEDURE — 97110 THERAPEUTIC EXERCISES: CPT

## 2021-06-15 PROCEDURE — 36415 COLL VENOUS BLD VENIPUNCTURE: CPT

## 2021-06-15 PROCEDURE — 97530 THERAPEUTIC ACTIVITIES: CPT

## 2021-06-15 PROCEDURE — 74011250637 HC RX REV CODE- 250/637: Performed by: HOSPITALIST

## 2021-06-15 PROCEDURE — 99232 SBSQ HOSP IP/OBS MODERATE 35: CPT | Performed by: INTERNAL MEDICINE

## 2021-06-15 PROCEDURE — 77010033678 HC OXYGEN DAILY

## 2021-06-15 PROCEDURE — 94760 N-INVAS EAR/PLS OXIMETRY 1: CPT

## 2021-06-15 PROCEDURE — 65660000000 HC RM CCU STEPDOWN

## 2021-06-15 PROCEDURE — 80053 COMPREHEN METABOLIC PANEL: CPT

## 2021-06-15 PROCEDURE — 85025 COMPLETE CBC W/AUTO DIFF WBC: CPT

## 2021-06-15 RX ADMIN — FOLIC ACID 1 MG: 1 TABLET ORAL at 09:21

## 2021-06-15 RX ADMIN — PANTOPRAZOLE SODIUM 40 MG: 40 TABLET, DELAYED RELEASE ORAL at 09:21

## 2021-06-15 RX ADMIN — Medication 10 ML: at 09:29

## 2021-06-15 RX ADMIN — ROSUVASTATIN CALCIUM 10 MG: 5 TABLET, COATED ORAL at 21:28

## 2021-06-15 RX ADMIN — Medication 10 ML: at 21:28

## 2021-06-15 RX ADMIN — AMIODARONE HYDROCHLORIDE 200 MG: 200 TABLET ORAL at 09:21

## 2021-06-15 RX ADMIN — DULOXETINE HYDROCHLORIDE 60 MG: 60 CAPSULE, DELAYED RELEASE ORAL at 09:21

## 2021-06-15 RX ADMIN — PANTOPRAZOLE SODIUM 40 MG: 40 TABLET, DELAYED RELEASE ORAL at 16:00

## 2021-06-15 RX ADMIN — LEVOTHYROXINE SODIUM 25 MCG: 0.05 TABLET ORAL at 09:21

## 2021-06-15 RX ADMIN — Medication 10 ML: at 06:00

## 2021-06-15 RX ADMIN — VITAMIN D, TAB 1000IU (100/BT) 1000 UNITS: 25 TAB at 09:21

## 2021-06-15 RX ADMIN — SENNOSIDES AND DOCUSATE SODIUM 1 TABLET: 8.6; 5 TABLET ORAL at 21:28

## 2021-06-15 RX ADMIN — SENNOSIDES AND DOCUSATE SODIUM 1 TABLET: 8.6; 5 TABLET ORAL at 09:21

## 2021-06-15 RX ADMIN — SPIRONOLACTONE 25 MG: 25 TABLET ORAL at 09:21

## 2021-06-15 RX ADMIN — POLYETHYLENE GLYCOL 3350 17 G: 17 POWDER, FOR SOLUTION ORAL at 09:20

## 2021-06-15 NOTE — PROGRESS NOTES
Pt resting in bed with eyes closed. All needs met. Hourly rounds completed. Bed low/locked, side rails up x2 with call bell in reach. Report will be given to oncoming shift.

## 2021-06-15 NOTE — PROGRESS NOTES
Pt resting in bed watching TV. All needs met. Hourly rounds completed. Bed low/locked, call bell in reach, side rails up x2. Report given to oncoming shift.

## 2021-06-15 NOTE — ROUTINE PROCESS
CHF teaching started post introduction to pt/family; aware of diagnosis. Planner/scale @ BS and will follow. Smoking/ ETOH/Illicit drug use cessation and maintain a healthy weight covered. Pt/family aware that I can not prescribe nor adjust  medications: 15mins Palliative Care score: 
Refused ACP on admission Start 2L/D Fluid restriction/ cardiac diet CHF teaching continues to pt/family. Emphasis on taking prescription meds as ordered, to keep F/U appts and to call MD STAT if any of the following occur: 
  short of breath.  If you can not lay flat without developing short of breath or rapid breathing at night; or if it wakes you up. Develop a cough or wheezing. Pt/family verbalizes understanding, will follow to reinforce teaching skills: 20 mins VM to FM

## 2021-06-15 NOTE — PROGRESS NOTES
ACUTE PHYSICAL THERAPY GOALS:  (Developed with and agreed upon by patient and/or caregiver. )  LTG:  (1.)Ms. Ruthell Gottron will move from supine to sit and sit to supine , scoot up and down and roll side to side in bed with INDEPENDENT within 7 treatment day(s). (2.)Ms. Ruthell Gottron will transfer from bed to chair and chair to bed with MODIFIED INDEPENDENCE using the least restrictive device within 7 treatment day(s). (3.)Ms. Almeida will ambulate with MODIFIED INDEPENDENCE for 250+ feet with the least restrictive device within 7 treatment day(s) while maintaining normal vital signs. (4.)Ms. Almeida will perform 5 stairs with HR and SBA within 7 treatment days for ascending and descending stairs for home. PHYSICAL THERAPY: Daily Note and AM Treatment Day # 2    Lorenzo Harris is a 68 y.o. female   PRIMARY DIAGNOSIS: Acute on chronic diastolic congestive heart failure (HCC)  CHF (congestive heart failure) ([facility] [address] [I50.9]         ASSESSMENT:     REHAB RECOMMENDATIONS: CURRENT LEVEL OF FUNCTION:  (Most Recently Demonstrated)   Recommendation to date pending progress:  [address] [place] [address] Therapy  Equipment:    To Be Determined Bed Mobility:   Contact Guard Assistance  Sit to Stand:   Contact Guard Assistance  Transfers:   Contact Guard Assistance  Gait/Mobility:   Contact Guard Assistance     ASSESSMENT:  Ms. Ruthell Gottron is pleasant and agreeable to therapy. She worked on bed mobility, ambulation, and LE exercises. She amb on RA with sats 89-90%, returned to room and placed back on 2L O2. She performed below LE exercises and was left up in chair. Good progress. Will continue with POC. SUBJECTIVE:   Ms. Ruthell Gottron states, \"Oh yeah, let's walk. \"    SOCIAL HISTORY/ LIVING ENVIRONMENT: lives alone, MOD I at baseline, friends assist as needed  Home Environment: Private residence  # Steps to Enter: 5  One/Two Story Residence: One story  Living Alone: Yes  Support Systems: Friends \ neighbors  OBJECTIVE:     PAIN: VITAL SIGNS: LINES/DRAINS:   Pre Treatment:    Post Treatment: not rated   IV  O2 Device: Nasal cannula     MOBILITY: I Mod I S SBA CGA Min Mod Max Total  NT x2 Comments:   Bed Mobility    Rolling [] [] [] [] [] [] [] [] [] [] []    Supine to Sit [] [] [] [] [] [] [] [] [] [] []    Scooting [] [] [] [] [] [] [] [] [] [] []    Sit to Supine [] [] [] [] [] [] [] [] [] [] []    Transfers    Sit to Stand [] [] [] [] [] [] [] [] [] [] []    Bed to Chair [] [] [] [] [] [] [] [] [] [] []    Stand to Sit [] [] [] [] [] [] [] [] [] [] []    I=Independent, Mod I=Modified Independent, S=Supervision, SBA=Standby Assistance, CGA=Contact Guard Assistance,   Min=Minimal Assistance, Mod=Moderate Assistance, Max=Maximal Assistance, Total=Total Assistance, NT=Not Tested    BALANCE: Good Fair+ Fair Fair- Poor NT Comments   Sitting Static [] [] [] [] [] []    Sitting Dynamic [] [] [] [] [] []              Standing Static [] [] [] [] [] []    Standing Dynamic [] [] [] [] [] []      GAIT: I Mod I S SBA CGA Min Mod Max Total  NT x2 Comments:   Level of Assistance [] [] [] [] [x] [] [] [] [] [] []    Distance 300' with 2 standing rest breaks    DME Rolling Walker    Gait Quality Slow tera    Weightbearing  Status N/A     I=Independent, Mod I=Modified Independent, S=Supervision, SBA=Standby Assistance, CGA=Contact Guard Assistance,   Min=Minimal Assistance, Mod=Moderate Assistance, Max=Maximal Assistance, Total=Total Assistance, NT=Not Tested    PLAN:   FREQUENCY/DURATION: PT Plan of Care: 3 times/week for duration of hospital stay or until stated goals are met, whichever comes first.  TREATMENT:     TREATMENT:   ($$ Therapeutic Activity: 23-37 mins  $$ Therapeutic Exercises: 8-22 mins    )  Therapeutic Activity (25 Minutes): Therapeutic activity included Supine to Sit, Scooting, Transfer Training, Ambulation on level ground, Sitting balance  and Standing balance to improve functional Mobility, Strength and Activity tolerance.   Therapeutic Exercise (13 Minutes): Therapeutic exercises noted below to improve functional activity tolerance, AROM and strength.      TREATMENT GRID:   Date:  6/15/21 Date:   Date:     Activity/Exercise Parameters Parameters Parameters   Ankle pumps X 15 B     LAQ X 15 B     Hip flexion X 15 B     Hip abd X 15 B                             AFTER TREATMENT POSITION/PRECAUTIONS:  Chair, Needs within reach and RN notified    INTERDISCIPLINARY COLLABORATION:  RN/PCT and PT/PTA    TOTAL TREATMENT DURATION:  PT Patient Time In/Time Out  Time In: 1113  Time Out: [facility]

## 2021-06-15 NOTE — INTERDISCIPLINARY ROUNDS
Interdisciplinary Rounds completed with Nursing, Case Management, Dietician, Pharmacy,  Physician and PT/OT present. Plan of care reviewed and updated. Consults include Cardiology, PT/OT Remote tele  Continue Expected discharge date: TBD Location: Home with home health

## 2021-06-15 NOTE — PROGRESS NOTES
6/15/2021 2:24 PM    Admit Date: 6/13/2021    Admit Diagnosis: CHF (congestive heart failure) ([address] [I50.9]      Subjective:   Breathing better but still some short of breath. No chest pain      Objective:      Visit Vitals  BP (!) 150/74 (BP 1 Location: Left upper arm, BP Patient Position: At rest)   Pulse 76   Temp 97.6 °F (36.4 °C)   Resp 17   Ht 5' (1.524 m)   Wt 147 lb 12.8 oz (67 kg)   SpO2 99%   BMI 28.87 kg/m²       Physical Exam:  Yovanny Buoy, Well Nourished, No Acute Distress, Alert & Oriented x 3, appropriate mood. Neck- supple, no JVD  CV- regular rate and rhythm no MRG  Lung- clear bilaterally  Abd- soft, nontender, nondistended  Ext- no edema bilaterally. Skin- warm and dry        Data Review:   Recent Labs     06/15/21  0458      K 3.5   BUN 17   CREA 1.67*   WBC 13.1*   HGB 9.4*   HCT 30.5*          Assessment/Plan:     Principal Problem:    Acute on chronic diastolic congestive heart failure ([address] (6/13/2021) improved. Creatinine is worsening and agree with holding Lasix. Check BMP in the morning. Active Problems:    Hyperlipidemia (1/16/2016)      Rheumatoid arthritis ([address] (1/16/2016)      Hypertension, essential, benign (8/18/2016)      Depression (8/18/2016)      Paroxysmal atrial fibrillation ([address] (3/19/2021) and normal sinus rhythm on amiodarone      Stage 3 chronic kidney disease ([address] (5/20/2021) worse.   Serial creatinine      Acute respiratory failure with hypoxia ([address] (6/14/2021)      Demand ischemia ([address] (6/14/2021)      Leukocytosis (6/14/2021)

## 2021-06-15 NOTE — PROGRESS NOTES
Problem: Falls - Risk of  Goal: *Absence of Falls  Description: Document Lolis Villavicencio Fall Risk and appropriate interventions in the flowsheet.   Outcome: Progressing Towards Goal  Note: Fall Risk Interventions:  Mobility Interventions: Communicate number of staff needed for ambulation/transfer, Patient to call before getting OOB, Utilize walker, cane, or other assistive device              Elimination Interventions: Call light in reach, Patient to call for help with toileting needs, Toileting schedule/hourly rounds    History of Falls Interventions: Evaluate medications/consider consulting pharmacy, Investigate reason for fall         Problem: Patient Education: Go to Patient Education Activity  Goal: Patient/Family Education  Outcome: Progressing Towards Goal     Problem: Patient Education: Go to Patient Education Activity  Goal: Patient/Family Education  Outcome: Progressing Towards Goal     Problem: Patient Education: Go to Patient Education Activity  Goal: Patient/Family Education  Outcome: Progressing Towards Goal

## 2021-06-16 ENCOUNTER — APPOINTMENT (OUTPATIENT)
Dept: GENERAL RADIOLOGY | Age: 81
DRG: 291 | End: 2021-06-16
Attending: FAMILY MEDICINE
Payer: MEDICARE

## 2021-06-16 LAB
ALBUMIN SERPL-MCNC: 2.5 G/DL (ref 3.2–4.6)
ALBUMIN/GLOB SERPL: 0.6 {RATIO} (ref 1.2–3.5)
ALP SERPL-CCNC: 75 U/L (ref 50–136)
ALT SERPL-CCNC: 15 U/L (ref 12–65)
ANION GAP SERPL CALC-SCNC: 6 MMOL/L (ref 7–16)
AST SERPL-CCNC: 12 U/L (ref 15–37)
BASOPHILS # BLD: 0.2 K/UL (ref 0–0.2)
BASOPHILS NFR BLD: 2 % (ref 0–2)
BILIRUB SERPL-MCNC: 0.4 MG/DL (ref 0.2–1.1)
BUN SERPL-MCNC: 19 MG/DL (ref 8–23)
CALCIUM SERPL-MCNC: 8.3 MG/DL (ref 8.3–10.4)
CHLORIDE SERPL-SCNC: 105 MMOL/L (ref 98–107)
CO2 SERPL-SCNC: 30 MMOL/L (ref 21–32)
CREAT SERPL-MCNC: 1.44 MG/DL (ref 0.6–1)
DIFFERENTIAL METHOD BLD: ABNORMAL
EOSINOPHIL # BLD: 1.1 K/UL (ref 0–0.8)
EOSINOPHIL NFR BLD: 9 % (ref 0.5–7.8)
ERYTHROCYTE [DISTWIDTH] IN BLOOD BY AUTOMATED COUNT: 17.8 % (ref 11.9–14.6)
GLOBULIN SER CALC-MCNC: 3.9 G/DL (ref 2.3–3.5)
GLUCOSE SERPL-MCNC: 81 MG/DL (ref 65–100)
HCT VFR BLD AUTO: 30.5 % (ref 35.8–46.3)
HGB BLD-MCNC: 9.3 G/DL (ref 11.7–15.4)
IMM GRANULOCYTES # BLD AUTO: 0 K/UL (ref 0–0.5)
IMM GRANULOCYTES NFR BLD AUTO: 0 % (ref 0–5)
LYMPHOCYTES # BLD: 2 K/UL (ref 0.5–4.6)
LYMPHOCYTES NFR BLD: 16 % (ref 13–44)
MCH RBC QN AUTO: 29.6 PG (ref 26.1–32.9)
MCHC RBC AUTO-ENTMCNC: 30.5 G/DL (ref 31.4–35)
MCV RBC AUTO: 97.1 FL (ref 79.6–97.8)
MONOCYTES # BLD: 1.3 K/UL (ref 0.1–1.3)
MONOCYTES NFR BLD: 11 % (ref 4–12)
NEUTS SEG # BLD: 7.3 K/UL (ref 1.7–8.2)
NEUTS SEG NFR BLD: 62 % (ref 43–78)
NRBC # BLD: 0 K/UL (ref 0–0.2)
PLATELET # BLD AUTO: 306 K/UL (ref 150–450)
PMV BLD AUTO: 11.6 FL (ref 9.4–12.3)
POTASSIUM SERPL-SCNC: 3.7 MMOL/L (ref 3.5–5.1)
PROT SERPL-MCNC: 6.4 G/DL (ref 6.3–8.2)
RBC # BLD AUTO: 3.14 M/UL (ref 4.05–5.2)
SODIUM SERPL-SCNC: 141 MMOL/L (ref 136–145)
WBC # BLD AUTO: 11.9 K/UL (ref 4.3–11.1)

## 2021-06-16 PROCEDURE — 97530 THERAPEUTIC ACTIVITIES: CPT

## 2021-06-16 PROCEDURE — 80053 COMPREHEN METABOLIC PANEL: CPT

## 2021-06-16 PROCEDURE — 74011250637 HC RX REV CODE- 250/637: Performed by: HOSPITALIST

## 2021-06-16 PROCEDURE — 85025 COMPLETE CBC W/AUTO DIFF WBC: CPT

## 2021-06-16 PROCEDURE — 36415 COLL VENOUS BLD VENIPUNCTURE: CPT

## 2021-06-16 PROCEDURE — 74011250637 HC RX REV CODE- 250/637: Performed by: INTERNAL MEDICINE

## 2021-06-16 PROCEDURE — 65660000000 HC RM CCU STEPDOWN

## 2021-06-16 PROCEDURE — 74011250636 HC RX REV CODE- 250/636: Performed by: HOSPITALIST

## 2021-06-16 PROCEDURE — 71045 X-RAY EXAM CHEST 1 VIEW: CPT

## 2021-06-16 PROCEDURE — 99232 SBSQ HOSP IP/OBS MODERATE 35: CPT | Performed by: INTERNAL MEDICINE

## 2021-06-16 RX ORDER — METOPROLOL SUCCINATE 25 MG/1
25 TABLET, EXTENDED RELEASE ORAL DAILY
Status: DISCONTINUED | OUTPATIENT
Start: 2021-06-17 | End: 2021-06-16

## 2021-06-16 RX ORDER — METOPROLOL SUCCINATE 50 MG/1
50 TABLET, EXTENDED RELEASE ORAL DAILY
Status: DISCONTINUED | OUTPATIENT
Start: 2021-06-17 | End: 2021-06-24 | Stop reason: HOSPADM

## 2021-06-16 RX ORDER — METOPROLOL SUCCINATE 25 MG/1
25 TABLET, EXTENDED RELEASE ORAL ONCE
Status: COMPLETED | OUTPATIENT
Start: 2021-06-16 | End: 2021-06-16

## 2021-06-16 RX ADMIN — SPIRONOLACTONE 25 MG: 25 TABLET ORAL at 08:25

## 2021-06-16 RX ADMIN — LEVOTHYROXINE SODIUM 25 MCG: 0.05 TABLET ORAL at 05:50

## 2021-06-16 RX ADMIN — AMIODARONE HYDROCHLORIDE 200 MG: 200 TABLET ORAL at 08:25

## 2021-06-16 RX ADMIN — DULOXETINE HYDROCHLORIDE 60 MG: 60 CAPSULE, DELAYED RELEASE ORAL at 08:24

## 2021-06-16 RX ADMIN — Medication 10 ML: at 21:20

## 2021-06-16 RX ADMIN — Medication 10 ML: at 05:49

## 2021-06-16 RX ADMIN — PANTOPRAZOLE SODIUM 40 MG: 40 TABLET, DELAYED RELEASE ORAL at 05:50

## 2021-06-16 RX ADMIN — FOLIC ACID 1 MG: 1 TABLET ORAL at 08:26

## 2021-06-16 RX ADMIN — Medication 10 ML: at 12:34

## 2021-06-16 RX ADMIN — SENNOSIDES AND DOCUSATE SODIUM 1 TABLET: 8.6; 5 TABLET ORAL at 08:26

## 2021-06-16 RX ADMIN — FUROSEMIDE 40 MG: 10 INJECTION INTRAMUSCULAR; INTRAVENOUS at 16:34

## 2021-06-16 RX ADMIN — POLYETHYLENE GLYCOL 3350 17 G: 17 POWDER, FOR SOLUTION ORAL at 08:28

## 2021-06-16 RX ADMIN — METOPROLOL SUCCINATE 25 MG: 25 TABLET, EXTENDED RELEASE ORAL at 15:05

## 2021-06-16 RX ADMIN — PANTOPRAZOLE SODIUM 40 MG: 40 TABLET, DELAYED RELEASE ORAL at 16:34

## 2021-06-16 RX ADMIN — FUROSEMIDE 40 MG: 10 INJECTION INTRAMUSCULAR; INTRAVENOUS at 08:26

## 2021-06-16 RX ADMIN — SENNOSIDES AND DOCUSATE SODIUM 1 TABLET: 8.6; 5 TABLET ORAL at 21:19

## 2021-06-16 RX ADMIN — VITAMIN D, TAB 1000IU (100/BT) 1000 UNITS: 25 TAB at 08:24

## 2021-06-16 RX ADMIN — ROSUVASTATIN CALCIUM 10 MG: 5 TABLET, COATED ORAL at 21:19

## 2021-06-16 NOTE — PROGRESS NOTES
6/16/2021 1:29 PM    Admit Date: 6/13/2021    Admit Diagnosis: CHF (congestive heart failure) ([address] [I50.9]      Subjective:   Breathing better. No chest pain      Objective:      Visit Vitals  BP (!) 155/72 (BP 1 Location: Left upper arm, BP Patient Position: Sitting)   Pulse 80   Temp 98.2 °F (36.8 °C)   Resp 18   Ht 5' (1.524 m)   Wt 150 lb 6.4 oz (68.2 kg)   SpO2 99%   BMI 29.37 kg/m²       Physical Exam:  Mira Albe, Well Nourished, No Acute Distress, Alert & Oriented x 3, appropriate mood. Neck- supple, no JVD  CV- regular rate and rhythm no MRG  Lung- clear bilaterally  Abd- soft, nontender, nondistended  Ext- no edema bilaterally. Skin- warm and dry        Data Review:   Recent Labs     06/16/21  0501      K 3.7   BUN 19   CREA 1.44*   WBC 11.9*   HGB 9.3*   HCT 30.5*          Assessment/Plan:     Principal Problem:    Acute on chronic diastolic congestive heart failure ([address] (6/13/2021) creatinine has improved. Will resume IV Lasix with close monitoring of the creatinine level    Active Problems:    Hyperlipidemia (1/16/2016)      Rheumatoid arthritis ([address] (1/16/2016)      Hypertension, essential, benign (8/18/2016) blood pressure is elevated will increase Toprol to 50 mg a day      Depression (8/18/2016)      Paroxysmal atrial fibrillation ([address] (3/19/2021)      Stage 3 chronic kidney disease ([address] (5/20/2021) proved serial creatinines.       Acute respiratory failure with hypoxia ([address] (6/14/2021)      Demand ischemia ([address] (6/14/2021)      Leukocytosis (6/14/2021)

## 2021-06-16 NOTE — PROGRESS NOTES
ACUTE PHYSICAL THERAPY GOALS:  (Developed with and agreed upon by patient and/or caregiver. )  LTG:  (1.)Ms. Elli Madera will move from supine to sit and sit to supine , scoot up and down and roll side to side in bed with INDEPENDENT within 7 treatment day(s). (2.)Ms. Elli Madera will transfer from bed to chair and chair to bed with MODIFIED INDEPENDENCE using the least restrictive device within 7 treatment day(s). (3.)Ms. Almeida will ambulate with MODIFIED INDEPENDENCE for 250+ feet with the least restrictive device within 7 treatment day(s) while maintaining normal vital signs. (4.)Ms. Almeida will perform 5 stairs with HR and SBA within 7 treatment days for ascending and descending stairs for home. PHYSICAL THERAPY: Daily Note and AM Treatment Day # 3    Flora Elizondo is a 68 y.o. female   PRIMARY DIAGNOSIS: Acute on chronic diastolic congestive heart failure (HCC)  CHF (congestive heart failure) ([facility] [address] [I50.9]         ASSESSMENT:     REHAB RECOMMENDATIONS: CURRENT LEVEL OF FUNCTION:  (Most Recently Demonstrated)   Recommendation to date pending progress:  [address] [place] [address] Therapy  Equipment:    To Be Determined Bed Mobility:   Not tested  Sit to Stand:   Standby Assistance  Transfers:   Contact Guard Assistance to [facility]  Gait/Mobility:  [employer] Assistance     ASSESSMENT:  Ms. Elli Madera is pleasant and agreeable to therapy, on 2L O2. She is up in chair on contact and ready to ambulate. She ambulated about 110' on RA with rolling walker when her sats decreased to 87%. O2 placed on at 2L to complete ambulation. Her sats then ranged 91-95% with ambulation. She returned to room and discussed activity pacing and not trying to do too much. She performs LE exercises ad sukhdeep. Good progress towards goals. Will continue with POC. SUBJECTIVE:   Ms. Elli Madera states, \"I've been waiting on you. \"    SOCIAL HISTORY/ LIVING ENVIRONMENT: lives alone, MOD I at baseline, friends assist as needed  Home Environment: Private residence  # Steps to Enter: 5  One/Two Story Residence: One story  Living Alone: Yes  Support Systems: Friends \ neighbors  OBJECTIVE:     PAIN: VITAL SIGNS: LINES/DRAINS:   Pre Treatment:    Post Treatment: not rated   IV  O2 Device: Nasal cannula     MOBILITY: I Mod I S SBA CGA Min Mod Max Total  NT x2 Comments:   Bed Mobility    Rolling [] [] [] [] [] [] [] [] [] [] []    Supine to Sit [] [] [] [] [] [] [] [] [] [] []    Scooting [] [] [] [] [] [] [] [] [] [] []    Sit to Supine [] [] [] [] [] [] [] [] [] [] []    Transfers    Sit to Stand [] [] [] [x] [] [] [] [] [] [] []    Bed to Chair [] [] [] [] [] [] [] [] [] [] []    Stand to Sit [] [] [] [x] [] [] [] [] [] [] []    I=Independent, Mod I=Modified Independent, S=Supervision, SBA=Standby Assistance, CGA=Contact Guard Assistance,   Min=Minimal Assistance, Mod=Moderate Assistance, Max=Maximal Assistance, Total=Total Assistance, NT=Not Tested    BALANCE: Good Fair+ Fair Fair- Poor NT Comments   Sitting Static [x] [] [] [] [] []    Sitting Dynamic [x] [] [] [] [] []              Standing Static [] [x] [] [] [] []    Standing Dynamic [] [x] [x] [] [] []      GAIT: I Mod I S SBA CGA Min Mod Max Total  NT x2 Comments:   Level of Assistance [] [] [] [] [x] [] [] [] [] [] [] After 110' RA, sats decreased to 87%. Placed on 2L, sats 91-95% for rest of amb.    Distance 300' with 2 standing rest breaks    DME Rolling Walker    Gait Quality Slow tera    Weightbearing  Status N/A     I=Independent, Mod I=Modified Independent, S=Supervision, SBA=Standby Assistance, CGA=Contact Guard Assistance,   Min=Minimal Assistance, Mod=Moderate Assistance, Max=Maximal Assistance, Total=Total Assistance, NT=Not Tested    PLAN:   FREQUENCY/DURATION: PT Plan of Care: 3 times/week for duration of hospital stay or until stated goals are met, whichever comes first.  TREATMENT:     TREATMENT:   ($$ Therapeutic Activity: 23-37 mins    )  Therapeutic Activity (23 Minutes): Therapeutic activity included Supine to Sit, Scooting, Transfer Training, Ambulation on level ground and Standing balance to improve functional Mobility, Strength and Activity tolerance.     TREATMENT GRID:   Date:  6/15/21 Date:   Date:     Activity/Exercise Parameters Parameters Parameters   Ankle pumps X 15 B     LAQ X 15 B     Hip flexion X 15 B     Hip abd X 15 B                             AFTER TREATMENT POSITION/PRECAUTIONS:  Chair, Needs within reach and RN notified    INTERDISCIPLINARY COLLABORATION:  RN/PCT and PT/PTA    TOTAL TREATMENT DURATION:  PT Patient Time In/Time Out  Time In: 1022  Time Out: [address], PTA

## 2021-06-16 NOTE — PROGRESS NOTES
initial visit. Met with patient at bedside, offered support, assurance of spiritual care staff's prayers.     Sarai Greenelain PRN

## 2021-06-16 NOTE — PROGRESS NOTES
Problem: Falls - Risk of  Goal: *Absence of Falls  Description: Document Susan Aaliyah Fall Risk and appropriate interventions in the flowsheet.   Outcome: Progressing Towards Goal  Note: Fall Risk Interventions:  Mobility Interventions: Bed/chair exit alarm, Patient to call before getting OOB         Medication Interventions: Patient to call before getting OOB, Teach patient to arise slowly    Elimination Interventions: Call light in reach    History of Falls Interventions: Bed/chair exit alarm, Door open when patient unattended

## 2021-06-16 NOTE — PROGRESS NOTES
Epi Hospitalist Progress Note     Name: Jarrell Warren   Age: 68 y.o. Sex: female  : 1940    MRN:     [id number]    Admit Date:  2021    Reason for Admission:  CHF (congestive heart failure) ([facility] [address] [I50.9]    Assessment & Plan     Acute respiratory failure with hypoxia due to acute on chronic CHF exacerbation  Patient was noted to be saturating 88% on room air and requiring 3 L nasal cannula on arrival to the ED. Echocardiogram in 2021 with preserved EF of 55 to 60%. pBNP of 10k. CXR with progressive vascular congestion. Resume Lasix 40 mg IV twice daily  Strict I's and O  Daily weights  - Cardiology on board, appreciate recs  - CXR this AM. On 2LNC, wean as tolerated. Elevated troponins likely due to demand ischemia  Troponin has peaked at 56.9. Patient without any chest pains. EKG with normal sinus rhythm without any specific T wave and ST changes. Hypertension//hyperlipidemia//paroxysmal atrial fibrillation  On amiodarone, apixaban, Crestor, spironolactone. Leukocytosis with history of leukopenia status post Granix (in May 20)  Procalcitonin of less than 0.05, UA without any evidence of UTI. Chest x-ray without any definitive infiltrate or consolidation. WBC has improved    Hypothyroidism: synthroid    Anemia with Hx of melena  Hemoglobin appears to be stable at 9.0 on admission. Appears to be around 8-7 in prior admission. EGD was deferred during last admission due to acute illness  PPI twice daily  Monitor hemoglobin and transfuse as needed. CKD stage II   Baseline creatinine 1.3-1.5, at baseline  - continue monitoring renal function while diuresis    Depression: Continue with Cymbalta  GERD: PPI    Diet:  DIET ADULT  DVT PPx: eliquis  GI Ppx: PPI  Code: Full Code    Dispo / Discharge Planning:  Pending weaning O2 and Cardiology recs. PT/OT Eval--HHPT/OT    Hospital Course/Subjective:     Please refer to the admission H&P for details of presentation.     In summary, Janith Saint is a 68 y.o. 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 Patient was recently discharged from rehab after hospitalization in May for fall with rib fractures. She is being admitted for acute on chronic CHF exacerbation. Subjective/24 hr Events (06/16/21) : Patient is seen and examined at bedside. No acute events reported overnight by nursing staff. Improvement in breathing. On 2LNC now. Lasix held yesterday with Cr elevated. Patient denies fever, chills, chest pains, shortness of breath, n/v, abdominal pain. Tolerating diet and having BM. Review of Systems: 14 point review of systems is otherwise negative with the exception of the elements mentioned above. Objective:     Patient Vitals for the past 24 hrs:   Temp Pulse Resp BP SpO2   06/16/21 1108 98.2 °F (36.8 °C) 80 18 (!) 155/72 99 %   06/16/21 0954     96 %   06/16/21 0720 98.6 °F (37 °C) 84 17 136/68 (!) 84 %   06/16/21 0524 97.6 °F (36.4 °C) 79 18 (!) 157/71 94 %   06/16/21 0032 98 °F (36.7 °C) 83 18 (!) 158/75 97 %   06/15/21 1940 98.4 °F (36.9 °C) 82 18 (!) 148/74 100 %   06/15/21 1555 98.1 °F (36.7 °C) 78 18 136/74 100 %     Oxygen Therapy  O2 Sat (%): 99 % (06/16/21 1108)  Pulse via Oximetry: 81 beats per minute (06/15/21 1016)  O2 Device: Nasal cannula (06/16/21 0720)  Skin Assessment: Clean, dry, & intact (06/16/21 0720)  O2 Flow Rate (L/min): 2 l/min (06/16/21 0720)    Body mass index is 29.37 kg/m². Physical Exam:   General:     alert, awake, no acute distress. Neck:    supple, non-tender. Trachea midline. Lungs:   Mild crackles on RLL, no wheezing.   Cardiac:   RRR, Normal S1 and S2  Abdomen:   Soft, non distended, nontender, +BS, no guarding/rebound  Extremities:   +2 pitting edema , pedal pulses present  Skin:   Warn, dry, normnal turgor and texture; no rash, ulcers   Neuro:  AAOx3. No gross focal neurological deficit  Psychiatric:  No anxiety, calm, cooperative    Data Review:  I have reviewed all labs, meds, and studies from the last 24 hours:    Labs:    Recent Results (from the past 24 hour(s))   CBC WITH AUTOMATED DIFF    Collection Time: 06/16/21  5:01 AM   Result Value Ref Range    WBC 11.9 (H) 4.3 - 11.1 K/uL    RBC 3.14 (L) 4.05 - 5.2 M/uL    HGB 9.3 (L) 11.7 - 15.4 g/dL    HCT 30.5 (L) 35.8 - 46.3 %    MCV 97.1 79.6 - 97.8 FL    MCH 29.6 26.1 - 32.9 PG    MCHC 30.5 (L) 31.4 - 35.0 g/dL    RDW 17.8 (H) 11.9 - 14.6 %    PLATELET 856 580 - 177 K/uL    MPV 11.6 9.4 - 12.3 FL    ABSOLUTE NRBC 0.00 0.0 - 0.2 K/uL    DF AUTOMATED      NEUTROPHILS 62 43 - 78 %    LYMPHOCYTES 16 13 - 44 %    MONOCYTES 11 4.0 - 12.0 %    EOSINOPHILS 9 (H) 0.5 - 7.8 %    BASOPHILS 2 0.0 - 2.0 %    IMMATURE GRANULOCYTES 0 0.0 - 5.0 %    ABS. NEUTROPHILS 7.3 1.7 - 8.2 K/UL    ABS. LYMPHOCYTES 2.0 0.5 - 4.6 K/UL    ABS. MONOCYTES 1.3 0.1 - 1.3 K/UL    ABS. EOSINOPHILS 1.1 (H) 0.0 - 0.8 K/UL    ABS. BASOPHILS 0.2 0.0 - 0.2 K/UL    ABS. IMM. GRANS. 0.0 0.0 - 0.5 K/UL   METABOLIC PANEL, COMPREHENSIVE    Collection Time: 06/16/21  5:01 AM   Result Value Ref Range    Sodium 141 136 - 145 mmol/L    Potassium 3.7 3.5 - 5.1 mmol/L    Chloride 105 98 - 107 mmol/L    CO2 30 21 - 32 mmol/L    Anion gap 6 (L) 7 - 16 mmol/L    Glucose 81 65 - 100 mg/dL    BUN 19 8 - 23 MG/DL    Creatinine 1.44 (H) 0.6 - 1.0 MG/DL    GFR est AA 45 (L) >60 ml/min/1.73m2    GFR est non-AA 37 (L) >60 ml/min/1.73m2    Calcium 8.3 8.3 - 10.4 MG/DL    Bilirubin, total 0.4 0.2 - 1.1 MG/DL    ALT (SGPT) 15 12 - 65 U/L    AST (SGOT) 12 (L) 15 - 37 U/L    Alk.  phosphatase 75 50 - 136 U/L    Protein, total 6.4 6.3 - 8.2 g/dL    Albumin 2.5 (L) 3.2 - 4.6 g/dL    Globulin 3.9 (H) 2.3 - 3.5 g/dL    A-G Ratio 0.6 (L) 1.2 - 3.5         All Micro Results     Procedure Component Value Units Date/Time    CULTURE, BLOOD [[id number]] Collected: 06/13/21 1746    Order Status: Completed Specimen: Blood Updated: 06/16/21 0642     Special Requests: --        LEFT  Antecubital       Culture result: NO GROWTH 3 DAYS       CULTURE, BLOOD [[id number]] Collected: 06/13/21 1749    Order Status: Completed Specimen: Blood Updated: 06/16/21 0642     Special Requests: --        LEFT  HAND       Culture result: NO GROWTH 3 DAYS             EKG Results     Procedure 720 Value Units Date/Time    EKG [[id number]] Collected: 06/13/21 1426    Order Status: Completed Updated: 06/14/21 0705     Ventricular Rate 90 BPM      Atrial Rate 90 BPM      P-R Interval 160 ms      QRS Duration 90 ms      Q-T Interval 316 ms      QTC Calculation (Bezet) 386 ms      Calculated P Axis 55 degrees      Calculated R Axis 81 degrees      Calculated T Axis 24 degrees      Diagnosis --     Normal sinus rhythm  Nonspecific T wave abnormality  Abnormal ECG  When compared with ECG of 12-MAY-2021 10:32,  Right bundle branch block is no longer Present  Confirmed by [facility] LAVINIA & JOVITA [facility]  MD (), Tad Standard ([id number]) on 6/14/2021 7:05:02 AM            Other Studies:  XR CHEST PORT    Result Date: 6/13/2021  1 View portable chest x-ray 6/13/2021 2:42 PM Indication: History of pneumonia, shortness of breath. Syncopal episode. Comparison: Prior studies-most recent 5/21/2021 Findings: This portable upright AP chest at 1437 shows the patient again rotated somewhat toward the left. Cardiomediastinal silhouette stable. Vascular pedicle appearance probably slightly more pronounced today. Mildly more progressed bibasilar hazy/veiling opacities which may represent layering effusions compared to the most recent study. Compared to older studies these are indolently progressing. Old right rib fractures again seen.      Increased vascular congestion today, patient probably has progressing basilar effusions-now of small/moderate volume. Appearance most suggestive of progressing vascular congestion. Correlate correlate clinically, consider serum BNP.           Current Meds:   Current Facility-Administered Medications   Medication Dose Route Frequency    [START ON 6/17/2021] metoprolol succinate (TOPROL-XL) XL tablet 25 mg  25 mg Oral DAILY    sodium chloride (NS) flush 5-10 mL  5-10 mL IntraVENous PRN    amiodarone (CORDARONE) tablet 200 mg  200 mg Oral DAILY    apixaban (ELIQUIS) tablet 5 mg  5 mg Oral BID    cholecalciferol (VITAMIN D3) (1000 Units /25 mcg) tablet 1,000 Units  1,000 Units Oral DAILY    DULoxetine (CYMBALTA) capsule 60 mg  60 mg Oral DAILY    levothyroxine (SYNTHROID) tablet 25 mcg  25 mcg Oral ACB    folic acid (FOLVITE) tablet 1 mg  1 mg Oral DAILY    ondansetron (ZOFRAN ODT) tablet 4 mg  4 mg Oral Q8H PRN    pantoprazole (PROTONIX) tablet 40 mg  40 mg Oral ACB&D    rosuvastatin (CRESTOR) tablet 10 mg  10 mg Oral QHS    spironolactone (ALDACTONE) tablet 25 mg  25 mg Oral DAILY    sodium chloride (NS) flush 5-40 mL  5-40 mL IntraVENous Q8H    sodium chloride (NS) flush 5-40 mL  5-40 mL IntraVENous PRN    acetaminophen (TYLENOL) tablet 650 mg  650 mg Oral Q6H PRN    Or    acetaminophen (TYLENOL) suppository 650 mg  650 mg Rectal Q6H PRN    polyethylene glycol (MIRALAX) packet 17 g  17 g Oral DAILY    senna-docusate (PERICOLACE) 8.6-50 mg per tablet 1 Tablet  1 Tablet Oral BID    magnesium hydroxide (MILK OF MAGNESIA) 400 mg/5 mL oral suspension 30 mL  30 mL Oral DAILY PRN    bisacodyL (DULCOLAX) suppository 10 mg  10 mg Rectal DAILY PRN    furosemide (LASIX) injection 40 mg  40 mg IntraVENous BID    captopriL (CAPOTEN) tablet 12.5 mg  12.5 mg Oral TID PRN       Problem List:  Hospital Problems as of 6/16/2021 Date Reviewed: 4/13/2021        Codes Class Noted - Resolved POA    Acute respiratory failure with hypoxia ([facility] ICD-10-CM: J96.01  ICD-9-CM: 518.81  6/14/2021 - Present Yes        Demand ischemia ([facility] ICD-10-CM: I24.8  ICD-9-CM: 411.89  6/14/2021 - Present Yes        Leukocytosis ICD-10-CM: M93.235  ICD-9-CM: 288.60  6/14/2021 - Present Yes        * (Principal) Acute on chronic diastolic congestive heart failure (HCC) ICD-10-CM: I50.33  ICD-9-CM: 428.33, 428.0  6/13/2021 - Present Yes        Stage 3 chronic kidney disease ([facility] (Chronic) ICD-10-CM: N18.30  ICD-9-CM: 585.3  5/20/2021 - Present Yes        Paroxysmal atrial fibrillation (HCC) (Chronic) ICD-10-CM: I48.0  ICD-9-CM: 427.31  3/19/2021 - Present Yes        Hypertension, essential, benign (Chronic) ICD-10-CM: I10  ICD-9-CM: 401.1  8/18/2016 - Present Yes        Depression ICD-10-CM: F32.9  ICD-9-CM: 966  8/18/2016 - Present Yes        Hyperlipidemia (Chronic) ICD-10-CM: E78.5  ICD-9-CM: 272.4  1/16/2016 - Present Yes        Rheumatoid arthritis ([facility] (Chronic) ICD-10-CM: M06.9  ICD-9-CM: 714.0  1/16/2016 - Present Yes               Part of this note was written by using a voice dictation software and the note has been proof read but may still contain some grammatical/other typographical errors.     Signed By: Melissa Beltrán DO   [employer] [facility]    June 16, 2021  12:57 PM

## 2021-06-17 LAB
ALBUMIN SERPL-MCNC: 2.4 G/DL (ref 3.2–4.6)
ALBUMIN/GLOB SERPL: 0.6 {RATIO} (ref 1.2–3.5)
ALP SERPL-CCNC: 72 U/L (ref 50–136)
ALT SERPL-CCNC: 14 U/L (ref 12–65)
ANION GAP SERPL CALC-SCNC: 4 MMOL/L (ref 7–16)
AST SERPL-CCNC: 10 U/L (ref 15–37)
BASOPHILS # BLD: 0.2 K/UL (ref 0–0.2)
BASOPHILS NFR BLD: 2 % (ref 0–2)
BILIRUB SERPL-MCNC: 0.3 MG/DL (ref 0.2–1.1)
BUN SERPL-MCNC: 21 MG/DL (ref 8–23)
CALCIUM SERPL-MCNC: 8.2 MG/DL (ref 8.3–10.4)
CHLORIDE SERPL-SCNC: 101 MMOL/L (ref 98–107)
CO2 SERPL-SCNC: 34 MMOL/L (ref 21–32)
CREAT SERPL-MCNC: 1.49 MG/DL (ref 0.6–1)
DIFFERENTIAL METHOD BLD: ABNORMAL
EOSINOPHIL # BLD: 0.9 K/UL (ref 0–0.8)
EOSINOPHIL NFR BLD: 8 % (ref 0.5–7.8)
ERYTHROCYTE [DISTWIDTH] IN BLOOD BY AUTOMATED COUNT: 17.2 % (ref 11.9–14.6)
GLOBULIN SER CALC-MCNC: 3.8 G/DL (ref 2.3–3.5)
GLUCOSE SERPL-MCNC: 91 MG/DL (ref 65–100)
HCT VFR BLD AUTO: 31.1 % (ref 35.8–46.3)
HGB BLD-MCNC: 9.6 G/DL (ref 11.7–15.4)
IMM GRANULOCYTES # BLD AUTO: 0 K/UL (ref 0–0.5)
IMM GRANULOCYTES NFR BLD AUTO: 0 % (ref 0–5)
LYMPHOCYTES # BLD: 1.9 K/UL (ref 0.5–4.6)
LYMPHOCYTES NFR BLD: 17 % (ref 13–44)
MCH RBC QN AUTO: 29.6 PG (ref 26.1–32.9)
MCHC RBC AUTO-ENTMCNC: 30.9 G/DL (ref 31.4–35)
MCV RBC AUTO: 96 FL (ref 79.6–97.8)
MONOCYTES # BLD: 1.3 K/UL (ref 0.1–1.3)
MONOCYTES NFR BLD: 12 % (ref 4–12)
NEUTS SEG # BLD: 7 K/UL (ref 1.7–8.2)
NEUTS SEG NFR BLD: 62 % (ref 43–78)
NRBC # BLD: 0 K/UL (ref 0–0.2)
PLATELET # BLD AUTO: 296 K/UL (ref 150–450)
PMV BLD AUTO: 11.5 FL (ref 9.4–12.3)
POTASSIUM SERPL-SCNC: 3.7 MMOL/L (ref 3.5–5.1)
PROT SERPL-MCNC: 6.2 G/DL (ref 6.3–8.2)
RBC # BLD AUTO: 3.24 M/UL (ref 4.05–5.2)
SODIUM SERPL-SCNC: 139 MMOL/L (ref 136–145)
WBC # BLD AUTO: 11.3 K/UL (ref 4.3–11.1)

## 2021-06-17 PROCEDURE — 65660000000 HC RM CCU STEPDOWN

## 2021-06-17 PROCEDURE — 99232 SBSQ HOSP IP/OBS MODERATE 35: CPT | Performed by: INTERNAL MEDICINE

## 2021-06-17 PROCEDURE — 97530 THERAPEUTIC ACTIVITIES: CPT

## 2021-06-17 PROCEDURE — 74011250637 HC RX REV CODE- 250/637: Performed by: INTERNAL MEDICINE

## 2021-06-17 PROCEDURE — 74011250637 HC RX REV CODE- 250/637: Performed by: HOSPITALIST

## 2021-06-17 PROCEDURE — 85025 COMPLETE CBC W/AUTO DIFF WBC: CPT

## 2021-06-17 PROCEDURE — 80053 COMPREHEN METABOLIC PANEL: CPT

## 2021-06-17 PROCEDURE — 36415 COLL VENOUS BLD VENIPUNCTURE: CPT

## 2021-06-17 PROCEDURE — 74011250636 HC RX REV CODE- 250/636: Performed by: HOSPITALIST

## 2021-06-17 RX ADMIN — FUROSEMIDE 40 MG: 10 INJECTION INTRAMUSCULAR; INTRAVENOUS at 08:25

## 2021-06-17 RX ADMIN — FOLIC ACID 1 MG: 1 TABLET ORAL at 08:25

## 2021-06-17 RX ADMIN — PANTOPRAZOLE SODIUM 40 MG: 40 TABLET, DELAYED RELEASE ORAL at 17:08

## 2021-06-17 RX ADMIN — PANTOPRAZOLE SODIUM 40 MG: 40 TABLET, DELAYED RELEASE ORAL at 04:52

## 2021-06-17 RX ADMIN — Medication 10 ML: at 14:46

## 2021-06-17 RX ADMIN — Medication 10 ML: at 22:28

## 2021-06-17 RX ADMIN — DULOXETINE HYDROCHLORIDE 60 MG: 60 CAPSULE, DELAYED RELEASE ORAL at 08:25

## 2021-06-17 RX ADMIN — FUROSEMIDE 40 MG: 10 INJECTION INTRAMUSCULAR; INTRAVENOUS at 17:08

## 2021-06-17 RX ADMIN — POLYETHYLENE GLYCOL 3350 17 G: 17 POWDER, FOR SOLUTION ORAL at 08:26

## 2021-06-17 RX ADMIN — SENNOSIDES AND DOCUSATE SODIUM 1 TABLET: 8.6; 5 TABLET ORAL at 08:26

## 2021-06-17 RX ADMIN — METOPROLOL SUCCINATE 50 MG: 50 TABLET, EXTENDED RELEASE ORAL at 08:26

## 2021-06-17 RX ADMIN — AMIODARONE HYDROCHLORIDE 200 MG: 200 TABLET ORAL at 08:26

## 2021-06-17 RX ADMIN — SENNOSIDES AND DOCUSATE SODIUM 1 TABLET: 8.6; 5 TABLET ORAL at 22:28

## 2021-06-17 RX ADMIN — LEVOTHYROXINE SODIUM 25 MCG: 0.05 TABLET ORAL at 04:52

## 2021-06-17 RX ADMIN — VITAMIN D, TAB 1000IU (100/BT) 1000 UNITS: 25 TAB at 08:26

## 2021-06-17 RX ADMIN — ROSUVASTATIN CALCIUM 10 MG: 5 TABLET, COATED ORAL at 22:28

## 2021-06-17 RX ADMIN — SPIRONOLACTONE 25 MG: 25 TABLET ORAL at 08:26

## 2021-06-17 NOTE — PROGRESS NOTES
Epi Hospitalist Progress Note     Name: Tess Quiroz   Age: 68 y.o. Sex: female  : 1940    MRN:     [id number]    Admit Date:  2021    Reason for Admission:  CHF (congestive heart failure) ([facility] [address] [I50.9]    Assessment & Plan     Acute respiratory failure with hypoxia due to acute on chronic CHF exacerbation  Patient was noted to be saturating 88% on room air and requiring 3 L nasal cannula on arrival to the ED. Echocardiogram in 2021 with preserved EF of 55 to 60%. pBNP of 10k. CXR with progressive vascular congestion. Strict I's and O. Daily weights  Cont Lasix 40 mg IV for 24-36 more hours then transition to po per Cards res  Cardiology on board, appreciate recs  On 2LNC, wean as tolerated. Elevated troponins likely due to demand ischemia  Troponin has peaked at 56.9. Patient without any chest pains. EKG with normal sinus rhythm without any specific T wave and ST changes. Hypertension//hyperlipidemia//paroxysmal atrial fibrillation  Cont amiodarone, apixaban, Crestor, spironolactone. Leukocytosis with history of leukopenia status post Granix (in May 20)  Procalcitonin of less than 0.05, UA without any evidence of UTI. Chest x-ray without any definitive infiltrate or consolidation. WBC has improved    Hypothyroidism: synthroid    Anemia with Hx of melena  Hemoglobin appears to be stable at 9.0 on admission. Appears to be around 8-7 in prior admission. EGD was deferred during last admission due to acute illness  Cont PPI twice daily  Monitor hemoglobin and transfuse as needed  Hgb stable    CKD stage II   Baseline creatinine 1.3-1.5, at baseline  Continue monitoring renal function while diuresis    Depression: Continue with Cymbalta  GERD: PPI    Diet:  DIET ADULT  DVT PPx: eliquis  GI Ppx: PPI  Code: Full Code    Dispo / Discharge Planning:  Pending weaning O2 and Cardiology recs.  PT/OT Eval--HHPT/OT    Hospital Course/Subjective:     Please refer to the admission H&P for details of presentation. In summary, Arley Pacheco is a 68 y.o. 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 Patient was recently discharged from rehab after hospitalization in May for fall with rib fractures. She is being admitted for acute on chronic CHF exacerbation. Cardiology was consulted. Subjective/24 hr Events (06/17/21) : Patient is seen and examined at bedside. No acute events reported overnight by nursing staff. Improvement in SOB. Lasix resumed yesterday. No complaints. Patient denies fever, chills, chest pains, shortness of breath, n/v, abdominal pain. Tolerating diet and having BM. Review of Systems: 14 point review of systems is otherwise negative with the exception of the elements mentioned above. Objective:     Patient Vitals for the past 24 hrs:   Temp Pulse Resp BP SpO2   06/17/21 0742 97.6 °F (36.4 °C) 66 18 130/67 98 %   06/17/21 0430 98.5 °F (36.9 °C) 69 18 134/73 94 %   06/16/21 2323 97.9 °F (36.6 °C) 72 18 134/71 97 %   06/16/21 1936 98.5 °F (36.9 °C) 74 17 136/68 99 %   06/16/21 1525 98.4 °F (36.9 °C) 79 17 137/75 97 %   06/16/21 1108 98.2 °F (36.8 °C) 80 18 (!) 155/72 99 %   06/16/21 0954     96 %     Oxygen Therapy  O2 Sat (%): 98 % (06/17/21 0742)  Pulse via Oximetry: 81 beats per minute (06/15/21 1016)  O2 Device: Nasal cannula (06/16/21 0720)  Skin Assessment: Clean, dry, & intact (06/16/21 0720)  O2 Flow Rate (L/min): 2 l/min (06/16/21 0720)    Body mass index is 28.65 kg/m². Physical Exam:   General:     alert, awake, no acute distress. Neck:    supple, non-tender. Trachea midline.    Lungs:   CTA b/l  Cardiac:   RRR, Normal S1 and S2  Abdomen:   Soft, non distended, nontender, +BS, no guarding/rebound  Extremities:   +1 pitting edema , pedal pulses present  Skin:   Warm, dry, normal turgor and texture; no rash, ulcers   Neuro:  AAOx3. No gross focal neurological deficit  Psychiatric:  No anxiety, calm, cooperative    Data Review:  I have reviewed all labs, meds, and studies from the last 24 hours:    Labs:    Recent Results (from the past 24 hour(s))   CBC WITH AUTOMATED DIFF    Collection Time: 06/17/21  5:12 AM   Result Value Ref Range    WBC 11.3 (H) 4.3 - 11.1 K/uL    RBC 3.24 (L) 4.05 - 5.2 M/uL    HGB 9.6 (L) 11.7 - 15.4 g/dL    HCT 31.1 (L) 35.8 - 46.3 %    MCV 96.0 79.6 - 97.8 FL    MCH 29.6 26.1 - 32.9 PG    MCHC 30.9 (L) 31.4 - 35.0 g/dL    RDW 17.2 (H) 11.9 - 14.6 %    PLATELET 741 508 - 134 K/uL    MPV 11.5 9.4 - 12.3 FL    ABSOLUTE NRBC 0.00 0.0 - 0.2 K/uL    DF AUTOMATED      NEUTROPHILS 62 43 - 78 %    LYMPHOCYTES 17 13 - 44 %    MONOCYTES 12 4.0 - 12.0 %    EOSINOPHILS 8 (H) 0.5 - 7.8 %    BASOPHILS 2 0.0 - 2.0 %    IMMATURE GRANULOCYTES 0 0.0 - 5.0 %    ABS. NEUTROPHILS 7.0 1.7 - 8.2 K/UL    ABS. LYMPHOCYTES 1.9 0.5 - 4.6 K/UL    ABS. MONOCYTES 1.3 0.1 - 1.3 K/UL    ABS. EOSINOPHILS 0.9 (H) 0.0 - 0.8 K/UL    ABS. BASOPHILS 0.2 0.0 - 0.2 K/UL    ABS. IMM. GRANS. 0.0 0.0 - 0.5 K/UL   METABOLIC PANEL, COMPREHENSIVE    Collection Time: 06/17/21  5:12 AM   Result Value Ref Range    Sodium 139 136 - 145 mmol/L    Potassium 3.7 3.5 - 5.1 mmol/L    Chloride 101 98 - 107 mmol/L    CO2 34 (H) 21 - 32 mmol/L    Anion gap 4 (L) 7 - 16 mmol/L    Glucose 91 65 - 100 mg/dL    BUN 21 8 - 23 MG/DL    Creatinine 1.49 (H) 0.6 - 1.0 MG/DL    GFR est AA 44 (L) >60 ml/min/1.73m2    GFR est non-AA 36 (L) >60 ml/min/1.73m2    Calcium 8.2 (L) 8.3 - 10.4 MG/DL    Bilirubin, total 0.3 0.2 - 1.1 MG/DL    ALT (SGPT) 14 12 - 65 U/L    AST (SGOT) 10 (L) 15 - 37 U/L    Alk.  phosphatase 72 50 - 136 U/L    Protein, total 6.2 (L) 6.3 - 8.2 g/dL    Albumin 2.4 (L) 3.2 - 4.6 g/dL    Globulin 3.8 (H) 2.3 - 3.5 g/dL A-G Ratio 0.6 (L) 1.2 - 3.5         All Micro Results     Procedure Component Value Units Date/Time    CULTURE, BLOOD [[id number]] Collected: 06/13/21 1746    Order Status: Completed Specimen: Blood Updated: 06/16/21 0642     Special Requests: --        LEFT  Antecubital       Culture result: NO GROWTH 3 DAYS       CULTURE, BLOOD [[id number]] Collected: 06/13/21 1749    Order Status: Completed Specimen: Blood Updated: 06/16/21 0642     Special Requests: --        LEFT  HAND       Culture result: NO GROWTH 3 DAYS             EKG Results     Procedure 720 Value Units Date/Time    EKG [[id number]] Collected: 06/13/21 1426    Order Status: Completed Updated: 06/14/21 0705     Ventricular Rate 90 BPM      Atrial Rate 90 BPM      P-R Interval 160 ms      QRS Duration 90 ms      Q-T Interval 316 ms      QTC Calculation (Bezet) 386 ms      Calculated P Axis 55 degrees      Calculated R Axis 81 degrees      Calculated T Axis 24 degrees      Diagnosis --     Normal sinus rhythm  Nonspecific T wave abnormality  Abnormal ECG  When compared with ECG of 12-MAY-2021 10:32,  Right bundle branch block is no longer Present  Confirmed by [facility] LAVINIA & JOVITA [facility]  MD (), Kyung Crump ([id number]) on 6/14/2021 7:05:02 AM            Other Studies:  XR CHEST PORT    Result Date: 6/13/2021  1 View portable chest x-ray 6/13/2021 2:42 PM Indication: History of pneumonia, shortness of breath. Syncopal episode. Comparison: Prior studies-most recent 5/21/2021 Findings: This portable upright AP chest at 1437 shows the patient again rotated somewhat toward the left. Cardiomediastinal silhouette stable. Vascular pedicle appearance probably slightly more pronounced today. Mildly more progressed bibasilar hazy/veiling opacities which may represent layering effusions compared to the most recent study. Compared to older studies these are indolently progressing. Old right rib fractures again seen.      Increased vascular congestion today, patient probably has progressing basilar effusions-now of small/moderate volume. Appearance most suggestive of progressing vascular congestion. Correlate correlate clinically, consider serum BNP.           Current Meds:   Current Facility-Administered Medications   Medication Dose Route Frequency    metoprolol succinate (TOPROL-XL) XL tablet 50 mg  50 mg Oral DAILY    sodium chloride (NS) flush 5-10 mL  5-10 mL IntraVENous PRN    amiodarone (CORDARONE) tablet 200 mg  200 mg Oral DAILY    apixaban (ELIQUIS) tablet 5 mg  5 mg Oral BID    cholecalciferol (VITAMIN D3) (1000 Units /25 mcg) tablet 1,000 Units  1,000 Units Oral DAILY    DULoxetine (CYMBALTA) capsule 60 mg  60 mg Oral DAILY    levothyroxine (SYNTHROID) tablet 25 mcg  25 mcg Oral ACB    folic acid (FOLVITE) tablet 1 mg  1 mg Oral DAILY    ondansetron (ZOFRAN ODT) tablet 4 mg  4 mg Oral Q8H PRN    pantoprazole (PROTONIX) tablet 40 mg  40 mg Oral ACB&D    rosuvastatin (CRESTOR) tablet 10 mg  10 mg Oral QHS    spironolactone (ALDACTONE) tablet 25 mg  25 mg Oral DAILY    sodium chloride (NS) flush 5-40 mL  5-40 mL IntraVENous Q8H    sodium chloride (NS) flush 5-40 mL  5-40 mL IntraVENous PRN    acetaminophen (TYLENOL) tablet 650 mg  650 mg Oral Q6H PRN    Or    acetaminophen (TYLENOL) suppository 650 mg  650 mg Rectal Q6H PRN    polyethylene glycol (MIRALAX) packet 17 g  17 g Oral DAILY    senna-docusate (PERICOLACE) 8.6-50 mg per tablet 1 Tablet  1 Tablet Oral BID    magnesium hydroxide (MILK OF MAGNESIA) 400 mg/5 mL oral suspension 30 mL  30 mL Oral DAILY PRN    bisacodyL (DULCOLAX) suppository 10 mg  10 mg Rectal DAILY PRN    furosemide (LASIX) injection 40 mg  40 mg IntraVENous BID    captopriL (CAPOTEN) tablet 12.5 mg  12.5 mg Oral TID PRN       Problem List:  Hospital Problems as of 6/17/2021 Date Reviewed: 4/13/2021        Codes Class Noted - Resolved POA    Acute respiratory failure with hypoxia (HCC) ICD-10-CM: J96.01  ICD-9-CM: 518.81  6/14/2021 - Present Yes Demand ischemia [facility]) ICD-10-CM: I24.8  ICD-9-CM: 411.89  6/14/2021 - Present Yes        Leukocytosis ICD-10-CM: A54.278  ICD-9-CM: 288.60  6/14/2021 - Present Yes        * (Principal) Acute on chronic diastolic congestive heart failure (HCC) ICD-10-CM: I50.33  ICD-9-CM: 428.33, 428.0  6/13/2021 - Present Yes        Stage 3 chronic kidney disease ([facility] (Chronic) ICD-10-CM: N18.30  ICD-9-CM: 585.3  5/20/2021 - Present Yes        Paroxysmal atrial fibrillation (HCC) (Chronic) ICD-10-CM: I48.0  ICD-9-CM: 427.31  3/19/2021 - Present Yes        Hypertension, essential, benign (Chronic) ICD-10-CM: I10  ICD-9-CM: 401.1  8/18/2016 - Present Yes        Depression ICD-10-CM: F32.9  ICD-9-CM: 164  8/18/2016 - Present Yes        Hyperlipidemia (Chronic) ICD-10-CM: E78.5  ICD-9-CM: 272.4  1/16/2016 - Present Yes        Rheumatoid arthritis ([facility] (Chronic) ICD-10-CM: M06.9  ICD-9-CM: 714.0  1/16/2016 - Present Yes               Part of this note was written by using a voice dictation software and the note has been proof read but may still contain some grammatical/other typographical errors.     Signed By: DO [facility]    June 17, 2021  12:57 PM

## 2021-06-17 NOTE — PROGRESS NOTES
am  6/17/2021 7:09 AM    Admit Date: 6/13/2021    Admit Diagnosis: CHF (congestive heart failure) ([facility] [I50.9]      Subjective:    Patient : Seems to be improving somewhat she is now getting IV Lasix and is on other appropriate medicines blood pressure is better controlled pulse is controlled well    Objective:      Visit Vitals  /73   Pulse 69   Temp 98.5 °F (36.9 °C)   Resp 18   Ht 5' (1.524 m)   Wt 146 lb 11.2 oz (66.5 kg)   SpO2 94%   BMI 28.65 kg/m²       ROS:  General ROS: negative for - chills  Hematological and Lymphatic ROS: negative for - blood clots or jaundice  Respiratory ROS: no cough, shortness of breath, or wheezing  Cardiovascular ROS: no chest pain or dyspnea on exertion  Gastrointestinal ROS: no abdominal pain, change in bowel habits, or black or bloody stools  Neurological ROS: no TIA or stroke symptoms    Physical Exam:      Physical Examination: General appearance - Appearance: chronically ill appearing.    Neck/lymph - supple, no significant adenopathy  Chest/CV - clear to auscultation, no wheezes, rales or rhonchi, symmetric air entry  Heart - normal rate, regular rhythm, normal S1, S2, no murmurs, rubs, clicks or gallops  Abdomen/GI - soft, nontender, nondistended, no masses or organomegaly   Musculoskeletal - no joint tenderness, deformity or swelling  Extremities - peripheral pulses normal, no pedal edema, no clubbing or cyanosis  Skin - normal coloration and turgor, no rashes, no suspicious skin lesions noted    Current Facility-Administered Medications   Medication Dose Route Frequency    metoprolol succinate (TOPROL-XL) XL tablet 50 mg  50 mg Oral DAILY    sodium chloride (NS) flush 5-10 mL  5-10 mL IntraVENous PRN    amiodarone (CORDARONE) tablet 200 mg  200 mg Oral DAILY    apixaban (ELIQUIS) tablet 5 mg  5 mg Oral BID    cholecalciferol (VITAMIN D3) (1000 Units /25 mcg) tablet 1,000 Units  1,000 Units Oral DAILY    DULoxetine (CYMBALTA) capsule 60 mg  60 mg Oral DAILY    levothyroxine (SYNTHROID) tablet 25 mcg  25 mcg Oral ACB    folic acid (FOLVITE) tablet 1 mg  1 mg Oral DAILY    ondansetron (ZOFRAN ODT) tablet 4 mg  4 mg Oral Q8H PRN    pantoprazole (PROTONIX) tablet 40 mg  40 mg Oral ACB&D    rosuvastatin (CRESTOR) tablet 10 mg  10 mg Oral QHS    spironolactone (ALDACTONE) tablet 25 mg  25 mg Oral DAILY    sodium chloride (NS) flush 5-40 mL  5-40 mL IntraVENous Q8H    sodium chloride (NS) flush 5-40 mL  5-40 mL IntraVENous PRN    acetaminophen (TYLENOL) tablet 650 mg  650 mg Oral Q6H PRN    Or    acetaminophen (TYLENOL) suppository 650 mg  650 mg Rectal Q6H PRN    polyethylene glycol (MIRALAX) packet 17 g  17 g Oral DAILY    senna-docusate (PERICOLACE) 8.6-50 mg per tablet 1 Tablet  1 Tablet Oral BID    magnesium hydroxide (MILK OF MAGNESIA) 400 mg/5 mL oral suspension 30 mL  30 mL Oral DAILY PRN    bisacodyL (DULCOLAX) suppository 10 mg  10 mg Rectal DAILY PRN    furosemide (LASIX) injection 40 mg  40 mg IntraVENous BID    captopriL (CAPOTEN) tablet 12.5 mg  12.5 mg Oral TID PRN       Data Review: data included in this note has been independently reviewed by the author   CMP:   Lab Results   Component Value Date/Time     06/17/2021 05:12 AM    K 3.7 06/17/2021 05:12 AM     06/17/2021 05:12 AM    CO2 34 (H) 06/17/2021 05:12 AM    AGAP 4 (L) 06/17/2021 05:12 AM    GLU 91 06/17/2021 05:12 AM    BUN 21 06/17/2021 05:12 AM    CREA 1.49 (H) 06/17/2021 05:12 AM    GFRAA 44 (L) 06/17/2021 05:12 AM    GFRNA 36 (L) 06/17/2021 05:12 AM    CA 8.2 (L) 06/17/2021 05:12 AM    ALB 2.4 (L) 06/17/2021 05:12 AM    TP 6.2 (L) 06/17/2021 05:12 AM    GLOB 3.8 (H) 06/17/2021 05:12 AM    AGRAT 0.6 (L) 06/17/2021 05:12 AM    ALT 14 06/17/2021 05:12 AM     CBC:   Lab Results   Component Value Date/Time    WBC 11.3 (H) 06/17/2021 05:12 AM    HGB 9.6 (L) 06/17/2021 05:12 AM    HCT 31.1 (L) 06/17/2021 05:12 AM     06/17/2021 05:12 AM        TELEMETRY: nsr    Assessment/Plan:     Principal Problem:    Acute on chronic diastolic congestive heart failure ([address] (6/13/2021)  Improving continue IV Lasix for 24-36 more hours then transition to p.o. Active Problems:    Hyperlipidemia (1/16/2016)    The current medical regimen is effective;  continue present plan and medications. Rheumatoid arthritis ([address] (1/16/2016)           Hypertension, essential, benign (8/18/2016)    The current medical regimen is effective;  continue present plan and medications. Depression (8/18/2016)           Paroxysmal atrial fibrillation ([address] (3/19/2021)  Controlled continue current meds      Stage 3 chronic kidney disease ([address] (5/20/2021)    Estimated Creatinine Clearance: 26.5 mL/min (A) (based on SCr of 1.49 mg/dL (H)).         Acute respiratory failure with hypoxia ([address] (6/14/2021)           Demand ischemia ([address] (6/14/2021)  No ischemic work-up planned      Leukocytosis (6/14/2021)                  Tommie Preston MD

## 2021-06-17 NOTE — PROGRESS NOTES
ACUTE PHYSICAL THERAPY GOALS:  (Developed with and agreed upon by patient and/or caregiver. )  LTG:  (1.)Ms. Rolando Armenta will move from supine to sit and sit to supine , scoot up and down and roll side to side in bed with INDEPENDENT within 7 treatment day(s). (2.)Ms. Rolando Armenta will transfer from bed to chair and chair to bed with MODIFIED INDEPENDENCE using the least restrictive device within 7 treatment day(s). (3.)Ms. Almeida will ambulate with MODIFIED INDEPENDENCE for 250+ feet with the least restrictive device within 7 treatment day(s) while maintaining normal vital signs. (4.)Ms. Almeida will perform 5 stairs with HR and SBA within 7 treatment days for ascending and descending stairs for home. PHYSICAL THERAPY: Daily Note and AM Treatment Day # 4    Jorge Kim is a 68 y.o. female   PRIMARY DIAGNOSIS: Acute on chronic diastolic congestive heart failure (HCC)  CHF (congestive heart failure) ([facility] [I50.9]         ASSESSMENT:     REHAB RECOMMENDATIONS: CURRENT LEVEL OF FUNCTION:  (Most Recently Demonstrated)   Recommendation to date pending progress:  [address] [place] [address] Therapy  Equipment:    To Be Determined Bed Mobility:   Not tested  Sit to Stand:   Standby Assistance  Transfers:   Standby Assistance   Gait/Mobility:   Standby Assistance to [facility] 5:  Ms. Rolando Armenta is sitting up in chair and is very pleasant and agreeable to therapy, on 2L O2. She stood and ambulated to the stairs and managed 2 steps with rails and close CGA. She did need a couple minutes to recover and then ambulated as below. She reports she has 5 steps at home and normally goes up 3, takes a rest, and then completes the other 2. She was on 2L O2 throughout session with sats 93-97%. She returned to chair and was left sitting up with needs in reach. Again discussed activity pacing and not trying to do too much, giving herself time to recover. Good progress towards goals. Will continue with POC.       SUBJECTIVE: Ms. Elli Madera states, Ze Nevarez you come see me in the morning? \"    SOCIAL HISTORY/ LIVING ENVIRONMENT: lives alone, MOD I at baseline, friends assist as needed  Home Environment: Private residence  # Steps to Enter: 5  One/Two Story Residence: One story  Living Alone: Yes  Support Systems: Friends \ neighbors  OBJECTIVE:     PAIN: VITAL SIGNS: LINES/DRAINS:   Pre Treatment: Pain Screen  Pain Scale 1: Numeric (0 - 10)  Pain Intensity 1: 0  Post Treatment: not rated   IV  O2 Device: Nasal cannula     MOBILITY: I Mod I S SBA CGA Min Mod Max Total  NT x2 Comments:   Bed Mobility    Rolling [] [] [] [] [] [] [] [] [] [] []    Supine to Sit [] [] [] [] [] [] [] [] [] [] []    Scooting [] [] [] [] [] [] [] [] [] [] []    Sit to Supine [] [] [] [] [] [] [] [] [] [] []    Transfers    Sit to Stand [] [] [] [x] [] [] [] [] [] [] []    Bed to Chair [] [] [] [] [] [] [] [] [] [] []    Stand to Sit [] [] [] [x] [] [] [] [] [] [] []    I=Independent, Mod I=Modified Independent, S=Supervision, SBA=Standby Assistance, CGA=Contact Guard Assistance,   Min=Minimal Assistance, Mod=Moderate Assistance, Max=Maximal Assistance, Total=Total Assistance, NT=Not Tested    BALANCE: Good Fair+ Fair Fair- Poor NT Comments   Sitting Static [x] [] [] [] [] []    Sitting Dynamic [x] [] [] [] [] []              Standing Static [] [x] [] [] [] []    Standing Dynamic [] [x] [x] [] [] []      GAIT: I Mod I S SBA CGA Min Mod Max Total  NT x2 Comments:   Level of Assistance [] [] [] [] [x] [] [] [] [] [] [] sats 93-97% throughout session on 2L O2.     Distance Up and down 2 steps, 300' with 3 standing rest breaks    DME Rolling Walker    Gait Quality Slow tera    Weightbearing  Status N/A     I=Independent, Mod I=Modified Independent, S=Supervision, SBA=Standby Assistance, CGA=Contact Guard Assistance,   Min=Minimal Assistance, Mod=Moderate Assistance, Max=Maximal Assistance, Total=Total Assistance, NT=Not Tested    PLAN:   FREQUENCY/DURATION: PT Plan of Care: 3 times/week for duration of hospital stay or until stated goals are met, whichever comes first.  TREATMENT:     TREATMENT:   ($$ Therapeutic Activity: 23-37 mins    )  Therapeutic Activity (28 Minutes): Therapeutic activity included Scooting, Transfer Training, Ambulation on level ground, Standing balance and stair training to improve functional Mobility, Strength and Activity tolerance.     TREATMENT GRID:   Date:  6/15/21 Date:   Date:     Activity/Exercise Parameters Parameters Parameters   Ankle pumps X 15 B     LAQ X 15 B     Hip flexion X 15 B     Hip abd X 15 B                             AFTER TREATMENT POSITION/PRECAUTIONS:  Chair, Needs within reach and RN notified    INTERDISCIPLINARY COLLABORATION:  RN/PCT and PT/PTA    TOTAL TREATMENT DURATION:  PT Patient Time In/Time Out  Time In: 0935  Time Out: CHEMO [place]

## 2021-06-17 NOTE — ROUTINE PROCESS
CHF teaching completed, verbalize emphasis on monitoring self and report to MD: 
 If you gain 2 lbs in one day or 5 lbs in a week, and short of breath.  If you can not lay flat without developing short of breath or rapid breathing at night; or if it wakes you up. Develop a cough or wheezing.  If you notice swollen hands/feet/ankles or stomach with a bloated/ full feeling.  If you are  more confused or mentally fuzzy or dizzy.  If you notice a rapid or change in your heart rate.  If you become more exhausted all the time and unable to do the same level of activity without stopping to catch your breath. Drink no more than 8 cups a day in 8 oz. cups. Limit Cola Drinks. Your Heart can not handle any more. Stay away from salt (limit anything with salt or sodium in it). Limit to 250mg per serving. Exercise needs to be started with your Doctors approval. 
Reduce stress; Call myself or Provider if assistance is needed. Pass post test via teach back, will make self available post DC ,if an questions arise. Diabetic teaching completed.  Planner/scale @ BS:  60 mins total 
 
HH/PT

## 2021-06-18 LAB
ALBUMIN SERPL-MCNC: 2.6 G/DL (ref 3.2–4.6)
ALBUMIN/GLOB SERPL: 0.6 {RATIO} (ref 1.2–3.5)
ALP SERPL-CCNC: 81 U/L (ref 50–136)
ALT SERPL-CCNC: 14 U/L (ref 12–65)
ANION GAP SERPL CALC-SCNC: 2 MMOL/L (ref 7–16)
AST SERPL-CCNC: 11 U/L (ref 15–37)
BACTERIA SPEC CULT: NORMAL
BACTERIA SPEC CULT: NORMAL
BASOPHILS # BLD: 0.2 K/UL (ref 0–0.2)
BASOPHILS NFR BLD: 1 % (ref 0–2)
BILIRUB SERPL-MCNC: 0.4 MG/DL (ref 0.2–1.1)
BUN SERPL-MCNC: 25 MG/DL (ref 8–23)
CALCIUM SERPL-MCNC: 8.5 MG/DL (ref 8.3–10.4)
CHLORIDE SERPL-SCNC: 98 MMOL/L (ref 98–107)
CO2 SERPL-SCNC: 36 MMOL/L (ref 21–32)
CREAT SERPL-MCNC: 1.68 MG/DL (ref 0.6–1)
DIFFERENTIAL METHOD BLD: ABNORMAL
EOSINOPHIL # BLD: 0.8 K/UL (ref 0–0.8)
EOSINOPHIL NFR BLD: 5 % (ref 0.5–7.8)
ERYTHROCYTE [DISTWIDTH] IN BLOOD BY AUTOMATED COUNT: 17.1 % (ref 11.9–14.6)
GLOBULIN SER CALC-MCNC: 4.1 G/DL (ref 2.3–3.5)
GLUCOSE SERPL-MCNC: 98 MG/DL (ref 65–100)
HCT VFR BLD AUTO: 31.7 % (ref 35.8–46.3)
HGB BLD-MCNC: 9.6 G/DL (ref 11.7–15.4)
IMM GRANULOCYTES # BLD AUTO: 0.1 K/UL (ref 0–0.5)
IMM GRANULOCYTES NFR BLD AUTO: 0 % (ref 0–5)
LYMPHOCYTES # BLD: 2.3 K/UL (ref 0.5–4.6)
LYMPHOCYTES NFR BLD: 15 % (ref 13–44)
MCH RBC QN AUTO: 29.4 PG (ref 26.1–32.9)
MCHC RBC AUTO-ENTMCNC: 30.3 G/DL (ref 31.4–35)
MCV RBC AUTO: 96.9 FL (ref 79.6–97.8)
MONOCYTES # BLD: 1.7 K/UL (ref 0.1–1.3)
MONOCYTES NFR BLD: 11 % (ref 4–12)
NEUTS SEG # BLD: 10.4 K/UL (ref 1.7–8.2)
NEUTS SEG NFR BLD: 68 % (ref 43–78)
NRBC # BLD: 0 K/UL (ref 0–0.2)
PLATELET # BLD AUTO: 298 K/UL (ref 150–450)
PMV BLD AUTO: 11.5 FL (ref 9.4–12.3)
POTASSIUM SERPL-SCNC: 3.7 MMOL/L (ref 3.5–5.1)
PROT SERPL-MCNC: 6.7 G/DL (ref 6.3–8.2)
RBC # BLD AUTO: 3.27 M/UL (ref 4.05–5.2)
SERVICE CMNT-IMP: NORMAL
SERVICE CMNT-IMP: NORMAL
SODIUM SERPL-SCNC: 136 MMOL/L (ref 136–145)
WBC # BLD AUTO: 15.4 K/UL (ref 4.3–11.1)

## 2021-06-18 PROCEDURE — 74011250637 HC RX REV CODE- 250/637: Performed by: HOSPITALIST

## 2021-06-18 PROCEDURE — 97110 THERAPEUTIC EXERCISES: CPT

## 2021-06-18 PROCEDURE — 74011250636 HC RX REV CODE- 250/636: Performed by: HOSPITALIST

## 2021-06-18 PROCEDURE — 94761 N-INVAS EAR/PLS OXIMETRY MLT: CPT

## 2021-06-18 PROCEDURE — 74011250637 HC RX REV CODE- 250/637: Performed by: INTERNAL MEDICINE

## 2021-06-18 PROCEDURE — 65660000000 HC RM CCU STEPDOWN

## 2021-06-18 PROCEDURE — 97530 THERAPEUTIC ACTIVITIES: CPT

## 2021-06-18 PROCEDURE — 99231 SBSQ HOSP IP/OBS SF/LOW 25: CPT | Performed by: INTERNAL MEDICINE

## 2021-06-18 PROCEDURE — 85025 COMPLETE CBC W/AUTO DIFF WBC: CPT

## 2021-06-18 PROCEDURE — 65270000029 HC RM PRIVATE

## 2021-06-18 PROCEDURE — 36415 COLL VENOUS BLD VENIPUNCTURE: CPT

## 2021-06-18 PROCEDURE — 80053 COMPREHEN METABOLIC PANEL: CPT

## 2021-06-18 RX ADMIN — FUROSEMIDE 40 MG: 10 INJECTION INTRAMUSCULAR; INTRAVENOUS at 17:32

## 2021-06-18 RX ADMIN — Medication 5 ML: at 14:00

## 2021-06-18 RX ADMIN — SPIRONOLACTONE 25 MG: 25 TABLET ORAL at 09:02

## 2021-06-18 RX ADMIN — SENNOSIDES AND DOCUSATE SODIUM 1 TABLET: 8.6; 5 TABLET ORAL at 09:02

## 2021-06-18 RX ADMIN — PANTOPRAZOLE SODIUM 40 MG: 40 TABLET, DELAYED RELEASE ORAL at 17:32

## 2021-06-18 RX ADMIN — Medication 10 ML: at 21:13

## 2021-06-18 RX ADMIN — ROSUVASTATIN CALCIUM 10 MG: 5 TABLET, COATED ORAL at 21:13

## 2021-06-18 RX ADMIN — PANTOPRAZOLE SODIUM 40 MG: 40 TABLET, DELAYED RELEASE ORAL at 06:24

## 2021-06-18 RX ADMIN — FOLIC ACID 1 MG: 1 TABLET ORAL at 09:02

## 2021-06-18 RX ADMIN — AMIODARONE HYDROCHLORIDE 200 MG: 200 TABLET ORAL at 09:02

## 2021-06-18 RX ADMIN — Medication 10 ML: at 06:27

## 2021-06-18 RX ADMIN — DULOXETINE HYDROCHLORIDE 60 MG: 60 CAPSULE, DELAYED RELEASE ORAL at 09:02

## 2021-06-18 RX ADMIN — LEVOTHYROXINE SODIUM 25 MCG: 0.05 TABLET ORAL at 06:24

## 2021-06-18 RX ADMIN — FUROSEMIDE 40 MG: 10 INJECTION INTRAMUSCULAR; INTRAVENOUS at 09:01

## 2021-06-18 RX ADMIN — METOPROLOL SUCCINATE 50 MG: 50 TABLET, EXTENDED RELEASE ORAL at 09:02

## 2021-06-18 RX ADMIN — VITAMIN D, TAB 1000IU (100/BT) 1000 UNITS: 25 TAB at 09:02

## 2021-06-18 RX ADMIN — SENNOSIDES AND DOCUSATE SODIUM 1 TABLET: 8.6; 5 TABLET ORAL at 20:23

## 2021-06-18 NOTE — PROGRESS NOTES
Hourly rounds performed through shift, pt denies needs at this time. Pt up in chair, locked and call light/personal items within reach. Will continue to monitor and report to night shift nurse.

## 2021-06-18 NOTE — PROGRESS NOTES
Oxygen Qualifier       Room air: SpO2 with O2 and liter flow   Resting SpO2  94%  no oxygen required   Ambulating SpO2  96%  no oxygen required       Completed by:    Santy Kimball

## 2021-06-18 NOTE — PROGRESS NOTES
Problem: Falls - Risk of  Goal: *Absence of Falls  Description: Document Aman Rodriguez Fall Risk and appropriate interventions in the flowsheet.   Outcome: Progressing Towards Goal  Note: Fall Risk Interventions:  Mobility Interventions: Patient to call before getting OOB         Medication Interventions: Patient to call before getting OOB    Elimination Interventions: Call light in reach    History of Falls Interventions: Door open when patient unattended         Problem: Pain  Goal: *Control of Pain  Outcome: Progressing Towards Goal

## 2021-06-18 NOTE — PROGRESS NOTES
Physician Progress Note      Latrell Godinez  [facility] #:                  [id number]  :                       1940  ADMIT DATE:       2021 2:50 PM  [address] [place] DATE:  RESPONDING  PROVIDER #:        THU BAIRD DO          QUERY TEXT:    Patient admitted with exacerbation of diastolic heart failure. Noted documentation of acute respiratory failure in progress notes. In order to support the diagnosis of acute respiratory failure, please include additional clinical indicators in your documentation. Or please document if the diagnosis of acute respiratory failure has been ruled out after further study. The medical record reflects the following:  Risk Factors: HF exacerbation  Clinical Indicators: No ABGs. Documentation on H&P on physical exam, General: No acute distress, speaking in full sentences, no use of accessory muscles. As per progress note on , Patient was noted to be saturating 88 percent  on room air and requiring 3 L nasal cannula on arrival to the ED. Treatment: Supplemental oxygen 3 to 4l    Thank You,  Earl Rg RN CDI  [phone]    Acute Respiratory Failure Clinical Indicators per [employer] [employer] Training Guide and Quick Reference Guide:  pO2 < 60 mmHg or SpO2 (pulse oximetry) < 91% breathing room air  pCO2 > 50 and pH < 7.35  P/F ratio (pO2 / FIO2) < 300  pO2 decrease or pCO2 increase by 10 mmHg from baseline (if known)  Supplemental oxygen of 40% or more  Presence of respiratory distress, tachypnea, dyspnea, shortness of breath, wheezing  Unable to speak in complete sentences  Use of accessory muscles to breathe  Extreme anxiety and feeling of impending doom  Tripod position  Confusion/altered mental status/obtunded  Options provided:  -- Acute Respiratory Failure as evidenced by, Please document evidence.   -- Acute Respiratory Failure ruled out after study  -- Other - I will add my own diagnosis  -- Disagree - Not applicable / Not valid  -- Disagree - Clinically unable to determine / Unknown  -- Refer to Clinical Documentation Reviewer    PROVIDER RESPONSE TEXT:    This patient is in acute respiratory failure as evidenced by saturating 88 percent on room air and requiring 3 L nasal cannula on arrival to the ED.     Query created by: Vanda Crews on 6/18/2021 3:19 PM      Electronically signed by:  Mora Ojeda DO 6/18/2021 3:23 PM

## 2021-06-18 NOTE — PROGRESS NOTES
IDT rounds: IV lasix; weaning O2.  Patient will return home with [facility] at discharge

## 2021-06-18 NOTE — PROGRESS NOTES
[facility] CARDIOLOGY PROGRESS NOTE           6/18/2021 2:47 PM    Admit Date: 6/13/2021         Subjective: Doing well diuresing on IV lasix. ROS:  Cardiovascular:  As noted above    Objective:      Vitals:    06/17/21 2309 06/18/21 0406 06/18/21 0737 06/18/21 1157   BP: (!) 122/58 (!) 144/68 (!) 155/66 (!) 147/63   Pulse: 68 67 66 64   Resp: 20 20 20 20   Temp: 98.4 °F (36.9 °C) 98 °F (36.7 °C) 98.1 °F (36.7 °C) 97.9 °F (36.6 °C)   SpO2: 96% 96% 97% 99%   Weight:  146 lb 1.6 oz (66.3 kg)     Height:           On telemetry:      Physical Exam:  General: Well Developed, Well Nourished, No Acute Distress, Alert & Oriented x 3, Appropriate mood  Neck: supple, no JVD  Heart: S1S2 with RRR without murmurs or gallops  Lungs: Clear throughout auscultation bilaterally without adventitious sounds  Abd: soft, nontender, nondistended, with good bowel sounds  Ext: no edema bilaterally  Skin: warm and dry      Data Review:   Recent Labs     06/18/21  0513 06/17/21  0512    139   K 3.7 3.7   BUN 25* 21   CREA 1.68* 1.49*   GLU 98 91   WBC 15.4* 11.3*   HGB 9.6* 9.6*   HCT 31.7* 31.1*    296       No results for input(s): TNIPOC, TROIQ in the last 72 hours. Assessment/Plan:     Principal Problem:    Acute on chronic diastolic congestive heart failure ([address] (6/13/2021)    Active Problems:    Hyperlipidemia (1/16/2016)      Rheumatoid arthritis ([address] (1/16/2016)      Hypertension, essential, benign (8/18/2016)      Depression (8/18/2016)      Paroxysmal atrial fibrillation ([address] (3/19/2021)      Stage 3 chronic kidney disease ([address] (5/20/2021)      Acute respiratory failure with hypoxia ([address] (6/14/2021)      Demand ischemia ([address] (6/14/2021)      Leukocytosis (6/14/2021)      A/P  1) dCHF - in acute exacerbation - continue IV diuresis 24 more hrs then change to oral therapy.   BMP tomorrow  2) lipids - statin  3) HTN - controlled  4) pAFIB - eliquis Dewain Lesches, MD  6/18/2021 2:47 PM

## 2021-06-18 NOTE — PROGRESS NOTES
Hourly rounds completed this shift. Denies complaints of pain. Patient rested well. Bed in low position and call light within reach of patient. Will report off to oncoming nurse.

## 2021-06-18 NOTE — PROGRESS NOTES
CM chart review; on IV lasix; weaning O2. Patient will return home with [facility] at discharge; will fax discharge summary when available. CM will follow for need for home O2 if unable to wean. normal...

## 2021-06-18 NOTE — PROGRESS NOTES
[facility] Hospitalist Progress Note     Name: Ankur Boles   Age: 68 y.o. Sex: female  : 1940    MRN:     [id number]    Admit Date:  2021    Reason for Admission:  CHF (congestive heart failure) ([facility] [address] [I50.9]    Assessment & Plan     Acute respiratory failure with hypoxia due to acute on chronic CHF exacerbation  Patient was noted to be saturating 88% on room air and requiring 3 L nasal cannula on arrival to the ED. Echocardiogram in 2021 with preserved EF of 55 to 60%. pBNP of 10k. CXR with progressive vascular congestion. Strict I's and O. Daily weights  Cont Lasix 40 mg IV   Cardiology on board, appreciate recs  On 1-2L O2NC, wean as tolerated. Anticipate d/c in 1-2 day spending cardiology recs if she continues to feel well. PT/OT rec'd HHT    Elevated troponins likely due to demand ischemia  Troponin has peaked at 56.9. Patient without any chest pains. EKG with normal sinus rhythm without any specific T wave and ST changes. Hypertension//hyperlipidemia//paroxysmal atrial fibrillation  Cont amiodarone, apixaban, Crestor, spironolactone. Leukocytosis with history of leukopenia status post Granix (in May 20)  Procalcitonin of less than 0.05, UA without any evidence of UTI. Chest x-ray without any definitive infiltrate or consolidation. WBC stable    Hypothyroidism: synthroid    Anemia with Hx of melena  Hemoglobin appears to be stable at 9.0 on admission. Appears to be around 8-7 in prior admission. EGD was deferred during last admission due to acute illness  Cont PPI twice daily  Monitor hemoglobin and transfuse as needed  Hgb stable    CKD stage II   Baseline creatinine 1.3-1.5, at baseline  Continue monitoring renal function while diuresis    Depression: Continue with Cymbalta  GERD: PPI    Diet:  DIET ADULT  DVT PPx: eliquis  GI Ppx: PPI  Code: Full Code    Dispo / Discharge Planning:  Anticipate 1-2 days Pending weaning O2 and Cardiology recs.  PT/OT Eval--HHPT/OT    SAMY ZARATE - HUMACAO Course/Subjective:     Please refer to the admission H&P for details of presentation. In summary, Edwin Pop is a 68 y.o. 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 Patient was recently discharged from rehab after hospitalization in May for fall with rib fractures. She is being admitted for acute on chronic CHF exacerbation. Cardiology was consulted. Subjective/24 hr Events (06/18/21) :    Pt stated she is doing well. Still on 1-2L O2NC. Still has some trace peripheral edema. Patient denies fever, chills, chest pains, shortness of breath, n/v, abdominal pain. Review of Systems: 14 point review of systems is otherwise negative with the exception of the elements mentioned above. Objective:     Patient Vitals for the past 24 hrs:   Temp Pulse Resp BP SpO2   06/18/21 0737 98.1 °F (36.7 °C) 66 20 (!) 155/66 97 %   06/18/21 0406 98 °F (36.7 °C) 67 20 (!) 144/68 96 %   06/17/21 2309 98.4 °F (36.9 °C) 68 20 (!) 122/58 96 %   06/17/21 2004 99.1 °F (37.3 °C) 65 19 130/70 95 %   06/17/21 1534 98.6 °F (37 °C) 66 18 133/79 100 %   06/17/21 1051 98.7 °F (37.1 °C) 69 18 109/64 94 %     Oxygen Therapy  O2 Sat (%): 97 % (06/18/21 0737)  Pulse via Oximetry: 81 beats per minute (06/15/21 1016)  O2 Device: None (Room air) (06/17/21 1951)  Skin Assessment: Clean, dry, & intact (06/16/21 0720)  O2 Flow Rate (L/min): 2 l/min (06/16/21 0720)    Body mass index is 28.53 kg/m². Physical Exam:   General:     alert, awake, no acute distress. Neck:    supple, non-tender. Trachea midline.    Lungs:   CTA b/l  Cardiac:   RRR, Normal S1 and S2  Abdomen:   Soft, non distended, nontender, +BS, no guarding/rebound  Extremities:   Trace pitting edema , pedal pulses present  Skin:   Warm, dry, normal turgor and texture; no rash, ulcers   Neuro:  AAOx3. No gross focal neurological deficit  Psychiatric:  No anxiety, calm, cooperative    Data Review:  I have reviewed all labs, meds, and studies from the last 24 hours:    Labs:    Recent Results (from the past 24 hour(s))   METABOLIC PANEL, COMPREHENSIVE    Collection Time: 06/18/21  5:13 AM   Result Value Ref Range    Sodium 136 136 - 145 mmol/L    Potassium 3.7 3.5 - 5.1 mmol/L    Chloride 98 98 - 107 mmol/L    CO2 36 (H) 21 - 32 mmol/L    Anion gap 2 (L) 7 - 16 mmol/L    Glucose 98 65 - 100 mg/dL    BUN 25 (H) 8 - 23 MG/DL    Creatinine 1.68 (H) 0.6 - 1.0 MG/DL    GFR est AA 38 (L) >60 ml/min/1.73m2    GFR est non-AA 31 (L) >60 ml/min/1.73m2    Calcium 8.5 8.3 - 10.4 MG/DL    Bilirubin, total 0.4 0.2 - 1.1 MG/DL    ALT (SGPT) 14 12 - 65 U/L    AST (SGOT) 11 (L) 15 - 37 U/L    Alk. phosphatase 81 50 - 136 U/L    Protein, total 6.7 6.3 - 8.2 g/dL    Albumin 2.6 (L) 3.2 - 4.6 g/dL    Globulin 4.1 (H) 2.3 - 3.5 g/dL    A-G Ratio 0.6 (L) 1.2 - 3.5     CBC WITH AUTOMATED DIFF    Collection Time: 06/18/21  5:13 AM   Result Value Ref Range    WBC 15.4 (H) 4.3 - 11.1 K/uL    RBC 3.27 (L) 4.05 - 5.2 M/uL    HGB 9.6 (L) 11.7 - 15.4 g/dL    HCT 31.7 (L) 35.8 - 46.3 %    MCV 96.9 79.6 - 97.8 FL    MCH 29.4 26.1 - 32.9 PG    MCHC 30.3 (L) 31.4 - 35.0 g/dL    RDW 17.1 (H) 11.9 - 14.6 %    PLATELET 115 165 - 873 K/uL    MPV 11.5 9.4 - 12.3 FL    ABSOLUTE NRBC 0.00 0.0 - 0.2 K/uL    DF AUTOMATED      NEUTROPHILS 68 43 - 78 %    LYMPHOCYTES 15 13 - 44 %    MONOCYTES 11 4.0 - 12.0 %    EOSINOPHILS 5 0.5 - 7.8 %    BASOPHILS 1 0.0 - 2.0 %    IMMATURE GRANULOCYTES 0 0.0 - 5.0 %    ABS. NEUTROPHILS 10.4 (H) 1.7 - 8.2 K/UL    ABS. LYMPHOCYTES 2.3 0.5 - 4.6 K/UL    ABS. MONOCYTES 1.7 (H) 0.1 - 1.3 K/UL    ABS. EOSINOPHILS 0.8 0.0 - 0.8 K/UL    ABS. BASOPHILS 0.2 0.0 - 0.2 K/UL    ABS. IMM.  GRANS. 0.1 0.0 - 0.5 K/UL       All Micro Results Procedure Component Value Units Date/Time    CULTURE, BLOOD [[id number]] Collected: 06/13/21 1749    Order Status: Completed Specimen: Blood Updated: 06/18/21 0704     Special Requests: --        LEFT  HAND       Culture result: NO GROWTH 5 DAYS       CULTURE, BLOOD [[id number]] Collected: 06/13/21 1746    Order Status: Completed Specimen: Blood Updated: 06/18/21 0704     Special Requests: --        LEFT  Antecubital       Culture result: NO GROWTH 5 DAYS             EKG Results     Procedure 720 Value Units Date/Time    EKG [[id number]] Collected: 06/13/21 1426    Order Status: Completed Updated: 06/14/21 0705     Ventricular Rate 90 BPM      Atrial Rate 90 BPM      P-R Interval 160 ms      QRS Duration 90 ms      Q-T Interval 316 ms      QTC Calculation (Bezet) 386 ms      Calculated P Axis 55 degrees      Calculated R Axis 81 degrees      Calculated T Axis 24 degrees      Diagnosis --     Normal sinus rhythm  Nonspecific T wave abnormality  Abnormal ECG  When compared with ECG of 12-MAY-2021 10:32,  Right bundle branch block is no longer Present  Confirmed by Jesús Villa MD (), Tala López ([id number]) on 6/14/2021 7:05:02 AM            Other Studies:  XR CHEST PORT    Result Date: 6/13/2021  1 View portable chest x-ray 6/13/2021 2:42 PM Indication: History of pneumonia, shortness of breath. Syncopal episode. Comparison: Prior studies-most recent 5/21/2021 Findings: This portable upright AP chest at 1437 shows the patient again rotated somewhat toward the left. Cardiomediastinal silhouette stable. Vascular pedicle appearance probably slightly more pronounced today. Mildly more progressed bibasilar hazy/veiling opacities which may represent layering effusions compared to the most recent study. Compared to older studies these are indolently progressing. Old right rib fractures again seen. Increased vascular congestion today, patient probably has progressing basilar effusions-now of small/moderate volume.  Appearance most suggestive of progressing vascular congestion. Correlate correlate clinically, consider serum BNP.           Current Meds:   Current Facility-Administered Medications   Medication Dose Route Frequency    metoprolol succinate (TOPROL-XL) XL tablet 50 mg  50 mg Oral DAILY    sodium chloride (NS) flush 5-10 mL  5-10 mL IntraVENous PRN    amiodarone (CORDARONE) tablet 200 mg  200 mg Oral DAILY    apixaban (ELIQUIS) tablet 5 mg  5 mg Oral BID    cholecalciferol (VITAMIN D3) (1000 Units /25 mcg) tablet 1,000 Units  1,000 Units Oral DAILY    DULoxetine (CYMBALTA) capsule 60 mg  60 mg Oral DAILY    levothyroxine (SYNTHROID) tablet 25 mcg  25 mcg Oral ACB    folic acid (FOLVITE) tablet 1 mg  1 mg Oral DAILY    ondansetron (ZOFRAN ODT) tablet 4 mg  4 mg Oral Q8H PRN    pantoprazole (PROTONIX) tablet 40 mg  40 mg Oral ACB&D    rosuvastatin (CRESTOR) tablet 10 mg  10 mg Oral QHS    spironolactone (ALDACTONE) tablet 25 mg  25 mg Oral DAILY    sodium chloride (NS) flush 5-40 mL  5-40 mL IntraVENous Q8H    sodium chloride (NS) flush 5-40 mL  5-40 mL IntraVENous PRN    acetaminophen (TYLENOL) tablet 650 mg  650 mg Oral Q6H PRN    Or    acetaminophen (TYLENOL) suppository 650 mg  650 mg Rectal Q6H PRN    polyethylene glycol (MIRALAX) packet 17 g  17 g Oral DAILY    senna-docusate (PERICOLACE) 8.6-50 mg per tablet 1 Tablet  1 Tablet Oral BID    magnesium hydroxide (MILK OF MAGNESIA) 400 mg/5 mL oral suspension 30 mL  30 mL Oral DAILY PRN    bisacodyL (DULCOLAX) suppository 10 mg  10 mg Rectal DAILY PRN    furosemide (LASIX) injection 40 mg  40 mg IntraVENous BID    captopriL (CAPOTEN) tablet 12.5 mg  12.5 mg Oral TID PRN       Problem List:  Hospital Problems as of 6/18/2021 Date Reviewed: 4/13/2021        Codes Class Noted - Resolved POA    Acute respiratory failure with hypoxia (HCC) ICD-10-CM: J96.01  ICD-9-CM: 518.81  6/14/2021 - Present Yes        Demand ischemia (HCC) ICD-10-CM: I24.8  ICD-9-CM: 411.89 6/14/2021 - Present Yes        Leukocytosis ICD-10-CM: Q47.352  ICD-9-CM: 288.60  6/14/2021 - Present Yes        * (Principal) Acute on chronic diastolic congestive heart failure (HCC) ICD-10-CM: I50.33  ICD-9-CM: 428.33, 428.0  6/13/2021 - Present Yes        Stage 3 chronic kidney disease ([facility] [address] (Chronic) ICD-10-CM: N18.30  ICD-9-CM: 585.3  5/20/2021 - Present Yes        Paroxysmal atrial fibrillation (HCC) (Chronic) ICD-10-CM: I48.0  ICD-9-CM: 427.31  3/19/2021 - Present Yes        Hypertension, essential, benign (Chronic) ICD-10-CM: I10  ICD-9-CM: 401.1  8/18/2016 - Present Yes        Depression ICD-10-CM: F32.9  ICD-9-CM: 267  8/18/2016 - Present Yes        Hyperlipidemia (Chronic) ICD-10-CM: E78.5  ICD-9-CM: 272.4  1/16/2016 - Present Yes        [facility] (Chronic) ICD-10-CM: M06.9  ICD-9-CM: 714.0  1/16/2016 - Present Yes               Part of this note was written by using a voice dictation software and the note has been proof read but may still contain some grammatical/other typographical errors.     Signed By: Neal Horne DO   [facility] [place] [facility]    June 18, 2021  12:57 PM

## 2021-06-18 NOTE — PROGRESS NOTES
ACUTE PHYSICAL THERAPY GOALS:  (Developed with and agreed upon by patient and/or caregiver. )  LTG:  (1.)Ms. Radha Buck will move from supine to sit and sit to supine , scoot up and down and roll side to side in bed with INDEPENDENT within 7 treatment day(s). (2.)Ms. Radha Buck will transfer from bed to chair and chair to bed with MODIFIED INDEPENDENCE using the least restrictive device within 7 treatment day(s). (3.)Ms. Almeida will ambulate with MODIFIED INDEPENDENCE for 250+ feet with the least restrictive device within 7 treatment day(s) while maintaining normal vital signs. (4.)Ms. Almeida will perform 5 stairs with HR and SBA within 7 treatment days for ascending and descending stairs for home. PHYSICAL THERAPY: Daily Note and AM Treatment Day # 5    Mary Bautista is a 68 y.o. female   PRIMARY DIAGNOSIS: Acute on chronic diastolic congestive heart failure (HCC)  [facility] [I50.9]         ASSESSMENT:     REHAB RECOMMENDATIONS: CURRENT LEVEL OF FUNCTION:  (Most Recently Demonstrated)   Recommendation to date pending progress:  [address] [place] [address] Therapy  Equipment:    To Be Determined Bed Mobility:   Not tested  Sit to Stand:   Standby Assistance  Transfers:   Standby Assistance   Gait/Mobility:   Standby Assistance      ASSESSMENT:  Ms. Radha Buck is sitting up in chair and is very pleasant and agreeable to therapy, on 2L O2. She ambulated in fofana with rolling walker and SBA on 1L O2 HFNC. Her sats were 90-96% throughout ambulation on 1L with 3 standing rest breaks. She returned to room and performed standing LE exercises. She was left up in chair with needs in reach. Good progress today towards goals. Will continue with POC. SUBJECTIVE:   Ms. Radha Buck states, \"oh yeah\" when asked to go walking.     SOCIAL HISTORY/ LIVING ENVIRONMENT: lives alone, MOD I at baseline, friends assist as needed  Home Environment: Private residence  # Steps to Enter: 5  One/Two Story Residence: One story  Living Alone: Yes  Support Systems: Friends \ neighbors  OBJECTIVE:     PAIN: VITAL SIGNS: LINES/DRAINS:   Pre Treatment: Pain Screen  Pain Scale 1: Numeric (0 - 10)  Pain Intensity 1: 0  Post Treatment: not rated   IV  O2 Device: None (Room air)     MOBILITY: I Mod I S SBA CGA Min Mod Max Total  NT x2 Comments:   Bed Mobility    Rolling [] [] [] [] [] [] [] [] [] [] []    Supine to Sit [] [] [] [] [] [] [] [] [] [] []    Scooting [] [] [] [] [] [] [] [] [] [] []    Sit to Supine [] [] [] [] [] [] [] [] [] [] []    Transfers    Sit to Stand [] [] [] [x] [] [] [] [] [] [] []    Bed to Chair [] [] [] [] [] [] [] [] [] [] []    Stand to Sit [] [] [] [x] [] [] [] [] [] [] []    I=Independent, Mod I=Modified Independent, S=Supervision, SBA=Standby Assistance, CGA=Contact Guard Assistance,   Min=Minimal Assistance, Mod=Moderate Assistance, Max=Maximal Assistance, Total=Total Assistance, NT=Not Tested    BALANCE: Good Fair+ Fair Fair- Poor NT Comments   Sitting Static [x] [] [] [] [] []    Sitting Dynamic [x] [] [] [] [] []              Standing Static [] [x] [] [] [] []    Standing Dynamic [] [x] [x] [] [] []      GAIT: I Mod I S SBA CGA Min Mod Max Total  NT x2 Comments:   Level of Assistance [] [] [] [x] [] [] [] [] [] [] [] sats 90-96% throughout session on 1L O2, HFNC    Distance 450' with 3 standing rest breaks    DME Rolling Walker    Gait Quality Slow tera    Weightbearing  Status N/A     I=Independent, Mod I=Modified Independent, S=Supervision, SBA=Standby Assistance, CGA=Contact Guard Assistance,   Min=Minimal Assistance, Mod=Moderate Assistance, Max=Maximal Assistance, Total=Total Assistance, NT=Not Tested    PLAN:   FREQUENCY/DURATION: PT Plan of Care: 3 times/week for duration of hospital stay or until stated goals are met, whichever comes first.  TREATMENT:     TREATMENT:   ($$ Therapeutic Activity: 8-22 mins  $$ Therapeutic Exercises: 8-22 mins    )  Therapeutic Activity (20 Minutes): Therapeutic activity included Scooting, Transfer Training, Ambulation on level ground and Standing balance to improve functional Mobility, Strength and Activity tolerance. Therapeutic Exercise (10 Minutes): Therapeutic exercises noted below to improve functional activity tolerance, AROM and strength.      TREATMENT GRID:   Date:  6/15/21 Date:  6/18/21 Date:     Activity/Exercise Parameters Parameters Parameters   Ankle pumps X 15 B     LAQ X 15 B X 10 B    Hip flexion X 15 B     Hip abd X 15 B     Standing heel raises  X 12 B    Standing hip flexion  X 12 B    Standing hip abd  X 12 B          AFTER TREATMENT POSITION/PRECAUTIONS:  Chair, Needs within reach and RN notified    INTERDISCIPLINARY COLLABORATION:  RN/PCT and PT/PTA    TOTAL TREATMENT DURATION:  PT Patient Time In/Time Out  Time In: 0955  Time Out: CHEMO [place]

## 2021-06-19 LAB
ANION GAP SERPL CALC-SCNC: 7 MMOL/L (ref 7–16)
BASOPHILS # BLD: 0.2 K/UL (ref 0–0.2)
BASOPHILS NFR BLD: 1 % (ref 0–2)
BUN SERPL-MCNC: 31 MG/DL (ref 8–23)
CALCIUM SERPL-MCNC: 8.5 MG/DL (ref 8.3–10.4)
CHLORIDE SERPL-SCNC: 96 MMOL/L (ref 98–107)
CO2 SERPL-SCNC: 31 MMOL/L (ref 21–32)
CREAT SERPL-MCNC: 1.72 MG/DL (ref 0.6–1)
DIFFERENTIAL METHOD BLD: ABNORMAL
EOSINOPHIL # BLD: 0.4 K/UL (ref 0–0.8)
EOSINOPHIL NFR BLD: 2 % (ref 0.5–7.8)
ERYTHROCYTE [DISTWIDTH] IN BLOOD BY AUTOMATED COUNT: 17.2 % (ref 11.9–14.6)
GLUCOSE SERPL-MCNC: 65 MG/DL (ref 65–100)
HCT VFR BLD AUTO: 33.2 % (ref 35.8–46.3)
HGB BLD-MCNC: 10.2 G/DL (ref 11.7–15.4)
IMM GRANULOCYTES # BLD AUTO: 0.1 K/UL (ref 0–0.5)
IMM GRANULOCYTES NFR BLD AUTO: 0 % (ref 0–5)
LYMPHOCYTES # BLD: 2 K/UL (ref 0.5–4.6)
LYMPHOCYTES NFR BLD: 12 % (ref 13–44)
MCH RBC QN AUTO: 29.7 PG (ref 26.1–32.9)
MCHC RBC AUTO-ENTMCNC: 30.7 G/DL (ref 31.4–35)
MCV RBC AUTO: 96.8 FL (ref 79.6–97.8)
MONOCYTES # BLD: 2.2 K/UL (ref 0.1–1.3)
MONOCYTES NFR BLD: 13 % (ref 4–12)
NEUTS SEG # BLD: 12.1 K/UL (ref 1.7–8.2)
NEUTS SEG NFR BLD: 72 % (ref 43–78)
NRBC # BLD: 0 K/UL (ref 0–0.2)
PLATELET # BLD AUTO: 321 K/UL (ref 150–450)
PMV BLD AUTO: 12 FL (ref 9.4–12.3)
POTASSIUM SERPL-SCNC: 4.7 MMOL/L (ref 3.5–5.1)
RBC # BLD AUTO: 3.43 M/UL (ref 4.05–5.2)
SODIUM SERPL-SCNC: 134 MMOL/L (ref 136–145)
WBC # BLD AUTO: 16.9 K/UL (ref 4.3–11.1)

## 2021-06-19 PROCEDURE — P9047 ALBUMIN (HUMAN), 25%, 50ML: HCPCS | Performed by: FAMILY MEDICINE

## 2021-06-19 PROCEDURE — 80048 BASIC METABOLIC PNL TOTAL CA: CPT

## 2021-06-19 PROCEDURE — 74011250637 HC RX REV CODE- 250/637: Performed by: INTERNAL MEDICINE

## 2021-06-19 PROCEDURE — 65660000000 HC RM CCU STEPDOWN

## 2021-06-19 PROCEDURE — 36415 COLL VENOUS BLD VENIPUNCTURE: CPT

## 2021-06-19 PROCEDURE — 74011250637 HC RX REV CODE- 250/637: Performed by: HOSPITALIST

## 2021-06-19 PROCEDURE — 85025 COMPLETE CBC W/AUTO DIFF WBC: CPT

## 2021-06-19 PROCEDURE — 74011250636 HC RX REV CODE- 250/636: Performed by: FAMILY MEDICINE

## 2021-06-19 PROCEDURE — 74011250636 HC RX REV CODE- 250/636: Performed by: HOSPITALIST

## 2021-06-19 PROCEDURE — 65270000029 HC RM PRIVATE

## 2021-06-19 PROCEDURE — 99233 SBSQ HOSP IP/OBS HIGH 50: CPT | Performed by: INTERNAL MEDICINE

## 2021-06-19 RX ORDER — ALBUMIN HUMAN 250 G/1000ML
12.5 SOLUTION INTRAVENOUS 2 TIMES DAILY
Status: COMPLETED | OUTPATIENT
Start: 2021-06-19 | End: 2021-06-20

## 2021-06-19 RX ORDER — MORPHINE SULFATE 2 MG/ML
2 INJECTION, SOLUTION INTRAMUSCULAR; INTRAVENOUS ONCE
Status: COMPLETED | OUTPATIENT
Start: 2021-06-19 | End: 2021-06-20

## 2021-06-19 RX ADMIN — PANTOPRAZOLE SODIUM 40 MG: 40 TABLET, DELAYED RELEASE ORAL at 17:08

## 2021-06-19 RX ADMIN — LEVOTHYROXINE SODIUM 25 MCG: 0.05 TABLET ORAL at 05:16

## 2021-06-19 RX ADMIN — ACETAMINOPHEN 650 MG: 325 TABLET ORAL at 21:48

## 2021-06-19 RX ADMIN — FOLIC ACID 1 MG: 1 TABLET ORAL at 09:06

## 2021-06-19 RX ADMIN — SENNOSIDES AND DOCUSATE SODIUM 1 TABLET: 8.6; 5 TABLET ORAL at 09:07

## 2021-06-19 RX ADMIN — PANTOPRAZOLE SODIUM 40 MG: 40 TABLET, DELAYED RELEASE ORAL at 05:15

## 2021-06-19 RX ADMIN — Medication 10 ML: at 05:17

## 2021-06-19 RX ADMIN — DULOXETINE HYDROCHLORIDE 60 MG: 60 CAPSULE, DELAYED RELEASE ORAL at 09:06

## 2021-06-19 RX ADMIN — APIXABAN 5 MG: 5 TABLET, FILM COATED ORAL at 09:06

## 2021-06-19 RX ADMIN — Medication 10 ML: at 14:57

## 2021-06-19 RX ADMIN — POLYETHYLENE GLYCOL 3350 17 G: 17 POWDER, FOR SOLUTION ORAL at 09:06

## 2021-06-19 RX ADMIN — Medication 10 ML: at 22:01

## 2021-06-19 RX ADMIN — VITAMIN D, TAB 1000IU (100/BT) 1000 UNITS: 25 TAB at 09:06

## 2021-06-19 RX ADMIN — AMIODARONE HYDROCHLORIDE 200 MG: 200 TABLET ORAL at 09:06

## 2021-06-19 RX ADMIN — SPIRONOLACTONE 25 MG: 25 TABLET ORAL at 09:07

## 2021-06-19 RX ADMIN — SENNOSIDES AND DOCUSATE SODIUM 1 TABLET: 8.6; 5 TABLET ORAL at 22:00

## 2021-06-19 RX ADMIN — ROSUVASTATIN CALCIUM 10 MG: 5 TABLET, COATED ORAL at 22:00

## 2021-06-19 RX ADMIN — ACETAMINOPHEN 650 MG: 325 TABLET ORAL at 03:47

## 2021-06-19 RX ADMIN — METOPROLOL SUCCINATE 50 MG: 50 TABLET, EXTENDED RELEASE ORAL at 09:07

## 2021-06-19 RX ADMIN — APIXABAN 5 MG: 5 TABLET, FILM COATED ORAL at 17:08

## 2021-06-19 RX ADMIN — ALBUMIN (HUMAN) 12.5 G: 0.25 INJECTION, SOLUTION INTRAVENOUS at 17:08

## 2021-06-19 NOTE — PROGRESS NOTES
Hourly rounds completed. Pt remained on room air,no SOB noted. Complained of left side pain to abdomen,medicated per [facility], expressed relief. Still with slight BLE edema, encouraged to elevate legs. No other needs at this time. Sitting in recliner,call light within reach, will give report to oncoming RN.

## 2021-06-19 NOTE — PROGRESS NOTES
[facility] CARDIOLOGY PROGRESS NOTE           6/19/2021 12:18 PM    Admit Date: 6/13/2021      Subjective: The patient does not report angina, shortness of breath at rest, or palpitations. ROS:  Constitutional:   Negative unexplained weight loss. Eyes:   Negative for unexplained blindness. ENT:   Negative for unexplained hearing loss. Respiratory:   Negative for unexplained hemoptysis. Cardiovascular:   Negative except as noted in HPI. Gastrointestinal:   Negative for unexplained vomiting. Genitourinary:   Negative for unexplained hematuria. Integumentary:   Negative for unexplained rash. Hematologic/Lymphatic:   Negative for unexplained excessive bleeding. Musculoskeletal:  Negative for unexplained joint pain. Neurological:   Negative for stroke. Behavioral/Psych:   Negative for suicidal ideations. Endocrine:   Negative for uncontrolled diabetic symptoms including polyuria, polydipsia. Objective:      Vitals:    06/18/21 2254 06/19/21 0407 06/19/21 0742 06/19/21 1119   BP: 124/61 (!) 103/53 (!) 128/58 (!) 132/54   Pulse: 70 70 63 62   Resp: 21 20 20 20   Temp: 98.4 °F (36.9 °C) 98.2 °F (36.8 °C) 98.2 °F (36.8 °C) 98.5 °F (36.9 °C)   SpO2: 91% 94% 93% 94%   Weight:       Height:           Physical Exam:  General-No Acute Distress  Neck- supple, no JVD  CV- regular rate and rhythm no RG  Lung- clear bilaterally  Abd- soft, nontender, nondistended  Ext- no edema bilaterally.   Skin- warm and dry    Data Review:   Recent Labs     06/19/21  0633 06/18/21  0513   * 136   K 4.7 3.7   BUN 31* 25*   CREA 1.72* 1.68*   GLU 65 98   WBC 16.9* 15.4*   HGB 10.2* 9.6*   HCT 33.2* 31.7*    298       Assessment/Plan:     HFpEF  - No e/o of hypervolemia on exam:  Withhold IVP Lasix with sCr 1.25 on 6/14 and now 1.72  - Follow up BMP in the AM to determine diuretic direction    Paroxysmal atrial fibrillation  - Continue with amiodarone, Toprol XL and Eliquis    Hypertension  - Continue with Toprol PAULINA Zeng MD

## 2021-06-19 NOTE — PROGRESS NOTES
[facility] Hospitalist Progress Note     Name: Jesus Tran   Age: 68 y.o. Sex: female  : 1940    MRN:     [id number]    Admit Date:  2021    Reason for Admission:  CHF (congestive heart failure) ([facility] [address] [I50.9]    Assessment & Plan     Acute respiratory failure with hypoxia due to acute on chronic CHF exacerbation  Patient was noted to be saturating 88% on room air and requiring 3 L nasal cannula on arrival to the ED. Echocardiogram in 2021 with preserved EF of 55 to 60%. pBNP of 10k. CXR with progressive vascular congestion. Strict I's and O. Daily weights  Cont Lasix 40 mg IV   Cardiology on board, appreciate recs  Weaned down to RA. Walking pulse Ox with RT stable on RA. Anticipate d/c to home maybe today or tomorrow pending Cardiology recs. Repeat BMP pending. PT/OT rec'd HHT    Elevated troponins likely due to demand ischemia  Troponin has peaked at 56.9. Patient without any chest pains. EKG with normal sinus rhythm without any specific T wave and ST changes. Hypertension//hyperlipidemia//paroxysmal atrial fibrillation  Cont amiodarone, apixaban, Crestor, spironolactone. Leukocytosis with history of leukopenia status post Granix (in May 20)  Procalcitonin of less than 0.05, UA without any evidence of UTI. Chest x-ray without any definitive infiltrate or consolidation. Hypothyroidism: synthroid    Anemia with Hx of melena  Hemoglobin appears to be stable at 9.0 on admission. Appears to be around 8-7 in prior admission. EGD was deferred during last admission due to acute illness  Cont PPI twice daily  Monitor hemoglobin and transfuse as needed  Hgb stable    CKD stage II   Baseline creatinine 1.3-1.5, at baseline  Continue monitoring renal function while diuresis    Depression: Continue with Cymbalta  GERD: PPI    Diet:  DIET ADULT  DVT PPx: eliquis  GI Ppx: PPI  Code: Full Code    Dispo / Discharge Planning:  Anticipate today or tomorrow pending cardiology recs and repeat BMP. PT/OT Eval--HHPT/OT    Hospital Course/Subjective:     Please refer to the admission H&P for details of presentation. In summary, Edwin Pop is a 68 y.o. 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 Patient was recently discharged from rehab after hospitalization in May for fall with rib fractures. She is being admitted for acute on chronic CHF exacerbation. Cardiology was consulted. Subjective/24 hr Events (06/19/21) : Pt breathing well on RA. No complaints. [place] to go home. Patient denies fever, chills, chest pains, shortness of breath, n/v, abdominal pain. Review of Systems: 14 point review of systems is otherwise negative with the exception of the elements mentioned above. Objective:     Patient Vitals for the past 24 hrs:   Temp Pulse Resp BP SpO2   06/19/21 0742 98.2 °F (36.8 °C) 63 20 (!) 128/58 93 %   06/19/21 0407 98.2 °F (36.8 °C) 70 20 (!) 103/53 94 %   06/18/21 2254 98.4 °F (36.9 °C) 70 21 124/61 91 %   06/18/21 1924 98.3 °F (36.8 °C) 65 22 134/66 92 %   06/18/21 1603 98 °F (36.7 °C) 62 20 (!) 149/58 93 %   06/18/21 1157 97.9 °F (36.6 °C) 64 20 (!) 147/63 99 %     Oxygen Therapy  O2 Sat (%): 93 % (06/19/21 0742)  Pulse via Oximetry: 81 beats per minute (06/15/21 1016)  O2 Device: None (Room air) (06/17/21 1951)  Skin Assessment: Clean, dry, & intact (06/16/21 0720)  O2 Flow Rate (L/min): 2 l/min (06/16/21 0720)    Body mass index is 28.53 kg/m². Physical Exam:   General:     alert, awake, no acute distress. Neck:    supple, non-tender. Trachea midline.    Lungs:   CTA b/l  Cardiac:   RRR, Normal S1 and S2  Abdomen:   Soft, non distended, nontender, +BS, no guarding/rebound  Extremities:   Trace pitting edema much improved, pedal pulses present  Skin:   Warm, dry, normal turgor and texture; no rash, ulcers   Neuro:  AAOx3. No gross focal neurological deficit  Psychiatric:  No anxiety, calm, cooperative    Data Review:  I have reviewed all labs, meds, and studies from the last 24 hours:    Labs:    Recent Results (from the past 24 hour(s))   CBC WITH AUTOMATED DIFF    Collection Time: 06/19/21  6:33 AM   Result Value Ref Range    WBC 16.9 (H) 4.3 - 11.1 K/uL    RBC 3.43 (L) 4.05 - 5.2 M/uL    HGB 10.2 (L) 11.7 - 15.4 g/dL    HCT 33.2 (L) 35.8 - 46.3 %    MCV 96.8 79.6 - 97.8 FL    MCH 29.7 26.1 - 32.9 PG    MCHC 30.7 (L) 31.4 - 35.0 g/dL    RDW 17.2 (H) 11.9 - 14.6 %    PLATELET 196 194 - 692 K/uL    MPV 12.0 9.4 - 12.3 FL    ABSOLUTE NRBC 0.00 0.0 - 0.2 K/uL    DF AUTOMATED      NEUTROPHILS 72 43 - 78 %    LYMPHOCYTES 12 (L) 13 - 44 %    MONOCYTES 13 (H) 4.0 - 12.0 %    EOSINOPHILS 2 0.5 - 7.8 %    BASOPHILS 1 0.0 - 2.0 %    IMMATURE GRANULOCYTES 0 0.0 - 5.0 %    ABS. NEUTROPHILS 12.1 (H) 1.7 - 8.2 K/UL    ABS. LYMPHOCYTES 2.0 0.5 - 4.6 K/UL    ABS. MONOCYTES 2.2 (H) 0.1 - 1.3 K/UL    ABS. EOSINOPHILS 0.4 0.0 - 0.8 K/UL    ABS. BASOPHILS 0.2 0.0 - 0.2 K/UL    ABS. IMM.  GRANS. 0.1 0.0 - 0.5 K/UL       All Micro Results     Procedure Component Value Units Date/Time    CULTURE, BLOOD [[id number]] Collected: 06/13/21 1749    Order Status: Completed Specimen: Blood Updated: 06/18/21 0704     Special Requests: --        LEFT  HAND       Culture result: NO GROWTH 5 DAYS       CULTURE, BLOOD [[id number]] Collected: 06/13/21 1746    Order Status: Completed Specimen: Blood Updated: 06/18/21 0704     Special Requests: --        LEFT  Antecubital       Culture result: NO GROWTH 5 DAYS             EKG Results     Procedure 720 Value Units Date/Time    EKG [[id number]] Collected: 06/13/21 1426    Order Status: Completed Updated: 06/14/21 0705     Ventricular Rate 90 BPM      Atrial Rate 90 BPM      P-R Interval 160 ms      QRS Duration 90 ms      Q-T Interval 316 ms      QTC Calculation (Bezet) 386 ms      Calculated P Axis 55 degrees      Calculated R Axis 81 degrees      Calculated T Axis 24 degrees      Diagnosis --     Normal sinus rhythm  Nonspecific T wave abnormality  Abnormal ECG  When compared with ECG of 12-MAY-2021 10:32,  Right bundle branch block is no longer Present  Confirmed by [facility] LAVINIA & JOVITA [facility] [place]  MD (), Lavonne Dubin ([id number]) on 6/14/2021 7:05:02 AM            Other Studies:  XR CHEST PORT    Result Date: 6/13/2021  1 View portable chest x-ray 6/13/2021 2:42 PM Indication: History of pneumonia, shortness of breath. Syncopal episode. Comparison: Prior studies-most recent 5/21/2021 Findings: This portable upright AP chest at 1437 shows the patient again rotated somewhat toward the left. Cardiomediastinal silhouette stable. Vascular pedicle appearance probably slightly more pronounced today. Mildly more progressed bibasilar hazy/veiling opacities which may represent layering effusions compared to the most recent study. Compared to older studies these are indolently progressing. Old right rib fractures again seen. Increased vascular congestion today, patient probably has progressing basilar effusions-now of small/moderate volume. Appearance most suggestive of progressing vascular congestion. Correlate correlate clinically, consider serum BNP.           Current Meds:   Current Facility-Administered Medications   Medication Dose Route Frequency    metoprolol succinate (TOPROL-XL) XL tablet 50 mg  50 mg Oral DAILY    sodium chloride (NS) flush 5-10 mL  5-10 mL IntraVENous PRN    amiodarone (CORDARONE) tablet 200 mg  200 mg Oral DAILY    apixaban (ELIQUIS) tablet 5 mg  5 mg Oral BID    cholecalciferol (VITAMIN D3) (1000 Units /25 mcg) tablet 1,000 Units  1,000 Units Oral DAILY    DULoxetine (CYMBALTA) capsule 60 mg  60 mg Oral DAILY    levothyroxine (SYNTHROID) tablet 25 mcg  25 mcg Oral ACB    folic acid (FOLVITE) tablet 1 mg  1 mg Oral DAILY  ondansetron (ZOFRAN ODT) tablet 4 mg  4 mg Oral Q8H PRN    pantoprazole (PROTONIX) tablet 40 mg  40 mg Oral ACB&D    rosuvastatin (CRESTOR) tablet 10 mg  10 mg Oral QHS    spironolactone (ALDACTONE) tablet 25 mg  25 mg Oral DAILY    sodium chloride (NS) flush 5-40 mL  5-40 mL IntraVENous Q8H    sodium chloride (NS) flush 5-40 mL  5-40 mL IntraVENous PRN    acetaminophen (TYLENOL) tablet 650 mg  650 mg Oral Q6H PRN    Or    acetaminophen (TYLENOL) suppository 650 mg  650 mg Rectal Q6H PRN    polyethylene glycol (MIRALAX) packet 17 g  17 g Oral DAILY    senna-docusate (PERICOLACE) 8.6-50 mg per tablet 1 Tablet  1 Tablet Oral BID    magnesium hydroxide (MILK OF MAGNESIA) 400 mg/5 mL oral suspension 30 mL  30 mL Oral DAILY PRN    bisacodyL (DULCOLAX) suppository 10 mg  10 mg Rectal DAILY PRN    furosemide (LASIX) injection 40 mg  40 mg IntraVENous BID    captopriL (CAPOTEN) tablet 12.5 mg  12.5 mg Oral TID PRN       Problem List:  Hospital Problems as of 6/19/2021 Date Reviewed: 4/13/2021        Codes Class Noted - Resolved POA    Acute respiratory failure with hypoxia (HCC) ICD-10-CM: J96.01  ICD-9-CM: 518.81  6/14/2021 - Present Yes        Demand ischemia ([facility] [address] ICD-10-CM: I24.8  ICD-9-CM: 411.89  6/14/2021 - Present Yes        Leukocytosis ICD-10-CM: I11.515  ICD-9-CM: 288.60  6/14/2021 - Present Yes        * (Principal) Acute on chronic diastolic congestive heart failure (HCC) ICD-10-CM: I50.33  ICD-9-CM: 428.33, 428.0  6/13/2021 - Present Yes        Stage 3 chronic kidney disease (HCC) (Chronic) ICD-10-CM: N18.30  ICD-9-CM: 585.3  5/20/2021 - Present Yes        Paroxysmal atrial fibrillation (HCC) (Chronic) ICD-10-CM: I48.0  ICD-9-CM: 427.31  3/19/2021 - Present Yes        Hypertension, essential, benign (Chronic) ICD-10-CM: I10  ICD-9-CM: 401.1  8/18/2016 - Present Yes        Depression ICD-10-CM: F32.9  ICD-9-CM: 263  8/18/2016 - Present Yes        Hyperlipidemia (Chronic) ICD-10-CM: E78.5  ICD-9-CM: 272.4  1/16/2016 - Present Yes        Rheumatoid arthritis ([facility] (Chronic) ICD-10-CM: M06.9  ICD-9-CM: 714.0  1/16/2016 - Present Yes               Part of this note was written by using a voice dictation software and the note has been proof read but may still contain some grammatical/other typographical errors.     Signed By: DO [facility]    June 19, 2021  12:57 PM

## 2021-06-19 NOTE — PROGRESS NOTES
Problem: Falls - Risk of  Goal: *Absence of Falls  Description: Document [place] Fall Risk and appropriate interventions in the flowsheet.   Outcome: Progressing Towards Goal  Note: Fall Risk Interventions:  Mobility Interventions: Patient to call before getting OOB         Medication Interventions: Patient to call before getting OOB    Elimination Interventions: Call light in reach    History of Falls Interventions: Door open when patient unattended         Problem: Patient Education: Go to Patient Education Activity  Goal: Patient/Family Education  Outcome: Progressing Towards Goal     Problem: Pain  Goal: *Control of Pain  Outcome: Progressing Towards Goal     Problem: Patient Education: Go to Patient Education Activity  Goal: Patient/Family Education  Outcome: Progressing Towards Goal     Problem: Patient Education: Go to Patient Education Activity  Goal: Patient/Family Education  Outcome: Progressing Towards Goal

## 2021-06-20 ENCOUNTER — APPOINTMENT (OUTPATIENT)
Dept: GENERAL RADIOLOGY | Age: 81
DRG: 291 | End: 2021-06-20
Attending: INTERNAL MEDICINE
Payer: MEDICARE

## 2021-06-20 LAB
ANION GAP SERPL CALC-SCNC: 7 MMOL/L (ref 7–16)
APPEARANCE UR: CLEAR
ARTERIAL PATENCY WRIST A: POSITIVE
ATRIAL RATE: 71 BPM
B PERT DNA SPEC QL NAA+PROBE: NOT DETECTED
BASE EXCESS BLD CALC-SCNC: 7.4 MMOL/L
BASOPHILS # BLD: 0.2 K/UL (ref 0–0.2)
BASOPHILS NFR BLD: 1 % (ref 0–2)
BDY SITE: ABNORMAL
BILIRUB UR QL: NEGATIVE
BNP SERPL-MCNC: 2873 PG/ML
BORDETELLA PARAPERTUSSIS PCR, BORPAR: NOT DETECTED
BUN SERPL-MCNC: 28 MG/DL (ref 8–23)
C PNEUM DNA SPEC QL NAA+PROBE: NOT DETECTED
CALCIUM SERPL-MCNC: 9 MG/DL (ref 8.3–10.4)
CALCULATED P AXIS, ECG09: 15 DEGREES
CALCULATED R AXIS, ECG10: 71 DEGREES
CALCULATED T AXIS, ECG11: 26 DEGREES
CHLORIDE SERPL-SCNC: 96 MMOL/L (ref 98–107)
CO2 SERPL-SCNC: 31 MMOL/L (ref 21–32)
COLOR UR: YELLOW
COVID-19 RAPID TEST, COVR: NOT DETECTED
CREAT SERPL-MCNC: 1.76 MG/DL (ref 0.6–1)
CREAT UR-MCNC: 55.3 MG/DL
DIAGNOSIS, 93000: NORMAL
DIFFERENTIAL METHOD BLD: ABNORMAL
EOSINOPHIL # BLD: 0.2 K/UL (ref 0–0.8)
EOSINOPHIL NFR BLD: 1 % (ref 0.5–7.8)
ERYTHROCYTE [DISTWIDTH] IN BLOOD BY AUTOMATED COUNT: 17 % (ref 11.9–14.6)
FIO2, L/MIN - FIO2P: 8
FLUAV SUBTYP SPEC NAA+PROBE: NOT DETECTED
FLUBV RNA SPEC QL NAA+PROBE: NOT DETECTED
GAS FLOW.O2 O2 DELIVERY SYS: ABNORMAL L/MIN
GLUCOSE SERPL-MCNC: 113 MG/DL (ref 65–100)
GLUCOSE UR STRIP.AUTO-MCNC: NEGATIVE MG/DL
HADV DNA SPEC QL NAA+PROBE: NOT DETECTED
HCO3 BLD-SCNC: 31.7 MMOL/L (ref 22–26)
HCOV 229E RNA SPEC QL NAA+PROBE: NOT DETECTED
HCOV HKU1 RNA SPEC QL NAA+PROBE: NOT DETECTED
HCOV NL63 RNA SPEC QL NAA+PROBE: NOT DETECTED
HCOV OC43 RNA SPEC QL NAA+PROBE: NOT DETECTED
HCT VFR BLD AUTO: 34.6 % (ref 35.8–46.3)
HGB BLD-MCNC: 10.7 G/DL (ref 11.7–15.4)
HGB UR QL STRIP: NEGATIVE
HMPV RNA SPEC QL NAA+PROBE: NOT DETECTED
HPIV1 RNA SPEC QL NAA+PROBE: NOT DETECTED
HPIV2 RNA SPEC QL NAA+PROBE: NOT DETECTED
HPIV3 RNA SPEC QL NAA+PROBE: NOT DETECTED
HPIV4 RNA SPEC QL NAA+PROBE: NOT DETECTED
IMM GRANULOCYTES # BLD AUTO: 0.1 K/UL (ref 0–0.5)
IMM GRANULOCYTES NFR BLD AUTO: 1 % (ref 0–5)
KETONES UR QL STRIP.AUTO: NEGATIVE MG/DL
LACTATE SERPL-SCNC: 1.5 MMOL/L (ref 0.4–2)
LEUKOCYTE ESTERASE UR QL STRIP.AUTO: NEGATIVE
LYMPHOCYTES # BLD: 2.5 K/UL (ref 0.5–4.6)
LYMPHOCYTES NFR BLD: 12 % (ref 13–44)
M PNEUMO DNA SPEC QL NAA+PROBE: NOT DETECTED
MAGNESIUM SERPL-MCNC: 2.2 MG/DL (ref 1.8–2.4)
MCH RBC QN AUTO: 29.2 PG (ref 26.1–32.9)
MCHC RBC AUTO-ENTMCNC: 30.9 G/DL (ref 31.4–35)
MCV RBC AUTO: 94.3 FL (ref 79.6–97.8)
MONOCYTES # BLD: 2.7 K/UL (ref 0.1–1.3)
MONOCYTES NFR BLD: 13 % (ref 4–12)
NEUTS SEG # BLD: 14.9 K/UL (ref 1.7–8.2)
NEUTS SEG NFR BLD: 73 % (ref 43–78)
NITRITE UR QL STRIP.AUTO: NEGATIVE
NRBC # BLD: 0 K/UL (ref 0–0.2)
P-R INTERVAL, ECG05: 158 MS
PCO2 BLD: 42.3 MMHG (ref 35–45)
PH BLD: 7.48 [PH] (ref 7.35–7.45)
PH UR STRIP: 5.5 [PH] (ref 5–9)
PLATELET # BLD AUTO: 315 K/UL (ref 150–450)
PMV BLD AUTO: 11.7 FL (ref 9.4–12.3)
PO2 BLD: 68 MMHG (ref 75–100)
POTASSIUM SERPL-SCNC: 4 MMOL/L (ref 3.5–5.1)
PROCALCITONIN SERPL-MCNC: 0.13 NG/ML
PROT UR STRIP-MCNC: NEGATIVE MG/DL
PROT UR-MCNC: 21 MG/DL
PROT/CREAT UR-RTO: 0.4
Q-T INTERVAL, ECG07: 470 MS
QRS DURATION, ECG06: 126 MS
QTC CALCULATION (BEZET), ECG08: 510 MS
RBC # BLD AUTO: 3.67 M/UL (ref 4.05–5.2)
RSV RNA SPEC QL NAA+PROBE: NOT DETECTED
RV+EV RNA SPEC QL NAA+PROBE: NOT DETECTED
SAO2 % BLD: 94.4 % (ref 95–98)
SARS-COV-2 PCR, COVPCR: NOT DETECTED
SARS-COV-2, COV2: NORMAL
SERVICE CMNT-IMP: ABNORMAL
SODIUM SERPL-SCNC: 134 MMOL/L (ref 136–145)
SODIUM UR-SCNC: 22 MMOL/L
SOURCE, COVRS: NORMAL
SP GR UR REFRACTOMETRY: 1.01 (ref 1–1.02)
SPECIMEN TYPE: ABNORMAL
TROPONIN-HIGH SENSITIVITY: 22.6 PG/ML (ref 0–14)
UROBILINOGEN UR QL STRIP.AUTO: 0.2 EU/DL (ref 0.2–1)
VENTRICULAR RATE, ECG03: 71 BPM
WBC # BLD AUTO: 20.5 K/UL (ref 4.3–11.1)

## 2021-06-20 PROCEDURE — 0202U NFCT DS 22 TRGT SARS-COV-2: CPT

## 2021-06-20 PROCEDURE — 74011000250 HC RX REV CODE- 250: Performed by: INTERNAL MEDICINE

## 2021-06-20 PROCEDURE — 87635 SARS-COV-2 COVID-19 AMP PRB: CPT

## 2021-06-20 PROCEDURE — 84484 ASSAY OF TROPONIN QUANT: CPT

## 2021-06-20 PROCEDURE — 94640 AIRWAY INHALATION TREATMENT: CPT

## 2021-06-20 PROCEDURE — 74011250636 HC RX REV CODE- 250/636: Performed by: INTERNAL MEDICINE

## 2021-06-20 PROCEDURE — 74011000258 HC RX REV CODE- 258: Performed by: INTERNAL MEDICINE

## 2021-06-20 PROCEDURE — 71045 X-RAY EXAM CHEST 1 VIEW: CPT

## 2021-06-20 PROCEDURE — 77010033678 HC OXYGEN DAILY

## 2021-06-20 PROCEDURE — 51798 US URINE CAPACITY MEASURE: CPT

## 2021-06-20 PROCEDURE — P9047 ALBUMIN (HUMAN), 25%, 50ML: HCPCS | Performed by: FAMILY MEDICINE

## 2021-06-20 PROCEDURE — 94760 N-INVAS EAR/PLS OXIMETRY 1: CPT

## 2021-06-20 PROCEDURE — 83880 ASSAY OF NATRIURETIC PEPTIDE: CPT

## 2021-06-20 PROCEDURE — 74011250636 HC RX REV CODE- 250/636: Performed by: FAMILY MEDICINE

## 2021-06-20 PROCEDURE — 76937 US GUIDE VASCULAR ACCESS: CPT

## 2021-06-20 PROCEDURE — 36600 WITHDRAWAL OF ARTERIAL BLOOD: CPT

## 2021-06-20 PROCEDURE — 87070 CULTURE OTHR SPECIMN AEROBIC: CPT

## 2021-06-20 PROCEDURE — 74011250636 HC RX REV CODE- 250/636

## 2021-06-20 PROCEDURE — 83605 ASSAY OF LACTIC ACID: CPT

## 2021-06-20 PROCEDURE — 94762 N-INVAS EAR/PLS OXIMTRY CONT: CPT

## 2021-06-20 PROCEDURE — 65270000029 HC RM PRIVATE

## 2021-06-20 PROCEDURE — 94664 DEMO&/EVAL PT USE INHALER: CPT

## 2021-06-20 PROCEDURE — 83735 ASSAY OF MAGNESIUM: CPT

## 2021-06-20 PROCEDURE — 87040 BLOOD CULTURE FOR BACTERIA: CPT

## 2021-06-20 PROCEDURE — 74011250637 HC RX REV CODE- 250/637: Performed by: INTERNAL MEDICINE

## 2021-06-20 PROCEDURE — 74011250637 HC RX REV CODE- 250/637: Performed by: HOSPITALIST

## 2021-06-20 PROCEDURE — 82803 BLOOD GASES ANY COMBINATION: CPT

## 2021-06-20 PROCEDURE — 65660000000 HC RM CCU STEPDOWN

## 2021-06-20 PROCEDURE — 84300 ASSAY OF URINE SODIUM: CPT

## 2021-06-20 PROCEDURE — 82570 ASSAY OF URINE CREATININE: CPT

## 2021-06-20 PROCEDURE — 74011250636 HC RX REV CODE- 250/636: Performed by: HOSPITALIST

## 2021-06-20 PROCEDURE — 81003 URINALYSIS AUTO W/O SCOPE: CPT

## 2021-06-20 PROCEDURE — 77010033711 HC HIGH FLOW OXYGEN

## 2021-06-20 PROCEDURE — 85025 COMPLETE CBC W/AUTO DIFF WBC: CPT

## 2021-06-20 PROCEDURE — 80048 BASIC METABOLIC PNL TOTAL CA: CPT

## 2021-06-20 PROCEDURE — 36415 COLL VENOUS BLD VENIPUNCTURE: CPT

## 2021-06-20 PROCEDURE — 99233 SBSQ HOSP IP/OBS HIGH 50: CPT | Performed by: INTERNAL MEDICINE

## 2021-06-20 PROCEDURE — 84145 PROCALCITONIN (PCT): CPT

## 2021-06-20 RX ORDER — HYDROMORPHONE HYDROCHLORIDE 1 MG/ML
0.5 INJECTION, SOLUTION INTRAMUSCULAR; INTRAVENOUS; SUBCUTANEOUS ONCE
Status: DISCONTINUED | OUTPATIENT
Start: 2021-06-20 | End: 2021-06-20

## 2021-06-20 RX ORDER — BUDESONIDE 0.5 MG/2ML
500 INHALANT ORAL
Status: DISCONTINUED | OUTPATIENT
Start: 2021-06-20 | End: 2021-06-24 | Stop reason: HOSPADM

## 2021-06-20 RX ORDER — ALBUTEROL SULFATE 0.83 MG/ML
2.5 SOLUTION RESPIRATORY (INHALATION)
Status: COMPLETED | OUTPATIENT
Start: 2021-06-20 | End: 2021-06-20

## 2021-06-20 RX ORDER — MORPHINE SULFATE 2 MG/ML
2 INJECTION, SOLUTION INTRAMUSCULAR; INTRAVENOUS ONCE
Status: COMPLETED | OUTPATIENT
Start: 2021-06-20 | End: 2021-06-20

## 2021-06-20 RX ORDER — BUDESONIDE 0.5 MG/2ML
500 INHALANT ORAL
Status: DISCONTINUED | OUTPATIENT
Start: 2021-06-20 | End: 2021-06-20

## 2021-06-20 RX ORDER — FUROSEMIDE 10 MG/ML
40 INJECTION INTRAMUSCULAR; INTRAVENOUS ONCE
Status: COMPLETED | OUTPATIENT
Start: 2021-06-20 | End: 2021-06-20

## 2021-06-20 RX ORDER — MORPHINE SULFATE 2 MG/ML
INJECTION, SOLUTION INTRAMUSCULAR; INTRAVENOUS
Status: COMPLETED
Start: 2021-06-20 | End: 2021-06-20

## 2021-06-20 RX ORDER — ALBUTEROL SULFATE 0.83 MG/ML
SOLUTION RESPIRATORY (INHALATION)
Status: ACTIVE
Start: 2021-06-20 | End: 2021-06-20

## 2021-06-20 RX ORDER — ALBUTEROL SULFATE 0.83 MG/ML
2.5 SOLUTION RESPIRATORY (INHALATION)
Status: DISCONTINUED | OUTPATIENT
Start: 2021-06-20 | End: 2021-06-24 | Stop reason: HOSPADM

## 2021-06-20 RX ORDER — DOXYCYCLINE 100 MG/1
100 CAPSULE ORAL EVERY 12 HOURS
Status: DISCONTINUED | OUTPATIENT
Start: 2021-06-20 | End: 2021-06-24 | Stop reason: HOSPADM

## 2021-06-20 RX ADMIN — FOLIC ACID 1 MG: 1 TABLET ORAL at 09:17

## 2021-06-20 RX ADMIN — ALBUTEROL SULFATE 2.5 MG: 2.5 SOLUTION RESPIRATORY (INHALATION) at 02:10

## 2021-06-20 RX ADMIN — ACETAMINOPHEN 650 MG: 325 TABLET ORAL at 03:26

## 2021-06-20 RX ADMIN — MORPHINE SULFATE 2 MG: 2 INJECTION, SOLUTION INTRAMUSCULAR; INTRAVENOUS at 02:42

## 2021-06-20 RX ADMIN — AMIODARONE HYDROCHLORIDE 200 MG: 200 TABLET ORAL at 09:17

## 2021-06-20 RX ADMIN — FUROSEMIDE 40 MG: 10 INJECTION INTRAMUSCULAR; INTRAVENOUS at 16:36

## 2021-06-20 RX ADMIN — DOXYCYCLINE HYCLATE 100 MG: 100 CAPSULE ORAL at 09:17

## 2021-06-20 RX ADMIN — POLYETHYLENE GLYCOL 3350 17 G: 17 POWDER, FOR SOLUTION ORAL at 09:18

## 2021-06-20 RX ADMIN — Medication 10 ML: at 07:10

## 2021-06-20 RX ADMIN — VITAMIN D, TAB 1000IU (100/BT) 1000 UNITS: 25 TAB at 09:17

## 2021-06-20 RX ADMIN — Medication 10 ML: at 21:25

## 2021-06-20 RX ADMIN — PANTOPRAZOLE SODIUM 40 MG: 40 TABLET, DELAYED RELEASE ORAL at 07:08

## 2021-06-20 RX ADMIN — METOPROLOL SUCCINATE 50 MG: 50 TABLET, EXTENDED RELEASE ORAL at 09:17

## 2021-06-20 RX ADMIN — MORPHINE SULFATE 2 MG: 2 INJECTION, SOLUTION INTRAMUSCULAR; INTRAVENOUS at 01:27

## 2021-06-20 RX ADMIN — AMPICILLIN SODIUM AND SULBACTAM SODIUM 3 G: 2; 1 INJECTION, POWDER, FOR SOLUTION INTRAMUSCULAR; INTRAVENOUS at 02:31

## 2021-06-20 RX ADMIN — AMPICILLIN SODIUM AND SULBACTAM SODIUM 3 G: 2; 1 INJECTION, POWDER, FOR SOLUTION INTRAMUSCULAR; INTRAVENOUS at 16:36

## 2021-06-20 RX ADMIN — ALBUMIN (HUMAN) 12.5 G: 0.25 INJECTION, SOLUTION INTRAVENOUS at 09:17

## 2021-06-20 RX ADMIN — ACETAMINOPHEN 650 MG: 325 TABLET ORAL at 09:17

## 2021-06-20 RX ADMIN — BUDESONIDE 500 MCG: 0.5 INHALANT RESPIRATORY (INHALATION) at 08:57

## 2021-06-20 RX ADMIN — METHYLPREDNISOLONE SODIUM SUCCINATE 40 MG: 40 INJECTION, POWDER, FOR SOLUTION INTRAMUSCULAR; INTRAVENOUS at 09:18

## 2021-06-20 RX ADMIN — METHYLPREDNISOLONE SODIUM SUCCINATE 125 MG: 125 INJECTION, POWDER, FOR SOLUTION INTRAMUSCULAR; INTRAVENOUS at 02:30

## 2021-06-20 RX ADMIN — FUROSEMIDE 40 MG: 10 INJECTION INTRAMUSCULAR; INTRAVENOUS at 02:41

## 2021-06-20 RX ADMIN — ALBUTEROL SULFATE 2.5 MG: 2.5 SOLUTION RESPIRATORY (INHALATION) at 20:32

## 2021-06-20 RX ADMIN — BUDESONIDE 500 MCG: 0.5 INHALANT RESPIRATORY (INHALATION) at 20:32

## 2021-06-20 RX ADMIN — METHYLPREDNISOLONE SODIUM SUCCINATE 40 MG: 40 INJECTION, POWDER, FOR SOLUTION INTRAMUSCULAR; INTRAVENOUS at 16:36

## 2021-06-20 RX ADMIN — APIXABAN 5 MG: 5 TABLET, FILM COATED ORAL at 09:17

## 2021-06-20 RX ADMIN — ROSUVASTATIN CALCIUM 10 MG: 5 TABLET, COATED ORAL at 21:23

## 2021-06-20 RX ADMIN — DULOXETINE HYDROCHLORIDE 60 MG: 60 CAPSULE, DELAYED RELEASE ORAL at 09:17

## 2021-06-20 RX ADMIN — Medication 10 ML: at 13:18

## 2021-06-20 RX ADMIN — SENNOSIDES AND DOCUSATE SODIUM 1 TABLET: 8.6; 5 TABLET ORAL at 09:17

## 2021-06-20 RX ADMIN — LEVOTHYROXINE SODIUM 25 MCG: 0.05 TABLET ORAL at 07:07

## 2021-06-20 RX ADMIN — ALBUTEROL SULFATE 2.5 MG: 2.5 SOLUTION RESPIRATORY (INHALATION) at 08:57

## 2021-06-20 RX ADMIN — PANTOPRAZOLE SODIUM 40 MG: 40 TABLET, DELAYED RELEASE ORAL at 16:36

## 2021-06-20 RX ADMIN — APIXABAN 5 MG: 5 TABLET, FILM COATED ORAL at 16:36

## 2021-06-20 RX ADMIN — SENNOSIDES AND DOCUSATE SODIUM 1 TABLET: 8.6; 5 TABLET ORAL at 21:23

## 2021-06-20 RX ADMIN — ALBUTEROL SULFATE 2.5 MG: 2.5 SOLUTION RESPIRATORY (INHALATION) at 14:04

## 2021-06-20 RX ADMIN — DOXYCYCLINE HYCLATE 100 MG: 100 CAPSULE ORAL at 21:23

## 2021-06-20 NOTE — PROGRESS NOTES
Date of Outreach Update:  Arley Pacheco was seen and assessed. MEWS Score: 2 (06/20/21 1531)  Vitals:    06/20/21 1149 06/20/21 1343 06/20/21 1404 06/20/21 1531   BP: 132/63   119/63   Pulse: 71 68  72   Resp: 24 26  24   Temp: 97.9 °F (36.6 °C)   97.8 °F (36.6 °C)   SpO2: 100% 99% 98% 95%   Weight:       Height:             Pain Assessment  Pain Intensity 1: 0 (06/20/21 0725)  Pain Location 1: Abdomen  Pain Intervention(s) 1: Medication (see MAR)  Patient Stated Pain Goal: 0    Previous Outreach assessment has been reviewed. There have been no significant clinical changes since the completion of the last dated Outreach assessment. *pt doing well through out today pt now weaned to 3L %     Will continue to follow up per outreach protocol.     Signed By:   Tremaine Funes RN    June 20, 2021 5:32 PM

## 2021-06-20 NOTE — PROGRESS NOTES
[facility] CARDIOLOGY PROGRESS NOTE           6/20/2021 2:58 PM    Admit Date: 6/13/2021      Subjective:   Rapid response overnight. SpO2 noted to be low and supplemental O2 increased. CXR concerning for pulmonary edema and PNA. Leukocytosis worsening. Patient reports no unde dyspnea at the moment and does not report palpitations or angina. ROS:  Constitutional:   Negative unexplained weight loss. Eyes:   Negative for unexplained blindness. ENT:   Negative for unexplained hearing loss. Respiratory:   Negative for unexplained hemoptysis. Cardiovascular:   Negative except as noted in HPI. Gastrointestinal:   Negative for unexplained vomiting. Genitourinary:   Negative for unexplained hematuria. Integumentary:   Negative for unexplained rash. Hematologic/Lymphatic:   Negative for unexplained excessive bleeding. Musculoskeletal:  Negative for unexplained joint pain. Neurological:   Negative for stroke. Behavioral/Psych:   Negative for suicidal ideations. Endocrine:   Negative for uncontrolled diabetic symptoms including polyuria, polydipsia. Objective:      Vitals:    06/20/21 0857 06/20/21 1149 06/20/21 1343 06/20/21 1404   BP:  132/63     Pulse:  71 68    Resp:  24 26    Temp:  97.9 °F (36.6 °C)     SpO2: 96% 100% 99% 98%   Weight:       Height:           Physical Exam:  General-No Acute Distress  Neck- supple, no JVD  CV- regular rate and rhythm no RG  Lung- poor air movement bilaterally  Abd- soft, nontender, nondistended  Ext- no edema bilaterally.   Skin- warm and dry    Data Review:   Recent Labs     06/20/21  0237 06/19/21  0633   * 134*   K 4.0 4.7   MG 2.2  --    BUN 28* 31*   CREA 1.76* 1.72*   * 65   WBC 20.5* 16.9*   HGB 10.7* 10.2*   HCT 34.6* 33.2*    321       Assessment/Plan:     Hypoxic respiratory failure  - EF noted to be 55-60% on 5/20/21  - CXR overnight concerning for pulmonary edema and possibly PNA: Worsening leukocytosis (present before steroid initiation) concerning for infection   - sCr increasing on IV Lasix: sCr 1.25 about 1 week ago; now 1.76 with patient minus 9.2L since admission  - IV Lasix resumed by hospital medicine overnight after withholding yesterday      NAZIA  - Nephrology note reviewed:  Rising sCr in the setting of diuresis, but they favor continued diuresis to achieve euvolemia  - Continue to monitor BMP and Mg daily on diuretics    Paroxysmal atrial fibrillation  - Continue with amiodarone, Toprol XL and Eliquis     Hypertension  - Continue with Toprol XL        Florencio Pandey MD

## 2021-06-20 NOTE — PROGRESS NOTES
Problem: Falls - Risk of  Goal: *Absence of Falls  Description: Document [place] Fall Risk and appropriate interventions in the flowsheet.   Outcome: Progressing Towards Goal  Note: Fall Risk Interventions:  Mobility Interventions: Bed/chair exit alarm, Patient to call before getting OOB, PT Consult for mobility concerns, PT Consult for assist device competence, OT consult for ADLs         Medication Interventions: Bed/chair exit alarm, Patient to call before getting OOB, Teach patient to arise slowly    Elimination Interventions: Bed/chair exit alarm, Call light in reach, Elevated toilet seat, Patient to call for help with toileting needs, Stay With Me (per policy), Toilet paper/wipes in reach, Toileting schedule/hourly rounds    History of Falls Interventions: Bed/chair exit alarm         Problem: Patient Education: Go to Patient Education Activity  Goal: Patient/Family Education  Outcome: Progressing Towards Goal     Problem: Pain  Goal: *Control of Pain  Outcome: Progressing Towards Goal     Problem: Patient Education: Go to Patient Education Activity  Goal: Patient/Family Education  Outcome: Progressing Towards Goal     Problem: Patient Education: Go to Patient Education Activity  Goal: Patient/Family Education  Outcome: Progressing Towards Goal

## 2021-06-20 NOTE — PROGRESS NOTES
Pt not getting any relief, in fact feeling worse. Sitting on side of bed to breath. Resp 32 and labored. PS MD on call, not in house. Rapid response called.

## 2021-06-20 NOTE — PROGRESS NOTES
Asked to start a PIV. Using US assistance, attempted to placed a 20g 1 3/4\" in left outer forearm without success. Using US assistance again, placed a 20g 1\" PIV in the left inner forearm. Primary RN informed.   Rod Talamantes RN, VAT

## 2021-06-20 NOTE — PROGRESS NOTES
Epi Hospitalist Progress Note     Name: Pavithra Piña   Age: 68 y.o. Sex: female  : 1940    MRN:     [id number]    Admit Date:  2021    Reason for Admission:  CHF (congestive heart failure) ([facility] [address] [I50.9]    Assessment & Plan     Acute respiratory failure with hypoxia due to acute on chronic CHF exacerbation  Patient was noted to be saturating 88% on room air and requiring 3 L nasal cannula on arrival to the ED. Echocardiogram in 2021 with preserved EF of 55 to 60%. pBNP of 10k on admission. CXR with progressive vascular congestion. ABG with hypoxia but no acidosis or hypercapnia. Pt on Eliquis so lower concern for PE. Strict I's and O. Daily weights  Cont telemetry  Cont Lasix 40 mg IV, holding Spironolactone d/t NAZIA  Cont breathing treatments and Solumedrol IV for now  On 6LNC, wean O2 as tolerated  Cardiology on board, appreciate recs  Repeat pro-BNP      NAZIA on CKD stage III   Baseline creatinine 1.3-1.5, at baseline. ? Cardiorenal syndrome  D/t SOB with concerns for worsen pulmonary edema, resume Lasix and hold Spironolactone  Continue monitoring renal function while diuresis  Obtain urine studies and UA  Consult Nephrology, appreciate recs      Leukocytosis with history of leukopenia status post Granix (in May 20)  On admission, Procal < 0.05, UA without any evidence of UTI. Rapid response called . CXR shows vascular congestion, LLL opacity atelectasis or consolidation w/o significant change compared to  studies. Covid NEG. Viral PCR panel neg. Abx: Unasyn/Doxycycline (-. ..) empirically for aspiration PNA  Repeat procal, UA, BCx this AM. De-escalate abx if afebrile, procal and BCx neg. Elevated troponins likely due to demand ischemia  Troponin has peaked at 56.9. Patient without any chest pains. EKG with normal sinus rhythm without any specific T wave and ST changes.       Hypertension//hyperlipidemia//paroxysmal atrial fibrillation  Cont amiodarone, apixaban, Crestor, spironolactone. Hypothyroidism: synthroid      Anemia with Hx of melena  Hemoglobin appears to be stable at 9.0 on admission. Appears to be around 8-7 in prior admission. EGD was deferred during last admission due to acute illness  Cont PPI twice daily  Monitor hemoglobin and transfuse as needed  Hgb stable      Depression: Continue with Cymbalta  GERD: PPI    Diet:  DIET ADULT  DVT PPx: eliquis  GI Ppx: PPI  Code: Full Code    Dispo / Discharge Plannin-3 days pending improvement in respiratory status. PT/OT Eval--HHPT/OT    Hospital Course/Subjective:     Please refer to the admission H&P for details of presentation. In summary, Arnav March is a 68 y.o. 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 Patient was recently discharged from rehab after hospitalization in May for fall with rib fractures. She is being admitted for acute on chronic CHF exacerbation. Cardiology was consulted. Subjective/24 hr Events (21) :    Discharge was held yesterday  as pt's Cr was rising and Lasix was held. Early this AM RR was called d/t her having chest pain, SOB, tachypnea associated with wheezing. Troponin improved from previous. EKG shows sinus rhythm and RBBB consistent with CXR shows concerns for worsening pulmonary edema and possible PNA. She was given antibiotics, Lasix, breathing treatments and started on Solumedrol. On 6LHFNC this AM. Still with some SOB but improved. L sided chest pain intermittent. Patient denies fever, chills, n/v, abdominal pain. Review of Systems: 14 point review of systems is otherwise negative with the exception of the elements mentioned above.     Objective:     Patient Vitals for the past 24 hrs:   Temp Pulse Resp BP SpO2   06/20/21 0857     96 %   06/20/21 0734 97.6 °F (36.4 °C) 66 28 (!) 142/70 97 %   06/20/21 0420     (!) 89 %   06/20/21 0350     94 %   06/20/21 0340 97.6 °F (36.4 °C) 69 (!) 42 133/70 (!) 85 %   06/20/21 0210     99 %   06/20/21 0208   (!) 36 125/67 90 %   06/20/21 0206  68 (!) 36 125/67 90 %   06/20/21 0158   (!) 36  92 %   06/19/21 2350 98.6 °F (37 °C) 70 20 (!) 125/56 93 %   06/19/21 1948 98.7 °F (37.1 °C) 69 20 (!) 131/59 91 %   06/19/21 1558 98.4 °F (36.9 °C) 67 20 (!) 128/55 95 %   06/19/21 1119 98.5 °F (36.9 °C) 62 20 (!) 132/54 94 %     Oxygen Therapy  O2 Sat (%): 96 % (06/20/21 0857)  Pulse via Oximetry: 71 beats per minute (06/20/21 0857)  O2 Device: Hi flow nasal cannula (06/20/21 0857)  Skin Assessment: Clean, dry, & intact (06/20/21 0857)  O2 Flow Rate (L/min): 6 l/min (06/20/21 0857)    Body mass index is 29.04 kg/m². Physical Exam:   General:     alert, awake, no acute distress. Neck:    supple, non-tender. Trachea midline. Lungs:   Rales and rhonchi on lung bases  Cardiac:   RRR, Normal S1 and S2  Abdomen:   Soft, non distended, nontender, +BS, no guarding/rebound  Extremities:   Trace pitting edema improved, pedal pulses present  Skin:   Warm, dry, normal turgor and texture; no rash, ulcers   Neuro:  AAOx3.  No gross focal neurological deficit  Psychiatric:  No anxiety, calm, cooperative    Data Review:  I have reviewed all labs, meds, and studies from the last 24 hours:    Labs:    Recent Results (from the past 24 hour(s))   EKG, 12 LEAD, INITIAL    Collection Time: 06/20/21  1:20 AM   Result Value Ref Range    Ventricular Rate 71 BPM    Atrial Rate 71 BPM    P-R Interval 158 ms    QRS Duration 126 ms    Q-T Interval 470 ms    QTC Calculation (Bezet) 510 ms    Calculated P Axis 15 degrees    Calculated R Axis 71 degrees    Calculated T Axis 26 degrees    Diagnosis       Normal sinus rhythm  Right bundle branch block  Abnormal ECG  When compared with ECG of 13-JUN-2021 14:26,  Right bundle branch block is now Present  Confirmed by [facility] LAVINIA & JOVITA [facility]  MD (), Mary CROWE ([id number]) on 6/20/2021 8:19:32 AM     CBC WITH AUTOMATED DIFF    Collection Time: 06/20/21  2:37 AM   Result Value Ref Range    WBC 20.5 (H) 4.3 - 11.1 K/uL    RBC 3.67 (L) 4.05 - 5.2 M/uL    HGB 10.7 (L) 11.7 - 15.4 g/dL    HCT 34.6 (L) 35.8 - 46.3 %    MCV 94.3 79.6 - 97.8 FL    MCH 29.2 26.1 - 32.9 PG    MCHC 30.9 (L) 31.4 - 35.0 g/dL    RDW 17.0 (H) 11.9 - 14.6 %    PLATELET 079 946 - 411 K/uL    MPV 11.7 9.4 - 12.3 FL    ABSOLUTE NRBC 0.00 0.0 - 0.2 K/uL    DF AUTOMATED      NEUTROPHILS 73 43 - 78 %    LYMPHOCYTES 12 (L) 13 - 44 %    MONOCYTES 13 (H) 4.0 - 12.0 %    EOSINOPHILS 1 0.5 - 7.8 %    BASOPHILS 1 0.0 - 2.0 %    IMMATURE GRANULOCYTES 1 0.0 - 5.0 %    ABS. NEUTROPHILS 14.9 (H) 1.7 - 8.2 K/UL    ABS. LYMPHOCYTES 2.5 0.5 - 4.6 K/UL    ABS. MONOCYTES 2.7 (H) 0.1 - 1.3 K/UL    ABS. EOSINOPHILS 0.2 0.0 - 0.8 K/UL    ABS. BASOPHILS 0.2 0.0 - 0.2 K/UL    ABS. IMM.  GRANS. 0.1 0.0 - 0.5 K/UL   METABOLIC PANEL, BASIC    Collection Time: 06/20/21  2:37 AM   Result Value Ref Range    Sodium 134 (L) 136 - 145 mmol/L    Potassium 4.0 3.5 - 5.1 mmol/L    Chloride 96 (L) 98 - 107 mmol/L    CO2 31 21 - 32 mmol/L    Anion gap 7 7 - 16 mmol/L    Glucose 113 (H) 65 - 100 mg/dL    BUN 28 (H) 8 - 23 MG/DL    Creatinine 1.76 (H) 0.6 - 1.0 MG/DL    GFR est AA 36 (L) >60 ml/min/1.73m2    GFR est non-AA 30 (L) >60 ml/min/1.73m2    Calcium 9.0 8.3 - 10.4 MG/DL   MAGNESIUM    Collection Time: 06/20/21  2:37 AM   Result Value Ref Range    Magnesium 2.2 1.8 - 2.4 mg/dL   LACTIC ACID    Collection Time: 06/20/21  2:37 AM   Result Value Ref Range    Lactic acid 1.5 0.4 - 2.0 MMOL/L   TROPONIN-HIGH SENSITIVITY    Collection Time: 06/20/21  2:37 AM   Result Value Ref Range    Troponin-High Sensitivity 22.6 (H) 0 - 14 pg/mL   BLOOD GAS, ARTERIAL POC    Collection Time: 06/20/21  4:18 AM   Result Value Ref Range Device: NASAL CANNULA      pH (POC) 7.48 (H) 7.35 - 7.45      pCO2 (POC) 42.3 35 - 45 MMHG    pO2 (POC) 68 (L) 75 - 100 MMHG    HCO3 (POC) 31.7 (H) 22 - 26 MMOL/L    sO2 (POC) 94.4 (L) 95 - 98 %    Base excess (POC) 7.4 mmol/L    Allens test (POC) Positive      Site LEFT RADIAL      Specimen type (POC) ARTERIAL      Performed by [facility]     FIO2, L/min 8     RESPIRATORY VIRUS PANEL W/COVID-19, PCR    Collection Time: 06/20/21  4:38 AM    Specimen: Nasopharyngeal   Result Value Ref Range    Adenovirus NOT DETECTED NOTDET      Coronavirus 229E NOT DETECTED NOTDET      Coronavirus HKU1 NOT DETECTED NOTDET      Coronavirus CVNL63 NOT DETECTED NOTDET      Coronavirus OC43 NOT DETECTED NOTDET      SARS-CoV-2, PCR NOT DETECTED NOTDET      Metapneumovirus NOT DETECTED NOTDET      Rhinovirus and Enterovirus NOT DETECTED NOTDET      Influenza A NOT DETECTED NOTDET      Influenza B NOT DETECTED NOTDET      Parainfluenza 1 NOT DETECTED NOTDET      Parainfluenza 2 NOT DETECTED NOTDET      Parainfluenza 3 NOT DETECTED NOTDET      Parainfluenza virus 4 NOT DETECTED NOTDET      RSV by PCR NOT DETECTED NOTDET      B. parapertussis, PCR NOT DETECTED NOTDET      Bordetella pertussis - PCR NOT DETECTED NOTDET      Chlamydophila pneumoniae DNA, QL, PCR NOT DETECTED NOTDET      Mycoplasma pneumoniae DNA, QL, PCR NOT DETECTED NOTDET     COVID-19 RAPID TEST    Collection Time: 06/20/21  4:39 AM   Result Value Ref Range    Specimen source NASAL      COVID-19 rapid test Not detected NOTD     SARS-COV-2    Collection Time: 06/20/21  4:39 AM   Result Value Ref Range    SARS-CoV-2 Please find results under separate order         All Micro Results     Procedure Component Value Units Date/Time    RESPIRATORY VIRUS PANEL W/COVID-19, PCR [[id number]] Collected: 06/20/21 8578    Order Status: Completed Specimen: Nasopharyngeal Updated: 06/20/21 0794     Adenovirus NOT DETECTED        Coronavirus 229E NOT DETECTED        Coronavirus HKU1 NOT DETECTED        Coronavirus CVNL63 NOT DETECTED        Coronavirus OC43 NOT DETECTED        SARS-CoV-2, PCR NOT DETECTED        Comment: This test has been authorized by the FDA under an Emergency Use Authorization (EUA) for use by authorized laboratories. Metapneumovirus NOT DETECTED        Rhinovirus and Enterovirus NOT DETECTED        Influenza A NOT DETECTED        Influenza B NOT DETECTED        Parainfluenza 1 NOT DETECTED        Parainfluenza 2 NOT DETECTED        Parainfluenza 3 NOT DETECTED        Parainfluenza virus 4 NOT DETECTED        RSV by PCR NOT DETECTED        B. parapertussis, PCR NOT DETECTED        Bordetella pertussis - PCR NOT DETECTED        Chlamydophila pneumoniae DNA, QL, PCR NOT DETECTED        Mycoplasma pneumoniae DNA, QL, PCR NOT DETECTED       COVID-19 RAPID TEST [[id number]] Collected: 06/20/21 0439    Order Status: Completed Specimen: Nasopharyngeal Updated: 06/20/21 0514     Specimen source NASAL        COVID-19 rapid test Not detected        Comment:      The specimen is NEGATIVE for SARS-CoV-2, the novel coronavirus associated with COVID-19. A negative result does not rule out COVID-19. This test has been authorized by the FDA under an Emergency Use Authorization (EUA) for use by authorized laboratories.         Fact sheet for Healthcare Providers: ConventionUpdate.co.nz  Fact sheet for Patients: ConventionUpdate.co.nz       Methodology: Isothermal Nucleic Acid Amplification         CULTURE, BLOOD [[id number]] Collected: 06/13/21 1749    Order Status: Completed Specimen: Blood Updated: 06/18/21 0704     Special Requests: --        LEFT  HAND       Culture result: NO GROWTH 5 DAYS       CULTURE, BLOOD [[id number]] Collected: 06/13/21 1746    Order Status: Completed Specimen: Blood Updated: 06/18/21 0704     Special Requests: --        LEFT  Antecubital       Culture result: NO GROWTH 5 DAYS             EKG Results     Procedure 720 Value Units Date/Time    EKG, 12 LEAD, INITIAL [[id number]] Collected: 06/20/21 0120    Order Status: Completed Updated: 06/20/21 0819     Ventricular Rate 71 BPM      Atrial Rate 71 BPM      P-R Interval 158 ms      QRS Duration 126 ms      Q-T Interval 470 ms      QTC Calculation (Bezet) 510 ms      Calculated P Axis 15 degrees      Calculated R Axis 71 degrees      Calculated T Axis 26 degrees      Diagnosis --     Normal sinus rhythm  Right bundle branch block  Abnormal ECG  When compared with ECG of 13-JUN-2021 14:26,  Right bundle branch block is now Present  Confirmed by Adrienne Taylor MD ()LIZETTE ([id number]) on 6/20/2021 8:19:32 AM      EKG [[id number]] Collected: 06/13/21 1426    Order Status: Completed Updated: 06/14/21 0705     Ventricular Rate 90 BPM      Atrial Rate 90 BPM      P-R Interval 160 ms      QRS Duration 90 ms      Q-T Interval 316 ms      QTC Calculation (Bezet) 386 ms      Calculated P Axis 55 degrees      Calculated R Axis 81 degrees      Calculated T Axis 24 degrees      Diagnosis --     Normal sinus rhythm  Nonspecific T wave abnormality  Abnormal ECG  When compared with ECG of 12-MAY-2021 10:32,  Right bundle branch block is no longer Present  Confirmed by Adrienne Taylor MD (), Chau Che ([id number]) on 6/14/2021 7:05:02 AM            Other Studies:  XR CHEST PORT    Result Date: 6/13/2021  1 View portable chest x-ray 6/13/2021 2:42 PM Indication: History of pneumonia, shortness of breath. Syncopal episode. Comparison: Prior studies-most recent 5/21/2021 Findings: This portable upright AP chest at 1437 shows the patient again rotated somewhat toward the left. Cardiomediastinal silhouette stable. Vascular pedicle appearance probably slightly more pronounced today. Mildly more progressed bibasilar hazy/veiling opacities which may represent layering effusions compared to the most recent study. Compared to older studies these are indolently progressing. Old right rib fractures again seen.      Increased vascular congestion today, patient probably has progressing basilar effusions-now of small/moderate volume. Appearance most suggestive of progressing vascular congestion. Correlate correlate clinically, consider serum BNP.           Current Meds:   Current Facility-Administered Medications   Medication Dose Route Frequency    ampicillin-sulbactam (UNASYN) 3 g in 0.9% sodium chloride (MBP/ADV) 100 mL MBP  3 g IntraVENous Q12H    albuterol (PROVENTIL VENTOLIN) nebulizer solution 2.5 mg  2.5 mg Nebulization Q6HWA RT    budesonide (PULMICORT) 500 mcg/2 ml nebulizer suspension  500 mcg Nebulization BID RT    methylPREDNISolone (PF) (SOLU-MEDROL) injection 40 mg  40 mg IntraVENous Q8H    HYDROmorphone (DILAUDID) injection 0.5 mg  0.5 mg IntraVENous ONCE    doxycycline (VIBRAMYCIN) capsule 100 mg  100 mg Oral Q12H    albumin human 25% (BUMINATE) solution 12.5 g  12.5 g IntraVENous BID    metoprolol succinate (TOPROL-XL) XL tablet 50 mg  50 mg Oral DAILY    sodium chloride (NS) flush 5-10 mL  5-10 mL IntraVENous PRN    amiodarone (CORDARONE) tablet 200 mg  200 mg Oral DAILY    apixaban (ELIQUIS) tablet 5 mg  5 mg Oral BID    cholecalciferol (VITAMIN D3) (1000 Units /25 mcg) tablet 1,000 Units  1,000 Units Oral DAILY    DULoxetine (CYMBALTA) capsule 60 mg  60 mg Oral DAILY    levothyroxine (SYNTHROID) tablet 25 mcg  25 mcg Oral ACB    folic acid (FOLVITE) tablet 1 mg  1 mg Oral DAILY    ondansetron (ZOFRAN ODT) tablet 4 mg  4 mg Oral Q8H PRN    pantoprazole (PROTONIX) tablet 40 mg  40 mg Oral ACB&D    rosuvastatin (CRESTOR) tablet 10 mg  10 mg Oral QHS    spironolactone (ALDACTONE) tablet 25 mg  25 mg Oral DAILY    sodium chloride (NS) flush 5-40 mL  5-40 mL IntraVENous Q8H    sodium chloride (NS) flush 5-40 mL  5-40 mL IntraVENous PRN    acetaminophen (TYLENOL) tablet 650 mg  650 mg Oral Q6H PRN    Or    acetaminophen (TYLENOL) suppository 650 mg  650 mg Rectal Q6H PRN    polyethylene glycol (MIRALAX) packet 17 g  17 g Oral DAILY    senna-docusate (PERICOLACE) 8.6-50 mg per tablet 1 Tablet  1 Tablet Oral BID    magnesium hydroxide (MILK OF MAGNESIA) 400 mg/5 mL oral suspension 30 mL  30 mL Oral DAILY PRN    bisacodyL (DULCOLAX) suppository 10 mg  10 mg Rectal DAILY PRN    [Held by provider] furosemide (LASIX) injection 40 mg  40 mg IntraVENous BID    captopriL (CAPOTEN) tablet 12.5 mg  12.5 mg Oral TID PRN       Problem List:  Hospital Problems as of 6/20/2021 Date Reviewed: 4/13/2021        Codes Class Noted - Resolved POA    Acute respiratory failure with hypoxia (HCC) ICD-10-CM: J96.01  ICD-9-CM: 518.81  6/14/2021 - Present Yes        Demand ischemia ([facility] ICD-10-CM: I24.8  ICD-9-CM: 411.89  6/14/2021 - Present Yes        Leukocytosis ICD-10-CM: J56.769  ICD-9-CM: 288.60  6/14/2021 - Present Yes        * (Principal) Acute on chronic diastolic congestive heart failure (HCC) ICD-10-CM: I50.33  ICD-9-CM: 428.33, 428.0  6/13/2021 - Present Yes        Stage 3 chronic kidney disease (HCC) (Chronic) ICD-10-CM: N18.30  ICD-9-CM: 585.3  5/20/2021 - Present Yes        Paroxysmal atrial fibrillation (HCC) (Chronic) ICD-10-CM: I48.0  ICD-9-CM: 427.31  3/19/2021 - Present Yes        Hypertension, essential, benign (Chronic) ICD-10-CM: I10  ICD-9-CM: 401.1  8/18/2016 - Present Yes        Depression ICD-10-CM: F32.9  ICD-9-CM: 995  8/18/2016 - Present Yes        Hyperlipidemia (Chronic) ICD-10-CM: E78.5  ICD-9-CM: 272.4  1/16/2016 - Present Yes        Rheumatoid arthritis ([facility] (Chronic) ICD-10-CM: M06.9  ICD-9-CM: 714.0  1/16/2016 - Present Yes               Part of this note was written by using a voice dictation software and the note has been proof read but may still contain some grammatical/other typographical errors.     Signed By: DO [facility]    June 20, 2021  12:57 PM

## 2021-06-20 NOTE — PROGRESS NOTES
Pt finally resting this morning without being Short of breath. O2 sat 97%. Denied need for the one time dose of Dilaudid.

## 2021-06-20 NOTE — PROGRESS NOTES
Rapid response called due to left costal pain and respiratory distress. At bedside patient tachypneic with significant inspiratory and expiratory wheezes. Hemodynamically stable with O2 Sat >92% on 2L by NC. EKG without new ischemic changes. Blood work noted for worsening leukocytosis. CXR obtained with concerns of pulm edema and pna. Given lasix IV 40mg, started on solumedrol, albuterol and pulmicort, and unsayn. Ordered BMP, CBC, troponin, lactic acid.

## 2021-06-20 NOTE — PROGRESS NOTES
Pt woke up with severe pain and unable to catch her breath. O2 sat 89% on room air. O2 at 2l placed. Morphine 2 mg given IV.

## 2021-06-20 NOTE — PROGRESS NOTES
Date of Outreach Update:  Tyler Tran was seen and assessed. MEWS Score: 1 (06/19/21 2350)  Vitals:    06/20/21 0210 06/20/21 0340 06/20/21 0350 06/20/21 0420   BP:  133/70     Pulse:  69     Resp:  (!) 42     Temp:  97.6 °F (36.4 °C)     SpO2: 99% (!) 85% 94% (!) 89%   Weight:  67.4 kg (148 lb 11.2 oz)     Height:          Called to bedside d/t nurse concern. Patient's O2 demands are increasing. Was placed on HFNC @5L, RT called to bedside. ABG done, ordered continuous pulse ox. O2 sats now maintaining 96% on 6L HFNC. Will continue to follow. Will continue to follow up per outreach protocol.     Signed By:   Martina Cotter    June 20, 2021 5:20 AM

## 2021-06-20 NOTE — PROGRESS NOTES
Date of Outreach Update:  Ronda Martínez was seen and assessed. MEWS Score: 2 (06/20/21 0734)  Vitals:    06/20/21 0340 06/20/21 0350 06/20/21 0420 06/20/21 0734   BP: 133/70   (!) 142/70   Pulse: 69   66   Resp: (!) 42   28   Temp: 97.6 °F (36.4 °C)   97.6 °F (36.4 °C)   SpO2: (!) 85% 94% (!) 89% 97%   Weight: 67.4 kg (148 lb 11.2 oz)      Height:             Pain Assessment  Pain Intensity 1: 0 (06/20/21 0725)  Pain Location 1: Abdomen  Pain Intervention(s) 1: Medication (see MAR)  Patient Stated Pain Goal: 0    Previous Outreach assessment has been reviewed. There have been no significant clinical changes since the completion of the last dated Outreach assessment.    - RAPID LAST NIGHT FOR CP/ AND INC O2 DEMANDS   -breathing treatment and labs drawn   - ordered continuous pulse ox  - O2 sats now maintaining 97% on 6L HFNC. RR 28  -pt a/ox3 following commands/ denies pain at this time/ primary RN at bedside   - Will continue to follow. -TROP ELEVATED   Results for Antonia Gilbert (MRN [id number]) as of 6/20/2021 08:43   Ref. Range 6/20/2021 02:37   Troponin-High Sensitivity Latest Ref Range: 0 - 14 pg/mL 22.6 (H)     - ABG   Results for Antonia Gilbert (MRN [id number]) as of 6/20/2021 08:43   Ref. Range 6/20/2021 04:18   pH (POC) Latest Ref Range: 7.35 - 7.45   7.48 (H)   pCO2 (POC) Latest Ref Range: 35 - 45 MMHG 42.3   pO2 (POC) Latest Ref Range: 75 - 100 MMHG 68 (L)   HCO3 (POC) Latest Ref Range: 22 - 26 MMOL/L 31.7 (H)   sO2 (POC) Latest Ref Range: 95 - 98 % 94.4 (L)   Base excess (POC) Latest Units: mmol/L 7.4   FIO2, L/min Latest Units:   8   Specimen type (POC) Latest Units:   ARTERIAL   Site Latest Units:   LEFT RADIAL   Device: Latest Units:   NASAL CANNULA   Allens test (POC) Latest Units:   Positive       Will continue to follow up per outreach protocol.     Signed By:   Samaria De Leon RN    June 20, 2021 8:44 AM

## 2021-06-20 NOTE — CONSULTS
[facility] CONSULT NOTE    Admission Date:  6/13/2021    Admission Diagnosis:  CHF (congestive heart failure) ([facility] [I50.9]    Consulting physician:  Saba Pedersen  Reason for consult:  NAZIA    Subjective:   History of Present Illness:    Ms. Roge Coleman is a 69 yo F with a PMH of HTN, atrial fibrillation and HFpEF who was admitted with decompensated heart failure and has been aggressively diuresed since admission on the 13th. Patint is net negative 9.2L since admission. She had a Cr of 1.25 that has steadily climbe to 1.76 today. Nephrology consulted for evaluation today. On my assessment, patient states that she is breathing easier and edema has resolved, but she remains on oxygen which she has not required previously.     Past Medical History:   Diagnosis Date    Acquired hypothyroidism 1/16/2016    Breast cancer ([facility] 2008    Depression 8/18/2016    Endocrine disease     hypothyroid    Fungal dermatitis 8/18/2016    Hyperlipidemia 1/16/2016    Hypertension, essential, benign 8/18/2016    Hypokalemia 8/18/2016    Inflamed sebaceous cyst 8/18/2016    Major depressive disorder, recurrent, moderate ([facility] 8/18/2016    Nicotine dependence, cigarettes, uncomplicated 5/17/6280    Osteopenia 8/18/2016    Osteoporosis 8/18/2016    Radiation therapy complication 2529    Rheumatoid arthritis ([facility] 1/16/2016    Right carotid bruit 1/16/2016    TIA (transient ischemic attack) 1/16/2016    Tobacco abuse 8/18/2016      Past Surgical History:   Procedure Laterality Date    HX ADENOIDECTOMY      HX BREAST LUMPECTOMY Right 2008    with lymph nodes    HX TONSILLECTOMY      IR BX BONE MARROW DIAGNOSTIC  5/14/2021    IR INSERT NON TUNL CVC OVER 5 YRS  5/14/2021      Current Facility-Administered Medications   Medication Dose Route Frequency    ampicillin-sulbactam (UNASYN) 3 g in 0.9% sodium chloride (MBP/ADV) 100 mL MBP  3 g IntraVENous Q12H    albuterol (PROVENTIL VENTOLIN) nebulizer solution 2.5 mg  2.5 mg Nebulization Q6HWA RT    budesonide (PULMICORT) 500 mcg/2 ml nebulizer suspension  500 mcg Nebulization BID RT    methylPREDNISolone (PF) (SOLU-MEDROL) injection 40 mg  40 mg IntraVENous Q8H    doxycycline (VIBRAMYCIN) capsule 100 mg  100 mg Oral Q12H    metoprolol succinate (TOPROL-XL) XL tablet 50 mg  50 mg Oral DAILY    sodium chloride (NS) flush 5-10 mL  5-10 mL IntraVENous PRN    amiodarone (CORDARONE) tablet 200 mg  200 mg Oral DAILY    apixaban (ELIQUIS) tablet 5 mg  5 mg Oral BID    cholecalciferol (VITAMIN D3) (1000 Units /25 mcg) tablet 1,000 Units  1,000 Units Oral DAILY    DULoxetine (CYMBALTA) capsule 60 mg  60 mg Oral DAILY    levothyroxine (SYNTHROID) tablet 25 mcg  25 mcg Oral ACB    folic acid (FOLVITE) tablet 1 mg  1 mg Oral DAILY    ondansetron (ZOFRAN ODT) tablet 4 mg  4 mg Oral Q8H PRN    pantoprazole (PROTONIX) tablet 40 mg  40 mg Oral ACB&D    rosuvastatin (CRESTOR) tablet 10 mg  10 mg Oral QHS    [Held by provider] spironolactone (ALDACTONE) tablet 25 mg  25 mg Oral DAILY    sodium chloride (NS) flush 5-40 mL  5-40 mL IntraVENous Q8H    sodium chloride (NS) flush 5-40 mL  5-40 mL IntraVENous PRN    acetaminophen (TYLENOL) tablet 650 mg  650 mg Oral Q6H PRN    Or    acetaminophen (TYLENOL) suppository 650 mg  650 mg Rectal Q6H PRN    polyethylene glycol (MIRALAX) packet 17 g  17 g Oral DAILY    senna-docusate (PERICOLACE) 8.6-50 mg per tablet 1 Tablet  1 Tablet Oral BID    magnesium hydroxide (MILK OF MAGNESIA) 400 mg/5 mL oral suspension 30 mL  30 mL Oral DAILY PRN    bisacodyL (DULCOLAX) suppository 10 mg  10 mg Rectal DAILY PRN    furosemide (LASIX) injection 40 mg  40 mg IntraVENous BID    captopriL (CAPOTEN) tablet 12.5 mg  12.5 mg Oral TID PRN     Allergies   Allergen Reactions    Eliquis [Apixaban] Other (comments)     Patient states she had hallucinations from Eliquis      Social History     Tobacco Use    Smoking status: Current Some Day Smoker    Smokeless tobacco: Never Used    Tobacco comment: Only on pay day-1/2 pack   Substance Use Topics    Alcohol use: No      Family History   Problem Relation Age of Onset    Stroke Mother     Cancer Father         Brain    HIV/AIDS Brother         Review of Systems- per HPI    Objective:   Vitals:    06/20/21 0420 06/20/21 0734 06/20/21 0857 06/20/21 1149   BP:  (!) 142/70  132/63   Pulse:  66  71   Resp:  28  24   Temp:  97.6 °F (36.4 °C)  97.9 °F (36.6 °C)   SpO2: (!) 89% 97% 96% 100%   Weight:       Height:           Intake/Output Summary (Last 24 hours) at 6/20/2021 1333  Last data filed at 6/20/2021 1315  Gross per 24 hour   Intake 660 ml   Output 700 ml   Net -40 ml       Physical Exam  GEN :in no distress, alert and oriented  HEENT: anicteric sclerae, eomi. Oropharynx without lesions. Neck - supple without JVD, no thyromegaly. No lymphadenopathy. CV - regular rate and rhythm, no murmur, no rub  Lung - diminished at bases   Chest wall - normal appearance  Abd - soft, nontender, bowel sounds present, no hepatosplenomegaly  Ext - no clubbing, no cyanosis, trace edema  Neurologic - nonfocal  Genitourinary - bladder nonpalpable  Skin - no rashes, no purpura, no ecchymoses  Psychiatric: Normal mood and affect. Data Review:   Recent Labs     06/20/21  0237 06/19/21  0633 06/18/21  0513   WBC 20.5* 16.9* 15.4*   HGB 10.7* 10.2* 9.6*   HCT 34.6* 33.2* 31.7*    321 298     Recent Labs     06/20/21  0237 06/19/21  0633 06/18/21  0513   * 134* 136   K 4.0 4.7 3.7   CL 96* 96* 98   CO2 31 31 36*   BUN 28* 31* 25*   CREA 1.76* 1.72* 1.68*   * 65 98   CA 9.0 8.5 8.5   MG 2.2  --   --      No results for input(s): PH, PCO2, PO2, PCO2 in the last 72 hours.     Problem List:     Patient Active Problem List    Diagnosis Date Noted    Acute respiratory failure with hypoxia ([facility] 06/14/2021    Demand ischemia ([facility] 06/14/2021    Leukocytosis 06/14/2021    Acute on chronic diastolic congestive heart failure ([facility] 06/13/2021    Solitary kidney 05/22/2021    Hypoalbuminemia due to protein-calorie malnutrition ([address] 05/22/2021    Stage 3 chronic kidney disease ([address] 05/20/2021    Anemia 05/12/2021    Rib fracture 05/12/2021    Pericarditis with effusion 03/23/2021    Paroxysmal atrial fibrillation ([address] 03/19/2021    Acute renal failure (ARF) ([address] 04/22/2019    Sepsis ([facility] 04/22/2019    Community acquired bacterial pneumonia 04/22/2019    Prolonged Q-T interval on ECG 04/22/2019    Hypertension, essential, benign 08/18/2016    Osteopenia 08/18/2016    Major depressive disorder, recurrent, moderate (HCC) 08/18/2016    Nicotine dependence, cigarettes, uncomplicated 58/09/0252    Osteoporosis 08/18/2016    Depression 08/18/2016    Hypokalemia 08/18/2016    Tobacco abuse 08/18/2016    Acquired hypothyroidism 01/16/2016    TIA (transient ischemic attack) 01/16/2016    Hyperlipidemia 01/16/2016    Rheumatoid arthritis ([facility] 01/16/2016    Right carotid bruit 01/16/2016       Assessment and Plan:    1) NAZIA- Rising Cr in the setting of diuresis. She is non-oliguric. Normal UA today. Renal US last month showed absent L kidney and increased echogenicity of the R.  CXR continues to show some congestion. Cr likely to continue to slowly rise with diuresis, but would favor euvolumia even at the expense of slightly worsening azotemia.     Geeta No MD

## 2021-06-20 NOTE — PROGRESS NOTES
Pt is alert and oriented x4. Pt in bed, resting. Bed in low position, wheels locked, call light within reach, instructed to call with any needs. Hourly rounds completed this shift. NAD noted. VSS. Pt has not c/o pain. IV is SL and dressing is clean, dry, and intact. Pt weaned to 3L NC, LAI noted. Hilton placed for urinary retention. Report to be given to night shift nurse.

## 2021-06-20 NOTE — PROGRESS NOTES
Pt c/o pain 10/10 in left lower lobe. Short of breath, O2 sat 95 % room air. MD notified. Tylenol 650 given pt.

## 2021-06-21 PROBLEM — J69.0 ASPIRATION PNEUMONIA (HCC): Status: ACTIVE | Noted: 2021-06-21

## 2021-06-21 LAB
ANION GAP SERPL CALC-SCNC: 6 MMOL/L (ref 7–16)
BASOPHILS # BLD: 0 K/UL (ref 0–0.2)
BASOPHILS NFR BLD: 0 % (ref 0–2)
BUN SERPL-MCNC: 45 MG/DL (ref 8–23)
CALCIUM SERPL-MCNC: 8.7 MG/DL (ref 8.3–10.4)
CHLORIDE SERPL-SCNC: 92 MMOL/L (ref 98–107)
CO2 SERPL-SCNC: 31 MMOL/L (ref 21–32)
CREAT SERPL-MCNC: 2.06 MG/DL (ref 0.6–1)
DIFFERENTIAL METHOD BLD: ABNORMAL
EOSINOPHIL # BLD: 0.1 K/UL (ref 0–0.8)
EOSINOPHIL NFR BLD: 0 % (ref 0.5–7.8)
ERYTHROCYTE [DISTWIDTH] IN BLOOD BY AUTOMATED COUNT: 16.9 % (ref 11.9–14.6)
GLUCOSE SERPL-MCNC: 218 MG/DL (ref 65–100)
HCT VFR BLD AUTO: 29 % (ref 35.8–46.3)
HGB BLD-MCNC: 9 G/DL (ref 11.7–15.4)
IMM GRANULOCYTES # BLD AUTO: 0.3 K/UL (ref 0–0.5)
IMM GRANULOCYTES NFR BLD AUTO: 1 % (ref 0–5)
LYMPHOCYTES # BLD: 0.6 K/UL (ref 0.5–4.6)
LYMPHOCYTES NFR BLD: 2 % (ref 13–44)
MAGNESIUM SERPL-MCNC: 2.1 MG/DL (ref 1.8–2.4)
MCH RBC QN AUTO: 29.2 PG (ref 26.1–32.9)
MCHC RBC AUTO-ENTMCNC: 31 G/DL (ref 31.4–35)
MCV RBC AUTO: 94.2 FL (ref 79.6–97.8)
MONOCYTES # BLD: 1.3 K/UL (ref 0.1–1.3)
MONOCYTES NFR BLD: 5 % (ref 4–12)
NEUTS SEG # BLD: 26.5 K/UL (ref 1.7–8.2)
NEUTS SEG NFR BLD: 92 % (ref 43–78)
NRBC # BLD: 0 K/UL (ref 0–0.2)
PLATELET # BLD AUTO: 298 K/UL (ref 150–450)
PMV BLD AUTO: 12.3 FL (ref 9.4–12.3)
POTASSIUM SERPL-SCNC: 3.5 MMOL/L (ref 3.5–5.1)
RBC # BLD AUTO: 3.08 M/UL (ref 4.05–5.2)
SODIUM SERPL-SCNC: 129 MMOL/L (ref 136–145)
WBC # BLD AUTO: 28.9 K/UL (ref 4.3–11.1)

## 2021-06-21 PROCEDURE — 65270000029 HC RM PRIVATE

## 2021-06-21 PROCEDURE — 74011250637 HC RX REV CODE- 250/637: Performed by: HOSPITALIST

## 2021-06-21 PROCEDURE — 94762 N-INVAS EAR/PLS OXIMTRY CONT: CPT

## 2021-06-21 PROCEDURE — 74011250637 HC RX REV CODE- 250/637: Performed by: INTERNAL MEDICINE

## 2021-06-21 PROCEDURE — 97530 THERAPEUTIC ACTIVITIES: CPT

## 2021-06-21 PROCEDURE — 99232 SBSQ HOSP IP/OBS MODERATE 35: CPT | Performed by: INTERNAL MEDICINE

## 2021-06-21 PROCEDURE — 74011000250 HC RX REV CODE- 250: Performed by: INTERNAL MEDICINE

## 2021-06-21 PROCEDURE — P9047 ALBUMIN (HUMAN), 25%, 50ML: HCPCS | Performed by: FAMILY MEDICINE

## 2021-06-21 PROCEDURE — 74011000258 HC RX REV CODE- 258: Performed by: INTERNAL MEDICINE

## 2021-06-21 PROCEDURE — 80048 BASIC METABOLIC PNL TOTAL CA: CPT

## 2021-06-21 PROCEDURE — 36415 COLL VENOUS BLD VENIPUNCTURE: CPT

## 2021-06-21 PROCEDURE — 74011250636 HC RX REV CODE- 250/636: Performed by: INTERNAL MEDICINE

## 2021-06-21 PROCEDURE — 94640 AIRWAY INHALATION TREATMENT: CPT

## 2021-06-21 PROCEDURE — 77030038269 HC DRN EXT URIN PURWCK BARD -A

## 2021-06-21 PROCEDURE — 74011250636 HC RX REV CODE- 250/636: Performed by: FAMILY MEDICINE

## 2021-06-21 PROCEDURE — 65660000000 HC RM CCU STEPDOWN

## 2021-06-21 PROCEDURE — 85025 COMPLETE CBC W/AUTO DIFF WBC: CPT

## 2021-06-21 PROCEDURE — 83735 ASSAY OF MAGNESIUM: CPT

## 2021-06-21 RX ORDER — ALBUMIN HUMAN 250 G/1000ML
25 SOLUTION INTRAVENOUS EVERY 6 HOURS
Status: COMPLETED | OUTPATIENT
Start: 2021-06-21 | End: 2021-06-23

## 2021-06-21 RX ADMIN — BUDESONIDE 500 MCG: 0.5 INHALANT RESPIRATORY (INHALATION) at 08:12

## 2021-06-21 RX ADMIN — SENNOSIDES AND DOCUSATE SODIUM 1 TABLET: 8.6; 5 TABLET ORAL at 08:56

## 2021-06-21 RX ADMIN — ROSUVASTATIN CALCIUM 10 MG: 5 TABLET, COATED ORAL at 21:13

## 2021-06-21 RX ADMIN — PANTOPRAZOLE SODIUM 40 MG: 40 TABLET, DELAYED RELEASE ORAL at 06:10

## 2021-06-21 RX ADMIN — BUDESONIDE 500 MCG: 0.5 INHALANT RESPIRATORY (INHALATION) at 19:36

## 2021-06-21 RX ADMIN — VITAMIN D, TAB 1000IU (100/BT) 1000 UNITS: 25 TAB at 08:56

## 2021-06-21 RX ADMIN — ALBUTEROL SULFATE 2.5 MG: 2.5 SOLUTION RESPIRATORY (INHALATION) at 14:53

## 2021-06-21 RX ADMIN — Medication 10 ML: at 13:20

## 2021-06-21 RX ADMIN — ALBUMIN (HUMAN) 25 G: 0.25 INJECTION, SOLUTION INTRAVENOUS at 08:58

## 2021-06-21 RX ADMIN — DULOXETINE HYDROCHLORIDE 60 MG: 60 CAPSULE, DELAYED RELEASE ORAL at 08:57

## 2021-06-21 RX ADMIN — PANTOPRAZOLE SODIUM 40 MG: 40 TABLET, DELAYED RELEASE ORAL at 16:31

## 2021-06-21 RX ADMIN — AMIODARONE HYDROCHLORIDE 200 MG: 200 TABLET ORAL at 08:58

## 2021-06-21 RX ADMIN — DOXYCYCLINE HYCLATE 100 MG: 100 CAPSULE ORAL at 08:56

## 2021-06-21 RX ADMIN — Medication 10 ML: at 06:13

## 2021-06-21 RX ADMIN — ALBUMIN (HUMAN) 25 G: 0.25 INJECTION, SOLUTION INTRAVENOUS at 11:18

## 2021-06-21 RX ADMIN — DOXYCYCLINE HYCLATE 100 MG: 100 CAPSULE ORAL at 21:13

## 2021-06-21 RX ADMIN — APIXABAN 5 MG: 5 TABLET, FILM COATED ORAL at 08:57

## 2021-06-21 RX ADMIN — ALBUTEROL SULFATE 2.5 MG: 2.5 SOLUTION RESPIRATORY (INHALATION) at 08:12

## 2021-06-21 RX ADMIN — Medication 10 ML: at 21:14

## 2021-06-21 RX ADMIN — FOLIC ACID 1 MG: 1 TABLET ORAL at 08:55

## 2021-06-21 RX ADMIN — AMPICILLIN SODIUM AND SULBACTAM SODIUM 3 G: 2; 1 INJECTION, POWDER, FOR SOLUTION INTRAMUSCULAR; INTRAVENOUS at 06:10

## 2021-06-21 RX ADMIN — ALBUTEROL SULFATE 2.5 MG: 2.5 SOLUTION RESPIRATORY (INHALATION) at 19:36

## 2021-06-21 RX ADMIN — LEVOTHYROXINE SODIUM 25 MCG: 0.05 TABLET ORAL at 06:10

## 2021-06-21 RX ADMIN — METHYLPREDNISOLONE SODIUM SUCCINATE 40 MG: 40 INJECTION, POWDER, FOR SOLUTION INTRAMUSCULAR; INTRAVENOUS at 00:24

## 2021-06-21 RX ADMIN — AMPICILLIN SODIUM AND SULBACTAM SODIUM 3 G: 2; 1 INJECTION, POWDER, FOR SOLUTION INTRAMUSCULAR; INTRAVENOUS at 17:40

## 2021-06-21 RX ADMIN — METOPROLOL SUCCINATE 50 MG: 50 TABLET, EXTENDED RELEASE ORAL at 08:58

## 2021-06-21 RX ADMIN — ACETAMINOPHEN 650 MG: 325 TABLET ORAL at 01:13

## 2021-06-21 RX ADMIN — SENNOSIDES AND DOCUSATE SODIUM 1 TABLET: 8.6; 5 TABLET ORAL at 21:13

## 2021-06-21 RX ADMIN — ALBUMIN (HUMAN) 25 G: 0.25 INJECTION, SOLUTION INTRAVENOUS at 16:31

## 2021-06-21 NOTE — PROGRESS NOTES
[facility]        Subjective: A on CKD  Breathing is better today. Off of O2. Review of Systems -   General ROS: negative for - fever, chills  Respiratory ROS: no SOB, cough, LAI  Cardiovascular ROS: no CP, palpitations  Gastrointestinal ROS: no N&V, abdominal pain, diarrhea  Genito-Urinary ROS: no difficulty voiding, dysuria  Neurological ROS: no seizures, focal weekness        Objective:    Vitals:    06/21/21 0039 06/21/21 0346 06/21/21 0742 06/21/21 0813   BP:  123/72 (!) 126/57    Pulse:  64 68    Resp:  20 20    Temp:  97.6 °F (36.4 °C) 97.7 °F (36.5 °C)    SpO2: 95% 95% 94% 96%   Weight:  68.5 kg (151 lb)     Height:           PE  Gen: in no acute distress  CV:reg rate  Chest:clear  Abd: soft  Ext/Access: no edema       . LAB  Recent Labs     06/21/21  0508 06/20/21  0237 06/19/21  0633   WBC 28.9* 20.5* 16.9*   HGB 9.0* 10.7* 10.2*   HCT 29.0* 34.6* 33.2*    315 321     Recent Labs     06/21/21  0508 06/20/21  0237 06/19/21  0633   * 134* 134*   K 3.5 4.0 4.7   CL 92* 96* 96*   CO2 31 31 31   * 113* 65   BUN 45* 28* 31*   CREA 2.06* 1.76* 1.72*   MG 2.1 2.2  --    CA 8.7 9.0 8.5           Radiology    A/P:   Patient Active Problem List   Diagnosis Code    Acquired hypothyroidism E03.9    TIA (transient ischemic attack) G45.9    Hyperlipidemia E78.5    Rheumatoid arthritis ([facility] M06.9    Right carotid bruit R09.89    Hypertension, essential, benign I10    Osteopenia M85.80    Major depressive disorder, recurrent, moderate (HCC) F33.1    Nicotine dependence, cigarettes, uncomplicated F71.711    Osteoporosis M81.0    Depression F32.9    Hypokalemia E87.6    Tobacco abuse Z72.0    Acute renal failure (ARF) (HCC) N17.9    Sepsis (HCC) A41.9    Community acquired bacterial pneumonia J15.9    Prolonged Q-T interval on ECG R94.31    Paroxysmal atrial fibrillation ([facility] I48.0    Pericarditis with effusion I31.9    Anemia D64.9    Rib fracture S22.39XA    Stage 3 chronic kidney disease (HCC) N18.30    Solitary kidney Q60.0    Hypoalbuminemia due to protein-calorie malnutrition (HCC) E88.09, E46    Acute on chronic diastolic congestive heart failure (HCC) I50.33    Acute respiratory failure with hypoxia (HCC) J96.01    Demand ischemia (HCC) I24.8    Leukocytosis D72.829    Aspiration pneumonia ([facility]) J69.0     A on CKD - creatinine is up to 2.06, Lasix and aldactone are on hold. Trying t ofind balance between dry lungs and wet kidneys. Agree with Dr Diana Latham, she will need some lasix on discharge.      Diastolic CHF    A Fib    Pneumomnia                    Pat Frank MD

## 2021-06-21 NOTE — PROGRESS NOTES
Date of Outreach Update:  Batsheva Wright was seen and assessed. MEWS Score: 1 (06/21/21 1621)  Vitals:    06/21/21 1209 06/21/21 1453 06/21/21 1505 06/21/21 1621   BP: (!) 112/59   (!) 116/54   Pulse: 70   77   Resp: 20   20   Temp: 98.2 °F (36.8 °C)   97.8 °F (36.6 °C)   SpO2: 92% 97% 100% 93%   Weight:       Height:             Pain Assessment  Pain Intensity 1: 0 (06/21/21 1640)  Pain Location 1: Abdomen  Pain Intervention(s) 1: Medication (see MAR)  Patient Stated Pain Goal: 0      Previous Outreach assessment has been reviewed. There have been no significant clinical changes since the completion of the last dated Outreach assessment. Will continue to follow up per outreach protocol.     Signed By:   Mason Claire RN    June 21, 2021 5:49 PM

## 2021-06-21 NOTE — PROGRESS NOTES
[facility] CARDIOLOGY PROGRESS NOTE           6/21/2021 8:33 AM    Admit Date: 6/13/2021         Subjective: Cr continues to rise. Holding lasix/spironolactone. Breathing improved. ROS:  Cardiovascular:  As noted above    Objective:      Vitals:    06/21/21 0039 06/21/21 0346 06/21/21 0742 06/21/21 0813   BP:  123/72 (!) 126/57    Pulse:  64 68    Resp:  20 20    Temp:  97.6 °F (36.4 °C) 97.7 °F (36.5 °C)    SpO2: 95% 95% 94% 96%   Weight:  151 lb (68.5 kg)     Height:           On telemetry:      Physical Exam:  General: Well Developed, Well Nourished, No Acute Distress, Alert & Oriented x 3, Appropriate mood  Neck: supple, no JVD  Heart: S1S2 with RRR without murmurs or gallops  Lungs: Clear throughout auscultation bilaterally without adventitious sounds  Abd: soft, nontender, nondistended, with good bowel sounds  Ext: no edema bilaterally  Skin: warm and dry      Data Review:   Recent Labs     06/21/21  0508 06/20/21  0237   * 134*   K 3.5 4.0   MG 2.1 2.2   BUN 45* 28*   CREA 2.06* 1.76*   * 113*   WBC 28.9* 20.5*   HGB 9.0* 10.7*   HCT 29.0* 34.6*    315       No results for input(s): TNIPOC, TROIQ in the last 72 hours.         Assessment/Plan:     Principal Problem:    Acute on chronic diastolic congestive heart failure ([address] (6/13/2021)    Active Problems:    Hyperlipidemia (1/16/2016)      Rheumatoid arthritis ([address] (1/16/2016)      Hypertension, essential, benign (8/18/2016)      Depression (8/18/2016)      Paroxysmal atrial fibrillation ([address] (3/19/2021)      Stage 3 chronic kidney disease ([address] (5/20/2021)      Acute respiratory failure with hypoxia ([address] (6/14/2021)      Demand ischemia ([address] (6/14/2021)      Leukocytosis (6/14/2021)      Aspiration pneumonia ([facility] (6/21/2021)    A/P  1) dCHF - holding spironolactone, holding lasix, will need lasix 20 mg oral daily on discharge  2) AoCKD - worse today, daily bmp, holding diuretics  3) Afib - amiodarone, toprol and cuate Singh MD  6/21/2021 8:33 AM

## 2021-06-21 NOTE — PROGRESS NOTES
ACUTE PHYSICAL THERAPY GOALS:  (Developed with and agreed upon by patient and/or caregiver. )  LTG:  (1.)Ms. Roge Coleman will move from supine to sit and sit to supine , scoot up and down and roll side to side in bed with INDEPENDENT within 7 treatment day(s). (2.)Ms. Roge Coleman will transfer from bed to chair and chair to bed with MODIFIED INDEPENDENCE using the least restrictive device within 7 treatment day(s). (3.)Ms. Almeida will ambulate with MODIFIED INDEPENDENCE for 250+ feet with the least restrictive device within 7 treatment day(s) while maintaining normal vital signs. (4.)Ms. Almeida will perform 5 stairs with HR and SBA within 7 treatment days for ascending and descending stairs for home. PHYSICAL THERAPY: Daily Note and PM Treatment Day # 6    Tyler Tran is a 68 y.o. female   PRIMARY DIAGNOSIS: Acute on chronic diastolic congestive heart failure (HCC)  CHF (congestive heart failure) ([facility] [I50.9]     ASSESSMENT:     REHAB RECOMMENDATIONS: CURRENT LEVEL OF FUNCTION:  (Most Recently Demonstrated)   Recommendation to date pending progress:  [address] [place] [address] Therapy  Equipment:    To Be Determined Bed Mobility:   Not tested  Sit to Stand:   Standby Assistance  Transfers:   Standby Assistance   Gait/Mobility:   Standby Assistance      ASSESSMENT:  Ms. Roge Coleman is sitting up in chair and is very pleasant and agreeable to therapy, on room air. She ambulated in fofana with rolling walker, SBA. SpO2 maintained, 100% upon reading at end of gait distance. Cues for pacing and breathing technique. 1 standing rest break to chat with RN. She returned to room and performed standing LE exercises. She was left up in chair with needs in reach. Good progress with activity tolerance today. Will continue therapy efforts. SUBJECTIVE:   Ms. Roge Coleman states, \"this is the first day in a few I have been up, this feels good. \"    SOCIAL HISTORY/ LIVING ENVIRONMENT: lives alone, MOD I at baseline, friends assist as needed  Home Environment: Private residence  # Steps to Enter: 5  One/Two Story Residence: One story  Living Alone: Yes  Support Systems: Friends \ neighbors  OBJECTIVE:     PAIN: VITAL SIGNS: LINES/DRAINS:   Pre Treatment: Pain Screen  Pain Scale 1: Numeric (0 - 10)  Pain Intensity 1: 0  Post Treatment: 0/10 Vital Signs  O2 Sat (%): 100 %  O2 Device: None (Room air) None  O2 Device: None (Room air)     MOBILITY: I Mod I S SBA CGA Min Mod Max Total  NT x2 Comments:   Bed Mobility    Rolling [] [] [] [] [] [] [] [] [] [] []    Supine to Sit [] [] [] [] [] [] [] [] [] [] []    Scooting [] [] [] [] [] [] [] [] [] [] []    Sit to Supine [] [] [] [] [] [] [] [] [] [] []    Transfers    Sit to Stand [] [] [] [x] [] [] [] [] [] [] []    Bed to Chair [] [] [] [x] [] [] [] [] [] [] []    Stand to Sit [] [] [] [x] [] [] [] [] [] [] []    I=Independent, Mod I=Modified Independent, S=Supervision, SBA=Standby Assistance, CGA=Contact Guard Assistance,   Min=Minimal Assistance, Mod=Moderate Assistance, Max=Maximal Assistance, Total=Total Assistance, NT=Not Tested    BALANCE: Good Fair+ Fair Fair- Poor NT Comments   Sitting Static [x] [] [] [] [] []    Sitting Dynamic [x] [] [] [] [] []              Standing Static [] [x] [] [] [] []    Standing Dynamic [] [x] [] [] [] []      GAIT: I Mod I S SBA CGA Min Mod Max Total  NT x2 Comments:   Level of Assistance [] [] [] [x] [] [] [] [] [] [] [] On room air   Distance 450' with 1 standing rest breaks    DME Rolling Walker    Gait Quality Slow, steady    Weightbearing  Status N/A     I=Independent, Mod I=Modified Independent, S=Supervision, SBA=Standby Assistance, CGA=Contact Guard Assistance,   Min=Minimal Assistance, Mod=Moderate Assistance, Max=Maximal Assistance, Total=Total Assistance, NT=Not Tested    PLAN:   FREQUENCY/DURATION: PT Plan of Care: 3 times/week for duration of hospital stay or until stated goals are met, whichever comes first.  TREATMENT:     TREATMENT:   ($$ Therapeutic Activity: 8-22 mins    )  Therapeutic Activity (17 Minutes): Therapeutic activity included Scooting, Transfer Training, Ambulation on level ground, Standing balance and seated BLE exercises to improve functional Mobility, Strength and Activity tolerance.     TREATMENT GRID:   Date:  6/15/21 Date:  6/18/21 Date:  6/21/21   Activity/Exercise Parameters Parameters Parameters   Ankle pumps X 15 B     LAQ X 15 B X 10 B 1 x 10 AB   Hip flexion X 15 B  1 x 10 AB   Hip abd X 15 B  1 x 10 AB   Standing heel raises  X 12 B    Standing hip flexion  X 12 B    Standing hip abd  X 12 B      AFTER TREATMENT POSITION/PRECAUTIONS:  Chair, Needs within reach and RN notified    INTERDISCIPLINARY COLLABORATION:  RN/PCT and PT/PTA    TOTAL TREATMENT DURATION:  PT Patient Time In/Time Out  Time In: 1505  Time Out: Kira 20, PT

## 2021-06-21 NOTE — PROGRESS NOTES
Pt had a much better night than last.  Able to wean oxygen off to room air. 500 cc's output in king. Slept very little tonight (probably due to the steroids), tylenol given without much effect.

## 2021-06-21 NOTE — PROGRESS NOTES
Date of Outreach Update:  Jose Roberto Estrada was seen and assessed. MEWS Score: 1 (06/21/21 8909)  Vitals:    06/21/21 0039 06/21/21 0346 06/21/21 0742 06/21/21 0813   BP:  123/72 (!) 126/57    Pulse:  64 68    Resp:  20 20    Temp:  97.6 °F (36.4 °C) 97.7 °F (36.5 °C)    SpO2: 95% 95% 94% 96%   Weight:  68.5 kg (151 lb)     Height:             Pain Assessment  Pain Intensity 1: 0 (06/21/21 0742)  Pain Location 1: Abdomen  Pain Intervention(s) 1: Medication (see MAR)  Patient Stated Pain Goal: 0      Previous Outreach assessment has been reviewed. There have been no significant clinical changes since the completion of the last dated Outreach assessment. Patient alert and oriented. Respirations unlabored on RA. SaO2 95%. VS, labs, and progress notes reviewed. Will continue to follow up per outreach protocol.     Signed By:   Kelle Dillard RN    June 21, 2021 11:39 AM

## 2021-06-21 NOTE — PROGRESS NOTES
Date of Outreach Update:  Juancarlos Dutta was seen and assessed. MEWS Score: 1 (06/21/21 0346)  Vitals:    06/20/21 2253 06/21/21 0015 06/21/21 0039 06/21/21 0346   BP: (!) 109/55   123/72   Pulse: 72   64   Resp: 24   20   Temp: 97.7 °F (36.5 °C)   97.6 °F (36.4 °C)   SpO2: 96% 100% 95% 95%   Weight:    68.5 kg (151 lb)   Height:             Pain Assessment  Pain Intensity 1: 0 (06/21/21 0346)  Pain Location 1: Abdomen  Pain Intervention(s) 1: Medication (see MAR)  Patient Stated Pain Goal: 0      Previous Outreach assessment has been reviewed. There have been no significant clinical changes since the completion of the last dated Outreach assessment. Pt found awake in bed. States she is feeling much better. Breathe sounds clear, diminished. Sats 95% on RA. Pt receiving Lasix. Diuresing well. Creatinine with bump this AM.    Will continue to follow up per outreach protocol.     Signed By:   Bakari Rutherford RN    June 21, 2021 6:42 AM

## 2021-06-21 NOTE — PROGRESS NOTES
Problem: Falls - Risk of  Goal: *Absence of Falls  Description: Document Karissa Trejo Fall Risk and appropriate interventions in the flowsheet.   Outcome: Progressing Towards Goal  Note: Fall Risk Interventions:  Mobility Interventions: Bed/chair exit alarm, Patient to call before getting OOB         Medication Interventions: Bed/chair exit alarm, Patient to call before getting OOB, Teach patient to arise slowly    Elimination Interventions: Bed/chair exit alarm, Call light in reach    History of Falls Interventions: Bed/chair exit alarm, Vital signs minimum Q4HRs X 24 hrs (comment for end date)

## 2021-06-21 NOTE — PROGRESS NOTES
[facility] Hospitalist Progress Note     Name: Omar Kowalski   Age: 68 y.o. Sex: female  : 1940    MRN:     [id number]    Admit Date:  2021    Reason for Admission:  CHF (congestive heart failure) ([facility] [address] [I50.9]    Assessment & Plan     Acute respiratory failure with hypoxia due to acute on chronic CHF exacerbation  Patient was noted to be saturating 88% on room air and requiring 3 L nasal cannula on arrival to the ED. Echocardiogram in 2021 with preserved EF of 55 to 60%. pBNP of 10k on admission. CXR with progressive vascular congestion. ABG with hypoxia but no acidosis or hypercapnia. Pt on Eliquis so lower concern for PE. Strict I's and O. Daily weights  Cont telemetry  D/c steroids, no hx of COPD  Repeated pro-BNP improves 10K>2K. Holding Lasix 40 mg IV and Spironolactone for now d/t NAZIA. Cardiology on board, appreciate recs  Weaned down to RA this AM    NAZIA on CKD stage III   Baseline creatinine 1.3-1.5, at baseline. ? Cardiorenal syndrome  Urine studies c/w pre-renal. UA unremarkable  Holding Lasix and Spironolactone for now as Cr 2.06. Albumin QID for 8 doses  Nephrology on board, appreciate recs    Aspiration PNA  Rapid response called . CXR shows pulmonary edema and PNA. Covid NEG. Viral PCR panel neg. Procal low 0.13. Abx: Unasyn/Doxycycline (-. ..) empirically  BCx: : NGTD  Sputum Cx : Pending   Worsening leukocytosis could be 2/2 steroid use as well. Pt weaned down to RA  Once improvement in leukocytosis, consider transition abx to Augmentin prior to discharge    Leukocytosis with history of leukopenia status post Granix (in May 20)  On admission, Procal < 0.05, UA without any evidence of UTI  Treatment for PNA per above  Worsening could also be 2/2 Solumedrol as well    Elevated troponins likely due to demand ischemia  Troponin has peaked at 56.9. Patient without any chest pains.   EKG with normal sinus rhythm without any specific T wave and ST changes. Hypertension//hyperlipidemia//paroxysmal atrial fibrillation  Cont amiodarone, apixaban, Crestor, spironolactone. Hypothyroidism: synthroid    Anemia with Hx of melena  Hemoglobin appears to be stable at 9.0 on admission. Appears to be around 8-7 in prior admission. EGD was deferred during last admission due to acute illness  Cont PPI twice daily  Monitor hemoglobin and transfuse as needed  Hgb stable    Depression: Continue with Cymbalta    GERD: PPI    Diet:  DIET ADULT  DVT PPx: eliquis  GI Ppx: PPI  Code: Full Code    Dispo / Discharge Plannin-3 days pending improvement in respiratory status. PT/OT Eval--HHPT/OT    Hospital Course/Subjective:     Please refer to the admission H&P for details of presentation. In summary, Radha Magallanes is a 68 y.o. 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 Patient was recently discharged from rehab after hospitalization in May for fall with rib fractures. She is being admitted for acute on chronic CHF exacerbation. Cardiology was consulted. She has been diuresing well and was weaned down to room air. However discharge was held due to elevated Cr and Lasix was held. Early  AM, RR was called d/t her having chest pain, SOB, tachypnea associated with wheezing. Troponin improved from previous. EKG shows sinus rhythm and RBBB consistent with CXR shows concerns for worsening pulmonary edema and possible PNA. She was given antibiotics, Lasix, breathing treatments and started on Solumedrol. Subjective/24 hr Events (21) : This AM breathing much better and has been weaned down to RA. Cr rising. Patient denies fever, chills, n/v, abdominal pain.       Review of Systems: 14 point review of systems is otherwise negative with the exception of the elements mentioned above. Objective:     Patient Vitals for the past 24 hrs:   Temp Pulse Resp BP SpO2   06/21/21 0742 97.7 °F (36.5 °C) 68 20 (!) 126/57 94 %   06/21/21 0346 97.6 °F (36.4 °C) 64 20 123/72 95 %   06/21/21 0039     95 %   06/21/21 0015     100 %   06/20/21 2253 97.7 °F (36.5 °C) 72 24 (!) 109/55 96 %   06/20/21 2032     95 %   06/20/21 1908 98.5 °F (36.9 °C) 86 24 128/65 99 %   06/20/21 1531 97.8 °F (36.6 °C) 72 24 119/63 95 %   06/20/21 1404     98 %   06/20/21 1343  68 26  99 %   06/20/21 1149 97.9 °F (36.6 °C) 71 24 132/63 100 %   06/20/21 0857     96 %     Oxygen Therapy  O2 Sat (%): 94 % (06/21/21 0742)  Pulse via Oximetry: 72 beats per minute (06/20/21 2032)  O2 Device: None (Room air) (06/21/21 0346)  Skin Assessment: Clean, dry, & intact (06/20/21 1404)  O2 Flow Rate (L/min): 2 l/min (06/21/21 0015)    Body mass index is 29.49 kg/m². Physical Exam:   General:     alert, awake, no acute distress. On RA. In good spirit. Neck:    supple, non-tender. Trachea midline. Lungs:   Rales and rhonchi on lung bases  Cardiac:   RRR, Normal S1 and S2  Abdomen:   Soft, non distended, nontender, +BS, no guarding/rebound  Extremities:   Edema resolved, pedal pulses present  Skin:   Warm, dry, normal turgor and texture; no rash, ulcers   Neuro:  AAOx3. No gross focal neurological deficit  Psychiatric:  No anxiety, calm, cooperative    Data Review:  I have reviewed all labs, meds, and studies from the last 24 hours:    Labs:    Recent Results (from the past 24 hour(s))   SODIUM, UR, RANDOM    Collection Time: 06/20/21  9:41 AM   Result Value Ref Range    Sodium,urine random 22 MMOL/L   PROTEIN/CREATININE RATIO, URINE    Collection Time: 06/20/21  9:41 AM   Result Value Ref Range    Protein, urine random 21 (H) <11.9 mg/dL    Creatinine, urine 55.30 mg/dL    Protein/Creat.  urine Ratio 0.4     URINALYSIS W/ RFLX MICROSCOPIC    Collection Time: 06/20/21  9:41 AM   Result Value Ref Range    Color YELLOW      Appearance CLEAR      Specific gravity 1.010 1.001 - 1.023      pH (UA) 5.5 5.0 - 9.0      Protein Negative NEG mg/dL    Glucose Negative mg/dL    Ketone Negative NEG mg/dL    Bilirubin Negative NEG      Blood Negative NEG      Urobilinogen 0.2 0.2 - 1.0 EU/dL    Nitrites Negative NEG      Leukocyte Esterase Negative NEG     PROCALCITONIN    Collection Time: 06/20/21 10:10 AM   Result Value Ref Range    Procalcitonin 0.13 ng/mL   CULTURE, BLOOD    Collection Time: 06/20/21 10:10 AM    Specimen: Blood   Result Value Ref Range    Special Requests: LEFT  HAND        Culture result: NO GROWTH AFTER 19 HOURS     NT-PRO BNP    Collection Time: 06/20/21 10:10 AM   Result Value Ref Range    NT pro-BNP 2,873 (H) <450 PG/ML   CULTURE, BLOOD    Collection Time: 06/20/21 11:02 AM    Specimen: Blood   Result Value Ref Range    Special Requests: LEFT  ARM        Culture result: NO GROWTH AFTER 19 HOURS     CBC WITH AUTOMATED DIFF    Collection Time: 06/21/21  5:08 AM   Result Value Ref Range    WBC 28.9 (H) 4.3 - 11.1 K/uL    RBC 3.08 (L) 4.05 - 5.2 M/uL    HGB 9.0 (L) 11.7 - 15.4 g/dL    HCT 29.0 (L) 35.8 - 46.3 %    MCV 94.2 79.6 - 97.8 FL    MCH 29.2 26.1 - 32.9 PG    MCHC 31.0 (L) 31.4 - 35.0 g/dL    RDW 16.9 (H) 11.9 - 14.6 %    PLATELET 037 260 - 179 K/uL    MPV 12.3 9.4 - 12.3 FL    ABSOLUTE NRBC 0.00 0.0 - 0.2 K/uL    DF AUTOMATED      NEUTROPHILS 92 (H) 43 - 78 %    LYMPHOCYTES 2 (L) 13 - 44 %    MONOCYTES 5 4.0 - 12.0 %    EOSINOPHILS 0 (L) 0.5 - 7.8 %    BASOPHILS 0 0.0 - 2.0 %    IMMATURE GRANULOCYTES 1 0.0 - 5.0 %    ABS. NEUTROPHILS 26.5 (H) 1.7 - 8.2 K/UL    ABS. LYMPHOCYTES 0.6 0.5 - 4.6 K/UL    ABS. MONOCYTES 1.3 0.1 - 1.3 K/UL    ABS. EOSINOPHILS 0.1 0.0 - 0.8 K/UL    ABS. BASOPHILS 0.0 0.0 - 0.2 K/UL    ABS. IMM.  GRANS. 0.3 0.0 - 0.5 K/UL   METABOLIC PANEL, BASIC    Collection Time: 06/21/21  5:08 AM   Result Value Ref Range    Sodium 129 (L) 136 - 145 mmol/L    Potassium 3.5 3.5 - 5.1 mmol/L    Chloride 92 (L) 98 - 107 mmol/L    CO2 31 21 - 32 mmol/L    Anion gap 6 (L) 7 - 16 mmol/L    Glucose 218 (H) 65 - 100 mg/dL    BUN 45 (H) 8 - 23 MG/DL    Creatinine 2.06 (H) 0.6 - 1.0 MG/DL    GFR est AA 30 (L) >60 ml/min/1.73m2    GFR est non-AA 25 (L) >60 ml/min/1.73m2    Calcium 8.7 8.3 - 10.4 MG/DL   MAGNESIUM    Collection Time: 06/21/21  5:08 AM   Result Value Ref Range    Magnesium 2.1 1.8 - 2.4 mg/dL       All Micro Results     Procedure Component Value Units Date/Time    CULTURE, BLOOD [[id number]] Collected: 06/20/21 1010    Order Status: Completed Specimen: Blood Updated: 06/21/21 0733     Special Requests: --        LEFT  HAND       Culture result: NO GROWTH AFTER 19 HOURS       CULTURE, BLOOD [[id number]] Collected: 06/20/21 1102    Order Status: Completed Specimen: Blood Updated: 06/21/21 0733     Special Requests: --        LEFT  ARM       Culture result: NO GROWTH AFTER 19 HOURS       CULTURE, RESPIRATORY/SPUTUM/BRONCH Shane Necessary STAIN [[id number]] Collected: 06/20/21 2129    Order Status: Completed Specimen: Sputum Updated: 06/20/21 2200    RESPIRATORY VIRUS PANEL W/COVID-19, PCR [[id number]] Collected: 06/20/21 0438    Order Status: Completed Specimen: Nasopharyngeal Updated: 06/20/21 0729     Adenovirus NOT DETECTED        Coronavirus 229E NOT DETECTED        Coronavirus HKU1 NOT DETECTED        Coronavirus CVNL63 NOT DETECTED        Coronavirus OC43 NOT DETECTED        SARS-CoV-2, PCR NOT DETECTED        Comment: This test has been authorized by the FDA under an Emergency Use Authorization (EUA) for use by authorized laboratories.          Metapneumovirus NOT DETECTED        Rhinovirus and Enterovirus NOT DETECTED        Influenza A NOT DETECTED        Influenza B NOT DETECTED        Parainfluenza 1 NOT DETECTED        Parainfluenza 2 NOT DETECTED        Parainfluenza 3 NOT DETECTED        Parainfluenza virus 4 NOT DETECTED RSV by PCR NOT DETECTED        B. parapertussis, PCR NOT DETECTED        Bordetella pertussis - PCR NOT DETECTED        Chlamydophila pneumoniae DNA, QL, PCR NOT DETECTED        Mycoplasma pneumoniae DNA, QL, PCR NOT DETECTED       COVID-19 RAPID TEST [[id number]] Collected: 06/20/21 0439    Order Status: Completed Specimen: Nasopharyngeal Updated: 06/20/21 0514     Specimen source NASAL        COVID-19 rapid test Not detected        Comment:      The specimen is NEGATIVE for SARS-CoV-2, the novel coronavirus associated with COVID-19. A negative result does not rule out COVID-19. This test has been authorized by the FDA under an Emergency Use Authorization (EUA) for use by authorized laboratories.         Fact sheet for Healthcare Providers: [facility]  Fact sheet for Patients: [employer]       Methodology: Isothermal Nucleic Acid Amplification         CULTURE, BLOOD [[id number]] Collected: 06/13/21 1749    Order Status: Completed Specimen: Blood Updated: 06/18/21 0704     Special Requests: --        LEFT  HAND       Culture result: NO GROWTH 5 DAYS       CULTURE, BLOOD [[id number]] Collected: 06/13/21 1746    Order Status: Completed Specimen: Blood Updated: 06/18/21 0704     Special Requests: --        LEFT  Antecubital       Culture result: NO GROWTH 5 DAYS             EKG Results     Procedure 720 Value Units Date/Time    EKG, 12 LEAD, INITIAL [[id number]] Collected: 06/20/21 0120    Order Status: Completed Updated: 06/20/21 0819     Ventricular Rate 71 BPM      Atrial Rate 71 BPM      P-R Interval 158 ms      QRS Duration 126 ms      Q-T Interval 470 ms      QTC Calculation (Bezet) 510 ms      Calculated P Axis 15 degrees      Calculated R Axis 71 degrees      Calculated T Axis 26 degrees      Diagnosis --     Normal sinus rhythm  Right bundle branch block  Abnormal ECG  When compared with ECG of 13-JUN-2021 14:26,  Right bundle branch block is now Present  Confirmed by [facility] ALVINIA HUSSEIN [facility]  MD ()eLla ([id number]) on 6/20/2021 8:19:32 AM      EKG [[id number]] Collected: 06/13/21 1426    Order Status: Completed Updated: 06/14/21 0705     Ventricular Rate 90 BPM      Atrial Rate 90 BPM      P-R Interval 160 ms      QRS Duration 90 ms      Q-T Interval 316 ms      QTC Calculation (Bezet) 386 ms      Calculated P Axis 55 degrees      Calculated R Axis 81 degrees      Calculated T Axis 24 degrees      Diagnosis --     Normal sinus rhythm  Nonspecific T wave abnormality  Abnormal ECG  When compared with ECG of 12-MAY-2021 10:32,  Right bundle branch block is no longer Present  Confirmed by [facility] JOVITA [facility]  MD ()Lela ([id number]) on 6/14/2021 7:05:02 AM            Other Studies:  XR CHEST PORT    Result Date: 6/13/2021  1 View portable chest x-ray 6/13/2021 2:42 PM Indication: History of pneumonia, shortness of breath. Syncopal episode. Comparison: Prior studies-most recent 5/21/2021 Findings: This portable upright AP chest at 1437 shows the patient again rotated somewhat toward the left. Cardiomediastinal silhouette stable. Vascular pedicle appearance probably slightly more pronounced today. Mildly more progressed bibasilar hazy/veiling opacities which may represent layering effusions compared to the most recent study. Compared to older studies these are indolently progressing. Old right rib fractures again seen. Increased vascular congestion today, patient probably has progressing basilar effusions-now of small/moderate volume. Appearance most suggestive of progressing vascular congestion. Correlate correlate clinically, consider serum BNP.           Current Meds:   Current Facility-Administered Medications   Medication Dose Route Frequency    albumin human 25% (BUMINATE) solution 25 g  25 g IntraVENous Q6H    ampicillin-sulbactam (UNASYN) 3 g in 0.9% sodium chloride (MBP/ADV) 100 mL MBP  3 g IntraVENous Q12H    albuterol (PROVENTIL VENTOLIN) nebulizer solution 2.5 mg 2.5 mg Nebulization Q6HWA RT    budesonide (PULMICORT) 500 mcg/2 ml nebulizer suspension  500 mcg Nebulization BID RT    doxycycline (VIBRAMYCIN) capsule 100 mg  100 mg Oral Q12H    metoprolol succinate (TOPROL-XL) XL tablet 50 mg  50 mg Oral DAILY    sodium chloride (NS) flush 5-10 mL  5-10 mL IntraVENous PRN    amiodarone (CORDARONE) tablet 200 mg  200 mg Oral DAILY    apixaban (ELIQUIS) tablet 5 mg  5 mg Oral BID    cholecalciferol (VITAMIN D3) (1000 Units /25 mcg) tablet 1,000 Units  1,000 Units Oral DAILY    DULoxetine (CYMBALTA) capsule 60 mg  60 mg Oral DAILY    levothyroxine (SYNTHROID) tablet 25 mcg  25 mcg Oral ACB    folic acid (FOLVITE) tablet 1 mg  1 mg Oral DAILY    ondansetron (ZOFRAN ODT) tablet 4 mg  4 mg Oral Q8H PRN    pantoprazole (PROTONIX) tablet 40 mg  40 mg Oral ACB&D    rosuvastatin (CRESTOR) tablet 10 mg  10 mg Oral QHS    [Held by provider] spironolactone (ALDACTONE) tablet 25 mg  25 mg Oral DAILY    sodium chloride (NS) flush 5-40 mL  5-40 mL IntraVENous Q8H    sodium chloride (NS) flush 5-40 mL  5-40 mL IntraVENous PRN    acetaminophen (TYLENOL) tablet 650 mg  650 mg Oral Q6H PRN    Or    acetaminophen (TYLENOL) suppository 650 mg  650 mg Rectal Q6H PRN    polyethylene glycol (MIRALAX) packet 17 g  17 g Oral DAILY    senna-docusate (PERICOLACE) 8.6-50 mg per tablet 1 Tablet  1 Tablet Oral BID    magnesium hydroxide (MILK OF MAGNESIA) 400 mg/5 mL oral suspension 30 mL  30 mL Oral DAILY PRN    bisacodyL (DULCOLAX) suppository 10 mg  10 mg Rectal DAILY PRN    [Held by provider] furosemide (LASIX) injection 40 mg  40 mg IntraVENous BID    captopriL (CAPOTEN) tablet 12.5 mg  12.5 mg Oral TID PRN       Problem List:  Hospital Problems as of 6/21/2021 Date Reviewed: 4/13/2021        Codes Class Noted - Resolved POA    Acute respiratory failure with hypoxia (HCC) ICD-10-CM: J96.01  ICD-9-CM: 518.81  6/14/2021 - Present Yes        Demand ischemia ([facility] [address] ICD-10-CM: I24.8  ICD-9-CM: 411.89  6/14/2021 - Present Yes        Leukocytosis ICD-10-CM: D72.829  ICD-9-CM: 288.60  6/14/2021 - Present Yes        * (Principal) Acute on chronic diastolic congestive heart failure (HCC) ICD-10-CM: I50.33  ICD-9-CM: 428.33, 428.0  6/13/2021 - Present Yes        Stage 3 chronic kidney disease ([facility] (Chronic) ICD-10-CM: N18.30  ICD-9-CM: 585.3  5/20/2021 - Present Yes        Paroxysmal atrial fibrillation (HCC) (Chronic) ICD-10-CM: I48.0  ICD-9-CM: 427.31  3/19/2021 - Present Yes        Hypertension, essential, benign (Chronic) ICD-10-CM: I10  ICD-9-CM: 401.1  8/18/2016 - Present Yes        Depression ICD-10-CM: F32.9  ICD-9-CM: 390  8/18/2016 - Present Yes        Hyperlipidemia (Chronic) ICD-10-CM: E78.5  ICD-9-CM: 272.4  1/16/2016 - Present Yes        Rheumatoid arthritis ([facility] (Chronic) ICD-10-CM: M06.9  ICD-9-CM: 714.0  1/16/2016 - Present Yes               Part of this note was written by using a voice dictation software and the note has been proof read but may still contain some grammatical/other typographical errors.     Signed By: DO Lydia [facility]    June 21, 2021  12:57 PM

## 2021-06-22 LAB
ANION GAP SERPL CALC-SCNC: 6 MMOL/L (ref 7–16)
BASOPHILS # BLD: 0 K/UL (ref 0–0.2)
BASOPHILS NFR BLD: 0 % (ref 0–2)
BUN SERPL-MCNC: 49 MG/DL (ref 8–23)
CALCIUM SERPL-MCNC: 8.8 MG/DL (ref 8.3–10.4)
CHLORIDE SERPL-SCNC: 97 MMOL/L (ref 98–107)
CO2 SERPL-SCNC: 30 MMOL/L (ref 21–32)
CREAT SERPL-MCNC: 1.98 MG/DL (ref 0.6–1)
DIFFERENTIAL METHOD BLD: ABNORMAL
EOSINOPHIL # BLD: 0 K/UL (ref 0–0.8)
EOSINOPHIL NFR BLD: 0 % (ref 0.5–7.8)
ERYTHROCYTE [DISTWIDTH] IN BLOOD BY AUTOMATED COUNT: 16.8 % (ref 11.9–14.6)
GLUCOSE SERPL-MCNC: 130 MG/DL (ref 65–100)
HCT VFR BLD AUTO: 24.8 % (ref 35.8–46.3)
HGB BLD-MCNC: 8.2 G/DL (ref 11.7–15.4)
IMM GRANULOCYTES # BLD AUTO: 0.2 K/UL (ref 0–0.5)
IMM GRANULOCYTES NFR BLD AUTO: 1 % (ref 0–5)
LYMPHOCYTES # BLD: 1.4 K/UL (ref 0.5–4.6)
LYMPHOCYTES NFR BLD: 6 % (ref 13–44)
MAGNESIUM SERPL-MCNC: 2.4 MG/DL (ref 1.8–2.4)
MCH RBC QN AUTO: 29.5 PG (ref 26.1–32.9)
MCHC RBC AUTO-ENTMCNC: 33.1 G/DL (ref 31.4–35)
MCV RBC AUTO: 89.2 FL (ref 79.6–97.8)
MONOCYTES # BLD: 1.7 K/UL (ref 0.1–1.3)
MONOCYTES NFR BLD: 8 % (ref 4–12)
NEUTS SEG # BLD: 19.7 K/UL (ref 1.7–8.2)
NEUTS SEG NFR BLD: 86 % (ref 43–78)
NRBC # BLD: 0 K/UL (ref 0–0.2)
PLATELET # BLD AUTO: 321 K/UL (ref 150–450)
PMV BLD AUTO: 12.1 FL (ref 9.4–12.3)
POTASSIUM SERPL-SCNC: 3.6 MMOL/L (ref 3.5–5.1)
RBC # BLD AUTO: 2.78 M/UL (ref 4.05–5.2)
SODIUM SERPL-SCNC: 133 MMOL/L (ref 136–145)
WBC # BLD AUTO: 23 K/UL (ref 4.3–11.1)

## 2021-06-22 PROCEDURE — 74011250636 HC RX REV CODE- 250/636: Performed by: INTERNAL MEDICINE

## 2021-06-22 PROCEDURE — 97530 THERAPEUTIC ACTIVITIES: CPT

## 2021-06-22 PROCEDURE — 36415 COLL VENOUS BLD VENIPUNCTURE: CPT

## 2021-06-22 PROCEDURE — 74011000250 HC RX REV CODE- 250: Performed by: INTERNAL MEDICINE

## 2021-06-22 PROCEDURE — 74011250637 HC RX REV CODE- 250/637: Performed by: HOSPITALIST

## 2021-06-22 PROCEDURE — 99232 SBSQ HOSP IP/OBS MODERATE 35: CPT | Performed by: INTERNAL MEDICINE

## 2021-06-22 PROCEDURE — 97110 THERAPEUTIC EXERCISES: CPT

## 2021-06-22 PROCEDURE — 65270000029 HC RM PRIVATE

## 2021-06-22 PROCEDURE — 74011250637 HC RX REV CODE- 250/637: Performed by: INTERNAL MEDICINE

## 2021-06-22 PROCEDURE — 85025 COMPLETE CBC W/AUTO DIFF WBC: CPT

## 2021-06-22 PROCEDURE — 94762 N-INVAS EAR/PLS OXIMTRY CONT: CPT

## 2021-06-22 PROCEDURE — 80048 BASIC METABOLIC PNL TOTAL CA: CPT

## 2021-06-22 PROCEDURE — P9047 ALBUMIN (HUMAN), 25%, 50ML: HCPCS | Performed by: FAMILY MEDICINE

## 2021-06-22 PROCEDURE — 74011000258 HC RX REV CODE- 258: Performed by: INTERNAL MEDICINE

## 2021-06-22 PROCEDURE — 97164 PT RE-EVAL EST PLAN CARE: CPT

## 2021-06-22 PROCEDURE — 83735 ASSAY OF MAGNESIUM: CPT

## 2021-06-22 PROCEDURE — 94640 AIRWAY INHALATION TREATMENT: CPT

## 2021-06-22 PROCEDURE — 74011250636 HC RX REV CODE- 250/636: Performed by: FAMILY MEDICINE

## 2021-06-22 RX ORDER — FUROSEMIDE 20 MG/1
20 TABLET ORAL DAILY
Status: DISCONTINUED | OUTPATIENT
Start: 2021-06-22 | End: 2021-06-24 | Stop reason: HOSPADM

## 2021-06-22 RX ADMIN — APIXABAN 5 MG: 5 TABLET, FILM COATED ORAL at 20:52

## 2021-06-22 RX ADMIN — AMPICILLIN SODIUM AND SULBACTAM SODIUM 3 G: 2; 1 INJECTION, POWDER, FOR SOLUTION INTRAMUSCULAR; INTRAVENOUS at 06:04

## 2021-06-22 RX ADMIN — AMIODARONE HYDROCHLORIDE 200 MG: 200 TABLET ORAL at 08:53

## 2021-06-22 RX ADMIN — PANTOPRAZOLE SODIUM 40 MG: 40 TABLET, DELAYED RELEASE ORAL at 16:40

## 2021-06-22 RX ADMIN — PANTOPRAZOLE SODIUM 40 MG: 40 TABLET, DELAYED RELEASE ORAL at 06:04

## 2021-06-22 RX ADMIN — ALBUMIN (HUMAN) 25 G: 0.25 INJECTION, SOLUTION INTRAVENOUS at 06:04

## 2021-06-22 RX ADMIN — AMPICILLIN SODIUM AND SULBACTAM SODIUM 3 G: 2; 1 INJECTION, POWDER, FOR SOLUTION INTRAMUSCULAR; INTRAVENOUS at 17:05

## 2021-06-22 RX ADMIN — ALBUMIN (HUMAN) 25 G: 0.25 INJECTION, SOLUTION INTRAVENOUS at 17:47

## 2021-06-22 RX ADMIN — DOXYCYCLINE HYCLATE 100 MG: 100 CAPSULE ORAL at 20:52

## 2021-06-22 RX ADMIN — ALBUTEROL SULFATE 2.5 MG: 2.5 SOLUTION RESPIRATORY (INHALATION) at 07:08

## 2021-06-22 RX ADMIN — FOLIC ACID 1 MG: 1 TABLET ORAL at 08:52

## 2021-06-22 RX ADMIN — VITAMIN D, TAB 1000IU (100/BT) 1000 UNITS: 25 TAB at 08:52

## 2021-06-22 RX ADMIN — LEVOTHYROXINE SODIUM 25 MCG: 0.05 TABLET ORAL at 06:04

## 2021-06-22 RX ADMIN — DOXYCYCLINE HYCLATE 100 MG: 100 CAPSULE ORAL at 08:52

## 2021-06-22 RX ADMIN — BUDESONIDE 500 MCG: 0.5 INHALANT RESPIRATORY (INHALATION) at 07:08

## 2021-06-22 RX ADMIN — ROSUVASTATIN CALCIUM 10 MG: 5 TABLET, COATED ORAL at 20:53

## 2021-06-22 RX ADMIN — METOPROLOL SUCCINATE 50 MG: 50 TABLET, EXTENDED RELEASE ORAL at 08:53

## 2021-06-22 RX ADMIN — DULOXETINE HYDROCHLORIDE 60 MG: 60 CAPSULE, DELAYED RELEASE ORAL at 08:52

## 2021-06-22 RX ADMIN — ALBUTEROL SULFATE 2.5 MG: 2.5 SOLUTION RESPIRATORY (INHALATION) at 14:58

## 2021-06-22 RX ADMIN — BUDESONIDE 500 MCG: 0.5 INHALANT RESPIRATORY (INHALATION) at 22:14

## 2021-06-22 RX ADMIN — ALBUTEROL SULFATE 2.5 MG: 2.5 SOLUTION RESPIRATORY (INHALATION) at 22:14

## 2021-06-22 RX ADMIN — Medication 10 ML: at 13:04

## 2021-06-22 RX ADMIN — FUROSEMIDE 20 MG: 20 TABLET ORAL at 09:04

## 2021-06-22 RX ADMIN — Medication 10 ML: at 06:04

## 2021-06-22 RX ADMIN — ALBUMIN (HUMAN) 25 G: 0.25 INJECTION, SOLUTION INTRAVENOUS at 11:00

## 2021-06-22 RX ADMIN — APIXABAN 5 MG: 5 TABLET, FILM COATED ORAL at 09:39

## 2021-06-22 RX ADMIN — ALBUMIN (HUMAN) 25 G: 0.25 INJECTION, SOLUTION INTRAVENOUS at 00:45

## 2021-06-22 RX ADMIN — Medication 5 ML: at 20:53

## 2021-06-22 NOTE — PROGRESS NOTES
Date of Outreach Update:  Edwin Pop was seen and assessed. Patient sleeping, respirations even and unlabored, no distress noted at this time. MEWS Score: 1 (06/21/21 1621)  Vitals:    06/21/21 1936 06/21/21 2317 06/22/21 0320 06/22/21 0406   BP:  126/62 126/66    Pulse:  82 78    Resp:  20 20    Temp:  98.4 °F (36.9 °C) 98.3 °F (36.8 °C)    SpO2: 93% 98% 96%    Weight:    68.7 kg (151 lb 8 oz)   Height:             Pain Assessment  Pain Intensity 1: 0 (06/21/21 1940)  Pain Location 1: Abdomen  Pain Intervention(s) 1: Medication (see MAR)  Patient Stated Pain Goal: 0    Previous Outreach assessment has been reviewed. There have been no significant clinical changes since the completion of the last dated Outreach assessment. Will continue to follow up per outreach protocol.     Signed By:   Lorri Davis RN    June 22, 2021 6:05 AM

## 2021-06-22 NOTE — INTERDISCIPLINARY ROUNDS
Interdisciplinary Rounds completed with Nursing, Case Management, Dietician, Pharmacy,  Physician and PT/OT present. Plan of care reviewed and updated.     Consults include Cardiology, Nephrology, PT/OT      Expected discharge date: 6/23/21     Location: Home with home health

## 2021-06-22 NOTE — PROGRESS NOTES
CM chart review. Patient was current with [facility] prior to admission; resumption of care orders have been faxed. CM will send discharge summary when available. Patient has been weaned to room air; no home oxygen needed. CM will continue to follow to assist with discharge needs when medically stable.

## 2021-06-22 NOTE — PROGRESS NOTES
Epi Hospitalist Progress Note     Name: Fernanda Justice   Age: 68 y.o. Sex: female  : 1940    MRN:     [id number]    Admit Date:  2021    Reason for Admission:  CHF (congestive heart failure) ([facility] [address] [I50.9]    Assessment & Plan     Acute respiratory failure with hypoxia due to acute on chronic CHF exacerbation  Patient was noted to be saturating 88% on room air and requiring 3 L nasal cannula on arrival to the ED. Echocardiogram in 2021 with preserved EF of 55 to 60%. pBNP of 10k on admission. CXR with progressive vascular congestion. ABG with hypoxia but no acidosis or hypercapnia. Pt on Eliquis so lower concern for PE. Strict I's and O. Daily weights  Cont telemetry  D/c steroids, no hx of COPD  Repeated pro-BNP improves 10K>2K. Holding Spironolactone for now d/t NAZIA. Cardiology on board, appreciate recs  Weaned down to RA   D/c IV lasix and Initiating Lasix 20mg po and monitor BMP    NAZAI on CKD stage III   Baseline creatinine 1.3-1.5, at baseline. ? Cardiorenal syndrome  Urine studies c/w pre-renal. UA unremarkable  Holding Spironolactone. Albumin QID for 8 doses  Nephrology on board, appreciate recs  D/c IV Lasix and trial of po today and monitor BMP    Aspiration PNA  Rapid response called . CXR shows pulmonary edema and PNA. Covid NEG. Viral PCR panel neg. Procal low 0.13. Abx: Unasyn/Doxycycline (-. ..) empirically  BCx: : NGTD  Sputum Cx : Pending   Worsening leukocytosis could be 2/2 steroid use as well. Pt weaned down to RA  Consider changing Unasyn to Augmentin prior to discharge    Leukocytosis with history of leukopenia status post Granix (in May 20)  On admission, Procal < 0.05, UA without any evidence of UTI  Treatment for PNA per above  Worsening could also be 2/2 Solumedrol as well    Elevated troponins likely due to demand ischemia  Troponin has peaked at 56.9. Patient without any chest pains.   EKG with normal sinus rhythm without any specific T wave and ST changes. Hypertension//hyperlipidemia//paroxysmal atrial fibrillation  Cont amiodarone, apixaban, Crestor, spironolactone. Hypothyroidism: synthroid    Anemia with Hx of melena  Hemoglobin appears to be stable at 9.0 on admission. Appears to be around 8-7 in prior admission. EGD was deferred during last admission due to acute illness  Cont PPI twice daily  Monitor hemoglobin and transfuse as needed  Hgb stable    Depression: Continue with Cymbalta    GERD: PPI    Diet:  DIET ADULT  DVT PPx: eliquis  GI Ppx: PPI  Code: Full Code    Dispo / Discharge Planning:  Anticipate tomorrow if renal function stable on Lasix. PT/OT Eval--HHPT/OT    Hospital Course/Subjective:     Please refer to the admission H&P for details of presentation. In summary, Jesus Tran is a 68 y.o. 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 Patient was recently discharged from rehab after hospitalization in May for fall with rib fractures. She is being admitted for acute on chronic CHF exacerbation. Cardiology was consulted. She has been diuresing well and was weaned down to room air. However discharge was held due to elevated Cr and Lasix was held. Early 6/20 AM, RR was called d/t her having chest pain, SOB, tachypnea associated with wheezing. Troponin improved from previous. EKG shows sinus rhythm and RBBB consistent with CXR shows concerns for worsening pulmonary edema and possible PNA. She was given antibiotics, Lasix, breathing treatments and started on Solumedrol. Pt was continued with antibiotics for presumed aspiration PNA. She was able to be weaned down to RA and Solumedrol discontinued as low suspicion for COPD exacerbation with no history.  Her IV Lasix was held with NAZIA and Nephrology was consulted. Pt has been initiated on po Lasix. Subjective/24 hr Events (06/22/21) : Cr better. In good spirit. Asking about going home. Patient denies fever, chills, SOB, chest pain, n/v, abdominal pain. Review of Systems: 14 point review of systems is otherwise negative with the exception of the elements mentioned above. Objective:     Patient Vitals for the past 24 hrs:   Temp Pulse Resp BP SpO2   06/22/21 0735 97.4 °F (36.3 °C) 67 19 133/60 98 %   06/22/21 0708     97 %   06/22/21 0320 98.3 °F (36.8 °C) 78 20 126/66 96 %   06/21/21 2317 98.4 °F (36.9 °C) 82 20 126/62 98 %   06/21/21 1936     93 %   06/21/21 1922 98.3 °F (36.8 °C) 60 20 123/63 92 %   06/21/21 1621 97.8 °F (36.6 °C) 77 20 (!) 116/54 93 %   06/21/21 1505     100 %   06/21/21 1453     97 %   06/21/21 1209 98.2 °F (36.8 °C) 70 20 (!) 112/59 92 %     Oxygen Therapy  O2 Sat (%): 98 % (06/22/21 0735)  Pulse via Oximetry: 68 beats per minute (06/22/21 0708)  O2 Device: None (Room air) (06/22/21 0708)  Skin Assessment: Clean, dry, & intact (06/20/21 1404)  O2 Flow Rate (L/min): 0 l/min (06/21/21 0813)    Body mass index is 29.59 kg/m². Physical Exam:   General:     alert, awake, no acute distress. On RA. In good spirit. Neck:    supple, non-tender. Trachea midline. Lungs:   Rales and rhonchi on lung bases  Cardiac:   RRR, Normal S1 and S2  Abdomen:   Soft, non distended, nontender, +BS, no guarding/rebound  Extremities:   Edema resolved, pedal pulses present  Skin:   Warm, dry, normal turgor and texture; no rash, ulcers   Neuro:  AAOx3.  No gross focal neurological deficit  Psychiatric:  No anxiety, calm, cooperative    Data Review:  I have reviewed all labs, meds, and studies from the last 24 hours:    Labs:    Recent Results (from the past 24 hour(s))   METABOLIC PANEL, BASIC    Collection Time: 06/22/21  5:02 AM   Result Value Ref Range    Sodium 133 (L) 136 - 145 mmol/L Potassium 3.6 3.5 - 5.1 mmol/L    Chloride 97 (L) 98 - 107 mmol/L    CO2 30 21 - 32 mmol/L    Anion gap 6 (L) 7 - 16 mmol/L    Glucose 130 (H) 65 - 100 mg/dL    BUN 49 (H) 8 - 23 MG/DL    Creatinine 1.98 (H) 0.6 - 1.0 MG/DL    GFR est AA 31 (L) >60 ml/min/1.73m2    GFR est non-AA 26 (L) >60 ml/min/1.73m2    Calcium 8.8 8.3 - 10.4 MG/DL   CBC WITH AUTOMATED DIFF    Collection Time: 06/22/21  5:02 AM   Result Value Ref Range    WBC 23.0 (H) 4.3 - 11.1 K/uL    RBC 2.78 (L) 4.05 - 5.2 M/uL    HGB 8.2 (L) 11.7 - 15.4 g/dL    HCT 24.8 (L) 35.8 - 46.3 %    MCV 89.2 79.6 - 97.8 FL    MCH 29.5 26.1 - 32.9 PG    MCHC 33.1 31.4 - 35.0 g/dL    RDW 16.8 (H) 11.9 - 14.6 %    PLATELET 093 206 - 992 K/uL    MPV 12.1 9.4 - 12.3 FL    ABSOLUTE NRBC 0.00 0.0 - 0.2 K/uL    DF AUTOMATED      NEUTROPHILS 86 (H) 43 - 78 %    LYMPHOCYTES 6 (L) 13 - 44 %    MONOCYTES 8 4.0 - 12.0 %    EOSINOPHILS 0 (L) 0.5 - 7.8 %    BASOPHILS 0 0.0 - 2.0 %    IMMATURE GRANULOCYTES 1 0.0 - 5.0 %    ABS. NEUTROPHILS 19.7 (H) 1.7 - 8.2 K/UL    ABS. LYMPHOCYTES 1.4 0.5 - 4.6 K/UL    ABS. MONOCYTES 1.7 (H) 0.1 - 1.3 K/UL    ABS. EOSINOPHILS 0.0 0.0 - 0.8 K/UL    ABS. BASOPHILS 0.0 0.0 - 0.2 K/UL    ABS. IMM.  GRANS. 0.2 0.0 - 0.5 K/UL   MAGNESIUM    Collection Time: 06/22/21  5:02 AM   Result Value Ref Range    Magnesium 2.4 1.8 - 2.4 mg/dL       All Micro Results     Procedure Component Value Units Date/Time    CULTURE, BLOOD [[id number]] Collected: 06/20/21 1010    Order Status: Completed Specimen: Blood Updated: 06/22/21 0659     Special Requests: --        LEFT  HAND       Culture result: NO GROWTH 2 DAYS       CULTURE, BLOOD [[id number]] Collected: 06/20/21 1102    Order Status: Completed Specimen: Blood Updated: 06/22/21 0659     Special Requests: --        LEFT  ARM       Culture result: NO GROWTH 2 DAYS       CULTURE, RESPIRATORY/SPUTUM/BRONCH Helen Arm STAIN [[id number]] Collected: 06/20/21 2129    Order Status: Completed Specimen: Sputum Updated: 06/21/21 1240     Special Requests: NO SPECIAL REQUESTS        GRAM STAIN 0 TO 4 WBCS/OIF      0 TO 1 EPITHELIAL CELLS/OIF      FEW GRAM POSITIVE COCCI         FEW GRAM NEGATIVE RODS         2+ MUCUS PRESENT        Culture result:       NO GROWTH AFTER SHORT PERIOD OF INCUBATION. FURTHER RESULTS TO FOLLOW AFTER OVERNIGHT INCUBATION. RESPIRATORY VIRUS PANEL W/COVID-19, PCR [[id number]] Collected: 06/20/21 0438    Order Status: Completed Specimen: Nasopharyngeal Updated: 06/20/21 0729     Adenovirus NOT DETECTED        Coronavirus 229E NOT DETECTED        Coronavirus HKU1 NOT DETECTED        Coronavirus CVNL63 NOT DETECTED        Coronavirus OC43 NOT DETECTED        SARS-CoV-2, PCR NOT DETECTED        Comment: This test has been authorized by the FDA under an Emergency Use Authorization (EUA) for use by authorized laboratories. Metapneumovirus NOT DETECTED        Rhinovirus and Enterovirus NOT DETECTED        Influenza A NOT DETECTED        Influenza B NOT DETECTED        Parainfluenza 1 NOT DETECTED        Parainfluenza 2 NOT DETECTED        Parainfluenza 3 NOT DETECTED        Parainfluenza virus 4 NOT DETECTED        RSV by PCR NOT DETECTED        B. parapertussis, PCR NOT DETECTED        Bordetella pertussis - PCR NOT DETECTED        Chlamydophila pneumoniae DNA, QL, PCR NOT DETECTED        Mycoplasma pneumoniae DNA, QL, PCR NOT DETECTED       COVID-19 RAPID TEST [[id number]] Collected: 06/20/21 0439    Order Status: Completed Specimen: Nasopharyngeal Updated: 06/20/21 0514     Specimen source NASAL        COVID-19 rapid test Not detected        Comment:      The specimen is NEGATIVE for SARS-CoV-2, the novel coronavirus associated with COVID-19. A negative result does not rule out COVID-19. This test has been authorized by the FDA under an Emergency Use Authorization (EUA) for use by authorized laboratories.         Fact sheet for Healthcare Providers: ConventionUpdate.co.nz  Fact sheet for Patients: [email or web address]       Methodology: Isothermal Nucleic Acid Amplification         CULTURE, BLOOD [[id number]] Collected: 06/13/21 1749    Order Status: Completed Specimen: Blood Updated: 06/18/21 0704     Special Requests: --        LEFT  HAND       Culture result: NO GROWTH 5 DAYS       CULTURE, BLOOD [[id number]] Collected: 06/13/21 1746    Order Status: Completed Specimen: Blood Updated: 06/18/21 0704     Special Requests: --        LEFT  Antecubital       Culture result: NO GROWTH 5 DAYS             EKG Results     Procedure 720 Value Units Date/Time    EKG, 12 LEAD, INITIAL [[id number]] Collected: 06/20/21 0120    Order Status: Completed Updated: 06/20/21 0819     Ventricular Rate 71 BPM      Atrial Rate 71 BPM      P-R Interval 158 ms      QRS Duration 126 ms      Q-T Interval 470 ms      QTC Calculation (Bezet) 510 ms      Calculated P Axis 15 degrees      Calculated R Axis 71 degrees      Calculated T Axis 26 degrees      Diagnosis --     Normal sinus rhythm  Right bundle branch block  Abnormal ECG  When compared with ECG of 13-JUN-2021 14:26,  Right bundle branch block is now Present  Confirmed by Stacey Young MD ()LIZETTE ([id number]) on 6/20/2021 8:19:32 AM      EKG [[id number]] Collected: 06/13/21 1426    Order Status: Completed Updated: 06/14/21 0705     Ventricular Rate 90 BPM      Atrial Rate 90 BPM      P-R Interval 160 ms      QRS Duration 90 ms      Q-T Interval 316 ms      QTC Calculation (Bezet) 386 ms      Calculated P Axis 55 degrees      Calculated R Axis 81 degrees      Calculated T Axis 24 degrees      Diagnosis --     Normal sinus rhythm  Nonspecific T wave abnormality  Abnormal ECG  When compared with ECG of 12-MAY-2021 10:32,  Right bundle branch block is no longer Present  Confirmed by Stacey Young MD ()LIZETTE ([id number]) on 6/14/2021 7:05:02 AM            Other Studies:  XR CHEST PORT    Result Date: 6/13/2021  1 View portable chest x-ray 6/13/2021 2:42 PM Indication: History of pneumonia, shortness of breath. Syncopal episode. Comparison: Prior studies-most recent 5/21/2021 Findings: This portable upright AP chest at 1437 shows the patient again rotated somewhat toward the left. Cardiomediastinal silhouette stable. Vascular pedicle appearance probably slightly more pronounced today. Mildly more progressed bibasilar hazy/veiling opacities which may represent layering effusions compared to the most recent study. Compared to older studies these are indolently progressing. Old right rib fractures again seen. Increased vascular congestion today, patient probably has progressing basilar effusions-now of small/moderate volume. Appearance most suggestive of progressing vascular congestion. Correlate correlate clinically, consider serum BNP.           Current Meds:   Current Facility-Administered Medications   Medication Dose Route Frequency    furosemide (LASIX) tablet 20 mg  20 mg Oral DAILY    albumin human 25% (BUMINATE) solution 25 g  25 g IntraVENous Q6H    ampicillin-sulbactam (UNASYN) 3 g in 0.9% sodium chloride (MBP/ADV) 100 mL MBP  3 g IntraVENous Q12H    albuterol (PROVENTIL VENTOLIN) nebulizer solution 2.5 mg  2.5 mg Nebulization Q6HWA RT    budesonide (PULMICORT) 500 mcg/2 ml nebulizer suspension  500 mcg Nebulization BID RT    doxycycline (VIBRAMYCIN) capsule 100 mg  100 mg Oral Q12H    metoprolol succinate (TOPROL-XL) XL tablet 50 mg  50 mg Oral DAILY    sodium chloride (NS) flush 5-10 mL  5-10 mL IntraVENous PRN    amiodarone (CORDARONE) tablet 200 mg  200 mg Oral DAILY    apixaban (ELIQUIS) tablet 5 mg  5 mg Oral BID    cholecalciferol (VITAMIN D3) (1000 Units /25 mcg) tablet 1,000 Units  1,000 Units Oral DAILY    DULoxetine (CYMBALTA) capsule 60 mg  60 mg Oral DAILY    levothyroxine (SYNTHROID) tablet 25 mcg  25 mcg Oral ACB    folic acid (FOLVITE) tablet 1 mg  1 mg Oral DAILY    ondansetron (ZOFRAN ODT) tablet 4 mg  4 mg Oral Q8H PRN    pantoprazole (PROTONIX) tablet 40 mg  40 mg Oral ACB&D    rosuvastatin (CRESTOR) tablet 10 mg  10 mg Oral QHS    [Held by provider] spironolactone (ALDACTONE) tablet 25 mg  25 mg Oral DAILY    sodium chloride (NS) flush 5-40 mL  5-40 mL IntraVENous Q8H    sodium chloride (NS) flush 5-40 mL  5-40 mL IntraVENous PRN    acetaminophen (TYLENOL) tablet 650 mg  650 mg Oral Q6H PRN    Or    acetaminophen (TYLENOL) suppository 650 mg  650 mg Rectal Q6H PRN    polyethylene glycol (MIRALAX) packet 17 g  17 g Oral DAILY    senna-docusate (PERICOLACE) 8.6-50 mg per tablet 1 Tablet  1 Tablet Oral BID    magnesium hydroxide (MILK OF MAGNESIA) 400 mg/5 mL oral suspension 30 mL  30 mL Oral DAILY PRN    bisacodyL (DULCOLAX) suppository 10 mg  10 mg Rectal DAILY PRN    captopriL (CAPOTEN) tablet 12.5 mg  12.5 mg Oral TID PRN       Problem List:  Hospital Problems as of 6/22/2021 Date Reviewed: 4/13/2021        Codes Class Noted - Resolved POA    Aspiration pneumonia ([facility] [address] ICD-10-CM: J69.0  ICD-9-CM: 507.0  6/21/2021 - Present No        Acute respiratory failure with hypoxia (HCC) ICD-10-CM: J96.01  ICD-9-CM: 518.81  6/14/2021 - Present Yes        Demand ischemia (HCC) ICD-10-CM: I24.8  ICD-9-CM: 411.89  6/14/2021 - Present Yes        Leukocytosis ICD-10-CM: C70.572  ICD-9-CM: 288.60  6/14/2021 - Present Yes        * (Principal) Acute on chronic diastolic congestive heart failure (HCC) ICD-10-CM: I50.33  ICD-9-CM: 428.33, 428.0  6/13/2021 - Present Yes        Stage 3 chronic kidney disease (HCC) (Chronic) ICD-10-CM: N18.30  ICD-9-CM: 585.3  5/20/2021 - Present Yes        Paroxysmal atrial fibrillation (HCC) (Chronic) ICD-10-CM: I48.0  ICD-9-CM: 427.31  3/19/2021 - Present Yes        Hypertension, essential, benign (Chronic) ICD-10-CM: I10  ICD-9-CM: 401.1  8/18/2016 - Present Yes        Depression ICD-10-CM: F32.9  ICD-9-CM: 037  8/18/2016 - Present Yes        Hyperlipidemia (Chronic) ICD-10-CM: E78.5  ICD-9-CM: 272.4 1/16/2016 - Present Yes        Rheumatoid arthritis ([facility] [address] (Chronic) ICD-10-CM: M06.9  ICD-9-CM: 714.0  1/16/2016 - Present Yes               Part of this note was written by using a voice dictation software and the note has been proof read but may still contain some grammatical/other typographical errors.     Signed By: DO Lydia [facility]    June 22, 2021  12:57 PM

## 2021-06-22 NOTE — PROGRESS NOTES
[facility] CARDIOLOGY PROGRESS NOTE           6/22/2021 9:32 AM    Admit Date: 6/13/2021         Subjective: Cr has improved today. Breathing fine. She has refused eliquis. ROS:  Cardiovascular:  As noted above    Objective:      Vitals:    06/22/21 0320 06/22/21 0406 06/22/21 0708 06/22/21 0735   BP: 126/66   133/60   Pulse: 78   67   Resp: 20   19   Temp: 98.3 °F (36.8 °C)   97.4 °F (36.3 °C)   SpO2: 96%  97% 98%   Weight:  151 lb 8 oz (68.7 kg)     Height:           On telemetry:      Physical Exam:  General: Well Developed, Well Nourished, No Acute Distress, Alert & Oriented x 3, Appropriate mood  Neck: supple, no JVD  Heart: S1S2 with RRR without murmurs or gallops  Lungs: Clear throughout auscultation bilaterally without adventitious sounds  Abd: soft, nontender, nondistended, with good bowel sounds  Ext: no edema bilaterally  Skin: warm and dry      Data Review:   Recent Labs     06/22/21  0502 06/21/21  0508   * 129*   K 3.6 3.5   MG 2.4 2.1   BUN 49* 45*   CREA 1.98* 2.06*   * 218*   WBC 23.0* 28.9*   HGB 8.2* 9.0*   HCT 24.8* 29.0*    298       No results for input(s): TNIPOC, TROIQ in the last 72 hours.         Assessment/Plan:     Principal Problem:    Acute on chronic diastolic congestive heart failure ([address] (6/13/2021)    Active Problems:    Hyperlipidemia (1/16/2016)      Rheumatoid arthritis ([address] (1/16/2016)      Hypertension, essential, benign (8/18/2016)      Depression (8/18/2016)      Paroxysmal atrial fibrillation ([address] (3/19/2021)      Stage 3 chronic kidney disease ([address] (5/20/2021)      Acute respiratory failure with hypoxia ([address] (6/14/2021)      Demand ischemia ([address] (6/14/2021)      Leukocytosis (6/14/2021)      Aspiration pneumonia ([facility] [address] (6/21/2021)    A/P  1) Afib -discussed Eliquis with her she has been receiving it ever since she has been here in the hospital.  She has not had any hallucinations I do not feel she has hallucinations with Eliquis she has agreed to take it today.   2) dCHF - hold lasix for one more day, resume oral 20 mg daily tomorrow  3) AoCKD - improving today  4) HTN - controlled      Carla Blevins MD  6/22/2021 9:32 AM

## 2021-06-22 NOTE — PROGRESS NOTES
ACUTE PHYSICAL THERAPY GOALS:  (Developed with and agreed upon by patient and/or caregiver. )  LTG (reviewed and updated upon reassessment on 21):  (1.)Ms. Antoni Dalal will move from supine to sit and sit to supine , scoot up and down and roll side to side in bed with INDEPENDENT within 7 treatment day(s). (2.)Ms. Antoni Dalal will transfer from bed to chair and chair to bed with MODIFIED INDEPENDENCE using the least restrictive device within 7 treatment day(s). (3.)Ms. Alemida will ambulate with MODIFIED INDEPENDENCE for 600 feet with the least restrictive device within 7 treatment day(s) while maintaining normal vital signs. (4.)Ms. Almeida will perform 5 stairs with HR and SBA within 7 treatment days for ascending and descending stairs for home. (5.) Reg Ulloa will perform standing static and dynamic balance activities x 20 minutes with MODIFIED INDEPENDENCE to improve safety within 7 treatment day(s). (6.) Reg Ulloa will perform bilateral lower extremity exercises x 15 min for HEP with INDEPENDENCE to improve strength, endurance, and functional mobility within 7 treatment day(s).    ________________________________________________________________________________________________       PHYSICAL THERAPY ASSESSMENT: Re-evaluation PT Treatment Day # 1      Reg Ulloa is a 68 y.o. female   PRIMARY DIAGNOSIS: Acute on chronic diastolic congestive heart failure (HCC)  CHF (congestive heart failure) ([facility] [I50.9]       Reason for Referral:    ICD-10: Treatment Diagnosis: Generalized Muscle Weakness (M62.81)  Difficulty in walking, Not elsewhere classified (R26.2)  History of falling (Z91.81)  INPATIENT: Payor: [facility] MEDICARE COMPLETE / Plan: [address] / Product Type: Managed Care Medicare /     ASSESSMENT:     REHAB RECOMMENDATIONS:   Recommendation to date pending progress:  [address] [place] [address] Therapy  Equipment:    To Be Determined (pt has RW)     PRIOR LEVEL OF FUNCTION:  (Prior to Hospitalization) INITIAL/CURRENT LEVEL OF FUNCTION:  (Most Recently Demonstrated)   Bed Mobility:   Independent  Sit to Stand:   Independent  Transfers:   Modified Independent  Gait/Mobility:   Modified Independent Bed Mobility:   Supervision  Sit to Stand:  [employer] Assistance  Transfers:   Standby Assistance  Gait/Mobility:   Standby Assistance     ASSESSMENT:  PT reassessment performed this date due to 7th visit. PT POC and goals have been updated to reflect pt's current level of function and mobility. She has made progress w/ activity tolerance and ambulation distance. She is tolerating mobility on room air. Overall today pt requiring standby assist for mobility; progressed therapeutic exercises as indicated. Pt will benefit from skilled therapy services to address stated deficits to promote return to highest level of function, independence, and safety. Will continue to follow. SUBJECTIVE:   Ms. Teja Fairbanks states, \"I am doing good today. \"    SOCIAL HISTORY/LIVING ENVIRONMENT: lives alone, MOD I at baseline, friends assist as needed  Home Environment: Private residence  # Steps to Enter: 5  One/Two Story Residence: One story  Living Alone: Yes  Support Systems: Friends \ neighbors  OBJECTIVE:     PAIN: VITAL SIGNS: LINES/DRAINS:   Pre Treatment: Pain Screen  Pain Scale 1: Numeric (0 - 10)  Pain Intensity 1: 0  Post Treatment: 0 Vital Signs  O2 Device: None (Room air) none  O2 Device: None (Room air)     GROSS EVALUATION:  B LE Within Functional Limits Abnormal/ Functional Abnormal/ Non-Functional (see comments) Not Tested Comments:   AROM [x] [] [] []    PROM [x] [] [] []    Strength [] [x] [] [] Grossly 4/5   Balance [x] [] [] []    Posture [] [x] [] [] Rounded shoulders   Sensation [x] [] [] []    Coordination [x] [] [] []    Tone [x] [] [] []    Edema [x] [] [] []    Activity Tolerance [] [x] [] [] Tolerating room air; still wheezing w/ mobility but SpO2 WNL    [] [] [] [] COGNITION/  PERCEPTION: Intact Impaired   (see comments) Comments:   Orientation [x] []    Vision [x] []    Hearing [x] []    Command Following [x] []    Safety Awareness [x] []     [] []      MOBILITY: I Mod I S SBA CGA Min Mod Max Total  NT x2 Comments:   Bed Mobility    Rolling [] [] [x] [] [] [] [] [] [] [] []    Supine to Sit [] [] [x] [] [] [] [] [] [] [] []    Scooting [] [] [x] [] [] [] [] [] [] [] []    Sit to Supine [] [] [] [] [] [] [] [] [] [x] [] In chair   Transfers    Sit to Stand [] [] [] [x] [] [] [] [] [] [] []    Bed to Chair [] [] [] [x] [] [] [] [] [] [] []    Stand to Sit [] [] [] [x] [] [] [] [] [] [] []    I=Independent, Mod I=Modified Independent, S=Supervision, SBA=Standby Assistance, CGA=Contact Guard Assistance,   Min=Minimal Assistance, Mod=Moderate Assistance, Max=Maximal Assistance, Total=Total Assistance, NT=Not Tested  GAIT: I Mod I S SBA CGA Min Mod Max Total  NT x2 Comments:   Level of Assistance [] [] [] [x] [] [] [] [] [] [] [] Wheezing with activity   Distance 400    DME Rolling Walker    Gait Quality Verbal Cues regarding breathing provided often    Weightbearing Status N/A     I=Independent, Mod I=Modified Independent, S=Supervision, SBA=Standby Assistance, CGA=Contact Guard Assistance,   Min=Minimal Assistance, Mod=Moderate Assistance, Max=Maximal Assistance, Total=Total Assistance, NT=Not Tested    [facility] AM-PAC 6 Clicks   Basic Mobility Inpatient Short Form       How much difficulty does the patient currently have. .. Unable A Lot A Little None   1. Turning over in bed (including adjusting bedclothes, sheets and blankets)? [] 1   [] 2   [] 3   [x] 4   2. Sitting down on and standing up from a chair with arms ( e.g., wheelchair, bedside commode, etc.)   [] 1   [] 2   [] 3   [x] 4   3. Moving from lying on back to sitting on the side of the bed? [] 1   [] 2   [] 3   [x] 4   How much help from another person does the patient currently need. ..  Total A Lot A Little None   4. Moving to and from a bed to a chair (including a wheelchair)? [] 1   [] 2   [] 3   [x] 4   5. Need to walk in hospital room? [] 1   [] 2   [x] 3   [] 4   6. Climbing 3-5 steps with a railing? [] 1   [] 2   [x] 3   [] 4   © 2007, Trustees of [address] [place] [id number], under license to [employer]. All rights reserved     Score:  Initial: 19 Most Recent: 22 (Date: 06-22-21 )    Interpretation of Tool:  Represents activities that are increasingly more difficult (i.e. Bed mobility, Transfers, Gait). PLAN:   FREQUENCY/DURATION: PT Plan of Care: 3 times/week for duration of hospital stay or until stated goals are met, whichever comes first.    PROBLEM LIST:   (Skilled intervention is medically necessary to address:)  1. Decreased ADL/Functional Activities  2. Decreased Activity Tolerance  3. Decreased Balance  4. Decreased Gait Ability  5. Decreased Strength  6. Decreased Transfer Abilities   INTERVENTIONS PLANNED:   (Benefits and precautions of physical therapy have been discussed with the patient.)  1. Therapeutic Activity  2. Therapeutic Exercise/HEP  3. Neuromuscular Re-education  4. Gait Training  5. Education     TREATMENT:     PT Re-EVALUATION: (Untimed Charge)    TREATMENT:   ($$ Therapeutic Activity: 8-22 mins  $$ Therapeutic Exercises: 8-22 mins    )  Therapeutic Activity (15 Minutes): Therapeutic activity included Supine to Sit, Scooting, Transfer Training, Ambulation on level ground, Sitting balance  and Standing balance to improve functional Mobility, Strength and Activity tolerance. Therapeutic Exercise (8 Minutes): Therapeutic exercises noted below to improve functional activity tolerance, strength and mobility.      TREATMENT GRID:    Date:  6/15/21 Date:  6/18/21 Date:  6/21/21 Date:  6/22/21   Activity/Exercise Parameters Parameters Parameters Parameters   Ankle pumps X 15 B        LAQ X 15 B X 10 B 1 x 10 AB 1 x 15   Hip flexion X 15 B   1 x 10 AB 1 x 15   Hip abd X 15 B   1 x 10 AB 1 x 15   Standing heel raises   X 12 B   1 x 15 (seated)   Standing hip flexion   X 12 B      Standing hip abd   X 12 B             AFTER TREATMENT POSITION/PRECAUTIONS:  Chair, Needs within reach and RN notified    INTERDISCIPLINARY COLLABORATION:  RN/PCT, PT/PTA and SPT    TOTAL TREATMENT DURATION:  PT Patient Time In/Time Out  Time In: 1348  Time Out: Shyam Moreno 70, PT

## 2021-06-22 NOTE — PROGRESS NOTES
[facility]        Subjective: A on CKD  Breathing is ok today. Off of O2. Review of Systems -   General ROS: negative for - fever, chills  Respiratory ROS: no SOB, cough, LAI  Cardiovascular ROS: no CP, palpitations  Gastrointestinal ROS: no N&V, abdominal pain, diarrhea  Genito-Urinary ROS: no difficulty voiding, dysuria  Neurological ROS: no seizures, focal weekness        Objective:    Vitals:    06/22/21 0320 06/22/21 0406 06/22/21 0708 06/22/21 0735   BP: 126/66   133/60   Pulse: 78   67   Resp: 20   19   Temp: 98.3 °F (36.8 °C)   97.4 °F (36.3 °C)   SpO2: 96%  97% 98%   Weight:  68.7 kg (151 lb 8 oz)     Height:           PE  Gen: in no acute distress  CV:reg rate  Chest:clear  Abd: soft  Ext/Access: no edema       . LAB  Recent Labs     06/22/21  0502 06/21/21  0508 06/20/21  0237   WBC 23.0* 28.9* 20.5*   HGB 8.2* 9.0* 10.7*   HCT 24.8* 29.0* 34.6*    298 315     Recent Labs     06/22/21  0502 06/21/21  0508 06/20/21  0237   * 129* 134*   K 3.6 3.5 4.0   CL 97* 92* 96*   CO2 30 31 31   * 218* 113*   BUN 49* 45* 28*   CREA 1.98* 2.06* 1.76*   MG 2.4 2.1 2.2   CA 8.8 8.7 9.0           Radiology    A/P:   Patient Active Problem List   Diagnosis Code    Acquired hypothyroidism E03.9    TIA (transient ischemic attack) G45.9    Hyperlipidemia E78.5    Rheumatoid arthritis ([facility] M06.9    Right carotid bruit R09.89    Hypertension, essential, benign I10    Osteopenia M85.80    Major depressive disorder, recurrent, moderate (HCC) F33.1    Nicotine dependence, cigarettes, uncomplicated R95.931    Osteoporosis M81.0    Depression F32.9    Hypokalemia E87.6    Tobacco abuse Z72.0    Acute renal failure (ARF) (HCC) N17.9    Sepsis (HCC) A41.9    Community acquired bacterial pneumonia J15.9    Prolonged Q-T interval on ECG R94.31    Paroxysmal atrial fibrillation (HCC) I48.0    Pericarditis with effusion I31.9    Anemia D64.9    Rib fracture S22.39XA    Stage 3 chronic kidney disease ([facility] N18.30    Solitary kidney Q60.0    Hypoalbuminemia due to protein-calorie malnutrition (HCC) E88.09, E46    Acute on chronic diastolic congestive heart failure (HCC) I50.33    Acute respiratory failure with hypoxia ([facility]) J96.01    Demand ischemia ([facility]) I24.8    Leukocytosis D72.829    Aspiration pneumonia ([facility]) J69.0     A on CKD - creatinine is 1.98, Lasix and aldactone are on hold. Trying to find balance between dry lungs and wet kidneys. Will add Lasix 20 PO every day and see how she does.     Diastolic CHF    A Fib    Pneumomnia                    Thomas Cary MD

## 2021-06-23 PROBLEM — J96.01 ACUTE RESPIRATORY FAILURE WITH HYPOXIA (HCC): Status: RESOLVED | Noted: 2021-06-14 | Resolved: 2021-06-23

## 2021-06-23 PROBLEM — J69.0 ASPIRATION PNEUMONIA (HCC): Status: RESOLVED | Noted: 2021-06-21 | Resolved: 2021-06-23

## 2021-06-23 PROBLEM — R77.8 ELEVATED TROPONIN: Status: RESOLVED | Noted: 2021-06-23 | Resolved: 2021-06-23

## 2021-06-23 PROBLEM — R77.8 ELEVATED TROPONIN: Status: ACTIVE | Noted: 2021-06-23

## 2021-06-23 PROBLEM — I24.8 DEMAND ISCHEMIA (HCC): Status: RESOLVED | Noted: 2021-06-14 | Resolved: 2021-06-23

## 2021-06-23 LAB
ANION GAP SERPL CALC-SCNC: 9 MMOL/L (ref 7–16)
BACTERIA SPEC CULT: NORMAL
BASOPHILS # BLD: 0 K/UL (ref 0–0.2)
BASOPHILS NFR BLD: 0 % (ref 0–2)
BUN SERPL-MCNC: 47 MG/DL (ref 8–23)
CALCIUM SERPL-MCNC: 8.9 MG/DL (ref 8.3–10.4)
CHLORIDE SERPL-SCNC: 103 MMOL/L (ref 98–107)
CO2 SERPL-SCNC: 27 MMOL/L (ref 21–32)
CREAT SERPL-MCNC: 1.58 MG/DL (ref 0.6–1)
DIFFERENTIAL METHOD BLD: ABNORMAL
EOSINOPHIL # BLD: 0.3 K/UL (ref 0–0.8)
EOSINOPHIL NFR BLD: 2 % (ref 0.5–7.8)
ERYTHROCYTE [DISTWIDTH] IN BLOOD BY AUTOMATED COUNT: 17.5 % (ref 11.9–14.6)
GLUCOSE SERPL-MCNC: 85 MG/DL (ref 65–100)
GRAM STN SPEC: NORMAL
HCT VFR BLD AUTO: 28.7 % (ref 35.8–46.3)
HGB BLD-MCNC: 8.5 G/DL (ref 11.7–15.4)
IMM GRANULOCYTES # BLD AUTO: 0 K/UL (ref 0–0.5)
IMM GRANULOCYTES NFR BLD AUTO: 0 % (ref 0–5)
LYMPHOCYTES # BLD: 1.7 K/UL (ref 0.5–4.6)
LYMPHOCYTES NFR BLD: 14 % (ref 13–44)
MAGNESIUM SERPL-MCNC: 2.3 MG/DL (ref 1.8–2.4)
MCH RBC QN AUTO: 29.1 PG (ref 26.1–32.9)
MCHC RBC AUTO-ENTMCNC: 29.6 G/DL (ref 31.4–35)
MCV RBC AUTO: 98.3 FL (ref 79.6–97.8)
MONOCYTES # BLD: 1.8 K/UL (ref 0.1–1.3)
MONOCYTES NFR BLD: 15 % (ref 4–12)
NEUTS SEG # BLD: 8.6 K/UL (ref 1.7–8.2)
NEUTS SEG NFR BLD: 69 % (ref 43–78)
NRBC # BLD: 0 K/UL (ref 0–0.2)
PLATELET # BLD AUTO: 314 K/UL (ref 150–450)
PMV BLD AUTO: 12 FL (ref 9.4–12.3)
POTASSIUM SERPL-SCNC: 4 MMOL/L (ref 3.5–5.1)
RBC # BLD AUTO: 2.92 M/UL (ref 4.05–5.2)
SERVICE CMNT-IMP: NORMAL
SODIUM SERPL-SCNC: 139 MMOL/L (ref 136–145)
WBC # BLD AUTO: 12.4 K/UL (ref 4.3–11.1)

## 2021-06-23 PROCEDURE — 74011000250 HC RX REV CODE- 250: Performed by: INTERNAL MEDICINE

## 2021-06-23 PROCEDURE — 74011250636 HC RX REV CODE- 250/636: Performed by: FAMILY MEDICINE

## 2021-06-23 PROCEDURE — 85025 COMPLETE CBC W/AUTO DIFF WBC: CPT

## 2021-06-23 PROCEDURE — 74011250637 HC RX REV CODE- 250/637: Performed by: INTERNAL MEDICINE

## 2021-06-23 PROCEDURE — P9047 ALBUMIN (HUMAN), 25%, 50ML: HCPCS | Performed by: FAMILY MEDICINE

## 2021-06-23 PROCEDURE — 83735 ASSAY OF MAGNESIUM: CPT

## 2021-06-23 PROCEDURE — 74011250636 HC RX REV CODE- 250/636: Performed by: INTERNAL MEDICINE

## 2021-06-23 PROCEDURE — 36415 COLL VENOUS BLD VENIPUNCTURE: CPT

## 2021-06-23 PROCEDURE — 94762 N-INVAS EAR/PLS OXIMTRY CONT: CPT

## 2021-06-23 PROCEDURE — 94760 N-INVAS EAR/PLS OXIMETRY 1: CPT

## 2021-06-23 PROCEDURE — 74011000258 HC RX REV CODE- 258: Performed by: INTERNAL MEDICINE

## 2021-06-23 PROCEDURE — 80048 BASIC METABOLIC PNL TOTAL CA: CPT

## 2021-06-23 PROCEDURE — 74011250637 HC RX REV CODE- 250/637: Performed by: HOSPITALIST

## 2021-06-23 PROCEDURE — 65270000029 HC RM PRIVATE

## 2021-06-23 PROCEDURE — 99231 SBSQ HOSP IP/OBS SF/LOW 25: CPT | Performed by: INTERNAL MEDICINE

## 2021-06-23 PROCEDURE — 94640 AIRWAY INHALATION TREATMENT: CPT

## 2021-06-23 RX ORDER — AMOXICILLIN AND CLAVULANATE POTASSIUM 875; 125 MG/1; MG/1
1 TABLET, FILM COATED ORAL EVERY 12 HOURS
Status: DISCONTINUED | OUTPATIENT
Start: 2021-06-24 | End: 2021-06-23 | Stop reason: DRUGHIGH

## 2021-06-23 RX ORDER — AMOXICILLIN AND CLAVULANATE POTASSIUM 500; 125 MG/1; MG/1
1 TABLET, FILM COATED ORAL EVERY 12 HOURS
Status: DISCONTINUED | OUTPATIENT
Start: 2021-06-24 | End: 2021-06-24 | Stop reason: HOSPADM

## 2021-06-23 RX ADMIN — BUDESONIDE 500 MCG: 0.5 INHALANT RESPIRATORY (INHALATION) at 20:43

## 2021-06-23 RX ADMIN — LEVOTHYROXINE SODIUM 25 MCG: 0.05 TABLET ORAL at 05:03

## 2021-06-23 RX ADMIN — ALBUMIN (HUMAN) 25 G: 0.25 INJECTION, SOLUTION INTRAVENOUS at 00:18

## 2021-06-23 RX ADMIN — VITAMIN D, TAB 1000IU (100/BT) 1000 UNITS: 25 TAB at 08:04

## 2021-06-23 RX ADMIN — BUDESONIDE 500 MCG: 0.5 INHALANT RESPIRATORY (INHALATION) at 07:53

## 2021-06-23 RX ADMIN — POLYETHYLENE GLYCOL 3350 17 G: 17 POWDER, FOR SOLUTION ORAL at 08:03

## 2021-06-23 RX ADMIN — Medication 10 ML: at 21:42

## 2021-06-23 RX ADMIN — SENNOSIDES AND DOCUSATE SODIUM 1 TABLET: 8.6; 5 TABLET ORAL at 21:42

## 2021-06-23 RX ADMIN — ALBUTEROL SULFATE 2.5 MG: 2.5 SOLUTION RESPIRATORY (INHALATION) at 07:53

## 2021-06-23 RX ADMIN — DOXYCYCLINE HYCLATE 100 MG: 100 CAPSULE ORAL at 08:04

## 2021-06-23 RX ADMIN — FUROSEMIDE 20 MG: 20 TABLET ORAL at 08:09

## 2021-06-23 RX ADMIN — DULOXETINE HYDROCHLORIDE 60 MG: 60 CAPSULE, DELAYED RELEASE ORAL at 08:09

## 2021-06-23 RX ADMIN — ROSUVASTATIN CALCIUM 10 MG: 5 TABLET, COATED ORAL at 21:42

## 2021-06-23 RX ADMIN — FOLIC ACID 1 MG: 1 TABLET ORAL at 08:09

## 2021-06-23 RX ADMIN — ALBUTEROL SULFATE 2.5 MG: 2.5 SOLUTION RESPIRATORY (INHALATION) at 20:43

## 2021-06-23 RX ADMIN — AMPICILLIN SODIUM AND SULBACTAM SODIUM 3 G: 2; 1 INJECTION, POWDER, FOR SOLUTION INTRAMUSCULAR; INTRAVENOUS at 17:20

## 2021-06-23 RX ADMIN — DOXYCYCLINE HYCLATE 100 MG: 100 CAPSULE ORAL at 21:42

## 2021-06-23 RX ADMIN — AMPICILLIN SODIUM AND SULBACTAM SODIUM 3 G: 2; 1 INJECTION, POWDER, FOR SOLUTION INTRAMUSCULAR; INTRAVENOUS at 05:08

## 2021-06-23 RX ADMIN — SENNOSIDES AND DOCUSATE SODIUM 1 TABLET: 8.6; 5 TABLET ORAL at 08:03

## 2021-06-23 RX ADMIN — Medication 5 ML: at 05:08

## 2021-06-23 RX ADMIN — PANTOPRAZOLE SODIUM 40 MG: 40 TABLET, DELAYED RELEASE ORAL at 16:40

## 2021-06-23 RX ADMIN — METOPROLOL SUCCINATE 50 MG: 50 TABLET, EXTENDED RELEASE ORAL at 08:04

## 2021-06-23 RX ADMIN — AMIODARONE HYDROCHLORIDE 200 MG: 200 TABLET ORAL at 08:09

## 2021-06-23 RX ADMIN — APIXABAN 5 MG: 5 TABLET, FILM COATED ORAL at 08:09

## 2021-06-23 RX ADMIN — PANTOPRAZOLE SODIUM 40 MG: 40 TABLET, DELAYED RELEASE ORAL at 05:04

## 2021-06-23 RX ADMIN — Medication 10 ML: at 13:15

## 2021-06-23 NOTE — ADT AUTH CERT NOTES
Heart Failure - Care Day 9 (6/21/2021) by Ritesh Oas       Review Status Review Entered   Completed 6/21/2021 14:19      Criteria Review      Care Day: 9 Care Date: 6/21/2021 Level of Care: Telemetry    Guideline Day 2    Level Of Care    (X) Floor    6/21/2021 14:19:29 EDT by Ritesh Oas      [id number]    Clinical Status    (X) * Hemodynamic stability    6/21/2021 14:19:29 EDT by Ritesh Oas      132/63-  71-  24-  97.9-  100%    (X) * Mental status at baseline    6/21/2021 14:19:29 EDT by Ritesh Oas      aaox3    (X) * No evidence of myocardial ischemia    6/21/2021 14:19:29 EDT by Ritesh Oas      no evidence of MI    (X) * Cardiac rate and rhythm acceptable    6/21/2021 14:19:29 EDT by Ritesh Oas      cardiac rate and rhythm acceptable    (X) * Oxygenation at baseline or improved    6/21/2021 14:19:29 EDT by Ritesh Oas      4-6 L NC    (X) * Pulmonary edema absent or improved    6/21/2021 14:19:29 EDT by Ritesh Oas      pulmonary edema absent    Activity    (X) Advance activity as tolerated    Routes    (X) Oral diet    Interventions    (X) * Pulmonary catheter absent    6/21/2021 14:19:29 EDT by Ritesh Oas      pulmonary catheter    (X) Weigh    6/21/2021 14:19:29 EDT by Ritesh Oas      daily    (X) Oxygen    6/21/2021 14:19:29 EDT by Ritesh Oas      4-6L    Medications    (X) Diuretics    6/21/2021 14:19:29 EDT by Ritesh Oas      lasix 40 iv bid    (X) Possible aldosterone antagonist    * Milestone   Additional Notes   6/20/21      Rapid response overnight. SpO2 noted to be low and supplemental O2 increased. CXR concerning for pulmonary edema and PNA.  Leukocytosis worsening.  Patient reports no unde dyspnea at the moment and does not report palpitations or angina.        BP: 132/63    Pulse: 71 68    Resp: 24 26    Temp: 97.9 °F (36.6 °C)    SpO2: 96% 100% 99% 98%   Weight:    Height:           Recent Labs     06/20/21   0237 06/19/21   0633   * 134*   K 4.0 4.7 MG 2.2 --    BUN 28* 31*   CREA 1.76* 1.72*   * 65   WBC 20.5* 16.9*   HGB 10.7* 10.2*   HCT 34.6* 33.2*    321           Assessment/Plan:       Hypoxic respiratory failure   - EF noted to be 55-60% on 5/20/21   - CXR overnight concerning for pulmonary edema and possibly PNA: Worsening leukocytosis (present before steroid initiation) concerning for infection    - sCr increasing on IV Lasix: sCr 1.25 about 1 week ago; now 1.76 with patient minus 9.2L since admission   - IV Lasix resumed by hospital medicine overnight after withholding yesterday        NAZIA   - Nephrology note reviewed:  Rising sCr in the setting of diuresis, but they favor continued diuresis to achieve euvolemia   - Continue to monitor BMP and Mg daily on diuretics       Paroxysmal atrial fibrillation   - Continue with amiodarone, Toprol XL and Eliquis       Hypertension   - Continue with Toprol XL           ampicillin-sulbactam (UNASYN) 3 g in 0.9% sodium chloride (MBP/ADV) 100 mL MBP 3 g IntraVENous Q12H    o albuterol (PROVENTIL VENTOLIN) nebulizer solution 2.5 mg 2.5 mg Nebulization Q6HWA RT   o budesonide (PULMICORT) 500 mcg/2 ml nebulizer suspension 500 mcg Nebulization BID RT   o methylPREDNISolone (PF) (SOLU-MEDROL) injection 40 mg 40 mg IntraVENous Q8H   o doxycycline (VIBRAMYCIN) capsule 100 mg 100 mg Oral Q12H   o metoprolol succinate (TOPROL-XL) XL tablet 50 mg 50 mg Oral DAILY   o sodium chloride (NS) flush 5-10 mL 5-10 mL IntraVENous PRN   o amiodarone (CORDARONE) tablet 200 mg 200 mg Oral DAILY   o apixaban (ELIQUIS) tablet 5 mg 5 mg Oral BID   o cholecalciferol (VITAMIN D3) (1000 Units /25 mcg) tablet 1,000 Units 1,000 Units Oral DAILY   o DULoxetine (CYMBALTA) capsule 60 mg 60 mg Oral DAILY   o levothyroxine (SYNTHROID) tablet 25 mcg 25 mcg Oral ACB   o folic acid (FOLVITE) tablet 1 mg 1 mg Oral DAILY   o ondansetron (ZOFRAN ODT) tablet 4 mg 4 mg Oral Q8H PRN   o pantoprazole (PROTONIX) tablet 40 mg 40 mg Oral ACB&D o rosuvastatin (CRESTOR) tablet 10 mg 10 mg Oral QHS   o [Held by provider] spironolactone (ALDACTONE) tablet 25 mg 25 mg Oral DAILY   o sodium chloride (NS) flush 5-40 mL 5-40 mL IntraVENous Q8H   o sodium chloride (NS) flush 5-40 mL 5-40 mL IntraVENous PRN   o acetaminophen (TYLENOL) tablet 650 mg 650 mg Oral Q6H PRN     Or   o acetaminophen (TYLENOL) suppository 650 mg 650 mg Rectal Q6H PRN   o polyethylene glycol (MIRALAX) packet 17 g 17 g Oral DAILY   o senna-docusate (PERICOLACE) 8.6-50 mg per tablet 1 Tablet 1 Tablet Oral BID   o magnesium hydroxide (MILK OF MAGNESIA) 400 mg/5 mL oral suspension 30 mL 30 mL Oral DAILY PRN   o bisacodyL (DULCOLAX) suppository 10 mg 10 mg Rectal DAILY PRN   o furosemide (LASIX) injection 40 mg 40 mg IntraVENous BID   o captopriL (CAPOTEN) tablet 12.5 mg 12.5 mg Oral TID PRN          ] Assessment and Plan:      1) NAZIA- Rising Cr in the setting of diuresis.  She is non-oliguric.   Normal UA today.  Renal US last month showed absent L kidney and increased echogenicity of the R. Mathew Jimenes continues to show so         Heart Failure - Care Day 5 (6/17/2021) by Kim Orozco       Review Status Review Entered   Completed 6/17/2021 14:47      Criteria Review      Care Day: 5 Care Date: 6/17/2021 Level of Care: Telemetry    Guideline Day 2    Level Of Care    (X) Floor    Clinical Status    (X) * Hemodynamic stability    6/17/2021 14:47:07 EDT by Kim Orozco      130/67- [place]. 6-  66-  18-  98% 2L    (X) * Mental status at baseline    6/17/2021 14:47:07 EDT by Janina Libman aaox3    (X) * No evidence of myocardial ischemia    6/17/2021 14:47:07 EDT by Kim Orozco      demand ischemia    (X) * Cardiac rate and rhythm acceptable    6/17/2021 14:47:07 EDT by [address] [place] [address]    (X) * Oxygenation at baseline or improved    6/17/2021 14:47:07 EDT by Lark Stands      94% 2L    ( ) * Pulmonary edema absent or improved    Activity    (X) Advance activity as tolerated 6/17/2021 14:47:07 EDT by Anne Marie Dumas ambulated about 110' on RA with rolling walker when her sats decreased to 87%.  O2 placed on at 2L to complete ambulation. Routes    (X) Oral diet    6/17/2021 14:47:07 EDT by Anne Marie Dumas regular low na    Interventions    (X) * Pulmonary catheter absent    6/17/2021 14:47:07 EDT by Jones Fee      no pulmonary catheter    (X) Weigh    6/17/2021 14:47:07 EDT by Jones Fee      daily    (X) Oxygen    6/17/2021 14:47:07 EDT by Jones Fee      2L    Medications    (X) Diuretics    6/17/2021 14:47:07 EDT by Jones Fee      lasix iv    (X) Possible aldosterone antagonist    6/17/2021 14:47:07 EDT by Jones Fee      aldactone 25 mg po qd    * Milestone   Additional Notes   6/17/21      Subjective/24 hr Events (06/17/21) : Patient is seen and examined at bedside.  No acute events reported overnight by nursing staff. Improvement in SOB. Lasix resumed yesterday. No complaints. Patient denies fever, chills, chest pains, shortness of breath, n/v, abdominal pain.    Tolerating diet and having BM.        06/17/21 0742 97.6 °F (36.4 °C) 66 18 130/67 98 %      O2 Flow Rate (L/min): 2 l/min (06/16/21 0720)       metoprolol succinate (TOPROL-XL) XL tablet 50 mg 50 mg Oral DAILY   o sodium chloride (NS) flush 5-10 mL 5-10 mL IntraVENous PRN   o amiodarone (CORDARONE) tablet 200 mg 200 mg Oral DAILY   o apixaban (ELIQUIS) tablet 5 mg 5 mg Oral BID   o cholecalciferol (VITAMIN D3) (1000 Units /25 mcg) tablet 1,000 Units 1,000 Units Oral DAILY   o DULoxetine (CYMBALTA) capsule 60 mg 60 mg Oral DAILY   o levothyroxine (SYNTHROID) tablet 25 mcg 25 mcg Oral ACB   o folic acid (FOLVITE) tablet 1 mg 1 mg Oral DAILY   o ondansetron (ZOFRAN ODT) tablet 4 mg 4 mg Oral Q8H PRN   o pantoprazole (PROTONIX) tablet 40 mg 40 mg Oral ACB&D   o rosuvastatin (CRESTOR) tablet 10 mg 10 mg Oral QHS   o spironolactone (ALDACTONE) tablet 25 mg 25 mg Oral DAILY   o sodium chloride (NS) flush 5-40 mL 5-40 mL IntraVENous Q8H   o sodium chloride (NS) flush 5-40 mL 5-40 mL IntraVENous PRN   o acetaminophen (TYLENOL) tablet 650 mg 650 mg Oral Q6H PRN     Or   o acetaminophen (TYLENOL) suppository 650 mg 650 mg Rectal Q6H PRN   o polyethylene glycol (MIRALAX) packet 17 g 17 g Oral DAILY   o senna-docusate (PERICOLACE) 8.6-50 mg per tablet 1 Tablet 1 Tablet Oral BID   o magnesium hydroxide (MILK OF MAGNESIA) 400 mg/5 mL oral suspension 30 mL 30 mL Oral DAILY PRN   o bisacodyL (DULCOLAX) suppository 10 mg 10 mg Rectal DAILY PRN   o furosemide (LASIX) injection 40 mg 40 mg IntraVENous BID   o captopriL (CAPOTEN) tablet 12.5 mg 12.5 mg Oral TID PRN       Cards note   Seems to be improving somewhat she is now getting IV Lasix and is on other appropriate medicines blood pressure is better controlled pulse is controlled well            K 3.7          CO2 34 (H)     AGAP 4 (L)     GLU 91     BUN 21     CREA 1.49 (H)     GFRAA 44 (L)     GFRNA 36 (L)     CA 8.2 (L)     ALB 2.4 (L)     TP 6.2 (L)     GLOB 3.8 (H)     AGRAT 0.6 (L)     ALT 14       CBC:       Component Value     WBC 11.3 (H)     HGB 9.6 (L)     HCT 31.1 (L)            Principal Problem:     Acute on chronic diastolic congestive heart failure (HCC) (6/13/2021)   Improving continue IV Lasix for 24-36 more hours then transition to p.o.       Active Problems:     Hyperlipidemia (1/16/2016)     The current medical regimen is effective;  continue present plan and medications.          Rheumatoid arthritis ([facility] (1/16/2016)               Hypertension, essential, benign (8/18/2016)     The current medical regimen is effective;  continue present plan and medications.             Depression (8/18/2016)               Paroxysmal atrial fibrillation ([facility] (3/19/2021)   Controlled continue current meds         Stage 3 chronic kidney disease ([facility] (5/20/2021)     Estimated Creatinine Clearance: 26.5 mL/min (A) (based on SCr of 1.49 mg/dL (H)).

## 2021-06-23 NOTE — PROGRESS NOTES
Epi Hospitalist Progress Note     Name: Edwin Pop   Age: 68 y.o. Sex: female  : 1940    MRN:     [id number]    Admit Date:  2021    Reason for Admission:  CHF (congestive heart failure) ([facility] [address] [I50.9]    Assessment & Plan     Acute respiratory failure with hypoxia due to acute on chronic CHF exacerbation  Patient was noted to be saturating 88% on room air and requiring 3 L nasal cannula on arrival to the ED. Echocardiogram in 2021 with preserved EF of 55 to 60%. pBNP of 10k on admission. CXR with progressive vascular congestion. ABG with hypoxia but no acidosis or hypercapnia. Pt on Eliquis so lower concern for PE.  - Cardiology on board, appreciate recs  - Weaned down to RA   - Now on Lasix 20mg po and monitor BMP     CKD stage III   Baseline creatinine 1.3-1.5, at baseline. ? Cardiorenal syndrome  Urine studies c/w pre-renal. UA unremarkable  Nephrology on board, appreciate recs    Aspiration PNA  Rapid response called . CXR shows pulmonary edema and PNA. Covid NEG. Viral PCR panel neg. Procal low 0.13. Abx: Unasyn/Doxycycline (-. ..) empirically  BCx: : NGTD  Sputum Cx : normal amy. - now weaned down to RA  - will change unasyn to augmentin starting tomorrow.     Leukocytosis with history of leukopenia status post Granix (in May 20)  On admission, Procal < 0.05, UA without any evidence of UTI  Treatment for PNA per above. Leukocystosis has been improving with treatment of Aspiration pna     Elevated troponins likely due to demand ischemia  Troponin has peaked at 56.9. Patient without any chest pains. EKG with normal sinus rhythm without any specific T wave and ST changes.     Hypertension//hyperlipidemia//paroxysmal atrial fibrillation  Cont amiodarone, apixaban, Crestor, spironolactone.     Hypothyroidism: synthroid     Anemia with Hx of melena  Hemoglobin appears to be stable at 9.0 on admission. Appears to be around 8-7 in prior admission.   EGD was deferred during last admission due to acute illness  Cont PPI twice daily  Monitor hemoglobin and transfuse as needed  Hgb stable     Depression: Continue with Cymbalta     GERD: PPI     Diet:  DIET ADULT  DVT PPx: eliquis  GI Ppx: PPI  Code: Full Code    Dispo / Discharge Planning: HHT    pending Cardio clearance for dc. Hospital Course/Subjective:     Please refer to the admission H&P for details of presentation. In summary, Isreal Simmons is a 68 y.o. 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 Patient was recently discharged from rehab after hospitalization in May for fall with rib fractures. She is being admitted for acute on chronic CHF exacerbation. Cardiology was consulted. She has been diuresing well and was weaned down to room air. However discharge was held due to elevated Cr and Lasix was held. Early 6/20 AM, RR was called d/t her having chest pain, SOB, tachypnea associated with wheezing. Troponin improved from previous. EKG shows sinus rhythm and RBBB consistent with CXR shows concerns for worsening pulmonary edema and possible PNA. She was given antibiotics, Lasix, breathing treatments and started on Solumedrol. Pt was continued with antibiotics for presumed aspiration PNA. She was able to be weaned down to RA and Solumedrol discontinued as low suspicion for COPD exacerbation with no history. Her IV Lasix was held with NAZIA and Nephrology was consulted. Pt has been initiated on po Lasix.         Subjective/24 hr Events (06/23/21) : Patient is seen and examined at bedside. No acute events reported overnight by nursing staff. Sitting in a recliner, on room air without any respiratory distress.   She reports that she has been feeling better. Able to participate with PT and walk in the hallway without any respiratory issues. Patient denies fever, chills, chest pains, shortness of breath, n/v, abdominal pain. Review of Systems: 14 point review of systems is otherwise negative with the exception of the elements mentioned above. Objective:     Patient Vitals for the past 24 hrs:   Temp Pulse Resp BP SpO2   06/23/21 0814 98 °F (36.7 °C)  18  97 %   06/23/21 0804  71  (!) 147/68    06/23/21 0753     93 %   06/23/21 0409 98.2 °F (36.8 °C) 93 26 113/60 94 %   06/22/21 2318 97.8 °F (36.6 °C) 68 25 133/69 92 %   06/22/21 1931 97.9 °F (36.6 °C) 69 26 (!) 124/56 94 %   06/22/21 1658 97.7 °F (36.5 °C) 69 19 (!) 115/47 97 %   06/22/21 1559     95 %     Oxygen Therapy  O2 Sat (%): 97 % (06/23/21 0814)  Pulse via Oximetry: 70 beats per minute (06/23/21 0753)  O2 Device: None (Room air) (06/23/21 0753)  Skin Assessment: Clean, dry, & intact (06/20/21 1404)  O2 Flow Rate (L/min): 0 l/min (06/21/21 0813)    Body mass index is 29.06 kg/m². Physical Exam:   General:     alert, awake, no acute distress. HENT:   normocephalic, atraumatic. Eyes:   anicteric sclerae, normal conjunctiva, EOMI  Neck:    supple, non-tender. Trachea midline. Lungs:   CTAB, no wheezing, rhonchi, rales  Cardiac:   RRR, Normal S1 and S2. Abdomen:   Soft, non distended, nontender, +BS, no guarding/rebound  Extremities:   No edema , pedal pulses present  Skin:   Warn, dry, normal turgor and texture  Neuro:  AAOx3.  No gross focal neurological deficit  Psychiatric:  No anxiety, calm, cooperative    Data Review:  I have reviewed all labs, meds, and studies from the last 24 hours:    Labs:    Recent Results (from the past 24 hour(s))   METABOLIC PANEL, BASIC    Collection Time: 06/23/21  5:34 AM   Result Value Ref Range    Sodium 139 136 - 145 mmol/L    Potassium 4.0 3.5 - 5.1 mmol/L    Chloride 103 98 - 107 mmol/L    CO2 27 21 - 32 mmol/L    Anion gap 9 7 - 16 mmol/L    Glucose 85 65 - 100 mg/dL    BUN 47 (H) 8 - 23 MG/DL    Creatinine 1.58 (H) 0.6 - 1.0 MG/DL    GFR est AA 41 (L) >60 ml/min/1.73m2    GFR est non-AA 34 (L) >60 ml/min/1.73m2    Calcium 8.9 8.3 - 10.4 MG/DL   CBC WITH AUTOMATED DIFF    Collection Time: 06/23/21  5:34 AM   Result Value Ref Range    WBC 12.4 (H) 4.3 - 11.1 K/uL    RBC 2.92 (L) 4.05 - 5.2 M/uL    HGB 8.5 (L) 11.7 - 15.4 g/dL    HCT 28.7 (L) 35.8 - 46.3 %    MCV 98.3 (H) 79.6 - 97.8 FL    MCH 29.1 26.1 - 32.9 PG    MCHC 29.6 (L) 31.4 - 35.0 g/dL    RDW 17.5 (H) 11.9 - 14.6 %    PLATELET 040 043 - 977 K/uL    MPV 12.0 9.4 - 12.3 FL    ABSOLUTE NRBC 0.00 0.0 - 0.2 K/uL    DF AUTOMATED      NEUTROPHILS 69 43 - 78 %    LYMPHOCYTES 14 13 - 44 %    MONOCYTES 15 (H) 4.0 - 12.0 %    EOSINOPHILS 2 0.5 - 7.8 %    BASOPHILS 0 0.0 - 2.0 %    IMMATURE GRANULOCYTES 0 0.0 - 5.0 %    ABS. NEUTROPHILS 8.6 (H) 1.7 - 8.2 K/UL    ABS. LYMPHOCYTES 1.7 0.5 - 4.6 K/UL    ABS. MONOCYTES 1.8 (H) 0.1 - 1.3 K/UL    ABS. EOSINOPHILS 0.3 0.0 - 0.8 K/UL    ABS. BASOPHILS 0.0 0.0 - 0.2 K/UL    ABS. IMM.  GRANS. 0.0 0.0 - 0.5 K/UL   MAGNESIUM    Collection Time: 06/23/21  5:34 AM   Result Value Ref Range    Magnesium 2.3 1.8 - 2.4 mg/dL       All Micro Results     Procedure Component Value Units Date/Time    CULTURE, RESPIRATORY/SPUTUM/BRONCH Shirrubina Milianel [[id number]] Collected: 06/20/21 2129    Order Status: Completed Specimen: Sputum Updated: 06/23/21 0905     Special Requests: NO SPECIAL REQUESTS        GRAM STAIN 0 TO 4 WBCS/OIF      0 TO 1 EPITHELIAL CELLS/OIF      FEW GRAM POSITIVE COCCI         FEW GRAM NEGATIVE RODS         2+ MUCUS PRESENT        Culture result:       LIGHT NORMAL RESPIRATORY MARC          CULTURE, BLOOD [[id number]] Collected: 06/20/21 1010    Order Status: Completed Specimen: Blood Updated: 06/23/21 0724     Special Requests: --        LEFT  HAND       Culture result: NO GROWTH 3 DAYS       CULTURE, BLOOD [[id number]] Collected: 06/20/21 1102    Order Status: Completed Specimen: Blood Updated: 06/23/21 0724     Special Requests: --        LEFT  ARM       Culture result: NO GROWTH 3 DAYS       RESPIRATORY VIRUS PANEL W/COVID-19, PCR [[id number]] Collected: 06/20/21 0438    Order Status: Completed Specimen: Nasopharyngeal Updated: 06/20/21 0729     Adenovirus NOT DETECTED        Coronavirus 229E NOT DETECTED        Coronavirus HKU1 NOT DETECTED        Coronavirus CVNL63 NOT DETECTED        Coronavirus OC43 NOT DETECTED        SARS-CoV-2, PCR NOT DETECTED        Comment: This test has been authorized by the FDA under an Emergency Use Authorization (EUA) for use by authorized laboratories. Metapneumovirus NOT DETECTED        Rhinovirus and Enterovirus NOT DETECTED        Influenza A NOT DETECTED        Influenza B NOT DETECTED        Parainfluenza 1 NOT DETECTED        Parainfluenza 2 NOT DETECTED        Parainfluenza 3 NOT DETECTED        Parainfluenza virus 4 NOT DETECTED        RSV by PCR NOT DETECTED        B. parapertussis, PCR NOT DETECTED        Bordetella pertussis - PCR NOT DETECTED        Chlamydophila pneumoniae DNA, QL, PCR NOT DETECTED        Mycoplasma pneumoniae DNA, QL, PCR NOT DETECTED       COVID-19 RAPID TEST [[id number]] Collected: 06/20/21 0439    Order Status: Completed Specimen: Nasopharyngeal Updated: 06/20/21 0514     Specimen source NASAL        COVID-19 rapid test Not detected        Comment:      The specimen is NEGATIVE for SARS-CoV-2, the novel coronavirus associated with COVID-19. A negative result does not rule out COVID-19. This test has been authorized by the FDA under an Emergency Use Authorization (EUA) for use by authorized laboratories.         Fact sheet for Healthcare Providers: ConventionUpdate.co.nz  Fact sheet for Patients: ConventionUpdate.co.nz       Methodology: Isothermal Nucleic Acid Amplification         CULTURE, BLOOD [[id number]] Collected: 06/13/21 4060    Order Status: Completed Specimen: Blood Updated: 06/18/21 0704     Special Requests: --        LEFT  HAND       Culture result: NO GROWTH 5 DAYS       CULTURE, BLOOD [[id number]] Collected: 06/13/21 1746    Order Status: Completed Specimen: Blood Updated: 06/18/21 0704     Special Requests: --        LEFT  Antecubital       Culture result: NO GROWTH 5 DAYS             EKG Results     Procedure 720 Value Units Date/Time    EKG, 12 LEAD, INITIAL [[id number]] Collected: 06/20/21 0120    Order Status: Completed Updated: 06/20/21 0819     Ventricular Rate 71 BPM      Atrial Rate 71 BPM      P-R Interval 158 ms      QRS Duration 126 ms      Q-T Interval 470 ms      QTC Calculation (Bezet) 510 ms      Calculated P Axis 15 degrees      Calculated R Axis 71 degrees      Calculated T Axis 26 degrees      Diagnosis --     Normal sinus rhythm  Right bundle branch block  Abnormal ECG  When compared with ECG of 13-JUN-2021 14:26,  Right bundle branch block is now Present  Confirmed by Viji Carrington MD ()LIZETTE ([id number]) on 6/20/2021 8:19:32 AM      EKG [[id number]] Collected: 06/13/21 1426    Order Status: Completed Updated: 06/14/21 0705     Ventricular Rate 90 BPM      Atrial Rate 90 BPM      P-R Interval 160 ms      QRS Duration 90 ms      Q-T Interval 316 ms      QTC Calculation (Bezet) 386 ms      Calculated P Axis 55 degrees      Calculated R Axis 81 degrees      Calculated T Axis 24 degrees      Diagnosis --     Normal sinus rhythm  Nonspecific T wave abnormality  Abnormal ECG  When compared with ECG of 12-MAY-2021 10:32,  Right bundle branch block is no longer Present  Confirmed by Viji Carrington MD ()LIZETTE ([id number]) on 6/14/2021 7:05:02 AM            Other Studies:  XR CHEST PORT    Result Date: 6/13/2021  1 View portable chest x-ray 6/13/2021 2:42 PM Indication: History of pneumonia, shortness of breath. Syncopal episode. Comparison: Prior studies-most recent 5/21/2021 Findings:  This portable upright AP chest at 1437 shows the patient again rotated somewhat toward the left. Cardiomediastinal silhouette stable. Vascular pedicle appearance probably slightly more pronounced today. Mildly more progressed bibasilar hazy/veiling opacities which may represent layering effusions compared to the most recent study. Compared to older studies these are indolently progressing. Old right rib fractures again seen. Increased vascular congestion today, patient probably has progressing basilar effusions-now of small/moderate volume. Appearance most suggestive of progressing vascular congestion. Correlate correlate clinically, consider serum BNP.           Current Meds:   Current Facility-Administered Medications   Medication Dose Route Frequency    [START ON 6/24/2021] amoxicillin-clavulanate (AUGMENTIN) 875-125 mg per tablet 1 Tablet  1 Tablet Oral Q12H    furosemide (LASIX) tablet 20 mg  20 mg Oral DAILY    ampicillin-sulbactam (UNASYN) 3 g in 0.9% sodium chloride (MBP/ADV) 100 mL MBP  3 g IntraVENous Q12H    albuterol (PROVENTIL VENTOLIN) nebulizer solution 2.5 mg  2.5 mg Nebulization Q6HWA RT    budesonide (PULMICORT) 500 mcg/2 ml nebulizer suspension  500 mcg Nebulization BID RT    doxycycline (VIBRAMYCIN) capsule 100 mg  100 mg Oral Q12H    metoprolol succinate (TOPROL-XL) XL tablet 50 mg  50 mg Oral DAILY    sodium chloride (NS) flush 5-10 mL  5-10 mL IntraVENous PRN    amiodarone (CORDARONE) tablet 200 mg  200 mg Oral DAILY    apixaban (ELIQUIS) tablet 5 mg  5 mg Oral BID    cholecalciferol (VITAMIN D3) (1000 Units /25 mcg) tablet 1,000 Units  1,000 Units Oral DAILY    DULoxetine (CYMBALTA) capsule 60 mg  60 mg Oral DAILY    levothyroxine (SYNTHROID) tablet 25 mcg  25 mcg Oral ACB    folic acid (FOLVITE) tablet 1 mg  1 mg Oral DAILY    ondansetron (ZOFRAN ODT) tablet 4 mg  4 mg Oral Q8H PRN    pantoprazole (PROTONIX) tablet 40 mg  40 mg Oral ACB&D    rosuvastatin (CRESTOR) tablet 10 mg  10 mg Oral QHS    [Held by provider] spironolactone (ALDACTONE) tablet 25 mg  25 mg Oral DAILY    sodium chloride (NS) flush 5-40 mL  5-40 mL IntraVENous Q8H    sodium chloride (NS) flush 5-40 mL  5-40 mL IntraVENous PRN    acetaminophen (TYLENOL) tablet 650 mg  650 mg Oral Q6H PRN    Or    acetaminophen (TYLENOL) suppository 650 mg  650 mg Rectal Q6H PRN    polyethylene glycol (MIRALAX) packet 17 g  17 g Oral DAILY    senna-docusate (PERICOLACE) 8.6-50 mg per tablet 1 Tablet  1 Tablet Oral BID    magnesium hydroxide (MILK OF MAGNESIA) 400 mg/5 mL oral suspension 30 mL  30 mL Oral DAILY PRN    bisacodyL (DULCOLAX) suppository 10 mg  10 mg Rectal DAILY PRN    captopriL (CAPOTEN) tablet 12.5 mg  12.5 mg Oral TID PRN       Problem List:  Hospital Problems as of 6/23/2021 Date Reviewed: 4/13/2021        Codes Class Noted - Resolved POA    Leukocytosis ICD-10-CM: D72.829  ICD-9-CM: 288.60  6/14/2021 - Present Yes        * (Principal) Acute on chronic diastolic congestive heart failure (HCC) ICD-10-CM: I50.33  ICD-9-CM: 428.33, 428.0  6/13/2021 - Present Yes        Stage 3 chronic kidney disease (HCC) (Chronic) ICD-10-CM: N18.30  ICD-9-CM: 585.3  5/20/2021 - Present Yes        Paroxysmal atrial fibrillation (HCC) (Chronic) ICD-10-CM: I48.0  ICD-9-CM: 427.31  3/19/2021 - Present Yes        Hypertension, essential, benign (Chronic) ICD-10-CM: I10  ICD-9-CM: 401.1  8/18/2016 - Present Yes        Depression ICD-10-CM: F32.9  ICD-9-CM: 103  8/18/2016 - Present Yes        Acquired hypothyroidism (Chronic) ICD-10-CM: E03.9  ICD-9-CM: 244.9  1/16/2016 - Present Yes        Hyperlipidemia (Chronic) ICD-10-CM: E78.5  ICD-9-CM: 272.4  1/16/2016 - Present Yes        Rheumatoid arthritis (HCC) (Chronic) ICD-10-CM: M06.9  ICD-9-CM: 714.0  1/16/2016 - Present Yes        RESOLVED: Elevated troponin ICD-10-CM: R77.8  ICD-9-CM: 790.6  6/23/2021 - 6/23/2021 No        RESOLVED: [facility] ICD-10-CM: J69.0  ICD-9-CM: 507.0  6/21/2021 - 6/23/2021 No RESOLVED: Acute respiratory failure with hypoxia (HCC) ICD-10-CM: J96.01  ICD-9-CM: 518.81  6/14/2021 - 6/23/2021 Yes        RESOLVED: Demand ischemia [facility]) ICD-10-CM: I24.8  ICD-9-CM: 411.89  6/14/2021 - 6/23/2021 Yes               Part of this note was written by using a voice dictation software and the note has been proof read but may still contain some grammatical/other typographical errors.     Signed By: Carolee Mary MD   [facility]    June 23, 2021  11:46 AM

## 2021-06-23 NOTE — PROGRESS NOTES
[address] Nephrology        Subjective: A on CKD    Patient seen and examined on rounds sitting up in bed, SOB improving, good uop, no new complaints. Review of Systems -   General ROS: negative for - fever, chills  Respiratory ROS: no SOB, cough, LAI  Cardiovascular ROS: no CP, palpitations  Gastrointestinal ROS: no N&V, abdominal pain, diarrhea  Genito-Urinary ROS: no difficulty voiding, dysuria  Neurological ROS: no seizures, focal weekness        Objective:    Vitals:    06/23/21 0409 06/23/21 0753 06/23/21 0804 06/23/21 0814   BP: 113/60  (!) 147/68    Pulse: 93  71    Resp: 26   18   Temp: 98.2 °F (36.8 °C)   98 °F (36.7 °C)   SpO2: 94% 93%  97%   Weight: 67.5 kg (148 lb 12.8 oz)      Height:           PE  Gen: in no acute distress, alert and oriented   CV:reg rate, S1, S2, no Rub   Chest:clear bilaterally  Abd: soft, non tender, + BS  Ext/Access: no edema       . LAB  Recent Labs     06/23/21  0534 06/22/21  0502 06/21/21  0508   WBC 12.4* 23.0* 28.9*   HGB 8.5* 8.2* 9.0*   HCT 28.7* 24.8* 29.0*    321 298     Recent Labs     06/23/21  0534 06/22/21  0502 06/21/21  0508    133* 129*   K 4.0 3.6 3.5    97* 92*   CO2 27 30 31   GLU 85 130* 218*   BUN 47* 49* 45*   CREA 1.58* 1.98* 2.06*   MG 2.3 2.4 2.1   CA 8.9 8.8 8.7           Radiology    A/P:   Patient Active Problem List   Diagnosis Code    Acquired hypothyroidism E03.9    TIA (transient ischemic attack) G45.9    Hyperlipidemia E78.5    Rheumatoid arthritis ([facility] M06.9    Right carotid bruit R09.89    Hypertension, essential, benign I10    Osteopenia M85.80    Major depressive disorder, recurrent, moderate (HCC) F33.1    Nicotine dependence, cigarettes, uncomplicated W72.190    Osteoporosis M81.0    Depression F32.9    Hypokalemia E87.6    Tobacco abuse Z72.0    Acute renal failure (ARF) (HCC) N17.9    Sepsis (HCC) A41.9    Community acquired bacterial pneumonia J15.9    Prolonged Q-T interval on ECG R94.31    Paroxysmal atrial fibrillation (HCC) I48.0    Pericarditis with effusion I31.9    Anemia D64.9    Rib fracture S22.39XA    Stage 3 chronic kidney disease (HCC) N18.30    Solitary kidney Q60.0    Hypoalbuminemia due to protein-calorie malnutrition ([facility]) E88.09, E46    Acute on chronic diastolic congestive heart failure ([facility]) I50.33    Acute respiratory failure with hypoxia ([facility]) J96.01    Demand ischemia ([facility]) I24.8    Leukocytosis D72.829    Aspiration pneumonia ([facility]) J69.0     A on CKD - creatinine is down to 1.58, Lasix and aldactone are on hold. Trying to find balance between dry lungs and wet kidneys tolerating lasix      Diastolic CHF- daily weights    A Fib- rate controlled. Pneumonia Abx per primary     Will sign off with improving renal function, ordered outpt Nephrology follow up. Please call with additional nephrology questions, thanks for this consultation.                      Subha Leal NP

## 2021-06-23 NOTE — PROGRESS NOTES
Comprehensive Nutrition Assessment    Type and Reason for Visit: Initial, RD nutrition re-screen/LOS    Nutrition Recommendations/Plan:   Meals and Snacks:  Continue current diet. Nutrition Supplement Therapy:  Medical food supplement therapy:  Initiate Ensure Enlive three times per day (this provides 350 kcal and 20 grams protein per bottle)     Malnutrition Assessment:  Malnutrition Status: No malnutrition    Nutrition Assessment:   Nutrition History: Pt denies any changes in appetite and reported fluctuations in weight are due to changes in fluid status, stated her UBW is 145-150lb. Pt reported drinking 1-2 Ensure Enlive at home in between meals throughout the last several months. Nutrition Background: Pt with PMH of HTN, atrial fibrillation, and CKD3 who was admitted with decompensated heart failure and has been aggressively diuresed since admission on 6/13. Patient is net negative 9.2L since admission. Edema has resolved and breathing status has improved. Daily Update:  Pt seen sitting in chair, alert and oriented. Pt stated her appetite was previously returning to baseline as fluid was being removed. Pt stated she was eating 60-80% of meals. However pt expressed dislike for new menu and stated she refused to most of dinner 6/22 as well as breakfast this AM as she \"was holding out for lunch. \" Pt reported she would routinely drink 1-2 Ensure per day as snacks in between meals at home and is agreeable to starting Ensure again as her appetite is almost \"back to normal.\" RD encouraged pt to try different items from the menu and use Ensure as a supplement to her nutrition, rather than a meal replacement. Nutrition Related Findings:   Patient with no presence of fat or muscle wasting. Current Nutrition Therapies:  ADULT DIET Regular;  Low Sodium (2 gm); 1800 ml    Current Intake:   Average Meal Intake: 51-75% Average Supplement Intake: None ordered      Anthropometric Measures:  Height: 5' (152.4 cm)  Current Body Wt: 67.5 kg (148 lb 13 oz) (6/23), Weight source: Bed scale  BMI: 29.1, Overweight (BMI 25.0-29. 9)  Ideal Body Weight (lbs) (Calculated): 100 lbs (45 kg), 148.8 %          Edema: LLE: Trace (6/23/2021  7:20 AM)  RLE: Trace (6/23/2021  7:20 AM)    WT / BMI WEIGHT BODY MASS INDEX   6/23/2021 148 lb 12.8 oz 29.06 kg/m2   5/24/2021 181 lb 3.5 oz 35.39 kg/m2   4/13/2021 149 lb 29.1 kg/m2   4/8/2021 150 lb 29.29 kg/m2   3/22/2021 144 lb 12.8 oz 28.28 kg/m2   4/30/2019 182 lb 5.1 oz 35.61 kg/m2   2/5/2018 158 lb 9.6 oz 32.86 kg/m2   Severe weight fluctuations due to fluid status and edema, pt has had 9.2L of fluid removed since admission. Estimated Daily Nutrient Needs:  Energy (kcal/day): 2154-6354 (25-30kcal/kg) (Kcal/kg, Weight Used: Ideal (45.5kg))  Protein (g/day): 46-55 (1-1.2g/kg) Weight Used: (Ideal)  Fluid (ml/day):   (1 ml/kcal (or Per MD))    Nutrition Diagnosis:   · Inadequate oral intake related to  (taste preferences ) as evidenced by  (pt reported poor PO intake due to dislike of food)     Nutrition Interventions:   Food and/or Nutrient Delivery: Continue current diet, Start oral nutrition supplement     Coordination of Nutrition Care: Continue to monitor while inpatient  Plan of Care discussed with Daksha Molina RN    Goals: Active Goal: Meet >75% of estimated nutrition needs by next RD follow up.     Nutrition Monitoring and Evaluation:      Food/Nutrient Intake Outcomes: Food and nutrient intake, Supplement intake       Discharge Planning:    Continue oral nutrition supplement, Continue current diet    Van Lee RD, LD  [phone]

## 2021-06-23 NOTE — PROGRESS NOTES
[facility] CARDIOLOGY PROGRESS NOTE           6/23/2021 4:10 PM    Admit Date: 6/13/2021      Subjective:   No sob. Feeling better. Objective:      Vitals:    06/23/21 0804 06/23/21 0814 06/23/21 1156 06/23/21 1233   BP: (!) 147/68   (!) 140/77   Pulse: 71   69   Resp:  18  20   Temp:  98 °F (36.7 °C)  98.1 °F (36.7 °C)   SpO2:  97%  94%   Weight:       Height:   5' (1.524 m)        Physical Exam:  General-No Acute Distress  Neck- supple, no JVD  CV- regular rate and rhythm no MRG  Lung- clear few wheezes, crackles  Abd- soft, nontender, nondistended  Ext- no edema bilaterally.   Skin- warm and dry    Data Review:   Recent Labs     06/23/21  0534 06/22/21  0502    133*   K 4.0 3.6   MG 2.3 2.4   BUN 47* 49*   CREA 1.58* 1.98*   GLU 85 130*   WBC 12.4* 23.0*   HGB 8.5* 8.2*   HCT 28.7* 24.8*    321       Assessment/Plan:     Principal Problem:    Acute on chronic diastolic congestive heart failure (HCC) (6/13/2021)        Active Problems:    Acquired hypothyroidism (1/16/2016)          Hyperlipidemia (1/16/2016)          Rheumatoid arthritis ([facility] (1/16/2016)          Hypertension, essential, benign (8/18/2016)          Depression (8/18/2016)          Paroxysmal atrial fibrillation ([facility] (3/19/2021)          Stage 3 chronic kidney disease ([facility] (5/20/2021)          Leukocytosis (6/14/2021)      /////    Agree w/ current management   No new recommendation  On standby          Julisa Cardenas MD  6/23/2021 4:10 PM

## 2021-06-24 VITALS
RESPIRATION RATE: 18 BRPM | HEIGHT: 60 IN | HEART RATE: 66 BPM | DIASTOLIC BLOOD PRESSURE: 64 MMHG | TEMPERATURE: 98.1 F | OXYGEN SATURATION: 95 % | BODY MASS INDEX: 29.49 KG/M2 | WEIGHT: 150.2 LBS | SYSTOLIC BLOOD PRESSURE: 146 MMHG

## 2021-06-24 PROBLEM — I50.33 ACUTE ON CHRONIC DIASTOLIC CONGESTIVE HEART FAILURE (HCC): Status: RESOLVED | Noted: 2021-06-13 | Resolved: 2021-06-24

## 2021-06-24 LAB
ANION GAP SERPL CALC-SCNC: 6 MMOL/L (ref 7–16)
BASOPHILS # BLD: 0.1 K/UL (ref 0–0.2)
BASOPHILS NFR BLD: 1 % (ref 0–2)
BUN SERPL-MCNC: 42 MG/DL (ref 8–23)
CALCIUM SERPL-MCNC: 9 MG/DL (ref 8.3–10.4)
CHLORIDE SERPL-SCNC: 103 MMOL/L (ref 98–107)
CO2 SERPL-SCNC: 31 MMOL/L (ref 21–32)
CREAT SERPL-MCNC: 1.53 MG/DL (ref 0.6–1)
DIFFERENTIAL METHOD BLD: ABNORMAL
EOSINOPHIL # BLD: 0.6 K/UL (ref 0–0.8)
EOSINOPHIL NFR BLD: 6 % (ref 0.5–7.8)
ERYTHROCYTE [DISTWIDTH] IN BLOOD BY AUTOMATED COUNT: 17.6 % (ref 11.9–14.6)
GLUCOSE SERPL-MCNC: 112 MG/DL (ref 65–100)
HCT VFR BLD AUTO: 27.2 % (ref 35.8–46.3)
HGB BLD-MCNC: 8.5 G/DL (ref 11.7–15.4)
IMM GRANULOCYTES # BLD AUTO: 0 K/UL (ref 0–0.5)
IMM GRANULOCYTES NFR BLD AUTO: 0 % (ref 0–5)
LYMPHOCYTES # BLD: 2.3 K/UL (ref 0.5–4.6)
LYMPHOCYTES NFR BLD: 21 % (ref 13–44)
MAGNESIUM SERPL-MCNC: 2.4 MG/DL (ref 1.8–2.4)
MCH RBC QN AUTO: 28.6 PG (ref 26.1–32.9)
MCHC RBC AUTO-ENTMCNC: 31.3 G/DL (ref 31.4–35)
MCV RBC AUTO: 91.6 FL (ref 79.6–97.8)
MONOCYTES # BLD: 1.7 K/UL (ref 0.1–1.3)
MONOCYTES NFR BLD: 16 % (ref 4–12)
NEUTS SEG # BLD: 6 K/UL (ref 1.7–8.2)
NEUTS SEG NFR BLD: 56 % (ref 43–78)
NRBC # BLD: 0 K/UL (ref 0–0.2)
PLATELET # BLD AUTO: 378 K/UL (ref 150–450)
PMV BLD AUTO: 11.7 FL (ref 9.4–12.3)
POTASSIUM SERPL-SCNC: 3.7 MMOL/L (ref 3.5–5.1)
RBC # BLD AUTO: 2.97 M/UL (ref 4.05–5.2)
SODIUM SERPL-SCNC: 140 MMOL/L (ref 136–145)
WBC # BLD AUTO: 10.7 K/UL (ref 4.3–11.1)

## 2021-06-24 PROCEDURE — 74011250637 HC RX REV CODE- 250/637: Performed by: INTERNAL MEDICINE

## 2021-06-24 PROCEDURE — 74011000250 HC RX REV CODE- 250: Performed by: INTERNAL MEDICINE

## 2021-06-24 PROCEDURE — 80048 BASIC METABOLIC PNL TOTAL CA: CPT

## 2021-06-24 PROCEDURE — 83735 ASSAY OF MAGNESIUM: CPT

## 2021-06-24 PROCEDURE — 36415 COLL VENOUS BLD VENIPUNCTURE: CPT

## 2021-06-24 PROCEDURE — 85025 COMPLETE CBC W/AUTO DIFF WBC: CPT

## 2021-06-24 PROCEDURE — 94761 N-INVAS EAR/PLS OXIMETRY MLT: CPT

## 2021-06-24 PROCEDURE — 74011250637 HC RX REV CODE- 250/637: Performed by: HOSPITALIST

## 2021-06-24 PROCEDURE — 94640 AIRWAY INHALATION TREATMENT: CPT

## 2021-06-24 PROCEDURE — 94760 N-INVAS EAR/PLS OXIMETRY 1: CPT

## 2021-06-24 RX ORDER — AMOXICILLIN AND CLAVULANATE POTASSIUM 500; 125 MG/1; MG/1
1 TABLET, FILM COATED ORAL EVERY 12 HOURS
Qty: 6 TABLET | Refills: 0 | Status: SHIPPED | OUTPATIENT
Start: 2021-06-24 | End: 2021-06-27

## 2021-06-24 RX ORDER — FUROSEMIDE 20 MG/1
20 TABLET ORAL DAILY
Qty: 30 TABLET | Refills: 1 | Status: SHIPPED | OUTPATIENT
Start: 2021-06-24

## 2021-06-24 RX ORDER — METOPROLOL SUCCINATE 50 MG/1
50 TABLET, EXTENDED RELEASE ORAL DAILY
Qty: 30 TABLET | Refills: 1 | Status: SHIPPED | OUTPATIENT
Start: 2021-06-25

## 2021-06-24 RX ADMIN — AMOXICILLIN AND CLAVULANATE POTASSIUM 1 TABLET: 500; 125 TABLET, FILM COATED ORAL at 08:05

## 2021-06-24 RX ADMIN — SENNOSIDES AND DOCUSATE SODIUM 1 TABLET: 8.6; 5 TABLET ORAL at 08:05

## 2021-06-24 RX ADMIN — DULOXETINE HYDROCHLORIDE 60 MG: 60 CAPSULE, DELAYED RELEASE ORAL at 08:05

## 2021-06-24 RX ADMIN — POLYETHYLENE GLYCOL 3350 17 G: 17 POWDER, FOR SOLUTION ORAL at 08:05

## 2021-06-24 RX ADMIN — VITAMIN D, TAB 1000IU (100/BT) 1000 UNITS: 25 TAB at 08:05

## 2021-06-24 RX ADMIN — DOXYCYCLINE HYCLATE 100 MG: 100 CAPSULE ORAL at 08:05

## 2021-06-24 RX ADMIN — Medication 10 ML: at 05:45

## 2021-06-24 RX ADMIN — AMIODARONE HYDROCHLORIDE 200 MG: 200 TABLET ORAL at 08:05

## 2021-06-24 RX ADMIN — METOPROLOL SUCCINATE 50 MG: 50 TABLET, EXTENDED RELEASE ORAL at 08:05

## 2021-06-24 RX ADMIN — LEVOTHYROXINE SODIUM 25 MCG: 0.05 TABLET ORAL at 05:44

## 2021-06-24 RX ADMIN — PANTOPRAZOLE SODIUM 40 MG: 40 TABLET, DELAYED RELEASE ORAL at 05:44

## 2021-06-24 RX ADMIN — FOLIC ACID 1 MG: 1 TABLET ORAL at 08:05

## 2021-06-24 RX ADMIN — FUROSEMIDE 20 MG: 20 TABLET ORAL at 08:05

## 2021-06-24 RX ADMIN — APIXABAN 5 MG: 5 TABLET, FILM COATED ORAL at 08:05

## 2021-06-24 RX ADMIN — ALBUTEROL SULFATE 2.5 MG: 2.5 SOLUTION RESPIRATORY (INHALATION) at 07:24

## 2021-06-24 RX ADMIN — BUDESONIDE 500 MCG: 0.5 INHALANT RESPIRATORY (INHALATION) at 07:24

## 2021-06-24 NOTE — PROGRESS NOTES
Problem: Falls - Risk of  Goal: *Absence of Falls  Description: Document Pawan Charles Fall Risk and appropriate interventions in the flowsheet.   Outcome: Resolved/Met     Problem: Patient Education: Go to Patient Education Activity  Goal: Patient/Family Education  Outcome: Resolved/Met     Problem: Pain  Goal: *Control of Pain  Outcome: Resolved/Met     Problem: Patient Education: Go to Patient Education Activity  Goal: Patient/Family Education  Outcome: Resolved/Met     Problem: Patient Education: Go to Patient Education Activity  Goal: Patient/Family Education  Outcome: Resolved/Met

## 2021-06-24 NOTE — PROGRESS NOTES
Pt is alert and oriented at time of discharge. AVS reviewed with pt, pt voiced understanding. Opportunity given to voice questions/concerns. IV removed with no complications, VSS at this time. Pt provided with heart failure education. Pt taken to front lobby via wheelchair at this time.

## 2021-06-24 NOTE — PROGRESS NOTES
Pt is for discharge home today with [place] family/friends for support and resume [facility] care orders called/faxed to [facility]. No additional needs/supportive care orders recieved for CM at this time. Care Management Interventions  PCP Verified by CM:  Yes  Mode of Transport at Discharge: Self  Transition of Care Consult (CM Consult): [address]: No  Reason Outside [place]: Patient already serviced by other home care/hospice agency ([address] [place] [address] [phone]/[facility] [phone])  Discharge Durable Medical Equipment: No  Physical Therapy Consult: Yes  Occupational Therapy Consult: No  Current Support Network: Own Home, Lives Alone  Confirm Follow Up Transport: Friends  Discharge Location  Discharge Placement: Home with home health

## 2021-06-24 NOTE — DISCHARGE SUMMARY
[facility]     Name:    Omar Kowalski  68 y.o.  female  :    1940     MRN:   [id number]       Admitting Physician: Nikkie Peters DO Admit Date:  2021  2:50 PM   Attending Physician: Jan Tipton MD  Primary Care Physician: August Gomes MD       Discharge Physician: Armando Hope MD  Discharge date: 21   Discharged Condition: Stable    Indication for Admission:   Chief Complaint   Patient presents with    Shortness of Breath        Reasons for hospitalization:  Hospital Problems as of 2021 Date Reviewed: 2021        Codes Class Noted - Resolved POA    Leukocytosis ICD-10-CM: D72.829  ICD-9-CM: 288.60  2021 - Present Yes        Stage 3 chronic kidney disease ([facility] (Chronic) ICD-10-CM: N18.30  ICD-9-CM: 585.3  2021 - Present Yes        Paroxysmal atrial fibrillation (HCC) (Chronic) ICD-10-CM: I48.0  ICD-9-CM: 427.31  3/19/2021 - Present Yes        Hypertension, essential, benign (Chronic) ICD-10-CM: I10  ICD-9-CM: 401.1  2016 - Present Yes        Depression ICD-10-CM: F32.9  ICD-9-CM: 454  2016 - Present Yes        Acquired hypothyroidism (Chronic) ICD-10-CM: E03.9  ICD-9-CM: 244.9  2016 - Present Yes        Hyperlipidemia (Chronic) ICD-10-CM: E78.5  ICD-9-CM: 272.4  2016 - Present Yes        Rheumatoid arthritis ([facility] (Chronic) ICD-10-CM: M06.9  ICD-9-CM: 714.0  2016 - Present Yes        RESOLVED: Elevated troponin ICD-10-CM: R77.8  ICD-9-CM: 790.6  2021 - 2021 No        RESOLVED: Aspiration pneumonia ([facility] ICD-10-CM: J69.0  ICD-9-CM: 507.0  2021 - 2021 No        RESOLVED: Acute respiratory failure with hypoxia ([facility] ICD-10-CM: J96.01  ICD-9-CM: 518.81  2021 - 2021 Yes        RESOLVED: Demand ischemia ([facility] ICD-10-CM: I24.8  ICD-9-CM: 411.89  2021 - 2021 Yes        * (Principal) RESOLVED: Acute on chronic diastolic congestive heart failure ([facility] ICD-10-CM: I50.33  ICD-9-CM: 428.33, 428.0  6/13/2021 - 6/24/2021 Yes                 Discharge Diagnosis: Acute respiratory failure with hypoxia due to acute on chronic CHF exacerbation    Did Patient have Sepsis (YES OR NO): No      Hospital Course :  Please refer to the admission H&P for details of presentation. In summary, Tyler Tran is a 68 y.o. 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  Patient was recently discharged from rehab after hospitalization in May for fall with rib fractures. She is being admitted for acute on chronic CHF exacerbation. Cardiology was consulted. She has been diuresing well and was weaned down to room air. However discharge was held due to elevated Cr and Lasix was held. Early 6/20 AM, RR was called d/t her having chest pain, SOB, tachypnea associated with wheezing. Troponin improved from previous. EKG shows sinus rhythm and RBBB consistent with CXR shows concerns for worsening pulmonary edema and possible PNA. She was given antibiotics, Lasix, breathing treatments and started on Solumedrol. Pt was continued with antibiotics for presumed aspiration PNA. She was able to be weaned down to RA and Solumedrol discontinued as low suspicion for COPD exacerbation with no history. Her IV Lasix was held with NAZIA and Nephrology was consulted. Pt has been initiated on po Lasix. Patient is medically stable for discharge. Patient is to continue taking medications as prescribed and to follow up with PCP on discharge. Patient is instructed to to call a physician or return to ED if any concerns/symptoms worsened. Discharge summary and encounter summary was sent to PCP electronically via \"Comm Mgt\" link in [facility], if possible.     Consults: IP CONSULT TO CARDIOLOGY  IP CONSULT TO NEPHROLOGY     Disposition: [facility] Health [facility]  Diet: DIET ADULT  DIET ADULT ORAL NUTRITION SUPPLEMENT   Code Status: Full Code    Discharge Info:     Current Discharge Medication List      START taking these medications    Details   amoxicillin-clavulanate (AUGMENTIN) 500-125 mg per tablet Take 1 Tablet by mouth every twelve (12) hours for 3 days. Qty: 6 Tablet, Refills: 0  Start date: 6/24/2021, End date: 6/27/2021      metoprolol succinate (TOPROL-XL) 50 mg XL tablet Take 1 Tablet by mouth daily. Qty: 30 Tablet, Refills: 1  Start date: 6/25/2021      furosemide (LASIX) 20 mg tablet Take 1 Tablet by mouth daily. Qty: 30 Tablet, Refills: 1  Start date: 6/24/2021         CONTINUE these medications which have NOT CHANGED    Details   pantoprazole (PROTONIX) 40 mg tablet Take 1 Tablet by mouth Before breakfast and dinner. Qty: 60 Tablet, Refills: 0      folic acid (FOLVITE) 1 mg tablet Take 1 mg by mouth daily. amiodarone (CORDARONE) 200 mg tablet Take 1 Tab by mouth daily. Qty: 30 Tab, Refills: 11      rosuvastatin (Crestor) 10 mg tablet Take 1 Tab by mouth nightly. Qty: 30 Tab, Refills: 1      cholecalciferol (VITAMIN D3) 1,000 unit tablet Take 1,000 Units by mouth daily. DULoxetine (CYMBALTA) 60 mg capsule Take 1 Cap by mouth daily. Qty: 90 Cap, Refills: 2    Associated Diagnoses: Idiopathic peripheral neuropathy; Major depressive disorder with single episode, in remission ([facility])      levothyroxine (SYNTHROID) 25 mcg tablet Take 1 Tab by mouth Daily (before breakfast). Qty: 90 Tab, Refills: 2    Associated Diagnoses: Acquired hypothyroidism      apixaban (ELIQUIS) 5 mg tablet Take 1 Tab by mouth two (2) times a day. Qty: 90 Tab, Refills: 3      ondansetron (ZOFRAN ODT) 4 mg disintegrating tablet Take 1 Tab by mouth every eight (8) hours as needed for Nausea or Vomiting.   Qty: 20 Tab, Refills: 1         STOP taking these medications       spironolactone (Aldactone) 25 mg tablet Comments:   Reason for Stopping:               Medications Discontinued During This Encounter   Medication Reason    cefTRIAXone (ROCEPHIN) 2 g in 0.9% sodium chloride (MBP/ADV) 50 mL MBP     sodium chloride (NS) flush 5-10 mL     metoprolol succinate (TOPROL-XL) XL tablet 25 mg     budesonide (PULMICORT) 500 mcg/2 ml nebulizer suspension     HYDROmorphone (DILAUDID) injection 0.5 mg     methylPREDNISolone (PF) (SOLU-MEDROL) injection 40 mg     furosemide (LASIX) injection 40 mg     amoxicillin-clavulanate (AUGMENTIN) 875-125 mg per tablet 1 Tablet DOSE ADJUSTMENT         Follow Up Orders: Follow-up Appointments   Procedures    FOLLOW UP VISIT Appointment in: Two Weeks Please set up [address] with [facility] upon hosp d/c with renal panel and cbc w/o diff prior to appointment. [phone] Thanks     Please set up hosp FU with [facility] upon hosp d/c with renal panel and cbc w/o diff prior to appointment. [phone]  Thanks     Standing Status:   Standing     Number of Occurrences:   1     Order Specific Question:   Appointment in     Answer: Two Weeks       Follow-up Information     Follow up With Specialties Details Why Contact Info    [facility]  On 6/28/2021 Dr. Jon Williamson  at 2:15PM. [address] Austen Grant [address] [place] [phone]  [address] [place] [address]  Will contact you to schedule home visit.  [facility]  [phone]    [facility], [address] [address]  [phone]      [facility]  On 7/7/2021 at 11:00 Bl[address] [id number]  [facility]  On 7/9/2021 at 9:00 TO SEE MD Nilda [address]val [address] [id number]  [phone]  [address]  They will resume your home care services. [place] [address]  [phone]            Discharge Exam:    Patient Vitals for the past 24 hrs:   Temp Pulse Resp BP SpO2   06/24/21 0724     95 %   06/24/21 0646 98.1 °F (36.7 °C) 66 18 (!) 146/64 94 %   06/24/21 0350 98.6 °F (37 °C) 69 20 (!) 132/55 92 %   06/23/21 2318 98 °F (36.7 °C) 71 20 (!) 123/54 94 %   06/23/21 2043     96 %   06/23/21 1943 98.1 °F (36.7 °C) 70 20 133/62 95 %   06/23/21 1633 98 °F (36.7 °C) 66 20 (!) 122/57 95 %   06/23/21 1233 98.1 °F (36.7 °C) 69 20 (!) 140/77 94 %     Oxygen Therapy  O2 Sat (%): 95 % (06/24/21 0724)  Pulse via Oximetry: 75 beats per minute (06/23/21 2043)  O2 Device: None (Room air) (06/24/21 0724)  Skin Assessment: Clean, dry, & intact (06/20/21 1404)  O2 Flow Rate (L/min): 0 l/min (06/21/21 0813)    Estimated body mass index is 29.33 kg/m² as calculated from the following:    Height as of this encounter: 5' (1.524 m). Weight as of this encounter: 68.1 kg (150 lb 3.2 oz). Intake/Output Summary (Last 24 hours) at 6/24/2021 1153  Last data filed at 6/23/2021 1800  Gross per 24 hour   Intake 240 ml   Output    Net 240 ml       *Note that automatically entered I/Os may not be accurate; dependent on patient compliance with collection and accurate  by assistants. Physical Exam:     General:                     alert, awake, no acute distress. HENT:                         normocephalic, atraumatic. Eyes:                           anicteric sclerae, normal conjunctiva, EOMI  Neck:                          supple, non-tender. Trachea midline. Lungs:                        CTAB, no wheezing, rhonchi, rales  Cardiac:                      RRR, Normal S1 and S2.     Abdomen:                  Soft, non distended, nontender, +BS, no guarding/rebound  Extremities:               No edema , pedal pulses present  Skin:                           Warn, dry, normal turgor and texture  Neuro:              AAOx3. No gross focal neurological deficit  Psychiatric:                No anxiety, calm, cooperative      All Labs from Last 24 Hrs:  Recent Results (from the past 24 hour(s))   METABOLIC PANEL, BASIC    Collection Time: 06/24/21  4:59 AM   Result Value Ref Range    Sodium 140 136 - 145 mmol/L    Potassium 3.7 3.5 - 5.1 mmol/L    Chloride 103 98 - 107 mmol/L    CO2 31 21 - 32 mmol/L    Anion gap 6 (L) 7 - 16 mmol/L    Glucose 112 (H) 65 - 100 mg/dL    BUN 42 (H) 8 - 23 MG/DL    Creatinine 1.53 (H) 0.6 - 1.0 MG/DL    GFR est AA 42 (L) >60 ml/min/1.73m2    GFR est non-AA 35 (L) >60 ml/min/1.73m2    Calcium 9.0 8.3 - 10.4 MG/DL   CBC WITH AUTOMATED DIFF    Collection Time: 06/24/21  4:59 AM   Result Value Ref Range    WBC 10.7 4.3 - 11.1 K/uL    RBC 2.97 (L) 4.05 - 5.2 M/uL    HGB 8.5 (L) 11.7 - 15.4 g/dL    HCT 27.2 (L) 35.8 - 46.3 %    MCV 91.6 79.6 - 97.8 FL    MCH 28.6 26.1 - 32.9 PG    MCHC 31.3 (L) 31.4 - 35.0 g/dL    RDW 17.6 (H) 11.9 - 14.6 %    PLATELET 646 266 - 653 K/uL    MPV 11.7 9.4 - 12.3 FL    ABSOLUTE NRBC 0.00 0.0 - 0.2 K/uL    DF AUTOMATED      NEUTROPHILS 56 43 - 78 %    LYMPHOCYTES 21 13 - 44 %    MONOCYTES 16 (H) 4.0 - 12.0 %    EOSINOPHILS 6 0.5 - 7.8 %    BASOPHILS 1 0.0 - 2.0 %    IMMATURE GRANULOCYTES 0 0.0 - 5.0 %    ABS. NEUTROPHILS 6.0 1.7 - 8.2 K/UL    ABS. LYMPHOCYTES 2.3 0.5 - 4.6 K/UL    ABS. MONOCYTES 1.7 (H) 0.1 - 1.3 K/UL    ABS. EOSINOPHILS 0.6 0.0 - 0.8 K/UL    ABS. BASOPHILS 0.1 0.0 - 0.2 K/UL    ABS. IMM.  GRANS. 0.0 0.0 - 0.5 K/UL   MAGNESIUM    Collection Time: 06/24/21  4:59 AM   Result Value Ref Range    Magnesium 2.4 1.8 - 2.4 mg/dL       All Micro Results     Procedure Component Value Units Date/Time    CULTURE, RESPIRATORY/SPUTUM/BRONCH Umer Ma [[id number]] Collected: 06/20/21 2129    Order Status: Completed Specimen: Sputum Updated: 06/23/21 0905     Special Requests: NO SPECIAL REQUESTS        GRAM STAIN 0 TO 4 WBCS/OIF      0 TO 1 EPITHELIAL CELLS/OIF      FEW GRAM POSITIVE COCCI         FEW GRAM NEGATIVE RODS         2+ MUCUS PRESENT        Culture result:       LIGHT NORMAL RESPIRATORY MARC          CULTURE, BLOOD [[id number]] Collected: 06/20/21 1010    Order Status: Completed Specimen: Blood Updated: 06/23/21 0724     Special Requests: --        LEFT  HAND       Culture result: NO GROWTH 3 DAYS       CULTURE, BLOOD [[id number]] Collected: 06/20/21 1102    Order Status: Completed Specimen: Blood Updated: 06/23/21 0724     Special Requests: --        LEFT  ARM       Culture result: NO GROWTH 3 DAYS       RESPIRATORY VIRUS PANEL W/COVID-19, PCR [[id number]] Collected: 06/20/21 0438    Order Status: Completed Specimen: Nasopharyngeal Updated: 06/20/21 0729     Adenovirus NOT DETECTED        Coronavirus 229E NOT DETECTED        Coronavirus HKU1 NOT DETECTED        Coronavirus CVNL63 NOT DETECTED        Coronavirus OC43 NOT DETECTED        SARS-CoV-2, PCR NOT DETECTED        Comment: This test has been authorized by the FDA under an Emergency Use Authorization (EUA) for use by authorized laboratories. Metapneumovirus NOT DETECTED        Rhinovirus and Enterovirus NOT DETECTED        Influenza A NOT DETECTED        Influenza B NOT DETECTED        Parainfluenza 1 NOT DETECTED        Parainfluenza 2 NOT DETECTED        Parainfluenza 3 NOT DETECTED        Parainfluenza virus 4 NOT DETECTED        RSV by PCR NOT DETECTED        B. parapertussis, PCR NOT DETECTED        Bordetella pertussis - PCR NOT DETECTED        Chlamydophila pneumoniae DNA, QL, PCR NOT DETECTED        Mycoplasma pneumoniae DNA, QL, PCR NOT DETECTED       COVID-19 RAPID TEST [[id number]] Collected: 06/20/21 0439    Order Status: Completed Specimen: Nasopharyngeal Updated: 06/20/21 0514     Specimen source NASAL        COVID-19 rapid test Not detected        Comment:      The specimen is NEGATIVE for SARS-CoV-2, the novel coronavirus associated with COVID-19.   A negative result does not rule out COVID-19. This test has been authorized by the FDA under an Emergency Use Authorization (EUA) for use by authorized laboratories. Fact sheet for Healthcare Providers: [employer]  Fact sheet for Patients: [employer]       Methodology: Isothermal Nucleic Acid Amplification         CULTURE, BLOOD [[id number]] Collected: 06/13/21 1749    Order Status: Completed Specimen: Blood Updated: 06/18/21 0704     Special Requests: --        LEFT  HAND       Culture result: NO GROWTH 5 DAYS       CULTURE, BLOOD [[id number]] Collected: 06/13/21 1746    Order Status: Completed Specimen: Blood Updated: 06/18/21 0704     Special Requests: --        LEFT  Antecubital       Culture result: NO GROWTH 5 DAYS             SARS-CoV-2 Lab Results  \"Novel Coronavirus\" Test: No results found for: COV2NT   \"Emergent Disease\" Test: No results found for: EDPR  \"SARS-COV-2\" Test: No results found for: XGCOVT  Rapid Test:   Lab Results   Component Value Date/Time    COVR Not detected 06/20/2021 04:39 AM            Diagnostic Imaging/Tests:   XR CHEST SNGL V    Result Date: 6/20/2021   Portable view of the chest COMPARISON: June 16, 2021 CLINICAL HISTORY: Respiratory distress. FINDINGS: Cardiac silhouette is enlarged. Mediastinal contour is within normal limits. Increased interstitial prominence, suggesting vascular congestion. There is left lung base opacity, similar to prior exam. No pneumothorax. 1. Persistent enlargement of the cardiac silhouette, likely combination of cardiomegaly and pericardial effusion. 2. Left lung base opacity, likely atelectasis or consolidation. 3. No significant change compared to June 16, 2021 study. XR CHEST SNGL V    Result Date: 6/16/2021  History: Shortness of breath Exam: portable chest Comparison: 6/13/2021 Findings: No change in the multiple right-sided rib fractures.  There is persistent but improved airspace consolidation at the left lung base when compared with the prior exam. No pneumothorax. No change in the appearance of the mediastinal contour or osseous structures. IMPRESSIONs: Persistent but improved airspace consolidation at the left lung base when compared with the prior study. XR CHEST PORT    Result Date: 6/13/2021  1 View portable chest x-ray 6/13/2021 2:42 PM Indication: History of pneumonia, shortness of breath. Syncopal episode. Comparison: Prior studies-most recent 5/21/2021 Findings: This portable upright AP chest at 1437 shows the patient again rotated somewhat toward the left. Cardiomediastinal silhouette stable. Vascular pedicle appearance probably slightly more pronounced today. Mildly more progressed bibasilar hazy/veiling opacities which may represent layering effusions compared to the most recent study. Compared to older studies these are indolently progressing. Old right rib fractures again seen. Increased vascular congestion today, patient probably has progressing basilar effusions-now of small/moderate volume. Appearance most suggestive of progressing vascular congestion. Correlate correlate clinically, consider serum BNP. Echocardiogram/EKG results:  No results found for this visit on 06/13/21.     EKG Results     Procedure 720 Value Units Date/Time    EKG, 12 LEAD, INITIAL [[id number]] Collected: 06/20/21 0120    Order Status: Completed Updated: 06/20/21 0819     Ventricular Rate 71 BPM      Atrial Rate 71 BPM      P-R Interval 158 ms      QRS Duration 126 ms      Q-T Interval 470 ms      QTC Calculation (Bezet) 510 ms      Calculated P Axis 15 degrees      Calculated R Axis 71 degrees      Calculated T Axis 26 degrees      Diagnosis --     Normal sinus rhythm  Right bundle branch block  Abnormal ECG  When compared with ECG of 13-JUN-2021 14:26,  Right bundle branch block is now Present  Confirmed by Stephanie Cagle MD (), Viktoriya Renteria ([id number]) on 6/20/2021 8:19:32 AM      EKG [[id number]] Collected: 06/13/21 1426    Order Status: Completed Updated: 06/14/21 0705     Ventricular Rate 90 BPM      Atrial Rate 90 BPM      P-R Interval 160 ms      QRS Duration 90 ms      Q-T Interval 316 ms      QTC Calculation (Bezet) 386 ms      Calculated P Axis 55 degrees      Calculated R Axis 81 degrees      Calculated T Axis 24 degrees      Diagnosis --     Normal sinus rhythm  Nonspecific T wave abnormality  Abnormal ECG  When compared with ECG of 12-MAY-2021 10:32,  Right bundle branch block is no longer Present  Confirmed by SINDHU BENITEZ ()Jorge ([id number]) on 6/14/2021 7:05:02 AM            Results for orders placed or performed during the hospital encounter of 06/13/21   EKG, 12 LEAD, INITIAL   Result Value Ref Range    Ventricular Rate 71 BPM    Atrial Rate 71 BPM    P-R Interval 158 ms    QRS Duration 126 ms    Q-T Interval 470 ms    QTC Calculation (Bezet) 510 ms    Calculated P Axis 15 degrees    Calculated R Axis 71 degrees    Calculated T Axis 26 degrees    Diagnosis       Normal sinus rhythm  Right bundle branch block  Abnormal ECG  When compared with ECG of 13-JUN-2021 14:26,  Right bundle branch block is now Present  Confirmed by Stacey Young MD (), Jorge Armendariz ([id number]) on 6/20/2021 8:19:32 AM         Procedures done this admission:  * No surgery found *        Time spent in patient discharge planning and coordination 40 minutes.       Signed By: Theron Gaspar MD   [facility]    June 24, 2021  11:52 AM

## 2021-06-24 NOTE — PROGRESS NOTES
Oxygen Qualifier       Room air: SpO2 with O2 and liter flow   Resting SpO2  95%     Ambulating SpO2  93%       No O2 applied during ambulation.      Completed by:    Nick Pearson RT

## 2021-06-25 LAB
BACTERIA SPEC CULT: NORMAL
BACTERIA SPEC CULT: NORMAL
SERVICE CMNT-IMP: NORMAL
SERVICE CMNT-IMP: NORMAL

## 2021-12-13 ENCOUNTER — APPOINTMENT (OUTPATIENT)
Dept: GENERAL RADIOLOGY | Age: 81
DRG: 812 | End: 2021-12-13
Attending: EMERGENCY MEDICINE
Payer: MEDICARE

## 2021-12-13 ENCOUNTER — HOSPITAL ENCOUNTER (INPATIENT)
Age: 81
LOS: 8 days | Discharge: HOME HEALTH | DRG: 812 | End: 2021-12-21
Attending: EMERGENCY MEDICINE | Admitting: HOSPITALIST
Payer: MEDICARE

## 2021-12-13 DIAGNOSIS — D50.9 IRON DEFICIENCY ANEMIA, UNSPECIFIED IRON DEFICIENCY ANEMIA TYPE: ICD-10-CM

## 2021-12-13 DIAGNOSIS — I48.0 PAROXYSMAL ATRIAL FIBRILLATION (HCC): Chronic | ICD-10-CM

## 2021-12-13 DIAGNOSIS — R06.02 SOB (SHORTNESS OF BREATH): Primary | ICD-10-CM

## 2021-12-13 DIAGNOSIS — E46 HYPOALBUMINEMIA DUE TO PROTEIN-CALORIE MALNUTRITION (HCC): ICD-10-CM

## 2021-12-13 DIAGNOSIS — E87.20 LACTIC ACIDOSIS: ICD-10-CM

## 2021-12-13 DIAGNOSIS — D64.9 SEVERE ANEMIA: ICD-10-CM

## 2021-12-13 DIAGNOSIS — E88.09 HYPOALBUMINEMIA DUE TO PROTEIN-CALORIE MALNUTRITION (HCC): ICD-10-CM

## 2021-12-13 DIAGNOSIS — E03.9 ACQUIRED HYPOTHYROIDISM: Chronic | ICD-10-CM

## 2021-12-13 DIAGNOSIS — D50.8 OTHER IRON DEFICIENCY ANEMIA: ICD-10-CM

## 2021-12-13 PROBLEM — N18.9 ACUTE RENAL FAILURE SUPERIMPOSED ON CHRONIC KIDNEY DISEASE (HCC): Status: ACTIVE | Noted: 2019-04-22

## 2021-12-13 LAB
APPEARANCE UR: CLEAR
APTT PPP: 29.9 SEC (ref 24.1–35.1)
ARTERIAL PATENCY WRIST A: NEGATIVE
ARTERIAL PATENCY WRIST A: POSITIVE
BASE DEFICIT BLD-SCNC: 7.1 MMOL/L
BASE DEFICIT BLDV-SCNC: 8.3 MMOL/L
BDY SITE: ABNORMAL
BDY SITE: ABNORMAL
BILIRUB UR QL: NEGATIVE
BNP SERPL-MCNC: 8862 PG/ML
COLOR UR: YELLOW
COVID-19 RAPID TEST, COVR: NOT DETECTED
GAS FLOW.O2 O2 DELIVERY SYS: ABNORMAL L/MIN
GAS FLOW.O2 O2 DELIVERY SYS: ABNORMAL L/MIN
GLUCOSE BLD STRIP.AUTO-MCNC: 77 MG/DL (ref 65–100)
GLUCOSE UR STRIP.AUTO-MCNC: NEGATIVE MG/DL
HCO3 BLD-SCNC: 18 MMOL/L (ref 22–26)
HCO3 BLDV-SCNC: 16.9 MMOL/L (ref 23–28)
HGB UR QL STRIP: NEGATIVE
HISTORY CHECKED?,CKHIST: NORMAL
INR PPP: 3.2
KETONES UR QL STRIP.AUTO: NEGATIVE MG/DL
LACTATE SERPL-SCNC: 12.3 MMOL/L (ref 0.4–2)
LACTATE SERPL-SCNC: 14.7 MMOL/L (ref 0.4–2)
LEUKOCYTE ESTERASE UR QL STRIP.AUTO: NEGATIVE
MAGNESIUM SERPL-MCNC: 2.6 MG/DL (ref 1.8–2.4)
NITRITE UR QL STRIP.AUTO: NEGATIVE
O2/TOTAL GAS SETTING VFR VENT: 60 %
O2/TOTAL GAS SETTING VFR VENT: 70 %
PCO2 BLD: 33.4 MMHG (ref 35–45)
PCO2 BLDV: 32.1 MMHG (ref 41–51)
PEEP RESPIRATORY: 6 CMH2O
PEEP RESPIRATORY: 8 CMH2O
PH BLD: 7.34 [PH] (ref 7.35–7.45)
PH BLDV: 7.33 [PH] (ref 7.32–7.42)
PH UR STRIP: 5 [PH] (ref 5–9)
PO2 BLD: 315 MMHG (ref 75–100)
PO2 BLDV: 26 MMHG
PRESSURE SUPPORT SETTING VENT: 10 CMH2O
PRESSURE SUPPORT SETTING VENT: 10 CMH2O
PROCALCITONIN SERPL-MCNC: 0.06 NG/ML (ref 0–0.49)
PROT UR STRIP-MCNC: NEGATIVE MG/DL
PROTHROMBIN TIME: 32.9 SEC (ref 12.6–14.5)
SAO2 % BLD: 99.9 % (ref 95–98)
SAO2 % BLDV: 44.3 % (ref 65–88)
SERVICE CMNT-IMP: ABNORMAL
SERVICE CMNT-IMP: NORMAL
SOURCE, COVRS: NORMAL
SP GR UR REFRACTOMETRY: 1.01 (ref 1–1.02)
SPECIMEN TYPE: ABNORMAL
SPECIMEN TYPE: ABNORMAL
TSH SERPL DL<=0.005 MIU/L-ACNC: 17.3 UIU/ML (ref 0.36–3.74)
UROBILINOGEN UR QL STRIP.AUTO: 1 EU/DL (ref 0.2–1)

## 2021-12-13 PROCEDURE — 71045 X-RAY EXAM CHEST 1 VIEW: CPT

## 2021-12-13 PROCEDURE — 83605 ASSAY OF LACTIC ACID: CPT

## 2021-12-13 PROCEDURE — 83735 ASSAY OF MAGNESIUM: CPT

## 2021-12-13 PROCEDURE — 73502 X-RAY EXAM HIP UNI 2-3 VIEWS: CPT

## 2021-12-13 PROCEDURE — 86923 COMPATIBILITY TEST ELECTRIC: CPT

## 2021-12-13 PROCEDURE — 36430 TRANSFUSION BLD/BLD COMPNT: CPT

## 2021-12-13 PROCEDURE — 65270000029 HC RM PRIVATE

## 2021-12-13 PROCEDURE — 36600 WITHDRAWAL OF ARTERIAL BLOOD: CPT

## 2021-12-13 PROCEDURE — 96365 THER/PROPH/DIAG IV INF INIT: CPT

## 2021-12-13 PROCEDURE — P9016 RBC LEUKOCYTES REDUCED: HCPCS

## 2021-12-13 PROCEDURE — 80053 COMPREHEN METABOLIC PANEL: CPT

## 2021-12-13 PROCEDURE — 93005 ELECTROCARDIOGRAM TRACING: CPT | Performed by: EMERGENCY MEDICINE

## 2021-12-13 PROCEDURE — 82803 BLOOD GASES ANY COMBINATION: CPT

## 2021-12-13 PROCEDURE — 85018 HEMOGLOBIN: CPT

## 2021-12-13 PROCEDURE — 87635 SARS-COV-2 COVID-19 AMP PRB: CPT

## 2021-12-13 PROCEDURE — 87040 BLOOD CULTURE FOR BACTERIA: CPT

## 2021-12-13 PROCEDURE — 96375 TX/PRO/DX INJ NEW DRUG ADDON: CPT

## 2021-12-13 PROCEDURE — 74011000258 HC RX REV CODE- 258: Performed by: EMERGENCY MEDICINE

## 2021-12-13 PROCEDURE — 74011250636 HC RX REV CODE- 250/636: Performed by: EMERGENCY MEDICINE

## 2021-12-13 PROCEDURE — 30233N1 TRANSFUSION OF NONAUTOLOGOUS RED BLOOD CELLS INTO PERIPHERAL VEIN, PERCUTANEOUS APPROACH: ICD-10-PCS | Performed by: INTERNAL MEDICINE

## 2021-12-13 PROCEDURE — 94660 CPAP INITIATION&MGMT: CPT

## 2021-12-13 PROCEDURE — 99285 EMERGENCY DEPT VISIT HI MDM: CPT

## 2021-12-13 PROCEDURE — 85025 COMPLETE CBC W/AUTO DIFF WBC: CPT

## 2021-12-13 PROCEDURE — 85610 PROTHROMBIN TIME: CPT

## 2021-12-13 PROCEDURE — 74011000250 HC RX REV CODE- 250: Performed by: EMERGENCY MEDICINE

## 2021-12-13 PROCEDURE — 86901 BLOOD TYPING SEROLOGIC RH(D): CPT

## 2021-12-13 PROCEDURE — 82962 GLUCOSE BLOOD TEST: CPT

## 2021-12-13 PROCEDURE — 84145 PROCALCITONIN (PCT): CPT

## 2021-12-13 PROCEDURE — 83880 ASSAY OF NATRIURETIC PEPTIDE: CPT

## 2021-12-13 PROCEDURE — 85730 THROMBOPLASTIN TIME PARTIAL: CPT

## 2021-12-13 PROCEDURE — 84443 ASSAY THYROID STIM HORMONE: CPT

## 2021-12-13 PROCEDURE — 81003 URINALYSIS AUTO W/O SCOPE: CPT

## 2021-12-13 RX ORDER — SODIUM CHLORIDE 0.9 % (FLUSH) 0.9 %
5-10 SYRINGE (ML) INJECTION AS NEEDED
Status: DISCONTINUED | OUTPATIENT
Start: 2021-12-13 | End: 2021-12-14

## 2021-12-13 RX ORDER — DEXTROSE 50 % IN WATER (D50W) INTRAVENOUS SYRINGE
50
Status: COMPLETED | OUTPATIENT
Start: 2021-12-13 | End: 2021-12-13

## 2021-12-13 RX ORDER — SODIUM CHLORIDE 9 MG/ML
250 INJECTION, SOLUTION INTRAVENOUS AS NEEDED
Status: DISCONTINUED | OUTPATIENT
Start: 2021-12-13 | End: 2021-12-14

## 2021-12-13 RX ORDER — ONDANSETRON 2 MG/ML
4 INJECTION INTRAMUSCULAR; INTRAVENOUS
Status: COMPLETED | OUTPATIENT
Start: 2021-12-13 | End: 2021-12-13

## 2021-12-13 RX ORDER — HYDROMORPHONE HYDROCHLORIDE 1 MG/ML
0.2 INJECTION, SOLUTION INTRAMUSCULAR; INTRAVENOUS; SUBCUTANEOUS ONCE
Status: COMPLETED | OUTPATIENT
Start: 2021-12-13 | End: 2021-12-13

## 2021-12-13 RX ORDER — SODIUM CHLORIDE 0.9 % (FLUSH) 0.9 %
5-10 SYRINGE (ML) INJECTION EVERY 8 HOURS
Status: DISCONTINUED | OUTPATIENT
Start: 2021-12-13 | End: 2021-12-21 | Stop reason: HOSPADM

## 2021-12-13 RX ADMIN — DEXTROSE MONOHYDRATE 25 G: 25 INJECTION, SOLUTION INTRAVENOUS at 18:01

## 2021-12-13 RX ADMIN — SODIUM CHLORIDE 500 ML: 9 INJECTION, SOLUTION INTRAVENOUS at 18:02

## 2021-12-13 RX ADMIN — HYDROMORPHONE HYDROCHLORIDE 0.2 MG: 1 INJECTION, SOLUTION INTRAMUSCULAR; INTRAVENOUS; SUBCUTANEOUS at 20:44

## 2021-12-13 RX ADMIN — PIPERACILLIN SODIUM AND TAZOBACTAM SODIUM 4.5 G: 4; .5 INJECTION, POWDER, LYOPHILIZED, FOR SOLUTION INTRAVENOUS at 18:29

## 2021-12-13 RX ADMIN — ONDANSETRON 4 MG: 2 INJECTION INTRAMUSCULAR; INTRAVENOUS at 20:40

## 2021-12-13 NOTE — ED PROVIDER NOTES
51-year-old female brought in by EMS. Patient lives by herself. States she uses oxygen when needed. EMS was called by family when they found her lethargic and short of breath. Last known well 2 days ago. Patient denies any pain. Has complaints of shortness of breath. She denies have any fever chills. Some cough. No vomiting diarrhea. EMS could not obtain O2 saturation. She was placed on CPAP. Transition to BiPAP when she arrived here. Review of records reveals congestive heart failure with a chronic left pleural effusion. Atrial fibrillation is on anticoagulant. History of anemia in the past.  History rheumatoid arthritis. Review of records reveals the patient last hospitalized about 6 months ago for congestive heart failure and presumed pneumonia at that time. Also history of some anemia that was felt to be possible due to GI bleed and also methotrexate toxicity. Patient denies to me any bleeding. Patient does live by herself. No family here at this point. The history is provided by the patient and the EMS personnel. Respiratory Distress  This is a new problem. The current episode started more than 2 days ago. The problem has been gradually worsening. Associated symptoms include cough, wheezing and leg swelling. Pertinent negatives include no fever, no headaches, no neck pain, no sputum production, no hemoptysis, no chest pain, no syncope, no vomiting, no abdominal pain, no rash and no leg pain. She has tried nothing for the symptoms. She has had prior hospitalizations. Associated medical issues include heart failure. Associated medical issues do not include PE, DVT or recent surgery.         Past Medical History:   Diagnosis Date    Acquired hypothyroidism 1/16/2016    Breast cancer ([facility] [address] 2008    Depression 8/18/2016    Endocrine disease     hypothyroid    Fungal dermatitis 8/18/2016    Hyperlipidemia 1/16/2016    Hypertension, essential, benign 8/18/2016    Hypokalemia 8/18/2016  Inflamed sebaceous cyst 8/18/2016    Major depressive disorder, recurrent, moderate (HCC) 8/18/2016    Nicotine dependence, cigarettes, uncomplicated 8/71/7861    Osteopenia 8/18/2016    Osteoporosis 8/18/2016    Radiation therapy complication 6231    Rheumatoid arthritis ([facility] [address] 1/16/2016    Right carotid bruit 1/16/2016    TIA (transient ischemic attack) 1/16/2016    Tobacco abuse 8/18/2016       Past Surgical History:   Procedure Laterality Date    HX ADENOIDECTOMY      HX BREAST LUMPECTOMY Right 2008    with lymph nodes    HX TONSILLECTOMY      IR BX BONE MARROW DIAGNOSTIC  5/14/2021    IR INSERT NON TUNL CVC OVER 5 YRS  5/14/2021         Family History:   Problem Relation Age of Onset    Stroke Mother     Cancer Father         Brain    HIV/AIDS Brother        Social History     Socioeconomic History    Marital status:      Spouse name: Not on file    Number of children: Not on file    Years of education: Not on file    Highest education level: Not on file   Occupational History    Not on file   Tobacco Use    Smoking status: Current Some Day Smoker    Smokeless tobacco: Never Used    Tobacco comment: Only on pay day-1/2 pack   Substance and Sexual Activity    Alcohol use: No    Drug use: No    Sexual activity: Not on file   Other Topics Concern    Not on file   Social History Narrative    Not on file     Social Determinants of Health     Financial Resource Strain:     Difficulty of Paying Living Expenses: Not on file   Food Insecurity:     Worried About Running Out of Food in the Last Year: Not on file    Hank of Food in the Last Year: Not on file   Transportation Needs:     Lack of Transportation (Medical): Not on file    Lack of Transportation (Non-Medical):  Not on file   Physical Activity:     Days of Exercise per Week: Not on file    Minutes of Exercise per Session: Not on file   Stress:     Feeling of Stress : Not on file   Social Connections:     Frequency of Communication with Friends and Family: Not on file    Frequency of Social Gatherings with Friends and Family: Not on file    Attends [employer] Services: Not on file    Active Member of Clubs or Organizations: Not on file    Attends Club or Organization Meetings: Not on file    Marital Status: Not on file   Intimate Partner Violence:     Fear of Current or Ex-Partner: Not on file    Emotionally Abused: Not on file    Physically Abused: Not on file    Sexually Abused: Not on file   Housing Stability:     Unable to Pay for Housing in the Last Year: Not on file    Number of [place] in the Last Year: Not on file    Unstable Housing in the Last Year: Not on file         ALLERGIES: Eliquis [apixaban]    Review of Systems   Constitutional: Positive for fatigue. Negative for chills and fever. Respiratory: Positive for cough and wheezing. Negative for hemoptysis, sputum production and shortness of breath. Cardiovascular: Positive for leg swelling. Negative for chest pain, palpitations and syncope. Gastrointestinal: Negative for abdominal pain, diarrhea and vomiting. Genitourinary: Negative for dysuria and flank pain. Musculoskeletal: Negative for back pain and neck pain. Skin: Negative for color change and rash. Neurological: Negative for syncope and headaches. All other systems reviewed and are negative. Vitals:    12/13/21 1645 12/13/21 1656 12/13/21 1710 12/13/21 1714   BP: (!) 121/34 (!) 99/43 (!) 114/96    Pulse: 61 60 61    Resp: 16 21 20    Temp:    (!) 95.2 °F (35.1 °C)   SpO2:   100%             Physical Exam  Vitals and nursing note reviewed. Constitutional:       General: She is not in acute distress. Appearance: She is well-developed. HENT:      Head: Normocephalic and atraumatic. Right Ear: External ear normal.      Left Ear: External ear normal.      Mouth/Throat:      Pharynx: No oropharyngeal exudate.    Eyes:      Conjunctiva/sclera: Conjunctivae normal.      Pupils: Pupils are equal, round, and reactive to light. Comments: Some exophthalmos   Cardiovascular:      Rate and Rhythm: Tachycardia present. Rhythm irregular. Heart sounds: No murmur heard. Pulmonary:      Effort: No respiratory distress. Breath sounds: Decreased breath sounds and wheezing (Mild) present. Abdominal:      General: Bowel sounds are normal.      Palpations: Abdomen is soft. There is no mass. Tenderness: There is no abdominal tenderness. There is no guarding or rebound. Hernia: No hernia is present. Musculoskeletal:         General: Swelling present. No tenderness. Cervical back: Normal range of motion and neck supple. Right lower leg: Edema present. Left lower leg: Edema present. Comments: 2+ pitting and equal   Skin:     General: Skin is warm and dry. Neurological:      Mental Status: She is alert. She is disoriented. Comments: Nl speech  No weakness in either arm. Psychiatric:         Speech: Speech normal.          MDM  Number of Diagnoses or Management Options  Diagnosis management comments: Altered mental status and respiratory distress the patient with a history of anemia, congestive heart failure. Patient on CPAP due to tachypnea in the field. Transition to BiPAP. Seems more comfortable now. Check ABG screen for anemia, sepsis, cardiac issues. Doubt pulmonary embolism as patient is on no anticoagulants. Amount and/or Complexity of Data Reviewed  Clinical lab tests: ordered and reviewed  Tests in the radiology section of CPT®: ordered and reviewed  Tests in the medicine section of CPT®: ordered and reviewed  Review and summarize past medical records: yes  Independent visualization of images, tracings, or specimens: yes (My interpretation EKG shows atrial fibrillation with controlled rate of 63. Right bundle branch block. No ST-T changes or ectopy.  Normal QT interval)    Risk of Complications, Morbidity, and/or Mortality  Presenting problems: moderate  Diagnostic procedures: minimal  Management options: low           Procedures      Results Include:    Recent Results (from the past 24 hour(s))   CBC WITH AUTOMATED DIFF    Collection Time: 12/13/21  4:44 PM   Result Value Ref Range    WBC 6.7 4.3 - 11.1 K/uL    RBC 1.96 (L) 4.05 - 5.2 M/uL    HGB 3.3 (LL) 11.7 - 15.4 g/dL    HCT 14.1 (L) 35.8 - 46.3 %    MCV 71.9 (L) 79.6 - 97.8 FL    MCH 16.8 (L) 26.1 - 32.9 PG    MCHC 23.4 (L) 31.4 - 35.0 g/dL    RDW 22.2 (H) 11.9 - 14.6 %    PLATELET 155 (H) 081 - 450 K/uL    MPV 11.5 9.4 - 12.3 FL    ABSOLUTE NRBC 0.38 (H) 0.0 - 0.2 K/uL    DF AUTOMATED      NEUTROPHILS 70 43 - 78 %    LYMPHOCYTES 13 13 - 44 %    MONOCYTES 16 (H) 4.0 - 12.0 %    EOSINOPHILS 0 (L) 0.5 - 7.8 %    BASOPHILS 0 0.0 - 2.0 %    IMMATURE GRANULOCYTES 0 0.0 - 5.0 %    ABS. NEUTROPHILS 4.7 1.7 - 8.2 K/UL    ABS. LYMPHOCYTES 0.9 0.5 - 4.6 K/UL    ABS. MONOCYTES 1.1 0.1 - 1.3 K/UL    ABS. EOSINOPHILS 0.0 0.0 - 0.8 K/UL    ABS. BASOPHILS 0.0 0.0 - 0.2 K/UL    ABS. IMM. GRANS. 0.0 0.0 - 0.5 K/UL   METABOLIC PANEL, COMPREHENSIVE    Collection Time: 12/13/21  4:44 PM   Result Value Ref Range    Sodium 138 136 - 145 mmol/L    Potassium 4.3 3.5 - 5.1 mmol/L    Chloride 100 98 - 107 mmol/L    CO2 18 (L) 21 - 32 mmol/L    Anion gap 20 (H) 7 - 16 mmol/L    Glucose 50 (L) 65 - 100 mg/dL    BUN 29 (H) 8 - 23 MG/DL    Creatinine 3.04 (H) 0.6 - 1.0 MG/DL    GFR est AA 19 (L) >60 ml/min/1.73m2    GFR est non-AA 16 (L) >60 ml/min/1.73m2    Calcium 8.5 8.3 - 10.4 MG/DL    Bilirubin, total 1.2 (H) 0.2 - 1.1 MG/DL    ALT (SGPT) 64 12 - 65 U/L    AST (SGOT) 114 (H) 15 - 37 U/L    Alk.  phosphatase 77 50 - 136 U/L    Protein, total 6.0 (L) 6.3 - 8.2 g/dL    Albumin 2.3 (L) 3.2 - 4.6 g/dL    Globulin 3.7 (H) 2.3 - 3.5 g/dL    A-G Ratio 0.6 (L) 1.2 - 3.5     MAGNESIUM    Collection Time: 12/13/21  4:44 PM   Result Value Ref Range    Magnesium 2.6 (H) 1.8 - 2.4 mg/dL   NT-PRO BNP Collection Time: 12/13/21  4:44 PM   Result Value Ref Range    NT pro-BNP 8,862 (H) <450 PG/ML   POC VENOUS BLOOD GAS    Collection Time: 12/13/21  5:00 PM   Result Value Ref Range    Device: BIPAP MASK      FIO2 (POC) 60 %    pH, venous (POC) 7.33 7.32 - 7.42      pCO2, venous (POC) 32.1 (L) 41 - 51 MMHG    pO2, venous (POC) 26 (LL) mmHg    HCO3, venous (POC) 16.9 (L) 23 - 28 MMOL/L    sO2, venous (POC) 44.3 (L) 65 - 88 %    Base deficit, venous (POC) 8.3 mmol/L    PEEP/CPAP (POC) 6 cmH2O    Pressure support 10 cmH2O    Allens test (POC) Negative      Site LEFT RADIAL      Specimen type (POC) VENOUS BLOOD      Performed by Arvilla Krabbe     Critical value read back DRFINN      XR CHEST PORT    Result Date: 12/13/2021  Portable chest x-ray CLINICAL INDICATION: Shortness of breath FINDINGS: Single AP view the chest compared to a similar exam dated 6/20/2021 shows persistent old right-sided rib fractures. The right lung is well-expanded and unchanged without pneumothorax. There is a persistent, moderate-sized left pleural effusion. The cardiac silhouette and mediastinum are stable. There is no pneumothorax. 1. Stable moderate size left pleural effusion. 5:45 PM  Patient skin is cool. She is very pale. O2 sat is difficult to obtain. Does seem to have 100% sat on BiPAP. We will try to wean and placed on high flow O2. I believe the patient's respiratory distress is more due to her hemoglobin at 3 as opposed to any lung issues. No overt failure or pneumonia on her x-ray. She is heme positive on rectal examination although no melena or bright red blood. Previous admission for anemia 7 months ago was felt to be due to a combination of methotrexate toxicity as well as some GI blood loss. Patient will require admission. Currently on BiPAP. Will transition to high flow cannula and check ABG. If stable without BiPAP, consult hospitalist for admission. Blood is typed and crossed and will be administered.  Patient also has acute kidney injury. Lactate is elevated at 14. Patient will be given dose of antibiotics. I doubt sepsis is much is anaerobic metabolism due to lack of oxygen carrying capacity. Repeat lactate after fluid bolus. Given history of congestive heart failure, I do not believe 30/kg bolus of fluids indicated. 7:20 PM  Oxygenating fairly well right now on BiPAP. Will give IV fluids simultaneously with blood. Did not want to give too much fluid beforehand for fear of diluting out what little hemoglobin she has. After first unit of blood is in and IV fluids then, reassess oxygenation and hope to start weaning off BiPAP at this point. 9:58 PM  Patient has been weaned to nasal cannula. Somewhat more comfortable. Had some left hip pain. Is had some falls. Thus an x-ray was ordered. Did not show any signs of fracture. Patient receiving second unit of blood. Lactate improving. I still believe more due to low hemoglobin state as opposed to sepsis. Will discuss with hospitalist regarding admission. CRITICAL CARE Documentation: This patient is critically ill and there is a high probability of of imminent or life threatening deterioration in the patient's condition without immediate management. The nature of the patient's clinical problem is: Severe anemia with hemoglobin of 3. Respiratory distress and shortness of breath. Hypotension. I have spent 1 hour in direct patient care, documentation, review of labs/xrays/old records, discussion with Colleague . The time involved in the performance of separately reportable procedures was not counted toward critical care time.      Laureen Rose MD; 12/13/2021 @10:01 PM

## 2021-12-14 ENCOUNTER — ANESTHESIA (OUTPATIENT)
Dept: ENDOSCOPY | Age: 81
DRG: 812 | End: 2021-12-14
Payer: MEDICARE

## 2021-12-14 ENCOUNTER — ANESTHESIA EVENT (OUTPATIENT)
Dept: ENDOSCOPY | Age: 81
DRG: 812 | End: 2021-12-14
Payer: MEDICARE

## 2021-12-14 PROBLEM — I50.32 DIASTOLIC CHF, CHRONIC (HCC): Status: ACTIVE | Noted: 2021-06-13

## 2021-12-14 LAB
ABO + RH BLD: NORMAL
BLD PROD TYP BPU: NORMAL
BLD PROD TYP BPU: NORMAL
BLOOD BANK CMNT PATIENT-IMP: NORMAL
BLOOD GROUP ANTIBODIES SERPL: NORMAL
BPU ID: NORMAL
BPU ID: NORMAL
CROSSMATCH RESULT,%XM: NORMAL
CROSSMATCH RESULT,%XM: NORMAL
HISTORY CHECKED?,CKHIST: NORMAL
HISTORY CHECKED?,CKHIST: NORMAL
LACTATE SERPL-SCNC: 1.4 MMOL/L (ref 0.4–2)
LACTATE SERPL-SCNC: 2.1 MMOL/L (ref 0.4–2)
LDH SERPL L TO P-CCNC: 500 U/L (ref 110–210)
MAGNESIUM SERPL-MCNC: 2.2 MG/DL (ref 1.8–2.4)
SPECIMEN EXP DATE BLD: NORMAL
STATUS OF UNIT,%ST: NORMAL
STATUS OF UNIT,%ST: NORMAL
UNIT DIVISION, %UDIV: 0
UNIT DIVISION, %UDIV: 0

## 2021-12-14 PROCEDURE — 86580 TB INTRADERMAL TEST: CPT | Performed by: STUDENT IN AN ORGANIZED HEALTH CARE EDUCATION/TRAINING PROGRAM

## 2021-12-14 PROCEDURE — 2709999900 HC NON-CHARGEABLE SUPPLY: Performed by: INTERNAL MEDICINE

## 2021-12-14 PROCEDURE — 74011000250 HC RX REV CODE- 250

## 2021-12-14 PROCEDURE — 74011250637 HC RX REV CODE- 250/637: Performed by: HOSPITALIST

## 2021-12-14 PROCEDURE — 80048 BASIC METABOLIC PNL TOTAL CA: CPT

## 2021-12-14 PROCEDURE — 85018 HEMOGLOBIN: CPT

## 2021-12-14 PROCEDURE — 36430 TRANSFUSION BLD/BLD COMPNT: CPT

## 2021-12-14 PROCEDURE — 86923 COMPATIBILITY TEST ELECTRIC: CPT

## 2021-12-14 PROCEDURE — 94760 N-INVAS EAR/PLS OXIMETRY 1: CPT

## 2021-12-14 PROCEDURE — 85027 COMPLETE CBC AUTOMATED: CPT

## 2021-12-14 PROCEDURE — 74011250636 HC RX REV CODE- 250/636

## 2021-12-14 PROCEDURE — 74011000250 HC RX REV CODE- 250: Performed by: HOSPITALIST

## 2021-12-14 PROCEDURE — 76060000031 HC ANESTHESIA FIRST 0.5 HR: Performed by: INTERNAL MEDICINE

## 2021-12-14 PROCEDURE — 83735 ASSAY OF MAGNESIUM: CPT

## 2021-12-14 PROCEDURE — 86901 BLOOD TYPING SEROLOGIC RH(D): CPT

## 2021-12-14 PROCEDURE — 83010 ASSAY OF HAPTOGLOBIN QUANT: CPT

## 2021-12-14 PROCEDURE — 83615 LACTATE (LD) (LDH) ENZYME: CPT

## 2021-12-14 PROCEDURE — 36415 COLL VENOUS BLD VENIPUNCTURE: CPT

## 2021-12-14 PROCEDURE — 0DJ08ZZ INSPECTION OF UPPER INTESTINAL TRACT, VIA NATURAL OR ARTIFICIAL OPENING ENDOSCOPIC: ICD-10-PCS | Performed by: INTERNAL MEDICINE

## 2021-12-14 PROCEDURE — 65660000000 HC RM CCU STEPDOWN

## 2021-12-14 PROCEDURE — 77010033678 HC OXYGEN DAILY

## 2021-12-14 PROCEDURE — P9016 RBC LEUKOCYTES REDUCED: HCPCS

## 2021-12-14 PROCEDURE — 76040000025: Performed by: INTERNAL MEDICINE

## 2021-12-14 PROCEDURE — 74011250636 HC RX REV CODE- 250/636: Performed by: HOSPITALIST

## 2021-12-14 PROCEDURE — 5A09357 ASSISTANCE WITH RESPIRATORY VENTILATION, LESS THAN 24 CONSECUTIVE HOURS, CONTINUOUS POSITIVE AIRWAY PRESSURE: ICD-10-PCS | Performed by: INTERNAL MEDICINE

## 2021-12-14 PROCEDURE — 83605 ASSAY OF LACTIC ACID: CPT

## 2021-12-14 PROCEDURE — 74011000250 HC RX REV CODE- 250: Performed by: STUDENT IN AN ORGANIZED HEALTH CARE EDUCATION/TRAINING PROGRAM

## 2021-12-14 PROCEDURE — C9113 INJ PANTOPRAZOLE SODIUM, VIA: HCPCS | Performed by: HOSPITALIST

## 2021-12-14 RX ORDER — FOLIC ACID 1 MG/1
1 TABLET ORAL DAILY
Status: DISCONTINUED | OUTPATIENT
Start: 2021-12-14 | End: 2021-12-21 | Stop reason: HOSPADM

## 2021-12-14 RX ORDER — SODIUM CHLORIDE 9 MG/ML
250 INJECTION, SOLUTION INTRAVENOUS AS NEEDED
Status: DISCONTINUED | OUTPATIENT
Start: 2021-12-14 | End: 2021-12-21 | Stop reason: HOSPADM

## 2021-12-14 RX ORDER — PROPOFOL 10 MG/ML
INJECTION, EMULSION INTRAVENOUS AS NEEDED
Status: DISCONTINUED | OUTPATIENT
Start: 2021-12-14 | End: 2021-12-14 | Stop reason: HOSPADM

## 2021-12-14 RX ORDER — SODIUM CHLORIDE 0.9 % (FLUSH) 0.9 %
5-40 SYRINGE (ML) INJECTION EVERY 8 HOURS
Status: DISCONTINUED | OUTPATIENT
Start: 2021-12-14 | End: 2021-12-21 | Stop reason: HOSPADM

## 2021-12-14 RX ORDER — SODIUM CHLORIDE 9 MG/ML
10 INJECTION, SOLUTION INTRAVENOUS CONTINUOUS
Status: DISCONTINUED | OUTPATIENT
Start: 2021-12-14 | End: 2021-12-14

## 2021-12-14 RX ORDER — SODIUM CHLORIDE, SODIUM LACTATE, POTASSIUM CHLORIDE, CALCIUM CHLORIDE 600; 310; 30; 20 MG/100ML; MG/100ML; MG/100ML; MG/100ML
100 INJECTION, SOLUTION INTRAVENOUS CONTINUOUS
Status: DISCONTINUED | OUTPATIENT
Start: 2021-12-14 | End: 2021-12-14

## 2021-12-14 RX ORDER — ACETAMINOPHEN 325 MG/1
650 TABLET ORAL
Status: DISCONTINUED | OUTPATIENT
Start: 2021-12-14 | End: 2021-12-21 | Stop reason: HOSPADM

## 2021-12-14 RX ORDER — AMIODARONE HYDROCHLORIDE 200 MG/1
200 TABLET ORAL DAILY
Status: DISCONTINUED | OUTPATIENT
Start: 2021-12-14 | End: 2021-12-21 | Stop reason: HOSPADM

## 2021-12-14 RX ORDER — SODIUM CHLORIDE, SODIUM LACTATE, POTASSIUM CHLORIDE, CALCIUM CHLORIDE 600; 310; 30; 20 MG/100ML; MG/100ML; MG/100ML; MG/100ML
INJECTION, SOLUTION INTRAVENOUS
Status: DISCONTINUED | OUTPATIENT
Start: 2021-12-14 | End: 2021-12-14 | Stop reason: HOSPADM

## 2021-12-14 RX ORDER — SODIUM CHLORIDE 0.9 % (FLUSH) 0.9 %
5-40 SYRINGE (ML) INJECTION AS NEEDED
Status: DISCONTINUED | OUTPATIENT
Start: 2021-12-14 | End: 2021-12-14

## 2021-12-14 RX ORDER — ETOMIDATE 2 MG/ML
INJECTION INTRAVENOUS AS NEEDED
Status: DISCONTINUED | OUTPATIENT
Start: 2021-12-14 | End: 2021-12-14 | Stop reason: HOSPADM

## 2021-12-14 RX ORDER — METOPROLOL SUCCINATE 50 MG/1
50 TABLET, EXTENDED RELEASE ORAL DAILY
Status: DISCONTINUED | OUTPATIENT
Start: 2021-12-14 | End: 2021-12-14

## 2021-12-14 RX ORDER — ONDANSETRON 2 MG/ML
4 INJECTION INTRAMUSCULAR; INTRAVENOUS
Status: DISCONTINUED | OUTPATIENT
Start: 2021-12-14 | End: 2021-12-21 | Stop reason: HOSPADM

## 2021-12-14 RX ORDER — DULOXETIN HYDROCHLORIDE 60 MG/1
60 CAPSULE, DELAYED RELEASE ORAL DAILY
Status: DISCONTINUED | OUTPATIENT
Start: 2021-12-14 | End: 2021-12-21 | Stop reason: HOSPADM

## 2021-12-14 RX ORDER — ONDANSETRON 4 MG/1
4 TABLET, ORALLY DISINTEGRATING ORAL
Status: DISCONTINUED | OUTPATIENT
Start: 2021-12-14 | End: 2021-12-21 | Stop reason: HOSPADM

## 2021-12-14 RX ORDER — LIDOCAINE HYDROCHLORIDE 20 MG/ML
INJECTION, SOLUTION EPIDURAL; INFILTRATION; INTRACAUDAL; PERINEURAL AS NEEDED
Status: DISCONTINUED | OUTPATIENT
Start: 2021-12-14 | End: 2021-12-14 | Stop reason: HOSPADM

## 2021-12-14 RX ORDER — SODIUM CHLORIDE 9 MG/ML
75 INJECTION, SOLUTION INTRAVENOUS CONTINUOUS
Status: DISCONTINUED | OUTPATIENT
Start: 2021-12-14 | End: 2021-12-14

## 2021-12-14 RX ORDER — LEVOTHYROXINE SODIUM 50 UG/1
25 TABLET ORAL
Status: DISCONTINUED | OUTPATIENT
Start: 2021-12-14 | End: 2021-12-15

## 2021-12-14 RX ORDER — SODIUM CHLORIDE 9 MG/ML
250 INJECTION, SOLUTION INTRAVENOUS AS NEEDED
Status: DISCONTINUED | OUTPATIENT
Start: 2021-12-14 | End: 2021-12-14

## 2021-12-14 RX ORDER — ACETAMINOPHEN 650 MG/1
650 SUPPOSITORY RECTAL
Status: DISCONTINUED | OUTPATIENT
Start: 2021-12-14 | End: 2021-12-21 | Stop reason: HOSPADM

## 2021-12-14 RX ORDER — METOPROLOL TARTRATE 25 MG/1
12.5 TABLET, FILM COATED ORAL 2 TIMES DAILY
Status: DISCONTINUED | OUTPATIENT
Start: 2021-12-14 | End: 2021-12-21 | Stop reason: HOSPADM

## 2021-12-14 RX ORDER — POLYETHYLENE GLYCOL 3350 17 G/17G
17 POWDER, FOR SOLUTION ORAL DAILY PRN
Status: DISCONTINUED | OUTPATIENT
Start: 2021-12-14 | End: 2021-12-21 | Stop reason: HOSPADM

## 2021-12-14 RX ADMIN — PROPOFOL 20 MG: 10 INJECTION, EMULSION INTRAVENOUS at 14:09

## 2021-12-14 RX ADMIN — FOLIC ACID 1 MG: 1 TABLET ORAL at 10:52

## 2021-12-14 RX ADMIN — LIDOCAINE HYDROCHLORIDE 60 MG: 20 INJECTION, SOLUTION EPIDURAL; INFILTRATION; INTRACAUDAL; PERINEURAL at 14:09

## 2021-12-14 RX ADMIN — TUBERCULIN PURIFIED PROTEIN DERIVATIVE 5 UNITS: 5 INJECTION, SOLUTION INTRADERMAL at 17:06

## 2021-12-14 RX ADMIN — METOPROLOL TARTRATE 12.5 MG: 25 TABLET, FILM COATED ORAL at 17:06

## 2021-12-14 RX ADMIN — PROPOFOL 10 MG: 10 INJECTION, EMULSION INTRAVENOUS at 14:10

## 2021-12-14 RX ADMIN — SODIUM CHLORIDE 75 ML/HR: 900 INJECTION, SOLUTION INTRAVENOUS at 06:52

## 2021-12-14 RX ADMIN — ETOMIDATE 4 MG: 2 INJECTION, SOLUTION INTRAVENOUS at 14:09

## 2021-12-14 RX ADMIN — SODIUM CHLORIDE 40 MG: 9 INJECTION INTRAMUSCULAR; INTRAVENOUS; SUBCUTANEOUS at 23:52

## 2021-12-14 RX ADMIN — LEVOTHYROXINE SODIUM 25 MCG: 0.05 TABLET ORAL at 06:52

## 2021-12-14 RX ADMIN — Medication 5 ML: at 17:11

## 2021-12-14 RX ADMIN — Medication 10 ML: at 21:24

## 2021-12-14 RX ADMIN — SODIUM CHLORIDE, SODIUM LACTATE, POTASSIUM CHLORIDE, AND CALCIUM CHLORIDE: 600; 310; 30; 20 INJECTION, SOLUTION INTRAVENOUS at 14:00

## 2021-12-14 RX ADMIN — DULOXETINE HYDROCHLORIDE 60 MG: 60 CAPSULE, DELAYED RELEASE ORAL at 10:52

## 2021-12-14 RX ADMIN — Medication 5 ML: at 17:12

## 2021-12-14 RX ADMIN — SODIUM CHLORIDE 40 MG: 9 INJECTION INTRAMUSCULAR; INTRAVENOUS; SUBCUTANEOUS at 17:06

## 2021-12-14 RX ADMIN — METOPROLOL SUCCINATE 50 MG: 50 TABLET, EXTENDED RELEASE ORAL at 10:52

## 2021-12-14 RX ADMIN — AMIODARONE HYDROCHLORIDE 200 MG: 200 TABLET ORAL at 10:52

## 2021-12-14 RX ADMIN — PROPOFOL 10 MG: 10 INJECTION, EMULSION INTRAVENOUS at 14:11

## 2021-12-14 NOTE — H&P
Epi Hospitalist History and Physical       Name:  Iqra Mcdaniel  Age:81 y.o. Sex:female   :  1940    MRN:  [id number]   PCP:  Catalina Trejo MD      Admit Date:  2021  4:32 PM   Chief Complaint: \"Pt arrives via EMS, per EMS, on CPAP, pt family found pt at home lethargic, pt was saying she \"couldn't breathe\". LKN x 2 days ago. No RA sat on pt with EMS, RR 40, 2.5 albuterol, bilateral edema to legs, placed on CPAP. HR 60, /67, BGL 90, 20g IV LAC \"    Reason for Admission:   Anemia [D64.9] Severe anemia, Hgb 3.3 tonight, in this patient with h/o multifactorial anemia, thought due to MTX bone marrow toxicity and GI bleed, last admission in May for same. She has been ordered 2 units in ER. ALso multiple lab abnormalities including NAZIA  With KCD, metabolic lactic acidosis. Assessment & Plan:     Principal Problem:    Anemia (2021)        Active Problems:    Hyperlipidemia (2016)          Hypertension, essential, benign (2016)          Acute renal failure superimposed on chronic kidney disease ([facility] [address] (2019)          Paroxysmal atrial fibrillation (HCC) (3/19/2021)          Lactic acidosis (2021)            PLAN:  1) Admit to Remote TELE, inpatient status   2) She already has 2 units ordered from ER, with repeat HGB after 2nd unit  3) Will gentlly hydrate given NAZIA and lactic acidosis, carefully monitor for fluid overload given h/o CHF. Her most recent ECHO was earlier this year showing EF 55-60%  4) Trend Lactic acid  5) Will consult GI in AM. Will start her on IV PPI . Keep her on clears. 6) Hold her anticoagulation for now. (Xarelto)  7) She has no close family, lives alone, desires Full code status. Emergency contact is [facility].         Disposition/Expected LOS: 4  Diet: DIET ADULT  VTE ppx: scd  GI ppx: PPI  Code status: Prior  Surrogate decision-maker: [facility]      History of Presenting Illness:     Iqra Mcdaniel is a 80 y.o. female with medical history of hypothyroidism, anemia, CKD, brought in by EMS. Patient lives by herself. States she uses oxygen when needed. EMS was called by family when they found her lethargic and short of breath. Last known well 2 days ago. Patient denies any pain. Has complaints of shortness of breath. She denies any fever chills. Some cough. No vomiting or diarrhea. EMS could not obtain O2 saturation. She was placed on CPAP. Transition to BiPAP when she arrived here. Review of records reveals congestive heart failure with a chronic left pleural effusion. Atrial fibrillation is on anticoagulant (Xarelto). History of anemia in the past.  History rheumatoid arthritis. Review of records reveals the patient last hospitalized about 6 months ago for congestive heart failure and presumed pneumonia at that time. Also history of some anemia (Hgb 3.7) that was felt to be possible due to GI bleed and also methotrexate toxicity. Patient denies to me any bleeding. Patient does live by herself. No family here at this point. Workup in reveals HGB 3.3, she also had anion gap metabolic acidosis, likely due to lactic acid (LA =14.7) , Aravind on CKD (Creatinine 1.53--->3.04). ER MD has ordered 2 units prBC, IVF, and empiric Abx, though at this time no obvious source of infection. ER doc documented : \"She is heme positive on rectal examination although no melena or bright red blood. \"    Review of Systems:  A 14 point review of systems was taken and pertinent positive as per HPI.         Past Medical History:   Diagnosis Date    Acquired hypothyroidism 1/16/2016    Breast cancer ([facility] [address] 2008    Depression 8/18/2016    Endocrine disease     hypothyroid    Fungal dermatitis 8/18/2016    Hyperlipidemia 1/16/2016    Hypertension, essential, benign 8/18/2016    Hypokalemia 8/18/2016    Inflamed sebaceous cyst 8/18/2016    Major depressive disorder, recurrent, moderate ([address] 8/18/2016    Nicotine dependence, cigarettes, uncomplicated 5/25/5644    Osteopenia 8/18/2016    Osteoporosis 8/18/2016    Radiation therapy complication 0479    Rheumatoid arthritis ([facility] [address] 1/16/2016    Right carotid bruit 1/16/2016    TIA (transient ischemic attack) 1/16/2016    Tobacco abuse 8/18/2016       Past Surgical History:   Procedure Laterality Date    HX ADENOIDECTOMY      HX BREAST LUMPECTOMY Right 2008    with lymph nodes    HX TONSILLECTOMY      IR BX BONE MARROW DIAGNOSTIC  5/14/2021    IR INSERT NON TUNL CVC OVER 5 YRS  5/14/2021       Family History : reviewed  Family History   Problem Relation Age of Onset    Stroke Mother     Cancer Father         Brain    HIV/AIDS Brother         Social History     Tobacco Use    Smoking status: Current Some Day Smoker    Smokeless tobacco: Never Used    Tobacco comment: Only on pay day-1/2 pack   Substance Use Topics    Alcohol use: No       Allergies   Allergen Reactions    Eliquis [Apixaban] Other (comments)     Patient states she had hallucinations from Eliquis       Immunization History   Administered Date(s) Administered    Influenza High Dose Vaccine PF 10/28/2015, 09/21/2016, 09/15/2017    Pneumococcal Polysaccharide (PPSV-23) 01/18/2016    TB Skin Test (PPD) Intradermal 04/22/2019, 05/12/2021         PTA Medications:  Current Outpatient Medications   Medication Instructions    amiodarone (CORDARONE) 200 mg, Oral, DAILY    apixaban (ELIQUIS) 5 mg, Oral, 2 TIMES DAILY    cholecalciferol (VITAMIN D3) 1,000 Units, Oral, DAILY    DULoxetine (CYMBALTA) 60 mg, Oral, DAILY    folic acid (FOLVITE) 1 mg, Oral, DAILY    furosemide (LASIX) 20 mg, Oral, DAILY    levothyroxine (SYNTHROID) 25 mcg, Oral, DAILY BEFORE BREAKFAST    metoprolol succinate (TOPROL-XL) 50 mg, Oral, DAILY    ondansetron (ZOFRAN ODT) 4 mg, Oral, EVERY 8 HOURS AS NEEDED    pantoprazole (PROTONIX) 40 mg, Oral, 2 TIMES DAILY BEFORE MEALS    rosuvastatin (CRESTOR) 10 mg, Oral, EVERY BEDTIME Objective:     Patient Vitals for the past 24 hrs:   Temp Pulse Resp BP SpO2   12/13/21 2315 98.2 °F (36.8 °C) 65 14 (!) 112/48 100 %   12/13/21 2300  65 16 (!) 112/50 100 %   12/13/21 2245 98.3 °F (36.8 °C) 65 22 (!) 102/46 100 %   12/13/21 2240 98.1 °F (36.7 °C) 66 20 113/72 100 %   12/13/21 2235 98.2 °F (36.8 °C) 65 20 98/64 100 %   12/13/21 2230 98.5 °F (36.9 °C) 65 18 (!) 106/48 100 %   12/13/21 2212     100 %   12/13/21 2130  63  119/64    12/13/21 2129  63 18  100 %   12/13/21 2115  62 9 (!) 113/96 100 %   12/13/21 2111  62 14 (!) 111/91 91 %   12/13/21 2100  61 18 (!) 128/39 100 %   12/13/21 2045  62 20 (!) 123/42 100 %   12/13/21 2040     100 %   12/13/21 2039 97 °F (36.1 °C) 63 15 123/60 100 %   12/13/21 2030  62 20 (!) 127/51 100 %   12/13/21 2014    (!) 123/47    12/13/21 2000 (!) 96.7 °F (35.9 °C) 62 17 (!) 112/46 98 %   12/13/21 1955  63 25 (!) 113/41    12/13/21 1951 97.4 °F (36.3 °C) 63 26 (!) 104/44 98 %   12/13/21 1946 (!) 96.6 °F (35.9 °C) 62 24 (!) 109/33 100 %   12/13/21 1941 96.9 °F (36.1 °C) 63 24 (!) 113/33 100 %   12/13/21 1936 97 °F (36.1 °C) 64 20 (!) 104/37 100 %   12/13/21 1916  62 20 (!) 100/41    12/13/21 1902  66 25  100 %   12/13/21 1901  66  (!) 95/33    12/13/21 1845  63 26 (!) 116/39 100 %   12/13/21 1833  63 24 (!) 98/58 100 %   12/13/21 1830  62 28 (!) 86/58    12/13/21 1826  62 17 (!) 107/55 100 %   12/13/21 1801  (!) 58 17 105/67    12/13/21 1745  (!) 59 18 103/89 100 %   12/13/21 1729  (!) 59 22 100/85    12/13/21 1714 (!) 95.2 °F (35.1 °C)       12/13/21 1710  61 20 (!) 114/96 100 %   12/13/21 1703  60 17 95/82    12/13/21 1656  60 21 (!) 99/43    12/13/21 1645  61 16 (!) 121/34    12/13/21 1638     100 %   12/13/21 1637  62      12/13/21 1636    (!) 97/39        Oxygen Therapy  O2 Sat (%): 100 % (12/13/21 2315)  Pulse via Oximetry: 65 beats per minute (12/13/21 2300)  O2 Device: Hi flow nasal cannula (12/13/21 2155)  O2 Flow Rate (L/min): 4 l/min (12/13/21 2212)  FIO2 (%): 40 % (12/13/21 2110)    There is no height or weight on file to calculate BMI. Physical Exam:    General:  Alert and oriented x 3, No acute distress, speaking in full sentences  HEENT:  Pupils equal and reactive to light and accommodation, oropharynx is clear. exopthalmos  Neck:   Supple, no lymphadenopathy, no JVD   Lungs:  Diminished breath sounds left  CV:   Regular rate, regular rhythm, with normal S1 and S2   Abdomen:  Soft, nontender, nondistended, normoactive bowel sounds   Extremities:  No cyanosis clubbing. 2+ pitting edema  Neuro:  Nonfocal, A&O x3   Psych:  Normal mood and affect       Data Reviewed: I have reviewed all labs, meds, and studies. Recent Results (from the past 24 hour(s))   CBC WITH AUTOMATED DIFF    Collection Time: 12/13/21  4:44 PM   Result Value Ref Range    WBC 6.7 4.3 - 11.1 K/uL    RBC 1.96 (L) 4.05 - 5.2 M/uL    HGB 3.3 (LL) 11.7 - 15.4 g/dL    HCT 14.1 (L) 35.8 - 46.3 %    MCV 71.9 (L) 79.6 - 97.8 FL    MCH 16.8 (L) 26.1 - 32.9 PG    MCHC 23.4 (L) 31.4 - 35.0 g/dL    RDW 22.2 (H) 11.9 - 14.6 %    PLATELET 259 (H) 626 - 450 K/uL    MPV 11.5 9.4 - 12.3 FL    ABSOLUTE NRBC 0.38 (H) 0.0 - 0.2 K/uL    DF AUTOMATED      NEUTROPHILS 70 43 - 78 %    LYMPHOCYTES 13 13 - 44 %    MONOCYTES 16 (H) 4.0 - 12.0 %    EOSINOPHILS 0 (L) 0.5 - 7.8 %    BASOPHILS 0 0.0 - 2.0 %    IMMATURE GRANULOCYTES 0 0.0 - 5.0 %    ABS. NEUTROPHILS 4.7 1.7 - 8.2 K/UL    ABS. LYMPHOCYTES 0.9 0.5 - 4.6 K/UL    ABS. MONOCYTES 1.1 0.1 - 1.3 K/UL    ABS. EOSINOPHILS 0.0 0.0 - 0.8 K/UL    ABS. BASOPHILS 0.0 0.0 - 0.2 K/UL    ABS. IMM.  GRANS. 0.0 0.0 - 0.5 K/UL   METABOLIC PANEL, COMPREHENSIVE    Collection Time: 12/13/21  4:44 PM   Result Value Ref Range    Sodium 138 136 - 145 mmol/L    Potassium 4.3 3.5 - 5.1 mmol/L    Chloride 100 98 - 107 mmol/L    CO2 18 (L) 21 - 32 mmol/L    Anion gap 20 (H) 7 - 16 mmol/L    Glucose 50 (L) 65 - 100 mg/dL    BUN 29 (H) 8 - 23 MG/DL    Creatinine 3.04 (H) 0.6 - 1.0 MG/DL    GFR est AA 19 (L) >60 ml/min/1.73m2    GFR est non-AA 16 (L) >60 ml/min/1.73m2    Calcium 8.5 8.3 - 10.4 MG/DL    Bilirubin, total 1.2 (H) 0.2 - 1.1 MG/DL    ALT (SGPT) 64 12 - 65 U/L    AST (SGOT) 114 (H) 15 - 37 U/L    Alk.  phosphatase 77 50 - 136 U/L    Protein, total 6.0 (L) 6.3 - 8.2 g/dL    Albumin 2.3 (L) 3.2 - 4.6 g/dL    Globulin 3.7 (H) 2.3 - 3.5 g/dL    A-G Ratio 0.6 (L) 1.2 - 3.5     MAGNESIUM    Collection Time: 12/13/21  4:44 PM   Result Value Ref Range    Magnesium 2.6 (H) 1.8 - 2.4 mg/dL   LACTIC ACID    Collection Time: 12/13/21  4:44 PM   Result Value Ref Range    Lactic acid 14.7 (HH) 0.4 - 2.0 MMOL/L   NT-PRO BNP    Collection Time: 12/13/21  4:44 PM   Result Value Ref Range    NT pro-BNP 8,862 (H) <450 PG/ML   PROCALCITONIN    Collection Time: 12/13/21  4:44 PM   Result Value Ref Range    Procalcitonin 0.06 0.00 - 0.49 ng/mL   PROTHROMBIN TIME + INR    Collection Time: 12/13/21  4:44 PM   Result Value Ref Range    Prothrombin time 32.9 (H) 12.6 - 14.5 sec    INR 3.2     PTT    Collection Time: 12/13/21  4:44 PM   Result Value Ref Range    aPTT 29.9 24.1 - 35.1 SEC   TSH 3RD GENERATION    Collection Time: 12/13/21  4:44 PM   Result Value Ref Range    TSH 17.300 (H) 0.358 - 3.740 uIU/mL   POC VENOUS BLOOD GAS    Collection Time: 12/13/21  5:00 PM   Result Value Ref Range    Device: BIPAP MASK      FIO2 (POC) 60 %    pH, venous (POC) 7.33 7.32 - 7.42      pCO2, venous (POC) 32.1 (L) 41 - 51 MMHG    pO2, venous (POC) 26 (LL) mmHg    HCO3, venous (POC) 16.9 (L) 23 - 28 MMOL/L    sO2, venous (POC) 44.3 (L) 65 - 88 %    Base deficit, venous (POC) 8.3 mmol/L    PEEP/CPAP (POC) 6 cmH2O    Pressure support 10 cmH2O    Allens test (POC) Negative      Site LEFT RADIAL      Specimen type (POC) VENOUS BLOOD      Performed by Flor Jacobs     Critical value read back DRFINN    URINALYSIS W/ RFLX MICROSCOPIC    Collection Time: 12/13/21  5:43 PM   Result Value Ref Range    Color YELLOW      Appearance CLEAR      Specific gravity 1.014 1.001 - 1.023      pH (UA) 5.0 5.0 - 9.0      Protein Negative NEG mg/dL    Glucose Negative mg/dL    Ketone Negative NEG mg/dL    Bilirubin Negative NEG      Blood Negative NEG      Urobilinogen 1.0 0.2 - 1.0 EU/dL    Nitrites Negative NEG      Leukocyte Esterase Negative NEG     TYPE & SCREEN    Collection Time: 12/13/21  5:45 PM   Result Value Ref Range    Crossmatch Expiration 12/16/2021,2359     ABO/Rh(D) A POSITIVE     Antibody screen NEG     Unit number [id number]     Blood component type  LR     Unit division 00     Status of unit ISSUED     Crossmatch result Compatible     Unit number [id number]     Blood component type  LR     Unit division 00     Status of unit ISSUED     Crossmatch result Compatible    RBC, ALLOCATE    Collection Time: 12/13/21  6:00 PM   Result Value Ref Range    HISTORY CHECKED?  Historical check performed    BLOOD GAS, ARTERIAL POC    Collection Time: 12/13/21  6:20 PM   Result Value Ref Range    Device: BIPAP MASK      FIO2 (POC) 70 %    pH (POC) 7.34 (L) 7.35 - 7.45      pCO2 (POC) 33.4 (L) 35 - 45 MMHG    pO2 (POC) 315 (H) 75 - 100 MMHG    HCO3 (POC) 18.0 (L) 22 - 26 MMOL/L    sO2 (POC) 99.9 (H) 95 - 98 %    Base deficit (POC) 7.1 mmol/L    PEEP/CPAP (POC) 8 cmH2O    Pressure support 10 cmH2O    Allens test (POC) Positive      Site RIGHT RADIAL      Specimen type (POC) ARTERIAL      Performed by [facility]     Critical value read back EMANUEL    LACTIC ACID    Collection Time: 12/13/21  7:48 PM   Result Value Ref Range    Lactic acid 12.3 (HH) 0.4 - 2.0 MMOL/L   GLUCOSE, POC    Collection Time: 12/13/21  9:17 PM   Result Value Ref Range    Glucose (POC) 77 65 - 100 mg/dL    Performed by Jayda    HGB & HCT    Collection Time: 12/13/21  9:46 PM   Result Value Ref Range    HGB 4.6 (LL) 11.7 - 15.4 g/dL    HCT 17.3 (L) 35.8 - 46.3 %       EKG Results Procedure 720 Value Units Date/Time    EKG [[id number]]     Order Status: Completed           All Micro Results     Procedure Component Value Units Date/Time    COVID-19 RAPID TEST [[id number]] Collected: 12/13/21 2309    Order Status: Completed Updated: 12/13/21 2315    CULTURE, BLOOD [[id number]] Collected: 12/13/21 1644    Order Status: Completed Specimen: Blood Updated: 12/13/21 1835    CULTURE, BLOOD [[id number]] Collected: 12/13/21 1644    Order Status: Completed Specimen: Blood Updated: 12/13/21 1835          Other Studies:  XR HIP LT W OR WO PELV 2-3 VWS    Result Date: 12/13/2021  EXAMINATION: Left Hip HISTORY: L hip pain. TECHNIQUE: Single frontal view of the pelvis. AP and lateral views of the left hip. COMPARISON: None available. FINDINGS: No evidence of acute fracture or dislocation of the pelvis. Bilateral hip joints are intact. The left femoral neck is intact. Soft tissues are unremarkable. No evidence of acute fracture or dislocation of the left hip. XR CHEST PORT    Result Date: 12/13/2021  Portable chest x-ray CLINICAL INDICATION: Shortness of breath FINDINGS: Single AP view the chest compared to a similar exam dated 6/20/2021 shows persistent old right-sided rib fractures. The right lung is well-expanded and unchanged without pneumothorax. There is a persistent, moderate-sized left pleural effusion. The cardiac silhouette and mediastinum are stable. There is no pneumothorax. 1. Stable moderate size left pleural effusion.         Medications:  Medications Administered      Medications Administered     dextrose (D50W) injection syrg 25 g     Admin Date  12/13/2021 Action  Given Dose  25 g Route  IntraVENous Administered By  Fredis Majano RN          HYDROmorphone (DILAUDID) injection 0.2 mg     Admin Date  12/13/2021 Action  Given Dose  0.2 mg Route  IntraVENous Administered By  Thaddeus Slater RN          ondansetron [facility]) injection 4 mg     Admin Date  12/13/2021 Action  Given Dose  4 mg Route  IntraVENous Administered By  Monica Simmons RN          piperacillin-tazobactam (ZOSYN) 4.5 g in 0.9% sodium chloride (MBP/ADV) 100 mL MBP     Admin Date  12/13/2021 Action  New Bag Dose  4.5 g Rate  200 mL/hr Route  IntraVENous Administered By  Monica Simmons RN          sodium chloride 0.9 % bolus infusion 500 mL     Admin Date  12/13/2021 Action  New Bag Dose  500 mL Rate  500 mL/hr Route  IntraVENous Administered By  Andres Rivera RN                    Signed By: Garret Kemp MD   [facility] Hospitalist [facility]    December 13, 2021   11:29 PM

## 2021-12-14 NOTE — ROUTINE PROCESS
TRANSFER - OUT REPORT:    Verbal report given to HUMBERTO Connell on Arik Links  being transferred to [address] [place] [address] for routine progression of care       Report consisted of patients Situation, Background, Assessment and   Recommendations(SBAR). Information from the following report(s) SBAR and Procedure Summary was reviewed with the receiving nurse. Lines:   Peripheral IV 12/13/21 Left Antecubital (Active)   Site Assessment Clean, dry, & intact 12/13/21 1636   Phlebitis Assessment 0 12/13/21 1636   Infiltration Assessment 0 12/13/21 1636       Peripheral IV 12/14/21 Posterior; Right Hand (Active)        Opportunity for questions and clarification was provided.       Patient transported with:   O2 @ 4 liters

## 2021-12-14 NOTE — PROGRESS NOTES
Hospitalist Progress Note   Admit Date:  2021  4:32 PM   Name:  Daniella Desir   Age:  80 y.o. Sex:  female  :  1940   MRN:  [id number]   Room:  ER31/    Presenting Complaint: Respiratory Distress    Reason(s) for Admission: Anemia [D64.9]     Hospital Course & Interval History:   Patient is a 79 y/o female with PMH HLD, HTN, CKD III, pAF (on Xarelto), chronic diastolic CHF, breast ca (7329), hypothyroidism, chronic hypoxic respiratory failure (2L NC continuous), depression, hx of leukopenia likely due to MTX toxicity s/p Granix, rheumatoid arthritis who presented to ED via EMS after friend found patient at home, lethargic, stating she \"couldn't breathe\" with last known well x 2 days prior. Patient endorses she tripped on a cord and fell on left side. Tachypneic and placed on CPAP by EMS, no saturation obtained, given albuterol. BLE edema present, HR 60, /67, BGL 90, RR 40. Upon ED arrival, patient found severely anemic with Hgb 3.3. Left Hip XR with no evidence of acute fracture or dislocation of the left hip. ED workup also with NAZIA on CKD with metabolic lactic acidosis. Patient was admitted for anemia and NAZIA with GI consulted. Patient received 2 U PRBC in ED with repeat Hgb 6.3 this am, will order 1 additional U PRBC. Patient underwent emergent EGD  with no abnormal findings. Hematology has been consulted for further evaluation. Can consider small bowel capsule as well. Adding labs to assess for hemolysis. Of note, patient was hospitalized in May with similar admission with anemia at that time multifactorial with possible MTX bone marrow toxicity and GIB. Subjective (21):  Patient alert, oriented x 3, calm, cooperative, seen post-EGD. Denies chest pain and SOB remains at baseline. Endorses cough recently with clear sputum. Endorses poor appetite x 3 days but denies vomiting, emesis, diarrhea.      Assessment & Plan:     Principal Problem:  Anemia   -Hgb 3.3 on presentation s/p 2 U PRBC in ED, Hgb currently 6.3 with 1 additional U PRBC ordered for transfusion this morning  -Home Xarelto held, Protonix IV BID  -GI consulted and patient underwent EGD 12/14 with no abnormal findings  -Consider small bowel capsule  -Hematology consulted, have added labs to assess for hemolysis  -Repeat H&H currently pending    Active Problems:  Hyperlipidemia   -Continue statin    Hypertension, essential, benign   -BP stable, continue BB but dose decreased due to low/norm BP    Acute renal failure superimposed on chronic kidney disease III (HCC)   -Baseline sCr ~1.5-1.7; elevated to 3.04 on presentation, now 2.79  -Avoid nephrotoxic medications  -Cautious IVF resuscitation with hx of diastolic CHF and has received 3 U PRBC today, +1.5 L since admission, will hold further IVF overnight and evaluate labs in am, non-oliguric    Paroxysmal atrial fibrillation ([address]   -Xarelto held, continue Amiodarone and BB  -Continuous telemetry monitoring    Lactic acidosis, resolved  -LA 14.7 on admission, 1.4 this am, continue to monitor    Hypothyroidism  -Continue Synthroid    Depression  -Continue Cymbalta    Chronic Diastolic Heart Failure  -[facility] Cardiology outpatient, does have a history of pericardial effusion earlier this year, Echo 2016 with Grade 2 DD, most recent Echo May 8773 normal systolic function, currently on Lasix 20 mg PO daily  -pBNP 8862, evidence of left pleural effusion on imaging   -Diuretics held due to NAZIA  -Cautious IVF resuscitation, received 3 U PRBC today, monitor fluid status closely, strict I&O's and daily weights, fluid status currently + 1.5L    Chronic Hypoxic Respiratory Failure  -On 2L home O2 continuous  -CXR 12/13 with stable moderate size left pleural effusion  -Supplemental O2 currently at 4L NC although saturation % and post-op EGD, wean to baseline 2L if patient tolerates    Left Pleural Effusion  -CXR 12/13 with stable moderate size left pleural effusion  -Unable to tolerate diuresis with renal function, stable at 4L NC with adequate saturations, continue to monitor    Dispo/Discharge Planning:  Pending clinical course    Diet:  ADULT DIET Clear Liquid  DVT PPx: SCD's  Code status: Full Code    Hospital Problems as of 12/14/2021 Date Reviewed: 6/28/2021          Codes Class Noted - Resolved POA    Lactic acidosis ICD-10-CM: E87.2  ICD-9-CM: 276.2  12/13/2021 - Present Unknown        Diastolic CHF, chronic (HCC) ICD-10-CM: I50.32  ICD-9-CM: 428.32, 428.0  6/13/2021 - Present Unknown        * (Principal) Anemia ICD-10-CM: D64.9  ICD-9-CM: 285.9  5/12/2021 - Present Yes        Paroxysmal atrial fibrillation (HCC) (Chronic) ICD-10-CM: I48.0  ICD-9-CM: 427.31  3/19/2021 - Present Yes        Acute renal failure superimposed on chronic kidney disease ([facility] ICD-10-CM: N17.9, N18.9  ICD-9-CM: 584.9, 585.9  4/22/2019 - Present Unknown        Hypertension, essential, benign (Chronic) ICD-10-CM: I10  ICD-9-CM: 401.1  8/18/2016 - Present Yes        Major depressive disorder, recurrent, moderate (HCC) (Chronic) ICD-10-CM: F33.1  ICD-9-CM: 296.32  8/18/2016 - Present Yes        Acquired hypothyroidism (Chronic) ICD-10-CM: E03.9  ICD-9-CM: 244.9  1/16/2016 - Present Yes        Hyperlipidemia (Chronic) ICD-10-CM: E78.5  ICD-9-CM: 272.4  1/16/2016 - Present Yes              Objective:     Patient Vitals for the past 24 hrs:   Temp Pulse Resp BP SpO2   12/14/21 0058 99.6 °F (37.6 °C) 65 16 (!) 122/54 95 %   12/14/21 0030 98 °F (36.7 °C) 66 19 117/68 100 %   12/14/21 0000 98.8 °F (37.1 °C) 65 11 (!) 112/48 99 %   12/13/21 2330  65 18 110/70 100 %   12/13/21 2315 98.2 °F (36.8 °C) 65 14 (!) 112/48 100 %   12/13/21 2300  65 16 (!) 112/50 100 %   12/13/21 2245 98.3 °F (36.8 °C) 65 22 (!) 102/46 100 %   12/13/21 2240 98.1 °F (36.7 °C) 66 20 113/72 100 %   12/13/21 2235 98.2 °F (36.8 °C) 65 20 98/64 100 %   12/13/21 2230 98.5 °F (36.9 °C) 65 18 (!) 106/48 100 %   12/13/21 2212     100 %   12/13/21 2130  63  119/64    12/13/21 2129  63 18  100 %   12/13/21 2115  62 9 (!) 113/96 100 %   12/13/21 2111  62 14 (!) 111/91 91 %   12/13/21 2100  61 18 (!) 128/39 100 %   12/13/21 2045  62 20 (!) 123/42 100 %   12/13/21 2040     100 %   12/13/21 2039 97 °F (36.1 °C) 63 15 123/60 100 %   12/13/21 2030  62 20 (!) 127/51 100 %   12/13/21 2014    (!) 123/47    12/13/21 2000 (!) 96.7 °F (35.9 °C) 62 17 (!) 112/46 98 %   12/13/21 1955  63 25 (!) 113/41    12/13/21 1951 97.4 °F (36.3 °C) 63 26 (!) 104/44 98 %   12/13/21 1946 (!) 96.6 °F (35.9 °C) 62 24 (!) 109/33 100 %   12/13/21 1941 96.9 °F (36.1 °C) 63 24 (!) 113/33 100 %   12/13/21 1936 97 °F (36.1 °C) 64 20 (!) 104/37 100 %   12/13/21 1916  62 20 (!) 100/41    12/13/21 1902  66 25  100 %   12/13/21 1901  66  (!) 95/33    12/13/21 1845  63 26 (!) 116/39 100 %   12/13/21 1833  63 24 (!) 98/58 100 %   12/13/21 1830  62 28 (!) 86/58    12/13/21 1826  62 17 (!) 107/55 100 %   12/13/21 1801  (!) 58 17 105/67    12/13/21 1745  (!) 59 18 103/89 100 %   12/13/21 1729  (!) 59 22 100/85    12/13/21 1714 (!) 95.2 °F (35.1 °C)       12/13/21 1710  61 20 (!) 114/96 100 %   12/13/21 1703  60 17 95/82    12/13/21 1656  60 21 (!) 99/43    12/13/21 1645  61 16 (!) 121/34    12/13/21 1638     100 %   12/13/21 1637  62      12/13/21 1636    (!) 97/39      Oxygen Therapy  O2 Sat (%): 95 % (12/14/21 0058)  Pulse via Oximetry: 65 beats per minute (12/13/21 2330)  O2 Device: Hi flow nasal cannula (12/13/21 2155)  O2 Flow Rate (L/min): 4 l/min (12/13/21 2212)  FIO2 (%): 40 % (12/13/21 2110)    Estimated body mass index is 27.93 kg/m² as calculated from the following:    Height as of 6/28/21: 5' (1.524 m). Weight as of 6/28/21: 64.9 kg (143 lb).     Intake/Output Summary (Last 24 hours) at 12/14/2021 0807  Last data filed at 12/14/2021 0058  Gross per 24 hour   Intake 1037.1 ml   Output    Net 1037.1 ml Physical Exam:   Blood pressure (!) 122/54, pulse 65, temperature 99.6 °F (37.6 °C), resp. rate 16, SpO2 95 %. General:    Frail, no acute distress, pleasant, conversational, alert  Head:  Normocephalic, atraumatic  Eyes:  Sclerae appear normal.  Pupils equally round. Exophthalmos   ENT:  Nares appear normal, no drainage. Moist oral mucosa  Neck:  No restricted ROM. Trachea midline   CV:   RRR. + murmur. No rubs, gallops, no JVD  Lungs:   Diminished air movement throughout, faint crackles bilateral bases. No wheezing, no rhonchi. Respirations even and unlabored. Abdomen: Bowel sounds present. Soft, nontender, nondistended. Extremities: No cyanosis or clubbing. BLE trace pitting edema. Skin:     No rashes and normal coloration. Warm and dry. Bilateral erythema around ankles, skin dry and flaky  Neuro:  CN II-XII grossly intact. Sensation intact. A&Ox3  Psych:  Normal mood and affect. I have reviewed ordered lab tests and independently visualized imaging below:    Recent Labs:  Recent Results (from the past 48 hour(s))   CBC WITH AUTOMATED DIFF    Collection Time: 12/13/21  4:44 PM   Result Value Ref Range    WBC 6.7 4.3 - 11.1 K/uL    RBC 1.96 (L) 4.05 - 5.2 M/uL    HGB 3.3 (LL) 11.7 - 15.4 g/dL    HCT 14.1 (L) 35.8 - 46.3 %    MCV 71.9 (L) 79.6 - 97.8 FL    MCH 16.8 (L) 26.1 - 32.9 PG    MCHC 23.4 (L) 31.4 - 35.0 g/dL    RDW 22.2 (H) 11.9 - 14.6 %    PLATELET 205 (H) 684 - 450 K/uL    MPV 11.5 9.4 - 12.3 FL    ABSOLUTE NRBC 0.38 (H) 0.0 - 0.2 K/uL    DF AUTOMATED      NEUTROPHILS 70 43 - 78 %    LYMPHOCYTES 13 13 - 44 %    MONOCYTES 16 (H) 4.0 - 12.0 %    EOSINOPHILS 0 (L) 0.5 - 7.8 %    BASOPHILS 0 0.0 - 2.0 %    IMMATURE GRANULOCYTES 0 0.0 - 5.0 %    ABS. NEUTROPHILS 4.7 1.7 - 8.2 K/UL    ABS. LYMPHOCYTES 0.9 0.5 - 4.6 K/UL    ABS. MONOCYTES 1.1 0.1 - 1.3 K/UL    ABS. EOSINOPHILS 0.0 0.0 - 0.8 K/UL    ABS. BASOPHILS 0.0 0.0 - 0.2 K/UL    ABS. IMM.  GRANS. 0.0 0.0 - 0.5 K/UL   METABOLIC PANEL, COMPREHENSIVE    Collection Time: 12/13/21  4:44 PM   Result Value Ref Range    Sodium 138 136 - 145 mmol/L    Potassium 4.3 3.5 - 5.1 mmol/L    Chloride 100 98 - 107 mmol/L    CO2 18 (L) 21 - 32 mmol/L    Anion gap 20 (H) 7 - 16 mmol/L    Glucose 50 (L) 65 - 100 mg/dL    BUN 29 (H) 8 - 23 MG/DL    Creatinine 3.04 (H) 0.6 - 1.0 MG/DL    GFR est AA 19 (L) >60 ml/min/1.73m2    GFR est non-AA 16 (L) >60 ml/min/1.73m2    Calcium 8.5 8.3 - 10.4 MG/DL    Bilirubin, total 1.2 (H) 0.2 - 1.1 MG/DL    ALT (SGPT) 64 12 - 65 U/L    AST (SGOT) 114 (H) 15 - 37 U/L    Alk.  phosphatase 77 50 - 136 U/L    Protein, total 6.0 (L) 6.3 - 8.2 g/dL    Albumin 2.3 (L) 3.2 - 4.6 g/dL    Globulin 3.7 (H) 2.3 - 3.5 g/dL    A-G Ratio 0.6 (L) 1.2 - 3.5     MAGNESIUM    Collection Time: 12/13/21  4:44 PM   Result Value Ref Range    Magnesium 2.6 (H) 1.8 - 2.4 mg/dL   CULTURE, BLOOD    Collection Time: 12/13/21  4:44 PM    Specimen: Blood   Result Value Ref Range    Special Requests: RIGHT  Antecubital        Culture result: NO GROWTH AFTER 11 HOURS     CULTURE, BLOOD    Collection Time: 12/13/21  4:44 PM    Specimen: Blood   Result Value Ref Range    Special Requests: RIGHT  FOREARM        Culture result: NO GROWTH AFTER 11 HOURS     LACTIC ACID    Collection Time: 12/13/21  4:44 PM   Result Value Ref Range    Lactic acid 14.7 (HH) 0.4 - 2.0 MMOL/L   NT-PRO BNP    Collection Time: 12/13/21  4:44 PM   Result Value Ref Range    NT pro-BNP 8,862 (H) <450 PG/ML   PROCALCITONIN    Collection Time: 12/13/21  4:44 PM   Result Value Ref Range    Procalcitonin 0.06 0.00 - 0.49 ng/mL   PROTHROMBIN TIME + INR    Collection Time: 12/13/21  4:44 PM   Result Value Ref Range    Prothrombin time 32.9 (H) 12.6 - 14.5 sec    INR 3.2     PTT    Collection Time: 12/13/21  4:44 PM   Result Value Ref Range    aPTT 29.9 24.1 - 35.1 SEC   TSH 3RD GENERATION    Collection Time: 12/13/21  4:44 PM   Result Value Ref Range    TSH 17.300 (H) 0.358 - 3.740 uIU/mL POC VENOUS BLOOD GAS    Collection Time: 12/13/21  5:00 PM   Result Value Ref Range    Device: BIPAP MASK      FIO2 (POC) 60 %    pH, venous (POC) 7.33 7.32 - 7.42      pCO2, venous (POC) 32.1 (L) 41 - 51 MMHG    pO2, venous (POC) 26 (LL) mmHg    HCO3, venous (POC) 16.9 (L) 23 - 28 MMOL/L    sO2, venous (POC) 44.3 (L) 65 - 88 %    Base deficit, venous (POC) 8.3 mmol/L    PEEP/CPAP (POC) 6 cmH2O    Pressure support 10 cmH2O    Allens test (POC) Negative      Site LEFT RADIAL      Specimen type (POC) VENOUS BLOOD      Performed by [facility]     Critical value read back EMANUEL    URINALYSIS W/ RFLX MICROSCOPIC    Collection Time: 12/13/21  5:43 PM   Result Value Ref Range    Color YELLOW      Appearance CLEAR      Specific gravity 1.014 1.001 - 1.023      pH (UA) 5.0 5.0 - 9.0      Protein Negative NEG mg/dL    Glucose Negative mg/dL    Ketone Negative NEG mg/dL    Bilirubin Negative NEG      Blood Negative NEG      Urobilinogen 1.0 0.2 - 1.0 EU/dL    Nitrites Negative NEG      Leukocyte Esterase Negative NEG     TYPE & SCREEN    Collection Time: 12/13/21  5:45 PM   Result Value Ref Range    Crossmatch Expiration 12/16/2021,2359     ABO/Rh(D) A POSITIVE     Antibody screen NEG     Unit number [id number]     Blood component type  LR     Unit division 00     Status of unit TRANSFUSED     Crossmatch result Compatible     Unit number [id number]     Blood component type [facility]     Unit division 00     Status of unit TRANSFUSED     Crossmatch result Compatible    RBC, ALLOCATE    Collection Time: 12/13/21  6:00 PM   Result Value Ref Range    HISTORY CHECKED?  Historical check performed    BLOOD GAS, ARTERIAL POC    Collection Time: 12/13/21  6:20 PM   Result Value Ref Range    Device: BIPAP MASK      FIO2 (POC) 70 %    pH (POC) 7.34 (L) 7.35 - 7.45      pCO2 (POC) 33.4 (L) 35 - 45 MMHG    pO2 (POC) 315 (H) 75 - 100 MMHG    HCO3 (POC) 18.0 (L) 22 - 26 MMOL/L    sO2 (POC) 99.9 (H) 95 - 98 %    Base deficit (POC) 7.1 mmol/L    PEEP/CPAP (POC) 8 cmH2O    Pressure support 10 cmH2O    Allens test (POC) Positive      Site RIGHT RADIAL      Specimen type (POC) ARTERIAL      Performed by [facility]     Critical value read back EMANUEL    LACTIC ACID    Collection Time: 12/13/21  7:48 PM   Result Value Ref Range    Lactic acid 12.3 (HH) 0.4 - 2.0 MMOL/L   GLUCOSE, POC    Collection Time: 12/13/21  9:17 PM   Result Value Ref Range    Glucose (POC) 77 65 - 100 mg/dL    Performed by [facility]    HGB & HCT    Collection Time: 12/13/21  9:46 PM   Result Value Ref Range    HGB 4.6 (LL) 11.7 - 15.4 g/dL    HCT 17.3 (L) 35.8 - 46.3 %   COVID-19 RAPID TEST    Collection Time: 12/13/21 11:09 PM   Result Value Ref Range    Specimen source NASAL      COVID-19 rapid test Not detected NOTD     METABOLIC PANEL, BASIC    Collection Time: 12/14/21  6:10 AM   Result Value Ref Range    Sodium 140 136 - 145 mmol/L    Potassium 3.9 3.5 - 5.1 mmol/L    Chloride 105 98 - 107 mmol/L    CO2 28 21 - 32 mmol/L    Anion gap 7 7 - 16 mmol/L    Glucose 88 65 - 100 mg/dL    BUN 30 (H) 8 - 23 MG/DL    Creatinine 2.79 (H) 0.6 - 1.0 MG/DL    GFR est AA 21 (L) >60 ml/min/1.73m2    GFR est non-AA 17 (L) >60 ml/min/1.73m2    Calcium 7.9 (L) 8.3 - 10.4 MG/DL   MAGNESIUM    Collection Time: 12/14/21  6:10 AM   Result Value Ref Range    Magnesium 2.2 1.8 - 2.4 mg/dL   CBC W/O DIFF    Collection Time: 12/14/21  6:10 AM   Result Value Ref Range    WBC 14.3 (H) 4.3 - 11.1 K/uL    RBC 2.69 (L) 4.05 - 5.2 M/uL    HGB 6.3 (LL) 11.7 - 15.4 g/dL    HCT 21.3 (L) 35.8 - 46.3 %    MCV 79.2 (L) 79.6 - 97.8 FL    MCH 23.4 (L) 26.1 - 32.9 PG    MCHC 29.6 (L) 31.4 - 35.0 g/dL    RDW 23.2 (H) 11.9 - 14.6 %    PLATELET 430 153 - 839 K/uL    MPV 11.0 9.4 - 12.3 FL    ABSOLUTE NRBC 0.25 (H) 0.0 - 0.2 K/uL   LACTIC ACID    Collection Time: 12/14/21  6:10 AM   Result Value Ref Range    Lactic acid 1.4 0.4 - 2.0 MMOL/L       All Micro Results     Procedure Component Value Units Date/Time    CULTURE, BLOOD [[id number]] Collected: 12/13/21 1644    Order Status: Completed Specimen: Blood Updated: 12/14/21 0615     Special Requests: --        RIGHT  Antecubital       Culture result: NO GROWTH AFTER 11 HOURS       CULTURE, BLOOD [[id number]] Collected: 12/13/21 1644    Order Status: Completed Specimen: Blood Updated: 12/14/21 0615     Special Requests: --        RIGHT  FOREARM       Culture result: NO GROWTH AFTER 11 HOURS       COVID-19 RAPID TEST [[id number]] Collected: 12/13/21 2309    Order Status: Completed Specimen: Nasopharyngeal Updated: 12/13/21 2336     Specimen source NASAL        COVID-19 rapid test Not detected        Comment:      The specimen is NEGATIVE for SARS-CoV-2, the novel coronavirus associated with COVID-19. A negative result does not rule out COVID-19. This test has been authorized by the FDA under an Emergency Use Authorization (EUA) for use by authorized laboratories. Fact sheet for Healthcare Providers: Banter!date.co.nz  Fact sheet for Patients: Banter!date.co.nz       Methodology: Isothermal Nucleic Acid Amplification               Other Studies:  XR HIP LT W OR WO PELV 2-3 VWS    Result Date: 12/13/2021  EXAMINATION: Left Hip HISTORY: L hip pain. TECHNIQUE: Single frontal view of the pelvis. AP and lateral views of the left hip. COMPARISON: None available. FINDINGS: No evidence of acute fracture or dislocation of the pelvis. Bilateral hip joints are intact. The left femoral neck is intact. Soft tissues are unremarkable. No evidence of acute fracture or dislocation of the left hip. XR CHEST PORT    Result Date: 12/13/2021  Portable chest x-ray CLINICAL INDICATION: Shortness of breath FINDINGS: Single AP view the chest compared to a similar exam dated 6/20/2021 shows persistent old right-sided rib fractures. The right lung is well-expanded and unchanged without pneumothorax.  There is a persistent, moderate-sized left pleural effusion. The cardiac silhouette and mediastinum are stable. There is no pneumothorax. 1. Stable moderate size left pleural effusion. Current Meds:  Current Facility-Administered Medications   Medication Dose Route Frequency    sodium chloride (NS) flush 5-40 mL  5-40 mL IntraVENous Q8H    sodium chloride (NS) flush 5-40 mL  5-40 mL IntraVENous PRN    acetaminophen (TYLENOL) tablet 650 mg  650 mg Oral Q6H PRN    Or    acetaminophen (TYLENOL) suppository 650 mg  650 mg Rectal Q6H PRN    polyethylene glycol (MIRALAX) packet 17 g  17 g Oral DAILY PRN    ondansetron (ZOFRAN ODT) tablet 4 mg  4 mg Oral Q8H PRN    Or    ondansetron (ZOFRAN) injection 4 mg  4 mg IntraVENous Q6H PRN    0.9% sodium chloride infusion  75 mL/hr IntraVENous CONTINUOUS    pantoprazole (PROTONIX) 40 mg in 0.9% sodium chloride 10 mL injection  40 mg IntraVENous Q12H    amiodarone (CORDARONE) tablet 200 mg  200 mg Oral DAILY    DULoxetine (CYMBALTA) capsule 60 mg  60 mg Oral DAILY    folic acid (FOLVITE) tablet 1 mg  1 mg Oral DAILY    levothyroxine (SYNTHROID) tablet 25 mcg  25 mcg Oral ACB    metoprolol succinate (TOPROL-XL) XL tablet 50 mg  50 mg Oral DAILY    sodium chloride (NS) flush 5-10 mL  5-10 mL IntraVENous Q8H    sodium chloride (NS) flush 5-10 mL  5-10 mL IntraVENous PRN    0.9% sodium chloride infusion 250 mL  250 mL IntraVENous PRN    0.9% sodium chloride infusion 250 mL  250 mL IntraVENous PRN    0.9% sodium chloride infusion 250 mL  250 mL IntraVENous PRN    0.9% sodium chloride infusion 250 mL  250 mL IntraVENous PRN     Current Outpatient Medications   Medication Sig    rivaroxaban (Xarelto) 20 mg tab tablet Take 20 mg by mouth daily (with breakfast).  metoprolol succinate (TOPROL-XL) 50 mg XL tablet Take 1 Tablet by mouth daily.  furosemide (LASIX) 20 mg tablet Take 1 Tablet by mouth daily.     pantoprazole (PROTONIX) 40 mg tablet Take 1 Tablet by mouth Before breakfast and dinner.  folic acid (FOLVITE) 1 mg tablet Take 1 mg by mouth daily.  amiodarone (CORDARONE) 200 mg tablet Take 1 Tab by mouth daily.  rosuvastatin (Crestor) 10 mg tablet Take 1 Tab by mouth nightly.  ondansetron (ZOFRAN ODT) 4 mg disintegrating tablet Take 1 Tab by mouth every eight (8) hours as needed for Nausea or Vomiting. (Patient not taking: Reported on 6/13/2021)    cholecalciferol (VITAMIN D3) 1,000 unit tablet Take 1,000 Units by mouth daily.  DULoxetine (CYMBALTA) 60 mg capsule Take 1 Cap by mouth daily.  levothyroxine (SYNTHROID) 25 mcg tablet Take 1 Tab by mouth Daily (before breakfast). Labs, vital signs, diagnostic testing reviewed. Case discussed with patient, care team, Dr. Lei Cherry.     Signed:  Rebecca Norris NP

## 2021-12-14 NOTE — ANESTHESIA POSTPROCEDURE EVALUATION
Procedure(s):  ESOPHAGOGASTRODUODENOSCOPY (EGD) /28 ER 32 / Being admitted to 825 *Diagnostic only. total IV anesthesia    Anesthesia Post Evaluation      Multimodal analgesia: multimodal analgesia used between 6 hours prior to anesthesia start to PACU discharge  Patient location during evaluation: bedside  Patient participation: complete - patient participated  Level of consciousness: awake and awake and alert  Pain management: adequate  Airway patency: patent  Anesthetic complications: no  Cardiovascular status: acceptable and stable  Respiratory status: acceptable and nasal cannula  Hydration status: acceptable  Post anesthesia nausea and vomiting:  none  Final Post Anesthesia Temperature Assessment:  Normothermia (36.0-37.5 degrees C)      INITIAL Post-op Vital signs:   Vitals Value Taken Time   /56 12/14/21 1500   Temp 36.3 °C (97.3 °F) 12/14/21 1422   Pulse 68 12/14/21 1506   Resp 16 12/14/21 1500   SpO2 96 % 12/14/21 1506   Vitals shown include unvalidated device data.

## 2021-12-14 NOTE — PROGRESS NOTES
Order for PT noted. Just back from EGD. Hgb still a bit low. Will check on her tomorrow.   Torie Sultana, PT

## 2021-12-14 NOTE — ED NOTES
TRANSFER - OUT REPORT:    Verbal report given to [address] [place] [address] on [facility]  being transferred to [facility] for routine progression of care       Report consisted of patients Situation, Background, Assessment and   Recommendations(SBAR). Information from the following report(s) ED Summary was reviewed with the receiving nurse. Lines:   Peripheral IV 12/13/21 Left Antecubital (Active)   Site Assessment Clean, dry, & intact 12/13/21 1636   Phlebitis Assessment 0 12/13/21 1636   Infiltration Assessment 0 12/13/21 1636        Opportunity for questions and clarification was provided.       Patient transported with:

## 2021-12-14 NOTE — PROGRESS NOTES
TRANSFER - IN REPORT:    Verbal report received from [facility] on [place]  being received from ER for routine progression of care      Report consisted of patients Situation, Background, Assessment and   Recommendations(SBAR). Information from the following report(s) SBAR and Recent Results was reviewed with the receiving nurse. Opportunity for questions and clarification was provided. Assessment completed upon patients arrival to unit and care assumed.

## 2021-12-14 NOTE — OP NOTES
Esophagogastroduodenoscopy    DATE of PROCEDURE: 12/14/2021    INDICATION: GI Bleed    POSTPROCEDURE DIAGNOSIS: Normal    MEDICATIONS ADMINISTERED: MAC anesthesia (see anesthesia report)    INSTRUMENT:    PROCEDURE:  After obtaining informed consent, the patient was placed in the left lateral position and sedated. The endoscope was advanced under direct vision without difficulty. The esophagus, stomach (including retroflexed views) and duodenum were evaluated. The patient was taken to the recovery area in stable condition.     FINDINGS:  ESOPHAGUS:Normal  STOMACH:Normal  DUODENUM: Normal    Estimated blood loss: 0-minimal   Specimens obtained during procedure: none    PLAN: Consider hematology evaluation , and small bowwel capsule

## 2021-12-14 NOTE — PROGRESS NOTES
TRANSFER - IN REPORT:    Verbal report received from [facility] on [place]  being received from ER 31 for ordered procedure      Report consisted of patients Situation, Background, Assessment and   Recommendations(SBAR). Information from the following report(s) SBAR, Intake/Output, MAR, Recent Results, Med Rec Status and Pre Procedure Checklist was reviewed with the receiving nurse. Opportunity for questions and clarification was provided.

## 2021-12-14 NOTE — CONSULTS
[facility] Consult Note       Primary GI Physician: Dr Evelin Carmona    Referring Provider:  Dr Tristen Romeo Date:  12/14/2021    Admit Date:  12/13/2021    Chief Complaint:  Cristela Saint Jacob    Subjective:     History of Present Illness:  Patient is a 80 y.o. female with PMH including but not limited to A Fib (dx 3/2021) on Xarelto, CKD, breast cancer, RA, TIA  who is seen in consultation at the request of Dr. Danial Kowalski for anemia. Hgb on admission was 3.3 (12/13) with elevated BUN 29 and Cr 3.04. Today hgb 6.3. WBC is elevated 14. 3. Xarelto on hold. PPI BID was started. She was seen by us in consult for anemia 5/12 and was noted to be severely anemic with leukopenia. Hematology saw and bone marrow bx with suspected methotrexate toxicity. She presented today with SOB and was found to have hgb of 3.3, now 6.3 after 2 U with a U pending. She denies melena, GERD, BRRB, abd or epigastric pain. She denies NSAID or ETOH use. It is unclear if she has had prior colonoscopy or EGD. No records in our office of such or on care everywhere.         PMH:  Past Medical History:   Diagnosis Date    Acquired hypothyroidism 1/16/2016    Breast cancer ([address] 2008    Depression 8/18/2016    Endocrine disease     hypothyroid    Fungal dermatitis 8/18/2016    Hyperlipidemia 1/16/2016    Hypertension, essential, benign 8/18/2016    Hypokalemia 8/18/2016    Inflamed sebaceous cyst 8/18/2016    Major depressive disorder, recurrent, moderate ([address] 8/18/2016    Nicotine dependence, cigarettes, uncomplicated 5/40/1090    Osteopenia 8/18/2016    Osteoporosis 8/18/2016    Radiation therapy complication 8184    Rheumatoid arthritis ([address] 1/16/2016    Right carotid bruit 1/16/2016    TIA (transient ischemic attack) 1/16/2016    Tobacco abuse 8/18/2016       PSH:  Past Surgical History:   Procedure Laterality Date    HX ADENOIDECTOMY      HX BREAST LUMPECTOMY Right 2008    with lymph nodes    HX TONSILLECTOMY      IR BX BONE MARROW DIAGNOSTIC  5/14/2021    IR INSERT NON TUNL CVC OVER 5 YRS  5/14/2021       Allergies: Allergies   Allergen Reactions    Eliquis [Apixaban] Other (comments)     Patient states she had hallucinations from Eliquis       Home Medications:  Prior to Admission medications    Medication Sig Start Date End Date Taking? Authorizing Provider   rivaroxaban (Xarelto) 20 mg tab tablet Take 20 mg by mouth daily (with breakfast). Yes Provider, Historical   metoprolol succinate (TOPROL-XL) 50 mg XL tablet Take 1 Tablet by mouth daily. 6/25/21   Edith Rao MD   furosemide (LASIX) 20 mg tablet Take 1 Tablet by mouth daily. 6/24/21   Edith Rao MD   pantoprazole (PROTONIX) 40 mg tablet Take 1 Tablet by mouth Before breakfast and dinner. 5/24/21   Mario Gambino MD   folic acid (FOLVITE) 1 mg tablet Take 1 mg by mouth daily. Provider, Historical   amiodarone (CORDARONE) 200 mg tablet Take 1 Tab by mouth daily. 4/13/21   Mason Lee MD   rosuvastatin (Crestor) 10 mg tablet Take 1 Tab by mouth nightly. 3/26/21   Sister, Lon Pinzon MD   ondansetron (ZOFRAN ODT) 4 mg disintegrating tablet Take 1 Tab by mouth every eight (8) hours as needed for Nausea or Vomiting. Patient not taking: Reported on 6/13/2021 3/26/21   Anuradha Franco MD   cholecalciferol (VITAMIN D3) 1,000 unit tablet Take 1,000 Units by mouth daily. Provider, Historical   DULoxetine (CYMBALTA) 60 mg capsule Take 1 Cap by mouth daily. 2/6/18   Andre Cast MD   levothyroxine (SYNTHROID) 25 mcg tablet Take 1 Tab by mouth Daily (before breakfast).  2/6/18   Andre Cast MD       Hospital Medications:  Current Facility-Administered Medications   Medication Dose Route Frequency    sodium chloride (NS) flush 5-40 mL  5-40 mL IntraVENous Q8H    sodium chloride (NS) flush 5-40 mL  5-40 mL IntraVENous PRN    acetaminophen (TYLENOL) tablet 650 mg  650 mg Oral Q6H PRN    Or    acetaminophen (TYLENOL) suppository 650 mg  650 mg Rectal Q6H PRN    polyethylene glycol (MIRALAX) packet 17 g  17 g Oral DAILY PRN    ondansetron (ZOFRAN ODT) tablet 4 mg  4 mg Oral Q8H PRN    Or    ondansetron (ZOFRAN) injection 4 mg  4 mg IntraVENous Q6H PRN    0.9% sodium chloride infusion  75 mL/hr IntraVENous CONTINUOUS    pantoprazole (PROTONIX) 40 mg in 0.9% sodium chloride 10 mL injection  40 mg IntraVENous Q12H    amiodarone (CORDARONE) tablet 200 mg  200 mg Oral DAILY    DULoxetine (CYMBALTA) capsule 60 mg  60 mg Oral DAILY    folic acid (FOLVITE) tablet 1 mg  1 mg Oral DAILY    levothyroxine (SYNTHROID) tablet 25 mcg  25 mcg Oral ACB    metoprolol succinate (TOPROL-XL) XL tablet 50 mg  50 mg Oral DAILY    0.9% sodium chloride infusion 250 mL  250 mL IntraVENous PRN    sodium chloride (NS) flush 5-10 mL  5-10 mL IntraVENous Q8H    sodium chloride (NS) flush 5-10 mL  5-10 mL IntraVENous PRN    0.9% sodium chloride infusion 250 mL  250 mL IntraVENous PRN    0.9% sodium chloride infusion 250 mL  250 mL IntraVENous PRN    0.9% sodium chloride infusion 250 mL  250 mL IntraVENous PRN    0.9% sodium chloride infusion 250 mL  250 mL IntraVENous PRN     Current Outpatient Medications   Medication Sig    rivaroxaban (Xarelto) 20 mg tab tablet Take 20 mg by mouth daily (with breakfast).  metoprolol succinate (TOPROL-XL) 50 mg XL tablet Take 1 Tablet by mouth daily.  furosemide (LASIX) 20 mg tablet Take 1 Tablet by mouth daily.  pantoprazole (PROTONIX) 40 mg tablet Take 1 Tablet by mouth Before breakfast and dinner.  folic acid (FOLVITE) 1 mg tablet Take 1 mg by mouth daily.  amiodarone (CORDARONE) 200 mg tablet Take 1 Tab by mouth daily.  rosuvastatin (Crestor) 10 mg tablet Take 1 Tab by mouth nightly.  ondansetron (ZOFRAN ODT) 4 mg disintegrating tablet Take 1 Tab by mouth every eight (8) hours as needed for Nausea or Vomiting.  (Patient not taking: Reported on 6/13/2021)    cholecalciferol (VITAMIN D3) 1,000 unit tablet Take 1,000 Units by mouth daily.  DULoxetine (CYMBALTA) 60 mg capsule Take 1 Cap by mouth daily.  levothyroxine (SYNTHROID) 25 mcg tablet Take 1 Tab by mouth Daily (before breakfast). Social History:  Social History     Tobacco Use    Smoking status: Current Some Day Smoker    Smokeless tobacco: Never Used    Tobacco comment: Only on pay day-1/2 pack   Substance Use Topics    Alcohol use: No       Pt denies any history of drug use, blood transfusions, or tattoos. Family History:  Family History   Problem Relation Age of Onset    Stroke Mother     Cancer Father         Brain    HIV/AIDS Brother        Review of Systems:  A detailed 10 system ROS is obtained, with pertinent positives as listed above. All others are negative. Diet:      Objective:     Physical Exam:  Vitals:  Visit Vitals  BP (!) 122/54 (BP 1 Location: Left upper arm, BP Patient Position: Lying)   Pulse 65   Temp 99.6 °F (37.6 °C)   Resp 16   SpO2 95%     Gen:  Pt is alert, cooperative, no acute distress  Skin:  Extremities and face reveal no rashes. HEENT: Sclerae anicteric. Extra-occular muscles are intact. No oral ulcers. No abnormal pigmentation of the lips. The neck is supple. Cardiovascular: Regular rate and rhythm. No murmurs, gallops, or rubs. Respiratory:  Comfortable breathing with no accessory muscle use. Clear breath sounds anteriorly with no wheezes, rales, or rhonchi. GI:  Abdomen nondistended, soft, and nontender. Normal active bowel sounds. No enlargement of the liver or spleen. No masses palpable. Rectal:  Deferred  Musculoskeletal:  No pitting edema of the lower legs. Neurological:   Patient is alert and oriented. Poor historian   Psychiatric:  Mood appears appropriate with judgement intact.       Laboratory:    Recent Labs     12/14/21  0610 12/13/21  2146 12/13/21  1644   WBC 14.3*  --  6.7   HGB 6.3* 4.6* 3.3*   HCT 21.3* 17.3* 14.1*     --  559*   MCV 79.2*  --  71.9*   NA 140  --  138   K 3.9  --  4.3     --  100   CO2 28  --  18*   BUN 30*  --  29*   CREA 2.79*  --  3.04*   CA 7.9*  --  8.5   MG 2.2  --  2.6*   GLU 88  --  50*   AP  --   --  77   AST  --   --  114*   ALT  --   --  64   TBILI  --   --  1.2*   ALB  --   --  2.3*   TP  --   --  6.0*   PTP  --   --  32.9*   INR  --   --  3.2   APTT  --   --  29.9          Assessment:     Principal Problem:    Anemia (5/12/2021)    Active Problems:    Hyperlipidemia (1/16/2016)      Hypertension, essential, benign (8/18/2016)      Acute renal failure superimposed on chronic kidney disease (HCC) (4/22/2019)      Paroxysmal atrial fibrillation (HCC) (3/19/2021)      Lactic acidosis (12/13/2021)      80 y.o. female with PMH including but not limited to A Fib (dx 3/2021) on Xarelto, CKD, breast cancer, RA, TIA admitted with SOB and hgb 3.3 (12/13) with elevated BUN 29 and Cr 3.04. Today hgb 6.3. WBC is elevated 14. 3. Xarelto on hold. She denies NSAID or ETOH use. It is unclear if she has had prior colonoscopy or EGD. She is a poor historian. INR os 3.2    Plan:     - On Xarelto, on hold last dose yesterday?  - NPO for EGD for sources of anemia  - Monitor INR - today 3.2    Candice Morejon NP  Patient is seen and examined in collaboration with Dr. Lv Hodge. Assessment and plan as per Dr. Elder Landaverde.

## 2021-12-14 NOTE — ANESTHESIA PREPROCEDURE EVALUATION
Relevant Problems   RESPIRATORY SYSTEM   (+) Community acquired bacterial pneumonia      NEUROLOGY   (+) Depression   (+) Major depressive disorder, recurrent, moderate (HCC)   (+) TIA (transient ischemic attack)      CARDIOVASCULAR   (+) Hypertension, essential, benign   (+) Paroxysmal atrial fibrillation (HCC)      RENAL FAILURE   (+) Acute renal failure superimposed on chronic kidney disease (HCC)   (+) Stage 3 chronic kidney disease (HCC)      ENDOCRINE   (+) Acquired hypothyroidism   (+) Rheumatoid arthritis (HCC)      HEMATOLOGY   (+) Anemia       Anesthetic History   No history of anesthetic complications            Review of Systems / Medical History  Patient summary reviewed and pertinent labs reviewed    Pulmonary          Pneumonia and shortness of breath    Pertinent negatives: Smoker: ex. Comments: On oxygen 2liters at home   Neuro/Psych       CVA (left leg weakness)  TIA and psychiatric history (depression)     Cardiovascular    Hypertension        Dysrhythmias : atrial fibrillation  Hyperlipidemia    Exercise tolerance: <4 METS: Cane/walker  Comments: xarelto and bASA--on hold    Nl EF   GI/Hepatic/Renal  Within defined limits       Renal disease: CRI      Comments: Solitary kidney Endo/Other      Hypothyroidism  Arthritis     Other Findings   Comments: hgb 3.3 on admission, now 6.6 after 2 units of blood in the ER yesterday. One unit running now after hgb 6.6 today.   Pt was brought in by EMS for SOB and found to be profoundly anemic has hx of GIB         Physical Exam    Airway  Mallampati: I  TM Distance: 4 - 6 cm  Neck ROM: normal range of motion   Mouth opening: Normal     Cardiovascular  Regular rate and rhythm,  S1 and S2 normal,  no murmur, click, rub, or gallop  Rhythm: regular  Rate: normal         Dental    Dentition: Full upper dentures     Pulmonary  Breath sounds clear to auscultation               Abdominal  GI exam deferred       Other Findings            Anesthetic Plan    ASA: 4, emergent  Anesthesia type: total IV anesthesia          Induction: Intravenous  Anesthetic plan and risks discussed with: Patient      Dr Ne Bowie wants to do an emergency EGD, diagnostic. Will have 1 unit PRBCs available if needed.

## 2021-12-15 LAB
25(OH)D3 SERPL-MCNC: 42.3 NG/ML (ref 30–100)
BILIRUB DIRECT SERPL-MCNC: 0.8 MG/DL
BILIRUB INDIRECT SERPL-MCNC: 0.6 MG/DL (ref 0–1.1)
BILIRUB SERPL-MCNC: 1.4 MG/DL (ref 0.2–1.1)
FERRITIN SERPL-MCNC: 56 NG/ML (ref 8–388)
FOLATE SERPL-MCNC: 91.9 NG/ML (ref 3.1–17.5)
HAPTOGLOB SERPL-MCNC: 62 MG/DL (ref 30–200)
INR PPP: 1.6
IRON SATN MFR SERPL: 6 %
IRON SERPL-MCNC: 20 UG/DL (ref 35–150)
LDH SERPL L TO P-CCNC: 443 U/L (ref 110–210)
MM INDURATION POC: 0 MM (ref 0–5)
PPD POC: NEGATIVE NEGATIVE
PROTHROMBIN TIME: 19 SEC (ref 12.6–14.5)
TIBC SERPL-MCNC: 357 UG/DL (ref 250–450)
VIT B12 SERPL-MCNC: 1471 PG/ML (ref 193–986)

## 2021-12-15 PROCEDURE — 74011250636 HC RX REV CODE- 250/636: Performed by: HOSPITALIST

## 2021-12-15 PROCEDURE — 82306 VITAMIN D 25 HYDROXY: CPT

## 2021-12-15 PROCEDURE — 74011250636 HC RX REV CODE- 250/636: Performed by: NURSE PRACTITIONER

## 2021-12-15 PROCEDURE — 83615 LACTATE (LD) (LDH) ENZYME: CPT

## 2021-12-15 PROCEDURE — 82248 BILIRUBIN DIRECT: CPT

## 2021-12-15 PROCEDURE — APPSS180 APP SPLIT SHARED TIME > 60 MINUTES: Performed by: NURSE PRACTITIONER

## 2021-12-15 PROCEDURE — 97166 OT EVAL MOD COMPLEX 45 MIN: CPT

## 2021-12-15 PROCEDURE — 65660000000 HC RM CCU STEPDOWN

## 2021-12-15 PROCEDURE — 85025 COMPLETE CBC W/AUTO DIFF WBC: CPT

## 2021-12-15 PROCEDURE — 97535 SELF CARE MNGMENT TRAINING: CPT

## 2021-12-15 PROCEDURE — 82607 VITAMIN B-12: CPT

## 2021-12-15 PROCEDURE — 85046 RETICYTE/HGB CONCENTRATE: CPT

## 2021-12-15 PROCEDURE — 74011250637 HC RX REV CODE- 250/637: Performed by: HOSPITALIST

## 2021-12-15 PROCEDURE — 36415 COLL VENOUS BLD VENIPUNCTURE: CPT

## 2021-12-15 PROCEDURE — 82180 ASSAY OF ASCORBIC ACID: CPT

## 2021-12-15 PROCEDURE — 85610 PROTHROMBIN TIME: CPT

## 2021-12-15 PROCEDURE — 74011000258 HC RX REV CODE- 258: Performed by: NURSE PRACTITIONER

## 2021-12-15 PROCEDURE — 80053 COMPREHEN METABOLIC PANEL: CPT

## 2021-12-15 PROCEDURE — 99223 1ST HOSP IP/OBS HIGH 75: CPT | Performed by: INTERNAL MEDICINE

## 2021-12-15 PROCEDURE — 97530 THERAPEUTIC ACTIVITIES: CPT

## 2021-12-15 PROCEDURE — 97162 PT EVAL MOD COMPLEX 30 MIN: CPT

## 2021-12-15 PROCEDURE — 82746 ASSAY OF FOLIC ACID SERUM: CPT

## 2021-12-15 PROCEDURE — C9113 INJ PANTOPRAZOLE SODIUM, VIA: HCPCS | Performed by: HOSPITALIST

## 2021-12-15 PROCEDURE — 74011000250 HC RX REV CODE- 250: Performed by: HOSPITALIST

## 2021-12-15 PROCEDURE — 82728 ASSAY OF FERRITIN: CPT

## 2021-12-15 PROCEDURE — 83540 ASSAY OF IRON: CPT

## 2021-12-15 PROCEDURE — APPNB15 APP NON BILLABLE TIME 0-15 MINS: Performed by: NURSE PRACTITIONER

## 2021-12-15 RX ORDER — LEVOTHYROXINE SODIUM 50 UG/1
50 TABLET ORAL
Status: DISCONTINUED | OUTPATIENT
Start: 2021-12-16 | End: 2021-12-21 | Stop reason: HOSPADM

## 2021-12-15 RX ORDER — PANTOPRAZOLE SODIUM 40 MG/1
40 TABLET, DELAYED RELEASE ORAL
Status: DISCONTINUED | OUTPATIENT
Start: 2021-12-16 | End: 2021-12-21 | Stop reason: HOSPADM

## 2021-12-15 RX ADMIN — METOPROLOL TARTRATE 12.5 MG: 25 TABLET, FILM COATED ORAL at 18:38

## 2021-12-15 RX ADMIN — DULOXETINE HYDROCHLORIDE 60 MG: 60 CAPSULE, DELAYED RELEASE ORAL at 08:37

## 2021-12-15 RX ADMIN — Medication 10 ML: at 13:51

## 2021-12-15 RX ADMIN — Medication 10 ML: at 06:00

## 2021-12-15 RX ADMIN — Medication 10 ML: at 21:38

## 2021-12-15 RX ADMIN — METOPROLOL TARTRATE 12.5 MG: 25 TABLET, FILM COATED ORAL at 08:37

## 2021-12-15 RX ADMIN — SODIUM CHLORIDE 25 MG: 9 INJECTION, SOLUTION INTRAVENOUS at 15:10

## 2021-12-15 RX ADMIN — SODIUM CHLORIDE 975 MG: 9 INJECTION, SOLUTION INTRAVENOUS at 16:31

## 2021-12-15 RX ADMIN — SODIUM CHLORIDE 40 MG: 9 INJECTION INTRAMUSCULAR; INTRAVENOUS; SUBCUTANEOUS at 12:35

## 2021-12-15 RX ADMIN — FOLIC ACID 1 MG: 1 TABLET ORAL at 08:37

## 2021-12-15 RX ADMIN — Medication 10 ML: at 21:37

## 2021-12-15 RX ADMIN — AMIODARONE HYDROCHLORIDE 200 MG: 200 TABLET ORAL at 08:37

## 2021-12-15 RX ADMIN — LEVOTHYROXINE SODIUM 25 MCG: 0.05 TABLET ORAL at 05:59

## 2021-12-15 NOTE — MANAGEMENT PLAN
[facility] Hematology & Oncology        Inpatient Hematology / Oncology Plan of Care    Reason for Consult:  Anemia [D64.9]  Referring Physician:  Nona Johnson MD    History of Present Illness:  Ms. Jamil Steiner is a 80 y.o. female admitted on 12/13/2021. The primary encounter diagnosis was SOB (shortness of breath). A diagnosis of Severe anemia was also pertinent to this visit. Anatoly Confer Her PMH includes hypothyroidism, anemia, CKD, CHF, Afib on Xarelto, and RA. She was seen in consult by hematology in 5/2021 for leukopenia and anemia (Hgb 3.7) felt to be r/t MTX toxicity and GIB. She lives alone. EMS was called by family when they found her to be lethargic and short of breath. She required BiPAP on arrival.  In ED, Hgb 3.3, LA 14.7, Cr 3.04 (from 1.53 in 6/2021). She was heme +ve on rectal exam.  GI performed EGD on 12/14 that was unremarkable. CXR stable mod L pleural effusion. Pt c/o L hip pain, xray neg. BCx-NGTD. Hgb up to 7.8 today after transfusion. She has received 3 units PRBCs. Exophthalmos noted on exam.  TSH 17.3. We were consulted for anemia.     Allergies   Allergen Reactions    Eliquis [Apixaban] Other (comments)     Patient states she had hallucinations from Eliquis     Past Medical History:   Diagnosis Date    Acquired hypothyroidism 1/16/2016    Breast cancer ([facility] 2008    Depression 8/18/2016    Endocrine disease     hypothyroid    Fungal dermatitis 8/18/2016    Hyperlipidemia 1/16/2016    Hypertension, essential, benign 8/18/2016    Hypokalemia 8/18/2016    Inflamed sebaceous cyst 8/18/2016    Major depressive disorder, recurrent, moderate ([address] 8/18/2016    Nicotine dependence, cigarettes, uncomplicated 7/61/5992    Osteopenia 8/18/2016    Osteoporosis 8/18/2016    Radiation therapy complication 6155    Rheumatoid arthritis ([address] 1/16/2016    Right carotid bruit 1/16/2016    TIA (transient ischemic attack) 1/16/2016    Tobacco abuse 8/18/2016     Past Surgical History:   Procedure Laterality Date    HX ADENOIDECTOMY      HX BREAST LUMPECTOMY Right 2008    with lymph nodes    HX TONSILLECTOMY      IR BX BONE MARROW DIAGNOSTIC  5/14/2021    IR INSERT NON TUNL CVC OVER 5 YRS  5/14/2021     Family History   Problem Relation Age of Onset    Stroke Mother     Cancer Father         Brain    HIV/AIDS Brother      Social History     Socioeconomic History    Marital status:      Spouse name: Not on file    Number of children: Not on file    Years of education: Not on file    Highest education level: Not on file   Occupational History    Not on file   Tobacco Use    Smoking status: Current Some Day Smoker    Smokeless tobacco: Never Used    Tobacco comment: Only on pay day-1/2 pack   Substance and Sexual Activity    Alcohol use: No    Drug use: No    Sexual activity: Not on file   Other Topics Concern    Not on file   Social History Narrative    Not on file     Social Determinants of Health     Financial Resource Strain:     Difficulty of Paying Living Expenses: Not on file   Food Insecurity:     Worried About Running Out of Food in the Last Year: Not on file    Hank of Food in the Last Year: Not on file   Transportation Needs:     Lack of Transportation (Medical): Not on file    Lack of Transportation (Non-Medical):  Not on file   Physical Activity:     Days of Exercise per Week: Not on file    Minutes of Exercise per Session: Not on file   Stress:     Feeling of Stress : Not on file   Social Connections:     Frequency of Communication with Friends and Family: Not on file    Frequency of Social Gatherings with Friends and Family: Not on file    Attends [employer] Services: Not on file    Active Member of Clubs or Organizations: Not on file    Attends Club or Organization Meetings: Not on file    Marital Status: Not on file   Intimate Partner Violence:     Fear of Current or Ex-Partner: Not on file    Emotionally Abused: Not on file   [place] Physically Abused: Not on file    Sexually Abused: Not on file   Housing Stability:     Unable to Pay for Housing in the Last Year: Not on file    Number of Places Lived in the Last Year: Not on file    Unstable Housing in the Last Year: Not on file     Current Facility-Administered Medications   Medication Dose Route Frequency Provider Last Rate Last Admin    sodium chloride (NS) flush 5-40 mL  5-40 mL IntraVENous Q8H Allie Richard MD   10 mL at 12/15/21 0600    acetaminophen (TYLENOL) tablet 650 mg  650 mg Oral Q6H PRN Allie Richard MD        Or    acetaminophen (TYLENOL) suppository 650 mg  650 mg Rectal Q6H PRN Allie Richard MD        polyethylene glycol (MIRALAX) packet 17 g  17 g Oral DAILY PRN Allie Richard MD        ondansetron (ZOFRAN ODT) tablet 4 mg  4 mg Oral Q8H PRN Allie Richard MD        Or    ondansetron [facility]) injection 4 mg  4 mg IntraVENous Q6H PRN Allie Richard MD        pantoprazole (PROTONIX) 40 mg in 0.9% sodium chloride 10 mL injection  40 mg IntraVENous Q12H Lonza Marck KAY MD   40 mg at 12/14/21 2352    amiodarone (CORDARONE) tablet 200 mg  200 mg Oral DAILY Allie Richard MD   200 mg at 12/15/21 0306    DULoxetine (CYMBALTA) capsule 60 mg  60 mg Oral DAILY Allie Richard MD   60 mg at 02/31/56 6818    folic acid (FOLVITE) tablet 1 mg  1 mg Oral DAILY Allie Richard MD   1 mg at 12/15/21 2360    levothyroxine (SYNTHROID) tablet 25 mcg  25 mcg Oral ACB Allie Richard MD   25 mcg at 12/15/21 0559    tuberculin injection 5 Units  5 Units IntraDERMal Nick Elders, NP   5 Units at 12/14/21 1706    sodium chloride (NS) flush 5-40 mL  5-40 mL IntraVENous Q8H German Malagon MD   10 mL at 12/15/21 0600    0.9% sodium chloride infusion 250 mL  250 mL IntraVENous PRN German Malagon MD        metoprolol tartrate (LOPRESSOR) tablet 12.5 mg  12.5 mg Oral BID Mariposa Gaytan MD   12.5 mg at 12/15/21 3956    sodium chloride (NS) flush 5-10 mL  5-10 mL IntraVENous Q8H Don French MD   10 mL at 12/15/21 0600       OBJECTIVE:  Patient Vitals for the past 8 hrs:   BP Temp Pulse Resp SpO2 Weight   12/15/21 0739 130/62 97.5 °F (36.4 °C) 62 20 99 %    12/15/21 0622   76  99 % 149 lb 3.2 oz (67.7 kg)     Temp (24hrs), Av.5 °F (36.9 °C), Min:97.3 °F (36.3 °C), Max:99.8 °F (37.7 °C)    12/15 07 - 12/15 1900  In: 360 [P.O.:360]  Out: -     Physical Exam:  Constitutional: Well developed, well nourished female in no acute distress, sitting comfortably in the bedside chair. [place]. HEENT: Normocephalic and atraumatic. Oropharynx is clear, mucous membranes are moist.  Extraocular muscles are intact. Sclerae anicteric. Exophthalmos noted. Neck supple without JVD. No thyromegaly present. Skin Warm and dry. Bruising on UE with abrasions noted. No erythema. No pallor. Respiratory Lungs with crackles in the bases bilaterally, normal air exchange without accessory muscle use. CVS Normal rate, regular rhythm and normal S1 and S2. No murmurs, gallops, or rubs. Abdomen Soft, nontender and nondistended, normoactive bowel sounds. No palpable mass. No hepatosplenomegaly. Neuro Grossly nonfocal with no obvious sensory or motor deficits. MSK Normal range of motion in general.  No edema and no tenderness. Psych Appropriate mood and affect. Labs:    Recent Results (from the past 24 hour(s))   RBC, ALLOCATE    Collection Time: 21  1:45 PM   Result Value Ref Range    HISTORY CHECKED?  Historical check performed    HGB & HCT    Collection Time: 21  4:00 PM   Result Value Ref Range    HGB 8.3 (L) 11.7 - 15.4 g/dL    HCT 27.4 (L) 35.8 - 46.3 %   LD    Collection Time: 21  5:02 PM   Result Value Ref Range     (H) 110 - 210 U/L   CBC WITH AUTOMATED DIFF    Collection Time: 12/15/21  6:50 AM   Result Value Ref Range    WBC 11.6 (H) 4.3 - 11.1 K/uL    RBC 3.17 (L) 4.05 - 5.2 M/uL    HGB 7.8 (L) 11.7 - 15.4 g/dL    HCT 25.7 (L) 35.8 - 46.3 %    MCV 81.1 79.6 - 97.8 FL    MCH 24.6 (L) 26.1 - 32.9 PG    MCHC 30.4 (L) 31.4 - 35.0 g/dL    RDW 20.6 (H) 11.9 - 14.6 %    PLATELET 121 220 - 779 K/uL    MPV 10.2 9.4 - 12.3 FL    ABSOLUTE NRBC 0.12 0.0 - 0.2 K/uL    DF AUTOMATED      NEUTROPHILS 82 (H) 43 - 78 %    LYMPHOCYTES 8 (L) 13 - 44 %    MONOCYTES 8 4.0 - 12.0 %    EOSINOPHILS 0 (L) 0.5 - 7.8 %    BASOPHILS 1 0.0 - 2.0 %    IMMATURE GRANULOCYTES 1 0.0 - 5.0 %    ABS. NEUTROPHILS 9.6 (H) 1.7 - 8.2 K/UL    ABS. LYMPHOCYTES 0.9 0.5 - 4.6 K/UL    ABS. MONOCYTES 0.9 0.1 - 1.3 K/UL    ABS. EOSINOPHILS 0.1 0.0 - 0.8 K/UL    ABS. BASOPHILS 0.1 0.0 - 0.2 K/UL    ABS. IMM. GRANS. 0.1 0.0 - 0.5 K/UL   METABOLIC PANEL, COMPREHENSIVE    Collection Time: 12/15/21  6:50 AM   Result Value Ref Range    Sodium 143 136 - 145 mmol/L    Potassium 3.3 (L) 3.5 - 5.1 mmol/L    Chloride 106 98 - 107 mmol/L    CO2 29 21 - 32 mmol/L    Anion gap 8 7 - 16 mmol/L    Glucose 60 (L) 65 - 100 mg/dL    BUN 30 (H) 8 - 23 MG/DL    Creatinine 2.61 (H) 0.6 - 1.0 MG/DL    GFR est AA 23 (L) >60 ml/min/1.73m2    GFR est non-AA 19 (L) >60 ml/min/1.73m2    Calcium 8.5 8.3 - 10.4 MG/DL    Bilirubin, total 1.5 (H) 0.2 - 1.1 MG/DL    ALT (SGPT) 364 (H) 12 - 65 U/L    AST (SGOT) 509 (H) 15 - 37 U/L    Alk. phosphatase 82 50 - 136 U/L    Protein, total 5.4 (L) 6.3 - 8.2 g/dL    Albumin 2.1 (L) 3.2 - 4.6 g/dL    Globulin 3.3 2.3 - 3.5 g/dL    A-G Ratio 0.6 (L) 1.2 - 3.5         Imaging:  XR HIP LT W OR WO PELV 2-3 VWS [[id number]] Collected: 12/13/21 2115   Order Status: Completed Updated: 12/13/21 2120   Narrative:     EXAMINATION: Left Hip     HISTORY: L hip pain. TECHNIQUE: Single frontal view of the pelvis. AP and lateral views of the left   hip. COMPARISON: None available. FINDINGS:   No evidence of acute fracture or dislocation of the pelvis. Bilateral hip joints   are intact. The left femoral neck is intact. Soft tissues are unremarkable.     Impression:     No evidence of acute fracture or dislocation of the left hip. XR Chest Port [[id number]] Collected: 12/13/21 1701   Order Status: Completed Updated: 12/13/21 1705   Narrative:     Portable chest x-ray     CLINICAL INDICATION: Shortness of breath     FINDINGS: Single AP view the chest compared to a similar exam dated 6/20/2021   shows persistent old right-sided rib fractures. The right lung is well-expanded   and unchanged without pneumothorax. There is a persistent, moderate-sized left   pleural effusion. The cardiac silhouette and mediastinum are stable. There is no   pneumothorax. Impression:     1. Stable moderate size left pleural effusion.          ASSESSMENT:  Problem List  Date Reviewed: 12/14/2021          Codes Class Noted    Lactic acidosis ICD-10-CM: E87.2  ICD-9-CM: 276.2  12/13/2021        Leukocytosis ICD-10-CM: D72.829  ICD-9-CM: 288.60  4/33/2131        Diastolic CHF, chronic (HCC) ICD-10-CM: I50.32  ICD-9-CM: 428.32, 428.0  6/13/2021        Solitary kidney (Chronic) ICD-10-CM: [id number]  ICD-9-CM: 753.0  5/22/2021        Hypoalbuminemia due to protein-calorie malnutrition ([facility] ICD-10-CM: E88.09, E46  ICD-9-CM: 273.8, 263.9  5/22/2021        Stage 3 chronic kidney disease ([facility] [address] (Chronic) ICD-10-CM: N18.30  ICD-9-CM: 585.3  5/20/2021        * (Principal) Anemia ICD-10-CM: D64.9  ICD-9-CM: 285.9  5/12/2021        Rib fracture ICD-10-CM: S22.39XA  ICD-9-CM: 807.00  5/12/2021        Pericarditis with effusion ICD-10-CM: I31.9  ICD-9-CM: 423.9  3/23/2021        Paroxysmal atrial fibrillation (HCC) (Chronic) ICD-10-CM: I48.0  ICD-9-CM: 427.31  3/19/2021        Acute renal failure superimposed on chronic kidney disease ([facility] ICD-10-CM: N17.9, N18.9  ICD-9-CM: 584.9, 585.9  4/22/2019        Sepsis ([address] ICD-10-CM: A41.9  ICD-9-CM: 038.9, 995.91  4/22/2019        Community acquired bacterial pneumonia ICD-10-CM: J15.9  ICD-9-CM: 482.9  4/22/2019        Prolonged Q-T interval on ECG ICD-10-CM: R94.31  ICD-9-CM: 794.31 4/22/2019        Hypertension, essential, benign (Chronic) ICD-10-CM: I10  ICD-9-CM: 401.1  8/18/2016        Osteopenia ICD-10-CM: M85.80  ICD-9-CM: 733.90  8/18/2016        Major depressive disorder, recurrent, moderate (HCC) (Chronic) ICD-10-CM: F33.1  ICD-9-CM: 296.32  8/18/2016        Nicotine dependence, cigarettes, uncomplicated RGQ-50-CD: O72.117  ICD-9-CM: 305.1  8/18/2016        Osteoporosis ICD-10-CM: M81.0  ICD-9-CM: 733.00  8/18/2016        Depression ICD-10-CM: F32. A  ICD-9-CM: 316  8/18/2016        Hypokalemia ICD-10-CM: E87.6  ICD-9-CM: 276.8  8/18/2016        Tobacco abuse ICD-10-CM: Z72.0  ICD-9-CM: 305.1  8/18/2016        Acquired hypothyroidism (Chronic) ICD-10-CM: E03.9  ICD-9-CM: 244.9  1/16/2016        TIA (transient ischemic attack) ICD-10-CM: G45.9  ICD-9-CM: 435.9  1/16/2016        Hyperlipidemia (Chronic) ICD-10-CM: E78.5  ICD-9-CM: 272.4  1/16/2016        Rheumatoid arthritis (HCC) (Chronic) ICD-10-CM: M06.9  ICD-9-CM: 714.0  1/16/2016        Right carotid bruit ICD-10-CM: R09.89  ICD-9-CM: 785.9  1/16/2016                RECOMMENDATIONS:  Anemia  - in ED, heme +ve on rectal exam  - s/p EGD on 12/14, unremarkable  - Check iron studies, B12, folate, Vit D, Vit C, hemolysis labs, smear  - Transfuse prn to keep Hgb >7    NAZIA on CKD  - mgmt per primary team    L hip pain  - L hip xray neg    Afib  - On Xarelto, held    Hypothyroidism  - TSH 17.3  - on Synthroid      Lab studies and imaging studies were personally reviewed. Thank you for allowing us to participate in the care of Ms. Lilliana Peralta. Formal consult note by Dr. Dante Gómez to follow.          Siomara Valle NP   [facility] Hematology & Oncology  [address] [address]  Office : [phone]  Fax : [phone]

## 2021-12-15 NOTE — PROGRESS NOTES
Care Management Interventions  PCP Verified by CM: Yes  Transition of Care Consult (CM Consult): [address]: No  Reason Outside [place]: Patient already serviced by other home care/hospice agency  Physical Therapy Consult: Yes  Occupational Therapy Consult: Yes  Speech Therapy Consult: No  Support Systems: Friend/Neighbor  Confirm Follow Up Transport: Friends  Freedom of Choice List was Provided with Basic Dialogue that Supports the Patient's Individualized Plan of Care/Goals, Treatment Preferences and Shares the Quality Data Associated with the Providers?: Yes  [employer] Information Provided?: No  Discharge Location  Discharge Placement: Home with home health  CM met with the patient. Patient is hearing impaired. Patient is current with [facility] visits patient about one time a month. Patient uses a walker to ambulate. She has no family but does have [place] friends that are very supportive. Her emergency contact, Sunshine James, is a friend that takes her to all her appointments and shopping. Patient would like to return home with [facility]. CM following.

## 2021-12-15 NOTE — PROGRESS NOTES
Hospitalist Progress Note   Admit Date:  2021  4:32 PM   Name:  J Luis Bell   Age:  80 y.o. Sex:  female  :  1940   MRN:  [id number]   Room:  4/    Presenting Complaint: Respiratory Distress    Reason(s) for Admission: Anemia [D64.9]     Hospital Course & Interval History:   Ms. Brandon Perrin is a very nice 79 y/o WF with a h/o hypothyroidism, chronic normocytic anemia, CKD who was admitted on  with symptomatic anemia. Her baseline Hb is around 8-10. On admission it was 3.3. LA was 12 and she had NAZIA on CKD. She was transfused. Xarelto held. GI consulted, had normal EGD On . Subjective (12/15/21):  Up to chair, feels well, eating lunch. Hb 7.8 today. Stable on 2L O2. No chest pain, SOB, N/V/D. Assessment & Plan:   # Symptomatic anemia   -  [address] is held, admitting Hb 3.3, s/p transfusion and now has improved to 7.8. Baseline Hb ranges 8-10. EGD unremarkable on . Change PPI to PO daily. GI recs capsule endoscopy. Hematology consulted and recs appreciated. Iron studies c/w LUIS ARMANDO. # NAZIA on CKD   - Baseline sCr high 1s-2, 2.7 on admission, slight improvement today, con't to encourage PO intake. # L pleural effusion   - Moderate, stable O2 needs. # Lactic acidosis   - Likely 2/2 hypotension on admission. Now resolved. # Hypothyroidism   - TSH 17, increase Synthroid to 50mcg daily. # Atrial fibrillation   -  [address] held in setting of AoC anemia. Con't amiodarone, metoprolol    # Chronic hypoxemic respiratory failure   - Stable, con't 2L O2. Dispo/Discharge Planning: Pending.   Diet:  ADULT DIET Regular; Low Fat/Low Chol/High Fiber/2 gm Na  DVT PPx: SCDs only with anemia  Code status: Full Code    Hospital Problems as of 12/15/2021 Date Reviewed: 2021          Codes Class Noted - Resolved POA    Lactic acidosis ICD-10-CM: E87.2  ICD-9-CM: 276.2  2021 - Present Unknown        Diastolic CHF, chronic (HCC) ICD-10-CM: I50.32  ICD-9-CM: 428.32, 428.0  2021 - Present Unknown        * (Principal) Anemia ICD-10-CM: D64.9  ICD-9-CM: 285.9  5/12/2021 - Present Yes        Paroxysmal atrial fibrillation (HCC) (Chronic) ICD-10-CM: I48.0  ICD-9-CM: 427.31  3/19/2021 - Present Yes        Acute renal failure superimposed on chronic kidney disease ([facility] [address] ICD-10-CM: N17.9, N18.9  ICD-9-CM: 584.9, 585.9  4/22/2019 - Present Unknown        Hypertension, essential, benign (Chronic) ICD-10-CM: I10  ICD-9-CM: 401.1  8/18/2016 - Present Yes        Major depressive disorder, recurrent, moderate (HCC) (Chronic) ICD-10-CM: F33.1  ICD-9-CM: 296.32  8/18/2016 - Present Yes        Acquired hypothyroidism (Chronic) ICD-10-CM: E03.9  ICD-9-CM: 244.9  1/16/2016 - Present Yes        Hyperlipidemia (Chronic) ICD-10-CM: E78.5  ICD-9-CM: 272.4  1/16/2016 - Present Yes              Objective:     Patient Vitals for the past 24 hrs:   Temp Pulse Resp BP SpO2   12/15/21 1056 97.4 °F (36.3 °C) 66 20 (!) 118/55 98 %   12/15/21 0740     98 %   12/15/21 0739 97.5 °F (36.4 °C) 62 20 130/62 99 %   12/15/21 0622  76   99 %   12/14/21 2258 98.1 °F (36.7 °C) 78 20 (!) 122/55 99 %   12/14/21 2021 98.4 °F (36.9 °C) 67 20 121/63 98 %   12/14/21 1815     100 %   12/14/21 1530 98.1 °F (36.7 °C) 68 20 (!) 114/57 100 %   12/14/21 1500  68 16 (!) 117/56 97 %   12/14/21 1451  68 16 (!) 113/54 96 %   12/14/21 1447  77 14 (!) 119/58 95 %   12/14/21 1436  69 14 (!) 107/53 100 %   12/14/21 1429  67 14 120/63 100 %   12/14/21 1422 97.3 °F (36.3 °C) 70 14 (!) 114/53 99 %   12/14/21 1343 99 °F (37.2 °C) 68 18 (!) 122/58 100 %     Oxygen Therapy  O2 Sat (%): 98 % (12/15/21 1056)  Pulse via Oximetry: 69 beats per minute (12/14/21 1500)  O2 Device: None (Room air) (12/15/21 0740)  Skin Assessment: Clean, dry, & intact (12/15/21 0350)  Skin Protection for O2 Device: No (12/15/21 0350)  O2 Flow Rate (L/min): 2 l/min (12/15/21 0350)  FIO2 (%): 36 % (12/14/21 1815)    Estimated body mass index is 29.14 kg/m² as calculated from the following:    Height as of 6/28/21: 5' (1.524 m). Weight as of this encounter: 67.7 kg (149 lb 3.2 oz). Intake/Output Summary (Last 24 hours) at 12/15/2021 1318  Last data filed at 12/15/2021 0929  Gross per 24 hour   Intake 775.8 ml   Output 900 ml   Net -124.2 ml         Physical Exam:   Blood pressure (!) 118/55, pulse 66, temperature 97.4 °F (36.3 °C), resp. rate 20, weight 67.7 kg (149 lb 3.2 oz), SpO2 98 %. General:    Well nourished. No overt distress. Very hard of hearing. Head:  Normocephalic, atraumatic  Eyes:  Sclerae appear normal.  Pupils equally round. Bilateral proptosis. ENT:  Nares appear normal, no drainage. Moist oral mucosa  Neck:  No restricted ROM. Trachea midline   CV:   RRR. No m/r/g. No jugular venous distension. Lungs:   CTAB. No wheezing, rhonchi, or rales. Respirations even, unlabored. 2L NC O2. Abdomen: Bowel sounds present. Soft, nontender, nondistended. Extremities: No cyanosis or clubbing. No edema  Skin:     No rashes and normal coloration. Warm and dry. Neuro:  CN II-XII grossly intact. Sensation intact. A&Ox3  Psych:  Normal mood and affect. I have reviewed ordered lab tests and independently visualized imaging below:    Recent Labs:  Recent Results (from the past 48 hour(s))   CBC WITH AUTOMATED DIFF    Collection Time: 12/13/21  4:44 PM   Result Value Ref Range    WBC 6.7 4.3 - 11.1 K/uL    RBC 1.96 (L) 4.05 - 5.2 M/uL    HGB 3.3 (LL) 11.7 - 15.4 g/dL    HCT 14.1 (L) 35.8 - 46.3 %    MCV 71.9 (L) 79.6 - 97.8 FL    MCH 16.8 (L) 26.1 - 32.9 PG    MCHC 23.4 (L) 31.4 - 35.0 g/dL    RDW 22.2 (H) 11.9 - 14.6 %    PLATELET 048 (H) 056 - 450 K/uL    MPV 11.5 9.4 - 12.3 FL    ABSOLUTE NRBC 0.38 (H) 0.0 - 0.2 K/uL    DF AUTOMATED      NEUTROPHILS 70 43 - 78 %    LYMPHOCYTES 13 13 - 44 %    MONOCYTES 16 (H) 4.0 - 12.0 %    EOSINOPHILS 0 (L) 0.5 - 7.8 %    BASOPHILS 0 0.0 - 2.0 %    IMMATURE GRANULOCYTES 0 0.0 - 5.0 %    ABS. NEUTROPHILS 4.7 1.7 - 8.2 K/UL    ABS. LYMPHOCYTES 0.9 0.5 - 4.6 K/UL    ABS. MONOCYTES 1.1 0.1 - 1.3 K/UL    ABS. EOSINOPHILS 0.0 0.0 - 0.8 K/UL    ABS. BASOPHILS 0.0 0.0 - 0.2 K/UL    ABS. IMM. GRANS. 0.0 0.0 - 0.5 K/UL   METABOLIC PANEL, COMPREHENSIVE    Collection Time: 12/13/21  4:44 PM   Result Value Ref Range    Sodium 138 136 - 145 mmol/L    Potassium 4.3 3.5 - 5.1 mmol/L    Chloride 100 98 - 107 mmol/L    CO2 18 (L) 21 - 32 mmol/L    Anion gap 20 (H) 7 - 16 mmol/L    Glucose 50 (L) 65 - 100 mg/dL    BUN 29 (H) 8 - 23 MG/DL    Creatinine 3.04 (H) 0.6 - 1.0 MG/DL    GFR est AA 19 (L) >60 ml/min/1.73m2    GFR est non-AA 16 (L) >60 ml/min/1.73m2    Calcium 8.5 8.3 - 10.4 MG/DL    Bilirubin, total 1.2 (H) 0.2 - 1.1 MG/DL    ALT (SGPT) 64 12 - 65 U/L    AST (SGOT) 114 (H) 15 - 37 U/L    Alk.  phosphatase 77 50 - 136 U/L    Protein, total 6.0 (L) 6.3 - 8.2 g/dL    Albumin 2.3 (L) 3.2 - 4.6 g/dL    Globulin 3.7 (H) 2.3 - 3.5 g/dL    A-G Ratio 0.6 (L) 1.2 - 3.5     MAGNESIUM    Collection Time: 12/13/21  4:44 PM   Result Value Ref Range    Magnesium 2.6 (H) 1.8 - 2.4 mg/dL   CULTURE, BLOOD    Collection Time: 12/13/21  4:44 PM    Specimen: Blood   Result Value Ref Range    Special Requests: RIGHT  Antecubital        Culture result: NO GROWTH 2 DAYS     CULTURE, BLOOD    Collection Time: 12/13/21  4:44 PM    Specimen: Blood   Result Value Ref Range    Special Requests: RIGHT  FOREARM        Culture result: NO GROWTH 2 DAYS     LACTIC ACID    Collection Time: 12/13/21  4:44 PM   Result Value Ref Range    Lactic acid 14.7 (HH) 0.4 - 2.0 MMOL/L   NT-PRO BNP    Collection Time: 12/13/21  4:44 PM   Result Value Ref Range    NT pro-BNP 8,862 (H) <450 PG/ML   PROCALCITONIN    Collection Time: 12/13/21  4:44 PM   Result Value Ref Range    Procalcitonin 0.06 0.00 - 0.49 ng/mL   PROTHROMBIN TIME + INR    Collection Time: 12/13/21  4:44 PM   Result Value Ref Range    Prothrombin time 32.9 (H) 12.6 - 14.5 sec    INR 3.2     PTT Collection Time: 12/13/21  4:44 PM   Result Value Ref Range    aPTT 29.9 24.1 - 35.1 SEC   TSH 3RD GENERATION    Collection Time: 12/13/21  4:44 PM   Result Value Ref Range    TSH 17.300 (H) 0.358 - 3.740 uIU/mL   POC VENOUS BLOOD GAS    Collection Time: 12/13/21  5:00 PM   Result Value Ref Range    Device: BIPAP MASK      FIO2 (POC) 60 %    pH, venous (POC) 7.33 7.32 - 7.42      pCO2, venous (POC) 32.1 (L) 41 - 51 MMHG    pO2, venous (POC) 26 (LL) mmHg    HCO3, venous (POC) 16.9 (L) 23 - 28 MMOL/L    sO2, venous (POC) 44.3 (L) 65 - 88 %    Base deficit, venous (POC) 8.3 mmol/L    PEEP/CPAP (POC) 6 cmH2O    Pressure support 10 cmH2O    Allens test (POC) Negative      Site LEFT RADIAL      Specimen type (POC) VENOUS BLOOD      Performed by Angela Roberts     Critical value read back DRFINN    URINALYSIS W/ RFLX MICROSCOPIC    Collection Time: 12/13/21  5:43 PM   Result Value Ref Range    Color YELLOW      Appearance CLEAR      Specific gravity 1.014 1.001 - 1.023      pH (UA) 5.0 5.0 - 9.0      Protein Negative NEG mg/dL    Glucose Negative mg/dL    Ketone Negative NEG mg/dL    Bilirubin Negative NEG      Blood Negative NEG      Urobilinogen 1.0 0.2 - 1.0 EU/dL    Nitrites Negative NEG      Leukocyte Esterase Negative NEG     TYPE & SCREEN    Collection Time: 12/13/21  5:45 PM   Result Value Ref Range    Crossmatch Expiration 12/13/2021,2359     ABO/Rh(D) A POSITIVE     Antibody screen NEG     Comment       SAMPLE SENT WITH TEMP LABEL AND SECOND TUBE WITH LAB LABEL WITH NO INFORMATION, TO BE RECOLLECTED.  NOTIFIED [facility] RN [id number] 12/14/21 1150 [facility]    Unit number [id number]     Blood component type  LR     Unit division 00     Status of unit TRANSFUSED     Crossmatch result Compatible     Unit number [id number]     Blood component type  LR     Unit division 00     Status of unit TRANSFUSED     Crossmatch result Compatible    RBC, ALLOCATE    Collection Time: 12/13/21  6:00 PM   Result Value Ref Range    HISTORY CHECKED?  Historical check performed    BLOOD GAS, ARTERIAL POC    Collection Time: 12/13/21  6:20 PM   Result Value Ref Range    Device: BIPAP MASK      FIO2 (POC) 70 %    pH (POC) 7.34 (L) 7.35 - 7.45      pCO2 (POC) 33.4 (L) 35 - 45 MMHG    pO2 (POC) 315 (H) 75 - 100 MMHG    HCO3 (POC) 18.0 (L) 22 - 26 MMOL/L    sO2 (POC) 99.9 (H) 95 - 98 %    Base deficit (POC) 7.1 mmol/L    PEEP/CPAP (POC) 8 cmH2O    Pressure support 10 cmH2O    Allens test (POC) Positive      Site RIGHT RADIAL      Specimen type (POC) ARTERIAL      Performed by Rene Cavazos     Critical value read back EMANUEL    LACTIC ACID    Collection Time: 12/13/21  7:48 PM   Result Value Ref Range    Lactic acid 12.3 (HH) 0.4 - 2.0 MMOL/L   GLUCOSE, POC    Collection Time: 12/13/21  9:17 PM   Result Value Ref Range    Glucose (POC) 77 65 - 100 mg/dL    Performed by Jayda    HGB & HCT    Collection Time: 12/13/21  9:46 PM   Result Value Ref Range    HGB 4.6 (LL) 11.7 - 15.4 g/dL    HCT 17.3 (L) 35.8 - 46.3 %   COVID-19 RAPID TEST    Collection Time: 12/13/21 11:09 PM   Result Value Ref Range    Specimen source NASAL      COVID-19 rapid test Not detected NOTD     METABOLIC PANEL, BASIC    Collection Time: 12/14/21  6:10 AM   Result Value Ref Range    Sodium 140 136 - 145 mmol/L    Potassium 3.9 3.5 - 5.1 mmol/L    Chloride 105 98 - 107 mmol/L    CO2 28 21 - 32 mmol/L    Anion gap 7 7 - 16 mmol/L    Glucose 88 65 - 100 mg/dL    BUN 30 (H) 8 - 23 MG/DL    Creatinine 2.79 (H) 0.6 - 1.0 MG/DL    GFR est AA 21 (L) >60 ml/min/1.73m2    GFR est non-AA 17 (L) >60 ml/min/1.73m2    Calcium 7.9 (L) 8.3 - 10.4 MG/DL   MAGNESIUM    Collection Time: 12/14/21  6:10 AM   Result Value Ref Range    Magnesium 2.2 1.8 - 2.4 mg/dL   CBC W/O DIFF    Collection Time: 12/14/21  6:10 AM   Result Value Ref Range    WBC 14.3 (H) 4.3 - 11.1 K/uL    RBC 2.69 (L) 4.05 - 5.2 M/uL    HGB 6.3 (LL) 11.7 - 15.4 g/dL    HCT 21.3 (L) 35.8 - 46.3 %    MCV 79.2 (L) 79.6 - 97.8 FL MCH 23.4 (L) 26.1 - 32.9 PG    MCHC 29.6 (L) 31.4 - 35.0 g/dL    RDW 23.2 (H) 11.9 - 14.6 %    PLATELET 828 250 - 788 K/uL    MPV 11.0 9.4 - 12.3 FL    ABSOLUTE NRBC 0.25 (H) 0.0 - 0.2 K/uL   LACTIC ACID    Collection Time: 12/14/21  6:10 AM   Result Value Ref Range    Lactic acid 1.4 0.4 - 2.0 MMOL/L   RBC, ALLOCATE    Collection Time: 12/14/21  8:15 AM   Result Value Ref Range    HISTORY CHECKED? Historical check performed    TYPE & SCREEN    Collection Time: 12/14/21  8:59 AM   Result Value Ref Range    Crossmatch Expiration 12/17/2021,2359     ABO/Rh(D) A POSITIVE     Antibody screen NEG     Comment       BRENT IN ER NOTIFIED BLOOD READY AT 1006 12/14/21 1150 [facility]    Unit number [id number]     Blood component type [facility]     Unit division 00     Status of unit TRANSFUSED     Crossmatch result Compatible     Unit number [id number]     Blood component type [facility]     Unit division 00     Status of unit ALLOCATED     Crossmatch result Compatible    LACTIC ACID    Collection Time: 12/14/21  9:11 AM   Result Value Ref Range    Lactic acid 2.1 (H) 0.4 - 2.0 MMOL/L   RBC, ALLOCATE    Collection Time: 12/14/21  1:45 PM   Result Value Ref Range    HISTORY CHECKED?  Historical check performed    HGB & HCT    Collection Time: 12/14/21  4:00 PM   Result Value Ref Range    HGB 8.3 (L) 11.7 - 15.4 g/dL    HCT 27.4 (L) 35.8 - 46.3 %   LD    Collection Time: 12/14/21  5:02 PM   Result Value Ref Range     (H) 110 - 210 U/L   HAPTOGLOBIN    Collection Time: 12/14/21  5:03 PM   Result Value Ref Range    Haptoglobin 62 30 - 200 mg/dL   CBC WITH AUTOMATED DIFF    Collection Time: 12/15/21  6:50 AM   Result Value Ref Range    WBC 11.6 (H) 4.3 - 11.1 K/uL    RBC 3.17 (L) 4.05 - 5.2 M/uL    HGB 7.8 (L) 11.7 - 15.4 g/dL    HCT 25.7 (L) 35.8 - 46.3 %    MCV 81.1 79.6 - 97.8 FL    MCH 24.6 (L) 26.1 - 32.9 PG    MCHC 30.4 (L) 31.4 - 35.0 g/dL    RDW 20.6 (H) 11.9 - 14.6 %    PLATELET 586 233 - 500 K/uL    MPV 10.2 9.4 - 12.3 FL ABSOLUTE NRBC 0.12 0.0 - 0.2 K/uL    DF AUTOMATED      NEUTROPHILS 82 (H) 43 - 78 %    LYMPHOCYTES 8 (L) 13 - 44 %    MONOCYTES 8 4.0 - 12.0 %    EOSINOPHILS 0 (L) 0.5 - 7.8 %    BASOPHILS 1 0.0 - 2.0 %    IMMATURE GRANULOCYTES 1 0.0 - 5.0 %    ABS. NEUTROPHILS 9.6 (H) 1.7 - 8.2 K/UL    ABS. LYMPHOCYTES 0.9 0.5 - 4.6 K/UL    ABS. MONOCYTES 0.9 0.1 - 1.3 K/UL    ABS. EOSINOPHILS 0.1 0.0 - 0.8 K/UL    ABS. BASOPHILS 0.1 0.0 - 0.2 K/UL    ABS. IMM. GRANS. 0.1 0.0 - 0.5 K/UL   METABOLIC PANEL, COMPREHENSIVE    Collection Time: 12/15/21  6:50 AM   Result Value Ref Range    Sodium 143 136 - 145 mmol/L    Potassium 3.3 (L) 3.5 - 5.1 mmol/L    Chloride 106 98 - 107 mmol/L    CO2 29 21 - 32 mmol/L    Anion gap 8 7 - 16 mmol/L    Glucose 60 (L) 65 - 100 mg/dL    BUN 30 (H) 8 - 23 MG/DL    Creatinine 2.61 (H) 0.6 - 1.0 MG/DL    GFR est AA 23 (L) >60 ml/min/1.73m2    GFR est non-AA 19 (L) >60 ml/min/1.73m2    Calcium 8.5 8.3 - 10.4 MG/DL    Bilirubin, total 1.5 (H) 0.2 - 1.1 MG/DL    ALT (SGPT) 364 (H) 12 - 65 U/L    AST (SGOT) 509 (H) 15 - 37 U/L    Alk.  phosphatase 82 50 - 136 U/L    Protein, total 5.4 (L) 6.3 - 8.2 g/dL    Albumin 2.1 (L) 3.2 - 4.6 g/dL    Globulin 3.3 2.3 - 3.5 g/dL    A-G Ratio 0.6 (L) 1.2 - 3.5     PATHOLOGIST REVIEW SMEARS    Collection Time: 12/15/21 10:58 AM   Result Value Ref Range    PATHOLOGIST REVIEW PENDING    TRANSFERRIN SATURATION    Collection Time: 12/15/21 10:59 AM   Result Value Ref Range    Iron 20 (L) 35 - 150 ug/dL    TIBC 357 250 - 450 ug/dL    Transferrin Saturation 6 (L) >20 %   FERRITIN    Collection Time: 12/15/21 10:59 AM   Result Value Ref Range    Ferritin 56 8 - 388 NG/ML   VITAMIN B12    Collection Time: 12/15/21 10:59 AM   Result Value Ref Range    Vitamin B12 1,471 (H) 193 - 986 pg/mL   FOLATE    Collection Time: 12/15/21 10:59 AM   Result Value Ref Range    Folate 91.9 (H) 3.1 - 17.5 ng/mL   VITAMIN D, 25 HYDROXY    Collection Time: 12/15/21 10:59 AM   Result Value Ref Range Vitamin D 25-Hydroxy 42.3 30.0 - 100.0 ng/mL   LD    Collection Time: 12/15/21 10:59 AM   Result Value Ref Range     (H) 110 - 210 U/L   RETICULOCYTE COUNT    Collection Time: 12/15/21 10:59 AM   Result Value Ref Range    Reticulocyte count 3.0 (H) 0.3 - 2.0 %    Absolute Retic Cnt. 0.1109 (H) 0.026 - 0.095 M/ul    Immature Retic Fraction 26.3 (H) 3.0 - 15.9 %    Retic Hgb Conc. 19 (L) 29 - 35 pg   PROTHROMBIN TIME + INR    Collection Time: 12/15/21 10:59 AM   Result Value Ref Range    Prothrombin time 19.0 (H) 12.6 - 14.5 sec    INR 1.6     BILIRUBIN, FRACTIONATED    Collection Time: 12/15/21 10:59 AM   Result Value Ref Range    Bilirubin, total 1.4 (H) 0.2 - 1.1 MG/DL    Bilirubin, direct 0.8 (H) <0.4 MG/DL    Bilirubin, indirect 0.6 0.0 - 1.1 MG/DL       All Micro Results     Procedure Component Value Units Date/Time    CULTURE, BLOOD [[id number]] Collected: 12/13/21 1644    Order Status: Completed Specimen: Blood Updated: 12/15/21 0630     Special Requests: --        RIGHT  FOREARM       Culture result: NO GROWTH 2 DAYS       CULTURE, BLOOD [[id number]] Collected: 12/13/21 1644    Order Status: Completed Specimen: Blood Updated: 12/15/21 0630     Special Requests: --        RIGHT  Antecubital       Culture result: NO GROWTH 2 DAYS       COVID-19 RAPID TEST [[id number]] Collected: 12/13/21 2309    Order Status: Completed Specimen: Nasopharyngeal Updated: 12/13/21 2336     Specimen source NASAL        COVID-19 rapid test Not detected        Comment:      The specimen is NEGATIVE for SARS-CoV-2, the novel coronavirus associated with COVID-19. A negative result does not rule out COVID-19. This test has been authorized by the FDA under an Emergency Use Authorization (EUA) for use by authorized laboratories.         Fact sheet for Healthcare Providers: ConventionUpdate.co.nz  Fact sheet for Patients: ConventionUpdate.co.nz       Methodology: Isothermal Nucleic Acid Amplification               Other Studies:  No results found. Current Meds:  Current Facility-Administered Medications   Medication Dose Route Frequency    sodium chloride (NS) flush 5-40 mL  5-40 mL IntraVENous Q8H    acetaminophen (TYLENOL) tablet 650 mg  650 mg Oral Q6H PRN    Or    acetaminophen (TYLENOL) suppository 650 mg  650 mg Rectal Q6H PRN    polyethylene glycol (MIRALAX) packet 17 g  17 g Oral DAILY PRN    ondansetron (ZOFRAN ODT) tablet 4 mg  4 mg Oral Q8H PRN    Or    ondansetron (ZOFRAN) injection 4 mg  4 mg IntraVENous Q6H PRN    pantoprazole (PROTONIX) 40 mg in 0.9% sodium chloride 10 mL injection  40 mg IntraVENous Q12H    amiodarone (CORDARONE) tablet 200 mg  200 mg Oral DAILY    DULoxetine (CYMBALTA) capsule 60 mg  60 mg Oral DAILY    folic acid (FOLVITE) tablet 1 mg  1 mg Oral DAILY    levothyroxine (SYNTHROID) tablet 25 mcg  25 mcg Oral ACB    tuberculin injection 5 Units  5 Units IntraDERMal ONCE    sodium chloride (NS) flush 5-40 mL  5-40 mL IntraVENous Q8H    0.9% sodium chloride infusion 250 mL  250 mL IntraVENous PRN    metoprolol tartrate (LOPRESSOR) tablet 12.5 mg  12.5 mg Oral BID    sodium chloride (NS) flush 5-10 mL  5-10 mL IntraVENous Q8H       Signed:  May Base, MD    Part of this note may have been written by using a voice dictation software. The note has been proof read but may still contain some grammatical/other typographical errors.

## 2021-12-15 NOTE — PROGRESS NOTES
ACUTE PHYSICAL THERAPY GOALS:  (Developed with and agreed upon by patient and/or caregiver.)  1. Ms. Maribell Banegas will perform supine to sit and sit to supine independently in 7 days. 2.  Ms. Maribell Banegas will perform sit to stand and bed to chair independently with least restrictive device in 7 days. 3.  Ms. Maribell Banegas will perform gait with rolling walker or least restrictive device 150 ft independently in 7 days. 4.  Ms. Maribell Banegas will tolerate >25 minutes dynamic activity in 7 days. PHYSICAL THERAPY ASSESSMENT: Initial Assessment, Daily Note and AM PT Treatment Day Ramandeep Burton is a 80 y.o. female   PRIMARY DIAGNOSIS: Anemia  Anemia [D64.9]  Procedure(s) (LRB):  ESOPHAGOGASTRODUODENOSCOPY (EGD) /28 ER 32 / Being admitted to [facility] *Diagnostic only (N/A)  1 Day Post-Op  Reason for Referral:    ICD-10: Treatment Diagnosis: Generalized Muscle Weakness (M62.81)  Difficulty in walking, Not elsewhere classified (R26.2)  INPATIENT: Payor: [facility] MEDICARE COMPLETE / Plan: [facility] MEDICARE COMPLETE / Product Type: Managed Care Medicare /     ASSESSMENT:     REHAB RECOMMENDATIONS:   Recommendation to date pending progress:  Setting:   Short-term Rehab   or home it really depends on progress. she lives alone. Equipment:    To Be Determined     PRIOR LEVEL OF FUNCTION:  (Prior to Hospitalization) INITIAL/CURRENT LEVEL OF FUNCTION:  (Most Recently Demonstrated)   Bed Mobility:   Modified Independent  Sit to Stand:   Modified Independent  Transfers:   Modified Independent  Gait/Mobility:   Modified Independent Bed Mobility:   Not tested  Sit to Stand:  [employer] Assistance  Transfers:   Contact Guard Assistance  Gait/Mobility:   Contact Guard Assistance     ASSESSMENT:  Ms. Maribell Banegas is super [place], lives by herself and needing O2 2 liters currently with rollator at home. She tells me she has tripped over her O2 cord a few times. She also tells me she has been to rehab and wonders why it hasn't helped.   Clearly with a  hgb of 3.3 something is going on and unless this is corrected she will never get strong. She admits that today she feels better and was able to walk to the door and back to the chair with cg using rolling walker. she says she could not do that yesterday. She has sores all over her arms  We talked about discharge plan and she understands that she might need a rehab stay but wants to make sure they figure out what is wrong first.  Ms. Cipriano Leone is functioning below baseline and is therefore appropriate for skilled PT to maximize her rehab potential.     SUBJECTIVE:   Ms. Cipriano Leone states, \"I just want to know whats wrong with me. \"    SOCIAL HISTORY/LIVING ENVIRONMENT: lives alone, rollator, [employer] gets groceries, or assists with transportation. She cleans her home herself.       OBJECTIVE:     PAIN: VITAL SIGNS: LINES/DRAINS:   Pre Treatment: Pain Screen  Pain Scale 1: Numeric (0 - 10)  Post Treatment: 0     O2 Device: None (Room air)     GROSS EVALUATION:   Within Functional Limits Abnormal/ Functional Abnormal/ Non-Functional (see comments) Not Tested Comments:   AROM [x] [] [] []    PROM [] [] [] []    Strength [x] [] [] [] Generally weak but functional   Balance [] [] [] []    Posture [] [] [] []    Sensation [] [x] [] [] Feet are hurting   Coordination [] [] [] []    Tone [] [] [] []    Edema [] [x] [] [] Bilateral feet edema   Activity Tolerance [] [x] [] [] SOB easily with limited activity    [] [] [] []      COGNITION/  PERCEPTION: Intact Impaired   (see comments) Comments:   Orientation [x] []    Vision [x] []    Hearing [] [x] Super [place] and no hearing aides   Command Following [x] []    Safety Awareness [x] []     [] []      MOBILITY: I Mod I S SBA CGA Min Mod Max Total  NT x2 Comments:   Bed Mobility    Rolling [] [] [] [] [] [] [] [] [] [x] []    Supine to Sit [] [] [] [] [] [] [] [] [] [x] []    Scooting [] [] [] [] [] [] [] [] [] [x] []    Sit to Supine [] [] [] [] [] [] [] [] [] [x] []    Transfers Sit to Stand [] [] [] [] [x] [] [] [] [] [] []    Bed to Chair [] [] [] [] [] [] [] [] [] [x] []    Stand to Sit [] [] [] [] [x] [] [] [] [] [] []    I=Independent, Mod I=Modified Independent, S=Supervision, SBA=Standby Assistance, CGA=Contact Guard Assistance,   Min=Minimal Assistance, Mod=Moderate Assistance, Max=Maximal Assistance, Total=Total Assistance, NT=Not Tested  GAIT: I Mod I S SBA CGA Min Mod Max Total  NT x2 Comments:   Level of Assistance [] [] [] [] [x] [] [] [] [] [] []    Distance 40 ft    DME Rolling Walker    Gait Quality slow    Weightbearing Status N/A     I=Independent, Mod I=Modified Independent, S=Supervision, SBA=Standby Assistance, CGA=Contact Guard Assistance,   Min=Minimal Assistance, Mod=Moderate Assistance, Max=Maximal Assistance, Total=Total Assistance, NT=Not Tested    [address] [place] [address]-[facility]       How much difficulty does the patient currently have. .. Unable A Lot A Little None   1. Turning over in bed (including adjusting bedclothes, sheets and blankets)? [] 1   [] 2   [x] 3   [] 4   2. Sitting down on and standing up from a chair with arms ( e.g., wheelchair, bedside commode, etc.)   [] 1   [] 2   [x] 3   [] 4   3. Moving from lying on back to sitting on the side of the bed? [] 1   [] 2   [x] 3   [] 4   How much help from another person does the patient currently need. .. Total A Lot A Little None   4. Moving to and from a bed to a chair (including a wheelchair)? [] 1   [] 2   [x] 3   [] 4   5. Need to walk in hospital room? [] 1   [] 2   [x] 3   [] 4   6. Climbing 3-5 steps with a railing? [] 1   [] 2   [x] 3   [] 4   © 2007, Trustees of [address] [place] [id number], under license to [employer]. All rights reserved     Score:  Initial: 18 Most Recent: X (Date: -- )    Interpretation of Tool:  Represents activities that are increasingly more difficult (i.e. Bed mobility, Transfers, Gait).     PLAN:   FREQUENCY/DURATION: for duration of hospital stay or until stated goals are met, whichever comes first.    PROBLEM LIST:   (Skilled intervention is medically necessary to address:)  1. Decreased Activity Tolerance  2. Decreased AROM/PROM  3. Decreased Gait Ability  4. Decreased Strength  5. Decreased Transfer Abilities   INTERVENTIONS PLANNED:   (Benefits and precautions of physical therapy have been discussed with the patient.)  1. Therapeutic Activity  2. Therapeutic Exercise/HEP  3. Neuromuscular Re-education  4. Gait Training  5. Education     TREATMENT:     EVALUATION: Moderate Complexity : (Untimed Charge)    TREATMENT:   ($$ Therapeutic Activity: 8-22 mins    )  Therapeutic Activity (13 Minutes): Therapeutic activity included Transfer Training, Ambulation on level ground, Sitting balance  and Standing balance to improve functional Mobility.     TREATMENT GRID:  N/A    AFTER TREATMENT POSITION/PRECAUTIONS:  Chair, Needs within reach and RN notified    INTERDISCIPLINARY COLLABORATION:  RN/PCT and PT/PTA    TOTAL TREATMENT DURATION:  PT Patient Time In/Time Out  Time In: 1135  Time Out: Nino 132, PT

## 2021-12-15 NOTE — PROGRESS NOTES
Patient alert and oriented. Sat in chair for multiple hours today. Now back in bed, resting while watching TV. No s/s of acute distress. Iron infusing without difficultly at this time. Safety measures in place. Will continue to monitor and report off to oncoming nurse.

## 2021-12-15 NOTE — PROGRESS NOTES
ACUTE OT GOALS:  (Developed with and agreed upon by patient and/or caregiver.)  1. Pt will toilet with SBA   2. Pt will complete functional mobility for ADLs with SBA using AD as needed  3. Pt will complete lower body dressing with SBA using AE as needed  4. Pt will complete grooming and hygiene at sink with SBA  5. Pt will demonstrate independence with HEP to promote increased BUE strength and functional use for ADLs  6. Pt will tolerate 20 minutes functional activity with min or fewer rest breaks to promote increased endurance for ADLs  7. Pt will independently demonstrate/ verbalize 2+ energy conservation techniques to promote increased endurance for ADLs  8.  Pt will complete bed mobility with SBA in prep for ADLs    Timeframe: 7 days      OCCUPATIONAL THERAPY ASSESSMENT: Initial Assessment and Daily Note OT Treatment Day # 1    Talib Damon is a 80 y.o. female   PRIMARY DIAGNOSIS: Anemia  Anemia [D64.9]  Procedure(s) (LRB):  ESOPHAGOGASTRODUODENOSCOPY (EGD) /28 ER 32 / Being admitted to [facility] *Diagnostic only (N/A)  1 Day Post-Op  Reason for Referral:  Generalized weakness, SOB, anemia  ICD-10: Treatment Diagnosis: Generalized Muscle Weakness (M62.81)  INPATIENT: Payor: [facility] MEDICARE COMPLETE / Plan: [facility] MEDICARE COMPLETE / Product Type: Managed Care Medicare /   ASSESSMENT:     REHAB RECOMMENDATIONS:   Recommendation to date pending progress:  [address] [place] [address]  Equipment:    None     PRIOR LEVEL OF FUNCTION:  (Prior to Hospitalization)  INITIAL/CURRENT LEVEL OF FUNCTION:  (Based on today's evaluation)   Bathing:   Independent  Dressing:   Independent  Feeding/Grooming:   Independent  Toileting:   Independent  Functional Mobility:   Modified Independent Bathing:   Contact Guard Assistance  Dressing:   Minimal Assistance  Feeding/Grooming:   Contact Guard Assistance  Toileting:   Contact Guard Assistance  Functional Mobility:   Contact Guard Assistance     ASSESSMENT:  Ms. Mary Vickers was admitted with severe anemia, Hgb 3.3 upon arrival. Pt presented generally weak with deficits in endurance, strength, and mobility impacting ADLs. Pt required min A for bed mobility and mod A to don socks, otherwise completed bathing, dressing, and mobility with CGA using RW. Pt endorsed significant SOB and fatigue with activity and required frequent rest breaks to tolerate. SpO2 < 83% with activity on room air, > 93% on 2L NC w/ cues for PLB technique. Pt is below her functional baseline and would benefit from skilled OT services to address deficits. SUBJECTIVE:   Ms. Jamil Steiner states, \"I just get so SOB that I can't do anything, I'm fine if I don't move. \"    SOCIAL HISTORY/LIVING ENVIRONMENT: Lives alone. Independent w/ ADLs, cane or rollator for home mobility. 5 falls d/t tripping over O2 line. 1L, 5 steps. Tub shower w/ shower chair, grab bars. Friend from [facility] Lubna Shah) assists w/ shopping and brings frozen meals and pt has assistance for IADLs.         OBJECTIVE:     PAIN: VITAL SIGNS: LINES/DRAINS:   Pre Treatment: Pain Screen  Pain Scale 1: Numeric (0 - 10)  Pain Intensity 1: 0  Post Treatment: 0   [place]ilton Catheter  O2 Device: None (Room air)     GROSS EVALUATION:  BUE Within Functional Limits Abnormal/ Functional Abnormal/ Non-Functional (see comments) Not Tested Comments:   AROM [] [x] [] []    PROM [] [] [] []    Strength [] [x] [] []    Balance [] [x] [] []    Posture [] [] [] []    Sensation [] [] [] []    Coordination [] [x] [] [] Arthritis B hands   Tone [] [] [] []    Edema [] [] [] []    Activity Tolerance [] [] [x] []     [] [] [] []      COGNITION/  PERCEPTION: Intact Impaired   (see comments) Comments:   Orientation [] []    Vision [] []    Hearing [] [x] VERY [place]   Judgment/ Insight [x] []    Attention [] []    Memory [] []    Command Following [x] []    Emotional Regulation [x] []     [] []      ACTIVITIES OF DAILY LIVING: I Mod I S SBA CGA Min Mod Max Total NT Comments   BASIC ADLs: Bathing/ Showering [] [] [] [] [x] [] [] [] [] []    Toileting [] [] [] [] [] [] [] [] [] []    Dressing [] [] [] [] [] [x] [] [] [] [] Mod socks, s/u gown   Feeding [] [] [] [] [] [] [] [] [] []    Grooming [] [] [] [] [x] [] [] [] [] []    Personal Device Care [] [] [] [] [] [] [] [] [] []    Functional Mobility [] [] [] [] [x] [] [] [] [] [] RW   I=Independent, Mod I=Modified Independent, S=Supervision, SBA=Standby Assistance, CGA=Contact Guard Assistance,   Min=Minimal Assistance, Mod=Moderate Assistance, Max=Maximal Assistance, Total=Total Assistance, NT=Not Tested    MOBILITY: I Mod I S SBA CGA Min Mod Max Total  NT x2 Comments:   Supine to sit [] [] [] [] [] [x] [] [] [] [] []    Sit to supine [] [] [] [] [] [] [] [] [] [] []    Sit to stand [] [] [] [] [x] [] [] [] [] [] []    Bed to chair [] [] [] [] [x] [] [] [] [] [] []    I=Independent, Mod I=Modified Independent, S=Supervision, SBA=Standby Assistance, CGA=Contact Guard Assistance,   Min=Minimal Assistance, Mod=Moderate Assistance, Max=Maximal Assistance, Total=Total Assistance, NT=Not Tested    [facility] [address] [address] AM-PAC 6 Clicks   Daily Activity Inpatient Short Form        How much help from another person does the patient currently need. .. Total A Lot A Little None   1. Putting on and taking off regular lower body clothing? [] 1   [] 2   [x] 3   [] 4   2. Bathing (including washing, rinsing, drying)? [] 1   [] 2   [x] 3   [] 4   3. Toileting, which includes using toilet, bedpan or urinal?   [] 1   [] 2   [x] 3   [] 4   4. Putting on and taking off regular upper body clothing? [] 1   [] 2   [x] 3   [] 4   5. Taking care of personal grooming such as brushing teeth? [] 1   [] 2   [x] 3   [] 4   6. Eating meals? [] 1   [] 2   [] 3   [x] 4   © 2007, Trustees of [address] [place] [address] [address] [address], under license to [facility].  All rights reserved     Score:  Initial: 19 Most Recent: X (Date: -- )   Interpretation of Tool:  Represents activities that are increasingly more difficult (i.e. Bed mobility, Transfers, Gait). PLAN:   FREQUENCY/DURATION: OT Plan of Care: 3 times/week for duration of hospital stay or until stated goals are met, whichever comes first.    PROBLEM LIST:   (Skilled intervention is medically necessary to address:)  1. Decreased ADL/Functional Activities  2. Decreased Activity Tolerance  3. Decreased Balance  4. Decreased Strength   INTERVENTIONS PLANNED:   (Benefits and precautions of occupational therapy have been discussed with the patient.)  1. Self Care Training  2. Therapeutic Activity  3. Therapeutic Exercise/HEP  4. Neuromuscular Re-education  5. Education     TREATMENT:     EVALUATION: Moderate Complexity : (Untimed Charge)    TREATMENT:   ($$ Self Care/Home Management: 8-22 mins    )  Self Care (15 Minutes): Self care including Upper Body Bathing, Lower Body Bathing, Upper Body Dressing, Lower Body Dressing and Grooming to increase independence and decrease level of assistance required.     TREATMENT GRID:  N/A    AFTER TREATMENT POSITION/PRECAUTIONS:  Chair, Needs within reach and RN notified    INTERDISCIPLINARY COLLABORATION:  RN/PCT    TOTAL TREATMENT DURATION:  OT Patient Time In/Time Out  Time In: 1488  Time Out: 154 [facility] Mairam Ellis

## 2021-12-15 NOTE — PROGRESS NOTES
[facility] Progress Note         Admit Date:  12/13/2021    Today's Date:  12/15/2021    CC: Anemia    Subjective:     Patient VERY [place] ([place] Denies ABD pain, N/V. No BM. EGD yesterday negative. Reports feeling very SOB with activities     Medications:   Current Facility-Administered Medications   Medication Dose Route Frequency    sodium chloride (NS) flush 5-40 mL  5-40 mL IntraVENous Q8H    acetaminophen (TYLENOL) tablet 650 mg  650 mg Oral Q6H PRN    Or    acetaminophen (TYLENOL) suppository 650 mg  650 mg Rectal Q6H PRN    polyethylene glycol (MIRALAX) packet 17 g  17 g Oral DAILY PRN    ondansetron (ZOFRAN ODT) tablet 4 mg  4 mg Oral Q8H PRN    Or    ondansetron (ZOFRAN) injection 4 mg  4 mg IntraVENous Q6H PRN    pantoprazole (PROTONIX) 40 mg in 0.9% sodium chloride 10 mL injection  40 mg IntraVENous Q12H    amiodarone (CORDARONE) tablet 200 mg  200 mg Oral DAILY    DULoxetine (CYMBALTA) capsule 60 mg  60 mg Oral DAILY    folic acid (FOLVITE) tablet 1 mg  1 mg Oral DAILY    levothyroxine (SYNTHROID) tablet 25 mcg  25 mcg Oral ACB    tuberculin injection 5 Units  5 Units IntraDERMal ONCE    sodium chloride (NS) flush 5-40 mL  5-40 mL IntraVENous Q8H    0.9% sodium chloride infusion 250 mL  250 mL IntraVENous PRN    metoprolol tartrate (LOPRESSOR) tablet 12.5 mg  12.5 mg Oral BID    sodium chloride (NS) flush 5-10 mL  5-10 mL IntraVENous Q8H       Review of Systems:  ROS was obtained, with pertinent positives as listed above. No chest pain or SOB. Diet:  Regular    Objective:   Vitals:  Visit Vitals  /62 (BP 1 Location: Left upper arm, BP Patient Position: At rest)   Pulse 62   Temp 97.5 °F (36.4 °C)   Resp 20   Wt 67.7 kg (149 lb 3.2 oz)   SpO2 99%   BMI 29.14 kg/m²     Intake/Output:  No intake/output data recorded.   12/13 1901 - 12/15 0700  In: 1352.9 [I.V.:550]  Out: 900 [Urine:900]  Exam:  General appearance: alert, cooperative, no distress, [place]  Lungs: clear to auscultation bilaterally anteriorly  Heart: regular rate and rhythm  Abdomen: soft, non-tender. Bowel sounds normal. No masses, no organomegaly  Extremities: extremities normal, atraumatic, no cyanosis or edema  Neuro:  alert and oriented    Data Review (Labs):    Recent Labs     12/15/21  0650 12/14/21  1600 12/14/21  0610 12/13/21  2146 12/13/21  1644   WBC 11.6*  --  14.3*  --  6.7   HGB 7.8* 8.3* 6.3* 4.6* 3.3*   HCT 25.7* 27.4* 21.3* 17.3* 14.1*     --  410  --  559*   MCV 81.1  --  79.2*  --  71.9*   NA  --   --  140  --  138   K  --   --  3.9  --  4.3   CL  --   --  105  --  100   CO2  --   --  28  --  18*   BUN  --   --  30*  --  29*   CREA  --   --  2.79*  --  3.04*   CA  --   --  7.9*  --  8.5   MG  --   --  2.2  --  2.6*   GLU  --   --  88  --  50*   AP  --   --   --   --  77   AST  --   --   --   --  114*   ALT  --   --   --   --  64   TBILI  --   --   --   --  1.2*   ALB  --   --   --   --  2.3*   TP  --   --   --   --  6.0*   PTP  --   --   --   --  32.9*   INR  --   --   --   --  3.2   APTT  --   --   --   --  29.9       Assessment:     Principal Problem:    Anemia (5/12/2021)    Active Problems:    Acquired hypothyroidism (1/16/2016)      Hyperlipidemia (1/16/2016)      Hypertension, essential, benign (8/18/2016)      Major depressive disorder, recurrent, moderate (HCC) (8/18/2016)      Acute renal failure superimposed on chronic kidney disease (HCC) (4/22/2019)      Paroxysmal atrial fibrillation (HCC) (3/65/8971)      Diastolic CHF, chronic (HCC) (6/13/2021)      Lactic acidosis (12/13/2021)         80 y.o. female with PMH including but not limited to A Fib (dx 3/2021) on Xarelto, CKD, breast cancer, RA, TIA admitted with SOB and hgb 3.3 (12/13) with elevated BUN 29 and Cr 3.04. Today hgb 6.3. WBC is elevated 14. 3. Xarelto on hold. She denies NSAID or ETOH use. It is unclear if she has had prior colonoscopy or EGD. She is a poor historian. INR was 3.2 on 12/13.   EGD yesterday was negative. Plan:     - On Xarelto, on hold last dose 11/13  - EGD negative, Hematology consulted  - SB evaluation with capsule endoscopy as out patient   - Monitor INR - 12/13 was 3.2, pending today     Darrius Waters NP  Patient is seen and examined in collaboration with Dr. Lynn Ramos. Assessment and plan as per Dr. Jesse Red.

## 2021-12-15 NOTE — PROGRESS NOTES
Pt is iron deficient with Tsat 6% and ferritin 56. Infed ordered.       Zi Denton NP  [facility]

## 2021-12-15 NOTE — PROGRESS NOTES
Report received from off going nurse. Patient resting with eyes closed. No s/s of acute distress at this time. Safety measures in place.

## 2021-12-15 NOTE — PROGRESS NOTES
Patient resting quietly in bed. Respirations even and unlabored. O2 sats % on 2L. Weaned to room air. Sat 98% on room air. No acute distress noted. Patient denies needs at present. Call light within reach. Will continue to monitor and give SBAR report to oncoming nurse.

## 2021-12-15 NOTE — PROGRESS NOTES
Physician Progress Note      Sissy Hercules  CSN #:                  [id number]  :                       1940  ADMIT DATE:       2021 4:32 PM  [address] [place] DATE:  RESPONDING  PROVIDER #:        Miguel Ángel Wallis MD          QUERY TEXT:    Pt admitted with anemia documented. If possible, please document in progress notes and discharge summary further specificity regarding the acuity and type of anemia:    The medical record reflects the following:  Risk Factors: hx severe anemia  Clinical Indicators: HGB 3.3  Treatment: s/p 3 u PRBC, heme consult, serial H/H  Options provided:  -- Anemia due to acute blood loss  -- Anemia due to acute on chronic blood loss  -- Anemia due to MTX bone marrow toxicity  -- Anemia due to GIB  -- Other - I will add my own diagnosis  -- Disagree - Not applicable / Not valid  -- Disagree - Clinically unable to determine / Unknown  -- Refer to Clinical Documentation Reviewer    PROVIDER RESPONSE TEXT:    Acute on chronic anemia, iron deficiency anemia    Query created by: Francisco Min on 12/15/2021 8:38 AM      QUERY TEXT:    Patient admitted with severe anemia, noted to have Paroxysmal atrial fibrillation and is maintained on Xarelto. If possible, please document in progress notes and discharge summary if you are evaluating and/or treating any of the following:     The medical record reflects the following:  Risk Factors: anemia, PAF  Clinical Indicators/Treatment: Paroxysmal atrial fibrillation ([address] -Xarelto held, continue Amiodarone and BB -Continuous  telemetry monitoring  Options provided:  -- Secondary hypercoagulable state in a patient with atrial fibrillation  -- Other - I will add my own diagnosis  -- Disagree - Not applicable / Not valid  -- Disagree - Clinically unable to determine / Unknown  -- Refer to Clinical Documentation Reviewer    PROVIDER RESPONSE TEXT:    This patient has 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 Query created by: Tianna Funez on 12/15/2021 8:41 AM      QUERY TEXT:    Pt admitted with severe anemia and has chronic respiratory failure documented. If possible, please document in progress notes and discharge summary further specificity regarding the type and acuity of respiratory failure: The medical record reflects the following:  Risk Factors: chronic hypoxic respiratory failure baseline 2L NC  Clinical Indicators: \"presented to ED via EMS after friend found patient at home, lethargic, stating she \"couldn't breathe\" with last known well x 2 days prior. Patient endorses she tripped on a cord and fell on left side. Tachypneic and placed on CPAP by EMS, no saturation obtained, given albuterol. BLE edema present, HR 60, /67, BGL 90, RR 40.\"  Treatment: CPAP, BiPAP  Options provided:  -- Chronic respiratory failure with hypoxia  -- Acute on chronic respiratory failure with hypoxia  -- Other - I will add my own diagnosis  -- Disagree - Not applicable / Not valid  -- Disagree - Clinically unable to determine / Unknown  -- Refer to Clinical Documentation Reviewer    PROVIDER RESPONSE TEXT:    This patient has chronic respiratory failure with hypoxia.     Query created by: Tianna Funez on 12/15/2021 8:44 AM      Electronically signed by:  Cammie Cobos MD 12/15/2021 1:27 PM

## 2021-12-16 LAB
HAV IGM SER QL: NONREACTIVE
HBV CORE IGM SER QL: NONREACTIVE
HBV SURFACE AG SER QL: NONREACTIVE
HCV AB SER QL: NONREACTIVE
HIV 1+2 AB+HIV1 P24 AG SERPL QL IA: NONREACTIVE
HIV12 RESULT COMMENT, HHIVC: ABNORMAL
MM INDURATION POC: 0 MM (ref 0–5)
PATH REV BLD -IMP: NORMAL
PPD POC: NEGATIVE NEGATIVE

## 2021-12-16 PROCEDURE — 80074 ACUTE HEPATITIS PANEL: CPT

## 2021-12-16 PROCEDURE — 97535 SELF CARE MNGMENT TRAINING: CPT

## 2021-12-16 PROCEDURE — 74011250637 HC RX REV CODE- 250/637: Performed by: HOSPITALIST

## 2021-12-16 PROCEDURE — 74011250637 HC RX REV CODE- 250/637: Performed by: INTERNAL MEDICINE

## 2021-12-16 PROCEDURE — 65660000000 HC RM CCU STEPDOWN

## 2021-12-16 PROCEDURE — 99232 SBSQ HOSP IP/OBS MODERATE 35: CPT | Performed by: INTERNAL MEDICINE

## 2021-12-16 PROCEDURE — 85018 HEMOGLOBIN: CPT

## 2021-12-16 PROCEDURE — 87389 HIV-1 AG W/HIV-1&-2 AB AG IA: CPT

## 2021-12-16 PROCEDURE — 36415 COLL VENOUS BLD VENIPUNCTURE: CPT

## 2021-12-16 PROCEDURE — APPSS45 APP SPLIT SHARED TIME 31-45 MINUTES: Performed by: NURSE PRACTITIONER

## 2021-12-16 RX ADMIN — Medication 10 ML: at 05:35

## 2021-12-16 RX ADMIN — Medication 10 ML: at 13:31

## 2021-12-16 RX ADMIN — Medication 10 ML: at 13:44

## 2021-12-16 RX ADMIN — Medication 10 ML: at 06:10

## 2021-12-16 RX ADMIN — METOPROLOL TARTRATE 12.5 MG: 25 TABLET, FILM COATED ORAL at 17:40

## 2021-12-16 RX ADMIN — METOPROLOL TARTRATE 12.5 MG: 25 TABLET, FILM COATED ORAL at 09:56

## 2021-12-16 RX ADMIN — Medication 10 ML: at 13:43

## 2021-12-16 RX ADMIN — AMIODARONE HYDROCHLORIDE 200 MG: 200 TABLET ORAL at 09:56

## 2021-12-16 RX ADMIN — Medication 10 ML: at 21:27

## 2021-12-16 RX ADMIN — PANTOPRAZOLE SODIUM 40 MG: 40 TABLET, DELAYED RELEASE ORAL at 06:13

## 2021-12-16 RX ADMIN — FOLIC ACID 1 MG: 1 TABLET ORAL at 09:56

## 2021-12-16 RX ADMIN — DULOXETINE HYDROCHLORIDE 60 MG: 60 CAPSULE, DELAYED RELEASE ORAL at 09:56

## 2021-12-16 RX ADMIN — LEVOTHYROXINE SODIUM 50 MCG: 0.05 TABLET ORAL at 06:13

## 2021-12-16 RX ADMIN — Medication 10 ML: at 21:23

## 2021-12-16 NOTE — CONSULTS
[facility] Hematology and Oncology: Inpatient Hematology / Oncology Consult Note    Reason for Consult:    Referring Physician:  Sixto Gallo MD    History of Present Illness:  Ms. Mary Vickers is a 80 y.o. female admitted on 12/13/2021 with a primary diagnosis of   Encounter Diagnoses   Name Primary?  SOB (shortness of breath) Yes    Severe anemia      Ms Charu Nolan is a pleasant 73yo woman admitted on 12/13 with sob and evere anemia. PMhx: Anemia, CKD, CHF, A. fib on Xarelto, rheumatoid arthritis, hypothyroidism. Most recently seen in consult by hematology in 5/2021 for leukopenia and anemia (colton Hb 3.7), felt to be related to methotrexate and GI bleed. Patient reports living alone with her pets. EMS was called by family. Shortness of breath severe enough to require BiPAP. Labs in ED with Hb 3.3, LA 14.7, CR 3 (BL 1.5 and 6/2021 (.  She was also heme positive on rectal exam.  EGD on 1214 by GI was unremarkable. CXR with moderate pleural effusion on the left. Patient also complained of left hip pain and x-rays were negative. Cultures negative. She required transfusion and her hemoglobin is 7.8 today. We were consulted for recommendations regarding her anemia. Review of Systems:  Constitutional Denies fever or chills. Denies weight loss or appetite changes. +fatigue. Denies night sweats. HEENT Denies trauma, blurry vision, hearing loss, ear pain, nosebleeds, sore throat, neck pain and ear discharge. Skin Denies lesions or rashes. Lungs +dyspnea, mild cough, no sputum production or hemoptysis. Cardiovascular Denies chest pain, palpitations, or lower extremity edema. Gastrointestinal Denies nausea or vomiting. Denies changes in bowel habits. Denies bloody or black stools. Denies abdominal pain.  Denies dysuria, frequency or hesitancy of urination. Neuro Denies headaches, visual changes or ataxia. Denies dizziness, tingling, tremors, sensory change, speech change, focal weakness. Hematology Denies bleeding   Endo Denies heat/cold intolerance   MSK +back pain, arthralgias, no myalgias or frequent falls. Psychiatric/Behavioral Denies depression and substance abuse. The patient is not nervous/anxious.          Allergies   Allergen Reactions    Eliquis [Apixaban] Other (comments)     Patient states she had hallucinations from Eliquis     Past Medical History:   Diagnosis Date    Acquired hypothyroidism 1/16/2016    Breast cancer ([facility] [address] 2008    Depression 8/18/2016    Endocrine disease     hypothyroid    Fungal dermatitis 8/18/2016    Hyperlipidemia 1/16/2016    Hypertension, essential, benign 8/18/2016    Hypokalemia 8/18/2016    Inflamed sebaceous cyst 8/18/2016    Major depressive disorder, recurrent, moderate ([facility] [address] 8/18/2016    Nicotine dependence, cigarettes, uncomplicated 1/95/2189    Osteopenia 8/18/2016    Osteoporosis 8/18/2016    Radiation therapy complication 7295    Rheumatoid arthritis ([facility] [address] 1/16/2016    Right carotid bruit 1/16/2016    TIA (transient ischemic attack) 1/16/2016    Tobacco abuse 8/18/2016     Past Surgical History:   Procedure Laterality Date    HX ADENOIDECTOMY      HX BREAST LUMPECTOMY Right 2008    with lymph nodes    HX TONSILLECTOMY      IR BX BONE MARROW DIAGNOSTIC  5/14/2021    IR INSERT NON TUNL CVC OVER 5 YRS  5/14/2021     Family History   Problem Relation Age of Onset    Stroke Mother     Cancer Father         Brain    HIV/AIDS Brother      Social History     Socioeconomic History    Marital status:      Spouse name: Not on file    Number of children: Not on file    Years of education: Not on file    Highest education level: Not on file   Occupational History    Not on file   Tobacco Use    Smoking status: Current Some Day Smoker    Smokeless tobacco: Never Used    Tobacco comment: Only on pay day-1/2 pack   Substance and Sexual Activity    Alcohol use: No    Drug use: No    Sexual activity: Not on file Other Topics Concern    Not on file   Social History Narrative    Not on file     Social Determinants of Health     Financial Resource Strain:     Difficulty of Paying Living Expenses: Not on file   Food Insecurity:     Worried About Running Out of Food in the Last Year: Not on file    Hank of Food in the Last Year: Not on file   Transportation Needs:     Lack of Transportation (Medical): Not on file    Lack of Transportation (Non-Medical):  Not on file   Physical Activity:     Days of Exercise per Week: Not on file    Minutes of Exercise per Session: Not on file   Stress:     Feeling of Stress : Not on file   Social Connections:     Frequency of Communication with Friends and Family: Not on file    Frequency of Social Gatherings with Friends and Family: Not on file    Attends Je[employer] witness Services: Not on file    Active Member of [address] [place] [address] or Organizations: Not on file    Attends Club or Organization Meetings: Not on file    Marital Status: Not on file   Intimate Partner Violence:     Fear of Current or Ex-Partner: Not on file    Emotionally Abused: Not on file    Physically Abused: Not on file    Sexually Abused: Not on file   Housing Stability:     Unable to Pay for Housing in the Last Year: Not on file    Number of [place] in the Last Year: Not on file    Unstable Housing in the Last Year: Not on file     Current Facility-Administered Medications   Medication Dose Route Frequency Provider Last Rate Last Admin    [START ON 12/16/2021] pantoprazole (PROTONIX) tablet 40 mg  40 mg Oral ACB Diana Griffiths MD        iron dextran (INFED) 975 mg in 0.9% sodium chloride 500 mL IVPB  975 mg IntraVENous Clora Herb, NP 83.3 mL/hr at 12/15/21 1631 975 mg at 12/15/21 1631    [START ON 12/16/2021] levothyroxine (SYNTHROID) tablet 50 mcg  50 mcg Oral ACB Diana Griffiths MD        sodium chloride (NS) flush 5-40 mL  5-40 mL IntraVENous Q8H Jose KAY MD   10 mL at 12/15/21 7381  acetaminophen (TYLENOL) tablet 650 mg  650 mg Oral Q6H PRN Wolf Bhatia MD        Or    acetaminophen (TYLENOL) suppository 650 mg  650 mg Rectal Q6H PRN Wolf Bhatia MD        polyethylene glycol (MIRALAX) packet 17 g  17 g Oral DAILY PRN Wolf Bhatia MD        ondansetron (ZOFRAN ODT) tablet 4 mg  4 mg Oral Q8H PRN Wolf Bhatia MD        Or    [facility] injection 4 mg  4 mg IntraVENous Q6H PRN Wolf Bhatia MD        amiodarone (CORDARONE) tablet 200 mg  200 mg Oral DAILY Wolf Bhatia MD   200 mg at 12/15/21 5859    DULoxetine (CYMBALTA) capsule 60 mg  60 mg Oral DAILY Wolf Bhatia MD   60 mg at  6389    folic acid (FOLVITE) tablet 1 mg  1 mg Oral DAILY Wolf Bhatia MD   1 mg at 12/15/21 0837    sodium chloride (NS) flush 5-40 mL  5-40 mL IntraVENous Q8H Pankaj Chavez MD   10 mL at 12/15/21 1351    0.9% sodium chloride infusion 250 mL  250 mL IntraVENous PRN Pankaj Chavez MD        metoprolol tartrate (LOPRESSOR) tablet 12.5 mg  12.5 mg Oral BID Jacklyn Hill MD   12.5 mg at 12/15/21 1838    sodium chloride (NS) flush 5-10 mL  5-10 mL IntraVENous Q8H Kirsty Conde MD   10 mL at 12/15/21 1351       OBJECTIVE:  Patient Vitals for the past 8 hrs:   BP Temp Pulse Resp SpO2   12/15/21 2024 115/60 98.9 °F (37.2 °C) 69 21 96 %   12/15/21 1448 (!) 104/56 98.3 °F (36.8 °C) 66 20 98 %     Temp (24hrs), Av °F (36.7 °C), Min:97.4 °F (36.3 °C), Max:98.9 °F (37.2 °C)    No intake/output data recorded. Physical Exam:  Constitutional: ECOG - 2  Well developed, obese female in no acute distress, sitting comfortably in the hospital chair. HEENT: Normocephalic and atraumatic. Oropharynx is clear, mucous membranes are moist.  + Exophthalmos. Pupils are equal, round, and reactive to light. Extraocular muscles are intact. Sclerae anicteric. Neck supple    Lymph node   No palpable submandibular, cervical, supraclavicular lymph nodes. Skin Warm and dry.   No erythema. + pallor    Respiratory Lungs decreased breath sounds at bases bilaterally, left greater than right   CVS Normal rate, regular rhythm and normal S1 and S2. Abdomen Soft, nontender and nondistended, normoactive bowel sounds. Neuro Grossly nonfocal with no obvious sensory or motor deficits. Able to answer questions, but somewhat tangential    MSK Able to lift legs off chair    Psych Appropriate mood and affect. Labs:    Recent Results (from the past 24 hour(s))   CBC WITH AUTOMATED DIFF    Collection Time: 12/15/21  6:50 AM   Result Value Ref Range    WBC 11.6 (H) 4.3 - 11.1 K/uL    RBC 3.17 (L) 4.05 - 5.2 M/uL    HGB 7.8 (L) 11.7 - 15.4 g/dL    HCT 25.7 (L) 35.8 - 46.3 %    MCV 81.1 79.6 - 97.8 FL    MCH 24.6 (L) 26.1 - 32.9 PG    MCHC 30.4 (L) 31.4 - 35.0 g/dL    RDW 20.6 (H) 11.9 - 14.6 %    PLATELET 067 923 - 279 K/uL    MPV 10.2 9.4 - 12.3 FL    ABSOLUTE NRBC 0.12 0.0 - 0.2 K/uL    DF AUTOMATED      NEUTROPHILS 82 (H) 43 - 78 %    LYMPHOCYTES 8 (L) 13 - 44 %    MONOCYTES 8 4.0 - 12.0 %    EOSINOPHILS 0 (L) 0.5 - 7.8 %    BASOPHILS 1 0.0 - 2.0 %    IMMATURE GRANULOCYTES 1 0.0 - 5.0 %    ABS. NEUTROPHILS 9.6 (H) 1.7 - 8.2 K/UL    ABS. LYMPHOCYTES 0.9 0.5 - 4.6 K/UL    ABS. MONOCYTES 0.9 0.1 - 1.3 K/UL    ABS. EOSINOPHILS 0.1 0.0 - 0.8 K/UL    ABS. BASOPHILS 0.1 0.0 - 0.2 K/UL    ABS. IMM. GRANS. 0.1 0.0 - 0.5 K/UL   METABOLIC PANEL, COMPREHENSIVE    Collection Time: 12/15/21  6:50 AM   Result Value Ref Range    Sodium 143 136 - 145 mmol/L    Potassium 3.3 (L) 3.5 - 5.1 mmol/L    Chloride 106 98 - 107 mmol/L    CO2 29 21 - 32 mmol/L    Anion gap 8 7 - 16 mmol/L    Glucose 60 (L) 65 - 100 mg/dL    BUN 30 (H) 8 - 23 MG/DL    Creatinine 2.61 (H) 0.6 - 1.0 MG/DL    GFR est AA 23 (L) >60 ml/min/1.73m2    GFR est non-AA 19 (L) >60 ml/min/1.73m2    Calcium 8.5 8.3 - 10.4 MG/DL    Bilirubin, total 1.5 (H) 0.2 - 1.1 MG/DL    ALT (SGPT) 364 (H) 12 - 65 U/L    AST (SGOT) 509 (H) 15 - 37 U/L    Alk. phosphatase 82 50 - 136 U/L    Protein, total 5.4 (L) 6.3 - 8.2 g/dL    Albumin 2.1 (L) 3.2 - 4.6 g/dL    Globulin 3.3 2.3 - 3.5 g/dL    A-G Ratio 0.6 (L) 1.2 - 3.5     PATHOLOGIST REVIEW SMEARS    Collection Time: 12/15/21 10:58 AM   Result Value Ref Range    PATHOLOGIST REVIEW PENDING    TRANSFERRIN SATURATION    Collection Time: 12/15/21 10:59 AM   Result Value Ref Range    Iron 20 (L) 35 - 150 ug/dL    TIBC 357 250 - 450 ug/dL    Transferrin Saturation 6 (L) >20 %   FERRITIN    Collection Time: 12/15/21 10:59 AM   Result Value Ref Range    Ferritin 56 8 - 388 NG/ML   VITAMIN B12    Collection Time: 12/15/21 10:59 AM   Result Value Ref Range    Vitamin B12 1,471 (H) 193 - 986 pg/mL   FOLATE    Collection Time: 12/15/21 10:59 AM   Result Value Ref Range    Folate 91.9 (H) 3.1 - 17.5 ng/mL   VITAMIN D, 25 HYDROXY    Collection Time: 12/15/21 10:59 AM   Result Value Ref Range    Vitamin D 25-Hydroxy 42.3 30.0 - 100.0 ng/mL   LD    Collection Time: 12/15/21 10:59 AM   Result Value Ref Range     (H) 110 - 210 U/L   RETICULOCYTE COUNT    Collection Time: 12/15/21 10:59 AM   Result Value Ref Range    Reticulocyte count 3.0 (H) 0.3 - 2.0 %    Absolute Retic Cnt. 0.1109 (H) 0.026 - 0.095 M/ul    Immature Retic Fraction 26.3 (H) 3.0 - 15.9 %    Retic Hgb Conc. 19 (L) 29 - 35 pg   PROTHROMBIN TIME + INR    Collection Time: 12/15/21 10:59 AM   Result Value Ref Range    Prothrombin time 19.0 (H) 12.6 - 14.5 sec    INR 1.6     BILIRUBIN, FRACTIONATED    Collection Time: 12/15/21 10:59 AM   Result Value Ref Range    Bilirubin, total 1.4 (H) 0.2 - 1.1 MG/DL    Bilirubin, direct 0.8 (H) <0.4 MG/DL    Bilirubin, indirect 0.6 0.0 - 1.1 MG/DL   PLEASE READ & DOCUMENT PPD TEST IN 24 HRS    Collection Time: 12/15/21  6:43 PM   Result Value Ref Range    PPD Negative Negative    mm Induration 0 0 - 5 mm       Imaging:  Reviewed     ASSESSMENT:    Principal Problem:    Anemia (5/12/2021)    Active Problems:    Acquired hypothyroidism (1/16/2016)      Hyperlipidemia (1/16/2016)      Hypertension, essential, benign (8/18/2016)      Major depressive disorder, recurrent, moderate ([facility] [address] (8/18/2016)      Acute renal failure superimposed on chronic kidney disease ([facility] [address] (4/22/2019)      Paroxysmal atrial fibrillation ([facility] [address] (7/56/9770)      Diastolic CHF, chronic (HCC) (6/13/2021)      Lactic acidosis (12/13/2021)        PLAN / RECOMMENDATIONS:  Lab studies and imaging studies were personally reviewed. Pertinent old records were reviewed. 1. Anemia - recurrent, severe, most likely multifactorial   -During today's visit we discussed the pathophysiology of hematopoiesis in general.  Her anemia is most likely related to chronic GI bleeding, chronic disease and being on anticoagulation, and likely malnutrition.  -We will check nutritional studies, viral serologies, smear/hemolysis labs, coags. Transfuse as needed to keep hemoglobin greater than 7  -Xarelto currently on hold per primary team  - LFTs noted and likely from low perfusion due to severe anemia    2. Hypothyroidism  -Exophthalmos noted on examination, TSH 17, patient on levothyroxine    Thank you for allowing me to participate in the care of Ms. Daryn Freeman.         Joshua Galloway MD  [address] [place] [address] Hematology and Oncology  [address] [place] [address]  Office : [phone]  Fax : [phone]

## 2021-12-16 NOTE — PROGRESS NOTES
Patient became strangled/choked on small piece of chopped potato at breakfast.   Dr Lorin Collins notified via perfect serve.

## 2021-12-16 NOTE — PROGRESS NOTES
[facility] Hematology & Oncology        Inpatient Hematology / Oncology Progress Note    Reason for Consult:  Anemia [D64.9]  Referring Physician:  Manjit Medina MD    24 Hour Events:  Afebrile, VSS, on O2 @ 2L  Hgb up to 8.1  S/p Infed yesterday  States she feels better today    ROS:  Constitutional: +weakness. Negative for fever, chills. CV: Negative for chest pain, palpitations, edema. Respiratory: Negative for dyspnea, cough, wheezing. GI: Negative for nausea, abdominal pain, diarrhea. 10 point review of systems is otherwise negative with the exception of the elements mentioned above in the HPI.          Allergies   Allergen Reactions    Eliquis [Apixaban] Other (comments)     Patient states she had hallucinations from Eliquis     Past Medical History:   Diagnosis Date    Acquired hypothyroidism 1/16/2016    Breast cancer ([facility] [address] 2008    Depression 8/18/2016    Endocrine disease     hypothyroid    Fungal dermatitis 8/18/2016    Hyperlipidemia 1/16/2016    Hypertension, essential, benign 8/18/2016    Hypokalemia 8/18/2016    Inflamed sebaceous cyst 8/18/2016    Major depressive disorder, recurrent, moderate ([address] 8/18/2016    Nicotine dependence, cigarettes, uncomplicated 7/21/7571    Osteopenia 8/18/2016    Osteoporosis 8/18/2016    Radiation therapy complication 8567    Rheumatoid arthritis ([address] 1/16/2016    Right carotid bruit 1/16/2016    TIA (transient ischemic attack) 1/16/2016    Tobacco abuse 8/18/2016     Past Surgical History:   Procedure Laterality Date    HX ADENOIDECTOMY      HX BREAST LUMPECTOMY Right 2008    with lymph nodes    HX TONSILLECTOMY      IR BX BONE MARROW DIAGNOSTIC  5/14/2021    IR INSERT NON TUNL CVC OVER 5 YRS  5/14/2021     Family History   Problem Relation Age of Onset    Stroke Mother     Cancer Father         Brain    HIV/AIDS Brother      Social History     Socioeconomic History    Marital status:      Spouse name: Not on file  Number of children: Not on file    Years of education: Not on file    Highest education level: Not on file   Occupational History    Not on file   Tobacco Use    Smoking status: Current Some Day Smoker    Smokeless tobacco: Never Used    Tobacco comment: Only on pay day-1/2 pack   Substance and Sexual Activity    Alcohol use: No    Drug use: No    Sexual activity: Not on file   Other Topics Concern    Not on file   Social History Narrative    Not on file     Social Determinants of Health     Financial Resource Strain:     Difficulty of Paying Living Expenses: Not on file   Food Insecurity:     Worried About Running Out of Food in the Last Year: Not on file    Hank of Food in the Last Year: Not on file   Transportation Needs:     Lack of Transportation (Medical): Not on file    Lack of Transportation (Non-Medical):  Not on file   Physical Activity:     Days of Exercise per Week: Not on file    Minutes of Exercise per Session: Not on file   Stress:     Feeling of Stress : Not on file   Social Connections:     Frequency of Communication with Friends and Family: Not on file    Frequency of Social Gatherings with Friends and Family: Not on file    Attends Druze Services: Not on file    Active Member of [address] [place] [address] [employer] or Organizations: Not on file    Attends Club or Organization Meetings: Not on file    Marital Status: Not on file   Intimate Partner Violence:     Fear of Current or Ex-Partner: Not on file    Emotionally Abused: Not on file    Physically Abused: Not on file    Sexually Abused: Not on file   Housing Stability:     Unable to Pay for Housing in the Last Year: Not on file    Number of [place] in the Last Year: Not on file    Unstable Housing in the Last Year: Not on file     Current Facility-Administered Medications   Medication Dose Route Frequency Provider Last Rate Last Admin    pantoprazole (PROTONIX) tablet 40 mg  40 mg Oral ACB Amanda Hoang MD   40 mg at 21 8083    levothyroxine (SYNTHROID) tablet 50 mcg  50 mcg Oral ACB Marian Casiano MD   50 mcg at 21 2955    sodium chloride (NS) flush 5-40 mL  5-40 mL IntraVENous Q8H Sudeep KAY MD   10 mL at 21 0610    acetaminophen (TYLENOL) tablet 650 mg  650 mg Oral Q6H PRN Matias Hennessy MD        Or    acetaminophen (TYLENOL) suppository 650 mg  650 mg Rectal Q6H PRN Matias Hennessy MD        polyethylene glycol [facility]) packet 17 g  17 g Oral DAILY PRN Matias Hennessy MD        ondansetron (ZOFRAN ODT) tablet 4 mg  4 mg Oral Q8H PRN Matias Hennessy MD        Or    ondansetron [facility]) injection 4 mg  4 mg IntraVENous Q6H PRN Matias Hennessy MD        amiodarone (CORDARONE) tablet 200 mg  200 mg Oral DAILY Matias Hennessy MD   200 mg at 12/15/21 4778    DULoxetine (CYMBALTA) capsule 60 mg  60 mg Oral DAILY Matias Hennessy MD   60 mg at  1326    folic acid (FOLVITE) tablet 1 mg  1 mg Oral DAILY Matias Hennessy MD   1 mg at 12/15/21 8701    sodium chloride (NS) flush 5-40 mL  5-40 mL IntraVENous Q8H Anupama Flores MD   10 mL at 21 0535    0.9% sodium chloride infusion 250 mL  250 mL IntraVENous PRN Anupama Flores MD        metoprolol tartrate (LOPRESSOR) tablet 12.5 mg  12.5 mg Oral BID Merrill Florentino MD   12.5 mg at 12/15/21 1838    sodium chloride (NS) flush 5-10 mL  5-10 mL IntraVENous Q8H Dedra Phillips MD   10 mL at 21 0535       OBJECTIVE:  Patient Vitals for the past 8 hrs:   BP Temp Pulse Resp SpO2   21 0733 131/72 97.9 °F (36.6 °C) 70 20 94 %   21 0626     92 %   21 0318 125/79 98.2 °F (36.8 °C) 86 20 93 %     Temp (24hrs), Av.3 °F (36.8 °C), Min:97.4 °F (36.3 °C), Max:98.9 °F (37.2 °C)    701 - 1900  In: 360 [P.O.:360]  Out: -     Physical Exam:  Constitutional: Well developed, well nourished female in no acute distress, sitting comfortably in the hospital bed. [place]. HEENT: Normocephalic and atraumatic. Oropharynx is clear, mucous membranes are moist.  Extraocular muscles are intact. Sclerae anicteric. Exophthalmos noted. Neck supple without JVD. No thyromegaly present. Skin Warm and dry. Bruising on UE with abrasions noted. No erythema. No pallor. Respiratory Lungs with crackles in the bases bilaterally, normal air exchange without accessory muscle use. CVS Normal rate, regular rhythm and normal S1 and S2. No murmurs, gallops, or rubs. Abdomen Soft, nontender and nondistended, normoactive bowel sounds. No palpable mass. No hepatosplenomegaly. Neuro Grossly nonfocal with no obvious sensory or motor deficits. MSK Normal range of motion in general.  No edema and no tenderness. Psych Appropriate mood and affect.         Labs:    Recent Results (from the past 24 hour(s))   PATHOLOGIST REVIEW SMEARS    Collection Time: 12/15/21 10:58 AM   Result Value Ref Range    PATHOLOGIST REVIEW PENDING    TRANSFERRIN SATURATION    Collection Time: 12/15/21 10:59 AM   Result Value Ref Range    Iron 20 (L) 35 - 150 ug/dL    TIBC 357 250 - 450 ug/dL    Transferrin Saturation 6 (L) >20 %   FERRITIN    Collection Time: 12/15/21 10:59 AM   Result Value Ref Range    Ferritin 56 8 - 388 NG/ML   VITAMIN B12    Collection Time: 12/15/21 10:59 AM   Result Value Ref Range    Vitamin B12 1,471 (H) 193 - 986 pg/mL   FOLATE    Collection Time: 12/15/21 10:59 AM   Result Value Ref Range    Folate 91.9 (H) 3.1 - 17.5 ng/mL   VITAMIN D, 25 HYDROXY    Collection Time: 12/15/21 10:59 AM   Result Value Ref Range    Vitamin D 25-Hydroxy 42.3 30.0 - 100.0 ng/mL   LD    Collection Time: 12/15/21 10:59 AM   Result Value Ref Range     (H) 110 - 210 U/L   RETICULOCYTE COUNT    Collection Time: 12/15/21 10:59 AM   Result Value Ref Range    Reticulocyte count 3.0 (H) 0.3 - 2.0 %    Absolute Retic Cnt. 0.1109 (H) 0.026 - 0.095 M/ul    Immature Retic Fraction 26.3 (H) 3.0 - 15.9 %    Retic Hgb Conc. 19 (L) 29 - 35 pg   PROTHROMBIN TIME + INR    Collection Time: 12/15/21 10:59 AM   Result Value Ref Range    Prothrombin time 19.0 (H) 12.6 - 14.5 sec    INR 1.6     BILIRUBIN, FRACTIONATED    Collection Time: 12/15/21 10:59 AM   Result Value Ref Range    Bilirubin, total 1.4 (H) 0.2 - 1.1 MG/DL    Bilirubin, direct 0.8 (H) <0.4 MG/DL    Bilirubin, indirect 0.6 0.0 - 1.1 MG/DL   PLEASE READ & DOCUMENT PPD TEST IN 24 HRS    Collection Time: 12/15/21  6:43 PM   Result Value Ref Range    PPD Negative Negative    mm Induration 0 0 - 5 mm   HEMOGLOBIN    Collection Time: 12/16/21  7:07 AM   Result Value Ref Range    HGB 8.1 (L) 11.7 - 15.4 g/dL       Imaging:  XR HIP LT W OR WO PELV 2-3 VWS [[id number]] Collected: 12/13/21 2115   Order Status: Completed Updated: 12/13/21 2120   Narrative:     EXAMINATION: Left Hip     HISTORY: L hip pain. TECHNIQUE: Single frontal view of the pelvis. AP and lateral views of the left   hip. COMPARISON: None available. FINDINGS:   No evidence of acute fracture or dislocation of the pelvis. Bilateral hip joints   are intact. The left femoral neck is intact. Soft tissues are unremarkable. Impression:     No evidence of acute fracture or dislocation of the left hip. XR Chest Port [[id number]] Collected: 12/13/21 1701   Order Status: Completed Updated: 12/13/21 1705   Narrative:     Portable chest x-ray     CLINICAL INDICATION: Shortness of breath     FINDINGS: Single AP view the chest compared to a similar exam dated 6/20/2021   shows persistent old right-sided rib fractures. The right lung is well-expanded   and unchanged without pneumothorax. There is a persistent, moderate-sized left   pleural effusion. The cardiac silhouette and mediastinum are stable. There is no   pneumothorax. Impression:     1. Stable moderate size left pleural effusion.          ASSESSMENT:  Problem List  Date Reviewed: 12/14/2021          Codes Class Noted    Lactic acidosis ICD-10-CM: E87.2  ICD-9-CM: 276.2  12/13/2021 Leukocytosis ICD-10-CM: A06.973  ICD-9-CM: 288.60  4/68/5013        Diastolic CHF, chronic (HCC) ICD-10-CM: I50.32  ICD-9-CM: 428.32, 428.0  6/13/2021        Solitary kidney (Chronic) ICD-10-CM: EAJ7838  ICD-9-CM: 753.0  5/22/2021        Hypoalbuminemia due to protein-calorie malnutrition ([facility] ICD-10-CM: E88.09, E46  ICD-9-CM: 273.8, 263.9  5/22/2021        Stage 3 chronic kidney disease (HCC) (Chronic) ICD-10-CM: N18.30  ICD-9-CM: 585.3  5/20/2021        * (Principal) Anemia ICD-10-CM: D64.9  ICD-9-CM: 285.9  5/12/2021        Rib fracture ICD-10-CM: S22.39XA  ICD-9-CM: 807.00  5/12/2021        Pericarditis with effusion ICD-10-CM: I31.9  ICD-9-CM: 423.9  3/23/2021        Paroxysmal atrial fibrillation (HCC) (Chronic) ICD-10-CM: I48.0  ICD-9-CM: 427.31  3/19/2021        Acute renal failure superimposed on chronic kidney disease ([facility] ICD-10-CM: N17.9, N18.9  ICD-9-CM: 584.9, 585.9  4/22/2019        Sepsis ([facility] ICD-10-CM: A41.9  ICD-9-CM: 038.9, 995.91  4/22/2019        Community acquired bacterial pneumonia ICD-10-CM: J15.9  ICD-9-CM: 482.9  4/22/2019        Prolonged Q-T interval on ECG ICD-10-CM: R94.31  ICD-9-CM: 794.31  4/22/2019        Hypertension, essential, benign (Chronic) ICD-10-CM: I10  ICD-9-CM: 401.1  8/18/2016        Osteopenia ICD-10-CM: M85.80  ICD-9-CM: 733.90  8/18/2016        Major depressive disorder, recurrent, moderate (HCC) (Chronic) ICD-10-CM: F33.1  ICD-9-CM: 296.32  8/18/2016        Nicotine dependence, cigarettes, uncomplicated LKF-52-NQ: O45.279  ICD-9-CM: 305.1  8/18/2016        Osteoporosis ICD-10-CM: M81.0  ICD-9-CM: 733.00  8/18/2016        Depression ICD-10-CM: F32. A  ICD-9-CM: 357  8/18/2016        Hypokalemia ICD-10-CM: E87.6  ICD-9-CM: 276.8  8/18/2016        Tobacco abuse ICD-10-CM: Z72.0  ICD-9-CM: 305.1  8/18/2016        Acquired hypothyroidism (Chronic) ICD-10-CM: E03.9  ICD-9-CM: 244.9  1/16/2016        TIA (transient ischemic attack) ICD-10-CM: G45.9  ICD-9-CM: 435.9  1/16/2016        Hyperlipidemia (Chronic) ICD-10-CM: E78.5  ICD-9-CM: 272.4  1/16/2016        Rheumatoid arthritis (HCC) (Chronic) ICD-10-CM: M06.9  ICD-9-CM: 714.0  1/16/2016        Right carotid bruit ICD-10-CM: R09.89  ICD-9-CM: 785.9  1/16/2016            Ms. Mary Vickers is a 80 y.o. female admitted on 12/13/2021. The primary encounter diagnosis was SOB (shortness of breath). Diagnoses of Severe anemia, Acquired hypothyroidism, Iron deficiency anemia, unspecified iron deficiency anemia type, Hypoalbuminemia due to protein-calorie malnutrition ([address] Lactic acidosis, and Paroxysmal atrial fibrillation ([facility] [address] were also pertinent to this visit. Malvin Hicks Her PMH includes hypothyroidism, anemia, CKD, CHF, Afib on Xarelto, and RA. She was seen in consult by hematology in 5/2021 for leukopenia and anemia (Hgb 3.7) felt to be r/t MTX toxicity and GIB. She lives alone. EMS was called by family when they found her to be lethargic and short of breath. She required BiPAP on arrival.  In ED, Hgb 3.3, LA 14.7, Cr 3.04 (from 1.53 in 6/2021). She was heme +ve on rectal exam.  GI performed EGD on 12/14 that was unremarkable. CXR stable mod L pleural effusion. Pt c/o L hip pain, xray neg. BCx-NGTD. Hgb up to 7.8 today after transfusion. She has received 3 units PRBCs. Exophthalmos noted on exam.  TSH 17.3. We were consulted for anemia. RECOMMENDATIONS:  Anemia / LUIS ARMANDO  - in ED, heme +ve on rectal exam  - s/p EGD on 12/14, unremarkable  - Check iron studies, B12, folate, Vit D, Vit C, hemolysis labs, smear  - Transfuse prn to keep Hgb >7  12/16 Hgb up to 8.1. Pt is iron deficient with Tsat 6% and ferritin 56. She rec'd Infed yesterday. No B12 or folate deficiency. No hemolysis. GI following, will likely need capsule endoscopy and colonoscopy.     NAZIA on CKD  - mgmt per primary team    L hip pain  - L hip xray neg    Afib  - On Xarelto, held    Hypothyroidism  - TSH 17.3  - on Synthroid  12/16 Hosp increased Synthroid to 50mcg daily      Goals and plan of care reviewed with the patient. All questions answered to the best of our ability. Thank you for allowing us to participate in the care of Ms. Malou Henley. We will sign off; please do not hesitate to call with any questions. She will need to f/u with Dr. Meryle Christen upon discharge.          Theron Swan NP   [facility] Hematology & Oncology  [address] [place] [address]  Office : [phone]  Fax : [phone]

## 2021-12-16 NOTE — PROGRESS NOTES
Assumed pt care. Pt A/Ox3 in bed resting on RA and breathing comfortably. Pt denies pain or need at this time. Iron infusing. Hilton catheter patent, intact and draining duran urine. Call light in reach. No apparent distress noted.

## 2021-12-16 NOTE — PROGRESS NOTES
Hospitalist Progress Note   Admit Date:  2021  4:32 PM   Name:  Anali Cristina   Age:  80 y.o. Sex:  female  :  1940   MRN:  [id number]   Room:  [facility]/    Presenting Complaint: Respiratory Distress    Reason(s) for Admission: Anemia [D64.9]     Hospital Course & Interval History:   Ms. Jamila Omalley is a very nice 79 y/o WF with a h/o hypothyroidism, chronic normocytic anemia, CKD who was admitted on  with symptomatic anemia. Her baseline Hb is around 8-10. On admission it was 3.3. LA was 12 and she had NAZIA on CKD. She was transfused. Xarelto held. GI consulted, had normal EGD On . Studies c/w LUIS ARMANDO, given IV iron. Subjective (21): In bed, pleasant and conversant. Says she feels better than she did this mornin when she choked on a potato during breakfast. Does not have any issues with PO intake otherwise. No chest pain, N/V/D. Assessment & Plan:   # Symptomatic anemia              - Xarelto held, admitting Hb 3.3, s/p transfusion and now up to 8.1. EGD unremarkable on . Change PPI to PO daily. GI recs capsule endoscopy and colonoscopy outpatient. Hematology consulted and recs appreciated. Iron studies c/w LUIS ARMANDO, given IV iron.     # NAZIA on CKD              - Con't to encourage PO. Repeat BMP tomorrow.     # L pleural effusion              - Moderate, stable O2 needs.      # Lactic acidosis              - Likely 2/2 hypotension on admission. Now resolved.     # Hypothyroidism              - TSH 17, increase Synthroid to 50mcg daily.     # Atrial fibrillation              - [facility] held in setting of AoC anemia. Con't amiodarone, metoprolol     # Chronic hypoxemic respiratory failure              - Stable, con't 2L O2. Dispo/Discharge Planning: Pending, STR vs home? Diet:  ADULT DIET Regular; Low Fat/Low Chol/High Fiber/2 gm Na  DVT PPx: SCDs only.   Code status: Full Code    Hospital Problems as of 2021 Date Reviewed: 2021          Codes Class Noted - Resolved POA Lactic acidosis ICD-10-CM: E87.2  ICD-9-CM: 276.2  12/13/2021 - Present Unknown        Diastolic CHF, chronic (HCC) ICD-10-CM: I50.32  ICD-9-CM: 428.32, 428.0  6/13/2021 - Present Unknown        * (Principal) Anemia ICD-10-CM: D64.9  ICD-9-CM: 285.9  5/12/2021 - Present Yes        Paroxysmal atrial fibrillation (HCC) (Chronic) ICD-10-CM: I48.0  ICD-9-CM: 427.31  3/19/2021 - Present Yes        Acute renal failure superimposed on chronic kidney disease ([facility] ICD-10-CM: N17.9, N18.9  ICD-9-CM: 584.9, 585.9  4/22/2019 - Present Unknown        Hypertension, essential, benign (Chronic) ICD-10-CM: I10  ICD-9-CM: 401.1  8/18/2016 - Present Yes        Major depressive disorder, recurrent, moderate (HCC) (Chronic) ICD-10-CM: F33.1  ICD-9-CM: 296.32  8/18/2016 - Present Yes        Acquired hypothyroidism (Chronic) ICD-10-CM: E03.9  ICD-9-CM: 244.9  1/16/2016 - Present Yes        Hyperlipidemia (Chronic) ICD-10-CM: E78.5  ICD-9-CM: 272.4  1/16/2016 - Present Yes              Objective:     Patient Vitals for the past 24 hrs:   Temp Pulse Resp BP SpO2   12/16/21 1108 98.6 °F (37 °C) 62 20 106/62 96 %   12/16/21 0830     94 %   12/16/21 0733 97.9 °F (36.6 °C) 70 20 131/72 94 %   12/16/21 0626     92 %   12/16/21 0318 98.2 °F (36.8 °C) 86 20 125/79 93 %   12/16/21 0031 98.9 °F (37.2 °C)  20 136/62 96 %   12/15/21 2024 98.9 °F (37.2 °C) 69 21 115/60 96 %   12/15/21 1448 98.3 °F (36.8 °C) 66 20 (!) 104/56 98 %     Oxygen Therapy  O2 Sat (%): 96 % (12/16/21 1108)  Pulse via Oximetry: 69 beats per minute (12/14/21 1500)  O2 Device: Nasal cannula (12/16/21 0830)  Skin Assessment: Clean, dry, & intact (12/15/21 0350)  Skin Protection for O2 Device: No (12/15/21 0350)  O2 Flow Rate (L/min): 2 l/min (12/16/21 0830)  FIO2 (%): 36 % (12/14/21 1815)    Estimated body mass index is 29.14 kg/m² as calculated from the following:    Height as of 6/28/21: 5' (1.524 m).     Weight as of this encounter: 67.7 kg (149 lb 3.2 oz).    Intake/Output Summary (Last 24 hours) at 12/16/2021 1254  Last data filed at 12/16/2021 0924  Gross per 24 hour   Intake 840 ml   Output 500 ml   Net 340 ml         Physical Exam:   Blood pressure 106/62, pulse 62, temperature 98.6 °F (37 °C), resp. rate 20, weight 67.7 kg (149 lb 3.2 oz), SpO2 96 %. General:    Well nourished. No overt distress. Hard of hearing. Appears stated age. Head:  Normocephalic, atraumatic  Eyes:  Sclerae appear normal.  Pupils equally round. Exophthalmos. ENT:  Nares appear normal, no drainage. Moist oral mucosa  Neck:  No restricted ROM. Trachea midline   CV:   RRR. No m/r/g. No jugular venous distension. Lungs:   CTAB. No wheezing, rhonchi, or rales. Respirations even, unlabored. On 2L NC O2. Abdomen: Bowel sounds present. Soft, nontender, nondistended. Extremities: No cyanosis or clubbing. No edema  Skin:     No rashes and normal coloration. Warm and dry. Neuro:  CN II-XII grossly intact. Sensation intact. A&Ox3  Psych:  Normal mood and affect. I have reviewed ordered lab tests and independently visualized imaging below:    Recent Labs:  Recent Results (from the past 48 hour(s))   RBC, ALLOCATE    Collection Time: 12/14/21  1:45 PM   Result Value Ref Range    HISTORY CHECKED?  Historical check performed    HGB & HCT    Collection Time: 12/14/21  4:00 PM   Result Value Ref Range    HGB 8.3 (L) 11.7 - 15.4 g/dL    HCT 27.4 (L) 35.8 - 46.3 %   LD    Collection Time: 12/14/21  5:02 PM   Result Value Ref Range     (H) 110 - 210 U/L   HAPTOGLOBIN    Collection Time: 12/14/21  5:03 PM   Result Value Ref Range    Haptoglobin 62 30 - 200 mg/dL   CBC WITH AUTOMATED DIFF    Collection Time: 12/15/21  6:50 AM   Result Value Ref Range    WBC 11.6 (H) 4.3 - 11.1 K/uL    RBC 3.17 (L) 4.05 - 5.2 M/uL    HGB 7.8 (L) 11.7 - 15.4 g/dL    HCT 25.7 (L) 35.8 - 46.3 %    MCV 81.1 79.6 - 97.8 FL    MCH 24.6 (L) 26.1 - 32.9 PG    MCHC 30.4 (L) 31.4 - 35.0 g/dL    RDW 20.6 (H) 11.9 - 14.6 %    PLATELET 780 816 - 430 K/uL    MPV 10.2 9.4 - 12.3 FL    ABSOLUTE NRBC 0.12 0.0 - 0.2 K/uL    DF AUTOMATED      NEUTROPHILS 82 (H) 43 - 78 %    LYMPHOCYTES 8 (L) 13 - 44 %    MONOCYTES 8 4.0 - 12.0 %    EOSINOPHILS 0 (L) 0.5 - 7.8 %    BASOPHILS 1 0.0 - 2.0 %    IMMATURE GRANULOCYTES 1 0.0 - 5.0 %    ABS. NEUTROPHILS 9.6 (H) 1.7 - 8.2 K/UL    ABS. LYMPHOCYTES 0.9 0.5 - 4.6 K/UL    ABS. MONOCYTES 0.9 0.1 - 1.3 K/UL    ABS. EOSINOPHILS 0.1 0.0 - 0.8 K/UL    ABS. BASOPHILS 0.1 0.0 - 0.2 K/UL    ABS. IMM. GRANS. 0.1 0.0 - 0.5 K/UL   METABOLIC PANEL, COMPREHENSIVE    Collection Time: 12/15/21  6:50 AM   Result Value Ref Range    Sodium 143 136 - 145 mmol/L    Potassium 3.3 (L) 3.5 - 5.1 mmol/L    Chloride 106 98 - 107 mmol/L    CO2 29 21 - 32 mmol/L    Anion gap 8 7 - 16 mmol/L    Glucose 60 (L) 65 - 100 mg/dL    BUN 30 (H) 8 - 23 MG/DL    Creatinine 2.61 (H) 0.6 - 1.0 MG/DL    GFR est AA 23 (L) >60 ml/min/1.73m2    GFR est non-AA 19 (L) >60 ml/min/1.73m2    Calcium 8.5 8.3 - 10.4 MG/DL    Bilirubin, total 1.5 (H) 0.2 - 1.1 MG/DL    ALT (SGPT) 364 (H) 12 - 65 U/L    AST (SGOT) 509 (H) 15 - 37 U/L    Alk.  phosphatase 82 50 - 136 U/L    Protein, total 5.4 (L) 6.3 - 8.2 g/dL    Albumin 2.1 (L) 3.2 - 4.6 g/dL    Globulin 3.3 2.3 - 3.5 g/dL    A-G Ratio 0.6 (L) 1.2 - 3.5     PATHOLOGIST REVIEW SMEARS    Collection Time: 12/15/21 10:58 AM   Result Value Ref Range    PATHOLOGIST REVIEW (NOTE)    TRANSFERRIN SATURATION    Collection Time: 12/15/21 10:59 AM   Result Value Ref Range    Iron 20 (L) 35 - 150 ug/dL    TIBC 357 250 - 450 ug/dL    Transferrin Saturation 6 (L) >20 %   FERRITIN    Collection Time: 12/15/21 10:59 AM   Result Value Ref Range    Ferritin 56 8 - 388 NG/ML   VITAMIN B12    Collection Time: 12/15/21 10:59 AM   Result Value Ref Range    Vitamin B12 1,471 (H) 193 - 986 pg/mL   FOLATE    Collection Time: 12/15/21 10:59 AM   Result Value Ref Range    Folate 91.9 (H) 3.1 - 17.5 ng/mL VITAMIN D, 25 HYDROXY    Collection Time: 12/15/21 10:59 AM   Result Value Ref Range    Vitamin D 25-Hydroxy 42.3 30.0 - 100.0 ng/mL   LD    Collection Time: 12/15/21 10:59 AM   Result Value Ref Range     (H) 110 - 210 U/L   RETICULOCYTE COUNT    Collection Time: 12/15/21 10:59 AM   Result Value Ref Range    Reticulocyte count 3.0 (H) 0.3 - 2.0 %    Absolute Retic Cnt. 0.1109 (H) 0.026 - 0.095 M/ul    Immature Retic Fraction 26.3 (H) 3.0 - 15.9 %    Retic Hgb Conc. 19 (L) 29 - 35 pg   PROTHROMBIN TIME + INR    Collection Time: 12/15/21 10:59 AM   Result Value Ref Range    Prothrombin time 19.0 (H) 12.6 - 14.5 sec    INR 1.6     BILIRUBIN, FRACTIONATED    Collection Time: 12/15/21 10:59 AM   Result Value Ref Range    Bilirubin, total 1.4 (H) 0.2 - 1.1 MG/DL    Bilirubin, direct 0.8 (H) <0.4 MG/DL    Bilirubin, indirect 0.6 0.0 - 1.1 MG/DL   PLEASE READ & DOCUMENT PPD TEST IN 24 HRS    Collection Time: 12/15/21  6:43 PM   Result Value Ref Range    PPD Negative Negative    mm Induration 0 0 - 5 mm   HEMOGLOBIN    Collection Time: 12/16/21  7:07 AM   Result Value Ref Range    HGB 8.1 (L) 11.7 - 15.4 g/dL       All Micro Results     Procedure Component Value Units Date/Time    CULTURE, BLOOD [[id number]] Collected: 12/13/21 1644    Order Status: Completed Specimen: Blood Updated: 12/16/21 0618     Special Requests: --        RIGHT  Antecubital       Culture result: NO GROWTH 3 DAYS       CULTURE, BLOOD [[id number]] Collected: 12/13/21 1644    Order Status: Completed Specimen: Blood Updated: 12/16/21 0618     Special Requests: --        RIGHT  FOREARM       Culture result: NO GROWTH 3 DAYS       COVID-19 RAPID TEST [[id number]] Collected: 12/13/21 4277    Order Status: Completed Specimen: Nasopharyngeal Updated: 12/13/21 4543     Specimen source NASAL        COVID-19 rapid test Not detected        Comment:      The specimen is NEGATIVE for SARS-CoV-2, the novel coronavirus associated with COVID-19.   A negative result does not rule out COVID-19. This test has been authorized by the FDA under an Emergency Use Authorization (EUA) for use by authorized laboratories. Fact sheet for Healthcare Providers: ConventionUpdate.co.nz  Fact sheet for Patients: ConventionUpdate.co.nz       Methodology: Isothermal Nucleic Acid Amplification               Other Studies:  No results found. Current Meds:  Current Facility-Administered Medications   Medication Dose Route Frequency    pantoprazole (PROTONIX) tablet 40 mg  40 mg Oral ACB    levothyroxine (SYNTHROID) tablet 50 mcg  50 mcg Oral ACB    sodium chloride (NS) flush 5-40 mL  5-40 mL IntraVENous Q8H    acetaminophen (TYLENOL) tablet 650 mg  650 mg Oral Q6H PRN    Or    acetaminophen (TYLENOL) suppository 650 mg  650 mg Rectal Q6H PRN    polyethylene glycol (MIRALAX) packet 17 g  17 g Oral DAILY PRN    ondansetron (ZOFRAN ODT) tablet 4 mg  4 mg Oral Q8H PRN    Or    ondansetron (ZOFRAN) injection 4 mg  4 mg IntraVENous Q6H PRN    amiodarone (CORDARONE) tablet 200 mg  200 mg Oral DAILY    DULoxetine (CYMBALTA) capsule 60 mg  60 mg Oral DAILY    folic acid (FOLVITE) tablet 1 mg  1 mg Oral DAILY    sodium chloride (NS) flush 5-40 mL  5-40 mL IntraVENous Q8H    0.9% sodium chloride infusion 250 mL  250 mL IntraVENous PRN    metoprolol tartrate (LOPRESSOR) tablet 12.5 mg  12.5 mg Oral BID    sodium chloride (NS) flush 5-10 mL  5-10 mL IntraVENous Q8H       Signed:  Katlyn Zayas MD    Part of this note may have been written by using a voice dictation software. The note has been proof read but may still contain some grammatical/other typographical errors.

## 2021-12-16 NOTE — PROGRESS NOTES
Report received from off going nurse. Patient lying in bed, resting with eyes closed. No s/s of acute distress. 2LPM via NC in use. Will continue to monitor. Safety measures in place.

## 2021-12-16 NOTE — PROGRESS NOTES
Patient resting in bed. Oxygen in use via 2LPM by NC. No s/s of acute distress. Attempted to wean oxygen today unsuccessfully. Safety measures in place. Will continue to monitor and report off to oncoming nurse.

## 2021-12-16 NOTE — PROGRESS NOTES
Problem: Falls - Risk of  Goal: *Absence of Falls  Description: Document Michel Chan Fall Risk and appropriate interventions in the flowsheet.   Outcome: Progressing Towards Goal  Note: Fall Risk Interventions:  Mobility Interventions: Communicate number of staff needed for ambulation/transfer,OT consult for ADLs,Patient to call before getting OOB,PT Consult for mobility concerns,PT Consult for assist device competence,Strengthening exercises (ROM-active/passive),Utilize walker, cane, or other assistive device,Utilize gait belt for transfers/ambulation         Medication Interventions: Evaluate medications/consider consulting pharmacy,Patient to call before getting OOB,Teach patient to arise slowly    Elimination Interventions: Call light in reach,Elevated toilet seat,Patient to call for help with toileting needs,Stay With Me (per policy),Toilet paper/wipes in reach,Toileting schedule/hourly rounds    History of Falls Interventions: Consult care management for discharge planning,Door open when patient unattended,Evaluate medications/consider consulting pharmacy,Room close to nurse's station

## 2021-12-16 NOTE — PROGRESS NOTES
No overt bleeding, we will complete work up as outpatient to include Capsule endoscopy and colonoscopy. We will sign off. [facility] Progress Note         Admit Date:  12/13/2021    Today's Date:  12/16/2021    CC: Anemia    Subjective:     Patient VERY [place]. Denies ABD pain, N/V. No BM. Reports improved SOB with activities. Hgb stable at 8.1    Medications:   Current Facility-Administered Medications   Medication Dose Route Frequency    pantoprazole (PROTONIX) tablet 40 mg  40 mg Oral ACB    levothyroxine (SYNTHROID) tablet 50 mcg  50 mcg Oral ACB    sodium chloride (NS) flush 5-40 mL  5-40 mL IntraVENous Q8H    acetaminophen (TYLENOL) tablet 650 mg  650 mg Oral Q6H PRN    Or    acetaminophen (TYLENOL) suppository 650 mg  650 mg Rectal Q6H PRN    polyethylene glycol (MIRALAX) packet 17 g  17 g Oral DAILY PRN    ondansetron (ZOFRAN ODT) tablet 4 mg  4 mg Oral Q8H PRN    Or    ondansetron (ZOFRAN) injection 4 mg  4 mg IntraVENous Q6H PRN    amiodarone (CORDARONE) tablet 200 mg  200 mg Oral DAILY    DULoxetine (CYMBALTA) capsule 60 mg  60 mg Oral DAILY    folic acid (FOLVITE) tablet 1 mg  1 mg Oral DAILY    sodium chloride (NS) flush 5-40 mL  5-40 mL IntraVENous Q8H    0.9% sodium chloride infusion 250 mL  250 mL IntraVENous PRN    metoprolol tartrate (LOPRESSOR) tablet 12.5 mg  12.5 mg Oral BID    sodium chloride (NS) flush 5-10 mL  5-10 mL IntraVENous Q8H       Review of Systems:  ROS was obtained, with pertinent positives as listed above. No chest pain or SOB.     Diet:  Regular    Objective:   Vitals:  Visit Vitals  /62 (BP 1 Location: Left upper arm, BP Patient Position: At rest)   Pulse 62   Temp 98.6 °F (37 °C)   Resp 20   Wt 67.7 kg (149 lb 3.2 oz)   SpO2 96%   BMI 29.14 kg/m²     Intake/Output:  12/16 0701 - 12/16 1900  In: 360 [P.O.:360]  Out: -   12/14 1901 - 12/16 0700  In: 840 [P.O.:840]  Out: 1400 [Urine:1400]  Exam:  General appearance: alert, cooperative, no distress, [place]  Lungs: clear to auscultation bilaterally anteriorly  Heart: regular rate and rhythm  Abdomen: soft, non-tender.  Bowel sounds normal. No masses, no organomegaly  Extremities: extremities normal, atraumatic, no cyanosis or edema  Neuro:  alert and oriented    Data Review (Labs):    Recent Labs     12/16/21  0707 12/15/21  1059 12/15/21  0650 12/14/21  1600 12/14/21  0610 12/13/21  2146 12/13/21  1644   WBC  --   --  11.6*  --  14.3*  --  6.7   HGB 8.1*  --  7.8* 8.3* 6.3* 4.6* 3.3*   HCT  --   --  25.7* 27.4* 21.3* 17.3* 14.1*   PLT  --   --  354  --  410  --  559*   MCV  --   --  81.1  --  79.2*  --  71.9*   NA  --   --  143  --  140  --  138   K  --   --  3.3*  --  3.9  --  4.3   CL  --   --  106  --  105  --  100   CO2  --   --  29  --  28  --  18*   BUN  --   --  30*  --  30*  --  29*   CREA  --   --  2.61*  --  2.79*  --  3.04*   CA  --   --  8.5  --  7.9*  --  8.5   MG  --   --   --   --  2.2  --  2.6*   GLU  --   --  60*  --  88  --  50*   AP  --   --  82  --   --   --  77   AST  --   --  509*  --   --   --  114*   ALT  --   --  364*  --   --   --  64   TBILI  --  1.4* 1.5*  --   --   --  1.2*   CBIL  --  0.8*  --   --   --   --   --    ALB  --   --  2.1*  --   --   --  2.3*   TP  --   --  5.4*  --   --   --  6.0*   PTP  --  19.0*  --   --   --   --  32.9*   INR  --  1.6  --   --   --   --  3.2   APTT  --   --   --   --   --   --  29.9       Assessment:     Principal Problem:    Anemia (5/12/2021)    Active Problems:    Acquired hypothyroidism (1/16/2016)      Hyperlipidemia (1/16/2016)      Hypertension, essential, benign (8/18/2016)      Major depressive disorder, recurrent, moderate (HCC) (8/18/2016)      Acute renal failure superimposed on chronic kidney disease (HCC) (4/22/2019)      Paroxysmal atrial fibrillation (HCC) (2/23/9711)      Diastolic CHF, chronic (HCC) (6/13/2021)      Lactic acidosis (12/13/2021)         80 y.o. female with PMH including but not limited to A Fib (dx 3/2021) on Xarelto, CKD, breast cancer, RA, TIA admitted with SOB and hgb 3.3 (12/13) with elevated BUN 29 and Cr 3.04. Today hgb 6.3. WBC is elevated 14. 3. Xarelto on hold. She denies NSAID or ETOH use. It is unclear if she has had prior colonoscopy or EGD. She is a poor historian. INR was 3.2 on 12/13. EGD 12/14 was negative. Plan:     - On Xarelto, on hold last dose 11/13  - EGD negative, Hematology consulted, iron infusion  - SB evaluation with capsule endoscopy as out patient also no recent colonoscopy to [facility] for LGUB  - Monitor INR - 12/13 was 3.2, on 12/15 was 1.6       Malick Vogt NP  Patient is seen and examined in collaboration with Dr. Che Conroy. Assessment and plan as per Dr. Kishan Velazquez.

## 2021-12-16 NOTE — PROGRESS NOTES
Pt in bed awake and watching TV. Pt denies pain or need at this time. No acute distress noted at the moment. Pt placed on 2L NC, sat 92%. To report to oncoming RN.

## 2021-12-16 NOTE — PROGRESS NOTES
ACUTE OT GOALS:  (Developed with and agreed upon by patient and/or caregiver.)  1. Pt will toilet with SBA   2. Pt will complete functional mobility for ADLs with SBA using AD as needed  3. Pt will complete lower body dressing with SBA using AE as needed  4. Pt will complete grooming and hygiene at sink with SBA  5. Pt will demonstrate independence with HEP to promote increased BUE strength and functional use for ADLs  6. Pt will tolerate 20 minutes functional activity with min or fewer rest breaks to promote increased endurance for ADLs  7. Pt will independently demonstrate/ verbalize 2+ energy conservation techniques to promote increased endurance for ADLs  8. Pt will complete bed mobility with SBA in prep for ADLs     Timeframe: 7 days    OCCUPATIONAL THERAPY: Daily Note OT Treatment Day # 2    Salina Cornell is a 80 y.o. female   PRIMARY DIAGNOSIS: Anemia  Anemia [D64.9]  Procedure(s) (LRB):  ESOPHAGOGASTRODUODENOSCOPY (EGD) /28 ER 32 / Being admitted to 825 *Diagnostic only (N/A)  2 Days Post-Op  Payor: [facility] MEDICARE COMPLETE / Plan: [employer] / Product Type: Managed Care Medicare /   ASSESSMENT:     REHAB RECOMMENDATIONS: CURRENT LEVEL OF FUNCTION:  (Most Recently Demonstrated)   Recommendation to date pending progress:  Setting:   Short-term Rehab  Equipment:    None Bathing:   Not tested  Dressing:   Minimal Assistance  Feeding/Grooming:   Contact Guard Assistance  Toileting:   Not tested  Functional Mobility:   Contact Guard Assistance     ASSESSMENT:  Ms. Garry Babin presented with hearing aids in and working which made communication a lot easier. Pt demonstrated ADLs at sink and mobility for ADLs with CGA using RW. Pt tolerated standing at sink ~6 minutes for grooming/ hygiene and practiced stepping on/ off scale and mobilizing around room to gather items and dispose of trash.  Pt fatigued and became SOB quickly and required several rest breaks to tolerate session, c/o feeling generally weak and increasingly shaky w/ fatigue. SpO2 < 87% on 1L, > 2 L and recovered to 93%, < 88% again with ADLs and > 93% with rest break and cues for PLB. Pt is progressing towards goals, continue POC. Recommend STR. SUBJECTIVE:   Ms. Neri Carballo states, \"I need to get stronger. \"    SOCIAL HISTORY/LIVING ENVIRONMENT: Lives alone  Support Systems: Friend/Neighbor    OBJECTIVE:     PAIN: VITAL SIGNS: LINES/DRAINS:   Pre Treatment: Pain Screen  Pain Scale 1: Numeric (0 - 10)  Pain Intensity 1: 0  Post Treatment: 0   Hilton Catheter  O2 Device: Nasal cannula     ACTIVITIES OF DAILY LIVING: I Mod I S SBA CGA Min Mod Max Total NT Comments   BASIC ADLs:              Bathing/ Showering [] [] [] [] [] [] [] [] [] []    Toileting [] [] [] [] [] [] [] [] [] []    Dressing [] [] [] [] [x] [] [] [] [] [] socks   Feeding [] [] [] [] [] [] [] [] [] []    Grooming [] [] [] [] [x] [] [] [] [] []    Personal Device Care [] [] [] [] [] [] [] [] [] []    Functional Mobility [] [] [] [] [x] [] [] [] [] []    I=Independent, Mod I=Modified Independent, S=Supervision, SBA=Standby Assistance, CGA=Contact Guard Assistance,   Min=Minimal Assistance, Mod=Moderate Assistance, Max=Maximal Assistance, Total=Total Assistance, NT=Not Tested    MOBILITY: I Mod I S SBA CGA Min Mod Max Total  NT x2 Comments:   Supine to sit [] [] [] [] [] [x] [] [] [] [] []    Sit to supine [] [] [] [] [] [] [] [] [] [] []    Sit to stand [] [] [] [] [x] [] [] [] [] [] []    Bed to chair [] [] [] [] [x] [] [] [] [] [] []    I=Independent, Mod I=Modified Independent, S=Supervision, SBA=Standby Assistance, CGA=Contact Guard Assistance,   Min=Minimal Assistance, Mod=Moderate Assistance, Max=Maximal Assistance, Total=Total Assistance, NT=Not Tested    PLAN:   FREQUENCY/DURATION: OT Plan of Care: 3 times/week for duration of hospital stay or until stated goals are met, whichever comes first.    TREATMENT:   TREATMENT:   ($$ Self Care/Home Management: 23-37 mins    )  Self Care (24 Minutes): Self care including Lower Body Dressing, Grooming and Energy Conservation Training to increase independence and decrease level of assistance required.     TREATMENT GRID:  N/A    AFTER TREATMENT POSITION/PRECAUTIONS:  Chair, Needs within reach and RN notified    INTERDISCIPLINARY COLLABORATION:  RN/PCT    TOTAL TREATMENT DURATION:  OT Patient Time In/Time Out  Time In: 1502  Time Out: [facility]

## 2021-12-17 PROCEDURE — 94760 N-INVAS EAR/PLS OXIMETRY 1: CPT

## 2021-12-17 PROCEDURE — 97530 THERAPEUTIC ACTIVITIES: CPT

## 2021-12-17 PROCEDURE — 80048 BASIC METABOLIC PNL TOTAL CA: CPT

## 2021-12-17 PROCEDURE — 77010033678 HC OXYGEN DAILY

## 2021-12-17 PROCEDURE — 65660000000 HC RM CCU STEPDOWN

## 2021-12-17 PROCEDURE — 74011250637 HC RX REV CODE- 250/637: Performed by: INTERNAL MEDICINE

## 2021-12-17 PROCEDURE — 74011250637 HC RX REV CODE- 250/637: Performed by: HOSPITALIST

## 2021-12-17 PROCEDURE — 36415 COLL VENOUS BLD VENIPUNCTURE: CPT

## 2021-12-17 PROCEDURE — 85018 HEMOGLOBIN: CPT

## 2021-12-17 RX ADMIN — Medication 10 ML: at 14:00

## 2021-12-17 RX ADMIN — Medication 5 ML: at 22:00

## 2021-12-17 RX ADMIN — PANTOPRAZOLE SODIUM 40 MG: 40 TABLET, DELAYED RELEASE ORAL at 06:10

## 2021-12-17 RX ADMIN — Medication 10 ML: at 05:31

## 2021-12-17 RX ADMIN — LEVOTHYROXINE SODIUM 50 MCG: 0.05 TABLET ORAL at 06:10

## 2021-12-17 RX ADMIN — DULOXETINE HYDROCHLORIDE 60 MG: 60 CAPSULE, DELAYED RELEASE ORAL at 08:44

## 2021-12-17 RX ADMIN — METOPROLOL TARTRATE 12.5 MG: 25 TABLET, FILM COATED ORAL at 08:44

## 2021-12-17 RX ADMIN — METOPROLOL TARTRATE 12.5 MG: 25 TABLET, FILM COATED ORAL at 19:05

## 2021-12-17 RX ADMIN — FOLIC ACID 1 MG: 1 TABLET ORAL at 08:44

## 2021-12-17 RX ADMIN — Medication 10 ML: at 05:30

## 2021-12-17 RX ADMIN — Medication 10 ML: at 21:45

## 2021-12-17 RX ADMIN — Medication 10 ML: at 05:28

## 2021-12-17 RX ADMIN — AMIODARONE HYDROCHLORIDE 200 MG: 200 TABLET ORAL at 08:44

## 2021-12-17 NOTE — PROGRESS NOTES
Patient and patient's friend feel that patient should discharge to rehab. They were agreeable to a referral to [address] [place] [address]. CM placed the referral and then followed up with a call requesting rehab to initiate precert. CM following.

## 2021-12-17 NOTE — PROGRESS NOTES
King catheter removed. Patient alert and oriented with no S/S of distress. 600 ml's of urine noted in the king catheter bag.

## 2021-12-17 NOTE — PROGRESS NOTES
Patient resting in bed, alert and oriented, cooperative with care. Hilton catheter in place, patent and draining yellow color urine. Patient on 2 liters of Oxygen via NC. Patient denies pain or distress, safety measures in place, call light within reach.

## 2021-12-17 NOTE — PROGRESS NOTES
Hospitalist Progress Note   Admit Date:  2021  4:32 PM   Name:  David Gimenez   Age:  80 y.o. Sex:  female  :  1940   MRN:  [id number]   Room:  4/    Presenting Complaint: Respiratory Distress    Reason(s) for Admission: Anemia [D64.9]     Hospital Course & Interval History:   Ms. Jose Lorenzo is a very nice 79 y/o WF with a h/o hypothyroidism, chronic normocytic anemia, CKD who was admitted on  with symptomatic anemia. Her baseline Hb is around 8-10. On admission it was 3.3. LA was 12 and she had NAZIA on CKD. She was transfused. Xarelto held. GI consulted, had normal EGD On .  Studies c/w LUIS ARMANDO, given IV iron. Subjective (21):  Up to chair, continues to feel well, still fairly weak, Hb stable. Her friend was visiting earlier and felt patient to be below functional baseline so CM has sent STR referrals. No chest pain or SOB. Assessment & Plan:   # Symptomatic anemia              - Xarelto held, admitting Hb 3.3, s/p transfusion, now stable >8. EGD unremarkable on . GI recs capsule endoscopy and colonoscopy outpatient. Hematology consulted and recs appreciated. Iron studies c/w LUIS ARMANDO, given IV iron. - She says she was taking Xarelto BID and says it was for AFib, but thinks she was taking 2.5mg. I called her [facility] in [place] and she has never had Xarelto called in or filled there, and 20mg pills are listed on her PTA meds. Reports prior GI issues with Eliquis.      # NAZIA on CKD              - Resolved, back to baseline (Cr mid to high 1s). Con't PO intake.     # L pleural effusion              - Moderate, stable O2 needs.      # Lactic acidosis              - Likely 2/2 hypotension on admission. Now resolved.     # Hypothyroidism/hx of Graves   - S/p what sounds like DELCID ablation, now on Synthroid              - TSH 17, increase Synthroid to 50mcg daily.     # Atrial fibrillation              - [address] held in setting of AoC anemia.  Con't amiodarone, metoprolol     # Chronic hypoxemic respiratory failure              - Stable, con't 2L O2. Dispo/Discharge Planning: Placement pending.   Diet:  ADULT DIET Regular; Low Fat/Low Chol/High Fiber/2 gm Na  DVT PPx: SCDs  Code status: Full Code    Hospital Problems as of 12/17/2021 Date Reviewed: 12/14/2021          Codes Class Noted - Resolved POA    Lactic acidosis ICD-10-CM: E87.2  ICD-9-CM: 276.2  12/13/2021 - Present Unknown        Diastolic CHF, chronic (HCC) ICD-10-CM: I50.32  ICD-9-CM: 428.32, 428.0  6/13/2021 - Present Unknown        * (Principal) Anemia ICD-10-CM: D64.9  ICD-9-CM: 285.9  5/12/2021 - Present Yes        Paroxysmal atrial fibrillation (HCC) (Chronic) ICD-10-CM: I48.0  ICD-9-CM: 427.31  3/19/2021 - Present Yes        Acute renal failure superimposed on chronic kidney disease ([facility] ICD-10-CM: N17.9, N18.9  ICD-9-CM: 584.9, 585.9  4/22/2019 - Present Unknown        Hypertension, essential, benign (Chronic) ICD-10-CM: I10  ICD-9-CM: 401.1  8/18/2016 - Present Yes        Major depressive disorder, recurrent, moderate (HCC) (Chronic) ICD-10-CM: F33.1  ICD-9-CM: 296.32  8/18/2016 - Present Yes        Acquired hypothyroidism (Chronic) ICD-10-CM: E03.9  ICD-9-CM: 244.9  1/16/2016 - Present Yes        Hyperlipidemia (Chronic) ICD-10-CM: E78.5  ICD-9-CM: 272.4  1/16/2016 - Present Yes              Objective:     Patient Vitals for the past 24 hrs:   Temp Pulse Resp BP SpO2   12/17/21 1118 98.1 °F (36.7 °C) 83 20 121/63 95 %   12/17/21 0730 98.1 °F (36.7 °C) 68 22 (!) 148/81 99 %   12/17/21 0400 98.1 °F (36.7 °C) 69 18 125/65 96 %   12/16/21 1935 98.1 °F (36.7 °C) 63 19 112/74 97 %   12/16/21 1521     (!) 87 %   12/16/21 1520     90 %   12/16/21 1505 98.9 °F (37.2 °C) 63 20 (!) 101/57 95 %   12/16/21 1459     96 %     Oxygen Therapy  O2 Sat (%): 95 % (12/17/21 1118)  Pulse via Oximetry: 69 beats per minute (12/14/21 1500)  O2 Device: Nasal cannula (12/17/21 0715)  Skin Assessment: Clean, dry, & intact (12/15/21 5930)  Skin Protection for O2 Device: No (12/15/21 0350)  O2 Flow Rate (L/min): 2 l/min (12/17/21 0715)  FIO2 (%): 36 % (12/14/21 1815)    Estimated body mass index is 29.82 kg/m² as calculated from the following:    Height as of 6/28/21: 5' (1.524 m). Weight as of this encounter: 69.3 kg (152 lb 11.2 oz). Intake/Output Summary (Last 24 hours) at 12/17/2021 1447  Last data filed at 12/17/2021 1127  Gross per 24 hour   Intake 480 ml   Output 1250 ml   Net -770 ml         Physical Exam:   Blood pressure 121/63, pulse 83, temperature 98.1 °F (36.7 °C), resp. rate 20, weight 69.3 kg (152 lb 11.2 oz), SpO2 95 %. General:    Well nourished. No overt distress. Pleasant, conversant, no complaints. Hard of hearing. Appears stated age. Head:  Normocephalic, atraumatic  Eyes:  Sclerae appear normal.  Pupils equally round. Exophthalmos. ENT:  Nares appear normal, no drainage. Moist oral mucosa  Neck:  No restricted ROM. Trachea midline   CV:   RRR. No m/r/g. No jugular venous distension. Lungs:   CTAB. No wheezing, rhonchi, or rales. Respirations even, unlabored. Abdomen: Bowel sounds present. Soft, nontender, nondistended. Extremities: No cyanosis or clubbing. B/l LE pitting edema. Skin:     No rashes and normal coloration. Warm and dry. Neuro:  CN II-XII grossly intact. Sensation intact. A&Ox3  Psych:  Normal mood and affect.       I have reviewed ordered lab tests and independently visualized imaging below:    Recent Labs:  Recent Results (from the past 48 hour(s))   PLEASE READ & DOCUMENT PPD TEST IN 24 HRS    Collection Time: 12/15/21  6:43 PM   Result Value Ref Range    PPD Negative Negative    mm Induration 0 0 - 5 mm   HEMOGLOBIN    Collection Time: 12/16/21  7:07 AM   Result Value Ref Range    HGB 8.1 (L) 11.7 - 15.4 g/dL   HEPATITIS PANEL, ACUTE    Collection Time: 12/16/21  7:07 AM   Result Value Ref Range    Hepatitis A, IgM NONREACTIVE NR      Hepatitis B core, IgM NONREACTIVE NR Hep B Surface Ag NONREACTIVE NR      Hepatitis C virus Ab NONREACTIVE NR     HIV 1/2 AG/AB, 4TH GENERATION,W RFLX CONFIRM    Collection Time: 12/16/21  7:07 AM   Result Value Ref Range    HIV 1/2 Interpretation NONREACTIVE NR      HIV 1/2 result comment SEE NOTE (A) NR     PLEASE READ & DOCUMENT PPD TEST IN 48 HRS    Collection Time: 12/16/21  5:45 PM   Result Value Ref Range    PPD Negative Negative    mm Induration 0 0 - 5 mm   HGB & HCT    Collection Time: 12/17/21  9:19 AM   Result Value Ref Range    HGB 8.3 (L) 11.7 - 15.4 g/dL    HCT 28.4 (L) 35.8 - 21.7 %   METABOLIC PANEL, BASIC    Collection Time: 12/17/21  9:19 AM   Result Value Ref Range    Sodium 143 136 - 145 mmol/L    Potassium 3.4 (L) 3.5 - 5.1 mmol/L    Chloride 108 (H) 98 - 107 mmol/L    CO2 31 21 - 32 mmol/L    Anion gap 4 (L) 7 - 16 mmol/L    Glucose 132 (H) 65 - 100 mg/dL    BUN 20 8 - 23 MG/DL    Creatinine 1.89 (H) 0.6 - 1.0 MG/DL    GFR est AA 33 (L) >60 ml/min/1.73m2    GFR est non-AA 27 (L) >60 ml/min/1.73m2    Calcium 8.3 8.3 - 10.4 MG/DL       All Micro Results     Procedure Component Value Units Date/Time    CULTURE, BLOOD [[id number]] Collected: 12/13/21 1644    Order Status: Completed Specimen: Blood Updated: 12/17/21 0731     Special Requests: --        RIGHT  FOREARM       Culture result: NO GROWTH 4 DAYS       CULTURE, BLOOD [[id number]] Collected: 12/13/21 1644    Order Status: Completed Specimen: Blood Updated: 12/17/21 0731     Special Requests: --        RIGHT  Antecubital       Culture result: NO GROWTH 4 DAYS       COVID-19 RAPID TEST [[id number]] Collected: 12/13/21 2301    Order Status: Completed Specimen: Nasopharyngeal Updated: 12/13/21 2666     Specimen source NASAL        COVID-19 rapid test Not detected        Comment:      The specimen is NEGATIVE for SARS-CoV-2, the novel coronavirus associated with COVID-19. A negative result does not rule out COVID-19.        This test has been authorized by the FDA under an Emergency Use Authorization (EUA) for use by authorized laboratories. Fact sheet for Healthcare Providers: ConventionUpdate.co.nz  Fact sheet for Patients: ConventionUpdate.co.nz       Methodology: Isothermal Nucleic Acid Amplification               Other Studies:  No results found. Current Meds:  Current Facility-Administered Medications   Medication Dose Route Frequency    pantoprazole (PROTONIX) tablet 40 mg  40 mg Oral ACB    levothyroxine (SYNTHROID) tablet 50 mcg  50 mcg Oral ACB    sodium chloride (NS) flush 5-40 mL  5-40 mL IntraVENous Q8H    acetaminophen (TYLENOL) tablet 650 mg  650 mg Oral Q6H PRN    Or    acetaminophen (TYLENOL) suppository 650 mg  650 mg Rectal Q6H PRN    polyethylene glycol (MIRALAX) packet 17 g  17 g Oral DAILY PRN    ondansetron (ZOFRAN ODT) tablet 4 mg  4 mg Oral Q8H PRN    Or    ondansetron (ZOFRAN) injection 4 mg  4 mg IntraVENous Q6H PRN    amiodarone (CORDARONE) tablet 200 mg  200 mg Oral DAILY    DULoxetine (CYMBALTA) capsule 60 mg  60 mg Oral DAILY    folic acid (FOLVITE) tablet 1 mg  1 mg Oral DAILY    sodium chloride (NS) flush 5-40 mL  5-40 mL IntraVENous Q8H    0.9% sodium chloride infusion 250 mL  250 mL IntraVENous PRN    metoprolol tartrate (LOPRESSOR) tablet 12.5 mg  12.5 mg Oral BID    sodium chloride (NS) flush 5-10 mL  5-10 mL IntraVENous Q8H       Signed:  Edelmiro Lefort, MD    Part of this note may have been written by using a voice dictation software. The note has been proof read but may still contain some grammatical/other typographical errors.

## 2021-12-17 NOTE — PROGRESS NOTES
ACUTE PHYSICAL THERAPY GOALS:  (Developed with and agreed upon by patient and/or caregiver.)  Developed with and agreed upon by patient and/or caregiver.)  1. Ms. Kriss Beckman will perform supine to sit and sit to supine independently in 7 days. 2.  Ms. Kriss Beckman will perform sit to stand and bed to chair independently with least restrictive device in 7 days. 3.  Ms. Kriss Beckman will perform gait with rolling walker or least restrictive device 150 ft independently in 7 days. 4.  Ms. Kriss Beckman will tolerate >25 minutes dynamic activity in 7 days. PHYSICAL THERAPY: Daily Note and AM Treatment Day # 2    Harsh Flores is a 80 y.o. female   PRIMARY DIAGNOSIS: Anemia  Anemia [D64.9]  Procedure(s) (LRB):  ESOPHAGOGASTRODUODENOSCOPY (EGD) /28 ER 32 / Being admitted to [facility] *Diagnostic only (N/A)  3 Days Post-Op    ASSESSMENT:     REHAB RECOMMENDATIONS: CURRENT LEVEL OF FUNCTION:  (Most Recently Demonstrated)   Recommendation to date pending progress:  Setting:   Short term rehab   Patient wants to go home if possible pending progress  Equipment:    To Be Determined Bed Mobility:   Modified Independent  Sit to Stand:  [employer] Assistance  Transfers:   Standby Assistance  Gait/Mobility:   Standby Assistance     ASSESSMENT:  Ms. Kriss Beckman is supine in the bed and agreeable to therapy. Bed mobility is modified independent, sitting balance good, sit to stand and gait with stand by assist to the recliner. Slight safety issue present. Therapeutic exercises performed while sitting in the recliner. Left with needs within reach. Good session. Making progress towards goals. A pleasure to work with. May need rehab at discharge pending progress. Will continue PT efforts. SUBJECTIVE:   Ms. Kriss Beckman states, \"Hello. \"    SOCIAL HISTORY/ LIVING ENVIRONMENT: see eval  Support Systems: Friend/Neighbor  OBJECTIVE:     PAIN: VITAL SIGNS: LINES/DRAINS:   Pre Treatment: Pain Screen  Pain Scale 1: Numeric (0 - 10)  Pain Intensity 1: 0/10  Post Treatment: 0/10   O2  O2 Device: Nasal cannula     MOBILITY: I Mod I S SBA CGA Min Mod Max Total  NT x2 Comments:   Bed Mobility    Rolling [] [x] [] [] [] [] [] [] [] [] []    Supine to Sit [] [x] [] [] [] [] [] [] [] [] []    Scooting [] [x] [] [] [] [] [] [] [] [] []    Sit to Supine [] [] [] [] [] [] [] [] [] [x] []    Transfers    Sit to Stand [] [] [] [x] [] [] [] [] [] [] []    Bed to Chair [] [] [] [x] [] [] [] [] [] [] []    Stand to Sit [] [] [] [x] [] [] [] [] [] [] []    I=Independent, Mod I=Modified Independent, S=Supervision, SBA=Standby Assistance, CGA=Contact Guard Assistance,   Min=Minimal Assistance, Mod=Moderate Assistance, Max=Maximal Assistance, Total=Total Assistance, NT=Not Tested    BALANCE: Good Fair+ Fair Fair- Poor NT Comments   Sitting Static [x] [] [] [] [] []    Sitting Dynamic [x] [] [] [] [] []              Standing Static [] [x] [] [] [] []    Standing Dynamic [] [] [] [] [] []      GAIT: I Mod I S SBA CGA Min Mod Max Total  NT x2 Comments:   Level of Assistance [] [] [] [x] [] [] [] [] [] [] []    Distance 15 feet     DME Rolling Walker    Gait Quality Slow tera    Weightbearing  Status N/A     I=Independent, Mod I=Modified Independent, S=Supervision, SBA=Standby Assistance, CGA=Contact Guard Assistance,   Min=Minimal Assistance, Mod=Moderate Assistance, Max=Maximal Assistance, Total=Total Assistance, NT=Not Tested    PLAN:   FREQUENCY/DURATION: PT Plan of Care: 3 times/week for duration of hospital stay or until stated goals are met, whichever comes first.  TREATMENT:     TREATMENT:   ($$ Therapeutic Activity: 23-37 mins    )  Therapeutic Activity (23 Minutes): Therapeutic activity included Rolling, Supine to Sit, Scooting, Transfer Training, Ambulation on level ground, Sitting balance , Standing balance and exercises to improve functional Mobility, Strength and Activity tolerance.     TREATMENT GRID:   Date:  12/17/21 Date:   Date:     Activity/Exercise Parameters Parameters Parameters   LAQ 10     heelslides 10     abduction 10     Ankle pumps 10                             AFTER TREATMENT POSITION/PRECAUTIONS:  Chair, Needs within reach and RN notified    INTERDISCIPLINARY COLLABORATION:  RN/PCT and PT/PTA    TOTAL TREATMENT DURATION:  PT Patient Time In/Time Out  Time In: 1025  Time Out: [address] [place] [address], [facility]

## 2021-12-17 NOTE — PROGRESS NOTES
Received bedside shift report from Alex Oneal RN. Pt lying in bed. No apparent distress. Respirations even and unlabored on 2L NC. Instructed to call for assistance with needs, as they arise. Pt voiced understanding.

## 2021-12-18 LAB
ABO + RH BLD: NORMAL
BACTERIA SPEC CULT: NORMAL
BACTERIA SPEC CULT: NORMAL
BLD PROD TYP BPU: NORMAL
BLD PROD TYP BPU: NORMAL
BLOOD BANK CMNT PATIENT-IMP: NORMAL
BLOOD GROUP ANTIBODIES SERPL: NORMAL
BPU ID: NORMAL
BPU ID: NORMAL
CROSSMATCH RESULT,%XM: NORMAL
CROSSMATCH RESULT,%XM: NORMAL
SERVICE CMNT-IMP: NORMAL
SERVICE CMNT-IMP: NORMAL
SPECIMEN EXP DATE BLD: NORMAL
STATUS OF UNIT,%ST: NORMAL
STATUS OF UNIT,%ST: NORMAL
UNIT DIVISION, %UDIV: 0
UNIT DIVISION, %UDIV: 0

## 2021-12-18 PROCEDURE — 94760 N-INVAS EAR/PLS OXIMETRY 1: CPT

## 2021-12-18 PROCEDURE — 74011250637 HC RX REV CODE- 250/637: Performed by: HOSPITALIST

## 2021-12-18 PROCEDURE — 74011250637 HC RX REV CODE- 250/637: Performed by: INTERNAL MEDICINE

## 2021-12-18 PROCEDURE — 65660000000 HC RM CCU STEPDOWN

## 2021-12-18 PROCEDURE — 77010033678 HC OXYGEN DAILY

## 2021-12-18 RX ORDER — FLUTICASONE PROPIONATE 50 MCG
2 SPRAY, SUSPENSION (ML) NASAL DAILY
Status: DISCONTINUED | OUTPATIENT
Start: 2021-12-19 | End: 2021-12-21 | Stop reason: HOSPADM

## 2021-12-18 RX ORDER — SENNOSIDES 8.6 MG/1
2 TABLET ORAL 2 TIMES DAILY
Status: DISCONTINUED | OUTPATIENT
Start: 2021-12-18 | End: 2021-12-21 | Stop reason: HOSPADM

## 2021-12-18 RX ORDER — FLUTICASONE PROPIONATE 50 MCG
2 SPRAY, SUSPENSION (ML) NASAL DAILY
Status: DISCONTINUED | OUTPATIENT
Start: 2021-12-19 | End: 2021-12-18

## 2021-12-18 RX ADMIN — AMIODARONE HYDROCHLORIDE 200 MG: 200 TABLET ORAL at 09:22

## 2021-12-18 RX ADMIN — LEVOTHYROXINE SODIUM 50 MCG: 0.05 TABLET ORAL at 05:59

## 2021-12-18 RX ADMIN — METOPROLOL TARTRATE 12.5 MG: 25 TABLET, FILM COATED ORAL at 09:21

## 2021-12-18 RX ADMIN — Medication 10 ML: at 14:00

## 2021-12-18 RX ADMIN — Medication 10 ML: at 22:00

## 2021-12-18 RX ADMIN — Medication 10 ML: at 05:59

## 2021-12-18 RX ADMIN — SENNOSIDES 17.2 MG: 8.6 TABLET, FILM COATED ORAL at 18:28

## 2021-12-18 RX ADMIN — Medication 5 ML: at 06:00

## 2021-12-18 RX ADMIN — RIVAROXABAN 15 MG: 15 TABLET, FILM COATED ORAL at 14:24

## 2021-12-18 RX ADMIN — FOLIC ACID 1 MG: 1 TABLET ORAL at 09:22

## 2021-12-18 RX ADMIN — DULOXETINE HYDROCHLORIDE 60 MG: 60 CAPSULE, DELAYED RELEASE ORAL at 09:22

## 2021-12-18 RX ADMIN — Medication 10 ML: at 21:38

## 2021-12-18 RX ADMIN — METOPROLOL TARTRATE 12.5 MG: 25 TABLET, FILM COATED ORAL at 18:28

## 2021-12-18 RX ADMIN — PANTOPRAZOLE SODIUM 40 MG: 40 TABLET, DELAYED RELEASE ORAL at 05:59

## 2021-12-18 NOTE — PROGRESS NOTES
Received bedside shift report from Kadi Rolon RN. Pt lying in bed. No apparent distress. Respirations even and unlabored on 2L NC. Instructed to call for assistance with needs, as they arise. Pt voiced understanding.

## 2021-12-18 NOTE — PROGRESS NOTES
Patient resting in bed, alert and oriented, cooperative with care. Patient on 2 liters. Patient denies pain or distress, safety measures in place, call light within reach.

## 2021-12-18 NOTE — PROGRESS NOTES
Hospitalist Progress Note   Admit Date:  2021  4:32 PM   Name:  Kwame Hannon   Age:  80 y.o. Sex:  female  :  1940   MRN:  [id number]   Room:  [facility]/    Presenting Complaint: Respiratory Distress    Reason(s) for Admission: Anemia [D64.9]     Hospital Course & Interval History:   Ms. Farideh Pink is a very nice 79 y/o WF with a h/o hypothyroidism, chronic normocytic anemia, CKD who was admitted on  with symptomatic anemia. Her baseline Hb is around 8-10. On admission it was 3.3. LA was 12 and she had NAZIA on CKD. She was transfused. Xarelto held. GI consulted, had normal EGD On .  Studies c/w LUIS ARMANDO, given IV iron. Hb stable, Xarelto re-challenged . Subjective (21):  Awake in bed, very pleasant, feels well, no BM yet today. Hb stable yesterday at 8.3. I called her pharmacy yesterday but she has never filled Xarelto there (pt told me she was taking it BID, has 20mg on PTA list but was saying she took 2.5mg for AFib). Assessment & Plan:   # Symptomatic anemia              - Xarelto held on admission with initial Hb 3.3, s/p transfusion. EGD unremarkable on . GI recs capsule endoscopy and colonoscopy outpatient. Hematology consulted and recs appreciated. Iron studies c/w LUIS ARMANDO, given IV iron. Hb stable. Discussed risks/benefits of resuming vs stopping renally dosed Xarelto, will resume today and monitor Hb and for any melena. If suspicion for re-bleeding then should stop all together.     # NAZIA on CKD              - Resolved.     # L pleural effusion              - Moderate, stable O2 needs.      # Lactic acidosis              - Likely 2/2 hypotension on admission. Now resolved.     # Hypothyroidism/hx of Graves              - S/p what sounds like DELCID ablation, now on Synthroid              - TSH 17, increase Synthroid to 50mcg daily.     # Atrial fibrillation              - [address] held in setting of AoC anemia.  Con't amiodarone, metoprolol     # Chronic hypoxemic respiratory failure              - Stable, con't 2L O2.     Dispo/Discharge Planning: Placement, referrals sent 12/17. Diet:  ADULT DIET Regular; Low Fat/Low Chol/High Fiber/2 gm Na  DVT PPx: Xarelto resumed.   Code status: Full Code    Hospital Problems as of 12/18/2021 Date Reviewed: 12/14/2021          Codes Class Noted - Resolved POA    Lactic acidosis ICD-10-CM: E87.2  ICD-9-CM: 276.2  12/13/2021 - Present Unknown        Diastolic CHF, chronic (HCC) ICD-10-CM: I50.32  ICD-9-CM: 428.32, 428.0  6/13/2021 - Present Unknown        * (Principal) Anemia ICD-10-CM: D64.9  ICD-9-CM: 285.9  5/12/2021 - Present Yes        Paroxysmal atrial fibrillation (HCC) (Chronic) ICD-10-CM: I48.0  ICD-9-CM: 427.31  3/19/2021 - Present Yes        Acute renal failure superimposed on chronic kidney disease ([facility] ICD-10-CM: N17.9, N18.9  ICD-9-CM: 584.9, 585.9  4/22/2019 - Present Unknown        Hypertension, essential, benign (Chronic) ICD-10-CM: I10  ICD-9-CM: 401.1  8/18/2016 - Present Yes        Major depressive disorder, recurrent, moderate (HCC) (Chronic) ICD-10-CM: F33.1  ICD-9-CM: 296.32  8/18/2016 - Present Yes        Acquired hypothyroidism (Chronic) ICD-10-CM: E03.9  ICD-9-CM: 244.9  1/16/2016 - Present Yes        Hyperlipidemia (Chronic) ICD-10-CM: E78.5  ICD-9-CM: 272.4  1/16/2016 - Present Yes              Objective:     Patient Vitals for the past 24 hrs:   Temp Pulse Resp BP SpO2   12/18/21 0852 98.9 °F (37.2 °C) 80  (!) 140/73 93 %   12/18/21 0414 98 °F (36.7 °C) 70  (!) 141/89 95 %   12/17/21 2219 98.3 °F (36.8 °C) 70  117/68 96 %   12/17/21 2133     96 %   12/17/21 2017 99.1 °F (37.3 °C) 69  130/70 98 %   12/17/21 1521 97.6 °F (36.4 °C) 66 19 122/62 92 %   12/17/21 1118 98.1 °F (36.7 °C) 83 20 121/63 95 %     Oxygen Therapy  O2 Sat (%): 93 % (12/18/21 0852)  Pulse via Oximetry: 69 beats per minute (12/14/21 1500)  O2 Device: Nasal cannula (12/18/21 0806)  Skin Assessment: Clean, dry, & intact (12/15/21 0350)  Skin Protection for O2 Device: No (12/15/21 0350)  O2 Flow Rate (L/min): 2 l/min (12/18/21 0806)  FIO2 (%): 36 % (12/14/21 1815)    Estimated body mass index is 29.82 kg/m² as calculated from the following:    Height as of 6/28/21: 5' (1.524 m). Weight as of this encounter: 69.3 kg (152 lb 11.2 oz). Intake/Output Summary (Last 24 hours) at 12/18/2021 1040  Last data filed at 12/18/2021 0716  Gross per 24 hour   Intake 240 ml   Output 1400 ml   Net -1160 ml         Physical Exam:   Blood pressure (!) 140/73, pulse 80, temperature 98.9 °F (37.2 °C), resp. rate 19, weight 69.3 kg (152 lb 11.2 oz), SpO2 93 %. General:    Well nourished. No overt distress. Pleasant, hard of hearing. Head:  Normocephalic, atraumatic  Eyes:  Sclerae appear normal.  Pupils equally round. Exophthalmos. ENT:  Nares appear normal, no drainage. Moist oral mucosa  Neck:  No restricted ROM. Trachea midline   CV:   RRR. No m/r/g. No jugular venous distension. Lungs:   CTAB. No wheezing, rhonchi, or rales. Respirations even, unlabored. Abdomen: Bowel sounds present. Soft, nontender, nondistended. Extremities: No cyanosis or clubbing. Bilateral LE pitting edema. Skin:     No rashes and normal coloration. Warm and dry. Neuro:  CN II-XII grossly intact. Sensation intact. A&Ox3  Psych:  Normal mood and affect.       I have reviewed ordered lab tests and independently visualized imaging below:    Recent Labs:  Recent Results (from the past 48 hour(s))   PLEASE READ & DOCUMENT PPD TEST IN 48 HRS    Collection Time: 12/16/21  5:45 PM   Result Value Ref Range    PPD Negative Negative    mm Induration 0 0 - 5 mm   HGB & HCT    Collection Time: 12/17/21  9:19 AM   Result Value Ref Range    HGB 8.3 (L) 11.7 - 15.4 g/dL    HCT 28.4 (L) 35.8 - 06.7 %   METABOLIC PANEL, BASIC    Collection Time: 12/17/21  9:19 AM   Result Value Ref Range    Sodium 143 136 - 145 mmol/L    Potassium 3.4 (L) 3.5 - 5.1 mmol/L    Chloride 108 (H) 98 - 107 mmol/L    CO2 31 21 - 32 mmol/L    Anion gap 4 (L) 7 - 16 mmol/L    Glucose 132 (H) 65 - 100 mg/dL    BUN 20 8 - 23 MG/DL    Creatinine 1.89 (H) 0.6 - 1.0 MG/DL    GFR est AA 33 (L) >60 ml/min/1.73m2    GFR est non-AA 27 (L) >60 ml/min/1.73m2    Calcium 8.3 8.3 - 10.4 MG/DL       All Micro Results     Procedure Component Value Units Date/Time    CULTURE, BLOOD [[id number]] Collected: 12/13/21 1644    Order Status: Completed Specimen: Blood Updated: 12/18/21 0644     Special Requests: --        RIGHT  Antecubital       Culture result: NO GROWTH 5 DAYS       CULTURE, BLOOD [[id number]] Collected: 12/13/21 1644    Order Status: Completed Specimen: Blood Updated: 12/18/21 0644     Special Requests: --        RIGHT  FOREARM       Culture result: NO GROWTH 5 DAYS       COVID-19 RAPID TEST [[id number]] Collected: 12/13/21 2309    Order Status: Completed Specimen: Nasopharyngeal Updated: 12/13/21 2336     Specimen source NASAL        COVID-19 rapid test Not detected        Comment:      The specimen is NEGATIVE for SARS-CoV-2, the novel coronavirus associated with COVID-19. A negative result does not rule out COVID-19. This test has been authorized by the FDA under an Emergency Use Authorization (EUA) for use by authorized laboratories. Fact sheet for Healthcare Providers: ConventionUpdate.co.nz  Fact sheet for Patients: ConventionUpdate.co.nz       Methodology: Isothermal Nucleic Acid Amplification               Other Studies:  No results found.     Current Meds:  Current Facility-Administered Medications   Medication Dose Route Frequency    rivaroxaban (XARELTO) tablet 15 mg  15 mg Oral DAILY WITH LUNCH    pantoprazole (PROTONIX) tablet 40 mg  40 mg Oral ACB    levothyroxine (SYNTHROID) tablet 50 mcg  50 mcg Oral ACB    sodium chloride (NS) flush 5-40 mL  5-40 mL IntraVENous Q8H    acetaminophen (TYLENOL) tablet 650 mg  650 mg Oral Q6H PRN    Or    acetaminophen (TYLENOL) suppository 650 mg  650 mg Rectal Q6H PRN    polyethylene glycol (MIRALAX) packet 17 g  17 g Oral DAILY PRN    ondansetron (ZOFRAN ODT) tablet 4 mg  4 mg Oral Q8H PRN    Or    ondansetron (ZOFRAN) injection 4 mg  4 mg IntraVENous Q6H PRN    amiodarone (CORDARONE) tablet 200 mg  200 mg Oral DAILY    DULoxetine (CYMBALTA) capsule 60 mg  60 mg Oral DAILY    folic acid (FOLVITE) tablet 1 mg  1 mg Oral DAILY    sodium chloride (NS) flush 5-40 mL  5-40 mL IntraVENous Q8H    0.9% sodium chloride infusion 250 mL  250 mL IntraVENous PRN    metoprolol tartrate (LOPRESSOR) tablet 12.5 mg  12.5 mg Oral BID    sodium chloride (NS) flush 5-10 mL  5-10 mL IntraVENous Q8H       Signed:  Burak Jones MD    Part of this note may have been written by using a voice dictation software. The note has been proof read but may still contain some grammatical/other typographical errors.

## 2021-12-19 PROCEDURE — 74011250637 HC RX REV CODE- 250/637: Performed by: INTERNAL MEDICINE

## 2021-12-19 PROCEDURE — 74011250637 HC RX REV CODE- 250/637: Performed by: HOSPITALIST

## 2021-12-19 PROCEDURE — 85027 COMPLETE CBC AUTOMATED: CPT

## 2021-12-19 PROCEDURE — 77010033678 HC OXYGEN DAILY

## 2021-12-19 PROCEDURE — 65660000000 HC RM CCU STEPDOWN

## 2021-12-19 PROCEDURE — 80048 BASIC METABOLIC PNL TOTAL CA: CPT

## 2021-12-19 PROCEDURE — 36415 COLL VENOUS BLD VENIPUNCTURE: CPT

## 2021-12-19 PROCEDURE — 74011250637 HC RX REV CODE- 250/637: Performed by: STUDENT IN AN ORGANIZED HEALTH CARE EDUCATION/TRAINING PROGRAM

## 2021-12-19 PROCEDURE — 94760 N-INVAS EAR/PLS OXIMETRY 1: CPT

## 2021-12-19 RX ADMIN — METOPROLOL TARTRATE 12.5 MG: 25 TABLET, FILM COATED ORAL at 09:06

## 2021-12-19 RX ADMIN — Medication 10 ML: at 05:49

## 2021-12-19 RX ADMIN — METOPROLOL TARTRATE 12.5 MG: 25 TABLET, FILM COATED ORAL at 17:21

## 2021-12-19 RX ADMIN — Medication 10 ML: at 22:18

## 2021-12-19 RX ADMIN — DULOXETINE HYDROCHLORIDE 60 MG: 60 CAPSULE, DELAYED RELEASE ORAL at 09:06

## 2021-12-19 RX ADMIN — Medication 10 ML: at 14:49

## 2021-12-19 RX ADMIN — PANTOPRAZOLE SODIUM 40 MG: 40 TABLET, DELAYED RELEASE ORAL at 05:49

## 2021-12-19 RX ADMIN — Medication 10 ML: at 14:47

## 2021-12-19 RX ADMIN — RIVAROXABAN 15 MG: 15 TABLET, FILM COATED ORAL at 11:31

## 2021-12-19 RX ADMIN — LEVOTHYROXINE SODIUM 50 MCG: 0.05 TABLET ORAL at 05:49

## 2021-12-19 RX ADMIN — Medication 10 ML: at 14:48

## 2021-12-19 RX ADMIN — SENNOSIDES 17.2 MG: 8.6 TABLET, FILM COATED ORAL at 09:06

## 2021-12-19 RX ADMIN — FLUTICASONE PROPIONATE 2 SPRAY: 50 SPRAY, METERED NASAL at 00:58

## 2021-12-19 RX ADMIN — Medication 10 ML: at 22:17

## 2021-12-19 RX ADMIN — FOLIC ACID 1 MG: 1 TABLET ORAL at 09:06

## 2021-12-19 RX ADMIN — Medication 10 ML: at 06:00

## 2021-12-19 RX ADMIN — AMIODARONE HYDROCHLORIDE 200 MG: 200 TABLET ORAL at 09:06

## 2021-12-19 RX ADMIN — SENNOSIDES 17.2 MG: 8.6 TABLET, FILM COATED ORAL at 17:21

## 2021-12-19 NOTE — PROGRESS NOTES
Assumed care for patient and received bedside shift report from Aidan, [facility]. Patient resting in bed with no pain or distress noted. Patient has 02 at 2 liters via NC with RR even/unlabored. Call light in reach and will continue to assess.

## 2021-12-19 NOTE — PROGRESS NOTES
Received bedside shift report from Brynn Moya RN. Pt lying in bed. No apparent distress. Respirations even and unlabored on 2L NC. Instructed to call for assistance with needs, as they arise. Pt voiced understanding.

## 2021-12-19 NOTE — PROGRESS NOTES
Hospitalist Progress Note   Admit Date:  2021  4:32 PM   Name:  Edris Severs   Age:  80 y.o. Sex:  female  :  1940   MRN:  [id number]   Room:  [facility]/    Presenting Complaint: Respiratory Distress    Reason(s) for Admission: Anemia [D64.9]     Hospital Course & Interval History:   Ms. Malou Henley is a very nice 79 y/o WF with a h/o hypothyroidism, chronic normocytic anemia, CKD who was admitted on  with symptomatic anemia. Her baseline Hb is around 8-10. On admission it was 3.3. LA was 12 and she had NAZIA on CKD. She was transfused. Xarelto held. GI consulted, had normal EGD On .  Studies c/w LUIS ARMANDO, given IV iron. Hb stable, Xarelto resumed . Subjective (21):  Up to chair on the phone with a friend. Continues to feel well. Had small amt BRBPR yesterday per RN but Hb today is improved to 9. Continues to feel well with no complaints. No chest pain, N/V/D, SOB. Assessment & Plan:   # Symptomatic anemia              - Xarelto held on admission with initial Hb 3.3, s/p transfusion. EGD unremarkable on . GI recs capsule endoscopy and colonoscopy outpatient. Hematology consulted, iron studies c/w LUIS ARMANDO, given IV iron. After discussing risks/benefits Xarelto was resumed on . Hb today is 9. Con't to monitor Hb and for clinical bleeding     # NAZIA on CKD              - Resolved. Cr 1.14 today.     # L pleural effusion              - Moderate, stable O2 needs.      # Lactic acidosis              - Likely 2/2 hypotension on admission. Now resolved.     # Hypothyroidism/hx of Graves              - S/p what sounds like DELCID ablation, now on Synthroid              - TSH 17, increase Synthroid to 50mcg daily.     # Atrial fibrillation              - [address] held in setting of AoC anemia. Con't amiodarone, metoprolol     # Chronic hypoxemic respiratory failure              - Stable, con't 2L O2.     Dispo/Discharge Planning: Placement.   Diet:  ADULT DIET Regular; Low Fat/Low Chol/High Fiber/2 gm Na  DVT PPx: Xarelto  Code status: Full Code    Hospital Problems as of 12/19/2021 Date Reviewed: 12/14/2021          Codes Class Noted - Resolved POA    Lactic acidosis ICD-10-CM: E87.2  ICD-9-CM: 276.2  12/13/2021 - Present Unknown        Diastolic CHF, chronic (HCC) ICD-10-CM: I50.32  ICD-9-CM: 428.32, 428.0  6/13/2021 - Present Unknown        * (Principal) Anemia ICD-10-CM: D64.9  ICD-9-CM: 285.9  5/12/2021 - Present Yes        Paroxysmal atrial fibrillation (HCC) (Chronic) ICD-10-CM: I48.0  ICD-9-CM: 427.31  3/19/2021 - Present Yes        Acute renal failure superimposed on chronic kidney disease ([facility] ICD-10-CM: N17.9, N18.9  ICD-9-CM: 584.9, 585.9  4/22/2019 - Present Unknown        Hypertension, essential, benign (Chronic) ICD-10-CM: I10  ICD-9-CM: 401.1  8/18/2016 - Present Yes        Major depressive disorder, recurrent, moderate (HCC) (Chronic) ICD-10-CM: F33.1  ICD-9-CM: 296.32  8/18/2016 - Present Yes        Acquired hypothyroidism (Chronic) ICD-10-CM: E03.9  ICD-9-CM: 244.9  1/16/2016 - Present Yes        Hyperlipidemia (Chronic) ICD-10-CM: E78.5  ICD-9-CM: 272.4  1/16/2016 - Present Yes              Objective:     Patient Vitals for the past 24 hrs:   Temp Pulse Resp BP SpO2   12/19/21 0743 97.6 °F (36.4 °C) 70 20 132/63 97 %   12/19/21 0429 97.5 °F (36.4 °C) 69  107/60 91 %   12/18/21 2107     95 %   12/18/21 1949 98.1 °F (36.7 °C) 70  (!) 140/64 97 %   12/18/21 1636 98.7 °F (37.1 °C) 86  (!) 148/70 98 %   12/18/21 1307 98.9 °F (37.2 °C) 83  (!) 145/60 95 %     Oxygen Therapy  O2 Sat (%): 97 % (12/19/21 0743)  Pulse via Oximetry: 69 beats per minute (12/14/21 1500)  O2 Device: Nasal cannula (12/19/21 1017)  Skin Assessment: Clean, dry, & intact (12/15/21 0350)  Skin Protection for O2 Device: No (12/15/21 0350)  O2 Flow Rate (L/min): 2 l/min (12/19/21 1017)  FIO2 (%): 36 % (12/14/21 1815)    Estimated body mass index is 29.82 kg/m² as calculated from the following:    Height as of 6/28/21: 5' (1.524 m). Weight as of this encounter: 69.3 kg (152 lb 11.2 oz). Intake/Output Summary (Last 24 hours) at 12/19/2021 1123  Last data filed at 12/19/2021 1054  Gross per 24 hour   Intake 300 ml   Output 400 ml   Net -100 ml         Physical Exam:   Blood pressure 132/63, pulse 70, temperature 97.6 °F (36.4 °C), resp. rate 20, weight 69.3 kg (152 lb 11.2 oz), SpO2 97 %. General:    Well nourished. No overt distress. Pleasant, conversant and cooperative with examination. Head:  Normocephalic, atraumatic  Eyes:  Sclerae appear normal.  Pupils equally round. Exophthalmos. ENT:  Nares appear normal, no drainage. Moist oral mucosa  Neck:  No restricted ROM. Trachea midline   CV:   RRR. No m/r/g. No jugular venous distension. Lungs:   CTAB. No wheezing, rhonchi, or rales. Respirations even, unlabored. 2L NC O2. Abdomen: Bowel sounds present. Soft, nontender, nondistended. Extremities: No cyanosis or clubbing. No edema  Skin:     No rashes and normal coloration. Warm and dry. Neuro:  CN II-XII grossly intact. Sensation intact. A&Ox3  Psych:  Normal mood and affect.       I have reviewed ordered lab tests and independently visualized imaging below:    Recent Labs:  Recent Results (from the past 48 hour(s))   METABOLIC PANEL, BASIC    Collection Time: 12/19/21  7:08 AM   Result Value Ref Range    Sodium 142 136 - 145 mmol/L    Potassium 3.8 3.5 - 5.1 mmol/L    Chloride 109 (H) 98 - 107 mmol/L    CO2 27 21 - 32 mmol/L    Anion gap 6 (L) 7 - 16 mmol/L    Glucose 85 65 - 100 mg/dL    BUN 12 8 - 23 MG/DL    Creatinine 1.14 (H) 0.6 - 1.0 MG/DL    GFR est AA 59 (L) >60 ml/min/1.73m2    GFR est non-AA 49 (L) >60 ml/min/1.73m2    Calcium 8.5 8.3 - 10.4 MG/DL   CBC W/O DIFF    Collection Time: 12/19/21  7:08 AM   Result Value Ref Range    WBC 8.1 4.3 - 11.1 K/uL    RBC 3.56 (L) 4.05 - 5.2 M/uL    HGB 9.0 (L) 11.7 - 15.4 g/dL    HCT 30.9 (L) 35.8 - 46.3 %    MCV 86.8 79.6 - 97.8 FL    MCH 25.3 (L) 26.1 - 32.9 PG    MCHC 29.1 (L) 31.4 - 35.0 g/dL    RDW 25.4 (H) 11.9 - 14.6 %    PLATELET 647 030 - 473 K/uL    MPV 10.3 9.4 - 12.3 FL    ABSOLUTE NRBC 0.06 0.0 - 0.2 K/uL       All Micro Results     Procedure Component Value Units Date/Time    CULTURE, BLOOD [[id number]] Collected: 12/13/21 1644    Order Status: Completed Specimen: Blood Updated: 12/18/21 0644     Special Requests: --        RIGHT  Antecubital       Culture result: NO GROWTH 5 DAYS       CULTURE, BLOOD [[id number]] Collected: 12/13/21 1644    Order Status: Completed Specimen: Blood Updated: 12/18/21 0644     Special Requests: --        RIGHT  FOREARM       Culture result: NO GROWTH 5 DAYS       COVID-19 RAPID TEST [[id number]] Collected: 12/13/21 2309    Order Status: Completed Specimen: Nasopharyngeal Updated: 12/13/21 2336     Specimen source NASAL        COVID-19 rapid test Not detected        Comment:      The specimen is NEGATIVE for SARS-CoV-2, the novel coronavirus associated with COVID-19. A negative result does not rule out COVID-19. This test has been authorized by the FDA under an Emergency Use Authorization (EUA) for use by authorized laboratories. Fact sheet for Healthcare Providers: ConventionUpdate.co.nz  Fact sheet for Patients: ConventionUpdate.co.nz       Methodology: Isothermal Nucleic Acid Amplification               Other Studies:  No results found.     Current Meds:  Current Facility-Administered Medications   Medication Dose Route Frequency    rivaroxaban (XARELTO) tablet 15 mg  15 mg Oral DAILY WITH LUNCH    senna (SENOKOT) tablet 17.2 mg  2 Tablet Oral BID    fluticasone propionate (FLONASE) 50 mcg/actuation nasal spray 2 Spray  2 Spray Both Nostrils DAILY    pantoprazole (PROTONIX) tablet 40 mg  40 mg Oral ACB    levothyroxine (SYNTHROID) tablet 50 mcg  50 mcg Oral ACB    sodium chloride (NS) flush 5-40 mL  5-40 mL IntraVENous Q8H    acetaminophen (TYLENOL) tablet 650 mg  650 mg Oral Q6H PRN    Or    acetaminophen (TYLENOL) suppository 650 mg  650 mg Rectal Q6H PRN    polyethylene glycol (MIRALAX) packet 17 g  17 g Oral DAILY PRN    ondansetron (ZOFRAN ODT) tablet 4 mg  4 mg Oral Q8H PRN    Or    ondansetron (ZOFRAN) injection 4 mg  4 mg IntraVENous Q6H PRN    amiodarone (CORDARONE) tablet 200 mg  200 mg Oral DAILY    DULoxetine (CYMBALTA) capsule 60 mg  60 mg Oral DAILY    folic acid (FOLVITE) tablet 1 mg  1 mg Oral DAILY    sodium chloride (NS) flush 5-40 mL  5-40 mL IntraVENous Q8H    0.9% sodium chloride infusion 250 mL  250 mL IntraVENous PRN    metoprolol tartrate (LOPRESSOR) tablet 12.5 mg  12.5 mg Oral BID    sodium chloride (NS) flush 5-10 mL  5-10 mL IntraVENous Q8H       Signed:  Cecilia Tadeo MD    Part of this note may have been written by using a voice dictation software. The note has been proof read but may still contain some grammatical/other typographical errors.

## 2021-12-19 NOTE — PROGRESS NOTES
Patient having left nasal stuffiness and this nurse notified hospitalist and flonase ordered. Will provide patient with flonase.   Call light in reach

## 2021-12-19 NOTE — PROGRESS NOTES
Flonase one spray given to left nare for stuffiness and patient voiced that medication has relieved the stuffiness. Call light in reach and will continue to assess.

## 2021-12-20 PROBLEM — N18.9 ACUTE RENAL FAILURE SUPERIMPOSED ON CHRONIC KIDNEY DISEASE (HCC): Status: RESOLVED | Noted: 2019-04-22 | Resolved: 2021-12-20

## 2021-12-20 PROBLEM — N17.9 ACUTE RENAL FAILURE SUPERIMPOSED ON CHRONIC KIDNEY DISEASE (HCC): Status: RESOLVED | Noted: 2019-04-22 | Resolved: 2021-12-20

## 2021-12-20 PROBLEM — E87.20 LACTIC ACIDOSIS: Status: RESOLVED | Noted: 2021-12-13 | Resolved: 2021-12-20

## 2021-12-20 PROCEDURE — 74011250637 HC RX REV CODE- 250/637: Performed by: INTERNAL MEDICINE

## 2021-12-20 PROCEDURE — 74011250637 HC RX REV CODE- 250/637: Performed by: HOSPITALIST

## 2021-12-20 PROCEDURE — 85018 HEMOGLOBIN: CPT

## 2021-12-20 PROCEDURE — 97530 THERAPEUTIC ACTIVITIES: CPT

## 2021-12-20 PROCEDURE — 36415 COLL VENOUS BLD VENIPUNCTURE: CPT

## 2021-12-20 PROCEDURE — 77010033678 HC OXYGEN DAILY

## 2021-12-20 PROCEDURE — 65660000000 HC RM CCU STEPDOWN

## 2021-12-20 RX ADMIN — DULOXETINE HYDROCHLORIDE 60 MG: 60 CAPSULE, DELAYED RELEASE ORAL at 09:43

## 2021-12-20 RX ADMIN — Medication 10 ML: at 06:25

## 2021-12-20 RX ADMIN — METOPROLOL TARTRATE 12.5 MG: 25 TABLET, FILM COATED ORAL at 17:12

## 2021-12-20 RX ADMIN — Medication 10 ML: at 15:20

## 2021-12-20 RX ADMIN — Medication 10 ML: at 21:41

## 2021-12-20 RX ADMIN — LEVOTHYROXINE SODIUM 50 MCG: 0.05 TABLET ORAL at 06:27

## 2021-12-20 RX ADMIN — FOLIC ACID 1 MG: 1 TABLET ORAL at 09:31

## 2021-12-20 RX ADMIN — SENNOSIDES 17.2 MG: 8.6 TABLET, FILM COATED ORAL at 09:43

## 2021-12-20 RX ADMIN — SENNOSIDES 17.2 MG: 8.6 TABLET, FILM COATED ORAL at 17:12

## 2021-12-20 RX ADMIN — PANTOPRAZOLE SODIUM 40 MG: 40 TABLET, DELAYED RELEASE ORAL at 06:27

## 2021-12-20 RX ADMIN — METOPROLOL TARTRATE 12.5 MG: 25 TABLET, FILM COATED ORAL at 09:31

## 2021-12-20 RX ADMIN — Medication 10 ML: at 21:40

## 2021-12-20 RX ADMIN — AMIODARONE HYDROCHLORIDE 200 MG: 200 TABLET ORAL at 09:31

## 2021-12-20 RX ADMIN — RIVAROXABAN 15 MG: 15 TABLET, FILM COATED ORAL at 13:03

## 2021-12-20 RX ADMIN — FLUTICASONE PROPIONATE 2 SPRAY: 50 SPRAY, METERED NASAL at 09:43

## 2021-12-20 NOTE — PROGRESS NOTES
Pt is resting in bed at this time. Pt is alert and oriented times 4. Pt is on 3L NC. Pt has no s/sx of acute distress at this time. Pt has safety measures in place. Pt has call light within reach. Will continue to monitor.

## 2021-12-20 NOTE — PROGRESS NOTES
Pt is resting in bed at this time. Pt is on 2L NC. Pt has no s/sx of acute distress at this time. Pt has safety measures in place. Pt has call light within reach and is encouraged to call for assistance if needed. Will prepare report for oncoming nurse. 06-03 Na122 mmol/L<L> Glu 103 mg/dL<H> K+ 3.4 mmol/L<L> Cr  0.74 mg/dL BUN 10 mg/dL Phos n/a   Alb n/a   PAB n/a

## 2021-12-20 NOTE — PROGRESS NOTES
Problem: Falls - Risk of  Goal: *Absence of Falls  Description: Document Jorge Luis Hadley Fall Risk and appropriate interventions in the flowsheet.   Outcome: Progressing Towards Goal  Note: Fall Risk Interventions:  Mobility Interventions: Patient to call before getting OOB         Medication Interventions: Patient to call before getting OOB    Elimination Interventions: Call light in reach    History of Falls Interventions: Door open when patient unattended

## 2021-12-20 NOTE — PROGRESS NOTES
Hospitalist Progress Note   Admit Date:  2021  4:32 PM   Name:  Daniella Desir   Age:  80 y.o. Sex:  female  :  1940   MRN:  [id number]   Room:  [facility]/    Presenting Complaint: Respiratory Distress    Reason(s) for Admission: Anemia [D64.9]     Hospital Course & Interval History:   Ms. Terrance Byrne is a very nice 79 y/o WF with a h/o hypothyroidism, chronic normocytic anemia, CKD who was admitted on  with symptomatic anemia. Her baseline Hb is around 8-10. On admission it was 3.3. LA was 12 and she had NAZIA on CKD. She was transfused. Xarelto held. GI consulted, had normal EGD On .  Studies c/w LUIS ARMANDO, given IV iron. Hb stable, Xarelto resumed . Rehab referrals sent. Subjective (21):  Awake in bed, pleasant as always. No chest pain, SOB, N/V/D. Waiting for breakfast. AM labs pending. On 2L. Assessment & Plan:   # Symptomatic anemia              - Xarelto held on admission with initial Hb 3.3, s/p transfusion. EGD unremarkable on . GI recs capsule endoscopy and colonoscopy outpatient. Hematology consulted, iron studies c/w LUIS ARMANDO, given IV iron. After discussing risks/benefits Xarelto was resumed on . Hb stable yesterday, awaiting labs this mrashlee. If evidence of re-bleeding will need to stop indefinitely.     # NAZIA on CKD3              - Resolved.      # L pleural effusion              - Moderate, stable O2 needs.      # Lactic acidosis              - Likely 2/2 hypotension on admission. Now resolved.     # Hypothyroidism/hx of Graves              - S/p what sounds like DELCID ablation, now on Synthroid              - TSH 17, increased Synthroid to 50mcg daily.     # Atrial fibrillation              - Con't amiodarone, metoprolol, Xarelto resumed .     # Chronic hypoxemic respiratory failure              - Stable, remains on 2L O2.     Dispo/Discharge Planning: Placement pending.   Diet:  ADULT DIET Regular; Low Fat/Low Chol/High Fiber/2 gm Na  DVT PPx: Xarelto  Code status: Full Code    Hospital Problems as of 12/20/2021 Date Reviewed: 12/14/2021          Codes Class Noted - Resolved POA    Diastolic CHF, chronic (HCC) ICD-10-CM: I50.32  ICD-9-CM: 428.32, 428.0  6/13/2021 - Present Unknown        * (Principal) Anemia ICD-10-CM: D64.9  ICD-9-CM: 285.9  5/12/2021 - Present Yes        Paroxysmal atrial fibrillation (HCC) (Chronic) ICD-10-CM: I48.0  ICD-9-CM: 427.31  3/19/2021 - Present Yes        Hypertension, essential, benign (Chronic) ICD-10-CM: I10  ICD-9-CM: 401.1  8/18/2016 - Present Yes        Major depressive disorder, recurrent, moderate (HCC) (Chronic) ICD-10-CM: F33.1  ICD-9-CM: 296.32  8/18/2016 - Present Yes        Acquired hypothyroidism (Chronic) ICD-10-CM: E03.9  ICD-9-CM: 244.9  1/16/2016 - Present Yes        Hyperlipidemia (Chronic) ICD-10-CM: E78.5  ICD-9-CM: 272.4  1/16/2016 - Present Yes        RESOLVED: Lactic acidosis ICD-10-CM: E87.2  ICD-9-CM: 276.2  12/13/2021 - 12/20/2021 Unknown        RESOLVED: Acute renal failure superimposed on chronic kidney disease ([facility] ICD-10-CM: N17.9, N18.9  ICD-9-CM: 584.9, 585.9  4/22/2019 - 12/20/2021 Unknown              Objective:     Patient Vitals for the past 24 hrs:   Temp Pulse Resp BP SpO2   12/20/21 0755 97.7 °F (36.5 °C) 68 22 (!) 134/100 100 %   12/20/21 0333 97.7 °F (36.5 °C) 72 22 136/76 96 %   12/19/21 2302 98.2 °F (36.8 °C) 72 20 133/69 98 %   12/19/21 2052     100 %   12/19/21 2011 97.6 °F (36.4 °C) 70 20 134/68 99 %   12/19/21 1552 97.7 °F (36.5 °C) 68 21 107/62 94 %   12/19/21 1137 97.5 °F (36.4 °C) 65 20 (!) 143/68 96 %     Oxygen Therapy  O2 Sat (%): 100 % (12/20/21 0215)  Pulse via Oximetry: 69 beats per minute (12/14/21 1500)  O2 Device: Nasal cannula (12/20/21 1253)  Skin Assessment: Clean, dry, & intact (12/15/21 0350)  Skin Protection for O2 Device: No (12/15/21 6280)  O2 Flow Rate (L/min): 2 l/min (12/20/21 5373)  FIO2 (%): 36 % (12/14/21 9165)    Estimated body mass index is 29.82 kg/m² as calculated from the following:    Height as of 6/28/21: 5' (1.524 m). Weight as of this encounter: 69.3 kg (152 lb 11.2 oz). Intake/Output Summary (Last 24 hours) at 12/20/2021 0809  Last data filed at 12/19/2021 1054  Gross per 24 hour   Intake 300 ml   Output    Net 300 ml         Physical Exam:   Blood pressure (!) 134/100, pulse 68, temperature 97.7 °F (36.5 °C), resp. rate 22, weight 69.3 kg (152 lb 11.2 oz), SpO2 100 %. General:    Well nourished. No overt distress  Head:  Normocephalic, atraumatic  Eyes:  Sclerae appear normal.  Pupils equally round. Exophthalmos. ENT:  Nares appear normal, no drainage. Moist oral mucosa  Neck:  No restricted ROM. Trachea midline   CV:   RRR. No m/r/g. No jugular venous distension. Lungs:   CTAB. No wheezing, rhonchi, or rales. Respirations even, unlabored. 2L NC O2. Abdomen: Bowel sounds present. Soft, nontender, nondistended. Extremities: No cyanosis or clubbing. Bilateral LE pitting edema. Skin:     No rashes and normal coloration. Warm and dry. Neuro:  CN II-XII grossly intact. Sensation intact. A&Ox3  Psych:  Normal mood and affect.       I have reviewed ordered lab tests and independently visualized imaging below:    Recent Labs:  Recent Results (from the past 48 hour(s))   METABOLIC PANEL, BASIC    Collection Time: 12/19/21  7:08 AM   Result Value Ref Range    Sodium 142 136 - 145 mmol/L    Potassium 3.8 3.5 - 5.1 mmol/L    Chloride 109 (H) 98 - 107 mmol/L    CO2 27 21 - 32 mmol/L    Anion gap 6 (L) 7 - 16 mmol/L    Glucose 85 65 - 100 mg/dL    BUN 12 8 - 23 MG/DL    Creatinine 1.14 (H) 0.6 - 1.0 MG/DL    GFR est AA 59 (L) >60 ml/min/1.73m2    GFR est non-AA 49 (L) >60 ml/min/1.73m2    Calcium 8.5 8.3 - 10.4 MG/DL   CBC W/O DIFF    Collection Time: 12/19/21  7:08 AM   Result Value Ref Range    WBC 8.1 4.3 - 11.1 K/uL    RBC 3.56 (L) 4.05 - 5.2 M/uL    HGB 9.0 (L) 11.7 - 15.4 g/dL    HCT 30.9 (L) 35.8 - 46.3 %    MCV 86.8 79.6 - 97.8 FL MCH 25.3 (L) 26.1 - 32.9 PG    MCHC 29.1 (L) 31.4 - 35.0 g/dL    RDW 25.4 (H) 11.9 - 14.6 %    PLATELET 204 846 - 380 K/uL    MPV 10.3 9.4 - 12.3 FL    ABSOLUTE NRBC 0.06 0.0 - 0.2 K/uL       All Micro Results     Procedure Component Value Units Date/Time    CULTURE, BLOOD [[id number]] Collected: 12/13/21 1644    Order Status: Completed Specimen: Blood Updated: 12/18/21 0644     Special Requests: --        RIGHT  Antecubital       Culture result: NO GROWTH 5 DAYS       CULTURE, BLOOD [[id number]] Collected: 12/13/21 1644    Order Status: Completed Specimen: Blood Updated: 12/18/21 0644     Special Requests: --        RIGHT  FOREARM       Culture result: NO GROWTH 5 DAYS       COVID-19 RAPID TEST [[id number]] Collected: 12/13/21 2309    Order Status: Completed Specimen: Nasopharyngeal Updated: 12/13/21 2336     Specimen source NASAL        COVID-19 rapid test Not detected        Comment:      The specimen is NEGATIVE for SARS-CoV-2, the novel coronavirus associated with COVID-19. A negative result does not rule out COVID-19. This test has been authorized by the FDA under an Emergency Use Authorization (EUA) for use by authorized laboratories. Fact sheet for Healthcare Providers: ConventionUpdate.co.nz  Fact sheet for Patients: ConventionUpdate.co.nz       Methodology: Isothermal Nucleic Acid Amplification               Other Studies:  No results found.     Current Meds:  Current Facility-Administered Medications   Medication Dose Route Frequency    rivaroxaban (XARELTO) tablet 15 mg  15 mg Oral DAILY WITH LUNCH    senna (SENOKOT) tablet 17.2 mg  2 Tablet Oral BID    fluticasone propionate (FLONASE) 50 mcg/actuation nasal spray 2 Spray  2 Spray Both Nostrils DAILY    pantoprazole (PROTONIX) tablet 40 mg  40 mg Oral ACB    levothyroxine (SYNTHROID) tablet 50 mcg  50 mcg Oral ACB    sodium chloride (NS) flush 5-40 mL  5-40 mL IntraVENous Q8H    acetaminophen (TYLENOL) tablet 650 mg  650 mg Oral Q6H PRN    Or    acetaminophen (TYLENOL) suppository 650 mg  650 mg Rectal Q6H PRN    polyethylene glycol (MIRALAX) packet 17 g  17 g Oral DAILY PRN    ondansetron (ZOFRAN ODT) tablet 4 mg  4 mg Oral Q8H PRN    Or    ondansetron (ZOFRAN) injection 4 mg  4 mg IntraVENous Q6H PRN    amiodarone (CORDARONE) tablet 200 mg  200 mg Oral DAILY    DULoxetine (CYMBALTA) capsule 60 mg  60 mg Oral DAILY    folic acid (FOLVITE) tablet 1 mg  1 mg Oral DAILY    sodium chloride (NS) flush 5-40 mL  5-40 mL IntraVENous Q8H    0.9% sodium chloride infusion 250 mL  250 mL IntraVENous PRN    metoprolol tartrate (LOPRESSOR) tablet 12.5 mg  12.5 mg Oral BID    sodium chloride (NS) flush 5-10 mL  5-10 mL IntraVENous Q8H       Signed:  Cecilia Tadeo MD    Part of this note may have been written by using a voice dictation software. The note has been proof read but may still contain some grammatical/other typographical errors.

## 2021-12-20 NOTE — PROGRESS NOTES
ACUTE PHYSICAL THERAPY GOALS:  (Developed with and agreed upon by patient and/or caregiver.)  Developed with and agreed upon by patient and/or caregiver.)  1. Ms. Brandon Perrin will perform supine to sit and sit to supine independently in 7 days. 2.  Ms. Brandon Perrin will perform sit to stand and bed to chair independently with least restrictive device in 7 days. 3.  Ms. Brandon Perrin will perform gait with rolling walker or least restrictive device 150 ft independently in 7 days. 4.  Ms. Brandon Perrin will tolerate >25 minutes dynamic activity in 7 days. PHYSICAL THERAPY: Daily Note and AM Treatment Day # 3    J Luis Bell is a 80 y.o. female   PRIMARY DIAGNOSIS: Anemia  Anemia [D64.9]  Procedure(s) (LRB):  ESOPHAGOGASTRODUODENOSCOPY (EGD) /28 ER 32 / Being admitted to [facility] *Diagnostic only (N/A)  6 Days Post-Op    ASSESSMENT:     REHAB RECOMMENDATIONS: CURRENT LEVEL OF FUNCTION:  (Most Recently Demonstrated)   Recommendation to date pending progress:  Setting:   Short term rehab   Patient wants to go home if possible pending progress  Equipment:    To Be Determined Bed Mobility:   Modified Independent  Sit to Stand:  [email or web address] Assistance  Transfers:   Standby Assistance  Gait/Mobility:   Standby Assistance     ASSESSMENT:  Ms. Brandon Perrin is making slow progress toward goals with improved gait distances. She performed transfers and all mobility with SBA and minimal cueing for safety and pacing. Ambulation x2 with the RW and cueing for breathing post ambulation. On 2L O2, de-sat to 86% following ambulation and slow recovery over several minutes with cueing for pursed lipped breathing. SUBJECTIVE:   Ms. Brandon Perrin states, \"\"I'll do what I can. \"    SOCIAL HISTORY/ LIVING ENVIRONMENT: see eval  Support Systems: Friend/Neighbor  OBJECTIVE:     PAIN: VITAL SIGNS: LINES/DRAINS:   Pre Treatment: Pain Screen  Pain Scale 1: Numeric (0 - 10)  Pain Intensity 1: 0/10  Post Treatment: 0/10   O2  O2 Device: Nasal cannula     MOBILITY: I Mod I S SBA CGA Min Mod Max Total  NT x2 Comments:   Bed Mobility    Rolling [] [] [] [] [] [] [] [] [] [x] []    Supine to Sit [] [] [] [] [] [] [] [] [] [x] []    Scooting [x] [] [] [] [] [] [] [] [] [] []    Sit to Supine [] [] [] [] [] [] [] [] [] [x] []    Transfers    Sit to Stand [] [] [] [x] [] [] [] [] [] [] []    Bed to Chair [] [] [] [x] [] [] [] [] [] [] []    Stand to Sit [] [] [x] [x] [] [] [] [] [] [] []    I=Independent, Mod I=Modified Independent, S=Supervision, SBA=Standby Assistance, CGA=Contact Guard Assistance,   Min=Minimal Assistance, Mod=Moderate Assistance, Max=Maximal Assistance, Total=Total Assistance, NT=Not Tested    BALANCE: Good Fair+ Fair Fair- Poor NT Comments   Sitting Static [x] [] [] [] [] []    Sitting Dynamic [x] [] [] [] [] []              Standing Static [x] [] [] [] [] []    Standing Dynamic [] [x] [] [] [] []      GAIT: I Mod I S SBA CGA Min Mod Max Total  NT x2 Comments:   Level of Assistance [] [] [] [x] [] [] [] [] [] [] []    Distance 20'x2    DME Rolling Walker    Gait Quality Slow tera    Weightbearing  Status N/A     I=Independent, Mod I=Modified Independent, S=Supervision, SBA=Standby Assistance, CGA=Contact Guard Assistance,   Min=Minimal Assistance, Mod=Moderate Assistance, Max=Maximal Assistance, Total=Total Assistance, NT=Not Tested    PLAN:   FREQUENCY/DURATION: PT Plan of Care: 3 times/week for duration of hospital stay or until stated goals are met, whichever comes first.  TREATMENT:     TREATMENT:   ($$ Therapeutic Activity: 23-37 mins    )  Therapeutic Activity (23 Minutes): Therapeutic activity included Scooting, Transfer Training, Ambulation on level ground, Sitting balance  and Standing balance to improve functional Mobility, Strength and Activity tolerance.     TREATMENT GRID:   Date:  12/17/21 Date:   Date:     Activity/Exercise Parameters Parameters Parameters   LAQ 10     heelslides 10     abduction 10     Ankle pumps 10 AFTER TREATMENT POSITION/PRECAUTIONS:  Chair, Needs within reach and RN notified    INTERDISCIPLINARY COLLABORATION:  RN/PCT and PT/PTA    TOTAL TREATMENT DURATION:  PT Patient Time In/Time Out  Time In: 9973  Time Out: [address] [place] [facility] [place]

## 2021-12-20 NOTE — PROGRESS NOTES
completed initial visit with patient. Patient expressed a positive affect, despite also complaining of stomach issues. Patient expressed lucille in expected discharge for tomorrow.  provided pastoral presence, prayer and empathetic listening.   Signed by  Iban Villeda M.Div.

## 2021-12-21 VITALS
WEIGHT: 152.7 LBS | HEART RATE: 71 BPM | RESPIRATION RATE: 20 BRPM | TEMPERATURE: 98.4 F | SYSTOLIC BLOOD PRESSURE: 124 MMHG | BODY MASS INDEX: 29.82 KG/M2 | OXYGEN SATURATION: 93 % | DIASTOLIC BLOOD PRESSURE: 61 MMHG

## 2021-12-21 PROCEDURE — 74011250637 HC RX REV CODE- 250/637: Performed by: HOSPITALIST

## 2021-12-21 PROCEDURE — 74011250637 HC RX REV CODE- 250/637: Performed by: INTERNAL MEDICINE

## 2021-12-21 PROCEDURE — 36415 COLL VENOUS BLD VENIPUNCTURE: CPT

## 2021-12-21 PROCEDURE — 94760 N-INVAS EAR/PLS OXIMETRY 1: CPT

## 2021-12-21 PROCEDURE — 85018 HEMOGLOBIN: CPT

## 2021-12-21 PROCEDURE — 77010033678 HC OXYGEN DAILY

## 2021-12-21 PROCEDURE — 97530 THERAPEUTIC ACTIVITIES: CPT

## 2021-12-21 RX ADMIN — FLUTICASONE PROPIONATE 2 SPRAY: 50 SPRAY, METERED NASAL at 09:00

## 2021-12-21 RX ADMIN — AMIODARONE HYDROCHLORIDE 200 MG: 200 TABLET ORAL at 09:52

## 2021-12-21 RX ADMIN — FOLIC ACID 1 MG: 1 TABLET ORAL at 09:52

## 2021-12-21 RX ADMIN — Medication 10 ML: at 06:18

## 2021-12-21 RX ADMIN — LEVOTHYROXINE SODIUM 50 MCG: 0.05 TABLET ORAL at 07:00

## 2021-12-21 RX ADMIN — METOPROLOL TARTRATE 12.5 MG: 25 TABLET, FILM COATED ORAL at 09:49

## 2021-12-21 RX ADMIN — DULOXETINE HYDROCHLORIDE 60 MG: 60 CAPSULE, DELAYED RELEASE ORAL at 09:52

## 2021-12-21 RX ADMIN — PANTOPRAZOLE SODIUM 40 MG: 40 TABLET, DELAYED RELEASE ORAL at 07:00

## 2021-12-21 RX ADMIN — RIVAROXABAN 15 MG: 15 TABLET, FILM COATED ORAL at 13:21

## 2021-12-21 NOTE — PROGRESS NOTES
Care Management Interventions  PCP Verified by CM: Yes  Transition of Care Consult (CM Consult): [address]: No  Reason Outside [place]: Patient already serviced by other home care/hospice agency  Physical Therapy Consult: Yes  Occupational Therapy Consult: Yes  Speech Therapy Consult: No  Support Systems: Friend/Neighbor  Confirm Follow Up Transport: Friends  Freedom of Choice List was Provided with Basic Dialogue that Supports the Patient's Individualized Plan of Care/Goals, Treatment Preferences and Shares the Quality Data Associated with the Providers?: Yes  [employer] Information Provided?: No  Discharge Location  Discharge Placement: Home with home health  CM called patient's friend Kell Zambrano to make her aware that patient has been denied rehab by insurance. Teena  has been ordered RN/PT/OT/Aide/SW. Patient's friend will transport patient home.

## 2021-12-21 NOTE — PROGRESS NOTES
Pt is resting in bed at this time. Pt is on 2L NC. Pt has no s/sx of acute distress at this time. Pt has safety measures in place. Pt has call light within reach and is encouraged to call for assistance if needed. Will prepare report for oncoming nurse.

## 2021-12-21 NOTE — PROGRESS NOTES
Physician Progress Note      PATIENTGearline Spencer  CSN #:                  [id number]  :                       1940  ADMIT DATE:       2021 4:32 PM  [address] DATE:  RESPONDING  PROVIDER #:        Amarilys George MD          QUERY TEXT:    Patient admitted with symptomatic anemia and is on chronic anticoagulation. If possible, please document in the progress notes and discharge summary if you are evaluating and/or treating any of the following: The medical record reflects the following:  Risk Factors: chronic normocytic anemia  Clinical Indicators: \"Xarelto held on admission with initial Hb 3.3, s/p transfusion. EGD unremarkable on . GI recs capsule endoscopy and colonoscopy outpatient. Hematology consulted, iron studies c/w LUIS ARMANDO, given IV iron. After discussing risks/benefits Xarelto was resumed on . Hb stable yesterday, awaiting labs this mrashlee. If evidence of re-bleeding will need to stop indefinitely\"  Treatment: Xarelto held. GI consulted, had normal EGD On , iron transfused  Options provided:  -- Bleeding (Hemorrhagic disorder) associated with Xarelto.   -- Bleeding unrelated to anticoagulation  -- Other - I will add my own diagnosis  -- Disagree - Not applicable / Not valid  -- Disagree - Clinically unable to determine / Unknown  -- Refer to Clinical Documentation Reviewer    PROVIDER RESPONSE TEXT:    Unknown etiology, xarelto possibly contributing to anemia    Query created by: Edwina Celaya on 2021 1:07 PM      Electronically signed by:  Amarilys George MD 2021 6:46 AM

## 2021-12-21 NOTE — PROGRESS NOTES
ACUTE PHYSICAL THERAPY GOALS:  (Developed with and agreed upon by patient and/or caregiver.)  Developed with and agreed upon by patient and/or caregiver.)  1. Ms. Farideh Pink will perform supine to sit and sit to supine independently in 7 days. 2.  Ms. Farideh Pink will perform sit to stand and bed to chair independently with least restrictive device in 7 days. 3.  Ms. Farideh Pink will perform gait with rolling walker or least restrictive device 150 ft independently in 7 days. 4.  Ms. Farideh Pink will tolerate >25 minutes dynamic activity in 7 days. PHYSICAL THERAPY: Daily Note and AM Treatment Day # 4    Kwame Hannon is a 80 y.o. female   PRIMARY DIAGNOSIS: Anemia  Anemia [D64.9]  Procedure(s) (LRB):  ESOPHAGOGASTRODUODENOSCOPY (EGD) /28 ER 32 / Being [facility] to [facility] *Diagnostic only (N/A)  7 Days Post-Op    ASSESSMENT:     REHAB RECOMMENDATIONS: CURRENT LEVEL OF FUNCTION:  (Most Recently Demonstrated)   Recommendation to date pending progress:  Setting:   [facility]  Equipment:    To Be Determined Bed Mobility:   Independent  Sit to Stand:  [place] Assistance  Transfers:   Standby Assistance  Gait/Mobility:   Standby Assistance     ASSESSMENT:  Ms. Farideh Pink continues to progress toward goals with increased gait distances and improved activity tolerance. She does require seated rest breaks between ambulation and cueing for pacing and pursed lipped breathing. Moving great today.        SUBJECTIVE:   Ms. Farideh Pink states, \"I'm feeling better\"    SOCIAL HISTORY/ LIVING ENVIRONMENT: see eval  Support Systems: Friend/Neighbor  OBJECTIVE:     PAIN: VITAL SIGNS: LINES/DRAINS:   Pre Treatment: Pain Screen  Pain Scale 1: Numeric (0 - 10)  Pain Intensity 1: 0/10  Post Treatment: 0/10   O2  O2 Device: Nasal cannula     MOBILITY: I Mod I S SBA CGA Min Mod Max Total  NT x2 Comments:   Bed Mobility    Rolling [] [] [] [] [] [] [] [] [] [x] []    Supine to Sit [x] [] [] [] [] [] [] [] [] [] []    Scooting [x] [] [] [] [] [] [] [] [] [] [] Sit to Supine [] [] [] [] [] [] [] [] [] [x] []    Transfers    Sit to Stand [] [] [] [x] [] [] [] [] [] [] []    Bed to Chair [] [] [x] [x] [] [] [] [] [] [] []    Stand to Sit [] [] [x] [x] [] [] [] [] [] [] []    I=Independent, Mod I=Modified Independent, S=Supervision, SBA=Standby Assistance, CGA=Contact Guard Assistance,   Min=Minimal Assistance, Mod=Moderate Assistance, Max=Maximal Assistance, Total=Total Assistance, NT=Not Tested    BALANCE: Good Fair+ Fair Fair- Poor NT Comments   Sitting Static [x] [] [] [] [] []    Sitting Dynamic [x] [] [] [] [] []              Standing Static [x] [] [] [] [] []    Standing Dynamic [] [x] [] [] [] []      GAIT: I Mod I S SBA CGA Min Mod Max Total  NT x2 Comments:   Level of Assistance [] [] [x] [x] [] [] [] [] [] [] []    Distance 40'x3    DME Rolling Walker    Gait Quality Slow tera    Weightbearing  Status N/A     I=Independent, Mod I=Modified Independent, S=Supervision, SBA=Standby Assistance, CGA=Contact Guard Assistance,   Min=Minimal Assistance, Mod=Moderate Assistance, Max=Maximal Assistance, Total=Total Assistance, NT=Not Tested    PLAN:   FREQUENCY/DURATION: PT Plan of Care: 3 times/week for duration of hospital stay or until stated goals are met, whichever comes first.  TREATMENT:     TREATMENT:   ($$ Therapeutic Activity: 23-37 mins    )  Therapeutic Activity (33 Minutes): Therapeutic activity included Supine to Sit, Scooting, Transfer Training, Ambulation on level ground, Sitting balance  and Standing balance to improve functional Mobility, Strength and Activity tolerance.     TREATMENT GRID:   Date:  12/17/21 Date:   Date:     Activity/Exercise Parameters Parameters Parameters   LAQ 10     heelslides 10     abduction 10     Ankle pumps 10                             AFTER TREATMENT POSITION/PRECAUTIONS:  Chair, Needs within reach and RN notified    INTERDISCIPLINARY COLLABORATION:  RN/PCT and PT/PTA    TOTAL TREATMENT DURATION:  PT Patient Time In/Time Out  Time In: 90  Time Out: [address], [place]

## 2021-12-21 NOTE — PROGRESS NOTES
Removed peripheral IV. Patient's friend at bedside to transport home. I have reviewed discharge instructions with the patient and reviewed medications. Provided with paper prescription. The patient and friend verbalized understanding. Signed paper copy of discharge summary placed in chart.

## 2021-12-21 NOTE — DISCHARGE INSTRUCTIONS
Patient Education        Anemia: Care Instructions  Your Care Instructions     Anemia is a low level of red blood cells, which carry oxygen throughout your body. Many things can cause anemia. Lack of iron is one of the most common causes. Your body needs iron to make hemoglobin, a substance in red blood cells that carries oxygen from the lungs to your body's cells. Without enough iron, the body produces fewer and smaller red blood cells. As a result, your body's cells do not get enough oxygen, and you feel tired and weak. And you may have trouble concentrating. Bleeding is the most common cause of a lack of iron. You may have heavy menstrual bleeding or bleeding caused by conditions such as ulcers, hemorrhoids, or cancer. Regular use of aspirin or other anti-inflammatory medicines (such as ibuprofen) also can cause bleeding in some people. A lack of iron in your diet also can cause anemia, especially at times when the body needs more iron, such as during pregnancy, infancy, and the teen years. Your doctor may have prescribed iron pills. It may take several months of treatment for your iron levels to return to normal. Your doctor also may suggest that you eat foods that are rich in iron, such as meat and beans. There are many other causes of anemia. It is not always due to a lack of iron. Finding the specific cause of your anemia will help your doctor find the right treatment for you. Follow-up care is a key part of your treatment and safety. Be sure to make and go to all appointments, and call your doctor if you are having problems. It's also a good idea to know your test results and keep a list of the medicines you take. How can you care for yourself at home? · Take your medicines exactly as prescribed. Call your doctor if you think you are having a problem with your medicine.   · If your doctor recommends iron pills, take them as directed:  ? Try to take the pills on an empty stomach about 1 hour before or 2 hours after meals. But you may need to take iron with food to avoid an upset stomach. ? Do not take antacids or drink milk or caffeine drinks (such as coffee, tea, or cola) at the same time or within 2 hours of the time that you take your iron. They can make it hard for your body to absorb the iron. ? Vitamin C (from food or supplements) helps your body absorb iron. Try taking iron pills with a glass of orange juice or some other food that is high in vitamin C, such as citrus fruits. ? Iron pills may cause stomach problems, such as heartburn, nausea, diarrhea, constipation, and cramps. Be sure to drink plenty of fluids, and include fruits, vegetables, and fiber in your diet each day. Iron pills often make your bowel movements dark or green. ? If you forget to take an iron pill, do not take a double dose of iron the next time you take a pill. ? Keep iron pills out of the reach of small children. An overdose of iron can be very dangerous. · Follow your doctor's advice about eating iron-rich foods. These include red meat, shellfish, poultry, eggs, beans, raisins, whole-grain bread, and leafy green vegetables. · Steam vegetables to help them keep their iron content. When should you call for help? Call 911 anytime you think you may need emergency care. For example, call if:    · You have symptoms of a heart attack. These may include:  ? Chest pain or pressure, or a strange feeling in the chest.  ? Sweating. ? Shortness of breath. ? Nausea or vomiting. ? Pain, pressure, or a strange feeling in the back, neck, jaw, or upper belly or in one or both shoulders or arms. ? Lightheadedness or sudden weakness. ? A fast or irregular heartbeat. After you call 911, the  may tell you to chew 1 adult-strength or 2 to 4 low-dose aspirin. Wait for an ambulance. Do not try to drive yourself.     · You passed out (lost consciousness).    Call your doctor now or seek immediate medical care if:    · You have new or increased shortness of breath.     · You are dizzy or lightheaded, or you feel like you may faint.     · Your fatigue and weakness continue or get worse.     · You have any abnormal bleeding, such as:  ? Nosebleeds. ? Vaginal bleeding that is different (heavier, more frequent, at a different time of the month) than what you are used to.  ? Bloody or black stools, or rectal bleeding. ? Bloody or pink urine. Watch closely for changes in your health, and be sure to contact your doctor if:    · You do not get better as expected. Where can you learn more? Go to http://www.melchor.com/  Enter R301 in the search box to learn more about \"Anemia: Care Instructions. \"  Current as of: April 29, 2021               Content Version: 13.0  © 2006-2021 [employer]. Care instructions adapted under license by [employer] (which disclaims liability or warranty for this information). If you have questions about a medical condition or this instruction, always ask your healthcare professional. [place] [address] any warranty or liability for your use of this information. Patient Education        Iron Deficiency Anemia: Care Instructions  Your Care Instructions     Anemia means that you don't have enough red blood cells. Red blood cells carry oxygen around your body. When you have anemia, it can make you pale, weak, and tired. Many things can cause anemia. The most common cause is loss of blood. This can happen if you have heavy menstrual periods. It can also happen if you have bleeding in your stomach or bowel. You can also get anemia if you don't have enough iron in your diet or if it's hard for your body to absorb iron. In some cases, pregnancy causes anemia. That's because a pregnant woman needs more iron. Your doctor may do more tests to find the cause of your anemia.  If a disease or other health problem is causing it, your doctor will treat that problem. It's important to follow up with your doctor to make sure that your iron level returns to normal.  Follow-up care is a key part of your treatment and safety. Be sure to make and go to all appointments, and call your doctor if you are having problems. It's also a good idea to know your test results and keep a list of the medicines you take. How can you care for yourself at home? · If your doctor recommended iron pills, take them as directed. ? Try to take the pills on an empty stomach. You can do this about 1 hour before or 2 hours after meals. But you may need to take iron with food to avoid an upset stomach. ? Do not take antacids or drink milk or anything with caffeine within 2 hours of when you take your iron. They can keep your body from absorbing the iron well. ? Vitamin C helps your body absorb iron. You may want to take iron pills with a glass of orange juice or some other food high in vitamin C.  ? Iron pills may cause stomach problems. These include heartburn, nausea, diarrhea, constipation, and cramps. It can help to drink plenty of fluids and include fruits, vegetables, and fiber in your diet. ? It's normal for iron pills to make your stool a greenish or grayish black. But internal bleeding can also cause dark stool. So it's important to tell your doctor about any color changes. ? Call your doctor if you think you are having a problem with your iron pills. Even after you start to feel better, it will take several months for your body to build up its supply of iron. ? If you miss a pill, don't take a double dose. ? Keep iron pills out of the reach of small children. Too much iron can be very dangerous. · Eat foods with a lot of iron. These include red meat, shellfish, poultry, and eggs. They also include beans, raisins, whole-grain bread, and leafy green vegetables. · Steam your vegetables. This is the best way to prepare them if you want to get as much iron as possible.   · Be safe with medicines. Do not take nonsteroidal anti-inflammatory pain relievers unless your doctor tells you to. These include aspirin, naproxen (Aleve), and ibuprofen (Advil, Motrin). · Liquid iron can stain your teeth. But you can mix it with water or juice and drink it with a straw. Then it won't get on your teeth. When should you call for help? Call 911 anytime you think you may need emergency care. For example, call if:    · You passed out (lost consciousness). Call your doctor now or seek immediate medical care if:    · You are short of breath.     · You are dizzy or light-headed, or you feel like you may faint.     · You have new or worse bleeding. Watch closely for changes in your health, and be sure to contact your doctor if:    · You feel weaker or more tired than usual.     · You do not get better as expected. Where can you learn more? Go to http://www.gray.com/  Enter Z825 in the search box to learn more about \"Iron Deficiency Anemia: Care Instructions. \"  Current as of: April 29, 2021               Content Version: 13.0  © 1444-8421 [employer]. Care instructions adapted under license by [employer] (which disclaims liability or warranty for this information). If you have questions about a medical condition or this instruction, always ask your healthcare professional. [place] [address] any warranty or liability for your use of this information. Patient Education        Atrial Fibrillation: Care Instructions  Your Care Instructions     Atrial fibrillation is an irregular and often fast heartbeat. Treating this condition is important for several reasons. It can cause blood clots, which can travel from your heart to your brain and cause a stroke. If you have a fast heartbeat, you may feel lightheaded, dizzy, and weak. An irregular heartbeat can also increase your risk for heart failure.   Atrial fibrillation is often the result of another heart condition, such as high blood pressure or coronary artery disease. Making changes to improve your heart condition will help you stay healthy and active. Follow-up care is a key part of your treatment and safety. Be sure to make and go to all appointments, and call your doctor if you are having problems. It's also a good idea to know your test results and keep a list of the medicines you take. How can you care for yourself at home? Medicines    · Take your medicines exactly as prescribed. Call your doctor if you think you are having a problem with your medicine. You will get more details on the specific medicines your doctor prescribes.     · If your doctor has given you a blood thinner to prevent a stroke, be sure you get instructions about how to take your medicine safely. Blood thinners can cause serious bleeding problems.     · Do not take any vitamins, over-the-counter drugs, or herbal products without talking to your doctor first.   Lifestyle changes    · Do not smoke. Smoking can increase your chance of a stroke and heart attack. If you need help quitting, talk to your doctor about stop-smoking programs and medicines. These can increase your chances of quitting for good.     · Eat a heart-healthy diet.     · Stay at a healthy weight. Lose weight if you need to.     · Limit alcohol to 2 drinks a day for men and 1 drink a day for women. Too much alcohol can cause health problems.     · Avoid colds and flu. Get a pneumococcal vaccine shot. If you have had one before, ask your doctor whether you need another dose. Get a flu shot every year. If you must be around people with colds or flu, wash your hands often. Activity    · If your doctor recommends it, get more exercise. Walking is a good choice. Bit by bit, increase the amount you walk every day. Try for at least 30 minutes on most days of the week. You also may want to swim, bike, or do other activities.  Your doctor may suggest that you join a cardiac rehabilitation program so that you can have help increasing your physical activity safely.     · Start light exercise if your doctor says it is okay. Even a small amount will help you get stronger, have more energy, and manage stress. Walking is an easy way to get exercise. Start out by walking a little more than you did in the hospital. Gradually increase the amount you walk.     · When you exercise, watch for signs that your heart is working too hard. You are pushing too hard if you cannot talk while you are exercising. If you become short of breath or dizzy or have chest pain, sit down and rest immediately.     · Check your pulse regularly. Place two fingers on the artery at the palm side of your wrist, in line with your thumb. If your heartbeat seems uneven or fast, talk to your doctor. When should you call for help? Call 911 anytime you think you may need emergency care. For example, call if:    · You have symptoms of a heart attack. These may include:  ? Chest pain or pressure, or a strange feeling in the chest.  ? Sweating. ? Shortness of breath. ? Nausea or vomiting. ? Pain, pressure, or a strange feeling in the back, neck, jaw, or upper belly or in one or both shoulders or arms. ? Lightheadedness or sudden weakness. ? A fast or irregular heartbeat. After you call 911, the  may tell you to chew 1 adult-strength or 2 to 4 low-dose aspirin. Wait for an ambulance. Do not try to drive yourself.     · You have symptoms of a stroke. These may include:  ? Sudden numbness, tingling, weakness, or loss of movement in your face, arm, or leg, especially on only one side of your body. ? Sudden vision changes. ? Sudden trouble speaking. ? Sudden confusion or trouble understanding simple statements. ? Sudden problems with walking or balance. ? A sudden, severe headache that is different from past headaches.     · You passed out (lost consciousness).    Call your doctor now or seek immediate medical care if:    · You have new or increased shortness of breath.     · You feel dizzy or lightheaded, or you feel like you may faint.     · Your heart rate becomes irregular.     · You can feel your heart flutter in your chest or skip heartbeats. Tell your doctor if these symptoms are new or worse. Watch closely for changes in your health, and be sure to contact your doctor if you have any problems. Where can you learn more? Go to http://www.gray.com/  Enter U020 in the search box to learn more about \"Atrial Fibrillation: Care Instructions. \"  Current as of: April 29, 2021               Content Version: 13.0  © 7799-4163 [email or web address]. Care instructions adapted under license by [employer] (which disclaims liability or warranty for this information). If you have questions about a medical condition or this instruction, always ask your healthcare professional. [address] any warranty or liability for your use of this information.

## 2021-12-21 NOTE — PROGRESS NOTES
Received bedside shift report from Renita Sanchez RN. Pt lying in bed. No apparent distress. Respirations even and unlabored on 2L NC. Instructed to call for assistance with needs, as they arise. Pt voiced understanding.

## 2021-12-21 NOTE — DISCHARGE SUMMARY
Hospitalist Discharge Summary   Admit Date:  2021  4:32 PM   DC Note date: 2021  Name:  Yoshi Ellington   Age:  80 y.o. Sex:  female  :  1940   MRN:  [id number]   Room:  [facility]  PCP:  Rocco Rosas MD    Presenting Complaint: Respiratory Distress    Initial Admission Diagnosis: Anemia [D64.9]     Problem List for this Hospitalization:  Hospital Problems as of 2021 Date Reviewed: 2021          Codes Class Noted - Resolved POA    Diastolic CHF, chronic ([facility] ICD-10-CM: I50.32  ICD-9-CM: 428.32, 428.0  2021 - Present Yes        * (Principal) Anemia ICD-10-CM: D64.9  ICD-9-CM: 285.9  2021 - Present Yes        Paroxysmal atrial fibrillation (HCC) (Chronic) ICD-10-CM: I48.0  ICD-9-CM: 427.31  3/19/2021 - Present Yes        Hypertension, essential, benign (Chronic) ICD-10-CM: I10  ICD-9-CM: 401.1  2016 - Present Yes        Major depressive disorder, recurrent, moderate (HCC) (Chronic) ICD-10-CM: F33.1  ICD-9-CM: 296.32  2016 - Present Yes        Acquired hypothyroidism (Chronic) ICD-10-CM: E03.9  ICD-9-CM: 244.9  2016 - Present Yes        Hyperlipidemia (Chronic) ICD-10-CM: E78.5  ICD-9-CM: 272.4  2016 - Present Yes        RESOLVED: Lactic acidosis ICD-10-CM: E87.2  ICD-9-CM: 276.2  2021 - 2021 Unknown        RESOLVED: Acute renal failure superimposed on chronic kidney disease ([facility] ICD-10-CM: N17.9, N18.9  ICD-9-CM: 584.9, 585.9  2019 - 2021 Unknown            Did Patient have Sepsis (YES OR NO): No    Hospital Course:  Ms. Puma Cullen is a very nice 79 y/o WF with a h/o hypothyroidism s/p DELCID for Graves disease, atrial fibrillation on Xarelto, chronic normocytic anemia, CKD3 who was admitted on  with symptomatic anemia. Her baseline Hb is around 8-10. On admission it was 3.3. LA was 12 and she had NAZIA on CKD. Xarelto was held.  She was transfused with improvement in Hb.  GI was consulted and on  she underwent EGD which was normal Hematology also consulted. Labs were c/w LUIS ARMANDO and she was given IV iron. Her Hb has stablized in the 8s and Xarelto was resumed on 12/18. Since then Hb has remained >8. GI is recommending outpatient follow up for capsule endoscopy and possibly colonoscopy. She will f/u with Dr. Zoila Navarro of Hematology as well. Xarelto dosing has been reduced based on kidney function. Order given for repeat CBC to check Hb within about 1 week. She was referred to rehab but was declined. She appears to be at baseline now and has neighborhood friends who support her. Her hospital course was otherwise unremarkable and she is medically stable for discharge. Disposition: Home Health Care [facility]  Diet: ADULT DIET Regular; Low Fat/Low Chol/High Fiber/2 gm Na  Code Status: Full Code    Follow Up Orders: Follow-up Appointments   Procedures    FOLLOW UP VISIT Appointment in: One Week Please schedule follow up appt with Dr. Zoila Navarro with labs prior (CBC, CMP) within one week of discharge #[phone]     Please schedule follow up appt with Dr. Zoila Navarro with labs prior (CBC, CMP) within one week of discharge #[phone]     Standing Status:   Standing     Number of Occurrences:   1     Order Specific Question:   Appointment in     Answer: One Week       Follow-up Information     Follow up With Specialties Details Why Contact Info    Imtiaz Raza MD Family Medicine   [address] [place] [address] Oncology  With Dr. Evita Whitfield [id number] [phone]          Follow up labs/diagnostics (ultimately defer to outpatient provider):  CBC (order provided)    Time spent in patient discharge and coordination 35 minutes. Plan was discussed with patient, RN, CM. All questions answered. Patient was stable at time of discharge. Instructions given to call a physician or return if any concerns.     Discharge Info:   Current Discharge Medication List      CONTINUE these medications which have CHANGED    Details   rivaroxaban (Xarelto) 15 mg tab tablet Take 1 Tablet by mouth daily (with breakfast) for 30 days. Qty: 30 Tablet, Refills: 0  Start date: 12/21/2021, End date: 1/20/2022         CONTINUE these medications which have NOT CHANGED    Details   metoprolol succinate (TOPROL-XL) 50 mg XL tablet Take 1 Tablet by mouth daily. Qty: 30 Tablet, Refills: 1      furosemide (LASIX) 20 mg tablet Take 1 Tablet by mouth daily. Qty: 30 Tablet, Refills: 1      pantoprazole (PROTONIX) 40 mg tablet Take 1 Tablet by mouth Before breakfast and dinner. Qty: 60 Tablet, Refills: 0      folic acid (FOLVITE) 1 mg tablet Take 1 mg by mouth daily. amiodarone (CORDARONE) 200 mg tablet Take 1 Tab by mouth daily. Qty: 30 Tab, Refills: 11      rosuvastatin (Crestor) 10 mg tablet Take 1 Tab by mouth nightly. Qty: 30 Tab, Refills: 1      ondansetron (ZOFRAN ODT) 4 mg disintegrating tablet Take 1 Tab by mouth every eight (8) hours as needed for Nausea or Vomiting. Qty: 20 Tab, Refills: 1      cholecalciferol (VITAMIN D3) 1,000 unit tablet Take 1,000 Units by mouth daily. DULoxetine (CYMBALTA) 60 mg capsule Take 1 Cap by mouth daily. Qty: 90 Cap, Refills: 2    Associated Diagnoses: Idiopathic peripheral neuropathy; Major depressive disorder with single episode, in remission (HCC)      levothyroxine (SYNTHROID) 25 mcg tablet Take 1 Tab by mouth Daily (before breakfast). Qty: 90 Tab, Refills: 2    Associated Diagnoses: Acquired hypothyroidism             Procedures done this admission:  Procedure(s):  ESOPHAGOGASTRODUODENOSCOPY (EGD) /28 ER 32 / Being admitted to 5 *Diagnostic only    Consults this admission:  IP CONSULT TO GASTROENTEROLOGY  IP CONSULT TO HEMATOLOGY    Echocardiogram/EKG results:  No results found for this or any previous visit.        EKG Results     Procedure 720 Value Units Date/Time    EKG [[id number]]     Order Status: Completed Diagnostic Imaging/Tests:   XR HIP LT W OR WO PELV 2-3 VWS    Result Date: 12/13/2021  EXAMINATION: Left Hip HISTORY: L hip pain. TECHNIQUE: Single frontal view of the pelvis. AP and lateral views of the left hip. COMPARISON: None available. FINDINGS: No evidence of acute fracture or dislocation of the pelvis. Bilateral hip joints are intact. The left femoral neck is intact. Soft tissues are unremarkable. No evidence of acute fracture or dislocation of the left hip. XR CHEST PORT    Result Date: 12/13/2021  Portable chest x-ray CLINICAL INDICATION: Shortness of breath FINDINGS: Single AP view the chest compared to a similar exam dated 6/20/2021 shows persistent old right-sided rib fractures. The right lung is well-expanded and unchanged without pneumothorax. There is a persistent, moderate-sized left pleural effusion. The cardiac silhouette and mediastinum are stable. There is no pneumothorax. 1. Stable moderate size left pleural effusion. All Micro Results     Procedure Component Value Units Date/Time    CULTURE, BLOOD [[id number]] Collected: 12/13/21 1644    Order Status: Completed Specimen: Blood Updated: 12/18/21 0644     Special Requests: --        RIGHT  Antecubital       Culture result: NO GROWTH 5 DAYS       CULTURE, BLOOD [[id number]] Collected: 12/13/21 1644    Order Status: Completed Specimen: Blood Updated: 12/18/21 0644     Special Requests: --        RIGHT  FOREARM       Culture result: NO GROWTH 5 DAYS       COVID-19 RAPID TEST [[id number]] Collected: 12/13/21 2309    Order Status: Completed Specimen: Nasopharyngeal Updated: 12/13/21 2336     Specimen source NASAL        COVID-19 rapid test Not detected        Comment:      The specimen is NEGATIVE for SARS-CoV-2, the novel coronavirus associated with COVID-19. A negative result does not rule out COVID-19. This test has been authorized by the FDA under an Emergency Use Authorization (EUA) for use by authorized laboratories. Fact sheet for Healthcare Providers: ConventionUpdate.co.nz  Fact sheet for Patients: ConventionUpdate.co.nz       Methodology: Isothermal Nucleic Acid Amplification               Labs: Results:       BMP, Mg, Phos Recent Labs     12/19/21  0708      K 3.8   *   CO2 27   AGAP 6*   BUN 12   CREA 1.14*   CA 8.5   GLU 85      CBC Recent Labs     12/21/21  0606 12/20/21  0716 12/19/21  0708   WBC  --   --  8.1   RBC  --   --  3.56*   HGB 9.0* 8.9* 9.0*   HCT 31.9* 31.4* 30.9*   PLT  --   --  270      LFT No results for input(s): ALT, TBIL, AP, TP, ALB, GLOB, AGRAT in the last 72 hours.     No lab exists for component: SGOT, GPT   Cardiac Testing Lab Results   Component Value Date/Time    BNP 25 (H) 04/22/2019 12:28 PM    Troponin-I, Qt. <0.02 (L) 01/16/2016 06:55 PM      Coagulation Tests Lab Results   Component Value Date/Time    Prothrombin time 19.0 (H) 12/15/2021 10:59 AM    Prothrombin time 32.9 (H) 12/13/2021 04:44 PM    Prothrombin time 17.5 (H) 05/12/2021 10:33 AM    INR 1.6 12/15/2021 10:59 AM    INR 3.2 12/13/2021 04:44 PM    INR 1.4 05/12/2021 10:33 AM    aPTT 29.9 12/13/2021 04:44 PM    aPTT 30.4 05/13/2021 08:25 AM    aPTT 28.1 03/21/2021 03:01 PM      A1c Lab Results   Component Value Date/Time    Hemoglobin A1c 5.2 01/17/2016 03:54 AM      Lipid Panel Lab Results   Component Value Date/Time    Cholesterol, total 140 02/05/2018 01:20 PM    HDL Cholesterol 45 02/05/2018 01:20 PM    LDL, calculated 72 02/05/2018 01:20 PM    VLDL, calculated 23 02/05/2018 01:20 PM    Triglyceride 113 02/05/2018 01:20 PM    CHOL/HDL Ratio 3.3 01/17/2016 03:54 AM      Thyroid Panel Lab Results   Component Value Date/Time    TSH 17.300 (H) 12/13/2021 04:44 PM    TSH 4.390 (H) 05/12/2021 01:46 PM        Most Recent UA Lab Results   Component Value Date/Time    Color YELLOW 12/13/2021 05:43 PM    Appearance CLEAR 12/13/2021 05:43 PM    Specific gravity 1.016 04/23/2019 08:35 PM    pH (UA) 5.0 12/13/2021 05:43 PM    Protein Negative 12/13/2021 05:43 PM    Glucose Negative 12/13/2021 05:43 PM    Ketone Negative 12/13/2021 05:43 PM    Bilirubin Negative 12/13/2021 05:43 PM    Blood Negative 12/13/2021 05:43 PM    Urobilinogen 1.0 12/13/2021 05:43 PM    Nitrites Negative 12/13/2021 05:43 PM    Leukocyte Esterase Negative 12/13/2021 05:43 PM    WBC 0-3 05/17/2021 09:46 AM    RBC 0-3 05/17/2021 09:46 AM    Epithelial cells 0-3 05/17/2021 09:46 AM    Bacteria TRACE 05/17/2021 09:46 AM    Casts 10-20 03/19/2021 03:37 PM    Crystals, urine 0 03/19/2021 03:37 PM    Mucus 0 03/19/2021 03:37 PM    Other observations RESULTS VERIFIED MANUALLY 05/17/2021 09:46 AM          All Labs from Last 24 Hrs:  Recent Results (from the past 24 hour(s))   HGB & HCT    Collection Time: 12/21/21  6:06 AM   Result Value Ref Range    HGB 9.0 (L) 11.7 - 15.4 g/dL    HCT 31.9 (L) 35.8 - 46.3 %       Current Med List in Hospital:   Current Facility-Administered Medications   Medication Dose Route Frequency    rivaroxaban (XARELTO) tablet 15 mg  15 mg Oral DAILY WITH LUNCH    senna (SENOKOT) tablet 17.2 mg  2 Tablet Oral BID    fluticasone propionate (FLONASE) 50 mcg/actuation nasal spray 2 Spray  2 Spray Both Nostrils DAILY    pantoprazole (PROTONIX) tablet 40 mg  40 mg Oral ACB    levothyroxine (SYNTHROID) tablet 50 mcg  50 mcg Oral ACB    sodium chloride (NS) flush 5-40 mL  5-40 mL IntraVENous Q8H    acetaminophen (TYLENOL) tablet 650 mg  650 mg Oral Q6H PRN    Or    acetaminophen (TYLENOL) suppository 650 mg  650 mg Rectal Q6H PRN    polyethylene glycol (MIRALAX) packet 17 g  17 g Oral DAILY PRN    ondansetron (ZOFRAN ODT) tablet 4 mg  4 mg Oral Q8H PRN    Or    ondansetron (ZOFRAN) injection 4 mg  4 mg IntraVENous Q6H PRN    amiodarone (CORDARONE) tablet 200 mg  200 mg Oral DAILY    DULoxetine (CYMBALTA) capsule 60 mg  60 mg Oral DAILY    folic acid (FOLVITE) tablet 1 mg  1 mg Oral DAILY    sodium chloride (NS) flush 5-40 mL  5-40 mL IntraVENous Q8H    0.9% sodium chloride infusion 250 mL  250 mL IntraVENous PRN    metoprolol tartrate (LOPRESSOR) tablet 12.5 mg  12.5 mg Oral BID    sodium chloride (NS) flush 5-10 mL  5-10 mL IntraVENous Q8H       Allergies   Allergen Reactions    Eliquis [Apixaban] Other (comments)     Patient states she had hallucinations from Eliquis     Immunization History   Administered Date(s) Administered    Influenza High Dose Vaccine PF 10/28/2015, 09/21/2016, 09/15/2017    Pneumococcal Polysaccharide (PPSV-23) 01/18/2016    TB Skin Test (PPD) Intradermal 04/22/2019, 05/12/2021, 12/14/2021       Recent Vital Data:  Patient Vitals for the past 24 hrs:   Temp Pulse Resp BP SpO2   12/21/21 0734 97.7 °F (36.5 °C) 69 20 (!) 147/64 99 %   12/21/21 0250 98.1 °F (36.7 °C) 70 20 (!) 147/56 99 %   12/20/21 2317 98 °F (36.7 °C) 70 21 (!) 140/62 98 %   12/20/21 2037 98.1 °F (36.7 °C) 67 21 132/67 99 %   12/20/21 1519 98 °F (36.7 °C) 68 22 136/71 100 %   12/20/21 1133 97.8 °F (36.6 °C) 68 22 (!) 126/99 100 %     Oxygen Therapy  O2 Sat (%): 99 % (12/21/21 0734)  Pulse via Oximetry: 69 beats per minute (12/14/21 1500)  O2 Device: Nasal cannula (12/21/21 0734)  Skin Assessment: Clean, dry, & intact (12/15/21 0350)  Skin Protection for O2 Device: No (12/15/21 0350)  O2 Flow Rate (L/min): 2 l/min (12/21/21 0734)  FIO2 (%): 36 % (12/14/21 1815)    Estimated body mass index is 29.82 kg/m² as calculated from the following:    Height as of 6/28/21: 5' (1.524 m). Weight as of this encounter: 69.3 kg (152 lb 11.2 oz). Intake/Output Summary (Last 24 hours) at 12/21/2021 1003  Last data filed at 12/21/2021 0347  Gross per 24 hour   Intake 180 ml   Output 1150 ml   Net -970 ml         Physical Exam:  General:    Well nourished. No overt distress. Appears stated age. Very pleasant and cooperative. Head:  Normocephalic, atraumatic. Eyes:  Sclerae appear normal.  Pupils equally round. Exophthalmos. HENT:  Nares appear normal, no drainage. Moist mucous membranes  Neck:  No restricted ROM. Trachea midline  CV:   RRR. No m/r/g. No JVD  Lungs:   CTAB. No wheezing, rhonchi, or rales. Even, unlabored. 2L NC O2. Abdomen:   Soft, nontender, nondistended. Extremities: Warm and dry. No cyanosis or clubbing. No edema. Skin:     No rashes. Normal coloration  Neuro:  CN II-XII grossly intact. Psych:  Normal mood and affect. Signed:  Anila Ivory MD    Part of this note may have been written by using a voice dictation software. The note has been proof read but may still contain some grammatical/other typographical errors.

## 2021-12-21 NOTE — PROGRESS NOTES
Pt is sleeping in bed at this time. Pt is on 2L NC. Pt is alert and oriented times 4. Pt has no s/sx of acute distress at this time. Pt has no requests. Pt has safety measures in place. Pt has call light within reach and is encouraged to call for assistance if needed. Will continue to monitor.

## 2021-12-21 NOTE — PROGRESS NOTES
Patient's insurance denied rehab for patient. CM left a vm with patient's friend, requesting she call CM back.

## 2021-12-25 ENCOUNTER — APPOINTMENT (OUTPATIENT)
Dept: GENERAL RADIOLOGY | Age: 81
DRG: 377 | End: 2021-12-25
Attending: EMERGENCY MEDICINE
Payer: MEDICARE

## 2021-12-25 ENCOUNTER — HOSPITAL ENCOUNTER (INPATIENT)
Age: 81
LOS: 12 days | Discharge: HOME HEALTH | DRG: 377 | End: 2022-01-06
Attending: EMERGENCY MEDICINE | Admitting: INTERNAL MEDICINE
Payer: MEDICARE

## 2021-12-25 DIAGNOSIS — K92.1 GASTROINTESTINAL HEMORRHAGE WITH MELENA: ICD-10-CM

## 2021-12-25 DIAGNOSIS — N18.32 STAGE 3B CHRONIC KIDNEY DISEASE (HCC): Chronic | ICD-10-CM

## 2021-12-25 DIAGNOSIS — D64.9 SYMPTOMATIC ANEMIA: Primary | ICD-10-CM

## 2021-12-25 DIAGNOSIS — I50.32 DIASTOLIC CHF, CHRONIC (HCC): ICD-10-CM

## 2021-12-25 DIAGNOSIS — I48.0 PAROXYSMAL ATRIAL FIBRILLATION (HCC): Chronic | ICD-10-CM

## 2021-12-25 DIAGNOSIS — J90 PLEURAL EFFUSION: ICD-10-CM

## 2021-12-25 DIAGNOSIS — I31.9 PERICARDITIS WITH EFFUSION: ICD-10-CM

## 2021-12-25 DIAGNOSIS — R09.02 HYPOXIA: ICD-10-CM

## 2021-12-25 DIAGNOSIS — E03.9 ACQUIRED HYPOTHYROIDISM: Chronic | ICD-10-CM

## 2021-12-25 PROBLEM — K92.2 GI BLEED: Status: ACTIVE | Noted: 2021-12-25

## 2021-12-25 LAB
APTT PPP: 38.5 SEC (ref 24.1–35.1)
BNP SERPL-MCNC: 7085 PG/ML
COVID-19 RAPID TEST, COVR: NOT DETECTED
HISTORY CHECKED?,CKHIST: NORMAL
HISTORY CHECKED?,CKHIST: NORMAL
INR PPP: 3.6
LACTATE SERPL-SCNC: 3 MMOL/L (ref 0.4–2)
LACTATE SERPL-SCNC: 5.1 MMOL/L (ref 0.4–2)
LIPASE SERPL-CCNC: 122 U/L (ref 73–393)
MAGNESIUM SERPL-MCNC: 1.9 MG/DL (ref 1.8–2.4)
PROTHROMBIN TIME: 36 SEC (ref 12.6–14.5)
SOURCE, COVRS: NORMAL

## 2021-12-25 PROCEDURE — P9016 RBC LEUKOCYTES REDUCED: HCPCS

## 2021-12-25 PROCEDURE — 74011000250 HC RX REV CODE- 250: Performed by: EMERGENCY MEDICINE

## 2021-12-25 PROCEDURE — 96374 THER/PROPH/DIAG INJ IV PUSH: CPT

## 2021-12-25 PROCEDURE — 85610 PROTHROMBIN TIME: CPT

## 2021-12-25 PROCEDURE — 83880 ASSAY OF NATRIURETIC PEPTIDE: CPT

## 2021-12-25 PROCEDURE — 80053 COMPREHEN METABOLIC PANEL: CPT

## 2021-12-25 PROCEDURE — 87635 SARS-COV-2 COVID-19 AMP PRB: CPT

## 2021-12-25 PROCEDURE — 85018 HEMOGLOBIN: CPT

## 2021-12-25 PROCEDURE — C9113 INJ PANTOPRAZOLE SODIUM, VIA: HCPCS | Performed by: EMERGENCY MEDICINE

## 2021-12-25 PROCEDURE — 75810000455 HC PLCMT CENT VENOUS CATH LVL 2 5182

## 2021-12-25 PROCEDURE — 85025 COMPLETE CBC W/AUTO DIFF WBC: CPT

## 2021-12-25 PROCEDURE — 83690 ASSAY OF LIPASE: CPT

## 2021-12-25 PROCEDURE — 77030027138 HC INCENT SPIROMETER -A

## 2021-12-25 PROCEDURE — 99283 EMERGENCY DEPT VISIT LOW MDM: CPT

## 2021-12-25 PROCEDURE — 86900 BLOOD TYPING SEROLOGIC ABO: CPT

## 2021-12-25 PROCEDURE — 93005 ELECTROCARDIOGRAM TRACING: CPT | Performed by: EMERGENCY MEDICINE

## 2021-12-25 PROCEDURE — 74011250636 HC RX REV CODE- 250/636: Performed by: INTERNAL MEDICINE

## 2021-12-25 PROCEDURE — 85730 THROMBOPLASTIN TIME PARTIAL: CPT

## 2021-12-25 PROCEDURE — 83735 ASSAY OF MAGNESIUM: CPT

## 2021-12-25 PROCEDURE — 36430 TRANSFUSION BLD/BLD COMPNT: CPT

## 2021-12-25 PROCEDURE — 77030040361 HC SLV COMPR DVT MDII -B

## 2021-12-25 PROCEDURE — 65610000006 HC RM INTENSIVE CARE

## 2021-12-25 PROCEDURE — 74011000636 HC RX REV CODE- 636: Performed by: EMERGENCY MEDICINE

## 2021-12-25 PROCEDURE — 02HV33Z INSERTION OF INFUSION DEVICE INTO SUPERIOR VENA CAVA, PERCUTANEOUS APPROACH: ICD-10-PCS | Performed by: HOSPITALIST

## 2021-12-25 PROCEDURE — 83605 ASSAY OF LACTIC ACID: CPT

## 2021-12-25 PROCEDURE — 2709999900 HC NON-CHARGEABLE SUPPLY

## 2021-12-25 PROCEDURE — 87040 BLOOD CULTURE FOR BACTERIA: CPT

## 2021-12-25 PROCEDURE — 71045 X-RAY EXAM CHEST 1 VIEW: CPT

## 2021-12-25 PROCEDURE — 77030040393 HC DRSG OPTIFOAM GENT MDII -B

## 2021-12-25 PROCEDURE — 86923 COMPATIBILITY TEST ELECTRIC: CPT

## 2021-12-25 PROCEDURE — 74011250636 HC RX REV CODE- 250/636: Performed by: EMERGENCY MEDICINE

## 2021-12-25 PROCEDURE — 36592 COLLECT BLOOD FROM PICC: CPT

## 2021-12-25 RX ORDER — ONDANSETRON 4 MG/1
4 TABLET, ORALLY DISINTEGRATING ORAL
Status: DISCONTINUED | OUTPATIENT
Start: 2021-12-25 | End: 2022-01-06 | Stop reason: HOSPADM

## 2021-12-25 RX ORDER — POLYETHYLENE GLYCOL 3350 17 G/17G
17 POWDER, FOR SOLUTION ORAL DAILY PRN
Status: DISCONTINUED | OUTPATIENT
Start: 2021-12-25 | End: 2022-01-06 | Stop reason: HOSPADM

## 2021-12-25 RX ORDER — SODIUM CHLORIDE 0.9 % (FLUSH) 0.9 %
5-40 SYRINGE (ML) INJECTION EVERY 8 HOURS
Status: DISCONTINUED | OUTPATIENT
Start: 2021-12-25 | End: 2022-01-06 | Stop reason: HOSPADM

## 2021-12-25 RX ORDER — AMIODARONE HYDROCHLORIDE 200 MG/1
200 TABLET ORAL DAILY
Status: DISCONTINUED | OUTPATIENT
Start: 2021-12-26 | End: 2021-12-27

## 2021-12-25 RX ORDER — FOLIC ACID 1 MG/1
1 TABLET ORAL DAILY
Status: DISCONTINUED | OUTPATIENT
Start: 2021-12-26 | End: 2022-01-06 | Stop reason: HOSPADM

## 2021-12-25 RX ORDER — SODIUM CHLORIDE 0.9 % (FLUSH) 0.9 %
5-40 SYRINGE (ML) INJECTION AS NEEDED
Status: DISCONTINUED | OUTPATIENT
Start: 2021-12-25 | End: 2022-01-06 | Stop reason: HOSPADM

## 2021-12-25 RX ORDER — ROSUVASTATIN CALCIUM 5 MG/1
10 TABLET, COATED ORAL
Status: DISCONTINUED | OUTPATIENT
Start: 2021-12-25 | End: 2022-01-06 | Stop reason: HOSPADM

## 2021-12-25 RX ORDER — DULOXETIN HYDROCHLORIDE 60 MG/1
60 CAPSULE, DELAYED RELEASE ORAL DAILY
Status: DISCONTINUED | OUTPATIENT
Start: 2021-12-26 | End: 2022-01-06 | Stop reason: HOSPADM

## 2021-12-25 RX ORDER — SODIUM CHLORIDE 9 MG/ML
250 INJECTION, SOLUTION INTRAVENOUS AS NEEDED
Status: DISCONTINUED | OUTPATIENT
Start: 2021-12-25 | End: 2021-12-25

## 2021-12-25 RX ORDER — ACETAMINOPHEN 325 MG/1
650 TABLET ORAL
Status: DISCONTINUED | OUTPATIENT
Start: 2021-12-25 | End: 2022-01-06 | Stop reason: HOSPADM

## 2021-12-25 RX ORDER — METOPROLOL SUCCINATE 50 MG/1
50 TABLET, EXTENDED RELEASE ORAL DAILY
Status: DISCONTINUED | OUTPATIENT
Start: 2021-12-26 | End: 2022-01-06 | Stop reason: HOSPADM

## 2021-12-25 RX ORDER — ACETAMINOPHEN 650 MG/1
650 SUPPOSITORY RECTAL
Status: DISCONTINUED | OUTPATIENT
Start: 2021-12-25 | End: 2022-01-06 | Stop reason: HOSPADM

## 2021-12-25 RX ORDER — FUROSEMIDE 20 MG/1
20 TABLET ORAL DAILY
Status: DISCONTINUED | OUTPATIENT
Start: 2021-12-26 | End: 2021-12-27

## 2021-12-25 RX ORDER — LEVOTHYROXINE SODIUM 50 UG/1
25 TABLET ORAL
Status: DISCONTINUED | OUTPATIENT
Start: 2021-12-26 | End: 2021-12-26

## 2021-12-25 RX ORDER — ONDANSETRON 2 MG/ML
4 INJECTION INTRAMUSCULAR; INTRAVENOUS
Status: DISCONTINUED | OUTPATIENT
Start: 2021-12-25 | End: 2022-01-06 | Stop reason: HOSPADM

## 2021-12-25 RX ORDER — FUROSEMIDE 10 MG/ML
40 INJECTION INTRAMUSCULAR; INTRAVENOUS ONCE
Status: COMPLETED | OUTPATIENT
Start: 2021-12-25 | End: 2021-12-25

## 2021-12-25 RX ADMIN — SODIUM CHLORIDE 40 MG: 9 INJECTION, SOLUTION INTRAMUSCULAR; INTRAVENOUS; SUBCUTANEOUS at 19:43

## 2021-12-25 RX ADMIN — FUROSEMIDE 40 MG: 10 INJECTION, SOLUTION INTRAMUSCULAR; INTRAVENOUS at 23:02

## 2021-12-25 RX ADMIN — Medication 3276 INT'L UNITS: at 20:56

## 2021-12-25 RX ADMIN — SODIUM CHLORIDE, PRESERVATIVE FREE 10 ML: 5 INJECTION INTRAVENOUS at 22:10

## 2021-12-25 NOTE — ED PROVIDER NOTES
79-year-old female presenting once again for shortness of breath. Patient lives at home by herself. Frequent hospitalizations over the past few months for shortness of breath secondary to symptomatic anemia and also diastolic heart failure. Tells me this is a recurrent episode has been worsening over the past few days. She also endorses difficulty controlling her bowels and her urine. She is currently wearing a brief. This is been going on for a few days. She is not hurting anywhere in particular but she does endorse some upset stomach. The history is provided by the patient. Shortness of Breath  This is a recurrent problem. The current episode started 2 days ago. The problem has been gradually worsening. Associated symptoms include leg swelling. Pertinent negatives include no fever, no headaches, no coryza, no rhinorrhea, no sore throat and no swollen glands.         Past Medical History:   Diagnosis Date    Acquired hypothyroidism 1/16/2016    Breast cancer ([facility] [address] 2008    Depression 8/18/2016    Endocrine disease     hypothyroid    Fungal dermatitis 8/18/2016    Hyperlipidemia 1/16/2016    Hypertension, essential, benign 8/18/2016    Hypokalemia 8/18/2016    Inflamed sebaceous cyst 8/18/2016    Major depressive disorder, recurrent, moderate ([address] 8/18/2016    Nicotine dependence, cigarettes, uncomplicated 7/05/1506    Osteopenia 8/18/2016    Osteoporosis 8/18/2016    Radiation therapy complication 5267    Rheumatoid arthritis ([address] 1/16/2016    Right carotid bruit 1/16/2016    TIA (transient ischemic attack) 1/16/2016    Tobacco abuse 8/18/2016       Past Surgical History:   Procedure Laterality Date    HX ADENOIDECTOMY      HX BREAST LUMPECTOMY Right 2008    with lymph nodes    HX TONSILLECTOMY      IR BX BONE MARROW DIAGNOSTIC  5/14/2021    IR INSERT NON TUNL CVC OVER 5 YRS  5/14/2021         Family History:   Problem Relation Age of Onset    Stroke Mother     Cancer Father Brain    HIV/AIDS Brother        Social History     Socioeconomic History    Marital status:      Spouse name: Not on file    Number of children: Not on file    Years of education: Not on file    Highest education level: Not on file   Occupational History    Not on file   Tobacco Use    Smoking status: Current Some Day Smoker    Smokeless tobacco: Never Used    Tobacco comment: Only on pay day-1/2 pack   Substance and Sexual Activity    Alcohol use: No    Drug use: No    Sexual activity: Not on file   Other Topics Concern    Not on file   Social History Narrative    Not on file     Social Determinants of Health     Financial Resource Strain:     Difficulty of Paying Living Expenses: Not on file   Food Insecurity:     Worried About Running Out of Food in the Last Year: Not on file    Hank of Food in the Last Year: Not on file   Transportation Needs:     Lack of Transportation (Medical): Not on file    Lack of Transportation (Non-Medical):  Not on file   Physical Activity:     Days of Exercise per Week: Not on file    Minutes of Exercise per Session: Not on file   Stress:     Feeling of Stress : Not on file   Social Connections:     Frequency of Communication with Friends and Family: Not on file    Frequency of Social Gatherings with Friends and Family: Not on file    Attends Evangelical Services: Not on file    Active Member of [address] [place] [address] [employer] or Organizations: Not on file    Attends Club or Organization Meetings: Not on file    Marital Status: Not on file   Intimate Partner Violence:     Fear of Current or Ex-Partner: Not on file    Emotionally Abused: Not on file    Physically Abused: Not on file    Sexually Abused: Not on file   Housing Stability:     Unable to Pay for Housing in the Last Year: Not on file    Number of [place] in the Last Year: Not on file    Unstable Housing in the Last Year: Not on file         ALLERGIES: Eliquis [apixaban]    Review of Systems Constitutional: Negative for fever. HENT: Negative for rhinorrhea and sore throat. Respiratory: Positive for shortness of breath. Cardiovascular: Positive for leg swelling. Neurological: Negative for headaches. All other systems reviewed and are negative. There were no vitals filed for this visit. Physical Exam  Vitals and nursing note reviewed. Constitutional:       General: She is not in acute distress. Appearance: She is well-developed. HENT:      Head: Normocephalic and atraumatic. Mouth/Throat:      Comments: Lips are quite pale. Eyes:      Extraocular Movements: Extraocular movements intact. Pupils: Pupils are equal, round, and reactive to light. Comments: Pale conjunctiva and nailbeds bilaterally   Cardiovascular:      Rate and Rhythm: Normal rate and regular rhythm. Pulmonary:      Effort: Pulmonary effort is normal.      Breath sounds: Normal breath sounds. Abdominal:      General: Bowel sounds are normal.      Palpations: Abdomen is soft. Genitourinary:     Rectum: Guaiac result positive. Comments: Large melanotic stool in brief  Musculoskeletal:         General: Normal range of motion. Cervical back: Neck supple. Right lower leg: Edema present. Left lower leg: Edema present. Comments: Edema of bilateral upper extremities as well as lower extremities. Right greater than left of the upper extremities and lower extremities are equal   Skin:     General: Skin is warm and dry. Capillary Refill: Capillary refill takes 2 to 3 seconds. Coloration: Skin is pale. Neurological:      Mental Status: She is alert and oriented to person, place, and time. MDM  Number of Diagnoses or Management Options  Gastrointestinal hemorrhage with melena  Symptomatic anemia  Diagnosis management comments: Very pleasant 73-year-old female presenting for recurrent edema and shortness of breath.   Patient's been admitted for anemia in the past and looks quite pale again. As we were cleaning the patient up we found that she had a large melanotic stool consistent with upper GI bleed. I am ordering blood without even waiting for hemoglobin at this time given that she is short of breath and quite pale with this history. We were able to get 2 peripheral IVs.  One was lost and another gained. Ultimately the patient began having softer blood pressures. Given the amount of bleeding that she is clearly experiencing with a drop of 6 g in 4 days ended up placing a quad lumen in the right groin. This location was chosen because of the patient's pulmonary edema preventing me from laying her flat. Ultrasound of the internal jugular is bilaterally showed very diminutive vessels and difficult targets. Given her lung disease I was also concerned about a subclavian as pneumothorax could have been catastrophic in her circumstances. She is also on blood thinners. As a result I chose the right groin and easily placed a quad lumen there.        Amount and/or Complexity of Data Reviewed  Clinical lab tests: ordered and reviewed (Results for orders placed or performed during the hospital encounter of 12/25/21  -COVID-19 RAPID TEST:        Result                      Value             Ref Range           Specimen source             NASAL                                 COVID-19 rapid test         Not detected      NOTD           -CBC WITH AUTOMATED DIFF:        Result                      Value             Ref Range           WBC                         9.7               4.3 - 11.1 K*       RBC                         1.38 (L)          4.05 - 5.2 M*       HGB                         3.7 (LL)          11.7 - 15.4 *       HCT                         13.5 (L)          35.8 - 46.3 %       MCV                         97.8              79.6 - 97.8 *       MCH                         26.8              26.1 - 32.9 *       MCHC                        27.4 (L) 31.4 - 35.0 *       RDW                         31.8 (H)          11.9 - 14.6 %       PLATELET                    200               150 - 450 K/*       MPV                         11.6              9.4 - 12.3 FL       ABSOLUTE NRBC               0.02              0.0 - 0.2 K/*       DF                          AUTOMATED                             NEUTROPHILS                 79 (H)            43 - 78 %           LYMPHOCYTES                 12 (L)            13 - 44 %           MONOCYTES                   8                 4.0 - 12.0 %        EOSINOPHILS                 0 (L)             0.5 - 7.8 %         BASOPHILS                   0                 0.0 - 2.0 %         IMMATURE GRANULOCYTES       1                 0.0 - 5.0 %         ABS. NEUTROPHILS            7.6               1.7 - 8.2 K/*       ABS. LYMPHOCYTES            1.2               0.5 - 4.6 K/*       ABS. MONOCYTES              0.8               0.1 - 1.3 K/*       ABS. EOSINOPHILS            0.0               0.0 - 0.8 K/*       ABS. BASOPHILS              0.0               0.0 - 0.2 K/*       ABS. IMM.  GRANS.            0.1               0.0 - 0.5 K/*  -PROTHROMBIN TIME + INR:        Result                      Value             Ref Range           Prothrombin time            36.0 (H)          12.6 - 14.5 *       INR                         3.6                              -PTT:        Result                      Value             Ref Range           aPTT                        38.5 (H)          24.1 - 35.1 *  -LIPASE:        Result                      Value             Ref Range           Lipase                      122               73 - 393 U/L   -MAGNESIUM:        Result                      Value             Ref Range           Magnesium                   1.9               1.8 - 2.4 mg*  -METABOLIC PANEL, COMPREHENSIVE:        Result                      Value             Ref Range           Sodium                      146 (H)           136 - 145 mm*       Potassium                   4.3               3.5 - 5.1 mm*       Chloride                    111 (H)           98 - 107 mmo*       CO2                         28                21 - 32 mmol*       Anion gap                   7                 7 - 16 mmol/L       Glucose                     128 (H)           65 - 100 mg/*       BUN                         39 (H)            8 - 23 MG/DL        Creatinine                  1.34 (H)          0.6 - 1.0 MG*       GFR est AA                  49 (L)            >60 ml/min/1*       GFR est non-AA              41 (L)            >60 ml/min/1*       Calcium                     7.8 (L)           8.3 - 10.4 M*       Bilirubin, total            0.4               0.2 - 1.1 MG*       ALT (SGPT)                  38                12 - 65 U/L         AST (SGOT)                  21                15 - 37 U/L         Alk.  phosphatase            42 (L)            50 - 136 U/L        Protein, total              4.3 (L)           6.3 - 8.2 g/*       Albumin                     1.6 (L)           3.2 - 4.6 g/*       Globulin                    2.7               2.3 - 3.5 g/*       A-G Ratio                   0.6 (L)           1.2 - 3.5      -LACTIC ACID:        Result                      Value             Ref Range           Lactic acid                 5.1 (HH)          0.4 - 2.0 MM*  -NT-PRO BNP:        Result                      Value             Ref Range           NT pro-BNP                  7,085 (H)         <450 PG/ML     -TYPE & SCREEN:        Result                      Value             Ref Range           Crossmatch Expiration                                         12/28/2021,2359       ABO/Rh(D)                   A POSITIVE                            Antibody screen             NEG                                   Comment                                                       BLOOD READY, CALLED ER AT 1831 12.25.21       Unit number                 [id number] Blood component type         LR                                 Unit division               00                                    Status of unit              ALLOCATED                             Crossmatch result           Compatible                            Unit number                 [id number]                         Blood component type        RC LR                                 Unit division               00                                    Status of unit              ISSUED                                Crossmatch result           Compatible                       -RBC, ALLOCATE:        Result                      Value             Ref Range           HISTORY CHECKED? Historical check performed  )  Tests in the radiology section of CPT®: reviewed and ordered (XR HIP LT W OR WO PELV 2-3 VWS    Result Date: 12/13/2021  EXAMINATION: Left Hip HISTORY: L hip pain. TECHNIQUE: Single frontal view of the pelvis. AP and lateral views of the left hip. COMPARISON: None available. FINDINGS: No evidence of acute fracture or dislocation of the pelvis. Bilateral hip joints are intact. The left femoral neck is intact. Soft tissues are unremarkable. No evidence of acute fracture or dislocation of the left hip. XR CHEST PORT    Result Date: 12/25/2021  Exam: XR CHEST PORT on 12/25/2021 6:08 PM Clinical History: The female patient is 80years old  presenting for shortness of breath. Comparison:  Chest x-ray 12/13/2021 Findings:  Frontal view of the chest was obtained. There is continued mild pulmonary vascular congestion with a small to moderate left basilar effusion. The cardiomediastinal silhouette is upper limits of normal in size. There are no acute osseous abnormalities. Surgical clips right axilla. 1. Continued CHF/volume overload.  CPT code(s) 28124     XR CHEST PORT    Result Date: 12/13/2021  Portable chest x-ray CLINICAL INDICATION: Shortness of breath FINDINGS: Single AP view the chest compared to a similar exam dated 6/20/2021 shows persistent old right-sided rib fractures. The right lung is well-expanded and unchanged without pneumothorax. There is a persistent, moderate-sized left pleural effusion. The cardiac silhouette and mediastinum are stable. There is no pneumothorax. 1. Stable moderate size left pleural effusion.    )  Tests in the medicine section of CPT®: ordered and reviewed  Decide to obtain previous medical records or to obtain history from someone other than the patient: yes  Discuss the patient with other providers: yes (Consult hospitalist.)  Independent visualization of images, tracings, or specimens: yes (Reviewed imaging)    Risk of Complications, Morbidity, and/or Mortality  Presenting problems: high  Diagnostic procedures: high  Management options: high  General comments: I personally reviewed the patient's vital signs, laboratory tests, and/or radiological findings. I discussed these findings with the patient and their significance. I answered all questions and explained that given these findings there is significant concern for increased morbidity and/or mortality without immediate intervention. As a result, I recommended admission to the hospital, consulted the appropriate service, and transitioned care to that service in improved condition      Patient Progress  Patient progress: improved    ED Course as of 12/25/21 1956   Sat Dec 25, 2021   1824 Notified of the patient's hemoglobin of 3.7. I have already ordered blood for her.  [JS]      ED Course User Index  [JS] Haile Pineda MD       Critical Care  Performed by: Haile Pineda MD  Authorized by: Haile Pineda MD     Critical care provider statement:     Critical care time (minutes):  60    Critical care time was exclusive of:  Separately billable procedures and treating other patients    Critical care was necessary to treat or prevent imminent or life-threatening deterioration of the following conditions:  Cardiac failure and circulatory failure    Critical care was time spent personally by me on the following activities:  Blood draw for specimens, ordering and performing treatments and interventions, development of treatment plan with patient or surrogate, ordering and review of laboratory studies, discussions with consultants, ordering and review of radiographic studies, discussions with primary provider, pulse oximetry, evaluation of patient's response to treatment, re-evaluation of patient's condition, examination of patient, review of old charts and obtaining history from patient or surrogate    I assumed direction of critical care for this patient from another provider in my specialty: no    Comments:      Large-volume GI bleed with melena, drop in hemoglobin 6 g in 4 days to 3.6 requiring blood transfusion in the emergency department. Central Line    Date/Time: 12/25/2021 8:08 PM  Performed by: Nestor Ibanez MD  Authorized by: Nestor Ibanez MD     Consent:     Consent obtained:  Emergent situation    Consent given by:  Patient    Risks discussed:  Arterial puncture and bleeding    Alternatives discussed:  No treatment  Pre-procedure details:     Hand hygiene: Hand hygiene performed prior to insertion      Sterile barrier technique: All elements of maximal sterile technique followed      Skin preparation:  2% chlorhexidine    Skin preparation agent: Skin preparation agent completely dried prior to procedure    Anesthesia (see MAR for exact dosages): Anesthesia method:  Local infiltration    Local anesthetic:  Lidocaine 1% w/o epi  Procedure details:     Location:  R femoral    Site selection rationale:  Patient too short of breath to lie flat and on blood thinner so cannot use subclavian's    Patient position:  Trendelenburg    Procedural supplies: Quad lumen.     Catheter size:  8.5 Fr    Landmarks identified: yes Ultrasound guidance: no      Number of attempts:  1    Successful placement: yes    Post-procedure details:     Post-procedure:  Dressing applied    Assessment:  Blood return through all ports and free fluid flow    Patient tolerance of procedure:   Tolerated well, no immediate complications

## 2021-12-25 NOTE — ED TRIAGE NOTES
Pt to ED via [address] [facility] for shortness of breath per family and c/o abdominal pain with diarrhea. Pt states she is incontinent at this time and \"cannot control my bladder. \" VSS with EMS. Pt was recently released from this facility for [facility].

## 2021-12-26 ENCOUNTER — APPOINTMENT (OUTPATIENT)
Dept: GENERAL RADIOLOGY | Age: 81
DRG: 377 | End: 2021-12-26
Attending: INTERNAL MEDICINE
Payer: MEDICARE

## 2021-12-26 LAB
BNP SERPL-MCNC: 5896 PG/ML
HISTORY CHECKED?,CKHIST: NORMAL
HISTORY CHECKED?,CKHIST: NORMAL
INR PPP: 1.8
MAGNESIUM SERPL-MCNC: 1.8 MG/DL (ref 1.8–2.4)
PROTHROMBIN TIME: 20.9 SEC (ref 12.6–14.5)

## 2021-12-26 PROCEDURE — 85025 COMPLETE CBC W/AUTO DIFF WBC: CPT

## 2021-12-26 PROCEDURE — 74011250637 HC RX REV CODE- 250/637: Performed by: NURSE PRACTITIONER

## 2021-12-26 PROCEDURE — 85018 HEMOGLOBIN: CPT

## 2021-12-26 PROCEDURE — 36592 COLLECT BLOOD FROM PICC: CPT

## 2021-12-26 PROCEDURE — 2709999900 HC NON-CHARGEABLE SUPPLY

## 2021-12-26 PROCEDURE — 30233N1 TRANSFUSION OF NONAUTOLOGOUS RED BLOOD CELLS INTO PERIPHERAL VEIN, PERCUTANEOUS APPROACH: ICD-10-PCS | Performed by: HOSPITALIST

## 2021-12-26 PROCEDURE — 65610000006 HC RM INTENSIVE CARE

## 2021-12-26 PROCEDURE — 77010033711 HC HIGH FLOW OXYGEN

## 2021-12-26 PROCEDURE — 36430 TRANSFUSION BLD/BLD COMPNT: CPT

## 2021-12-26 PROCEDURE — 83880 ASSAY OF NATRIURETIC PEPTIDE: CPT

## 2021-12-26 PROCEDURE — 74011250636 HC RX REV CODE- 250/636: Performed by: STUDENT IN AN ORGANIZED HEALTH CARE EDUCATION/TRAINING PROGRAM

## 2021-12-26 PROCEDURE — C9113 INJ PANTOPRAZOLE SODIUM, VIA: HCPCS | Performed by: INTERNAL MEDICINE

## 2021-12-26 PROCEDURE — 74011250636 HC RX REV CODE- 250/636: Performed by: INTERNAL MEDICINE

## 2021-12-26 PROCEDURE — 74011250637 HC RX REV CODE- 250/637: Performed by: INTERNAL MEDICINE

## 2021-12-26 PROCEDURE — 85610 PROTHROMBIN TIME: CPT

## 2021-12-26 PROCEDURE — 71045 X-RAY EXAM CHEST 1 VIEW: CPT

## 2021-12-26 PROCEDURE — P9016 RBC LEUKOCYTES REDUCED: HCPCS

## 2021-12-26 PROCEDURE — 74011000250 HC RX REV CODE- 250: Performed by: INTERNAL MEDICINE

## 2021-12-26 PROCEDURE — 74011250637 HC RX REV CODE- 250/637: Performed by: STUDENT IN AN ORGANIZED HEALTH CARE EDUCATION/TRAINING PROGRAM

## 2021-12-26 PROCEDURE — 83735 ASSAY OF MAGNESIUM: CPT

## 2021-12-26 PROCEDURE — 80053 COMPREHEN METABOLIC PANEL: CPT

## 2021-12-26 RX ORDER — LANOLIN ALCOHOL/MO/W.PET/CERES
400 CREAM (GRAM) TOPICAL DAILY
Status: COMPLETED | OUTPATIENT
Start: 2021-12-26 | End: 2021-12-26

## 2021-12-26 RX ORDER — POTASSIUM CHLORIDE 750 MG/1
10 TABLET, EXTENDED RELEASE ORAL DAILY
Status: COMPLETED | OUTPATIENT
Start: 2021-12-26 | End: 2021-12-26

## 2021-12-26 RX ORDER — POLYETHYLENE GLYCOL 3350 17 G/17G
238 POWDER, FOR SOLUTION ORAL ONCE
Status: DISCONTINUED | OUTPATIENT
Start: 2021-12-26 | End: 2021-12-26

## 2021-12-26 RX ORDER — FUROSEMIDE 10 MG/ML
40 INJECTION INTRAMUSCULAR; INTRAVENOUS DAILY
Status: DISCONTINUED | OUTPATIENT
Start: 2021-12-26 | End: 2021-12-26

## 2021-12-26 RX ORDER — FUROSEMIDE 10 MG/ML
40 INJECTION INTRAMUSCULAR; INTRAVENOUS 2 TIMES DAILY
Status: DISCONTINUED | OUTPATIENT
Start: 2021-12-26 | End: 2021-12-28

## 2021-12-26 RX ORDER — LANOLIN ALCOHOL/MO/W.PET/CERES
400 CREAM (GRAM) TOPICAL 2 TIMES DAILY
Status: DISCONTINUED | OUTPATIENT
Start: 2021-12-26 | End: 2021-12-28

## 2021-12-26 RX ORDER — BISACODYL 5 MG
10 TABLET, DELAYED RELEASE (ENTERIC COATED) ORAL
Status: COMPLETED | OUTPATIENT
Start: 2021-12-26 | End: 2021-12-26

## 2021-12-26 RX ORDER — SODIUM CHLORIDE 9 MG/ML
250 INJECTION, SOLUTION INTRAVENOUS AS NEEDED
Status: DISCONTINUED | OUTPATIENT
Start: 2021-12-26 | End: 2021-12-26

## 2021-12-26 RX ORDER — LEVOTHYROXINE SODIUM 50 UG/1
50 TABLET ORAL
Status: DISCONTINUED | OUTPATIENT
Start: 2021-12-27 | End: 2022-01-06 | Stop reason: HOSPADM

## 2021-12-26 RX ADMIN — FUROSEMIDE 40 MG: 10 INJECTION, SOLUTION INTRAMUSCULAR; INTRAVENOUS at 08:37

## 2021-12-26 RX ADMIN — SODIUM CHLORIDE, PRESERVATIVE FREE 10 ML: 5 INJECTION INTRAVENOUS at 20:21

## 2021-12-26 RX ADMIN — AMIODARONE HYDROCHLORIDE 200 MG: 200 TABLET ORAL at 08:37

## 2021-12-26 RX ADMIN — LEVOTHYROXINE SODIUM 25 MCG: 0.05 TABLET ORAL at 05:31

## 2021-12-26 RX ADMIN — SODIUM CHLORIDE, PRESERVATIVE FREE 10 ML: 5 INJECTION INTRAVENOUS at 16:15

## 2021-12-26 RX ADMIN — DULOXETINE HYDROCHLORIDE 60 MG: 60 CAPSULE, DELAYED RELEASE ORAL at 08:37

## 2021-12-26 RX ADMIN — Medication 400 MG: at 08:37

## 2021-12-26 RX ADMIN — SODIUM CHLORIDE 40 MG: 9 INJECTION INTRAMUSCULAR; INTRAVENOUS; SUBCUTANEOUS at 20:31

## 2021-12-26 RX ADMIN — Medication 400 MG: at 18:02

## 2021-12-26 RX ADMIN — BISACODYL 10 MG: 5 TABLET, COATED ORAL at 12:02

## 2021-12-26 RX ADMIN — SODIUM CHLORIDE 40 MG: 9 INJECTION INTRAMUSCULAR; INTRAVENOUS; SUBCUTANEOUS at 08:38

## 2021-12-26 RX ADMIN — FUROSEMIDE 40 MG: 10 INJECTION, SOLUTION INTRAMUSCULAR; INTRAVENOUS at 18:02

## 2021-12-26 RX ADMIN — POTASSIUM CHLORIDE 10 MEQ: 750 TABLET, EXTENDED RELEASE ORAL at 08:37

## 2021-12-26 RX ADMIN — FOLIC ACID 1 MG: 1 TABLET ORAL at 08:37

## 2021-12-26 RX ADMIN — Medication 400 MG: at 08:38

## 2021-12-26 RX ADMIN — ROSUVASTATIN CALCIUM 10 MG: 5 TABLET, FILM COATED ORAL at 20:24

## 2021-12-26 RX ADMIN — POLYETHYLENE GLYCOL 3350 17 G: 17 POWDER, FOR SOLUTION ORAL at 12:02

## 2021-12-26 RX ADMIN — SODIUM CHLORIDE, PRESERVATIVE FREE 10 ML: 5 INJECTION INTRAVENOUS at 05:31

## 2021-12-26 NOTE — CONSULTS
[facility] Consult Note       Primary GI Physician: Dr Liane Goldmann    Referring Provider:  Dr Sanna Guy    Consult Date:  12/26/2021    Admit Date:  12/25/2021    Chief Complaint:  Anemia, melena    Subjective:     History of Present Illness:  Patient is a 80 y.o. female with PMH atrial fibrillation on Xarelto, chronic anemia, Grave's disease, CKD stage III, breast cancer, RA, seen in consultation at the request of Dr. Sanna Guy for anemia and melena. Patient was recently admitted and discharged 12/13/21 - 12/21/21 for symptomatic anemia with admission hgb 3.3. She was transfused and Xarelto initially held. GI evaluation that admission included EGD 12/14/21 with Dr Liane Goldmann and was normal.  Further evaluation of anemia was deferred in absence of overt of overt bleeding and outpatient colonoscopy and SB capsule endoscopy were to be considered. She re-presented to the ED yesterday with SOB and was found to have hgb 3.7, down from discharge valuate of 9.0 on 12/21/21. Xarelto has again been placed on hold and she has been transfused 3 units of PRBC with improvement in hgb today at 6.1; repeat hgb now pending. She has been placed on bid Protonix IV by the admitting service. She reports she has not seen black stools but admits to increased stool frequency and some incontinence with difficulty cleaning afterwards. Current nursing staff has not seen melena.       PMH:  Past Medical History:   Diagnosis Date    Acquired hypothyroidism 1/16/2016    Breast cancer ([facility] [address] 2008    Depression 8/18/2016    Endocrine disease     hypothyroid    Fungal dermatitis 8/18/2016    Hyperlipidemia 1/16/2016    Hypertension, essential, benign 8/18/2016    Hypokalemia 8/18/2016    Inflamed sebaceous cyst 8/18/2016    Major depressive disorder, recurrent, moderate ([address] 8/18/2016    Nicotine dependence, cigarettes, uncomplicated 3/33/3262    Osteopenia 8/18/2016    Osteoporosis 8/18/2016    Radiation therapy complication 1430    Rheumatoid arthritis ([facility] [address] 1/16/2016    Right carotid bruit 1/16/2016    TIA (transient ischemic attack) 1/16/2016    Tobacco abuse 8/18/2016       PSH:  Past Surgical History:   Procedure Laterality Date    HX ADENOIDECTOMY      HX BREAST LUMPECTOMY Right 2008    with lymph nodes    HX TONSILLECTOMY      IR BX BONE MARROW DIAGNOSTIC  5/14/2021    IR INSERT NON TUNL CVC OVER 5 YRS  5/14/2021       Allergies: Allergies   Allergen Reactions    Eliquis [Apixaban] Other (comments)     Patient states she had hallucinations from Eliquis       Home Medications:  Prior to Admission medications    Medication Sig Start Date End Date Taking? Authorizing Provider   rivaroxaban (Xarelto) 15 mg tab tablet Take 1 Tablet by mouth daily (with breakfast) for 30 days. 12/21/21 1/20/22 Yes Rober Liang MD   metoprolol succinate (TOPROL-XL) 50 mg XL tablet Take 1 Tablet by mouth daily. 6/25/21  Yes Dimitri Verma MD   furosemide (LASIX) 20 mg tablet Take 1 Tablet by mouth daily. Patient taking differently: Take 40 mg by mouth daily. 6/24/21  Yes Dimitri Verma MD   pantoprazole (PROTONIX) 40 mg tablet Take 1 Tablet by mouth Before breakfast and dinner. 5/24/21  Yes Kenny Carvajal MD   folic acid (FOLVITE) 1 mg tablet Take 1 mg by mouth daily. Yes Provider, Historical   amiodarone (CORDARONE) 200 mg tablet Take 1 Tab by mouth daily. 4/13/21  Yes Philip Bang MD   rosuvastatin (Crestor) 10 mg tablet Take 1 Tab by mouth nightly. 3/26/21  Yes Sister, Kristina Bo MD   ondansetron (ZOFRAN ODT) 4 mg disintegrating tablet Take 1 Tab by mouth every eight (8) hours as needed for Nausea or Vomiting. 3/26/21  Yes Sister, Kristina Bo MD   cholecalciferol (VITAMIN D3) 1,000 unit tablet Take 1,000 Units by mouth daily. Yes Provider, Historical   DULoxetine (CYMBALTA) 60 mg capsule Take 1 Cap by mouth daily.  2/6/18  Yes Martha Manuel MD   levothyroxine (SYNTHROID) 25 mcg tablet Take 1 Tab by mouth Daily (before breakfast). 2/6/18  Yes Obdulia Baca MD       [facility] Medications:  Current Facility-Administered Medications   Medication Dose Route Frequency    0.9% sodium chloride infusion 250 mL  250 mL IntraVENous PRN    potassium chloride (KLOR-CON M10) tablet 10 mEq  10 mEq Oral DAILY    magnesium oxide (MAG-OX) tablet 400 mg  400 mg Oral DAILY    furosemide (LASIX) injection 40 mg  40 mg IntraVENous DAILY    0.9% sodium chloride infusion 250 mL  250 mL IntraVENous PRN    magnesium oxide (MAG-OX) tablet 400 mg  400 mg Oral BID    amiodarone (CORDARONE) tablet 200 mg  200 mg Oral DAILY    DULoxetine (CYMBALTA) capsule 60 mg  60 mg Oral DAILY    folic acid (FOLVITE) tablet 1 mg  1 mg Oral DAILY    [Held by provider] furosemide (LASIX) tablet 20 mg  20 mg Oral DAILY    levothyroxine (SYNTHROID) tablet 25 mcg  25 mcg Oral ACB    [Held by provider] metoprolol succinate (TOPROL-XL) XL tablet 50 mg  50 mg Oral DAILY    pantoprazole (PROTONIX) 40 mg in 0.9% sodium chloride 10 mL injection  40 mg IntraVENous Q12H    rosuvastatin (CRESTOR) tablet 10 mg  10 mg Oral QHS    sodium chloride (NS) flush 5-40 mL  5-40 mL IntraVENous Q8H    sodium chloride (NS) flush 5-40 mL  5-40 mL IntraVENous PRN    acetaminophen (TYLENOL) tablet 650 mg  650 mg Oral Q6H PRN    Or    acetaminophen (TYLENOL) suppository 650 mg  650 mg Rectal Q6H PRN    polyethylene glycol (MIRALAX) packet 17 g  17 g Oral DAILY PRN    ondansetron (ZOFRAN ODT) tablet 4 mg  4 mg Oral Q8H PRN    Or    ondansetron (ZOFRAN) injection 4 mg  4 mg IntraVENous Q6H PRN       Social History:  Social History     Tobacco Use    Smoking status: Current Some Day Smoker    Smokeless tobacco: Never Used    Tobacco comment: Only on pay day-1/2 pack   Substance Use Topics    Alcohol use: No       Pt denies any history of drug use, blood transfusions, or tattoos.     Family History:  Family History   Problem Relation Age of Onset    Stroke Mother    Adonay Cancer Father         Brain    HIV/AIDS Brother        Review of Systems:  A detailed 10 system ROS is obtained, with pertinent positives as listed above. All others are negative. Diet:  NPO    Objective:     Physical Exam:  Vitals:  Visit Vitals  BP (!) 130/59 (BP 1 Location: Left upper arm, BP Patient Position: At rest;Sitting)   Pulse 74   Temp (!) 96.4 °F (35.8 °C)   Resp 25   Ht 5' (1.524 m)   Wt 72 kg (158 lb 11.7 oz)   SpO2 100%   BMI 31.00 kg/m²     Gen:  Pt is alert, cooperative, no acute distress  Skin:  Extremities and face reveal no rashes. HEENT: Sclerae anicteric. Marked exophthalmos. Extra-occular muscles are intact. No oral ulcers. No abnormal pigmentation of the lips. The neck is supple. Cardiovascular: Regular rate and rhythm. No murmurs, gallops, or rubs. Respiratory:  Comfortable breathing with no accessory muscle use. Wheezing to upper lung lobes with expiration. On O2.    GI:  Abdomen nondistended, soft, and nontender. Normal active bowel sounds. No enlargement of the liver or spleen. No masses palpable. Rectal:  Deferred  Musculoskeletal:  No pitting edema of the lower legs. Neurological:  Gross memory appears fair. Patient is alert and oriented. Psychiatric:  Mood appears appropriate with judgement intact.   .    Laboratory:    Recent Labs     12/26/21  0303 12/26/21  0300 12/25/21  2309 12/25/21  1730   WBC 10.6  --   --  9.7   HGB 6.1*  --  4.9* 3.7*   HCT 19.8*  --  15.8* 13.5*     --   --  200   MCV 90.4  --   --  97.8   NA  --  144  --  146*   K  --  4.0  --  4.3   CL  --  111*  --  111*   CO2  --  30  --  28   BUN  --  41*  --  39*   CREA  --  1.29*  --  1.34*   CA  --  7.9*  --  7.8*   MG  --  1.8  --  1.9   GLU  --  99  --  128*   AP  --  36*  --  42*   AST  --  21  --  21   ALT  --  31  --  38   TBILI  --  0.5  --  0.4   ALB  --  1.5*  --  1.6*   TP  --  4.0*  --  4.3*   LPSE  --   --   --  122   PTP  --  20.9*  --  36.0*   INR  --  1.8  --  3.6   APTT  -- --   --  38.5*          Assessment:     Principal Problem:    GI bleed (12/25/2021)    Active Problems:    Acquired hypothyroidism (1/16/2016)      Hyperlipidemia (1/16/2016)      Rheumatoid arthritis ([facility] [address] (1/16/2016)      Hypertension, essential, benign (8/18/2016)      Stage 3 chronic kidney disease ([facility] [address] (7/33/2961)      Diastolic CHF, chronic ([facility] [address] (6/13/2021)        Plan:     81 yo female is seen in consultation for acute on chronic anemia, presenting to the ED 12/25 with SOB and found to have hgb 3.7. She was discharged just 4 days prior, also for anemia, undergoing unremarkable EGD at that time; colonoscopy was deferred to outpatient setting in absence of overt bleeding. She admits to stool frequency and incontinence since hospital discharge but cannot confirm or deny melena. She has received 3 units PRBC since admission last night and nursing reports no melena since admission. Xarelto is on hold and she has been placed on Protonix IV bid. She cannot recall hx of colonoscopy. 1.  Consider repeat EGD with addition of push enteroscopy for further evaluation of anemia and concern for melena. Will discuss timing. Pending findings, she will likely need inpatient colonoscopy for further evaluation. 2.  NPO  3. Xarelto on hold  4. Follow hgb; transfuse to keep hgb 7.0 or >  5. Agree with IV Protonix for now  6. Further recommendations pending above      Patient is seen and examined in collaboration with Dr. Aurelio Lucia. Assessment and plan as per Dr. Aurelio Lucia.   Essie Lara NP

## 2021-12-26 NOTE — PROGRESS NOTES
Reviewed notes for new spiritual concerns      Per notes:    HIGH RISK FOR DECLINE DURING ADMISSION    4TH ADMIT PAST YEAR    NO FRANCINE PREFERENCE

## 2021-12-26 NOTE — H&P
Hospitalist History and Physical   Admit Date:  2021  4:57 PM   Name:  Charity Nuñez   Age:  80 y.o. Sex:  female  :  1940   MRN:  [id number]   Room:  ER07/    Presenting Complaint: Shortness of Breath    Reason(s) for Admission: No admission diagnoses are documented for this encounter. History of Present Illness:   Charity Nuñez is a 80 y.o. female with medical history of hypothyroidism, history of Graves' disease, atrial fibrillation on Xarelto, chronic anemia, CKD stage III. Patient recently admitted  for progressive anemia with hemoglobin on arrival of 3.3. GI was consulted and patient underwent EGD on 21 that was unremarkable. Heme-onc was consulted and labs were consistent with iron deficiency anemia and patient was given IV iron. Xarelto was resumed on  and hemoglobin greater than 8 on discharge. Patient presents today with report of increasing shortness of breath. She appears pale. Was found to have large melanotic stool on exam.       ER work-up notable for WBC 9, hemoglobin 3.7, platelets 418, INR 3.6, sodium 146, potassium 4.3, glucose 128, creatinine 1.34, albumin 1.6, LFTs within normal limits, lactic acid 5.1, proBNP 7085  Checks x-ray notable for pulmonary edema    Hospitalist asked to admit for profound symptomatic anemia with melanotic stool. 1 unit packed red blood cells ordered on admission, repeat unit orders with request for 1 unit ahead    Review of Systems:  10 systems reviewed and negative except as noted in HPI.   Assessment & Plan:     # ABLA  # GI bleeding - likely upper w/ melanotic stool on exam   - serial H&H  - Transfuse for hgb <7  - 2 units ordered to date   - Kcentra given for INR 3 (pt on xarelto)  - IV PPI BID  - GI consult  - monitor BP  - NPO  - holding IV currently d/t volume overload concern (see below)      # LUIS ARMANDO  - recently admitted for hgb of 3  - seen by heme - dx'd with LUIS ARMANDO  - currently getting blood transfusions, may need additional iron      # Afib  - Currently rate controlled  - continue amiodarone  - holding lopressor for soft BPs  - holding Xarelto d/t bleeding    # CDK stage 3  - Cr 1.3 at baseline  - monitor BMP in Am    # Chronic diastolic CHF  - pulm edema on CXR - BNP >7K  - also getting blood products  - Lasix IV 40mg x 1 dose now  - resume oral lasix for AM  - monitor BP and o2 requirements closely    # hypothyroidism  - synthroid      Dispo/Discharge Planning:   Anticipate 3-4 night stay    Diet: No diet orders on file  VTE ppx: scd  Code status: Prior      This visit took 45 minutes  of critical care time in evaluation and management of a criticially ill patient, such that the critical illness acutely impairs cardiovascular/pulmonary systems such that there is a high probability of imminent of life-threatening deterioration in the patient's condition needing immediate attention. The time listed is exclusive an any procedures which may have been performed. Cricital care time includes time spent at bedside performing patient interview and physcial exam, time spent researching patient prior to interaction with patient, time spent discussing findings and treatment plan with patient and/or family, time speint discussing patient with consultants and colleagues, time spent reviewing pertinent laboratory and radiographic evaluations, time spent re-evaluating patient, or time spent discussing patient with nursing staff.    Hospital Problems as of 12/25/2021 Date Reviewed: 12/14/2021    None          Past History:  Past Medical History:   Diagnosis Date    Acquired hypothyroidism 1/16/2016    Breast cancer ([facility] [address] 2008    Depression 8/18/2016    Endocrine disease     hypothyroid    Fungal dermatitis 8/18/2016    Hyperlipidemia 1/16/2016    Hypertension, essential, benign 8/18/2016    Hypokalemia 8/18/2016    Inflamed sebaceous cyst 8/18/2016    Major depressive disorder, recurrent, moderate ([facility] [address] 8/18/2016    Nicotine dependence, cigarettes, uncomplicated 7/07/8685    Osteopenia 8/18/2016    Osteoporosis 8/18/2016    Radiation therapy complication 2711    Rheumatoid arthritis ([facility] [address] 1/16/2016    Right carotid bruit 1/16/2016    TIA (transient ischemic attack) 1/16/2016    Tobacco abuse 8/18/2016     Past Surgical History:   Procedure Laterality Date    HX ADENOIDECTOMY      HX BREAST LUMPECTOMY Right 2008    with lymph nodes    HX TONSILLECTOMY      IR BX BONE MARROW DIAGNOSTIC  5/14/2021    IR INSERT NON TUNL CVC OVER 5 YRS  5/14/2021      Allergies   Allergen Reactions    Eliquis [Apixaban] Other (comments)     Patient states she had hallucinations from Eliquis      Social History     Tobacco Use    Smoking status: Current Some Day Smoker    Smokeless tobacco: Never Used    Tobacco comment: Only on pay day-1/2 pack   Substance Use Topics    Alcohol use: No      Family History   Problem Relation Age of Onset    Stroke Mother     Cancer Father         Brain    HIV/AIDS Brother       Family history reviewed and negative except as otherwise noted.     Immunization History   Administered Date(s) Administered    Influenza High Dose Vaccine PF 10/28/2015, 09/21/2016, 09/15/2017    Pneumococcal Polysaccharide (PPSV-23) 01/18/2016    TB Skin Test (PPD) Intradermal 04/22/2019, 05/12/2021, 12/14/2021     Prior to Admit Medications:  Current Outpatient Medications   Medication Instructions    amiodarone (CORDARONE) 200 mg, Oral, DAILY    cholecalciferol (VITAMIN D3) 1,000 Units, Oral, DAILY    DULoxetine (CYMBALTA) 60 mg, Oral, DAILY    folic acid (FOLVITE) 1 mg, Oral, DAILY    furosemide (LASIX) 20 mg, Oral, DAILY    levothyroxine (SYNTHROID) 25 mcg, Oral, DAILY BEFORE BREAKFAST    metoprolol succinate (TOPROL-XL) 50 mg, Oral, DAILY    ondansetron (ZOFRAN ODT) 4 mg, Oral, EVERY 8 HOURS AS NEEDED    pantoprazole (PROTONIX) 40 mg, Oral, 2 TIMES DAILY BEFORE MEALS    rivaroxaban (Lennart Retort) 15 mg, Oral, DAILY WITH BREAKFAST    rosuvastatin (CRESTOR) 10 mg, Oral, EVERY BEDTIME       Objective:     Patient Vitals for the past 24 hrs:   Temp Pulse Resp BP   12/25/21 1900 97.2 °F (36.2 °C) 76 24 (!) 136/120   12/25/21 1847 97.6 °F (36.4 °C) 76 24 (!) 118/102   12/25/21 1706 98.8 °F (37.1 °C)   105/86          Estimated body mass index is 29.29 kg/m² as calculated from the following:    Height as of this encounter: 5' (1.524 m). Weight as of this encounter: 68 kg (150 lb). Intake/Output Summary (Last 24 hours) at 12/25/2021 2011  Last data filed at 12/25/2021 1847  Gross per 24 hour   Intake 310 ml   Output    Net 310 ml         Physical Exam:    Blood pressure (!) 136/120, pulse 76, temperature 97.2 °F (36.2 °C), resp. rate 24, height 5' (1.524 m), weight 68 kg (150 lb). General:    Well nourished. Appears pale and fatigued  Head:  Normocephalic, atraumatic  Eyes:  Sclerae appear normal.  Pupils equally round. ENT:  Nares appear normal, no drainage. Moist oral mucosa  Neck:  No restricted ROM. Trachea midline   CV:   RRR. No m/r/g. No jugular venous distension. Lungs:   CTAB. No wheezing, rhonchi, or rales. Respirations even, unlabored  Abdomen: Bowel sounds present. Soft, nontender, nondistended. Extremities: No cyanosis or clubbing. No edema  Skin:     Pale, no rashes  Neuro:  CN II-XII grossly intact. Sensation intact. A&Ox3  Psych:  Normal mood and affect.       I have reviewed ordered lab tests and independently visualized imaging below:    Last 24hr Labs:  Recent Results (from the past 24 hour(s))   CBC WITH AUTOMATED DIFF    Collection Time: 12/25/21  5:30 PM   Result Value Ref Range    WBC 9.7 4.3 - 11.1 K/uL    RBC 1.38 (L) 4.05 - 5.2 M/uL    HGB 3.7 (LL) 11.7 - 15.4 g/dL    HCT 13.5 (L) 35.8 - 46.3 %    MCV 97.8 79.6 - 97.8 FL    MCH 26.8 26.1 - 32.9 PG    MCHC 27.4 (L) 31.4 - 35.0 g/dL    RDW 31.8 (H) 11.9 - 14.6 %    PLATELET 481 464 - 110 K/uL    MPV 11.6 9.4 - 12.3 FL ABSOLUTE NRBC 0.02 0.0 - 0.2 K/uL    DF AUTOMATED      NEUTROPHILS 79 (H) 43 - 78 %    LYMPHOCYTES 12 (L) 13 - 44 %    MONOCYTES 8 4.0 - 12.0 %    EOSINOPHILS 0 (L) 0.5 - 7.8 %    BASOPHILS 0 0.0 - 2.0 %    IMMATURE GRANULOCYTES 1 0.0 - 5.0 %    ABS. NEUTROPHILS 7.6 1.7 - 8.2 K/UL    ABS. LYMPHOCYTES 1.2 0.5 - 4.6 K/UL    ABS. MONOCYTES 0.8 0.1 - 1.3 K/UL    ABS. EOSINOPHILS 0.0 0.0 - 0.8 K/UL    ABS. BASOPHILS 0.0 0.0 - 0.2 K/UL    ABS. IMM. GRANS. 0.1 0.0 - 0.5 K/UL   PROTHROMBIN TIME + INR    Collection Time: 12/25/21  5:30 PM   Result Value Ref Range    Prothrombin time 36.0 (H) 12.6 - 14.5 sec    INR 3.6     PTT    Collection Time: 12/25/21  5:30 PM   Result Value Ref Range    aPTT 38.5 (H) 24.1 - 35.1 SEC   LIPASE    Collection Time: 12/25/21  5:30 PM   Result Value Ref Range    Lipase 122 73 - 393 U/L   MAGNESIUM    Collection Time: 12/25/21  5:30 PM   Result Value Ref Range    Magnesium 1.9 1.8 - 2.4 mg/dL   METABOLIC PANEL, COMPREHENSIVE    Collection Time: 12/25/21  5:30 PM   Result Value Ref Range    Sodium 146 (H) 136 - 145 mmol/L    Potassium 4.3 3.5 - 5.1 mmol/L    Chloride 111 (H) 98 - 107 mmol/L    CO2 28 21 - 32 mmol/L    Anion gap 7 7 - 16 mmol/L    Glucose 128 (H) 65 - 100 mg/dL    BUN 39 (H) 8 - 23 MG/DL    Creatinine 1.34 (H) 0.6 - 1.0 MG/DL    GFR est AA 49 (L) >60 ml/min/1.73m2    GFR est non-AA 41 (L) >60 ml/min/1.73m2    Calcium 7.8 (L) 8.3 - 10.4 MG/DL    Bilirubin, total 0.4 0.2 - 1.1 MG/DL    ALT (SGPT) 38 12 - 65 U/L    AST (SGOT) 21 15 - 37 U/L    Alk. phosphatase 42 (L) 50 - 136 U/L    Protein, total 4.3 (L) 6.3 - 8.2 g/dL    Albumin 1.6 (L) 3.2 - 4.6 g/dL    Globulin 2.7 2.3 - 3.5 g/dL    A-G Ratio 0.6 (L) 1.2 - 3.5     LACTIC ACID    Collection Time: 12/25/21  5:30 PM   Result Value Ref Range    Lactic acid 5.1 (HH) 0.4 - 2.0 MMOL/L   RBC, ALLOCATE    Collection Time: 12/25/21  5:30 PM   Result Value Ref Range    HISTORY CHECKED?  Historical check performed    NT-PRO BNP Collection Time: 12/25/21  5:30 PM   Result Value Ref Range    NT pro-BNP 7,085 (H) <450 PG/ML   TYPE & SCREEN    Collection Time: 12/25/21  5:31 PM   Result Value Ref Range    Crossmatch Expiration 12/28/2021,2359     ABO/Rh(D) A POSITIVE     Antibody screen NEG     Comment BLOOD READY, CALLED ER AT 1831 12.25.21     Unit number [id number]     Blood component type  LR     Unit division 00     Status of unit ALLOCATED     Crossmatch result Compatible     Unit number [id number]     Blood component type RC LR     Unit division 00     Status of unit ISSUED     Crossmatch result Compatible    COVID-19 RAPID TEST    Collection Time: 12/25/21  6:23 PM   Result Value Ref Range    Specimen source NASAL      COVID-19 rapid test Not detected NOTD         All Micro Results     Procedure Component Value Units Date/Time    COVID-19 RAPID TEST [[id number]] Collected: 12/25/21 1823    Order Status: Completed Specimen: Nasopharyngeal Updated: 12/25/21 1859     Specimen source NASAL        COVID-19 rapid test Not detected        Comment:      The specimen is NEGATIVE for SARS-CoV-2, the novel coronavirus associated with COVID-19. A negative result does not rule out COVID-19. This test has been authorized by the FDA under an Emergency Use Authorization (EUA) for use by authorized laboratories. Fact sheet for Healthcare Providers: ConventionUpdate.co.nz  Fact sheet for Patients: ConventionUpdate.co.nz       Methodology: Isothermal Nucleic Acid Amplification         CULTURE, BLOOD [[id number]] Collected: 12/25/21 1746    Order Status: Completed Specimen: Blood Updated: 12/25/21 1806    CULTURE, BLOOD [[id number]] Collected: 12/25/21 1730    Order Status: Completed Specimen: Blood Updated: 12/25/21 1806          Other Studies:  XR CHEST PORT    Result Date: 12/25/2021  Exam: XR CHEST PORT on 12/25/2021 6:08 PM Clinical History:  The female patient is 80years old  presenting for shortness of breath. Comparison:  Chest x-ray 12/13/2021 Findings:  Frontal view of the chest was obtained. There is continued mild pulmonary vascular congestion with a small to moderate left basilar effusion. The cardiomediastinal silhouette is upper limits of normal in size. There are no acute osseous abnormalities. Surgical clips right axilla. 1. Continued CHF/volume overload. CPT code(s) 54581       Medications Administered     pantoprazole (PROTONIX) 40 mg in 0.9% sodium chloride 10 mL injection     Admin Date  12/25/2021 Action  Given Dose  40 mg Route  IntraVENous Administered By  Ben Benitez RN                Signed:  Carlos Jaramillo DO    Part of this note may have been written by using a voice dictation software. The note has been proof read but may still contain some grammatical/other typographical errors.

## 2021-12-26 NOTE — PROGRESS NOTES
Hospitalist Progress Note   Admit Date:  2021  4:57 PM   Name:  Nadira Younger   Age:  80 y.o. Sex:  female  :  1940   MRN:  [id number]   Room:  102/    Presenting Complaint: Shortness of Breath    Reason(s) for Admission: GI bleed [facility] Course & Interval History:   Nadira Younger is a 80 y.o. female with medical history of hypothyroidism, history of Graves' disease, atrial fibrillation on Xarelto, chronic anemia, CKD stage III. Patient recently admitted  for progressive anemia with hemoglobin on arrival of 3.3. GI was consulted and patient underwent EGD on 21 that was unremarkable. Heme-onc was consulted and labs were consistent with iron deficiency anemia and patient was given IV iron. Xarelto was resumed on  and hemoglobin greater than 8 on discharge. Patient presents today with report of increasing shortness of breath. She appears pale. Was found to have large melanotic stool on exam.        ER work-up notable for WBC 9, hemoglobin 3.7, platelets 428, INR 3.6, sodium 146, potassium 4.3, glucose 128, creatinine 1.34, albumin 1.6, LFTs within normal limits, lactic acid 5.1, proBNP 7085  Checks x-ray notable for pulmonary edema. Subjective (21): Mark Montgomery Patient received 3 units of blood overnight and no hematochezia or bleeding.   patient denies chest pain, vomiting, diarrhea    Assessment & Plan:     # ABLA  # GI bleeding - likely upper w/ melanotic stool on exam  - Kcentra given for INR 3 (pt on xarelto)   S/p 3 units of PRBC  S/p Lasix 40 mg IV  F/u EGD with enteroscopy   Follow-up with colonoscopy  Holding Xarelto  - IV PPI BID  - serial H&H  - Transfuse for hgb <7   GI is following and appreciate the recommendations  - NPO  - holding IV currently d/t volume overload concern (see below)    Acute hypoxemic respiratory failure  Most likely due to CHF  Hypothyroidism is worsening CHF   Patient is on high flow 50 L in ED and taper down to 3 L NC   BNP is elevated 7000 and trended down to 5000  Chest x-ray showed pulmonary congestion   repeat chest x-ray showed bilateral pleural effusions, left greater than right  obtain echo  Started on Lasix 40 IV BID      # LUIS ARMANDO   hgb of 3 and after receiving blood transfusions repeat hemoglobin is 7  - seen by heme on prior admission- dx'd with LUIS ARMANDO  - currently getting blood transfusions, may need additional iron        # Afib  - Currently rate controlled  - holding amiodarone for worsening hypothyroidism  - holding lopressor for soft BPs  - holding Xarelto d/t bleeding     # CDK stage 3  - Cr 1.3 at baseline  - monitor BMP in Am     # Chronic diastolic CHF  - pulm edema on CXR - BNP >7K and BNP is trending down 5K today  Will repeat chest x-ray today  -Started on Lasix IV 40mg   Obtain echo       # hypothyroidism  TSH is 17.3 noted and on 12/13  - increased the dose of synthroid       Dispo/Discharge Planning:   Anticipate 3-4 night stay       Diet:  DIET NPO  DVT PPx: SCDs  Code status: Full Code    Hospital Problems as of 12/26/2021 Date Reviewed: 12/14/2021          Codes Class Noted - Resolved POA    * (Principal) GI bleed ICD-10-CM: K92.2  ICD-9-CM: 578.9  12/25/2021 - Present Unknown        Diastolic CHF, chronic (HCC) ICD-10-CM: I50.32  ICD-9-CM: 428.32, 428.0  6/13/2021 - Present Yes        Stage 3 chronic kidney disease ([facility] [address] (Chronic) ICD-10-CM: N18.30  ICD-9-CM: 585.3  5/20/2021 - Present Yes        Hypertension, essential, benign (Chronic) ICD-10-CM: I10  ICD-9-CM: 401.1  8/18/2016 - Present Yes        Acquired hypothyroidism (Chronic) ICD-10-CM: E03.9  ICD-9-CM: 244.9  1/16/2016 - Present Yes        Hyperlipidemia (Chronic) ICD-10-CM: E78.5  ICD-9-CM: 272.4  1/16/2016 - Present Yes        Rheumatoid arthritis ([facility] [address] (Chronic) ICD-10-CM: M06.9  ICD-9-CM: 714.0  1/16/2016 - Present Yes              Objective:     Patient Vitals for the past 24 hrs:   Temp Pulse Resp BP SpO2   12/26/21 0837  75  (!) 129/58    12/26/21 0732 (!) 96.4 °F (35.8 °C) 74 25 (!) 130/59 100 %   12/26/21 0702  75 9 (!) 116/55 100 %   12/26/21 0632  74 8 (!) 112/55 100 %   12/26/21 0602  74 8 (!) 121/55 100 %   12/26/21 0600  74 10  100 %   12/26/21 0532  75 9 (!) 103/55 100 %   12/26/21 0502  (!) 132 19 (!) 108/51 100 %   12/26/21 0455 97 °F (36.1 °C)       12/26/21 0435 97.4 °F (36.3 °C) 75 12 (!) 109/53 100 %   12/26/21 0402  76 22 (!) 109/53 100 %   12/26/21 0333  76 8 (!) 121/51 100 %   12/26/21 0302 98.1 °F (36.7 °C) 77 9 (!) 99/49 100 %   12/26/21 0232  76 13 (!) 114/54 100 %   12/26/21 0231  75 8  100 %   12/26/21 0202  75 9 (!) 138/58 100 %   12/26/21 0132  77 (!) 0 (!) 110/54 100 %   12/26/21 0103  76 15 113/81 100 %   12/26/21 0102  75 9  100 %   12/26/21 0101  (!) 194 (!) 41  100 %   12/26/21 0033  76 9 (!) 114/56 100 %   12/25/21 2345 97 °F (36.1 °C)       12/25/21 2333  77 11 (!) 97/53 100 %   12/25/21 2321 98 °F (36.7 °C)       12/25/21 2303  74 15 (!) 135/56 100 %   12/25/21 2302  74 15  100 %   12/25/21 2301  74 8  100 %   12/25/21 2300  74 (!) 7  100 %   12/25/21 2233  80 9 (!) 111/51 100 %   12/25/21 2202 97.4 °F (36.3 °C) 80 18 115/60    12/25/21 2201  80 21     12/25/21 2153 97.4 °F (36.3 °C) 80 27     12/25/21 2145     100 %   12/25/21 2049  81 24 (!) 133/96    12/25/21 2017     100 %   12/25/21 1948  79 14 (!) 144/121    12/25/21 1933  78 19 110/86    12/25/21 1918  79 8 (!) 94/36    12/25/21 1900 97.2 °F (36.2 °C) 76 24 (!) 136/120    12/25/21 1847 97.6 °F (36.4 °C) 76 24 (!) 118/102    12/25/21 1706 98.8 °F (37.1 °C)   105/86      Oxygen Therapy  O2 Sat (%): 100 % (12/26/21 0732)  Pulse via Oximetry: 74 beats per minute (12/26/21 0732)  O2 Device: Hi flow nasal cannula; Heated (12/26/21 0320)  Skin Assessment: Clean, dry, & intact (12/25/21 2156)  Skin Protection for O2 Device: No (12/25/21 2156)  O2 Flow Rate (L/min): 50 l/min (12/25/21 2156)  O2 Temperature: 87.8 °F (31 °C) (12/25/21 2145)  FIO2 (%): 50 % (12/26/21 0320)    Estimated body mass index is 31 kg/m² as calculated from the following:    Height as of this encounter: 5' (1.524 m). Weight as of this encounter: 72 kg (158 lb 11.7 oz). Intake/Output Summary (Last 24 hours) at 12/26/2021 0915  Last data filed at 12/26/2021 0732  Gross per 24 hour   Intake 1400.9 ml   Output 900 ml   Net 500.9 ml         Physical Exam:     Blood pressure (!) 129/58, pulse 75, temperature (!) 96.4 °F (35.8 °C), resp. rate 25, height 5' (1.524 m), weight 72 kg (158 lb 11.7 oz), SpO2 100 %. General:    Well nourished. No overt distress  Head:  Normocephalic, atraumatic  Eyes:  Sclerae appear normal.  Pupils equally round. Exophthalmos is present bilaterally  ENT:  Nares appear normal, no drainage. Moist oral mucosa  Neck:  No restricted ROM. Trachea midline   CV:   RRR. No m/r/g. No jugular venous distension. Lungs:   CTAB. No wheezing, rhonchi, or rales. Respirations even, unlabored  Abdomen: Bowel sounds present. Soft, nontender, nondistended. Extremities: No cyanosis or clubbing. No edema  Skin:     No rashes and normal coloration. Warm and dry. Neuro:  CN II-XII grossly intact. Sensation intact. A&Ox3  Psych:  Normal mood and affect.       I have reviewed ordered lab tests and independently visualized imaging below:    Recent Labs:  Recent Results (from the past 48 hour(s))   CBC WITH AUTOMATED DIFF    Collection Time: 12/25/21  5:30 PM   Result Value Ref Range    WBC 9.7 4.3 - 11.1 K/uL    RBC 1.38 (L) 4.05 - 5.2 M/uL    HGB 3.7 (LL) 11.7 - 15.4 g/dL    HCT 13.5 (L) 35.8 - 46.3 %    MCV 97.8 79.6 - 97.8 FL    MCH 26.8 26.1 - 32.9 PG    MCHC 27.4 (L) 31.4 - 35.0 g/dL    RDW 31.8 (H) 11.9 - 14.6 %    PLATELET 264 985 - 856 K/uL    MPV 11.6 9.4 - 12.3 FL    ABSOLUTE NRBC 0.02 0.0 - 0.2 K/uL    DF AUTOMATED      NEUTROPHILS 79 (H) 43 - 78 %    LYMPHOCYTES 12 (L) 13 - 44 %    MONOCYTES 8 4.0 - 12.0 %    EOSINOPHILS 0 (L) 0.5 - 7.8 %    BASOPHILS 0 0.0 - 2.0 %    IMMATURE GRANULOCYTES 1 0.0 - 5.0 %    ABS. NEUTROPHILS 7.6 1.7 - 8.2 K/UL    ABS. LYMPHOCYTES 1.2 0.5 - 4.6 K/UL    ABS. MONOCYTES 0.8 0.1 - 1.3 K/UL    ABS. EOSINOPHILS 0.0 0.0 - 0.8 K/UL    ABS. BASOPHILS 0.0 0.0 - 0.2 K/UL    ABS. IMM. GRANS. 0.1 0.0 - 0.5 K/UL   PROTHROMBIN TIME + INR    Collection Time: 12/25/21  5:30 PM   Result Value Ref Range    Prothrombin time 36.0 (H) 12.6 - 14.5 sec    INR 3.6     PTT    Collection Time: 12/25/21  5:30 PM   Result Value Ref Range    aPTT 38.5 (H) 24.1 - 35.1 SEC   LIPASE    Collection Time: 12/25/21  5:30 PM   Result Value Ref Range    Lipase 122 73 - 393 U/L   MAGNESIUM    Collection Time: 12/25/21  5:30 PM   Result Value Ref Range    Magnesium 1.9 1.8 - 2.4 mg/dL   METABOLIC PANEL, COMPREHENSIVE    Collection Time: 12/25/21  5:30 PM   Result Value Ref Range    Sodium 146 (H) 136 - 145 mmol/L    Potassium 4.3 3.5 - 5.1 mmol/L    Chloride 111 (H) 98 - 107 mmol/L    CO2 28 21 - 32 mmol/L    Anion gap 7 7 - 16 mmol/L    Glucose 128 (H) 65 - 100 mg/dL    BUN 39 (H) 8 - 23 MG/DL    Creatinine 1.34 (H) 0.6 - 1.0 MG/DL    GFR est AA 49 (L) >60 ml/min/1.73m2    GFR est non-AA 41 (L) >60 ml/min/1.73m2    Calcium 7.8 (L) 8.3 - 10.4 MG/DL    Bilirubin, total 0.4 0.2 - 1.1 MG/DL    ALT (SGPT) 38 12 - 65 U/L    AST (SGOT) 21 15 - 37 U/L    Alk. phosphatase 42 (L) 50 - 136 U/L    Protein, total 4.3 (L) 6.3 - 8.2 g/dL    Albumin 1.6 (L) 3.2 - 4.6 g/dL    Globulin 2.7 2.3 - 3.5 g/dL    A-G Ratio 0.6 (L) 1.2 - 3.5     LACTIC ACID    Collection Time: 12/25/21  5:30 PM   Result Value Ref Range    Lactic acid 5.1 (HH) 0.4 - 2.0 MMOL/L   RBC, ALLOCATE    Collection Time: 12/25/21  5:30 PM   Result Value Ref Range    HISTORY CHECKED?  Historical check performed    NT-PRO BNP    Collection Time: 12/25/21  5:30 PM   Result Value Ref Range    NT pro-BNP 7,085 (H) <450 PG/ML   TYPE & SCREEN    Collection Time: 12/25/21  5:31 PM   Result Value Ref Range    Crossmatch Expiration 12/28/2021,2359     ABO/Rh(D) A POSITIVE     Antibody screen NEG     Comment BLOOD READY, CALLED ER AT 1831 12.25.21     Unit number [id number]     Blood component type RC LR     Unit division 00     Status of unit ISSUED     Crossmatch result Compatible     Unit number [id number]     Blood component type RC LR     Unit division 00     Status of unit ISSUED     Crossmatch result Compatible     Unit number [id number]     Blood component type RC LR     Unit division 00     Status of unit ISSUED     Crossmatch result Compatible     Unit number [id number]     Blood component type RC LR     Unit division 00     Status of unit ALLOCATED     Crossmatch result Compatible    COVID-19 RAPID TEST    Collection Time: 12/25/21  6:23 PM   Result Value Ref Range    Specimen source NASAL      COVID-19 rapid test Not detected NOTD     LACTIC ACID    Collection Time: 12/25/21  7:44 PM   Result Value Ref Range    Lactic acid 3.0 (H) 0.4 - 2.0 MMOL/L   RBC, ALLOCATE    Collection Time: 12/25/21 10:45 PM   Result Value Ref Range    HISTORY CHECKED? Historical check performed    HGB & HCT    Collection Time: 12/25/21 11:09 PM   Result Value Ref Range    HGB 4.9 (LL) 11.7 - 15.4 g/dL    HCT 15.8 (L) 35.8 - 18.4 %   METABOLIC PANEL, COMPREHENSIVE    Collection Time: 12/26/21  3:00 AM   Result Value Ref Range    Sodium 144 136 - 145 mmol/L    Potassium 4.0 3.5 - 5.1 mmol/L    Chloride 111 (H) 98 - 107 mmol/L    CO2 30 21 - 32 mmol/L    Anion gap 3 (L) 7 - 16 mmol/L    Glucose 99 65 - 100 mg/dL    BUN 41 (H) 8 - 23 MG/DL    Creatinine 1.29 (H) 0.6 - 1.0 MG/DL    GFR est AA 51 (L) >60 ml/min/1.73m2    GFR est non-AA 42 (L) >60 ml/min/1.73m2    Calcium 7.9 (L) 8.3 - 10.4 MG/DL    Bilirubin, total 0.5 0.2 - 1.1 MG/DL    ALT (SGPT) 31 12 - 65 U/L    AST (SGOT) 21 15 - 37 U/L    Alk.  phosphatase 36 (L) 50 - 136 U/L    Protein, total 4.0 (L) 6.3 - 8.2 g/dL    Albumin 1.5 (L) 3.2 - 4.6 g/dL    Globulin 2.5 2.3 - 3.5 g/dL    A-G Ratio 0.6 (L) 1.2 - 3.5     PROTHROMBIN TIME + INR    Collection Time: 12/26/21  3:00 AM   Result Value Ref Range    Prothrombin time 20.9 (H) 12.6 - 14.5 sec    INR 1.8     NT-PRO BNP    Collection Time: 12/26/21  3:00 AM   Result Value Ref Range    NT pro-BNP 5,896 (H) <450 PG/ML   MAGNESIUM    Collection Time: 12/26/21  3:00 AM   Result Value Ref Range    Magnesium 1.8 1.8 - 2.4 mg/dL   CBC WITH AUTOMATED DIFF    Collection Time: 12/26/21  3:03 AM   Result Value Ref Range    WBC 10.6 4.3 - 11.1 K/uL    RBC 2.19 (L) 4.05 - 5.2 M/uL    HGB 6.1 (LL) 11.7 - 15.4 g/dL    HCT 19.8 (L) 35.8 - 46.3 %    MCV 90.4 79.6 - 97.8 FL    MCH 27.9 26.1 - 32.9 PG    MCHC 30.8 (L) 31.4 - 35.0 g/dL    RDW 19.9 (H) 11.9 - 14.6 %    PLATELET 986 208 - 521 K/uL    MPV 12.0 9.4 - 12.3 FL    ABSOLUTE NRBC 0.02 0.0 - 0.2 K/uL    NEUTROPHILS 76 43 - 78 %    LYMPHOCYTES 13 13 - 44 %    MONOCYTES 9 4.0 - 12.0 %    EOSINOPHILS 0 (L) 0.5 - 7.8 %    BASOPHILS 1 0.0 - 2.0 %    IMMATURE GRANULOCYTES 1 0.0 - 5.0 %    ABS. NEUTROPHILS 8.0 1.7 - 8.2 K/UL    ABS. LYMPHOCYTES 1.4 0.5 - 4.6 K/UL    ABS. MONOCYTES 1.0 0.1 - 1.3 K/UL    ABS. EOSINOPHILS 0.0 0.0 - 0.8 K/UL    ABS. BASOPHILS 0.1 0.0 - 0.2 K/UL    ABS. IMM. GRANS. 0.1 0.0 - 0.5 K/UL    DF AUTOMATED     RBC, ALLOCATE    Collection Time: 12/26/21  4:00 AM   Result Value Ref Range    HISTORY CHECKED? Historical check performed    RBC, ALLOCATE    Collection Time: 12/26/21  7:30 AM   Result Value Ref Range    HISTORY CHECKED? Historical check performed        All Micro Results     Procedure Component Value Units Date/Time    COVID-19 RAPID TEST [[id number]] Collected: 12/25/21 1823    Order Status: Completed Specimen: Nasopharyngeal Updated: 12/25/21 1859     Specimen source NASAL        COVID-19 rapid test Not detected        Comment:      The specimen is NEGATIVE for SARS-CoV-2, the novel coronavirus associated with COVID-19.   A negative result does not rule out COVID-19. This test has been authorized by the FDA under an Emergency Use Authorization (EUA) for use by authorized laboratories. Fact sheet for Healthcare Providers: ConventionUpdate.co.nz  Fact sheet for Patients: ConventionUpdate.co.nz       Methodology: Isothermal Nucleic Acid Amplification         CULTURE, BLOOD [[id number]] Collected: 12/25/21 1746    Order Status: Completed Specimen: Blood Updated: 12/25/21 1806    CULTURE, BLOOD [[id number]] Collected: 12/25/21 1730    Order Status: Completed Specimen: Blood Updated: 12/25/21 1806          Other Studies:  XR CHEST PORT    Result Date: 12/26/2021  Chest X-ray INDICATION: Follow-up heart surgery A portable AP view of the chest was obtained. FINDINGS: There are stable bilateral infiltrates, probably pulmonary edema. There are bilateral pleural effusions, left greater than right. There is stable cardiomegaly. The bony thorax is intact. Stable effusions and mild pulmonary edema     XR CHEST PORT    Result Date: 12/25/2021  Exam: XR CHEST PORT on 12/25/2021 6:08 PM Clinical History: The female patient is 80years old  presenting for shortness of breath. Comparison:  Chest x-ray 12/13/2021 Findings:  Frontal view of the chest was obtained. There is continued mild pulmonary vascular congestion with a small to moderate left basilar effusion. The cardiomediastinal silhouette is upper limits of normal in size. There are no acute osseous abnormalities. Surgical clips right axilla. 1. Continued CHF/volume overload.  CPT code(s) 64100       Current Meds:  Current Facility-Administered Medications   Medication Dose Route Frequency    0.9% sodium chloride infusion 250 mL  250 mL IntraVENous PRN    furosemide (LASIX) injection 40 mg  40 mg IntraVENous DAILY    0.9% sodium chloride infusion 250 mL  250 mL IntraVENous PRN    magnesium oxide (MAG-OX) tablet 400 mg  400 mg Oral BID    amiodarone (CORDARONE) tablet 200 mg  200 mg Oral DAILY    DULoxetine (CYMBALTA) capsule 60 mg  60 mg Oral DAILY    folic acid (FOLVITE) tablet 1 mg  1 mg Oral DAILY    [Held by provider] furosemide (LASIX) tablet 20 mg  20 mg Oral DAILY    levothyroxine (SYNTHROID) tablet 25 mcg  25 mcg Oral ACB    [Held by provider] metoprolol succinate (TOPROL-XL) XL tablet 50 mg  50 mg Oral DAILY    pantoprazole (PROTONIX) 40 mg in 0.9% sodium chloride 10 mL injection  40 mg IntraVENous Q12H    rosuvastatin (CRESTOR) tablet 10 mg  10 mg Oral QHS    sodium chloride (NS) flush 5-40 mL  5-40 mL IntraVENous Q8H    sodium chloride (NS) flush 5-40 mL  5-40 mL IntraVENous PRN    acetaminophen (TYLENOL) tablet 650 mg  650 mg Oral Q6H PRN    Or    acetaminophen (TYLENOL) suppository 650 mg  650 mg Rectal Q6H PRN    polyethylene glycol (MIRALAX) packet 17 g  17 g Oral DAILY PRN    ondansetron (ZOFRAN ODT) tablet 4 mg  4 mg Oral Q8H PRN    Or    ondansetron (ZOFRAN) injection 4 mg  4 mg IntraVENous Q6H PRN       Signed:  Kristy Dhillon MD    Part of this note may have been written by using a voice dictation software. The note has been proof read but may still contain some grammatical/other typographical errors. normal... Well appearing, well nourished, awake, alert, oriented to person, place, time/situation and in no apparent distress.

## 2021-12-26 NOTE — PROGRESS NOTES
TRANSFER - IN REPORT:    Verbal report received from ER Nurse(name) on Mariano Gram  being received from ER(unit) for routine progression of care      Report consisted of patients Situation, Background, Assessment and   Recommendations(SBAR). Information from the following report(s) SBAR and ED Summary was reviewed with the receiving nurse. Opportunity for questions and clarification was provided. Assessment completed upon patients arrival to unit and care assumed.

## 2021-12-26 NOTE — PROGRESS NOTES
POC provided with chart check done. Patient's sacrum benign with optifoam to site. Feet with dried scaly skin, lotion applied. Patient's arms with numerous abrasions and bruising.

## 2021-12-27 ENCOUNTER — ANESTHESIA (OUTPATIENT)
Dept: ENDOSCOPY | Age: 81
DRG: 377 | End: 2021-12-27
Payer: MEDICARE

## 2021-12-27 ENCOUNTER — ANESTHESIA EVENT (OUTPATIENT)
Dept: ENDOSCOPY | Age: 81
DRG: 377 | End: 2021-12-27
Payer: MEDICARE

## 2021-12-27 ENCOUNTER — APPOINTMENT (OUTPATIENT)
Dept: GENERAL RADIOLOGY | Age: 81
DRG: 377 | End: 2021-12-27
Attending: STUDENT IN AN ORGANIZED HEALTH CARE EDUCATION/TRAINING PROGRAM
Payer: MEDICARE

## 2021-12-27 LAB
BNP SERPL-MCNC: 7107 PG/ML
MAGNESIUM SERPL-MCNC: 2.1 MG/DL (ref 1.8–2.4)
PHOSPHATE SERPL-MCNC: 3.1 MG/DL (ref 2.3–3.7)

## 2021-12-27 PROCEDURE — 74011000250 HC RX REV CODE- 250: Performed by: INTERNAL MEDICINE

## 2021-12-27 PROCEDURE — 74011000250 HC RX REV CODE- 250: Performed by: REGISTERED NURSE

## 2021-12-27 PROCEDURE — 36592 COLLECT BLOOD FROM PICC: CPT

## 2021-12-27 PROCEDURE — 83880 ASSAY OF NATRIURETIC PEPTIDE: CPT

## 2021-12-27 PROCEDURE — 65610000006 HC RM INTENSIVE CARE

## 2021-12-27 PROCEDURE — 74011250636 HC RX REV CODE- 250/636: Performed by: INTERNAL MEDICINE

## 2021-12-27 PROCEDURE — 74011250636 HC RX REV CODE- 250/636: Performed by: REGISTERED NURSE

## 2021-12-27 PROCEDURE — 84100 ASSAY OF PHOSPHORUS: CPT

## 2021-12-27 PROCEDURE — 77030022875 HC PRB AR PLSM COAG ERBE -C: Performed by: INTERNAL MEDICINE

## 2021-12-27 PROCEDURE — 74011250637 HC RX REV CODE- 250/637: Performed by: PHYSICIAN ASSISTANT

## 2021-12-27 PROCEDURE — 0W3P8ZZ CONTROL BLEEDING IN GASTROINTESTINAL TRACT, VIA NATURAL OR ARTIFICIAL OPENING ENDOSCOPIC: ICD-10-PCS | Performed by: INTERNAL MEDICINE

## 2021-12-27 PROCEDURE — 71045 X-RAY EXAM CHEST 1 VIEW: CPT

## 2021-12-27 PROCEDURE — 74011000250 HC RX REV CODE- 250: Performed by: STUDENT IN AN ORGANIZED HEALTH CARE EDUCATION/TRAINING PROGRAM

## 2021-12-27 PROCEDURE — 85027 COMPLETE CBC AUTOMATED: CPT

## 2021-12-27 PROCEDURE — 80048 BASIC METABOLIC PNL TOTAL CA: CPT

## 2021-12-27 PROCEDURE — 74011250637 HC RX REV CODE- 250/637: Performed by: INTERNAL MEDICINE

## 2021-12-27 PROCEDURE — 76040000025: Performed by: INTERNAL MEDICINE

## 2021-12-27 PROCEDURE — C9113 INJ PANTOPRAZOLE SODIUM, VIA: HCPCS | Performed by: INTERNAL MEDICINE

## 2021-12-27 PROCEDURE — 74011250636 HC RX REV CODE- 250/636: Performed by: STUDENT IN AN ORGANIZED HEALTH CARE EDUCATION/TRAINING PROGRAM

## 2021-12-27 PROCEDURE — 83735 ASSAY OF MAGNESIUM: CPT

## 2021-12-27 PROCEDURE — 99222 1ST HOSP IP/OBS MODERATE 55: CPT | Performed by: INTERNAL MEDICINE

## 2021-12-27 PROCEDURE — 2709999900 HC NON-CHARGEABLE SUPPLY: Performed by: INTERNAL MEDICINE

## 2021-12-27 PROCEDURE — 74011250637 HC RX REV CODE- 250/637: Performed by: STUDENT IN AN ORGANIZED HEALTH CARE EDUCATION/TRAINING PROGRAM

## 2021-12-27 PROCEDURE — 2709999900 HC NON-CHARGEABLE SUPPLY

## 2021-12-27 PROCEDURE — 76060000032 HC ANESTHESIA 0.5 TO 1 HR: Performed by: INTERNAL MEDICINE

## 2021-12-27 RX ORDER — AMIODARONE HYDROCHLORIDE 200 MG/1
200 TABLET ORAL DAILY
Status: DISCONTINUED | OUTPATIENT
Start: 2021-12-27 | End: 2021-12-27 | Stop reason: SDUPTHER

## 2021-12-27 RX ORDER — SODIUM CHLORIDE, SODIUM LACTATE, POTASSIUM CHLORIDE, CALCIUM CHLORIDE 600; 310; 30; 20 MG/100ML; MG/100ML; MG/100ML; MG/100ML
25 INJECTION, SOLUTION INTRAVENOUS CONTINUOUS
Status: CANCELLED | OUTPATIENT
Start: 2021-12-27 | End: 2021-12-28

## 2021-12-27 RX ORDER — PROPOFOL 10 MG/ML
INJECTION, EMULSION INTRAVENOUS AS NEEDED
Status: DISCONTINUED | OUTPATIENT
Start: 2021-12-27 | End: 2021-12-27 | Stop reason: HOSPADM

## 2021-12-27 RX ORDER — AMIODARONE HYDROCHLORIDE 200 MG/1
200 TABLET ORAL DAILY
Status: DISCONTINUED | OUTPATIENT
Start: 2021-12-28 | End: 2022-01-06 | Stop reason: HOSPADM

## 2021-12-27 RX ORDER — PROPOFOL 10 MG/ML
INJECTION, EMULSION INTRAVENOUS
Status: DISCONTINUED | OUTPATIENT
Start: 2021-12-27 | End: 2021-12-27 | Stop reason: HOSPADM

## 2021-12-27 RX ORDER — SODIUM CHLORIDE, SODIUM LACTATE, POTASSIUM CHLORIDE, CALCIUM CHLORIDE 600; 310; 30; 20 MG/100ML; MG/100ML; MG/100ML; MG/100ML
100 INJECTION, SOLUTION INTRAVENOUS CONTINUOUS
Status: CANCELLED | OUTPATIENT
Start: 2021-12-27

## 2021-12-27 RX ORDER — POLYETHYLENE GLYCOL 3350 17 G/17G
255 POWDER, FOR SOLUTION ORAL ONCE
Status: DISCONTINUED | OUTPATIENT
Start: 2021-12-27 | End: 2021-12-27 | Stop reason: SDUPTHER

## 2021-12-27 RX ORDER — POTASSIUM CHLORIDE 20 MEQ/1
20 TABLET, EXTENDED RELEASE ORAL 2 TIMES DAILY
Status: DISPENSED | OUTPATIENT
Start: 2021-12-27 | End: 2021-12-28

## 2021-12-27 RX ORDER — DEXTROSE MONOHYDRATE AND SODIUM CHLORIDE 5; .45 G/100ML; G/100ML
50 INJECTION, SOLUTION INTRAVENOUS CONTINUOUS
Status: DISPENSED | OUTPATIENT
Start: 2021-12-27 | End: 2021-12-28

## 2021-12-27 RX ORDER — POLYETHYLENE GLYCOL 3350 17 G/17G
238 POWDER, FOR SOLUTION ORAL ONCE
Status: COMPLETED | OUTPATIENT
Start: 2021-12-27 | End: 2021-12-27

## 2021-12-27 RX ORDER — SODIUM CHLORIDE, SODIUM LACTATE, POTASSIUM CHLORIDE, CALCIUM CHLORIDE 600; 310; 30; 20 MG/100ML; MG/100ML; MG/100ML; MG/100ML
INJECTION, SOLUTION INTRAVENOUS
Status: DISCONTINUED | OUTPATIENT
Start: 2021-12-27 | End: 2021-12-27 | Stop reason: HOSPADM

## 2021-12-27 RX ORDER — BISACODYL 5 MG
10 TABLET, DELAYED RELEASE (ENTERIC COATED) ORAL
Status: COMPLETED | OUTPATIENT
Start: 2021-12-27 | End: 2021-12-27

## 2021-12-27 RX ORDER — LIDOCAINE HYDROCHLORIDE 20 MG/ML
INJECTION, SOLUTION EPIDURAL; INFILTRATION; INTRACAUDAL; PERINEURAL AS NEEDED
Status: DISCONTINUED | OUTPATIENT
Start: 2021-12-27 | End: 2021-12-27 | Stop reason: HOSPADM

## 2021-12-27 RX ORDER — FAMOTIDINE 20 MG/1
20 TABLET, FILM COATED ORAL AS NEEDED
Status: CANCELLED | OUTPATIENT
Start: 2021-12-27

## 2021-12-27 RX ORDER — DEXTROSE MONOHYDRATE 50 MG/ML
50 INJECTION, SOLUTION INTRAVENOUS CONTINUOUS
Status: DISCONTINUED | OUTPATIENT
Start: 2021-12-27 | End: 2021-12-27

## 2021-12-27 RX ADMIN — AMIODARONE HYDROCHLORIDE 200 MG: 200 TABLET ORAL at 08:39

## 2021-12-27 RX ADMIN — POLYETHYLENE GLYCOL 3350 238 G: 17 POWDER, FOR SOLUTION ORAL at 15:05

## 2021-12-27 RX ADMIN — POTASSIUM CHLORIDE 20 MEQ: 20 TABLET, EXTENDED RELEASE ORAL at 17:18

## 2021-12-27 RX ADMIN — LIDOCAINE HYDROCHLORIDE 100 MG: 20 INJECTION, SOLUTION EPIDURAL; INFILTRATION; INTRACAUDAL; PERINEURAL at 13:51

## 2021-12-27 RX ADMIN — SODIUM CHLORIDE 40 MG: 9 INJECTION INTRAMUSCULAR; INTRAVENOUS; SUBCUTANEOUS at 08:40

## 2021-12-27 RX ADMIN — LEVOTHYROXINE SODIUM 50 MCG: 50 TABLET ORAL at 05:23

## 2021-12-27 RX ADMIN — PROPOFOL 100 MCG/KG/MIN: 10 INJECTION, EMULSION INTRAVENOUS at 13:51

## 2021-12-27 RX ADMIN — FOLIC ACID 1 MG: 1 TABLET ORAL at 15:02

## 2021-12-27 RX ADMIN — DEXTROSE MONOHYDRATE AND SODIUM CHLORIDE 50 ML/HR: 5; .45 INJECTION, SOLUTION INTRAVENOUS at 16:13

## 2021-12-27 RX ADMIN — FUROSEMIDE 40 MG: 10 INJECTION, SOLUTION INTRAMUSCULAR; INTRAVENOUS at 17:18

## 2021-12-27 RX ADMIN — PROPOFOL 10 MG: 10 INJECTION, EMULSION INTRAVENOUS at 13:54

## 2021-12-27 RX ADMIN — Medication 30 ML: at 20:17

## 2021-12-27 RX ADMIN — SODIUM CHLORIDE, PRESERVATIVE FREE 40 ML: 5 INJECTION INTRAVENOUS at 13:15

## 2021-12-27 RX ADMIN — Medication 400 MG: at 15:03

## 2021-12-27 RX ADMIN — METOPROLOL SUCCINATE 50 MG: 50 TABLET, EXTENDED RELEASE ORAL at 08:39

## 2021-12-27 RX ADMIN — FUROSEMIDE 40 MG: 10 INJECTION, SOLUTION INTRAMUSCULAR; INTRAVENOUS at 08:39

## 2021-12-27 RX ADMIN — ROSUVASTATIN CALCIUM 10 MG: 5 TABLET, FILM COATED ORAL at 20:11

## 2021-12-27 RX ADMIN — Medication 400 MG: at 17:18

## 2021-12-27 RX ADMIN — SODIUM CHLORIDE, SODIUM LACTATE, POTASSIUM CHLORIDE, AND CALCIUM CHLORIDE: 600; 310; 30; 20 INJECTION, SOLUTION INTRAVENOUS at 13:43

## 2021-12-27 RX ADMIN — SODIUM CHLORIDE 40 MG: 9 INJECTION INTRAMUSCULAR; INTRAVENOUS; SUBCUTANEOUS at 20:17

## 2021-12-27 RX ADMIN — DULOXETINE HYDROCHLORIDE 60 MG: 60 CAPSULE, DELAYED RELEASE ORAL at 15:03

## 2021-12-27 RX ADMIN — PROPOFOL 20 MG: 10 INJECTION, EMULSION INTRAVENOUS at 13:51

## 2021-12-27 RX ADMIN — BISACODYL 10 MG: 5 TABLET, COATED ORAL at 15:02

## 2021-12-27 RX ADMIN — Medication 10 ML: at 03:07

## 2021-12-27 NOTE — ANESTHESIA PREPROCEDURE EVALUATION
Relevant Problems   RESPIRATORY SYSTEM   (+) Community acquired bacterial pneumonia      NEUROLOGY   (+) Depression   (+) Major depressive disorder, recurrent, moderate (HCC)   (+) TIA (transient ischemic attack)      CARDIOVASCULAR   (+) Hypertension, essential, benign   (+) Paroxysmal atrial fibrillation (HCC)      RENAL FAILURE   (+) Stage 3 chronic kidney disease (HCC)      ENDOCRINE   (+) Acquired hypothyroidism   (+) Rheumatoid arthritis (HCC)      HEMATOLOGY   (+) Anemia       Anesthetic History   No history of anesthetic complications            Review of Systems / Medical History  Patient summary reviewed and pertinent labs reviewed    Pulmonary          Pneumonia and shortness of breath    Pertinent negatives: Smoker: ex. Comments: On oxygen 2liters at home   Neuro/Psych       CVA (left leg weakness)  TIA and psychiatric history (depression)     Cardiovascular    Hypertension        Dysrhythmias : atrial fibrillation  Hyperlipidemia    Exercise tolerance: <4 METS: Cane/walker  Comments: xarelto and Serge--on hold    Nl EF   GI/Hepatic/Renal  Within defined limits       Renal disease: CRI      Comments: Solitary kidney Endo/Other      Hypothyroidism  Arthritis     Other Findings   Comments: hgb 3.3 on admission, now 8.5 after 3 units of blood in the ER yesterday. One unit running now after hgb 6.6 today.             Physical Exam    Airway  Mallampati: I  TM Distance: 4 - 6 cm  Neck ROM: normal range of motion   Mouth opening: Normal     Cardiovascular  Regular rate and rhythm,  S1 and S2 normal,  no murmur, click, rub, or gallop  Rhythm: regular  Rate: normal         Dental    Dentition: Full upper dentures     Pulmonary  Breath sounds clear to auscultation               Abdominal  GI exam deferred       Other Findings            Anesthetic Plan    ASA: 4  Anesthesia type: total IV anesthesia          Induction: Intravenous  Anesthetic plan and risks discussed with: Patient

## 2021-12-27 NOTE — H&P
[facility] Cardiology Initial Cardiac Evaluation                 Date of  Admission: 12/25/2021  4:57 PM     Primary Care Physician: Vale Baker MD  Primary Cardiologist: Dr Mumtaz Cheng  Referring Physician: Dr Rosio Griffith  Attending Physician: Dr Terry Bhagat    CC: CHF, PAF      Madhuri Chance is a 80 y.o. female admitted for GI bleed [K92.2]. She has a h/o paroxysmal atrial fibrillation on amiodarone and Eliquis (dx 3/2021), pericardial effusion (3-2021), HTN, HLD, hypothyroidism, TIA, depression, breast cancer s/p radiation, rheumatoid arthritis, CKD, and dHF w echo 5-2021 w EF 55-60% w mild LAE. Has developed renal failure in past w diuresis. She smoked 1 pack per week from age 15 until age 68 when she quit. FH neg CAD. She has had increasing SOB x several weeks, gradually worse, with LAI and orthopnea, rare cough, no F/C, no CP, dizziness, palpitations or syncope. She frequently falls to her left though not light headed. She remains on a low NA diet. Had been on po lasix at home. Increased LE edema though weight overall stable. She has not missed any doses of xarelto at home. No overt bleeding. She had a TIA four years ago she thinks. SOB worse w diarrhea and she came to the ER to be evaluated. In ER WBC 12.7, hgb 3.7, nA 145, K 3.5, cr 1.38. No EKG but on monitor remains NSR (HR recorded at times jumping to 160 inaccurate per nursing- tele monitor reviewed and no tachycardia). /63. Her xarelto, amio and toprol have been held. GI consulted and colonoscopy pending. pBNP 7100, CXR pulmonary edema. She has been transfused 3 units to hgb 8.2 and her SOB is improved. Given IV lasix w - 700 cc diuresis.       Past cardiac work up:  dHF w echo 5-2021 w EF 55-60% w mild LAE  A fib dx 3-2021- eliquis, amio    Soc: smoked 1 pack per week from age 15 until age 68 when she quit FH : neg CAD  Covid: Not vaccinated    Patient Active Problem List   Diagnosis Code    Acquired hypothyroidism E03.9    TIA (transient ischemic attack) G45.9    Hyperlipidemia E78.5    Rheumatoid arthritis ([facility] M06.9    Right carotid bruit R09.89    Hypertension, essential, benign I10    Osteopenia M85.80    Major depressive disorder, recurrent, moderate ([facility] F33.1    Nicotine dependence, cigarettes, uncomplicated X52.162    Osteoporosis M81.0    Depression F32. A    Hypokalemia E87.6    Tobacco abuse Z72.0    Seps[facility] A41.9    Community acquired bacterial pneumonia J15.9    Prolonged Q-T interval on ECG R94.31    Paroxysmal atrial fibrillation (HCC) I48.0    Pericarditis with effusion I31.9    Anemia D64.9    Rib fracture S22.39XA    Stage 3 chronic kidney disease (HCC) N18.30    Solitary kidney [id number]    Hypoalbuminemia due to protein-calorie malnutrition (HCC) H50.30, R25    Diastolic CHF, chronic (HCC) I50.32    Leukocytosis D72.829    GI bleed K92.2       Past Medical History:   Diagnosis Date    Acquired hypothyroidism 1/16/2016    Breast cancer ([facility] 2008    Depression 8/18/2016    Endocrine disease     hypothyroid    Fungal dermatitis 8/18/2016    Hyperlipidemia 1/16/2016    Hypertension, essential, benign 8/18/2016    Hypokalemia 8/18/2016    Inflamed sebaceous cyst 8/18/2016    Major depressive disorder, recurrent, moderate ([facility] 8/18/2016    Nicotine dependence, cigarettes, uncomplicated 4/87/5611    Osteopenia 8/18/2016    Osteoporosis 8/18/2016    Radiation therapy complication 4868    Rheumatoid arthritis ([facility] 1/16/2016    Right carotid bruit 1/16/2016    TIA (transient ischemic attack) 1/16/2016    Tobacco abuse 8/18/2016      Past Surgical History:   Procedure Laterality Date    HX ADENOIDECTOMY      HX BREAST LUMPECTOMY Right 2008    with lymph nodes    HX TONSILLECTOMY      IR BX BONE MARROW DIAGNOSTIC  5/14/2021    IR INSERT NON TUNL CVC OVER 5 YRS  5/14/2021     Allergies   Allergen Reactions    Eliquis [Apixaban] Other (comments)     Patient states she had hallucinations from Eliquis      Family History   Problem Relation Age of Onset    Stroke Mother     Cancer Father         Brain    HIV/AIDS Brother       Social History     Tobacco Use    Smoking status: Current Some Day Smoker    Smokeless tobacco: Never Used    Tobacco comment: Only on pay day-1/2 pack   Substance Use Topics    Alcohol use: No        Current Facility-Administered Medications   Medication Dose Route Frequency    potassium chloride (K-DUR, KLOR-CON M20) SR tablet 20 mEq  20 mEq Oral BID    magnesium oxide (MAG-OX) tablet 400 mg  400 mg Oral BID    furosemide (LASIX) injection 40 mg  40 mg IntraVENous BID    levothyroxine (SYNTHROID) tablet 50 mcg  50 mcg Oral ACB    [Held by provider] amiodarone (CORDARONE) tablet 200 mg  200 mg Oral DAILY    DULoxetine (CYMBALTA) capsule 60 mg  60 mg Oral DAILY    folic acid (FOLVITE) tablet 1 mg  1 mg Oral DAILY    [Held by provider] furosemide (LASIX) tablet 20 mg  20 mg Oral DAILY    [Held by provider] metoprolol succinate (TOPROL-XL) XL tablet 50 mg  50 mg Oral DAILY    pantoprazole (PROTONIX) 40 mg in 0.9% sodium chloride 10 mL injection  40 mg IntraVENous Q12H    rosuvastatin (CRESTOR) tablet 10 mg  10 mg Oral QHS    sodium chloride (NS) flush 5-40 mL  5-40 mL IntraVENous Q8H    sodium chloride (NS) flush 5-40 mL  5-40 mL IntraVENous PRN    acetaminophen (TYLENOL) tablet 650 mg  650 mg Oral Q6H PRN    Or    acetaminophen (TYLENOL) suppository 650 mg  650 mg Rectal Q6H PRN    polyethylene glycol (MIRALAX) packet 17 g  17 g Oral DAILY PRN    ondansetron (ZOFRAN ODT) tablet 4 mg  4 mg Oral Q8H PRN    Or    ondansetron (ZOFRAN) injection 4 mg  4 mg IntraVENous Q6H PRN       Review of Symptoms:  General: no weight change,  + weakness, no fever or chills  Skin: no rashes, lumps, or other skin changes  HEENT: no headache, + decreased hearing   Neck: no swollen glands, goiter, pain or stiffness  Respiratory: rare dry cough, sputum, hemoptysis, + dyspnea, wheezing  Cardiovascular: + as per HPI  Gastrointestinal: + GI bleed, diarrhea- GI following   Urinary: no frequency, urgency , hematuria, burning/pain with urination, recent flank pain, polyuria, nocturia, or difficulty urinating  Peripheral Vascular: no claudication, leg cramps, prior DVTs, swelling of calves, legs, or feet, color change, or swelling with redness or tenderness  Musculoskeletal: no muscle or joint pain/stiffness, joint swelling, erythema of joints, or back pain  Psychiatric: no depression or excessive stress  Neurological: ? TIA in 2018  Hematologic: + anemia  Endocrine: + thyroid problems, heat or cold intolerance, excessive sweating, polyuria, polydipsia, no diabetes.        Physical Exam  Vitals:    12/27/21 0402 12/27/21 0403 12/27/21 0501 12/27/21 0602   BP:   (!) 111/54 134/61   Pulse: 73  75 76   Resp: 11  (!) 35 (!) 43   Temp:       SpO2: 93% 91% 91% 93%   Weight:       Height:           Physical Exam:  General: Well Developed, Well Nourished, No Acute Distress, appears stated age, 3L O2 by NC  Head: normocephalic atraumatic   ENT: pupils equal and round, exophthalmos   Neck: supple, no JVD, no carotid bruits  Heart: S1S2 with RRR with  2/6 murmur  Lungs: Posteriorly few crackles at bases   Abd: soft, nontender, nondistended, with good bowel sounds  Ext: warm, 1+ B dependent edema   Skin: warm and dry  Psychiatric: Normal mood and affect  Neurologic: Alert and oriented X 3      Cardiographics    Telemetry: NSR w PVC      Labs:   Recent Labs     12/27/21  0301 12/26/21  0833 12/26/21  0303 12/26/21  0303 12/26/21  0300 12/25/21  2309 12/25/21  1730 12/25/21  1730     --   --   --  144  --   --  146*   K 3.5  --   --   --  4.0  --   --  4.3   MG 2.1  --   --   --  1.8  --   --  1.9   BUN 35*  --   --   --  41*  --   --  39*   CREA 1.38*  --   --   --  1.29*  --   --  1.34*   GLU 84  --   --   --  99  --   --  128*   WBC 12.7*  --   --  10.6  --   --    < > 9.7   HGB 8.2* 8.6*   < > 6.1* --    < >   < > 3.7*   HCT 25.6* 26.5*   < > 19.8*  --    < >   < > 13.5*     --   --  169  --   --    < > 200   INR  --   --   --   --  1.8  --   --  3.6    < > = values in this interval not displayed. Assessment/Plan:     Assessment:    GI bleed (12/25/2021)- second significant GI bleed/drop in hgb this year   -GI following, pending colonoscopy   -xarelto on hold, may need to stop if recurrent GI bleeding    [facility]- 5-2021 EF 55-60% w h/o pericardial effusion   - repeat echo pending   - assess response to IV lasix, monitor volume status closely    PAF- dx in 3-2021 w infection, no recurrence on amio/toprol and xarelto   - with h/o thyroid problems may consider stopping amio and     see if she maintains sinus   - restart toprol when BP will allow   - hold xarelto, may need to stop w recurrent anemia    Acquired hypothyroidism (1/16/2016)- h/o Graves disease   - synthroid    Hyperlipidemia (1/16/2016)- crestor    Rheumatoid arthritis ([facility] (1/16/2016)- per primary    Hypertension, essential, benign (8/18/2016)- BP low, toprol on hold    Stage 3 chronic kidney disease ([facility] (5/20/2021)- monitor closely w diuresis    Thank you very much for this referral. We appreciate the opportunity to participate in this patient's care. We will follow along with above stated plan.     Shari Leon PA-C  Consulting MD: [employer]

## 2021-12-27 NOTE — H&P
History and Physical for Procedures             Date: 12/27/2021     History of Present Illness:  Patient presents to undergo EGD with push enteroscopy.      Past Medical History:   Diagnosis Date    Acquired hypothyroidism 1/16/2016    Breast cancer ([facility] 2008    Depression 8/18/2016    Endocrine disease     hypothyroid    Fungal dermatitis 8/18/2016    Hyperlipidemia 1/16/2016    Hypertension, essential, benign 8/18/2016    Hypokalemia 8/18/2016    Inflamed sebaceous cyst 8/18/2016    Major depressive disorder, recurrent, [facility] 8/18/2016    Nicotine dependence, cigarettes, uncomplicated 8/36/8641    Osteopenia 8/18/2016    Osteoporosis 8/18/2016    Radiation therapy complication 4494    Rheumatoid arthritis ([facility] 1/16/2016    Right carotid bruit 1/16/2016    TIA (transient ischemic attack) 1/16/2016    Tobacco abuse 8/18/2016     Past Surgical History:   Procedure Laterality Date    HX ADENOIDECTOMY      HX BREAST LUMPECTOMY Right 2008    with lymph nodes    HX TONSILLECTOMY      IR BX BONE MARROW DIAGNOSTIC  5/14/2021    IR INSERT NON TUNL CVC OVER 5 YRS  5/14/2021      Family History   Problem Relation Age of Onset    Stroke Mother     Cancer Father         Brain    HIV/AIDS Brother      Social History     Tobacco Use    Smoking status: Current Some Day Smoker    Smokeless tobacco: Never Used    Tobacco comment: Only on pay day-1/2 pack   Substance Use Topics    Alcohol use: No        Allergies   Allergen Reactions    Eliquis [Apixaban] Other (comments)     Patient states she had hallucinations from Eliquis     Current Facility-Administered Medications   Medication Dose Route Frequency    potassium chloride (K-DUR, KLOR-CON M20) SR tablet 20 mEq  20 mEq Oral BID    magnesium oxide (MAG-OX) tablet 400 mg  400 mg Oral BID    furosemide (LASIX) injection 40 mg  40 mg IntraVENous BID    levothyroxine (SYNTHROID) tablet 50 mcg  50 mcg Oral ACB    amiodarone (CORDARONE) tablet 200 mg  200 mg Oral DAILY    DULoxetine (CYMBALTA) capsule 60 mg  60 mg Oral DAILY    folic acid (FOLVITE) tablet 1 mg  1 mg Oral DAILY    [Held by provider] furosemide (LASIX) tablet 20 mg  20 mg Oral DAILY    metoprolol succinate (TOPROL-XL) XL tablet 50 mg  50 mg Oral DAILY    pantoprazole (PROTONIX) 40 mg in 0.9% sodium chloride 10 mL injection  40 mg IntraVENous Q12H    rosuvastatin (CRESTOR) tablet 10 mg  10 mg Oral QHS    sodium chloride (NS) flush 5-40 mL  5-40 mL IntraVENous Q8H    sodium chloride (NS) flush 5-40 mL  5-40 mL IntraVENous PRN    acetaminophen (TYLENOL) tablet 650 mg  650 mg Oral Q6H PRN    Or    acetaminophen (TYLENOL) suppository 650 mg  650 mg Rectal Q6H PRN    polyethylene glycol (MIRALAX) packet 17 g  17 g Oral DAILY PRN    ondansetron (ZOFRAN ODT) tablet 4 mg  4 mg Oral Q8H PRN    Or    ondansetron (ZOFRAN) injection 4 mg  4 mg IntraVENous Q6H PRN        Review of Systems:  A detailed 10 organ review of systems is obtained with pertinent positives as listed in the History of Present Illness. All others are negative. Objective:     Physical Exam:  Visit Vitals  /65   Pulse 67   Temp 98 °F (36.7 °C)   Resp 25   Ht 5' (1.524 m)   Wt 71 kg (156 lb 8.4 oz)   SpO2 100%   BMI 30.57 kg/m²      General:  Alert and oriented. Heart: Regular rate and rhythm  Lungs:  Clear to auscultation bilaterally  Abdomen: Soft, nontender, nondistended    Impression/Plan:     Proceed with EGD with push enteroscopy as planned. I have discussed with the patient the technique, benefits, alternatives, and risks of these procedures, including medication reaction, immediate or delayed bleeding, or perforation of the gastrointestinal tract.      Signed By: Malvin Segovia MD     December 27, 2021

## 2021-12-27 NOTE — PROGRESS NOTES
Hospitalist Progress Note   Admit Date:  2021  4:57 PM   Name:  Norman Wright   Age:  80 y.o. Sex:  female  :  1940   MRN:  [id number]   Room:      Presenting Complaint: Shortness of Breath    Reason(s) for Admission: GI bleed [facility] Course & Interval History:   Norman Wright is a 80 y.o. female with medical history of hypothyroidism, history of Graves' disease, atrial fibrillation on Xarelto, chronic anemia, CKD stage III. Patient recently admitted  for progressive anemia with hemoglobin on arrival of 3.3. GI was consulted and patient underwent EGD on 21 that was unremarkable. Heme-onc was consulted and labs were consistent with iron deficiency anemia and patient was given IV iron. Xarelto was resumed on  and hemoglobin greater than 8 on discharge. Patient presents today with report of increasing shortness of breath. She appears pale. Was found to have large melanotic stool on exam.        ER work-up notable for WBC 9, hemoglobin 3.7, platelets 868, INR 3.6, sodium 146, potassium 4.3, glucose 128, creatinine 1.34, albumin 1.6, LFTs within normal limits, lactic acid 5.1, proBNP 7085  Checks x-ray notable for pulmonary edema. Subjective (21): Patient is sitting comfortably in the chair and saturating well on 2 L of nasal cannula. Patient denies hematochezia or bleeding, chest pain, vomiting, diarrhea    Assessment & Plan:     # ABLA  # GI bleeding - likely upper w/ melanotic stool on exam  - Kcentra given for INR 3 (pt on xarelto)   S/p 3 units of PRBC  EGD with push enteroscopy showed a duodenal and jejunal angiodysplasias and treated with APC.   Follow-up with colonoscopy tomorrow  -Continue PPI BID  - serial H&H and  Transfuse for hgb <7  Started on clear liquids   GI is following and appreciate the recommendations    Acute hypoxemic respiratory failure  Most likely due to CHF   Patient is saturating well on 2 L of nasal cannula  BNP is elevated 7000 and trended down to 5000  Chest x-ray showed pulmonary congestion   repeat chest x-ray showed bilateral pleural effusions, left greater than right  F/u  echo  Continue Lasix 40 IV BID      # LUIS ARMANDO  Most likely due to angiodysplasias   hgb of 3 and after receiving blood transfusions repeat hemoglobin is 8  - seen by heme on prior admission- dx'd with LUIS ARMANDO  - currently getting blood transfusions, may need additional iron        # PAfib  - Currently rate controlled  - holding amiodarone with history of thyroid problem  - holding lopressor for soft BPs and will resume when blood pressure improves  Holding Xarelto and may need to stop if recurrent GI bleeding     # CDK stage 3  - Cr 1.3 at baseline  - monitor BMP in Am     # Chronic diastolic CHF  - pulm edema on CXR - BNP >7K and BNP is trending down 5K today  Will repeat chest x-ray today  -Started on Lasix IV 40mg   Obtain echo       # hypothyroidism  TSH is 17.3 noted and on 12/13  - increased the dose of synthroid       Dispo/Discharge Planning:   Anticipate 3-4 night stay       Diet:  DIET NPO  ADULT DIET Clear Liquid  DVT PPx: SCDs  Code status: Full Code    Hospital Problems as of 12/27/2021 Date Reviewed: 12/27/2021          Codes Class Noted - Resolved POA    * (Principal) GI bleed ICD-10-CM: K92.2  ICD-9-CM: 578.9  12/25/2021 - Present Unknown        Diastolic CHF, chronic (HCC) ICD-10-CM: I50.32  ICD-9-CM: 428.32, 428.0  6/13/2021 - Present Yes        Stage 3 chronic kidney disease ([facility] [address] (Chronic) ICD-10-CM: N18.30  ICD-9-CM: 585.3  5/20/2021 - Present Yes        Hypertension, essential, benign (Chronic) ICD-10-CM: I10  ICD-9-CM: 401.1  8/18/2016 - Present Yes        Acquired hypothyroidism (Chronic) ICD-10-CM: E03.9  ICD-9-CM: 244.9  1/16/2016 - Present Yes        Hyperlipidemia (Chronic) ICD-10-CM: E78.5  ICD-9-CM: 272.4  1/16/2016 - Present Yes        Rheumatoid arthritis ([facility] (Chronic) ICD-10-CM: M06.9  ICD-9-CM: 714.0 1/16/2016 - Present Yes              Objective:     Patient Vitals for the past 24 hrs:   Temp Pulse Resp BP SpO2   12/27/21 1430 96.8 °F (36 °C) 66 14 126/70 92 %   12/27/21 1156  (!) 145 (!) 45 139/63 100 %   12/27/21 1056 97.8 °F (36.6 °C) 67 10 (!) 143/59 100 %   12/27/21 0956  67 25 137/65 100 %   12/27/21 0942     (!) 88 %   12/27/21 0941  69 29  (!) 87 %   12/27/21 0856  73 16 128/60 94 %   12/27/21 0839  73  134/63    12/27/21 0756  74 21 134/63 94 %   12/27/21 0656 98 °F (36.7 °C) (!) 160 22 138/63 94 %   12/27/21 0602  76 (!) 43 134/61 93 %   12/27/21 0501  75 (!) 35 (!) 111/54 91 %   12/27/21 0403     91 %   12/27/21 0402  73 11  93 %   12/27/21 0401  74 22 (!) 112/56 92 %   12/27/21 0302 97.4 °F (36.3 °C) 74 9 (!) 116/53 96 %   12/27/21 0202  76 19 (!) 143/65 100 %   12/27/21 0102  74 16 (!) 107/53 92 %   12/27/21 0101  75      12/27/21 0006  74 13  91 %   12/27/21 0005  (!) 167 (!) 39  (!) 89 %   12/27/21 0004  93 17  92 %   12/27/21 0003  (!) 141 (!) 41 (!) 111/51 91 %   12/27/21 0002  75 13  91 %   12/26/21 2302 98.4 °F (36.9 °C) 76 11 120/60 92 %   12/26/21 2301  75 9  92 %   12/26/21 2300  (!) 107 13  92 %   12/26/21 2257 98.4 °F (36.9 °C)       12/26/21 2249     92 %   12/26/21 2202  93 17 110/72 92 %   12/26/21 2103  72 26 (!) 108/53 94 %   12/26/21 2002  73 12 (!) 115/58 99 %   12/26/21 1931  74 13 (!) 106/54 99 %   12/26/21 1902 97.4 °F (36.3 °C) 74 17 (!) 123/55 98 %   12/26/21 1901  74 14  98 %   12/26/21 1900  73 21  99 %   12/26/21 1832  (!) 169 11 126/62 100 %   12/26/21 1802  77  (!) 110/53    12/26/21 1754  76 15 (!) 110/53 100 %   12/26/21 1724  75 15 (!) 108/54 100 %   12/26/21 1654  (!) 176 20 121/72 99 %   12/26/21 1624  72 19 (!) 107/55 100 %   12/26/21 1554  75 18 (!) 113/56 100 %   12/26/21 1524  78 19 (!) 115/56 100 %     Oxygen Therapy  O2 Sat (%): 92 % (12/27/21 1430)  Pulse via Oximetry: 71 beats per minute (12/27/21 1156)  O2 Device: Oxygen mask (12/27/21 1430)  Skin Assessment: Clean, dry, & intact (12/26/21 1102)  Skin Protection for O2 Device: No (12/25/21 2156)  O2 Flow Rate (L/min): 2 l/min (12/27/21 1056)  O2 Temperature: 87.8 °F (31 °C) (12/26/21 1230)  FIO2 (%): 50 % (12/26/21 1230)    Estimated body mass index is 30.57 kg/m² as calculated from the following:    Height as of this encounter: 5' (1.524 m). Weight as of this encounter: 71 kg (156 lb 8.4 oz). Intake/Output Summary (Last 24 hours) at 12/27/2021 1510  Last data filed at 12/27/2021 1410  Gross per 24 hour   Intake 860 ml   Output 2875 ml   Net -2015 ml         Physical Exam:     Blood pressure (!) 129/58, pulse 75, temperature (!) 96.4 °F (35.8 °C), resp. rate 25, height 5' (1.524 m), weight 72 kg (158 lb 11.7 oz), SpO2 100 %. General:    Well nourished. No overt distress  Head:  Normocephalic, atraumatic  Eyes:  Sclerae appear normal.  Pupils equally round. Exophthalmos is present bilaterally  ENT:  Nares appear normal, no drainage. Moist oral mucosa  Neck:  No restricted ROM. Trachea midline   CV:   RRR. No m/r/g. No jugular venous distension. Lungs:   CTAB. No wheezing, rhonchi, or rales. Respirations even, unlabored  Abdomen: Bowel sounds present. Soft, nontender, nondistended. Extremities: No cyanosis or clubbing. No edema  Skin:     No rashes and normal coloration. Warm and dry. Neuro:  CN II-XII grossly intact. Sensation intact. A&Ox3  Psych:  Normal mood and affect.       I have reviewed ordered lab tests and independently visualized imaging below:    Recent Labs:  Recent Results (from the past 48 hour(s))   CBC WITH AUTOMATED DIFF    Collection Time: 12/25/21  5:30 PM   Result Value Ref Range    WBC 9.7 4.3 - 11.1 K/uL    RBC 1.38 (L) 4.05 - 5.2 M/uL    HGB 3.7 (LL) 11.7 - 15.4 g/dL    HCT 13.5 (L) 35.8 - 46.3 %    MCV 97.8 79.6 - 97.8 FL    MCH 26.8 26.1 - 32.9 PG    MCHC 27.4 (L) 31.4 - 35.0 g/dL    RDW 31.8 (H) 11.9 - 14.6 %    PLATELET 977 402 - 197 K/uL    MPV 11.6 9.4 - 12.3 FL    ABSOLUTE NRBC 0.02 0.0 - 0.2 K/uL    DF AUTOMATED      NEUTROPHILS 79 (H) 43 - 78 %    LYMPHOCYTES 12 (L) 13 - 44 %    MONOCYTES 8 4.0 - 12.0 %    EOSINOPHILS 0 (L) 0.5 - 7.8 %    BASOPHILS 0 0.0 - 2.0 %    IMMATURE GRANULOCYTES 1 0.0 - 5.0 %    ABS. NEUTROPHILS 7.6 1.7 - 8.2 K/UL    ABS. LYMPHOCYTES 1.2 0.5 - 4.6 K/UL    ABS. MONOCYTES 0.8 0.1 - 1.3 K/UL    ABS. EOSINOPHILS 0.0 0.0 - 0.8 K/UL    ABS. BASOPHILS 0.0 0.0 - 0.2 K/UL    ABS. IMM. GRANS. 0.1 0.0 - 0.5 K/UL   PROTHROMBIN TIME + INR    Collection Time: 12/25/21  5:30 PM   Result Value Ref Range    Prothrombin time 36.0 (H) 12.6 - 14.5 sec    INR 3.6     PTT    Collection Time: 12/25/21  5:30 PM   Result Value Ref Range    aPTT 38.5 (H) 24.1 - 35.1 SEC   LIPASE    Collection Time: 12/25/21  5:30 PM   Result Value Ref Range    Lipase 122 73 - 393 U/L   MAGNESIUM    Collection Time: 12/25/21  5:30 PM   Result Value Ref Range    Magnesium 1.9 1.8 - 2.4 mg/dL   METABOLIC PANEL, COMPREHENSIVE    Collection Time: 12/25/21  5:30 PM   Result Value Ref Range    Sodium 146 (H) 136 - 145 mmol/L    Potassium 4.3 3.5 - 5.1 mmol/L    Chloride 111 (H) 98 - 107 mmol/L    CO2 28 21 - 32 mmol/L    Anion gap 7 7 - 16 mmol/L    Glucose 128 (H) 65 - 100 mg/dL    BUN 39 (H) 8 - 23 MG/DL    Creatinine 1.34 (H) 0.6 - 1.0 MG/DL    GFR est AA 49 (L) >60 ml/min/1.73m2    GFR est non-AA 41 (L) >60 ml/min/1.73m2    Calcium 7.8 (L) 8.3 - 10.4 MG/DL    Bilirubin, total 0.4 0.2 - 1.1 MG/DL    ALT (SGPT) 38 12 - 65 U/L    AST (SGOT) 21 15 - 37 U/L    Alk.  phosphatase 42 (L) 50 - 136 U/L    Protein, total 4.3 (L) 6.3 - 8.2 g/dL    Albumin 1.6 (L) 3.2 - 4.6 g/dL    Globulin 2.7 2.3 - 3.5 g/dL    A-G Ratio 0.6 (L) 1.2 - 3.5     CULTURE, BLOOD    Collection Time: 12/25/21  5:30 PM    Specimen: Blood   Result Value Ref Range    Special Requests: LEFT  Antecubital        Culture result: NO GROWTH 2 DAYS     LACTIC ACID Collection Time: 12/25/21  5:30 PM   Result Value Ref Range    Lactic acid 5.1 (HH) 0.4 - 2.0 MMOL/L   RBC, ALLOCATE    Collection Time: 12/25/21  5:30 PM   Result Value Ref Range    HISTORY CHECKED? Historical check performed    NT-PRO BNP    Collection Time: 12/25/21  5:30 PM   Result Value Ref Range    NT pro-BNP 7,085 (H) <450 PG/ML   TYPE & SCREEN    Collection Time: 12/25/21  5:31 PM   Result Value Ref Range    Crossmatch Expiration 12/28/2021,2359     ABO/Rh(D) A POSITIVE     Antibody screen NEG     Comment BLOOD READY, CALLED ER AT 1831 12.25.21     Unit number [id number]     Blood component type RC LR     Unit division 00     Status of unit TRANSFUSED     Crossmatch result Compatible     Unit number [id number]     Blood component type RC LR     Unit division 00     Status of unit TRANSFUSED     Crossmatch result Compatible     Unit number [id number]     Blood component type RC LR     Unit division 00     Status of unit TRANSFUSED     Crossmatch result Compatible     Unit number [id number]     Blood component type  LR     Unit division 00     Status of unit ALLOCATED     Crossmatch result Compatible    CULTURE, BLOOD    Collection Time: 12/25/21  5:46 PM    Specimen: Blood   Result Value Ref Range    Special Requests: LEFT FOREARM      Culture result: NO GROWTH 2 DAYS     COVID-19 RAPID TEST    Collection Time: 12/25/21  6:23 PM   Result Value Ref Range    Specimen source NASAL      COVID-19 rapid test Not detected NOTD     LACTIC ACID    Collection Time: 12/25/21  7:44 PM   Result Value Ref Range    Lactic acid 3.0 (H) 0.4 - 2.0 MMOL/L   RBC, ALLOCATE    Collection Time: 12/25/21 10:45 PM   Result Value Ref Range    HISTORY CHECKED?  Historical check performed    HGB & HCT    Collection Time: 12/25/21 11:09 PM   Result Value Ref Range    HGB 4.9 (LL) 11.7 - 15.4 g/dL    HCT 15.8 (L) 35.8 - 02.5 %   METABOLIC PANEL, COMPREHENSIVE    Collection Time: 12/26/21  3:00 AM   Result Value Ref Range Sodium 144 136 - 145 mmol/L    Potassium 4.0 3.5 - 5.1 mmol/L    Chloride 111 (H) 98 - 107 mmol/L    CO2 30 21 - 32 mmol/L    Anion gap 3 (L) 7 - 16 mmol/L    Glucose 99 65 - 100 mg/dL    BUN 41 (H) 8 - 23 MG/DL    Creatinine 1.29 (H) 0.6 - 1.0 MG/DL    GFR est AA 51 (L) >60 ml/min/1.73m2    GFR est non-AA 42 (L) >60 ml/min/1.73m2    Calcium 7.9 (L) 8.3 - 10.4 MG/DL    Bilirubin, total 0.5 0.2 - 1.1 MG/DL    ALT (SGPT) 31 12 - 65 U/L    AST (SGOT) 21 15 - 37 U/L    Alk. phosphatase 36 (L) 50 - 136 U/L    Protein, total 4.0 (L) 6.3 - 8.2 g/dL    Albumin 1.5 (L) 3.2 - 4.6 g/dL    Globulin 2.5 2.3 - 3.5 g/dL    A-G Ratio 0.6 (L) 1.2 - 3.5     PROTHROMBIN TIME + INR    Collection Time: 12/26/21  3:00 AM   Result Value Ref Range    Prothrombin time 20.9 (H) 12.6 - 14.5 sec    INR 1.8     NT-PRO BNP    Collection Time: 12/26/21  3:00 AM   Result Value Ref Range    NT pro-BNP 5,896 (H) <450 PG/ML   MAGNESIUM    Collection Time: 12/26/21  3:00 AM   Result Value Ref Range    Magnesium 1.8 1.8 - 2.4 mg/dL   CBC WITH AUTOMATED DIFF    Collection Time: 12/26/21  3:03 AM   Result Value Ref Range    WBC 10.6 4.3 - 11.1 K/uL    RBC 2.19 (L) 4.05 - 5.2 M/uL    HGB 6.1 (LL) 11.7 - 15.4 g/dL    HCT 19.8 (L) 35.8 - 46.3 %    MCV 90.4 79.6 - 97.8 FL    MCH 27.9 26.1 - 32.9 PG    MCHC 30.8 (L) 31.4 - 35.0 g/dL    RDW 19.9 (H) 11.9 - 14.6 %    PLATELET 982 405 - 307 K/uL    MPV 12.0 9.4 - 12.3 FL    ABSOLUTE NRBC 0.02 0.0 - 0.2 K/uL    NEUTROPHILS 76 43 - 78 %    LYMPHOCYTES 13 13 - 44 %    MONOCYTES 9 4.0 - 12.0 %    EOSINOPHILS 0 (L) 0.5 - 7.8 %    BASOPHILS 1 0.0 - 2.0 %    IMMATURE GRANULOCYTES 1 0.0 - 5.0 %    ABS. NEUTROPHILS 8.0 1.7 - 8.2 K/UL    ABS. LYMPHOCYTES 1.4 0.5 - 4.6 K/UL    ABS. MONOCYTES 1.0 0.1 - 1.3 K/UL    ABS. EOSINOPHILS 0.0 0.0 - 0.8 K/UL    ABS. BASOPHILS 0.1 0.0 - 0.2 K/UL    ABS. IMM.  GRANS. 0.1 0.0 - 0.5 K/UL    DF AUTOMATED     RBC, ALLOCATE    Collection Time: 12/26/21  4:00 AM   Result Value Ref Range HISTORY CHECKED? Historical check performed    RBC, ALLOCATE    Collection Time: 12/26/21  7:30 AM   Result Value Ref Range    HISTORY CHECKED?  Historical check performed    HGB & HCT    Collection Time: 12/26/21  8:33 AM   Result Value Ref Range    HGB 8.6 (L) 11.7 - 15.4 g/dL    HCT 26.5 (L) 35.8 - 46.3 %   CBC W/O DIFF    Collection Time: 12/27/21  3:01 AM   Result Value Ref Range    WBC 12.7 (H) 4.3 - 11.1 K/uL    RBC 2.75 (L) 4.05 - 5.2 M/uL    HGB 8.2 (L) 11.7 - 15.4 g/dL    HCT 25.6 (L) 35.8 - 46.3 %    MCV 93.1 79.6 - 97.8 FL    MCH 29.8 26.1 - 32.9 PG    MCHC 32.0 31.4 - 35.0 g/dL    RDW 20.3 (H) 11.9 - 14.6 %    PLATELET 428 992 - 560 K/uL    MPV 11.1 9.4 - 12.3 FL    ABSOLUTE NRBC 0.07 0.0 - 0.2 K/uL   METABOLIC PANEL, BASIC    Collection Time: 12/27/21  3:01 AM   Result Value Ref Range    Sodium 145 136 - 145 mmol/L    Potassium 3.5 3.5 - 5.1 mmol/L    Chloride 107 98 - 107 mmol/L    CO2 33 (H) 21 - 32 mmol/L    Anion gap 5 (L) 7 - 16 mmol/L    Glucose 84 65 - 100 mg/dL    BUN 35 (H) 8 - 23 MG/DL    Creatinine 1.38 (H) 0.6 - 1.0 MG/DL    GFR est AA 47 (L) >60 ml/min/1.73m2    GFR est non-AA 39 (L) >60 ml/min/1.73m2    Calcium 7.7 (L) 8.3 - 10.4 MG/DL   MAGNESIUM    Collection Time: 12/27/21  3:01 AM   Result Value Ref Range    Magnesium 2.1 1.8 - 2.4 mg/dL   PHOSPHORUS    Collection Time: 12/27/21  3:01 AM   Result Value Ref Range    Phosphorus 3.1 2.3 - 3.7 MG/DL   NT-PRO BNP    Collection Time: 12/27/21  3:01 AM   Result Value Ref Range    NT pro-BNP 7,107 (H) <450 PG/ML       All Micro Results     Procedure Component Value Units Date/Time    CULTURE, BLOOD [[id number]] Collected: 12/25/21 1746    Order Status: Completed Specimen: Blood Updated: 12/27/21 0706     Special Requests: LEFT FOREARM        Culture result: NO GROWTH 2 DAYS       CULTURE, BLOOD [[id number]] Collected: 12/25/21 1730    Order Status: Completed Specimen: Blood Updated: 12/27/21 0706     Special Requests: -- LEFT  Antecubital       Culture result: NO GROWTH 2 DAYS       COVID-19 RAPID TEST [[id number]] Collected: 12/25/21 1823    Order Status: Completed Specimen: Nasopharyngeal Updated: 12/25/21 1859     Specimen source NASAL        COVID-19 rapid test Not detected        Comment:      The specimen is NEGATIVE for SARS-CoV-2, the novel coronavirus associated with COVID-19. A negative result does not rule out COVID-19. This test has been authorized by the FDA under an Emergency Use Authorization (EUA) for use by authorized laboratories. Fact sheet for Healthcare Providers: [employer]  Fact sheet for Patients: STYLIGHT.nz       Methodology: Isothermal Nucleic Acid Amplification               Other Studies:  XR CHEST PORT    Result Date: 12/27/2021  PORTABLE CHEST 1 VIEW HISTORY: Postoperative evaluation after cardiac surgery. COMPARISON: December 26, 2021 FINDINGS: A left pleural effusion is present with retrocardiac atelectasis or consolidation. There is a right pleural effusion. Surgical clips are present in the right axillary tail. Interstitial markings suggests volume overload/edema. Pleural effusions with lower lobe atelectasis or consolidation.       Current Meds:  Current Facility-Administered Medications   Medication Dose Route Frequency    potassium chloride (K-DUR, KLOR-CON M20) SR tablet 20 mEq  20 mEq Oral BID    magnesium oxide (MAG-OX) tablet 400 mg  400 mg Oral BID    furosemide (LASIX) injection 40 mg  40 mg IntraVENous BID    levothyroxine (SYNTHROID) tablet 50 mcg  50 mcg Oral ACB    amiodarone (CORDARONE) tablet 200 mg  200 mg Oral DAILY    DULoxetine (CYMBALTA) capsule 60 mg  60 mg Oral DAILY    folic acid (FOLVITE) tablet 1 mg  1 mg Oral DAILY    [Held by provider] furosemide (LASIX) tablet 20 mg  20 mg Oral DAILY    metoprolol succinate (TOPROL-XL) XL tablet 50 mg  50 mg Oral DAILY    pantoprazole (PROTONIX) 40 mg in 0.9% sodium chloride 10 mL injection  40 mg IntraVENous Q12H    rosuvastatin (CRESTOR) tablet 10 mg  10 mg Oral QHS    sodium chloride (NS) flush 5-40 mL  5-40 mL IntraVENous Q8H    sodium chloride (NS) flush 5-40 mL  5-40 mL IntraVENous PRN    acetaminophen (TYLENOL) tablet 650 mg  650 mg Oral Q6H PRN    Or    acetaminophen (TYLENOL) suppository 650 mg  650 mg Rectal Q6H PRN    polyethylene glycol (MIRALAX) packet 17 g  17 g Oral DAILY PRN    ondansetron (ZOFRAN ODT) tablet 4 mg  4 mg Oral Q8H PRN    Or    ondansetron (ZOFRAN) injection 4 mg  4 mg IntraVENous Q6H PRN       Signed:  Manolo Garnett MD    Part of this note may have been written by using a voice dictation software. The note has been proof read but may still contain some grammatical/other typographical errors.

## 2021-12-27 NOTE — PROGRESS NOTES
Chart reviewed. Consult received to assist with discharge planning. Attempted to meet with pt at bedside but pt sleeping. [address], Taoist friend, listed as primary contact. Lora Vale seems supportive. Lora Vale verified demographic information. PCP verified as Dr. Lenny Vargas. Pt lives in 1 story trailer alone, 6 steps to enter into the home. Pt independent with ADLs with assistive devices but does not drive prior to admission. Pt has DMEs such as oxygen (unknown provider), walker, wheelchair, and grab bars in bathroom. Lora Vale able to transport home, to doctor apt,  medications, and get groceries. Pt primary pharmacy is [facility]. Pt able to afford medications. Pt active with [facility] RN/PT/OT/aide/SW but Lora Vale reports they have not been to home to see pt as she was readmitted prior to visit. Pt has HCPOA. ACP complete, see ACP note. Pt currently on bedrest but may benefit from PT/OT eval once stable. Pt previously denied for STR on last visit. Readmission assessment complete. CM to continue to follow and monitor for any needs that may occur. Care Management Interventions  PCP Verified by CM: Yes (Dr. Lenny Vargas)  Mode of Transport at Discharge:  Other (see comment) (kaleb)  Transition of Care Consult (CM Consult): Discharge Planning  Discharge Durable Medical Equipment: No  Physical Therapy Consult: No  Occupational Therapy Consult: No  Speech Therapy Consult: No  Support Systems: Friend/Neighbor  Confirm Follow Up Transport: Friends  The Plan for Transition of Care is Related to the Following Treatment Goals : Return to baseline  [employer] Provided?: No  Discharge Location  Discharge Placement: Unable to determine at this time       Readmission Assessment  Number of days since last admission?: 1-7 days  Previous disposition: Home with Home Health  Who is being interviewed?: Caregiver Clifton Albarado)  What was the patient's/caregiver's perception as to why they think they needed to return back to the hospital?: Did not realize care needs would be so extensive  Did you visit your Primary Care Physician after you left the hospital, before you returned this time?: No  Why weren't you able to visit your PCP?: Other (Comment) (return prior to follow up appointment)  Did you see a specialist, such as Cardiac, Pulmonary, Orthopedic Physician, etc. after you left the hospital?: No  Who advised the patient to return to the hospital?: Self-referral  Does the patient report anything that got in the way of taking their medications?: No  In our efforts to provide the best possible care to you and others like you, can you think of anything that we could have done to help you after you left the hospital the first time, so that you might not have needed to return so soon?: Additional Community resources available for illness support

## 2021-12-27 NOTE — PROGRESS NOTES
TRANSFER - IN REPORT:    Verbal report received from [facility] on Rushie Bolus  being received from [facility] 102 for ordered procedure      Report consisted of patients Situation, Background, Assessment and   Recommendations(SBAR). Information from the following report(s) SBAR, Intake/Output, MAR, Recent Results, Med Rec Status and Pre Procedure Checklist was reviewed with the receiving nurse. Opportunity for questions and clarification was provided.

## 2021-12-27 NOTE — OP NOTES
Procedure:  Esophagogastroduodenoscopy with push enteroscopy    Date of Procedure:  12/27/2021    Patient:  Bhavna Adams     1940    Indication:  Iron deficiency anemia    Sedation:  MAC    Pre-Procedure Physical Exam:    Mental status:  alert and oriented  Airway:  normal oropharyngeal airway and neck mobility  CV:  regular rate and rhythm  Respiratory:  clear to auscultation    Procedure:  A History and Physical has been performed, and patient medication allergies have been reviewed. Risks of perforation, hemorrhage, adverse drug reaction, and aspiration were discussed. Informed consent was obtained for the procedure, including sedation. The patient was placed in the left lateral decubitus position. The heart rate, oxygen saturations, blood pressure, and response to care were monitored throughout the procedure. The pediatric colonoscope was passed through the mouth and advanced under direct vision to the jejunum. A careful inspection was made as the gastroscope was withdrawn, including a retroflexed view of the proximal stomach. The patient tolerated the procedure well. Findings:     OROPHARYNX: Cords and pyriform recesses appear normal.   ESOPHAGUS: The esophagus is normal. The proximal, mid, and distal portions are normal. The Z-Line is intact. STOMACH: On retroflexion, the flap-valve is classified as Hill Grade III, with a tissue fold present at the lesser curvature that is not prominent and an open hiatus. The fundus on antegrade and retroflexed views is normal. The body, antrum, and pylorus are normal.   DUODENUM: Two 3 mm nonbleeding angiodysplasias are seen in the fourth portion of the duodenum. These were treated with argon plasma coagulation (APC) at settings of 20 W and 2 L per minute. JEJUNUM:  Three 3-4 angiodysplasias are seen in the examined portion of the jejunum. 1 was actively oozing blood.  These were treated with argon plasma coagulation (APC) at settings of 20 W and 2 L per minute. Specimen:  No    Estimated Blood Loss:  None    Implant:  None           Impression:    Duodenal and jejunal angiodysplasias. Treated with APC. These are likely contributing to iron-deficiency anemia. Plan:  1. Resumed clear liquids today. 2. Protonix 40 mg twice daily. 3. Will prep today for colonoscopy tomorrow.      Signed:  Dax Castro MD  12/27/2021  2:13 PM

## 2021-12-27 NOTE — PROGRESS NOTES
Bedside and Verbal shift [facility]

## 2021-12-27 NOTE — ACP (ADVANCE CARE PLANNING)
Advance Care Planning     General Advance Care Planning (ACP) Conversation      Date of Conversation: 12/25/2021  Conducted with: Patient with Decision Making Capacity    Healthcare Decision Maker:     Primary Decision Maker: Mele Nava - Other Non-relative - [phone]    Secondary Decision Maker: Maisha Jason - Other Non-relative - [phone]  Click here to complete [address] including selection of the Healthcare Decision Maker Relationship (ie \"Primary\")      Today we documented Decision Maker(s) consistent with ACP documents on file. Content/Action Overview:    Has ACP document(s) on file - reflects the patient's care preferences  Reviewed DNR/DNI and patient elects Full Code (Attempt Resuscitation)  Topics discussed: treatment goals  Additional Comments: n/a     Length of Voluntary ACP Conversation in minutes:  <16 minutes (Non-Billable)    Yancy Oneal RN

## 2021-12-27 NOTE — PROGRESS NOTES
Report of procedure summary given to primary RN Rafi Gonzalez. Pt to be transported by this RN and ANITRA Cotter.

## 2021-12-28 ENCOUNTER — ANESTHESIA (OUTPATIENT)
Dept: ENDOSCOPY | Age: 81
DRG: 377 | End: 2021-12-28
Payer: MEDICARE

## 2021-12-28 ENCOUNTER — APPOINTMENT (OUTPATIENT)
Dept: NON INVASIVE DIAGNOSTICS | Age: 81
DRG: 377 | End: 2021-12-28
Attending: STUDENT IN AN ORGANIZED HEALTH CARE EDUCATION/TRAINING PROGRAM
Payer: MEDICARE

## 2021-12-28 LAB
BNP SERPL-MCNC: 7341 PG/ML
ECHO AO ROOT DIAM: 2.8 CM
ECHO AO ROOT INDEX: 1.66 CM/M2
ECHO AV AREA PEAK VELOCITY: 1.4 CM2
ECHO AV AREA PEAK VELOCITY: 1.4 CM2
ECHO AV AREA VTI: 1.3 CM2
ECHO AV AREA VTI: 1.3 CM2
ECHO AV MEAN GRADIENT: 10 MMHG
ECHO AV MEAN VELOCITY: 1.5 M/S
ECHO AV PEAK GRADIENT: 18 MMHG
ECHO AV PEAK VELOCITY: 2.2 M/S
ECHO AV VELOCITY RATIO: 0.45
ECHO AV VTI: 56 CM
ECHO EST RA PRESSURE: 3 MMHG
ECHO LA DIAMETER INDEX: 2.84 CM/M2
ECHO LA DIAMETER: 4.8 CM
ECHO LA TO AORTIC ROOT RATIO: 1.71
ECHO LV E' LATERAL VELOCITY: 9 CM/S
ECHO LV E' SEPTAL VELOCITY: 5 CM/S
ECHO LV EDV A2C: 115 ML
ECHO LV EDV A4C: 133 ML
ECHO LV EDV BP: 127 ML (ref 56–104)
ECHO LV EDV BP: 127 ML (ref 56–104)
ECHO LV EDV INDEX A4C: 79 ML/M2
ECHO LV EDV NDEX A2C: 68 ML/M2
ECHO LV EJECTION FRACTION A2C: 54 %
ECHO LV EJECTION FRACTION A4C: 55 %
ECHO LV EJECTION FRACTION BIPLANE: 54 % (ref 55–100)
ECHO LV EJECTION FRACTION BIPLANE: 54 % (ref 55–100)
ECHO LV ESV A2C: 53 ML
ECHO LV ESV A4C: 60 ML
ECHO LV ESV BP: 58 ML (ref 19–49)
ECHO LV ESV INDEX A2C: 31 ML/M2
ECHO LV ESV INDEX A4C: 36 ML/M2
ECHO LV ESV INDEX BP: 34 ML/M2
ECHO LV FRACTIONAL SHORTENING: 37 % (ref 28–44)
ECHO LV INTERNAL DIMENSION DIASTOLE INDEX: 3.49 CM/M2
ECHO LV INTERNAL DIMENSION DIASTOLIC: 5.9 CM (ref 3.9–5.3)
ECHO LV INTERNAL DIMENSION SYSTOLIC INDEX: 2.19 CM/M2
ECHO LV INTERNAL DIMENSION SYSTOLIC: 3.7 CM
ECHO LV IVSD: 0.6 CM (ref 0.6–0.9)
ECHO LV MASS 2D: 153.4 G (ref 67–162)
ECHO LV MASS INDEX 2D: 90.8 G/M2 (ref 43–95)
ECHO LV POSTERIOR WALL DIASTOLIC: 0.8 CM (ref 0.6–0.9)
ECHO LV RELATIVE WALL THICKNESS RATIO: 0.27
ECHO LVOT AREA: 2.8 CM2
ECHO LVOT AV VTI INDEX: 0.46
ECHO LVOT DIAM: 1.9 CM
ECHO LVOT MEAN GRADIENT: 2 MMHG
ECHO LVOT PEAK GRADIENT: 4 MMHG
ECHO LVOT PEAK VELOCITY: 1 M/S
ECHO LVOT STROKE VOLUME INDEX: 42.9 ML/M2
ECHO LVOT SV: 72.5 ML
ECHO LVOT VTI: 25.6 CM
ECHO MV A VELOCITY: 0.73 M/S
ECHO MV AREA PHT: 4.1 CM2
ECHO MV E DECELERATION TIME (DT): 185.9 MS
ECHO MV E VELOCITY: 1.1 M/S
ECHO MV E/A RATIO: 1.51
ECHO MV E/E' LATERAL: 12.22
ECHO MV E/E' RATIO (AVERAGED): 17.11
ECHO MV E/E' SEPTAL: 22
ECHO MV PRESSURE HALF TIME (PHT): 53.9 MS
ECHO MV REGURGITANT ALIASING (NYQUIST) VELOCITY: 34 CM/S
ECHO MV REGURGITANT VELOCITY PISA: 5.4 M/S
ECHO MV REGURGITANT VTIA: 204.1 CM
ECHO RIGHT VENTRICULAR SYSTOLIC PRESSURE (RVSP): 32 MMHG
ECHO RV FREE WALL PEAK S': 10 CM/S
ECHO RV INTERNAL DIMENSION: 3.3 CM
ECHO RV TAPSE: 1.7 CM (ref 1.5–2)
ECHO TV REGURGITANT MAX VELOCITY: 2.67 M/S
ECHO TV REGURGITANT PEAK GRADIENT: 29 MMHG
MAGNESIUM SERPL-MCNC: 3.1 MG/DL (ref 1.8–2.4)
PHOSPHATE SERPL-MCNC: 3.2 MG/DL (ref 2.3–3.7)

## 2021-12-28 PROCEDURE — 80048 BASIC METABOLIC PNL TOTAL CA: CPT

## 2021-12-28 PROCEDURE — 65660000000 HC RM CCU STEPDOWN

## 2021-12-28 PROCEDURE — 76060000033 HC ANESTHESIA 1 TO 1.5 HR: Performed by: INTERNAL MEDICINE

## 2021-12-28 PROCEDURE — 2709999900 HC NON-CHARGEABLE SUPPLY

## 2021-12-28 PROCEDURE — 74011000250 HC RX REV CODE- 250: Performed by: NURSE ANESTHETIST, CERTIFIED REGISTERED

## 2021-12-28 PROCEDURE — 74011250636 HC RX REV CODE- 250/636: Performed by: NURSE ANESTHETIST, CERTIFIED REGISTERED

## 2021-12-28 PROCEDURE — 84100 ASSAY OF PHOSPHORUS: CPT

## 2021-12-28 PROCEDURE — 0DJD8ZZ INSPECTION OF LOWER INTESTINAL TRACT, VIA NATURAL OR ARTIFICIAL OPENING ENDOSCOPIC: ICD-10-PCS | Performed by: INTERNAL MEDICINE

## 2021-12-28 PROCEDURE — 77030027138 HC INCENT SPIROMETER -A

## 2021-12-28 PROCEDURE — 76040000026: Performed by: INTERNAL MEDICINE

## 2021-12-28 PROCEDURE — 36592 COLLECT BLOOD FROM PICC: CPT

## 2021-12-28 PROCEDURE — 74011250637 HC RX REV CODE- 250/637: Performed by: STUDENT IN AN ORGANIZED HEALTH CARE EDUCATION/TRAINING PROGRAM

## 2021-12-28 PROCEDURE — 74011250636 HC RX REV CODE- 250/636: Performed by: INTERNAL MEDICINE

## 2021-12-28 PROCEDURE — C9113 INJ PANTOPRAZOLE SODIUM, VIA: HCPCS | Performed by: INTERNAL MEDICINE

## 2021-12-28 PROCEDURE — 83735 ASSAY OF MAGNESIUM: CPT

## 2021-12-28 PROCEDURE — 74011250637 HC RX REV CODE- 250/637: Performed by: INTERNAL MEDICINE

## 2021-12-28 PROCEDURE — 83880 ASSAY OF NATRIURETIC PEPTIDE: CPT

## 2021-12-28 PROCEDURE — 93306 TTE W/DOPPLER COMPLETE: CPT

## 2021-12-28 PROCEDURE — 74011000250 HC RX REV CODE- 250: Performed by: INTERNAL MEDICINE

## 2021-12-28 PROCEDURE — 85027 COMPLETE CBC AUTOMATED: CPT

## 2021-12-28 PROCEDURE — 99232 SBSQ HOSP IP/OBS MODERATE 35: CPT | Performed by: INTERNAL MEDICINE

## 2021-12-28 PROCEDURE — 2709999900 HC NON-CHARGEABLE SUPPLY: Performed by: INTERNAL MEDICINE

## 2021-12-28 RX ORDER — PANTOPRAZOLE SODIUM 40 MG/1
40 TABLET, DELAYED RELEASE ORAL
Status: DISCONTINUED | OUTPATIENT
Start: 2021-12-29 | End: 2022-01-06 | Stop reason: HOSPADM

## 2021-12-28 RX ORDER — SODIUM CHLORIDE, SODIUM LACTATE, POTASSIUM CHLORIDE, CALCIUM CHLORIDE 600; 310; 30; 20 MG/100ML; MG/100ML; MG/100ML; MG/100ML
INJECTION, SOLUTION INTRAVENOUS
Status: DISCONTINUED | OUTPATIENT
Start: 2021-12-28 | End: 2021-12-28 | Stop reason: HOSPADM

## 2021-12-28 RX ORDER — EPHEDRINE SULFATE/0.9% NACL/PF 50 MG/5 ML
SYRINGE (ML) INTRAVENOUS AS NEEDED
Status: DISCONTINUED | OUTPATIENT
Start: 2021-12-28 | End: 2021-12-28 | Stop reason: HOSPADM

## 2021-12-28 RX ORDER — FUROSEMIDE 40 MG/1
40 TABLET ORAL DAILY
Status: DISCONTINUED | OUTPATIENT
Start: 2021-12-29 | End: 2022-01-06 | Stop reason: HOSPADM

## 2021-12-28 RX ORDER — LIDOCAINE HYDROCHLORIDE 20 MG/ML
INJECTION, SOLUTION EPIDURAL; INFILTRATION; INTRACAUDAL; PERINEURAL AS NEEDED
Status: DISCONTINUED | OUTPATIENT
Start: 2021-12-28 | End: 2021-12-28 | Stop reason: HOSPADM

## 2021-12-28 RX ORDER — PROPOFOL 10 MG/ML
INJECTION, EMULSION INTRAVENOUS AS NEEDED
Status: DISCONTINUED | OUTPATIENT
Start: 2021-12-28 | End: 2021-12-28 | Stop reason: HOSPADM

## 2021-12-28 RX ADMIN — ROSUVASTATIN CALCIUM 10 MG: 5 TABLET, FILM COATED ORAL at 21:17

## 2021-12-28 RX ADMIN — Medication 20 MG: at 15:15

## 2021-12-28 RX ADMIN — LIDOCAINE HYDROCHLORIDE 60 MG: 20 INJECTION, SOLUTION EPIDURAL; INFILTRATION; INTRACAUDAL; PERINEURAL at 15:01

## 2021-12-28 RX ADMIN — FOLIC ACID 1 MG: 1 TABLET ORAL at 08:31

## 2021-12-28 RX ADMIN — SODIUM CHLORIDE, PRESERVATIVE FREE 40 ML: 5 INJECTION INTRAVENOUS at 08:32

## 2021-12-28 RX ADMIN — LEVOTHYROXINE SODIUM 50 MCG: 50 TABLET ORAL at 08:31

## 2021-12-28 RX ADMIN — SODIUM CHLORIDE, PRESERVATIVE FREE 40 ML: 5 INJECTION INTRAVENOUS at 21:17

## 2021-12-28 RX ADMIN — PROPOFOL 50 MG: 10 INJECTION, EMULSION INTRAVENOUS at 15:10

## 2021-12-28 RX ADMIN — PROPOFOL 50 MG: 10 INJECTION, EMULSION INTRAVENOUS at 15:20

## 2021-12-28 RX ADMIN — PROPOFOL 50 MG: 10 INJECTION, EMULSION INTRAVENOUS at 15:15

## 2021-12-28 RX ADMIN — PROPOFOL 50 MG: 10 INJECTION, EMULSION INTRAVENOUS at 15:06

## 2021-12-28 RX ADMIN — PROPOFOL 50 MG: 10 INJECTION, EMULSION INTRAVENOUS at 15:25

## 2021-12-28 RX ADMIN — SODIUM CHLORIDE 40 MG: 9 INJECTION INTRAMUSCULAR; INTRAVENOUS; SUBCUTANEOUS at 08:33

## 2021-12-28 RX ADMIN — PROPOFOL 50 MG: 10 INJECTION, EMULSION INTRAVENOUS at 15:01

## 2021-12-28 RX ADMIN — SODIUM CHLORIDE, SODIUM LACTATE, POTASSIUM CHLORIDE, AND CALCIUM CHLORIDE: 600; 310; 30; 20 INJECTION, SOLUTION INTRAVENOUS at 14:40

## 2021-12-28 RX ADMIN — DULOXETINE HYDROCHLORIDE 60 MG: 60 CAPSULE, DELAYED RELEASE ORAL at 08:31

## 2021-12-28 RX ADMIN — SODIUM CHLORIDE, PRESERVATIVE FREE 10 ML: 5 INJECTION INTRAVENOUS at 14:02

## 2021-12-28 RX ADMIN — METOPROLOL SUCCINATE 50 MG: 50 TABLET, EXTENDED RELEASE ORAL at 08:31

## 2021-12-28 RX ADMIN — Medication 15 MG: at 15:10

## 2021-12-28 RX ADMIN — Medication 15 MG: at 15:23

## 2021-12-28 NOTE — ANESTHESIA POSTPROCEDURE EVALUATION
Procedure(s):  COLONOSCOPY/ 31 CVICU 102. total IV anesthesia    Anesthesia Post Evaluation      Multimodal analgesia: multimodal analgesia used between 6 hours prior to anesthesia start to PACU discharge  Patient location during evaluation: ICU  Patient participation: complete - patient participated  Level of consciousness: awake  Pain management: adequate  Airway patency: patent  Anesthetic complications: no  Cardiovascular status: acceptable  Respiratory status: spontaneous ventilation and acceptable  Hydration status: acceptable  Post anesthesia nausea and vomiting:  none      INITIAL Post-op Vital signs:   Vitals Value Taken Time   /78 12/28/21 1602   Temp 36.1 °C (97 °F) 12/28/21 1557   Pulse 64 12/28/21 1612   Resp 40 12/28/21 1612   SpO2 98 % 12/28/21 1612   Vitals shown include unvalidated device data.

## 2021-12-28 NOTE — PROGRESS NOTES
TRANSFER - OUT REPORT:    Verbal report given to Tamara Rodriguez RN on Susan Valadez  being transferred to GI Lab for ordered procedure       Report consisted of patients Situation, Background, Assessment and Recommendations(SBAR). Information from the following report(s) SBAR, Kardex, Intake/Output, MAR, Recent Results and Cardiac Rhythm NSR was reviewed with the receiving nurse. Opportunity for questions and clarification was provided.

## 2021-12-28 NOTE — PROGRESS NOTES
[facility] CARDIOLOGY PROGRESS NOTE           12/28/2021 4:18 PM    Admit Date: 12/25/2021      Subjective:   Sedated post endoscopy        Objective:      Vitals:    12/28/21 1300 12/28/21 1400 12/28/21 1448 12/28/21 1557   BP: (!) 167/67 (!) 133/59 133/64 118/64   Pulse: 63 (!) 107 65 60   Resp: 22 11  18   Temp:    97 °F (36.1 °C)   SpO2:  97% 97% 99%   Weight:       Height:           Physical Exam:  General-No Acute Distress, sleepy  Neck- supple, no JVD  CV- regular rate and rhythm no MRG  Lung- clear bilaterally  Abd- soft, nontender, nondistended  Ext- no edema bilaterally. Skin- warm and dry    Data Review:   Recent Labs     12/28/21  0356 12/27/21  0301 12/26/21  0303 12/26/21  0300 12/25/21  2309 12/25/21  1730    145   < > 144  --  146*   K 3.6 3.5   < > 4.0  --  4.3   MG 3.1* 2.1   < > 1.8  --  1.9   BUN 25* 35*   < > 41*  --  39*   CREA 1.39* 1.38*   < > 1.29*  --  1.34*   GLU 97 84   < > 99  --  128*   WBC 11.5* 12.7*   < >  --   --  9.7   HGB 9.6* 8.2*   < >  --    < > 3.7*   HCT 30.7* 25.6*   < >  --    < > 13.5*    217   < >  --   --  200   INR  --   --   --  1.8  --  3.6    < > = values in this interval not displayed.        Assessment/Plan:     Principal Problem:    GI bleed (12/25/2021)        Active Problems:    Acquired hypothyroidism (1/16/2016)          Hyperlipidemia (1/16/2016)          Rheumatoid arthritis ([facility] [address] (1/16/2016)          Hypertension, essential, benign (8/18/2016)          Stage 3 chronic kidney disease ([facility] [address] (5/20/2021)          Diastolic CHF, chronic ([facility] [address] (6/13/2021)      PAF      ////    Stop daily NT pro BNP  Resume po amiodarone  Resume po Jared Brown MD  12/28/2021 4:18 PM

## 2021-12-28 NOTE — ANESTHESIA POSTPROCEDURE EVALUATION
Procedure(s):  ESOPHAGOGASTRODUODENOSCOPY (EGD)/ 31  ENTEROSCOPY  ENDOSCOPIC ARGON PLASMA COAGULATION. total IV anesthesia    Anesthesia Post Evaluation      Multimodal analgesia: multimodal analgesia not used between 6 hours prior to anesthesia start to PACU discharge  Patient location during evaluation: PACU  Patient participation: complete - patient participated  Level of consciousness: awake and alert  Pain management: adequate  Airway patency: patent  Anesthetic complications: no  Cardiovascular status: hemodynamically stable  Respiratory status: acceptable  Hydration status: acceptable        INITIAL Post-op Vital signs:   Vitals Value Taken Time   /67 12/28/21 0903   Temp 36.6 °C (97.8 °F) 12/28/21 0700   Pulse 66 12/28/21 0903   Resp 17 12/28/21 0903   SpO2 94 % 12/28/21 0903   Vitals shown include unvalidated device data.

## 2021-12-28 NOTE — PROGRESS NOTES
Tap water enema given per MD orders. Very lite brown watery stool return. Patient tolerated without problems.

## 2021-12-28 NOTE — PROGRESS NOTES
GI  Pt on the schedule for Colonoscopy for around 1pm today. She has been completing bowel prep. Stool this morning is brown with tint of red blood and difficult to see through. We are ordering tap water enemas. Further recommendations will be based upon findings on colonoscopy, pt clinical course.      Deepika Zheng PA-C  [facility]

## 2021-12-28 NOTE — PROGRESS NOTES
Hospitalist Progress Note   Admit Date:  2021  4:57 PM   Name:  Alejandro Damon   Age:  80 y.o. Sex:  female  :  1940   MRN:  [id number]   Room:  102/    Presenting Complaint: Shortness of Breath    Reason(s) for Admission: GI bleed [facility] Course & Interval History:   Alejandro Damon is a 80 y.o. female with medical history of hypothyroidism, history of Graves' disease, atrial fibrillation on Xarelto, chronic anemia, CKD stage III. Patient recently admitted  for progressive anemia with hemoglobin on arrival of 3.3. GI was consulted and patient underwent EGD on 21 that was unremarkable. Heme-onc was consulted and labs were consistent with iron deficiency anemia and patient was given IV iron. Xarelto was resumed on  and hemoglobin greater than 8 on discharge. Patient presents today with report of increasing shortness of breath. She appears pale. Was found to have large melanotic stool on exam.        ER work-up notable for WBC 9, hemoglobin 3.7, platelets 313, INR 3.6, sodium 146, potassium 4.3, glucose 128, creatinine 1.34, albumin 1.6, LFTs within normal limits, lactic acid 5.1, proBNP 7085  Checks x-ray notable for pulmonary edema. Subjective (21): Patient is having bloody bowel movements which are maroon-colored. Patient denies  chest pain, vomiting, shortness of breath, diarrhea    Assessment & Plan:     # ABLA  # GI bleeding - likely upper w/ melanotic stool on exam  - Kcentra given for INR 3 (pt on xarelto)   S/p 3 units of PRBC  EGD with push enteroscopy showed a duodenal and jejunal angiodysplasias and treated with APC. Total of 5 angiodysplasias. There is high likelihood of additional AVMs beyond the reach of the pediatric colonoscope. colonoscopy showed diverticulosis, internal hemorrhoids.   Switched PPI to once daily  - serial H&H and  Transfuse for hgb <7  Started on soft diet  Patient will need long-term iron supplementation    GI is following and appreciate the recommendations    Acute hypoxemic respiratory failure  Most likely due to CHF   Patient is saturating well on 2 L of nasal cannula  BNP is elevated 7000 and trended down to 5000  Chest x-ray showed pulmonary congestion   repeat chest x-ray showed bilateral pleural effusions, left greater than right  F/u  echo  Continue Lasix 40 IV BID      # LUIS ARMANDO  Most likely due to angiodysplasias   hgb of 3 and after receiving blood transfusions repeat hemoglobin is 8  - seen by heme on prior admission- dx'd with LUIS ARMANDO  - currently getting blood transfusions  Started on iron supplementation oral      # PAfib  - Currently rate controlled  Resumed amiodarone   - holding lopressor for soft BPs and will resume when blood pressure improves  Holding Xarelto for at least 72 hours and may need to stop if recurrent GI bleeding     # CDK stage 3  - Cr 1.3 at baseline  - monitor BMP in Am     # Chronic diastolic CHF  - pulm edema on CXR - BNP >7K and BNP is trending down 7K   -Started on Lasix 40mg po   echo with normal EF        # hypothyroidism  TSH is 17.3 noted and on 12/13  - increased the dose of synthroid from 25-50       Dispo/Discharge Planning: Discharge home tomorrow       Diet:  DIET NPO  DVT PPx: SCDs  Code status: Full Code    Hospital Problems as of 12/28/2021 Date Reviewed: 12/27/2021          Codes Class Noted - Resolved POA    * (Principal) GI bleed ICD-10-CM: K92.2  ICD-9-CM: 578.9  12/25/2021 - Present Unknown        Diastolic CHF, chronic (HCC) ICD-10-CM: I50.32  ICD-9-CM: 428.32, 428.0  6/13/2021 - Present Yes        Stage 3 chronic kidney disease (HCC) (Chronic) ICD-10-CM: N18.30  ICD-9-CM: 585.3  5/20/2021 - Present Yes        Hypertension, essential, benign (Chronic) ICD-10-CM: I10  ICD-9-CM: 401.1  8/18/2016 - Present Yes        Acquired hypothyroidism (Chronic) ICD-10-CM: E03.9  ICD-9-CM: 244.9  1/16/2016 - Present Yes        Hyperlipidemia (Chronic) ICD-10-CM: E78.5  ICD-9-CM: 272.4  1/16/2016 - Present Yes        Rheumatoid arthritis ([facility] [address] (Chronic) ICD-10-CM: M06.9  ICD-9-CM: 714.0  1/16/2016 - Present Yes              Objective:     Patient Vitals for the past 24 hrs:   Temp Pulse Resp BP SpO2   12/28/21 1557 97 °F (36.1 °C) 60 18 118/64 99 %   12/28/21 1448  65  133/64 97 %   12/28/21 1400  (!) 107 11 (!) 133/59 97 %   12/28/21 1300  63 22 (!) 167/67    12/28/21 1200  (!) 150 10 (!) 105/52 94 %   12/28/21 1100 98 °F (36.7 °C) 65 10 (!) 144/64 96 %   12/28/21 1000  65 (!) 50 (!) 167/70 92 %   12/28/21 0900  (!) 129 23 (!) 166/67 93 %   12/28/21 0831  97  135/65    12/28/21 0800  (!) 120 20 135/65    12/28/21 0700 97.8 °F (36.6 °C) 68 14 (!) 140/64 94 %   12/28/21 0603  67 14 (!) 141/74    12/28/21 0503  72 16 (!) 134/58    12/28/21 0403 99.3 °F (37.4 °C) 71 22 (!) 140/57 93 %   12/28/21 0303  65 18 (!) 158/67    12/28/21 0203  64 11 117/81    12/28/21 0103  93 13 137/65    12/28/21 0003  72 14 (!) 161/72    12/27/21 2303 97.9 °F (36.6 °C) 74 27 (!) 163/63 95 %   12/27/21 2202  69 23 (!) 110/56    12/27/21 2103  70 21 (!) 92/51 96 %   12/27/21 2003  69 16 (!) 151/65    12/27/21 1903 98.3 °F (36.8 °C) 72 15 (!) 131/59 95 %   12/27/21 1756  65 24 (!) 152/72 95 %   12/27/21 1711  (!) 201 22 (!) 148/62 100 %     Oxygen Therapy  O2 Sat (%): 99 % (12/28/21 1557)  Pulse via Oximetry: 67 beats per minute (12/28/21 1400)  O2 Device: Nasal cannula (12/28/21 1557)  Skin Assessment: Clean, dry, & intact (12/26/21 1102)  Skin Protection for O2 Device: No (12/25/21 2156)  O2 Flow Rate (L/min): 4 l/min (12/28/21 1448)  O2 Temperature: 87.8 °F (31 °C) (12/26/21 1230)  FIO2 (%): 50 % (12/26/21 1230)    Estimated body mass index is 30.86 kg/m² as calculated from the following:    Height as of this encounter: 5' (1.524 m). Weight as of this encounter: 71.7 kg (158 lb).     Intake/Output Summary (Last 24 hours) at 12/28/2021 1619  Last data filed at 12/28/2021 1515  Gross per 24 hour   Intake 2773.34 ml   Output 1400 ml   Net 1373.34 ml         Physical Exam:     Blood pressure (!) 129/58, pulse 75, temperature (!) 96.4 °F (35.8 °C), resp. rate 25, height 5' (1.524 m), weight 72 kg (158 lb 11.7 oz), SpO2 100 %. General:    Well nourished. No overt distress  Head:  Normocephalic, atraumatic  Eyes:  Sclerae appear normal.  Pupils equally round. Exophthalmos is present bilaterally  ENT:  Nares appear normal, no drainage. Moist oral mucosa  Neck:  No restricted ROM. Trachea midline   CV:   RRR. No m/r/g. No jugular venous distension. Lungs:   CTAB. No wheezing, rhonchi, or rales. Respirations even, unlabored  Abdomen: Bowel sounds present. Soft, nontender, nondistended. Extremities: No cyanosis or clubbing. No edema  Skin:     No rashes and normal coloration. Warm and dry. Neuro:  CN II-XII grossly intact. Sensation intact. A&Ox3  Psych:  Normal mood and affect.       I have reviewed ordered lab tests and independently visualized imaging below:    Recent Labs:  Recent Results (from the past 48 hour(s))   CBC W/O DIFF    Collection Time: 12/27/21  3:01 AM   Result Value Ref Range    WBC 12.7 (H) 4.3 - 11.1 K/uL    RBC 2.75 (L) 4.05 - 5.2 M/uL    HGB 8.2 (L) 11.7 - 15.4 g/dL    HCT 25.6 (L) 35.8 - 46.3 %    MCV 93.1 79.6 - 97.8 FL    MCH 29.8 26.1 - 32.9 PG    MCHC 32.0 31.4 - 35.0 g/dL    RDW 20.3 (H) 11.9 - 14.6 %    PLATELET 797 454 - 644 K/uL    MPV 11.1 9.4 - 12.3 FL    ABSOLUTE NRBC 0.07 0.0 - 0.2 K/uL   METABOLIC PANEL, BASIC    Collection Time: 12/27/21  3:01 AM   Result Value Ref Range    Sodium 145 136 - 145 mmol/L    Potassium 3.5 3.5 - 5.1 mmol/L    Chloride 107 98 - 107 mmol/L    CO2 33 (H) 21 - 32 mmol/L    Anion gap 5 (L) 7 - 16 mmol/L    Glucose 84 65 - 100 mg/dL    BUN 35 (H) 8 - 23 MG/DL    Creatinine 1.38 (H) 0.6 - 1.0 MG/DL    GFR est AA 47 (L) >60 ml/min/1.73m2    GFR est non-AA 39 (L) >60 ml/min/1.73m2    Calcium 7.7 (L) 8.3 - 10.4 MG/DL   MAGNESIUM    Collection Time: 12/27/21  3:01 AM   Result Value Ref Range    Magnesium 2.1 1.8 - 2.4 mg/dL   PHOSPHORUS    Collection Time: 12/27/21  3:01 AM   Result Value Ref Range    Phosphorus 3.1 2.3 - 3.7 MG/DL   NT-PRO BNP    Collection Time: 12/27/21  3:01 AM   Result Value Ref Range    NT pro-BNP 7,107 (H) <450 PG/ML   CBC W/O DIFF    Collection Time: 12/28/21  3:56 AM   Result Value Ref Range    WBC 11.5 (H) 4.3 - 11.1 K/uL    RBC 3.19 (L) 4.05 - 5.2 M/uL    HGB 9.6 (L) 11.7 - 15.4 g/dL    HCT 30.7 (L) 35.8 - 46.3 %    MCV 96.2 79.6 - 97.8 FL    MCH 30.1 26.1 - 32.9 PG    MCHC 31.3 (L) 31.4 - 35.0 g/dL    RDW 20.9 (H) 11.9 - 14.6 %    PLATELET 702 005 - 359 K/uL    MPV 11.5 9.4 - 12.3 FL    ABSOLUTE NRBC 0.02 0.0 - 0.2 K/uL   METABOLIC PANEL, BASIC    Collection Time: 12/28/21  3:56 AM   Result Value Ref Range    Sodium 143 136 - 145 mmol/L    Potassium 3.6 3.5 - 5.1 mmol/L    Chloride 106 98 - 107 mmol/L    CO2 33 (H) 21 - 32 mmol/L    Anion gap 4 (L) 7 - 16 mmol/L    Glucose 97 65 - 100 mg/dL    BUN 25 (H) 8 - 23 MG/DL    Creatinine 1.39 (H) 0.6 - 1.0 MG/DL    GFR est AA 47 (L) >60 ml/min/1.73m2    GFR est non-AA 39 (L) >60 ml/min/1.73m2    Calcium 8.3 8.3 - 10.4 MG/DL   MAGNESIUM    Collection Time: 12/28/21  3:56 AM   Result Value Ref Range    Magnesium 3.1 (H) 1.8 - 2.4 mg/dL   PHOSPHORUS    Collection Time: 12/28/21  3:56 AM   Result Value Ref Range    Phosphorus 3.2 2.3 - 3.7 MG/DL   NT-PRO BNP    Collection Time: 12/28/21  3:56 AM   Result Value Ref Range    NT pro-BNP 7,341 (H) <450 PG/ML   ECHO ADULT COMPLETE    Collection Time: 12/28/21 11:00 AM   Result Value Ref Range    IVSd 0.6 0.6 - 0.9 cm    LVIDd 5.9 (A) 3.9 - 5.3 cm    LVIDs 3.7 cm    LVOT Diameter 1.9 cm    LVPWd 0.8 0.6 - 0.9 cm    LVOT SV 72.5 ml    EF BP 54 (A) 55 - 100 %    EF BP 54 (A) 55 - 100 %    LV Ejection Fraction A2C 54 %    LV Ejection Fraction A4C 55 %    LV EDV A2C 115 mL    LV EDV A4C 133 mL    LV EDV  (A) 56 - 104 mL    LV EDV  (A) 56 - 104 mL    LV ESV A2C 53 mL    LV ESV A4C 60 mL    LV ESV BP 58 (A) 19 - 49 mL    LVOT Peak Gradient 4 mmHg    LVOT Mean Gradient 2 mmHg    LVOT Peak Velocity 1.0 m/s    LVOT VTI 25.6 cm    RVIDd 3.3 cm    RV Free Wall Peak S' 10 cm/s    LA Diameter 4.8 cm    AV Area by Peak Velocity 1.4 cm2    AV Area by Peak Velocity 1.4 cm2    AV Area by VTI 1.3 cm2    AV Area by VTI 1.3 cm2    AV Peak Gradient 18 mmHg    AV Mean Gradient 10 mmHg    AV Peak Velocity 2.2 m/s    AV Mean Velocity 1.5 m/s    AV VTI 56.0 cm    MV Nyquist Velocity 34 cm/s    MV A Velocity 0.73 m/s    MV E Wave Deceleration Time 185.9 ms    MV E Velocity 1.10 m/s    LV E' Lateral Velocity 9 cm/s    LV E' Septal Velocity 5 cm/s    MV PHT 53.9 ms    MV Area by PHT 4.1 cm2    MV Regurg Velocity PISA 5.4 m/s    MR .1 cm    TAPSE 1.7 1.5 - 2.0 cm    TR Peak Gradient 29 mmHg    TR Max Velocity 2.67 m/s    Aortic Root 2.8 cm    Fractional Shortening 2D 37 28 - 44 %    LV ESV Index BP 34 mL/m2    LV ESV Index A4C 36 mL/m2    LV EDV Index A4C 79 mL/m2    LV ESV Index A2C 31 mL/m2    LV EDV Index A2C 68 mL/m2    LVIDd Index 3.49 cm/m2    LVIDs Index 2.19 cm/m2    LV RWT Ratio 0.27     LV Mass 2D 153.4 67 - 162 g    LV Mass 2D Index 90.8 43 - 95 g/m2    MV E/A 1.51     E/E' Ratio (Averaged) 17.11     E/E' Lateral 12.22     E/E' Septal 22.00     LVOT Stroke Volume Index 42.9 mL/m2    LVOT Area 2.8 cm2    LA Size Index 2.84 cm/m2    LA/AO Root Ratio 1.71     Ao Root Index 1.66 cm/m2    AV Velocity Ratio 0.45     LVOT:AV VTI Index 0.46     Est. RA Pressure 3 mmHg    RVSP 32 mmHg       All Micro Results     Procedure Component Value Units Date/Time    CULTURE, BLOOD [[id number]] Collected: 12/25/21 1730    Order Status: Completed Specimen: Blood Updated: 12/28/21 0658     Special Requests: --        LEFT  Antecubital       Culture result: NO GROWTH 3 DAYS       CULTURE, BLOOD [[id number]] Collected: 12/25/21 9196 Order Status: Completed Specimen: Blood Updated: 12/28/21 0658     Special Requests: LEFT FOREARM        Culture result: NO GROWTH 3 DAYS       COVID-19 RAPID TEST [[id number]] Collected: 12/25/21 1823    Order Status: Completed Specimen: Nasopharyngeal Updated: 12/25/21 1859     Specimen source NASAL        COVID-19 rapid test Not detected        Comment:      The specimen is NEGATIVE for SARS-CoV-2, the novel coronavirus associated with COVID-19. A negative result does not rule out COVID-19. This test has been authorized by the FDA under an Emergency Use Authorization (EUA) for use by authorized laboratories. Fact sheet for Healthcare Providers: [employer]Check[employer]nz  Fact sheet for Patients: WaveCheck[employer]nz       Methodology: Isothermal Nucleic Acid Amplification               Other Studies:  ECHO ADULT COMPLETE    Result Date: 12/28/2021    Left Ventricle: Left ventricle is mildly dilated. Normal wall thickness. Normal wall motion. The EF by visual approximation is 55 - 60%. Normal diastolic function.   Right Ventricle: Right ventricle size is normal. Low normal systolic function.   Aortic Valve: Probably trileaflet aortic valve. No transvalvular regurgitation. No stenosis.   Mitral Valve: Mild to moderate transvalvular regurgitation.   Left Atrium: Left atrium is severely dilated.        Current Meds:  Current Facility-Administered Medications   Medication Dose Route Frequency    amiodarone (CORDARONE) tablet 200 mg  200 mg Oral DAILY    levothyroxine (SYNTHROID) tablet 50 mcg  50 mcg Oral ACB    DULoxetine (CYMBALTA) capsule 60 mg  60 mg Oral DAILY    folic acid (FOLVITE) tablet 1 mg  1 mg Oral DAILY    metoprolol succinate (TOPROL-XL) XL tablet 50 mg  50 mg Oral DAILY    pantoprazole (PROTONIX) 40 mg in 0.9% sodium chloride 10 mL injection  40 mg IntraVENous Q12H    rosuvastatin (CRESTOR) tablet 10 mg  10 mg Oral QHS    sodium chloride (NS) flush 5-40 mL  5-40 mL IntraVENous Q8H    sodium chloride (NS) flush 5-40 mL  5-40 mL IntraVENous PRN    acetaminophen (TYLENOL) tablet 650 mg  650 mg Oral Q6H PRN    Or    acetaminophen (TYLENOL) suppository 650 mg  650 mg Rectal Q6H PRN    polyethylene glycol (MIRALAX) packet 17 g  17 g Oral DAILY PRN    ondansetron (ZOFRAN ODT) tablet 4 mg  4 mg Oral Q8H PRN    Or    ondansetron (ZOFRAN) injection 4 mg  4 mg IntraVENous Q6H PRN       Signed:  Natalie Cagle MD    Part of this note may have been written by using a voice dictation software. The note has been proof read but may still contain some grammatical/other typographical errors.

## 2021-12-28 NOTE — ANESTHESIA PREPROCEDURE EVALUATION
Anesthetic History   No history of anesthetic complications            Review of Systems / Medical History  Patient summary reviewed and pertinent labs reviewed    Pulmonary          Pneumonia and shortness of breath    Pertinent negatives: Smoker: Fromer smoker. Comments: On oxygen 2liters at home   Neuro/Psych       CVA (left leg weakness)  TIA and psychiatric history (Depression)     Cardiovascular    Hypertension        Dysrhythmias : atrial fibrillation  Hyperlipidemia    Exercise tolerance: <4 METS: Cane/walker  Comments: xarelto and bASA--on hold given GI bleed    Echo earlier today with normal LVEF and dilated LA.   GI/Hepatic/Renal  Within defined limits       Renal disease: CRI      Comments: Solitary kidney Endo/Other      Hypothyroidism  Arthritis (RA)     Other Findings   Comments: Hgb 3.3 on admission, now 9.6 after receiving blood.          Physical Exam    Airway  Mallampati: I  TM Distance: 4 - 6 cm  Neck ROM: normal range of motion   Mouth opening: Normal     Cardiovascular  Regular rate and rhythm,  S1 and S2 normal,  no murmur, click, rub, or gallop  Rhythm: regular  Rate: normal         Dental    Dentition: Full upper dentures     Pulmonary  Breath sounds clear to auscultation               Abdominal  GI exam deferred       Other Findings            Anesthetic Plan    ASA: 4  Anesthesia type: total IV anesthesia          Induction: Intravenous  Anesthetic plan and risks discussed with: Patient

## 2021-12-28 NOTE — OP NOTES
Procedure:  Colonoscopy    Date of Procedure:  12/28/2021    Patient:  Mikal Scott     1940    Indication:  Iron deficiency anemia     Sedation:  MAC    Pre-Procedure Exam:    Mental status:  alert and oriented  Airway:  normal oropharyngeal airway and neck mobility  CV:  regular rate and rhythm   Respiratory:  clear to auscultation    Procedure:  A History and Physical has been performed, and patient medication allergies have been reviewed. Risks of perforation, hemorrhage, adverse drug reaction, and aspiration were discussed. Informed consent was obtained for the procedure, including sedation. The patient was placed in the left lateral decubitus position. The heart rate, oxygen saturations, blood pressure, and response to care were monitored throughout the procedure. After performing a rectal exam, the colonoscope was passed through the anus and advanced under direct vision to the cecum, identified by appendiceal orifice and ileocecal valve. The quality of prep was adequate but mucosal visualization was good after vigorous irrigation and aspiration of liquid. A careful inspection was made as the colonoscope was withdrawn, including a retroflexed view of the rectum. The patient tolerated the procedure well. Findings:     ANUS:  Anal exam reveals no masses or hemorrhoids. RECTUM: Internal hemorrhoids were seen in the rectum. SIGMOID COLON:  Multiple large and small-mouthed diverticula were seen. DESCENDING COLON:  A few small-mouthed diverticula were seen. SPLENIC FLEXURE:  The splenic flexure is normal.   TRANSVERSE COLON:  The mucosa is normal with good vascular pattern and without ulcers, diverticula, and polyps. HEPATIC FLEXURE:  The hepatic flexure is normal.   ASCENDING COLON:  The mucosa is normal with good vascular pattern and without ulcers, diverticula, and polyps.    CECUM:  The appendiceal orifice appears normal. The ileocecal valve appears normal.   TERMINAL ILEUM:  The terminal ileum was not entered. Specimen:  No    Estimated Blood Loss:  None    Implant:  None           Impression:    1. Diverticulosis. 2. Internal hemorrhoids. 3. No convincing source of GI blood loss anemia seen by colonoscopy. Suspect multifactorial etiology of anemia including chronic GI blood loss from small bowel angiodysplasias. While there were 5 angiodysplasias that were treated, there is high likelihood of additional AVMs beyond the reach of the pediatric colonoscope. Patient will likely require long-term iron supplementation. Plan:  1. Resume GI soft diet today. 2. No further colonoscopies based on patient's advanced age. 3. Change PPI to Protonix 40 mg daily, taken prior to breakfast.   4. Patient will be a significantly increase risk for recurrence of bleeding if anticoagulation is resumed. If this is imperative, would hold for an additional 72 hours. 5. We will sign off, but do not hesitate to contact us for any additional assistance. We will arrange for a GI follow-up office visit in 4 weeks. Patient will be contacted by our office.      Signed:  Lisa Gonzalez MD  12/28/2021  8:37 AM

## 2021-12-29 LAB
ABO + RH BLD: NORMAL
BLD PROD TYP BPU: NORMAL
BLOOD BANK CMNT PATIENT-IMP: NORMAL
BLOOD GROUP ANTIBODIES SERPL: NORMAL
BPU ID: NORMAL
CROSSMATCH RESULT,%XM: NORMAL
MAGNESIUM SERPL-MCNC: 2.6 MG/DL (ref 1.8–2.4)
PHOSPHATE SERPL-MCNC: 4.1 MG/DL (ref 2.3–3.7)
SPECIMEN EXP DATE BLD: NORMAL
STATUS OF UNIT,%ST: NORMAL
UNIT DIVISION, %UDIV: 0

## 2021-12-29 PROCEDURE — 2709999900 HC NON-CHARGEABLE SUPPLY

## 2021-12-29 PROCEDURE — 74011250637 HC RX REV CODE- 250/637: Performed by: FAMILY MEDICINE

## 2021-12-29 PROCEDURE — 83735 ASSAY OF MAGNESIUM: CPT

## 2021-12-29 PROCEDURE — 84100 ASSAY OF PHOSPHORUS: CPT

## 2021-12-29 PROCEDURE — 80048 BASIC METABOLIC PNL TOTAL CA: CPT

## 2021-12-29 PROCEDURE — 77010033678 HC OXYGEN DAILY

## 2021-12-29 PROCEDURE — 99231 SBSQ HOSP IP/OBS SF/LOW 25: CPT | Performed by: INTERNAL MEDICINE

## 2021-12-29 PROCEDURE — 65660000000 HC RM CCU STEPDOWN

## 2021-12-29 PROCEDURE — 74011250637 HC RX REV CODE- 250/637: Performed by: STUDENT IN AN ORGANIZED HEALTH CARE EDUCATION/TRAINING PROGRAM

## 2021-12-29 PROCEDURE — 85025 COMPLETE CBC W/AUTO DIFF WBC: CPT

## 2021-12-29 PROCEDURE — 74011250637 HC RX REV CODE- 250/637: Performed by: PHYSICIAN ASSISTANT

## 2021-12-29 PROCEDURE — 74011250637 HC RX REV CODE- 250/637: Performed by: INTERNAL MEDICINE

## 2021-12-29 PROCEDURE — 36592 COLLECT BLOOD FROM PICC: CPT

## 2021-12-29 RX ORDER — LANOLIN ALCOHOL/MO/W.PET/CERES
1 CREAM (GRAM) TOPICAL
Status: DISCONTINUED | OUTPATIENT
Start: 2021-12-30 | End: 2022-01-06 | Stop reason: HOSPADM

## 2021-12-29 RX ADMIN — FOLIC ACID 1 MG: 1 TABLET ORAL at 08:41

## 2021-12-29 RX ADMIN — ROSUVASTATIN CALCIUM 10 MG: 5 TABLET, FILM COATED ORAL at 20:14

## 2021-12-29 RX ADMIN — METOPROLOL SUCCINATE 50 MG: 50 TABLET, EXTENDED RELEASE ORAL at 08:41

## 2021-12-29 RX ADMIN — AMIODARONE HYDROCHLORIDE 200 MG: 200 TABLET ORAL at 08:41

## 2021-12-29 RX ADMIN — FUROSEMIDE 40 MG: 40 TABLET ORAL at 08:40

## 2021-12-29 RX ADMIN — Medication 1 EACH: at 23:57

## 2021-12-29 RX ADMIN — PANTOPRAZOLE SODIUM 40 MG: 40 TABLET, DELAYED RELEASE ORAL at 08:41

## 2021-12-29 RX ADMIN — DULOXETINE HYDROCHLORIDE 60 MG: 60 CAPSULE, DELAYED RELEASE ORAL at 08:41

## 2021-12-29 RX ADMIN — SODIUM CHLORIDE, PRESERVATIVE FREE 40 ML: 5 INJECTION INTRAVENOUS at 06:30

## 2021-12-29 RX ADMIN — LEVOTHYROXINE SODIUM 50 MCG: 50 TABLET ORAL at 07:30

## 2021-12-29 RX ADMIN — Medication 10 ML: at 19:50

## 2021-12-29 NOTE — PROGRESS NOTES
Hospitalist Progress Note   Admit Date:  2021  4:57 PM   Name:  Nasir Meléndez   Age:  80 y.o. Sex:  adult  :  1940   MRN:  [id number]   Room:  102/01    Presenting Complaint: Shortness of Breath    Reason(s) for Admission: GI bleed [facility] Course & Interval History:   80 y.o. female with medical history of hypothyroidism, history of Graves' disease, atrial fibrillation on Xarelto, chronic anemia, CKD stage III.  Patient recently admitted  for progressive anemia with hemoglobin on arrival of 3.3. Morton Bowling was consulted and patient underwent EGD on 21 that was unremarkable.  Heme-onc was consulted and labs were consistent with iron deficiency anemia and patient was given IV iron.  Xarelto was resumed on  and hemoglobin greater than 8 on discharge.    Patient presents  with report of increasing shortness of breath. She appears pale and was found to have large melanotic stool on exam.   ER work-up notable for WBC 9, hemoglobin 3.7, platelets 203, INR 3.6, sodium 146, potassium 4.3, glucose 128, creatinine 1.34, albumin 1.6, LFTs within normal limits, lactic acid 5.1, proBNP 7085. Checks x-ray notable for pulmonary edema. Pt admitted for ABLA/acute GIB. She received Kcentra for INR >3 on admission. S/p 3U PRBC for Hgb 3.7 on admission. Gi has seen patient. 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 Total of 5 angiodysplasias. There is high likelihood of additional AVMs beyond the reach of the pediatric colonoscope. S/p Colonoscopy  showed diverticulosis, internal hemorrhoids. Xarelto is discontinued indefinitely with cardiology recommendations. Hgb has been stable since transfusion. Pt continues to have acute on chronic respiratory failure with hypoxia. She wears 2LO2NC at home but has been requiring 4LO2NC here. Chest x-ray showed pulmonary congestion.  Repeat chest x-ray showed bilateral pleural effusions, left greater than right. Rapid Covid-19 Neg. Pt was started on IV Lasix and Cardiology was consulted. TTE 12/28 with EF 49-95%, normal diastolic function. She is now on po Lasix and home amiodarone has been resumed. Subjective (12/29/21):  Pt stated she would feel a lot better if she can breath better. Gets SOB with ambulation quickly. Denies chest pain, fever, chills, abdominal pain, N/V. Assessment & Plan:     ABLA  GI bleed   S/p Kcentra for INR >3 on admission. S/p 3U PRBC for Hgb 3.7 on admission. Gi has seen patient. 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 Total of 5 angiodysplasias. There is high likelihood of additional AVMs beyond the reach of the pediatric colonoscope. S/p Colonoscopy 12/28 showed diverticulosis, internal hemorrhoids  Start iron supplementation  Cont PPI po   On Soft diet  Stopping Xarelto indefinitely per Cardiology recs. Needs GI f/u in 4 weeks  CTM Hgb, transfuse if <7  D/c king    Acute on chronic hypoxemic respiratory failure  Most likely due to CHF   Baseline requires 2LO2NC. Chest x-ray showed pulmonary congestion. Repeat chest x-ray showed bilateral pleural effusions, left greater than right. Rapid Covid-19 Neg. TTE 12/28 with EF 39-15%, normal diastolic function. S/p IV Lasix  Now on po Lasix  Encourage frequent IS use  Cardiology on board, appreciate recs  On 4LO2NC, wean as tolerated  CT chest to eval for pleural effusions, may need thoracentesis.  Check procal in AM  RT for walking pulse ox in AM    Acquired hypothyroidism  Cont home Synthroid    Hyperlipidemia  Cont statin    PAF  Cont home amiodarone  Xarelto stopping indefinitely as pt is not a candidate for chronic OAC per cardiology    Hypertension, essential, benign  Cont po Lasix and Toprol    Stage 3 chronic kidney disease  Cr at baseline 1.3-1.5  Avoid nephrotoxic meds    Diastolic CHF, chronic  Resume po Lasix and Toprol    Anxiety/depression  Cont home Cymbalta        Dispo/Discharge Planning:      Hopeful for tomorrow if Hgb remains stable. RT for walking pulse ox. PT/OT, anticipate SNF.     Diet:  ADULT DIET Easy to Chew  DVT PPx: SCD's  Code status: Full Code    Hospital Problems as of 12/29/2021 Date Reviewed: 12/27/2021          Codes Class Noted - Resolved POA    * (Principal) GI bleed ICD-10-CM: K92.2  ICD-9-CM: 578.9  12/25/2021 - Present Unknown        Diastolic CHF, chronic (HCC) ICD-10-CM: I50.32  ICD-9-CM: 428.32, 428.0  6/13/2021 - Present Yes        Stage 3 chronic kidney disease ([address] (Chronic) ICD-10-CM: N18.30  ICD-9-CM: 585.3  5/20/2021 - Present Yes        Hypertension, essential, benign (Chronic) ICD-10-CM: I10  ICD-9-CM: 401.1  8/18/2016 - Present Yes        Acquired hypothyroidism (Chronic) ICD-10-CM: E03.9  ICD-9-CM: 244.9  1/16/2016 - Present Yes        Hyperlipidemia (Chronic) ICD-10-CM: E78.5  ICD-9-CM: 272.4  1/16/2016 - Present Yes        Rheumatoid arthritis ([address] (Chronic) ICD-10-CM: M06.9  ICD-9-CM: 714.0  1/16/2016 - Present Yes              Objective:     Patient Vitals for the past 24 hrs:   Temp Pulse Resp BP SpO2   12/29/21 1730  70 25 (!) 110/53    12/29/21 1715  71 14     12/29/21 1700  72 (!) 38 127/61    12/29/21 1645  73 26     12/29/21 1630  71 28 106/82    12/29/21 1600  68 24 (!) 100/51    12/29/21 1545  69 (!) 35     12/29/21 1530  68 12 (!) 106/50    12/29/21 1502  66 17 (!) 98/52    12/29/21 1430 98.4 °F (36.9 °C) 68 14 (!) 107/53 97 %   12/29/21 1415  68 13     12/29/21 1400  68 (!) 33 (!) 117/57    12/29/21 1330  69 21 (!) 117/56    12/29/21 1315  68 (!) 72     12/29/21 1300  69 22 (!) 114/54    12/29/21 1245  73 25     12/29/21 1230  72 15 (!) 127/58    12/29/21 1215  72 18     12/29/21 1200 97.6 °F (36.4 °C) 74 15 131/60 94 %   12/29/21 1145  73 19     12/29/21 1130  68 25 125/68    12/29/21 1115  91 16     12/29/21 1100  69 13 124/60    12/29/21 1045  69 11     12/29/21 1030  69 14 (!) 116/59    12/29/21 1000  72 15 (!) 118/54    12/29/21 0945  73 17     12/29/21 0930  72 16 123/62    12/29/21 0915  (!) 116 13     12/29/21 0900  71 20 (!) 112/55    12/29/21 0845  70 24     12/29/21 0830  72 17 117/82    12/29/21 0815  70 (!) 100     12/29/21 0800 96.9 °F (36.1 °C) 91 28 107/86    12/29/21 0730  88 16 (!) 101/47    12/29/21 0700  (!) 110 (!) 31 (!) 101/52    12/29/21 0630  68 (!) 32 (!) 148/63    12/29/21 0600  69 (!) 38 (!) 108/53    12/29/21 0530  96 (!) 33 (!) 102/52    12/29/21 0500  68 25 (!) 133/102    12/29/21 0430  67 24 (!) 110/55    12/29/21 0400 98.4 °F (36.9 °C) 67 30 128/61 98 %   12/29/21 0330  68  (!) 111/59    12/29/21 0300  67 13 (!) 115/56    12/29/21 0230  68 19 137/63    12/29/21 0200  69 15 (!) 117/56    12/29/21 0130  69 12 (!) 108/53    12/29/21 0100  69 13 (!) 141/59    12/29/21 0030  67 28 (!) 115/53    12/29/21 0000 98.4 °F (36.9 °C) 69 26 (!) 111/55 98 %   12/28/21 2330  70 28 (!) 117/56    12/28/21 2300  69 16 (!) 124/58    12/28/21 2230  69 12 135/64    12/28/21 2200  67 12 (!) 110/55    12/28/21 2130  67 14 127/60    12/28/21 2100  78 14 (!) 110/56    12/28/21 2030  67 19 (!) 110/59    12/28/21 2000  71 13 (!) 114/54    12/28/21 1930  (!) 142 11 98/61    12/28/21 1902 97.8 °F (36.6 °C) 82 16 (!) 104/54 100 %     Oxygen Therapy  O2 Sat (%): 97 % (12/29/21 1430)  Pulse via Oximetry: 67 beats per minute (12/28/21 1400)  O2 Device: Nasal cannula (12/29/21 0800)  Skin Assessment: Clean, dry, & intact (12/29/21 0800)  Skin Protection for O2 Device: No (12/25/21 2156)  O2 Flow Rate (L/min): 4 l/min (12/29/21 0727)  O2 Temperature: 87.8 °F (31 °C) (12/26/21 1230)  FIO2 (%): 50 % (12/26/21 1230)    Estimated body mass index is 30.86 kg/m² as calculated from the following:    Height as of this encounter: 5' (1.524 m).     Weight as of this encounter: 71.7 kg (158 lb). Intake/Output Summary (Last 24 hours) at 12/29/2021 1827  Last data filed at 12/29/2021 1743  Gross per 24 hour   Intake 1400 ml   Output 1240 ml   Net 160 ml         Physical Exam:     Blood pressure (!) 110/53, pulse 70, temperature 98.4 °F (36.9 °C), resp. rate 25, height 5' (1.524 m), weight 71.7 kg (158 lb), SpO2 97 %. General:    Well nourished. No overt distress  Head:  Normocephalic, atraumatic  Eyes:  Sclerae appear normal.  Pupils equally round. ENT:  Nares appear normal, no drainage. Moist oral mucosa  Neck:  No restricted ROM. Trachea midline   CV:   RRR. No m/r/g. No jugular venous distension. Lungs:   Decrease lung sounds b/l bases. Respirations even, unlabored  Abdomen: Bowel sounds present. Soft, nontender, nondistended. Extremities: No cyanosis or clubbing. No edema  Skin:     No rashes and normal coloration. Warm and dry. Neuro:  CN II-XII grossly intact. Sensation intact. A&Ox3  Psych:  Normal mood and affect.       I have reviewed ordered lab tests and independently visualized imaging below:    Recent Labs:  Recent Results (from the past 48 hour(s))   CBC W/O DIFF    Collection Time: 12/28/21  3:56 AM   Result Value Ref Range    WBC 11.5 (H) 4.3 - 11.1 K/uL    RBC 3.19 (L) 4.23 - 5.6 M/uL    HGB 9.6 (L) 13.6 - 17.2 g/dL    HCT 30.7 (L) 41.1 - 50.3 %    MCV 96.2 79.6 - 97.8 FL    MCH 30.1 26.1 - 32.9 PG    MCHC 31.3 (L) 31.4 - 35.0 g/dL    RDW 20.9 (H) 11.9 - 14.6 %    PLATELET 765 804 - 619 K/uL    MPV 11.5 9.4 - 12.3 FL    ABSOLUTE NRBC 0.02 0.0 - 0.2 K/uL   METABOLIC PANEL, BASIC    Collection Time: 12/28/21  3:56 AM   Result Value Ref Range    Sodium 143 138 - 145 mmol/L    Potassium 3.6 3.5 - 5.1 mmol/L    Chloride 106 98 - 107 mmol/L    CO2 33 (H) 21 - 32 mmol/L    Anion gap 4 (L) 7 - 16 mmol/L    Glucose 97 65 - 100 mg/dL    BUN 25 (H) 8 - 23 MG/DL    Creatinine 1.39 0.8 - 1.5 MG/DL    GFR est AA 47 (L) >60 ml/min/1.73m2    GFR est non-AA 39 (L) >60 ml/min/1.73m2    Calcium 8.3 8.3 - 10.4 MG/DL   MAGNESIUM    Collection Time: 12/28/21  3:56 AM   Result Value Ref Range    Magnesium 3.1 (H) 1.8 - 2.4 mg/dL   PHOSPHORUS    Collection Time: 12/28/21  3:56 AM   Result Value Ref Range    Phosphorus 3.2 2.3 - 3.7 MG/DL   NT-PRO BNP    Collection Time: 12/28/21  3:56 AM   Result Value Ref Range    NT pro-BNP 7,341 (H) <450 PG/ML   ECHO ADULT COMPLETE    Collection Time: 12/28/21 11:00 AM   Result Value Ref Range    IVSd 0.6 0.6 - 0.9 cm    LVIDd 5.9 (A) 3.9 - 5.3 cm    LVIDs 3.7 cm    LVOT Diameter 1.9 cm    LVPWd 0.8 0.6 - 0.9 cm    LVOT SV 72.5 ml    EF BP 54 (A) 55 - 100 %    EF BP 54 (A) 55 - 100 %    LV Ejection Fraction A2C 54 %    LV Ejection Fraction A4C 55 %    LV EDV A2C 115 mL    LV EDV A4C 133 mL    LV EDV  (A) 56 - 104 mL    LV EDV  (A) 56 - 104 mL    LV ESV A2C 53 mL    LV ESV A4C 60 mL    LV ESV BP 58 (A) 19 - 49 mL    LVOT Peak Gradient 4 mmHg    LVOT Mean Gradient 2 mmHg    LVOT Peak Velocity 1.0 m/s    LVOT VTI 25.6 cm    RVIDd 3.3 cm    RV Free Wall Peak S' 10 cm/s    LA Diameter 4.8 cm    AV Area by Peak Velocity 1.4 cm2    AV Area by Peak Velocity 1.4 cm2    AV Area by VTI 1.3 cm2    AV Area by VTI 1.3 cm2    AV Peak Gradient 18 mmHg    AV Mean Gradient 10 mmHg    AV Peak Velocity 2.2 m/s    AV Mean Velocity 1.5 m/s    AV VTI 56.0 cm    MV Nyquist Velocity 34 cm/s    MV A Velocity 0.73 m/s    MV E Wave Deceleration Time 185.9 ms    MV E Velocity 1.10 m/s    LV E' Lateral Velocity 9 cm/s    LV E' Septal Velocity 5 cm/s    MV PHT 53.9 ms    MV Area by PHT 4.1 cm2    MV Regurg Velocity PISA 5.4 m/s    MR .1 cm    TAPSE 1.7 1.5 - 2.0 cm    TR Peak Gradient 29 mmHg    TR Max Velocity 2.67 m/s    Aortic Root 2.8 cm    Fractional Shortening 2D 37 28 - 44 %    LV ESV Index BP 34 mL/m2    LV ESV Index A4C 36 mL/m2    LV EDV Index A4C 79 mL/m2    LV ESV Index A2C 31 mL/m2    LV EDV Index A2C 68 mL/m2    LVIDd Index 3.49 cm/m2 LVIDs Index 2.19 cm/m2    LV RWT Ratio 0.27     LV Mass 2D 153.4 67 - 162 g    LV Mass 2D Index 90.8 43 - 95 g/m2    MV E/A 1.51     E/E' Ratio (Averaged) 17.11     E/E' Lateral 12.22     E/E' Septal 22.00     LVOT Stroke Volume Index 42.9 mL/m2    LVOT Area 2.8 cm2    LA Size Index 2.84 cm/m2    LA/AO Root Ratio 1.71     Ao Root Index 1.66 cm/m2    AV Velocity Ratio 0.45     LVOT:AV VTI Index 0.46     Est. RA Pressure 3 mmHg    RVSP 32 mmHg   CBC WITH AUTOMATED DIFF    Collection Time: 12/29/21  4:05 AM   Result Value Ref Range    WBC 8.7 4.3 - 11.1 K/uL    RBC 2.84 (L) 4.23 - 5.6 M/uL    HGB 8.4 (L) 13.6 - 17.2 g/dL    HCT 27.3 (L) 41.1 - 50.3 %    MCV 96.1 79.6 - 97.8 FL    MCH 29.6 26.1 - 32.9 PG    MCHC 30.8 (L) 31.4 - 35.0 g/dL    RDW 21.7 (H) 11.9 - 14.6 %    PLATELET 538 839 - 108 K/uL    MPV 10.9 9.4 - 12.3 FL    ABSOLUTE NRBC 0.00 0.0 - 0.2 K/uL    DF AUTOMATED      NEUTROPHILS 74 43 - 78 %    LYMPHOCYTES 12 (L) 13 - 44 %    MONOCYTES 11 4.0 - 12.0 %    EOSINOPHILS 3 0.5 - 7.8 %    BASOPHILS 1 0.0 - 2.0 %    IMMATURE GRANULOCYTES 0 0.0 - 5.0 %    ABS. NEUTROPHILS 6.4 1.7 - 8.2 K/UL    ABS. LYMPHOCYTES 1.0 0.5 - 4.6 K/UL    ABS. MONOCYTES 1.0 0.1 - 1.3 K/UL    ABS. EOSINOPHILS 0.2 0.0 - 0.8 K/UL    ABS. BASOPHILS 0.1 0.0 - 0.2 K/UL    ABS. IMM.  GRANS. 0.0 0.0 - 0.5 K/UL   METABOLIC PANEL, BASIC    Collection Time: 12/29/21  4:05 AM   Result Value Ref Range    Sodium 142 138 - 145 mmol/L    Potassium 3.5 3.5 - 5.1 mmol/L    Chloride 106 98 - 107 mmol/L    CO2 34 (H) 21 - 32 mmol/L    Anion gap 2 (L) 7 - 16 mmol/L    Glucose 96 65 - 100 mg/dL    BUN 21 8 - 23 MG/DL    Creatinine 1.35 0.8 - 1.5 MG/DL    GFR est AA 49 (L) >60 ml/min/1.73m2    GFR est non-AA 40 (L) >60 ml/min/1.73m2    Calcium 7.9 (L) 8.3 - 10.4 MG/DL   MAGNESIUM    Collection Time: 12/29/21  4:05 AM   Result Value Ref Range    Magnesium 2.6 (H) 1.8 - 2.4 mg/dL   PHOSPHORUS    Collection Time: 12/29/21  4:05 AM   Result Value Ref Range Phosphorus 4.1 (H) 2.3 - 3.7 MG/DL       All Micro Results     Procedure Component Value Units Date/Time    CULTURE, BLOOD [[id number]] Collected: 12/25/21 1730    Order Status: Completed Specimen: Blood Updated: 12/28/21 0658     Special Requests: --        LEFT  Antecubital       Culture result: NO GROWTH 3 DAYS       CULTURE, BLOOD [[id number]] Collected: 12/25/21 1746    Order Status: Completed Specimen: Blood Updated: 12/28/21 0658     Special Requests: LEFT FOREARM        Culture result: NO GROWTH 3 DAYS       COVID-19 RAPID TEST [[id number]] Collected: 12/25/21 1823    Order Status: Completed Specimen: Nasopharyngeal Updated: 12/25/21 1859     Specimen source NASAL        COVID-19 rapid test Not detected        Comment:      The specimen is NEGATIVE for SARS-CoV-2, the novel coronavirus associated with COVID-19. A negative result does not rule out COVID-19. This test has been authorized by the FDA under an Emergency Use Authorization (EUA) for use by authorized laboratories. Fact sheet for Healthcare Providers: ConventionUpdate.co.nz  Fact sheet for Patients: ConventionUpdate.co.nz       Methodology: Isothermal Nucleic Acid Amplification               Other Studies:  No results found.     Current Meds:  Current Facility-Administered Medications   Medication Dose Route Frequency    pantoprazole (PROTONIX) tablet 40 mg  40 mg Oral ACB    furosemide (LASIX) tablet 40 mg  40 mg Oral DAILY    amiodarone (CORDARONE) tablet 200 mg  200 mg Oral DAILY    levothyroxine (SYNTHROID) tablet 50 mcg  50 mcg Oral ACB    DULoxetine (CYMBALTA) capsule 60 mg  60 mg Oral DAILY    folic acid (FOLVITE) tablet 1 mg  1 mg Oral DAILY    metoprolol succinate (TOPROL-XL) XL tablet 50 mg  50 mg Oral DAILY    rosuvastatin (CRESTOR) tablet 10 mg  10 mg Oral QHS    sodium chloride (NS) flush 5-40 mL  5-40 mL IntraVENous Q8H    sodium chloride (NS) flush 5-40 mL  5-40 mL IntraVENous PRN    acetaminophen (TYLENOL) tablet 650 mg  650 mg Oral Q6H PRN    Or    acetaminophen (TYLENOL) suppository 650 mg  650 mg Rectal Q6H PRN    polyethylene glycol (MIRALAX) packet 17 g  17 g Oral DAILY PRN    ondansetron (ZOFRAN ODT) tablet 4 mg  4 mg Oral Q8H PRN    Or    ondansetron (ZOFRAN) injection 4 mg  4 mg IntraVENous Q6H PRN       Signed: Rene Granados DO    Part of this note may have been written by using a voice dictation software. The note has been proof read but may still contain some grammatical/other typographical errors.

## 2021-12-29 NOTE — PROGRESS NOTES
[facility] CARDIOLOGY PROGRESS NOTE           12/29/2021 1:47 PM    Admit Date: 12/25/2021      Subjective:   No cp or inc sob      Objective:      Vitals:    12/29/21 1245 12/29/21 1300 12/29/21 1315 12/29/21 1330   BP:  (!) 114/54  (!) 117/56   Pulse: 73 69 68 69   Resp: 25 22 (!) 72 21   Temp:       SpO2:       Weight:       Height:           Physical Exam:  General-No Acute Distress  Neck- supple, no JVD  CV- regular rate and rhythm + MRG  Lung- scattered wheeze  Abd- soft, nontender, nondistended  Ext- no edema bilaterally.   Skin- warm and dry    Data Review:   Recent Labs     12/29/21  0405 12/28/21  0356    143   K 3.5 3.6   MG 2.6* 3.1*   BUN 21 25*   CREA 1.35 1.39   GLU 96 97   WBC 8.7 11.5*   HGB 8.4* 9.6*   HCT 27.3* 30.7*    337       Assessment/Plan:     Principal Problem:    GI bleed (12/25/2021)        Active Problems:    Acquired hypothyroidism (1/16/2016)          Hyperlipidemia (1/16/2016)          Rheumatoid arthritis ([facility] [address] (1/16/2016)          Hypertension, essential, benign (8/18/2016)          Stage 3 chronic kidney disease ([facility] [address] (5/20/2021)          Diastolic CHF, chronic (HCC) (6/13/2021)        Paf    ////    CV stable  Cardiac med rx looks good  On standby      Minoo Palomares MD  12/29/2021 1:47 PM

## 2021-12-29 NOTE — PROGRESS NOTES
A follow up visit was made to the patient. Emotional support, spiritual presence and the assurance of  prayer were provided for the patient.       Jacqueline Dunham, [address] [facility], [facility]

## 2021-12-29 NOTE — PROGRESS NOTES
Bedside report to [facility], pt calm, no distress noted. VSS. Chart and labs reviewed with on-coming nurse.

## 2021-12-29 NOTE — PROGRESS NOTES
Arrival/HPI





- General


Chief Complaint: Altered Mental Status


Time Seen by Provider: 11/26/18 15:01


Historian: Patient





- History of Present Illness


Narrative History of Present Illness (Text): 


11/26/18 15:15


A 82 year old male, whose past medical history includes deafness, HTN, COPD 

(takes nebulizer twice daily), and hypothyroidism, brought in by EMS to the 

emergency department for AMS. Patient is accompanied by wife. Per wife, patient 

was last here 04/28/18 and was found to have a brain bleed. Patient was admitted

and later on after discharge, was transferred to a nursing home. 1 week later, 

patient was fine once out of nursing home. Mentions patient has told her after 

having operation for his subdural hematoma, stated "his head felt fuzzy," and 

would mention this to her frequently. Also, wife states patient recently, for 

the past 6 days, has been behaving abnormally, not making any sense when talking

and doing strange things. Wife has become increasingly concerned as patient 

continues to act strangely and called an ambulance and had patient brought here 

to the ER to be evaluated for AMS. Patient's wife denies any abdominal pain, 

frequency, or any other complaints at this time. Limited HPI and ROS due to 

patient's AMS however, as patient appears confused and is hard of hearing. 





PMD: Dr. Tolentino








Past Medical History





- Provider Review


Nursing Documentation Reviewed: Yes





- Infectious Disease


Hx of Infectious Diseases: None





- Tetanus Immunization


Tetanus Immunization: Up to Date





- Cardiac


Hx Cardiac Disorders: Yes (CARDIAC STNEET 2003)


Hx Hypertension: Yes





- Pulmonary


Hx Chronic Obstructive Pulmonary Disease (COPD): Yes





- Neurological


Hx Neurological Disorder: Yes





- HEENT


Hx HEENT Disorder: Yes


Hx Blind: Yes (RIGHT EYE)


Hx Cataracts: Yes (LEFT EYE)


Hx Deafness: Yes (RIGHT EAR)





- Renal


Hx Renal Disorder: No





- Hematological/Oncological


Hx Blood Disorders: No





- Integumentary


Hx Dermatological Disorder: No





- Musculoskeletal/Rheumatological


Hx Musculoskeletal Disorders: No


Hx Falls: Yes





- Gastrointestinal


Hx Gastrointestinal Disorders: No





- Genitourinary/Gynecological


Hx Genitourinary Disorders: No





- Psychiatric


Hx Depression: No


Hx Emotional Abuse: No


Hx Physical Abuse: No


Hx Substance Use: No





- Past Surgical History


Past Surgical History: Non-Contributing





- Anesthesia


Hx Anesthesia: Yes


Hx Anesthesia Reactions: No


Hx Malignant Hyperthermia: No





- Suicidal Assessment


Feels Threatened In Home Enviroment: No





Family/Social History





- Physician Review


Nursing Documentation Reviewed: Yes


Family/Social History: No Known Family HX


Smoking Status: Former Smoker


Hx Alcohol Use: Yes (ETOH ABUSE H/O QUIT)


Hx Substance Use: No


Hx Substance Use Treatment: No





Allergies/Home Meds


Allergies/Adverse Reactions: 


Allergies





No Known Allergies Allergy (Verified 04/28/18 15:33)


   








Home Medications: 


                                    Home Meds











 Medication  Instructions  Recorded  Confirmed


 


Aspirin [Adult Aspirin] 81 mg PO DAILY 04/28/18 11/26/18


 


Brimonidine 0.15% [Alphagan P 1 drp OD TID 04/28/18 11/26/18





0.15% Opht]   


 


Cholecalciferol (Vitamin D3) 2,000 unit PO DAILY 04/28/18 11/26/18





[Vitamin D3]   


 


Diclofenac 50 mg PO DAILY 04/28/18 11/26/18


 


Levothyroxine Sodium [Levoxyl] 125 mcg PO DAILY 04/28/18 11/26/18


 


Lisinopril [Zestril] 20 mg PO DAILY 04/28/18 11/26/18


 


Metoprolol Succinate XL [Toprol XL] 50 mg PO DAILY 04/28/18 11/26/18


 


Omeprazole 20 mg PO DAILY 04/28/18 11/26/18


 


Simvastatin 80 mg PO HS 04/28/18 11/26/18


 


Terazosin [Hytrin] 5 mg PO HS 04/28/18 11/26/18


 


Vitamin B Complex [Super B-50 1 cap PO DAILY 04/28/18 11/26/18





Complex]   














Review of Systems





- Review of Systems


Systems not reviewed;Unavailable: Altered Mental Status


Respiratory: Cough (baseline from COPD)


Gastrointestinal: absent: Abdominal Pain


Genitourinary Male: absent: Frequency





Physical Exam





- Physical Exam


Narrative Physical Exam (Text): 





Gen: VS reviewed, hard of hearing, responds to voice but appears confused, well 

nourished, nontoxic, mild distress.


ENT: normal pharynx, dry mucous membranes.


Eye: EOMI, PERRL.


Neck: no JVD, supple, no adenopathy.


CV: regular rate, regular rhythm, no rubs, no murmur, no gallops, S1, S2, pulses

equal and strong.


Pulm: no distress, clear to auscultation, no wheeze, no rhonchi, breath sounds 

equal, no rales.


Abd: soft, appears non-tender, no guarding, no rebound, no rigidity, normal 

bowel sounds.


Ext: no edema.


Skin: good color, no rash, no cyanosis.


Psych: hard of hearing, responds to voice, limited psych exam secondary to 

patient's confusion.


Neuro: patient appears confused, CN2-12 intact grossly, motor intact, sensation 

intact. Patient moves all extremities spontaneously.








Vital Signs











  Temp Pulse Resp BP Pulse Ox


 


 11/26/18 15:07  100.5 F H  87  14   95


 


 11/26/18 14:54  100.5 F H  88  14  134/88  95











Finger Stick Blood Glucose: 108





Medical Decision Making


ED Course and Treatment: 


11/26/18 15:20


Impression: 82 year old male brought in for AMS.





Plan:


-- EKG


-- Head CT


-- Chest X-ray


-- Labs


-- Urinalysis


-- Urine Culture


-- Arterial Blood Gas


-- Reassess and disposition





Prior Visits:


Notes and results from previous visits were reviewed. Patient was last seen in 

the emergency department on 04/28/2018 for shortness of breath. wheezing, and 

headache. Patient was admitted for subdural hematoma and COPD.





Progress Notes:








11/26/18 18:04


admit accepted by dr. ann, patient to be admitted for hallucinations, rule 

out sepsis, the presumed source at this time may be the urine and it was agreed 

to empirically tx with iv abx. 


11/26/18 18:17


wife updated on plan and the wife angrily brought it to my attention that the 

patient's tongue is swollen which she states she only noticed today. on repeat 

inspection of the tongue it appears somehat edematous, there is no swelling 

under the tongue or swelling of the hypoglossal area, the patient is in no acute

rep distress


11/26/18 18:21


d/w intensivist, dr. coley, will come to the ED for ICU screening


11/26/18 18:56


admit accepted to the ICU by intensivist, agrees with empiric abx coverage, add 

acyclovir for one dose


11/26/18 18:58








- Lab Interpretations


Lab Results: 





                                   Lab Results





11/26/18 15:03: POC Glucose (mg/dL) 108











- RAD Interpretation


Narrative RAD Interpretations (Text): 





11/26/2018 15:48


Chest X-ray


IMPRESSION: No active disease.


Dictator: Coleman Mike MD





11/26/2018 15:51


Head CT


FINDINGS:


HEMORRHAGE: No intracranial hemorrhage. 


BRAIN:


No mass effect or edema.  Chronic periventricular white matter ischemic disease.


VENTRICLES: Unremarkable. No hydrocephalus. 


CALVARIUM: Unremarkable.


PARANASAL SINUSES:


Unremarkable as visualized. No significant inflammatory changes.


MASTOID AIR CELLS:


Unremarkable as visualized. No inflammatory changes.


OTHER FINDINGS: None.


IMPRESSION: No acute intracranial hemorrhage.


Dictator: Faisal Garcia MD





11/26/18 18:12


Date of service: 





11/26/2018





PROCEDURE:  CT Abdomen and Pelvis without intravenous contrast





HISTORY:


stone





COMPARISON:


None.





TECHNIQUE:


Axial and reformatted coronal and sagittal CT images of the abdomen and pelvis 

were obtained without IV or oral contrast administration. 





Contrast dose: 0





Radiation dose:





Total exam DLP = 981.08 mGy-cm.





This CT exam was performed using one or more of the following dose reduction 

techniques: Automated exposure control, adjustment of the mA and/or kV according

to patient size, and/or use of iterative reconstruction technique.





FINDINGS:





LOWER THORAX:


Severe emphysematous changes noted in lung bases.  There is airspace 

consolidation at the right lung base may represent atelectasis or pneumonia. 





LIVER:


The liver is mildly enlarged.  No gross lesion or ductal dilatation.  





GALLBLADDER AND BILE DUCTS:


Unremarkable. 





PANCREAS:


Unremarkable. No gross lesion or ductal dilatation.





SPLEEN:


Unremarkable. 





ADRENALS:


Unremarkable. No mass. 





KIDNEYS AND URETERS:


No evidence of nephrolithiasis or hydronephrosis.  Multiple low-attenuation 

cystic lesion seen in both kidneys larger and more on the left. 





VASCULATURE:


There is infrarenal abdominal aortic aneurysm measures 5.4 centimeter in the 

transverse diameter.  There is also mild aneurysmal dilatation of the distal 

abdominal aorta just above the bifurcation measures 3.8 centimeter.  Multiple fo

ci of aortic atherosclerotic calcification and mural plaque present.





BOWEL:


Mild constipation.  No obstruction. No gross mural thickening. Scattered colonic

diverticulosis are seen without evidence of diverticulitis.  Large hiatus hernia







APPENDIX:


No evidence of appendicitis 





PERITONEUM:


Unremarkable. No free fluid. No free air. 





LYMPH NODES:


Unremarkable. No enlarged lymph nodes. 





BLADDER:


Mild-to-moderate diffuse urinary bladder wall thickening 





REPRODUCTIVE:


Mildly enlarged prostate. 





BONES:


No acute fracture. 





OTHER FINDINGS:


None.





IMPRESSION:


No evidence of nephrolithiasis or hydronephrosis.  Multiple low-attenuation 

lesions in the renal cortex likely represent benign cysts. 





Hepatomegaly. 





Large hiatus hernia. 





Infrarenal abdominal aortic aneurysm measures 5.4 centimeter. 





Airspace consolidation at the right lung base may represent atelectasis or 

pneumonia.  Severe emphysema.


Radiology Orders: 











11/26/18 15:15


CHEST PORTABLE [RAD] Stat 





11/26/18 15:16


HEAD W/O CONTRAST [CT] Stat 











: Radiologist





- EKG Interpretation


EKG Interpretation (Text): 





11/26/18 17:41


1501: nsr at 90 bpm, nml qrs, nml axis, nonspecific t wave abn


Interpreted by ED Physician: Yes





- Scribe Statement


The provider has reviewed the documentation as recorded by the Bobby Mccoy





Provider Scribe Attestation:


All medical record entries made by the Scribe were at my direction and 

personally dictated by me. I have reviewed the chart and agree that the record 

accurately reflects my personal performance of the history, physical exam, 

medical decision making, and the department course for this patient. I have also

 personally directed, reviewed, and agree with the discharge instructions and 

disposition.








Disposition/Present on Arrival





- Present on Arrival


Any Indicators Present on Arrival: No


History of DVT/PE: No


History of Uncontrolled Diabetes: No


Urinary Catheter: No


History of Decub. Ulcer: No


History Surgical Site Infection Following: None





- Disposition


Have Diagnosis and Disposition been Completed?: Yes


Diagnosis: 


 Altered mental state





Disposition: HOSPITALIZED


Disposition Time: 18:04


Patient Plan: Admission


Patient Problems: 


                             Current Active Problems











Problem Status Onset


 


Altered mental state Acute 











Condition: STABLE Bedside Report and care received from [facility] RN(off going nurse)  via H&P, [facility], [address] and [employer]. VSS, assessment to follow.

## 2021-12-30 ENCOUNTER — APPOINTMENT (OUTPATIENT)
Dept: ULTRASOUND IMAGING | Age: 81
DRG: 377 | End: 2021-12-30
Attending: FAMILY MEDICINE
Payer: MEDICARE

## 2021-12-30 ENCOUNTER — APPOINTMENT (OUTPATIENT)
Dept: CT IMAGING | Age: 81
DRG: 377 | End: 2021-12-30
Attending: FAMILY MEDICINE
Payer: MEDICARE

## 2021-12-30 PROBLEM — J90 PLEURAL EFFUSION: Status: ACTIVE | Noted: 2021-12-30

## 2021-12-30 PROBLEM — N28.1 RENAL CYST, LEFT: Status: ACTIVE | Noted: 2021-12-30

## 2021-12-30 PROBLEM — S22.080A T12 COMPRESSION FRACTURE (HCC): Status: ACTIVE | Noted: 2021-12-30

## 2021-12-30 LAB
APPEARANCE FLD: CLEAR
BACTERIA SPEC CULT: NORMAL
BACTERIA SPEC CULT: NORMAL
COLOR FLD: YELLOW
EOSINOPHIL NFR BRONCH MANUAL: 1 %
GLUCOSE FLD-MCNC: 109 MG/DL
LDH FLD L TO P-CCNC: 59 U/L
LYMPHOCYTES NFR BRONCH MANUAL: 74 %
MACROPHAGES NFR BRONCH MANUAL: 3 %
MAGNESIUM SERPL-MCNC: 2.2 MG/DL (ref 1.8–2.4)
NEUTROPHILS NFR BRONCH MANUAL: 22 %
NUC CELL # FLD: 125 /CU MM
PHOSPHATE SERPL-MCNC: 3.1 MG/DL (ref 2.3–3.7)
PROCALCITONIN SERPL-MCNC: <0.05 NG/ML (ref 0–0.49)
PROT FLD-MCNC: 1.3 G/DL
RBC # FLD: 1000 /CU MM
SERVICE CMNT-IMP: NORMAL
SERVICE CMNT-IMP: NORMAL
SPECIMEN SOURCE FLD: NORMAL

## 2021-12-30 PROCEDURE — 77030040393 HC DRSG OPTIFOAM GENT MDII -B

## 2021-12-30 PROCEDURE — 97165 OT EVAL LOW COMPLEX 30 MIN: CPT

## 2021-12-30 PROCEDURE — 83735 ASSAY OF MAGNESIUM: CPT

## 2021-12-30 PROCEDURE — 71250 CT THORAX DX C-: CPT

## 2021-12-30 PROCEDURE — 65660000000 HC RM CCU STEPDOWN

## 2021-12-30 PROCEDURE — 84157 ASSAY OF PROTEIN OTHER: CPT

## 2021-12-30 PROCEDURE — 74011250637 HC RX REV CODE- 250/637: Performed by: STUDENT IN AN ORGANIZED HEALTH CARE EDUCATION/TRAINING PROGRAM

## 2021-12-30 PROCEDURE — 74011250637 HC RX REV CODE- 250/637: Performed by: FAMILY MEDICINE

## 2021-12-30 PROCEDURE — 80048 BASIC METABOLIC PNL TOTAL CA: CPT

## 2021-12-30 PROCEDURE — 83615 LACTATE (LD) (LDH) ENZYME: CPT

## 2021-12-30 PROCEDURE — 99223 1ST HOSP IP/OBS HIGH 75: CPT | Performed by: INTERNAL MEDICINE

## 2021-12-30 PROCEDURE — 97530 THERAPEUTIC ACTIVITIES: CPT

## 2021-12-30 PROCEDURE — 74011000250 HC RX REV CODE- 250: Performed by: FAMILY MEDICINE

## 2021-12-30 PROCEDURE — 75810000165 HC THORACENTESIS

## 2021-12-30 PROCEDURE — 88112 CYTOPATH CELL ENHANCE TECH: CPT

## 2021-12-30 PROCEDURE — 85027 COMPLETE CBC AUTOMATED: CPT

## 2021-12-30 PROCEDURE — 36592 COLLECT BLOOD FROM PICC: CPT

## 2021-12-30 PROCEDURE — 74011250637 HC RX REV CODE- 250/637: Performed by: PHYSICIAN ASSISTANT

## 2021-12-30 PROCEDURE — 77010033678 HC OXYGEN DAILY

## 2021-12-30 PROCEDURE — 84100 ASSAY OF PHOSPHORUS: CPT

## 2021-12-30 PROCEDURE — 97162 PT EVAL MOD COMPLEX 30 MIN: CPT

## 2021-12-30 PROCEDURE — 2709999900 HC NON-CHARGEABLE SUPPLY: Performed by: INTERNAL MEDICINE

## 2021-12-30 PROCEDURE — 88305 TISSUE EXAM BY PATHOLOGIST: CPT

## 2021-12-30 PROCEDURE — 77030012890

## 2021-12-30 PROCEDURE — 97112 NEUROMUSCULAR REEDUCATION: CPT

## 2021-12-30 PROCEDURE — 84145 PROCALCITONIN (PCT): CPT

## 2021-12-30 PROCEDURE — 76040000019: Performed by: INTERNAL MEDICINE

## 2021-12-30 PROCEDURE — 74011250637 HC RX REV CODE- 250/637: Performed by: INTERNAL MEDICINE

## 2021-12-30 PROCEDURE — 87205 SMEAR GRAM STAIN: CPT

## 2021-12-30 PROCEDURE — 0W9B3ZZ DRAINAGE OF LEFT PLEURAL CAVITY, PERCUTANEOUS APPROACH: ICD-10-PCS | Performed by: INTERNAL MEDICINE

## 2021-12-30 PROCEDURE — 82945 GLUCOSE OTHER FLUID: CPT

## 2021-12-30 PROCEDURE — 86580 TB INTRADERMAL TEST: CPT | Performed by: FAMILY MEDICINE

## 2021-12-30 PROCEDURE — 77030038269 HC DRN EXT URIN PURWCK BARD -A

## 2021-12-30 PROCEDURE — 94761 N-INVAS EAR/PLS OXIMETRY MLT: CPT

## 2021-12-30 PROCEDURE — 76775 US EXAM ABDO BACK WALL LIM: CPT

## 2021-12-30 PROCEDURE — 97535 SELF CARE MNGMENT TRAINING: CPT

## 2021-12-30 PROCEDURE — 89050 BODY FLUID CELL COUNT: CPT

## 2021-12-30 PROCEDURE — 76604 US EXAM CHEST: CPT | Performed by: INTERNAL MEDICINE

## 2021-12-30 PROCEDURE — 87102 FUNGUS ISOLATION CULTURE: CPT

## 2021-12-30 PROCEDURE — 87116 MYCOBACTERIA CULTURE: CPT

## 2021-12-30 PROCEDURE — 77030014147 HC TY THORCENT PARA TELE -B: Performed by: INTERNAL MEDICINE

## 2021-12-30 PROCEDURE — 32555 ASPIRATE PLEURA W/ IMAGING: CPT | Performed by: INTERNAL MEDICINE

## 2021-12-30 PROCEDURE — 2709999900 HC NON-CHARGEABLE SUPPLY

## 2021-12-30 RX ORDER — POTASSIUM CHLORIDE 750 MG/1
10 TABLET, EXTENDED RELEASE ORAL DAILY
Status: COMPLETED | OUTPATIENT
Start: 2021-12-30 | End: 2021-12-30

## 2021-12-30 RX ADMIN — POTASSIUM CHLORIDE 10 MEQ: 10 TABLET, EXTENDED RELEASE ORAL at 09:52

## 2021-12-30 RX ADMIN — Medication 10 ML: at 20:28

## 2021-12-30 RX ADMIN — PANTOPRAZOLE SODIUM 40 MG: 40 TABLET, DELAYED RELEASE ORAL at 05:16

## 2021-12-30 RX ADMIN — ONDANSETRON 4 MG: 4 TABLET, ORALLY DISINTEGRATING ORAL at 22:01

## 2021-12-30 RX ADMIN — DULOXETINE HYDROCHLORIDE 60 MG: 60 CAPSULE, DELAYED RELEASE ORAL at 09:51

## 2021-12-30 RX ADMIN — AMIODARONE HYDROCHLORIDE 200 MG: 200 TABLET ORAL at 09:51

## 2021-12-30 RX ADMIN — TUBERCULIN PURIFIED PROTEIN DERIVATIVE 5 UNITS: 5 INJECTION, SOLUTION INTRADERMAL at 20:30

## 2021-12-30 RX ADMIN — FERROUS SULFATE TAB 325 MG (65 MG ELEMENTAL FE) 325 MG: 325 (65 FE) TAB at 09:51

## 2021-12-30 RX ADMIN — ROSUVASTATIN CALCIUM 10 MG: 5 TABLET, FILM COATED ORAL at 20:29

## 2021-12-30 RX ADMIN — FOLIC ACID 1 MG: 1 TABLET ORAL at 09:51

## 2021-12-30 RX ADMIN — SODIUM CHLORIDE, PRESERVATIVE FREE 10 ML: 5 INJECTION INTRAVENOUS at 16:40

## 2021-12-30 RX ADMIN — LEVOTHYROXINE SODIUM 50 MCG: 50 TABLET ORAL at 05:15

## 2021-12-30 RX ADMIN — METOPROLOL SUCCINATE 50 MG: 50 TABLET, EXTENDED RELEASE ORAL at 16:40

## 2021-12-30 RX ADMIN — Medication 10 ML: at 02:54

## 2021-12-30 RX ADMIN — FUROSEMIDE 40 MG: 40 TABLET ORAL at 09:52

## 2021-12-30 NOTE — CONSULTS
History and Physical Initial Visit NOTE           12/30/2021    Brain Ivy                        Date of Admission:  12/25/2021    The patient's chart is reviewed and the patient is discussed with the staff. Subjective:     Patient is a 80 y.o.  female, PMH hypothyroidism, HLD, RA, atrial fibrillation on Xarelto, CKD III, and dCHF seen and evaluated at the request of Dr. Zeke Sexton for pleural effusions. The patient came in initially on 12/25/21 after c/o increased shortness of breath and being found to have Hgb 3.7. She apparently had a melanotic stool. The patient has a history of dCHF and has been on Lasix however recent CT chest and CXR reveals bilateral pleural effusions, left > right. The patient states that she normally wears O2 at 2L NC at home but has had more trouble breathing here recently. She also endorses BLE edema as well. The patient has previously was admitted 12/13/21 thru 12/21/21 with anemia, Hgb 3.3. GI was consulted during that admission and EGD done on 12/14/21 was unremarkable. Heme/Oncology was also consulted during that admission and found that lab work up was consistent with LUIS ARMANDO and the patient received IV iron infusion. Currently the patient has been taken off anticoagulation. She denies any fevers, chills, nausea, or vomiting. The patient states she has had thoracentesis done in the past but cannot remember when. Review of Systems  A comprehensive review of systems was negative except for that written in the HPI. Prior to Admission Medications   Prescriptions Last Dose Informant Patient Reported? Taking? DULoxetine (CYMBALTA) 60 mg capsule 12/25/2021 at Unknown time  No Yes   Sig: Take 1 Cap by mouth daily. amiodarone (CORDARONE) 200 mg tablet 12/25/2021 at Unknown time  No Yes   Sig: Take 1 Tab by mouth daily. cholecalciferol (VITAMIN D3) 1,000 unit tablet 12/25/2021 at Unknown time  Yes Yes   Sig: Take 1,000 Units by mouth daily. folic acid (FOLVITE) 1 mg tablet 12/25/2021 at Unknown time  Yes Yes   Sig: Take 1 mg by mouth daily. furosemide (LASIX) 20 mg tablet 12/25/2021 at Unknown time  No Yes   Sig: Take 1 Tablet by mouth daily. Patient taking differently: Take 40 mg by mouth daily. levothyroxine (SYNTHROID) 25 mcg tablet 12/25/2021 at Unknown time  No Yes   Sig: Take 1 Tab by mouth Daily (before breakfast). metoprolol succinate (TOPROL-XL) 50 mg XL tablet 12/25/2021 at Unknown time  No Yes   Sig: Take 1 Tablet by mouth daily. ondansetron (ZOFRAN ODT) 4 mg disintegrating tablet 12/25/2021 at Unknown time  No Yes   Sig: Take 1 Tab by mouth every eight (8) hours as needed for Nausea or Vomiting. pantoprazole (PROTONIX) 40 mg tablet 12/25/2021 at Unknown time  No Yes   Sig: Take 1 Tablet by mouth Before breakfast and dinner. rivaroxaban (Xarelto) 15 mg tab tablet 12/25/2021 at Unknown time  No Yes   Sig: Take 1 Tablet by mouth daily (with breakfast) for 30 days. rosuvastatin (Crestor) 10 mg tablet 12/25/2021 at Unknown time  No Yes   Sig: Take 1 Tab by mouth nightly.       Facility-Administered Medications: None     Past Medical History:   Diagnosis Date    Acquired hypothyroidism 1/16/2016    [facility] ([facility] 2008    Depression 8/18/2016    Endocrine disease     hypothyroid    Fungal dermatitis 8/18/2016    Hyperlipidemia 1/16/2016    Hypertension, essential, benign 8/18/2016    Hypokalemia 8/18/2016    Inflamed sebaceous cyst 8/18/2016    Major depressive disorder, recurrent, moderate ([facility] 8/18/2016    Nicotine dependence, cigarettes, uncomplicated 3/80/3343    Osteopenia 8/18/2016    Osteoporosis 8/18/2016    Radiation therapy complication 8184    Rheumatoid arthritis ([facility] 1/16/2016    Right carotid bruit 1/16/2016    TIA (transient ischemic attack) 1/16/2016    Tobacco abuse 8/18/2016     Past Surgical History:   Procedure Laterality Date    COLONOSCOPY N/A 12/28/2021    COLONOSCOPY/ 31 CVICU 102 performed by Meaghan V[facility]quez MD [facility] ENDOSCOPY    HX ADENOIDECTOMY      HX BREAST LUMPECTOMY Right 2008    with lymph nodes    HX TONSILLECTOMY      IR BX BONE MARROW DIAGNOSTIC  5/14/2021    IR INSERT NON TUNL CVC OVER 5 YRS  5/14/2021     Social History     Socioeconomic History    Marital status:      Spouse name: Not on file    Number of children: Not on file    Years of education: Not on file    Highest education level: Not on file   Occupational History    Not on file   Tobacco Use    Smoking status: Current Some Day Smoker    Smokeless tobacco: Never Used    Tobacco comment: Only on pay day-1/2 pack   Substance and Sexual Activity    Alcohol use: No    Drug use: No    Sexual activity: Not on file   Other Topics Concern    Not on file   Social History Narrative    Not on file     Social Determinants of Health     Financial Resource Strain:     Difficulty of Paying Living Expenses: Not on file   Food Insecurity:     Worried About Running Out of Food in the Last Year: Not on file    Ahnk of Food in the Last Year: Not on file   Transportation Needs:     Lack of Transportation (Medical): Not on file    Lack of Transportation (Non-Medical):  Not on file   Physical Activity:     Days of Exercise per Week: Not on file    Minutes of Exercise per Session: Not on file   Stress:     Feeling of Stress : Not on file   Social Connections:     Frequency of Communication with Friends and Family: Not on file    Frequency of Social Gatherings with Friends and Family: Not on file    Attends Oriental orthodox Services: Not on file    Active Member of Clubs or Organizations: Not on file    Attends Club or Organization Meetings: Not on file    Marital Status: Not on file   Intimate Partner Violence:     Fear of Current or Ex-Partner: Not on file    Emotionally Abused: Not on file    Physically Abused: Not on file    Sexually Abused: Not on file   Housing Stability:     Unable to Pay for Housing in the Last Year: Not on file    Number of Places Lived in the Last Year: Not on file    Unstable Housing in the Last Year: Not on file     Family History   Problem Relation Age of Onset    Stroke Mother     Cancer Father         Brain    HIV/AIDS Brother      Allergies   Allergen Reactions    Eliquis [Apixaban] Other (comments)     Patient states she had hallucinations from Eliquis     Current Facility-Administered Medications   Medication Dose Route Frequency    tuberculin injection 5 Units  5 Units IntraDERMal ONCE    ferrous sulfate tablet 325 mg  1 Tablet Oral DAILY WITH BREAKFAST    lip protectant (BLISTEX) ointment 1 Each  1 Each Topical PRN    pantoprazole (PROTONIX) tablet 40 mg  40 mg Oral ACB    furosemide (LASIX) tablet 40 mg  40 mg Oral DAILY    amiodarone (CORDARONE) tablet 200 mg  200 mg Oral DAILY    levothyroxine (SYNTHROID) tablet 50 mcg  50 mcg Oral ACB    DULoxetine (CYMBALTA) capsule 60 mg  60 mg Oral DAILY    folic acid (FOLVITE) tablet 1 mg  1 mg Oral DAILY    metoprolol succinate (TOPROL-XL) XL tablet 50 mg  50 mg Oral DAILY    rosuvastatin (CRESTOR) tablet 10 mg  10 mg Oral QHS    sodium chloride (NS) flush 5-40 mL  5-40 mL IntraVENous Q8H    sodium chloride (NS) flush 5-40 mL  5-40 mL IntraVENous PRN    acetaminophen (TYLENOL) tablet 650 mg  650 mg Oral Q6H PRN    Or    acetaminophen (TYLENOL) suppository 650 mg  650 mg Rectal Q6H PRN    polyethylene glycol (MIRALAX) packet 17 g  17 g Oral DAILY PRN    ondansetron (ZOFRAN ODT) tablet 4 mg  4 mg Oral Q8H PRN    Or    ondansetron (ZOFRAN) injection 4 mg  4 mg IntraVENous Q6H PRN     Objective:   Blood pressure 132/60, pulse (!) 130, temperature 98.3 °F (36.8 °C), resp. rate (!) 87, height 5' (1.524 m), weight 143 lb 8.3 oz (65.1 kg), SpO2 93 %.      Intake/Output Summary (Last 24 hours) at 12/30/2021 1501  Last data filed at 12/30/2021 0513  Gross per 24 hour   Intake 580 ml   Output 650 ml   Net -70 ml     PHYSICAL EXAM   Constitutional:  the patient is well developed and in no acute distress  EENMT:  Sclera clear, pupils equal, oral mucosa moist  Respiratory: O2 at 4L NC, crackles  Cardiovascular:  RRR without M,G,R  Gastrointestinal: soft and non-tender; with positive bowel sounds. Musculoskeletal: warm without cyanosis. There is no lower extremity edema. Skin:  no jaundice or rashes, no wounds   Neurologic: no gross neuro deficits     Psychiatric:  alert and oriented x 3    CXR: None today    12/27/21          CT Chest: 12/30/21--bilateral pleural effusion, L>R          Recent Labs     12/30/21  0255 12/29/21  0405 12/28/21  0356   WBC 7.9 8.7 11.5*   HGB 8.1* 8.4* 9.6*   HCT 26.9* 27.3* 30.7*    327 337   PCT <0.05  --   --     142 143   K 3.9 3.5 3.6    106 106   CO2 34* 34* 33*   GLU 95 96 97   BUN 19 21 25*   CREA 1.42 1.35 1.39   MG 2.2 2.6* 3.1*   CA 7.8* 7.9* 8.3   PHOS 3.1 4.1* 3.2   BNPNT  --   --  7,341*     ECHO: Results from [place] encounter on 12/25/21    ECHO ADULT COMPLETE    Interpretation Summary    Left Ventricle: Left ventricle is mildly dilated. Normal wall thickness. Normal wall motion. The EF by visual approximation is 55 - 60%. Normal diastolic function.   Right Ventricle: Right ventricle size is normal. Low normal systolic function.   Aortic Valve: Probably trileaflet aortic valve. No transvalvular regurgitation. No stenosis.   Mitral Valve: Mild to moderate transvalvular regurgitation.   Left Atrium: Left atrium is severely dilated. Results     Procedure Component Value Units Date/Time    COVID-19 RAPID TEST [[id number]] Collected: 12/25/21 1823    Order Status: Completed Specimen: Nasopharyngeal Updated: 12/25/21 1859     Specimen source NASAL        COVID-19 rapid test Not detected        Comment:      The specimen is NEGATIVE for SARS-CoV-2, the novel coronavirus associated with COVID-19. A negative result does not rule out COVID-19.        This test has been authorized by the FDA under an Emergency Use Authorization (EUA) for use by authorized laboratories. Fact sheet for Healthcare Providers: ConventionUpdate.co.nz  Fact sheet for Patients: ConventionUpdate.co.nz       Methodology: Isothermal Nucleic Acid Amplification         CULTURE, BLOOD [[id number]] Collected: 12/25/21 1746    Order Status: Completed Specimen: Blood Updated: 12/30/21 0747     Special Requests: LEFT FOREARM        Culture result: NO GROWTH 5 DAYS       CULTURE, BLOOD [[id number]] Collected: 12/25/21 1730    Order Status: Completed Specimen: Blood Updated: 12/30/21 0747     Special Requests: --        LEFT  Antecubital       Culture result: NO GROWTH 5 DAYS           Inpat Anti-Infectives (From admission, onward)    None        Assessment and Plan:  (Medical Decision Making)   Principal Problem:    GI bleed (12/25/2021)  --Hgb 8.1 today, anticoagulation held, previously on Xarelto    Active Problems:    Acquired hypothyroidism (1/16/2016)  --On Levothyroxine      Hyperlipidemia (1/16/2016)  --On Crestor      Rheumatoid arthritis ([facility] (1/16/2016)  --      Hypertension, essential, benign (8/18/2016)  --On metoprolol      Stage 3 chronic kidney disease ([facility] (5/20/2021)  --Creatinine 1.49      Diastolic CHF, chronic ([facility] (6/13/2021)  --Severely dilated left atrium per most recent echo, on Lasix, still with bilateral pleural effusions      Bilateral Pleural Effusions  --Noted on CXR and CT chest recently despite being on Lasix, off anticoagulation. Reasonable to do thoracentesis and patient is agreeable to this. On 3L O2 NC here, was on 2L O2 NC at home, will continue to attempt to wean as tolerated. Full Code    More than 50% of the time documented was spent in face-to-face contact with the patient and in the care of the patient on the floor/unit where the patient is located.     Thank you very much for this referral.  We appreciate the opportunity to participate in this patient's care. Will follow along with above stated plan. Dona Lauren NP     I have spoken with and examined the patient. I agree with the above assessment and plan as documented. Gen: alert  Lungs:  Mild rales, diminished in bases, 2L  Heart:  RRR with no Murmur/Rubs/Gallops  Abd: soft  Ext: no edema    79 y/o female with heart failure, afib, GIB. Continue diuresis. Will attempt evaluation for thoracentesis. Off Xarelto.      Carry MD Dereck

## 2021-12-30 NOTE — PROGRESS NOTES
Hospitalist Progress Note   Admit Date:  2021  4:57 PM   Name:  Tonia Yoo   Age:  80 y.o. Sex:  adult  :  1940   MRN:  [id number]   Room:  102/01    Presenting Complaint: Shortness of Breath    Reason(s) for Admission: GI bleed [facility] Course & Interval History:   80 y.o. female with medical history of hypothyroidism, history of Graves' disease, atrial fibrillation on Xarelto, chronic anemia, CKD stage III.  Patient recently admitted  for progressive anemia with hemoglobin on arrival of 3.3. Harsh Lewis was consulted and patient underwent EGD on 21 that was unremarkable.  Heme-onc was consulted and labs were consistent with iron deficiency anemia and patient was given IV iron.  Xarelto was resumed on  and hemoglobin greater than 8 on discharge.    Patient presents  with report of increasing shortness of breath. She appears pale and was found to have large melanotic stool on exam.   ER work-up notable for WBC 9, hemoglobin 3.7, platelets 892, INR 3.6, sodium 146, potassium 4.3, glucose 128, creatinine 1.34, albumin 1.6, LFTs within normal limits, lactic acid 5.1, proBNP 7085. Checks x-ray notable for pulmonary edema. Pt admitted for ABLA/acute GIB. She received Kcentra for INR >3 on admission. S/p 3U PRBC for Hgb 3.7 on admission. Gi has seen patient. 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 Total of 5 angiodysplasias. There is high likelihood of additional AVMs beyond the reach of the pediatric colonoscope. S/p Colonoscopy  showed diverticulosis, internal hemorrhoids. Xarelto is discontinued indefinitely with cardiology recommendations. Hgb has been stable since transfusion. Pt continues to have acute on chronic respiratory failure with hypoxia. She wears 2LO2NC at home but has been requiring 4LO2NC here. Chest x-ray showed pulmonary congestion.  Repeat chest x-ray showed bilateral pleural effusions, left greater than right. Rapid Covid-19 Neg. Pt was started on IV Lasix and Cardiology was consulted. TTE 12/28 with EF 14-52%, normal diastolic function. She is now on po Lasix and home amiodarone has been resumed. Cardiology signed off. CT chest was obtained to assess for pleural effusion and shows b/l pleural effusions (L>R) and bibasilar infiltrates/atelectasis; chronic T12 compression fracture. Pulmonary was consulted for thoracentesis. Subjective (12/30/21):  Pt stated she feels much better after thoracentesis. She can breath better. On 4LO2NC. denies pain. Denies chest pain, fever, chills, abdominal pain, N/V. Assessment & Plan:     ABLA  GI bleed   S/p Kcentra for INR >3 on admission. S/p 3U PRBC for Hgb 3.7 on admission. Gi has seen patient. 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 Total of 5 angiodysplasias. There is high likelihood of additional AVMs beyond the reach of the pediatric colonoscope. S/p Colonoscopy 12/28 showed diverticulosis, internal hemorrhoids  Start iron supplementation  Cont PPI po   On Soft diet  Stopping Xarelto indefinitely per Cardiology recs. Needs GI f/u in 4 weeks  CTM Hgb, transfuse if <7  Hgb stable ~8    Acute on chronic hypoxemic respiratory failure  Most likely due to CHF   Pleural effusion, L>R  Baseline requires 2LO2NC. Chest x-ray showed pulmonary congestion. Repeat chest x-ray showed bilateral pleural effusions, left greater than right. Rapid Covid-19 Neg. TTE 12/28 with EF 99-69%, normal diastolic function. CT chest 12/30 shows b/l pleural effusions (L>R) and bibasilar infiltrates/atelectasis; chronic T12 compression fracture  Procal negative, pt afebrile and no leukocytosis, low concerns for infiltrates on CT being infection, most likely atelectasis  S/p IV Lasix.  Cont po Lasix  Cardiology has seen, now on standby  Encourage frequent IS use  On 4LO2NC, wean as tolerated  Consulted Pulmonology for thoracentesis, appreciate recs. S/p 800mL removed from L pleural effusion 12/30  Pleural studies pending    Chronic compression fracture  CT chest 12/30 captured chronic T12 compression fracture. Pt stated she fell 6-8 months ago but did not get evaluated. Denies pain now. 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 I spent 35 minutes of time caring for this patient at bedside or nearby on the unit, more than 50 percent of which was spent on coordination of care and/or counseling regarding the disease process, treatment options, and treatment plan.       Diet:  ADULT DIET Easy to Chew  DVT PPx: SCD's  Code status: Full Code    Hospital Problems as of 12/30/2021 Date Reviewed: 12/27/2021          Codes Class Noted - Resolved POA    Pleural effusion ICD-10-CM: J90  ICD-9-CM: 511.9  12/30/2021 - Present Unknown        * (Principal) GI bleed ICD-10-CM: K92.2  ICD-9-CM: 578.9  12/25/2021 - Present Unknown        Diastolic CHF, chronic (HCC) ICD-10-CM: I50.32  ICD-9-CM: 428.32, 428.0  6/13/2021 - Present Yes        Stage 3 chronic kidney disease ([facility] [address] (Chronic) ICD-10-CM: N18.30  ICD-9-CM: 585.3  5/20/2021 - Present Yes        Hypertension, essential, benign (Chronic) ICD-10-CM: I10  ICD-9-CM: 401.1  8/18/2016 - Present Yes        Acquired hypothyroidism (Chronic) ICD-10-CM: E03.9  ICD-9-CM: 244.9 1/16/2016 - Present Yes        Hyperlipidemia (Chronic) ICD-10-CM: E78.5  ICD-9-CM: 272.4  1/16/2016 - Present Yes        Rheumatoid arthritis ([facility] [address] (Chronic) ICD-10-CM: M06.9  ICD-9-CM: 714.0  1/16/2016 - Present Yes              Objective:     Patient Vitals for the past 24 hrs:   Temp Pulse Resp BP SpO2   12/30/21 1640  74  (!) 167/68    12/30/21 1552  75 27 (!) 158/67 100 %   12/30/21 1458  71 24 (!) 158/67    12/30/21 1358  66 13 (!) 117/57    12/30/21 1258  (!) 130 (!) 87 132/60    12/30/21 1158  76 24 139/64    12/30/21 1058  67 25 136/60    12/30/21 1028  69 12 (!) 141/63    12/30/21 0958  68 18 (!) 124/57    12/30/21 0952  66  (!) 124/57    12/30/21 0910     93 %   12/30/21 0843    (!) 121/59    12/30/21 0658 98.3 °F (36.8 °C) 68 23 114/65 96 %   12/30/21 0600  68 18     12/30/21 0406  66 13     12/30/21 0405  66 27     12/30/21 0404  67 (!) 39     12/30/21 0403  67 24     12/30/21 0402  68 15 120/67    12/30/21 0401  67 14     12/30/21 0400  67 18     12/30/21 0302 97.4 °F (36.3 °C) 69 13 (!) 115/58    12/30/21 0202  69 14 (!) 130/58    12/30/21 0102  68 16 (!) 142/60    12/30/21 0002  69 19 137/64    12/29/21 2308  67 12     12/29/21 2307  67 11     12/29/21 2306  69 22     12/29/21 2305  68 12     12/29/21 2304  68 12     12/29/21 2303  76 23     12/29/21 2302  69 12 (!) 127/59    12/29/21 2206  68 24     12/29/21 2204  68 11 (!) 113/53    12/29/21 2103  69 13     12/29/21 2102  69 14 136/60    12/29/21 2101  69 9     12/29/21 2100  69 11     12/29/21 2014  70 20     12/29/21 2013  86 30     12/29/21 2012  71 18     12/29/21 2011  70      12/29/21 2002  71 (!) 39 (!) 120/58    12/29/21 1902 97 °F (36.1 °C) 69 14 (!) 120/56    12/29/21 1901  70 13     12/29/21 1900 97 °F (36.1 °C) 70 23  99 %   12/29/21 1859  70 29  98 %   12/29/21 1800  69 20 (!) 113/53    12/29/21 1730  70 25 (!) 110/53    12/29/21 1715  71 14       Oxygen Therapy  O2 Sat (%): 100 % (12/30/21 1552)  Pulse via Oximetry: 75 beats per minute (12/30/21 1552)  O2 Device: Nasal cannula (12/30/21 1552)  Skin Assessment: Clean, dry, & intact (12/30/21 0742)  Skin Protection for O2 Device: N/A (12/29/21 2039)  O2 Flow Rate (L/min): 4 l/min (12/30/21 1552)  O2 Temperature: 87.8 °F (31 °C) (12/26/21 1230)  FIO2 (%): 50 % (12/26/21 1230)    Estimated body mass index is 28.03 kg/m² as calculated from the following:    Height as of this encounter: 5' (1.524 m). Weight as of this encounter: 65.1 kg (143 lb 8.3 oz). Intake/Output Summary (Last 24 hours) at 12/30/2021 1707  Last data filed at 12/30/2021 1640  Gross per 24 hour   Intake 1060 ml   Output 2025 ml   Net -965 ml         Physical Exam:     Blood pressure (!) 167/68, pulse 74, temperature 98.3 °F (36.8 °C), resp. rate 27, height 5' (1.524 m), weight 65.1 kg (143 lb 8.3 oz), SpO2 100 %. General:    Well nourished. No overt distress  Head:  Normocephalic, atraumatic  Eyes:  Sclerae appear normal.  Pupils equally round. ENT:  Nares appear normal, no drainage. Moist oral mucosa  Neck:  No restricted ROM. Trachea midline   CV:   RRR. No m/r/g. No jugular venous distension. Lungs:   CTAB. Respirations even, unlabored  Abdomen: Bowel sounds present. Soft, nontender, nondistended. Extremities: No cyanosis or clubbing. No edema  Skin:     No rashes and normal coloration. Warm and dry. Neuro:  CN II-XII grossly intact. Sensation intact. A&Ox3  Psych:  Normal mood and affect.       I have reviewed ordered lab tests and independently visualized imaging below:    Recent Labs:  Recent Results (from the past 48 hour(s))   CBC WITH AUTOMATED DIFF    Collection Time: 12/29/21  4:05 AM   Result Value Ref Range    WBC 8.7 4.3 - 11.1 K/uL    RBC 2.84 (L) 4.23 - 5.6 M/uL    HGB 8.4 (L) 13.6 - 17.2 g/dL    HCT 27.3 (L) 41.1 - 50.3 %    MCV 96.1 79.6 - 97.8 FL    MCH 29.6 26.1 - 32.9 PG    MCHC 30.8 (L) 31.4 - 35.0 g/dL    RDW 21.7 (H) 11.9 - 14.6 %    PLATELET 566 379 - 768 K/uL    MPV 10.9 9.4 - 12.3 FL    ABSOLUTE NRBC 0.00 0.0 - 0.2 K/uL    DF AUTOMATED      NEUTROPHILS 74 43 - 78 %    LYMPHOCYTES 12 (L) 13 - 44 %    MONOCYTES 11 4.0 - 12.0 %    EOSINOPHILS 3 0.5 - 7.8 %    BASOPHILS 1 0.0 - 2.0 %    IMMATURE GRANULOCYTES 0 0.0 - 5.0 %    ABS. NEUTROPHILS 6.4 1.7 - 8.2 K/UL    ABS. LYMPHOCYTES 1.0 0.5 - 4.6 K/UL    ABS. MONOCYTES 1.0 0.1 - 1.3 K/UL    ABS. EOSINOPHILS 0.2 0.0 - 0.8 K/UL    ABS. BASOPHILS 0.1 0.0 - 0.2 K/UL    ABS. IMM.  GRANS. 0.0 0.0 - 0.5 K/UL   METABOLIC PANEL, BASIC    Collection Time: 12/29/21  4:05 AM   Result Value Ref Range    Sodium 142 138 - 145 mmol/L    Potassium 3.5 3.5 - 5.1 mmol/L    Chloride 106 98 - 107 mmol/L    CO2 34 (H) 21 - 32 mmol/L    Anion gap 2 (L) 7 - 16 mmol/L    Glucose 96 65 - 100 mg/dL    BUN 21 8 - 23 MG/DL    Creatinine 1.35 0.8 - 1.5 MG/DL    GFR est AA 49 (L) >60 ml/min/1.73m2    GFR est non-AA 40 (L) >60 ml/min/1.73m2    Calcium 7.9 (L) 8.3 - 10.4 MG/DL   MAGNESIUM    Collection Time: 12/29/21  4:05 AM   Result Value Ref Range    Magnesium 2.6 (H) 1.8 - 2.4 mg/dL   PHOSPHORUS    Collection Time: 12/29/21  4:05 AM   Result Value Ref Range    Phosphorus 4.1 (H) 2.3 - 3.7 MG/DL   MAGNESIUM    Collection Time: 12/30/21  2:55 AM   Result Value Ref Range    Magnesium 2.2 1.8 - 2.4 mg/dL   PHOSPHORUS    Collection Time: 12/30/21  2:55 AM   Result Value Ref Range    Phosphorus 3.1 2.3 - 3.7 MG/DL   CBC W/O DIFF    Collection Time: 12/30/21  2:55 AM   Result Value Ref Range    WBC 7.9 4.3 - 11.1 K/uL    RBC 2.71 (L) 4.23 - 5.6 M/uL    HGB 8.1 (L) 13.6 - 17.2 g/dL    HCT 26.9 (L) 41.1 - 50.3 %    MCV 99.3 (H) 79.6 - 97.8 FL    MCH 29.9 26.1 - 32.9 PG    MCHC 30.1 (L) 31.4 - 35.0 g/dL    RDW 21.2 (H) 11.9 - 14.6 %    PLATELET 726 967 - 213 K/uL    MPV 10.8 9.4 - 12.3 FL    ABSOLUTE NRBC 0.00 0.0 - 0.2 K/uL   METABOLIC PANEL, BASIC Collection Time: 12/30/21  2:55 AM   Result Value Ref Range    Sodium 141 138 - 145 mmol/L    Potassium 3.9 3.5 - 5.1 mmol/L    Chloride 105 98 - 107 mmol/L    CO2 34 (H) 21 - 32 mmol/L    Anion gap 2 (L) 7 - 16 mmol/L    Glucose 95 65 - 100 mg/dL    BUN 19 8 - 23 MG/DL    Creatinine 1.42 0.8 - 1.5 MG/DL    GFR est AA >60 >60 ml/min/1.73m2    GFR est non-AA >60 >60 ml/min/1.73m2    Calcium 7.8 (L) 8.3 - 10.4 MG/DL   PROCALCITONIN    Collection Time: 12/30/21  2:55 AM   Result Value Ref Range    Procalcitonin <0.05 0.00 - 0.49 ng/mL       All Micro Results     Procedure Component Value Units Date/Time    AFB CULTURE + SMEAR W/RFLX ID FROM CULTURE [[id number]] Collected: 12/30/21 1558    Order Status: Completed Updated: 12/30/21 1653    CULTURE, BODY FLUID [place] STAIN [[id number]] Collected: 12/30/21 1558    Order Status: Completed Updated: 12/30/21 1652    FUNGUS CULTURE AND SMEAR [[id number]] Collected: 12/30/21 1558    Order Status: Completed Updated: 12/30/21 1652    CULTURE, BLOOD [[id number]] Collected: 12/25/21 1746    Order Status: Completed Specimen: Blood Updated: 12/30/21 0747     Special Requests: LEFT FOREARM        Culture result: NO GROWTH 5 DAYS       CULTURE, BLOOD [[id number]] Collected: 12/25/21 1730    Order Status: Completed Specimen: Blood Updated: 12/30/21 0747     Special Requests: --        LEFT  Antecubital       Culture result: NO GROWTH 5 DAYS       COVID-19 RAPID TEST [[id number]] Collected: 12/25/21 1823    Order Status: Completed Specimen: Nasopharyngeal Updated: 12/25/21 1859     Specimen source NASAL        COVID-19 rapid test Not detected        Comment:      The specimen is NEGATIVE for SARS-CoV-2, the novel coronavirus associated with COVID-19. A negative result does not rule out COVID-19. This test has been authorized by the FDA under an Emergency Use Authorization (EUA) for use by authorized laboratories.         Fact sheet for Healthcare Providers: ConventionUpdate.co.nz  Fact sheet for Patients: ConventionUpdate.co.nz       Methodology: Isothermal Nucleic Acid Amplification               Other Studies:  CT CHEST WO CONT    Result Date: 12/30/2021  CT CHEST INDICATION: Follow-up pleural effusions and infiltrates Multiple axial images were obtained through the chest without IV contrast. Radiation dose reduction techniques were used for this study: All CT scans performed at this facility use one or all of the following: Automated exposure control, adjustment of the mA and/or kVp according to patient's size, iterative reconstruction. COMPARISON: Chest x-ray dated 12/27/2021 and chest CT dated 03/19/2021 FINDINGS: - LUNGS: There are bilateral pleural effusions, left greater than right. There is associated infiltrate/atelectasis in both lung bases. There is a stable 4 mm density in the right middle lobe, likely scarring. .  Mild septal thickening is noted in the lung apices bilaterally. - MEDIASTINUM/AXILLA: No significant adenopathy. There are surgical clips in the right axilla. - HEART/VESSELS: Vascular calcification in cardiomegaly. - CHEST WALL/BONES: There is a T12 compression fracture which is new compared to the prior CT. - UPPER ABDOMEN: Left kidney is atrophic. There is a 4.8 cm hyperdense mass adjacent to the lower pole the area of the left kidney, possibly a dilated renal pelvis. 1.  Bilateral pleural effusions and bibasilar infiltrate/atelectasis, left greater than right. 2.  Left perirenal mass, probably a complex cyst.  Suggest ultrasound evaluation. 3.  T12 compression fracture which is not appear acute, but is new compared with CT from 03/19/2021.       Current Meds:  Current Facility-Administered Medications   Medication Dose Route Frequency    tuberculin injection 5 Units  5 Units IntraDERMal ONCE    ferrous sulfate tablet 325 mg  1 Tablet Oral DAILY WITH BREAKFAST    lip protectant (BLISTEX) ointment 1 Each  1 Each Topical PRN    pantoprazole (PROTONIX) tablet 40 mg  40 mg Oral ACB    furosemide (LASIX) tablet 40 mg  40 mg Oral DAILY    amiodarone (CORDARONE) tablet 200 mg  200 mg Oral DAILY    levothyroxine (SYNTHROID) tablet 50 mcg  50 mcg Oral ACB    DULoxetine (CYMBALTA) capsule 60 mg  60 mg Oral DAILY    folic acid (FOLVITE) tablet 1 mg  1 mg Oral DAILY    metoprolol succinate (TOPROL-XL) XL tablet 50 mg  50 mg Oral DAILY    rosuvastatin (CRESTOR) tablet 10 mg  10 mg Oral QHS    sodium chloride (NS) flush 5-40 mL  5-40 mL IntraVENous Q8H    sodium chloride (NS) flush 5-40 mL  5-40 mL IntraVENous PRN    acetaminophen (TYLENOL) tablet 650 mg  650 mg Oral Q6H PRN    Or    acetaminophen (TYLENOL) suppository 650 mg  650 mg Rectal Q6H PRN    polyethylene glycol (MIRALAX) packet 17 g  17 g Oral DAILY PRN    ondansetron (ZOFRAN ODT) tablet 4 mg  4 mg Oral Q8H PRN    Or    ondansetron (ZOFRAN) injection 4 mg  4 mg IntraVENous Q6H PRN       Signed: Rosa Jones DO    Part of this note may have been written by using a voice dictation software. The note has been proof read but may still contain some grammatical/other typographical errors.

## 2021-12-30 NOTE — H&P (VIEW-ONLY)
History and Physical Initial Visit NOTE           12/30/2021    Madhuri Chance                        Date of Admission:  12/25/2021    The patient's chart is reviewed and the patient is discussed with the staff. Subjective:     Patient is a 80 y.o.  female, PMH hypothyroidism, HLD, RA, atrial fibrillation on Xarelto, CKD III, and dCHF seen and evaluated at the request of Dr. Regino Rubio for pleural effusions. The patient came in initially on 12/25/21 after c/o increased shortness of breath and being found to have Hgb 3.7. She apparently had a melanotic stool. The patient has a history of dCHF and has been on Lasix however recent CT chest and CXR reveals bilateral pleural effusions, left > right. The patient states that she normally wears O2 at 2L NC at home but has had more trouble breathing here recently. She also endorses BLE edema as well. The patient has previously was admitted 12/13/21 thru 12/21/21 with anemia, Hgb 3.3. GI was consulted during that admission and EGD done on 12/14/21 was unremarkable. Heme/Oncology was also consulted during that admission and found that lab work up was consistent with LUIS ARMANDO and the patient received IV iron infusion. Currently the patient has been taken off anticoagulation. She denies any fevers, chills, nausea, or vomiting. The patient states she has had thoracentesis done in the past but cannot remember when. Review of Systems  A comprehensive review of systems was negative except for that written in the HPI. Prior to Admission Medications   Prescriptions Last Dose Informant Patient Reported? Taking? DULoxetine (CYMBALTA) 60 mg capsule 12/25/2021 at Unknown time  No Yes   Sig: Take 1 Cap by mouth daily. amiodarone (CORDARONE) 200 mg tablet 12/25/2021 at Unknown time  No Yes   Sig: Take 1 Tab by mouth daily. cholecalciferol (VITAMIN D3) 1,000 unit tablet 12/25/2021 at Unknown time  Yes Yes   Sig: Take 1,000 Units by mouth daily. folic acid (FOLVITE) 1 mg tablet 12/25/2021 at Unknown time  Yes Yes   Sig: Take 1 mg by mouth daily. furosemide (LASIX) 20 mg tablet 12/25/2021 at Unknown time  No Yes   Sig: Take 1 Tablet by mouth daily. Patient taking differently: Take 40 mg by mouth daily. levothyroxine (SYNTHROID) 25 mcg tablet 12/25/2021 at Unknown time  No Yes   Sig: Take 1 Tab by mouth Daily (before breakfast). metoprolol succinate (TOPROL-XL) 50 mg XL tablet 12/25/2021 at Unknown time  No Yes   Sig: Take 1 Tablet by mouth daily. ondansetron (ZOFRAN ODT) 4 mg disintegrating tablet 12/25/2021 at Unknown time  No Yes   Sig: Take 1 Tab by mouth every eight (8) hours as needed for Nausea or Vomiting. pantoprazole (PROTONIX) 40 mg tablet 12/25/2021 at Unknown time  No Yes   Sig: Take 1 Tablet by mouth Before breakfast and dinner. rivaroxaban (Xarelto) 15 mg tab tablet 12/25/2021 at Unknown time  No Yes   Sig: Take 1 Tablet by mouth daily (with breakfast) for 30 days. rosuvastatin (Crestor) 10 mg tablet 12/25/2021 at Unknown time  No Yes   Sig: Take 1 Tab by mouth nightly.       Facility-Administered Medications: None     Past Medical History:   Diagnosis Date    Acquired hypothyroidism 1/16/2016    Breast cancer ([facility] 2008    Depression 8/18/2016    Endocrine disease     hypothyroid    Fungal dermatitis 8/18/2016    Hyperlipidemia 1/16/2016    Hypertension, essential, benign 8/18/2016    Hypokalemia 8/18/2016    Inflamed sebaceous cyst 8/18/2016    Major depressive disorder, recurrent, moderate ([facility] 8/18/2016    Nicotine dependence, cigarettes, uncomplicated 5/23/8480    Osteopenia 8/18/2016    Osteoporosis 8/18/2016    Radiation therapy complication 8022    Rheumatoid arthritis ([facility] 1/16/2016    Right carotid bruit 1/16/2016    TIA (transient ischemic attack) 1/16/2016    Tobacco abuse 8/18/2016     Past Surgical History:   Procedure Laterality Date    COLONOSCOPY N/A 12/28/2021    COLONOSCOPY/ 31 CVICU 102 performed by Liv Porras [facility] at [facility] ENDOSCOPY    HX ADENOIDECTOMY      HX BREAST LUMPECTOMY Right 2008    with lymph nodes    HX TONSILLECTOMY      IR BX BONE MARROW DIAGNOSTIC  5/14/2021    IR INSERT NON TUNL CVC OVER 5 YRS  5/14/2021     Social History     Socioeconomic History    Marital status:      Spouse name: Not on file    Number of children: Not on file    Years of education: Not on file    Highest education level: Not on file   Occupational History    Not on file   Tobacco Use    Smoking status: Current Some Day Smoker    Smokeless tobacco: Never Used    Tobacco comment: Only on pay day-1/2 pack   Substance and Sexual Activity    Alcohol use: No    Drug use: No    Sexual activity: Not on file   Other Topics Concern    Not on file   Social History Narrative    Not on file     Social Determinants of Health     Financial Resource Strain:     Difficulty of Paying Living Expenses: Not on file   Food Insecurity:     Worried About Running Out of Food in the Last Year: Not on file    Hank of Food in the Last Year: Not on file   Transportation Needs:     Lack of Transportation (Medical): Not on file    Lack of Transportation (Non-Medical):  Not on file   Physical Activity:     Days of Exercise per Week: Not on file    Minutes of Exercise per Session: Not on file   Stress:     Feeling of Stress : Not on file   Social Connections:     Frequency of Communication with Friends and Family: Not on file    Frequency of Social Gatherings with Friends and Family: Not on file    Attends Tenriism Services: Not on file    Active Member of Clubs or Organizations: Not on file    Attends Club or Organization Meetings: Not on file    Marital Status: Not on file   Intimate Partner Violence:     Fear of Current or Ex-Partner: Not on file    Emotionally Abused: Not on file    Physically Abused: Not on file    Sexually Abused: Not on file   Housing Stability:     Unable to Pay for Housing in the Last Year: Not on file    Number of Places Lived in the Last Year: Not on file    Unstable Housing in the Last Year: Not on file     Family History   Problem Relation Age of Onset    Stroke Mother     Cancer Father         Brain    HIV/AIDS Brother      Allergies   Allergen Reactions    Eliquis [Apixaban] Other (comments)     Patient states she had hallucinations from Eliquis     Current Facility-Administered Medications   Medication Dose Route Frequency    tuberculin injection 5 Units  5 Units IntraDERMal ONCE    ferrous sulfate tablet 325 mg  1 Tablet Oral DAILY WITH BREAKFAST    lip protectant (BLISTEX) ointment 1 Each  1 Each Topical PRN    pantoprazole (PROTONIX) tablet 40 mg  40 mg Oral ACB    furosemide (LASIX) tablet 40 mg  40 mg Oral DAILY    amiodarone (CORDARONE) tablet 200 mg  200 mg Oral DAILY    levothyroxine (SYNTHROID) tablet 50 mcg  50 mcg Oral ACB    DULoxetine (CYMBALTA) capsule 60 mg  60 mg Oral DAILY    folic acid (FOLVITE) tablet 1 mg  1 mg Oral DAILY    metoprolol succinate (TOPROL-XL) XL tablet 50 mg  50 mg Oral DAILY    rosuvastatin (CRESTOR) tablet 10 mg  10 mg Oral QHS    sodium chloride (NS) flush 5-40 mL  5-40 mL IntraVENous Q8H    sodium chloride (NS) flush 5-40 mL  5-40 mL IntraVENous PRN    acetaminophen (TYLENOL) tablet 650 mg  650 mg Oral Q6H PRN    Or    acetaminophen (TYLENOL) suppository 650 mg  650 mg Rectal Q6H PRN    polyethylene glycol (MIRALAX) packet 17 g  17 g Oral DAILY PRN    ondansetron (ZOFRAN ODT) tablet 4 mg  4 mg Oral Q8H PRN    Or    ondansetron (ZOFRAN) injection 4 mg  4 mg IntraVENous Q6H PRN     Objective:   Blood pressure 132/60, pulse (!) 130, temperature 98.3 °F (36.8 °C), resp. rate (!) 87, height 5' (1.524 m), weight 143 lb 8.3 oz (65.1 kg), SpO2 93 %.      Intake/Output Summary (Last 24 hours) at 12/30/2021 1503  Last data filed at 12/30/2021 0513  Gross per 24 hour   Intake 580 ml   Output 650 ml   Net -70 ml     PHYSICAL EXAM   Constitutional:  the patient is well developed and in no acute distress  EENMT:  Sclera clear, pupils equal, oral mucosa moist  Respiratory: O2 at 4L NC, crackles  Cardiovascular:  RRR without M,G,R  Gastrointestinal: soft and non-tender; with positive bowel sounds. Musculoskeletal: warm without cyanosis. There is no lower extremity edema. Skin:  no jaundice or rashes, no wounds   Neurologic: no gross neuro deficits     Psychiatric:  alert and oriented x 3    CXR: None today    12/27/21          CT Chest: 12/30/21--bilateral pleural effusion, L>R          Recent Labs     12/30/21  0255 12/29/21  0405 12/28/21  0356   WBC 7.9 8.7 11.5*   HGB 8.1* 8.4* 9.6*   HCT 26.9* 27.3* 30.7*    327 337   PCT <0.05  --   --     142 143   K 3.9 3.5 3.6    106 106   CO2 34* 34* 33*   GLU 95 96 97   BUN 19 21 25*   CREA 1.42 1.35 1.39   MG 2.2 2.6* 3.1*   CA 7.8* 7.9* 8.3   PHOS 3.1 4.1* 3.2   BNPNT  --   --  7,341*     ECHO: Results from [place] encounter on 12/25/21    ECHO ADULT COMPLETE    Interpretation Summary    Left Ventricle: Left ventricle is mildly dilated. Normal wall thickness. Normal wall motion. The EF by visual approximation is 55 - 60%. Normal diastolic function.   Right Ventricle: Right ventricle size is normal. Low normal systolic function.   Aortic Valve: Probably trileaflet aortic valve. No transvalvular regurgitation. No stenosis.   Mitral Valve: Mild to moderate transvalvular regurgitation.   Left Atrium: Left atrium is severely dilated. Results     Procedure Component Value Units Date/Time    COVID-19 RAPID TEST [[id number]] Collected: 12/25/21 1823    Order Status: Completed Specimen: Nasopharyngeal Updated: 12/25/21 1859     Specimen source NASAL        COVID-19 rapid test Not detected        Comment:      The specimen is NEGATIVE for SARS-CoV-2, the novel coronavirus associated with COVID-19. A negative result does not rule out COVID-19.        This test has been authorized by the FDA under an Emergency Use Authorization (EUA) for use by authorized laboratories. Fact sheet for Healthcare Providers: ConventionUpdate.co.nz  Fact sheet for Patients: ConventionUpdate.co.nz       Methodology: Isothermal Nucleic Acid Amplification         CULTURE, BLOOD [[id number]] Collected: 12/25/21 1746    Order Status: Completed Specimen: Blood Updated: 12/30/21 0747     Special Requests: LEFT FOREARM        Culture result: NO GROWTH 5 DAYS       CULTURE, BLOOD [[id number]] Collected: 12/25/21 1730    Order Status: Completed Specimen: Blood Updated: 12/30/21 0747     Special Requests: --        LEFT  Antecubital       Culture result: NO GROWTH 5 DAYS           Inpat Anti-Infectives (From admission, onward)    None        Assessment and Plan:  (Medical Decision Making)   Principal Problem:    GI bleed (12/25/2021)  --Hgb 8.1 today, anticoagulation held, previously on Xarelto    Active Problems:    Acquired hypothyroidism (1/16/2016)  --On Levothyroxine      Hyperlipidemia (1/16/2016)  --On Crestor      Rheumatoid arthritis ([facility] (1/16/2016)  --      Hypertension, essential, benign (8/18/2016)  --On metoprolol      Stage 3 chronic kidney disease ([facility] (5/20/2021)  --Creatinine 0.62      Diastolic CHF, chronic ([facility] (6/13/2021)  --Severely dilated left atrium per most recent echo, on Lasix, still with bilateral pleural effusions      Bilateral Pleural Effusions  --Noted on CXR and CT chest recently despite being on Lasix, off anticoagulation. Reasonable to do thoracentesis and patient is agreeable to this. On 3L O2 NC here, was on 2L O2 NC at home, will continue to attempt to wean as tolerated. Full Code    More than 50% of the time documented was spent in face-to-face contact with the patient and in the care of the patient on the floor/unit where the patient is located.     Thank you very much for this referral.  We appreciate the opportunity to participate in this patient's care. Will follow along with above stated plan. Bill Sousa NP     I have spoken with and examined the patient. I agree with the above assessment and plan as documented. Gen: alert  Lungs:  Mild rales, diminished in bases, 2L  Heart:  RRR with no Murmur/Rubs/Gallops  Abd: soft  Ext: no edema    81 y/o female with heart failure, afib, GIB. Continue diuresis. Will attempt evaluation for thoracentesis. Off Xarelto.      Serina Wallace MD

## 2021-12-30 NOTE — PROGRESS NOTES
Dr. Jigar Thompson notified of patient's urine in king being cloudy with sediment. King discontinued with Purewick placed.

## 2021-12-30 NOTE — PROGRESS NOTES
ACUTE OT GOALS:  (Developed with and agreed upon by patient and/or caregiver.)  1. Patient will complete lower body bathing and dressing with supervision and adaptive equipment as needed. 2. Patient will complete toileting with supervision. 3. Patient will tolerate 30 minutes of OT treatment with 1-2 rest breaks to increase activity tolerance for ADLs. 4. Patient will complete functional transfers with supervision and adaptive equipment as needed. 5. Patient will complete functional mobility for household distances with supervision and adaptive equipment as needed. 6. Patient will complete self-grooming while standing edge of sink with supervision and adaptive equipment as needed.   7. Patient will complete functional activity while maintaining Sp02 > 90% with 1-2 rest breaks as needed/     Timeframe: 7 visits         OCCUPATIONAL THERAPY ASSESSMENT: Initial Assessment and Daily Note OT Treatment Day # 1    Alesha Dorsey is a 80 y.o. adult   PRIMARY DIAGNOSIS: GI bleed  GI bleed [K92.2]  Procedure(s) (LRB):  COLONOSCOPY/ 31 CVICU 102 (N/A)  2 Days Post-Op  Reason for Referral:   ICD-10: Treatment Diagnosis: Generalized Muscle Weakness (M62.81)  Repeated Falls (R29.6)  INPATIENT: Payor: AARP MEDICARE COMPLETE / Plan: [address] / Product Type: Managed Care Medicare /   ASSESSMENT:     REHAB RECOMMENDATIONS:   Recommendation to date pending progress:  Setting:   Short-term Rehab  Equipment:    To Be Determined     PRIOR LEVEL OF FUNCTION:  (Prior to Hospitalization)  INITIAL/CURRENT LEVEL OF FUNCTION:  (Based on today's evaluation)   Bathing:   Independent  Dressing:   Independent  Feeding/Grooming:   Independent  Toileting:   Independent  Functional Mobility:   Modified Independent Bathing:   Minimal Assistance  Dressing:   Minimal Assistance  Feeding/Grooming:   Set Up  Toileting:   Minimal Assistance  Functional Mobility:   Contact Guard Assistance x RW     ASSESSMENT:  Ms. Elle presnts with deficits in overall strength, activity tolerance, ADL performance and functional mobility. Presents in ICU for GI bleed and and acute respiratory failure; currently resting on 4L 02 (baseline use of 2L 02). Sp02 does well at rest but does de-sat with activity; rest breaks provided as needed. BUE AROM and strength (3/5) are generally decreased but WFL. Min A sit <> stand transfer; CGA x RW for functional mobility; cues for safety. Min A for catalina-tasks. At this time, Yudi Ferguson is functioning below baseline for ADLs and functional mobility. Pt would benefit from skilled OT services to address OT goals and and plan of care. .     SUBJECTIVE:   Ms. Iza Mejia states, \"I've been falling a lot recently. \"    SOCIAL HISTORY/LIVING ENVIRONMENT: Lives alone in a 1-story home. Multiple recent falls over the last week. Independent at baseline for ADLs and MOD I for functional mobility with use of RW.    Home Environment: Private residence  One/Two Story Residence: One story  Living Alone: Yes  Support Systems: Friend/Neighbor    OBJECTIVE:     PAIN: VITAL SIGNS: LINES/DRAINS:   Pre Treatment: Pain Screen  Pain Scale 1: Numeric (0 - 10)  Pain Intensity 1: 0  Post Treatment: 0   none  O2 Device: Nasal cannula     GROSS EVALUATION:  BUEs Within Functional Limits Abnormal/ Functional Abnormal/ Non-Functional (see comments) Not Tested Comments:   AROM [] [x] [] []    PROM [] [] [] []    Strength [] [x] [] [] Generally decreased; 3/5   Balance [] [x] [] []    Posture [] [x] [] []    Sensation [x] [] [] []    Coordination [x] [] [] []    Tone [x] [] [] []    Edema [] [x] [] []    Activity Tolerance [] [x] [] [] 4L 02; baseline use of 2L 02    [] [] [] []      COGNITION/  PERCEPTION: Intact Impaired   (see comments) Comments:   Orientation [x] []    Vision [x] []    Hearing [] [x] [place]   Judgment/ Insight [] [x] Mild impulsivity   Attention [x] []    Memory [x] []    Command Following [x] []    Emotional Regulation [x] []     [] []      ACTIVITIES OF DAILY LIVING: I Mod I S SBA CGA Min Mod Max Total NT Comments   BASIC ADLs:              Bathing/ Showering [] [] [] [] [] [] [] [] [] [x]    Toileting [] [] [] [] [] [x] [] [] [] [] Leandra-care   Dressing [] [] [] [] [] [] [] [] [] [x]    Feeding [] [] [] [] [] [] [] [] [] [x]    Grooming [] [] [] [x] [] [] [] [] [] [] Combing hair   Personal Device Care [] [] [] [] [] [] [] [] [] []    Functional Mobility [] [] [] [] [x] [] [] [] [] [] X RW   I=Independent, Mod I=Modified Independent, S=Supervision, SBA=Standby Assistance, CGA=Contact Guard Assistance,   Min=Minimal Assistance, Mod=Moderate Assistance, Max=Maximal Assistance, Total=Total Assistance, NT=Not Tested    MOBILITY: I Mod I S SBA CGA Min Mod Max Total  NT x2 Comments:   Supine to sit [] [] [] [] [] [] [] [] [] [x] []    Sit to supine [] [] [] [] [] [] [] [] [] [x] []    Sit to stand [] [] [] [] [] [x] [] [] [] [] []    Bed to chair [] [] [] [] [] [] [] [] [] [x] []    I=Independent, Mod I=Modified Independent, S=Supervision, SBA=Standby Assistance, CGA=Contact Guard Assistance,   Min=Minimal Assistance, Mod=Moderate Assistance, Max=Maximal Assistance, Total=Total Assistance, NT=Not Tested    [address] [facility] [address] [address] AM-PAC 6 Clicks   Daily Activity Inpatient Short Form        How much help from another person does the patient currently need. .. Total A Lot A Little None   1. Putting on and taking off regular lower body clothing? [] 1   [] 2   [x] 3   [] 4   2. Bathing (including washing, rinsing, drying)? [] 1   [] 2   [x] 3   [] 4   3. Toileting, which includes using toilet, bedpan or urinal?   [] 1   [] 2   [x] 3   [] 4   4. Putting on and taking off regular upper body clothing? [] 1   [] 2   [x] 3   [] 4   5. Taking care of personal grooming such as brushing teeth? [] 1   [] 2   [x] 3   [] 4   6. Eating meals? [] 1   [] 2   [x] 3   [] 4   © 2007, Trustees of [address] [place] [address] [address] [id number], under license to [facility]. All rights reserved     Score:  Initial: 18 Most Recent: X (Date: -- )   Interpretation of Tool:  Represents activities that are increasingly more difficult (i.e. Bed mobility, Transfers, Gait). PLAN:   FREQUENCY/DURATION: OT Plan of Care: 3 times/week for duration of hospital stay or until stated goals are met, whichever comes first.    PROBLEM LIST:   (Skilled intervention is medically necessary to address:)  1. Decreased ADL/Functional Activities  2. Decreased Activity Tolerance  3. Decreased AROM/PROM  4. Decreased Balance  5. Decreased Coordination  6. Decreased Gait Ability  7. Decreased Strength  8. Decreased Transfer Abilities   INTERVENTIONS PLANNED:   (Benefits and precautions of occupational therapy have been discussed with the patient.)  1. Self Care Training  2. Therapeutic Activity  3. Therapeutic Exercise/HEP  4. Neuromuscular Re-education  5. Education     TREATMENT:     EVALUATION: Low Complexity : (Untimed Charge)    TREATMENT:   ($$ Self Care/Home Management: 8-22 mins$$ Neuromuscular Re-Education: 8-22 mins   )  Self Care (10 Minutes): Self care including Toileting, Grooming and Energy Conservation Training to increase independence and decrease level of assistance required. Neuromuscular Re-education (13 Minutes): Neuromuscular Re-education included Balance Training, Coordination training, Postural training, Sitting balance training and Standing balance training to improve Balance, Coordination and Postural Control.     TREATMENT GRID:  N/A    AFTER TREATMENT POSITION/PRECAUTIONS:  Chair, Needs within reach and RN notified    INTERDISCIPLINARY COLLABORATION:  RN/PCT, PT/PTA and OT/SCHOREDER    TOTAL TREATMENT DURATION:  OT Patient Time In/Time Out  Time In: 5674  Time Out: [address], OT

## 2021-12-30 NOTE — PROGRESS NOTES
ACUTE PHYSICAL THERAPY GOALS:  (Developed with and agreed upon by patient and/or caregiver.)  1. Ms. Nuzhat Pinto will perform all transfers independently in 7 days. 2.  Ms. Nuzhat Pinto will perform gait with rolling walker 120 ft independently in 7 days. 3.  Ms. Nuzhat Pinto will perform up and down 5 steps with rail independently in 7 days. PHYSICAL THERAPY ASSESSMENT: Initial Assessment and Daily Note PT Treatment Day # 1      Ning Martini is a 80 y.o. adult   PRIMARY DIAGNOSIS: GI bleed  GI bleed [K92.2]  Procedure(s) (LRB):  COLONOSCOPY/ 31 CVICU 102 (N/A)  2 Days Post-Op  Reason for Referral:    ICD-10: Treatment Diagnosis: Generalized Muscle Weakness (M62.81)  Difficulty in walking, Not elsewhere classified (R26.2)  INPATIENT: Payor: AARP MEDICARE COMPLETE / Plan: [facility] / Product Type: Managed Care Medicare /     ASSESSMENT:     REHAB RECOMMENDATIONS:   Recommendation to date pending progress:  Setting:   Short-term Rehab  Equipment:    None     PRIOR LEVEL OF FUNCTION:  (Prior to Hospitalization) INITIAL/CURRENT LEVEL OF FUNCTION:  (Most Recently Demonstrated)   Bed Mobility:   Modified Independent  Sit to Stand:   Modified Independent  Transfers:   Modified Independent  Gait/Mobility:   Modified Independent Bed Mobility:   Not tested  Sit to Stand:  [employer] Assistance  Transfers:   Contact Guard Assistance  Gait/Mobility:   Contact Guard Assistance     ASSESSMENT:  Ms. Nuzhat Pinto is well known to PT from recent admission. She is super hard of hearing. Lives by herself. On O2 2 liters. Has a [employer] friend bring her food and clean 1 time a week. HHPT comes 1 time a week. Since discharge on 12/21 she has fallen 3 times already. She blames it on her left knee giving way or getting tangled on her O2 tubing. She admits to taking off her O2 so she wont get tangled when she is up. Ms. Nuzhat Pinto has at least 3/5 to left knee extension.  Had her perform all tasks turning to the left which is what she says happens when it gives way. Today she is contact guard for everything with rolling walker and she never buckled. Respiratory and OT were in on the evaluation. She needed as much as 5 liters while up moving. My concern is that living alone is no longer appropriate. At the very least she will need a rehab stay. Her last admission her hgb was 3. 3.  She again is losing blood. On admission this time her hgb was 4.9  She was discharged home on 12/21 and it was 9.0. Ms. Manish Middleton is functioning below baseline and is therefore appropriate for skilled PT to maximize her rehab potential.  SNF at discharge. SUBJECTIVE:   Ms. Marie Phleamrina states, \"I fell 3 times. .\"    SOCIAL HISTORY/LIVING ENVIRONMENT:   Home Environment: Private residence  One/Two Story Residence: One story  Living Alone: Yes  Support Systems: Friend/Neighbor  OBJECTIVE:     PAIN: VITAL SIGNS: LINES/DRAINS:   Pre Treatment: Pain Screen  Pain Scale 1: Numeric (0 - 10)  Pain Intensity 1: 0  Post Treatment: 0  See respiratory note   O2 Device: Nasal cannula     GROSS EVALUATION:   Within Functional Limits Abnormal/ Functional Abnormal/ Non-Functional (see comments) Not Tested Comments:   AROM [x] [] [] []    PROM [] [] [] []    Strength [x] [] [] []    Balance [] [] [] []    Posture [] [] [] []    Sensation [x] [] [] []    Coordination [] [] [] []    Tone [] [] [] []    Edema [] [] [] []    Activity Tolerance [] [x] [] [] Gets SOB easily    [] [] [] []      COGNITION/  PERCEPTION: Intact Impaired   (see comments) Comments:   Orientation [x] []    Vision [x] []    Hearing [] [x] [place]   Command Following [x] []    Safety Awareness [x] []     [] []      MOBILITY: I Mod I S SBA CGA Min Mod Max Total  NT x2 Comments:   Bed Mobility    Rolling [] [] [] [] [] [] [] [] [] [x] []    Supine to Sit [] [] [] [] [] [] [] [] [] [x] []    Scooting [] [] [] [] [] [] [] [] [] [x] []    Sit to Supine [] [] [] [] [] [] [] [] [] [x] []    Transfers    Sit to Stand [] [] [] [] [x] [] [] [] [] [] []    Bed to Chair [] [] [] [] [x] [] [] [] [] [] []    Stand to Sit [] [] [] [] [x] [] [] [] [] [] []    I=Independent, Mod I=Modified Independent, S=Supervision, SBA=Standby Assistance, CGA=Contact Guard Assistance,   Min=Minimal Assistance, Mod=Moderate Assistance, Max=Maximal Assistance, Total=Total Assistance, NT=Not Tested  GAIT: I Mod I S SBA CGA Min Mod Max Total  NT x2 Comments:   Level of Assistance [] [] [] [] [x] [] [] [] [] [] []    Distance 60 ft with 1 seated rest break    DME Rolling Walker    Gait Quality slow    Weightbearing Status N/A     I=Independent, Mod I=Modified Independent, S=Supervision, SBA=Standby Assistance, CGA=Contact Guard Assistance,   Min=Minimal Assistance, Mod=Moderate Assistance, Max=Maximal Assistance, Total=Total Assistance, NT=Not [facility] [place] [place]       How much difficulty does the patient currently have. .. Unable A Lot A Little None   1. Turning over in bed (including adjusting bedclothes, sheets and blankets)? [] 1   [] 2   [x] 3   [] 4   2. Sitting down on and standing up from a chair with arms ( e.g., wheelchair, bedside commode, etc.)   [] 1   [] 2   [x] 3   [] 4   3. Moving from lying on back to sitting on the side of the bed? [] 1   [] 2   [x] 3   [] 4   How much help from another person does the patient currently need. .. Total A Lot A Little None   4. Moving to and from a bed to a chair (including a wheelchair)? [] 1   [] 2   [x] 3   [] 4   5. Need to walk in hospital room? [] 1   [] 2   [x] 3   [] 4   6. Climbing 3-5 steps with a railing? [] 1   [] 2   [x] 3   [] 4   © 2007, Trustees of [facility] [employer], under license to [employer]. All rights reserved     Score:  Initial: 18 Most Recent: X (Date: -- )    Interpretation of Tool:  Represents activities that are increasingly more difficult (i.e. Bed mobility, Transfers, Gait).     PLAN: FREQUENCY/DURATION: PT Plan of Care: 3 times/week for duration of hospital stay or until stated goals are met, whichever comes first.    PROBLEM LIST:   (Skilled intervention is medically necessary to address:)  1. Decreased Activity Tolerance  2. Decreased AROM/PROM  3. Decreased Balance  4. Decreased Gait Ability  5. Decreased Strength  6. Decreased Transfer Abilities   INTERVENTIONS PLANNED:   (Benefits and precautions of physical therapy have been discussed with the patient.)  1. Therapeutic Activity  2. Therapeutic Exercise/HEP  3. Neuromuscular Re-education  4. Gait Training  5. Education     TREATMENT:     EVALUATION: Moderate Complexity : (Untimed Charge)    TREATMENT:   ($$ Therapeutic Activity: 8-22 mins    )  Co-Treatment PT/OT necessary due to patient's decreased overall endurance/tolerance levels, as well as need for high level skilled assistance to complete functional transfers/mobility and functional tasks  Therapeutic Activity (17 Minutes): Therapeutic activity included Transfer Training, Ambulation on level ground, Sitting balance  and Standing balance to improve functional Mobility.     TREATMENT GRID:  N/A    AFTER TREATMENT POSITION/PRECAUTIONS:  Alarm Activated, Chair, Needs within reach and RN notified    INTERDISCIPLINARY COLLABORATION:  RN/PCT, PT/PTA, OT/SCHROEDER and respiratory    TOTAL TREATMENT DURATION:  PT Patient Time In/Time Out  Time In: 0852  Time Out: 1952 [address] [address], PT

## 2021-12-30 NOTE — INTERVAL H&P NOTE
Update History & Physical    The Patient's History and Physical of December 30,   Thoracentesis was reviewed with the patient and I examined the patient. There was no change. The surgical site was confirmed by the patient and me. Plan:  The risk, benefits, expected outcome, and alternative to the recommended procedure have been discussed with the patient. Patient understands and wants to proceed with the procedure.     Electronically signed by Wenceslao Mistry MD on 12/30/2021 at 4:06 PM

## 2021-12-30 NOTE — PROGRESS NOTES
Chart reviewed for discharge planning needs. At this point, it appears that patient would benefit from short-term rehab at discharge pending medical improvement. She is currently in ICU, so we will continue to follow to see how patient progresses.

## 2021-12-30 NOTE — PROGRESS NOTES
Assisted Dr. Aleyda Perez with bedside thoracentesis, 800 mL clear yellow fluid removed from left chest cavity without difficulty. Samples sent to lab via [address] [place] [id number] RT and patient tolerated well.  Back to recliner per patient request.

## 2021-12-30 NOTE — PROGRESS NOTES
Oxygen Qualifier       Room air: SpO2 with O2 and liter flow   Resting SpO2  90%  93% on 4L   Ambulating SpO2  79% 82% on 1L  85% on 2L  87% on 3L  89% on 4L  92% on 5L     Pt ambulated with PT and Respiratory with mild SOB during ambulation. Pt states she uses 2LNC at home but does not always wear it due to the cannula getting tangled.       Completed by:    Roverto Zuniga

## 2021-12-30 NOTE — PROCEDURES
PROCEDURE:  DIAGNOSTIC THORACENTESIS, THERAPEUTIC THORACENTESIS     PRE-OP DIAGNOSIS:  Left PLEURAL EFFUSION    POST-OP DIAGNOSIS:  Left PLEURAL EFFUSION    VOLUME REMOVED:  800 cc    ANESTHESIA:  LOCAL ANESTHESIA WITH 1% LIDOCAINE 10 CC TOTAL. CHEST ULTRASOUND FINDINGS:    A Turbo-M, Sonosite ultrasound with a 5-16 mHz probe was used to image the chest and localize the pleural effusion on the bilateral  chest.    A small anechoic space was seen on the right consistent with an uncomplicated pleural effusion. This appears to small to benefit from drainage and likely would be higher risk given small window. A moderate anechoic space was seen on the left consistent with an uncomplicated pleural effusion. DESCRIPTION OF PROCEDURE:    After obtaining informed consent and localizing the safest location for thoracentesis, the  9th intercostal space was marked with a blunt, plastic needle cap in the mid scapular line. An [employer] AK-0100 Pleral-Seal thoracentesis kit was used to perform the procedure. The skin was cleansed with the supplied chlorhexidine swab and then draped in the usual fashion. Using the previously marked location as a guide, a 22 G 1.5 inch needle was used to inject 1% lidocaine into the skin and subcutaneous tissue, as well as onto the underlying rib and inter-costal muscles. Pleural fluid was aspirated to assure proper location and additional lidocaine was injected into the pleural space prior to removing the anesthesia needle. A 3mm incision was then made with the supplied scalpel in the usual fashion to facilitate the insertion of the thoracentesis needle. The needle with an 8 Taiwanese thoracentesis catheter was then introduced into the chest through the previously made incision in the usual fashion, the rib localized with the needle, and the catheter then marched over the rib into the pleural space.     After aspirating fluid, the thoracentesis catheter was then placed into the chest using the needle itself as a trocar. The needle was then removed and the catheter was attached to the supplied tubing without complication. 800 cc of yellow fluid was aspirated and sent for analysis. Fluid was sent for the following tests:    LDH  Total Protein  Glucose  Cell count with differential  Routine culture and Gram stain  Cytology  AFB  Fungus    Post procedure US confirmed complete drainage of the effusion and presence of lung sliding, ruling out pneumothorax.  ([id number])    EBL:  <4JF    COMPLICATIONS:  None    Caleb Mcgraw MD

## 2021-12-31 ENCOUNTER — APPOINTMENT (OUTPATIENT)
Dept: GENERAL RADIOLOGY | Age: 81
DRG: 377 | End: 2021-12-31
Attending: FAMILY MEDICINE
Payer: MEDICARE

## 2021-12-31 PROBLEM — R09.02 HYPOXIA: Status: ACTIVE | Noted: 2021-12-31

## 2021-12-31 LAB
MAGNESIUM SERPL-MCNC: 1.8 MG/DL (ref 1.8–2.4)
MM INDURATION POC: 0 MM (ref 0–5)
PPD POC: NEGATIVE NEGATIVE

## 2021-12-31 PROCEDURE — 65660000000 HC RM CCU STEPDOWN

## 2021-12-31 PROCEDURE — 83735 ASSAY OF MAGNESIUM: CPT

## 2021-12-31 PROCEDURE — 80048 BASIC METABOLIC PNL TOTAL CA: CPT

## 2021-12-31 PROCEDURE — 85027 COMPLETE CBC AUTOMATED: CPT

## 2021-12-31 PROCEDURE — 99232 SBSQ HOSP IP/OBS MODERATE 35: CPT | Performed by: INTERNAL MEDICINE

## 2021-12-31 PROCEDURE — 36592 COLLECT BLOOD FROM PICC: CPT

## 2021-12-31 PROCEDURE — 74011250637 HC RX REV CODE- 250/637: Performed by: INTERNAL MEDICINE

## 2021-12-31 PROCEDURE — 74011250637 HC RX REV CODE- 250/637: Performed by: PHYSICIAN ASSISTANT

## 2021-12-31 PROCEDURE — 74011250637 HC RX REV CODE- 250/637: Performed by: STUDENT IN AN ORGANIZED HEALTH CARE EDUCATION/TRAINING PROGRAM

## 2021-12-31 PROCEDURE — 74011250637 HC RX REV CODE- 250/637: Performed by: FAMILY MEDICINE

## 2021-12-31 PROCEDURE — 71045 X-RAY EXAM CHEST 1 VIEW: CPT

## 2021-12-31 PROCEDURE — 2709999900 HC NON-CHARGEABLE SUPPLY

## 2021-12-31 RX ORDER — POTASSIUM CHLORIDE 20 MEQ/1
40 TABLET, EXTENDED RELEASE ORAL
Status: DISPENSED | OUTPATIENT
Start: 2021-12-31 | End: 2021-12-31

## 2021-12-31 RX ORDER — POTASSIUM CHLORIDE 20 MEQ/1
40 TABLET, EXTENDED RELEASE ORAL ONCE
Status: COMPLETED | OUTPATIENT
Start: 2021-12-31 | End: 2021-12-31

## 2021-12-31 RX ADMIN — FUROSEMIDE 40 MG: 40 TABLET ORAL at 11:02

## 2021-12-31 RX ADMIN — SODIUM CHLORIDE, PRESERVATIVE FREE 40 ML: 5 INJECTION INTRAVENOUS at 21:50

## 2021-12-31 RX ADMIN — PANTOPRAZOLE SODIUM 40 MG: 40 TABLET, DELAYED RELEASE ORAL at 05:26

## 2021-12-31 RX ADMIN — POTASSIUM CHLORIDE 40 MEQ: 20 TABLET, EXTENDED RELEASE ORAL at 05:25

## 2021-12-31 RX ADMIN — ROSUVASTATIN CALCIUM 10 MG: 5 TABLET, FILM COATED ORAL at 21:50

## 2021-12-31 RX ADMIN — Medication 10 ML: at 02:42

## 2021-12-31 RX ADMIN — DULOXETINE HYDROCHLORIDE 60 MG: 60 CAPSULE, DELAYED RELEASE ORAL at 11:02

## 2021-12-31 RX ADMIN — LEVOTHYROXINE SODIUM 50 MCG: 50 TABLET ORAL at 05:25

## 2021-12-31 RX ADMIN — FOLIC ACID 1 MG: 1 TABLET ORAL at 11:01

## 2021-12-31 RX ADMIN — FERROUS SULFATE TAB 325 MG (65 MG ELEMENTAL FE) 325 MG: 325 (65 FE) TAB at 11:01

## 2021-12-31 RX ADMIN — AMIODARONE HYDROCHLORIDE 200 MG: 200 TABLET ORAL at 11:01

## 2021-12-31 NOTE — PROGRESS NOTES
Trena Smith  Admission Date: 12/25/2021         Daily Progress Note: 12/31/2021    The patient's chart is reviewed and the patient is discussed with the staff. Background: Patient is a 80 y.o.  female, PMH hypothyroidism, HLD, RA, atrial fibrillation on Xarelto, CKD III, and dCHF seen and evaluated at the request of Dr. Quyen Brooks for pleural effusions. The patient came in initially on 12/25/21 after c/o increased shortness of breath and being found to have Hgb 3.7. She apparently had a melanotic stool. The patient has a history of dCHF and has been on Lasix however recent CT chest and CXR reveals bilateral pleural effusions, left > right. The patient states that she normally wears O2 at 2L NC at home but has had more trouble breathing here recently. She also endorses BLE edema as well. The patient has previously was admitted 12/13/21 thru 12/21/21 with anemia, Hgb 3.3. GI was consulted during that admission and EGD done on 12/14/21 was unremarkable. Heme/Oncology was also consulted during that admission and found that lab work up was consistent with LUIS ARMANDO and the patient received IV iron infusion.         Subjective:   Currently on 3 LPM NC. Sitting up in recliner. No distress noted.      Current Facility-Administered Medications   Medication Dose Route Frequency    potassium chloride (K-DUR, KLOR-CON M20) SR tablet 40 mEq  40 mEq Oral NOW    tuberculin injection 5 Units  5 Units IntraDERMal ONCE    ferrous sulfate tablet 325 mg  1 Tablet Oral DAILY WITH BREAKFAST    lip protectant (BLISTEX) ointment 1 Each  1 Each Topical PRN    pantoprazole (PROTONIX) tablet 40 mg  40 mg Oral ACB    furosemide (LASIX) tablet 40 mg  40 mg Oral DAILY    amiodarone (CORDARONE) tablet 200 mg  200 mg Oral DAILY    levothyroxine (SYNTHROID) tablet 50 mcg  50 mcg Oral ACB    DULoxetine (CYMBALTA) capsule 60 mg  60 mg Oral DAILY    folic acid (FOLVITE) tablet 1 mg  1 mg Oral DAILY    metoprolol succinate (TOPROL-XL) XL tablet 50 mg  50 mg Oral DAILY    rosuvastatin (CRESTOR) tablet 10 mg  10 mg Oral QHS    sodium chloride (NS) flush 5-40 mL  5-40 mL IntraVENous Q8H    sodium chloride (NS) flush 5-40 mL  5-40 mL IntraVENous PRN    acetaminophen (TYLENOL) tablet 650 mg  650 mg Oral Q6H PRN    Or    acetaminophen (TYLENOL) suppository 650 mg  650 mg Rectal Q6H PRN    polyethylene glycol (MIRALAX) packet 17 g  17 g Oral DAILY PRN    ondansetron (ZOFRAN ODT) tablet 4 mg  4 mg Oral Q8H PRN    Or    ondansetron (ZOFRAN) injection 4 mg  4 mg IntraVENous Q6H PRN     Review of Systems  Constitutional: negative for fever, chills, sweats  Cardiovascular: negative for chest pain, palpitations, syncope, edema  Gastrointestinal:  negative for dysphagia, reflux, vomiting, diarrhea, abdominal pain, or melena  Neurologic:  negative for focal weakness, numbness, headache  Objective:     Vitals:    12/31/21 0245 12/31/21 0812 12/31/21 0813 12/31/21 1102   BP: (!) 102/51  (!) 98/45 (!) 112/57   Pulse: 70 69 70 69   Resp: 20      Temp: 98.8 °F (37.1 °C)      SpO2: 91% (!) 79% 93%    Weight:       Height:           Intake/Output Summary (Last 24 hours) at 12/31/2021 1522  Last data filed at 12/31/2021 1345  Gross per 24 hour   Intake 360 ml   Output 2300 ml   Net -1940 ml     Physical Exam:   Constitutional:  the patient is well developed and in no acute distress  EENMT:  Sclera clear, pupils equal, oral mucosa moist  Respiratory: O2 at 4L NC, crackles  Cardiovascular:  RRR without M,G,R  Gastrointestinal: soft and non-tender; with positive bowel sounds. Musculoskeletal: warm without cyanosis. There is no lower extremity edema.   Skin:  no jaundice or rashes, no wounds   Neurologic: no gross neuro deficits     Psychiatric:  alert and oriented x 3      CXR:   12/31/2021      CT Chest: 12/30/21--bilateral pleural effusion, L>R        Oxygen Qualifier 12/30/2021          Room air: SpO2 with O2 and liter flow Resting SpO2  90%  93% on 4L   Ambulating SpO2  79% 82% on 1L  85% on 2L  87% on 3L  89% on 4L  92% on 5L      Pt ambulated with PT and Respiratory with mild SOB during ambulation. Pt states she uses 2LNC at home but does not always wear it due to the cannula getting tangled. LAB:  Recent Labs     12/31/21  0348 12/30/21  0255 12/29/21  0405   WBC 9.3 7.9 8.7   HGB 8.7* 8.1* 8.4*   HCT 28.8* 26.9* 27.3*    354 327   PCT  --  <0.05  --      Recent Labs     12/31/21  0730 12/31/21  0240 12/30/21  0255 12/29/21  0405   NA  --  143 141 142   K  --  3.4* 3.9 3.5   CL  --  110* 105 106   CO2  --  28 34* 34*   GLU  --  76 95 96   BUN  --  15 19 21   CREA  --  0.95 1.42 1.35   MG 1.8  --  2.2 2.6*   CA  --  6.8* 7.8* 7.9*   PHOS  --   --  3.1 4.1*     No results for input(s): LAC, TROPHS, BNPNT, CRP in the last 72 hours. No lab exists for component: ESR  No results for input(s): PH, PCO2, PO2, HCO3, PHI, PCO2I, PO2I, HCO3I in the last 72 hours. Recent Labs     12/30/21  1558   CULT NO GROWTH AFTER SHORT PERIOD OF INCUBATION. FURTHER RESULTS TO FOLLOW AFTER OVERNIGHT INCUBATION.      Assessment and Plan:  (Medical Decision Making)   Principal Problem:    GI bleed (12/25/2021)    --Hgb 8.7 today, stable    --permanently off of anticoagulation, was on xarelto, cleared per cardiology     Active Problems:    Acquired hypothyroidism (1/16/2016)    --on levothyroxine      Hyperlipidemia (1/16/2016)    --on crestor      Rheumatoid arthritis ([facility] [address] (1/16/2016)    --per PCP       Hypertension, essential, benign (8/18/2016)    --on metoprolol      Stage 3 chronic kidney disease ([facility] (5/20/2021)    --creatinine stable, 9.69      Diastolic CHF, chronic ([address] (6/13/2021)    --severely dilated left atrium per most recent echo, on lasix    --bilateral pleural effusions      Pleural effusion (12/30/2021)    --800ml transudative fluid removed from L lung       Hypoxia   --cont to require 3 LPM NC, baseline is 2 LPM NC at home   --ambulating sat 90% RA, 93% on 4 LPM NC>>she will likely need a repeat within 24 hours of discharge      Cont to follow pleural labs. Nothing further to add from pulmonary standpoint, will sign off. Call if needed. More than 50% of the time documented was spent in face-to-face contact with the patient and in the care of the patient on the floor/unit where the patient is located. Monty Dutta NP   I have spoken with and examined the patient. I agree with the above assessment and plan as documented.     Gen:  alert  Lungs:  clear  Heart:  RRR with no Murmur/Rubs/Gallops  Abd:obese  Ext: no edema    S/p thoracentesis- has transfer orders, will sign off    Laura Myers MD

## 2021-12-31 NOTE — PROGRESS NOTES
Patient POC provided. Sacrum benig with optifoam in place. Assisted back to bed after beath. Patient off monitor, has non-monitored orders for transfer.

## 2021-12-31 NOTE — PROGRESS NOTES
Hospitalist Progress Note   Admit Date:  2021  4:57 PM   Name:  Shirin Bustillos   Age:  80 y.o. Sex:  adult  :  1940   MRN:  [id number]   Room:  102/01    Presenting Complaint: Shortness of Breath    Reason(s) for Admission: GI bleed [facility] Course & Interval History:   80 y.o. female with medical history of hypothyroidism, history of Graves' disease, atrial fibrillation on Xarelto, chronic anemia, CKD stage III.  Patient recently admitted  for progressive anemia with hemoglobin on arrival of 3.3. Osiel Kapadia was consulted and patient underwent EGD on 21 that was unremarkable.  Heme-onc was consulted and labs were consistent with iron deficiency anemia and patient was given IV iron.  Xarelto was resumed on  and hemoglobin greater than 8 on discharge.    Patient presents  with report of increasing shortness of breath. She appears pale and was found to have large melanotic stool on exam.   ER work-up notable for WBC 9, hemoglobin 3.7, platelets 337, INR 3.6, sodium 146, potassium 4.3, glucose 128, creatinine 1.34, albumin 1.6, LFTs within normal limits, lactic acid 5.1, proBNP 7085. Checks x-ray notable for pulmonary edema. Pt admitted for ABLA/acute GIB. She received Kcentra for INR >3 on admission. S/p 3U PRBC for Hgb 3.7 on admission. Gi has seen patient. 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 Total of 5 angiodysplasias. There is high likelihood of additional AVMs beyond the reach of the pediatric colonoscope. S/p Colonoscopy  showed diverticulosis, internal hemorrhoids. Xarelto is discontinued indefinitely with cardiology recommendations. Hgb has been stable since transfusion. Pt continues to have acute on chronic respiratory failure with hypoxia. She wears 2LO2NC at home but has been requiring 4LO2NC here. Chest x-ray showed pulmonary congestion.  Repeat chest x-ray showed bilateral pleural effusions, left greater than right. Rapid Covid-19 Neg. Pt was started on IV Lasix and Cardiology was consulted. TTE 12/28 with EF 53-86%, normal diastolic function. She is now on po Lasix and home amiodarone has been resumed. Cardiology signed off. CT chest was obtained to assess for pleural effusion and shows b/l pleural effusions (L>R) and bibasilar infiltrates/atelectasis; chronic T12 compression fracture. Pulmonary was consulted for thoracentesis. S/p 800mL removed from L pleural effusion 12/30      Subjective (12/31/21):  Pt doing well this AM. No acute complaints. Sitting up in recliner. O2sat adequate, now on 2LO2NC. Denies chest pain, fever, chills, abdominal pain, N/V. Assessment & Plan:     ABLA  GI bleed   S/p Kcentra for INR >3 on admission. S/p 3U PRBC for Hgb 3.7 on admission. Gi has seen patient. 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 Total of 5 angiodysplasias. There is high likelihood of additional AVMs beyond the reach of the pediatric colonoscope. S/p Colonoscopy 12/28 showed diverticulosis, internal hemorrhoids  Start iron supplementation  Cont PPI po   On Soft diet  Stopping Xarelto indefinitely per Cardiology recs. Needs GI f/u in 4 weeks  CTM Hgb, transfuse if <7  Hgb stable ~8. Change lab draws to qod    Acute on chronic hypoxemic respiratory failure  Most likely due to CHF   Pleural effusion, L>R  Baseline requires 2LO2NC. Chest x-ray showed pulmonary congestion. Repeat chest x-ray showed bilateral pleural effusions, left greater than right. Rapid Covid-19 Neg. TTE 12/28 with EF 66-91%, normal diastolic function. CT chest 12/30 shows b/l pleural effusions (L>R) and bibasilar infiltrates/atelectasis; chronic T12 compression fracture  Procal negative, pt afebrile and no leukocytosis, low concerns for infiltrates on CT being infection, most likely atelectasis  S/p IV Lasix.  Cont po Lasix  Cardiology has seen, now on standby  Encourage frequent IS use  Consulted Pulmonology for thoracentesis, appreciate recs. S/p 800mL removed from L pleural effusion 12/30  Pleural studies pending  On 2LO2NC (from 4L), wean as tolerated    Hypokalemia  Replace and recheck  Check Mag    Chronic compression fracture  CT chest 12/30 captured chronic T12 compression fracture. Pt stated she fell 6-8 months ago but did not get evaluated. Denies pain now. Supportive care  PT/OT    Renal cyst, L  L perirenal cyst captured on CT chest, most likely complex cyst  Renal US not able to visualize cyst, recommend CTAP for f/u. Can be done outpt. Acquired hypothyroidism  Cont home Synthroid    Hyperlipidemia  Cont statin    PAF  Cont home amiodarone  Xarelto stopping indefinitely as pt is not a candidate for chronic OAC per cardiology    Hypertension, essential, benign  Cont po Lasix and Toprol    Stage 3 chronic kidney disease  Cr at baseline 1.3-1.5  Avoid nephrotoxic meds    Diastolic CHF, chronic  Resume po Lasix and Toprol    Anxiety/depression  Cont home Cymbalta        Dispo/Discharge Planning:       PT/OT--STR pending. I spent 30 minutes of time caring for this patient at bedside or nearby on the unit, more than 50 percent of which was spent on coordination of care and/or counseling regarding the disease process, treatment options, and treatment plan.       Diet:  ADULT DIET Easy to Chew  DVT PPx: SCD's  Code status: Full Code    Hospital Problems as of 12/31/2021 Date Reviewed: 12/27/2021          Codes Class Noted - Resolved POA    Pleural effusion ICD-10-CM: J90  ICD-9-CM: 511.9  12/30/2021 - Present Unknown        T12 compression fracture ([facility] [address] ICD-10-CM: P94.280V  ICD-9-CM: 805.2  12/30/2021 - Present Unknown        Renal cyst, left ICD-10-CM: N28.1  ICD-9-CM: 753.10  12/30/2021 - Present Unknown        * (Principal) GI bleed ICD-10-CM: K92.2  ICD-9-CM: 578.9  12/25/2021 - Present Unknown        Diastolic CHF, chronic (HCC) ICD-10-CM: I50.32  ICD-9-CM: 428.32, 428.0  6/13/2021 - Present Yes        Stage 3 chronic kidney disease ([facility] (Chronic) ICD-10-CM: N18.30  ICD-9-CM: 585.3  5/20/2021 - Present Yes        Hypertension, essential, benign (Chronic) ICD-10-CM: I10  ICD-9-CM: 401.1  8/18/2016 - Present Yes        Acquired hypothyroidism (Chronic) ICD-10-CM: E03.9  ICD-9-CM: 244.9  1/16/2016 - Present Yes        Hyperlipidemia (Chronic) ICD-10-CM: E78.5  ICD-9-CM: 272.4  1/16/2016 - Present Yes        Rheumatoid arthritis ([facility] (Chronic) ICD-10-CM: M06.9  ICD-9-CM: 714.0  1/16/2016 - Present Yes              Objective:     Patient Vitals for the past 24 hrs:   Temp Pulse Resp BP SpO2   12/31/21 0245 98.8 °F (37.1 °C) 70 20 (!) 102/51 91 %   12/30/21 2204 98.4 °F (36.9 °C) 69 20 (!) 106/53 94 %   12/30/21 1903  69 28     12/30/21 1902  70 17     12/30/21 1901  69 17     12/30/21 1900 97.4 °F (36.3 °C) 69 19 (!) 117/59 100 %   12/30/21 1832  71 11 128/60    12/30/21 1802  70 17 124/61    12/30/21 1733  72 13 (!) 112/55    12/30/21 1703  69 21 127/67    12/30/21 1640  74  (!) 167/68    12/30/21 1552  75 27 (!) 158/67 100 %   12/30/21 1458 98.2 °F (36.8 °C) 71 24 (!) 158/67    12/30/21 1358  66 13 (!) 117/57    12/30/21 1258  (!) 130 (!) 87 132/60    12/30/21 1158  76 24 139/64    12/30/21 1058 98.7 °F (37.1 °C) 67 25 136/60    12/30/21 1028  69 12 (!) 141/63    12/30/21 0958  68 18 (!) 124/57    12/30/21 0952  66  (!) 124/57    12/30/21 0910     93 %   12/30/21 0843    (!) 121/59      Oxygen Therapy  O2 Sat (%): 91 % (12/31/21 0245)  Pulse via Oximetry: 75 beats per minute (12/30/21 1552)  O2 Device: Nasal cannula;Humidifier (12/31/21 0304)  Skin Assessment: Clean, dry, & intact (12/30/21 9236)  Skin Protection for O2 Device: N/A (12/29/21 2039)  O2 Flow Rate (L/min): 2 l/min (12/31/21 0304)  O2 Temperature: 87.8 °F (31 °C) (12/26/21 1230)  FIO2 (%): 50 % (12/26/21 1230)    Estimated body mass index is 26.69 kg/m² as calculated from the following:    Height as of this encounter: 5' (1.524 m). Weight as of this encounter: 62 kg (136 lb 11 oz). Intake/Output Summary (Last 24 hours) at 12/31/2021 0946  Last data filed at 12/31/2021 0602  Gross per 24 hour   Intake 840 ml   Output 2425 ml   Net -1585 ml         Physical Exam:     Blood pressure (!) 102/51, pulse 70, temperature 98.8 °F (37.1 °C), resp. rate 20, height 5' (1.524 m), weight 62 kg (136 lb 11 oz), SpO2 91 %. General:    Well nourished. No overt distress  Head:  Normocephalic, atraumatic  Eyes:  Sclerae appear normal.  Pupils equally round. ENT:  Nares appear normal, no drainage. Moist oral mucosa  Neck:  No restricted ROM. Trachea midline   CV:   RRR. No m/r/g. No jugular venous distension. Lungs:   CTAB. Respirations even, unlabored  Abdomen: Bowel sounds present. Soft, nontender, nondistended. Extremities: No cyanosis or clubbing. No edema  Skin:     No rashes and normal coloration. Warm and dry. Neuro:  CN II-XII grossly intact. Sensation intact. A&Ox3  Psych:  Normal mood and affect.       I have reviewed ordered lab tests and independently visualized imaging below:    Recent Labs:  Recent Results (from the past 48 hour(s))   MAGNESIUM    Collection Time: 12/30/21  2:55 AM   Result Value Ref Range    Magnesium 2.2 1.8 - 2.4 mg/dL   PHOSPHORUS    Collection Time: 12/30/21  2:55 AM   Result Value Ref Range    Phosphorus 3.1 2.3 - 3.7 MG/DL   CBC W/O DIFF    Collection Time: 12/30/21  2:55 AM   Result Value Ref Range    WBC 7.9 4.3 - 11.1 K/uL    RBC 2.71 (L) 4.23 - 5.6 M/uL    HGB 8.1 (L) 13.6 - 17.2 g/dL    HCT 26.9 (L) 41.1 - 50.3 %    MCV 99.3 (H) 79.6 - 97.8 FL    MCH 29.9 26.1 - 32.9 PG    MCHC 30.1 (L) 31.4 - 35.0 g/dL    RDW 21.2 (H) 11.9 - 14.6 %    PLATELET 729 780 - 428 K/uL    MPV 10.8 9.4 - 12.3 FL    ABSOLUTE NRBC 0.00 0.0 - 0.2 K/uL   METABOLIC PANEL, BASIC    Collection Time: 12/30/21  2:55 AM   Result Value Ref Range    Sodium 141 138 - 145 mmol/L    Potassium 3.9 3.5 - 5.1 mmol/L    Chloride 105 98 - 107 mmol/L    CO2 34 (H) 21 - 32 mmol/L    Anion gap 2 (L) 7 - 16 mmol/L    Glucose 95 65 - 100 mg/dL    BUN 19 8 - 23 MG/DL    Creatinine 1.42 0.8 - 1.5 MG/DL    GFR est AA >60 >60 ml/min/1.73m2    GFR est non-AA >60 >60 ml/min/1.73m2    Calcium 7.8 (L) 8.3 - 10.4 MG/DL   PROCALCITONIN    Collection Time: 12/30/21  2:55 AM   Result Value Ref Range    Procalcitonin <0.05 0.00 - 0.49 ng/mL   CELL COUNT, BODY FLUID    Collection Time: 12/30/21  3:58 PM   Result Value Ref Range    BODY FLUID TYPE PLEURAL FLUID      FLUID COLOR YELLOW      FLUID APPEARANCE CLEAR      FLUID RBC CT. 1,000 /cu mm    FLUID WBC COUNT 125 /cu mm    BRCH NEUTROPHIL 22 %    BRCH LYMPHS 74 %    BRCH MACROPHAGES 3 %    BRCH EOSINS 1 %   GLUCOSE, FLUID    Collection Time: 12/30/21  3:58 PM   Result Value Ref Range    Fluid Type: PLEURAL FLUID      Glucose, body fld. 109 MG/DL   LDH, BODY FLUID    Collection Time: 12/30/21  3:58 PM   Result Value Ref Range    Fluid Type: PLEURAL FLUID      LD, body fld. 59 U/L   PROTEIN TOTAL, FLUID    Collection Time: 12/30/21  3:58 PM   Result Value Ref Range    Fluid Type: PLEURAL FLUID      Protein total, body fld.  1.3 g/dL   METABOLIC PANEL, BASIC    Collection Time: 12/31/21  2:40 AM   Result Value Ref Range    Sodium 143 138 - 145 mmol/L    Potassium 3.4 (L) 3.5 - 5.1 mmol/L    Chloride 110 (H) 98 - 107 mmol/L    CO2 28 21 - 32 mmol/L    Anion gap 5 (L) 7 - 16 mmol/L    Glucose 76 65 - 100 mg/dL    BUN 15 8 - 23 MG/DL    Creatinine 0.95 0.8 - 1.5 MG/DL    GFR est AA >60 >60 ml/min/1.73m2    GFR est non-AA >60 >60 ml/min/1.73m2    Calcium 6.8 (L) 8.3 - 10.4 MG/DL   CBC W/O DIFF    Collection Time: 12/31/21  3:48 AM   Result Value Ref Range    WBC 9.3 4.3 - 11.1 K/uL    RBC 2.89 (L) 4.23 - 5.6 M/uL    HGB 8.7 (L) 13.6 - 17.2 g/dL    HCT 28.8 (L) 41.1 - 50.3 %    MCV 99.7 (H) 79.6 - 97.8 FL    MCH 30.1 26.1 - 32.9 PG    MCHC 30.2 (L) 31.4 - 35.0 g/dL    RDW 20.3 (H) 11.9 - 14.6 %    PLATELET 017 066 - 066 K/uL    MPV 10.6 9.4 - 12.3 FL    ABSOLUTE NRBC 0.00 0.0 - 0.2 K/uL       All Micro Results     Procedure Component Value Units Date/Time    CULTURE, BODY FLUID Bernardo Crabtree STAIN [[id number]] Collected: 12/30/21 1558    Order Status: Completed Updated: 12/30/21 1932    AFB CULTURE + SMEAR W/RFLX ID FROM CULTURE [[id number]] Collected: 12/30/21 1558    Order Status: Completed Updated: 12/30/21 1653    FUNGUS CULTURE AND SMEAR [[id number]] Collected: 12/30/21 1558    Order Status: Completed Updated: 12/30/21 1652    CULTURE, BLOOD [[id number]] Collected: 12/25/21 1746    Order Status: Completed Specimen: Blood Updated: 12/30/21 0747     Special Requests: LEFT FOREARM        Culture result: NO GROWTH 5 DAYS       CULTURE, BLOOD [[id number]] Collected: 12/25/21 1730    Order Status: Completed Specimen: Blood Updated: 12/30/21 0747     Special Requests: --        LEFT  Antecubital       Culture result: NO GROWTH 5 DAYS       COVID-19 RAPID TEST [[id number]] Collected: 12/25/21 1823    Order Status: Completed Specimen: Nasopharyngeal Updated: 12/25/21 1859     Specimen source NASAL        COVID-19 rapid test Not detected        Comment:      The specimen is NEGATIVE for SARS-CoV-2, the novel coronavirus associated with COVID-19. A negative result does not rule out COVID-19. This test has been authorized by the FDA under an Emergency Use Authorization (EUA) for use by authorized laboratories.         Fact sheet for Healthcare Providers: ConventionUpdate.co.nz  Fact sheet for Patients: ConventionUpdate.co.nz       Methodology: Isothermal Nucleic Acid Amplification               Other Studies:  CT CHEST WO CONT    Result Date: 12/30/2021  CT CHEST INDICATION: Follow-up pleural effusions and infiltrates Multiple axial images were obtained through the chest without IV contrast. Radiation dose reduction techniques were used for this study: All CT scans performed at this facility use one or all of the following: Automated exposure control, adjustment of the mA and/or kVp according to patient's size, iterative reconstruction. COMPARISON: Chest x-ray dated 12/27/2021 and chest CT dated 03/19/2021 FINDINGS: - LUNGS: There are bilateral pleural effusions, left greater than right. There is associated infiltrate/atelectasis in both lung bases. There is a stable 4 mm density in the right middle lobe, likely scarring. .  Mild septal thickening is noted in the lung apices bilaterally. - MEDIASTINUM/AXILLA: No significant adenopathy. There are surgical clips in the right axilla. - HEART/VESSELS: Vascular calcification in cardiomegaly. - CHEST WALL/BONES: There is a T12 compression fracture which is new compared to the prior CT. - UPPER ABDOMEN: Left kidney is atrophic. There is a 4.8 cm hyperdense mass adjacent to the lower pole the area of the left kidney, possibly a dilated renal pelvis. 1.  Bilateral pleural effusions and bibasilar infiltrate/atelectasis, left greater than right. 2.  Left perirenal mass, probably a complex cyst.  Suggest ultrasound evaluation. 3.  T12 compression fracture which is not appear acute, but is new compared with CT from 03/19/2021. [employer]    Result Date: 12/30/2021  Clinical history: Possible complex mass in the left kidney captured at periphery of the imaging field of view on the prior CT chest. TECHNIQUE: Grayscale renal ultrasound. COMPARISON: CT chest earlier today. FINDINGS: Right kidney is normal in echogenicity, contour and size and measures 12.1 cm no right hydronephrosis. Left kidney is atrophic and not well visualized due to overlying bowel gas. No definite left renal mass is seen. Urinary bladder is normal in contour. 1. The apparent left renal abnormality noted on the earlier CT chest study is not definitely seen on this ultrasound study. Overlying bowel gas limits sensitivity. Since the abnormality was captured at the periphery of the imaging field of view on the prior CT chest study, follow-up CT abdomen study may be beneficial to better visualize the findings. Current Meds:  Current Facility-Administered Medications   Medication Dose Route Frequency    potassium chloride (K-DUR, KLOR-CON M20) SR tablet 40 mEq  40 mEq Oral NOW    tuberculin injection 5 Units  5 Units IntraDERMal ONCE    ferrous sulfate tablet 325 mg  1 Tablet Oral DAILY WITH BREAKFAST    lip protectant (BLISTEX) ointment 1 Each  1 Each Topical PRN    pantoprazole (PROTONIX) tablet 40 mg  40 mg Oral ACB    furosemide (LASIX) tablet 40 mg  40 mg Oral DAILY    amiodarone (CORDARONE) tablet 200 mg  200 mg Oral DAILY    levothyroxine (SYNTHROID) tablet 50 mcg  50 mcg Oral ACB    DULoxetine (CYMBALTA) capsule 60 mg  60 mg Oral DAILY    folic acid (FOLVITE) tablet 1 mg  1 mg Oral DAILY    metoprolol succinate (TOPROL-XL) XL tablet 50 mg  50 mg Oral DAILY    rosuvastatin (CRESTOR) tablet 10 mg  10 mg Oral QHS    sodium chloride (NS) flush 5-40 mL  5-40 mL IntraVENous Q8H    sodium chloride (NS) flush 5-40 mL  5-40 mL IntraVENous PRN    acetaminophen (TYLENOL) tablet 650 mg  650 mg Oral Q6H PRN    Or    acetaminophen (TYLENOL) suppository 650 mg  650 mg Rectal Q6H PRN    polyethylene glycol (MIRALAX) packet 17 g  17 g Oral DAILY PRN    ondansetron (ZOFRAN ODT) tablet 4 mg  4 mg Oral Q8H PRN    Or    ondansetron (ZOFRAN) injection 4 mg  4 mg IntraVENous Q6H PRN       Signed: Eugene Prieto DO    Part of this note may have been written by using a voice dictation software. The note has been proof read but may still contain some grammatical/other typographical errors.

## 2021-12-31 NOTE — PROGRESS NOTES
Patient had removed NC and O2 @ 2 LPM, SPO2 check revealed a SPO2 of 79-80% on RA. Placed on 3 LPM of O2 and SPO2 returned to > 94% within 60 seconds.

## 2022-01-01 PROCEDURE — 65270000029 HC RM PRIVATE

## 2022-01-01 PROCEDURE — 97530 THERAPEUTIC ACTIVITIES: CPT

## 2022-01-01 PROCEDURE — 74011250637 HC RX REV CODE- 250/637: Performed by: INTERNAL MEDICINE

## 2022-01-01 PROCEDURE — 74011250637 HC RX REV CODE- 250/637: Performed by: FAMILY MEDICINE

## 2022-01-01 PROCEDURE — 77010033678 HC OXYGEN DAILY

## 2022-01-01 PROCEDURE — 74011250637 HC RX REV CODE- 250/637: Performed by: PHYSICIAN ASSISTANT

## 2022-01-01 PROCEDURE — 74011250636 HC RX REV CODE- 250/636: Performed by: INTERNAL MEDICINE

## 2022-01-01 PROCEDURE — 74011250637 HC RX REV CODE- 250/637: Performed by: STUDENT IN AN ORGANIZED HEALTH CARE EDUCATION/TRAINING PROGRAM

## 2022-01-01 PROCEDURE — 97110 THERAPEUTIC EXERCISES: CPT

## 2022-01-01 RX ORDER — MAGNESIUM SULFATE HEPTAHYDRATE 40 MG/ML
2 INJECTION, SOLUTION INTRAVENOUS ONCE
Status: COMPLETED | OUTPATIENT
Start: 2022-01-01 | End: 2022-01-01

## 2022-01-01 RX ORDER — LANOLIN ALCOHOL/MO/W.PET/CERES
400 CREAM (GRAM) TOPICAL DAILY
Status: DISCONTINUED | OUTPATIENT
Start: 2022-01-02 | End: 2022-01-06 | Stop reason: HOSPADM

## 2022-01-01 RX ADMIN — MAGNESIUM SULFATE HEPTAHYDRATE 2 G: 40 INJECTION, SOLUTION INTRAVENOUS at 14:47

## 2022-01-01 RX ADMIN — ACETAMINOPHEN 650 MG: 325 TABLET ORAL at 12:15

## 2022-01-01 RX ADMIN — PANTOPRAZOLE SODIUM 40 MG: 40 TABLET, DELAYED RELEASE ORAL at 08:01

## 2022-01-01 RX ADMIN — FOLIC ACID 1 MG: 1 TABLET ORAL at 08:54

## 2022-01-01 RX ADMIN — ROSUVASTATIN CALCIUM 10 MG: 5 TABLET, FILM COATED ORAL at 22:00

## 2022-01-01 RX ADMIN — METOPROLOL SUCCINATE 50 MG: 50 TABLET, EXTENDED RELEASE ORAL at 08:54

## 2022-01-01 RX ADMIN — SODIUM CHLORIDE, PRESERVATIVE FREE 5 ML: 5 INJECTION INTRAVENOUS at 14:00

## 2022-01-01 RX ADMIN — DULOXETINE HYDROCHLORIDE 60 MG: 60 CAPSULE, DELAYED RELEASE ORAL at 08:54

## 2022-01-01 RX ADMIN — FERROUS SULFATE TAB 325 MG (65 MG ELEMENTAL FE) 325 MG: 325 (65 FE) TAB at 08:01

## 2022-01-01 RX ADMIN — SODIUM CHLORIDE, PRESERVATIVE FREE 10 ML: 5 INJECTION INTRAVENOUS at 22:01

## 2022-01-01 RX ADMIN — SODIUM CHLORIDE, PRESERVATIVE FREE 40 ML: 5 INJECTION INTRAVENOUS at 06:31

## 2022-01-01 RX ADMIN — FUROSEMIDE 40 MG: 40 TABLET ORAL at 08:54

## 2022-01-01 RX ADMIN — AMIODARONE HYDROCHLORIDE 200 MG: 200 TABLET ORAL at 08:54

## 2022-01-01 RX ADMIN — LEVOTHYROXINE SODIUM 50 MCG: 50 TABLET ORAL at 08:01

## 2022-01-01 NOTE — PROGRESS NOTES
TRANSFER - IN REPORT:    Verbal report received from [address] [place] [address], RN(name) on Tonia Yoo  being received from CVICU(unit) for routine progression of care      Report consisted of patients Situation, Background, Assessment and   Recommendations(SBAR). Information from the following report(s) SBAR, Kardex, Procedure Summary and Recent Results was reviewed with the receiving nurse. Opportunity for questions and clarification was provided. Assessment completed upon patients arrival to unit and care assumed. Ambulatory with 2L O2. LAI. Multiple healing blistered areas along shy arms; pt states from needle/pokes and are improving.

## 2022-01-01 NOTE — PROGRESS NOTES
Hospitalist Progress Note   Admit Date:  2021  4:57 PM   Name:  Darby Rabago   Age:  80 y.o. Sex:  adult  :  1940   MRN:  [id number]   Room:  102/01    Presenting Complaint: Shortness of Breath    Reason(s) for Admission: GI bleed [facility] Course & Interval History:   From h and p and prior notes HPI:  80 y.o. female with medical history of hypothyroidism, history of Graves' disease, atrial fibrillation on Xarelto, chronic anemia, CKD stage III.  Patient recently admitted  for progressive anemia with hemoglobin on arrival of 3.3. Kaylee Dear was consulted and patient underwent EGD on 21 that was unremarkable.  Heme-onc was consulted and labs were consistent with iron deficiency anemia and patient was given IV iron.  Xarelto was resumed on  and hemoglobin greater than 8 on discharge.    Patient presents  with report of increasing shortness of breath. She appears pale and was found to have large melanotic stool on exam.   ER work-up notable for WBC 9, hemoglobin 3.7, platelets 712, INR 3.6, sodium 146, potassium 4.3, glucose 128, creatinine 1.34, albumin 1.6, LFTs within normal limits, lactic acid 5.1, proBNP 7085. Checks x-ray notable for pulmonary edema.     Pt admitted for ABLA/acute GIB. She received Kcentra for INR >3 on admission. S/p 3U PRBC for Hgb 3.7 on admission. Gi has seen patient. 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 Total of 5 angiodysplasias. There is high likelihood of additional AVMs beyond the reach of the pediatric colonoscope. S/p Colonoscopy  showed diverticulosis, internal hemorrhoids. Xarelto is discontinued indefinitely with cardiology recommendations. Hgb has been stable since transfusion.     Pt continues to have acute on chronic respiratory failure with hypoxia. She wears 2LO2NC at home but has been requiring 4LO2NC here.  Chest x-ray showed pulmonary congestion. Repeat chest x-ray showed bilateral pleural effusions, left greater than right. Rapid Covid-19 Neg. Pt was started on IV Lasix and Cardiology was consulted. TTE 12/28 with EF 99-37%, normal diastolic function. She is now on po Lasix and home amiodarone has been resumed. Cardiology signed off.     CT chest was obtained to assess for pleural effusion and shows b/l pleural effusions (L>R) and bibasilar infiltrates/atelectasis; chronic T12 compression fracture. Pulmonary was consulted for thoracentesis. S/p 800mL removed from L pleural effusion 12/30        Subjective (1/1/22)  Dyspnea and desaturation with exertion. Overall dyspnea at rest stable to slightly improved. Echocardiogram with fairly well preserved LVEF with known diastolic dysfunction. Perinephric mass noted left with follow-up renal ultrasound no visualized mass and suggestion of possible follow-up CT imaging. Patient with CKD. Hemoglobin stable with most recent numbers 8.4, 8.1, 8.7. Seen by physical therapy and short-term rehab recommended. Off Xarelto. EGD angiodysplasias multiple regions and colonoscopy late December with diverticulosis. She is status post thoracentesis and has been transferred out of the unit but there are no beds available on the medical floor.     Assessment & Plan:      ABLA  GI bleed   S/p Kcentra for INR >3 on admission. S/p 3U PRBC for Hgb 3.7 on admission. Gi has seen patient. 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 Total of 5 angiodysplasias. There is high likelihood of additional AVMs beyond the reach of the pediatric colonoscope. S/p Colonoscopy 12/28 showed diverticulosis, internal hemorrhoids  Now receiving iron supplementation  Receiving PPI po   On regular soft diet  Stoped Xarelto indefinitely per Cardiology recs.  Needs GI f/u in 4 weeks  Follow-up hemoglobin pending at time of evaluation       Acute on chronic hypoxemic respiratory failure  Most likely due to CHF   Pleural effusion, L>R  Baseline requires 2LO2NC. Chest x-ray showed pulmonary congestion. Repeat chest x-ray showed bilateral pleural effusions, left greater than right. Rapid Covid-19 Neg. TTE 12/28 with EF 15-13%, normal diastolic function. CT chest 12/30 shows b/l pleural effusions (L>R) and bibasilar infiltrates/atelectasis; chronic T12 compression fracture  Procal negative, pt afebrile and no leukocytosis, low concerns for infiltrates on CT being infection, most likely atelectasis  S/p IV Lasix. Cont po Lasix  Cardiology has seen, now on standby  Encourage frequent IS use  Consulted Pulmonology for thoracentesis, appreciate recs. S/p 800mL removed from L pleural effusion 12/30  Pleural studies pending  On 2LO2NC (from 4L), chronic home oxygen 2 L nasal cannula as above  If continues to remain hypoxemic with exertion follow-up imaging may need further more aggressive diuresiscontinue once daily oral Lasix for now     Hypokalemia  Replace and recheck  Check Magwas 1.8supplement     Chronic compression fracture  CT chest 12/30 captured chronic T12 compression fracture. Pt stated she fell 6-8 months ago but did not get evaluated. Denies pain now. 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 Can be done outpt.   This is unchangedschedule outpatient CT scan on discharge     Acquired hypothyroidism  Cont home Synthroidcheck TSH a.m. free T4 and T3 total.     Hyperlipidemia  Cont statin     PAF  Cont home amiodarone  Xarelto stopping indefinitely as pt is not a candidate for chronic OAC per cardiologythis is unchanged     Hypertension, essential, benign  Cont po Lasix and Toprol  May need increased Lasix if edema recurrent     Stage 3 chronic kidney disease  Cr at baseline 1.3-1.5  Avoid nephrotoxic meds     Diastolic CHF, chronic  Resume po Lasix and Toprolunchanged as above     Anxiety/depression  Cont home Cymbalta    Debilityneed short-term rehabphysical therapy evaluation and recommendations reviewedCase management consult PPD will need rapid Covid test prior to discharge          Dispo/Discharge Planning:      Appears stable for discharge to short-term rehab when bed available        Diet:  ADULT DIET Easy to Chew  DVT PPx: SCD's  Code status: Full Code      Hospital Problems as of 1/1/2022 Date Reviewed: 12/27/2021          Codes Class Noted - Resolved POA    Hypoxia ICD-10-CM: R09.02  ICD-9-CM: 799.02  12/31/2021 - Present Unknown        Pleural effusion ICD-10-CM: J90  ICD-9-CM: 511.9  12/30/2021 - Present Unknown        T12 compression fracture ([facility] ICD-10-CM: S22.080A  ICD-9-CM: 805.2  12/30/2021 - Present Unknown        Renal cyst, left ICD-10-CM: N28.1  ICD-9-CM: 753.10  12/30/2021 - Present Unknown        * (Principal) GI bleed ICD-10-CM: K92.2  ICD-9-CM: 578.9  12/25/2021 - Present Unknown        Diastolic CHF, chronic (HCC) ICD-10-CM: I50.32  ICD-9-CM: 428.32, 428.0  6/13/2021 - Present Yes        Stage 3 chronic kidney disease ([facility] (Chronic) ICD-10-CM: N18.30  ICD-9-CM: 585.3  5/20/2021 - Present Yes        Hypertension, essential, benign (Chronic) ICD-10-CM: I10  ICD-9-CM: 401.1  8/18/2016 - Present Yes        Acquired hypothyroidism (Chronic) ICD-10-CM: E03.9  ICD-9-CM: 244.9  1/16/2016 - Present Yes        Hyperlipidemia (Chronic) ICD-10-CM: E78.5  ICD-9-CM: 272.4  1/16/2016 - Present Yes        Rheumatoid arthritis ([facility] (Chronic) ICD-10-CM: M06.9  ICD-9-CM: 714.0  1/16/2016 - Present Yes              Objective:     Patient Vitals for the past 24 hrs:   Temp Pulse Resp BP SpO2   01/01/22 0855     95 %   01/01/22 0200 97.6 °F (36.4 °C)       01/01/22 0000    133/63    12/31/21 2310  74   94 %   12/31/21 2123 97.6 °F (36.4 °C) 75  (!) 156/67 95 %   12/31/21 1758  73  (!) 118/55 97 %   12/31/21 1545 98.7 °F (37.1 °C) 70 20 (!) 98/58 95 %   12/31/21 1258  74  (!) 97/47 90 %     Oxygen Therapy  O2 Sat (%): 95 % (01/01/22 0855)  Pulse via Oximetry: 83 beats per minute (01/01/22 0855)  O2 Device: Nasal cannula (01/01/22 0855)  Skin Assessment: Clean, dry, & intact (12/31/21 1545)  Skin Protection for O2 Device: N/A (12/29/21 2039)  O2 Flow Rate (L/min): 2 l/min (01/01/22 0855)  O2 Temperature: 87.8 °F (31 °C) (12/26/21 1230)  FIO2 (%): 50 % (12/26/21 1230)    Estimated body mass index is 26.69 kg/m² as calculated from the following:    Height as of this encounter: 5' (1.524 m). Weight as of this encounter: 62 kg (136 lb 11 oz). Intake/Output Summary (Last 24 hours) at 1/1/2022 1226  Last data filed at 1/1/2022 0200  Gross per 24 hour   Intake 480 ml   Output 1450 ml   Net -970 ml         Physical Exam:     Blood pressure 133/63, pulse 74, temperature 97.6 °F (36.4 °C), resp. rate 20, height 5' (1.524 m), weight 62 kg (136 lb 11 oz), SpO2 95 %. General:    Well nourished. No overt distress  Head:  Normocephalic, atraumatic  Eyes:  Sclerae appear normal.  Pupils equally round. Eyes are proptotic  ENT:  Nares appear normal, no drainage. Moist oral mucosa  Neck:  No restricted ROM. Trachea midline   CV:   Irregular but rate controlled no m/r/g. No jugular venous distension. Lungs:   CTAB. No wheezing, rhonchi, or rales. Respirations even, unlabored  Abdomen: Bowel sounds present. Soft, nontender, nondistended. Extremities: No cyanosis or clubbing. No edema  Skin:     No rashes and normal coloration. Warm and dry. Neuro:  CN II-XII grossly intact. Sensation intact. A&Ox3  Psych:  Normal mood and affect.       I have reviewed ordered lab tests and independently visualized imaging below:    Recent Labs:  Recent Results (from the past 48 hour(s))   CULTURE, BODY FLUID W GRAM STAIN    Collection Time: 12/30/21  3:58 PM    Specimen: Pleural Fluid    LEFT   Result Value Ref Range    Special Requests: NO SPECIAL REQUESTS      GRAM STAIN NO WBCS SEEN      GRAM STAIN NO DEFINITE ORGANISM SEEN      Culture result: NO GROWTH 1 DAY     FUNGUS CULTURE AND SMEAR    Collection Time: 12/30/21  3:58 PM    Specimen: Miscellaneous sample   Result Value Ref Range    Source PLEURAL FLUID      Fungus stain Direct Inoculation     Fungus (Mycology) Culture Other source received    AFB CULTURE + SMEAR W/RFLX ID FROM CULTURE    Collection Time: 12/30/21  3:58 PM    Specimen: Miscellaneous sample   Result Value Ref Range    Source PLEURAL FLUID      AFB Specimen processing Concentration     Acid Fast Smear Negative      Acid Fast Culture PENDING    CELL COUNT, BODY FLUID    Collection Time: 12/30/21  3:58 PM   Result Value Ref Range    BODY FLUID TYPE PLEURAL FLUID      FLUID COLOR YELLOW      FLUID APPEARANCE CLEAR      FLUID RBC CT. 1,000 /cu mm    FLUID WBC COUNT 125 /cu mm    BRCH NEUTROPHIL 22 %    BRCH LYMPHS 74 %    BRCH MACROPHAGES 3 %    BRCH EOSINS 1 %   GLUCOSE, FLUID    Collection Time: 12/30/21  3:58 PM   Result Value Ref Range    Fluid Type: PLEURAL FLUID      Glucose, body fld. 109 MG/DL   LDH, BODY FLUID    Collection Time: 12/30/21  3:58 PM   Result Value Ref Range    Fluid Type: PLEURAL FLUID      LD, body fld. 59 U/L   PROTEIN TOTAL, FLUID    Collection Time: 12/30/21  3:58 PM   Result Value Ref Range    Fluid Type: PLEURAL FLUID      Protein total, body fld.  1.3 g/dL   METABOLIC PANEL, BASIC    Collection Time: 12/31/21  2:40 AM   Result Value Ref Range    Sodium 143 138 - 145 mmol/L    Potassium 3.4 (L) 3.5 - 5.1 mmol/L    Chloride 110 (H) 98 - 107 mmol/L    CO2 28 21 - 32 mmol/L    Anion gap 5 (L) 7 - 16 mmol/L    Glucose 76 65 - 100 mg/dL    BUN 15 8 - 23 MG/DL    Creatinine 0.95 0.8 - 1.5 MG/DL    GFR est AA >60 >60 ml/min/1.73m2    GFR est non-AA >60 >60 ml/min/1.73m2    Calcium 6.8 (L) 8.3 - 10.4 MG/DL   CBC W/O DIFF    Collection Time: 12/31/21  3:48 AM   Result Value Ref Range    WBC 9.3 4.3 - 11.1 K/uL    RBC 2.89 (L) 4.23 - 5.6 M/uL    HGB 8.7 (L) 13.6 - 17.2 g/dL    HCT 28.8 (L) 41.1 - 50.3 %    MCV 99.7 (H) 79.6 - 97.8 FL    MCH 30.1 26.1 - 32.9 PG    MCHC 30.2 (L) 31.4 - 35.0 g/dL    RDW 20.3 (H) 11.9 - 14.6 %    PLATELET 813 918 - 803 K/uL    MPV 10.6 9.4 - 12.3 FL    ABSOLUTE NRBC 0.00 0.0 - 0.2 K/uL   MAGNESIUM    Collection Time: 12/31/21  7:30 AM   Result Value Ref Range    Magnesium 1.8 1.8 - 2.4 mg/dL   PLEASE READ & DOCUMENT PPD TEST IN 24 HRS    Collection Time: 12/31/21  9:00 PM   Result Value Ref Range    PPD Negative Negative    mm Induration 0 0 - 5 mm       All Micro Results     Procedure Component Value Units Date/Time    CULTURE, BODY FLUID [facility] [address] STAIN [[id number]] Collected: 12/30/21 1558    Order Status: Completed Specimen: Pleural Fluid Updated: 01/01/22 0750     Special Requests: NO SPECIAL REQUESTS        GRAM STAIN NO WBCS SEEN         NO DEFINITE ORGANISM SEEN        Culture result: NO GROWTH 1 DAY       FUNGUS CULTURE AND SMEAR [[id number]] Collected: 12/30/21 1558    Order Status: Completed Specimen: Miscellaneous sample Updated: 12/31/21 1736     Source PLEURAL FLUID        Comment: LEFT  1          Fungus stain Direct Inoculation     Fungus (Mycology) Culture Other source received     Comment: (NOTE)  Performed At: [facility] & [address] [id number]  Jose Wiggins MD QP:[id number]         AFB CULTURE + SMEAR W/RFLX ID FROM CULTURE [[id number]] Collected: 12/30/21 1558    Order Status: Completed Specimen: Miscellaneous sample Updated: 12/31/21 1436     Source PLEURAL FLUID        Comment: LEFT  1          AFB Specimen processing Concentration     Acid Fast Smear Negative        Comment: (NOTE)  Performed At: [facility] & [address] [id number]  Jose Wiggins MD CX:[id number]          Acid Fast Culture PENDING    CULTURE, BLOOD [[id number]] Collected: 12/25/21 1746    Order Status: Completed Specimen: Blood Updated: 12/30/21 0740 Special Requests: LEFT FOREARM        Culture result: NO GROWTH 5 DAYS       CULTURE, BLOOD [[id number]] Collected: 12/25/21 1730    Order Status: Completed Specimen: Blood Updated: 12/30/21 1846     Special Requests: --        LEFT  Antecubital       Culture result: NO GROWTH 5 DAYS       COVID-19 RAPID TEST [[id number]] Collected: 12/25/21 1823    Order Status: Completed Specimen: Nasopharyngeal Updated: 12/25/21 1859     Specimen source NASAL        COVID-19 rapid test Not detected        Comment:      The specimen is NEGATIVE for SARS-CoV-2, the novel coronavirus associated with COVID-19. A negative result does not rule out COVID-19. This test has been authorized by the FDA under an Emergency Use Authorization (EUA) for use by authorized laboratories. Fact sheet for Healthcare Providers: [employer]Asthmatrackerdate.co.nz  Fact sheet for Patients: [employer]Softdate.co.nz       Methodology: Isothermal Nucleic Acid Amplification               Other Studies:  No results found.     Current Meds:  Current Facility-Administered Medications   Medication Dose Route Frequency    ferrous sulfate tablet 325 mg  1 Tablet Oral DAILY WITH BREAKFAST    lip protectant (BLISTEX) ointment 1 Each  1 Each Topical PRN    pantoprazole (PROTONIX) tablet 40 mg  40 mg Oral ACB    furosemide (LASIX) tablet 40 mg  40 mg Oral DAILY    amiodarone (CORDARONE) tablet 200 mg  200 mg Oral DAILY    levothyroxine (SYNTHROID) tablet 50 mcg  50 mcg Oral ACB    DULoxetine (CYMBALTA) capsule 60 mg  60 mg Oral DAILY    folic acid (FOLVITE) tablet 1 mg  1 mg Oral DAILY    metoprolol succinate (TOPROL-XL) XL tablet 50 mg  50 mg Oral DAILY    rosuvastatin (CRESTOR) tablet 10 mg  10 mg Oral QHS    sodium chloride (NS) flush 5-40 mL  5-40 mL IntraVENous Q8H    sodium chloride (NS) flush 5-40 mL  5-40 mL IntraVENous PRN    acetaminophen (TYLENOL) tablet 650 mg  650 mg Oral Q6H PRN    Or    acetaminophen (TYLENOL) suppository 650 mg  650 mg Rectal Q6H PRN    polyethylene glycol (MIRALAX) packet 17 g  17 g Oral DAILY PRN    ondansetron (ZOFRAN ODT) tablet 4 mg  4 mg Oral Q8H PRN    Or    ondansetron (ZOFRAN) injection 4 mg  4 mg IntraVENous Q6H PRN       Signed:  Matias Cordero DO    Part of this note may have been written by using a voice dictation software. The note has been proof read but may still contain some grammatical/other typographical errors.

## 2022-01-01 NOTE — PROGRESS NOTES
ACUTE PHYSICAL THERAPY GOALS:  (Developed with and agreed upon by patient and/or caregiver.)  1. Ms. Summer Meléndez will perform all transfers independently in 7 days. 2.  Ms. Summer Meléndez will perform gait with rolling walker 120 ft independently in 7 days. 3.  Ms. Summer Meléndez will perform up and down 5 steps with rail independently in 7 days. PHYSICAL THERAPY: Daily Note and AM Treatment Day # 2    Vipin Lopez is a 80 y.o. adult   PRIMARY DIAGNOSIS: GI bleed  GI bleed [K92.2]  Procedure(s) (LRB):  THORACENTESIS (N/A)  ULTRASOUND (Bilateral)  2 Days Post-Op    ASSESSMENT:     REHAB RECOMMENDATIONS: CURRENT LEVEL OF FUNCTION:  (Most Recently Demonstrated)   Recommendation to date pending progress:  Setting:   Short-term Rehab  Equipment:    None Bed Mobility:   Modified Independent  Sit to Stand:   Contact Guard Assistance  Transfers:   Contact Guard Assistance  Gait/Mobility:   Contact Guard Assistance     ASSESSMENT:  Ms. Summer Meléndez was supine and ready to participate. She was on 2L with sats dropping briefly to 88% while walking. She performed seated exercises below then walked a few laps of the unit with the cardiac walker. She is somewhat off balance with several falls as of late at home. SUBJECTIVE:   Ms. Summer Meléndez states, \"ok. \"    SOCIAL HISTORY/ LIVING ENVIRONMENT:   Home Environment: Private residence  One/Two Story Residence: One story  Living Alone: Yes  Support Systems: Friend/Neighbor  OBJECTIVE:     PAIN: VITAL SIGNS: LINES/DRAINS:   Pre Treatment: Pain Screen  Pain Scale 1: FLACC  Pain Intensity 1: 0  Post Treatment: 0   none  O2 Device: Nasal cannula     MOBILITY: I Mod I S SBA CGA Min Mod Max Total  NT x2 Comments:   Bed Mobility    Rolling [] [] [] [] [] [] [] [] [] [] []    Supine to Sit [] [x] [] [] [] [] [] [] [] [] []    Scooting [x] [] [] [] [] [] [] [] [] [] []    Sit to Supine [] [] [] [] [] [] [] [] [] [] []    Transfers    Sit to Stand [] [] [] [] [x] [] [] [] [] [] []    Bed to Chair [] [] [] [] [x] [] [] [] [] [] []    Stand to Sit [] [] [] [] [x] [] [] [] [] [] []    I=Independent, Mod I=Modified Independent, S=Supervision, SBA=Standby Assistance, CGA=Contact Guard Assistance,   Min=Minimal Assistance, Mod=Moderate Assistance, Max=Maximal Assistance, Total=Total Assistance, NT=Not Tested    BALANCE: Good Fair+ Fair Fair- Poor NT Comments   Sitting Static [x] [] [] [] [] []    Sitting Dynamic [x] [] [] [] [] []              Standing Static [] [] [x] [] [] []    Standing Dynamic [] [] [x] [] [] []      GAIT: I Mod I S SBA CGA Min Mod Max Total  NT x2 Comments:   Level of Assistance [] [] [] [] [x] [x] [] [] [] [] []    Distance 120    DME cardiac walker    Gait Quality Slightly off balance    Weightbearing  Status N/A     I=Independent, Mod I=Modified Independent, S=Supervision, SBA=Standby Assistance, CGA=Contact Guard Assistance,   Min=Minimal Assistance, Mod=Moderate Assistance, Max=Maximal Assistance, Total=Total Assistance, NT=Not Tested    PLAN:   FREQUENCY/DURATION: PT Plan of Care: 3 times/week for duration of hospital stay or until stated goals are met, whichever comes first.  TREATMENT:     TREATMENT:   ($$ Therapeutic Activity: 8-22 mins  $$ Therapeutic Exercises: 8-22 mins    )  Therapeutic Activity (15 Minutes): Therapeutic activity included Supine to Sit, Scooting, Transfer Training, Ambulation on level ground and Standing balance to improve functional Mobility, Strength and Activity tolerance. Therapeutic Exercise (10 Minutes): Therapeutic exercises noted below to improve functional activity tolerance, AROM, strength and mobility.      TREATMENT GRID:   Date:  1/1/22 Date:   Date:     Activity/Exercise Parameters Parameters Parameters   Seated TKE 20x B     AP 10x B     Seated marching 20x B     Seated hip abd 20x B                             AFTER TREATMENT POSITION/PRECAUTIONS:  Chair, Needs within reach and RN notified    INTERDISCIPLINARY COLLABORATION:  RN/PCT and PT/PTA    TOTAL TREATMENT DURATION:  PT Patient Time In/Time Out  Time In: 8610  Time Out: 9376    Kamla Canales PTA

## 2022-01-01 NOTE — PROGRESS NOTES
TRANSFER - OUT REPORT:    Verbal report given to Ana Laura Godinez RN on Jennifer Carcamo  being transferred to 5th floor(unit) for routine progression of care       Report consisted of patients Situation, Background, Assessment and   Recommendations(SBAR). Information from the following report(s) SBAR, Kardex, Procedure Summary, Intake/Output, MAR and Recent Results was reviewed with the receiving nurse. Lines:   Quad Lumen 12/25/21 (Active)   Central Line Being Utilized No 01/01/22 0800   Criteria for Appropriate Use Limited/no vessel suitable for conventional peripheral access 01/01/22 0800   Site Assessment Clean, dry, & intact 01/01/22 0800   Infiltration Assessment 0 01/01/22 0800   Affected Extremity/Extremities Color distal to insertion site pink (or appropriate for race); Pulses palpable 01/01/22 0800   Date of Last Dressing Change 12/29/21 01/01/22 0800   Dressing Status Clean, dry, & intact 01/01/22 0800   Dressing Type Disk with Chlorhexadine gluconate (CHG); Transparent 01/01/22 0800   Action Taken Open ports on tubing capped 01/01/22 0800   Proximal Hub Color/Line Status White;Capped 01/01/22 0800   Positive Blood Return (Medial Site) Yes 01/01/22 0800   Medial 1 Hub Color/Line Status Gray;Capped 01/01/22 0800   Positive Blood Return (Lateral Site) Yes 01/01/22 0800   Medial 2 Hub Color/Line Status Blue;Capped 01/01/22 0800   Positive Blood Return (Site #3) Yes 01/01/22 0800   Distal Hub Color/Line Status Brown;Capped 01/01/22 0800   Positive Blood Return (Site #4) No 01/01/22 0800   Alcohol Cap Used No 12/28/21 1600        Opportunity for questions and clarification was provided.       Patient transported with:   O2 @ 2 liters

## 2022-01-02 ENCOUNTER — APPOINTMENT (OUTPATIENT)
Dept: GENERAL RADIOLOGY | Age: 82
DRG: 377 | End: 2022-01-02
Attending: STUDENT IN AN ORGANIZED HEALTH CARE EDUCATION/TRAINING PROGRAM
Payer: MEDICARE

## 2022-01-02 LAB
BACTERIA SPEC CULT: NORMAL
GRAM STN SPEC: NORMAL
GRAM STN SPEC: NORMAL
SERVICE CMNT-IMP: NORMAL
T3 SERPL-MCNC: 0.65 NG/ML (ref 0.6–1.81)
T4 FREE SERPL-MCNC: 1.2 NG/DL (ref 0.78–1.46)
TSH SERPL DL<=0.005 MIU/L-ACNC: 13.6 UIU/ML (ref 0.36–3.74)

## 2022-01-02 PROCEDURE — 94760 N-INVAS EAR/PLS OXIMETRY 1: CPT

## 2022-01-02 PROCEDURE — 71045 X-RAY EXAM CHEST 1 VIEW: CPT

## 2022-01-02 PROCEDURE — 85027 COMPLETE CBC AUTOMATED: CPT

## 2022-01-02 PROCEDURE — 74011250637 HC RX REV CODE- 250/637: Performed by: STUDENT IN AN ORGANIZED HEALTH CARE EDUCATION/TRAINING PROGRAM

## 2022-01-02 PROCEDURE — 74011250637 HC RX REV CODE- 250/637: Performed by: INTERNAL MEDICINE

## 2022-01-02 PROCEDURE — 84439 ASSAY OF FREE THYROXINE: CPT

## 2022-01-02 PROCEDURE — 80048 BASIC METABOLIC PNL TOTAL CA: CPT

## 2022-01-02 PROCEDURE — 77010033678 HC OXYGEN DAILY

## 2022-01-02 PROCEDURE — 36415 COLL VENOUS BLD VENIPUNCTURE: CPT

## 2022-01-02 PROCEDURE — 74011250637 HC RX REV CODE- 250/637: Performed by: PHYSICIAN ASSISTANT

## 2022-01-02 PROCEDURE — 65270000029 HC RM PRIVATE

## 2022-01-02 PROCEDURE — 84443 ASSAY THYROID STIM HORMONE: CPT

## 2022-01-02 PROCEDURE — 74011250637 HC RX REV CODE- 250/637: Performed by: FAMILY MEDICINE

## 2022-01-02 PROCEDURE — 84480 ASSAY TRIIODOTHYRONINE (T3): CPT

## 2022-01-02 RX ADMIN — SODIUM CHLORIDE, PRESERVATIVE FREE 10 ML: 5 INJECTION INTRAVENOUS at 21:11

## 2022-01-02 RX ADMIN — AMIODARONE HYDROCHLORIDE 200 MG: 200 TABLET ORAL at 08:50

## 2022-01-02 RX ADMIN — FUROSEMIDE 40 MG: 40 TABLET ORAL at 08:50

## 2022-01-02 RX ADMIN — DULOXETINE HYDROCHLORIDE 60 MG: 60 CAPSULE, DELAYED RELEASE ORAL at 08:50

## 2022-01-02 RX ADMIN — METOPROLOL SUCCINATE 50 MG: 50 TABLET, EXTENDED RELEASE ORAL at 08:50

## 2022-01-02 RX ADMIN — PANTOPRAZOLE SODIUM 40 MG: 40 TABLET, DELAYED RELEASE ORAL at 06:05

## 2022-01-02 RX ADMIN — FERROUS SULFATE TAB 325 MG (65 MG ELEMENTAL FE) 325 MG: 325 (65 FE) TAB at 08:50

## 2022-01-02 RX ADMIN — ROSUVASTATIN CALCIUM 10 MG: 5 TABLET, FILM COATED ORAL at 21:11

## 2022-01-02 RX ADMIN — SODIUM CHLORIDE, PRESERVATIVE FREE 10 ML: 5 INJECTION INTRAVENOUS at 06:05

## 2022-01-02 RX ADMIN — LEVOTHYROXINE SODIUM 50 MCG: 50 TABLET ORAL at 06:05

## 2022-01-02 RX ADMIN — FOLIC ACID 1 MG: 1 TABLET ORAL at 08:50

## 2022-01-02 RX ADMIN — SODIUM CHLORIDE, PRESERVATIVE FREE 10 ML: 5 INJECTION INTRAVENOUS at 15:00

## 2022-01-02 RX ADMIN — Medication 400 MG: at 08:50

## 2022-01-02 NOTE — PROGRESS NOTES
Problem: Pain  Goal: *Control of Pain  Outcome: Progressing Towards Goal     Problem: Falls - Risk of  Goal: *Absence of Falls  Description: Document Obed Fall Risk and appropriate interventions in the flowsheet.   Outcome: Progressing Towards Goal  Note: Fall Risk Interventions:  Mobility Interventions: Bed/chair exit alarm,Patient to call before getting OOB    Mentation Interventions: Adequate sleep, hydration, pain control,Door open when patient unattended,Eyeglasses and hearing aids    Medication Interventions: Teach patient to arise slowly,Patient to call before getting OOB    Elimination Interventions: Bed/chair exit alarm,Call light in reach,Patient to call for help with toileting needs,Toileting schedule/hourly rounds    History of Falls Interventions: Bed/chair exit alarm,Door open when patient unattended,Investigate reason for fall

## 2022-01-02 NOTE — PROGRESS NOTES
Hospitalist Progress Note   Admit Date:  2021  4:57 PM   Name:  Susan Valadez   Age:  80 y.o. Sex:  adult  :  1940   MRN:  [id number]   Room:  [facility]/    Presenting Complaint: Shortness of Breath    Reason(s) for Admission: GI bleed [facility] Course & Interval History:   79-year-old female with hypothyroid history of Graves' disease A. fib on Xarelto anemia CKD 3 who was recently admitted  with progressive acute blood loss anemia that hemoglobin 3.3 at that time. She was status post an EGD on  was unremarkable. She was given IV iron per hematology recs. Xarelto was resumed and hemoglobin was greater than 8 on discharge. She represents  with increasing shortness of breath. She appeared pale found have a large melanotic stool on exam.  ED work-up showed hemoglobin 3.7 INR 3.6. Chest x-ray showed pulmonary edema. She received Kcentra for INR. She received 3 units of red blood cells. 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 5 angiodysplasias treated. They also surmised that there is a high likelihood of additional AVMs beyond the reach of the pediatric scope. She also had a colonoscopy  which showed diverticulosis internal hemorrhoids. Xarelto has now been discontinued indefinitely per cardiology recommendations. The patient has acute on chronic respiratory failure with hypoxia. She is wears 2 L at home but is requiring roughly 4 L here. Chest x-ray show pulmonary congestion and the repeat chest x-ray showed bilateral pleural effusions left greater than right. Covid swab was negative. She is given IV Lasix. Pulmonology is consulted, after a CT chest was obtained that showed pleural effusion and a larger left-sided than right-sided. They did a thoracentesis remove 800 mL on . Subjective (22): Still complaining of shortness of breath.   She feels like it is hard for her to catch her breath. Denies any pain, alleviation, exacerbation, associated symptoms. Assessment & Plan:     1. Acute on chronic hypoxic respiratory failure, left-sided pleural effusion: We will do a repeat chest x-ray today as her last thoracentesis was on 12/30 and after that thoracentesis she said she felt subjectively much better and has been progressively worsening since that time. She also has decreased breath sounds on the left posterior lung field which is consistent with potential reaccumulation or possibly trapped lung. 2. hypokalemia: Resolved  3. Acute blood loss anemia, upper GI bleed: Hemoglobin is now stable. She needs to GI follow-up in 4 weeks. Continue pantoprazole, ferrous sulfate and folic acid  4. Compression fracture: This is chronic and redemonstrated on a CT chest 12/30. PT and OT continue supportive care. 5. Left renal cyst: Incidentally found on CT. Renal ultrasound was unable to visualize abscess. Is recommended that she does a follow-up CT abdomen pelvis as an outpatient. 6. Hypothyroidism, Graves' disease: Her TSH was elevated on 1/2 however her free T4 and T3 are normal so continue at her current dose of Synthroid  7. Hyperlipidemia: Continue statin  8. Paroxysmal A. Fib: January home amiodarone. Stopped her Xarelto and she is no longer a candidate for chronic [address] [place] [address] Road per cardiology. 9. Hypertension: Continue p.o. Lasix and Toprol. 10. CKD 3: Creatinine at baseline avoid nephrotoxins renally dose meds. 11. Anxiety depression: Continue home Cymbalta      Dispo/Discharge Planning:    Patient is ready for discharge to short-term rehab as soon as preop is completed    Diet:  ADULT DIET Easy to Chew  DVT PPx: Restarting subcutaneous heparin on 1/2 as the GI bleed is not a contraindication to DVT prophylaxis.   Code status: Full Code    Hospital Problems as of 1/2/2022 Date Reviewed: 12/27/2021          Codes Class Noted - Resolved POA    Hypoxia ICD-10-CM: R09.02  ICD-9-CM: 799.02  12/31/2021 - Present Unknown        Pleural effusion ICD-10-CM: J90  ICD-9-CM: 511.9  12/30/2021 - Present Unknown        T12 compression fracture ([facility] ICD-10-CM: S22.080A  ICD-9-CM: 805.2  12/30/2021 - Present Unknown        Renal cyst, left ICD-10-CM: N28.1  ICD-9-CM: 753.10  12/30/2021 - Present Unknown        * (Principal) GI bleed ICD-10-CM: K92.2  ICD-9-CM: 578.9  12/25/2021 - Present Unknown        Diastolic CHF, chronic (HCC) ICD-10-CM: I50.32  ICD-9-CM: 428.32, 428.0  6/13/2021 - Present Yes        Stage 3 chronic kidney disease ([facility] (Chronic) ICD-10-CM: N18.30  ICD-9-CM: 585.3  5/20/2021 - Present Yes        Hypertension, essential, benign (Chronic) ICD-10-CM: I10  ICD-9-CM: 401.1  8/18/2016 - Present Yes        Acquired hypothyroidism (Chronic) ICD-10-CM: E03.9  ICD-9-CM: 244.9  1/16/2016 - Present Yes        Hyperlipidemia (Chronic) ICD-10-CM: E78.5  ICD-9-CM: 272.4  1/16/2016 - Present Yes        Rheumatoid arthritis ([facility] (Chronic) ICD-10-CM: M06.9  ICD-9-CM: 714.0  1/16/2016 - Present Yes              Objective:     Patient Vitals for the past 24 hrs:   Temp Pulse Resp BP SpO2   01/02/22 1211 99.6 °F (37.6 °C) 74 20 (!) 143/68 98 %   01/02/22 0743 98 °F (36.7 °C) 70 18 (!) 142/87 97 %   01/02/22 0337 98.5 °F (36.9 °C) 75 16 138/75 97 %   01/01/22 2316 97.5 °F (36.4 °C) 79 16 123/68 99 %   01/01/22 1957 97.5 °F (36.4 °C) 91 16 121/89 96 %   01/01/22 1801 98.5 °F (36.9 °C) 75 18 (!) 111/55 97 %     Oxygen Therapy  O2 Sat (%): 98 % (01/02/22 1211)  Pulse via Oximetry: 83 beats per minute (01/01/22 0855)  O2 Device: Nasal cannula (01/02/22 1211)  Skin Assessment: Clean, dry, & intact (12/31/21 1545)  Skin Protection for O2 Device: N/A (12/29/21 2039)  O2 Flow Rate (L/min): 2 l/min (01/02/22 0753)  O2 Temperature: 87.8 °F (31 °C) (12/26/21 1230)  FIO2 (%): 50 % (12/26/21 1230)    Estimated body mass index is 26.72 kg/m² as calculated from the following:    Height as of this encounter: 5' (1.524 m). Weight as of this encounter: 62.1 kg (136 lb 12.8 oz). Intake/Output Summary (Last 24 hours) at 1/2/2022 1420  Last data filed at 1/2/2022 1211  Gross per 24 hour   Intake 410 ml   Output 1400 ml   Net -990 ml         Physical Exam:     Blood pressure (!) 143/68, pulse 74, temperature 99.6 °F (37.6 °C), resp. rate 20, height 5' (1.524 m), weight 62.1 kg (136 lb 12.8 oz), SpO2 98 %. General:    Well nourished. No overt distress  Head:  Normocephalic, atraumatic  Eyes:  Prominent exophthalmos  ENT:  Nares appear normal, no drainage. Moist oral mucosa  Neck:  No restricted ROM. Trachea midline   CV:   RRR. No m/r/g. No jugular venous distension. Lungs:   Decreased breath sounds in the left posterior lung field  Abdomen: Bowel sounds present. Soft, nontender, nondistended. Extremities: No cyanosis or clubbing. No edema  Skin:     No rashes and normal coloration. Warm and dry. Neuro:  CN II-XII grossly intact. Sensation intact. A&Ox3  Psych:  Normal mood and affect.       I have reviewed ordered lab tests and independently visualized imaging below:    Recent Labs:  Recent Results (from the past 48 hour(s))   PLEASE READ & DOCUMENT PPD TEST IN 24 HRS    Collection Time: 12/31/21  9:00 PM   Result Value Ref Range    PPD Negative Negative    mm Induration 0 0 - 5 mm   CBC W/O DIFF    Collection Time: 01/02/22 10:51 AM   Result Value Ref Range    WBC 7.0 4.3 - 11.1 K/uL    RBC 3.19 (L) 4.23 - 5.6 M/uL    HGB 9.5 (L) 13.6 - 17.2 g/dL    HCT 32.1 (L) 41.1 - 50.3 %    .6 (H) 79.6 - 97.8 FL    MCH 29.8 26.1 - 32.9 PG    MCHC 29.6 (L) 31.4 - 35.0 g/dL    RDW 19.3 (H) 11.9 - 14.6 %    PLATELET 963 454 - 761 K/uL    MPV 10.7 9.4 - 12.3 FL    ABSOLUTE NRBC 0.00 0.0 - 0.2 K/uL   METABOLIC PANEL, BASIC    Collection Time: 01/02/22 10:51 AM   Result Value Ref Range    Sodium 138 138 - 145 mmol/L    Potassium 4.0 3.5 - 5.1 mmol/L    Chloride 102 98 - 107 mmol/L    CO2 35 (H) 21 - 32 mmol/L    Anion gap 1 (L) 7 - 16 mmol/L    Glucose 102 (H) 65 - 100 mg/dL    BUN 15 8 - 23 MG/DL    Creatinine 1.15 0.8 - 1.5 MG/DL    GFR est AA >60 >60 ml/min/1.73m2    GFR est non-AA >60 >60 ml/min/1.73m2    Calcium 8.6 8.3 - 10.4 MG/DL   TSH 3RD GENERATION    Collection Time: 01/02/22 10:51 AM   Result Value Ref Range    TSH 13.600 (H) 0.358 - 3.740 uIU/mL   T4, FREE    Collection Time: 01/02/22 10:51 AM   Result Value Ref Range    T4, Free 1.2 0.78 - 1.46 NG/DL   T3 TOTAL    Collection Time: 01/02/22 10:51 AM   Result Value Ref Range    T3, total 0.65 0.60 - 1.81 ng/mL       All Micro Results     Procedure Component Value Units Date/Time    CULTURE, BODY FLUID Alois Citizen STAIN [[id number]] Collected: 12/30/21 1558    Order Status: Completed Specimen: Pleural Fluid Updated: 01/02/22 0839     Special Requests: NO SPECIAL REQUESTS        GRAM STAIN NO WBCS SEEN         NO DEFINITE ORGANISM SEEN        Culture result: NO GROWTH 2 DAYS       FUNGUS CULTURE AND SMEAR [[id number]] Collected: 12/30/21 1558    Order Status: Completed Specimen: Miscellaneous sample Updated: 01/01/22 1236     Source PLEURAL FLUID        Comment: LEFT  1          Fungus stain Direct Inoculation     Fungus (Mycology) Culture Other source received     Comment: (NOTE)  Performed At: [facility]  [employer] [address] [place] [address] [id number]  Erica Tovar MD LQ:[id number]         AFB CULTURE + SMEAR W/RFLX ID FROM CULTURE [[id number]] Collected: 12/30/21 1558    Order Status: Completed Specimen: Miscellaneous sample Updated: 12/31/21 1436     Source PLEURAL FLUID        Comment: LEFT  1          AFB Specimen processing Concentration     Acid Fast Smear Negative        Comment: (NOTE)  Performed At: [facility]  [employer] [address] [place] [address] [phone]  Erica Tovar MD LT:[id number]          Acid Fast Culture PENDING    CULTURE, BLOOD [[id number]] Collected: 12/25/21 1746    Order Status: Completed Specimen: Blood Updated: 12/30/21 0747     Special Requests: LEFT FOREARM        Culture result: NO GROWTH 5 DAYS       CULTURE, BLOOD [[id number]] Collected: 12/25/21 1730    Order Status: Completed Specimen: Blood Updated: 12/30/21 0747     Special Requests: --        LEFT  Antecubital       Culture result: NO GROWTH 5 DAYS       COVID-19 RAPID TEST [[id number]] Collected: 12/25/21 1823    Order Status: Completed Specimen: Nasopharyngeal Updated: 12/25/21 1859     Specimen source NASAL        COVID-19 rapid test Not detected        Comment:      The specimen is NEGATIVE for SARS-CoV-2, the novel coronavirus associated with COVID-19. A negative result does not rule out COVID-19. This test has been authorized by the FDA under an Emergency Use Authorization (EUA) for use by authorized laboratories. Fact sheet for Healthcare Providers: ConventionUpdate.co.nz  Fact sheet for Patients: ConventionUpdate.co.nz       Methodology: Isothermal Nucleic Acid Amplification               Other Studies:  No results found.     Current Meds:  Current Facility-Administered Medications   Medication Dose Route Frequency    magnesium oxide (MAG-OX) tablet 400 mg  400 mg Oral DAILY    ferrous sulfate tablet 325 mg  1 Tablet Oral DAILY WITH BREAKFAST    lip protectant (BLISTEX) ointment 1 Each  1 Each Topical PRN    pantoprazole (PROTONIX) tablet 40 mg  40 mg Oral ACB    furosemide (LASIX) tablet 40 mg  40 mg Oral DAILY    amiodarone (CORDARONE) tablet 200 mg  200 mg Oral DAILY    levothyroxine (SYNTHROID) tablet 50 mcg  50 mcg Oral ACB    DULoxetine (CYMBALTA) capsule 60 mg  60 mg Oral DAILY    folic acid (FOLVITE) tablet 1 mg  1 mg Oral DAILY    metoprolol succinate (TOPROL-XL) XL tablet 50 mg  50 mg Oral DAILY    rosuvastatin (CRESTOR) tablet 10 mg  10 mg Oral QHS    sodium chloride (NS) flush 5-40 mL  5-40 mL IntraVENous Q8H    sodium chloride (NS) flush 5-40 mL  5-40 mL IntraVENous PRN    acetaminophen (TYLENOL) tablet 650 mg  650 mg Oral Q6H PRN    Or    acetaminophen (TYLENOL) suppository 650 mg  650 mg Rectal Q6H PRN    polyethylene glycol (MIRALAX) packet 17 g  17 g Oral DAILY PRN    ondansetron (ZOFRAN ODT) tablet 4 mg  4 mg Oral Q8H PRN    Or    ondansetron (ZOFRAN) injection 4 mg  4 mg IntraVENous Q6H PRN       Signed:  Marco Artis MD    Part of this note may have been written by using a voice dictation software. The note has been proof read but may still contain some grammatical/other typographical errors.

## 2022-01-02 NOTE — PROGRESS NOTES
Problem: Heart Failure: Day 5  Goal: Off Pathway (Use only if patient is Off Pathway)  Outcome: Progressing Towards Goal. Remains dyspneic after activity. 2L O2. Goal: Activity/Safety  Outcome: Progressing Towards Goal. Walker at home.   Goal: Nutrition/Diet  Outcome: Progressing Towards Goal. Appetite improved

## 2022-01-03 LAB
ALBUMIN SERPL-MCNC: 1.5 G/DL (ref 3.2–4.6)
ALBUMIN SERPL-MCNC: 1.6 G/DL (ref 3.2–4.6)
ALBUMIN SERPL-MCNC: 2.1 G/DL (ref 3.2–4.6)
ALBUMIN SERPL-MCNC: 2.3 G/DL (ref 3.2–4.6)
ALBUMIN/GLOB SERPL: 0.6 {RATIO} (ref 1.2–3.5)
ALP SERPL-CCNC: 36 U/L (ref 50–136)
ALP SERPL-CCNC: 42 U/L (ref 50–136)
ALP SERPL-CCNC: 77 U/L (ref 50–136)
ALP SERPL-CCNC: 82 U/L (ref 50–136)
ALT SERPL-CCNC: 31 U/L (ref 12–65)
ALT SERPL-CCNC: 364 U/L (ref 12–65)
ALT SERPL-CCNC: 38 U/L (ref 12–65)
ALT SERPL-CCNC: 64 U/L (ref 12–65)
ANION GAP SERPL CALC-SCNC: 1 MMOL/L (ref 7–16)
ANION GAP SERPL CALC-SCNC: 2 MMOL/L (ref 7–16)
ANION GAP SERPL CALC-SCNC: 2 MMOL/L (ref 7–16)
ANION GAP SERPL CALC-SCNC: 20 MMOL/L (ref 7–16)
ANION GAP SERPL CALC-SCNC: 3 MMOL/L (ref 7–16)
ANION GAP SERPL CALC-SCNC: 4 MMOL/L (ref 7–16)
ANION GAP SERPL CALC-SCNC: 4 MMOL/L (ref 7–16)
ANION GAP SERPL CALC-SCNC: 5 MMOL/L (ref 7–16)
ANION GAP SERPL CALC-SCNC: 5 MMOL/L (ref 7–16)
ANION GAP SERPL CALC-SCNC: 6 MMOL/L (ref 7–16)
ANION GAP SERPL CALC-SCNC: 7 MMOL/L (ref 7–16)
ANION GAP SERPL CALC-SCNC: 7 MMOL/L (ref 7–16)
ANION GAP SERPL CALC-SCNC: 8 MMOL/L (ref 7–16)
AST SERPL-CCNC: 114 U/L (ref 15–37)
AST SERPL-CCNC: 21 U/L (ref 15–37)
AST SERPL-CCNC: 21 U/L (ref 15–37)
AST SERPL-CCNC: 509 U/L (ref 15–37)
BASOPHILS # BLD: 0 K/UL (ref 0–0.2)
BASOPHILS # BLD: 0 K/UL (ref 0–0.2)
BASOPHILS # BLD: 0.1 K/UL (ref 0–0.2)
BASOPHILS NFR BLD: 0 % (ref 0–2)
BASOPHILS NFR BLD: 0 % (ref 0–2)
BASOPHILS NFR BLD: 1 % (ref 0–2)
BILIRUB SERPL-MCNC: 0.4 MG/DL (ref 0.2–1.1)
BILIRUB SERPL-MCNC: 0.5 MG/DL (ref 0.2–1.1)
BILIRUB SERPL-MCNC: 1.2 MG/DL (ref 0.2–1.1)
BILIRUB SERPL-MCNC: 1.5 MG/DL (ref 0.2–1.1)
BUN SERPL-MCNC: 12 MG/DL (ref 8–23)
BUN SERPL-MCNC: 15 MG/DL (ref 8–23)
BUN SERPL-MCNC: 15 MG/DL (ref 8–23)
BUN SERPL-MCNC: 19 MG/DL (ref 8–23)
BUN SERPL-MCNC: 20 MG/DL (ref 8–23)
BUN SERPL-MCNC: 21 MG/DL (ref 8–23)
BUN SERPL-MCNC: 25 MG/DL (ref 8–23)
BUN SERPL-MCNC: 29 MG/DL (ref 8–23)
BUN SERPL-MCNC: 30 MG/DL (ref 8–23)
BUN SERPL-MCNC: 30 MG/DL (ref 8–23)
BUN SERPL-MCNC: 35 MG/DL (ref 8–23)
BUN SERPL-MCNC: 39 MG/DL (ref 8–23)
BUN SERPL-MCNC: 41 MG/DL (ref 8–23)
CALCIUM SERPL-MCNC: 6.8 MG/DL (ref 8.3–10.4)
CALCIUM SERPL-MCNC: 7.7 MG/DL (ref 8.3–10.4)
CALCIUM SERPL-MCNC: 7.8 MG/DL (ref 8.3–10.4)
CALCIUM SERPL-MCNC: 7.8 MG/DL (ref 8.3–10.4)
CALCIUM SERPL-MCNC: 7.9 MG/DL (ref 8.3–10.4)
CALCIUM SERPL-MCNC: 8.3 MG/DL (ref 8.3–10.4)
CALCIUM SERPL-MCNC: 8.3 MG/DL (ref 8.3–10.4)
CALCIUM SERPL-MCNC: 8.5 MG/DL (ref 8.3–10.4)
CALCIUM SERPL-MCNC: 8.6 MG/DL (ref 8.3–10.4)
CHLORIDE SERPL-SCNC: 100 MMOL/L (ref 98–107)
CHLORIDE SERPL-SCNC: 102 MMOL/L (ref 98–107)
CHLORIDE SERPL-SCNC: 105 MMOL/L (ref 98–107)
CHLORIDE SERPL-SCNC: 105 MMOL/L (ref 98–107)
CHLORIDE SERPL-SCNC: 106 MMOL/L (ref 98–107)
CHLORIDE SERPL-SCNC: 107 MMOL/L (ref 98–107)
CHLORIDE SERPL-SCNC: 108 MMOL/L (ref 98–107)
CHLORIDE SERPL-SCNC: 109 MMOL/L (ref 98–107)
CHLORIDE SERPL-SCNC: 110 MMOL/L (ref 98–107)
CHLORIDE SERPL-SCNC: 111 MMOL/L (ref 98–107)
CHLORIDE SERPL-SCNC: 111 MMOL/L (ref 98–107)
CO2 SERPL-SCNC: 18 MMOL/L (ref 21–32)
CO2 SERPL-SCNC: 27 MMOL/L (ref 21–32)
CO2 SERPL-SCNC: 28 MMOL/L (ref 21–32)
CO2 SERPL-SCNC: 29 MMOL/L (ref 21–32)
CO2 SERPL-SCNC: 30 MMOL/L (ref 21–32)
CO2 SERPL-SCNC: 31 MMOL/L (ref 21–32)
CO2 SERPL-SCNC: 33 MMOL/L (ref 21–32)
CO2 SERPL-SCNC: 33 MMOL/L (ref 21–32)
CO2 SERPL-SCNC: 34 MMOL/L (ref 21–32)
CO2 SERPL-SCNC: 34 MMOL/L (ref 21–32)
CO2 SERPL-SCNC: 35 MMOL/L (ref 21–32)
CREAT SERPL-MCNC: 0.95 MG/DL (ref 0.6–1)
CREAT SERPL-MCNC: 1.14 MG/DL (ref 0.6–1)
CREAT SERPL-MCNC: 1.15 MG/DL (ref 0.6–1)
CREAT SERPL-MCNC: 1.29 MG/DL (ref 0.6–1)
CREAT SERPL-MCNC: 1.34 MG/DL (ref 0.6–1)
CREAT SERPL-MCNC: 1.35 MG/DL (ref 0.6–1)
CREAT SERPL-MCNC: 1.38 MG/DL (ref 0.6–1)
CREAT SERPL-MCNC: 1.39 MG/DL (ref 0.6–1)
CREAT SERPL-MCNC: 1.42 MG/DL (ref 0.6–1)
CREAT SERPL-MCNC: 1.89 MG/DL (ref 0.6–1)
CREAT SERPL-MCNC: 2.61 MG/DL (ref 0.6–1)
CREAT SERPL-MCNC: 2.79 MG/DL (ref 0.6–1)
CREAT SERPL-MCNC: 3.04 MG/DL (ref 0.6–1)
DIFFERENTIAL METHOD BLD: ABNORMAL
EOSINOPHIL # BLD: 0 K/UL (ref 0–0.8)
EOSINOPHIL # BLD: 0.1 K/UL (ref 0–0.8)
EOSINOPHIL # BLD: 0.2 K/UL (ref 0–0.8)
EOSINOPHIL NFR BLD: 0 % (ref 0.5–7.8)
EOSINOPHIL NFR BLD: 3 % (ref 0.5–7.8)
ERYTHROCYTE [DISTWIDTH] IN BLOOD BY AUTOMATED COUNT: 19.3 % (ref 11.9–14.6)
ERYTHROCYTE [DISTWIDTH] IN BLOOD BY AUTOMATED COUNT: 19.9 % (ref 11.9–14.6)
ERYTHROCYTE [DISTWIDTH] IN BLOOD BY AUTOMATED COUNT: 20.3 % (ref 11.9–14.6)
ERYTHROCYTE [DISTWIDTH] IN BLOOD BY AUTOMATED COUNT: 20.3 % (ref 11.9–14.6)
ERYTHROCYTE [DISTWIDTH] IN BLOOD BY AUTOMATED COUNT: 20.6 % (ref 11.9–14.6)
ERYTHROCYTE [DISTWIDTH] IN BLOOD BY AUTOMATED COUNT: 20.9 % (ref 11.9–14.6)
ERYTHROCYTE [DISTWIDTH] IN BLOOD BY AUTOMATED COUNT: 21.2 % (ref 11.9–14.6)
ERYTHROCYTE [DISTWIDTH] IN BLOOD BY AUTOMATED COUNT: 21.7 % (ref 11.9–14.6)
ERYTHROCYTE [DISTWIDTH] IN BLOOD BY AUTOMATED COUNT: 22.2 % (ref 11.9–14.6)
ERYTHROCYTE [DISTWIDTH] IN BLOOD BY AUTOMATED COUNT: 23.2 % (ref 11.9–14.6)
ERYTHROCYTE [DISTWIDTH] IN BLOOD BY AUTOMATED COUNT: 25.4 % (ref 11.9–14.6)
ERYTHROCYTE [DISTWIDTH] IN BLOOD BY AUTOMATED COUNT: 31.8 % (ref 11.9–14.6)
GLOBULIN SER CALC-MCNC: 2.5 G/DL (ref 2.3–3.5)
GLOBULIN SER CALC-MCNC: 2.7 G/DL (ref 2.3–3.5)
GLOBULIN SER CALC-MCNC: 3.3 G/DL (ref 2.3–3.5)
GLOBULIN SER CALC-MCNC: 3.7 G/DL (ref 2.3–3.5)
GLUCOSE SERPL-MCNC: 102 MG/DL (ref 65–100)
GLUCOSE SERPL-MCNC: 128 MG/DL (ref 65–100)
GLUCOSE SERPL-MCNC: 132 MG/DL (ref 65–100)
GLUCOSE SERPL-MCNC: 50 MG/DL (ref 65–100)
GLUCOSE SERPL-MCNC: 60 MG/DL (ref 65–100)
GLUCOSE SERPL-MCNC: 76 MG/DL (ref 65–100)
GLUCOSE SERPL-MCNC: 84 MG/DL (ref 65–100)
GLUCOSE SERPL-MCNC: 85 MG/DL (ref 65–100)
GLUCOSE SERPL-MCNC: 88 MG/DL (ref 65–100)
GLUCOSE SERPL-MCNC: 95 MG/DL (ref 65–100)
GLUCOSE SERPL-MCNC: 96 MG/DL (ref 65–100)
GLUCOSE SERPL-MCNC: 97 MG/DL (ref 65–100)
GLUCOSE SERPL-MCNC: 99 MG/DL (ref 65–100)
HCT VFR BLD AUTO: 13.5 % (ref 35.8–46.3)
HCT VFR BLD AUTO: 14.1 % (ref 35.8–46.3)
HCT VFR BLD AUTO: 15.8 % (ref 35.8–46.3)
HCT VFR BLD AUTO: 17.3 % (ref 35.8–46.3)
HCT VFR BLD AUTO: 19.8 % (ref 35.8–46.3)
HCT VFR BLD AUTO: 21.3 % (ref 35.8–46.3)
HCT VFR BLD AUTO: 25.6 % (ref 35.8–46.3)
HCT VFR BLD AUTO: 25.7 % (ref 35.8–46.3)
HCT VFR BLD AUTO: 26.5 % (ref 35.8–46.3)
HCT VFR BLD AUTO: 26.9 % (ref 35.8–46.3)
HCT VFR BLD AUTO: 27.3 % (ref 35.8–46.3)
HCT VFR BLD AUTO: 27.4 % (ref 35.8–46.3)
HCT VFR BLD AUTO: 28.4 % (ref 35.8–46.3)
HCT VFR BLD AUTO: 28.8 % (ref 35.8–46.3)
HCT VFR BLD AUTO: 30.7 % (ref 35.8–46.3)
HCT VFR BLD AUTO: 30.9 % (ref 35.8–46.3)
HCT VFR BLD AUTO: 31.4 % (ref 35.8–46.3)
HCT VFR BLD AUTO: 31.9 % (ref 35.8–46.3)
HCT VFR BLD AUTO: 32.1 % (ref 35.8–46.3)
HGB BLD-MCNC: 3.3 G/DL (ref 11.7–15.4)
HGB BLD-MCNC: 3.7 G/DL (ref 11.7–15.4)
HGB BLD-MCNC: 4.6 G/DL (ref 11.7–15.4)
HGB BLD-MCNC: 4.9 G/DL (ref 11.7–15.4)
HGB BLD-MCNC: 6.1 G/DL (ref 11.7–15.4)
HGB BLD-MCNC: 6.3 G/DL (ref 11.7–15.4)
HGB BLD-MCNC: 7.8 G/DL (ref 11.7–15.4)
HGB BLD-MCNC: 8.1 G/DL (ref 11.7–15.4)
HGB BLD-MCNC: 8.1 G/DL (ref 11.7–15.4)
HGB BLD-MCNC: 8.2 G/DL (ref 11.7–15.4)
HGB BLD-MCNC: 8.3 G/DL (ref 11.7–15.4)
HGB BLD-MCNC: 8.3 G/DL (ref 11.7–15.4)
HGB BLD-MCNC: 8.4 G/DL (ref 11.7–15.4)
HGB BLD-MCNC: 8.6 G/DL (ref 11.7–15.4)
HGB BLD-MCNC: 8.7 G/DL (ref 11.7–15.4)
HGB BLD-MCNC: 8.9 G/DL (ref 11.7–15.4)
HGB BLD-MCNC: 9 G/DL (ref 11.7–15.4)
HGB BLD-MCNC: 9 G/DL (ref 11.7–15.4)
HGB BLD-MCNC: 9.5 G/DL (ref 11.7–15.4)
HGB BLD-MCNC: 9.6 G/DL (ref 11.7–15.4)
HGB RETIC QN AUTO: 19 PG (ref 29–35)
IMM GRANULOCYTES # BLD AUTO: 0 K/UL (ref 0–0.5)
IMM GRANULOCYTES # BLD AUTO: 0 K/UL (ref 0–0.5)
IMM GRANULOCYTES # BLD AUTO: 0.1 K/UL (ref 0–0.5)
IMM GRANULOCYTES NFR BLD AUTO: 0 % (ref 0–5)
IMM GRANULOCYTES NFR BLD AUTO: 0 % (ref 0–5)
IMM GRANULOCYTES NFR BLD AUTO: 1 % (ref 0–5)
IMM RETICS NFR: 26.3 % (ref 3–15.9)
LACTATE SERPL-SCNC: 0.3 MMOL/L (ref 0.4–2)
LYMPHOCYTES # BLD: 0.9 K/UL (ref 0.5–4.6)
LYMPHOCYTES # BLD: 0.9 K/UL (ref 0.5–4.6)
LYMPHOCYTES # BLD: 1 K/UL (ref 0.5–4.6)
LYMPHOCYTES # BLD: 1.2 K/UL (ref 0.5–4.6)
LYMPHOCYTES # BLD: 1.4 K/UL (ref 0.5–4.6)
LYMPHOCYTES NFR BLD: 12 % (ref 13–44)
LYMPHOCYTES NFR BLD: 12 % (ref 13–44)
LYMPHOCYTES NFR BLD: 13 % (ref 13–44)
LYMPHOCYTES NFR BLD: 13 % (ref 13–44)
LYMPHOCYTES NFR BLD: 8 % (ref 13–44)
MCH RBC QN AUTO: 16.8 PG (ref 26.1–32.9)
MCH RBC QN AUTO: 23.4 PG (ref 26.1–32.9)
MCH RBC QN AUTO: 24.6 PG (ref 26.1–32.9)
MCH RBC QN AUTO: 25.3 PG (ref 26.1–32.9)
MCH RBC QN AUTO: 26.8 PG (ref 26.1–32.9)
MCH RBC QN AUTO: 27.9 PG (ref 26.1–32.9)
MCH RBC QN AUTO: 29.6 PG (ref 26.1–32.9)
MCH RBC QN AUTO: 29.8 PG (ref 26.1–32.9)
MCH RBC QN AUTO: 29.8 PG (ref 26.1–32.9)
MCH RBC QN AUTO: 29.9 PG (ref 26.1–32.9)
MCH RBC QN AUTO: 30.1 PG (ref 26.1–32.9)
MCH RBC QN AUTO: 30.1 PG (ref 26.1–32.9)
MCHC RBC AUTO-ENTMCNC: 23.4 G/DL (ref 31.4–35)
MCHC RBC AUTO-ENTMCNC: 27.4 G/DL (ref 31.4–35)
MCHC RBC AUTO-ENTMCNC: 29.1 G/DL (ref 31.4–35)
MCHC RBC AUTO-ENTMCNC: 29.6 G/DL (ref 31.4–35)
MCHC RBC AUTO-ENTMCNC: 29.6 G/DL (ref 31.4–35)
MCHC RBC AUTO-ENTMCNC: 30.1 G/DL (ref 31.4–35)
MCHC RBC AUTO-ENTMCNC: 30.2 G/DL (ref 31.4–35)
MCHC RBC AUTO-ENTMCNC: 30.4 G/DL (ref 31.4–35)
MCHC RBC AUTO-ENTMCNC: 30.8 G/DL (ref 31.4–35)
MCHC RBC AUTO-ENTMCNC: 30.8 G/DL (ref 31.4–35)
MCHC RBC AUTO-ENTMCNC: 31.3 G/DL (ref 31.4–35)
MCHC RBC AUTO-ENTMCNC: 32 G/DL (ref 31.4–35)
MCV RBC AUTO: 100.6 FL (ref 79.6–97.8)
MCV RBC AUTO: 71.9 FL (ref 79.6–97.8)
MCV RBC AUTO: 79.2 FL (ref 79.6–97.8)
MCV RBC AUTO: 81.1 FL (ref 79.6–97.8)
MCV RBC AUTO: 86.8 FL (ref 79.6–97.8)
MCV RBC AUTO: 90.4 FL (ref 79.6–97.8)
MCV RBC AUTO: 93.1 FL (ref 79.6–97.8)
MCV RBC AUTO: 96.1 FL (ref 79.6–97.8)
MCV RBC AUTO: 96.2 FL (ref 79.6–97.8)
MCV RBC AUTO: 97.8 FL (ref 79.6–97.8)
MCV RBC AUTO: 99.3 FL (ref 79.6–97.8)
MCV RBC AUTO: 99.7 FL (ref 79.6–97.8)
MONOCYTES # BLD: 0.8 K/UL (ref 0.1–1.3)
MONOCYTES # BLD: 0.9 K/UL (ref 0.1–1.3)
MONOCYTES # BLD: 1 K/UL (ref 0.1–1.3)
MONOCYTES # BLD: 1 K/UL (ref 0.1–1.3)
MONOCYTES # BLD: 1.1 K/UL (ref 0.1–1.3)
MONOCYTES NFR BLD: 11 % (ref 4–12)
MONOCYTES NFR BLD: 16 % (ref 4–12)
MONOCYTES NFR BLD: 8 % (ref 4–12)
MONOCYTES NFR BLD: 8 % (ref 4–12)
MONOCYTES NFR BLD: 9 % (ref 4–12)
NEUTS SEG # BLD: 4.7 K/UL (ref 1.7–8.2)
NEUTS SEG # BLD: 6.4 K/UL (ref 1.7–8.2)
NEUTS SEG # BLD: 7.6 K/UL (ref 1.7–8.2)
NEUTS SEG # BLD: 8 K/UL (ref 1.7–8.2)
NEUTS SEG # BLD: 9.6 K/UL (ref 1.7–8.2)
NEUTS SEG NFR BLD: 70 % (ref 43–78)
NEUTS SEG NFR BLD: 74 % (ref 43–78)
NEUTS SEG NFR BLD: 76 % (ref 43–78)
NEUTS SEG NFR BLD: 79 % (ref 43–78)
NEUTS SEG NFR BLD: 82 % (ref 43–78)
NRBC # BLD: 0 K/UL (ref 0–0.2)
NRBC # BLD: 0.02 K/UL (ref 0–0.2)
NRBC # BLD: 0.06 K/UL (ref 0–0.2)
NRBC # BLD: 0.07 K/UL (ref 0–0.2)
NRBC # BLD: 0.12 K/UL (ref 0–0.2)
NRBC # BLD: 0.25 K/UL (ref 0–0.2)
NRBC # BLD: 0.38 K/UL (ref 0–0.2)
PLATELET # BLD AUTO: 169 K/UL (ref 150–450)
PLATELET # BLD AUTO: 200 K/UL (ref 150–450)
PLATELET # BLD AUTO: 217 K/UL (ref 150–450)
PLATELET # BLD AUTO: 270 K/UL (ref 150–450)
PLATELET # BLD AUTO: 327 K/UL (ref 150–450)
PLATELET # BLD AUTO: 337 K/UL (ref 150–450)
PLATELET # BLD AUTO: 354 K/UL (ref 150–450)
PLATELET # BLD AUTO: 354 K/UL (ref 150–450)
PLATELET # BLD AUTO: 381 K/UL (ref 150–450)
PLATELET # BLD AUTO: 386 K/UL (ref 150–450)
PLATELET # BLD AUTO: 410 K/UL (ref 150–450)
PLATELET # BLD AUTO: 559 K/UL (ref 150–450)
PMV BLD AUTO: 10.2 FL (ref 9.4–12.3)
PMV BLD AUTO: 10.3 FL (ref 9.4–12.3)
PMV BLD AUTO: 10.6 FL (ref 9.4–12.3)
PMV BLD AUTO: 10.7 FL (ref 9.4–12.3)
PMV BLD AUTO: 10.8 FL (ref 9.4–12.3)
PMV BLD AUTO: 10.9 FL (ref 9.4–12.3)
PMV BLD AUTO: 11 FL (ref 9.4–12.3)
PMV BLD AUTO: 11.1 FL (ref 9.4–12.3)
PMV BLD AUTO: 11.5 FL (ref 9.4–12.3)
PMV BLD AUTO: 11.5 FL (ref 9.4–12.3)
PMV BLD AUTO: 11.6 FL (ref 9.4–12.3)
PMV BLD AUTO: 12 FL (ref 9.4–12.3)
POTASSIUM SERPL-SCNC: 3.3 MMOL/L (ref 3.5–5.1)
POTASSIUM SERPL-SCNC: 3.4 MMOL/L (ref 3.5–5.1)
POTASSIUM SERPL-SCNC: 3.4 MMOL/L (ref 3.5–5.1)
POTASSIUM SERPL-SCNC: 3.5 MMOL/L (ref 3.5–5.1)
POTASSIUM SERPL-SCNC: 3.5 MMOL/L (ref 3.5–5.1)
POTASSIUM SERPL-SCNC: 3.6 MMOL/L (ref 3.5–5.1)
POTASSIUM SERPL-SCNC: 3.8 MMOL/L (ref 3.5–5.1)
POTASSIUM SERPL-SCNC: 3.9 MMOL/L (ref 3.5–5.1)
POTASSIUM SERPL-SCNC: 3.9 MMOL/L (ref 3.5–5.1)
POTASSIUM SERPL-SCNC: 4 MMOL/L (ref 3.5–5.1)
POTASSIUM SERPL-SCNC: 4 MMOL/L (ref 3.5–5.1)
POTASSIUM SERPL-SCNC: 4.3 MMOL/L (ref 3.5–5.1)
POTASSIUM SERPL-SCNC: 4.3 MMOL/L (ref 3.5–5.1)
PROT SERPL-MCNC: 4 G/DL (ref 6.3–8.2)
PROT SERPL-MCNC: 4.3 G/DL (ref 6.3–8.2)
PROT SERPL-MCNC: 5.4 G/DL (ref 6.3–8.2)
PROT SERPL-MCNC: 6 G/DL (ref 6.3–8.2)
RBC # BLD AUTO: 1.38 M/UL (ref 4.05–5.2)
RBC # BLD AUTO: 1.96 M/UL (ref 4.05–5.2)
RBC # BLD AUTO: 2.19 M/UL (ref 4.05–5.2)
RBC # BLD AUTO: 2.69 M/UL (ref 4.05–5.2)
RBC # BLD AUTO: 2.71 M/UL (ref 4.05–5.2)
RBC # BLD AUTO: 2.75 M/UL (ref 4.05–5.2)
RBC # BLD AUTO: 2.84 M/UL (ref 4.05–5.2)
RBC # BLD AUTO: 2.89 M/UL (ref 4.05–5.2)
RBC # BLD AUTO: 3.17 M/UL (ref 4.05–5.2)
RBC # BLD AUTO: 3.19 M/UL (ref 4.05–5.2)
RBC # BLD AUTO: 3.19 M/UL (ref 4.05–5.2)
RBC # BLD AUTO: 3.56 M/UL (ref 4.05–5.2)
RETICS # AUTO: 0.11 M/UL (ref 0.03–0.1)
RETICS/RBC NFR AUTO: 3 % (ref 0.3–2)
SODIUM SERPL-SCNC: 138 MMOL/L (ref 136–145)
SODIUM SERPL-SCNC: 138 MMOL/L (ref 136–145)
SODIUM SERPL-SCNC: 140 MMOL/L (ref 136–145)
SODIUM SERPL-SCNC: 141 MMOL/L (ref 136–145)
SODIUM SERPL-SCNC: 142 MMOL/L (ref 136–145)
SODIUM SERPL-SCNC: 142 MMOL/L (ref 136–145)
SODIUM SERPL-SCNC: 143 MMOL/L (ref 136–145)
SODIUM SERPL-SCNC: 144 MMOL/L (ref 136–145)
SODIUM SERPL-SCNC: 145 MMOL/L (ref 136–145)
SODIUM SERPL-SCNC: 146 MMOL/L (ref 136–145)
VIT C SERPL-MCNC: 0.7 MG/DL (ref 0.4–2)
WBC # BLD AUTO: 10.6 K/UL (ref 4.3–11.1)
WBC # BLD AUTO: 11.5 K/UL (ref 4.3–11.1)
WBC # BLD AUTO: 11.6 K/UL (ref 4.3–11.1)
WBC # BLD AUTO: 12.7 K/UL (ref 4.3–11.1)
WBC # BLD AUTO: 14.3 K/UL (ref 4.3–11.1)
WBC # BLD AUTO: 6.7 K/UL (ref 4.3–11.1)
WBC # BLD AUTO: 7 K/UL (ref 4.3–11.1)
WBC # BLD AUTO: 7.9 K/UL (ref 4.3–11.1)
WBC # BLD AUTO: 8.1 K/UL (ref 4.3–11.1)
WBC # BLD AUTO: 8.7 K/UL (ref 4.3–11.1)
WBC # BLD AUTO: 9.3 K/UL (ref 4.3–11.1)
WBC # BLD AUTO: 9.7 K/UL (ref 4.3–11.1)

## 2022-01-03 PROCEDURE — 74011250637 HC RX REV CODE- 250/637: Performed by: INTERNAL MEDICINE

## 2022-01-03 PROCEDURE — 87070 CULTURE OTHR SPECIMN AEROBIC: CPT

## 2022-01-03 PROCEDURE — 65270000029 HC RM PRIVATE

## 2022-01-03 PROCEDURE — 77030020847 HC STATLOK BARD -A

## 2022-01-03 PROCEDURE — 83605 ASSAY OF LACTIC ACID: CPT

## 2022-01-03 PROCEDURE — 74011000250 HC RX REV CODE- 250: Performed by: INTERNAL MEDICINE

## 2022-01-03 PROCEDURE — 36415 COLL VENOUS BLD VENIPUNCTURE: CPT

## 2022-01-03 PROCEDURE — 77030041974 HC CATH SYS PERIPH TELE -B

## 2022-01-03 PROCEDURE — 87040 BLOOD CULTURE FOR BACTERIA: CPT

## 2022-01-03 PROCEDURE — 74011250636 HC RX REV CODE- 250/636: Performed by: HOSPITALIST

## 2022-01-03 PROCEDURE — 74011250637 HC RX REV CODE- 250/637: Performed by: STUDENT IN AN ORGANIZED HEALTH CARE EDUCATION/TRAINING PROGRAM

## 2022-01-03 PROCEDURE — 76937 US GUIDE VASCULAR ACCESS: CPT

## 2022-01-03 PROCEDURE — 97110 THERAPEUTIC EXERCISES: CPT

## 2022-01-03 PROCEDURE — 74011250637 HC RX REV CODE- 250/637: Performed by: FAMILY MEDICINE

## 2022-01-03 PROCEDURE — 74011250637 HC RX REV CODE- 250/637: Performed by: PHYSICIAN ASSISTANT

## 2022-01-03 RX ORDER — FUROSEMIDE 10 MG/ML
20 INJECTION INTRAMUSCULAR; INTRAVENOUS ONCE
Status: COMPLETED | OUTPATIENT
Start: 2022-01-03 | End: 2022-01-03

## 2022-01-03 RX ADMIN — FUROSEMIDE 20 MG: 10 INJECTION, SOLUTION INTRAMUSCULAR; INTRAVENOUS at 14:38

## 2022-01-03 RX ADMIN — SODIUM CHLORIDE, PRESERVATIVE FREE 10 ML: 5 INJECTION INTRAVENOUS at 05:16

## 2022-01-03 RX ADMIN — FUROSEMIDE 40 MG: 40 TABLET ORAL at 08:45

## 2022-01-03 RX ADMIN — FERROUS SULFATE TAB 325 MG (65 MG ELEMENTAL FE) 325 MG: 325 (65 FE) TAB at 08:45

## 2022-01-03 RX ADMIN — METOPROLOL SUCCINATE 50 MG: 50 TABLET, EXTENDED RELEASE ORAL at 08:44

## 2022-01-03 RX ADMIN — ROSUVASTATIN CALCIUM 10 MG: 5 TABLET, FILM COATED ORAL at 21:50

## 2022-01-03 RX ADMIN — AMIODARONE HYDROCHLORIDE 200 MG: 200 TABLET ORAL at 08:45

## 2022-01-03 RX ADMIN — LEVOTHYROXINE SODIUM 50 MCG: 50 TABLET ORAL at 05:20

## 2022-01-03 RX ADMIN — Medication 400 MG: at 08:45

## 2022-01-03 RX ADMIN — PANTOPRAZOLE SODIUM 40 MG: 40 TABLET, DELAYED RELEASE ORAL at 05:20

## 2022-01-03 RX ADMIN — DULOXETINE HYDROCHLORIDE 60 MG: 60 CAPSULE, DELAYED RELEASE ORAL at 08:45

## 2022-01-03 RX ADMIN — SODIUM CHLORIDE, PRESERVATIVE FREE 10 ML: 5 INJECTION INTRAVENOUS at 14:39

## 2022-01-03 RX ADMIN — SODIUM CHLORIDE, PRESERVATIVE FREE 10 ML: 5 INJECTION INTRAVENOUS at 21:51

## 2022-01-03 RX ADMIN — FOLIC ACID 1 MG: 1 TABLET ORAL at 08:45

## 2022-01-03 NOTE — PROGRESS NOTES
Patient were discussed in [facility] team meeting this AM.  CM met with patient to discuss PT recommendation for rehab. Patient were agreeable to rehab. Patient states she were placed at [facility] but has declined to return again. Patient has requested a referral be made to [facility]. Referral completed. CM will continue to monitor.

## 2022-01-03 NOTE — PROGRESS NOTES
Problem: Pain  Goal: *Control of Pain  Outcome: Progressing Towards Goal     Problem: Falls - Risk of  Goal: *Absence of Falls  Description: Document Obed Fall Risk and appropriate interventions in the flowsheet.   Outcome: Progressing Towards Goal  Note: Fall Risk Interventions:  Mobility Interventions: Bed/chair exit alarm,Patient to call before getting OOB,PT Consult for mobility concerns    Mentation Interventions: Adequate sleep, hydration, pain control    Medication Interventions: Patient to call before getting OOB,Teach patient to arise slowly    Elimination Interventions: Bed/chair exit alarm,Call light in reach,Patient to call for help with toileting needs,Stay With Me (per policy)    History of Falls Interventions: Bed/chair exit alarm,Door open when patient unattended,Investigate reason for fall         Problem: Patient Education: Go to Patient Education Activity  Goal: Patient/Family Education  Outcome: Progressing Towards Goal

## 2022-01-03 NOTE — PROGRESS NOTES
Hospitalist Progress Note   Admit Date:  2021  4:57 PM   Name:  Norman Wright   Age:  80 y.o. Sex:  female  :  1940   MRN:  [id number]   Room:  [facility]/    Presenting Complaint: Shortness of Breath    Reason(s) for Admission: GI bleed [facility] Course & Interval History: This is a 68-year-old female with hypothyroid history of Graves' disease A. fib on Xarelto anemia CKD 3 who was recently admitted  with progressive acute blood loss anemia that hemoglobin 3.3 at that time. She was status post an EGD on  was unremarkable. She was given IV iron per hematology recs. Xarelto was resumed and hemoglobin was greater than 8 on discharge. She represents  with increasing shortness of breath. She appeared pale found have a large melanotic stool on exam.  ED work-up showed hemoglobin 3.7 INR 3.6. Chest x-ray showed pulmonary edema. She received Kcentra for INR. She received 3 units of red blood cells. 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 5 angiodysplasias treated. They also surmised that there is a high likelihood of additional AVMs beyond the reach of the pediatric scope. She also had a colonoscopy  which showed diverticulosis internal hemorrhoids. Xarelto has now been discontinued indefinitely per cardiology recommendations. The patient has acute on chronic respiratory failure with hypoxia. She is wears 2 L at home but is requiring roughly 4 L here. Chest x-ray show pulmonary congestion and the repeat chest x-ray showed bilateral pleural effusions left greater than right. Covid swab was negative. She is given IV Lasix. Pulmonology is consulted, after a CT chest was obtained that showed pleural effusion and a larger left-sided than right-sided. They did a thoracentesis remove 800 mL on . Subjective (22): Patient still feels short of breath. Currently needing 4 L oxygen nasal cannula. She reports that she felt significantly better after thoracentesis few days ago. However 2 hours later she started feeling short of breath again. No chest pain. Assessment & Plan:     Acute on chronic hypoxic respiratory failure, left-sided pleural effusion:   Repeat chest x-ray was done yesterday which shows small left greater than right pleural effusions. Persistent diffuse pulmonary opacity likely pulmonary edema. Continue with oral Lasix. Lasix 20 mg iv this afternoon. Repeat CXR in am.     Hypokalemia: Resolved    Acute blood loss anemia, upper GI bleed:   Hemoglobin is now stable. She needs to GI follow-up in 4 weeks. Continue pantoprazole, ferrous sulfate and folic acid    V35 Compression fracture: This is chronic and redemonstrated on a CT chest 12/30. PT and OT continue supportive care. Left renal cyst: Incidentally found on CT. Renal ultrasound was unable to visualize abscess. Is recommended that she does a follow-up CT abdomen pelvis as an outpatient. Hypothyroidism, Graves' disease:   Her TSH was elevated on 1/2 however her free T4 and T3 are normal so continue at her current dose of Synthroid    Hyperlipidemia:   Continue statin    Paroxysmal A. Fib:   Continue home amiodarone. Stopped her Xarelto and she is no longer a candidate for chronic OAC due to GI bleed/ severe anemia, per cardiology. Hypertension:   Continue p.o. Lasix and Toprol. CKD 3:   Creatinine at baseline avoid nephrotoxins renally dose meds. Anxiety depression:   Continue home Cymbalta      Dispo/Discharge Planning:    Plan to discharge to [facility] once bed available.      Diet:  ADULT DIET Easy to Chew  DVT PPx: SCDs  Code status: Full Code    Hospital Problems as of 1/3/2022 Date Reviewed: 12/27/2021          Codes Class Noted - Resolved POA    Hypoxia ICD-10-CM: R09.02  ICD-9-CM: 799.02  12/31/2021 - Present Unknown        Pleural effusion ICD-10-CM: J90  ICD-9-CM: 511.9  12/30/2021 - Present Unknown        T12 compression fracture [facility]) ICD-10-CM: I87.142R  ICD-9-CM: 805.2  12/30/2021 - Present Unknown        Renal cyst, left ICD-10-CM: N28.1  ICD-9-CM: 753.10  12/30/2021 - Present Unknown        * (Principal) GI bleed ICD-10-CM: K92.2  ICD-9-CM: 578.9  12/25/2021 - Present Unknown        Diastolic CHF, chronic (HCC) ICD-10-CM: I50.32  ICD-9-CM: 428.32, 428.0  6/13/2021 - Present Yes        Stage 3 chronic kidney disease ([facility] [address] (Chronic) ICD-10-CM: N18.30  ICD-9-CM: 585.3  5/20/2021 - Present Yes        Hypertension, essential, benign (Chronic) ICD-10-CM: I10  ICD-9-CM: 401.1  8/18/2016 - Present Yes        Acquired hypothyroidism (Chronic) ICD-10-CM: E03.9  ICD-9-CM: 244.9  1/16/2016 - Present Yes        Hyperlipidemia (Chronic) ICD-10-CM: E78.5  ICD-9-CM: 272.4  1/16/2016 - Present Yes        Rheumatoid arthritis ([facility] [address] (Chronic) ICD-10-CM: M06.9  ICD-9-CM: 714.0  1/16/2016 - Present Yes              Objective:     Patient Vitals for the past 24 hrs:   Temp Pulse Resp BP SpO2   01/03/22 1116 97.9 °F (36.6 °C) 68 16 114/79 98 %   01/03/22 0739 97.7 °F (36.5 °C) 67 20 134/60 92 %   01/03/22 0405 98.3 °F (36.8 °C) 71 22 134/63 97 %   01/03/22 0327 98.7 °F (37.1 °C) 72 20 138/61 94 %   01/02/22 2339 99.1 °F (37.3 °C) 72 18 (!) 148/70 99 %   01/02/22 2014     95 %   01/02/22 1958 98.2 °F (36.8 °C) 70 18 (!) 121/53 90 %   01/02/22 1700 99.1 °F (37.3 °C) 74 18 125/65 90 %     Oxygen Therapy  O2 Sat (%): 98 % (01/03/22 1116)  Pulse via Oximetry: 83 beats per minute (01/01/22 0855)  O2 Device: Nasal cannula (01/02/22 2014)  Skin Assessment: Clean, dry, & intact (12/31/21 1545)  Skin Protection for O2 Device: N/A (12/29/21 2039)  O2 Flow Rate (L/min): 4 l/min (01/02/22 2014)  O2 Temperature: 87.8 °F (31 °C) (12/26/21 1230)  FIO2 (%): 50 % (12/26/21 1230)    Estimated body mass index is 26.72 kg/m² as calculated from the following:    Height as of this encounter: 5' (1.524 m). Weight as of this encounter: 62.1 kg (136 lb 12.8 oz). Intake/Output Summary (Last 24 hours) at 1/3/2022 1412  Last data filed at 1/2/2022 1700  Gross per 24 hour   Intake    Output 575 ml   Net -575 ml         Physical Exam:     Blood pressure 114/79, pulse 68, temperature 97.9 °F (36.6 °C), resp. rate 16, height 5' (1.524 m), weight 62.1 kg (136 lb 12.8 oz), SpO2 98 %. General:    Well nourished. No overt distress  Head:  Normocephalic, atraumatic  Eyes:  Prominent exophthalmos  ENT:  Nares appear normal, no drainage. Moist oral mucosa  Neck:  No restricted ROM. Trachea midline   CV:   RRR. No m/r/g. No jugular venous distension. Lungs:   Decreased breath sounds in the left posterior lung field  Abdomen: Bowel sounds present. Soft, nontender, nondistended. Extremities: No cyanosis or clubbing. No edema  Skin:     No rashes and normal coloration. Warm and dry. Neuro:  CN II-XII grossly intact. Sensation intact. A&Ox3  Psych:  Normal mood and affect.       I have reviewed ordered lab tests and independently visualized imaging below:    Recent Labs:  Recent Results (from the past 48 hour(s))   CBC W/O DIFF    Collection Time: 01/02/22 10:51 AM   Result Value Ref Range    WBC 7.0 4.3 - 11.1 K/uL    RBC 3.19 (L) 4.05 - 5.2 M/uL    HGB 9.5 (L) 11.7 - 15.4 g/dL    HCT 32.1 (L) 35.8 - 46.3 %    .6 (H) 79.6 - 97.8 FL    MCH 29.8 26.1 - 32.9 PG    MCHC 29.6 (L) 31.4 - 35.0 g/dL    RDW 19.3 (H) 11.9 - 14.6 %    PLATELET 850 164 - 168 K/uL    MPV 10.7 9.4 - 12.3 FL    ABSOLUTE NRBC 0.00 0.0 - 0.2 K/uL   METABOLIC PANEL, BASIC    Collection Time: 01/02/22 10:51 AM   Result Value Ref Range    Sodium 138 136 - 145 mmol/L    Potassium 4.0 3.5 - 5.1 mmol/L    Chloride 102 98 - 107 mmol/L    CO2 35 (H) 21 - 32 mmol/L    Anion gap 1 (L) 7 - 16 mmol/L    Glucose 102 (H) 65 - 100 mg/dL    BUN 15 8 - 23 MG/DL    Creatinine 1.15 (H) 0.6 - 1.0 MG/DL    GFR est AA >60 >60 ml/min/1.73m2    GFR est non-AA >60 >60 ml/min/1.73m2    Calcium 8.6 8.3 - 10.4 MG/DL   TSH 3RD GENERATION    Collection Time: 01/02/22 10:51 AM   Result Value Ref Range    TSH 13.600 (H) 0.358 - 3.740 uIU/mL   T4, FREE    Collection Time: 01/02/22 10:51 AM   Result Value Ref Range    T4, Free 1.2 0.78 - 1.46 NG/DL   T3 TOTAL    Collection Time: 01/02/22 10:51 AM   Result Value Ref Range    T3, total 0.65 0.60 - 1.81 ng/mL       All Micro Results     Procedure Component Value Units Date/Time    FUNGUS CULTURE AND SMEAR [[id number]] Collected: 12/30/21 1558    Order Status: Completed Specimen: Miscellaneous sample Updated: 01/03/22 1403     Source PLEURAL FLUID        Comment: LEFT  1          Fungus stain Direct Inoculation     Comment: DEMOGRAPHIC UPDATE OCCURRED ON 01/03 AT 1403, QA FLAGS AND RANGES MAY NO LONGER BE VALID        Fungus (Mycology) Culture Other source received     Comment: DEMOGRAPHIC UPDATE OCCURRED ON 01/03 AT 1403, QA FLAGS AND RANGES MAY NO LONGER BE VALID  (NOTE)  Performed At: [facility] [address] [place] [id number]  Domingo Resendez MD CK:[id number]         AFB CULTURE + SMEAR W/RFLX ID FROM CULTURE [[id number]] Collected: 12/30/21 1558    Order Status: Completed Specimen: Miscellaneous sample Updated: 01/03/22 1403     Source PLEURAL FLUID        Comment: LEFT  1          AFB Specimen processing Concentration     Comment: DEMOGRAPHIC UPDATE OCCURRED ON 01/03 AT 1403, QA FLAGS AND RANGES MAY NO LONGER BE VALID        Acid Fast Smear Negative        Comment: DEMOGRAPHIC UPDATE OCCURRED ON 01/03 AT 1403, QA FLAGS AND RANGES MAY NO LONGER BE VALID  (NOTE)  Performed At: [place], [place] [id number]  Domingo Resendez MD RT:[id number]          Acid Fast Culture PENDING    CULTURE, BODY FLUID W Glenny Garrison [[id number]] Collected: 12/30/21 1558    Order Status: Completed Specimen: Pleural Fluid Updated: 01/02/22 0839     Special Requests: NO SPECIAL REQUESTS GRAM STAIN NO WBCS SEEN         NO DEFINITE ORGANISM SEEN        Culture result: NO GROWTH 2 DAYS       CULTURE, BLOOD [[id number]] Collected: 12/25/21 1746    Order Status: Completed Specimen: Blood Updated: 12/30/21 0747     Special Requests: LEFT FOREARM        Culture result: NO GROWTH 5 DAYS       CULTURE, BLOOD [[id number]] Collected: 12/25/21 1730    Order Status: Completed Specimen: Blood Updated: 12/30/21 0747     Special Requests: --        LEFT  Antecubital       Culture result: NO GROWTH 5 DAYS       COVID-19 RAPID TEST [[id number]] Collected: 12/25/21 1823    Order Status: Completed Specimen: Nasopharyngeal Updated: 12/25/21 1859     Specimen source NASAL        COVID-19 rapid test Not detected        Comment:      The specimen is NEGATIVE for SARS-CoV-2, the novel coronavirus associated with COVID-19. A negative result does not rule out COVID-19. This test has been authorized by the FDA under an Emergency Use Authorization (EUA) for use by authorized laboratories. Fact sheet for Healthcare Providers: ConventionMola.comdate.co.nz  Fact sheet for Patients: ConventionUpdate.co.nz       Methodology: Isothermal Nucleic Acid Amplification               Other Studies:  XR CHEST SNGL V    Result Date: 1/2/2022  Chest portable CLINICAL INDICATION: Subacute worsening shortness of breath and diminished left lower lung breath sounds, follow-up of a pleural effusion after thoracentesis, previous CABG, pulmonary edema COMPARISON: 12/31/2021 radiograph and 12/30/2021 CT TECHNIQUE: single AP portable view chest at 6:20 PM upright FINDINGS: Stable lung volumes, mediastinal and hilar contours. The left greater than right pleural effusions have slightly decreased. The diffuse bilateral reticular opacities and pulmonary vascular congestion have not definitely changed. No evidence of a pneumothorax or an increased consolidation. Low bone density. Patient is rotated.  Right axillary surgical clips, old right posterior rib fractures, scoliosis, degenerative changes of spine are again noted. Bones were further evaluated on recent CT. 1. Decreased small left greater than right pleural effusions. 2. Persisting diffuse pulmonary opacity; favor pulmonary edema (atypical or viral infection, inflammatory processes considered less likely). Current Meds:  Current Facility-Administered Medications   Medication Dose Route Frequency    furosemide (LASIX) injection 20 mg  20 mg IntraVENous ONCE    magnesium oxide (MAG-OX) tablet 400 mg  400 mg Oral DAILY    ferrous sulfate tablet 325 mg  1 Tablet Oral DAILY WITH BREAKFAST    lip protectant (BLISTEX) ointment 1 Each  1 Each Topical PRN    pantoprazole (PROTONIX) tablet 40 mg  40 mg Oral ACB    furosemide (LASIX) tablet 40 mg  40 mg Oral DAILY    amiodarone (CORDARONE) tablet 200 mg  200 mg Oral DAILY    levothyroxine (SYNTHROID) tablet 50 mcg  50 mcg Oral ACB    DULoxetine (CYMBALTA) capsule 60 mg  60 mg Oral DAILY    folic acid (FOLVITE) tablet 1 mg  1 mg Oral DAILY    metoprolol succinate (TOPROL-XL) XL tablet 50 mg  50 mg Oral DAILY    rosuvastatin (CRESTOR) tablet 10 mg  10 mg Oral QHS    sodium chloride (NS) flush 5-40 mL  5-40 mL IntraVENous Q8H    sodium chloride (NS) flush 5-40 mL  5-40 mL IntraVENous PRN    acetaminophen (TYLENOL) tablet 650 mg  650 mg Oral Q6H PRN    Or    acetaminophen (TYLENOL) suppository 650 mg  650 mg Rectal Q6H PRN    polyethylene glycol (MIRALAX) packet 17 g  17 g Oral DAILY PRN    ondansetron (ZOFRAN ODT) tablet 4 mg  4 mg Oral Q8H PRN    Or    ondansetron (ZOFRAN) injection 4 mg  4 mg IntraVENous Q6H PRN       Signed:  Caty Tolbert MD    Part of this note may have been written by using a voice dictation software. The note has been proof read but may still contain some grammatical/other typographical errors.

## 2022-01-03 NOTE — PROGRESS NOTES
ACUTE OT GOALS:  (Developed with and agreed upon by patient and/or caregiver.)  1. Patient will complete lower body bathing and dressing with supervision and adaptive equipment as needed. 2. Patient will complete toileting with supervision. 3. Patient will tolerate 30 minutes of OT treatment with 1-2 rest breaks to increase activity tolerance for ADLs. 4. Patient will complete functional transfers with supervision and adaptive equipment as needed. 5. Patient will complete functional mobility for household distances with supervision and adaptive equipment as needed. 6. Patient will complete self-grooming while standing edge of sink with supervision and adaptive equipment as needed. 7. Patient will complete functional activity while maintaining Sp02 > 90% with 1-2 rest breaks as needed/      Timeframe: 7 visits      OCCUPATIONAL THERAPY: Daily Note OT Treatment Day # 2    Ulises Sales is a 80 y.o. female   PRIMARY DIAGNOSIS: GI bleed  GI bleed [K92.2]  Procedure(s) (LRB):  THORACENTESIS (N/A)  ULTRASOUND (Bilateral)  4 Days Post-Op  Payor: [facility] MEDICARE COMPLETE / Plan: [address] / Product Type: [employer] Medicare /   ASSESSMENT:     REHAB RECOMMENDATIONS: CURRENT LEVEL OF FUNCTION:  (Most Recently Demonstrated)   Recommendation to date pending progress:  Setting:   Short-term Rehab  Equipment:    None Bathing:   Not tested  Dressing:   Not tested  Feeding/Grooming:   Not tested  Toileting:   Not tested  Functional Mobility:   Not tested     ASSESSMENT:  Ms. Zuly Engel is doing fair today. Pt presents sitting up in bed upon arrival. Pt instructed in UE exercises to increase strength and activity tolerance to perform mobility and ADLs. Pt tolerated exercises well. Left with theraband and pink foam block to increase strength on her own. Pt verbalized/demonstrated understanding. Minimal progress made today. Will continue to benefit from skilled OT during stay.      SUBJECTIVE:   Ms. Boom Romeo states, \"How are you doing sweetheart\"    SOCIAL HISTORY/LIVING ENVIRONMENT:   Home Environment: Private residence  One/Two Story Residence: One story  Living Alone: Yes  Support Systems: Friend/Neighbor    OBJECTIVE:     PAIN: VITAL SIGNS: LINES/DRAINS:   Pre Treatment: Pain Screen  Pain Scale 1: Numeric (0 - 10)  Pain Intensity 1: 0  Post Treatment: 0   N/a  O2 Device: Nasal cannula     ACTIVITIES OF DAILY LIVING: I Mod I S SBA CGA Min Mod Max Total NT Comments   BASIC ADLs:              Bathing/ Showering [] [] [] [] [] [] [] [] [] [x]    Toileting [] [] [] [] [] [] [] [] [] [x]    Dressing [] [] [] [] [] [] [] [] [] [x]    Feeding [] [] [] [] [] [] [] [] [] [x]    Grooming [] [] [] [] [] [] [] [] [] [x]    Personal Device Care [] [] [] [] [] [] [] [] [] [x]    Functional Mobility [] [] [] [] [] [] [] [] [] [x]    I=Independent, Mod I=Modified Independent, S=Supervision, SBA=Standby Assistance, CGA=Contact Guard Assistance,   Min=Minimal Assistance, Mod=Moderate Assistance, Max=Maximal Assistance, Total=Total Assistance, NT=Not Tested    MOBILITY: I Mod I S SBA CGA Min Mod Max Total  NT x2 Comments:   Supine to sit [] [] [] [] [] [] [] [] [] [x] []    Sit to supine [] [] [] [] [] [] [] [] [] [x] []    Sit to stand [] [] [] [] [] [] [] [] [] [x] []    Bed to chair [] [] [] [] [] [] [] [] [] [x] []    I=Independent, Mod I=Modified Independent, S=Supervision, SBA=Standby Assistance, CGA=Contact Guard Assistance,   Min=Minimal Assistance, Mod=Moderate Assistance, Max=Maximal Assistance, Total=Total Assistance, NT=Not Tested    BALANCE: Good Fair+ Fair Fair- Poor NT Comments   Sitting Static [] [] [x] [] [] []    Sitting Dynamic [] [] [x] [] [] []              Standing Static [] [] [] [] [] [x]    Standing Dynamic [] [] [] [] [] [x]      PLAN:   FREQUENCY/DURATION: OT Plan of Care: 3 times/week for duration of hospital stay or until stated goals are met, whichever comes first.    TREATMENT:   TREATMENT:   ( $$ Therapeutic Exercises: 8-22 mins   )  Therapeutic Exercise (10 Minutes): Therapeutic exercises noted below to improve functional activity tolerance, strength and mobility.      TREATMENT GRID:   Date:  1/3/22 Date:   Date:     Activity/Exercise Parameters Parameters Parameters   Shoulder Abd/Adduction 10 reps     Shoulder Flexion 10 reps     Elbow Flexion 10 reps                               AFTER TREATMENT POSITION/PRECAUTIONS:  Bed, Needs within reach and RN notified    INTERDISCIPLINARY COLLABORATION:  RN/PCT and OT/SCHROEDER    TOTAL TREATMENT DURATION:  OT Patient Time In/Time Out  Time In: 1540  Time Out: 1100 [facility]

## 2022-01-03 NOTE — PROGRESS NOTES
US Guided PIV access-   Skin was cleaned and disinfected prior to IV puncture. Ultrasound was used to find the vein which was compressible and does not have any ultrasound features of an artery or nerve bundle. Under real-time ultrasound guidance peripheral access was obtained in the right forearm using 18g 8cm [address] lot [id number]. Blood return was present and IV flushed without difficulty with no clinical signs of infiltration. IV dressing applied and there were no immediate complications noted and patient tolerated the procedure well.

## 2022-01-04 ENCOUNTER — APPOINTMENT (OUTPATIENT)
Dept: GENERAL RADIOLOGY | Age: 82
DRG: 377 | End: 2022-01-04
Attending: HOSPITALIST
Payer: MEDICARE

## 2022-01-04 LAB
ANION GAP SERPL CALC-SCNC: 2 MMOL/L (ref 7–16)
BUN SERPL-MCNC: 16 MG/DL (ref 8–23)
CALCIUM SERPL-MCNC: 8.6 MG/DL (ref 8.3–10.4)
CHLORIDE SERPL-SCNC: 102 MMOL/L (ref 98–107)
CO2 SERPL-SCNC: 36 MMOL/L (ref 21–32)
CREAT SERPL-MCNC: 1.17 MG/DL (ref 0.6–1)
ERYTHROCYTE [DISTWIDTH] IN BLOOD BY AUTOMATED COUNT: 18.8 % (ref 11.9–14.6)
GLUCOSE SERPL-MCNC: 82 MG/DL (ref 65–100)
HCT VFR BLD AUTO: 30.2 % (ref 35.8–46.3)
HGB BLD-MCNC: 8.8 G/DL (ref 11.7–15.4)
MCH RBC QN AUTO: 28.6 PG (ref 26.1–32.9)
MCHC RBC AUTO-ENTMCNC: 29.1 G/DL (ref 31.4–35)
MCV RBC AUTO: 98.1 FL (ref 79.6–97.8)
NRBC # BLD: 0 K/UL (ref 0–0.2)
PLATELET # BLD AUTO: 381 K/UL (ref 150–450)
PMV BLD AUTO: 10.7 FL (ref 9.4–12.3)
POTASSIUM SERPL-SCNC: 3.9 MMOL/L (ref 3.5–5.1)
RBC # BLD AUTO: 3.08 M/UL (ref 4.05–5.2)
SODIUM SERPL-SCNC: 140 MMOL/L (ref 136–145)
WBC # BLD AUTO: 7.3 K/UL (ref 4.3–11.1)

## 2022-01-04 PROCEDURE — 65270000029 HC RM PRIVATE

## 2022-01-04 PROCEDURE — 71045 X-RAY EXAM CHEST 1 VIEW: CPT

## 2022-01-04 PROCEDURE — 74011250637 HC RX REV CODE- 250/637: Performed by: FAMILY MEDICINE

## 2022-01-04 PROCEDURE — 74011250637 HC RX REV CODE- 250/637: Performed by: STUDENT IN AN ORGANIZED HEALTH CARE EDUCATION/TRAINING PROGRAM

## 2022-01-04 PROCEDURE — 97530 THERAPEUTIC ACTIVITIES: CPT

## 2022-01-04 PROCEDURE — 74011250637 HC RX REV CODE- 250/637: Performed by: INTERNAL MEDICINE

## 2022-01-04 PROCEDURE — 80048 BASIC METABOLIC PNL TOTAL CA: CPT

## 2022-01-04 PROCEDURE — 86580 TB INTRADERMAL TEST: CPT | Performed by: HOSPITALIST

## 2022-01-04 PROCEDURE — 74011000250 HC RX REV CODE- 250: Performed by: HOSPITALIST

## 2022-01-04 PROCEDURE — 36415 COLL VENOUS BLD VENIPUNCTURE: CPT

## 2022-01-04 PROCEDURE — 74011250636 HC RX REV CODE- 250/636: Performed by: HOSPITALIST

## 2022-01-04 PROCEDURE — 85027 COMPLETE CBC AUTOMATED: CPT

## 2022-01-04 PROCEDURE — 74011000250 HC RX REV CODE- 250: Performed by: INTERNAL MEDICINE

## 2022-01-04 PROCEDURE — 74011250637 HC RX REV CODE- 250/637: Performed by: PHYSICIAN ASSISTANT

## 2022-01-04 RX ORDER — FUROSEMIDE 10 MG/ML
40 INJECTION INTRAMUSCULAR; INTRAVENOUS ONCE
Status: COMPLETED | OUTPATIENT
Start: 2022-01-04 | End: 2022-01-04

## 2022-01-04 RX ADMIN — ONDANSETRON 4 MG: 4 TABLET, ORALLY DISINTEGRATING ORAL at 22:49

## 2022-01-04 RX ADMIN — Medication 5 UNITS: at 14:27

## 2022-01-04 RX ADMIN — SODIUM CHLORIDE, PRESERVATIVE FREE 10 ML: 5 INJECTION INTRAVENOUS at 06:26

## 2022-01-04 RX ADMIN — FOLIC ACID 1 MG: 1 TABLET ORAL at 09:11

## 2022-01-04 RX ADMIN — ROSUVASTATIN CALCIUM 10 MG: 5 TABLET, FILM COATED ORAL at 22:45

## 2022-01-04 RX ADMIN — FERROUS SULFATE TAB 325 MG (65 MG ELEMENTAL FE) 325 MG: 325 (65 FE) TAB at 09:11

## 2022-01-04 RX ADMIN — FUROSEMIDE 40 MG: 40 TABLET ORAL at 09:11

## 2022-01-04 RX ADMIN — PANTOPRAZOLE SODIUM 40 MG: 40 TABLET, DELAYED RELEASE ORAL at 06:26

## 2022-01-04 RX ADMIN — AMIODARONE HYDROCHLORIDE 200 MG: 200 TABLET ORAL at 09:11

## 2022-01-04 RX ADMIN — SODIUM CHLORIDE, PRESERVATIVE FREE 10 ML: 5 INJECTION INTRAVENOUS at 22:46

## 2022-01-04 RX ADMIN — DULOXETINE HYDROCHLORIDE 60 MG: 60 CAPSULE, DELAYED RELEASE ORAL at 09:11

## 2022-01-04 RX ADMIN — FUROSEMIDE 40 MG: 10 INJECTION, SOLUTION INTRAVENOUS at 14:26

## 2022-01-04 RX ADMIN — LEVOTHYROXINE SODIUM 50 MCG: 50 TABLET ORAL at 06:26

## 2022-01-04 RX ADMIN — METOPROLOL SUCCINATE 50 MG: 50 TABLET, EXTENDED RELEASE ORAL at 09:11

## 2022-01-04 RX ADMIN — Medication 400 MG: at 09:11

## 2022-01-04 RX ADMIN — SODIUM CHLORIDE, PRESERVATIVE FREE 10 ML: 5 INJECTION INTRAVENOUS at 14:00

## 2022-01-04 NOTE — PROGRESS NOTES
Hospitalist Progress Note   Admit Date:  2021  4:57 PM   Name:  Erick Springer   Age:  80 y.o. Sex:  female  :  1940   MRN:  [id number]   Room:  [facility]/    Presenting Complaint: Shortness of Breath    Reason(s) for Admission: GI bleed [facility] Course & Interval History: This is a 68-year-old female with hypothyroid history of Graves' disease A. fib on Xarelto anemia CKD 3 who was recently admitted  with progressive acute blood loss anemia that hemoglobin 3.3 at that time. She was status post an EGD on  was unremarkable. She was given IV iron per hematology recs. Xarelto was resumed and hemoglobin was greater than 8 on discharge. She represents  with increasing shortness of breath. She appeared pale found have a large melanotic stool on exam.  ED work-up showed hemoglobin 3.7 INR 3.6. Chest x-ray showed pulmonary edema. She received Kcentra for INR. She received 3 units of red blood cells. 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 5 angiodysplasias treated. They also surmised that there is a high likelihood of additional AVMs beyond the reach of the pediatric scope. She also had a colonoscopy  which showed diverticulosis internal hemorrhoids. Xarelto has now been discontinued indefinitely per cardiology recommendations. The patient has acute on chronic respiratory failure with hypoxia. She is wears 2 L at home but is requiring roughly 4 L here. Chest x-ray show pulmonary congestion and the repeat chest x-ray showed bilateral pleural effusions left greater than right. Covid swab was negative. She is given IV Lasix. Pulmonology is consulted, after a CT chest was obtained that showed pleural effusion and a larger left-sided than right-sided. They did thoracentesis remove 800 mL on . Subjective (22):   Patient is sitting up in chair this morning. Still on 5 L nasal cannula. No fever no chills. No chest pain. Assessment & Plan:       Acute on chronic hypoxic respiratory failure, left-sided pleural effusion:   Patient is still needing 5 L oxygen nasal cannula this morning. Repeat chest x-ray was done this morning which shows small left pleural effusion and right pleural effusion unchanged. Continue with oral Lasix. Lasix 40 mg iv this afternoon. Hypokalemia: Resolved    Acute blood loss anemia, upper GI bleed:   Hemoglobin is now stable. She needs to GI follow-up in 4 weeks. Continue pantoprazole, ferrous sulfate and folic acid    R68 Compression fracture: This is chronic and redemonstrated on a CT chest 12/30. PT and OT continue supportive care. Left renal cyst: Incidentally found on CT. Renal ultrasound was unable to visualize abscess. Is recommended that she does a follow-up CT abdomen pelvis as an outpatient. Hypothyroidism, Graves' disease:   Her TSH was elevated on 1/2 however her free T4 and T3 are normal so continue at her current dose of Synthroid    Hyperlipidemia:   Continue statin    Paroxysmal A. Fib:   Continue home amiodarone. Stopped her Xarelto and she is no longer a candidate for chronic OAC due to GI bleed/ severe anemia, per cardiology. Hypertension:   Continue p.o. Lasix and Toprol. CKD 3:   Creatinine at baseline avoid nephrotoxins renally dose meds. Anxiety depression:   Continue home Cymbalta      Dispo/Discharge Planning:    Plan to discharge to [facility] once bed available.      Diet:  ADULT DIET Easy to Chew  DVT PPx: SCDs  Code status: Full Code    Hospital Problems as of 1/4/2022 Date Reviewed: 12/27/2021          Codes Class Noted - Resolved POA    Hypoxia ICD-10-CM: R09.02  ICD-9-CM: 799.02  12/31/2021 - Present Unknown        Pleural effusion ICD-10-CM: J90  ICD-9-CM: 511.9  12/30/2021 - Present Unknown        T12 compression fracture ([facility] [address] ICD-10-CM: A36.161M  ICD-9-CM: 805.2  12/30/2021 - Present Unknown        Renal cyst, left ICD-10-CM: N28.1  ICD-9-CM: 753.10  12/30/2021 - Present Unknown        * (Principal) GI bleed ICD-10-CM: K92.2  ICD-9-CM: 578.9  12/25/2021 - Present Unknown        Diastolic CHF, chronic (HCC) ICD-10-CM: I50.32  ICD-9-CM: 428.32, 428.0  6/13/2021 - Present Yes        Stage 3 chronic kidney disease ([facility] (Chronic) ICD-10-CM: N18.30  ICD-9-CM: 585.3  5/20/2021 - Present Yes        Hypertension, essential, benign (Chronic) ICD-10-CM: I10  ICD-9-CM: 401.1  8/18/2016 - Present Yes        Acquired hypothyroidism (Chronic) ICD-10-CM: E03.9  ICD-9-CM: 244.9  1/16/2016 - Present Yes        Hyperlipidemia (Chronic) ICD-10-CM: E78.5  ICD-9-CM: 272.4  1/16/2016 - Present Yes        Rheumatoid arthritis ([facility] (Chronic) ICD-10-CM: M06.9  ICD-9-CM: 714.0  1/16/2016 - Present Yes              Objective:     Patient Vitals for the past 24 hrs:   Temp Pulse Resp BP SpO2   01/04/22 1200 98.4 °F (36.9 °C) 70 18 (!) 132/57 100 %   01/04/22 0800 98.5 °F (36.9 °C) 66 18  100 %   01/04/22 0408 98.9 °F (37.2 °C) 67 18 123/86 93 %   01/03/22 2315 99.2 °F (37.3 °C) 68 18 (!) 149/62 96 %   01/03/22 1947 98.8 °F (37.1 °C) 68 22 123/66 94 %     Oxygen Therapy  O2 Sat (%): 100 % (01/04/22 1200)  Pulse via Oximetry: 83 beats per minute (01/01/22 0855)  O2 Device: Nasal cannula (01/04/22 0408)  Skin Assessment: Clean, dry, & intact (12/31/21 2518)  Skin Protection for O2 Device: N/A (12/29/21 2039)  O2 Flow Rate (L/min): 3 l/min (01/04/22 0345)  O2 Temperature: 87.8 °F (31 °C) (12/26/21 1230)  FIO2 (%): 50 % (12/26/21 1230)    Estimated body mass index is 28.04 kg/m² as calculated from the following:    Height as of this encounter: 5' (1.524 m). Weight as of this encounter: 65.1 kg (143 lb 9.6 oz).     Intake/Output Summary (Last 24 hours) at 1/4/2022 1554  Last data filed at 1/4/2022 1253  Gross per 24 hour   Intake    Output 300 ml   Net -300 ml         Physical Exam:     Blood pressure (!) 132/57, pulse 70, temperature 98.4 °F (36.9 °C), resp. rate 18, height 5' (1.524 m), weight 65.1 kg (143 lb 9.6 oz), SpO2 100 %. General:    Well nourished. No overt distress  Head:  Normocephalic, atraumatic  Eyes:  Prominent exophthalmos  ENT:  Nares appear normal, no drainage. Moist oral mucosa  Neck:  No restricted ROM. Trachea midline   CV:   RRR. No m/r/g. No jugular venous distension. Lungs:   Decreased breath sounds in the left posterior lung field  Abdomen: Bowel sounds present. Soft, nontender, nondistended. Extremities: No cyanosis or clubbing. No edema  Skin:     No rashes and normal coloration. Warm and dry. Neuro:  CN II-XII grossly intact. Sensation intact. A&Ox3  Psych:  Normal mood and affect. I have reviewed ordered lab tests and independently visualized imaging below:    Recent Labs:  Recent Results (from the past 48 hour(s))   CULTURE, CATHETER TIP    Collection Time: 01/03/22  5:31 PM    Specimen: CVP Catheter Tip   Result Value Ref Range    Special Requests: NO SPECIAL REQUESTS      Culture result:        NO GROWTH AFTER SHORT PERIOD OF INCUBATION. FURTHER RESULTS TO FOLLOW AFTER OVERNIGHT INCUBATION.    CULTURE, BLOOD    Collection Time: 01/03/22  5:58 PM    Specimen: Blood   Result Value Ref Range    Special Requests: LEFT  Antecubital        Culture result: NO GROWTH AFTER 13 HOURS     LACTIC ACID    Collection Time: 01/03/22  5:58 PM   Result Value Ref Range    Lactic acid 0.3 (L) 0.4 - 2.0 MMOL/L   CULTURE, BLOOD    Collection Time: 01/03/22  6:02 PM    Specimen: Blood   Result Value Ref Range    Special Requests: RIGHT  HAND        Culture result: NO GROWTH AFTER 13 HOURS     CBC W/O DIFF    Collection Time: 01/04/22  5:55 AM   Result Value Ref Range    WBC 7.3 4.3 - 11.1 K/uL    RBC 3.08 (L) 4.05 - 5.2 M/uL    HGB 8.8 (L) 11.7 - 15.4 g/dL    HCT 30.2 (L) 35.8 - 46.3 %    MCV 98.1 (H) 79.6 - 97.8 FL    MCH 28.6 26.1 - 32.9 PG    MCHC 29.1 (L) 31.4 - 35.0 g/dL    RDW 18. 8 (H) 11.9 - 14.6 %    PLATELET 542 131 - 101 K/uL    MPV 10.7 9.4 - 12.3 FL    ABSOLUTE NRBC 0.00 0.0 - 0.2 K/uL   METABOLIC PANEL, BASIC    Collection Time: 01/04/22  5:55 AM   Result Value Ref Range    Sodium 140 136 - 145 mmol/L    Potassium 3.9 3.5 - 5.1 mmol/L    Chloride 102 98 - 107 mmol/L    CO2 36 (H) 21 - 32 mmol/L    Anion gap 2 (L) 7 - 16 mmol/L    Glucose 82 65 - 100 mg/dL    BUN 16 8 - 23 MG/DL    Creatinine 1.17 (H) 0.6 - 1.0 MG/DL    GFR est AA 57 (L) >60 ml/min/1.73m2    GFR est non-AA 47 (L) >60 ml/min/1.73m2    Calcium 8.6 8.3 - 10.4 MG/DL       All Micro Results     Procedure Component Value Units Date/Time    CULTURE, CATHETER TIP [[id number]] Collected: 01/03/22 1731    Order Status: Completed Specimen: CVP Catheter Tip Updated: 01/04/22 0923     Special Requests: NO SPECIAL REQUESTS        Culture result:       NO GROWTH AFTER SHORT PERIOD OF INCUBATION. FURTHER RESULTS TO FOLLOW AFTER OVERNIGHT INCUBATION.           CULTURE, BLOOD [[id number]] Collected: 01/03/22 1758    Order Status: Completed Specimen: Blood Updated: 01/04/22 0757     Special Requests: --        LEFT  Antecubital       Culture result: NO GROWTH AFTER 13 HOURS       CULTURE, BLOOD [[id number]] Collected: 01/03/22 1802    Order Status: Completed Specimen: Blood Updated: 01/04/22 0757     Special Requests: --        RIGHT  HAND       Culture result: NO GROWTH AFTER 13 HOURS       FUNGUS CULTURE AND SMEAR [[id number]] Collected: 12/30/21 1558    Order Status: Completed Specimen: Miscellaneous sample Updated: 01/03/22 1403     Source PLEURAL FLUID        Comment: LEFT  1          Fungus stain Direct Inoculation     Comment: DEMOGRAPHIC UPDATE OCCURRED ON 01/03 AT 8852, QA FLAGS AND RANGES MAY NO LONGER BE VALID        Fungus (Mycology) Culture Other source received     Comment: DEMOGRAPHIC UPDATE OCCURRED ON 01/03 AT 1403, QA FLAGS AND RANGES MAY NO LONGER BE VALID  (NOTE)  Performed At: [place] [address] [facility] [id number]  Kimberley Ely MD UX:[id number]         AFB CULTURE + SMEAR W/RFLX ID FROM CULTURE [[id number]] Collected: 12/30/21 1558    Order Status: Completed Specimen: Miscellaneous sample Updated: 01/03/22 1403     Source PLEURAL FLUID        Comment: LEFT  1          AFB Specimen processing Concentration     Comment: DEMOGRAPHIC UPDATE OCCURRED ON 01/03 AT 1403, QA FLAGS AND RANGES MAY NO LONGER BE VALID        Acid Fast Smear Negative        Comment: DEMOGRAPHIC UPDATE OCCURRED ON 01/03 AT 1403, QA FLAGS AND RANGES MAY NO LONGER BE VALID  (NOTE)  Performed At: [facility] [address] [facility] [id number]  Kimberley Ely MD CO:[id number]          Acid Fast Culture PENDING    CULTURE, BODY FLUID W Henrietta Nicholasre [[id number]] Collected: 12/30/21 1558    Order Status: Completed Specimen: Pleural Fluid Updated: 01/02/22 0839     Special Requests: NO SPECIAL REQUESTS        GRAM STAIN NO WBCS SEEN         NO DEFINITE ORGANISM SEEN        Culture result: NO GROWTH 2 DAYS       CULTURE, BLOOD [[id number]] Collected: 12/25/21 1746    Order Status: Completed Specimen: Blood Updated: 12/30/21 0747     Special Requests: LEFT FOREARM        Culture result: NO GROWTH 5 DAYS       CULTURE, BLOOD [[id number]] Collected: 12/25/21 1730    Order Status: Completed Specimen: Blood Updated: 12/30/21 0747     Special Requests: --        LEFT  Antecubital       Culture result: NO GROWTH 5 DAYS       COVID-19 RAPID TEST [[id number]] Collected: 12/25/21 1823    Order Status: Completed Specimen: Nasopharyngeal Updated: 12/25/21 1859     Specimen source NASAL        COVID-19 rapid test Not detected        Comment:      The specimen is NEGATIVE for SARS-CoV-2, the novel coronavirus associated with COVID-19. A negative result does not rule out COVID-19. This test has been authorized by the FDA under an Emergency Use Authorization (EUA) for use by authorized laboratories.         Fact sheet for Healthcare Providers: Anita.comontse  Fact sheet for Patients: Anita.co.nz       Methodology: Isothermal Nucleic Acid Amplification               Other Studies:  XR CHEST SNGL V    Result Date: 1/4/2022  CHEST RADIOGRAPH, 1 views, 1/4/2022 History: Shortness of breath. Technique: Portable frontal view of the chest. Comparison: Chest radiograph 1/2/2022 Findings: The heart is mildly enlarged although stable. There is no pneumothorax. Stable from infiltrates are seen which favor the periphery of the lung. A stable small left, and trace right basilar effusion are seen. 1.  Stable findings of the chest as described above. This report was made using voice transcription. Despite my best efforts to avoid any, transcription errors may persist. If there is any question about the accuracy of the report or need for clarification, then please call [phone], or text me through TextRecruitv for clarification or correction.        Current Meds:  Current Facility-Administered Medications   Medication Dose Route Frequency    tuberculin injection 5 Units  5 Units IntraDERMal ONCE    magnesium oxide (MAG-OX) tablet 400 mg  400 mg Oral DAILY    ferrous sulfate tablet 325 mg  1 Tablet Oral DAILY WITH BREAKFAST    lip protectant (BLISTEX) ointment 1 Each  1 Each Topical PRN    pantoprazole (PROTONIX) tablet 40 mg  40 mg Oral ACB    furosemide (LASIX) tablet 40 mg  40 mg Oral DAILY    amiodarone (CORDARONE) tablet 200 mg  200 mg Oral DAILY    levothyroxine (SYNTHROID) tablet 50 mcg  50 mcg Oral ACB    DULoxetine (CYMBALTA) capsule 60 mg  60 mg Oral DAILY    folic acid (FOLVITE) tablet 1 mg  1 mg Oral DAILY    metoprolol succinate (TOPROL-XL) XL tablet 50 mg  50 mg Oral DAILY    rosuvastatin (CRESTOR) tablet 10 mg  10 mg Oral QHS    sodium chloride (NS) flush 5-40 mL  5-40 mL IntraVENous Q8H    sodium chloride (NS) flush 5-40 mL  5-40 mL IntraVENous PRN    acetaminophen (TYLENOL) tablet 650 mg  650 mg Oral Q6H PRN    Or    acetaminophen (TYLENOL) suppository 650 mg  650 mg Rectal Q6H PRN    polyethylene glycol (MIRALAX) packet 17 g  17 g Oral DAILY PRN    ondansetron (ZOFRAN ODT) tablet 4 mg  4 mg Oral Q8H PRN    Or    ondansetron (ZOFRAN) injection 4 mg  4 mg IntraVENous Q6H PRN       Signed:  Lawyer Ganesh MD    Part of this note may have been written by using a voice dictation software. The note has been proof read but may still contain some grammatical/other typographical errors.

## 2022-01-04 NOTE — PROGRESS NOTES
Upon helping the patient to the restroom this RN noticed the dressing to the right femoral CVC was half way off with purulent drainage. This RN then cleaned/changed the dressing using sterile technique, and there was a moderate amount of purulent foul smelling drainage. MD notified and gave orders to have VAT place a peripheral IV, remove CVC and send tip for culture. VAT placed an 18g endurance catheter in the right forearm, I then removed CVC without complications or bleeding. Patient is now eating dinner with no new complaints.

## 2022-01-04 NOTE — PROGRESS NOTES
ACUTE PHYSICAL THERAPY GOALS:  (Developed with and agreed upon by patient and/or caregiver.)  1. Ms. Trae Phillip will perform all transfers independently in 7 days. 2.  Ms. Trae Phillip will perform gait with rolling walker 120 ft independently in 7 days. 3.  Ms. Trae Phillip will perform up and down 5 steps with rail independently in 7 days. PHYSICAL THERAPY: Daily Note and AM Treatment Day # 3    Davion Blancas is a 80 y.o. female   PRIMARY DIAGNOSIS: GI bleed  GI bleed [K92.2]  Procedure(s) (LRB):  THORACENTESIS (N/A)  ULTRASOUND (Bilateral)  5 Days Post-Op    ASSESSMENT:     REHAB RECOMMENDATIONS: CURRENT LEVEL OF FUNCTION:  (Most Recently Demonstrated)   Recommendation to date pending progress:  Setting:   Short-term Rehab  Equipment:    None Bed Mobility:   Modified Independent  Sit to Stand:   Contact Guard Assistance  Transfers:   Contact Guard Assistance  Gait/Mobility:   Contact Guard Assistance     ASSESSMENT:  Ms. Trae Phillip was supine and ready to participate. She was on 5L with sats 99% after walking. She got  Herself to the EOB and transferred to the commode with CGA. She used the commode and cleaned herself independently. She walked the [place] in the unit with walker and CGA and was SOB upon return but sats great. Awaiting rehab     SUBJECTIVE:   Ms. Trae Phillip states, \"ok. \"    SOCIAL HISTORY/ LIVING ENVIRONMENT:   Home Environment: Private residence  One/Two Story Residence: One story  Living Alone: Yes  Support Systems: Friend/Neighbor  OBJECTIVE:     PAIN: VITAL SIGNS: LINES/DRAINS:   Pre Treatment: Pain Screen  Pain Scale 1: FLACC  Pain Intensity 1: 0  Post Treatment: 0   none  O2 Device: Nasal cannula     MOBILITY: I Mod I S SBA CGA Min Mod Max Total  NT x2 Comments:   Bed Mobility    Rolling [] [] [] [] [] [] [] [] [] [] []    Supine to Sit [] [x] [] [] [] [] [] [] [] [] []    Scooting [x] [] [] [] [] [] [] [] [] [] []    Sit to Supine [] [] [] [] [] [] [] [] [] [] []    Transfers    Sit to Stand [] [] [] [] [x] [] [] [] [] [] []    Bed to Chair [] [] [] [] [x] [] [] [] [] [] []    Stand to Sit [] [] [] [] [x] [] [] [] [] [] []    I=Independent, Mod I=Modified Independent, S=Supervision, SBA=Standby Assistance, CGA=Contact Guard Assistance,   Min=Minimal Assistance, Mod=Moderate Assistance, Max=Maximal Assistance, Total=Total Assistance, NT=Not Tested    BALANCE: Good Fair+ Fair Fair- Poor NT Comments   Sitting Static [x] [] [] [] [] []    Sitting Dynamic [x] [] [] [] [] []              Standing Static [] [] [x] [] [] []    Standing Dynamic [] [] [x] [] [] []      GAIT: I Mod I S SBA CGA Min Mod Max Total  NT x2 Comments:   Level of Assistance [] [] [] [] [x] [] [] [] [] [] []    Distance 200    DME Rolling Walker    Gait Quality Slightly off balance    Weightbearing  Status N/A     I=Independent, Mod I=Modified Independent, S=Supervision, SBA=Standby Assistance, CGA=Contact Guard Assistance,   Min=Minimal Assistance, Mod=Moderate Assistance, Max=Maximal Assistance, Total=Total Assistance, NT=Not Tested    PLAN:   FREQUENCY/DURATION: PT Plan of Care: 3 times/week for duration of hospital stay or until stated goals are met, whichever comes first.  TREATMENT:     TREATMENT:   ($$ Therapeutic Activity: 23-37 mins    )  Therapeutic Activity (25 Minutes): Therapeutic activity included Supine to Sit, Scooting, Transfer Training, Ambulation on level ground and Standing balance to improve functional Mobility, Strength and Activity tolerance.     TREATMENT GRID:   Date:  1/1/22 Date:   Date:     Activity/Exercise Parameters Parameters Parameters   Seated TKE 20x B     AP 10x B     Seated marching 20x B     Seated hip abd 20x B                             AFTER TREATMENT POSITION/PRECAUTIONS:  Chair, Needs within reach and RN notified    INTERDISCIPLINARY COLLABORATION:  RN/PCT and PT/PTA    TOTAL TREATMENT DURATION:  PT Patient Time In/Time Out  Time In: 0920  Time Out: 0945    Tiago West PTA

## 2022-01-04 NOTE — PROGRESS NOTES
Problem: Pain  Goal: *Control of Pain  Outcome: Progressing Towards Goal     Problem: Falls - Risk of  Goal: *Absence of Falls  Description: Document Obed Fall Risk and appropriate interventions in the flowsheet.   Outcome: Progressing Towards Goal  Note: Fall Risk Interventions:  Mobility Interventions: Patient to call before getting OOB,Bed/chair exit alarm    Mentation Interventions: Adequate sleep, hydration, pain control,Bed/chair exit alarm,Door open when patient unattended,More frequent rounding    Medication Interventions: Bed/chair exit alarm,Patient to call before getting OOB    Elimination Interventions: Bed/chair exit alarm,Call light in reach,Patient to call for help with toileting needs    History of Falls Interventions: Bed/chair exit alarm,Door open when patient unattended

## 2022-01-04 NOTE — PROGRESS NOTES
CM chart review. Referral for [facility]. CM placed call to admissions coordinator, [facility] [phone]; left VM requesting return call re: bed availability. CM will continue to follow to assist with discharge needs.

## 2022-01-04 NOTE — PROGRESS NOTES
[id number]  PPD ordered for [facility]  RNCM will discuss [facility] choices with patient and submit referrals    Will continue to follow for discharge planning needs  Please consult  if any new issues arise.   Discharge plan is [facility] if patient is agreeable

## 2022-01-05 LAB
ANION GAP SERPL CALC-SCNC: 0 MMOL/L (ref 7–16)
BUN SERPL-MCNC: 17 MG/DL (ref 8–23)
CALCIUM SERPL-MCNC: 8.6 MG/DL (ref 8.3–10.4)
CHLORIDE SERPL-SCNC: 99 MMOL/L (ref 98–107)
CO2 SERPL-SCNC: 38 MMOL/L (ref 21–32)
CREAT SERPL-MCNC: 1.23 MG/DL (ref 0.6–1)
ERYTHROCYTE [DISTWIDTH] IN BLOOD BY AUTOMATED COUNT: 18.6 % (ref 11.9–14.6)
GLUCOSE SERPL-MCNC: 88 MG/DL (ref 65–100)
HCT VFR BLD AUTO: 31.5 % (ref 35.8–46.3)
HGB BLD-MCNC: 9.4 G/DL (ref 11.7–15.4)
MCH RBC QN AUTO: 29.7 PG (ref 26.1–32.9)
MCHC RBC AUTO-ENTMCNC: 29.8 G/DL (ref 31.4–35)
MCV RBC AUTO: 99.4 FL (ref 79.6–97.8)
MM INDURATION POC: 0 MM (ref 0–5)
NRBC # BLD: 0 K/UL (ref 0–0.2)
PLATELET # BLD AUTO: 380 K/UL (ref 150–450)
PMV BLD AUTO: 11.1 FL (ref 9.4–12.3)
POTASSIUM SERPL-SCNC: 4 MMOL/L (ref 3.5–5.1)
PPD POC: NEGATIVE NEGATIVE
RBC # BLD AUTO: 3.17 M/UL (ref 4.05–5.2)
SODIUM SERPL-SCNC: 137 MMOL/L (ref 136–145)
WBC # BLD AUTO: 7.2 K/UL (ref 4.3–11.1)

## 2022-01-05 PROCEDURE — 74011000250 HC RX REV CODE- 250: Performed by: INTERNAL MEDICINE

## 2022-01-05 PROCEDURE — 85027 COMPLETE CBC AUTOMATED: CPT

## 2022-01-05 PROCEDURE — 74011250637 HC RX REV CODE- 250/637: Performed by: PHYSICIAN ASSISTANT

## 2022-01-05 PROCEDURE — 97530 THERAPEUTIC ACTIVITIES: CPT

## 2022-01-05 PROCEDURE — 74011250637 HC RX REV CODE- 250/637: Performed by: FAMILY MEDICINE

## 2022-01-05 PROCEDURE — 65270000029 HC RM PRIVATE

## 2022-01-05 PROCEDURE — 74011250637 HC RX REV CODE- 250/637: Performed by: INTERNAL MEDICINE

## 2022-01-05 PROCEDURE — 74011250637 HC RX REV CODE- 250/637: Performed by: STUDENT IN AN ORGANIZED HEALTH CARE EDUCATION/TRAINING PROGRAM

## 2022-01-05 PROCEDURE — 36415 COLL VENOUS BLD VENIPUNCTURE: CPT

## 2022-01-05 PROCEDURE — 80048 BASIC METABOLIC PNL TOTAL CA: CPT

## 2022-01-05 RX ADMIN — FERROUS SULFATE TAB 325 MG (65 MG ELEMENTAL FE) 325 MG: 325 (65 FE) TAB at 08:43

## 2022-01-05 RX ADMIN — PANTOPRAZOLE SODIUM 40 MG: 40 TABLET, DELAYED RELEASE ORAL at 06:13

## 2022-01-05 RX ADMIN — DULOXETINE HYDROCHLORIDE 60 MG: 60 CAPSULE, DELAYED RELEASE ORAL at 08:43

## 2022-01-05 RX ADMIN — Medication 400 MG: at 08:43

## 2022-01-05 RX ADMIN — SODIUM CHLORIDE, PRESERVATIVE FREE 10 ML: 5 INJECTION INTRAVENOUS at 14:00

## 2022-01-05 RX ADMIN — FOLIC ACID 1 MG: 1 TABLET ORAL at 08:43

## 2022-01-05 RX ADMIN — LEVOTHYROXINE SODIUM 50 MCG: 50 TABLET ORAL at 06:13

## 2022-01-05 RX ADMIN — ROSUVASTATIN CALCIUM 10 MG: 5 TABLET, FILM COATED ORAL at 21:31

## 2022-01-05 RX ADMIN — FUROSEMIDE 40 MG: 40 TABLET ORAL at 08:43

## 2022-01-05 RX ADMIN — METOPROLOL SUCCINATE 50 MG: 50 TABLET, EXTENDED RELEASE ORAL at 08:43

## 2022-01-05 RX ADMIN — AMIODARONE HYDROCHLORIDE 200 MG: 200 TABLET ORAL at 08:43

## 2022-01-05 RX ADMIN — SODIUM CHLORIDE, PRESERVATIVE FREE 10 ML: 5 INJECTION INTRAVENOUS at 21:31

## 2022-01-05 RX ADMIN — SODIUM CHLORIDE, PRESERVATIVE FREE 10 ML: 5 INJECTION INTRAVENOUS at 06:13

## 2022-01-05 NOTE — PROGRESS NOTES
Problem: Pain  Goal: *Control of Pain  Outcome: Progressing Towards Goal     Problem: Falls - Risk of  Goal: *Absence of Falls  Description: Document Obed Fall Risk and appropriate interventions in the flowsheet.   Outcome: Progressing Towards Goal  Note: Fall Risk Interventions:  Mobility Interventions: Patient to call before getting OOB,Communicate number of staff needed for ambulation/transfer,Bed/chair exit alarm    Mentation Interventions: Adequate sleep, hydration, pain control,Bed/chair exit alarm,Door open when patient unattended,Eyeglasses and hearing aids,More frequent rounding    Medication Interventions: Bed/chair exit alarm,Patient to call before getting OOB    Elimination Interventions: Call light in reach,Bed/chair exit alarm    History of Falls Interventions: Bed/chair exit alarm,Room close to nurse's station

## 2022-01-05 NOTE — PROGRESS NOTES
CM left a voicemail for [address] with [facility] [place] regarding a referral that was submitted for STR for pt on 1-3-22. Mahi Hall returned the call and stated she can accept this pt and wanted to confirm that the pt's insurance is a [facility] product. CM accepted the bed offer and requested that pre-cert be started today. PT and OT are working with pt and recommend STR at d/c. Pt will need a rapid COVID-19 test no more than 72 hours prior to admission to the SNF. D/C plan is for pt to go to STR at [facility] [place] once Reshma Aguilar is approved. CM will continue to follow and remain available if any needs arise.

## 2022-01-05 NOTE — PROGRESS NOTES
Hospitalist Progress Note   Admit Date:  2021  4:57 PM   Name:  Shirin Bustillos   Age:  80 y.o. Sex:  female  :  1940   MRN:  [id number]   Room:  [facility]/    Presenting Complaint: Shortness of Breath    Reason(s) for Admission: GI bleed [facility] Course & Interval History: This is a 37-year-old female with hypothyroid history of Graves' disease A. fib on Xarelto anemia CKD 3 who was recently admitted  with progressive acute blood loss anemia that hemoglobin 3.3 at that time. She was status post an EGD on  was unremarkable. She was given IV iron per hematology recs. Xarelto was resumed and hemoglobin was greater than 8 on discharge. She represents  with increasing shortness of breath. She appeared pale found have a large melanotic stool on exam.  ED work-up showed hemoglobin 3.7 INR 3.6. Chest x-ray showed pulmonary edema. She received Kcentra for INR. She received 3 units of red blood cells. 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 5 angiodysplasias treated. They also surmised that there is a high likelihood of additional AVMs beyond the reach of the pediatric scope. She also had a colonoscopy  which showed diverticulosis internal hemorrhoids. Xarelto has now been discontinued indefinitely per cardiology recommendations. The patient has acute on chronic respiratory failure with hypoxia. She is wears 2 L at home but is requiring roughly 4 L here. Chest x-ray show pulmonary congestion and the repeat chest x-ray showed bilateral pleural effusions left greater than right. Covid swab was negative. She is given IV Lasix. Pulmonology is consulted, after a CT chest was obtained that showed pleural effusion and a larger left-sided than right-sided. They did thoracentesis remove 800 mL on . Respiratory status improved.   Currently the patient is on 3 L oxygen nasal cannula. At baseline patient is on 2 L oxygen. Subjective (01/05/22): Patient is currently on oxygen 3 L nasal cannula. At baseline patient is on 2 L oxygen. No fever no chills. No chest pain. Awaiting short-term rehab placement. Assessment & Plan: This is a 72-year-old female with:     Acute on chronic hypoxic respiratory failure, left-sided pleural effusion:   Patient is still needing 3 L oxygen nasal cannula this morning. At baseline patient is on 2 L oxygen nasal cannula. Repeat chest x-ray was done 1/4 which shows small left pleural effusion and right pleural effusion unchanged. Continue with oral Lasix. Hypokalemia: Resolved    Acute blood loss anemia, upper GI bleed:   Hemoglobin is now stable. She needs to GI follow-up in 4 weeks. Continue pantoprazole, ferrous sulfate and folic acid    I53 Compression fracture: This is chronic and redemonstrated on a CT chest 12/30. PT and OT continue supportive care. Left renal cyst: Incidentally found on CT. Renal ultrasound was unable to visualize abscess. Is recommended that she does a follow-up CT abdomen pelvis as an outpatient. Hypothyroidism, Graves' disease:   Her TSH was elevated on 1/2 however her free T4 and T3 are normal so continue at her current dose of Synthroid    Hyperlipidemia:   Continue statin    Paroxysmal A. Fib:   Continue home amiodarone. Stopped her Xarelto and she is no longer a candidate for chronic OAC due to GI bleed/ severe anemia, per cardiology. Hypertension:   Continue p.o. Lasix and Toprol. CKD 3:   Creatinine at baseline avoid nephrotoxins renally dose meds. Anxiety depression:   Continue home Cymbalta      Dispo/Discharge Planning:    Plan to discharge to [facility] once bed available.      Diet:  ADULT DIET Easy to Chew  DVT PPx: SCDs  Code status: Full Code    Hospital Problems as of 1/5/2022 Date Reviewed: 12/27/2021          Codes Class Noted - Resolved POA    Hypoxia ICD-10-CM: R09.02  ICD-9-CM: 799.02  12/31/2021 - Present Unknown        Pleural effusion ICD-10-CM: J90  ICD-9-CM: 511.9  12/30/2021 - Present Unknown        T12 compression fracture ([facility] ICD-10-CM: S22.080A  ICD-9-CM: 805.2  12/30/2021 - Present Unknown        Renal cyst, left ICD-10-CM: N28.1  ICD-9-CM: 753.10  12/30/2021 - Present Unknown        * (Principal) GI bleed ICD-10-CM: K92.2  ICD-9-CM: 578.9  12/25/2021 - Present Unknown        Diastolic CHF, chronic (HCC) ICD-10-CM: I50.32  ICD-9-CM: 428.32, 428.0  6/13/2021 - Present Yes        Stage 3 chronic kidney disease ([facility] (Chronic) ICD-10-CM: N18.30  ICD-9-CM: 585.3  5/20/2021 - Present Yes        Hypertension, essential, benign (Chronic) ICD-10-CM: I10  ICD-9-CM: 401.1  8/18/2016 - Present Yes        Acquired hypothyroidism (Chronic) ICD-10-CM: E03.9  ICD-9-CM: 244.9  1/16/2016 - Present Yes        Hyperlipidemia (Chronic) ICD-10-CM: E78.5  ICD-9-CM: 272.4  1/16/2016 - Present Yes        Rheumatoid arthritis ([facility] (Chronic) ICD-10-CM: M06.9  ICD-9-CM: 714.0  1/16/2016 - Present Yes              Objective:     Patient Vitals for the past 24 hrs:   Temp Pulse Resp BP SpO2   01/05/22 1247 99.1 °F (37.3 °C) 69 18 (!) 137/55 100 %   01/05/22 0810 98.1 °F (36.7 °C) 69 18 129/68 100 %   01/05/22 0348 98.4 °F (36.9 °C) 78 18 (!) 121/58 96 %   01/04/22 2250 98.6 °F (37 °C) 73 20 (!) 117/56 95 %   01/04/22 2028 98 °F (36.7 °C) 70 18 116/70 91 %     Oxygen Therapy  O2 Sat (%): 100 % (01/05/22 1247)  Pulse via Oximetry: 83 beats per minute (01/01/22 0855)  O2 Device: Nasal cannula (01/05/22 1537)  Skin Assessment: Clean, dry, & intact (12/31/21 1545)  Skin Protection for O2 Device: N/A (12/29/21 2039)  O2 Flow Rate (L/min): 3 l/min (01/05/22 1537)  O2 Temperature: 87.8 °F (31 °C) (12/26/21 1230)  FIO2 (%): 50 % (12/26/21 1230)    Estimated body mass index is 28.04 kg/m² as calculated from the following:    Height as of this encounter: 5' (1.524 m).     Weight as of this encounter: 65.1 kg (143 lb 9.6 oz). No intake or output data in the 24 hours ending 01/05/22 1656      Physical Exam:     Blood pressure (!) 137/55, pulse 69, temperature 99.1 °F (37.3 °C), resp. rate 18, height 5' (1.524 m), weight 65.1 kg (143 lb 9.6 oz), SpO2 100 %. General:    Well nourished. No overt distress on 3L oxygen NC,   Head:  Normocephalic, atraumatic  Eyes:  Prominent exophthalmos  ENT:  Nares appear normal, no drainage. Moist oral mucosa  Neck:  No restricted ROM. Trachea midline   CV:   RRR. No m/r/g. No jugular venous distension. Lungs:   Decreased breath sounds in the left posterior lung field  Abdomen: Bowel sounds present. Soft, nontender, nondistended. Extremities: No cyanosis or clubbing. No edema  Skin:     No rashes and normal coloration. Warm and dry. Neuro:  CN II-XII grossly intact. Sensation intact. A&Ox3  Psych:  Normal mood and affect.       I have reviewed ordered lab tests and independently visualized imaging below:    Recent Labs:  Recent Results (from the past 48 hour(s))   CULTURE, CATHETER TIP    Collection Time: 01/03/22  5:31 PM    Specimen: CVP Catheter Tip   Result Value Ref Range    Special Requests: NO SPECIAL REQUESTS      Culture result: <15 CFU STAPHYLOCOCCUS SPECIES, COAGULASE NEGATIVE (A)     CULTURE, BLOOD    Collection Time: 01/03/22  5:58 PM    Specimen: Blood   Result Value Ref Range    Special Requests: LEFT  Antecubital        Culture result: NO GROWTH 2 DAYS     LACTIC ACID    Collection Time: 01/03/22  5:58 PM   Result Value Ref Range    Lactic acid 0.3 (L) 0.4 - 2.0 MMOL/L   CULTURE, BLOOD    Collection Time: 01/03/22  6:02 PM    Specimen: Blood   Result Value Ref Range    Special Requests: RIGHT  HAND        Culture result: NO GROWTH 2 DAYS     CBC W/O DIFF    Collection Time: 01/04/22  5:55 AM   Result Value Ref Range    WBC 7.3 4.3 - 11.1 K/uL    RBC 3.08 (L) 4.05 - 5.2 M/uL    HGB 8.8 (L) 11.7 - 15.4 g/dL    HCT 30.2 (L) 35.8 - 46.3 %    MCV 98.1 (H) 79.6 - 97.8 FL    MCH 28.6 26.1 - 32.9 PG    MCHC 29.1 (L) 31.4 - 35.0 g/dL    RDW 18.8 (H) 11.9 - 14.6 %    PLATELET 593 180 - 105 K/uL    MPV 10.7 9.4 - 12.3 FL    ABSOLUTE NRBC 0.00 0.0 - 0.2 K/uL   METABOLIC PANEL, BASIC    Collection Time: 01/04/22  5:55 AM   Result Value Ref Range    Sodium 140 136 - 145 mmol/L    Potassium 3.9 3.5 - 5.1 mmol/L    Chloride 102 98 - 107 mmol/L    CO2 36 (H) 21 - 32 mmol/L    Anion gap 2 (L) 7 - 16 mmol/L    Glucose 82 65 - 100 mg/dL    BUN 16 8 - 23 MG/DL    Creatinine 1.17 (H) 0.6 - 1.0 MG/DL    GFR est AA 57 (L) >60 ml/min/1.73m2    GFR est non-AA 47 (L) >60 ml/min/1.73m2    Calcium 8.6 8.3 - 10.4 MG/DL   CBC W/O DIFF    Collection Time: 01/05/22  6:24 AM   Result Value Ref Range    WBC 7.2 4.3 - 11.1 K/uL    RBC 3.17 (L) 4.05 - 5.2 M/uL    HGB 9.4 (L) 11.7 - 15.4 g/dL    HCT 31.5 (L) 35.8 - 46.3 %    MCV 99.4 (H) 79.6 - 97.8 FL    MCH 29.7 26.1 - 32.9 PG    MCHC 29.8 (L) 31.4 - 35.0 g/dL    RDW 18.6 (H) 11.9 - 14.6 %    PLATELET 876 815 - 933 K/uL    MPV 11.1 9.4 - 12.3 FL    ABSOLUTE NRBC 0.00 0.0 - 0.2 K/uL   METABOLIC PANEL, BASIC    Collection Time: 01/05/22  6:24 AM   Result Value Ref Range    Sodium 137 136 - 145 mmol/L    Potassium 4.0 3.5 - 5.1 mmol/L    Chloride 99 98 - 107 mmol/L    CO2 38 (H) 21 - 32 mmol/L    Anion gap 0 (L) 7 - 16 mmol/L    Glucose 88 65 - 100 mg/dL    BUN 17 8 - 23 MG/DL    Creatinine 1.23 (H) 0.6 - 1.0 MG/DL    GFR est AA 54 (L) >60 ml/min/1.73m2    GFR est non-AA 45 (L) >60 ml/min/1.73m2    Calcium 8.6 8.3 - 10.4 MG/DL   PLEASE READ & DOCUMENT PPD TEST IN 24 HRS    Collection Time: 01/05/22  3:24 PM   Result Value Ref Range    PPD Negative Negative    mm Induration 0 0 - 5 mm       All Micro Results     Procedure Component Value Units Date/Time    CULTURE, CATHETER TIP [[id number]]  (Abnormal) Collected: 01/03/22 6565    Order Status: Completed Specimen: CVP Catheter Tip Updated: 01/05/22 0904     Special Requests: NO SPECIAL REQUESTS        Culture result:       <15 CFU STAPHYLOCOCCUS SPECIES, COAGULASE NEGATIVE          CULTURE, BLOOD [[id number]] Collected: 01/03/22 1802    Order Status: Completed Specimen: Blood Updated: 01/05/22 0833     Special Requests: --        RIGHT  HAND       Culture result: NO GROWTH 2 DAYS       CULTURE, BLOOD [[id number]] Collected: 01/03/22 1758    Order Status: Completed Specimen: Blood Updated: 01/05/22 0833     Special Requests: --        LEFT  Antecubital       Culture result: NO GROWTH 2 DAYS       FUNGUS CULTURE AND SMEAR [[id number]] Collected: 12/30/21 1558    Order Status: Completed Specimen: Miscellaneous sample Updated: 01/03/22 1403     Source PLEURAL FLUID        Comment: LEFT  1          Fungus stain Direct Inoculation     Comment: DEMOGRAPHIC UPDATE OCCURRED ON 01/03 AT 1403, QA FLAGS AND RANGES MAY NO LONGER BE VALID        Fungus (Mycology) Culture Other source received     Comment: DEMOGRAPHIC UPDATE OCCURRED ON 01/03 AT 1403, QA FLAGS AND RANGES MAY NO LONGER BE VALID  (NOTE)  Performed At: [facility] [address] [id number]  Shannon Ross MD [id number]         AFB CULTURE + SMEAR W/RFLX ID FROM CULTURE [[id number]] Collected: 12/30/21 1558    Order Status: Completed Specimen: Miscellaneous sample Updated: 01/03/22 1403     Source PLEURAL FLUID        Comment: LEFT  1          AFB Specimen processing Concentration     Comment: DEMOGRAPHIC UPDATE OCCURRED ON 01/03 AT 1403, QA FLAGS AND RANGES MAY NO LONGER BE VALID        Acid Fast Smear Negative        Comment: DEMOGRAPHIC UPDATE OCCURRED ON 01/03 AT 1403, QA FLAGS AND RANGES MAY NO LONGER BE VALID  (NOTE)  Performed At: [facility] & [facility]  [employer] [address] [id number]  Shannon Ross MD ZQ:[id number]          Acid Fast Culture PENDING    CULTURE, BODY FLUID W Mak Degroot [[id number]] Collected: 12/30/21 1558    Order Status: Completed Specimen: Pleural Fluid Updated: 01/02/22 0839     Special Requests: NO SPECIAL REQUESTS        GRAM STAIN NO WBCS SEEN         NO DEFINITE ORGANISM SEEN        Culture result: NO GROWTH 2 DAYS       CULTURE, BLOOD [[id number]] Collected: 12/25/21 1746    Order Status: Completed Specimen: Blood Updated: 12/30/21 0747     Special Requests: LEFT FOREARM        Culture result: NO GROWTH 5 DAYS       CULTURE, BLOOD [[id number]] Collected: 12/25/21 1730    Order Status: Completed Specimen: Blood Updated: 12/30/21 0747     Special Requests: --        LEFT  Antecubital       Culture result: NO GROWTH 5 DAYS       COVID-19 RAPID TEST [[id number]] Collected: 12/25/21 1823    Order Status: Completed Specimen: Nasopharyngeal Updated: 12/25/21 1859     Specimen source NASAL        COVID-19 rapid test Not detected        Comment:      The specimen is NEGATIVE for SARS-CoV-2, the novel coronavirus associated with COVID-19. A negative result does not rule out COVID-19. This test has been authorized by the FDA under an Emergency Use Authorization (EUA) for use by authorized laboratories. Fact sheet for Healthcare Providers: ConventionMake YES! Happendate.co.nz  Fact sheet for Patients: LaserLeapdate.co.nz       Methodology: Isothermal Nucleic Acid Amplification               Other Studies:  No results found.     Current Meds:  Current Facility-Administered Medications   Medication Dose Route Frequency    magnesium oxide (MAG-OX) tablet 400 mg  400 mg Oral DAILY    ferrous sulfate tablet 325 mg  1 Tablet Oral DAILY WITH BREAKFAST    lip protectant (BLISTEX) ointment 1 Each  1 Each Topical PRN    pantoprazole (PROTONIX) tablet 40 mg  40 mg Oral ACB    furosemide (LASIX) tablet 40 mg  40 mg Oral DAILY    amiodarone (CORDARONE) tablet 200 mg  200 mg Oral DAILY    levothyroxine (SYNTHROID) tablet 50 mcg  50 mcg Oral ACB    DULoxetine (CYMBALTA) capsule 60 mg  60 mg Oral DAILY    folic acid (FOLVITE) tablet 1 mg  1 mg Oral DAILY    metoprolol succinate (TOPROL-XL) XL tablet 50 mg  50 mg Oral DAILY    rosuvastatin (CRESTOR) tablet 10 mg  10 mg Oral QHS    sodium chloride (NS) flush 5-40 mL  5-40 mL IntraVENous Q8H    sodium chloride (NS) flush 5-40 mL  5-40 mL IntraVENous PRN    acetaminophen (TYLENOL) tablet 650 mg  650 mg Oral Q6H PRN    Or    acetaminophen (TYLENOL) suppository 650 mg  650 mg Rectal Q6H PRN    polyethylene glycol (MIRALAX) packet 17 g  17 g Oral DAILY PRN    ondansetron (ZOFRAN ODT) tablet 4 mg  4 mg Oral Q8H PRN    Or    ondansetron (ZOFRAN) injection 4 mg  4 mg IntraVENous Q6H PRN       Signed:  Marie Ferrera MD    Part of this note may have been written by using a voice dictation software. The note has been proof read but may still contain some grammatical/other typographical errors.

## 2022-01-05 NOTE — ACP (ADVANCE CARE PLANNING)
Advance Care Planning     General Advance Care Planning (ACP) Conversation      Date of Conversation: 12/25/2021  Conducted with: Patient with Decision Making Capacity    Healthcare Decision Maker:     Primary Decision Maker: Louis Nicole - Other Non-relative - [phone]    Secondary Decision Maker: Alfred Rao - Other Non-relative - [phone]  Click here to complete [address] including selection of the Healthcare Decision Maker Relationship (ie \"Primary\")      Today we documented Decision Maker(s) consistent with ACP documents on file. Content/Action Overview:    Has ACP document(s) on file - reflects the patient's care preferences  Reviewed DNR/DNI and patient elects Full Code (Attempt Resuscitation)  Topics discussed: artificial nutrition, ventilation preferences, hospitalization preferences and resuscitation preferences  Additional Comments: N/A     Length of Voluntary ACP Conversation in minutes:  <16 minutes (Non-Billable)    Reford Tayler, LMSW

## 2022-01-05 NOTE — PROGRESS NOTES
ACUTE PHYSICAL THERAPY GOALS:  (Developed with and agreed upon by patient and/or caregiver.)  1. Ms. Nuzhat Pinto will perform all transfers independently in 7 days. 2.  Ms. Nuzhat Pinto will perform gait with rolling walker 120 ft independently in 7 days. 3.  Ms. Nuzhat Pinto will perform up and down 5 steps with rail independently in 7 days. PHYSICAL THERAPY: Daily Note and PM Treatment Day # 4    Ning Martini is a 80 y.o. female   PRIMARY DIAGNOSIS: GI bleed  GI bleed [K92.2]  Procedure(s) (LRB):  THORACENTESIS (N/A)  ULTRASOUND (Bilateral)  6 Days Post-Op    ASSESSMENT:     REHAB RECOMMENDATIONS: CURRENT LEVEL OF FUNCTION:  (Most Recently Demonstrated)   Recommendation to date pending progress:  Setting:   Short-term Rehab  Equipment:    None Bed Mobility:   Modified Independent  Sit to Stand:  [employer] Assistance-minimal assistance  Transfers:   Contact Guard Assistance  Gait/Mobility:   Contact Guard Assistance     ASSESSMENT:  Ms. Nuzhat Pinot presents today without complaints, pleasant and agreeable to therapy. Worked on bed mobility, seated activities, and sit-stand transfer to RW with CGA-min assist. Practiced ambulation in room and hallway using RW and with CGA for safety. Demonstrates fluctuating pace and forward flexed posture. One short standing rest break. On 3L O2 via NC today which is an improvement. Plans for dc to rehab as pt not safe to dc home at this time.      SUBJECTIVE:   Ms. Nuzhat Pinto states, \"thank you\"    SOCIAL HISTORY/ LIVING ENVIRONMENT:   Home Environment: Private residence  One/Two Story Residence: One story  Living Alone: Yes  Support Systems: Friend/Neighbor  OBJECTIVE:     PAIN: VITAL SIGNS: LINES/DRAINS:   Pre Treatment: Pain Screen  Pain Scale 1: Numeric (0 - 10)  Pain Intensity 1: 0  Post Treatment: 0 Vital Signs  O2 Device: Nasal cannula  O2 Flow Rate (L/min): 3 l/min none  O2 Device: Nasal cannula     MOBILITY: I Mod I S SBA CGA Min Mod Max Total  NT x2 Comments:   Bed Mobility Rolling [] [] [] [] [] [] [] [] [] [] []    Supine to Sit [] [x] [] [] [] [] [] [] [] [] []    Scooting [] [] [] [] [] [x] [] [] [] [] []    Sit to Supine [] [] [] [] [] [] [] [] [] [] []    Transfers    Sit to Stand [] [] [] [] [x] [x] [] [] [] [] []    Bed to Chair [] [] [] [] [x] [] [] [] [] [] []    Stand to Sit [] [] [] [] [x] [] [] [] [] [] []    I=Independent, Mod I=Modified Independent, S=Supervision, SBA=Standby Assistance, CGA=Contact Guard Assistance,   Min=Minimal Assistance, Mod=Moderate Assistance, Max=Maximal Assistance, Total=Total Assistance, NT=Not Tested    BALANCE: Good Fair+ Fair Fair- Poor NT Comments   Sitting Static [x] [] [] [] [] []    Sitting Dynamic [x] [] [] [] [] []              Standing Static [] [] [x] [] [] []    Standing Dynamic [] [] [x] [] [] []      GAIT: I Mod I S SBA CGA Min Mod Max Total  NT x2 Comments:   Level of Assistance [] [] [] [] [x] [] [] [] [] [] []    Distance 200    DME Rolling Walker    Gait Quality Slightly off balance, fluctuating pace, one rest break    Weightbearing  Status N/A     I=Independent, Mod I=Modified Independent, S=Supervision, SBA=Standby Assistance, CGA=Contact Guard Assistance,   Min=Minimal Assistance, Mod=Moderate Assistance, Max=Maximal Assistance, Total=Total Assistance, NT=Not Tested    PLAN:   FREQUENCY/DURATION: PT Plan of Care: 3 times/week for duration of hospital stay or until stated goals are met, whichever comes first.  TREATMENT:     TREATMENT:   ($$ Therapeutic Activity: 8-22 mins    )  Therapeutic Activity (14 Minutes): Therapeutic activity included Rolling, Supine to Sit, Scooting, Lateral Scooting, Transfer Training, Ambulation on level ground, Sitting balance  and Standing balance to improve functional Mobility, Strength and Activity tolerance.     TREATMENT GRID:   Date:  1/1/22 Date:   Date:     Activity/Exercise Parameters Parameters Parameters   Seated TKE 20x B     AP 10x B     Seated marching 20x B     Seated hip abd 20x B                             AFTER TREATMENT POSITION/PRECAUTIONS:  Chair, Needs within reach and RN notified    INTERDISCIPLINARY COLLABORATION:  RN/PCT and PT/PTA    TOTAL TREATMENT DURATION:  PT Patient Time In/Time Out  Time In: 1523  Time Out: 17[address] Cade Krishnan, MARTHAT

## 2022-01-06 VITALS
DIASTOLIC BLOOD PRESSURE: 58 MMHG | BODY MASS INDEX: 28.26 KG/M2 | HEART RATE: 69 BPM | HEIGHT: 60 IN | RESPIRATION RATE: 19 BRPM | OXYGEN SATURATION: 90 % | SYSTOLIC BLOOD PRESSURE: 148 MMHG | WEIGHT: 143.94 LBS | TEMPERATURE: 98.3 F

## 2022-01-06 LAB
BACTERIA SPEC CULT: ABNORMAL
BACTERIA SPEC CULT: ABNORMAL
SERVICE CMNT-IMP: ABNORMAL

## 2022-01-06 PROCEDURE — 97535 SELF CARE MNGMENT TRAINING: CPT

## 2022-01-06 PROCEDURE — 97530 THERAPEUTIC ACTIVITIES: CPT

## 2022-01-06 PROCEDURE — 74011250637 HC RX REV CODE- 250/637: Performed by: FAMILY MEDICINE

## 2022-01-06 PROCEDURE — 74011250637 HC RX REV CODE- 250/637: Performed by: PHYSICIAN ASSISTANT

## 2022-01-06 PROCEDURE — 74011250637 HC RX REV CODE- 250/637: Performed by: INTERNAL MEDICINE

## 2022-01-06 PROCEDURE — 74011250637 HC RX REV CODE- 250/637: Performed by: STUDENT IN AN ORGANIZED HEALTH CARE EDUCATION/TRAINING PROGRAM

## 2022-01-06 PROCEDURE — 74011000250 HC RX REV CODE- 250: Performed by: INTERNAL MEDICINE

## 2022-01-06 RX ORDER — LANOLIN ALCOHOL/MO/W.PET/CERES
325 CREAM (GRAM) TOPICAL
Qty: 30 TABLET | Refills: 0 | Status: SHIPPED | OUTPATIENT
Start: 2022-01-07 | End: 2022-02-06

## 2022-01-06 RX ADMIN — SODIUM CHLORIDE, PRESERVATIVE FREE 10 ML: 5 INJECTION INTRAVENOUS at 06:00

## 2022-01-06 RX ADMIN — PANTOPRAZOLE SODIUM 40 MG: 40 TABLET, DELAYED RELEASE ORAL at 09:56

## 2022-01-06 RX ADMIN — METOPROLOL SUCCINATE 50 MG: 50 TABLET, EXTENDED RELEASE ORAL at 09:56

## 2022-01-06 RX ADMIN — FERROUS SULFATE TAB 325 MG (65 MG ELEMENTAL FE) 325 MG: 325 (65 FE) TAB at 09:57

## 2022-01-06 RX ADMIN — AMIODARONE HYDROCHLORIDE 200 MG: 200 TABLET ORAL at 09:55

## 2022-01-06 RX ADMIN — LEVOTHYROXINE SODIUM 50 MCG: 50 TABLET ORAL at 09:55

## 2022-01-06 RX ADMIN — FUROSEMIDE 40 MG: 40 TABLET ORAL at 09:56

## 2022-01-06 RX ADMIN — DULOXETINE HYDROCHLORIDE 60 MG: 60 CAPSULE, DELAYED RELEASE ORAL at 09:56

## 2022-01-06 RX ADMIN — Medication 400 MG: at 09:55

## 2022-01-06 RX ADMIN — FOLIC ACID 1 MG: 1 TABLET ORAL at 09:56

## 2022-01-06 NOTE — PROGRESS NOTES
Received patient from [address] in stable condition. Pt in bed resting quietly. Resp even & unlabored at rest; no acute signs of distress noted. Bed low & locked; call light in reach; no needs voiced.

## 2022-01-06 NOTE — PROGRESS NOTES
ACUTE OT GOALS:  (Developed with and agreed upon by patient and/or caregiver.)  1. Patient will complete lower body bathing and dressing with supervision and adaptive equipment as needed. 01/06/2022 progressing  2. Patient will complete toileting with supervision. 01/06/2022 progressing  3. Patient will tolerate 30 minutes of OT treatment with 1-2 rest breaks to increase activity tolerance for ADLs. 01/06/2022 progressing  4. Patient will complete functional transfers with supervision and adaptive equipment as needed. 01/06/2022 progressing  5. Patient will complete functional mobility for household distances with supervision and adaptive equipment as needed. 6. Patient will complete self-grooming while standing edge of sink with supervision and adaptive equipment as needed. 01/06/2022 progressing  7. Patient will complete functional activity while maintaining Sp02 > 90% with 1-2 rest breaks as needed. 01/06/2022 progressing      Timeframe: 7 visits      OCCUPATIONAL THERAPY: Daily Note   OT Treatment Day # 3    Servando Honeycutt is a 80 y.o. female   PRIMARY DIAGNOSIS: GI bleed  GI bleed [K92.2]  Procedure(s) (LRB):  THORACENTESIS (N/A)  ULTRASOUND (Bilateral)  7 Days Post-Op  Payor: AARP MEDICARE COMPLETE / Plan: [address] / Product Type: Managed Care Medicare /   ASSESSMENT:     REHAB RECOMMENDATIONS: CURRENT LEVEL OF FUNCTION:  (Most Recently Demonstrated)   Recommendation to date pending progress:  Setting:   Short-term Rehab  Equipment:    None Bathing:   Minimal Assistance shower with chair and HH nozzle, OT helped wash hair  Dressing:   Minimal Assistance uses sock aide at home  Feeding/Grooming:   Modified Independent finished breakfast and drank fresh coffee while resting  Toileting:   Contact Guard Assistance found on toilet with no O2 in place, gasping. Reapplied and had BM.  Showered then donned clean pull up with CGA and extra time  Functional Mobility:  Mariya Zamora to CGA since pt was fatigued after shower and dressing and standing for grooming at sink     ASSESSMENT:  Ms. Hugh Oconnor found sitting on the toilet with no Oxygen and door closed, having a BM. Reported she called and no one came so she got up by herself but the O2 tubing would not reach, so she removed it. Pt on 3L NC. Appeared to be struggling to breathe. OT got longer tubing and Pt donned it. Rested and recovered. Pt agreeable to shower and liked the idea of washing her hair. OT placed BSC in shower and pt TFd with CGA and sat to shower chair. Completed bathing with A for shampooing her hair and back only. Crossed leg method for washing feet. Stood for [employer]. OT helped rinse off. OT educated on energy conservation and pacing of tasks with fair return demonstration. Need reinforcement. Pt TFd out of shower with CGA and sat to toilet to complete drying off and dressing. Donned new socks with min A. Reports she uses a sock aide at home. Pt donned pull up brief with SBA and CGA for steadying on feet. Stood at sink for coming [employer] A (reports needs hair cut and hair is too long for her to get the back). Also brushed teeth with SBA. Ambulated back to recliner with SBA, OT managing O2 tubing, and pt's B feet elevated, tray table across her and all needs in reach. OT got fresh coffee for pt. Pt demonstrated to this OT how she has been completing the UE exercises with yellow T band to increase her strength and activity tolerance that the prior OT staff had instructed her in. This OT added more exercise suggestions. Reports she has been doing them often. Cautioned pt to rest and recover from ADL before beginning UE exercises. She verbalized understanding. Pt is progressing towards goals as above but still has impaired activity tolerance. Will continue to benefit from skilled OT during stay. SUBJECTIVE:   Ms. Hugh Oconnor states, \"Oh that was kelsie to get a shower, wash my hair and brush my teeth. I feel like a new woman. Thank you. \"    SOCIAL HISTORY/LIVING ENVIRONMENT: Lives alone in a 1-story home. Multiple recent falls over the last week. Independent at baseline for ADLs and MOD I for functional mobility with use of RW. Home Environment: Private residence  One/Two Story Residence: One story  Living Alone: Yes  Support Systems: Friend/Neighbor    OBJECTIVE:     PAIN: VITAL SIGNS: LINES/DRAINS:   Pre Treatment: Pain Screen  Pain Scale 1: Numeric (0 - 10)  Pain Intensity 1: 0  Post Treatment:no complaint of pain Vital Signs  O2 Device: Nasal cannula  O2 Flow Rate (L/min): 3 l/min (increased to 6 during shower) N/a  O2 Device: Nasal cannula     ACTIVITIES OF DAILY LIVING: I Mod I S SBA CGA Min Mod Max Total NT Comments   BASIC ADLs:              Bathing/ Showering [] [] [] [] [] [x] [] [] [] [] On shower chair, HH nozzle, OT washed back and helped wash & rinse hair   Toileting [] [] [] [] [x] [] [] [] [] []    Dressing [] [] [] [] [] [x] [] [] [] [] For socks only, pt uses sock aide at home. Hospital gown. Feeding [] [] [x] [] [] [] [] [] [] [] finished breakfast and drank coffee   Grooming [] [] [] [] [] [x] [] [] [] [] For combing tangles out of back of hair   Personal Device Care [] [] [] [x] [] [] [] [] [] [] OT placed hearing aide on tray table after pt removed, she replaced it.    Functional Mobility [] [] [] [] [x] [] [] [] [] [] Pt not safe with O2 tubing   I=Independent, Mod I=Modified Independent, S=Supervision, SBA=Standby Assistance, CGA=Contact Guard Assistance,   Min=Minimal Assistance, Mod=Moderate Assistance, Max=Maximal Assistance, Total=Total Assistance, NT=Not Tested    MOBILITY: I Mod I S SBA CGA Min Mod Max Total  NT x2 Comments:   Supine to sit [] [] [] [] [] [] [] [] [] [x] [] Found sitting on toilet   Sit to supine [] [] [] [] [] [] [] [] [] [x] []    Sit to stand [] [] [] [] [x] [] [] [] [] [] []    Bed to chair [] [] [] [] [x] [] [] [] [] [] []    I=Independent, Mod I=Modified Independent, S=Supervision, SBA=Standby Assistance, CGA=Contact Guard Assistance,   Min=Minimal Assistance, Mod=Moderate Assistance, Max=Maximal Assistance, Total=Total Assistance, NT=Not Tested    BALANCE: Good Fair+ Fair Fair- Poor NT Comments   Sitting Static [] [] [x] [] [] []    Sitting Dynamic [] [] [x] [] [] []              Standing Static [] [] [x] [] [] []    Standing Dynamic [] [] [x] [] [] [x]      PLAN:   FREQUENCY/DURATION: OT Plan of Care: 3 times/week for duration of hospital stay or until stated goals are met, whichever comes first.    TREATMENT:   TREATMENT:   ($$ Self Care/Home Management: 53-67 mins$$ Therapeutic Activity: 8-22 mins   )  Therapeutic Activity (10 Minutes): Therapeutic activity included Transfer Training, Ambulation on level ground, Sitting balance , Standing balance and safety in shower to improve functional Mobility, Strength, ROM, Activity tolerance and ADLs. Therapeutic Exercise (3 Minutes): Therapeutic exercises noted below to improve functional activity tolerance, AROM, strength, mobility and use of B UEs in ADLs. Self Care (60 Minutes): Self care including Upper Body Bathing, Lower Body Bathing, Toileting, Upper Body Dressing, Lower Body Dressing, Self Feeding, Personal Device Care, Grooming, ADL Adaptive Equipment Training, Energy Conservation Training and safety in ADLs to increase independence and decrease level of assistance required.     TREATMENT GRID: pt demonstrated understanding and will complete on her own daily, takes appropriate rest breaks, seated, yellow T band   Date:  1/3/22 Date:  01/06/2022 Date:     Activity/Exercise Parameters Parameters Parameters   Shoulder Abd/Adduction 10 reps 5 reps    Shoulder Flexion 10 reps 5 reps    Elbow Flexion 10 reps 5 reps    Chest pull  5 reps    Diagonals, pull up each arm across body  5 reps each    Overhead pull down  5 reps            AFTER TREATMENT POSITION/PRECAUTIONS:  Chair, Needs within reach and RN notified    Kanwal Carrillo COLLABORATION:  RN/PCT, OT/SCHROEDER and RN Case Manager/     TOTAL TREATMENT DURATION:  73 mins  OT Patient Time In/Time Out  Time In: 0800  Time Out: 0913    BRANDEE Farmer, MS, OTR/L

## 2022-01-06 NOTE — PROGRESS NOTES
01/06/22 1257   Resting (Room Air)   SpO2 93   HR 70   Resting (On O2)   SpO2 98   HR 70   O2 Device Nasal cannula   O2 Flow Rate (l/min) 4 l/min   During Walk (Room Air)   SpO2 90   HR 70   Walk/Assistance Device Ambulation   Rate of Dyspnea 0   Symptoms Leg pain   During Walk (On O2)   Need Additional O2 Flow Rate Rows No

## 2022-01-06 NOTE — PROGRESS NOTES
ACUTE PHYSICAL THERAPY GOALS:  (Developed with and agreed upon by patient and/or caregiver.)  1. Ms. Zuly Engel will perform all transfers independently in 7 days. 2.  Ms. Zuly Engel will perform gait with rolling walker 120 ft independently in 7 days. 3.  Ms. Zuly Engel will perform up and down 5 steps with rail independently in 7 days. PHYSICAL THERAPY: Daily Note and AM Treatment Day # 5    Ulises Sales is a 80 y.o. female   PRIMARY DIAGNOSIS: GI bleed  GI bleed [K92.2]  Procedure(s) (LRB):  THORACENTESIS (N/A)  ULTRASOUND (Bilateral)  7 Days Post-Op    ASSESSMENT:     REHAB RECOMMENDATIONS: CURRENT LEVEL OF FUNCTION:  (Most Recently Demonstrated)   Recommendation to date pending progress:  [address] [place] [address] Therapy  Equipment:    None Bed Mobility:   Modified Independent  Sit to Stand:  [employer] [facility] Assistance  Transfers:   Standby Assistance  Gait/Mobility:   Contact Guard Assistance-stand by assistance     ASSESSMENT:  Ms. Zuly Engel presents without complaints today, agreeable to therapy treatment and mobility. Performs transfers with SBA to RW. Cues for technique as well as activity pacing and breathing. Ambulation in room and hallway x 315 ft with RW/CGA-SBA. Pt with much improved pacing and gait safety, breathing today and was able to increase gait distance. Returned to room for seated rest break then performs LE exercises with good participation. Positioned comfortably afterwards with needs in reach. Pt now expresses wishes to dc home not STR due to COVID concerns. Recommending  PT and will benefit from shorter or more portable oxygen tubing.      SUBJECTIVE:   Ms. Zuly Engel states, \"thank you\"    SOCIAL HISTORY/ LIVING ENVIRONMENT:   Home Environment: Private residence  One/Two Story Residence: One story  Living Alone: Yes  Support Systems: Friend/Neighbor  OBJECTIVE:     PAIN: VITAL SIGNS: LINES/DRAINS:   Pre Treatment: Pain Screen  Pain Scale 1: Numeric (0 - 10)  Pain Intensity 1: 0  Post Treatment: 0 Vital Signs  O2 Device: Nasal cannula  O2 Flow Rate (L/min): 4 l/min none  O2 Device: None (Room air)     MOBILITY: I Mod I S SBA CGA Min Mod Max Total  NT x2 Comments:   Bed Mobility    Rolling [] [] [] [] [] [] [] [] [] [] []    Supine to Sit [] [x] [] [] [] [] [] [] [] [] []    Scooting [] [] [] [] [] [] [] [] [] [] []    Sit to Supine [] [] [] [] [] [] [] [] [] [] []    Transfers    Sit to Stand [] [] [] [x] [] [] [] [] [] [] []    Bed to Chair [] [] [] [] [x] [] [] [] [] [] []    Stand to Sit [] [] [] [x] [] [] [] [] [] [] []    I=Independent, Mod I=Modified Independent, S=Supervision, SBA=Standby Assistance, CGA=Contact Guard Assistance,   Min=Minimal Assistance, Mod=Moderate Assistance, Max=Maximal Assistance, Total=Total Assistance, NT=Not Tested    BALANCE: Good Fair+ Fair Fair- Poor NT Comments   Sitting Static [x] [] [] [] [] []    Sitting Dynamic [x] [] [] [] [] []              Standing Static [] [x] [] [] [] []    Standing Dynamic [] [x] [] [] [] []      GAIT: I Mod I S SBA CGA Min Mod Max Total  NT x2 Comments:   Level of Assistance [] [] [] [x] [x] [] [] [] [] [] []    Distance 315'    DME Rolling Walker    Gait Quality Steady pace, no major deficits    Weightbearing  Status N/A     I=Independent, Mod I=Modified Independent, S=Supervision, SBA=Standby Assistance, CGA=Contact Guard Assistance,   Min=Minimal Assistance, Mod=Moderate Assistance, Max=Maximal Assistance, Total=Total Assistance, NT=Not Tested    PLAN:   FREQUENCY/DURATION: PT Plan of Care: 3 times/week for duration of hospital stay or until stated goals are met, whichever comes first.  TREATMENT:     TREATMENT:   ($$ Therapeutic Activity: 23-37 mins    )  Therapeutic Activity (25 Minutes): Therapeutic activity included Scooting, Transfer Training, Ambulation on level ground, Sitting balance , Standing balance and seated LE exercises to improve functional Mobility, Strength, Activity tolerance and balance.     TREATMENT GRID:   Date:  1/1/22 Date:  1/6/22 Date:     Activity/Exercise Parameters Parameters Parameters   Seated TKE 20x B 10x    AP 10x B 10x    Seated marching 20x B 10x    Seated hip abd 20x B 10x                            AFTER TREATMENT POSITION/PRECAUTIONS:  Chair, Needs within reach and RN notified    INTERDISCIPLINARY COLLABORATION:  RN/PCT and PT/PTA    TOTAL TREATMENT DURATION:  PT Patient Time In/Time Out  Time In: 1037  Time Out: Shanice Sims 37, DPT

## 2022-01-06 NOTE — PROGRESS NOTES
Pt to d/c home and resume [place] at [facility]: RN, PT, OT, CNA. A friend will provide transportation home. [facility] and insurance auth was approved this morning, however pt decided she did not want to go to [facility] due to COVID-19. CM called [facility] to request a shorter O2 canula as pt states her current tubing is long and caused her to fall in the past.  CM spoke with Dinora Rivera and she stated that new tubing and a connector will be sent tomorrow to pt's home via [employer]. No other supportive care needs identified. Milestones met. Care Management Interventions  PCP Verified by CM: Yes (Aviva Bryant. Shine Knight MD)  Mode of Transport at Discharge: Other (see comment) (Friend)  Transition of Care Consult (CM Consult): Discharge [address] Larsen: No  Reason Outside [place]: Patient already serviced by other home care/hospice agency ([facility])  Discharge Durable Medical Equipment: No  Physical Therapy Consult: Yes  Occupational Therapy Consult: Yes  Speech Therapy Consult: No  Support Systems: Friend/Neighbor  Confirm Follow Up Transport: Friends  The Plan for Transition of Care is Related to the Following Treatment Goals : Pt requires home health to return to functional baseline in the community.   The Patient and/or Patient Representative was Provided with a Choice of Provider and Agrees with the Discharge Plan?: Yes  Name of the Patient Representative Who was Provided with a Choice of Provider and Agrees with the Discharge Plan: Mehran Bosch  [place] of Choice List was Provided with Basic Dialogue that Supports the Patient's Individualized Plan of Care/Goals, Treatment Preferences and Shares the Quality Data Associated with the Providers?: Yes  [place] [facility] Information Provided?: No  Discharge Location  Discharge Placement: Home with home health ([facility])

## 2022-01-06 NOTE — DISCHARGE SUMMARY
Hospitalist Discharge Summary   Admit Date:  2021  4:57 PM   DC Note date: 2022  Name:  Catherine Mars   Age:  80 y.o. Sex:  female  :  1940   MRN:  [id number]   Room:    PCP:  Rocio Dennis MD    Presenting Complaint: Shortness of Breath    Initial Admission Diagnosis: GI bleed [K92.2]     Problem List for this Hospitalization:  Hospital Problems as of 2022 Date Reviewed: 2021          Codes Class Noted - Resolved POA    Hypoxia ICD-10-CM: R09.02  ICD-9-CM: 799.02  2021 - Present Unknown        Pleural effusion ICD-10-CM: J90  ICD-9-CM: 511.9  2021 - Present Unknown        T12 compression fracture ([facility] ICD-10-CM: S22.080A  ICD-9-CM: 805.2  2021 - Present Unknown        Renal cyst, left ICD-10-CM: N28.1  ICD-9-CM: 753.10  2021 - Present Unknown        * (Principal) GI bleed ICD-10-CM: K92.2  ICD-9-CM: 578.9  2021 - Present Unknown        Diastolic CHF, chronic (HCC) ICD-10-CM: I50.32  ICD-9-CM: 428.32, 428.0  2021 - Present Yes        Stage 3 chronic kidney disease ([facility] (Chronic) ICD-10-CM: N18.30  ICD-9-CM: 585.3  2021 - Present Yes        Hypertension, essential, benign (Chronic) ICD-10-CM: I10  ICD-9-CM: 401.1  2016 - Present Yes        Acquired hypothyroidism (Chronic) ICD-10-CM: E03.9  ICD-9-CM: 244.9  2016 - Present Yes        Hyperlipidemia (Chronic) ICD-10-CM: E78.5  ICD-9-CM: 272.4  2016 - Present Yes        Rheumatoid arthritis ([facility] (Chronic) ICD-10-CM: M06.9  ICD-9-CM: 714.0  2016 - Present Yes            Did Patient have Sepsis (YES OR NO): NO    Acute on chronic hypoxemic respiratory failure, bilateral pleural effusions  Acute blood loss anemia/upper GI bleed  T12 compression fracture  Hypokalemia  Left renal cyst  Hypothyroidism, Graves' disease  Hyperlipidemia  Paroxysmal atrial fibrillation  Hypertension   CKD stage III  Anxiety/depression    Hospital Course:     This is a 79-year-old female with hypothyroid history of Graves' disease A. fib on Xarelto anemia CKD 3 who was recently admitted 12/1312/21 with progressive acute blood loss anemia that hemoglobin 3.3 at that time. She was status post an EGD on 12/14 was unremarkable. She was given IV iron per hematology recs. Xarelto was resumed and hemoglobin was greater than 8 on discharge. She represents 12/25 with increasing shortness of breath. She appeared pale found have a large melanotic stool on exam.  ED work-up showed hemoglobin 3.7 INR 3.6. Chest x-ray showed pulmonary edema. She received Kcentra for INR. She received 3 units of red blood cells. 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 5 angiodysplasias treated. Also there is a high likelihood of additional AVMs beyond the reach of the pediatric scope. She also had a colonoscopy 12/28 which showed diverticulosis internal hemorrhoids. Xarelto was held during hospital stay. The patient has acute on chronic respiratory failure with hypoxia. She is wears 2 L at home but is requiring roughly 4 L here. Chest x-ray show pulmonary congestion and the repeat chest x-ray showed bilateral pleural effusions left greater than right. Covid swab was negative. She is given IV Lasix. Pulmonology is consulted, after a CT chest was obtained that showed pleural effusion and a larger left-sided than right-sided. They did thoracentesis remove 800 mL on 12/30. Respiratory status improved. Currently the patient is on 3 L oxygen nasal cannula. At baseline patient is on 2 L oxygen. Repeat home oxygen screen was done and pt needed 4L Oxygen with ambulation. Risk/benefits of anticoagulation were discussed with the patient as she is at increased risk of stroke given her paroxysmal atrial fibrillation.   Patient stated that she would like to restart Xarelto as she had previous stroke and she is at increased risk of recurrent stroke and she will accept the risk of bleeding. Xarelto was resumed on discharge. She is initially planned to be discharged to short-term rehab but patient stated that she is worried about going to rehab given increased risk of COVID infection. She is discharged home with home health services today in a stable condition. Disposition: Home Health Care Svc  Diet: ADULT DIET Easy to Chew  Code Status: Full Code    Follow Up Orders: Follow-up Appointments   Procedures    FOLLOW UP VISIT Appointment in: Other (Specify) 4-6wks with [facility]. Our office to call patient to schedule this visit. 4-6wks with [facility]. Our office to call patient to schedule this visit. Standing Status:   Standing     Number of Occurrences:   1     Order Specific Question:   Appointment in     Answer: Other (Specify)    FOLLOW UP VISIT Appointment in: 3 - 5 Days F/U WITH PCP IN 3-5 DAYS. F/U WITH PCP IN 3-5 DAYS. Standing Status:   Standing     Number of Occurrences:   1     Standing Expiration Date:   1/7/2022     Order Specific Question:   Appointment in     Answer:   3 - 5 Days       Follow-up Information     Follow up With Specialties Details Why Contact Info    [address] [facility]) [address], [address] [id number] [phone]      [address] [place] [address] [facility]  On 2/2/2022 Apt time 10:[address] [facility] [facility]  [phone]          Follow up labs/diagnostics (ultimately defer to outpatient provider):  CBC, CMP in 3-5 days    Time spent in patient discharge and coordination 41 minutes. Plan was discussed with the pt. All questions answered. Patient was stable at time of discharge.   Instructions given to call a physician or return if any concerns. Discharge Info:   Current Discharge Medication List      START taking these medications    Details   ferrous sulfate 325 mg (65 mg iron) tablet Take 1 Tablet by mouth daily (with breakfast) for 30 days. Qty: 30 Tablet, Refills: 0  Start date: 1/7/2022, End date: 2/6/2022         CONTINUE these medications which have NOT CHANGED    Details   rivaroxaban (Xarelto) 15 mg tab tablet Take 1 Tablet by mouth daily (with breakfast) for 30 days. Qty: 30 Tablet, Refills: 0      metoprolol succinate (TOPROL-XL) 50 mg XL tablet Take 1 Tablet by mouth daily. Qty: 30 Tablet, Refills: 1      furosemide (LASIX) 20 mg tablet Take 1 Tablet by mouth daily. Qty: 30 Tablet, Refills: 1      pantoprazole (PROTONIX) 40 mg tablet Take 1 Tablet by mouth Before breakfast and dinner. Qty: 60 Tablet, Refills: 0      folic acid (FOLVITE) 1 mg tablet Take 1 mg by mouth daily. amiodarone (CORDARONE) 200 mg tablet Take 1 Tab by mouth daily. Qty: 30 Tab, Refills: 11      rosuvastatin (Crestor) 10 mg tablet Take 1 Tab by mouth nightly. Qty: 30 Tab, Refills: 1      ondansetron (ZOFRAN ODT) 4 mg disintegrating tablet Take 1 Tab by mouth every eight (8) hours as needed for Nausea or Vomiting. Qty: 20 Tab, Refills: 1      cholecalciferol (VITAMIN D3) 1,000 unit tablet Take 1,000 Units by mouth daily. DULoxetine (CYMBALTA) 60 mg capsule Take 1 Cap by mouth daily. Qty: 90 Cap, Refills: 2    Associated Diagnoses: Idiopathic peripheral neuropathy; Major depressive disorder with single episode, in remission (HCC)      levothyroxine (SYNTHROID) 25 mcg tablet Take 1 Tab by mouth Daily (before breakfast).   Qty: 90 Tab, Refills: 2    Associated Diagnoses: Acquired hypothyroidism             Procedures done this admission:  Procedure(s):  THORACENTESIS  ULTRASOUND    Consults this admission:  IP CONSULT TO GASTROENTEROLOGY  IP CONSULT TO PODIATRY  IP CONSULT TO CARDIOLOGY  IP CONSULT TO PULMONOLOGY    Echocardiogram/EKG results:  Results from Hospital Encounter encounter on 12/25/21    ECHO ADULT COMPLETE    Interpretation Summary    Left Ventricle: Left ventricle is mildly dilated. Normal wall thickness. Normal wall motion. The EF by visual approximation is 55 - 60%. Normal diastolic function.   Right Ventricle: Right ventricle size is normal. Low normal systolic function.   Aortic Valve: Probably trileaflet aortic valve. No transvalvular regurgitation. No stenosis.   Mitral Valve: Mild to moderate transvalvular regurgitation.   Left Atrium: Left atrium is severely dilated. EKG Results     Procedure 720 Value Units Date/Time    EKG, 12 LEAD, INITIAL [[id number]]     Order Status: Completed           Diagnostic Imaging/Tests:   XR CHEST PORT    Result Date: 12/26/2021  Chest X-ray INDICATION: Follow-up heart surgery A portable AP view of the chest was obtained. FINDINGS: There are stable bilateral infiltrates, probably pulmonary edema. There are bilateral pleural effusions, left greater than right. There is stable cardiomegaly. The bony thorax is intact. Stable effusions and mild pulmonary edema     XR CHEST PORT    Result Date: 12/25/2021  Exam: XR CHEST PORT on 12/25/2021 6:08 PM Clinical History: The female patient is 80years old  presenting for shortness of breath. Comparison:  Chest x-ray 12/13/2021 Findings:  Frontal view of the chest was obtained. There is continued mild pulmonary vascular congestion with a small to moderate left basilar effusion. The cardiomediastinal silhouette is upper limits of normal in size. There are no acute osseous abnormalities. Surgical clips right axilla. 1. Continued CHF/volume overload.  CPT code(s) 29965       All Micro Results     Procedure Component Value Units Date/Time    CULTURE, CATHETER TIP [[id number]]  (Abnormal) Collected: 01/03/22 6497    Order Status: Completed Specimen: CVP Catheter Tip Updated: 01/06/22 0721     Special Requests: NO SPECIAL REQUESTS        Culture result:       <15 CFU STAPHYLOCOCCUS SPECIES, COAGULASE NEGATIVE                  THIS ORGANISM WILL BE HELD FOR 7 DAYS.  IF FURTHER TESTING IS REQUIRED PLEASE NOTIFY MICROBIOLOGY          CULTURE, BLOOD [[id number]] Collected: 01/03/22 1802    Order Status: Completed Specimen: Blood Updated: 01/06/22 0719     Special Requests: --        RIGHT  HAND       Culture result: NO GROWTH 3 DAYS       CULTURE, BLOOD [[id number]] Collected: 01/03/22 1758    Order Status: Completed Specimen: Blood Updated: 01/06/22 0719     Special Requests: --        LEFT  Antecubital       Culture result: NO GROWTH 3 DAYS       FUNGUS CULTURE AND SMEAR [[id number]] Collected: 12/30/21 1558    Order Status: Completed Specimen: Miscellaneous sample Updated: 01/03/22 1403     Source PLEURAL FLUID        Comment: LEFT  1          Fungus stain Direct Inoculation     Comment: DEMOGRAPHIC UPDATE OCCURRED ON 01/03 AT 0564, QA FLAGS AND RANGES MAY NO LONGER BE VALID        Fungus (Mycology) Culture Other source received     Comment: DEMOGRAPHIC UPDATE OCCURRED ON 01/03 AT 1403, QA FLAGS AND RANGES MAY NO LONGER BE VALID  (NOTE)  Performed At: [facility] [address] [id number]  Debra Pacheco MD GD:[id number]         AFB CULTURE + SMEAR W/RFLX ID FROM CULTURE [[id number]] Collected: 12/30/21 1558    Order Status: Completed Specimen: Miscellaneous sample Updated: 01/03/22 1403     Source PLEURAL FLUID        Comment: LEFT  1          AFB Specimen processing Concentration     Comment: DEMOGRAPHIC UPDATE OCCURRED ON 01/03 AT 1403, QA FLAGS AND RANGES MAY NO LONGER BE VALID        Acid Fast Smear Negative        Comment: DEMOGRAPHIC UPDATE OCCURRED ON 01/03 AT 1403, QA FLAGS AND RANGES MAY NO LONGER BE VALID  (NOTE)  Performed At: [facility] [address] [id number]  Debra Pacheco MD VU:[id number]          Acid Fast Culture PENDING    CULTURE, BODY FLUID Lashonda Hester [[id number]] Collected: 12/30/21 1558    Order Status: Completed Specimen: Pleural Fluid Updated: 01/02/22 0839     Special Requests: NO SPECIAL REQUESTS        GRAM STAIN NO WBCS SEEN         NO DEFINITE ORGANISM SEEN        Culture result: NO GROWTH 2 DAYS       CULTURE, BLOOD [[id number]] Collected: 12/25/21 1746    Order Status: Completed Specimen: Blood Updated: 12/30/21 0747     Special Requests: LEFT FOREARM        Culture result: NO GROWTH 5 DAYS       CULTURE, BLOOD [[id number]] Collected: 12/25/21 1730    Order Status: Completed Specimen: Blood Updated: 12/30/21 0747     Special Requests: --        LEFT  Antecubital       Culture result: NO GROWTH 5 DAYS       COVID-19 RAPID TEST [[id number]] Collected: 12/25/21 1823    Order Status: Completed Specimen: Nasopharyngeal Updated: 12/25/21 1859     Specimen source NASAL        COVID-19 rapid test Not detected        Comment:      The specimen is NEGATIVE for SARS-CoV-2, the novel coronavirus associated with COVID-19. A negative result does not rule out COVID-19. This test has been authorized by the FDA under an Emergency Use Authorization (EUA) for use by authorized laboratories. Fact sheet for Healthcare Providers: ConventionUpdate.co.nz  Fact sheet for Patients: ConventionUpdate.co.nz       Methodology: Isothermal Nucleic Acid Amplification               Labs: Results:       BMP, Mg, Phos Recent Labs     01/05/22  0624 01/04/22  0555    140   K 4.0 3.9   CL 99 102   CO2 38* 36*   AGAP 0* 2*   BUN 17 16   CREA 1.23* 1.17*   CA 8.6 8.6   GLU 88 82      CBC Recent Labs     01/05/22  0624 01/04/22  0555   WBC 7.2 7.3   RBC 3.17* 3.08*   HGB 9.4* 8.8*   HCT 31.5* 30.2*    381      LFT No results for input(s): ALT, TBIL, AP, TP, ALB, GLOB, AGRAT in the last 72 hours.     No lab exists for component: SGOT, GPT   Cardiac Testing Lab Results   Component Value Date/Time    BNP 25 (H) 04/22/2019 12:28 PM    Troponin-I, Qt. <0.02 (L) 01/16/2016 06:55 PM      Coagulation Tests Lab Results   Component Value Date/Time    Prothrombin time 20.9 (H) 12/26/2021 03:00 AM    Prothrombin time 36.0 (H) 12/25/2021 05:30 PM    Prothrombin time 19.0 (H) 12/15/2021 10:59 AM    INR 1.8 12/26/2021 03:00 AM    INR 3.6 12/25/2021 05:30 PM    INR 1.6 12/15/2021 10:59 AM    aPTT 38.5 (H) 12/25/2021 05:30 PM    aPTT 29.9 12/13/2021 04:44 PM    aPTT 30.4 05/13/2021 08:25 AM      A1c Lab Results   Component Value Date/Time    Hemoglobin A1c 5.2 01/17/2016 03:54 AM      Lipid Panel Lab Results   Component Value Date/Time    Cholesterol, total 140 02/05/2018 01:20 PM    HDL Cholesterol 45 02/05/2018 01:20 PM    LDL, calculated 72 02/05/2018 01:20 PM    VLDL, calculated 23 02/05/2018 01:20 PM    Triglyceride 113 02/05/2018 01:20 PM    CHOL/HDL Ratio 3.3 01/17/2016 03:54 AM      Thyroid Panel Lab Results   Component Value Date/Time    TSH 13.600 (H) 01/02/2022 10:51 AM    TSH 17.300 (H) 12/13/2021 04:44 PM    T4, Free 1.2 01/02/2022 10:51 AM    T3, total 0.65 01/02/2022 10:51 AM        Most Recent UA Lab Results   Component Value Date/Time    Color YELLOW 12/13/2021 05:43 PM    Appearance CLEAR 12/13/2021 05:43 PM    Specific gravity 1.016 04/23/2019 08:35 PM    pH (UA) 5.0 12/13/2021 05:43 PM    Protein Negative 12/13/2021 05:43 PM    Glucose Negative 12/13/2021 05:43 PM    Ketone Negative 12/13/2021 05:43 PM    Bilirubin Negative 12/13/2021 05:43 PM    Blood Negative 12/13/2021 05:43 PM    Urobilinogen 1.0 12/13/2021 05:43 PM    Nitrites Negative 12/13/2021 05:43 PM    Leukocyte Esterase Negative 12/13/2021 05:43 PM    WBC 0-3 05/17/2021 09:46 AM    RBC 0-3 05/17/2021 09:46 AM    Epithelial cells 0-3 05/17/2021 09:46 AM    Bacteria TRACE 05/17/2021 09:46 AM    Casts 10-20 03/19/2021 03:37 PM    Crystals, urine 0 03/19/2021 03:37 PM    Mucus 0 03/19/2021 03:37 PM    Other observations RESULTS VERIFIED MANUALLY 05/17/2021 09:46 AM All Labs from Last 24 Hrs:  Recent Results (from the past 24 hour(s))   PLEASE READ & DOCUMENT PPD TEST IN 24 HRS    Collection Time: 01/05/22  3:24 PM   Result Value Ref Range    PPD Negative Negative    mm Induration 0 0 - 5 mm       Current Med List in Hospital:   Current Facility-Administered Medications   Medication Dose Route Frequency    magnesium oxide (MAG-OX) tablet 400 mg  400 mg Oral DAILY    ferrous sulfate tablet 325 mg  1 Tablet Oral DAILY WITH BREAKFAST    lip protectant (BLISTEX) ointment 1 Each  1 Each Topical PRN    pantoprazole (PROTONIX) tablet 40 mg  40 mg Oral ACB    furosemide (LASIX) tablet 40 mg  40 mg Oral DAILY    amiodarone (CORDARONE) tablet 200 mg  200 mg Oral DAILY    levothyroxine (SYNTHROID) tablet 50 mcg  50 mcg Oral ACB    DULoxetine (CYMBALTA) capsule 60 mg  60 mg Oral DAILY    folic acid (FOLVITE) tablet 1 mg  1 mg Oral DAILY    metoprolol succinate (TOPROL-XL) XL tablet 50 mg  50 mg Oral DAILY    rosuvastatin (CRESTOR) tablet 10 mg  10 mg Oral QHS    sodium chloride (NS) flush 5-40 mL  5-40 mL IntraVENous Q8H    sodium chloride (NS) flush 5-40 mL  5-40 mL IntraVENous PRN    acetaminophen (TYLENOL) tablet 650 mg  650 mg Oral Q6H PRN    Or    acetaminophen (TYLENOL) suppository 650 mg  650 mg Rectal Q6H PRN    polyethylene glycol (MIRALAX) packet 17 g  17 g Oral DAILY PRN    ondansetron (ZOFRAN ODT) tablet 4 mg  4 mg Oral Q8H PRN    Or    ondansetron (ZOFRAN) injection 4 mg  4 mg IntraVENous Q6H PRN       Allergies   Allergen Reactions    Eliquis [Apixaban] Other (comments)     Patient states she had hallucinations from Eliquis     Immunization History   Administered Date(s) Administered    Influenza High Dose Vaccine PF 10/28/2015, 09/21/2016, 09/15/2017    Pneumococcal Polysaccharide (PPSV-23) 01/18/2016    TB Skin Test (PPD) Intradermal 04/22/2019, 05/12/2021, 12/14/2021, 12/30/2021, 01/04/2022       Recent Vital Data:  Patient Vitals for the past 24 hrs:   Temp Pulse Resp BP SpO2   01/06/22 1015 98.3 °F (36.8 °C) 69 19 (!) 148/58 97 %   01/06/22 0814 98.2 °F (36.8 °C) 73 20 (!) 137/101 97 %   01/06/22 0320 98 °F (36.7 °C) 65 14 (!) 128/94 98 %   01/05/22 2246 98.6 °F (37 °C) 68 20 137/84 100 %   01/05/22 1940 97.9 °F (36.6 °C) 65 18 (!) 140/70 97 %   01/05/22 1648 97.4 °F (36.3 °C) 67 18 111/74 100 %   01/05/22 1247 99.1 °F (37.3 °C) 69 18 (!) 137/55 100 %     Oxygen Therapy  O2 Sat (%): 97 % (01/06/22 1015)  Pulse via Oximetry: 83 beats per minute (01/01/22 0855)  O2 Device: Nasal cannula (01/06/22 1102)  Skin Assessment: Clean, dry, & intact (01/06/22 1402)  Skin Protection for O2 Device: N/A (12/29/21 2039)  O2 Flow Rate (L/min): 4 l/min (01/06/22 1102)  O2 Temperature: 87.8 °F (31 °C) (12/26/21 1230)  FIO2 (%): 50 % (12/26/21 1230)    Estimated body mass index is 28.11 kg/m² as calculated from the following:    Height as of this encounter: 5' (1.524 m). Weight as of this encounter: 65.3 kg (143 lb 15 oz). No intake or output data in the 24 hours ending 01/06/22 1216      Physical Exam:    General:    Well nourished. No overt distress on 3L Oxygen NC,   Head:  Normocephalic, atraumatic  Eyes:  Sclerae appear normal.  Pupils equally round. HENT:  Nares appear normal, no drainage. Moist mucous membranes  Neck:  No restricted ROM. Trachea midline  CV:   RRR. No m/r/g. No JVD  Lungs:   CTAB. No wheezing, rhonchi, or rales. Even, unlabored  Abdomen:   Soft, nontender, nondistended. Extremities: Warm and dry. No cyanosis or clubbing. No edema. Skin:     No rashes. Normal coloration  Neuro:  CN II-XII grossly intact. Psych:  Normal mood and affect. Signed:  Alice Childers MD    Part of this note may have been written by using a voice dictation software. The note has been proof read but may still contain some grammatical/other typographical errors.

## 2022-01-06 NOTE — PROGRESS NOTES
Problem: Pain  Goal: *Control of Pain  1/6/2022 1340 by Sina Perera RN  Outcome: Resolved/Met  1/6/2022 1340 by Sina Perera RN  Outcome: Progressing Towards Goal  Goal: *PALLIATIVE CARE:  Alleviation of Pain  1/6/2022 1340 by Sina Perera RN  Outcome: Resolved/Met  1/6/2022 1340 by Sina Perera RN  Outcome: Progressing Towards Goal     Problem: Patient Education: Go to Patient Education Activity  Goal: Patient/Family Education  1/6/2022 1340 by Sina Perera RN  Outcome: Resolved/Met  1/6/2022 1340 by Sina Perera RN  Outcome: Progressing Towards Goal     Problem: Falls - Risk of  Goal: *Absence of Falls  Description: Document Gretchen Mo Fall Risk and appropriate interventions in the flowsheet.   1/6/2022 1340 by Sina Perera RN  Outcome: Resolved/Met  1/6/2022 1340 by Sina Perera RN  Outcome: Progressing Towards Goal  Note: Fall Risk Interventions:  Mobility Interventions: Bed/chair exit alarm    Mentation Interventions: Bed/chair exit alarm    Medication Interventions: Bed/chair exit alarm    Elimination Interventions: Bed/chair exit alarm,Call light in reach    History of Falls Interventions: Bed/chair exit alarm         Problem: Patient Education: Go to Patient Education Activity  Goal: Patient/Family Education  1/6/2022 1340 by Sina Perera RN  Outcome: Resolved/Met  1/6/2022 1340 by Sina Perera RN  Outcome: Progressing Towards Goal     Problem: Patient Education: Go to Patient Education Activity  Goal: Patient/Family Education  1/6/2022 1340 by Sina Perera RN  Outcome: Resolved/Met  1/6/2022 1340 by Sina Perera RN  Outcome: Progressing Towards Goal     Problem: Heart Failure: Day 1  Goal: Off Pathway (Use only if patient is Off Pathway)  1/6/2022 1340 by Sina Perera RN  Outcome: Resolved/Met  1/6/2022 1340 by Sina Perera RN  Outcome: Progressing Towards Goal  Goal: Activity/Safety  1/6/2022 1340 by Jayce Monreal Mario Sagastume, RN  Outcome: Resolved/Met  1/6/2022 1340 by Cora Diamond, RN  Outcome: Progressing Towards Goal  Goal: Consults, if ordered  1/6/2022 1340 by Cora Diamond, RN  Outcome: Resolved/Met  1/6/2022 1340 by Cora Diamond, RN  Outcome: Progressing Towards Goal  Goal: Diagnostic Test/Procedures  1/6/2022 1340 by Cora Diamond, RN  Outcome: Resolved/Met  1/6/2022 1340 by Cora Diamond, RN  Outcome: Progressing Towards Goal  Goal: Nutrition/Diet  1/6/2022 1340 by Cora Diamond, RN  Outcome: Resolved/Met  1/6/2022 1340 by Cora Diamond, RN  Outcome: Progressing Towards Goal  Goal: Discharge Planning  1/6/2022 1340 by Cora Diamond, RN  Outcome: Resolved/Met  1/6/2022 1340 by Cora Diamond, RN  Outcome: Progressing Towards Goal  Goal: Medications  1/6/2022 1340 by Cora Diamond, RN  Outcome: Resolved/Met  1/6/2022 1340 by Cora Diamond, RN  Outcome: Progressing Towards Goal  Goal: Respiratory  1/6/2022 1340 by Cora Diamond, RN  Outcome: Resolved/Met  1/6/2022 1340 by Cora Diamond, RN  Outcome: Progressing Towards Goal  Goal: Treatments/Interventions/Procedures  1/6/2022 1340 by Cora Diamond, RN  Outcome: Resolved/Met  1/6/2022 1340 by Cora Diamond, RN  Outcome: Progressing Towards Goal  Goal: Psychosocial  1/6/2022 1340 by Cora Diamond, RN  Outcome: Resolved/Met  1/6/2022 1340 by Cora Diamond, RN  Outcome: Progressing Towards Goal  Goal: *Oxygen saturation within defined limits  1/6/2022 1340 by Cora Diamond, RN  Outcome: Resolved/Met  1/6/2022 1340 by Cora Diamond, RN  Outcome: Progressing Towards Goal  Goal: *Hemodynamically stable  1/6/2022 1340 by Cora Diamond, RN  Outcome: Resolved/Met  1/6/2022 1340 by Cora Diamond, RN  Outcome: Progressing Towards Goal  Goal: *Optimal pain control at patient's stated goal  1/6/2022 1340 by Cora Diamond RN  Outcome: Resolved/Met  1/6/2022 1340 by Jesusita Ornelas, Shireen Bach, RN  Outcome: Progressing Towards Goal  Goal: *Anxiety reduced or absent  1/6/2022 1340 by Serene Sharma, RN  Outcome: Resolved/Met  1/6/2022 1340 by Serene Sharma, RN  Outcome: Progressing Towards Goal     Problem: Heart Failure: Day 2  Goal: Off Pathway (Use only if patient is Off Pathway)  1/6/2022 1340 by Serene Sharma, RN  Outcome: Resolved/Met  1/6/2022 1340 by Serene Sharma, RN  Outcome: Progressing Towards Goal  Goal: Activity/Safety  1/6/2022 1340 by Serene Sharma, RN  Outcome: Resolved/Met  1/6/2022 1340 by Serene Sharma, RN  Outcome: Progressing Towards Goal  Goal: Consults, if ordered  1/6/2022 1340 by Serene Sharma, RN  Outcome: Resolved/Met  1/6/2022 1340 by Serene Sharma, RN  Outcome: Progressing Towards Goal  Goal: Diagnostic Test/Procedures  1/6/2022 1340 by Serene Sharma, RN  Outcome: Resolved/Met  1/6/2022 1340 by Serene Sharma, RN  Outcome: Progressing Towards Goal  Goal: Nutrition/Diet  1/6/2022 1340 by Serene Sharma, RN  Outcome: Resolved/Met  1/6/2022 1340 by Serene Sharma, RN  Outcome: Progressing Towards Goal  Goal: Discharge Planning  1/6/2022 1340 by Serene Sharma, RN  Outcome: Resolved/Met  1/6/2022 1340 by Serene Sharma, RN  Outcome: Progressing Towards Goal  Goal: Medications  1/6/2022 1340 by Serene Sharma, RN  Outcome: Resolved/Met  1/6/2022 1340 by Serene Sharma, RN  Outcome: Progressing Towards Goal  Goal: Respiratory  1/6/2022 1340 by Serene Sharma, RN  Outcome: Resolved/Met  1/6/2022 1340 by Serene Sharma, RN  Outcome: Progressing Towards Goal  Goal: Treatments/Interventions/Procedures  1/6/2022 1340 by Serene Sharma, RN  Outcome: Resolved/Met  1/6/2022 1340 by Serene Sharma, RN  Outcome: Progressing Towards Goal  Goal: Psychosocial  1/6/2022 1340 by Serene Sharma, RN  Outcome: Resolved/Met  1/6/2022 1340 by Serene Sharma, RN  Outcome: Progressing Towards Goal  Goal: *Oxygen saturation within defined limits  1/6/2022 1340 by Dirk Salmon, RN  Outcome: Resolved/Met  1/6/2022 1340 by Dirk Salmon, RN  Outcome: Progressing Towards Goal  Goal: *Hemodynamically stable  1/6/2022 1340 by Dirk Salmon, RN  Outcome: Resolved/Met  1/6/2022 1340 by Dirk Salmon, RN  Outcome: Progressing Towards Goal  Goal: *Optimal pain control at patient's stated goal  1/6/2022 1340 by Dirk Salmon, RN  Outcome: Resolved/Met  1/6/2022 1340 by Dirk Salmon, RN  Outcome: Progressing Towards Goal  Goal: *Anxiety reduced or absent  1/6/2022 1340 by Dirk Salmon, RN  Outcome: Resolved/Met  1/6/2022 1340 by Dirk Salmon, RN  Outcome: Progressing Towards Goal  Goal: *Demonstrates progressive activity  1/6/2022 1340 by Dirk Salmon, RN  Outcome: Resolved/Met  1/6/2022 1340 by Dirk Salmon, RN  Outcome: Progressing Towards Goal     Problem: Heart Failure: Day 3  Goal: Off Pathway (Use only if patient is Off Pathway)  1/6/2022 1340 by Drik Salmon, RN  Outcome: Resolved/Met  1/6/2022 1340 by Dirk Salmon, RN  Outcome: Progressing Towards Goal  Goal: Activity/Safety  1/6/2022 1340 by Dirk Salmon, RN  Outcome: Resolved/Met  1/6/2022 1340 by Dirk Salmon, RN  Outcome: Progressing Towards Goal  Goal: Diagnostic Test/Procedures  1/6/2022 1340 by Dirk Salmon, RN  Outcome: Resolved/Met  1/6/2022 1340 by Dirk Salmon, RN  Outcome: Progressing Towards Goal  Goal: Nutrition/Diet  1/6/2022 1340 by Dirk Salmon, RN  Outcome: Resolved/Met  1/6/2022 1340 by Dirk Salmon, RN  Outcome: Progressing Towards Goal  Goal: Discharge Planning  1/6/2022 1340 by Dirk Salmon, RN  Outcome: Resolved/Met  1/6/2022 1340 by Dirk Salmon, RN  Outcome: Progressing Towards Goal  Goal: Medications  1/6/2022 1340 by Dirk Salmon, RN  Outcome: Resolved/Met  1/6/2022 1340 by Dirk Salmon, RN  Outcome: Progressing Towards Goal  Goal: Respiratory  1/6/2022 1340 by Serene Sharma, RN  Outcome: Resolved/Met  1/6/2022 1340 by Serene Sharma, RN  Outcome: Progressing Towards Goal  Goal: Treatments/Interventions/Procedures  1/6/2022 1340 by Serene Sharma, RN  Outcome: Resolved/Met  1/6/2022 1340 by Serene Sharma, RN  Outcome: Progressing Towards Goal  Goal: Psychosocial  1/6/2022 1340 by Serene Sharma, RN  Outcome: Resolved/Met  1/6/2022 1340 by Serene Sharma, RN  Outcome: Progressing Towards Goal  Goal: *Oxygen saturation within defined limits  1/6/2022 1340 by Serene Sharma, RN  Outcome: Resolved/Met  1/6/2022 1340 by Serene Sharma, RN  Outcome: Progressing Towards Goal  Goal: *Hemodynamically stable  1/6/2022 1340 by Serene Sharma, RN  Outcome: Resolved/Met  1/6/2022 1340 by Serene Sharma, RN  Outcome: Progressing Towards Goal  Goal: *Optimal pain control at patient's stated goal  1/6/2022 1340 by Serene Sharma, RN  Outcome: Resolved/Met  1/6/2022 1340 by Serene Sharma, RN  Outcome: Progressing Towards Goal  Goal: *Anxiety reduced or absent  1/6/2022 1340 by Serene Sharma, RN  Outcome: Resolved/Met  1/6/2022 1340 by Serene Sharma, RN  Outcome: Progressing Towards Goal  Goal: *Demonstrates progressive activity  1/6/2022 1340 by Serene Sharma, RN  Outcome: Resolved/Met  1/6/2022 1340 by Serene Sharma, RN  Outcome: Progressing Towards Goal     Problem: Heart Failure: Day 4  Goal: Off Pathway (Use only if patient is Off Pathway)  1/6/2022 1340 by Serene Sharma, RN  Outcome: Resolved/Met  1/6/2022 1340 by Serene Sharma, RN  Outcome: Progressing Towards Goal  Goal: Activity/Safety  1/6/2022 1340 by Serene Sharma, RN  Outcome: Resolved/Met  1/6/2022 1340 by Serene Sharma, RN  Outcome: Progressing Towards Goal  Goal: Diagnostic Test/Procedures  1/6/2022 1340 by Serene Sharma, RN  Outcome: Resolved/Met  1/6/2022 1340 by Romana Whatley, HUMBERTO  Outcome: Progressing Towards Goal  Goal: Nutrition/Diet  1/6/2022 1340 by Romana Whatley RN  Outcome: Resolved/Met  1/6/2022 1340 by Romana Whatley RN  Outcome: Progressing Towards Goal  Goal: Discharge Planning  1/6/2022 1340 by Romana Whatley RN  Outcome: Resolved/Met  1/6/2022 1340 by Romana Whatley RN  Outcome: Progressing Towards Goal  Goal: Medications  1/6/2022 1340 by Romana Whatley RN  Outcome: Resolved/Met  1/6/2022 1340 by Romana Whatley RN  Outcome: Progressing Towards Goal  Goal: Respiratory  1/6/2022 1340 by Romana Whatley RN  Outcome: Resolved/Met  1/6/2022 1340 by Romana Whatley RN  Outcome: Progressing Towards Goal  Goal: Treatments/Interventions/Procedures  1/6/2022 1340 by Romana Whatley RN  Outcome: Resolved/Met  1/6/2022 1340 by Romana Whatley RN  Outcome: Progressing Towards Goal  Goal: Psychosocial  1/6/2022 1340 by Romana Whatley RN  Outcome: Resolved/Met  1/6/2022 1340 by Romana Whatley RN  Outcome: Progressing Towards Goal  Goal: *Oxygen saturation within defined limits  1/6/2022 1340 by Romana Whatley RN  Outcome: Resolved/Met  1/6/2022 1340 by Romana Whatley RN  Outcome: Progressing Towards Goal  Goal: *Hemodynamically stable  1/6/2022 1340 by Romana Whatley RN  Outcome: Resolved/Met  1/6/2022 1340 by Romana Whatley RN  Outcome: Progressing Towards Goal  Goal: *Optimal pain control at patient's stated goal  1/6/2022 1340 by Romana Whatley RN  Outcome: Resolved/Met  1/6/2022 1340 by Romana Whatley RN  Outcome: Progressing Towards Goal  Goal: *Anxiety reduced or absent  1/6/2022 1340 by Romana Whatley RN  Outcome: Resolved/Met  1/6/2022 1340 by Romana Whatley RN  Outcome: Progressing Towards Goal  Goal: *Demonstrates progressive activity  1/6/2022 1340 by Romana Whatley RN  Outcome: Resolved/Met  1/6/2022 1340 by Romana Kida, RN  Outcome: Progressing Towards Goal     Problem: Heart Failure: Day 5  Goal: Off Pathway (Use only if patient is Off Pathway)  1/6/2022 1340 by Parish Miller RN  Outcome: Resolved/Met  1/6/2022 1340 by Parish Miller RN  Outcome: Progressing Towards Goal  Goal: Activity/Safety  1/6/2022 1340 by Parish Miller RN  Outcome: Resolved/Met  1/6/2022 1340 by Parish Miller RN  Outcome: Progressing Towards Goal  Goal: Diagnostic Test/Procedures  1/6/2022 1340 by Parish Miller RN  Outcome: Resolved/Met  1/6/2022 1340 by Parish Miller RN  Outcome: Progressing Towards Goal  Goal: Nutrition/Diet  1/6/2022 1340 by Parish Miller RN  Outcome: Resolved/Met  1/6/2022 1340 by Parish Miller RN  Outcome: Progressing Towards Goal  Goal: Discharge Planning  1/6/2022 1340 by Parish Miller RN  Outcome: Resolved/Met  1/6/2022 1340 by Parish Miller RN  Outcome: Progressing Towards Goal  Goal: Medications  1/6/2022 1340 by Parish Miller RN  Outcome: Resolved/Met  1/6/2022 1340 by Parish Miller RN  Outcome: Progressing Towards Goal  Goal: Respiratory  1/6/2022 1340 by Parish Miller RN  Outcome: Resolved/Met  1/6/2022 1340 by Parish Miller RN  Outcome: Progressing Towards Goal  Goal: Treatments/Interventions/Procedures  1/6/2022 1340 by Parish Miller RN  Outcome: Resolved/Met  1/6/2022 1340 by Parish Miller RN  Outcome: Progressing Towards Goal  Goal: Psychosocial  1/6/2022 1340 by Parish Miller RN  Outcome: Resolved/Met  1/6/2022 1340 by Parish Miller RN  Outcome: Progressing Towards Goal     Problem: Heart Failure: Discharge Outcomes  Goal: *Demonstrates ability to perform prescribed activity without shortness of breath or discomfort  1/6/2022 1340 by Parish Miller RN  Outcome: Resolved/Met  1/6/2022 1340 by Parish Miller RN  Outcome: Progressing Towards Goal  Goal: *Left ventricular function assessment completed prior to or during stay, or planned for post-discharge  1/6/2022 1340 by Opal Evans RN  Outcome: Resolved/Met  1/6/2022 1340 by Opal Evans RN  Outcome: Progressing Towards Goal  Goal: *ACEI prescribed if LVEF less than 40% and no contraindications or ARB prescribed  1/6/2022 1340 by Opal Evans RN  Outcome: Resolved/Met  1/6/2022 1340 by Opal Evans RN  Outcome: Progressing Towards Goal  Goal: *Verbalizes understanding and describes prescribed diet  1/6/2022 1340 by Opal Evans RN  Outcome: Resolved/Met  1/6/2022 1340 by Opal Evans RN  Outcome: Progressing Towards Goal  Goal: *Verbalizes understanding/describes prescribed medications  1/6/2022 1340 by Opal Evans RN  Outcome: Resolved/Met  1/6/2022 1340 by Opal Evans RN  Outcome: Progressing Towards Goal  Goal: *Describes available resources and support systems  Description: (eg: Home Health, Palliative Care, [employer] Directive)  1/6/2022 1340 by Oapl Evans RN  Outcome: Resolved/Met  1/6/2022 1340 by Opal Evans RN  Outcome: Progressing Towards Goal  Goal: *Describes smoking cessation resources  1/6/2022 1340 by Opal Evans RN  Outcome: Resolved/Met  1/6/2022 1340 by Opal Evans RN  Outcome: Progressing Towards Goal  Goal: *Understands and describes signs and symptoms to report to providers(Stroke Metric)  1/6/2022 1340 by Opal Evans RN  Outcome: Resolved/Met  1/6/2022 1340 by Opal Evans RN  Outcome: Progressing Towards Goal  Goal: *Describes/verbalizes understanding of follow-up/return appt  Description: (eg: to physicians, diabetes treatment coordinator, and other resources  1/6/2022 1340 by Opal Evans RN  Outcome: Resolved/Met  1/6/2022 1340 by Opal Evans RN  Outcome: Progressing Towards Goal  Goal: *Describes importance of continuing daily weights and changes to report to physician  1/6/2022 1340 by Opal Evans RN  Outcome: Resolved/Met  1/6/2022 1340 by Salo Armenta RN  Outcome: Progressing Towards Goal

## 2022-01-10 NOTE — PROGRESS NOTES
89F PMHx Paroxysmal Atrial Fibrillation, HTN, Hypothyroidism, COPD (2-3 L nasal cannula at home) Hyperlipidemia, presenting with Bilateral LE edema and right LE wounds with cellulitis. Hospitalist Note     Admit Date:  3/19/2021  1:13 PM   Name:  Fina Adasm   Age:  68 y.o.  :  1940   MRN:  [id number]   PCP:  Hilary Kumar MD  Treatment Team: Attending Provider: Patricia Abreu MD; Consulting Provider: Ameena Garcia MD; Consulting Provider: Soheila Olivas MD; Consulting Provider: Tiki Bowles MD; Utilization Review: Murali Patiño; Care Manager: Julisa Paz; Charge Nurse: Cherry Garduno RN; Occupational Therapy Assistant: Kristi Banks; Physical Therapist: Madan Iraheta    HPI/Subjective:     Patient 42-year-old female, history significant for breast cancer status post radiations, rheumatoid arthritis on chronic methotrexate, chronic kidney disease, former smoker,    Patient developed progressive shortness of breath, over the last 2 weeks, started close outpatient pneumonia treatments by PCP, did not improve, shortness of breath worsened this morning, patient also felt the new onset of palpitation EMS was called, patient found to have significant hypotension systolic of 69, with heart rate greater than 140, consistent with atrial fibrillation rapid medical response, seen by cardiology, amiodarone and heparin drip was started     Patient denies sick contacts, denies recent travels, denies prior cardiac history, patient was told that she has a single  kidney but denies kidney disease     3/22  -Patient feeling better today,cr down trending 1.54 (1.07 was baseline )  -chest pain resolved  -pt told me she was getting remicade in past for RA but insurance stopped paying for this; she told me she had RT for her breast cancer in ~, now only getting screening mammograms  -Blood cultures thus far negative   -rpt echo with persistent moderate effusion  -she is still tachycardic    3/23 Patient is doing well this morning. No chest pain. Tachycardia improved. No fever no chills. No nausea no vomiting.  No shortness of breath.     3/24 patient reports Pt. well known to me with COPD, recent covid, presents with increased pedal edema and cellulitis of legs/ ulcerations.  Suggest wound care, antibiotics, diurese.  Had fever spike--no recurrence.    Wants to go home .  Fu with me in office.   ID note apprec.  Continue inhalers.  The patient is a 89 year old female with a history of HTN, HL, atrial fibrillation, COPD on home O2 who presents with leg swelling and leg wounds.    Plan:  - ECG and telemetry with unclear rhythm - atrial flutter vs. sinus rhythm with PACs  - Patient has a history of atrial fibrillation  - Echo 12/21 with normal LV systolic function, mod pulm HTN  - Continue diltiazem  mg daily  - Continue carvedilol 6.25 mg bid  - Continue amiodarone 200 mg daily  - Continue apixaban 2.5 mg bid  - Continue bumetanide 1 mg PO q12h  - Elevate legs  - Wound care  - Antibiotics for cellulitis The patient is a 89 year old female with a history of HTN, HL, atrial fibrillation, COPD on home O2 who presents with leg swelling and leg wounds.    Plan:  - ECG and telemetry with unclear rhythm - atrial flutter vs. sinus rhythm with PACs  - Patient has a history of atrial fibrillation  - Echo 12/21 with normal LV systolic function, mod pulm HTN  - Continue diltiazem  mg daily  - Continue carvedilol 6.25 mg bid  - Continue amiodarone 200 mg daily  - Continue apixaban 2.5 mg bid  - Continue bumetanide 1 mg PO q12h  - Elevate legs  - Wound care  - Antibiotics for cellulitis The patient is a 89 year old female with a history of HTN, HL, atrial fibrillation, COPD on home O2 who presents with leg swelling and leg wounds.    Plan:  - ECG and telemetry with unclear rhythm - atrial flutter vs. sinus rhythm with PACs  - Patient has a history of atrial fibrillation  - Echo 12/21 with normal LV systolic function, mod pulm HTN  - Continue diltiazem  mg daily  - Continue carvedilol 6.25 mg bid  - Continue amiodarone 200 mg daily  - Continue apixaban 2.5 mg bid  - Continue bumetanide 1 mg PO q12h  - If renal function improves as outpatient can consider addition of metolazone 2.5 mg tiw  - Elevate legs  - Wound care  - Discharge planning The patient is a 89 year old female with a history of HTN, HL, atrial fibrillation, COPD on home O2 who presents with leg swelling and leg wounds.    Plan:  - ECG and telemetry with unclear rhythm - atrial flutter vs. sinus rhythm with PACs  - Patient has a history of atrial fibrillation  - Echo 12/21 with normal LV systolic function, mod pulm HTN  - Continue diltiazem  mg daily  - Continue carvedilol 6.25 mg bid  - Continue amiodarone 200 mg daily  - If patient becomes tachycardic, increase diltiazem or carvedilol  - Continue apixaban 2.5 mg bid  - Lower bumetanide to 1 mg PO q12h  - Elevate legs  - Wound care  - Antibiotics for cellulitis 89F PMHx Paroxysmal Atrial Fibrillation, HTN, Hypothyroidism, COPD (2-3 L nasal cannula at home) Hyperlipidemia, presenting with Bilateral LE edema and right LE wounds with cellulitis. Pt. well known to me with COPD, recent covid, presents with increased pedal edema and cellulitis of legs/ ulcerations.  Suggest wound care, antibiotics, diurese.  Wants to go home .  Fu with me in office.   ID note apprec.  Continue inhalers.  Pt. well known to me with COPD, recent covid, presents with increased pedal edema and cellulitis of legs/ ulcerations.  Suggest wound care, antibiotics, diurese.  Wants to go home .  Fu with me in office.   ID note apprec.  Continue inhalers.  The patient is a 89 year old female with a history of HTN, HL, atrial fibrillation, COPD on home O2 who presents with leg swelling and leg wounds.    Plan:  - ECG and telemetry with unclear rhythm - atrial flutter vs. sinus rhythm with PACs  - Patient has a history of atrial fibrillation  - Echo 12/21 with normal LV systolic function, mod pulm HTN  - Continue diltiazem  mg daily  - Continue carvedilol 6.25 mg bid  - Continue amiodarone 200 mg daily  - If patient becomes tachycardic, increase diltiazem or carvedilol  - Continue apixaban 2.5 mg bid  - Continue bumetanide 1 mg IV q12h  - Leg swelling has improved - likely transition to oral diuretics tomorrow  - Wound care  - Antibiotics for cellulitis she has shortness of breath with ambulation.  No fever no chills.  No chest pain.  No bleeding per rectum or evidence of external bleeding.      Complete ROS done and is as stated in HPI or otherwise negative  No other complaints  Objective:     Patient Vitals for the past 24 hrs:   Temp Pulse Resp BP SpO2   03/24/21 1542 98.1 °F (36.7 °C) 81 18 121/66 95 %   03/24/21 1055 98.2 °F (36.8 °C) 85 18 102/74 94 %   03/24/21 0718 98 °F (36.7 °C) 79 18 125/63 95 %   03/24/21 0429 98.2 °F (36.8 °C) 80 16 122/68 94 %   03/24/21 0016 98.4 °F (36.9 °C) 84 16 112/68 96 %   03/24/21 0015 — 84 — 112/68 —   03/23/21 2108 — — — — 93 %   03/23/21 1953 98.5 °F (36.9 °C) 88 16 (!) 107/54 93 %   03/23/21 1616 98.1 °F (36.7 °C) 88 16 116/81 95 %     Oxygen Therapy  O2 Sat (%): 95 % (03/24/21 1542)  Pulse via Oximetry: 87 beats per minute (03/22/21 0016)  O2 Device: Room air (03/23/21 2108)  Skin Assessment: Clean, dry, & intact (03/23/21 2108)  O2 Flow Rate (L/min): 2 l/min (03/22/21 1124)    Estimated body mass index is 28.28 kg/m² as calculated from the following:    Height as of this encounter: 5' (1.524 m).    Weight as of this encounter: 65.7 kg (144 lb 12.8 oz).      Intake/Output Summary (Last 24 hours) at 3/24/2021 1557  Last data filed at 3/24/2021 1331  Gross per 24 hour   Intake 480 ml   Output —   Net 480 ml       *Note that automatically entered I/Os may not be accurate; dependent on patient compliance with collection and accurate  by techs.    General:          Alert, chronically ill appearing, cooperative, no distress, appears stated age.     Head:               Normocephalic, without obvious abnormality, atraumatic.  Nose:               Nares normal. No drainage or sinus tenderness.  Lungs:             Clear to auscultation bilaterally.  No Wheezing or Rhonchi. No rales.  Heart:              Regular rate and rhythm,  no murmur, rub or gallop.  Abdomen:        Soft, non-tender. Not distended.  Bowel sounds  Pt. well known to me with COPD, recent covid, presents with increased pedal edema and cellulitis of legs/ ulcerations.  Suggest wound care, antibiotics, diurese.  ID note apprec.  Continue inhalers.  The patient is a 89 year old female with a history of HTN, HL, atrial fibrillation, COPD on home O2 who presents with leg swelling and leg wounds.    Plan:  - ECG and telemetry with unclear rhythm - atrial flutter vs. sinus rhythm with PACs  - Patient has a history of atrial fibrillation  - Echo 12/21 with normal LV systolic function, mod pulm HTN  - Continue diltiazem  mg daily  - Continue carvedilol 6.25 mg bid  - Continue amiodarone 200 mg daily  - Continue apixaban 2.5 mg bid  - Continue bumetanide 1 mg PO q12h  - Elevate legs  - Wound care  - Antibiotics for cellulitis The patient is a 89 year old female with a history of HTN, HL, atrial fibrillation, COPD on home O2 who presents with leg swelling and leg wounds.    Plan:  - ECG and telemetry with unclear rhythm - atrial flutter vs. sinus rhythm with PACs  - Patient has a history of atrial fibrillation  - Echo 12/21 with normal LV systolic function, mod pulm HTN  - Continue diltiazem  mg daily  - Continue carvedilol 6.25 mg bid  - Continue amiodarone 200 mg daily  - If patient becomes tachycardic, increase diltiazem or carvedilol  - Continue apixaban 2.5 mg bid  - CXR with small pleural effusions  - Continue bumetanide 1 mg IV q12h  - Wound care  - Antibiotics for cellulitis normal.   Extremities:     No cyanosis. No edema. No clubbing  Skin:                Texture, turgor normal. No rashes or lesions. Not Jaundiced  Neurologic:      Alert and oriented x 3, no focal deficits     Data Review:  I have reviewed all labs, meds, and studies from the last 24 hours:    Recent Results (from the past 24 hour(s))   GLUCOSE, POC    Collection Time: 03/23/21  4:21 PM   Result Value Ref Range    Glucose (POC) 173 (H) 65 - 100 mg/dL    Performed by Manjit Medina    CBC WITH AUTOMATED DIFF    Collection Time: 03/24/21  4:00 AM   Result Value Ref Range    WBC 17.1 (H) 4.3 - 11.1 K/uL    RBC 2.96 (L) 4.05 - 5.2 M/uL    HGB 9.5 (L) 11.7 - 15.4 g/dL    HCT 28.7 (L) 35.8 - 46.3 %    MCV 97.0 79.6 - 97.8 FL    MCH 32.1 26.1 - 32.9 PG    MCHC 33.1 31.4 - 35.0 g/dL    RDW 13.5 11.9 - 14.6 %    PLATELET 812 954 - 355 K/uL    MPV 10.3 9.4 - 12.3 FL    ABSOLUTE NRBC 0.00 0.0 - 0.2 K/uL    DF AUTOMATED      NEUTROPHILS 88 (H) 43 - 78 %    LYMPHOCYTES 5 (L) 13 - 44 %    MONOCYTES 6 4.0 - 12.0 %    EOSINOPHILS 0 (L) 0.5 - 7.8 %    BASOPHILS 0 0.0 - 2.0 %    IMMATURE GRANULOCYTES 1 0.0 - 5.0 %    ABS. NEUTROPHILS 15.0 (H) 1.7 - 8.2 K/UL    ABS. LYMPHOCYTES 0.9 0.5 - 4.6 K/UL    ABS. MONOCYTES 1.1 0.1 - 1.3 K/UL    ABS. EOSINOPHILS 0.0 0.0 - 0.8 K/UL    ABS. BASOPHILS 0.0 0.0 - 0.2 K/UL    ABS. IMM.  GRANS. 0.1 0.0 - 0.5 K/UL   METABOLIC PANEL, BASIC    Collection Time: 03/24/21  4:00 AM   Result Value Ref Range    Sodium 135 (L) 136 - 145 mmol/L    Potassium 4.6 3.5 - 5.1 mmol/L    Chloride 101 98 - 107 mmol/L    CO2 28 21 - 32 mmol/L    Anion gap 6 (L) 7 - 16 mmol/L    Glucose 116 (H) 65 - 100 mg/dL    BUN 33 (H) 8 - 23 MG/DL    Creatinine 1.63 (H) 0.6 - 1.0 MG/DL    GFR est AA 39 (L) >60 ml/min/1.73m2    GFR est non-AA 32 (L) >60 ml/min/1.73m2    Calcium 9.1 8.3 - 10.4 MG/DL   HEPARIN XA UFH    Collection Time: 03/24/21  4:00 AM   Result Value Ref Range    Heparin Xa UFH 0.73 (H) 0.3 - 0.7 IU/mL   HEPARIN XA UFH ulcerations with cellulitis right lower extremity  Collection Time: 03/24/21  1:18 PM   Result Value Ref Range    Heparin Xa UFH 0.55 0.3 - 0.7 IU/mL        All Micro Results     Procedure Component Value Units Date/Time    BLOOD CULTURE [[id number]] Collected: 03/19/21 1339    Order Status: Completed Specimen: Blood Updated: 03/24/21 1055     Special Requests: --        LEFT  Antecubital       Culture result: NO GROWTH 5 DAYS       BLOOD CULTURE [[id number]] Collected: 03/19/21 1530    Order Status: Completed Specimen: Blood Updated: 03/24/21 1055     Special Requests: --        LEFT  HAND       Culture result: NO GROWTH 5 DAYS       CULTURE, URINE [[id number]]  (Abnormal) Collected: 03/22/21 0600    Order Status: Completed Specimen: Urine from Clean catch Updated: 03/24/21 0744     Special Requests: NO SPECIAL REQUESTS        Culture result:       >100,000 COLONIES/mL YEAST IDENTIFICATION TO FOLLOW          SARS-COV-2, PCR [[id number]] Collected: 03/19/21 1815    Order Status: Completed Specimen: Nasopharyngeal Updated: 03/20/21 1250     Specimen source Nasopharyngeal        SARS-CoV-2 Not detected        Comment:      The specimen is NEGATIVE for SARS-CoV-2, the novel coronavirus associated with COVID-19. A negative result does not rule out COVID-19. This test has been authorized by the FDA under an Emergency Use Authorization (EUA) for use by authorized laboratories. Fact sheet for Healthcare Providers: kstattoo.com  Fact sheet for Patients: https://fda.gov/media/566709/download       Methodology: RT-PCR         COVID-19 RAPID TEST [[id number]] Collected: 03/19/21 1815    Order Status: Completed Specimen: Nasopharyngeal Updated: 03/19/21 1913     Specimen source Nasopharyngeal        COVID-19 rapid test Not detected        Comment:      The specimen is NEGATIVE for SARS-CoV-2, the novel coronavirus associated with COVID-19. A negative result does not rule out COVID-19.        This test has been authorized by the FDA under an Pt. well known to me with COPD, recent covid, presents with increased pedal edema and cellulitis of legs/ ulcerations.  Suggest wound care, antibiotics, diurese.  Had fever spike--no recurrence.    Wants to go home .  Fu with me in office.   ID note apprec.  Continue inhalers.  Pt. well known to me with COPD, recent covid, presents with increased pedal edema and cellulitis of legs/ ulcerations.  Suggest wound care, antibiotics, diurese.  Had fever spike--will check cxr, ua.  Wants to go home .  Fu with me in office.   ID note apprec.  Continue inhalers.  Emergency Use Authorization (EUA) for use by authorized laboratories.         Fact sheet for Healthcare Providers: ConventionUpdate.co.nz  Fact sheet for Patients: ConventionUpdate.co.nz       Methodology: Isothermal Nucleic Acid Amplification               Current Meds:  Current Facility-Administered Medications   Medication Dose Route Frequency    apixaban (ELIQUIS) tablet 5 mg  5 mg Oral BID    predniSONE (DELTASONE) tablet 30 mg  30 mg Oral DAILY WITH BREAKFAST    azithromycin (ZITHROMAX) tablet 500 mg  500 mg Oral Q24H    cefTRIAXone (ROCEPHIN) 1 g in 0.9% sodium chloride (MBP/ADV) 50 mL MBP  1 g IntraVENous Q24H    HYDROcodone-homatropine (HYCODAN) 5-1.5 mg/5 mL (5 mL) oral solution 5 mL  5 mL Oral Q4H PRN    levalbuterol (XOPENEX) nebulizer soln 1.25 mg/3 mL  1.25 mg Nebulization Q4H PRN    amiodarone (CORDARONE) tablet 400 mg  400 mg Oral Q8H    benzonatate (TESSALON) capsule 100 mg  100 mg Oral TID PRN    [Held by provider] aspirin delayed-release tablet 81 mg  81 mg Oral DAILY    cholecalciferol (VITAMIN D3) (1000 Units /25 mcg) tablet 1,000 Units  1,000 Units Oral DAILY    DULoxetine (CYMBALTA) capsule 60 mg  60 mg Oral DAILY    folic acid (FOLVITE) tablet 1 mg  1 mg Oral DAILY    levothyroxine (SYNTHROID) tablet 25 mcg  25 mcg Oral ACB    methotrexate (RHEUMATREX) tablet 12.5 mg  12.5 mg Oral every Sunday    rosuvastatin (CRESTOR) tablet 10 mg  10 mg Oral QHS    sodium chloride (NS) flush 5-40 mL  5-40 mL IntraVENous Q8H    sodium chloride (NS) flush 5-40 mL  5-40 mL IntraVENous PRN    acetaminophen (TYLENOL) tablet 650 mg  650 mg Oral Q6H PRN    Or    acetaminophen (TYLENOL) suppository 650 mg  650 mg Rectal Q6H PRN    polyethylene glycol (MIRALAX) packet 17 g  17 g Oral DAILY    senna-docusate (PERICOLACE) 8.6-50 mg per tablet 1 Tab  1 Tab Oral BID    magnesium hydroxide (MILK OF MAGNESIA) 400 mg/5 mL oral suspension 30 mL  30 mL Oral DAILY PRN Pt. well known to me with COPD, recent covid, presents with increased pedal edema and cellulitis of legs/ ulcerations.  Suggest wound care, antibiotics, diurese.  Wants to go home .  Fu with me in office.   ID note apprec.  Continue inhalers.   bisacodyL (DULCOLAX) suppository 10 mg  10 mg Rectal DAILY PRN    ondansetron (ZOFRAN ODT) tablet 4 mg  4 mg Oral Q8H PRN    Or    ondansetron (ZOFRAN) injection 4 mg  4 mg IntraVENous Q6H PRN    famotidine (PEPCID) tablet 20 mg  20 mg Oral DAILY    [Held by provider] metoprolol succinate (TOPROL-XL) XL tablet 25 mg  25 mg Oral DAILY       Other Studies:  Results for orders placed or performed during the hospital encounter of 21   2D ECHO COMPLETE ADULT (TTE) W OR WO CONTR    Narrative    [facility]  [address] [facility], [address] [facility]  [phone]    Transthoracic Echocardiogram  2D, M-mode, Doppler, and Color Doppler    Patient: Ronald Owens  MR #: [id number]  : [id number]  Age: 68 years  Gender: Female  Study date: 20-Mar-2021  Account #: [de-identified]  Height: 60 in  Weight: 154.7 lb  BSA: 1.67 mï¾²  Status:Stat  Location: ER3  BP: 137/ 65    Allergies: NO KNOWN ALLERGENS    Sonographer:  SIMRAN Eric  Group:  [id number] Card[address]  Reading Physician:  rEick Baer. MD Karen    INDICATIONS: Cardiac Arrhythmia, Pericardial Effusion    PROCEDURE: This was a stat study. A transthoracic echocardiogram was   performed. The study included complete 2D imaging, M-mode, complete spectral Doppler,   and  color Doppler. Image quality was adequate. LEFT VENTRICLE: Size was normal. Systolic function was normal. Ejection  fraction was estimated in the range of 55 % to 60 %. There were no regional  wall motion abnormalities. Wall thickness was normal. Left ventricular  diastolic function is indeterminate based on assessment criteria. Avg E/e':  14.2. RIGHT VENTRICLE: The size was normal. Systolic function was mildly reduced by  TAPSE assessment. The tricuspid jet envelope definition was inadequate for  estimation of RV systolic pressure.     LEFT ATRIUM: Size was normal.    RIGHT ATRIUM: Size was normal.    SYSTEMIC VEINS: IVC: The inferior vena cava was normal in size and course. The  respirophasic change in diameter was more than 50%. AORTIC VALVE: The valve was trileaflet. Leaflets exhibited mild sclerosis. There was no evidence for stenosis. There was no insufficiency. MITRAL VALVE: There was mild annular calcification. There was no evidence for  stenosis. There was mild regurgitation. TRICUSPID VALVE: The valve structure was normal. There was no evidence for  stenosis. There was mild regurgitation. PULMONIC VALVE: Not well visualized. There was no evidence for stenosis. There  was no insufficiency. PERICARDIUM: A moderate pericardial effusion was identified circumferential   to  the heart. There was mild RA inversion, no clear evidence of RV diastolic  collapse or clear evidence of hemodynamic compromise. Clinical correlation  required. AORTA: The root exhibited normal size. SUMMARY:    -  Left ventricle: Systolic function was normal. Ejection fraction was  estimated in the range of 55 % to 60 %. There were no regional wall motion  abnormalities. -  Right ventricle: Systolic function was mildly reduced by TAPSE assessment. -  Inferior vena cava, hepatic veins: The respirophasic change in diameter   was  more than 50%. -  Mitral valve: There was mild annular calcification. There was mild  regurgitation.    -  Tricuspid valve: There was mild regurgitation.    -  Pericardium: A moderate pericardial effusion was identified   circumferential  to the heart. There was mild RA inversion, no clear evidence of RV diastolic  collapse or clear evidence of hemodynamic compromise. Clinical correlation  required. SYSTEM MEASUREMENT TABLES    2D  Ao Diam: 2.6 cm  LA Diam: 4.3 cm  LAEDV Index (A-L): 26.5 ml/m2  %FS: 35.9 %  IVSd: 0.7 cm  LVIDd: 4.8 cm  LVIDs: 3.1 cm  LVOT Diam: 1.7 cm  LVPWd: 0.8 cm    PW  E/E' Av.2  E/E' Lat: 14.9  E/E' Sept: 13.6    Prepared and signed by    Genesis Pinto.  Sudheer Guerrero MD  Signed 20-Mar-2021 11:10:09     2D ECHO [address] [place], [address] [facility] [address]  [phone]    Transthoracic Echocardiogram  2D and Doppler    Patient: Terrence De La Cruz  MR #: [id number]  : [id number]  Age: 68 years  Gender: Female  Study date: 22-Mar-2021  Account #: [de-identified]  Height: 60 in  Weight: 153.8 lb  BSA: 1.67 mï¾²  Status:Routine  Location: [facility]  BP: 103/ 89    Allergies: NO KNOWN ALLERGENS    Sonographer:  Stefania Burt RD  Group:  [id number] Card[address]  Referring Physician:  Madalyn Bell. Nu Gaming MD  Reading Physician:  Kaity Mae MD    INDICATIONS: Pericardial Effusion. PROCEDURE: This was a routine study. A transthoracic echocardiogram was  performed. The study included limited 2D imaging and limited spectral   Doppler. Image quality was adequate. LEFT VENTRICLE: Size was normal. Systolic function was normal. Ejection  fraction was estimated in the range of 65 % to 70 %. SYSTEMIC VEINS: IVC: The inferior vena cava was normal in size. The  respirophasic change in diameter was more than 50%. PERICARDIUM: A moderate pericardial effusion was identified circumferential   to  the heart. There was overlying fibrinous appearing material noted on the RV  free wall. There was no evidence of hemodynamic significance. SUMMARY:    -  Left ventricle: Systolic function was normal. Ejection fraction was  estimated in the range of 65 % to 70 %. -  Pericardium: A moderate pericardial effusion was identified   circumferential  to the heart. There was overlying fibrinous appearing material noted on the   RV  free wall. There was no evidence of hemodynamic significance. Prepared and signed by    Kaity Mae MD  Signed 22-Mar-2021 11:56:39         No results found.     Assessment and Plan:     Hospital Problems as of 3/24/2021 Date Reviewed: 2016          Codes Class Noted - Resolved POA    Pericarditis with effusion ICD-10-CM: 89F PMHx Paroxysmal Atrial Fibrillation, HTN, Hypothyroidism, COPD (2-3 L nasal cannula at home) Hyperlipidemia, presenting with Bilateral LE edema and right LE wounds with cellulitis. I31.9  ICD-9-CM: 423.9  3/23/2021 - Present Unknown        * (Principal) Atrial fibrillation with rapid ventricular response (HCC) ICD-10-CM: I48.91  ICD-9-CM: 427.31  3/19/2021 - Present Unknown        Acute renal failure (ARF) ([facility]) ICD-10-CM: N17.9  ICD-9-CM: 584.9  4/22/2019 - Present Yes        Sepsis ([facility] ICD-10-CM: A41.9  ICD-9-CM: 038.9, 995.91  4/22/2019 - Present Yes        Hypertension, essential, benign ICD-10-CM: I10  ICD-9-CM: 401.1  8/18/2016 - Present Yes        Acquired hypothyroidism ICD-10-CM: E03.9  ICD-9-CM: 244.9  1/16/2016 - Present Yes        Rheumatoid arthritis ([facility] ICD-10-CM: M06.9  ICD-9-CM: 714.0  1/16/2016 - Present Yes            Severe sepsisetiology unclear, likely multifactorial.  Patient may have been hypotensive in the setting of moderate pericardial effusion effusion and dehydration, which led to severe hypotension on admission  -Hypotension on admission was improved with IV hydration  -Blood cultures negative, check UA urine cultures, trend pro calcitonin  -Continue with antibiotics as ordered for bronchiolitis on CT   -COVID negative     Atrial fibrillation rapid ventricular response,resolved  appreciate cardiology's consultation  -back to SR after amio gtt; continue PO amio per cardiology  -tele monitor  Repeat ECHO shows stable findings with moderate pericardial effusion. heparin drip dced today. Switch to Eliquis    Moderate pericardial effusion  -extremely elevated high sensitivity CRP suggests this may be pericarditis effusion; her RA symptoms dont seem bad enough to explain this  -she had chest pain and afib on admission  -Rheumatology consult appreciated. recommends colchicine or prednisone. Due to drug interaction of colchicine with amiodarone, patient started on prednisone.   -cardiology recs appreciated     Acute kidney injuryvs CKD 3  ?  Pre-renal  last known creatinine 1.07, on admission 3.3, CT urogram shows left atrophic kidney, no renal calculi or hydronephrosis, likely prerenal due to dehydration and hypotension  Status post 3 L of NS bolus in the emergency room, follow urine electrolytes. -3/23 Cr slightly worse at 1.6 from 1.5 yesterday. 3/24 Cr stable at 1.6. May be baseline from- ? CKD3.      Hypertension  BP stable at 120/60.   hold home medication due to well controlled BP.      History of rheumatoid arthritis, HTN, major depressions, former smoker, hyperlipidemia, history of TIA, Graves disease and ?hypothyroidism on Synthroid (TSH WNL), left atrophic kidney,  -currently stable co morbidities add to patient's case complexity     DVT PPx: heparin gtt switched to Eliquis today,     Code Status: full      Anticipated discharge: 24-48 hours, depending on patient's progression.  PT/OT eval.      Signed:  Amrita Sanford MD 89F PMHx Paroxysmal Atrial Fibrillation, HTN, Hypothyroidism, COPD (2-3 L nasal cannula at home) Hyperlipidemia, presenting with Bilateral LE edema and right LE wounds with cellulitis. 89F PMHx Paroxysmal Atrial Fibrillation, HTN, Hypothyroidism, COPD (2-3 L nasal cannula at home) Hyperlipidemia, presenting with Bilateral LE edema and right LE wounds with cellulitis. 89F PMHx Paroxysmal Atrial Fibrillation, HTN, Hypothyroidism, COPD (2-3 L nasal cannula at home) Hyperlipidemia, presenting with Bilateral LE edema and right LE wounds with cellulitis. 89F PMHx Paroxysmal Atrial Fibrillation, HTN, Hypothyroidism, COPD (2-3 L nasal cannula at home) Hyperlipidemia, presenting with Bilateral LE edema and right LE wounds with cellulitis.

## 2022-01-26 ENCOUNTER — HOSPITAL ENCOUNTER (OUTPATIENT)
Dept: LAB | Age: 82
Discharge: HOME | End: 2022-01-26
Payer: MEDICARE

## 2022-01-26 DIAGNOSIS — D64.9 SYMPTOMATIC ANEMIA: ICD-10-CM

## 2022-01-26 LAB
ABO + RH BLD: NORMAL
ALBUMIN SERPL-MCNC: 2.5 G/DL (ref 3.2–4.6)
ALBUMIN/GLOB SERPL: 0.6 {RATIO} (ref 1.2–3.5)
ALP SERPL-CCNC: 79 U/L (ref 50–136)
ALT SERPL-CCNC: 17 U/L (ref 12–65)
ANION GAP SERPL CALC-SCNC: 5 MMOL/L (ref 7–16)
AST SERPL-CCNC: 19 U/L (ref 15–37)
BASOPHILS # BLD: 0.1 K/UL (ref 0–0.2)
BASOPHILS NFR BLD: 2 % (ref 0–2)
BILIRUB SERPL-MCNC: 0.4 MG/DL (ref 0.2–1.1)
BLOOD GROUP ANTIBODIES SERPL: NORMAL
BUN SERPL-MCNC: 15 MG/DL (ref 8–23)
CALCIUM SERPL-MCNC: 8.9 MG/DL (ref 8.3–10.4)
CHLORIDE SERPL-SCNC: 103 MMOL/L (ref 98–107)
CO2 SERPL-SCNC: 34 MMOL/L (ref 21–32)
CREAT SERPL-MCNC: 1.2 MG/DL (ref 0.6–1)
DIFFERENTIAL METHOD BLD: ABNORMAL
EOSINOPHIL # BLD: 0.4 K/UL (ref 0–0.8)
EOSINOPHIL NFR BLD: 6 % (ref 0.5–7.8)
ERYTHROCYTE [DISTWIDTH] IN BLOOD BY AUTOMATED COUNT: 17.9 % (ref 11.9–14.6)
FERRITIN SERPL-MCNC: 127 NG/ML (ref 8–388)
GLOBULIN SER CALC-MCNC: 4.4 G/DL (ref 2.3–3.5)
GLUCOSE SERPL-MCNC: 86 MG/DL (ref 65–100)
HCT VFR BLD AUTO: 31.3 % (ref 35.8–46.3)
HGB BLD-MCNC: 9.5 G/DL (ref 11.7–15.4)
HGB RETIC QN AUTO: 31 PG (ref 29–35)
IMM GRANULOCYTES # BLD AUTO: 0 K/UL (ref 0–0.5)
IMM GRANULOCYTES NFR BLD AUTO: 0 % (ref 0–5)
IMM RETICS NFR: 14 % (ref 3–15.9)
IRON SATN MFR SERPL: 13 %
IRON SERPL-MCNC: 32 UG/DL (ref 35–150)
LYMPHOCYTES # BLD: 1.2 K/UL (ref 0.5–4.6)
LYMPHOCYTES NFR BLD: 19 % (ref 13–44)
MCH RBC QN AUTO: 29.3 PG (ref 26.1–32.9)
MCHC RBC AUTO-ENTMCNC: 30.4 G/DL (ref 31.4–35)
MCV RBC AUTO: 96.6 FL (ref 79.6–97.8)
MONOCYTES # BLD: 0.8 K/UL (ref 0.1–1.3)
MONOCYTES NFR BLD: 12 % (ref 4–12)
NEUTS SEG # BLD: 4 K/UL (ref 1.7–8.2)
NEUTS SEG NFR BLD: 61 % (ref 43–78)
NRBC # BLD: 0 K/UL (ref 0–0.2)
PLATELET # BLD AUTO: 307 K/UL (ref 150–450)
PMV BLD AUTO: 11.5 FL (ref 9.4–12.3)
POTASSIUM SERPL-SCNC: 3.5 MMOL/L (ref 3.5–5.1)
PROT SERPL-MCNC: 6.9 G/DL (ref 6.3–8.2)
RBC # BLD AUTO: 3.24 M/UL (ref 4.05–5.2)
RETICS # AUTO: 0.06 M/UL (ref 0.03–0.1)
RETICS/RBC NFR AUTO: 1.8 % (ref 0.3–2)
SODIUM SERPL-SCNC: 142 MMOL/L (ref 136–145)
SPECIMEN EXP DATE BLD: NORMAL
TIBC SERPL-MCNC: 248 UG/DL (ref 250–450)
WBC # BLD AUTO: 6.6 K/UL (ref 4.3–11.1)

## 2022-01-26 PROCEDURE — 86900 BLOOD TYPING SEROLOGIC ABO: CPT

## 2022-01-26 PROCEDURE — 82728 ASSAY OF FERRITIN: CPT

## 2022-01-26 PROCEDURE — 36415 COLL VENOUS BLD VENIPUNCTURE: CPT

## 2022-01-26 PROCEDURE — 85046 RETICYTE/HGB CONCENTRATE: CPT

## 2022-01-26 PROCEDURE — 83540 ASSAY OF IRON: CPT

## 2022-01-26 PROCEDURE — 80053 COMPREHEN METABOLIC PANEL: CPT

## 2022-01-26 PROCEDURE — 85025 COMPLETE CBC W/AUTO DIFF WBC: CPT

## 2022-01-31 LAB
FUNGUS CULTURE, RFCO2T: NEGATIVE
FUNGUS SMEAR, RFCO1T: NORMAL
FUNGUS SPEC CULT: NORMAL
FUNGUS STAIN, 188244: NORMAL
REFLEX TO ID, RFCO3T: NORMAL
SPECIMEN SOURCE: NORMAL
SPECIMEN SOURCE: NORMAL

## 2022-02-07 ENCOUNTER — TRANSCRIBE ORDER (OUTPATIENT)
Dept: SCHEDULING | Age: 82
End: 2022-02-07

## 2022-02-07 DIAGNOSIS — Z12.31 VISIT FOR SCREENING MAMMOGRAM: Primary | ICD-10-CM

## 2022-02-09 ENCOUNTER — APPOINTMENT (OUTPATIENT)
Dept: INFUSION THERAPY | Age: 82
End: 2022-02-09

## 2022-02-12 LAB
ACID FAST STN SPEC: NEGATIVE
MYCOBACTERIUM SPEC QL CULT: NEGATIVE
SPECIMEN PREPARATION: NORMAL
SPECIMEN SOURCE: NORMAL

## 2022-03-18 PROBLEM — D64.9 ANEMIA: Status: ACTIVE | Noted: 2021-05-12

## 2022-03-18 PROBLEM — N18.30 STAGE 3 CHRONIC KIDNEY DISEASE (HCC): Status: ACTIVE | Noted: 2021-05-20

## 2022-03-18 PROBLEM — I48.0 PAROXYSMAL ATRIAL FIBRILLATION (HCC): Status: ACTIVE | Noted: 2021-03-19

## 2022-03-18 PROBLEM — D72.829 LEUKOCYTOSIS: Status: ACTIVE | Noted: 2021-06-14

## 2022-03-19 PROBLEM — S22.080A T12 COMPRESSION FRACTURE (HCC): Status: ACTIVE | Noted: 2021-12-30

## 2022-03-19 PROBLEM — J90 PLEURAL EFFUSION: Status: ACTIVE | Noted: 2021-12-30

## 2022-03-19 PROBLEM — I31.9 PERICARDITIS WITH EFFUSION: Status: ACTIVE | Noted: 2021-03-23

## 2022-03-19 PROBLEM — I50.32 DIASTOLIC CHF, CHRONIC (HCC): Status: ACTIVE | Noted: 2021-06-13

## 2022-03-19 PROBLEM — N28.1 RENAL CYST, LEFT: Status: ACTIVE | Noted: 2021-12-30

## 2022-03-19 PROBLEM — R94.31 PROLONGED Q-T INTERVAL ON ECG: Status: ACTIVE | Noted: 2019-04-22

## 2022-03-19 PROBLEM — K92.2 GI BLEED: Status: ACTIVE | Noted: 2021-12-25

## 2022-03-19 PROBLEM — A41.9 SEPSIS (HCC): Status: ACTIVE | Noted: 2019-04-22

## 2022-03-19 PROBLEM — J15.9 COMMUNITY ACQUIRED BACTERIAL PNEUMONIA: Status: ACTIVE | Noted: 2019-04-22

## 2022-03-20 PROBLEM — S22.39XA RIB FRACTURE: Status: ACTIVE | Noted: 2021-05-12

## 2022-03-20 PROBLEM — R09.02 HYPOXIA: Status: ACTIVE | Noted: 2021-12-31

## 2022-03-20 PROBLEM — E88.09 HYPOALBUMINEMIA DUE TO PROTEIN-CALORIE MALNUTRITION (HCC): Status: ACTIVE | Noted: 2021-05-22

## 2022-03-20 PROBLEM — E46 HYPOALBUMINEMIA DUE TO PROTEIN-CALORIE MALNUTRITION (HCC): Status: ACTIVE | Noted: 2021-05-22

## 2022-08-12 NOTE — PROGRESS NOTES
Problem: Self Care Deficits Care Plan (Adult) Goal: *Acute Goals and Plan of Care (Insert Text) Description 1. Patient will complete lower body bathing and dressing with modified independence and adaptive equipment as needed. 2. Patient will complete toileting with modified independence. 3. Patient will tolerate 25 minutes of OT treatment with 2-3 rest breaks to increase activity tolerance for ADLs. 4. Patient will complete functional transfers with modified independence and adaptive equipment as needed. 5. Patient will verbalize with independence 3 ways to complete energy conservation for ADL. 6. Patient will complete functional mobility for household distances with modified independence and good safety awareness. Timeframe: 7 visits Outcome: Progressing Towards Goal 
  
 
OCCUPATIONAL THERAPY: Daily Note and AM  
 4/30/2019 INPATIENT: OT Visit Days: 3 Payor: Kacey Pace / Plan: [address] [place] [address] HMO / Product Type: Managed Care Medicare /  
  
NAME/AGE/GENDER: Stepan Tracy is a 70 y.o. female PRIMARY DIAGNOSIS:  Acute renal failure (ARF) (HCC) [N17.9] Acute renal failure (ARF) (HCC) Acute renal failure (ARF) (HCC) ICD-10: Treatment Diagnosis:  
 · Generalized Muscle Weakness (M62.81) · Other lack of cordination (R27.8) Precautions/Allergies: 
   Patient has no known allergies. ASSESSMENT:  
Ms. Charmayne Fester was admitted with acute renal failure and shortness of breath. Pt lives with alone in a mobile home with a tub/shower and is independent with ADL at baseline. Pt states she uses a cane for functional mobility and denies any falls in the past. Pt still drives and completes her own cooking/household chores. Pt is currently wearing nasal cannula but typically does not wear O2 at baseline. 4/30/2019 Pt presents up in the chair upon arrival. Pt agreeable to treatment and completed sit to stand with SBA and a rolling walker.  Pt completed functional mobility in room and into the hallway with a rolling walker and SBA. Pt returned to her room and was given a rest break and then participated in exercises listed in the following grid. Pt left up in the chair with belongings in reach. Good effort. Continue OT POC. This section established at most recent assessment PROBLEM LIST (Impairments causing functional limitations): 1. Decreased Strength 2. Decreased ADL/Functional Activities 3. Decreased Transfer Abilities 4. Decreased Ambulation Ability/Technique 5. Decreased Balance 6. Increased Pain 7. Decreased Activity Tolerance 8. Decreased Pacing Skills 9. Decreased Work Simplification/Energy Conservation Techniques 10. Increased Shortness of Breath 11. Decreased [place] with Home Exercise Program 
 INTERVENTIONS PLANNED: (Benefits and precautions of occupational therapy have been discussed with the patient.) 1. Activities of daily living training 2. Adaptive equipment training 3. Balance training 4. Clothing management 5. Donning&doffing training 6. Neuromuscular re-eduation 7. Therapeutic activity 8. Therapeutic exercise TREATMENT PLAN: Frequency/Duration: Follow patient 3 times per week to address above goals. Rehabilitation Potential For Stated Goals: Good REHAB RECOMMENDATIONS (at time of discharge pending progress):   
Placement: It is my opinion, based on this patient's performance to date, that Ms. Willis Chacon may benefit from [address] after discharge due to her functional deficits (listed above) that are likely to improve with skilled rehabilitation because she has multiple medical issues that affect her functional mobility in the community. Equipment: ? TBD   
    
 
 
 
OCCUPATIONAL PROFILE AND HISTORY:  
History of Present Injury/Illness (Reason for Referral): 
See H&P Past Medical History/Comorbidities: Ms. Willis Chacon  has a past medical history of Acquired hypothyroidism (1/16/2016), [facility] (2008), Depression (8/18/2016), Endocrine disease, Fungal dermatitis (8/18/2016), Hyperlipidemia (1/16/2016), Hypertension, essential, benign (8/18/2016), Hypokalemia (8/18/2016), Inflamed sebaceous cyst (8/18/2016), Major depressive disorder, recurrent, moderate ([facility] (8/18/2016), Nicotine dependence, cigarettes, uncomplicated (8/27/7043), Osteopenia (8/18/2016), Osteoporosis (8/18/2016), Radiation therapy complication (0522), Rheumatoid arthritis ([facility] (1/16/2016), Right carotid bruit (1/16/2016), TIA (transient ischemic attack) (1/16/2016), and Tobacco abuse (8/18/2016). Ms. Rosa Maria Farias  has a past surgical history that includes hx tonsillectomy; hx adenoidectomy; and hx breast lumpectomy (Right, 2008). Social History/Living Environment:  
Home Environment: Trailer/mobile home # Steps to Enter: 6 Rails to Enter: Yes Hand Rails : Bilateral 
One/Two Story Residence: One story Living Alone: Yes Support Systems: None Patient Expects to be Discharged to[de-identified] Private residence Current DME Used/Available at Home: Cane, straight Tub or Shower Type: Tub/Shower combination Prior Level of Function/Work/Activity: 
Pt lives with alone in a mobile home with a tub/shower and is independent with ADL at baseline. Pt states she uses a cane for functional mobility and denies any falls in the past. Pt still drives and completes her own cooking/household chores. Pt is currently wearing nasal cannula but typically does not wear O2 at baseline. Personal Factors:   
      Social Background:  lives alone Other factors that influence how disability is experienced by the patient:  multiple co-morbidities Number of Personal Factors/Comorbidities that affect the Plan of Care: Expanded review of therapy/medical records (1-2):  MODERATE COMPLEXITY ASSESSMENT OF OCCUPATIONAL PERFORMANCE[de-identified]  
Activities of Daily Living:  
Basic ADLs (From Assessment) Complex ADLs (From Assessment) Feeding: Setup Oral Facial Hygiene/Grooming: Stand-by assistance Bathing: Moderate assistance Upper Body Dressing: Minimum assistance Lower Body Dressing: Moderate assistance Toileting: Contact guard assistance Instrumental ADL Meal Preparation: Maximum assistance Homemaking: Total assistance Grooming/Bathing/Dressing Activities of Daily Living Bed/Mat Mobility Sit to Stand: Supervision Stand to Sit: Supervision Most Recent Physical Functioning:  
Gross Assessment: 
  
         
  
Posture: 
Posture (WDL): Exceptions to [facility] Posture Assessment: Forward head, Rounded shoulders, Kyphosis Balance: 
Sitting: Intact Standing: Impaired Standing - Static: Good Standing - Dynamic : Fair Bed Mobility: 
  
Wheelchair Mobility: 
  
Transfers: 
Sit to Stand: Supervision Stand to Sit: Supervision Patient Vitals for the past 6 hrs: 
 BP BP Patient Position SpO2 Pulse 04/30/19 0740 120/70 Sitting 100 % 83 Mental Status Neurologic State: Alert Orientation Level: Oriented X4 Cognition: Appropriate safety awareness Perception: Appears intact Perseveration: No perseveration noted Safety/Judgement: Awareness of environment, Fall prevention Physical Skills Involved: 
1. Balance 2. Strength 3. Activity Tolerance Cognitive Skills Affected (resulting in the inability to perform in a timely and safe manner): 1. Executive Function 2. Divided Attention Psychosocial Skills Affected: 1. Habits/Routines 2. Self-Awareness Number of elements that affect the Plan of Care: 5+:  HIGH COMPLEXITY CLINICAL DECISION MAKING:  
[employer] AM-PAC 6 Clicks Daily Activity Inpatient Short Form How much help from another person does the patient currently need. .. Total A Lot A Little None 1. Putting on and taking off regular lower body clothing?    ? 1   ? 2   ? 3   ? 4  
 2.  Bathing (including washing, rinsing, drying)? ? 1   ? 2   ? 3   ? 4  
3. Toileting, which includes using toilet, bedpan or urinal?   ? 1   ? 2   ? 3   ? 4  
4. Putting on and taking off regular upper body clothing? ? 1   ? 2   ? 3   ? 4  
5. Taking care of personal grooming such as brushing teeth? ? 1   ? 2   ? 3   ? 4  
6. Eating meals? ? 1   ? 2   ? 3   ? 4  
© 2007, Trustees of [address] [place] [address] [address] [address], under license to [employer] [place]. All rights reserved Score:  Initial: 16 Most Recent: X (Date: -- ) Interpretation of Tool:  Represents activities that are increasingly more difficult (i.e. Bed mobility, Transfers, Gait). Medical Necessity:    
· Patient demonstrates good ·  rehab potential due to higher previous functional level. Reason for Services/Other Comments: 
· Patient continues to require skilled intervention due to decreased independence with ADL/functional transfers · . Use of outcome tool(s) and clinical judgement create a POC that gives a: LOW COMPLEXITY  
 
 
 
TREATMENT:  
(In addition to Assessment/Re-Assessment sessions the following treatments were rendered) Pre-treatment Symptoms/Complaints:   
Pain: Initial:  
Pain Intensity 1: 0  Post Session:  0/10 Therapeutic Activity: (10 minutes): Therapeutic activities including Ambulation on level ground to improve mobility, strength and balance. Required SBA  to promote static and dynamic balance in standing. Therapeutic Exercise: (15 minutes):  Exercises per grid below to improve mobility, strength and dynamic movement of arm - bilateral to improve functional bending, lifting and reaching. Required minimal visual and verbal cues to promote proper body posture and promote proper body mechanics. Progressed resistance, range and repetitions as indicated. Date: 
4/30/19 Date: 
 Date: Activity/Exercise Parameters Parameters Parameters Shoulder flexion/extension 2 sets of 15 reps with red theraband Bill For Surgical Tray: no Shoulder horizontal add/abb 2 sets of 15 reps with red theraband Punches 2 sets of 15 reps with red theraband Elbow flexion/extension 2 sets of 15 reps with red theraband Tricep extension 2 sets of 15 reps with red theraband Braces/Orthotics/Lines/Etc:  
· IV 
· O2 Device: Nasal cannula Treatment/Session Assessment:   
· Response to Treatment:  Pt tolerated well. · Interdisciplinary Collaboration:  
o Certified Occupational Therapy Assistant 
o Registered Nurse · After treatment position/precautions:  
o Up in chair 
o Bed alarm/tab alert on 
o Bed/Chair-wheels locked 
o Call light within reach 
o RN notified · Compliance with Program/Exercises: Will assess as treatment progresses. · Recommendations/Intent for next treatment session: \"Next visit will focus on advancements to more challenging activities and reduction in assistance provided\". Total Treatment Duration: OT Patient Time In/Time Out Time In: 5248 Time Out: 7473 Fabiola Snyder Expected Date Of Service: 08/12/2022 Billing Type: Third-Party Bill

## 2022-09-17 ENCOUNTER — HOSPITAL ENCOUNTER (INPATIENT)
Age: 82
LOS: 4 days | Discharge: NURSING, REHAB OR LONG-TERM CARE | DRG: 522 | End: 2022-09-21
Attending: GENERAL PRACTICE | Admitting: STUDENT IN AN ORGANIZED HEALTH CARE EDUCATION/TRAINING PROGRAM
Payer: MEDICARE

## 2022-09-17 ENCOUNTER — APPOINTMENT (OUTPATIENT)
Dept: GENERAL RADIOLOGY | Age: 82
DRG: 522 | End: 2022-09-17
Payer: MEDICARE

## 2022-09-17 DIAGNOSIS — S72.001A CLOSED FRACTURE OF NECK OF RIGHT FEMUR, INITIAL ENCOUNTER (HCC): Primary | ICD-10-CM

## 2022-09-17 PROBLEM — I48.0 PAROXYSMAL ATRIAL FIBRILLATION (HCC): Status: ACTIVE | Noted: 2021-03-19

## 2022-09-17 PROBLEM — N17.9 AKI (ACUTE KIDNEY INJURY) (HCC): Status: ACTIVE | Noted: 2022-09-17

## 2022-09-17 PROBLEM — S72.001G CLOSED FRACTURE OF NECK OF RIGHT FEMUR WITH DELAYED HEALING: Status: ACTIVE | Noted: 2022-09-17

## 2022-09-17 PROBLEM — K21.9 GERD (GASTROESOPHAGEAL REFLUX DISEASE): Status: ACTIVE | Noted: 2022-09-17

## 2022-09-17 LAB
ABO + RH BLD: NORMAL
ALBUMIN SERPL-MCNC: 3.3 G/DL (ref 3.2–4.6)
ALBUMIN/GLOB SERPL: 0.7 {RATIO} (ref 1.2–3.5)
ALP SERPL-CCNC: 115 U/L (ref 50–136)
ALT SERPL-CCNC: 32 U/L (ref 12–65)
ANION GAP SERPL CALC-SCNC: 4 MMOL/L (ref 4–13)
AST SERPL-CCNC: 26 U/L (ref 15–37)
BASOPHILS # BLD: 0.1 K/UL (ref 0–0.2)
BASOPHILS NFR BLD: 1 % (ref 0–2)
BILIRUB SERPL-MCNC: 0.6 MG/DL (ref 0.2–1.1)
BLOOD GROUP ANTIBODIES SERPL: NORMAL
BUN SERPL-MCNC: 27 MG/DL (ref 8–23)
CALCIUM SERPL-MCNC: 9.4 MG/DL (ref 8.3–10.4)
CHLORIDE SERPL-SCNC: 101 MMOL/L (ref 101–110)
CO2 SERPL-SCNC: 32 MMOL/L (ref 21–32)
CREAT SERPL-MCNC: 1.7 MG/DL (ref 0.6–1)
DIFFERENTIAL METHOD BLD: ABNORMAL
EOSINOPHIL # BLD: 0.2 K/UL (ref 0–0.8)
EOSINOPHIL NFR BLD: 2 % (ref 0.5–7.8)
ERYTHROCYTE [DISTWIDTH] IN BLOOD BY AUTOMATED COUNT: 16.8 % (ref 11.9–14.6)
GLOBULIN SER CALC-MCNC: 4.7 G/DL (ref 2.3–3.5)
GLUCOSE SERPL-MCNC: 123 MG/DL (ref 65–100)
HCT VFR BLD AUTO: 46.4 % (ref 35.8–46.3)
HGB BLD-MCNC: 14.5 G/DL (ref 11.7–15.4)
IMM GRANULOCYTES # BLD AUTO: 0.1 K/UL (ref 0–0.5)
IMM GRANULOCYTES NFR BLD AUTO: 1 % (ref 0–5)
INR PPP: 1
LYMPHOCYTES # BLD: 1.7 K/UL (ref 0.5–4.6)
LYMPHOCYTES NFR BLD: 13 % (ref 13–44)
MCH RBC QN AUTO: 27.9 PG (ref 26.1–32.9)
MCHC RBC AUTO-ENTMCNC: 31.3 G/DL (ref 31.4–35)
MCV RBC AUTO: 89.2 FL (ref 79.6–97.8)
MONOCYTES # BLD: 1 K/UL (ref 0.1–1.3)
MONOCYTES NFR BLD: 7 % (ref 4–12)
NEUTS SEG # BLD: 10.3 K/UL (ref 1.7–8.2)
NEUTS SEG NFR BLD: 76 % (ref 43–78)
NRBC # BLD: 0 K/UL (ref 0–0.2)
PLATELET # BLD AUTO: 266 K/UL (ref 150–450)
PMV BLD AUTO: 11.7 FL (ref 9.4–12.3)
POTASSIUM SERPL-SCNC: 4.4 MMOL/L (ref 3.5–5.1)
PROT SERPL-MCNC: 8 G/DL (ref 6.3–8.2)
PROTHROMBIN TIME: 13.7 SEC (ref 12.6–14.5)
RBC # BLD AUTO: 5.2 M/UL (ref 4.05–5.2)
SODIUM SERPL-SCNC: 137 MMOL/L (ref 136–145)
SPECIMEN EXP DATE BLD: NORMAL
WBC # BLD AUTO: 13.4 K/UL (ref 4.3–11.1)

## 2022-09-17 PROCEDURE — 6360000002 HC RX W HCPCS: Performed by: GENERAL PRACTICE

## 2022-09-17 PROCEDURE — 73502 X-RAY EXAM HIP UNI 2-3 VIEWS: CPT

## 2022-09-17 PROCEDURE — 1100000000 HC RM PRIVATE

## 2022-09-17 PROCEDURE — 96375 TX/PRO/DX INJ NEW DRUG ADDON: CPT

## 2022-09-17 PROCEDURE — 85025 COMPLETE CBC W/AUTO DIFF WBC: CPT

## 2022-09-17 PROCEDURE — 71045 X-RAY EXAM CHEST 1 VIEW: CPT

## 2022-09-17 PROCEDURE — 73552 X-RAY EXAM OF FEMUR 2/>: CPT

## 2022-09-17 PROCEDURE — 96374 THER/PROPH/DIAG INJ IV PUSH: CPT

## 2022-09-17 PROCEDURE — 85610 PROTHROMBIN TIME: CPT

## 2022-09-17 PROCEDURE — 99285 EMERGENCY DEPT VISIT HI MDM: CPT

## 2022-09-17 PROCEDURE — 80053 COMPREHEN METABOLIC PANEL: CPT

## 2022-09-17 PROCEDURE — 86901 BLOOD TYPING SEROLOGIC RH(D): CPT

## 2022-09-17 PROCEDURE — 93005 ELECTROCARDIOGRAM TRACING: CPT | Performed by: GENERAL PRACTICE

## 2022-09-17 RX ORDER — ONDANSETRON 2 MG/ML
4 INJECTION INTRAMUSCULAR; INTRAVENOUS
Status: COMPLETED | OUTPATIENT
Start: 2022-09-17 | End: 2022-09-17

## 2022-09-17 RX ORDER — MORPHINE SULFATE 4 MG/ML
4 INJECTION INTRAVENOUS EVERY 4 HOURS PRN
Status: DISCONTINUED | OUTPATIENT
Start: 2022-09-17 | End: 2022-09-18

## 2022-09-17 RX ORDER — MORPHINE SULFATE 4 MG/ML
4 INJECTION INTRAVENOUS ONCE
Status: COMPLETED | OUTPATIENT
Start: 2022-09-17 | End: 2022-09-17

## 2022-09-17 RX ADMIN — MORPHINE SULFATE 4 MG: 4 INJECTION INTRAVENOUS at 21:27

## 2022-09-17 RX ADMIN — ONDANSETRON 4 MG: 2 INJECTION INTRAMUSCULAR; INTRAVENOUS at 21:26

## 2022-09-17 ASSESSMENT — PAIN SCALES - GENERAL
PAINLEVEL_OUTOF10: 10
PAINLEVEL_OUTOF10: 10

## 2022-09-17 ASSESSMENT — PAIN - FUNCTIONAL ASSESSMENT: PAIN_FUNCTIONAL_ASSESSMENT: 0-10

## 2022-09-17 NOTE — ED TRIAGE NOTES
Pt arrives via ems for fall, reports no loc, pt reports she fell off 3 foot step stool and landed on rt side.  Pt on 02 prn at home

## 2022-09-18 ENCOUNTER — ANESTHESIA (OUTPATIENT)
Dept: SURGERY | Age: 82
DRG: 522 | End: 2022-09-18
Payer: MEDICARE

## 2022-09-18 ENCOUNTER — ANESTHESIA EVENT (OUTPATIENT)
Dept: SURGERY | Age: 82
DRG: 522 | End: 2022-09-18
Payer: MEDICARE

## 2022-09-18 ENCOUNTER — APPOINTMENT (OUTPATIENT)
Dept: GENERAL RADIOLOGY | Age: 82
DRG: 522 | End: 2022-09-18
Payer: MEDICARE

## 2022-09-18 LAB
ALBUMIN SERPL-MCNC: 3 G/DL (ref 3.2–4.6)
ALBUMIN/GLOB SERPL: 0.7 {RATIO} (ref 1.2–3.5)
ALP SERPL-CCNC: 114 U/L (ref 50–136)
ALT SERPL-CCNC: 30 U/L (ref 12–65)
ANION GAP SERPL CALC-SCNC: 5 MMOL/L (ref 4–13)
AST SERPL-CCNC: 25 U/L (ref 15–37)
BASOPHILS # BLD: 0.1 K/UL (ref 0–0.2)
BASOPHILS NFR BLD: 1 % (ref 0–2)
BILIRUB SERPL-MCNC: 0.7 MG/DL (ref 0.2–1.1)
BUN SERPL-MCNC: 25 MG/DL (ref 8–23)
CALCIUM SERPL-MCNC: 9.3 MG/DL (ref 8.3–10.4)
CHLORIDE SERPL-SCNC: 103 MMOL/L (ref 101–110)
CO2 SERPL-SCNC: 30 MMOL/L (ref 21–32)
CREAT SERPL-MCNC: 1.4 MG/DL (ref 0.6–1)
DIFFERENTIAL METHOD BLD: ABNORMAL
EOSINOPHIL # BLD: 0.1 K/UL (ref 0–0.8)
EOSINOPHIL NFR BLD: 1 % (ref 0.5–7.8)
ERYTHROCYTE [DISTWIDTH] IN BLOOD BY AUTOMATED COUNT: 16.9 % (ref 11.9–14.6)
GLOBULIN SER CALC-MCNC: 4.6 G/DL (ref 2.3–3.5)
GLUCOSE SERPL-MCNC: 136 MG/DL (ref 65–100)
HCT VFR BLD AUTO: 45.8 % (ref 35.8–46.3)
HGB BLD-MCNC: 14.3 G/DL (ref 11.7–15.4)
IMM GRANULOCYTES # BLD AUTO: 0.1 K/UL (ref 0–0.5)
IMM GRANULOCYTES NFR BLD AUTO: 0 % (ref 0–5)
LYMPHOCYTES # BLD: 1.7 K/UL (ref 0.5–4.6)
LYMPHOCYTES NFR BLD: 15 % (ref 13–44)
MCH RBC QN AUTO: 27.9 PG (ref 26.1–32.9)
MCHC RBC AUTO-ENTMCNC: 31.2 G/DL (ref 31.4–35)
MCV RBC AUTO: 89.5 FL (ref 79.6–97.8)
MONOCYTES # BLD: 1.4 K/UL (ref 0.1–1.3)
MONOCYTES NFR BLD: 12 % (ref 4–12)
NEUTS SEG # BLD: 8.3 K/UL (ref 1.7–8.2)
NEUTS SEG NFR BLD: 71 % (ref 43–78)
NRBC # BLD: 0 K/UL (ref 0–0.2)
PLATELET # BLD AUTO: 239 K/UL (ref 150–450)
PMV BLD AUTO: 11.8 FL (ref 9.4–12.3)
POTASSIUM SERPL-SCNC: 4.2 MMOL/L (ref 3.5–5.1)
PROT SERPL-MCNC: 7.6 G/DL (ref 6.3–8.2)
RBC # BLD AUTO: 5.12 M/UL (ref 4.05–5.2)
SODIUM SERPL-SCNC: 138 MMOL/L (ref 136–145)
WBC # BLD AUTO: 11.6 K/UL (ref 4.3–11.1)

## 2022-09-18 PROCEDURE — 3700000001 HC ADD 15 MINUTES (ANESTHESIA): Performed by: ORTHOPAEDIC SURGERY

## 2022-09-18 PROCEDURE — 2580000003 HC RX 258: Performed by: STUDENT IN AN ORGANIZED HEALTH CARE EDUCATION/TRAINING PROGRAM

## 2022-09-18 PROCEDURE — 6360000002 HC RX W HCPCS: Performed by: NURSE ANESTHETIST, CERTIFIED REGISTERED

## 2022-09-18 PROCEDURE — 3600000015 HC SURGERY LEVEL 5 ADDTL 15MIN: Performed by: ORTHOPAEDIC SURGERY

## 2022-09-18 PROCEDURE — 99221 1ST HOSP IP/OBS SF/LOW 40: CPT | Performed by: ORTHOPAEDIC SURGERY

## 2022-09-18 PROCEDURE — 72170 X-RAY EXAM OF PELVIS: CPT

## 2022-09-18 PROCEDURE — 6370000000 HC RX 637 (ALT 250 FOR IP): Performed by: HOSPITALIST

## 2022-09-18 PROCEDURE — 6360000002 HC RX W HCPCS: Performed by: STUDENT IN AN ORGANIZED HEALTH CARE EDUCATION/TRAINING PROGRAM

## 2022-09-18 PROCEDURE — 0SRR01A REPLACEMENT OF RIGHT HIP JOINT, FEMORAL SURFACE WITH METAL SYNTHETIC SUBSTITUTE, UNCEMENTED, OPEN APPROACH: ICD-10-PCS | Performed by: ORTHOPAEDIC SURGERY

## 2022-09-18 PROCEDURE — 2709999900 HC NON-CHARGEABLE SUPPLY: Performed by: ORTHOPAEDIC SURGERY

## 2022-09-18 PROCEDURE — 3700000000 HC ANESTHESIA ATTENDED CARE: Performed by: ORTHOPAEDIC SURGERY

## 2022-09-18 PROCEDURE — 2500000003 HC RX 250 WO HCPCS: Performed by: NURSE ANESTHETIST, CERTIFIED REGISTERED

## 2022-09-18 PROCEDURE — C1776 JOINT DEVICE (IMPLANTABLE): HCPCS | Performed by: ORTHOPAEDIC SURGERY

## 2022-09-18 PROCEDURE — 6360000002 HC RX W HCPCS: Performed by: HOSPITALIST

## 2022-09-18 PROCEDURE — 1100000003 HC PRIVATE W/ TELEMETRY

## 2022-09-18 PROCEDURE — 7100000001 HC PACU RECOVERY - ADDTL 15 MIN: Performed by: ORTHOPAEDIC SURGERY

## 2022-09-18 PROCEDURE — 36415 COLL VENOUS BLD VENIPUNCTURE: CPT

## 2022-09-18 PROCEDURE — 3600000005 HC SURGERY LEVEL 5 BASE: Performed by: ORTHOPAEDIC SURGERY

## 2022-09-18 PROCEDURE — 85025 COMPLETE CBC W/AUTO DIFF WBC: CPT

## 2022-09-18 PROCEDURE — 6360000002 HC RX W HCPCS: Performed by: ORTHOPAEDIC SURGERY

## 2022-09-18 PROCEDURE — 2580000003 HC RX 258: Performed by: NURSE ANESTHETIST, CERTIFIED REGISTERED

## 2022-09-18 PROCEDURE — 1100000000 HC RM PRIVATE

## 2022-09-18 PROCEDURE — 2500000003 HC RX 250 WO HCPCS: Performed by: ANESTHESIOLOGY

## 2022-09-18 PROCEDURE — 2580000003 HC RX 258: Performed by: ORTHOPAEDIC SURGERY

## 2022-09-18 PROCEDURE — 6370000000 HC RX 637 (ALT 250 FOR IP): Performed by: STUDENT IN AN ORGANIZED HEALTH CARE EDUCATION/TRAINING PROGRAM

## 2022-09-18 PROCEDURE — 80053 COMPREHEN METABOLIC PANEL: CPT

## 2022-09-18 PROCEDURE — 7100000000 HC PACU RECOVERY - FIRST 15 MIN: Performed by: ORTHOPAEDIC SURGERY

## 2022-09-18 PROCEDURE — 2580000003 HC RX 258: Performed by: ANESTHESIOLOGY

## 2022-09-18 PROCEDURE — 2500000003 HC RX 250 WO HCPCS: Performed by: ORTHOPAEDIC SURGERY

## 2022-09-18 PROCEDURE — 6360000002 HC RX W HCPCS: Performed by: ANESTHESIOLOGY

## 2022-09-18 DEVICE — BIPOLAR COMPONENT
Type: IMPLANTABLE DEVICE | Site: HIP | Status: FUNCTIONAL
Brand: UHR

## 2022-09-18 DEVICE — HIP STEM
Type: IMPLANTABLE DEVICE | Site: HIP | Status: FUNCTIONAL
Brand: OMNIFIT

## 2022-09-18 DEVICE — COMPONENT TOT HIP CAPPED BPLR UPLR CEM STEM H4STRYKER] STRYKER CORP]: Type: IMPLANTABLE DEVICE | Site: HIP | Status: FUNCTIONAL

## 2022-09-18 DEVICE — FEMORAL HEAD
Type: IMPLANTABLE DEVICE | Site: HIP | Status: FUNCTIONAL
Brand: C-TAPER HEAD

## 2022-09-18 RX ORDER — OXYCODONE HYDROCHLORIDE 5 MG/1
10 TABLET ORAL PRN
Status: ACTIVE | OUTPATIENT
Start: 2022-09-18 | End: 2022-09-18

## 2022-09-18 RX ORDER — EPHEDRINE SULFATE/0.9% NACL/PF 50 MG/5 ML
SYRINGE (ML) INTRAVENOUS PRN
Status: DISCONTINUED | OUTPATIENT
Start: 2022-09-18 | End: 2022-09-18 | Stop reason: SDUPTHER

## 2022-09-18 RX ORDER — HYDROMORPHONE HYDROCHLORIDE 2 MG/ML
0.25 INJECTION, SOLUTION INTRAMUSCULAR; INTRAVENOUS; SUBCUTANEOUS EVERY 5 MIN PRN
Status: DISCONTINUED | OUTPATIENT
Start: 2022-09-18 | End: 2022-09-19

## 2022-09-18 RX ORDER — POLYETHYLENE GLYCOL 3350 17 G/17G
17 POWDER, FOR SOLUTION ORAL DAILY PRN
Status: DISCONTINUED | OUTPATIENT
Start: 2022-09-18 | End: 2022-09-21 | Stop reason: HOSPADM

## 2022-09-18 RX ORDER — PANTOPRAZOLE SODIUM 40 MG/1
40 TABLET, DELAYED RELEASE ORAL
Status: DISCONTINUED | OUTPATIENT
Start: 2022-09-18 | End: 2022-09-21 | Stop reason: HOSPADM

## 2022-09-18 RX ORDER — LABETALOL HYDROCHLORIDE 5 MG/ML
10 INJECTION, SOLUTION INTRAVENOUS
Status: DISCONTINUED | OUTPATIENT
Start: 2022-09-18 | End: 2022-09-21 | Stop reason: HOSPADM

## 2022-09-18 RX ORDER — LEVALBUTEROL INHALATION SOLUTION 1.25 MG/3ML
1.25 SOLUTION RESPIRATORY (INHALATION) EVERY 4 HOURS PRN
Status: DISCONTINUED | OUTPATIENT
Start: 2022-09-18 | End: 2022-09-21 | Stop reason: HOSPADM

## 2022-09-18 RX ORDER — SODIUM CHLORIDE 9 MG/ML
INJECTION, SOLUTION INTRAVENOUS PRN
Status: DISCONTINUED | OUTPATIENT
Start: 2022-09-18 | End: 2022-09-21 | Stop reason: HOSPADM

## 2022-09-18 RX ORDER — FOLIC ACID 1 MG/1
1 TABLET ORAL DAILY
Status: DISCONTINUED | OUTPATIENT
Start: 2022-09-18 | End: 2022-09-21 | Stop reason: HOSPADM

## 2022-09-18 RX ORDER — HYDROMORPHONE HCL 110MG/55ML
PATIENT CONTROLLED ANALGESIA SYRINGE INTRAVENOUS PRN
Status: DISCONTINUED | OUTPATIENT
Start: 2022-09-18 | End: 2022-09-18 | Stop reason: SDUPTHER

## 2022-09-18 RX ORDER — AMLODIPINE BESYLATE 5 MG/1
5 TABLET ORAL DAILY
Status: DISCONTINUED | OUTPATIENT
Start: 2022-09-18 | End: 2022-09-21 | Stop reason: HOSPADM

## 2022-09-18 RX ORDER — SODIUM CHLORIDE, SODIUM LACTATE, POTASSIUM CHLORIDE, AND CALCIUM CHLORIDE .6; .31; .03; .02 G/100ML; G/100ML; G/100ML; G/100ML
1000 INJECTION, SOLUTION INTRAVENOUS ONCE
Status: COMPLETED | OUTPATIENT
Start: 2022-09-18 | End: 2022-09-18

## 2022-09-18 RX ORDER — SODIUM CHLORIDE, SODIUM LACTATE, POTASSIUM CHLORIDE, CALCIUM CHLORIDE 600; 310; 30; 20 MG/100ML; MG/100ML; MG/100ML; MG/100ML
INJECTION, SOLUTION INTRAVENOUS CONTINUOUS
Status: DISCONTINUED | OUTPATIENT
Start: 2022-09-18 | End: 2022-09-20

## 2022-09-18 RX ORDER — ONDANSETRON 4 MG/1
4 TABLET, ORALLY DISINTEGRATING ORAL EVERY 8 HOURS PRN
Status: DISCONTINUED | OUTPATIENT
Start: 2022-09-18 | End: 2022-09-21 | Stop reason: HOSPADM

## 2022-09-18 RX ORDER — PROPOFOL 10 MG/ML
INJECTION, EMULSION INTRAVENOUS PRN
Status: DISCONTINUED | OUTPATIENT
Start: 2022-09-18 | End: 2022-09-18 | Stop reason: SDUPTHER

## 2022-09-18 RX ORDER — HYDROMORPHONE HYDROCHLORIDE 2 MG/ML
0.5 INJECTION, SOLUTION INTRAMUSCULAR; INTRAVENOUS; SUBCUTANEOUS EVERY 5 MIN PRN
Status: DISCONTINUED | OUTPATIENT
Start: 2022-09-18 | End: 2022-09-19

## 2022-09-18 RX ORDER — DEXAMETHASONE SODIUM PHOSPHATE 4 MG/ML
INJECTION, SOLUTION INTRA-ARTICULAR; INTRALESIONAL; INTRAMUSCULAR; INTRAVENOUS; SOFT TISSUE PRN
Status: DISCONTINUED | OUTPATIENT
Start: 2022-09-18 | End: 2022-09-18 | Stop reason: SDUPTHER

## 2022-09-18 RX ORDER — OXYCODONE HYDROCHLORIDE 5 MG/1
5 TABLET ORAL PRN
Status: ACTIVE | OUTPATIENT
Start: 2022-09-18 | End: 2022-09-18

## 2022-09-18 RX ORDER — MORPHINE SULFATE 4 MG/ML
2 INJECTION INTRAVENOUS EVERY 4 HOURS PRN
Status: DISCONTINUED | OUTPATIENT
Start: 2022-09-18 | End: 2022-09-21 | Stop reason: HOSPADM

## 2022-09-18 RX ORDER — FUROSEMIDE 20 MG/1
20 TABLET ORAL DAILY
Status: DISCONTINUED | OUTPATIENT
Start: 2022-09-18 | End: 2022-09-21 | Stop reason: HOSPADM

## 2022-09-18 RX ORDER — DEXTROSE MONOHYDRATE 100 MG/ML
INJECTION, SOLUTION INTRAVENOUS CONTINUOUS PRN
Status: DISCONTINUED | OUTPATIENT
Start: 2022-09-18 | End: 2022-09-21 | Stop reason: HOSPADM

## 2022-09-18 RX ORDER — NEOSTIGMINE METHYLSULFATE 1 MG/ML
INJECTION, SOLUTION INTRAVENOUS PRN
Status: DISCONTINUED | OUTPATIENT
Start: 2022-09-18 | End: 2022-09-18 | Stop reason: SDUPTHER

## 2022-09-18 RX ORDER — SODIUM CHLORIDE, SODIUM LACTATE, POTASSIUM CHLORIDE, CALCIUM CHLORIDE 600; 310; 30; 20 MG/100ML; MG/100ML; MG/100ML; MG/100ML
INJECTION, SOLUTION INTRAVENOUS CONTINUOUS PRN
Status: DISCONTINUED | OUTPATIENT
Start: 2022-09-18 | End: 2022-09-18 | Stop reason: SDUPTHER

## 2022-09-18 RX ORDER — DULOXETIN HYDROCHLORIDE 30 MG/1
60 CAPSULE, DELAYED RELEASE ORAL DAILY
Status: DISCONTINUED | OUTPATIENT
Start: 2022-09-18 | End: 2022-09-21 | Stop reason: HOSPADM

## 2022-09-18 RX ORDER — ONDANSETRON 2 MG/ML
INJECTION INTRAMUSCULAR; INTRAVENOUS PRN
Status: DISCONTINUED | OUTPATIENT
Start: 2022-09-18 | End: 2022-09-18 | Stop reason: SDUPTHER

## 2022-09-18 RX ORDER — METOPROLOL SUCCINATE 25 MG/1
25 TABLET, EXTENDED RELEASE ORAL DAILY
Status: DISCONTINUED | OUTPATIENT
Start: 2022-09-18 | End: 2022-09-21 | Stop reason: HOSPADM

## 2022-09-18 RX ORDER — GLYCOPYRROLATE 0.2 MG/ML
INJECTION INTRAMUSCULAR; INTRAVENOUS PRN
Status: DISCONTINUED | OUTPATIENT
Start: 2022-09-18 | End: 2022-09-18 | Stop reason: SDUPTHER

## 2022-09-18 RX ORDER — ONDANSETRON 2 MG/ML
4 INJECTION INTRAMUSCULAR; INTRAVENOUS EVERY 6 HOURS PRN
Status: DISCONTINUED | OUTPATIENT
Start: 2022-09-18 | End: 2022-09-21 | Stop reason: HOSPADM

## 2022-09-18 RX ORDER — ACETAMINOPHEN 325 MG/1
650 TABLET ORAL EVERY 6 HOURS PRN
Status: DISCONTINUED | OUTPATIENT
Start: 2022-09-18 | End: 2022-09-21 | Stop reason: HOSPADM

## 2022-09-18 RX ORDER — SODIUM CHLORIDE 9 MG/ML
INJECTION, SOLUTION INTRAVENOUS CONTINUOUS
Status: DISCONTINUED | OUTPATIENT
Start: 2022-09-18 | End: 2022-09-19

## 2022-09-18 RX ORDER — AMIODARONE HYDROCHLORIDE 200 MG/1
200 TABLET ORAL DAILY
Status: DISCONTINUED | OUTPATIENT
Start: 2022-09-18 | End: 2022-09-21 | Stop reason: HOSPADM

## 2022-09-18 RX ORDER — SODIUM CHLORIDE 0.9 % (FLUSH) 0.9 %
5-40 SYRINGE (ML) INJECTION EVERY 12 HOURS SCHEDULED
Status: DISCONTINUED | OUTPATIENT
Start: 2022-09-18 | End: 2022-09-21 | Stop reason: HOSPADM

## 2022-09-18 RX ORDER — LEVOTHYROXINE SODIUM 0.05 MG/1
25 TABLET ORAL
Status: DISCONTINUED | OUTPATIENT
Start: 2022-09-18 | End: 2022-09-21 | Stop reason: HOSPADM

## 2022-09-18 RX ORDER — SODIUM CHLORIDE 0.9 % (FLUSH) 0.9 %
5-40 SYRINGE (ML) INJECTION PRN
Status: DISCONTINUED | OUTPATIENT
Start: 2022-09-18 | End: 2022-09-21 | Stop reason: HOSPADM

## 2022-09-18 RX ORDER — IPRATROPIUM BROMIDE AND ALBUTEROL SULFATE 2.5; .5 MG/3ML; MG/3ML
1 SOLUTION RESPIRATORY (INHALATION)
Status: ACTIVE | OUTPATIENT
Start: 2022-09-18 | End: 2022-09-18

## 2022-09-18 RX ORDER — ACETAMINOPHEN 650 MG/1
650 SUPPOSITORY RECTAL EVERY 6 HOURS PRN
Status: DISCONTINUED | OUTPATIENT
Start: 2022-09-18 | End: 2022-09-21 | Stop reason: HOSPADM

## 2022-09-18 RX ORDER — ALBUTEROL SULFATE 90 UG/1
AEROSOL, METERED RESPIRATORY (INHALATION)
COMMUNITY

## 2022-09-18 RX ORDER — HYDROMORPHONE HYDROCHLORIDE 1 MG/ML
INJECTION, SOLUTION INTRAMUSCULAR; INTRAVENOUS; SUBCUTANEOUS PRN
Status: DISCONTINUED | OUTPATIENT
Start: 2022-09-18 | End: 2022-09-18 | Stop reason: SDUPTHER

## 2022-09-18 RX ORDER — ONDANSETRON 2 MG/ML
4 INJECTION INTRAMUSCULAR; INTRAVENOUS
Status: ACTIVE | OUTPATIENT
Start: 2022-09-18 | End: 2022-09-18

## 2022-09-18 RX ORDER — LIDOCAINE HYDROCHLORIDE 20 MG/ML
INJECTION, SOLUTION EPIDURAL; INFILTRATION; INTRACAUDAL; PERINEURAL PRN
Status: DISCONTINUED | OUTPATIENT
Start: 2022-09-18 | End: 2022-09-18 | Stop reason: SDUPTHER

## 2022-09-18 RX ORDER — ROSUVASTATIN CALCIUM 5 MG/1
10 TABLET, COATED ORAL NIGHTLY
Status: DISCONTINUED | OUTPATIENT
Start: 2022-09-18 | End: 2022-09-21 | Stop reason: HOSPADM

## 2022-09-18 RX ORDER — FENTANYL CITRATE 50 UG/ML
INJECTION, SOLUTION INTRAMUSCULAR; INTRAVENOUS PRN
Status: DISCONTINUED | OUTPATIENT
Start: 2022-09-18 | End: 2022-09-18 | Stop reason: SDUPTHER

## 2022-09-18 RX ORDER — HYDRALAZINE HYDROCHLORIDE 20 MG/ML
10 INJECTION INTRAMUSCULAR; INTRAVENOUS EVERY 6 HOURS PRN
Status: DISCONTINUED | OUTPATIENT
Start: 2022-09-18 | End: 2022-09-21 | Stop reason: HOSPADM

## 2022-09-18 RX ORDER — ROCURONIUM BROMIDE 10 MG/ML
INJECTION, SOLUTION INTRAVENOUS PRN
Status: DISCONTINUED | OUTPATIENT
Start: 2022-09-18 | End: 2022-09-18 | Stop reason: SDUPTHER

## 2022-09-18 RX ADMIN — FENTANYL CITRATE 50 MCG: 50 INJECTION, SOLUTION INTRAMUSCULAR; INTRAVENOUS at 11:58

## 2022-09-18 RX ADMIN — FENTANYL CITRATE 50 MCG: 50 INJECTION, SOLUTION INTRAMUSCULAR; INTRAVENOUS at 11:10

## 2022-09-18 RX ADMIN — SODIUM CHLORIDE, SODIUM LACTATE, POTASSIUM CHLORIDE, AND CALCIUM CHLORIDE: 600; 310; 30; 20 INJECTION, SOLUTION INTRAVENOUS at 11:01

## 2022-09-18 RX ADMIN — HYDROMORPHONE HYDROCHLORIDE 0.4 MG: 1 INJECTION, SOLUTION INTRAMUSCULAR; INTRAVENOUS; SUBCUTANEOUS at 12:50

## 2022-09-18 RX ADMIN — Medication 1000 MG: at 11:52

## 2022-09-18 RX ADMIN — ROSUVASTATIN CALCIUM 10 MG: 5 TABLET, FILM COATED ORAL at 21:02

## 2022-09-18 RX ADMIN — MORPHINE SULFATE 4 MG: 4 INJECTION INTRAVENOUS at 04:47

## 2022-09-18 RX ADMIN — FENTANYL CITRATE 50 MCG: 50 INJECTION, SOLUTION INTRAMUSCULAR; INTRAVENOUS at 11:07

## 2022-09-18 RX ADMIN — LEVOTHYROXINE SODIUM 25 MCG: 0.05 TABLET ORAL at 04:48

## 2022-09-18 RX ADMIN — DEXAMETHASONE SODIUM PHOSPHATE 4 MG: 4 INJECTION, SOLUTION INTRAMUSCULAR; INTRAVENOUS at 11:29

## 2022-09-18 RX ADMIN — MORPHINE SULFATE 2 MG: 4 INJECTION INTRAVENOUS at 09:49

## 2022-09-18 RX ADMIN — Medication 10 MG: at 11:24

## 2022-09-18 RX ADMIN — ONDANSETRON 4 MG: 2 INJECTION INTRAMUSCULAR; INTRAVENOUS at 11:29

## 2022-09-18 RX ADMIN — Medication 3 MG: at 12:46

## 2022-09-18 RX ADMIN — SODIUM CHLORIDE, PRESERVATIVE FREE 5 ML: 5 INJECTION INTRAVENOUS at 08:50

## 2022-09-18 RX ADMIN — MORPHINE SULFATE 4 MG: 4 INJECTION INTRAVENOUS at 00:41

## 2022-09-18 RX ADMIN — SODIUM CHLORIDE, PRESERVATIVE FREE 10 ML: 5 INJECTION INTRAVENOUS at 21:03

## 2022-09-18 RX ADMIN — GLYCOPYRROLATE 0.6 MG: 0.2 INJECTION, SOLUTION INTRAMUSCULAR; INTRAVENOUS at 12:45

## 2022-09-18 RX ADMIN — AMLODIPINE BESYLATE 5 MG: 5 TABLET ORAL at 08:50

## 2022-09-18 RX ADMIN — SODIUM CHLORIDE: 9 INJECTION, SOLUTION INTRAVENOUS at 00:43

## 2022-09-18 RX ADMIN — SODIUM CHLORIDE, PRESERVATIVE FREE 10 ML: 5 INJECTION INTRAVENOUS at 00:43

## 2022-09-18 RX ADMIN — CEFAZOLIN SODIUM 2000 MG: 100 INJECTION, POWDER, LYOPHILIZED, FOR SOLUTION INTRAVENOUS at 19:41

## 2022-09-18 RX ADMIN — Medication 2 G: at 11:25

## 2022-09-18 RX ADMIN — ASPIRIN 325 MG: 325 TABLET, COATED ORAL at 21:02

## 2022-09-18 RX ADMIN — METOPROLOL SUCCINATE 25 MG: 25 TABLET, EXTENDED RELEASE ORAL at 08:51

## 2022-09-18 RX ADMIN — SODIUM CHLORIDE, POTASSIUM CHLORIDE, SODIUM LACTATE AND CALCIUM CHLORIDE 1000 ML: 600; 310; 30; 20 INJECTION, SOLUTION INTRAVENOUS at 13:12

## 2022-09-18 RX ADMIN — PANTOPRAZOLE SODIUM 40 MG: 40 TABLET, DELAYED RELEASE ORAL at 04:48

## 2022-09-18 RX ADMIN — Medication 10 MG: at 11:21

## 2022-09-18 RX ADMIN — HYDROMORPHONE HYDROCHLORIDE 0.4 MG: 1 INJECTION, SOLUTION INTRAMUSCULAR; INTRAVENOUS; SUBCUTANEOUS at 13:17

## 2022-09-18 RX ADMIN — PHENYLEPHRINE HYDROCHLORIDE 50 MCG/MIN: 10 INJECTION INTRAVENOUS at 11:28

## 2022-09-18 RX ADMIN — SODIUM CHLORIDE, POTASSIUM CHLORIDE, SODIUM LACTATE AND CALCIUM CHLORIDE: 600; 310; 30; 20 INJECTION, SOLUTION INTRAVENOUS at 10:58

## 2022-09-18 RX ADMIN — AMIODARONE HYDROCHLORIDE 200 MG: 200 TABLET ORAL at 08:51

## 2022-09-18 RX ADMIN — PROPOFOL 30 MG: 10 INJECTION, EMULSION INTRAVENOUS at 11:37

## 2022-09-18 RX ADMIN — PANTOPRAZOLE SODIUM 40 MG: 40 TABLET, DELAYED RELEASE ORAL at 17:28

## 2022-09-18 RX ADMIN — ROCURONIUM BROMIDE 50 MG: 50 INJECTION, SOLUTION INTRAVENOUS at 11:10

## 2022-09-18 RX ADMIN — PHENYLEPHRINE HYDROCHLORIDE 100 MCG: 10 INJECTION INTRAVENOUS at 11:21

## 2022-09-18 RX ADMIN — SODIUM CHLORIDE, PRESERVATIVE FREE 10 ML: 5 INJECTION INTRAVENOUS at 21:02

## 2022-09-18 RX ADMIN — PROPOFOL 150 MG: 10 INJECTION, EMULSION INTRAVENOUS at 11:10

## 2022-09-18 RX ADMIN — Medication 1000 MG: at 11:53

## 2022-09-18 RX ADMIN — PHENYLEPHRINE HYDROCHLORIDE 200 MCG: 10 INJECTION INTRAVENOUS at 11:20

## 2022-09-18 RX ADMIN — DULOXETINE HYDROCHLORIDE 60 MG: 30 CAPSULE, DELAYED RELEASE ORAL at 09:20

## 2022-09-18 RX ADMIN — LIDOCAINE HYDROCHLORIDE 100 MG: 20 INJECTION, SOLUTION EPIDURAL; INFILTRATION; INTRACAUDAL; PERINEURAL at 11:10

## 2022-09-18 ASSESSMENT — PAIN DESCRIPTION - ONSET
ONSET: ON-GOING
ONSET: ON-GOING

## 2022-09-18 ASSESSMENT — PAIN SCALES - GENERAL
PAINLEVEL_OUTOF10: 10
PAINLEVEL_OUTOF10: 0
PAINLEVEL_OUTOF10: 0
PAINLEVEL_OUTOF10: 10
PAINLEVEL_OUTOF10: 0
PAINLEVEL_OUTOF10: 9
PAINLEVEL_OUTOF10: 5

## 2022-09-18 ASSESSMENT — PAIN DESCRIPTION - ORIENTATION
ORIENTATION: RIGHT

## 2022-09-18 ASSESSMENT — LIFESTYLE VARIABLES: SMOKING_STATUS: 0

## 2022-09-18 ASSESSMENT — COPD QUESTIONNAIRES: CAT_SEVERITY: MODERATE

## 2022-09-18 ASSESSMENT — PAIN DESCRIPTION - LOCATION
LOCATION: LEG
LOCATION: LEG
LOCATION: HIP
LOCATION: LEG

## 2022-09-18 ASSESSMENT — PAIN DESCRIPTION - DESCRIPTORS
DESCRIPTORS: THROBBING
DESCRIPTORS: THROBBING
DESCRIPTORS: SORE
DESCRIPTORS: ACHING

## 2022-09-18 ASSESSMENT — PAIN DESCRIPTION - PAIN TYPE
TYPE: ACUTE PAIN
TYPE: ACUTE PAIN

## 2022-09-18 ASSESSMENT — PAIN - FUNCTIONAL ASSESSMENT
PAIN_FUNCTIONAL_ASSESSMENT: PREVENTS OR INTERFERES SOME ACTIVE ACTIVITIES AND ADLS
PAIN_FUNCTIONAL_ASSESSMENT: PREVENTS OR INTERFERES SOME ACTIVE ACTIVITIES AND ADLS

## 2022-09-18 ASSESSMENT — PAIN DESCRIPTION - FREQUENCY
FREQUENCY: CONTINUOUS
FREQUENCY: CONTINUOUS

## 2022-09-18 NOTE — PERIOP NOTE
TRANSFER - OUT REPORT:    Verbal report given to Eloisa Sr RN on [facility]  being transferred to [phone] for routine progression of patient care       Report consisted of patient's Situation, Background, Assessment and   Recommendations(SBAR). Information from the following report(s) Nurse Handoff Report, MAR, Recent Results, and Cardiac Rhythm NSR  was reviewed with the receiving nurse. Lines:   Peripheral IV 09/17/22 Left Antecubital (Active)   Site Assessment Clean, dry & intact 09/18/22 1043   Line Status Infusing 09/18/22 1043   Line Care Connections checked and tightened 09/18/22 1043   Phlebitis Assessment No symptoms 09/18/22 1043   Infiltration Assessment 0 09/18/22 1043   Alcohol Cap Used Yes 09/18/22 1043   Dressing Status Clean, dry & intact 09/18/22 1043   Dressing Type Transparent; Other (Comment) 09/18/22 1043       Peripheral IV 09/18/22 Left Arm (Active)        Opportunity for questions and clarification was provided.       Patient transported with:  O2 @ 4lpm

## 2022-09-18 NOTE — PROGRESS NOTES
Occupational Therapy Note:    Attempted to see patient this AM for occupational therapy evaluation session. Patient fell off a ladder and has closed fracture of neck of right femur with delayed healing. Pending orthopedic consult. Will HOLD OT eval, follow and re-attempt as schedule permits/patient available and as per Ortho orders.  Thank you,    RYLEE DE LA FUENTE, MS, OTR/L      [facility]

## 2022-09-18 NOTE — PERIOP NOTE
Patient stating okay for surgeon to call friend/caretaker, Mendez Heredia, after surgery. Cell: [phone].

## 2022-09-18 NOTE — ADDENDUM NOTE
Addendum  created 09/18/22 1324 by Ishmael Hanley MD    Attestation recorded in [facility] filed, [facility]

## 2022-09-18 NOTE — ANESTHESIA PROCEDURE NOTES
Airway  Date/Time: 9/18/2022 11:45 AM  Urgency: emergent      General Information and Staff    Patient location during procedure: OR  Anesthesiologist: Victoriano Guillen MD  Resident/CRNA: MONIQUE Abbott - CRNA  Performed: resident/CRNA     Indications and Patient Condition  Indications for airway management: anesthesia  Spontaneous Ventilation: absent  Sedation level: deep  Preoxygenated: yes  Patient position: sniffing  Mask difficulty assessment: vent by bag mask    Final Airway Details  Final airway type: endotracheal airway      Successful airway: ETT  Cuffed: yes   Successful intubation technique: direct laryngoscopy  Blade: Hector  Blade size: #3  ETT size (mm): 7.0  Cormack-Lehane Classification: grade I - full view of glottis  Placement verified by: chest auscultation and capnometry   Measured from: lips  ETT to lips (cm): 22  Number of attempts at approach: 1

## 2022-09-18 NOTE — OP NOTE
Operative Note      Patient: Irwin Candelario  YOB: 1940  MRN: [id number]    Date of Procedure: 9/18/2022    Pre-Op Diagnosis: right femoral neck fracture    Post-Op Diagnosis: Same       Procedure(s):  HIP HEMIARTHROPLASTY RIGHT    Surgeon(s):  Wing Ke MD    Assistant:   * No surgical staff found *    Anesthesia: General    Estimated Blood Loss (mL): 978     Complications: None    Specimens:   * No specimens in log *    Implants:  Implant Name Type Inv. Item Serial No.  Lot No. LRB No. Used Action   COMPONENT BPLR OD42MM ID28MM UNIV HIP HD SYS UHR - [id number]  COMPONENT BPLR OD42MM ID28MM UNIV HIP HD SYS UHR  [email or web address] [id number] Right 1 Implanted   HEAD FEM [id number] +2.5MM OFFSET HIP CO CHROM C TAPR LIFT - [id number]  HEAD FEM [id number] +2.5MM OFFSET HIP CO CHROM C TAPR LIFT  [employer] [id number] Right 1 Implanted   STEM BPLR SZ 8 L140MM NK L30MM 36MM OFFSET 132DEG CO CHROME - [id number]  STEM BPLR SZ 8 L140MM NK L30MM 36MM OFFSET 132DEG CO CHROME  [email or web address] [id number] Right 1 Implanted         Drains:   Urinary Catheter 09/17/22 Yap (Active)   $ Urethral catheter insertion Inserted for procedure 09/18/22 0149   Catheter Indications Perioperative use for selected surgical procedures 09/18/22 1043   Site Assessment No urethral drainage 09/18/22 0149   Urine Color Yellow 09/18/22 1043   Urine Appearance Clear 09/18/22 1043   Collection Container Standard 09/18/22 0149   Securement Method Leg strap 09/18/22 1043   Catheter Care Completed Yes 09/18/22 0149   Catheter Best Practices  Drainage tube clipped to bed;Catheter secured to thigh; Tamper seal intact; Bag below bladder;Bag not on floor; Lack of dependent loop in tubing;Drainage bag less than half full 09/18/22 1043   Status Draining;Patent 09/18/22 1043   Output (mL) 550 mL 09/18/22 0043       Findings: Right femoral neck fracture displaced    Detailed Description of Procedure:    The patient was taken to the operating room and placed under general anesthesia. She was placed in the lateral decubitus position. Right hip prepped and draped in sterile fashion. I then made a 5 to 6 inch incision over the lateral aspect of the hip. I incised down to the fascia. I then incised the fascia longitudinally. I then made an anterolateral approach to the hip and incised the anterior one thirds of the abductors. Fracture was visualized and we resected the femoral neck in standard fashion. Then remove the femoral head and sized this in standard fashion. I then lateralized the starting point on the femoral canal.  Using the T-handle reamer I found the canal.  I then used the femoral broaches to broach the hip for the appropriate sized femoral stem. We then placed a trial femoral head on the femoral stem. The hip was reduced. This gave us excellent range of motion and stability. The leg lengths were approximately equal.  We then removed the trial implants. I irrigated the hip and placed the press-fit femoral stem into the femoral canal with excellent fit and fill. I then placed the true bipolar head on the trunnion. The hip was then reduced. Again this gave us excellent range of motion and stability. We then irrigated the wound copiously with Pulsavac irrigation. I then closed the deep and superficial layers of the abductors with #2 fiber wire suture the fascial layer was closed with 0 Vicryl's. The dermis closed with 2-0 Vicryl's. The skin was closed with staples. This was then covered with a sterile dressing. Patient was awakened and extubated. She was taken to the recovery room in stable condition. There were no complications.     Electronically signed by Bertin Wong MD on 9/18/2022 at 12:49 PM

## 2022-09-18 NOTE — PERIOP NOTE
TRANSFER - IN REPORT:    Verbal report received from Kelsie, [facility] on Lars Dial being received from [phone] for ordered procedure      Report consisted of patients Situation, Background, Assessment and Recommendations(SBAR). Information from the following report(s) SBAR, Kardex, MAR, Recent Results, Procedure Verification, and Quality Measures was reviewed with the receiving nurse. Opportunity for questions and clarification was provided. Assessment completed upon patients arrival to unit and care assumed.

## 2022-09-18 NOTE — PROGRESS NOTES
Hospitalist Progress Note   Admit Date:  2022  7:42 PM   Name:  Shanda Vences   Age:  80 y.o. Sex:  female  :  1940   MRN:  [id number]   Room:  [facility]/    Presenting Complaint: Fall     Reason(s) for Admission: Closed fracture of neck of right femur with delayed healing [S72.001G]  Closed fracture of neck of right femur, initial encounter [facility]) [facility] Course: This is a 81-year-old female with past medical history significant for hypothyroidism, tension, dyslipidemia, GERD, paroxysmal atrial fibrillation on anticoagulation secondary to anemia presented to ER after fall from ladder. She was found to have right femur neck fracture. Orthopedics was consulted. Patient underwent hemiarthroplasty today . Subjective & 24hr Events (22): Patient has pain in the right hip no chest pain no shortness of breath. No fever no chills. Assessment & Plan:     Principal Problem: This is a 81-year-old female with    Traumatic acute right femoral neck fracture: Postop day 0 status post right hemiarthroplasty  Orthopedics consulted. Appreciate recommendations. Pain management, DVT prophylaxis per orthopedics. Acute kidney injury/CKD stage III  Creatinine of 1.7 on admission, creatinine was 1.4 today. Baseline seems to be approximately 1.2-1.3. Continue hydration with IV fluids. Paroxysmal atrial fibrillation  Continue home medications albuterol  Documentation from cardiology from prior admissions currently not on anticoagulation due to bleed and anemia. Hypothyroidism  Continue levothyroxine    Hypertension  Continue antihypertensive medications. Dyslipidemia  Statin    GERD  PPI        Anticipated discharge needs:    PT/OT, PPD. Needs short-term rehab on discharge.      Diet:  Diet NPO  DVT PPx: SCDs  Code status: Full Code    Hospital Problems:  Principal Problem:    Closed fracture of neck of right femur with delayed healing  Active Problems: GENNARO (acute kidney injury) ([facility] [address]    GERD (gastroesophageal reflux disease)    Paroxysmal atrial fibrillation (HCC)    Acquired hypothyroidism    Hypertension, essential, benign    Hyperlipidemia  Resolved Problems:    * No resolved hospital problems. *      Objective:   Patient Vitals for the past 24 hrs:   Temp Pulse Resp BP SpO2   09/18/22 1400 -- 64 18 (!) 167/74 96 %   09/18/22 1355 98.4 °F (36.9 °C) 64 17 (!) 171/76 95 %   09/18/22 1349 -- 65 18 (!) 175/79 93 %   09/18/22 1345 -- 65 18 (!) 176/74 91 %   09/18/22 1340 -- 65 17 (!) 196/79 92 %   09/18/22 1335 -- 65 18 (!) 183/79 94 %   09/18/22 1330 -- 65 18 (!) 165/70 92 %   09/18/22 1328 -- 65 18 (!) 164/72 93 %   09/18/22 1320 -- 65 -- (!) 170/74 92 %   09/18/22 1315 -- 66 -- -- 98 %   09/18/22 1300 98.5 °F (36.9 °C) 65 17 (!) 177/75 94 %   09/18/22 1015 98.2 °F (36.8 °C) 62 18 (!) 181/79 94 %   09/18/22 1010 -- -- -- -- 90 %   09/18/22 0949 -- -- 15 -- --   09/18/22 0845 -- -- -- 129/78 --   09/18/22 0707 98.4 °F (36.9 °C) 62 18 (!) 195/83 95 %   09/18/22 0447 -- -- 18 -- --   09/18/22 0334 97.9 °F (36.6 °C) 60 18 (!) 188/65 97 %   09/18/22 0144 -- -- -- (!) 191/72 --   09/18/22 0111 -- -- 17 -- --   09/18/22 0044 -- -- -- (!) 190/81 --   09/18/22 0041 -- -- 20 -- --   09/18/22 0015 97.7 °F (36.5 °C) 64 18 (!) 199/93 97 %   09/17/22 2231 -- 66 21 (!) 166/93 97 %   09/17/22 2201 -- 71 14 (!) 179/101 99 %   09/17/22 2131 -- -- -- (!) 166/129 96 %   09/17/22 2011 -- -- -- (!) 156/139 90 %   09/17/22 1938 98 °F (36.7 °C) 65 -- (!) 194/93 --       Oxygen Therapy  SpO2: 96 %  Pulse via Oximetry: 65 beats per minute  Pulse Oximeter Device Mode: Intermittent  Pulse Oximeter Device Location: Left, Hand  O2 Device: Nasal cannula  O2 Flow Rate (L/min): 4 L/min    Estimated body mass index is 25.97 kg/m² as calculated from the following:    Height as of this encounter: 5' (1.524 m). Weight as of this encounter: 133 lb (60.3 kg).     Intake/Output Summary (Last 24 hours) at 9/18/2022 1424  Last data filed at 9/18/2022 1200  Gross per 24 hour   Intake 700 ml   Output 1850 ml   Net -1150 ml         Physical Exam:     Blood pressure (!) 167/74, pulse 64, temperature 98.4 °F (36.9 °C), temperature source Oral, resp. rate 18, height 5' (1.524 m), weight 133 lb (60.3 kg), SpO2 96 %. General:    Alert awake female, well nourished. Head:  Normocephalic, atraumatic  Eyes:  Sclerae appear normal.  Pupils equally round. ENT:  Nares appear normal, no drainage. Moist oral mucosa  Neck:  No restricted ROM. Trachea midline   CV:   RRR. No m/r/g. No jugular venous distension. Lungs:   CTAB. No wheezing, rhonchi, or rales. Symmetric expansion. Abdomen: Bowel sounds present. Soft, nontender, nondistended. Extremities: No cyanosis or clubbing. No edema, externally rotated right lower extremity,  Skin:     No rashes and normal coloration. Warm and dry. Neuro:  CN II-XII grossly intact. Sensation intact. A&Ox3  Psych:  Normal mood and affect.       I have personally reviewed labs and tests showing:  Recent Labs:  Recent Results (from the past 48 hour(s))   CBC with Auto Differential    Collection Time: 09/17/22  9:30 PM   Result Value Ref Range    WBC 13.4 (H) 4.3 - 11.1 K/uL    RBC 5.20 4.05 - 5.2 M/uL    Hemoglobin 14.5 11.7 - 15.4 g/dL    Hematocrit 46.4 (H) 35.8 - 46.3 %    MCV 89.2 79.6 - 97.8 FL    MCH 27.9 26.1 - 32.9 PG    MCHC 31.3 (L) 31.4 - 35.0 g/dL    RDW 16.8 (H) 11.9 - 14.6 %    Platelets 169 527 - 102 K/uL    MPV 11.7 9.4 - 12.3 FL    nRBC 0.00 0.0 - 0.2 K/uL    Differential Type AUTOMATED      Seg Neutrophils 76 43 - 78 %    Lymphocytes 13 13 - 44 %    Monocytes 7 4.0 - 12.0 %    Eosinophils % 2 0.5 - 7.8 %    Basophils 1 0.0 - 2.0 %    Immature Granulocytes 1 0.0 - 5.0 %    Segs Absolute 10.3 (H) 1.7 - 8.2 K/UL    Absolute Lymph # 1.7 0.5 - 4.6 K/UL    Absolute Mono # 1.0 0.1 - 1.3 K/UL    Absolute Eos # 0.2 0.0 - 0.8 K/UL    Basophils Absolute 0.1 0.0 - 0.2 K/UL    Absolute Immature Granulocyte 0.1 0.0 - 0.5 K/UL   CMP    Collection Time: 09/17/22  9:30 PM   Result Value Ref Range    Sodium 137 136 - 145 mmol/L    Potassium 4.4 3.5 - 5.1 mmol/L    Chloride 101 101 - 110 mmol/L    CO2 32 21 - 32 mmol/L    Anion Gap 4 4 - 13 mmol/L    Glucose 123 (H) 65 - 100 mg/dL    BUN 27 (H) 8 - 23 MG/DL    Creatinine 1.70 (H) 0.6 - 1.0 MG/DL    GFR African American 37 (L) >60 ml/min/1.73m2    GFR Non- 31 (L) >60 ml/min/1.73m2    Calcium 9.4 8.3 - 10.4 MG/DL    Total Bilirubin 0.6 0.2 - 1.1 MG/DL    ALT 32 12 - 65 U/L    AST 26 15 - 37 U/L    Alk Phosphatase 115 50 - 136 U/L    Total Protein 8.0 6.3 - 8.2 g/dL    Albumin 3.3 3.2 - 4.6 g/dL    Globulin 4.7 (H) 2.3 - 3.5 g/dL    Albumin/Globulin Ratio 0.7 (L) 1.2 - 3.5     Protime-INR    Collection Time: 09/17/22  9:30 PM   Result Value Ref Range    Protime 13.7 12.6 - 14.5 sec    INR 1.0     TYPE AND SCREEN    Collection Time: 09/17/22  9:30 PM   Result Value Ref Range    Crossmatch expiration date 09/20/2022,2359     ABO/Rh A POSITIVE     Antibody Screen NEG    Comprehensive Metabolic Panel w/ Reflex to MG    Collection Time: 09/18/22  3:45 AM   Result Value Ref Range    Sodium 138 136 - 145 mmol/L    Potassium 4.2 3.5 - 5.1 mmol/L    Chloride 103 101 - 110 mmol/L    CO2 30 21 - 32 mmol/L    Anion Gap 5 4 - 13 mmol/L    Glucose 136 (H) 65 - 100 mg/dL    BUN 25 (H) 8 - 23 MG/DL    Creatinine 1.40 (H) 0.6 - 1.0 MG/DL    GFR  47 (L) >60 ml/min/1.73m2    GFR Non- 39 (L) >60 ml/min/1.73m2    Calcium 9.3 8.3 - 10.4 MG/DL    Total Bilirubin 0.7 0.2 - 1.1 MG/DL    ALT 30 12 - 65 U/L    AST 25 15 - 37 U/L    Alk Phosphatase 114 50 - 136 U/L    Total Protein 7.6 6.3 - 8.2 g/dL    Albumin 3.0 (L) 3.2 - 4.6 g/dL    Globulin 4.6 (H) 2.3 - 3.5 g/dL    Albumin/Globulin Ratio 0.7 (L) 1.2 - 3.5     CBC with Auto Differential    Collection Time: 09/18/22  3:45 AM   Result Value Ref Range    WBC 11.6 (H) ondansetron (ZOFRAN) injection 4 mg  4 mg IntraVENous Q6H PRN    polyethylene glycol (GLYCOLAX) packet 17 g  17 g Oral Daily PRN    acetaminophen (TYLENOL) tablet 650 mg  650 mg Oral Q6H PRN    Or    acetaminophen (TYLENOL) suppository 650 mg  650 mg Rectal Q6H PRN    0.9 % sodium chloride infusion   IntraVENous Continuous    morphine injection 2 mg  2 mg IntraVENous Q4H PRN    amLODIPine (NORVASC) tablet 5 mg  5 mg Oral Daily    hydrALAZINE (APRESOLINE) injection 10 mg  10 mg IntraVENous Q6H PRN    lactated ringers infusion   IntraVENous Continuous    lactated ringers bolus  1,000 mL IntraVENous Once       Signed:  Ирина Vieira MD    Part of this note may have been written by using a voice dictation software. The note has been proof read but may still contain some grammatical/other typographical errors.

## 2022-09-18 NOTE — ANESTHESIA PRE PROCEDURE
Department of Anesthesiology  Preprocedure Note       Name:  Lucien Long   Age:  80 y.o.  :  1940                                          MRN:  [id number]         Date:  2022      Surgeon: Chari Zhang):  Nilay Fragoso MD    Procedure: Procedure(s):  HIP HEMIARTHROPLASTY RIGHT    Medications prior to admission:   Prior to Admission medications    Medication Sig Start Date End Date Taking? Authorizing Provider   amiodarone (CORDARONE) 200 MG tablet Take 200 mg by mouth daily 21   Ar Automatic Reconciliation   vitamin D (CHOLECALCIFEROL) 25 MCG (1000 UT) TABS tablet Take 1,000 Units by mouth daily    Ar Automatic Reconciliation   DULoxetine (CYMBALTA) 60 MG extended release capsule Take 60 mg by mouth daily 18   Ar Automatic Reconciliation   folic acid (FOLVITE) 1 MG tablet Take 1 mg by mouth daily    Ar Automatic Reconciliation   furosemide (LASIX) 20 MG tablet Take 40 mg by mouth daily Pt states dose increased to 40mg due to leg swelling 21   Ar Automatic Reconciliation   levothyroxine (SYNTHROID) 25 MCG tablet Take 25 mcg by mouth every morning (before breakfast) 18   Ar Automatic Reconciliation   metoprolol succinate (TOPROL XL) 50 MG extended release tablet Take 50 mg by mouth daily Pt states doctor lowered dose to 25mg 21   Ar Automatic Reconciliation   Metoprolol-HCTZ ER 25-12.5 MG TB24 Metoprolol succ 25 mg-hydrochlorothiazide 12.5 mg tablet,ext. rel 24 hr Take 1 tablet every day by oral route    Ar Automatic Reconciliation   ondansetron (ZOFRAN-ODT) 4 MG disintegrating tablet Take 4 mg by mouth every 8 hours as needed 3/26/21   Ar Automatic Reconciliation   pantoprazole (PROTONIX) 40 MG tablet Take 40 mg by mouth 2 times daily (before meals) 21   Ar Automatic Reconciliation   rosuvastatin (CRESTOR) 10 MG tablet Take 10 mg by mouth 3/26/21   Ar Automatic Reconciliation       Current medications:    Current Facility-Administered Medications   Medication Dose Route Frequency Provider Last Rate Last Admin    furosemide (LASIX) tablet 20 mg  20 mg Oral Daily Hannah Nichole MD        pantoprazole (PROTONIX) tablet 40 mg  40 mg Oral BID AC Hannah Nichole MD   40 mg at 71/39/95 8295    folic acid (FOLVITE) tablet 1 mg  1 mg Oral Daily Hannah Nichole MD        levothyroxine (SYNTHROID) tablet 25 mcg  25 mcg Oral QAM AC Hannah Nichole MD   25 mcg at 09/18/22 0448    amiodarone (CORDARONE) tablet 200 mg  200 mg Oral Daily Hannah Nichole MD   200 mg at 09/18/22 0851    metoprolol succinate (TOPROL XL) extended release tablet 25 mg  25 mg Oral Daily Hannah Nichole MD   25 mg at 09/18/22 0851    rosuvastatin (CRESTOR) tablet 10 mg  10 mg Oral Nightly Hannah Nichole MD        DULoxetine (CYMBALTA) extended release capsule 60 mg  60 mg Oral Daily Hannah Nichole MD   60 mg at 09/18/22 0920    sodium chloride flush 0.9 % injection 5-40 mL  5-40 mL IntraVENous 2 times per day Hannah Nichole MD        sodium chloride flush 0.9 % injection 5-40 mL  5-40 mL IntraVENous PRN Hannah Nichole MD   10 mL at 09/18/22 0043    0.9 % sodium chloride infusion   IntraVENous PRN Hannah Nichole MD        ondansetron (ZOFRAN-ODT) disintegrating tablet 4 mg  4 mg Oral Q8H PRN Hannah Nichole MD        Or    ondansetron [facility]) injection 4 mg  4 mg IntraVENous Q6H PRN Hannah Nichole MD        polyethylene glycol [facility]) packet 17 g  17 g Oral Daily PRN Hannah Nichole MD        acetaminophen (TYLENOL) tablet 650 mg  650 mg Oral Q6H PRN Hannah Nichole MD        Or    acetaminophen (TYLENOL) suppository 650 mg  650 mg Rectal Q6H PRN Hannah Nichole MD        0.9 % sodium chloride infusion   IntraVENous Continuous Hannah Nichole  mL/hr at 09/18/22 0043 New Bag at 09/18/22 0043    morphine injection 2 mg  2 mg IntraVENous Q4H PRN Eric Sloan MD        amLODIPine (NORVASC) tablet 5 mg  5 mg Oral Daily Eric Sloan MD   5 mg at 09/18/22 0850    hydrALAZINE (APRESOLINE) Osteoporosis 8/18/2016    Radiation therapy complication 8092    Rheumatoid arthritis ([facility] [address] 1/16/2016    Right carotid bruit 1/16/2016    TIA (transient ischemic attack) 1/16/2016    Tobacco abuse 8/18/2016       Past Surgical History:        Procedure Laterality Date    ADENOIDECTOMY      BREAST LUMPECTOMY Right 2008    with lymph nodes    COLONOSCOPY N/A 12/28/2021    COLONOSCOPY/ 31 CV[facility] 102 performed [place] at [id number] BONE  5/14/2021    IR BIOPSY PERC SUPERF BONE  5/14/2021    IR BIOPSY PERC SUPERF BONE 5/14/2021 [facility]    IR NONTUNNELED VASCULAR CATHETER  5/14/2021    IR NONTUNNELED VASCULAR CATHETER  5/14/2021    IR NONTUNNELED VASCULAR CATHETER 5/14/2021 [facility]    TONSILLECTOMY         Social History:    Social History     Tobacco Use    Smoking status: Some Days    Smokeless tobacco: Never    Tobacco comments:     Quit smoking: Only on pay day-1/2 pack   Substance Use Topics    Alcohol use: No                                Ready to quit: Not Answered  Counseling given: Not Answered  Tobacco comments: Quit smoking: Only on pay day-1/2 pack      Vital Signs (Current):   Vitals:    09/18/22 0334 09/18/22 0447 09/18/22 0707 09/18/22 0845   BP: (!) 188/65  (!) 195/83 129/78   Pulse: 60  62    Resp: 18 18 18    Temp: 97.9 °F (36.6 °C)  98.4 °F (36.9 °C)    TempSrc: Oral  Oral    SpO2: 97%  95%    Weight:       Height:                                                  BP Readings from Last 3 Encounters:   09/18/22 129/78   01/26/22 (!) 120/58   06/28/21 (!) 126/52       NPO Status:                                                                                 BMI:   Wt Readings from Last 3 Encounters:   09/17/22 133 lb (60.3 kg)   01/26/22 136 lb 3.2 oz (61.8 kg)   06/28/21 143 lb (64.9 kg)     Body mass index is 25.97 kg/m².     CBC:   Lab Results   Component Value Date/Time    WBC 11.6 09/18/2022 03:45 AM    RBC 5.12 09/18/2022 03:45 AM    HGB 14.3 09/18/2022 03:45 AM    HCT 45.8 09/18/2022 03:45 AM    MCV 89.5 09/18/2022 03:45 AM    RDW 16.9 09/18/2022 03:45 AM     09/18/2022 03:45 AM       CMP:   Lab Results   Component Value Date/Time     09/18/2022 03:45 AM    K 4.2 09/18/2022 03:45 AM     09/18/2022 03:45 AM    CO2 30 09/18/2022 03:45 AM    BUN 25 09/18/2022 03:45 AM    CREATININE 1.40 09/18/2022 03:45 AM    GFRAA 47 09/18/2022 03:45 AM    AGRATIO 0.6 01/26/2022 03:10 PM    LABGLOM 39 09/18/2022 03:45 AM    GLUCOSE 136 09/18/2022 03:45 AM    PROT 7.6 09/18/2022 03:45 AM    CALCIUM 9.3 09/18/2022 03:45 AM    BILITOT 0.7 09/18/2022 03:45 AM    ALKPHOS 114 09/18/2022 03:45 AM    ALKPHOS 79 01/26/2022 03:10 PM    AST 25 09/18/2022 03:45 AM    ALT 30 09/18/2022 03:45 AM       POC Tests: No results for input(s): POCGLU, POCNA, POCK, POCCL, POCBUN, POCHEMO, POCHCT in the last 72 hours.     Coags:   Lab Results   Component Value Date/Time    PROTIME 13.7 09/17/2022 09:30 PM    INR 1.0 09/17/2022 09:30 PM    APTT 38.5 12/25/2021 05:30 PM       HCG (If Applicable): No results found for: PREGTESTUR, PREGSERUM, HCG, HCGQUANT     ABGs:   Lab Results   Component Value Date/Time    PHART 7.34 12/13/2021 06:20 PM    PO2ART 315 12/13/2021 06:20 PM    GVW7WZO 33.4 12/13/2021 06:20 PM    MOR0UHT 18.0 12/13/2021 06:20 PM    BEART 7.4 06/20/2021 04:18 AM        Type & Screen (If Applicable):  No results found for: LABABO, LABRH    Drug/Infectious Status (If Applicable):  No results found for: HIV, HEPCAB    COVID-19 Screening (If Applicable):   Lab Results   Component Value Date/Time    COVID19 Not detected 12/25/2021 06:23 PM    COVID19 Please find results under separate order 06/20/2021 04:39 AM           Anesthesia Evaluation  Patient summary reviewed and Nursing notes reviewed no history of anesthetic complications:   Airway: Mallampati: I  TM distance: >3 FB   Neck ROM: limited  Mouth opening: < 3 FB   Dental:    (+) upper dentures and lower dentures      Pulmonary: breath sounds clear to auscultation  (+) COPD (Patient denies COPD - enbrio, on oxygen at home 2.5L uses PRN and QHS ): moderate,      (-) not a current smoker (quit 1 year)                           Cardiovascular:  Exercise tolerance: good (>4 METS),   (+) hypertension: moderate, dysrhythmias: atrial fibrillation,         Rhythm: regular  Rate: normal                 ROS comment: History of pericardial effusion 2021 - treated conservatively    Echo 3/2021 -    Left Ventricle: Left ventricle is mildly dilated. Normal wall   thickness. Normal wall motion. The EF is 55 - 60%. Normal diastolic function.   Right Ventricle: Right ventricle size is normal. Low normal systolic function.   Aortic Valve: Probably trileaflet aortic valve. No transvalvular regurgitation. No stenosis.   Mitral Valve: Mild to moderate transvalvular regurgitation.   Left Atrium: Left atrium is severely dilated. EKG -  Rhythm: sinus rhythm, RBBB     ST segments: Normal     Neuro/Psych:   (+) TIA (Patient is unsure, she denies any symptoms. Was told she had a TIA), depression/anxiety              ROS comment: Hearing impaired on right side GI/Hepatic/Renal:   (+) GERD:, renal disease: CRI and ARF,           Endo/Other:    (+) hypothyroidism: arthritis: rheumatoid. , .                  ROS comment: Closed fracture of neck of right femur with delayed healing   Abdominal:             Vascular: Other Findings:           Anesthesia Plan      general     ASA 3 - emergent     (GETA)  Induction: intravenous. MIPS: Postoperative opioids intended and Prophylactic antiemetics administered. Anesthetic plan and risks discussed with patient. Use of blood products discussed with patient whom. Plan discussed with CRNA.                     Kristyn Baeza MD   9/18/2022

## 2022-09-18 NOTE — ED PROVIDER NOTES
Emergency Department Provider Note                   PCP:                Alexis Priest MD               Age: 80 y.o. Sex: female     No diagnosis found. DISPOSITION          MDM  Number of Diagnoses or Management Options  Closed fracture of neck of right femur, initial encounter [facility]): new, needed workup  Diagnosis management comments: Patient presents with fall and right femoral neck fracture. Otherwise, patient is stable and has no other injuries.   I have notified orthopedics on-call and the patient will be admitted to the hospitalist.       Amount and/or Complexity of Data Reviewed  Clinical lab tests: ordered and reviewed  Tests in the radiology section of CPT®: ordered and reviewed  Tests in the medicine section of CPT®: ordered and reviewed  Discussion of test results with the performing providers: no  Decide to obtain previous medical records or to obtain history from someone other than the patient: no  Obtain history from someone other than the patient: no  Review and summarize past medical records: no  Discuss the patient with other providers: yes  Independent visualization of images, tracings, or specimens: yes    Risk of Complications, Morbidity, and/or Mortality  Presenting problems: moderate  Diagnostic procedures: moderate  Management options: moderate    Patient Progress  Patient progress: stable             Orders Placed This Encounter   Procedures    XR HIP RIGHT (2-3 VIEWS)    XR FEMUR RIGHT (MIN 2 VIEWS)    XR CHEST 1 VIEW    CBC with Auto Differential    CMP    Protime-INR    Insert chavarria catheter    EKG 12 Lead    TYPE AND SCREEN        Medications   morphine injection 4 mg (has no administration in time range)   ondansetron (ZOFRAN) injection 4 mg (has no administration in time range)       New Prescriptions    No medications on file        Jenny Grayson is a 80 y.o. female who presents to the Emergency Department with chief complaint of    Chief Complaint   Patient presents with Fall      Patient presents with mechanical fall. Patient was going up on the third step of a ladder that she was using to redecorating her house. Patient then fell and sustained right hip pain. Patient was unable to get up. Medics noted that she was shortened and rotated on the right leg. She denies any other injuries she states she did not hit her head. Patient also denies any recent illness. Review of Systems   All other systems reviewed and are negative. Past Medical History:   Diagnosis Date    Acquired hypothyroidism 1/16/2016    Breast cancer ([facility] [address] 2008    Depression 8/18/2016    Endocrine disease     hypothyroid    Fungal dermatitis 8/18/2016    Hyperlipidemia 1/16/2016    Hypertension, essential, benign 8/18/2016    Hypokalemia 8/18/2016    Inflamed sebaceous cyst 8/18/2016    Major depressive disorder, recurrent, moderate ([facility] [address] 8/18/2016    Nicotine dependence, cigarettes, uncomplicated 4/55/3746    Osteopenia 8/18/2016    Osteoporosis 8/18/2016    Radiation therapy complication 8480    Rheumatoid arthritis ([facility] [address] 1/16/2016    Right carotid bruit 1/16/2016    TIA (transient ischemic attack) 1/16/2016    Tobacco abuse 8/18/2016        Past Surgical History:   Procedure Laterality Date    ADENOIDECTOMY      BREAST LUMPECTOMY Right 2008    with lymph nodes    COLONOSCOPY N/A 12/28/2021    COLONOSCOPY/ 31 CVICU 102 performed by Lily Coyle MD at [facility]. 56. BONE  5/14/2021    IR [place] BONE  5/14/2021    IR BIOPSY PERC SUPERF BONE 5/14/2021 [facility]    IR NONTUNNELED VASCULAR CATHETER  5/14/2021    IR NONTUNNELED VASCULAR CATHETER  5/14/2021    IR NONTUNNELED VASCULAR CATHETER 5/14/2021 [facility]    TONSILLECTOMY          Family History   Problem Relation Age of Onset    Stroke Mother     Cancer Father         Brain    HIV/AIDS Brother         Social History     Socioeconomic History    Marital status:     Tobacco Use Smoking status: Some Days    Smokeless tobacco: Never    Tobacco comments:     Quit smoking: Only on pay day-1/2 pack   Substance and Sexual Activity    Alcohol use: No    Drug use: No         Apixaban     Previous Medications    AMIODARONE (CORDARONE) 200 MG TABLET    Take 200 mg by mouth daily    DULOXETINE (CYMBALTA) 60 MG EXTENDED RELEASE CAPSULE    Take 60 mg by mouth daily    FOLIC ACID (FOLVITE) 1 MG TABLET    Take 1 mg by mouth daily    FUROSEMIDE (LASIX) 20 MG TABLET    Take 20 mg by mouth daily    LEVOTHYROXINE (SYNTHROID) 25 MCG TABLET    Take 25 mcg by mouth every morning (before breakfast)    METOPROLOL SUCCINATE (TOPROL XL) 50 MG EXTENDED RELEASE TABLET    Take 50 mg by mouth daily    METOPROLOL-HCTZ ER 25-12.5 MG TB24    Metoprolol succ 25 mg-hydrochlorothiazide 12.5 mg tablet,ext. rel 24 hr Take 1 tablet every day by oral route    ONDANSETRON (ZOFRAN-ODT) 4 MG DISINTEGRATING TABLET    Take 4 mg by mouth every 8 hours as needed    PANTOPRAZOLE (PROTONIX) 40 MG TABLET    Take 40 mg by mouth 2 times daily (before meals)    ROSUVASTATIN (CRESTOR) 10 MG TABLET    Take 10 mg by mouth    VITAMIN D (CHOLECALCIFEROL) 25 MCG (1000 UT) TABS TABLET    Take 1,000 Units by mouth daily        Vitals signs and nursing note reviewed. Patient Vitals for the past 4 hrs:   Temp Pulse BP   09/17/22 1938 98 °F (36.7 °C) 65 (!) 194/93          Physical Exam  Constitutional:       General: She is not in acute distress. HENT:      Head: Normocephalic and atraumatic. Nose: Nose normal.      Mouth/Throat:      Mouth: Mucous membranes are moist.   Eyes:      Extraocular Movements: Extraocular movements intact. Pupils: Pupils are equal, round, and reactive to light. Cardiovascular:      Rate and Rhythm: Normal rate and regular rhythm. Heart sounds: Normal heart sounds. Pulmonary:      Effort: No respiratory distress. Breath sounds: No wheezing. Abdominal:      General: Abdomen is flat. Palpations: Abdomen is soft. Tenderness: There is no abdominal tenderness. Musculoskeletal:         General: Tenderness (Right hip) and deformity (Right lower extremity shortened and rotated) present. No swelling. Cervical back: Normal range of motion. Skin:     Findings: No erythema or rash. Neurological:      General: No focal deficit present. Mental Status: She is oriented to person, place, and time. Psychiatric:         Mood and Affect: Mood normal.         Behavior: Behavior normal.        EKG 12 Lead    Date/Time: 9/17/2022 9:44 PM  Performed by: Clarissa Hammond DO  Authorized by: Clarissa Hammond DO     ECG reviewed by ED Physician in the absence of a cardiologist: yes    Rate:     ECG rate:  69    ECG rate assessment: normal    Rhythm:     Rhythm: sinus rhythm    QRS:     QRS conduction: RBBB    ST segments:     ST segments:  Normal    Results for orders placed or performed during the hospital encounter of 09/17/22   XR HIP RIGHT (2-3 VIEWS)    Narrative    EXAM: Pelvis and right hip x-rays. INDICATION: Pain after falling. COMPARISON: Subsequent dedicated right femur x-rays. TECHNIQUE: A frontal view of the pelvis was supplemented with 2 dedicated views  of the right hip joint. FINDINGS: There is a displaced subcapital right femoral neck fracture. No other  fractures are identified. The pelvic ring is intact. Minor arthritic changes are  noted in both hip joints. Impression    Right femoral neck fracture. XR FEMUR RIGHT (MIN 2 VIEWS)    Narrative    EXAM: Right femur x-rays. INDICATION: Pain after falling. COMPARISON: Subsequent dedicated pelvis and right hip x-rays. TECHNIQUE: 4 views. FINDINGS: As noted on the hip x-ray report, there is a right femoral neck  fracture. No fractures are seen in the more distal right humerus. Vascular  calcifications are noted in the soft tissues. Impression    As above.     XR CHEST 1 VIEW    Narrative    EXAM: Chest

## 2022-09-18 NOTE — ANESTHESIA POSTPROCEDURE EVALUATION
Department of Anesthesiology  Postprocedure Note    Patient: Roberto Billings  MRN: [id number]  YOB: 1940  Date of evaluation: 9/18/2022      Procedure Summary     Date: 09/18/22 Room / Location: [facility] MAIN OR  / [facility] MAIN OR    Anesthesia Start: 1101 Anesthesia Stop: 1304    Procedure: HIP HEMIARTHROPLASTY RIGHT (Right: Hip) Diagnosis:       Closed displaced fracture of right femoral neck ([address]      (right femoral neck fracture)    Providers: Marcus Berg MD Responsible Provider: Kristyn Baeza MD    Anesthesia Type: general ASA Status: 3 - Emergent          Anesthesia Type: No value filed.     Fam Phase I: Fam Score: 8    Fam Phase II:        Anesthesia Post Evaluation    Patient location during evaluation: PACU  Patient participation: complete - patient participated  Level of consciousness: awake and alert  Pain score: 4  Airway patency: patent  Nausea & Vomiting: no nausea and no vomiting  Complications: no  Cardiovascular status: hemodynamically stable  Respiratory status: acceptable, nonlabored ventilation and spontaneous ventilation  Hydration status: euvolemic  Comments: BP (!) 181/79   Pulse 62   Temp 98.2 °F (36.8 °C) (Oral)   Resp 18   Ht 5' (1.524 m)   Wt 133 lb (60.3 kg)   SpO2 94%   BMI 25.97 kg/m²     Multimodal analgesia pain management approach

## 2022-09-18 NOTE — ED NOTES
TRANSFER - OUT REPORT:    Verbal report given to Laci Jean on Lucien Long  being transferred to 7th floor  for ordered procedure       Report consisted of patient's Situation, Background, Assessment and   Recommendations(SBAR). Information from the following report(s) ED SBAR was reviewed with the receiving nurse. Lines:   Peripheral IV 09/17/22 Left Antecubital (Active)        Opportunity for questions and clarification was provided.       Patient transported with:  O2 @ 2lpm and Lisy Jenkins 172, RN  09/17/22 7989

## 2022-09-18 NOTE — PROGRESS NOTES
Attempted to see patient this AM for physical therapy evaluation session. Patient fell off a ladder and has closed fracture of neck of right femur with delayed healing. Pending orthopedic consult. Will HOLD PT eval, follow and re-attempt as schedule permits/patient available and as per Ortho orders.  Thank you,    Jj Cavanaugh, PT, DPT

## 2022-09-18 NOTE — CONSULTS
TONSILLECTOMY       Family History:   Family History   Problem Relation Age of Onset    Stroke Mother     Cancer Father         Brain    HIV/AIDS Brother       Social History:   Social History     Tobacco Use    Smoking status: Some Days    Smokeless tobacco: Never    Tobacco comments:     Quit smoking:  Only on pay day-1/2 pack   Substance Use Topics    Alcohol use: No       ALLERGIES:   Allergies   Allergen Reactions    Apixaban Other (See Comments)     Patient states she had hallucinations from Eliquis        Patient Medications    Current Facility-Administered Medications   Medication Dose Route Frequency    furosemide (LASIX) tablet 20 mg  20 mg Oral Daily    pantoprazole (PROTONIX) tablet 40 mg  40 mg Oral BID AC    folic acid (FOLVITE) tablet 1 mg  1 mg Oral Daily    levothyroxine (SYNTHROID) tablet 25 mcg  25 mcg Oral QAM AC    amiodarone (CORDARONE) tablet 200 mg  200 mg Oral Daily    metoprolol succinate (TOPROL XL) extended release tablet 25 mg  25 mg Oral Daily    rosuvastatin (CRESTOR) tablet 10 mg  10 mg Oral Nightly    DULoxetine (CYMBALTA) extended release capsule 60 mg  60 mg Oral Daily    sodium chloride flush 0.9 % injection 5-40 mL  5-40 mL IntraVENous 2 times per day    sodium chloride flush 0.9 % injection 5-40 mL  5-40 mL IntraVENous PRN    0.9 % sodium chloride infusion   IntraVENous PRN    ondansetron (ZOFRAN-ODT) disintegrating tablet 4 mg  4 mg Oral Q8H PRN    Or    ondansetron (ZOFRAN) injection 4 mg  4 mg IntraVENous Q6H PRN    polyethylene glycol (GLYCOLAX) packet 17 g  17 g Oral Daily PRN    acetaminophen (TYLENOL) tablet 650 mg  650 mg Oral Q6H PRN    Or    acetaminophen (TYLENOL) suppository 650 mg  650 mg Rectal Q6H PRN    0.9 % sodium chloride infusion   IntraVENous Continuous    morphine injection 2 mg  2 mg IntraVENous Q4H PRN    amLODIPine (NORVASC) tablet 5 mg  5 mg Oral Daily    hydrALAZINE (APRESOLINE) injection 10 mg  10 mg IntraVENous Q6H PRN    ceFAZolin (ANCEF) 2000 mg in sterile water 20 mL IV syringe  2,000 mg IntraVENous On Call to OR         Review of Systems:  Pertinent items are noted in HPI. Physical Exam:      General: NAD, Alert, Oriented x 3   Mental Status: Appropriate   Psych: Normal Affect, Normal Mood    HEENT: Normal Cephalic/Atraumatic, PERRL   Lungs: Respirations even and unlabored, Breath Sounds were clear, no respiratory distress   Heart: Regular Rate and Rhythm   Vascular: Distal pulses intact, good capillary refill   Skin: No redness, No Rashes, Skin is dry   Musculoskeletal: Shun of the right lower extremity reveals slight shortening and external rotation. She is tender over the groin. No open wounds. Range of motion is not tested. Pulses are adequate in both feet. No gross neurologic deficits.   Lymphatic: No lympahdenopathy, No distal edema   Neuro: No gross deficits   Abdomen: Soft, Non tender, No distension      VITALS: Patient Vitals for the past 8 hrs:   BP Temp Temp src Pulse Resp SpO2   22 1015 (!) 181/79 98.2 °F (36.8 °C) Oral 62 18 94 %   22 1010 -- -- -- -- -- 90 %   22 0949 -- -- -- -- 15 --   22 0845 129/78 -- -- -- -- --   22 0707 (!) 195/83 98.4 °F (36.9 °C) Oral 62 18 95 %   22 0447 -- -- -- -- 18 --   22 0334 (!) 188/65 97.9 °F (36.6 °C) Oral 60 18 97 %    , Temp (24hrs), Av °F (36.7 °C), Min:97.7 °F (36.5 °C), Max:98.4 °F (36.9 °C)        Recent Results (from the past 12 hour(s))   Comprehensive Metabolic Panel w/ Reflex to MG    Collection Time: 22  3:45 AM   Result Value Ref Range    Sodium 138 136 - 145 mmol/L    Potassium 4.2 3.5 - 5.1 mmol/L    Chloride 103 101 - 110 mmol/L    CO2 30 21 - 32 mmol/L    Anion Gap 5 4 - 13 mmol/L    Glucose 136 (H) 65 - 100 mg/dL    BUN 25 (H) 8 - 23 MG/DL    Creatinine 1.40 (H) 0.6 - 1.0 MG/DL    GFR  47 (L) >60 ml/min/1.73m2    GFR Non- 39 (L) >60 ml/min/1.73m2    Calcium 9.3 8.3 - 10.4 MG/DL    Total Bilirubin 0.7 0.2 - 1.1 MG/DL    ALT 30 12 - 65 U/L    AST 25 15 - 37 U/L    Alk Phosphatase 114 50 - 136 U/L    Total Protein 7.6 6.3 - 8.2 g/dL    Albumin 3.0 (L) 3.2 - 4.6 g/dL    Globulin 4.6 (H) 2.3 - 3.5 g/dL    Albumin/Globulin Ratio 0.7 (L) 1.2 - 3.5     CBC with Auto Differential    Collection Time: 09/18/22  3:45 AM   Result Value Ref Range    WBC 11.6 (H) 4.3 - 11.1 K/uL    RBC 5.12 4.05 - 5.2 M/uL    Hemoglobin 14.3 11.7 - 15.4 g/dL    Hematocrit 45.8 35.8 - 46.3 %    MCV 89.5 79.6 - 97.8 FL    MCH 27.9 26.1 - 32.9 PG    MCHC 31.2 (L) 31.4 - 35.0 g/dL    RDW 16.9 (H) 11.9 - 14.6 %    Platelets 342 560 - 158 K/uL    MPV 11.8 9.4 - 12.3 FL    nRBC 0.00 0.0 - 0.2 K/uL    Differential Type AUTOMATED      Seg Neutrophils 71 43 - 78 %    Lymphocytes 15 13 - 44 %    Monocytes 12 4.0 - 12.0 %    Eosinophils % 1 0.5 - 7.8 %    Basophils 1 0.0 - 2.0 %    Immature Granulocytes 0 0.0 - 5.0 %    Segs Absolute 8.3 (H) 1.7 - 8.2 K/UL    Absolute Lymph # 1.7 0.5 - 4.6 K/UL    Absolute Mono # 1.4 (H) 0.1 - 1.3 K/UL    Absolute Eos # 0.1 0.0 - 0.8 K/UL    Basophils Absolute 0.1 0.0 - 0.2 K/UL    Absolute Immature Granulocyte 0.1 0.0 - 0.5 K/UL          Diagnosis   Patient Active Problem List   Diagnosis    Paroxysmal atrial fibrillation (HCC)    Leukocytosis    Nicotine dependence, cigarettes, uncomplicated    Depression    Anemia    Acquired hypothyroidism    Stage 3 chronic kidney disease (HCC)    Diastolic CHF, chronic (HCC)    Pericarditis with effusion    Osteoporosis    Hypokalemia    Renal cyst, left    Pleural effusion    TIA (transient ischemic attack)    Tobacco abuse    Major depressive disorder, recurrent, moderate (HCC)    Osteopenia    Hypertension, essential, benign    Sepsis (HCC)    GI bleed    Rheumatoid arthritis (HCC)    T12 compression fracture ([facility] [address]    Community acquired bacterial pneumonia    Prolonged Q-T interval on ECG    Right carotid bruit    Rib fracture    Hyperlipidemia    Hypoalbuminemia due to protein-calorie malnutrition ([facility] [address]    Hypoxia    Closed fracture of neck of right femur with delayed healing    GENNARO (acute kidney injury) ([facility] [address]    GERD (gastroesophageal reflux disease)          X-rays:  X-rays of the pelvis and right hip show a displaced transcervical femoral neck fracture. No obvious other fractures or dislocations. No bony lesions. Assessment     Right femoral neck fracture    Medical Decision Making:     Patient was a community ambulator prior to this injury. She would benefit from hemiarthroplasty of the right hip. She is aware the risk and benefits and wishes to proceed.         Bowen Abel MD  9/18/2022,  10:49 AM

## 2022-09-18 NOTE — H&P
Hospitalist History and Physical   Admit Date:  2022  7:42 PM   Name:  Ne Vuong   Age:  80 y.o. Sex:  female  :  1940   MRN:  [id number]   Room:  ER    Presenting Complaint: Fall     Reason(s) for Admission: Closed fracture of neck of right femur with delayed healing [S72.001G]     History of Present Illness:   Ne Vuong is a 80 y.o. female with medical history of hypothyroidism, hypertension, hyperlipidemia, GERD, proximal atrial fibrillation not on anticoagulation secondary to severe anemia who presented after fall from ladder. Patient found to have closed fracture of neck of the right femur. .  Patient stated that she had instant pain after falling from the ladder to her right hip. Patient was unable to get up. Patient denies any loss of consciousness or any trauma to her head. Chest x-ray negative. WBC elevated 13.4 likely reactive. Vital stable at this time. Patient with pain anytime she moves in the bed. Patient otherwise without complaints. Patient denies any cardiac chest pain, shortness of breath, abdominal pain, fever/chills. Review of Systems:  10 systems reviewed and negative except as noted in HPI.   Assessment & Plan:   Closed fracture of neck of right femur   -Pain management with morphine 4 mg every 4 hours  - Ortho consulted, appreciate recommendations  - PT/OT  - IV fluids  - DVT prophylaxis per Ortho  - Hip x-rays with evidence of closed fracture of neck of right femur    Acute kidney injury  - Creatinine of 1.7  - Continue IV fluids  - Avoid nephrotoxic agents  - Follow-up daily BMP    Hypothyroidism  - Continue home Synthroid    Hypertension  - Continue home antihypertensives    Hyperlipidemia  - Continue home statin    GERD  - PPI    Proximal atrial fibrillation  - Continue home amiodarone  - Not on anticoagulation secondary to severe anemia Per documentation from cardiology from prior admissions    Note,      Anticipated discharge needs:   Dispo pending clinical course    Diet: Diet NPO  VTE ppx: SCDs  Code status: No Order    Hospital Problems:  Principal Problem:    Closed fracture of neck of right femur with delayed healing  Resolved Problems:    * No resolved hospital problems. *       Past History:     Past Medical History:   Diagnosis Date    Acquired hypothyroidism 1/16/2016    Breast cancer ([facility] [address] 2008    Depression 8/18/2016    Endocrine disease     hypothyroid    Fungal dermatitis 8/18/2016    Hyperlipidemia 1/16/2016    Hypertension, essential, benign 8/18/2016    Hypokalemia 8/18/2016    Inflamed sebaceous cyst 8/18/2016    Major depressive disorder, recurrent, moderate ([facility] 8/18/2016    Nicotine dependence, cigarettes, uncomplicated 3/66/3567    Osteopenia 8/18/2016    Osteoporosis 8/18/2016    Radiation therapy complication 5393    Rheumatoid arthritis ([facility] 1/16/2016    Right carotid bruit 1/16/2016    TIA (transient ischemic attack) 1/16/2016    Tobacco abuse 8/18/2016       Past Surgical History:   Procedure Laterality Date    ADENOIDECTOMY      BREAST LUMPECTOMY Right 2008    with lymph nodes    COLONOSCOPY N/A 12/28/2021    COLONOSCOPY/ 31 CVICU 102 performed by Angie Lizarraga MD at [facility] [address] BONE  5/14/2021    IR [place] BONE  5/14/2021    IR BIOPSY PERC SUPERF BONE 5/14/2021 [facility]    IR NONTUNNELED VASCULAR CATHETER  5/14/2021    IR NONTUNNELED VASCULAR CATHETER  5/14/2021    IR NONTUNNELED VASCULAR CATHETER 5/14/2021 [facility]    TONSILLECTOMY          Social History     Tobacco Use    Smoking status: Some Days    Smokeless tobacco: Never    Tobacco comments:     Quit smoking:  Only on pay day-1/2 pack   Substance Use Topics    Alcohol use: No      Social History     Substance and Sexual Activity   Drug Use No       Family History   Problem Relation Age of Onset    Stroke Mother     Cancer Father         Brain    HIV/AIDS Brother         Immunization History   Administered Date(s) Administered    Influenza, High Dose (Fluzone 65 yrs and older) 10/28/2015, 09/21/2016, 09/15/2017    PPD Test 04/22/2019, 05/12/2021, 12/14/2021, 12/30/2021, 01/04/2022    Pneumococcal Polysaccharide ([id number]) 01/18/2016     Allergies   Allergen Reactions    Apixaban Other (See Comments)     Patient states she had hallucinations from Eliquis     Prior to Admit Medications:  Current Outpatient Medications   Medication Instructions    amiodarone (CORDARONE) 200 mg, Oral, DAILY    DULoxetine (CYMBALTA) 60 mg, Oral, DAILY    folic acid (FOLVITE) 1 mg, Oral, DAILY    furosemide (LASIX) 20 mg, Oral, DAILY    levothyroxine (SYNTHROID) 25 mcg, Oral, DAILY BEFORE BREAKFAST    metoprolol succinate (TOPROL XL) 50 mg, Oral, DAILY    Metoprolol-HCTZ ER 25-12.5 MG TB24 Metoprolol succ 25 mg-hydrochlorothiazide 12.5 mg tablet,ext. rel 24 hr Take 1 tablet every day by oral route    ondansetron (ZOFRAN-ODT) 4 mg, Oral, EVERY 8 HOURS PRN    pantoprazole (PROTONIX) 40 mg, Oral, 2 TIMES DAILY BEFORE MEALS    rosuvastatin (CRESTOR) 10 mg, Oral    vitamin D (CHOLECALCIFEROL) 1,000 Units, Oral, DAILY         Objective:   Patient Vitals for the past 24 hrs:   Temp Pulse BP   09/17/22 1938 98 °F (36.7 °C) 65 (!) 194/93       Oxygen Therapy  Pulse Oximeter Device Mode: Continuous  O2 Device: None (Room air)    Estimated body mass index is 25.97 kg/m² as calculated from the following:    Height as of this encounter: 5' (1.524 m). Weight as of this encounter: 133 lb (60.3 kg). Intake/Output Summary (Last 24 hours) at 9/17/2022 7220  Last data filed at 9/17/2022 2146  Gross per 24 hour   Intake --   Output 700 ml   Net -700 ml         Physical Exam:  Blood pressure (!) 194/93, pulse 65, temperature 98 °F (36.7 °C), temperature source Oral, height 5' (1.524 m), weight 133 lb (60.3 kg). General:    Well nourished. Head:  Normocephalic, atraumatic  Eyes:  Sclerae appear normal.  Pupils equally round.   ENT:  Nares appear normal, no drainage. Moist oral mucosa  Neck:  No restricted ROM. Trachea midline   CV:   RRR. No m/r/g. No jugular venous distension. Lungs:   CTAB. No wheezing, rhonchi, or rales. Symmetric expansion. Abdomen: Bowel sounds present. Soft, nontender, nondistended. Extremities: No cyanosis or clubbing. Tenderness to right hip with right lower extremity shortened and rotated  Skin:     No rashes and normal coloration. Warm and dry. Neuro:  CN II-XII grossly intact. Sensation intact. A&Ox3  Psych:  Normal mood and affect.       I have personally reviewed labs and tests showing:  Recent Labs:  Recent Results (from the past 24 hour(s))   CBC with Auto Differential    Collection Time: 09/17/22  9:30 PM   Result Value Ref Range    WBC 13.4 (H) 4.3 - 11.1 K/uL    RBC 5.20 4.05 - 5.2 M/uL    Hemoglobin 14.5 11.7 - 15.4 g/dL    Hematocrit 46.4 (H) 35.8 - 46.3 %    MCV 89.2 79.6 - 97.8 FL    MCH 27.9 26.1 - 32.9 PG    MCHC 31.3 (L) 31.4 - 35.0 g/dL    RDW 16.8 (H) 11.9 - 14.6 %    Platelets 897 724 - 511 K/uL    MPV 11.7 9.4 - 12.3 FL    nRBC 0.00 0.0 - 0.2 K/uL    Differential Type AUTOMATED      Seg Neutrophils 76 43 - 78 %    Lymphocytes 13 13 - 44 %    Monocytes 7 4.0 - 12.0 %    Eosinophils % 2 0.5 - 7.8 %    Basophils 1 0.0 - 2.0 %    Immature Granulocytes 1 0.0 - 5.0 %    Segs Absolute 10.3 (H) 1.7 - 8.2 K/UL    Absolute Lymph # 1.7 0.5 - 4.6 K/UL    Absolute Mono # 1.0 0.1 - 1.3 K/UL    Absolute Eos # 0.2 0.0 - 0.8 K/UL    Basophils Absolute 0.1 0.0 - 0.2 K/UL    Absolute Immature Granulocyte 0.1 0.0 - 0.5 K/UL   CMP    Collection Time: 09/17/22  9:30 PM   Result Value Ref Range    Sodium 137 136 - 145 mmol/L    Potassium 4.4 3.5 - 5.1 mmol/L    Chloride 101 101 - 110 mmol/L    CO2 32 21 - 32 mmol/L    Anion Gap 4 4 - 13 mmol/L    Glucose 123 (H) 65 - 100 mg/dL    BUN 27 (H) 8 - 23 MG/DL    Creatinine 1.70 (H) 0.6 - 1.0 MG/DL    GFR African American 37 (L) >60 ml/min/1.73m2    GFR Non-African and surgical clips in the right axilla. The right lung is clear. No pneumothorax or pleural effusion or acute displaced fracture is seen. No acute process. Echocardiogram:  No results found for this or any previous visit. Orders Placed This Encounter   Medications    morphine injection 4 mg    ondansetron (ZOFRAN) injection 4 mg    morphine injection 4 mg         Signed:  Anthony Rasmussen MD    Part of this note may have been written by using a voice dictation software. The note has been proof read but may still contain some grammatical/other typographical errors.

## 2022-09-19 LAB
ANION GAP SERPL CALC-SCNC: 4 MMOL/L (ref 4–13)
BASOPHILS # BLD: 0 K/UL (ref 0–0.2)
BASOPHILS NFR BLD: 0 % (ref 0–2)
BUN SERPL-MCNC: 26 MG/DL (ref 8–23)
CALCIUM SERPL-MCNC: 8.2 MG/DL (ref 8.3–10.4)
CHLORIDE SERPL-SCNC: 107 MMOL/L (ref 101–110)
CO2 SERPL-SCNC: 28 MMOL/L (ref 21–32)
CREAT SERPL-MCNC: 1.6 MG/DL (ref 0.6–1)
DIFFERENTIAL METHOD BLD: ABNORMAL
EOSINOPHIL # BLD: 0 K/UL (ref 0–0.8)
EOSINOPHIL NFR BLD: 0 % (ref 0.5–7.8)
ERYTHROCYTE [DISTWIDTH] IN BLOOD BY AUTOMATED COUNT: 17.2 % (ref 11.9–14.6)
GLUCOSE SERPL-MCNC: 146 MG/DL (ref 65–100)
HCT VFR BLD AUTO: 39.3 % (ref 35.8–46.3)
HGB BLD-MCNC: 12.1 G/DL (ref 11.7–15.4)
IMM GRANULOCYTES # BLD AUTO: 0.1 K/UL (ref 0–0.5)
IMM GRANULOCYTES NFR BLD AUTO: 1 % (ref 0–5)
LYMPHOCYTES # BLD: 1.2 K/UL (ref 0.5–4.6)
LYMPHOCYTES NFR BLD: 8 % (ref 13–44)
MCH RBC QN AUTO: 28.3 PG (ref 26.1–32.9)
MCHC RBC AUTO-ENTMCNC: 30.8 G/DL (ref 31.4–35)
MCV RBC AUTO: 92 FL (ref 79.6–97.8)
MONOCYTES # BLD: 1.8 K/UL (ref 0.1–1.3)
MONOCYTES NFR BLD: 11 % (ref 4–12)
NEUTS SEG # BLD: 12.9 K/UL (ref 1.7–8.2)
NEUTS SEG NFR BLD: 81 % (ref 43–78)
NRBC # BLD: 0 K/UL (ref 0–0.2)
PLATELET # BLD AUTO: 216 K/UL (ref 150–450)
PMV BLD AUTO: 12.2 FL (ref 9.4–12.3)
POTASSIUM SERPL-SCNC: 4.4 MMOL/L (ref 3.5–5.1)
RBC # BLD AUTO: 4.27 M/UL (ref 4.05–5.2)
SODIUM SERPL-SCNC: 139 MMOL/L (ref 136–145)
WBC # BLD AUTO: 16 K/UL (ref 4.3–11.1)

## 2022-09-19 PROCEDURE — 51701 INSERT BLADDER CATHETER: CPT

## 2022-09-19 PROCEDURE — 97530 THERAPEUTIC ACTIVITIES: CPT

## 2022-09-19 PROCEDURE — 2500000003 HC RX 250 WO HCPCS: Performed by: HOSPITALIST

## 2022-09-19 PROCEDURE — 2580000003 HC RX 258: Performed by: ORTHOPAEDIC SURGERY

## 2022-09-19 PROCEDURE — 2700000000 HC OXYGEN THERAPY PER DAY

## 2022-09-19 PROCEDURE — 97165 OT EVAL LOW COMPLEX 30 MIN: CPT

## 2022-09-19 PROCEDURE — 2580000003 HC RX 258: Performed by: ANESTHESIOLOGY

## 2022-09-19 PROCEDURE — 6370000000 HC RX 637 (ALT 250 FOR IP): Performed by: STUDENT IN AN ORGANIZED HEALTH CARE EDUCATION/TRAINING PROGRAM

## 2022-09-19 PROCEDURE — 2500000003 HC RX 250 WO HCPCS: Performed by: ORTHOPAEDIC SURGERY

## 2022-09-19 PROCEDURE — 80048 BASIC METABOLIC PNL TOTAL CA: CPT

## 2022-09-19 PROCEDURE — 6370000000 HC RX 637 (ALT 250 FOR IP): Performed by: HOSPITALIST

## 2022-09-19 PROCEDURE — 6360000002 HC RX W HCPCS: Performed by: ORTHOPAEDIC SURGERY

## 2022-09-19 PROCEDURE — 85025 COMPLETE CBC W/AUTO DIFF WBC: CPT

## 2022-09-19 PROCEDURE — 36415 COLL VENOUS BLD VENIPUNCTURE: CPT

## 2022-09-19 PROCEDURE — 1100000003 HC PRIVATE W/ TELEMETRY

## 2022-09-19 PROCEDURE — 51798 US URINE CAPACITY MEASURE: CPT

## 2022-09-19 PROCEDURE — 94760 N-INVAS EAR/PLS OXIMETRY 1: CPT

## 2022-09-19 PROCEDURE — 97535 SELF CARE MNGMENT TRAINING: CPT

## 2022-09-19 PROCEDURE — 97162 PT EVAL MOD COMPLEX 30 MIN: CPT

## 2022-09-19 PROCEDURE — 2580000003 HC RX 258: Performed by: STUDENT IN AN ORGANIZED HEALTH CARE EDUCATION/TRAINING PROGRAM

## 2022-09-19 RX ORDER — OXYCODONE HYDROCHLORIDE AND ACETAMINOPHEN 5; 325 MG/1; MG/1
1 TABLET ORAL EVERY 4 HOURS PRN
Status: DISCONTINUED | OUTPATIENT
Start: 2022-09-19 | End: 2022-09-21 | Stop reason: HOSPADM

## 2022-09-19 RX ADMIN — OXYCODONE AND ACETAMINOPHEN 1 TABLET: 5; 325 TABLET ORAL at 14:37

## 2022-09-19 RX ADMIN — TUBERCULIN PURIFIED PROTEIN DERIVATIVE 5 UNITS: 5 INJECTION, SOLUTION INTRADERMAL at 12:05

## 2022-09-19 RX ADMIN — SODIUM CHLORIDE, PRESERVATIVE FREE 10 ML: 5 INJECTION INTRAVENOUS at 21:21

## 2022-09-19 RX ADMIN — SODIUM CHLORIDE, PRESERVATIVE FREE 10 ML: 5 INJECTION INTRAVENOUS at 21:22

## 2022-09-19 RX ADMIN — FUROSEMIDE 20 MG: 20 TABLET ORAL at 08:44

## 2022-09-19 RX ADMIN — FOLIC ACID 1 MG: 1 TABLET ORAL at 08:44

## 2022-09-19 RX ADMIN — DULOXETINE HYDROCHLORIDE 60 MG: 30 CAPSULE, DELAYED RELEASE ORAL at 08:44

## 2022-09-19 RX ADMIN — PANTOPRAZOLE SODIUM 40 MG: 40 TABLET, DELAYED RELEASE ORAL at 16:27

## 2022-09-19 RX ADMIN — AMIODARONE HYDROCHLORIDE 200 MG: 200 TABLET ORAL at 08:44

## 2022-09-19 RX ADMIN — CEFAZOLIN SODIUM 2000 MG: 100 INJECTION, POWDER, LYOPHILIZED, FOR SOLUTION INTRAVENOUS at 02:48

## 2022-09-19 RX ADMIN — METOPROLOL SUCCINATE 25 MG: 25 TABLET, EXTENDED RELEASE ORAL at 08:44

## 2022-09-19 RX ADMIN — AMLODIPINE BESYLATE 5 MG: 5 TABLET ORAL at 08:44

## 2022-09-19 RX ADMIN — PANTOPRAZOLE SODIUM 40 MG: 40 TABLET, DELAYED RELEASE ORAL at 05:25

## 2022-09-19 RX ADMIN — ASPIRIN 325 MG: 325 TABLET, COATED ORAL at 21:21

## 2022-09-19 RX ADMIN — LEVOTHYROXINE SODIUM 25 MCG: 0.05 TABLET ORAL at 05:25

## 2022-09-19 RX ADMIN — SODIUM CHLORIDE, POTASSIUM CHLORIDE, SODIUM LACTATE AND CALCIUM CHLORIDE: 600; 310; 30; 20 INJECTION, SOLUTION INTRAVENOUS at 00:45

## 2022-09-19 RX ADMIN — ROSUVASTATIN CALCIUM 10 MG: 5 TABLET, FILM COATED ORAL at 21:20

## 2022-09-19 ASSESSMENT — PAIN DESCRIPTION - ORIENTATION: ORIENTATION: RIGHT

## 2022-09-19 ASSESSMENT — PAIN - FUNCTIONAL ASSESSMENT: PAIN_FUNCTIONAL_ASSESSMENT: ACTIVITIES ARE NOT PREVENTED

## 2022-09-19 ASSESSMENT — PAIN DESCRIPTION - LOCATION: LOCATION: HIP

## 2022-09-19 ASSESSMENT — PAIN SCALES - GENERAL
PAINLEVEL_OUTOF10: 0
PAINLEVEL_OUTOF10: 5
PAINLEVEL_OUTOF10: 0
PAINLEVEL_OUTOF10: 0

## 2022-09-19 ASSESSMENT — PAIN DESCRIPTION - DESCRIPTORS: DESCRIPTORS: ACHING

## 2022-09-19 NOTE — PROGRESS NOTES
[address]        2022         Post Op day: 1 Day Post-Op Procedure(s) (LRB):  HIP HEMIARTHROPLASTY RIGHT (Right)      Admit Date: 2022  Admit Diagnosis: Closed fracture of neck of right femur with delayed healing [S72.001G]  Closed fracture of neck of right femur, initial encounter ([facility] [S72.001A]       Principle Problem: Closed fracture of neck of right femur with delayed healing. Subjective: Doing well, No complaints, No SOB, No Chest Pain, No Nausea or Vomiting     Objective:   Vital Signs are Stable, No Acute Distress, Alert,  Dressing is Dry,  Neurovascular exam is normal.     Assessment / Plan :  Patient Active Problem List   Diagnosis    Paroxysmal atrial fibrillation (HCC)    Leukocytosis    Nicotine dependence, cigarettes, uncomplicated    Depression    Anemia    Acquired hypothyroidism    Stage 3 chronic kidney disease (HCC)    Diastolic CHF, chronic ([facility])    Pericarditis with effusion    Osteoporosis    Hypokalemia    Renal cyst, left    Pleural effusion    TIA (transient ischemic attack)    Tobacco abuse    Major depressive disorder, recurrent, moderate ([facility])    Osteopenia    Hypertension, essential, benign    Sepsis ([facility]    GI bleed    Rheumatoid arthritis (HCC)    T12 compression fracture ([facility])    Community acquired bacterial pneumonia    Prolonged Q-T interval on ECG    Right carotid bruit    Rib fracture    Hyperlipidemia    Hypoalbuminemia due to protein-calorie malnutrition (HCC)    Hypoxia    Closed fracture of neck of right femur with delayed healing    GENNARO (acute kidney injury) (HCC)    GERD (gastroesophageal reflux disease)    Patient Vitals for the past 8 hrs:   BP Temp Temp src Pulse Resp SpO2   22 0807 (!) 125/53 99.9 °F (37.7 °C) Oral 72 18 97 %   22 0253 (!) 125/53 98.8 °F (37.1 °C) Oral 71 18 97 %    Temp (24hrs), Av.6 °F (37 °C), Min:97.9 °F (36.6 °C), Max:99.9 °F (37.7 °C)    Body mass index is 25.97 kg/m².     Lab Results Component Value Date/Time    HGB 12.1 09/19/2022 03:19 AM          S/P Procedure(s) (LRB):  HIP HEMIARTHROPLASTY RIGHT (Right)      Medical Mgmt per hospitalist  Anticoagulation plan: ASA 325mg daily  Continue PT  Fall Precautions  DC disp: home vs rehab   F/U: 2 weeks postop for wound check and staple removal        Signed By: LAMAR Coates, PA  9/19/2022,  8:36 AM

## 2022-09-19 NOTE — PROGRESS NOTES
PHYSICAL THERAPY: Daily Note PM   (Link to Caseload Tracking: PT Visit Days : 1  Time In/Out PT Charge Capture  Rehab Caseload Tracker  Orders    Ta James is a 80 y.o. female   PRIMARY DIAGNOSIS: Closed fracture of neck of right femur with delayed healing  Closed fracture of neck of right femur with delayed healing [S72.001G]  Closed fracture of neck of right femur, initial encounter ([facility] [S72.001A]  Procedure(s) (LRB):  HIP HEMIARTHROPLASTY RIGHT (Right)  1 Day Post-Op  Inpatient: Payor: Arti Mora / Plan: Alpesh Al / Product Type: *No Product type* /     ASSESSMENT:     REHAB RECOMMENDATIONS:   Recommendation to date pending progress:  Setting:  Short-term Rehab    Equipment:    To Be Determined     ASSESSMENT:  Ms. Mendoza Man was received sitting in chair, agreeable to therapy, requesting to return to bed. Pt with more fatigue and pain this PM, she required modA of 2 to ambulate 2 ft and transfer back to bed. Pt with very uncontrolled descent from stand>sit requiring max cueing for safety. Return to supine with modA of 2. Lower portion of operative led noted to have increased redness- RN notified. Some progress, will continue to follow.       SUBJECTIVE:   Ms. Mendoza Man states, \"I'm ready to go back to bed\"     Social/Functional Lives With: Alone  Type of Home: House  Home Equipment: [place], rolling  Has the patient had two or more falls in the past year or any fall with injury in the past year?: No  Receives Help From: Personal care attendant ([employer] friend, Janusz Quevedo)  ADL Assistance: Independent  Ambulation Assistance: Independent (occaisonal use of cane)  OBJECTIVE:     PAIN: VITALS / O2: Nataly Lipps / [facility] / DRAINS:   Pre Treatment:   Pain Assessment: None - Denies Pain      Post Treatment: pain with mobility, does not quantify Vitals        Oxygen    Purewick    RESTRICTIONS/PRECAUTIONS:  Restrictions/Precautions  Required Braces or Orthoses?: Yes  Required Braces or Orthoses?: Yes  Required Braces or Orthoses  Right Lower Extremity Brace: Knee Immobilizer     MOBILITY: I Mod I S SBA CGA Min Mod Max Total  NT x2 Comments:   Bed Mobility    Rolling [] [] [] [] [] [] [x] [] [] [] [x]    Supine to Sit [] [] [] [] [] [] [] [] [] [x] []    Scooting [] [] [] [] [] [] [x] [] [] [] [x]    Sit to Supine [] [] [] [] [] [] [x] [] [] [] [x]    Transfers    Sit to Stand [] [] [] [] [] [] [x] [] [] [] [x]    Bed to Chair [] [] [] [] [] [] [x] [] [] [] [x]    Stand to Sit [] [] [] [] [] [] [] [x] [] [] [x]     [] [] [] [] [] [] [] [] [] [] []    I=Independent, Mod I=Modified Independent, S=Supervision, SBA=Standby Assistance, CGA=Contact Guard Assistance,   Min=Minimal Assistance, Mod=Moderate Assistance, Max=Maximal Assistance, Total=Total Assistance, NT=Not Tested    BALANCE: Good Fair+ Fair Fair- Poor NT Comments   Sitting Static [] [x] [] [] [] []    Sitting Dynamic [] [] [x] [] [] []              Standing Static [] [] [] [x] [] []    Standing Dynamic [] [] [] [] [x] []      GAIT: I Mod I S SBA CGA Min Mod Max Total  NT x2 Comments:   Level of Assistance [] [] [] [] [] [] [x] [] [] [] [x]    Distance 2 feet    DME Rolling Walker    Gait Quality Antalgic, Decreased chuy , Decreased step clearance, Decreased step length, and Trunk sway increased    Weightbearing Status      Stairs      I=Independent, Mod I=Modified Independent, S=Supervision, SBA=Standby Assistance, CGA=Contact Guard Assistance,   Min=Minimal Assistance, Mod=Moderate Assistance, Max=Maximal Assistance, Total=Total Assistance, NT=Not Tested    PLAN:   ACUTE PHYSICAL THERAPY GOALS:   (Developed with and agreed upon by patient and/or caregiver.)    (1.) Osman Lipa  will move from supine to sit and sit to supine  with STAND BY ASSIST within 7 treatment day(s). (2.) Osman Lipa will transfer from bed to chair and chair to bed with STAND BY ASSIST using the least restrictive device within 7 treatment day(s).     (3.) Osman Lipa will ambulate with STAND BY ASSIST for 150 feet with the least restrictive device within 7 treatment day(s). (4.) Ne Vuong will perform standing static and dynamic balance activities x 20 minutes with STAND BY ASSIST to improve safety within 7 treatment day(s). (5.) Ne Vuong will perform bilateral lower extremity exercises x 20 min for HEP with INDEPENDENCE to improve strength, endurance, and functional mobility within 7 treatment day(s). FREQUENCY AND DURATION: BID for duration of hospital stay or until stated goals are met, whichever comes first.    TREATMENT:   TREATMENT:   Therapeutic Activity (15 Minutes): Therapeutic activity included Rolling, Sit to Supine, Scooting, Transfer Training, Ambulation on level ground, Sitting balance , and Standing balance to improve functional Activity tolerance, Balance, Mobility, and Strength. TREATMENT GRID:  N/A    AFTER TREATMENT PRECAUTIONS: Bed, Bed/Chair Locked, Call light within reach, Needs within reach, and RN notified    INTERDISCIPLINARY COLLABORATION:  RN/ PCT and OT/ SHELBY    EDUCATION: Education Given To: Patient  Education Provided: Role of Therapy;Plan of Care;Precautions;Transfer Training; Fall Prevention Strategies; Equipment  Education Method: Verbal  Barriers to Learning: None  Education Outcome: Verbalized understanding    TIME IN/OUT:  Time In: 1359  Time Out: [address]  Minutes: [address] MATEO GUZMAN

## 2022-09-19 NOTE — PLAN OF CARE
Problem: Discharge Planning  Goal: Discharge to home or other facility with appropriate resources  9/19/2022 1000 by Asia Hazel RN  Outcome: Progressing  9/18/2022 2022 by Kymberly Otto RN  Outcome: Progressing     Problem: Pain  Goal: Verbalizes/displays adequate comfort level or baseline comfort level  9/19/2022 1000 by Asia Hazel RN  Outcome: Progressing  9/18/2022 2022 by Kymberly Otto RN  Outcome: Progressing     Problem: Safety - Adult  Goal: Free from fall injury  9/19/2022 1000 by Asia Hazel RN  Outcome: Progressing  [address] (Taken 9/19/2022 0807)  Free From Fall Injury:   Based on caregiver fall risk screen, instruct family/caregiver to ask for assistance with transferring infant if caregiver noted to have fall risk factors   Instruct family/caregiver on patient safety  9/18/2022 2022 by Kymberly Otto RN  Outcome: Progressing     Problem: ABCDS Injury Assessment  Goal: Absence of physical injury  9/19/2022 1000 by Asia Hazel RN  Outcome: Progressing  Flowsheets (Taken 9/19/2022 0807)  Absence of Physical Injury: Implement safety measures based on patient assessment  9/18/2022 2022 by Kymberly Otto RN  Outcome: Progressing     Problem: Chronic Conditions and Co-morbidities  Goal: Patient's chronic conditions and co-morbidity symptoms are monitored and maintained or improved  9/19/2022 1000 by Asia Hazel RN  Outcome: Progressing  9/18/2022 2022 by Kymberly Otto RN  Outcome: Progressing     Problem: Skin/Tissue Integrity  Goal: Absence of new skin breakdown  Description: 1. Monitor for areas of redness and/or skin breakdown  2. Assess vascular access sites hourly  3. Every 4-6 hours minimum:  Change oxygen saturation probe site  4. Every 4-6 hours:  If on nasal continuous positive airway pressure, respiratory therapy assess nares and determine need for appliance change or resting period.   9/19/2022 1000 by Asia Hazel RN  Outcome: Progressing  9/18/2022 2022 by Kymberly Otto RN  Outcome: Progressing

## 2022-09-19 NOTE — PROGRESS NOTES
OCCUPATIONAL THERAPY Initial Assessment and Daily Note       OT Visit Days: 1  Acknowledge Orders  Time  OT Charge Capture  Rehab Caseload Tracker      Yosvany Alonso is a 80 y.o. female   PRIMARY DIAGNOSIS: Closed fracture of neck of right femur with delayed healing  Closed fracture of neck of right femur with delayed healing [S72.001G]  Closed fracture of neck of right femur, initial encounter ([facility] [S72.001A]  Procedure(s) (LRB):  HIP HEMIARTHROPLASTY RIGHT (Right)  1 Day Post-Op  Reason for Referral: Pain in Right Hip (M25.551)  History of falling (Z91.81)  Inpatient: Payor: Dixon Starks / Plan: Rip Samina / Product Type: *No Product type* /     ASSESSMENT:     REHAB RECOMMENDATIONS:   Recommendation to date pending progress:  Setting:  Short-term Rehab    Equipment:    To Be Determined     ASSESSMENT:  Ms. Justina Hernandez is a 79 y/o F admitted after fall, now POD#1 from R hip hemiarthroplasty & WBAT RLE. Baseline independent, living alone. She does have a Taoist friend that assists as needed with IADLs. Today reports no pain at rest, increasing pain in R hip with activity. Mobile with minimal-moderate assist x2. Able to complete seated ADL with setup. She is functioning below her baseline & would benefit from continued OT to increase independence and safety.       [address] [facility] [address] [address] AM-PAC 6 Clicks Daily Activity Inpatient Short Form:    AM-PAC Daily Activity Inpatient   How much help for putting on and taking off regular lower body clothing?: A Lot  How much help for Bathing?: A Lot  How much help for Toileting?: A Lot  How much help for putting on and taking off regular upper body clothing?: A Little  How much help for taking care of personal grooming?: A Little  How much help for eating meals?: A Little  AM-[facility] Inpatient Daily Activity Raw Score: 15  AM-PAC Inpatient ADL T-Scale Score : 34.69  ADL Inpatient CMS 0-100% Score: 56.46  ADL Inpatient CMS G-Code Modifier : CK           SUBJECTIVE: [] [] [x] []     Toilet [] [] [] [] [] [] [] [] [] [x] []      [] [] [] [] [] [] [] [] [] [] []    I=Independent, Mod I=Modified Independent, S=Supervision/Setup, SBA=Standby Assistance, CGA=Contact Guard Assistance, Min=Minimal Assistance, Mod=Moderate Assistance, Max=Maximal Assistance, Total=Total Assistance, NT=Not Tested    ACTIVITIES OF DAILY LIVING: I Mod I S SBA CGA Min Mod Max Total NT Comments   BASIC ADLs:              Upper Body Bathing  [] [] [] [] [] [] [] [] [] []    Lower Body Bathing [] [] [] [] [] [] [] [] [] []    Toileting [] [] [] [] [] [] [] [] [] []    Upper Body Dressing [] [] [] [] [] [] [] [] [] []    Lower Body Dressing [] [] [] [] [] [] [] [] [] []    Feeding [] [] [] [] [] [] [] [] [] []    Grooming [] [] [x] [] [] [] [] [] [] [] Brushing teeth, washing hands from seated position    Personal Device Care [] [] [] [] [] [] [] [] [] []    Functional Mobility [] [] [] [] [] [] [x] [] [] []    I=Independent, Mod I=Modified Independent, S=Supervision/Setup, SBA=Standby Assistance, CGA=Contact Guard Assistance, Min=Minimal Assistance, Mod=Moderate Assistance, Max=Maximal Assistance, Total=Total Assistance, NT=Not Tested    PLAN:     FREQUENCY/DURATION   OT Plan of Care: 5 times/week for duration of hospital stay or until stated goals are met, whichever comes first.    ACUTE OCCUPATIONAL THERAPY GOALS:   (Developed with and agreed upon by patient and/or caregiver.)  1. Patient will verbalize and demonstrate understanding of hip precautions with 100% accuracy during ADL. 2. Patient will complete functional transfers with CGA and adaptive equipment as needed. 3. Patient will complete lower body bathing and dressing with minimal assistance and adaptive equipment as needed. 4. Patient will complete toileting with minimal assistance. 5. Patient will tolerate at least 20 minutes of BUE therapeutic exercises to increase strength in BUE to aid in functional transfers.    6. Patient will tolerate at least 30 minutes of OT treatment with no rest breaks to increase activity tolerance for ADLs. Timeframe: 7 visits         PROBLEM LIST:   (Skilled intervention is medically necessary to address:)  Decreased ADL/Functional Activities  Decreased Activity Tolerance  Decreased AROM/PROM  Decreased Balance  Decreased Gait Ability  Decreased Safety Awareness  Decreased Strength  Decreased Transfer Abilities  Increased Pain   INTERVENTIONS PLANNED:  (Benefits and precautions of occupational therapy have been discussed with the patient.)  Self Care Training  Therapeutic Activity  Therapeutic Exercise/HEP  Neuromuscular Re-education  Manual Therapy  Education         TREATMENT:     EVALUATION: LOW COMPLEXITY: (Untimed Charge)    TREATMENT:   Co-Treatment PT/OT necessary due to patient's decreased overall endurance/tolerance levels, as well as need for high level skilled assistance to complete functional transfers/mobility and functional tasks  Self Care (8 minutes): Patient participated in grooming ADLs in supported sitting with no verbal cueing to increase independence. Patient also participated in functional transfer training to increase independence and increase safety awareness.      TREATMENT GRID:  N/A    AFTER TREATMENT PRECAUTIONS: Bed/Chair Locked, Call light within reach, Chair, Needs within reach, and RN notified    INTERDISCIPLINARY COLLABORATION:  MD/ PA/ NP , RN/ PCT, PT/ PTA, OT/ SHELBY, and RN Case Manager/      EDUCATION:  Education Given To: Patient  Education Provided: Role of Therapy;Plan of Care  Education Outcome: Continued education needed    TOTAL TREATMENT DURATION AND TIME:  Time In: 1041  Time Out: [address] [address]  Minutes: [address], [place]

## 2022-09-19 NOTE — ACP (ADVANCE CARE PLANNING)
Advance Care Planning     Advance Care Planning Inpatient Note  [facility] Department    Today's Date: 9/19/2022  Unit: SFD 7 MED SURG    Received request from patient. Upon review of chart and communication with care team, patient's decision making abilities are not in question. . Patient was/were present in the room during visit. Goals of ACP Conversation:  Discuss advance care planning documents    Health Care Decision Makers:       Primary Decision Maker: Carlos Barlow - Other - [phone]    Secondary Decision Maker: Bernadette Gonsales - Other - [phone]  Summary:  Completed [address]    Advance Care Planning Documents (Patient Wishes):  Healthcare Power of /Advance Directive Appointment of Health Care Agent     Assessment:  Patient expressed desire to update her HCPOA     Interventions:  Provided education on documents for clarity and greater understanding  Assisted in the completion of documents according to patient's wishes at this time  Encouraged ongoing ACP conversation with future decision makers and loved ones    Care Preferences Communicated:   No    Outcomes/Plan:  New advance directive completed. Returned original document(s) to patient, as well as copies for distribution to appointed agents  Copy of advance directive given to staff to scan into medical record. Routed ACP note to attending provider or other IDT member.   Teach Back Method used to verify the patient's and/or Healthcare Decision Maker's understanding of key information in the advance directive documents    Electronically signed by Pato Barrera on 9/19/2022 at 9:54 AM

## 2022-09-19 NOTE — PROGRESS NOTES
Advance Care Planning      Advance Care Planning Inpatient Note  [facility] Department     Today's Date: 9/19/2022  Unit: SFD 7 MED SURG     Received request from patient. Upon review of chart and communication with care team, patient's decision making abilities are not in question. . Patient was/were present in the room during visit. Goals of ACP Conversation:  Discuss advance care planning documents     Health Care Decision Makers:                  Primary Decision Maker: Tan Jordna - Other - [phone]    Secondary Decision Maker: Luzmaria Gant - Other - [phone]  Summary:  Completed [address]     Advance Care Planning Documents (Patient Wishes):  Healthcare Power of /Advance Directive Appointment of Health Care Agent      Assessment:  Patient expressed desire to update her HCPOA      Interventions:  Provided education on documents for clarity and greater understanding  Assisted in the completion of documents according to patient's wishes at this time  Encouraged ongoing ACP conversation with future decision makers and loved ones     Care Preferences Communicated:              No     Outcomes/Plan:  New advance directive completed. Returned original document(s) to patient, as well as copies for distribution to appointed agents  Copy of advance directive given to staff to scan into medical record. Routed ACP note to attending provider or other IDT member.   Teach Back Method used to verify the patient's and/or Healthcare Decision Maker's understanding of key information in the advance directive documents     Electronically signed by Mayte Small on 9/19/2022 at 9:54 AM

## 2022-09-19 NOTE — DISCHARGE INSTRUCTIONS
[facility]      IF YOU HAVE ANY PROBLEMS ONCE YOU ARE AT HOME CALL THE FOLLOWING NUMBERS:   Main office number: [phone] ask for José Miguel Estrada (medical assistant with Dr. Tracey Keen)  Office Address: [facility] [address] [address] [address] [address] , [address] [facility]      Patient Discharge Instructions    Kvng Jacob / [id number] : 1940    Admitted 2022           To be given to [address] on Admission         Weight bearing status: As tolerated with walker and assistance    Activity  Continue Physical Therapy and Occupational Therapy   Fall precautions     Wound Care  Dry dressing changes using sterile technique every other day or more frequently if needed   Staples are to be left in and removed in our office 2 weeks postop    Diet  Resume regular or diabetic diet      Medications    Patient medications are listed on the medication reconciliation sheet. Follow up   Follow up in our office in 2 weeks postop   All patients are to be transported via stretcher unless they are able to independently get out of a chair and stand without assistance. Information obtained by :  I understand that if any problems occur once I am at home I am to contact my physician. I understand and acknowledge receipt of the instructions indicated above.                                                                                                                                            Physician's or R.N.'s Signature                                                                  Date/Time                                                                                                                                              Patient or Representative Signature                                                          Date/Time

## 2022-09-19 NOTE — PROGRESS NOTES
atraumatic  Eyes:  Sclerae appear normal.  Pupils equally round. ENT:  Nares appear normal, no drainage. Moist oral mucosa  Neck:  No restricted ROM. Trachea midline   CV:   RRR. No m/r/g. No jugular venous distension. Lungs:   CTAB. No wheezing, rhonchi, or rales. Symmetric expansion. Abdomen: Bowel sounds present. Soft, nontender, nondistended. Extremities: No cyanosis or clubbing. No edema, postop dressing in place in the right hip,  Skin:     No rashes and normal coloration. Warm and dry. Neuro:  CN II-XII grossly intact. Sensation intact. A&Ox3  Psych:  Normal mood and affect.       I have personally reviewed labs and tests showing:  Recent Labs:  Recent Results (from the past 48 hour(s))   CBC with Auto Differential    Collection Time: 09/17/22  9:30 PM   Result Value Ref Range    WBC 13.4 (H) 4.3 - 11.1 K/uL    RBC 5.20 4.05 - 5.2 M/uL    Hemoglobin 14.5 11.7 - 15.4 g/dL    Hematocrit 46.4 (H) 35.8 - 46.3 %    MCV 89.2 79.6 - 97.8 FL    MCH 27.9 26.1 - 32.9 PG    MCHC 31.3 (L) 31.4 - 35.0 g/dL    RDW 16.8 (H) 11.9 - 14.6 %    Platelets 037 571 - 105 K/uL    MPV 11.7 9.4 - 12.3 FL    nRBC 0.00 0.0 - 0.2 K/uL    Differential Type AUTOMATED      Seg Neutrophils 76 43 - 78 %    Lymphocytes 13 13 - 44 %    Monocytes 7 4.0 - 12.0 %    Eosinophils % 2 0.5 - 7.8 %    Basophils 1 0.0 - 2.0 %    Immature Granulocytes 1 0.0 - 5.0 %    Segs Absolute 10.3 (H) 1.7 - 8.2 K/UL    Absolute Lymph # 1.7 0.5 - 4.6 K/UL    Absolute Mono # 1.0 0.1 - 1.3 K/UL    Absolute Eos # 0.2 0.0 - 0.8 K/UL    Basophils Absolute 0.1 0.0 - 0.2 K/UL    Absolute Immature Granulocyte 0.1 0.0 - 0.5 K/UL   CMP    Collection Time: 09/17/22  9:30 PM   Result Value Ref Range    Sodium 137 136 - 145 mmol/L    Potassium 4.4 3.5 - 5.1 mmol/L    Chloride 101 101 - 110 mmol/L    CO2 32 21 - 32 mmol/L    Anion Gap 4 4 - 13 mmol/L    Glucose 123 (H) 65 - 100 mg/dL    BUN 27 (H) 8 - 23 MG/DL    Creatinine 1.70 (H) 0.6 - 1.0 MG/DL    GFR  37 (L) >60 ml/min/1.73m2    GFR Non- 31 (L) >60 ml/min/1.73m2    Calcium 9.4 8.3 - 10.4 MG/DL    Total Bilirubin 0.6 0.2 - 1.1 MG/DL    ALT 32 12 - 65 U/L    AST 26 15 - 37 U/L    Alk Phosphatase 115 50 - 136 U/L    Total Protein 8.0 6.3 - 8.2 g/dL    Albumin 3.3 3.2 - 4.6 g/dL    Globulin 4.7 (H) 2.3 - 3.5 g/dL    Albumin/Globulin Ratio 0.7 (L) 1.2 - 3.5     Protime-INR    Collection Time: 09/17/22  9:30 PM   Result Value Ref Range    Protime 13.7 12.6 - 14.5 sec    INR 1.0     TYPE AND SCREEN    Collection Time: 09/17/22  9:30 PM   Result Value Ref Range    Crossmatch expiration date 09/20/2022,2359     ABO/Rh A POSITIVE     Antibody Screen NEG    Comprehensive Metabolic Panel w/ Reflex to MG    Collection Time: 09/18/22  3:45 AM   Result Value Ref Range    Sodium 138 136 - 145 mmol/L    Potassium 4.2 3.5 - 5.1 mmol/L    Chloride 103 101 - 110 mmol/L    CO2 30 21 - 32 mmol/L    Anion Gap 5 4 - 13 mmol/L    Glucose 136 (H) 65 - 100 mg/dL    BUN 25 (H) 8 - 23 MG/DL    Creatinine 1.40 (H) 0.6 - 1.0 MG/DL    GFR  47 (L) >60 ml/min/1.73m2    GFR Non- 39 (L) >60 ml/min/1.73m2    Calcium 9.3 8.3 - 10.4 MG/DL    Total Bilirubin 0.7 0.2 - 1.1 MG/DL    ALT 30 12 - 65 U/L    AST 25 15 - 37 U/L    Alk Phosphatase 114 50 - 136 U/L    Total Protein 7.6 6.3 - 8.2 g/dL    Albumin 3.0 (L) 3.2 - 4.6 g/dL    Globulin 4.6 (H) 2.3 - 3.5 g/dL    Albumin/Globulin Ratio 0.7 (L) 1.2 - 3.5     CBC with Auto Differential    Collection Time: 09/18/22  3:45 AM   Result Value Ref Range    WBC 11.6 (H) 4.3 - 11.1 K/uL    RBC 5.12 4.05 - 5.2 M/uL    Hemoglobin 14.3 11.7 - 15.4 g/dL    Hematocrit 45.8 35.8 - 46.3 %    MCV 89.5 79.6 - 97.8 FL    MCH 27.9 26.1 - 32.9 PG    MCHC 31.2 (L) 31.4 - 35.0 g/dL    RDW 16.9 (H) 11.9 - 14.6 %    Platelets 815 551 - 925 K/uL    MPV 11.8 9.4 - 12.3 FL    nRBC 0.00 0.0 - 0.2 K/uL    Differential Type AUTOMATED      Seg Neutrophils 71 43 - 78 % Lymphocytes 15 13 - 44 %    Monocytes 12 4.0 - 12.0 %    Eosinophils % 1 0.5 - 7.8 %    Basophils 1 0.0 - 2.0 %    Immature Granulocytes 0 0.0 - 5.0 %    Segs Absolute 8.3 (H) 1.7 - 8.2 K/UL    Absolute Lymph # 1.7 0.5 - 4.6 K/UL    Absolute Mono # 1.4 (H) 0.1 - 1.3 K/UL    Absolute Eos # 0.1 0.0 - 0.8 K/UL    Basophils Absolute 0.1 0.0 - 0.2 K/UL    Absolute Immature Granulocyte 0.1 0.0 - 0.5 K/UL   CBC with Auto Differential    Collection Time: 09/19/22  3:19 AM   Result Value Ref Range    WBC 16.0 (H) 4.3 - 11.1 K/uL    RBC 4.27 4.05 - 5.2 M/uL    Hemoglobin 12.1 11.7 - 15.4 g/dL    Hematocrit 39.3 35.8 - 46.3 %    MCV 92.0 79.6 - 97.8 FL    MCH 28.3 26.1 - 32.9 PG    MCHC 30.8 (L) 31.4 - 35.0 g/dL    RDW 17.2 (H) 11.9 - 14.6 %    Platelets 846 505 - 395 K/uL    MPV 12.2 9.4 - 12.3 FL    nRBC 0.00 0.0 - 0.2 K/uL    Differential Type AUTOMATED      Seg Neutrophils 81 (H) 43 - 78 %    Lymphocytes 8 (L) 13 - 44 %    Monocytes 11 4.0 - 12.0 %    Eosinophils % 0 (L) 0.5 - 7.8 %    Basophils 0 0.0 - 2.0 %    Immature Granulocytes 1 0.0 - 5.0 %    Segs Absolute 12.9 (H) 1.7 - 8.2 K/UL    Absolute Lymph # 1.2 0.5 - 4.6 K/UL    Absolute Mono # 1.8 (H) 0.1 - 1.3 K/UL    Absolute Eos # 0.0 0.0 - 0.8 K/UL    Basophils Absolute 0.0 0.0 - 0.2 K/UL    Absolute Immature Granulocyte 0.1 0.0 - 0.5 K/UL   Basic Metabolic Panel w/ Reflex to MG    Collection Time: 09/19/22  3:19 AM   Result Value Ref Range    Sodium 139 136 - 145 mmol/L    Potassium 4.4 3.5 - 5.1 mmol/L    Chloride 107 101 - 110 mmol/L    CO2 28 21 - 32 mmol/L    Anion Gap 4 4 - 13 mmol/L    Glucose 146 (H) 65 - 100 mg/dL    BUN 26 (H) 8 - 23 MG/DL    Creatinine 1.60 (H) 0.6 - 1.0 MG/DL    GFR African American 40 (L) >60 ml/min/1.73m2    GFR Non- 33 (L) >60 ml/min/1.73m2    Calcium 8.2 (L) 8.3 - 10.4 MG/DL       I have personally reviewed imaging studies showing: Other Studies:  XR PELVIS (1-2 VIEWS)   Final Result   Right hip arthroplasty.       XR HIP RIGHT (2-3 VIEWS)   Final Result   Right femoral neck fracture. XR FEMUR RIGHT (MIN 2 VIEWS)   Final Result   As above. XR CHEST 1 VIEW   Final Result   No acute process.           Current Meds:  Current Facility-Administered Medications   Medication Dose Route Frequency    tuberculin injection 5 Units  5 Units IntraDERmal Once    oxyCODONE-acetaminophen (PERCOCET) 5-325 MG per tablet 1 tablet  1 tablet Oral Q4H PRN    furosemide (LASIX) tablet 20 mg  20 mg Oral Daily    pantoprazole (PROTONIX) tablet 40 mg  40 mg Oral BID AC    folic acid (FOLVITE) tablet 1 mg  1 mg Oral Daily    levothyroxine (SYNTHROID) tablet 25 mcg  25 mcg Oral QAM AC    amiodarone (CORDARONE) tablet 200 mg  200 mg Oral Daily    metoprolol succinate (TOPROL XL) extended release tablet 25 mg  25 mg Oral Daily    rosuvastatin (CRESTOR) tablet 10 mg  10 mg Oral Nightly    DULoxetine (CYMBALTA) extended release capsule 60 mg  60 mg Oral Daily    sodium chloride flush 0.9 % injection 5-40 mL  5-40 mL IntraVENous 2 times per day    sodium chloride flush 0.9 % injection 5-40 mL  5-40 mL IntraVENous PRN    0.9 % sodium chloride infusion   IntraVENous PRN    ondansetron (ZOFRAN-ODT) disintegrating tablet 4 mg  4 mg Oral Q8H PRN    Or    ondansetron (ZOFRAN) injection 4 mg  4 mg IntraVENous Q6H PRN    polyethylene glycol (GLYCOLAX) packet 17 g  17 g Oral Daily PRN    acetaminophen (TYLENOL) tablet 650 mg  650 mg Oral Q6H PRN    Or    acetaminophen (TYLENOL) suppository 650 mg  650 mg Rectal Q6H PRN    0.9 % sodium chloride infusion   IntraVENous Continuous    morphine injection 2 mg  2 mg IntraVENous Q4H PRN    amLODIPine (NORVASC) tablet 5 mg  5 mg Oral Daily    hydrALAZINE (APRESOLINE) injection 10 mg  10 mg IntraVENous Q6H PRN    lactated ringers infusion   IntraVENous Continuous    labetalol (NORMODYNE;TRANDATE) injection 10 mg  10 mg IntraVENous Q15 Min PRN    glucose chewable tablet 16 g  4 tablet Oral PRN    dextrose bolus 10% 125 mL  125 mL IntraVENous PRN    Or    dextrose bolus 10% 250 mL  250 mL IntraVENous PRN    glucagon (rDNA) injection 1 mg  1 mg SubCUTAneous PRN    dextrose 10 % infusion   IntraVENous Continuous PRN    sodium chloride flush 0.9 % injection 5-40 mL  5-40 mL IntraVENous 2 times per day    sodium chloride flush 0.9 % injection 5-40 mL  5-40 mL IntraVENous PRN    0.9 % sodium chloride infusion   IntraVENous PRN    aspirin EC tablet 325 mg  325 mg Oral Daily    levalbuterol (XOPENEX) nebulizer solution 1.25 mg  1.25 mg Nebulization Q4H PRN       Signed:  Phuong Guerrero MD    Part of this note may have been written by using a voice dictation software. The note has been proof read but may still contain some grammatical/other typographical errors.

## 2022-09-19 NOTE — CARE COORDINATION
Pt is a 81 yo female admitted for surgical repair of a right femoral neck fracture she sustained in a fall. Pt is POD1. PT/OT evals complete with the recommendation for STR at [facility]. CM met with pt to discuss these recommendations. Pt is in agreement with STR at [place] and from a list of facilities in network with her insurance [facility]. Referrals submitted. Awaiting responses. Pt's insurance will require precert which the accepting facility will initiate when bed offer is accepted. PPD placed today. CM following to facilitate pt's transfer to rehab at [facility].       09/19/22 1761   Service Assessment   Patient Orientation Alert and Oriented   Cognition Alert   History Provided By Patient;Medical Record   Primary [address] [address] is: Named in [address] [place] [address]   PCP Verified by CM Yes   Last Visit to PCP Within last 3 months   Prior Functional Level Independent in ADLs/IADLs   Current Functional Level Assistance with the following:;Mobility; Bathing;Dressing   Can patient return to prior living arrangement No   Ability to make needs known: Good   Family able to assist with home care needs: No   Financial Resources Medicare   Social/Functional History   Lives With Alone   Type of [address] Cane;Walker, rolling   Receives Help From Personal care attendant  (Anabaptist friend, Aundra Quevedo)   [address] [facility]  (occaisonal use of cane)   Transfer Assistance Independent   Active  No   Occupation Retired   Discharge Planning   Type of [address] Prior To Admission None   [address]   DME Ordered?  No   Potential Assistance Purchasing Medications No   Type of Home Care Services None   Patient expects to be discharged to: Skilled nursing facility   History of falls? 1   Services At/After Discharge   Transition of Care Consult (CM Consult) SNF  (no CM consult received)   Services At/After Discharge PT;OT;Nursing services;Skilled Nursing Facility (SNF); Transport; In ambulance   [address] Provided? No   Mode of Transport at Discharge BLS   Confirm Follow Up Transport Friends   Condition of Participation: Discharge Planning   The Plan for Transition of Care is related to the following treatment goals: STR to improve pt's strength and functional abilities for a safe transition to home   The Patient and/or Patient Representative was provided with a Choice of Provider? Patient   The Patient and/Or Patient Representative agree with the Discharge Plan? Yes   Freedom of Choice list was provided with basic dialogue that supports the patient's individualized plan of care/goals, treatment preferences, and shares the quality data associated with the providers?   Yes

## 2022-09-20 PROBLEM — I50.32 DIASTOLIC CHF, CHRONIC (HCC): Chronic | Status: ACTIVE | Noted: 2021-06-13

## 2022-09-20 PROBLEM — N18.30 STAGE 3 CHRONIC KIDNEY DISEASE (HCC): Chronic | Status: ACTIVE | Noted: 2021-05-20

## 2022-09-20 PROBLEM — I48.0 PAROXYSMAL ATRIAL FIBRILLATION (HCC): Chronic | Status: ACTIVE | Noted: 2021-03-19

## 2022-09-20 PROBLEM — D62 POSTOPERATIVE ANEMIA DUE TO ACUTE BLOOD LOSS: Status: ACTIVE | Noted: 2022-09-20

## 2022-09-20 PROBLEM — I50.32 DIASTOLIC CHF, CHRONIC (HCC): Status: ACTIVE | Noted: 2021-06-13

## 2022-09-20 PROBLEM — N17.9 AKI (ACUTE KIDNEY INJURY) (HCC): Status: RESOLVED | Noted: 2022-09-17 | Resolved: 2022-09-20

## 2022-09-20 PROBLEM — K21.9 GERD (GASTROESOPHAGEAL REFLUX DISEASE): Chronic | Status: ACTIVE | Noted: 2022-09-17

## 2022-09-20 LAB
ANION GAP SERPL CALC-SCNC: 2 MMOL/L (ref 4–13)
BASOPHILS # BLD: 0.1 K/UL (ref 0–0.2)
BASOPHILS NFR BLD: 1 % (ref 0–2)
BUN SERPL-MCNC: 23 MG/DL (ref 8–23)
CALCIUM SERPL-MCNC: 8.2 MG/DL (ref 8.3–10.4)
CHLORIDE SERPL-SCNC: 106 MMOL/L (ref 101–110)
CO2 SERPL-SCNC: 29 MMOL/L (ref 21–32)
CREAT SERPL-MCNC: 1.1 MG/DL (ref 0.6–1)
DIFFERENTIAL METHOD BLD: ABNORMAL
EOSINOPHIL # BLD: 0.1 K/UL (ref 0–0.8)
EOSINOPHIL NFR BLD: 1 % (ref 0.5–7.8)
ERYTHROCYTE [DISTWIDTH] IN BLOOD BY AUTOMATED COUNT: 17.2 % (ref 11.9–14.6)
GLUCOSE SERPL-MCNC: 108 MG/DL (ref 65–100)
HCT VFR BLD AUTO: 36.1 % (ref 35.8–46.3)
HGB BLD-MCNC: 11.3 G/DL (ref 11.7–15.4)
IMM GRANULOCYTES # BLD AUTO: 0.1 K/UL (ref 0–0.5)
IMM GRANULOCYTES NFR BLD AUTO: 0 % (ref 0–5)
LYMPHOCYTES # BLD: 1.8 K/UL (ref 0.5–4.6)
LYMPHOCYTES NFR BLD: 14 % (ref 13–44)
MCH RBC QN AUTO: 28.3 PG (ref 26.1–32.9)
MCHC RBC AUTO-ENTMCNC: 31.3 G/DL (ref 31.4–35)
MCV RBC AUTO: 90.5 FL (ref 79.6–97.8)
MM INDURATION, POC: 0 MM (ref 0–5)
MONOCYTES # BLD: 1.4 K/UL (ref 0.1–1.3)
MONOCYTES NFR BLD: 11 % (ref 4–12)
NEUTS SEG # BLD: 9.3 K/UL (ref 1.7–8.2)
NEUTS SEG NFR BLD: 73 % (ref 43–78)
NRBC # BLD: 0 K/UL (ref 0–0.2)
PLATELET # BLD AUTO: 198 K/UL (ref 150–450)
PMV BLD AUTO: 12.1 FL (ref 9.4–12.3)
POTASSIUM SERPL-SCNC: 4.3 MMOL/L (ref 3.5–5.1)
PPD, POC: NEGATIVE
RBC # BLD AUTO: 3.99 M/UL (ref 4.05–5.2)
SARS-COV-2: NORMAL
SODIUM SERPL-SCNC: 137 MMOL/L (ref 136–145)
WBC # BLD AUTO: 12.8 K/UL (ref 4.3–11.1)

## 2022-09-20 PROCEDURE — 6370000000 HC RX 637 (ALT 250 FOR IP): Performed by: HOSPITALIST

## 2022-09-20 PROCEDURE — 6370000000 HC RX 637 (ALT 250 FOR IP): Performed by: INTERNAL MEDICINE

## 2022-09-20 PROCEDURE — 2580000003 HC RX 258: Performed by: ORTHOPAEDIC SURGERY

## 2022-09-20 PROCEDURE — 51798 US URINE CAPACITY MEASURE: CPT

## 2022-09-20 PROCEDURE — 36415 COLL VENOUS BLD VENIPUNCTURE: CPT

## 2022-09-20 PROCEDURE — 97112 NEUROMUSCULAR REEDUCATION: CPT

## 2022-09-20 PROCEDURE — 97530 THERAPEUTIC ACTIVITIES: CPT

## 2022-09-20 PROCEDURE — 2580000003 HC RX 258: Performed by: HOSPITALIST

## 2022-09-20 PROCEDURE — 6370000000 HC RX 637 (ALT 250 FOR IP): Performed by: STUDENT IN AN ORGANIZED HEALTH CARE EDUCATION/TRAINING PROGRAM

## 2022-09-20 PROCEDURE — 85025 COMPLETE CBC W/AUTO DIFF WBC: CPT

## 2022-09-20 PROCEDURE — 97116 GAIT TRAINING THERAPY: CPT

## 2022-09-20 PROCEDURE — 51701 INSERT BLADDER CATHETER: CPT

## 2022-09-20 PROCEDURE — 97110 THERAPEUTIC EXERCISES: CPT

## 2022-09-20 PROCEDURE — 1100000000 HC RM PRIVATE

## 2022-09-20 PROCEDURE — 80048 BASIC METABOLIC PNL TOTAL CA: CPT

## 2022-09-20 PROCEDURE — 2580000003 HC RX 258: Performed by: STUDENT IN AN ORGANIZED HEALTH CARE EDUCATION/TRAINING PROGRAM

## 2022-09-20 PROCEDURE — U0005 INFEC AGEN DETEC AMPLI PROBE: HCPCS

## 2022-09-20 RX ORDER — TAMSULOSIN HYDROCHLORIDE 0.4 MG/1
0.4 CAPSULE ORAL DAILY
Status: DISCONTINUED | OUTPATIENT
Start: 2022-09-20 | End: 2022-09-21 | Stop reason: HOSPADM

## 2022-09-20 RX ADMIN — AMLODIPINE BESYLATE 5 MG: 5 TABLET ORAL at 08:56

## 2022-09-20 RX ADMIN — SODIUM CHLORIDE, PRESERVATIVE FREE 5 ML: 5 INJECTION INTRAVENOUS at 20:46

## 2022-09-20 RX ADMIN — FOLIC ACID 1 MG: 1 TABLET ORAL at 08:57

## 2022-09-20 RX ADMIN — OXYCODONE AND ACETAMINOPHEN 1 TABLET: 5; 325 TABLET ORAL at 05:38

## 2022-09-20 RX ADMIN — TAMSULOSIN HYDROCHLORIDE 0.4 MG: 0.4 CAPSULE ORAL at 08:56

## 2022-09-20 RX ADMIN — OXYCODONE AND ACETAMINOPHEN 1 TABLET: 5; 325 TABLET ORAL at 08:56

## 2022-09-20 RX ADMIN — DULOXETINE HYDROCHLORIDE 60 MG: 30 CAPSULE, DELAYED RELEASE ORAL at 08:56

## 2022-09-20 RX ADMIN — FUROSEMIDE 20 MG: 20 TABLET ORAL at 08:57

## 2022-09-20 RX ADMIN — ROSUVASTATIN CALCIUM 10 MG: 5 TABLET, FILM COATED ORAL at 20:45

## 2022-09-20 RX ADMIN — PANTOPRAZOLE SODIUM 40 MG: 40 TABLET, DELAYED RELEASE ORAL at 05:39

## 2022-09-20 RX ADMIN — PANTOPRAZOLE SODIUM 40 MG: 40 TABLET, DELAYED RELEASE ORAL at 17:10

## 2022-09-20 RX ADMIN — AMIODARONE HYDROCHLORIDE 200 MG: 200 TABLET ORAL at 08:56

## 2022-09-20 RX ADMIN — SODIUM CHLORIDE, POTASSIUM CHLORIDE, SODIUM LACTATE AND CALCIUM CHLORIDE: 600; 310; 30; 20 INJECTION, SOLUTION INTRAVENOUS at 04:56

## 2022-09-20 RX ADMIN — LEVOTHYROXINE SODIUM 25 MCG: 0.05 TABLET ORAL at 05:39

## 2022-09-20 RX ADMIN — METOPROLOL SUCCINATE 25 MG: 25 TABLET, EXTENDED RELEASE ORAL at 08:56

## 2022-09-20 RX ADMIN — ASPIRIN 325 MG: 325 TABLET, COATED ORAL at 20:45

## 2022-09-20 RX ADMIN — OXYCODONE AND ACETAMINOPHEN 1 TABLET: 5; 325 TABLET ORAL at 17:10

## 2022-09-20 ASSESSMENT — PAIN DESCRIPTION - FREQUENCY: FREQUENCY: INTERMITTENT

## 2022-09-20 ASSESSMENT — PAIN DESCRIPTION - PAIN TYPE: TYPE: SURGICAL PAIN

## 2022-09-20 ASSESSMENT — PAIN DESCRIPTION - ONSET: ONSET: GRADUAL

## 2022-09-20 ASSESSMENT — PAIN SCALES - GENERAL
PAINLEVEL_OUTOF10: 0
PAINLEVEL_OUTOF10: 4
PAINLEVEL_OUTOF10: 0
PAINLEVEL_OUTOF10: 4
PAINLEVEL_OUTOF10: 0
PAINLEVEL_OUTOF10: 5

## 2022-09-20 ASSESSMENT — PAIN DESCRIPTION - LOCATION
LOCATION: HIP
LOCATION: GENERALIZED

## 2022-09-20 ASSESSMENT — PAIN DESCRIPTION - ORIENTATION: ORIENTATION: RIGHT

## 2022-09-20 ASSESSMENT — PAIN DESCRIPTION - DESCRIPTORS: DESCRIPTORS: ACHING

## 2022-09-20 ASSESSMENT — PAIN - FUNCTIONAL ASSESSMENT: PAIN_FUNCTIONAL_ASSESSMENT: PREVENTS OR INTERFERES SOME ACTIVE ACTIVITIES AND ADLS

## 2022-09-20 NOTE — PROGRESS NOTES
PHYSICAL THERAPY: Daily Note PM   (Link to Caseload Tracking: PT Visit Days : 2  Time In/Out PT Charge Capture  Rehab Caseload Tracker  Orders    Kvng Jacob is a 80 y.o. female   PRIMARY DIAGNOSIS: Closed fracture of neck of right femur with delayed healing  Closed fracture of neck of right femur with delayed healing [S72.001G]  Closed [facility] of neck of right femur[facility] [S72.001A]  Procedure(s) (LRB):  HIP HEMIARTHROPLASTY RIGHT (Right)  2 Days Post-Op  Inpatient: Payor: Dayan Stanton / Plan: Melany Vicente / Product Type: *No Product type* /     ASSESSMENT:     REHAB RECOMMENDATIONS:   Recommendation to date pending progress:  Setting:  Short-term Rehab (patient wants HHPT, but lives alone)    Equipment:    To Be Determined     ASSESSMENT:  Ms. Gretchen Gonzalez is making slow progress toward goals. Today she is motivated to ambulate and participated well. Bed mobility required mod Ax2, but transfers and ambulation were performed with min Ax2. Ambulation is slow and presents with antalgic gait pattern. Cueing provided for posture and walker use. PM:  The patient continues to make good progress toward goals with increased gait distances. Gait remains very antalgic and additional cueing needed for posture and walker management. She participated well with a few seated exercises and reports that this relieved some of the stiffness in her leg. KI doffed for treatment and replaced afterwards.       SUBJECTIVE:   Ms. Gretchen Gonzalez states, \"I can go once more\"     Social/Functional Lives With: Alone  Type of Home: House  Home Equipment: Wadell Jayne, rolling  Has the patient had two or more falls in the past year or any fall with injury in the past year?: No  Receives Help From: Personal care attendant (Yarsanism friend, Dahiana Savage)  ADL Assistance: Independent  Ambulation Assistance: Independent (occaisonal use of cane)  Transfer Assistance: Independent  Active : No  Occupation: Retired  OBJECTIVE:     PAIN: VITALS / O2: PRECAUTION / Lita Brand / [place]:   Pre Treatment:    0      Post Treatment: 0 Vitals        Oxygen    Purewick    RESTRICTIONS/PRECAUTIONS:  Restrictions/Precautions  Required Braces or Orthoses?: Yes  Required Braces or Orthoses?: Yes  Required Braces or Orthoses  Right Lower Extremity Brace: Knee Immobilizer     MOBILITY: I Mod I S SBA CGA Min Mod Max Total  NT x2 Comments:   Bed Mobility    Rolling [] [] [] [] [] [] [] [] [] [] []    Supine to Sit [] [] [] [] [] [] [] [] [] [] []    Scooting [] [] [] [] [] [] [] [] [] [] []    Sit to Supine [] [] [] [] [] [x] [] [] [] [] []    Transfers    Sit to Stand [] [] [] [] [x] [] [] [] [] [] []    Bed to Chair [] [] [] [] [] [] [] [] [] [] []    Stand to Sit [] [] [] [] [x] [x] [] [] [] [] []     [] [] [] [] [] [] [] [] [] [] []    I=Independent, Mod I=Modified Independent, S=Supervision, SBA=Standby Assistance, CGA=Contact Guard Assistance,   Min=Minimal Assistance, Mod=Moderate Assistance, Max=Maximal Assistance, Total=Total Assistance, NT=Not Tested    BALANCE: Good Fair+ Fair Fair- Poor NT Comments   Sitting Static [] [x] [] [] [] []    Sitting Dynamic [] [] [x] [] [] []              Standing Static [] [] [x] [] [] []    Standing Dynamic [] [] [] [x] [] []      GAIT: I Mod I S SBA CGA Min Mod Max Total  NT x2 Comments:   Level of Assistance [] [] [] [] [x] [] [] [] [] [] []    Distance 2x15 feet    DME Rolling Walker    Gait Quality Antalgic, Decreased chuy , Decreased step clearance, Decreased step length, and Trunk sway increased    Weightbearing Status      Stairs      I=Independent, Mod I=Modified Independent, S=Supervision, SBA=Standby Assistance, CGA=Contact Guard Assistance,   Min=Minimal Assistance, Mod=Moderate Assistance, Max=Maximal Assistance, Total=Total Assistance, NT=Not Tested    PLAN:   ACUTE PHYSICAL THERAPY GOALS:   (Developed with and agreed upon by patient and/or caregiver.)    (1.) Roberto Billings  will move from supine to sit and sit to supine  with STAND BY ASSIST within 7 treatment day(s). (2.) Anaya Mcgill will transfer from bed to chair and chair to bed with STAND BY ASSIST using the least restrictive device within 7 treatment day(s). (3.) Anaya Mcgill will ambulate with STAND BY ASSIST for 150 feet with the least restrictive device within 7 treatment day(s). (4.) Anaya Mcgill will perform standing static and dynamic balance activities x 20 minutes with STAND BY ASSIST to improve safety within 7 treatment day(s). (5.) Anaya Mcgill will perform bilateral lower extremity exercises x 20 min for HEP with INDEPENDENCE to improve strength, endurance, and functional mobility within 7 treatment day(s). FREQUENCY AND DURATION: BID for duration of hospital stay or until stated goals are met, whichever comes first.    TREATMENT:   TREATMENT:   Therapeutic Activity (24 Minutes): Therapeutic activity included Sit to Supine, Scooting, Transfer Training, Ambulation on level ground, Sitting balance , and Standing balance to improve functional Activity tolerance, Balance, Coordination, Mobility, and Strength.     TREATMENT GRID:  N/A    AFTER TREATMENT PRECAUTIONS: Bed, Bed/Chair Locked, Call light within reach, Needs within reach, and RN notified    INTERDISCIPLINARY COLLABORATION:  RN/ PCT and PT/ JUNE    EDUCATION:      TIME IN/OUT:  Time In: 1429  Time Out: 1420 Igor Brown  Minutes: [address] [place], PTA

## 2022-09-20 NOTE — PROGRESS NOTES
PHYSICAL THERAPY: Daily Note AM   (Link to Caseload Tracking: PT Visit Days : 2  Time In/Out PT Charge Capture  Rehab Caseload Tracker  Orders    Ally Anaya is a 80 y.o. female   PRIMARY DIAGNOSIS: Closed fracture of neck of right femur with delayed healing  Closed fracture of neck of right femur with delayed healing [S72.001G]  Closed fracture of neck of right femur, initial encounter ([facility] [S72.001A]  Procedure(s) (LRB):  HIP HEMIARTHROPLASTY RIGHT (Right)  2 Days Post-Op  Inpatient: Payor: Saurabh Dickerson / Plan: Yair Garcia / Product Type: *No Product type* /     ASSESSMENT:     REHAB RECOMMENDATIONS:   Recommendation to date pending progress:  Setting:  Short-term Rehab    Equipment:    To Be Determined     ASSESSMENT:  Ms. Perlita Godoy is making slow progress toward goals. Today she is motivated to ambulate and participated well. Bed mobility required mod Ax2, but transfers and ambulation were performed with min Ax2. Ambulation is slow and presents with antalgic gait pattern. Cueing provided for posture and walker use.       SUBJECTIVE:   Ms. Perlita Godoy states, \"I'll walk today\"     Social/Functional Lives With: Alone  Type of Home: House  Home Equipment: mukund Ambriz  Has the patient had two or more falls in the past year or any fall with injury in the past year?: No  Receives Help From: Personal care attendant ([employer] friend, Coco Rachel)  ADL Assistance: Independent  Ambulation Assistance: Independent (occaisonal use of cane)  Transfer Assistance: Independent  Active : No  Occupation: Retired  OBJECTIVE:     PAIN: VITALS / O2: PRECAUTION / [place] S[place] / [address]:   Pre Treatment:    0      Post Treatment: 0 Vitals        Oxygen    Purewick    RESTRICTIONS/PRECAUTIONS:  Restrictions/Precautions  Required Braces or Orthoses?: Yes  Required Braces or Orthoses?: Yes  Required Braces or Orthoses  Right Lower Extremity Brace: Knee Immobilizer     MOBILITY: I Mod I S SBA CGA Min Mod Max Total  NT x2 Comments:   Bed Mobility    Rolling [] [] [] [] [] [] [] [] [] [] []    Supine to Sit [] [] [] [] [] [] [x] [] [] [] []    Scooting [] [] [] [] [] [] [x] [] [] [] [x]    Sit to Supine [] [] [] [] [] [] [] [] [] [] []    Transfers    Sit to Stand [] [] [] [] [] [x] [] [] [] [] [x]    Bed to Chair [] [] [] [] [] [x] [] [] [] [] [x]    Stand to Sit [] [] [] [] [] [x] [] [] [] [] [x]     [] [] [] [] [] [] [] [] [] [] []    I=Independent, Mod I=Modified Independent, S=Supervision, SBA=Standby Assistance, CGA=Contact Guard Assistance,   Min=Minimal Assistance, Mod=Moderate Assistance, Max=Maximal Assistance, Total=Total Assistance, NT=Not Tested    BALANCE: Good Fair+ Fair Fair- Poor NT Comments   Sitting Static [] [x] [] [] [] []    Sitting Dynamic [] [] [x] [] [] []              Standing Static [] [] [] [x] [] []    Standing Dynamic [] [] [] [x] [] []      GAIT: I Mod I S SBA CGA Min Mod Max Total  NT x2 Comments:   Level of Assistance [] [] [] [] [x] [] [] [] [] [] [x]    Distance 2x15 feet    DME Rolling Walker    Gait Quality Antalgic, Decreased chuy , Decreased step clearance, Decreased step length, and Trunk sway increased    Weightbearing Status      Stairs      I=Independent, Mod I=Modified Independent, S=Supervision, SBA=Standby Assistance, CGA=Contact Guard Assistance,   Min=Minimal Assistance, Mod=Moderate Assistance, Max=Maximal Assistance, Total=Total Assistance, NT=Not Tested    PLAN:   ACUTE PHYSICAL THERAPY GOALS:   (Developed with and agreed upon by patient and/or caregiver.)    (1.) Lucien Long  will move from supine to sit and sit to supine  with STAND BY ASSIST within 7 treatment day(s). (2.) Lucien Long will transfer from bed to chair and chair to bed with STAND BY ASSIST using the least restrictive device within 7 treatment day(s). (3.) Lucien Long will ambulate with STAND BY ASSIST for 150 feet with the least restrictive device within 7 treatment day(s).    (4.) Lucien Long will perform standing static and dynamic balance activities x 20 minutes with STAND BY ASSIST to improve safety within 7 treatment day(s). (5.) Vlad López will perform bilateral lower extremity exercises x 20 min for HEP with INDEPENDENCE to improve strength, endurance, and functional mobility within 7 treatment day(s). FREQUENCY AND DURATION: BID for duration of hospital stay or until stated goals are met, whichever comes first.    TREATMENT:   TREATMENT:   Co-Treatment PT/OT necessary due to patient's decreased overall endurance/tolerance levels, as well as need for high level skilled assistance to complete functional transfers/mobility and functional tasks  Therapeutic Activity (30 Minutes): Therapeutic activity included Supine to Sit, Scooting, Transfer Training, Ambulation on level ground, Sitting balance , and Standing balance to improve functional Activity tolerance, Balance, Coordination, Mobility, and Strength.     TREATMENT GRID:  N/A    AFTER TREATMENT PRECAUTIONS: Bed/Chair Locked, Call light within reach, Chair, Needs within reach, and RN notified    INTERDISCIPLINARY COLLABORATION:  RN/ PCT, PT/ PTA, and OT/ SHELBY    EDUCATION:      TIME IN/OUT:  Time In: 1015  Time Out: 9[facility]  Minutes: [address] [facility], [facility]

## 2022-09-20 NOTE — PROGRESS NOTES
Hospitalist Progress Note   Admit Date:  2022  7:42 PM   Name:  Ally Anaya   Age:  80 y.o. Sex:  female  :  1940   MRN:  [id number]   Room:  /    Presenting Complaint: Fall     Reason(s) for Admission: Closed fracture of neck of right femur with delayed healing [S72.001G]  Closed fracture of neck of right femur, initial encounter [facility]) [facility] Course: This is a 70-year-old female with past medical history significant for hypothyroidism, tension, dyslipidemia, GERD, paroxysmal atrial fibrillation on anticoagulation secondary to anemia presented to ER after fall from ladder. She was found to have right femur neck fracture. Orthopedics was consulted. Patient underwent right hemiarthroplasty . Had some ABLA postop but no complications      Subjective & 24hr Events (22):   Pt c/o urinary retention. Has not been out of bed to commode or toilet yet. Only retains in bed so far. No fevers, CP, SOB, or other complaints      Assessment & Plan:       Traumatic acute right femoral neck fracture:   Await rehab placement    Urinary Retention  22 -  Start flomax. ABLA postop  22 - hb slightly lower. recheck CBC in AM      Acute kidney injury/CKD stage III  22 - back to baseline today. Stop IVF. Hx dCHF. recheck BMP in AM    Paroxysmal atrial fibrillation  Continue home medications albuterol  not on anticoagulation due to bleed and anemia. Hemoglobin in January was 9. Hypothyroidism  Continue levothyroxine    Hypertension  Continue antihypertensive medications. dCHF  cont lasix      Anticipated discharge needs:    PT/OT, PPD. Needs short-term rehab on discharge. Case management working on placement. Diet:  ADULT DIET;  Regular  DVT PPx: SCDs  Code status: Full Code    Hospital Problems:  Principal Problem:    Closed fracture of neck of right femur with delayed healing  Active Problems:    GERD (gastroesophageal reflux disease)    Postoperative anemia due to acute blood loss    Paroxysmal atrial fibrillation (HCC)    Acquired hypothyroidism    Stage 3 chronic kidney disease (HCC)    Diastolic CHF, chronic (HCC)    Hypertension, essential, benign    Hyperlipidemia  Resolved Problems:    GENNARO (acute kidney injury) ([facility]      Objective:   Patient Vitals for the past 24 hrs:   Temp Pulse Resp BP SpO2   09/20/22 0730 98.6 °F (37 °C) 69 22 (!) 137/51 98 %   09/20/22 0359 99.3 °F (37.4 °C) 72 16 (!) 157/77 96 %   09/19/22 2301 98.8 °F (37.1 °C) 69 18 (!) 153/67 97 %   09/19/22 1933 99 °F (37.2 °C) 68 20 (!) 125/50 97 %   09/19/22 1557 99.1 °F (37.3 °C) 77 17 (!) 148/59 (!) 88 %   09/19/22 1437 -- -- 18 -- --   09/19/22 1147 -- -- -- -- 91 %   09/19/22 1055 98.6 °F (37 °C) 78 18 135/82 100 %       Oxygen Therapy  SpO2: 98 %  Pulse via Oximetry: 65 beats per minute  Pulse Oximeter Device Mode: Other (Comment)  Pulse Oximeter Device Location: Left, Hand  O2 Device: None (Room air)  O2 Flow Rate (L/min): 2 L/min    Estimated body mass index is 25.97 kg/m² as calculated from the following:    Height as of this encounter: 5' (1.524 m). Weight as of this encounter: 133 lb (60.3 kg). Intake/Output Summary (Last 24 hours) at 9/20/2022 0818  Last data filed at 9/20/2022 0534  Gross per 24 hour   Intake 590 ml   Output 1950 ml   Net -1360 ml         Physical Exam:     Blood pressure (!) 137/51, pulse 69, temperature 98.6 °F (37 °C), temperature source Oral, resp. rate 22, height 5' (1.524 m), weight 133 lb (60.3 kg), SpO2 98 %. General:    Alert awake female, well nourished. Head:  Normocephalic, atraumatic  Eyes:  Sclerae appear normal.  Pupils equally round. ENT:  Nares appear normal, no drainage. Moist oral mucosa  Neck:  No restricted ROM. Trachea midline   CV:   RRR. No jugular venous distension. Lungs:   CTAB. Symmetric expansion. Abdomen:   nondistended. Extremities: No cyanosis or clubbing.   No edema, postop dressing in place in the right hip,  Skin:     No rashes and normal coloration. Warm and dry. Neuro:  CN II-XII grossly intact. Sensation intact. A&Ox3  Psych:  Normal mood and affect.       I have personally reviewed labs and tests showing:  Recent Labs:  Recent Results (from the past 48 hour(s))   CBC with Auto Differential    Collection Time: 09/19/22  3:19 AM   Result Value Ref Range    WBC 16.0 (H) 4.3 - 11.1 K/uL    RBC 4.27 4.05 - 5.2 M/uL    Hemoglobin 12.1 11.7 - 15.4 g/dL    Hematocrit 39.3 35.8 - 46.3 %    MCV 92.0 79.6 - 97.8 FL    MCH 28.3 26.1 - 32.9 PG    MCHC 30.8 (L) 31.4 - 35.0 g/dL    RDW 17.2 (H) 11.9 - 14.6 %    Platelets 056 902 - 318 K/uL    MPV 12.2 9.4 - 12.3 FL    nRBC 0.00 0.0 - 0.2 K/uL    Differential Type AUTOMATED      Seg Neutrophils 81 (H) 43 - 78 %    Lymphocytes 8 (L) 13 - 44 %    Monocytes 11 4.0 - 12.0 %    Eosinophils % 0 (L) 0.5 - 7.8 %    Basophils 0 0.0 - 2.0 %    Immature Granulocytes 1 0.0 - 5.0 %    Segs Absolute 12.9 (H) 1.7 - 8.2 K/UL    Absolute Lymph # 1.2 0.5 - 4.6 K/UL    Absolute Mono # 1.8 (H) 0.1 - 1.3 K/UL    Absolute Eos # 0.0 0.0 - 0.8 K/UL    Basophils Absolute 0.0 0.0 - 0.2 K/UL    Absolute Immature Granulocyte 0.1 0.0 - 0.5 K/UL   Basic Metabolic Panel w/ Reflex to MG    Collection Time: 09/19/22  3:19 AM   Result Value Ref Range    Sodium 139 136 - 145 mmol/L    Potassium 4.4 3.5 - 5.1 mmol/L    Chloride 107 101 - 110 mmol/L    CO2 28 21 - 32 mmol/L    Anion Gap 4 4 - 13 mmol/L    Glucose 146 (H) 65 - 100 mg/dL    BUN 26 (H) 8 - 23 MG/DL    Creatinine 1.60 (H) 0.6 - 1.0 MG/DL    GFR African American 40 (L) >60 ml/min/1.73m2    GFR Non- 33 (L) >60 ml/min/1.73m2    Calcium 8.2 (L) 8.3 - 10.4 MG/DL   CBC with Auto Differential    Collection Time: 09/20/22  3:42 AM   Result Value Ref Range    WBC 12.8 (H) 4.3 - 11.1 K/uL    RBC 3.99 (L) 4.05 - 5.2 M/uL    Hemoglobin 11.3 (L) 11.7 - 15.4 g/dL    Hematocrit 36.1 35.8 - 46.3 %    MCV 90.5 79.6 - 97.8 FL MCH 28.3 26.1 - 32.9 PG    MCHC 31.3 (L) 31.4 - 35.0 g/dL    RDW 17.2 (H) 11.9 - 14.6 %    Platelets 140 884 - 575 K/uL    MPV 12.1 9.4 - 12.3 FL    nRBC 0.00 0.0 - 0.2 K/uL    Differential Type AUTOMATED      Seg Neutrophils 73 43 - 78 %    Lymphocytes 14 13 - 44 %    Monocytes 11 4.0 - 12.0 %    Eosinophils % 1 0.5 - 7.8 %    Basophils 1 0.0 - 2.0 %    Immature Granulocytes 0 0.0 - 5.0 %    Segs Absolute 9.3 (H) 1.7 - 8.2 K/UL    Absolute Lymph # 1.8 0.5 - 4.6 K/UL    Absolute Mono # 1.4 (H) 0.1 - 1.3 K/UL    Absolute Eos # 0.1 0.0 - 0.8 K/UL    Basophils Absolute 0.1 0.0 - 0.2 K/UL    Absolute Immature Granulocyte 0.1 0.0 - 0.5 K/UL   Basic Metabolic Panel w/ Reflex to MG    Collection Time: 09/20/22  3:42 AM   Result Value Ref Range    Sodium 137 136 - 145 mmol/L    Potassium 4.3 3.5 - 5.1 mmol/L    Chloride 106 101 - 110 mmol/L    CO2 29 21 - 32 mmol/L    Anion Gap 2 (L) 4 - 13 mmol/L    Glucose 108 (H) 65 - 100 mg/dL    BUN 23 8 - 23 MG/DL    Creatinine 1.10 (H) 0.6 - 1.0 MG/DL    GFR African American >60 >60 ml/min/1.73m2    GFR Non- 51 (L) >60 ml/min/1.73m2    Calcium 8.2 (L) 8.3 - 10.4 MG/DL       I have personally reviewed imaging studies showing: Other Studies:  XR PELVIS (1-2 VIEWS)   Final Result   Right hip arthroplasty. XR HIP RIGHT (2-3 VIEWS)   Final Result   Right femoral neck fracture. XR FEMUR RIGHT (MIN 2 VIEWS)   Final Result   As above. XR CHEST 1 VIEW   Final Result   No acute process.           Current Meds:  Current Facility-Administered Medications   Medication Dose Route Frequency    tamsulosin (FLOMAX) capsule 0.4 mg  0.4 mg Oral Daily    tuberculin injection 5 Units  5 Units IntraDERmal Once    oxyCODONE-acetaminophen (PERCOCET) 5-325 MG per tablet 1 tablet  1 tablet Oral Q4H PRN    furosemide (LASIX) tablet 20 mg  20 mg Oral Daily    pantoprazole (PROTONIX) tablet 40 mg  40 mg Oral BID AC    folic acid (FOLVITE) tablet 1 mg  1 mg Oral Daily levothyroxine (SYNTHROID) tablet 25 mcg  25 mcg Oral QAM AC    amiodarone (CORDARONE) tablet 200 mg  200 mg Oral Daily    metoprolol succinate (TOPROL XL) extended release tablet 25 mg  25 mg Oral Daily    rosuvastatin (CRESTOR) tablet 10 mg  10 mg Oral Nightly    DULoxetine (CYMBALTA) extended release capsule 60 mg  60 mg Oral Daily    sodium chloride flush 0.9 % injection 5-40 mL  5-40 mL IntraVENous 2 times per day    sodium chloride flush 0.9 % injection 5-40 mL  5-40 mL IntraVENous PRN    0.9 % sodium chloride infusion   IntraVENous PRN    ondansetron (ZOFRAN-ODT) disintegrating tablet 4 mg  4 mg Oral Q8H PRN    Or    ondansetron (ZOFRAN) injection 4 mg  4 mg IntraVENous Q6H PRN    polyethylene glycol (GLYCOLAX) packet 17 g  17 g Oral Daily PRN    acetaminophen (TYLENOL) tablet 650 mg  650 mg Oral Q6H PRN    Or    acetaminophen (TYLENOL) suppository 650 mg  650 mg Rectal Q6H PRN    morphine injection 2 mg  2 mg IntraVENous Q4H PRN    amLODIPine (NORVASC) tablet 5 mg  5 mg Oral Daily    hydrALAZINE (APRESOLINE) injection 10 mg  10 mg IntraVENous Q6H PRN    labetalol (NORMODYNE;TRANDATE) injection 10 mg  10 mg IntraVENous Q15 Min PRN    glucose chewable tablet 16 g  4 tablet Oral PRN    dextrose bolus 10% 125 mL  125 mL IntraVENous PRN    Or    dextrose bolus 10% 250 mL  250 mL IntraVENous PRN    glucagon (rDNA) injection 1 mg  1 mg SubCUTAneous PRN    dextrose 10 % infusion   IntraVENous Continuous PRN    sodium chloride flush 0.9 % injection 5-40 mL  5-40 mL IntraVENous 2 times per day    sodium chloride flush 0.9 % injection 5-40 mL  5-40 mL IntraVENous PRN    0.9 % sodium chloride infusion   IntraVENous PRN    aspirin EC tablet 325 mg  325 mg Oral Daily    levalbuterol (XOPENEX) nebulizer solution 1.25 mg  1.25 mg Nebulization Q4H PRN       Signed:  Florin Jordan MD    Part of this note may have been written by using a voice dictation software.   The note has been proof read but may still contain some grammatical/other typographical errors.

## 2022-09-20 NOTE — CARE COORDINATION
No beds available at [facility]. Pt has been accepted for admission to [facility]. Insurance approval received and pt can transfer to rehab tomorrow if she is medically cleared for [place]. COVID PCR test ordered today. CM following to facilitate pt's transfer to rehab at [place].

## 2022-09-20 NOTE — PROGRESS NOTES
[facility]        2022         Post Op day: 2 Days Post-Op Procedure(s) (LRB):  HIP HEMIARTHROPLASTY RIGHT (Right)      Admit Date: 2022  Admit Diagnosis: Closed fracture of neck of right femur with delayed healing [S72.001G]  Closed fracture of neck of right femur, initial encounter ([facility] [S72.001A]       Principle Problem: Closed fracture of neck of right femur with delayed healing. Subjective: Doing well, No complaints, No SOB, No Chest Pain, No Nausea or Vomiting     Objective:   Vital Signs are Stable, No Acute Distress, Alert,  Dressing is Dry,  Neurovascular exam is normal.     Assessment / Plan :  Patient Active Problem List   Diagnosis    Paroxysmal atrial fibrillation (HCC)    Leukocytosis    Nicotine dependence, cigarettes, uncomplicated    Depression    Anemia    Acquired hypothyroidism    Stage 3 chronic kidney disease (HCC)    Diastolic CHF, chronic (HCC)    Pericarditis with effusion    Osteoporosis    Hypokalemia    Renal cyst, left    Pleural effusion    TIA (transient ischemic attack)    Tobacco abuse    Major depressive disorder, recurrent, moderate ([facility])    Osteopenia    Hypertension, essential, benign    Sepsis ([facility]    GI bleed    Rheumatoid arthritis ([facility]    T12 compression fracture ([facility]    Community acquired bacterial pneumonia    Prolonged Q-T interval on ECG    Right carotid bruit    Rib fracture    Hyperlipidemia    Hypoalbuminemia due to protein-calorie malnutrition (HCC)    Hypoxia    Closed fracture of neck of right femur with delayed healing    GERD (gastroesophageal reflux disease)    Postoperative anemia due to acute blood loss    Patient Vitals for the past 8 hrs:   BP Temp Temp src Pulse Resp SpO2   22 0730 (!) 137/51 98.6 °F (37 °C) Oral 69 22 98 %   22 0359 (!) 157/77 99.3 °F (37.4 °C) Oral 72 16 96 %    Temp (24hrs), Av.9 °F (37.2 °C), Min:98.6 °F (37 °C), Max:99.3 °F (37.4 °C)    Body mass index is 25.97 kg/m².     Lab Results   Component Value Date/Time    HGB 11.3 09/20/2022 03:42 AM          S/P Procedure(s) (LRB):  HIP HEMIARTHROPLASTY RIGHT (Right)       Medical Mgmt per hospitalist  Anticoagulation plan: ASA 325mg daily  Continue PT  Fall Precautions  DC disp: rehab placement  F/U: 2 weeks postop for wound check and staple removal        Signed By: LAMAR Mir, PA  9/20/2022,  8:48 AM

## 2022-09-20 NOTE — PROGRESS NOTES
OCCUPATIONAL THERAPY: Daily Note AM   OT Visit Days: 2   Time  OT Charge Capture  Rehab Caseload Tracker  OT Orders    Roberto Broderick is a 80 y.o. female   PRIMARY DIAGNOSIS: Closed fracture of neck of right femur with delayed healing  Closed fracture of neck of right femur with delayed healing [S72.001G]  Closed fracture of neck of right femur, initial encounter ([facility] [S72.001A]  Procedure(s) (LRB):  HIP HEMIARTHROPLASTY RIGHT (Right)  2 Days Post-Op  Inpatient: Payor: [facility] MEDICARE / Plan: Willie Renée / Product Type: *No Product type* /     ASSESSMENT:     REHAB RECOMMENDATIONS: CURRENT LEVEL OF FUNCTION:  (Most Recently Demonstrated)   Recommendation to date pending progress:  Setting:  Inpatient Rehab Facility    Equipment:    To Be Determined Bathing:  Not Tested  Dressing:  Not Tested  Feeding/Grooming:  Not Tested  Toileting:  Not Tested  Functional Mobility:  Contact Guard Assist     ASSESSMENT:  Ms. Tomás Arteaga is doing well today. Pt required min/mod A x2 for bed mobility. Fair (+) sitting balance at EOB. Pt was able to stand and perform functional mobility around the room with CGA/min Ax2. One seated rest break given and patient performed more mobility around the room. Pt instructed in UE exercises to increase strength and activity tolerance. Pt tolerated exercises well. Left in chair with all needs in reach. Good session for patient. Making good progress with goals. Will continue to benefit from skilled OT during stay.        SUBJECTIVE:     Ms. Tomás Arteaga states, \"I don't need no rehab, I just need you two around to help me\"     Social/Functional Lives With: Alone  Type of Home: House  Home Equipment: mukund [place]  Has the patient had two or more falls in the past year or any fall with injury in the past year?: No  Receives Help From: Personal care attendant ([employer] friend, Mendez Heredia)  ADL Assistance: Independent  Ambulation Assistance: Independent (occaisonal use of cane)  Transfer Assistance: Independent  Active : No  Occupation: Retired    OBJECTIVE:     STEFANIA / Danisha Fernandez / Chandni Shaffer: KARLOS    RESTRICTIONS/PRECAUTIONS:  Restrictions/Precautions  Restrictions/Precautions: Weight Bearing  Required Braces or Orthoses?: Yes  Lower Extremity Weight Bearing Restrictions  Right Lower Extremity Weight Bearing: Weight Bearing As Tolerated  Required Braces or Orthoses  Right Lower Extremity Brace: Knee Immobilizer        PAIN: VITALS / O2:   Pre Treatment:              Post Treatment: 0 Vitals          Oxygen            MOBILITY: I Mod I S SBA CGA Min Mod Max Total  NT x2 Comments:   Bed Mobility    Rolling [] [] [] [] [] [] [] [] [] [x] []    Supine to Sit [] [] [] [] [] [x] [x] [] [] [] [x]    Scooting [] [] [] [] [x] [x] [] [] [] [] []    Sit to Supine [] [] [] [] [] [] [] [] [] [x] []    Transfers    Sit to Stand [] [] [] [] [x] [x] [] [] [] [] []    Bed to Chair [] [] [] [] [x] [] [] [] [] [] []    Stand to Sit [] [] [] [x] [x] [] [] [] [] [] []    Tub/Shower [] [] [] [] [] [] [] [] [] [x] []     Toilet [] [] [] [] [] [] [] [] [] [x] []      [] [] [] [] [] [] [] [] [] [] []    I=Independent, Mod I=Modified Independent, S=Supervision/Setup, SBA=Standby Assistance, CGA=Contact Guard Assistance, Min=Minimal Assistance, Mod=Moderate Assistance, Max=Maximal Assistance, Total=Total Assistance, NT=Not Tested    ACTIVITIES OF DAILY LIVING: I Mod I S SBA CGA Min Mod Max Total NT Comments   BASIC ADLs:              Upper Body   Bathing [] [] [] [] [] [] [] [] [] [x]    Lower Body Bathing [] [] [] [] [] [] [] [] [] [x]    Toileting [] [] [] [] [] [] [] [] [] [x]    Upper Body Dressing [] [] [] [] [] [] [] [] [] [x]    Lower Body Dressing [] [] [] [] [] [] [] [] [] [x]    Feeding [] [] [] [] [] [] [] [] [] [x]    Grooming [] [] [] [] [] [] [] [] [] [x]    Personal Device Care [] [] [] [] [] [] [] [] [] [x]    Functional Mobility [] [] [] [] [x] [x] [] [] [] [] x2   I=Independent, Mod I=Modified Independent, S=Supervision/Setup, SBA=Standby Assistance, CGA=Contact Guard Assistance, Min=Minimal Assistance, Mod=Moderate Assistance, Max=Maximal Assistance, Total=Total Assistance, NT=Not Tested    BALANCE: Good Fair+ Fair Fair- Poor NT Comments   Sitting Static [] [x] [] [] [] []    Sitting Dynamic [] [x] [] [] [] []              Standing Static [] [] [x] [] [] []    Standing Dynamic [] [] [x] [] [] []        PLAN:     FREQUENCY/DURATION   OT Plan of Care: 5 times/week for duration of hospital stay or until stated goals are met, whichever comes first.    ACUTE OCCUPATIONAL THERAPY GOALS:   (Developed with and agreed upon by patient and/or caregiver.)    1. Patient will verbalize and demonstrate understanding of hip precautions with 100% accuracy during ADL. 2. Patient will complete functional transfers with CGA and adaptive equipment as needed. 3. Patient will complete lower body bathing and dressing with minimal assistance and adaptive equipment as needed. 4. Patient will complete toileting with minimal assistance. 5. Patient will tolerate at least 20 minutes of BUE therapeutic exercises to increase strength in BUE to aid in functional transfers. 6. Patient will tolerate at least 30 minutes of OT treatment with no rest breaks to increase activity tolerance for ADLs. Timeframe: 7 visits        TREATMENT:   Therapeutic Exercise (10 Minutes): Therapeutic exercises noted below to improve functional activity tolerance, strength, and mobility. Neuromuscular Re-education (20 Minutes): Neuromuscular Re-education included Balance Training, Coordination training, Postural training, Sitting balance training, and Standing balance training to improve Balance, Coordination, Functional Mobility, and Postural Control.     TREATMENT GRID:   Date:  9/20/22 Date:   Date:     Activity/Exercise Parameters Parameters Parameters   Shoulder Abd/Adduction 10 reps     Elbow Flexion 10 reps     Punches 10 reps

## 2022-09-20 NOTE — PROGRESS NOTES
Pt in bed eating oxygen on 2 L via nc. Pt got up with therapy today and verbalized some redness to her feet and discomfort. Medication administered. Pt also states that her feet are not as red as they were prior to her coming into the hospital.  Covid PCR done as ordered.

## 2022-09-20 NOTE — PROGRESS NOTES
Per report from previous nurse this am, pt has been straight cath twice since chavarria was removed. Every time pt was straight cath there was more than 600ml of urine removed. MD may need to access and follow up . This nurse assessed pt and asked her have she had any previous issues with voiding prior to this admission. Pt states no that she has not had any trouble until this admission. Will continue to manage care.

## 2022-09-21 VITALS
DIASTOLIC BLOOD PRESSURE: 56 MMHG | RESPIRATION RATE: 18 BRPM | HEIGHT: 60 IN | WEIGHT: 133 LBS | HEART RATE: 75 BPM | TEMPERATURE: 98.8 F | SYSTOLIC BLOOD PRESSURE: 135 MMHG | BODY MASS INDEX: 26.11 KG/M2 | OXYGEN SATURATION: 100 %

## 2022-09-21 LAB
ANION GAP SERPL CALC-SCNC: 3 MMOL/L (ref 4–13)
BASOPHILS # BLD: 0 K/UL (ref 0–0.2)
BASOPHILS NFR BLD: 0 % (ref 0–2)
BUN SERPL-MCNC: 23 MG/DL (ref 8–23)
CALCIUM SERPL-MCNC: 8.3 MG/DL (ref 8.3–10.4)
CHLORIDE SERPL-SCNC: 105 MMOL/L (ref 101–110)
CO2 SERPL-SCNC: 28 MMOL/L (ref 21–32)
CREAT SERPL-MCNC: 1.2 MG/DL (ref 0.6–1)
DIFFERENTIAL METHOD BLD: ABNORMAL
EOSINOPHIL # BLD: 0.3 K/UL (ref 0–0.8)
EOSINOPHIL NFR BLD: 3 % (ref 0.5–7.8)
ERYTHROCYTE [DISTWIDTH] IN BLOOD BY AUTOMATED COUNT: 17.4 % (ref 11.9–14.6)
GLUCOSE SERPL-MCNC: 123 MG/DL (ref 65–100)
HCT VFR BLD AUTO: 34 % (ref 35.8–46.3)
HGB BLD-MCNC: 10.5 G/DL (ref 11.7–15.4)
IMM GRANULOCYTES # BLD AUTO: 0 K/UL (ref 0–0.5)
IMM GRANULOCYTES NFR BLD AUTO: 0 % (ref 0–5)
LYMPHOCYTES # BLD: 1.6 K/UL (ref 0.5–4.6)
LYMPHOCYTES NFR BLD: 16 % (ref 13–44)
MCH RBC QN AUTO: 27.9 PG (ref 26.1–32.9)
MCHC RBC AUTO-ENTMCNC: 30.9 G/DL (ref 31.4–35)
MCV RBC AUTO: 90.4 FL (ref 79.6–97.8)
MM INDURATION, POC: 0 MM (ref 0–5)
MONOCYTES # BLD: 1 K/UL (ref 0.1–1.3)
MONOCYTES NFR BLD: 10 % (ref 4–12)
NEUTS SEG # BLD: 7 K/UL (ref 1.7–8.2)
NEUTS SEG NFR BLD: 71 % (ref 43–78)
NRBC # BLD: 0 K/UL (ref 0–0.2)
PLATELET # BLD AUTO: 162 K/UL (ref 150–450)
PMV BLD AUTO: 12.1 FL (ref 9.4–12.3)
POTASSIUM SERPL-SCNC: 4.1 MMOL/L (ref 3.5–5.1)
PPD, POC: NEGATIVE
RBC # BLD AUTO: 3.76 M/UL (ref 4.05–5.2)
SARS-COV-2 RNA RESP QL NAA+PROBE: NOT DETECTED
SODIUM SERPL-SCNC: 136 MMOL/L (ref 136–145)
SOURCE: NORMAL
WBC # BLD AUTO: 10 K/UL (ref 4.3–11.1)

## 2022-09-21 PROCEDURE — 6370000000 HC RX 637 (ALT 250 FOR IP): Performed by: INTERNAL MEDICINE

## 2022-09-21 PROCEDURE — 80048 BASIC METABOLIC PNL TOTAL CA: CPT

## 2022-09-21 PROCEDURE — 2580000003 HC RX 258: Performed by: ORTHOPAEDIC SURGERY

## 2022-09-21 PROCEDURE — 6370000000 HC RX 637 (ALT 250 FOR IP): Performed by: STUDENT IN AN ORGANIZED HEALTH CARE EDUCATION/TRAINING PROGRAM

## 2022-09-21 PROCEDURE — 2580000003 HC RX 258: Performed by: STUDENT IN AN ORGANIZED HEALTH CARE EDUCATION/TRAINING PROGRAM

## 2022-09-21 PROCEDURE — 36415 COLL VENOUS BLD VENIPUNCTURE: CPT

## 2022-09-21 PROCEDURE — 97530 THERAPEUTIC ACTIVITIES: CPT

## 2022-09-21 PROCEDURE — 6370000000 HC RX 637 (ALT 250 FOR IP): Performed by: HOSPITALIST

## 2022-09-21 PROCEDURE — 85025 COMPLETE CBC W/AUTO DIFF WBC: CPT

## 2022-09-21 RX ORDER — METOPROLOL SUCCINATE 25 MG/1
25 TABLET, EXTENDED RELEASE ORAL DAILY
Qty: 30 TABLET | Refills: 3
Start: 2022-09-22

## 2022-09-21 RX ORDER — FUROSEMIDE 20 MG/1
20 TABLET ORAL DAILY
Qty: 60 TABLET | Refills: 3
Start: 2022-09-22

## 2022-09-21 RX ORDER — TAMSULOSIN HYDROCHLORIDE 0.4 MG/1
0.4 CAPSULE ORAL DAILY
Qty: 7 CAPSULE | Refills: 0
Start: 2022-09-22 | End: 2022-09-29

## 2022-09-21 RX ADMIN — SODIUM CHLORIDE, PRESERVATIVE FREE 10 ML: 5 INJECTION INTRAVENOUS at 11:02

## 2022-09-21 RX ADMIN — LEVOTHYROXINE SODIUM 25 MCG: 0.05 TABLET ORAL at 05:34

## 2022-09-21 RX ADMIN — FUROSEMIDE 20 MG: 20 TABLET ORAL at 08:17

## 2022-09-21 RX ADMIN — AMLODIPINE BESYLATE 5 MG: 5 TABLET ORAL at 08:17

## 2022-09-21 RX ADMIN — FOLIC ACID 1 MG: 1 TABLET ORAL at 08:17

## 2022-09-21 RX ADMIN — DULOXETINE HYDROCHLORIDE 60 MG: 30 CAPSULE, DELAYED RELEASE ORAL at 08:17

## 2022-09-21 RX ADMIN — METOPROLOL SUCCINATE 25 MG: 25 TABLET, EXTENDED RELEASE ORAL at 08:17

## 2022-09-21 RX ADMIN — SODIUM CHLORIDE, PRESERVATIVE FREE 10 ML: 5 INJECTION INTRAVENOUS at 11:01

## 2022-09-21 RX ADMIN — TAMSULOSIN HYDROCHLORIDE 0.4 MG: 0.4 CAPSULE ORAL at 08:17

## 2022-09-21 RX ADMIN — PANTOPRAZOLE SODIUM 40 MG: 40 TABLET, DELAYED RELEASE ORAL at 05:34

## 2022-09-21 RX ADMIN — OXYCODONE AND ACETAMINOPHEN 1 TABLET: 5; 325 TABLET ORAL at 13:56

## 2022-09-21 RX ADMIN — AMIODARONE HYDROCHLORIDE 200 MG: 200 TABLET ORAL at 08:17

## 2022-09-21 ASSESSMENT — PAIN - FUNCTIONAL ASSESSMENT: PAIN_FUNCTIONAL_ASSESSMENT: NONE - DENIES PAIN

## 2022-09-21 ASSESSMENT — PAIN DESCRIPTION - DESCRIPTORS: DESCRIPTORS: ACHING

## 2022-09-21 ASSESSMENT — PAIN DESCRIPTION - ORIENTATION: ORIENTATION: LEFT

## 2022-09-21 ASSESSMENT — PAIN DESCRIPTION - LOCATION: LOCATION: HIP

## 2022-09-21 ASSESSMENT — PAIN SCALES - GENERAL: PAINLEVEL_OUTOF10: 6

## 2022-09-21 NOTE — PROGRESS NOTES
TRANSFER - OUT REPORT:    Verbal report given to Rosita MCDERMOTT on Leana Beckman  being transferred to [facility] for routine progression of patient care       Report consisted of patient's Situation, Background, Assessment and   Recommendations(SBAR). Information from the following report(s) Nurse Handoff Report was reviewed with the receiving nurse. Lines:   Peripheral IV 09/19/22 Distal;Left; Anterior Forearm (Active)   Site Assessment Clean, dry & intact 09/21/22 1053   Line Status Capped 09/21/22 1053   Line Care Cap changed 09/21/22 1053   Phlebitis Assessment No symptoms 09/21/22 1053   Infiltration Assessment 0 09/21/22 1053   Alcohol Cap Used Yes 09/21/22 1053   Dressing Status Clean, dry & intact 09/21/22 1053   Dressing Type Transparent 09/21/22 1053       Peripheral IV 09/19/22 Left Antecubital (Active)   Site Assessment Clean, dry & intact 09/21/22 1053   Line Status Capped 09/21/22 1053   Line Care Cap changed 09/21/22 1053   Phlebitis Assessment No symptoms 09/21/22 1053   Infiltration Assessment 0 09/21/22 1053   Alcohol Cap Used Yes 09/21/22 1053   Dressing Status Clean, dry & intact 09/21/22 1053   Dressing Type Transparent 09/21/22 1053        Opportunity for questions and clarification was provided.       Patient transported with:  O2 @ 2lpm

## 2022-09-21 NOTE — DISCHARGE SUMMARY
Hospitalist Discharge Summary   Admit Date:  2022  7:42 PM   DC Note date: 2022  Name:  Lucien Long   Age:  80 y.o. Sex:  female  :  1940   MRN:  [id number]   Room:  [facility]  PCP:  Katja Godoy MD    Presenting Complaint: Fall     Initial Admission Diagnosis: Closed fracture of neck of right femur with delayed healing [S72.001G]  Closed [facility] of neck of right femur[facility] [S72.001A]     Problem List for this Hospitalization (present on admission):    Principal Problem:    Closed fracture of neck of right femur with delayed healing  Active Problems:    GERD (gastroesophageal reflux disease)    Postoperative anemia due to acute blood loss    Paroxysmal atrial fibrillation (HCC)    Acquired hypothyroidism    Stage 3 chronic kidney disease (HCC)    Diastolic CHF, chronic (HCC)    Hypertension, essential, benign    Hyperlipidemia  Resolved Problems:    GENNARO (acute kidney injury) [facility])      Hospital Course: This is a 81-year-old female with past medical history significant for hypothyroidism, tension, dyslipidemia, GERD, paroxysmal atrial fibrillation on anticoagulation secondary to anemia presented to ER after fall from ladder. She was found to have right femur neck fracture. Orthopedics was consulted. Patient underwent right hemiarthroplasty . Had some ABLA postop but no complications. Stable enough to go to rehab today    Lasix was reduced due to GENNARO (which improved with IVF). If she has leg swelling she should use leg wraps or stockings preferably over increasing her lasix dose. She had some urinary retention postop. Flomax started yesterday. Attempt voiding trial in a few days at SNF    Disposition: SNF  Diet: ADULT DIET; Regular  Code Status: Full Code    Follow Ups:   Contact information for follow-up providers     LAMAR Koch, PA. Schedule an appointment as soon as possible for   a visit in 2 week(s).     Specialties: Orthopedic Surgery, Physician Assistant  Contact information:  [address]  [address] [facility] [address]  [phone]                   Contact information for after-discharge care     Discharge [address] . Service: Inpatient Rehabilitation  Contact information:  [address]  [address] [facility]  [phone]                           Time spent in patient discharge and coordination 35 minutes. Follow up labs/diagnostics (ultimately defer to outpatient provider):  CBC, BMP    Plan was discussed with pt. All questions answered. Patient was stable at time of discharge. Instructions given to call a physician or return if any concerns.     Current Discharge Medication List        START taking these medications    Details   aspirin 325 MG EC tablet Take 1 tablet by mouth daily for 21 days  Qty: 21 tablet, Refills: 0      tamsulosin (FLOMAX) 0.4 MG capsule Take 1 capsule by mouth daily for 7 days  Qty: 7 capsule, Refills: 0           CONTINUE these medications which have CHANGED    Details   furosemide (LASIX) 20 MG tablet Take 1 tablet by mouth daily  Qty: 60 tablet, Refills: 3      metoprolol succinate (TOPROL XL) 25 MG extended release tablet Take 1 tablet by mouth daily  Qty: 30 tablet, Refills: 3           CONTINUE these medications which have NOT CHANGED    Details   albuterol sulfate HFA (PROVENTIL;VENTOLIN;PROAIR) 108 (90 Base) MCG/ACT inhaler albuterol sulfate HFA 90 mcg/actuation aerosol inhaler      amiodarone (CORDARONE) 200 MG tablet Take 200 mg by mouth daily      vitamin D (CHOLECALCIFEROL) 25 MCG (1000 UT) TABS tablet Take 1,000 Units by mouth daily      DULoxetine (CYMBALTA) 60 MG extended release capsule Take 60 mg by mouth daily      folic acid (FOLVITE) 1 MG tablet Take 1 mg by mouth daily      levothyroxine (SYNTHROID) 25 MCG tablet Take 25 mcg by mouth every morning (before breakfast)      ondansetron (ZOFRAN-ODT) 4 MG disintegrating tablet Take 4 mg by mouth every 8 hours as needed      pantoprazole (PROTONIX) 40 MG tablet Take 40 mg by mouth 2 times daily (before meals)      rosuvastatin (CRESTOR) 10 MG tablet Take 10 mg by mouth           STOP taking these medications       Metoprolol-HCTZ ER 25-12.5 MG TB24 Comments:   Reason for Stopping:               Procedures done this admission:  Procedure(s):  HIP HEMIARTHROPLASTY RIGHT    Consults this admission:  [facility]    Echocardiogram results:  No results found for this or any previous visit. Diagnostic Imaging/Tests:   XR PELVIS (1-2 VIEWS)    Result Date: 9/18/2022  Right hip arthroplasty. XR HIP RIGHT (2-3 VIEWS)    Result Date: 9/17/2022  Right femoral neck fracture. XR FEMUR RIGHT (MIN 2 VIEWS)    Result Date: 9/17/2022  As above. XR CHEST 1 VIEW    Result Date: 9/17/2022  No acute process. Labs: Results:       BMP, Mg, Phos Recent Labs     09/19/22 0319 09/20/22  0342 09/21/22  0341    137 136   K 4.4 4.3 4.1    106 105   CO2 28 29 28   ANIONGAP 4 2* 3*   BUN 26* 23 23   CREATININE 1.60* 1.10* 1.20*   LABGLOM 33* 51* 46*   GFRAA 40* >60 56*   CALCIUM 8.2* 8.2* 8.3   GLUCOSE 146* 108* 123*      CBC Recent Labs     09/19/22 0319 09/20/22 0342 09/21/22  0341   WBC 16.0* 12.8* 10.0   RBC 4.27 3.99* 3.76*   HGB 12.1 11.3* 10.5*   HCT 39.3 36.1 34.0*   MCV 92.0 90.5 90.4   MCH 28.3 28.3 27.9   MCHC 30.8* 31.3* 30.9*   RDW 17.2* 17.2* 17.4*    198 162   MPV 12.2 12.1 12.1   NRBC 0.00 0.00 0.00   SEGS 81* 73 71   LYMPHOPCT 8* 14 16   EOSRELPCT 0* 1 3   MONOPCT 11 11 10   BASOPCT 0 1 0   IMMGRAN 1 0 0   SEGSABS 12.9* 9.3* 7.0   LYMPHSABS 1.2 1.8 1.6   EOSABS 0.0 0.1 0.3   MONOSABS 1.8* 1.4* 1.0   BASOSABS 0.0 0.1 0.0   ABSIMMGRAN 0.1 0.1 0.0      LFT No results for input(s): BILITOT, BILIDIR, ALKPHOS, AST, ALT, PROT, LABALBU, GLOB in the last 72 hours.    Cardiac  Lab Results   Component Value Date/Time TROPHS 22.6 06/20/2021 02:37 AM    TROPHS 33.1 06/14/2021 07:47 AM    TROPHS 56.9 06/13/2021 05:45 PM      Coags Lab Results   Component Value Date/Time    PROTIME 13.7 09/17/2022 09:30 PM    PROTIME 20.9 12/26/2021 03:00 AM    PROTIME 36.0 12/25/2021 05:30 PM    INR 1.0 09/17/2022 09:30 PM    INR 1.8 12/26/2021 03:00 AM    INR 3.6 12/25/2021 05:30 PM    APTT 38.5 12/25/2021 05:30 PM    APTT 29.9 12/13/2021 04:44 PM    APTT 30.4 05/13/2021 08:25 AM      A1c No results found for: LABA1C, EAG   Lipids No results found for: CHOL, LDLCALC, LABVLDL, HDL, CHOLHDLRATIO, TRIG   Thyroid  No results found for: Adra Post     Most Recent UA Lab Results   Component Value Date/Time    COLORU YELLOW 12/13/2021 05:43 PM    SPECGRAV 1.014 12/13/2021 05:43 PM    PROTEINU Negative 12/13/2021 05:43 PM    GLUCOSEU Negative 12/13/2021 05:43 PM    KETUA Negative 12/13/2021 05:43 PM    BILIRUBINUR Negative 12/13/2021 05:43 PM    BLOODU Negative 12/13/2021 05:43 PM    NITRU Negative 12/13/2021 05:43 PM    LEUKOCYTESUR Negative 12/13/2021 05:43 PM    WBCUA 0-3 05/17/2021 09:46 AM    RBCUA 0-3 05/17/2021 09:46 AM    BACTERIA TRACE 05/17/2021 09:46 AM    MUCUS 0 03/19/2021 03:37 PM        No results for input(s): CULTURE in the last 720 hours.     All Labs from Last 24 Hrs:  Recent Results (from the past 24 hour(s))   COVID-19    Collection Time: 09/20/22  5:18 PM    Specimen: Nasopharyngeal Swab   Result Value Ref Range    SARS-CoV-2 Please find results under separate order     COVID-19    Collection Time: 09/20/22  5:18 PM    Specimen: Nasopharyngeal   Result Value Ref Range    Source Nasopharyngeal      SARS-CoV-2, PCR Not detected NOTD     CBC with Auto Differential    Collection Time: 09/21/22  3:41 AM   Result Value Ref Range    WBC 10.0 4.3 - 11.1 K/uL    RBC 3.76 (L) 4.05 - 5.2 M/uL    Hemoglobin 10.5 (L) 11.7 - 15.4 g/dL    Hematocrit 34.0 (L) 35.8 - 46.3 %    MCV 90.4 79.6 - 97.8 FL    MCH 27.9 26.1 - 32.9 PG    MCHC 30.9 (L) 31.4 - 35.0 g/dL    RDW 17.4 (H) 11.9 - 14.6 %    Platelets 199 532 - 272 K/uL    MPV 12.1 9.4 - 12.3 FL    nRBC 0.00 0.0 - 0.2 K/uL    Differential Type AUTOMATED      Seg Neutrophils 71 43 - 78 %    Lymphocytes 16 13 - 44 %    Monocytes 10 4.0 - 12.0 %    Eosinophils % 3 0.5 - 7.8 %    Basophils 0 0.0 - 2.0 %    Immature Granulocytes 0 0.0 - 5.0 %    Segs Absolute 7.0 1.7 - 8.2 K/UL    Absolute Lymph # 1.6 0.5 - 4.6 K/UL    Absolute Mono # 1.0 0.1 - 1.3 K/UL    Absolute Eos # 0.3 0.0 - 0.8 K/UL    Basophils Absolute 0.0 0.0 - 0.2 K/UL    Absolute Immature Granulocyte 0.0 0.0 - 0.5 K/UL   Basic Metabolic Panel w/ Reflex to MG    Collection Time: 09/21/22  3:41 AM   Result Value Ref Range    Sodium 136 136 - 145 mmol/L    Potassium 4.1 3.5 - 5.1 mmol/L    Chloride 105 101 - 110 mmol/L    CO2 28 21 - 32 mmol/L    Anion Gap 3 (L) 4 - 13 mmol/L    Glucose 123 (H) 65 - 100 mg/dL    BUN 23 8 - 23 MG/DL    Creatinine 1.20 (H) 0.6 - 1.0 MG/DL    GFR  56 (L) >60 ml/min/1.73m2    GFR Non- 46 (L) >60 ml/min/1.73m2    Calcium 8.3 8.3 - 10.4 MG/DL       Allergies   Allergen Reactions    Apixaban Other (See Comments)     Patient states she had hallucinations from Eliquis     Immunization History   Administered Date(s) Administered    Influenza, High Dose (Fluzone 65 yrs and older) 10/28/2015, 09/21/2016, 09/15/2017    PPD Test 04/22/2019, 05/12/2021, 12/14/2021, 12/30/2021, 01/04/2022, 09/19/2022    Pneumococcal Polysaccharide ([id number]) 01/18/2016       Recent Vital Data:  Patient Vitals for the past 24 hrs:   Temp Pulse Resp BP SpO2   09/21/22 0721 98.4 °F (36.9 °C) 74 17 (!) 139/57 97 %   09/21/22 0331 99.3 °F (37.4 °C) 80 20 (!) 145/59 96 %   09/20/22 2345 98.2 °F (36.8 °C) 74 20 (!) 134/52 99 %   09/20/22 1919 98.2 °F (36.8 °C) 71 18 (!) 117/49 96 %   09/20/22 1723 97.9 °F (36.6 °C) 70 20 (!) 102/50 100 %   09/20/22 1105 97.5 °F (36.4 °C) 64 21 (!) 95/41 99 %       Oxygen Therapy  SpO2: 97 %  Pulse via Oximetry: 65 beats per minute  Pulse Oximeter Device Mode: Other (Comment)  Pulse Oximeter Device Location: Left, Hand  O2 Device: Nasal cannula  O2 Flow Rate (L/min): 2 L/min    Estimated body mass index is 25.97 kg/m² as calculated from the following:    Height as of this encounter: 5' (1.524 m). Weight as of this encounter: 133 lb (60.3 kg). Intake/Output Summary (Last 24 hours) at 9/21/2022 1019  Last data filed at 9/21/2022 0339  Gross per 24 hour   Intake 276 ml   Output 1050 ml   Net -774 ml         Physical Exam:    General:    Well nourished. No overt distress  Head:  Normocephalic, atraumatic  Eyes:  Sclerae appear normal.  Pupils equally round. HENT:  Nares appear normal, no drainage. Moist mucous membranes  Neck:  No restricted ROM. Trachea midline  CV:   RRR. No m/r/g. No JVD  Lungs:   CTAB. Respirations even, unlabored  Abdomen:   Soft, nontender, nondistended. Extremities: Warm and dry. No cyanosis or clubbing. No edema. Skin:     No rashes. Normal coloration  Neuro:  CN II-XII grossly intact. Psych:  Normal mood and affect. Signed:  Aldo Denis MD    Part of this note may have been written by using a voice dictation software. The note has been proof read but may still contain some grammatical/other typographical errors.

## 2022-09-21 NOTE — PROGRESS NOTES
PHYSICAL THERAPY: Daily Note PM   (Link to Caseload Tracking: PT Visit Days : 3  Time In/Out PT Charge Capture  Rehab Caseload Tracker  Orders    Ping Dillard is a 80 y.o. female   PRIMARY DIAGNOSIS: Closed fracture of neck of right femur with delayed healing  Closed fracture of neck of right femur with delayed healing [S72.001G]  [facility] [S72.001A]  Procedure(s) (LRB):  HIP HEMIARTHROPLASTY RIGHT (Right)  3 Days Post-Op  Inpatient: Payor: Rachel Saini / Plan: Carlos Eduardo Lyn / Product Type: *No Product type* /     ASSESSMENT:     REHAB RECOMMENDATIONS:   Recommendation to date pending progress:  Setting:  Short-term Rehab (patient wants HHPT, but lives alone)    Equipment:    To Be Determined     ASSESSMENT:  Ms. Hebert Mariscal continues to progress well toward goals with increased gait distances and decreased assistance levels. She continues to require min A with bed mobility, but is now able to transfers with CGA. She ambulated with the RW and CGA, minimal cueing for walker management, posture, and gait technique. Gait is antalgic, but steady. PM:  The patient maintained mobility this PM despite complaints of increased pain and fatigue. She is still very motivated  to push herself and participated well. One seated rest break needed during ambulation and several standing breaks with cueing for pacing and pursed lipped breathing.       SUBJECTIVE:   Ms. Hebert Mariscal states, \"It was harder this afternoon\"     Social/Functional Lives With: Alone  Type of Home: House  Home Equipment: mukund Connor  Has the patient had two or more falls in the past year or any fall with injury in the past year?: No  Receives Help From: Personal care attendant ([employer] friend, Saskia Maos)  ADL Assistance: Independent  Ambulation Assistance: Independent (occaisonal use of cane)  Transfer Assistance: Independent  Active : No  Occupation: Retired  OBJECTIVE: PAIN: VITALS / O2: PRECAUTION / [place] Juanito / DRAINS:   Pre Treatment:    8      Post Treatment: 7 Vitals        Oxygen    Purewick    RESTRICTIONS/PRECAUTIONS:  Restrictions/Precautions  Required Braces or Orthoses?: Yes  Required Braces or Orthoses?: Yes  Required Braces or Orthoses  Right Lower Extremity Brace: Knee Immobilizer     MOBILITY: I Mod I S SBA CGA Min Mod Max Total  NT x2 Comments:   Bed Mobility    Rolling [] [] [] [] [] [] [] [] [] [] []    Supine to Sit [] [] [] [] [] [x] [] [] [] [] []    Scooting [] [] [] [] [] [x] [] [] [] [] []    Sit to Supine [] [] [] [] [] [x] [] [] [] [] []    Transfers    Sit to Stand [] [] [] [] [x] [] [] [] [] [] []    Bed to Chair [] [] [] [] [x] [] [] [] [] [] []    Stand to Sit [] [] [] [] [x] [] [] [] [] [] []     [] [] [] [] [] [] [] [] [] [] []    I=Independent, Mod I=Modified Independent, S=Supervision, SBA=Standby Assistance, CGA=Contact Guard Assistance,   Min=Minimal Assistance, Mod=Moderate Assistance, Max=Maximal Assistance, Total=Total Assistance, NT=Not Tested    BALANCE: Good Fair+ Fair Fair- Poor NT Comments   Sitting Static [x] [] [] [] [] []    Sitting Dynamic [x] [] [] [] [] []              Standing Static [] [x] [] [] [] []    Standing Dynamic [] [x] [] [] [] []      GAIT: I Mod I S SBA CGA Min Mod Max Total  NT x2 Comments:   Level of Assistance [] [] [] [] [x] [] [] [] [] [] []    Distance 2x100 feet    DME Rolling Walker    Gait Quality Antalgic, Decreased chuy , Decreased step clearance, Decreased step length, and Trunk sway increased    Weightbearing Status      Stairs      I=Independent, Mod I=Modified Independent, S=Supervision, SBA=Standby Assistance, CGA=Contact Guard Assistance,   Min=Minimal Assistance, Mod=Moderate Assistance, Max=Maximal Assistance, Total=Total Assistance, NT=Not Tested    PLAN:   ACUTE PHYSICAL THERAPY GOALS:   (Developed with and agreed upon by patient and/or caregiver.)    (1.) Stephani Montanez  will move from supine to sit and sit to supine  with STAND BY ASSIST within 7 treatment day(s). (2.) Irwin Candelario will transfer from bed to chair and chair to bed with STAND BY ASSIST using the least restrictive device within 7 treatment day(s). (3.) Irwin Candelario will ambulate with STAND BY ASSIST for 150 feet with the least restrictive device within 7 treatment day(s). (4.) Irwin Candelario will perform standing static and dynamic balance activities x 20 minutes with STAND BY ASSIST to improve safety within 7 treatment day(s). (5.) Irwin Candelario will perform bilateral lower extremity exercises x 20 min for HEP with INDEPENDENCE to improve strength, endurance, and functional mobility within 7 treatment day(s). FREQUENCY AND DURATION: BID for duration of hospital stay or until stated goals are met, whichever comes first.    TREATMENT:   TREATMENT:   Therapeutic Activity (26 Minutes): Therapeutic activity included Supine to Sit, Sit to Supine, Scooting, Transfer Training, Ambulation on level ground, Sitting balance , and Standing balance to improve functional Activity tolerance, Balance, Coordination, Mobility, and Strength.     TREATMENT GRID:  N/A    AFTER TREATMENT PRECAUTIONS: Bed, Bed/Chair Locked, Call light within reach, Needs within reach, and RN notified    INTERDISCIPLINARY COLLABORATION:  RN/ PCT and PT/ PTA    EDUCATION:      TIME IN/OUT:  Time In: 1318  Time Out: [address]  Minutes: [address], PTA

## 2022-09-21 NOTE — CARE COORDINATION
of Transport at Discharge [address] [facility] Time of Discharge 1430   Confirm Follow Up Transport Friends   Condition of Participation: Discharge Planning   The Plan for Transition of Care is related to the following treatment goals: STR to improve pt's strength and functional abilities for a safe transition to home   The Patient and/or Patient Representative was provided with a Choice of Provider? Patient   The Patient and/Or Patient Representative agree with the Discharge Plan? Yes   Freedom of Choice list was provided with basic dialogue that supports the patient's individualized plan of care/goals, treatment preferences, and shares the quality data associated with the providers?   Yes

## 2022-09-21 NOTE — PLAN OF CARE
Problem: Discharge Planning  Goal: Discharge to home or other facility with appropriate resources  Outcome: Progressing  Flowsheets (Taken 9/20/2022 1916)  Discharge to home or other facility with appropriate resources: Identify barriers to discharge with patient and caregiver     Problem: Pain  Goal: Verbalizes/displays adequate comfort level or baseline comfort level  Outcome: Progressing  Flowsheets (Taken 9/20/2022 0956 by Rui Cantu RN)  Verbalizes/displays adequate comfort level or baseline comfort level:   Administer analgesics based on type and severity of pain and evaluate response   Assess pain using appropriate pain scale     Problem: Safety - Adult  Goal: Free from fall injury  Outcome: Progressing     Problem: ABCDS Injury Assessment  Goal: Absence of physical injury  Outcome: Progressing     Problem: Chronic Conditions and Co-morbidities  Goal: Patient's chronic conditions and co-morbidity symptoms are monitored and maintained or improved  Outcome: Progressing     Problem: Skin/Tissue Integrity  Goal: Absence of new skin breakdown  Description: 1. Monitor for areas of redness and/or skin breakdown  2. Assess vascular access sites hourly  3. Every 4-6 hours minimum:  Change oxygen saturation probe site  4. Every 4-6 hours:  If on nasal continuous positive airway pressure, respiratory therapy assess nares and determine need for appliance change or resting period.   Outcome: Progressing

## 2022-09-21 NOTE — PROGRESS NOTES
PHYSICAL THERAPY: Daily Note AM   (Link to Caseload Tracking: PT Visit Days : 3  Time In/Out PT Charge Capture  Rehab Caseload Tracker  Orders    Asael Garcia is a 80 y.o. female   PRIMARY DIAGNOSIS: Closed fracture of neck of right femur with delayed healing  Closed fracture of neck of right femur with delayed healing [S72.001G]  Closed fracture of neck of right femur, [facility] [S72.001A]  Procedure(s) (LRB):  HIP HEMIARTHROPLASTY RIGHT (Right)  3 Days Post-Op  Inpatient: Payor: Gildardo Blankenship / Plan: Yulia Meadows / Product Type: *No Product type* /     ASSESSMENT:     REHAB RECOMMENDATIONS:   Recommendation to date pending progress:  Setting:  Short-term Rehab (patient wants HHPT, but lives alone)    Equipment:    To Be Determined     ASSESSMENT:  Ms. Valerie Amaral continues to progress well toward goals with increased gait distances and decreased assistance levels. She continues to require min A with bed mobility, but is now able to transfers with CGA. She ambulated with the RW and CGA, minimal cueing for walker management, posture, and gait technique. Gait is antalgic, but steady.        SUBJECTIVE:   Ms. Valerie Amaral states, \"I think I'm doing well\"     Social/Functional Lives With: Alone  Type of Home: House  Home Equipment: [employer] Number, rolling  Has the patient had two or more falls in the past year or any fall with injury in the past year?: No  Receives Help From: Personal care attendant (Taoism friend, [facility])  ADL Assistance: Independent  Ambulation Assistance: Independent (occaisonal use of cane)  Transfer Assistance: Independent  Active : No  Occupation: Retired  OBJECTIVE:     PAIN: VITALS / O2: PRECAUTION / Jackqueline Cecile / Adis Brunner:   Pre Treatment:    0      Post Treatment: 0 Vitals        Oxygen    Purewick    RESTRICTIONS/PRECAUTIONS:  Restrictions/Precautions  Required Braces or Orthoses?: Yes  Required Braces or Orthoses?: Yes  Required Braces or Orthoses  Right Lower Extremity Brace: Knee Immobilizer     MOBILITY: I Mod I S SBA CGA Min Mod Max Total  NT x2 Comments:   Bed Mobility    Rolling [] [] [] [] [] [] [] [] [] [] []    Supine to Sit [] [] [] [] [] [x] [] [] [] [] []    Scooting [] [] [] [] [] [x] [] [] [] [] []    Sit to Supine [] [] [] [] [] [] [] [] [] [] []    Transfers    Sit to Stand [] [] [] [] [x] [] [] [] [] [] []    Bed to Chair [] [] [] [] [x] [] [] [] [] [] []    Stand to Sit [] [] [] [] [x] [] [] [] [] [] []     [] [] [] [] [] [] [] [] [] [] []    I=Independent, Mod I=Modified Independent, S=Supervision, SBA=Standby Assistance, CGA=Contact Guard Assistance,   Min=Minimal Assistance, Mod=Moderate Assistance, Max=Maximal Assistance, Total=Total Assistance, NT=Not Tested    BALANCE: Good Fair+ Fair Fair- Poor NT Comments   Sitting Static [x] [] [] [] [] []    Sitting Dynamic [x] [] [] [] [] []              Standing Static [] [x] [] [] [] []    Standing Dynamic [] [x] [] [] [] []      GAIT: I Mod I S SBA CGA Min Mod Max Total  NT x2 Comments:   Level of Assistance [] [] [] [] [x] [] [] [] [] [] []    Distance 2x100 feet    DME Rolling Walker    Gait Quality Antalgic, Decreased chuy , Decreased step clearance, Decreased step length, and Trunk sway increased    Weightbearing Status      Stairs      I=Independent, Mod I=Modified Independent, S=Supervision, SBA=Standby Assistance, CGA=Contact Guard Assistance,   Min=Minimal Assistance, Mod=Moderate Assistance, Max=Maximal Assistance, Total=Total Assistance, NT=Not Tested    PLAN:   ACUTE PHYSICAL THERAPY GOALS:   (Developed with and agreed upon by patient and/or caregiver.)    (1.) Yosvany Door  will move from supine to sit and sit to supine  with STAND BY ASSIST within 7 treatment day(s). (2.) Georganne Door will transfer from bed to chair and chair to bed with STAND BY ASSIST using the least restrictive device within 7 treatment day(s).     (3.) Georganne Door will ambulate with STAND BY ASSIST for 150 feet with the least restrictive device within 7 treatment day(s). (4.) Pavan Cali will perform standing static and dynamic balance activities x 20 minutes with STAND BY ASSIST to improve safety within 7 treatment day(s). (5.) Pavan Cali will perform bilateral lower extremity exercises x 20 min for HEP with INDEPENDENCE to improve strength, endurance, and functional mobility within 7 treatment day(s). FREQUENCY AND DURATION: BID for duration of hospital stay or until stated goals are met, whichever comes first.    TREATMENT:   TREATMENT:   Therapeutic Activity (41 Minutes): Therapeutic activity included Supine to Sit, Scooting, Transfer Training, Ambulation on level ground, Sitting balance , and Standing balance to improve functional Activity tolerance, Balance, Coordination, Mobility, and Strength.     TREATMENT GRID:  N/A    AFTER TREATMENT PRECAUTIONS: Bed/Chair Locked, Call light within reach, Chair, Needs within reach, and RN notified    INTERDISCIPLINARY COLLABORATION:  RN/ PCT and PT/ PTA    EDUCATION:      TIME IN/OUT:  Time In: 0908  Time Out: [facility]  Minutes: [place] PTA

## 2022-10-05 ENCOUNTER — OFFICE VISIT (OUTPATIENT)
Dept: ORTHOPEDIC SURGERY | Age: 82
End: 2022-10-05

## 2022-10-05 DIAGNOSIS — S72.001A CLOSED HIP FRACTURE REQUIRING OPERATIVE REPAIR, RIGHT, INITIAL ENCOUNTER (HCC): Primary | ICD-10-CM

## 2022-10-05 PROCEDURE — 99024 POSTOP FOLLOW-UP VISIT: CPT | Performed by: PHYSICIAN ASSISTANT

## 2022-10-05 NOTE — PROGRESS NOTES
[facility]            Patient ID:  Name: Ranjan Cochran  AGE/Gender: 80 y.o. female  MRN: [id number]  : 1940    Date of Service: 10/5/2022          ALLERGIES:   Allergies   Allergen Reactions    Apixaban Other (See Comments)     Patient states she had hallucinations from Eliquis          History:  The patient is seen today for follow-up. The patient sustained  a right Hip fracture and underwent ORIF at [facility].  They are doing well with regard to the hip,  having very little discomfort or pain. They have no other complaints or concerns: The patient has been progressing with physical therapy. Physical Exam:       General:  On exam the patient is a pleasant 80 y.o. female in no acute distress, A&O x 3. Hip: This incision is healing there is swelling consistent with the postoperative time frame. There is no drainage. ROM not assessed. The calf is soft and non-tender. Assessment and Plan:   The incision is healing. I removed the staples and applied steri strips. OK to bathe. OK to DC knee immobilizer. WBAT with walker. The patient was advised to notify us if drainage returns. We will follow up in 4 weeks or sooner if needed.        Electronically signed by:   Jazzy Fernandez PA  10/5/2022,  9:07 AM

## 2022-11-08 ENCOUNTER — OFFICE VISIT (OUTPATIENT)
Dept: ORTHOPEDIC SURGERY | Age: 82
End: 2022-11-08

## 2022-11-08 DIAGNOSIS — S72.001A CLOSED HIP FRACTURE REQUIRING OPERATIVE REPAIR, RIGHT, INITIAL ENCOUNTER (HCC): Primary | ICD-10-CM

## 2022-11-08 PROCEDURE — 99024 POSTOP FOLLOW-UP VISIT: CPT | Performed by: ORTHOPAEDIC SURGERY

## 2022-11-08 NOTE — PROGRESS NOTES
Progress Note    Patient: Kirstin Savage MRN: [id number]  SSN: [id number]    YOB: 1940  Age: 80 y.o. Sex: female        11/8/2022      Subjective:     Patient is now about 7 weeks out from right hip hemiarthroplasty for right femoral neck fracture. She seems to be doing really well. She says she had very little pain in her hip. She still noticed that she does not have the greatest balance and strength in her hip but otherwise she seems to be very pleased with how she is doing    Objective: There were no vitals filed for this visit. Physical Exam:     Skin - incision is well healed with no redness or drainage  Motor and sensory function intact in RIGHT LOWER extremity  Pulses palpable in RIGHT LOWER extremity     XRAY FINDINGS:  Sgnngsnodjb-coljke-st right femoral neck fracture, findings-AP pelvis as well as a lateral of the right hip shows the right hip prosthesis is in excellent position. The leg lengths are roughly equal.  There is no evidence of loosening or failure impression-well-positioned right hip hemiarthroplasty    Assessment:     7weeks right hip hemiarthroplasty    Plan:     I spoke with the patient regarding the fact that I think she is doing very well. I do not think she needs to return now. I have spoken with her little bit about osteoporosis management. She is not sure if she has had any sort of osteoporosis testing recently but she thinks if she has been sometime ago. She thinks that she has just been put on some vitamin D3 and calcium in the past she does not remember having even Fosamax or anything else that she had to take. She does have a family physician that she sees regularly and she says she is very comfortable talking with them about this at her next visit which I encouraged her to do so I have explained that we will include this in my note from today's visit as well. I would recommend that she talk to her family physician.   If for any reason she wants us to refer for someone that does osteoporosis management we do have someone we refer people to routinely but as long as she and her family physician are comfortable with this I think this is a very reasonable thing to do.   She can follow-up with me now on a as needed basis    Signed By: Nida Verma MD     November 8, 2022

## 2023-09-08 NOTE — PROGRESS NOTES
Since the symptoms of infection are resolved there is no need for further antibiotic therapy.    If the rash is not causing any concerning symptoms it is reasonable to continue to monitor with the expectation it will gradually resolve. If rash worsens or any other symptoms develop patient should be seen and evaluated in person in an immediate fashion.   SBAR report given to the oncoming nurse.

## 2023-10-08 ENCOUNTER — APPOINTMENT (OUTPATIENT)
Dept: CT IMAGING | Age: 83
End: 2023-10-08
Payer: MEDICARE

## 2023-10-08 ENCOUNTER — APPOINTMENT (OUTPATIENT)
Dept: GENERAL RADIOLOGY | Age: 83
End: 2023-10-08
Payer: MEDICARE

## 2023-10-08 ENCOUNTER — HOSPITAL ENCOUNTER (INPATIENT)
Age: 83
LOS: 5 days | Discharge: OTHER INSTITUTION | End: 2023-10-13
Attending: EMERGENCY MEDICINE | Admitting: INTERNAL MEDICINE
Payer: MEDICARE

## 2023-10-08 DIAGNOSIS — R63.8 POOR FLUID INTAKE: ICD-10-CM

## 2023-10-08 DIAGNOSIS — N17.9 ACUTE RENAL FAILURE, UNSPECIFIED ACUTE RENAL FAILURE TYPE (HCC): Primary | ICD-10-CM

## 2023-10-08 DIAGNOSIS — R57.1 HYPOVOLEMIC SHOCK (HCC): ICD-10-CM

## 2023-10-08 DIAGNOSIS — I10 HYPERTENSION, ESSENTIAL, BENIGN: Chronic | ICD-10-CM

## 2023-10-08 DIAGNOSIS — E86.9 VOLUME DEPLETION: ICD-10-CM

## 2023-10-08 LAB
ALBUMIN SERPL-MCNC: 2.5 G/DL (ref 3.2–4.6)
ALBUMIN/GLOB SERPL: 0.7 (ref 0.4–1.6)
ALP SERPL-CCNC: 125 U/L (ref 50–136)
ALT SERPL-CCNC: 90 U/L (ref 12–65)
ANION GAP SERPL CALC-SCNC: 17 MMOL/L (ref 2–11)
APPEARANCE UR: CLEAR
AST SERPL-CCNC: 71 U/L (ref 15–37)
BACTERIA URNS QL MICRO: ABNORMAL /HPF
BASOPHILS # BLD: 0 K/UL (ref 0–0.2)
BASOPHILS NFR BLD: 0 % (ref 0–2)
BILIRUB DIRECT SERPL-MCNC: 0.3 MG/DL
BILIRUB SERPL-MCNC: 0.8 MG/DL (ref 0.2–1.1)
BILIRUB UR QL: NEGATIVE
BUN SERPL-MCNC: 164 MG/DL (ref 8–23)
CALCIUM SERPL-MCNC: 7.6 MG/DL (ref 8.3–10.4)
CHLORIDE SERPL-SCNC: 89 MMOL/L (ref 101–110)
CK SERPL-CCNC: 125 U/L (ref 21–215)
CO2 SERPL-SCNC: 24 MMOL/L (ref 21–32)
COLOR UR: ABNORMAL
CREAT SERPL-MCNC: 9.4 MG/DL (ref 0.6–1)
DIFFERENTIAL METHOD BLD: ABNORMAL
EOSINOPHIL # BLD: 0.1 K/UL (ref 0–0.8)
EOSINOPHIL NFR BLD: 1 % (ref 0.5–7.8)
EPI CELLS #/AREA URNS HPF: ABNORMAL /HPF
ERYTHROCYTE [DISTWIDTH] IN BLOOD BY AUTOMATED COUNT: 14.5 % (ref 11.9–14.6)
GLOBULIN SER CALC-MCNC: 3.6 G/DL (ref 2.8–4.5)
GLUCOSE BLD STRIP.AUTO-MCNC: 93 MG/DL (ref 65–100)
GLUCOSE BLD STRIP.AUTO-MCNC: 97 MG/DL (ref 65–100)
GLUCOSE SERPL-MCNC: 99 MG/DL (ref 65–100)
GLUCOSE UR STRIP.AUTO-MCNC: NEGATIVE MG/DL
HCT VFR BLD AUTO: 32.5 % (ref 35.8–46.3)
HGB BLD-MCNC: 10.7 G/DL (ref 11.7–15.4)
HGB UR QL STRIP: NEGATIVE
IMM GRANULOCYTES # BLD AUTO: 0.1 K/UL (ref 0–0.5)
IMM GRANULOCYTES NFR BLD AUTO: 1 % (ref 0–5)
KETONES UR QL STRIP.AUTO: NEGATIVE MG/DL
LACTATE SERPL-SCNC: 0.7 MMOL/L (ref 0.4–2)
LEUKOCYTE ESTERASE UR QL STRIP.AUTO: ABNORMAL
LYMPHOCYTES # BLD: 1 K/UL (ref 0.5–4.6)
LYMPHOCYTES NFR BLD: 8 % (ref 13–44)
MAGNESIUM SERPL-MCNC: 2.6 MG/DL (ref 1.8–2.4)
MCH RBC QN AUTO: 28.8 PG (ref 26.1–32.9)
MCHC RBC AUTO-ENTMCNC: 32.9 G/DL (ref 31.4–35)
MCV RBC AUTO: 87.4 FL (ref 82–102)
MONOCYTES # BLD: 1.5 K/UL (ref 0.1–1.3)
MONOCYTES NFR BLD: 12 % (ref 4–12)
NEUTS SEG # BLD: 10 K/UL (ref 1.7–8.2)
NEUTS SEG NFR BLD: 78 % (ref 43–78)
NITRITE UR QL STRIP.AUTO: NEGATIVE
NRBC # BLD: 0 K/UL (ref 0–0.2)
PH UR STRIP: 5 (ref 5–9)
PHOSPHATE SERPL-MCNC: 8.6 MG/DL (ref 2.3–3.7)
PLATELET # BLD AUTO: 352 K/UL (ref 150–450)
PMV BLD AUTO: 10.9 FL (ref 9.4–12.3)
POTASSIUM SERPL-SCNC: 3.6 MMOL/L (ref 3.5–5.1)
PROCALCITONIN SERPL-MCNC: 0.6 NG/ML (ref 0–0.49)
PROT SERPL-MCNC: 6.1 G/DL (ref 6.3–8.2)
PROT UR STRIP-MCNC: NEGATIVE MG/DL
RBC # BLD AUTO: 3.72 M/UL (ref 4.05–5.2)
RBC #/AREA URNS HPF: ABNORMAL /HPF
SERVICE CMNT-IMP: NORMAL
SERVICE CMNT-IMP: NORMAL
SODIUM SERPL-SCNC: 130 MMOL/L (ref 133–143)
SP GR UR REFRACTOMETRY: 1.01 (ref 1–1.02)
TSH W FREE THYROID IF ABNORMAL: 0.5 UIU/ML (ref 0.36–3.74)
TSH W FREE THYROID IF ABNORMAL: 0.79 UIU/ML (ref 0.36–3.74)
UROBILINOGEN UR QL STRIP.AUTO: 1 EU/DL (ref 0.2–1)
WBC # BLD AUTO: 12.8 K/UL (ref 4.3–11.1)
WBC URNS QL MICRO: ABNORMAL /HPF

## 2023-10-08 PROCEDURE — 70450 CT HEAD/BRAIN W/O DYE: CPT

## 2023-10-08 PROCEDURE — 2580000003 HC RX 258: Performed by: EMERGENCY MEDICINE

## 2023-10-08 PROCEDURE — 96361 HYDRATE IV INFUSION ADD-ON: CPT

## 2023-10-08 PROCEDURE — 2500000003 HC RX 250 WO HCPCS: Performed by: EMERGENCY MEDICINE

## 2023-10-08 PROCEDURE — 80076 HEPATIC FUNCTION PANEL: CPT

## 2023-10-08 PROCEDURE — 80048 BASIC METABOLIC PNL TOTAL CA: CPT

## 2023-10-08 PROCEDURE — 51702 INSERT TEMP BLADDER CATH: CPT

## 2023-10-08 PROCEDURE — 85025 COMPLETE CBC W/AUTO DIFF WBC: CPT

## 2023-10-08 PROCEDURE — 51798 US URINE CAPACITY MEASURE: CPT

## 2023-10-08 PROCEDURE — 36415 COLL VENOUS BLD VENIPUNCTURE: CPT

## 2023-10-08 PROCEDURE — 6360000002 HC RX W HCPCS: Performed by: EMERGENCY MEDICINE

## 2023-10-08 PROCEDURE — 83605 ASSAY OF LACTIC ACID: CPT

## 2023-10-08 PROCEDURE — 2580000003 HC RX 258: Performed by: INTERNAL MEDICINE

## 2023-10-08 PROCEDURE — 87040 BLOOD CULTURE FOR BACTERIA: CPT

## 2023-10-08 PROCEDURE — 84100 ASSAY OF PHOSPHORUS: CPT

## 2023-10-08 PROCEDURE — 6360000002 HC RX W HCPCS: Performed by: INTERNAL MEDICINE

## 2023-10-08 PROCEDURE — 83735 ASSAY OF MAGNESIUM: CPT

## 2023-10-08 PROCEDURE — 96365 THER/PROPH/DIAG IV INF INIT: CPT

## 2023-10-08 PROCEDURE — 99285 EMERGENCY DEPT VISIT HI MDM: CPT

## 2023-10-08 PROCEDURE — 82550 ASSAY OF CK (CPK): CPT

## 2023-10-08 PROCEDURE — 96374 THER/PROPH/DIAG INJ IV PUSH: CPT

## 2023-10-08 PROCEDURE — 81001 URINALYSIS AUTO W/SCOPE: CPT

## 2023-10-08 PROCEDURE — 82962 GLUCOSE BLOOD TEST: CPT

## 2023-10-08 PROCEDURE — 99291 CRITICAL CARE FIRST HOUR: CPT | Performed by: INTERNAL MEDICINE

## 2023-10-08 PROCEDURE — 84443 ASSAY THYROID STIM HORMONE: CPT

## 2023-10-08 PROCEDURE — 84145 PROCALCITONIN (PCT): CPT

## 2023-10-08 PROCEDURE — 96367 TX/PROPH/DG ADDL SEQ IV INF: CPT

## 2023-10-08 PROCEDURE — 71045 X-RAY EXAM CHEST 1 VIEW: CPT

## 2023-10-08 PROCEDURE — 2000000000 HC ICU R&B

## 2023-10-08 RX ORDER — SODIUM CHLORIDE 0.9 % (FLUSH) 0.9 %
5-40 SYRINGE (ML) INJECTION EVERY 12 HOURS SCHEDULED
Status: DISCONTINUED | OUTPATIENT
Start: 2023-10-08 | End: 2023-10-13 | Stop reason: HOSPADM

## 2023-10-08 RX ORDER — ACETAMINOPHEN 325 MG/1
650 TABLET ORAL EVERY 6 HOURS PRN
Status: DISCONTINUED | OUTPATIENT
Start: 2023-10-08 | End: 2023-10-13 | Stop reason: HOSPADM

## 2023-10-08 RX ORDER — NOREPINEPHRINE BITARTRATE 0.02 MG/ML
1-100 INJECTION, SOLUTION INTRAVENOUS CONTINUOUS
Status: DISCONTINUED | OUTPATIENT
Start: 2023-10-08 | End: 2023-10-08 | Stop reason: SDUPTHER

## 2023-10-08 RX ORDER — HEPARIN SODIUM 5000 [USP'U]/ML
5000 INJECTION, SOLUTION INTRAVENOUS; SUBCUTANEOUS EVERY 8 HOURS SCHEDULED
Status: DISCONTINUED | OUTPATIENT
Start: 2023-10-08 | End: 2023-10-13 | Stop reason: HOSPADM

## 2023-10-08 RX ORDER — NOREPINEPHRINE BITARTRATE 0.06 MG/ML
2-100 INJECTION, SOLUTION INTRAVENOUS CONTINUOUS
Status: DISCONTINUED | OUTPATIENT
Start: 2023-10-08 | End: 2023-10-08 | Stop reason: CLARIF

## 2023-10-08 RX ORDER — NOREPINEPHRINE BITARTRATE 0.02 MG/ML
2-100 INJECTION, SOLUTION INTRAVENOUS CONTINUOUS
Status: DISCONTINUED | OUTPATIENT
Start: 2023-10-08 | End: 2023-10-10

## 2023-10-08 RX ORDER — 0.9 % SODIUM CHLORIDE 0.9 %
500 INTRAVENOUS SOLUTION INTRAVENOUS
Status: COMPLETED | OUTPATIENT
Start: 2023-10-08 | End: 2023-10-08

## 2023-10-08 RX ORDER — SODIUM CHLORIDE 9 MG/ML
INJECTION, SOLUTION INTRAVENOUS PRN
Status: DISCONTINUED | OUTPATIENT
Start: 2023-10-08 | End: 2023-10-13 | Stop reason: HOSPADM

## 2023-10-08 RX ORDER — SODIUM CHLORIDE 0.9 % (FLUSH) 0.9 %
5-40 SYRINGE (ML) INJECTION PRN
Status: DISCONTINUED | OUTPATIENT
Start: 2023-10-08 | End: 2023-10-13 | Stop reason: HOSPADM

## 2023-10-08 RX ORDER — 0.9 % SODIUM CHLORIDE 0.9 %
1000 INTRAVENOUS SOLUTION INTRAVENOUS
Status: COMPLETED | OUTPATIENT
Start: 2023-10-08 | End: 2023-10-08

## 2023-10-08 RX ORDER — ONDANSETRON 2 MG/ML
4 INJECTION INTRAMUSCULAR; INTRAVENOUS EVERY 6 HOURS PRN
Status: DISCONTINUED | OUTPATIENT
Start: 2023-10-08 | End: 2023-10-13 | Stop reason: HOSPADM

## 2023-10-08 RX ORDER — 0.9 % SODIUM CHLORIDE 0.9 %
1000 INTRAVENOUS SOLUTION INTRAVENOUS ONCE
Status: DISCONTINUED | OUTPATIENT
Start: 2023-10-08 | End: 2023-10-08 | Stop reason: SDUPTHER

## 2023-10-08 RX ORDER — POLYETHYLENE GLYCOL 3350 17 G/17G
17 POWDER, FOR SOLUTION ORAL DAILY PRN
Status: DISCONTINUED | OUTPATIENT
Start: 2023-10-08 | End: 2023-10-13 | Stop reason: HOSPADM

## 2023-10-08 RX ORDER — ONDANSETRON 4 MG/1
4 TABLET, ORALLY DISINTEGRATING ORAL EVERY 8 HOURS PRN
Status: DISCONTINUED | OUTPATIENT
Start: 2023-10-08 | End: 2023-10-13 | Stop reason: HOSPADM

## 2023-10-08 RX ORDER — SODIUM CHLORIDE, SODIUM LACTATE, POTASSIUM CHLORIDE, CALCIUM CHLORIDE 600; 310; 30; 20 MG/100ML; MG/100ML; MG/100ML; MG/100ML
INJECTION, SOLUTION INTRAVENOUS CONTINUOUS
Status: DISCONTINUED | OUTPATIENT
Start: 2023-10-09 | End: 2023-10-13 | Stop reason: HOSPADM

## 2023-10-08 RX ORDER — INSULIN LISPRO 100 [IU]/ML
0-8 INJECTION, SOLUTION INTRAVENOUS; SUBCUTANEOUS
Status: DISCONTINUED | OUTPATIENT
Start: 2023-10-08 | End: 2023-10-12

## 2023-10-08 RX ORDER — SODIUM CHLORIDE 9 MG/ML
INJECTION, SOLUTION INTRAVENOUS CONTINUOUS
Status: DISCONTINUED | OUTPATIENT
Start: 2023-10-08 | End: 2023-10-08

## 2023-10-08 RX ORDER — SODIUM CHLORIDE, SODIUM LACTATE, POTASSIUM CHLORIDE, CALCIUM CHLORIDE 600; 310; 30; 20 MG/100ML; MG/100ML; MG/100ML; MG/100ML
INJECTION, SOLUTION INTRAVENOUS CONTINUOUS
Status: ACTIVE | OUTPATIENT
Start: 2023-10-08 | End: 2023-10-09

## 2023-10-08 RX ORDER — INSULIN LISPRO 100 [IU]/ML
0-4 INJECTION, SOLUTION INTRAVENOUS; SUBCUTANEOUS NIGHTLY
Status: DISCONTINUED | OUTPATIENT
Start: 2023-10-08 | End: 2023-10-12

## 2023-10-08 RX ORDER — NOREPINEPHRINE BITARTRATE 0.06 MG/ML
1-100 INJECTION, SOLUTION INTRAVENOUS CONTINUOUS
Status: DISCONTINUED | OUTPATIENT
Start: 2023-10-08 | End: 2023-10-08

## 2023-10-08 RX ORDER — DEXTROSE MONOHYDRATE 100 MG/ML
INJECTION, SOLUTION INTRAVENOUS CONTINUOUS PRN
Status: DISCONTINUED | OUTPATIENT
Start: 2023-10-08 | End: 2023-10-13 | Stop reason: HOSPADM

## 2023-10-08 RX ADMIN — SODIUM CHLORIDE 1000 ML: 9 INJECTION, SOLUTION INTRAVENOUS at 13:24

## 2023-10-08 RX ADMIN — NOREPINEPHRINE BITARTRATE 5 MCG/MIN: 1 INJECTION, SOLUTION, CONCENTRATE INTRAVENOUS at 22:48

## 2023-10-08 RX ADMIN — SODIUM CHLORIDE, POTASSIUM CHLORIDE, SODIUM LACTATE AND CALCIUM CHLORIDE: 600; 310; 30; 20 INJECTION, SOLUTION INTRAVENOUS at 17:09

## 2023-10-08 RX ADMIN — SODIUM CHLORIDE 500 ML: 9 INJECTION, SOLUTION INTRAVENOUS at 14:41

## 2023-10-08 RX ADMIN — HEPARIN SODIUM 5000 UNITS: 5000 INJECTION INTRAVENOUS; SUBCUTANEOUS at 21:53

## 2023-10-08 RX ADMIN — CEFTRIAXONE 1000 MG: 1 INJECTION, POWDER, FOR SOLUTION INTRAMUSCULAR; INTRAVENOUS at 12:14

## 2023-10-08 RX ADMIN — NOREPINEPHRINE BITARTRATE 5 MCG/MIN: 1 INJECTION, SOLUTION, CONCENTRATE INTRAVENOUS at 14:18

## 2023-10-08 RX ADMIN — VANCOMYCIN HYDROCHLORIDE 1000 MG: 1 INJECTION, POWDER, LYOPHILIZED, FOR SOLUTION INTRAVENOUS at 15:26

## 2023-10-08 RX ADMIN — SODIUM CHLORIDE, POTASSIUM CHLORIDE, SODIUM LACTATE AND CALCIUM CHLORIDE: 600; 310; 30; 20 INJECTION, SOLUTION INTRAVENOUS at 22:22

## 2023-10-08 RX ADMIN — SODIUM CHLORIDE 1000 ML: 9 INJECTION, SOLUTION INTRAVENOUS at 12:20

## 2023-10-08 ASSESSMENT — LIFESTYLE VARIABLES
HOW OFTEN DO YOU HAVE A DRINK CONTAINING ALCOHOL: NEVER
HOW MANY STANDARD DRINKS CONTAINING ALCOHOL DO YOU HAVE ON A TYPICAL DAY: PATIENT DOES NOT DRINK

## 2023-10-08 NOTE — ED NOTES
TRANSFER - OUT REPORT:    Verbal report given to [place], RN on Brandon Aguero  being transferred to  [phone] for routine progression of patient care       Report consisted of patient's Situation, Background, Assessment and   Recommendations(SBAR). Information from the following report(s) ED Encounter Summary, ED SBAR, Intake/Output, MAR, and Recent Results was reviewed with the receiving nurse. [place] Fall Assessment:    Presents to emergency department  because of falls (Syncope, seizure, or loss of consciousness): No  Age > 70: Yes  Altered Mental Status, Intoxication with alcohol or substance confusion (Disorientation, impaired judgment, poor safety awaremess, or inability to follow instructions): No  Impaired Mobility: Ambulates or transfers with assistive devices or assistance; Unable to ambulate or transer.: Yes             Lines:   Peripheral IV 10/08/23 Left Antecubital (Active)       Peripheral IV 10/08/23 Right Antecubital (Active)        Opportunity for questions and clarification was provided.       Patient transported with:  O2 @ 3lpm and Registered Nurse           Kerry Salvador RN  10/08/23 5842

## 2023-10-08 NOTE — ED NOTES
Bladder scan performed by Tricia Mirza RN with result of 230mL.       Ravinder Conde, [address] [place] [address]  10/08/23 9808

## 2023-10-08 NOTE — ED TRIAGE NOTES
Pt arrives via EMS coming from home with c/o inability to urinate for the past two days. Endorses decreased appetite and minimal intake since doctor's visit last weak. VS: /128, , SaO2 95% on 3LPM via nasal cannula (at baseline).

## 2023-10-08 NOTE — ED PROVIDER NOTES
feel it appropriate to admit the patient for further evaluation, observation and management. I communicated with the intensivist in detail about the patient and they agreed to see and admit the patient. Patient/family verbalized understanding and agreement with this course of action and plan. Complexity of Problems Addressed:  1 or more acute illnesses that pose a threat to life or bodily function. Data Reviewed and Analyzed:  Category 1:   I ordered each unique test.  I reviewed the results of each unique test.    Category 2:     Category 3: Discussion of management or test interpretation. See MDM / clinical course section above for details    Risk of Complications and/or Morbidity of Patient Management:  Prescription drug management performed. Drug therapy given requiring intensive monitoring for toxicity. Shared medical decision making was utilized in creating the patients health plan today. The patient was admitted and I have discussed patient management with the admitting provider. Critical Care:  Please see procedure note, 45 minutes excluding separately billable procedures and teaching time    Sepsis:  Is this patient to be included in the SEP-1 core measure due to severe sepsis or septic shock? No Exclusion criteria - the patient is NOT to be included for SEP-1 Core Measure due to:  Alternative explanation for abnormal labs/vitals that do not relate to sepsis, see MDM for further explanation     Orders Placed This Encounter   Procedures    Blood Culture 1    Blood Culture 2    XR CHEST PORTABLE    CT Head W/O Contrast    CBC with Auto Differential    BMP    Urinalysis    Lactate, Sepsis    CK    Phosphorus    Magnesium    Procalcitonin    TSH with Reflex    Hepatic Function Panel    Bladder scan    Weigh patient    Strict intake and output    IP CONSULT TO INTENSIVIST    Saline lock IV    Insert/Charge Yap Catheter - Simple      ED Meds Given:  Medications   norepinephrine (LEVOPHED) 4 mg or  pneumothorax, accounting for rotation of the patient to the left. The bony  thorax appears intact on this view. There are overlying radiopaque support  devices. Impression    NO ACUTE CARDIOPULMONARY DISEASE IDENTIFIED. CT Head W/O Contrast    Narrative    NONCONTRAST HEAD CT    CLINICAL HISTORY:  82year-old with dizziness. TECHNIQUE:  Axial images were obtained with spiral technique. Radiation dose  reduction was achieved using one or all of the following techniques: automated  exposure control, weight-based dosing, iterative reconstruction. COMPARISON:  May 12, 2021. REPORT:   Standard noncontrast head CT demonstrates no definite intracranial  mass effect, hemorrhage, or evidence of acute geographic infarction. White  matter hypodensities are most consistent with small vessel ischemic disease. The ventricles are normal in size and configuration, accounting for the  patient's age. There is persistent small mucous retention cyst or polyp in the  left maxillary antrum. Orbits  and other paranasal sinuses are clear where  imaged. Bone windows demonstrate no definite fracture or destruction. Impression    NO ACUTE INTRACRANIAL ABNORMALITY IDENTIFIED AT NONCONTRAST CT.          CBC with Auto Differential   Result Value Ref Range    WBC 12.8 (H) 4.3 - 11.1 K/uL    RBC 3.72 (L) 4.05 - 5.2 M/uL    Hemoglobin 10.7 (L) 11.7 - 15.4 g/dL    Hematocrit 32.5 (L) 35.8 - 46.3 %    MCV 87.4 82 - 102 FL    MCH 28.8 26.1 - 32.9 PG    MCHC 32.9 31.4 - 35.0 g/dL    RDW 14.5 11.9 - 14.6 %    Platelets 183 087 - 801 K/uL    MPV 10.9 9.4 - 12.3 FL    nRBC 0.00 0.0 - 0.2 K/uL    Differential Type AUTOMATED      Neutrophils % 78 43 - 78 %    Lymphocytes % 8 (L) 13 - 44 %    Monocytes % 12 4.0 - 12.0 %    Eosinophils % 1 0.5 - 7.8 %    Basophils % 0 0.0 - 2.0 %    Immature Granulocytes 1 0.0 - 5.0 %    Neutrophils Absolute 10.0 (H) 1.7 - 8.2 K/UL    Lymphocytes Absolute 1.0 0.5 - 4.6 K/UL    Monocytes

## 2023-10-09 ENCOUNTER — APPOINTMENT (OUTPATIENT)
Dept: NON INVASIVE DIAGNOSTICS | Age: 83
End: 2023-10-09
Payer: MEDICARE

## 2023-10-09 LAB
ALBUMIN SERPL-MCNC: 2 G/DL (ref 3.2–4.6)
ALBUMIN/GLOB SERPL: 0.6 (ref 0.4–1.6)
ALP SERPL-CCNC: 109 U/L (ref 50–136)
ALT SERPL-CCNC: 70 U/L (ref 12–65)
ANION GAP SERPL CALC-SCNC: 13 MMOL/L (ref 2–11)
ANION GAP SERPL CALC-SCNC: 9 MMOL/L (ref 2–11)
AST SERPL-CCNC: 56 U/L (ref 15–37)
BASOPHILS # BLD: 0 K/UL (ref 0–0.2)
BASOPHILS NFR BLD: 0 % (ref 0–2)
BILIRUB SERPL-MCNC: 0.6 MG/DL (ref 0.2–1.1)
BUN SERPL-MCNC: 125 MG/DL (ref 8–23)
BUN SERPL-MCNC: 95 MG/DL (ref 8–23)
CA-I BLD-MCNC: 4.19 MG/DL (ref 4–5.2)
CALCIUM SERPL-MCNC: 7.4 MG/DL (ref 8.3–10.4)
CALCIUM SERPL-MCNC: 7.6 MG/DL (ref 8.3–10.4)
CHLORIDE SERPL-SCNC: 104 MMOL/L (ref 101–110)
CHLORIDE SERPL-SCNC: 104 MMOL/L (ref 101–110)
CO2 SERPL-SCNC: 28 MMOL/L (ref 21–32)
CO2 SERPL-SCNC: 29 MMOL/L (ref 21–32)
CREAT SERPL-MCNC: 3.8 MG/DL (ref 0.6–1)
CREAT SERPL-MCNC: 5.5 MG/DL (ref 0.6–1)
DIFFERENTIAL METHOD BLD: ABNORMAL
ECHO AO ASC DIAM: 2.6 CM
ECHO AO ASCENDING AORTA INDEX: 1.6 CM/M2
ECHO AO ROOT DIAM: 2.6 CM
ECHO AO ROOT INDEX: 1.6 CM/M2
ECHO AV AREA PEAK VELOCITY: 1.5 CM2
ECHO AV AREA VTI: 1.4 CM2
ECHO AV AREA/BSA PEAK VELOCITY: 0.9 CM2/M2
ECHO AV AREA/BSA VTI: 0.9 CM2/M2
ECHO AV MEAN GRADIENT: 12 MMHG
ECHO AV MEAN VELOCITY: 1.7 M/S
ECHO AV PEAK GRADIENT: 24 MMHG
ECHO AV PEAK VELOCITY: 2.4 M/S
ECHO AV VELOCITY RATIO: 0.54
ECHO AV VTI: 58.3 CM
ECHO EST RA PRESSURE: 8 MMHG
ECHO IVC PROX: 2.2 CM
ECHO LA AREA 2C: 18.6 CM2
ECHO LA AREA 4C: 18 CM2
ECHO LA DIAMETER INDEX: 2.72 CM/M2
ECHO LA DIAMETER: 4.4 CM
ECHO LA MAJOR AXIS: 5.9 CM
ECHO LA MINOR AXIS: 5.3 CM
ECHO LA TO AORTIC ROOT RATIO: 1.69
ECHO LA VOL 2C: 53 ML (ref 22–52)
ECHO LA VOL 4C: 44 ML (ref 22–52)
ECHO LA VOL BP: 50 ML (ref 22–52)
ECHO LA VOL/BSA BIPLANE: 31 ML/M2 (ref 16–34)
ECHO LA VOLUME INDEX A2C: 33 ML/M2 (ref 16–34)
ECHO LA VOLUME INDEX A4C: 27 ML/M2 (ref 16–34)
ECHO LV E' LATERAL VELOCITY: 10 CM/S
ECHO LV E' SEPTAL VELOCITY: 7 CM/S
ECHO LV EDV A2C: 79 ML
ECHO LV EDV A4C: 79 ML
ECHO LV EDV INDEX A4C: 49 ML/M2
ECHO LV EDV NDEX A2C: 49 ML/M2
ECHO LV EJECTION FRACTION A2C: 62 %
ECHO LV EJECTION FRACTION A4C: 62 %
ECHO LV EJECTION FRACTION BIPLANE: 61 % (ref 55–100)
ECHO LV ESV A2C: 30 ML
ECHO LV ESV A4C: 30 ML
ECHO LV ESV INDEX A2C: 19 ML/M2
ECHO LV ESV INDEX A4C: 19 ML/M2
ECHO LV FRACTIONAL SHORTENING: 36 % (ref 28–44)
ECHO LV INTERNAL DIMENSION DIASTOLE INDEX: 2.78 CM/M2
ECHO LV INTERNAL DIMENSION DIASTOLIC: 4.5 CM (ref 3.9–5.3)
ECHO LV INTERNAL DIMENSION SYSTOLIC INDEX: 1.79 CM/M2
ECHO LV INTERNAL DIMENSION SYSTOLIC: 2.9 CM
ECHO LV IVSD: 1 CM (ref 0.6–0.9)
ECHO LV MASS 2D: 142.9 G (ref 67–162)
ECHO LV MASS INDEX 2D: 88.2 G/M2 (ref 43–95)
ECHO LV POSTERIOR WALL DIASTOLIC: 0.9 CM (ref 0.6–0.9)
ECHO LV RELATIVE WALL THICKNESS RATIO: 0.4
ECHO LVOT AREA: 2.8 CM2
ECHO LVOT AV VTI INDEX: 0.48
ECHO LVOT DIAM: 1.9 CM
ECHO LVOT MEAN GRADIENT: 4 MMHG
ECHO LVOT PEAK GRADIENT: 7 MMHG
ECHO LVOT PEAK VELOCITY: 1.3 M/S
ECHO LVOT STROKE VOLUME INDEX: 49.3 ML/M2
ECHO LVOT SV: 79.9 ML
ECHO LVOT VTI: 28.2 CM
ECHO MV A VELOCITY: 0.77 M/S
ECHO MV E DECELERATION TIME (DT): 203 MS
ECHO MV E VELOCITY: 0.95 M/S
ECHO MV E/A RATIO: 1.23
ECHO MV E/E' LATERAL: 9.5
ECHO MV E/E' RATIO (AVERAGED): 11.54
ECHO MV E/E' SEPTAL: 13.57
ECHO PV ACCELERATION TIME (AT): 92 MS
ECHO PV MAX VELOCITY: 1.3 M/S
ECHO PV PEAK GRADIENT: 6 MMHG
ECHO RIGHT VENTRICULAR SYSTOLIC PRESSURE (RVSP): 41 MMHG
ECHO RV BASAL DIMENSION: 3.4 CM
ECHO RV FREE WALL PEAK S': 11 CM/S
ECHO RV INTERNAL DIMENSION: 3.4 CM
ECHO RV TAPSE: 2.2 CM (ref 1.7–?)
ECHO TV REGURGITANT MAX VELOCITY: 2.88 M/S
ECHO TV REGURGITANT PEAK GRADIENT: 33 MMHG
EOSINOPHIL # BLD: 0.1 K/UL (ref 0–0.8)
EOSINOPHIL NFR BLD: 1 % (ref 0.5–7.8)
ERYTHROCYTE [DISTWIDTH] IN BLOOD BY AUTOMATED COUNT: 14.5 % (ref 11.9–14.6)
GLOBULIN SER CALC-MCNC: 3.2 G/DL (ref 2.8–4.5)
GLUCOSE BLD STRIP.AUTO-MCNC: 122 MG/DL (ref 65–100)
GLUCOSE BLD STRIP.AUTO-MCNC: 141 MG/DL (ref 65–100)
GLUCOSE BLD STRIP.AUTO-MCNC: 152 MG/DL (ref 65–100)
GLUCOSE BLD STRIP.AUTO-MCNC: 182 MG/DL (ref 65–100)
GLUCOSE SERPL-MCNC: 141 MG/DL (ref 65–100)
GLUCOSE SERPL-MCNC: 72 MG/DL (ref 65–100)
HCT VFR BLD AUTO: 34.4 % (ref 35.8–46.3)
HGB BLD-MCNC: 10.9 G/DL (ref 11.7–15.4)
IMM GRANULOCYTES # BLD AUTO: 0.1 K/UL (ref 0–0.5)
IMM GRANULOCYTES NFR BLD AUTO: 1 % (ref 0–5)
LACTATE SERPL-SCNC: 1.4 MMOL/L (ref 0.4–2)
LYMPHOCYTES # BLD: 0.8 K/UL (ref 0.5–4.6)
LYMPHOCYTES NFR BLD: 7 % (ref 13–44)
MAGNESIUM SERPL-MCNC: 1.7 MG/DL (ref 1.8–2.4)
MAGNESIUM SERPL-MCNC: 1.8 MG/DL (ref 1.8–2.4)
MCH RBC QN AUTO: 28.6 PG (ref 26.1–32.9)
MCHC RBC AUTO-ENTMCNC: 31.7 G/DL (ref 31.4–35)
MCV RBC AUTO: 90.3 FL (ref 82–102)
MONOCYTES # BLD: 1.3 K/UL (ref 0.1–1.3)
MONOCYTES NFR BLD: 11 % (ref 4–12)
NEUTS SEG # BLD: 9.1 K/UL (ref 1.7–8.2)
NEUTS SEG NFR BLD: 80 % (ref 43–78)
NRBC # BLD: 0 K/UL (ref 0–0.2)
NT PRO BNP: 2889 PG/ML
PHOSPHATE SERPL-MCNC: 6.3 MG/DL (ref 2.3–3.7)
PLATELET # BLD AUTO: 365 K/UL (ref 150–450)
PLATELET COMMENT: ADEQUATE
PMV BLD AUTO: 10.7 FL (ref 9.4–12.3)
POTASSIUM SERPL-SCNC: 3 MMOL/L (ref 3.5–5.1)
POTASSIUM SERPL-SCNC: 3.3 MMOL/L (ref 3.5–5.1)
PROT SERPL-MCNC: 5.2 G/DL (ref 6.3–8.2)
RBC # BLD AUTO: 3.81 M/UL (ref 4.05–5.2)
RBC MORPH BLD: ABNORMAL
SERVICE CMNT-IMP: ABNORMAL
SODIUM SERPL-SCNC: 142 MMOL/L (ref 133–143)
SODIUM SERPL-SCNC: 145 MMOL/L (ref 133–143)
T3 SERPL-MCNC: 0.33 NG/ML (ref 0.6–1.81)
T4 FREE SERPL-MCNC: 1.1 NG/DL (ref 0.78–1.46)
TSH W FREE THYROID IF ABNORMAL: 0.58 UIU/ML (ref 0.36–3.74)
WBC # BLD AUTO: 11.4 K/UL (ref 4.3–11.1)
WBC MORPH BLD: ABNORMAL

## 2023-10-09 PROCEDURE — 93306 TTE W/DOPPLER COMPLETE: CPT

## 2023-10-09 PROCEDURE — 6360000002 HC RX W HCPCS: Performed by: INTERNAL MEDICINE

## 2023-10-09 PROCEDURE — 6370000000 HC RX 637 (ALT 250 FOR IP): Performed by: INTERNAL MEDICINE

## 2023-10-09 PROCEDURE — 83735 ASSAY OF MAGNESIUM: CPT

## 2023-10-09 PROCEDURE — 2500000003 HC RX 250 WO HCPCS: Performed by: INTERNAL MEDICINE

## 2023-10-09 PROCEDURE — 80053 COMPREHEN METABOLIC PANEL: CPT

## 2023-10-09 PROCEDURE — 2580000003 HC RX 258: Performed by: INTERNAL MEDICINE

## 2023-10-09 PROCEDURE — 84480 ASSAY TRIIODOTHYRONINE (T3): CPT

## 2023-10-09 PROCEDURE — 36415 COLL VENOUS BLD VENIPUNCTURE: CPT

## 2023-10-09 PROCEDURE — 93306 TTE W/DOPPLER COMPLETE: CPT | Performed by: INTERNAL MEDICINE

## 2023-10-09 PROCEDURE — 2700000000 HC OXYGEN THERAPY PER DAY

## 2023-10-09 PROCEDURE — 85025 COMPLETE CBC W/AUTO DIFF WBC: CPT

## 2023-10-09 PROCEDURE — 83880 ASSAY OF NATRIURETIC PEPTIDE: CPT

## 2023-10-09 PROCEDURE — 99232 SBSQ HOSP IP/OBS MODERATE 35: CPT | Performed by: INTERNAL MEDICINE

## 2023-10-09 PROCEDURE — 82962 GLUCOSE BLOOD TEST: CPT

## 2023-10-09 PROCEDURE — 97535 SELF CARE MNGMENT TRAINING: CPT

## 2023-10-09 PROCEDURE — 97165 OT EVAL LOW COMPLEX 30 MIN: CPT

## 2023-10-09 PROCEDURE — 84443 ASSAY THYROID STIM HORMONE: CPT

## 2023-10-09 PROCEDURE — 82330 ASSAY OF CALCIUM: CPT

## 2023-10-09 PROCEDURE — 2000000000 HC ICU R&B

## 2023-10-09 PROCEDURE — 83605 ASSAY OF LACTIC ACID: CPT

## 2023-10-09 PROCEDURE — 84439 ASSAY OF FREE THYROXINE: CPT

## 2023-10-09 PROCEDURE — 2580000003 HC RX 258

## 2023-10-09 PROCEDURE — 84100 ASSAY OF PHOSPHORUS: CPT

## 2023-10-09 RX ORDER — POTASSIUM CHLORIDE 20 MEQ/1
40 TABLET, EXTENDED RELEASE ORAL PRN
Status: DISCONTINUED | OUTPATIENT
Start: 2023-10-09 | End: 2023-10-13 | Stop reason: HOSPADM

## 2023-10-09 RX ORDER — 0.9 % SODIUM CHLORIDE 0.9 %
500 INTRAVENOUS SOLUTION INTRAVENOUS ONCE
Status: COMPLETED | OUTPATIENT
Start: 2023-10-09 | End: 2023-10-09

## 2023-10-09 RX ORDER — POTASSIUM CHLORIDE 7.45 MG/ML
10 INJECTION INTRAVENOUS PRN
Status: DISCONTINUED | OUTPATIENT
Start: 2023-10-09 | End: 2023-10-13 | Stop reason: HOSPADM

## 2023-10-09 RX ORDER — MAGNESIUM SULFATE IN WATER 40 MG/ML
2000 INJECTION, SOLUTION INTRAVENOUS PRN
Status: DISCONTINUED | OUTPATIENT
Start: 2023-10-09 | End: 2023-10-13 | Stop reason: HOSPADM

## 2023-10-09 RX ORDER — 0.9 % SODIUM CHLORIDE 0.9 %
500 INTRAVENOUS SOLUTION INTRAVENOUS ONCE
Status: COMPLETED | OUTPATIENT
Start: 2023-10-09 | End: 2023-10-10

## 2023-10-09 RX ADMIN — SODIUM CHLORIDE, PRESERVATIVE FREE 10 ML: 5 INJECTION INTRAVENOUS at 08:27

## 2023-10-09 RX ADMIN — SODIUM CHLORIDE 500 ML: 9 INJECTION, SOLUTION INTRAVENOUS at 21:37

## 2023-10-09 RX ADMIN — HEPARIN SODIUM 5000 UNITS: 5000 INJECTION INTRAVENOUS; SUBCUTANEOUS at 13:31

## 2023-10-09 RX ADMIN — SODIUM CHLORIDE, PRESERVATIVE FREE 10 ML: 5 INJECTION INTRAVENOUS at 19:50

## 2023-10-09 RX ADMIN — TUBERCULIN PURIFIED PROTEIN DERIVATIVE 5 UNITS: 5 INJECTION, SOLUTION INTRADERMAL at 13:31

## 2023-10-09 RX ADMIN — SODIUM CHLORIDE, POTASSIUM CHLORIDE, SODIUM LACTATE AND CALCIUM CHLORIDE: 600; 310; 30; 20 INJECTION, SOLUTION INTRAVENOUS at 23:28

## 2023-10-09 RX ADMIN — HEPARIN SODIUM 5000 UNITS: 5000 INJECTION INTRAVENOUS; SUBCUTANEOUS at 21:50

## 2023-10-09 RX ADMIN — SODIUM CHLORIDE 500 ML: 9 INJECTION, SOLUTION INTRAVENOUS at 18:22

## 2023-10-09 RX ADMIN — SODIUM CHLORIDE, POTASSIUM CHLORIDE, SODIUM LACTATE AND CALCIUM CHLORIDE: 600; 310; 30; 20 INJECTION, SOLUTION INTRAVENOUS at 10:17

## 2023-10-09 RX ADMIN — SODIUM CHLORIDE, POTASSIUM CHLORIDE, SODIUM LACTATE AND CALCIUM CHLORIDE: 600; 310; 30; 20 INJECTION, SOLUTION INTRAVENOUS at 16:52

## 2023-10-09 RX ADMIN — ONDANSETRON 4 MG: 4 TABLET, ORALLY DISINTEGRATING ORAL at 17:18

## 2023-10-09 RX ADMIN — SODIUM CHLORIDE, POTASSIUM CHLORIDE, SODIUM LACTATE AND CALCIUM CHLORIDE: 600; 310; 30; 20 INJECTION, SOLUTION INTRAVENOUS at 03:35

## 2023-10-09 RX ADMIN — HEPARIN SODIUM 5000 UNITS: 5000 INJECTION INTRAVENOUS; SUBCUTANEOUS at 06:03

## 2023-10-09 RX ADMIN — SODIUM CHLORIDE 500 ML: 9 INJECTION, SOLUTION INTRAVENOUS at 19:50

## 2023-10-09 ASSESSMENT — PAIN SCALES - GENERAL
PAINLEVEL_OUTOF10: 0

## 2023-10-09 NOTE — PROGRESS NOTES
Initial visit made to patient and a prayer was provided. She was resting.     Jose Meyer, [address], MRE

## 2023-10-09 NOTE — ACP (ADVANCE CARE PLANNING)
Advance Care Planning     Advance Care Planning Activator (Inpatient)  Conversation Note      Date of ACP Conversation: 10/9/2023         ACP Activator: Maame Hawk RN    {When Decision Maker makes decisions on behalf of the incapacitated patient: Decision Maker is asked to consider and make decisions based on patient values, known preferences, or best interests. Health Care Decision Maker: AYAAN on file. Current Designated Health Care Decision Maker:     Primary Decision Maker: [facility] - Emergency Contact - [phone]    Secondary Decision Maker: Willie Dang [place] - [phone]  Click here to complete Healthcare Decision Makers including section of the Healthcare Decision Maker Relationship (ie \"Primary\")  Today we documented Decision Maker(s) consistent with ACP documents on file.

## 2023-10-09 NOTE — PROGRESS NOTES
ACUTE OCCUPATIONAL THERAPY GOALS:   (Developed with and agreed upon by patient and/or caregiver.)  1. Patient will complete lower body bathing and dressing with SBA and adaptive equipment as needed. 2. Patient will complete toileting with SBA. 3. Patient will tolerate 30 minutes of OT treatment with 1-2 rest breaks to increase activity tolerance for ADLs. 4. Patient will complete functional transfers with SBA and adaptive equipment as needed. 5. Patient will complete functional mobility for household distances with SBA and adaptive equipment as needed. 6. Patient will complete self-grooming while standing edge of sink with SBA and adaptive equipment as needed. Timeframe: 7 visits       OCCUPATIONAL THERAPY Initial Assessment, Daily Note, and AM       OT Visit Days: 1   Acknowledge Orders  Time  OT Charge Capture  Rehab Caseload Tracker      Nikita Webster is a 82 y.o. female   PRIMARY DIAGNOSIS: Acute renal failure (ARF) ([address])  Hypovolemic shock ([address]) [R57.1]  Volume depletion [E86.9]  Acute renal failure (ARF) (HCC) [N17.9]  Poor fluid intake [R63.8]  Acute renal failure, unspecified acute renal failure type ([address]) [N17.9]       Reason for Referral: Generalized Muscle Weakness (M62.81)  Inpatient: Payor: Alexis Barr / Plan: Deyanne Beverage / Product Type: *No Product type* /     ASSESSMENT:     REHAB RECOMMENDATIONS:   Recommendation to date pending progress:  Setting:  Home Health Therapy    Equipment:    To Be Determined     ASSESSMENT:  Ms. Radonna Cooks presents with deficits in overall strength, activity tolerance, activities of daily living performance, and functional mobility. Presents in ICU for acute renal failure; alert and very pleasant. Resting on 3L 02. Today, BUE are generally decreased but WFL. Min A  for functional bed mobility/supine <> sit transfer to edge of bed; fair + unsupported edge of bed sitting balance. Max A to rashid socks in preparation for OOB activity.  Min A for Chair, Needs within reach, and RN notified    INTERDISCIPLINARY COLLABORATION:  RN/ PCT and OT/ SHELBY    EDUCATION:  Education Given To: Patient  Education Provided: Role of Therapy;Plan of Care  Barriers to Learning: None  Education Outcome: Verbalized understanding;Demonstrated understanding     TOTAL TREATMENT DURATION AND TIME:  Time In: 1002  Time Out: [address]  Minutes: RYLEE Espino

## 2023-10-09 NOTE — INTERDISCIPLINARY ROUNDS
Multi-D Rounds/Checklist (leapfrog):  Lines: can any be removed?: None    Urinary Catheter 10/08/23 Yap (Active)      DVT Prophylaxis: Ordered  Vent: N/A  Nutrition Ordered/appropriate: Ordered  Can antibiotics or other drugs be stopped? N/A Yes/No  Inpat Anti-Infectives (From admission, onward)      None          Consults needed: None  A: Is pain control adequate? (has PRNs? Stop drip?) Yes  B: Sedation break and SBT? N/A  C: Is sedation choice appropriate? N/A  D: Delirium/CAM-ICU? No  E: Mobility goals/appropriateness? Yes  F: Family update and plan? ? is surrogate decision maker and is being updated daily by primary attending and nursing staff.     Eva Sampson, APRN - CNP

## 2023-10-09 NOTE — PLAN OF CARE
Pt tolerating rehydration with LR. Requires continued monitoring of electrolytes and kidney function.

## 2023-10-10 PROBLEM — E83.42 HYPOMAGNESEMIA: Status: ACTIVE | Noted: 2023-10-10

## 2023-10-10 PROBLEM — R57.1 HYPOVOLEMIC SHOCK (HCC): Status: RESOLVED | Noted: 2023-10-08 | Resolved: 2023-10-10

## 2023-10-10 PROBLEM — R63.8 POOR FLUID INTAKE: Status: RESOLVED | Noted: 2023-10-08 | Resolved: 2023-10-10

## 2023-10-10 PROBLEM — N19 ACUTE PRERENAL AZOTEMIA: Status: ACTIVE | Noted: 2023-10-10

## 2023-10-10 LAB
ANION GAP SERPL CALC-SCNC: 5 MMOL/L (ref 2–11)
BUN SERPL-MCNC: 73 MG/DL (ref 8–23)
CALCIUM SERPL-MCNC: 7.6 MG/DL (ref 8.3–10.4)
CHLORIDE SERPL-SCNC: 110 MMOL/L (ref 101–110)
CO2 SERPL-SCNC: 30 MMOL/L (ref 21–32)
CREAT SERPL-MCNC: 2.5 MG/DL (ref 0.6–1)
ERYTHROCYTE [DISTWIDTH] IN BLOOD BY AUTOMATED COUNT: 14.8 % (ref 11.9–14.6)
GLUCOSE BLD STRIP.AUTO-MCNC: 101 MG/DL (ref 65–100)
GLUCOSE BLD STRIP.AUTO-MCNC: 114 MG/DL (ref 65–100)
GLUCOSE BLD STRIP.AUTO-MCNC: 76 MG/DL (ref 65–100)
GLUCOSE BLD STRIP.AUTO-MCNC: 95 MG/DL (ref 65–100)
GLUCOSE SERPL-MCNC: 104 MG/DL (ref 65–100)
HCT VFR BLD AUTO: 31.4 % (ref 35.8–46.3)
HGB BLD-MCNC: 10.3 G/DL (ref 11.7–15.4)
MAGNESIUM SERPL-MCNC: 1.4 MG/DL (ref 1.8–2.4)
MCH RBC QN AUTO: 29.4 PG (ref 26.1–32.9)
MCHC RBC AUTO-ENTMCNC: 32.8 G/DL (ref 31.4–35)
MCV RBC AUTO: 89.7 FL (ref 82–102)
MM INDURATION, POC: 0 MM (ref 0–5)
NRBC # BLD: 0 K/UL (ref 0–0.2)
PLATELET # BLD AUTO: 306 K/UL (ref 150–450)
PMV BLD AUTO: 11 FL (ref 9.4–12.3)
POTASSIUM SERPL-SCNC: 3.9 MMOL/L (ref 3.5–5.1)
PPD, POC: NEGATIVE
RBC # BLD AUTO: 3.5 M/UL (ref 4.05–5.2)
SERVICE CMNT-IMP: ABNORMAL
SERVICE CMNT-IMP: ABNORMAL
SERVICE CMNT-IMP: NORMAL
SERVICE CMNT-IMP: NORMAL
SODIUM SERPL-SCNC: 145 MMOL/L (ref 133–143)
WBC # BLD AUTO: 11.1 K/UL (ref 4.3–11.1)

## 2023-10-10 PROCEDURE — 80048 BASIC METABOLIC PNL TOTAL CA: CPT

## 2023-10-10 PROCEDURE — 97530 THERAPEUTIC ACTIVITIES: CPT

## 2023-10-10 PROCEDURE — 6360000002 HC RX W HCPCS

## 2023-10-10 PROCEDURE — 6370000000 HC RX 637 (ALT 250 FOR IP): Performed by: NURSE PRACTITIONER

## 2023-10-10 PROCEDURE — 97535 SELF CARE MNGMENT TRAINING: CPT

## 2023-10-10 PROCEDURE — 2580000003 HC RX 258: Performed by: INTERNAL MEDICINE

## 2023-10-10 PROCEDURE — 6360000002 HC RX W HCPCS: Performed by: INTERNAL MEDICINE

## 2023-10-10 PROCEDURE — 83735 ASSAY OF MAGNESIUM: CPT

## 2023-10-10 PROCEDURE — 2580000003 HC RX 258: Performed by: NURSE PRACTITIONER

## 2023-10-10 PROCEDURE — 99232 SBSQ HOSP IP/OBS MODERATE 35: CPT | Performed by: INTERNAL MEDICINE

## 2023-10-10 PROCEDURE — 6370000000 HC RX 637 (ALT 250 FOR IP): Performed by: INTERNAL MEDICINE

## 2023-10-10 PROCEDURE — 36415 COLL VENOUS BLD VENIPUNCTURE: CPT

## 2023-10-10 PROCEDURE — 82962 GLUCOSE BLOOD TEST: CPT

## 2023-10-10 PROCEDURE — 97161 PT EVAL LOW COMPLEX 20 MIN: CPT

## 2023-10-10 PROCEDURE — 85027 COMPLETE CBC AUTOMATED: CPT

## 2023-10-10 PROCEDURE — 1100000000 HC RM PRIVATE

## 2023-10-10 RX ORDER — SACUBITRIL AND VALSARTAN 24; 26 MG/1; MG/1
1 TABLET, FILM COATED ORAL 2 TIMES DAILY
Status: ON HOLD | COMMUNITY
End: 2023-10-13 | Stop reason: HOSPADM

## 2023-10-10 RX ORDER — LEVOTHYROXINE SODIUM 0.05 MG/1
25 TABLET ORAL
Status: DISCONTINUED | OUTPATIENT
Start: 2023-10-10 | End: 2023-10-13 | Stop reason: HOSPADM

## 2023-10-10 RX ORDER — HYDROCHLOROTHIAZIDE 12.5 MG/1
12.5 CAPSULE, GELATIN COATED ORAL DAILY
Status: ON HOLD | COMMUNITY
End: 2023-10-13 | Stop reason: HOSPADM

## 2023-10-10 RX ORDER — CARVEDILOL 12.5 MG/1
12.5 TABLET ORAL 2 TIMES DAILY WITH MEALS
COMMUNITY

## 2023-10-10 RX ORDER — LANOLIN ALCOHOL/MO/W.PET/CERES
400 CREAM (GRAM) TOPICAL DAILY
Status: DISCONTINUED | OUTPATIENT
Start: 2023-10-10 | End: 2023-10-13 | Stop reason: HOSPADM

## 2023-10-10 RX ORDER — FUROSEMIDE 40 MG/1
40 TABLET ORAL 2 TIMES DAILY
Status: ON HOLD | COMMUNITY
End: 2023-10-13 | Stop reason: SDUPTHER

## 2023-10-10 RX ADMIN — MAGNESIUM SULFATE HEPTAHYDRATE 2000 MG: 40 INJECTION, SOLUTION INTRAVENOUS at 11:45

## 2023-10-10 RX ADMIN — POTASSIUM CHLORIDE 10 MEQ: 7.46 INJECTION, SOLUTION INTRAVENOUS at 05:47

## 2023-10-10 RX ADMIN — HEPARIN SODIUM 5000 UNITS: 5000 INJECTION INTRAVENOUS; SUBCUTANEOUS at 22:15

## 2023-10-10 RX ADMIN — HEPARIN SODIUM 5000 UNITS: 5000 INJECTION INTRAVENOUS; SUBCUTANEOUS at 06:30

## 2023-10-10 RX ADMIN — HEPARIN SODIUM 5000 UNITS: 5000 INJECTION INTRAVENOUS; SUBCUTANEOUS at 14:54

## 2023-10-10 RX ADMIN — SODIUM CHLORIDE, POTASSIUM CHLORIDE, SODIUM LACTATE AND CALCIUM CHLORIDE: 600; 310; 30; 20 INJECTION, SOLUTION INTRAVENOUS at 10:05

## 2023-10-10 RX ADMIN — LEVOTHYROXINE SODIUM 25 MCG: 0.05 TABLET ORAL at 09:30

## 2023-10-10 RX ADMIN — SODIUM CHLORIDE, PRESERVATIVE FREE 10 ML: 5 INJECTION INTRAVENOUS at 08:13

## 2023-10-10 RX ADMIN — POTASSIUM CHLORIDE 10 MEQ: 7.46 INJECTION, SOLUTION INTRAVENOUS at 03:16

## 2023-10-10 RX ADMIN — POTASSIUM CHLORIDE 10 MEQ: 7.46 INJECTION, SOLUTION INTRAVENOUS at 02:19

## 2023-10-10 RX ADMIN — POTASSIUM CHLORIDE 10 MEQ: 7.46 INJECTION, SOLUTION INTRAVENOUS at 04:29

## 2023-10-10 RX ADMIN — SODIUM CHLORIDE, PRESERVATIVE FREE 5 ML: 5 INJECTION INTRAVENOUS at 20:50

## 2023-10-10 RX ADMIN — SODIUM CHLORIDE, POTASSIUM CHLORIDE, SODIUM LACTATE AND CALCIUM CHLORIDE: 600; 310; 30; 20 INJECTION, SOLUTION INTRAVENOUS at 06:40

## 2023-10-10 RX ADMIN — SODIUM CHLORIDE, POTASSIUM CHLORIDE, SODIUM LACTATE AND CALCIUM CHLORIDE: 600; 310; 30; 20 INJECTION, SOLUTION INTRAVENOUS at 23:03

## 2023-10-10 RX ADMIN — POTASSIUM CHLORIDE 10 MEQ: 7.46 INJECTION, SOLUTION INTRAVENOUS at 01:06

## 2023-10-10 RX ADMIN — MAGNESIUM GLUCONATE 500 MG ORAL TABLET 400 MG: 500 TABLET ORAL at 14:54

## 2023-10-10 RX ADMIN — POTASSIUM CHLORIDE 10 MEQ: 7.46 INJECTION, SOLUTION INTRAVENOUS at 00:16

## 2023-10-10 ASSESSMENT — PAIN SCALES - GENERAL
PAINLEVEL_OUTOF10: 0

## 2023-10-10 NOTE — PROGRESS NOTES
A follow up visit was made to the patient. Emotional support, spiritual presence and   prayer were provided for the patient. She had just completed a therapy session with our physical therapists. She is now being transferred to one of the medical floors. She was thankful for the 's prayer.       Neil Quintero, [address], [facility]

## 2023-10-10 NOTE — PROGRESS NOTES
ACUTE PHYSICAL THERAPY GOALS:   (Developed with and agreed upon by patient and/or caregiver. )  LTG:  (1.)Ms. Paige Matt will move from supine to sit and sit to supine , scoot up and down, and roll side to side in bed with INDEPENDENCE within 7 treatment day(s). (2.)Ms. Paige Matt will transfer from bed to chair and chair to bed with MODIFIED INDEPENDENCE using the least restrictive device within 7 treatment day(s). (3.)Ms. Hernandez will ambulate with MODIFIED INDEPENDENCE for 250 feet with the least restrictive device within 7 treatment day(s). (4.)Ms. Paige Matt will participate in therapeutic activity/exercises x 25 minutes for increased activity tolerance within 7 treatment days. (5.)Ms. Paige aMtt will perform standing static and dynamic balance activities x 15 minutes with SUPERVISION to improve safety within 7 day(s). ________________________________________________________________________________________________        PHYSICAL THERAPY Initial Assessment and AM  (Link to Caseload Tracking: PT Visit Days : 1  Acknowledge Orders  Time In/Out  PT Charge Capture  Rehab Caseload Tracker    Nicole Cleary is a 82 y.o. female   PRIMARY DIAGNOSIS: Acute renal failure (ARF) ([address])  Hypovolemic shock ([address]) [R57.1]  Volume depletion [E86.9]  Acute renal failure (ARF) (HCC) [N17.9]  Poor fluid intake [R63.8]  Acute renal failure, unspecified acute renal failure type ([address]) [N17.9]       Reason for Referral: Generalized Muscle Weakness (M62.81)  Difficulty in walking, Not elsewhere classified (R26.2)  Inpatient: Payor: Tory Medina / Plan: Resfracisco Slimhayden / Product Type: *No Product type* /     ASSESSMENT:     REHAB RECOMMENDATIONS:   Recommendation to date pending progress:  Setting:  Short-term Rehab    Equipment:    To Be Determined     ASSESSMENT:  Ms. Paige Matt was admitted to the hospital with c/o poor appetite with inability to urinate. [facility] and decreased strength in B LEs.   Pt was

## 2023-10-10 NOTE — PROGRESS NOTES
ACUTE OCCUPATIONAL THERAPY GOALS:   (Developed with and agreed upon by patient and/or caregiver.)  1. Patient will complete lower body bathing and dressing with SBA and adaptive equipment as needed. 2. Patient will complete toileting with SBA. 3. Patient will tolerate 30 minutes of OT treatment with 1-2 rest breaks to increase activity tolerance for ADLs. 4. Patient will complete functional transfers with SBA and adaptive equipment as needed. 5. Patient will complete functional mobility for household distances with SBA and adaptive equipment as needed. 6. Patient will complete self-grooming while standing edge of sink with SBA and adaptive equipment as needed. Timeframe: 7 visits         OCCUPATIONAL THERAPY Daily Note and AM     OT Visit Days: 2   Time  OT Charge Capture  Rehab Caseload Tracker  OT Orders    Nikita Webster is a 82 y.o. female   PRIMARY DIAGNOSIS: Acute renal failure (ARF) ([address])  Hypovolemic shock ([address]) [R57.1]  Volume depletion [E86.9]  Acute renal failure (ARF) (HCC) [N17.9]  Poor fluid intake [R63.8]  Acute renal failure, unspecified acute renal failure type ([address]) [N17.9]       Inpatient: Payor: [facility] MEDICARE / Plan: [employer] / Product Type: *No Product type* /     ASSESSMENT:     REHAB RECOMMENDATIONS: CURRENT LEVEL OF FUNCTION:  (Most Recently Demonstrated)   Recommendation to date pending progress:  Setting:  Short-term Rehab    Equipment:    To Be Determined Bathing:  Maximal Assist - LB  Dressing:  Minimal Assist - donning new gown  Feeding/Grooming:  Stand by Assist - washing face  Toileting:  Maximal Assist- susie-care in static standing   Functional Mobility:  Minimal Assist x RW     ASSESSMENT:  Ms. Radonna Cooks continues to present with deficits in overall strength, activity tolerance, ADL performance, and functional mobility. Remains in ICU; resting on 3L 02. Alert and very pleasant.  Requiring min A x 1-2 for bed mobility/supine <> sit transfer to EOB; fair + EOB sitting balance. Min A for sit <> stand; min A x RW for mobility to chair. Short seated rest break provided before standing to facilitate susie-care and LB bathing. Patient able to maintain static standing balance with min A x RW while susie-care was completed. Returned to sitting to complete self-grooming tasks with SBA. Upon completion, pt ambulated short distances to hallway with CGA x 2 x RW and placed sitting upright in wheelchair in preparation for transport HEIDY. Slowly progressing towards therapy goals and plan of care. Will continue OT efforts as indicated. SUBJECTIVE:     Ms. Ilia Salas states, \"It's like a bumper sticker. \"     Social/Functional Lives With: Alone  Bathroom Equipment: Grab bars in shower, Shower chair  Home Equipment: Hamzah Mac, rolling, Oxygen  Receives Help From: Friend(s)  ADL Assistance: Needs assistance  Toileting: Independent  Homemaking Assistance: Needs assistance  Homemaking Responsibilities: Yes  Ambulation Assistance: Independent  Transfer Assistance: Independent  Active : No  Patient's  Info: friend  Occupation: Retired    OBJECTIVE:     Dang Miller / Kelly Mi / Yanna Sides: Stella Chris    RESTRICTIONS/PRECAUTIONS:  Restrictions/Precautions  Restrictions/Precautions: Fall Risk        PAIN: Jason Grel[place] / O2:   Pre Treatment:   Numeric: 0/10             Post Treatment: 0 /10 Vitals          Oxygen       3L 02         MOBILITY: I Mod I S SBA CGA Min Mod Max Total  NT x2 Comments:   Bed Mobility    Rolling [] [] [] [] [] [x] [] [] [] [] [] 1-2   Supine to Sit [] [] [] [] [] [x] [] [] [] [] [] 1-2   Scooting [] [] [] [] [] [x] [] [] [] [] [] 1-2   Sit to Supine [] [] [] [] [] [] [] [] [] [] []    Transfers    Sit to Stand [] [] [] [] [] [x] [] [] [] [] []    Bed to Chair [] [] [] [] [] [x] [] [] [] [] [] X RW   Stand to Sit [] [] [] [] [] [x] [] [] [] [] []    I=Independent, Mod I=Modified Independent, S=Supervision/Setup, SBA=Standby Assistance, CGA=Contact Guard Assistance,

## 2023-10-10 NOTE — CONSULTS
- 34 ml/m2    LVOT Stroke Volume Index 49.3 mL/m2    LA Volume Index 2C 33 16 - 34 mL/m2    LA Volume Index 4C 27 16 - 34 mL/m2    LA Size Index 2.72 cm/m2    LA/AO Root Ratio 1.69     Ao Root Index 1.60 cm/m2    Ascending Aorta Index 1.60 cm/m2    AV Velocity Ratio 0.54     LVOT:AV VTI Index 0.48     ARIK/BSA VTI 0.9 cm2/m2    ARIK/BSA Peak Velocity 0.9 cm2/m2    Est. RA Pressure 8 mmHg    RVSP 41 mmHg   POCT Glucose    Collection Time: 10/09/23  4:33 PM   Result Value Ref Range    POC Glucose 122 (H) 65 - 100 mg/dL    Performed by: Hola    Basic Metabolic Panel w/ Reflex to MG    Collection Time: 10/09/23  7:35 PM   Result Value Ref Range    Sodium 142 133 - 143 mmol/L    Potassium 3.0 (L) 3.5 - 5.1 mmol/L    Chloride 104 101 - 110 mmol/L    CO2 29 21 - 32 mmol/L    Anion Gap 9 2 - 11 mmol/L    Glucose 141 (H) 65 - 100 mg/dL    BUN 95 (H) 8 - 23 MG/DL    Creatinine 3.80 (H) 0.6 - 1.0 MG/DL    Est, Glom Filt Rate 11 (L) >60 ml/min/1.73m2    Calcium 7.6 (L) 8.3 - 10.4 MG/DL   Brain Natriuretic Peptide    Collection Time: 10/09/23  7:35 PM   Result Value Ref Range    NT Pro-BNP 2,889 (H) <450 PG/ML   Magnesium    Collection Time: 10/09/23  7:35 PM   Result Value Ref Range    Magnesium 1.7 (L) 1.8 - 2.4 mg/dL   POCT Glucose    Collection Time: 10/09/23  9:40 PM   Result Value Ref Range    POC Glucose 141 (H) 65 - 100 mg/dL    Performed by: Ellen    POCT Glucose    Collection Time: 10/10/23  8:13 AM   Result Value Ref Range    POC Glucose 101 (H) 65 - 100 mg/dL    Performed by: Jakub    CBC    Collection Time: 10/10/23  8:14 AM   Result Value Ref Range    WBC 11.1 4.3 - 11.1 K/uL    RBC 3.50 (L) 4.05 - 5.2 M/uL    Hemoglobin 10.3 (L) 11.7 - 15.4 g/dL    Hematocrit 31.4 (L) 35.8 - 46.3 %    MCV 89.7 82 - 102 FL    MCH 29.4 26.1 - 32.9 PG    MCHC 32.8 31.4 - 35.0 g/dL    RDW 14.8 (H) 11.9 - 14.6 %    Platelets 902 030 - 403 K/uL    MPV 11.0 9.4 - 12.3 FL    nRBC 0.00 0.0 - 0.2 K/uL   Basic Metabolic maxillary antrum. Orbits  and other paranasal sinuses are clear where imaged. Bone windows demonstrate no definite fracture or destruction. NO ACUTE INTRACRANIAL ABNORMALITY IDENTIFIED AT NONCONTRAST CT.     XR CHEST PORTABLE    Result Date: 10/8/2023  PORTABLE CHEST, October 8, 2023 at 48 hours CLINICAL HISTORY:  Generalized weakness. COMPARISON:  September 17, 2022. FINDINGS:  AP erect image demonstrates no confluent infiltrate or significant pleural fluid. Stable left basilar pleural/parenchymal scarring. The heart size is within normal limits without evidence of congestive heart failure or pneumothorax, accounting for rotation of the patient to the left. The bony thorax appears intact on this view. There are overlying radiopaque support devices. NO ACUTE CARDIOPULMONARY DISEASE IDENTIFIED. Echocardiogram:  10/08/23    ECHO (TTE) COMPLETE (PRN CONTRAST/BUBBLE/STRAIN/3D) 10/09/2023  4:04 PM (Final)    Interpretation Summary    Left Ventricle: Normal left ventricular systolic function with a visually estimated EF of 55 - 60%. Left ventricle size is normal. Normal wall thickness. Normal wall motion. Aortic Valve: There is mild stenosis of the aortic valve (DI ~0.48). Tricuspid Valve: The estimated RVSP is mildly elevated at around 41 mmHg. Left Atrium: Left atrium is mildly dilated. Signed by: Helio Flores MD on 10/9/2023  4:04 PM        Signed:  Valery Cardona MD    Part of this note may have been written by using a voice dictation software. The note has been proof read but may still contain some grammatical/other typographical errors.

## 2023-10-10 NOTE — PROGRESS NOTES
TRANSFER - OUT REPORT:    Verbal report given to [facility] SHARRON Ahuja on Florinda Fung  being transferred to 21  for routine progression of patient care       Report consisted of patient's Situation, Background, Assessment and   Recommendations(SBAR). Information from the following report(s) Nurse Handoff Report, Index, ED Encounter Summary, ED SBAR, Adult Overview, Intake/Output, MAR, Recent Results, and Cardiac Rhythm NSR w/1st deg AVB  was reviewed with the receiving nurse. Lines:   Peripheral IV 10/10/23 Right Wrist (Active)   Site Assessment Clean, dry & intact 10/10/23 0858   Line Status Blood return noted; Flushed; Infusing 10/10/23 0858   Line Care Connections checked and tightened 10/10/23 0858   Phlebitis Assessment No symptoms 10/10/23 0858   Infiltration Assessment 0 10/10/23 0858   Alcohol Cap Used Yes 10/10/23 0858   Dressing Status Clean, dry & intact; New dressing applied 10/10/23 0858   Dressing Type Transparent 10/10/23 0858   Dressing Intervention New 10/10/23 0858        Opportunity for questions and clarification was provided.       Patient transported with:  O2 @ 2lpm and Registered Nurse

## 2023-10-10 NOTE — WOUND CARE
Patient seen in ICU. Noted perianal and lower gluteal cleft fold redness. Patient stated it is tender to touch. Zinc barrier cream applied to protect and prevent friction breakdown. Both heels dark red but blanchable. Floated off bed at this time. Sacrum and coccyx clear. Seen with primary nurse. Discussed with nurse and patient at bedside. continue prevention measures. Wound team will sign off. Please call if needed further.

## 2023-10-11 LAB
ANION GAP SERPL CALC-SCNC: 4 MMOL/L (ref 2–11)
BUN SERPL-MCNC: 46 MG/DL (ref 8–23)
CALCIUM SERPL-MCNC: 8.2 MG/DL (ref 8.3–10.4)
CHLORIDE SERPL-SCNC: 109 MMOL/L (ref 101–110)
CO2 SERPL-SCNC: 32 MMOL/L (ref 21–32)
CREAT SERPL-MCNC: 1.8 MG/DL (ref 0.6–1)
GLUCOSE BLD STRIP.AUTO-MCNC: 78 MG/DL (ref 65–100)
GLUCOSE BLD STRIP.AUTO-MCNC: 84 MG/DL (ref 65–100)
GLUCOSE BLD STRIP.AUTO-MCNC: 89 MG/DL (ref 65–100)
GLUCOSE BLD STRIP.AUTO-MCNC: 93 MG/DL (ref 65–100)
GLUCOSE BLD STRIP.AUTO-MCNC: 99 MG/DL (ref 65–100)
GLUCOSE SERPL-MCNC: 96 MG/DL (ref 65–100)
MAGNESIUM SERPL-MCNC: 1.8 MG/DL (ref 1.8–2.4)
MM INDURATION, POC: 0 MM (ref 0–5)
POTASSIUM SERPL-SCNC: 3.6 MMOL/L (ref 3.5–5.1)
PPD, POC: NEGATIVE
SERVICE CMNT-IMP: NORMAL
SODIUM SERPL-SCNC: 145 MMOL/L (ref 133–143)

## 2023-10-11 PROCEDURE — 82962 GLUCOSE BLOOD TEST: CPT

## 2023-10-11 PROCEDURE — 83735 ASSAY OF MAGNESIUM: CPT

## 2023-10-11 PROCEDURE — 6370000000 HC RX 637 (ALT 250 FOR IP): Performed by: INTERNAL MEDICINE

## 2023-10-11 PROCEDURE — 2580000003 HC RX 258: Performed by: INTERNAL MEDICINE

## 2023-10-11 PROCEDURE — 80048 BASIC METABOLIC PNL TOTAL CA: CPT

## 2023-10-11 PROCEDURE — 6360000002 HC RX W HCPCS: Performed by: INTERNAL MEDICINE

## 2023-10-11 PROCEDURE — 2580000003 HC RX 258: Performed by: NURSE PRACTITIONER

## 2023-10-11 PROCEDURE — 97530 THERAPEUTIC ACTIVITIES: CPT

## 2023-10-11 PROCEDURE — 6370000000 HC RX 637 (ALT 250 FOR IP): Performed by: NURSE PRACTITIONER

## 2023-10-11 PROCEDURE — 1100000000 HC RM PRIVATE

## 2023-10-11 PROCEDURE — 36415 COLL VENOUS BLD VENIPUNCTURE: CPT

## 2023-10-11 PROCEDURE — 76937 US GUIDE VASCULAR ACCESS: CPT

## 2023-10-11 RX ADMIN — MAGNESIUM GLUCONATE 500 MG ORAL TABLET 400 MG: 500 TABLET ORAL at 08:28

## 2023-10-11 RX ADMIN — HEPARIN SODIUM 5000 UNITS: 5000 INJECTION INTRAVENOUS; SUBCUTANEOUS at 06:08

## 2023-10-11 RX ADMIN — SODIUM CHLORIDE, PRESERVATIVE FREE 5 ML: 5 INJECTION INTRAVENOUS at 08:30

## 2023-10-11 RX ADMIN — LEVOTHYROXINE SODIUM 25 MCG: 0.05 TABLET ORAL at 06:09

## 2023-10-11 RX ADMIN — HEPARIN SODIUM 5000 UNITS: 5000 INJECTION INTRAVENOUS; SUBCUTANEOUS at 14:23

## 2023-10-11 RX ADMIN — SODIUM CHLORIDE, POTASSIUM CHLORIDE, SODIUM LACTATE AND CALCIUM CHLORIDE: 600; 310; 30; 20 INJECTION, SOLUTION INTRAVENOUS at 13:24

## 2023-10-11 RX ADMIN — HEPARIN SODIUM 5000 UNITS: 5000 INJECTION INTRAVENOUS; SUBCUTANEOUS at 22:16

## 2023-10-11 RX ADMIN — SODIUM CHLORIDE, PRESERVATIVE FREE 5 ML: 5 INJECTION INTRAVENOUS at 20:25

## 2023-10-11 ASSESSMENT — PAIN SCALES - GENERAL
PAINLEVEL_OUTOF10: 0

## 2023-10-11 NOTE — PLAN OF CARE
Problem: Discharge Planning  Goal: Discharge to home or other facility with appropriate resources  Outcome: Progressing  Flowsheets (Taken 10/10/2023 2048)  Discharge to home or other facility with appropriate resources: Identify barriers to discharge with patient and caregiver     Problem: Safety - Adult  Goal: Free from fall injury  Outcome: Progressing  Flowsheets (Taken 10/10/2023 2048)  Free From Fall Injury: Instruct family/caregiver on patient safety     Problem: Skin/Tissue Integrity  Goal: Absence of new skin breakdown  Description: 1. Monitor for areas of redness and/or skin breakdown  2. Assess vascular access sites hourly  3. Every 4-6 hours minimum:  Change oxygen saturation probe site  4. Every 4-6 hours:  If on nasal continuous positive airway pressure, respiratory therapy assess nares and determine need for appliance change or resting period.   Outcome: Progressing     Problem: ABCDS Injury Assessment  Goal: Absence of physical injury  Outcome: Progressing  Flowsheets (Taken 10/10/2023 2048)  Absence of Physical Injury: Implement safety measures based on patient assessment     Problem: Metabolic/Fluid and Electrolytes - Adult  Goal: Electrolytes maintained within normal limits  Outcome: Progressing  Flowsheets (Taken 10/10/2023 2048)  Electrolytes maintained within normal limits: Monitor labs and assess patient for signs and symptoms of electrolyte imbalances  Goal: Hemodynamic stability and optimal renal function maintained  Outcome: Progressing  Flowsheets (Taken 10/10/2023 2048)  Hemodynamic stability and optimal renal function maintained: Monitor labs and assess for signs and symptoms of volume excess or deficit  Goal: Glucose maintained within prescribed range  Outcome: Progressing  Flowsheets (Taken 10/10/2023 2048)  Glucose maintained within prescribed range: Monitor blood glucose as ordered     Problem: Chronic Conditions and Co-morbidities  Goal: Patient's chronic conditions and

## 2023-10-11 NOTE — PROGRESS NOTES
49 mL/m2    LVIDd Index 2.78 cm/m2    LVIDs Index 1.79 cm/m2    LV RWT Ratio 0.40     LV Mass 2D 142.9 67 - 162 g    LV Mass 2D Index 88.2 43 - 95 g/m2    MV E/A 1.23     E/E' Ratio (Averaged) 11.54     E/E' Lateral 9.50     E/E' Septal 13.57     LA Volume Index BP 31 16 - 34 ml/m2    LVOT Stroke Volume Index 49.3 mL/m2    LA Volume Index 2C 33 16 - 34 mL/m2    LA Volume Index 4C 27 16 - 34 mL/m2    LA Size Index 2.72 cm/m2    LA/AO Root Ratio 1.69     Ao Root Index 1.60 cm/m2    Ascending Aorta Index 1.60 cm/m2    AV Velocity Ratio 0.54     LVOT:AV VTI Index 0.48     ARIK/BSA VTI 0.9 cm2/m2    ARIK/BSA Peak Velocity 0.9 cm2/m2    Est. RA Pressure 8 mmHg    RVSP 41 mmHg   POCT Glucose    Collection Time: 10/09/23  4:33 PM   Result Value Ref Range    POC Glucose 122 (H) 65 - 100 mg/dL    Performed by: Hola    Basic Metabolic Panel w/ Reflex to MG    Collection Time: 10/09/23  7:35 PM   Result Value Ref Range    Sodium 142 133 - 143 mmol/L    Potassium 3.0 (L) 3.5 - 5.1 mmol/L    Chloride 104 101 - 110 mmol/L    CO2 29 21 - 32 mmol/L    Anion Gap 9 2 - 11 mmol/L    Glucose 141 (H) 65 - 100 mg/dL    BUN 95 (H) 8 - 23 MG/DL    Creatinine 3.80 (H) 0.6 - 1.0 MG/DL    Est, Glom Filt Rate 11 (L) >60 ml/min/1.73m2    Calcium 7.6 (L) 8.3 - 10.4 MG/DL   Brain Natriuretic Peptide    Collection Time: 10/09/23  7:35 PM   Result Value Ref Range    NT Pro-BNP 2,889 (H) <450 PG/ML   Magnesium    Collection Time: 10/09/23  7:35 PM   Result Value Ref Range    Magnesium 1.7 (L) 1.8 - 2.4 mg/dL   POCT Glucose    Collection Time: 10/09/23  9:40 PM   Result Value Ref Range    POC Glucose 141 (H) 65 - 100 mg/dL    Performed by: Ellen    PLEASE READ & DOCUMENT PPD TEST IN 24 HRS    Collection Time: 10/10/23 12:00 AM   Result Value Ref Range    PPD, (POC) Negative Negative    mm Induration 0 0 - 5 mm   POCT Glucose    Collection Time: 10/10/23  8:13 AM   Result Value Ref Range    POC Glucose 101 (H) 65 - 100 mg/dL    Performed (H) 8 - 23 MG/DL    Creatinine 1.80 (H) 0.6 - 1.0 MG/DL    Est, Glom Filt Rate 28 (L) >60 ml/min/1.73m2    Calcium 8.2 (L) 8.3 - 10.4 MG/DL   POCT Glucose    Collection Time: 10/11/23  7:43 AM   Result Value Ref Range    POC Glucose 84 65 - 100 mg/dL    Performed by: Elma    POCT Glucose    Collection Time: 10/11/23 11:14 AM   Result Value Ref Range    POC Glucose 78 65 - 100 mg/dL    Performed by: Elma        Current Meds:  Current Facility-Administered Medications   Medication Dose Route Frequency    levothyroxine (SYNTHROID) tablet 25 mcg  25 mcg Oral QAM AC    magnesium oxide (MAG-OX) tablet 400 mg  400 mg Oral Daily    potassium chloride (KLOR-CON M) extended release tablet 40 mEq  40 mEq Oral PRN    Or    potassium bicarb-citric acid (EFFER-K) effervescent tablet 40 mEq  40 mEq Oral PRN    Or    potassium chloride 10 mEq/100 mL IVPB (Peripheral Line)  10 mEq IntraVENous PRN    magnesium sulfate 2000 mg in 50 mL IVPB premix  2,000 mg IntraVENous PRN    sodium chloride flush 0.9 % injection 5-40 mL  5-40 mL IntraVENous 2 times per day    sodium chloride flush 0.9 % injection 5-40 mL  5-40 mL IntraVENous PRN    0.9 % sodium chloride infusion   IntraVENous PRN    heparin (porcine) injection 5,000 Units  5,000 Units SubCUTAneous 3 times per day    ondansetron (ZOFRAN-ODT) disintegrating tablet 4 mg  4 mg Oral Q8H PRN    Or    ondansetron (ZOFRAN) injection 4 mg  4 mg IntraVENous Q6H PRN    polyethylene glycol (GLYCOLAX) packet 17 g  17 g Oral Daily PRN    acetaminophen (TYLENOL) tablet 650 mg  650 mg Oral Q6H PRN    Or    acetaminophen (TYLENOL) suppository 650 mg  650 mg Rectal Q6H PRN    lactated ringers IV soln infusion   IntraVENous Continuous    insulin lispro (HUMALOG) injection vial 0-8 Units  0-8 Units SubCUTAneous TID WC    insulin lispro (HUMALOG) injection vial 0-4 Units  0-4 Units SubCUTAneous Nightly    glucose chewable tablet 16 g  4 tablet Oral PRN    dextrose bolus 10%

## 2023-10-11 NOTE — PROGRESS NOTES
ACUTE PHYSICAL THERAPY GOALS:   (Developed with and agreed upon by patient and/or caregiver. )  LTG:  (1.)Ms. Chago Morales will move from supine to sit and sit to supine , scoot up and down, and roll side to side in bed with INDEPENDENCE within 7 treatment day(s). (2.)Ms. Chago Morales will transfer from bed to chair and chair to bed with MODIFIED INDEPENDENCE using the least restrictive device within 7 treatment day(s). (3.)Ms. Hernandez will ambulate with MODIFIED INDEPENDENCE for 250 feet with the least restrictive device within 7 treatment day(s). (4.)Ms. Chago Morales will participate in therapeutic activity/exercises x 25 minutes for increased activity tolerance within 7 treatment days. (5.)Ms. Chago Morales will perform standing static and dynamic balance activities x 15 minutes with SUPERVISION to improve safety within 7 day(s). PHYSICAL THERAPY: Daily Note PM   (Link to Caseload Tracking: PT Visit Days : 2  Time In/Out PT Charge Capture  Rehab Caseload Tracker  Orders    Juan Alberto Ramires is a 82 y.o. female   PRIMARY DIAGNOSIS: Acute renal failure (ARF) ([address])  Hypovolemic shock ([address]) [R57.1]  Volume depletion [E86.9]  Acute renal failure (ARF) (HCC) [N17.9]  Poor fluid intake [R63.8]  Acute renal failure, unspecified acute renal failure type ([address]) [N17.9]       Inpatient: Payor: Barney Cohen / Plan: [facility] / Product Type: *No Product type* /     ASSESSMENT:     REHAB RECOMMENDATIONS:   Recommendation to date pending progress:  Setting:  Short-term Rehab    Equipment:    To Be Determined     ASSESSMENT:  Ms. Chago Morales was supine in bed on 3L NC upon entering the room and agreeable to therapy. Today, pt demonstrates slow progress toward established goals. This session, pt required CG/Min (A) for all mobility while requiring additional use of RW/gait belt for ambulation of 20'x3 c seated breaks for CANSECO.  While moving on their feet, pt cont to ambulate c wide ALEXA associated with slow-shuffling gait although she

## 2023-10-11 NOTE — PROGRESS NOTES
Assisted patient to the restroom; noted skin tears between the butt cheeks. Barrier cream and allevyn applied.

## 2023-10-12 LAB
ANION GAP SERPL CALC-SCNC: 3 MMOL/L (ref 2–11)
BUN SERPL-MCNC: 26 MG/DL (ref 8–23)
CALCIUM SERPL-MCNC: 8.1 MG/DL (ref 8.3–10.4)
CHLORIDE SERPL-SCNC: 111 MMOL/L (ref 101–110)
CO2 SERPL-SCNC: 31 MMOL/L (ref 21–32)
CREAT SERPL-MCNC: 1.3 MG/DL (ref 0.6–1)
GLUCOSE BLD STRIP.AUTO-MCNC: 117 MG/DL (ref 65–100)
GLUCOSE BLD STRIP.AUTO-MCNC: 72 MG/DL (ref 65–100)
GLUCOSE BLD STRIP.AUTO-MCNC: 93 MG/DL (ref 65–100)
GLUCOSE BLD STRIP.AUTO-MCNC: 97 MG/DL (ref 65–100)
GLUCOSE SERPL-MCNC: 96 MG/DL (ref 65–100)
MM INDURATION, POC: 0 MM (ref 0–5)
POTASSIUM SERPL-SCNC: 4.1 MMOL/L (ref 3.5–5.1)
PPD, POC: NEGATIVE
SERVICE CMNT-IMP: ABNORMAL
SERVICE CMNT-IMP: NORMAL
SODIUM SERPL-SCNC: 145 MMOL/L (ref 133–143)

## 2023-10-12 PROCEDURE — 2580000003 HC RX 258: Performed by: FAMILY MEDICINE

## 2023-10-12 PROCEDURE — 97535 SELF CARE MNGMENT TRAINING: CPT

## 2023-10-12 PROCEDURE — 6360000002 HC RX W HCPCS: Performed by: INTERNAL MEDICINE

## 2023-10-12 PROCEDURE — 1100000000 HC RM PRIVATE

## 2023-10-12 PROCEDURE — 2580000003 HC RX 258: Performed by: NURSE PRACTITIONER

## 2023-10-12 PROCEDURE — 2580000003 HC RX 258: Performed by: INTERNAL MEDICINE

## 2023-10-12 PROCEDURE — 6370000000 HC RX 637 (ALT 250 FOR IP): Performed by: FAMILY MEDICINE

## 2023-10-12 PROCEDURE — 6370000000 HC RX 637 (ALT 250 FOR IP): Performed by: NURSE PRACTITIONER

## 2023-10-12 PROCEDURE — 80048 BASIC METABOLIC PNL TOTAL CA: CPT

## 2023-10-12 PROCEDURE — 82962 GLUCOSE BLOOD TEST: CPT

## 2023-10-12 PROCEDURE — 6370000000 HC RX 637 (ALT 250 FOR IP): Performed by: INTERNAL MEDICINE

## 2023-10-12 PROCEDURE — 36415 COLL VENOUS BLD VENIPUNCTURE: CPT

## 2023-10-12 PROCEDURE — 97112 NEUROMUSCULAR REEDUCATION: CPT

## 2023-10-12 RX ORDER — CARVEDILOL 12.5 MG/1
12.5 TABLET ORAL 2 TIMES DAILY WITH MEALS
Status: DISCONTINUED | OUTPATIENT
Start: 2023-10-12 | End: 2023-10-13 | Stop reason: HOSPADM

## 2023-10-12 RX ORDER — AMIODARONE HYDROCHLORIDE 200 MG/1
200 TABLET ORAL DAILY
Status: DISCONTINUED | OUTPATIENT
Start: 2023-10-12 | End: 2023-10-13 | Stop reason: HOSPADM

## 2023-10-12 RX ADMIN — LEVOTHYROXINE SODIUM 25 MCG: 0.05 TABLET ORAL at 05:58

## 2023-10-12 RX ADMIN — HEPARIN SODIUM 5000 UNITS: 5000 INJECTION INTRAVENOUS; SUBCUTANEOUS at 15:02

## 2023-10-12 RX ADMIN — CARVEDILOL 12.5 MG: 12.5 TABLET, FILM COATED ORAL at 16:42

## 2023-10-12 RX ADMIN — HEPARIN SODIUM 5000 UNITS: 5000 INJECTION INTRAVENOUS; SUBCUTANEOUS at 22:05

## 2023-10-12 RX ADMIN — SODIUM CHLORIDE, POTASSIUM CHLORIDE, SODIUM LACTATE AND CALCIUM CHLORIDE: 600; 310; 30; 20 INJECTION, SOLUTION INTRAVENOUS at 22:12

## 2023-10-12 RX ADMIN — SODIUM CHLORIDE, PRESERVATIVE FREE 5 ML: 5 INJECTION INTRAVENOUS at 08:28

## 2023-10-12 RX ADMIN — HEPARIN SODIUM 5000 UNITS: 5000 INJECTION INTRAVENOUS; SUBCUTANEOUS at 05:58

## 2023-10-12 RX ADMIN — SODIUM CHLORIDE, POTASSIUM CHLORIDE, SODIUM LACTATE AND CALCIUM CHLORIDE: 600; 310; 30; 20 INJECTION, SOLUTION INTRAVENOUS at 05:58

## 2023-10-12 RX ADMIN — MAGNESIUM GLUCONATE 500 MG ORAL TABLET 400 MG: 500 TABLET ORAL at 08:27

## 2023-10-12 RX ADMIN — AMIODARONE HYDROCHLORIDE 200 MG: 200 TABLET ORAL at 16:01

## 2023-10-12 ASSESSMENT — PAIN SCALES - GENERAL
PAINLEVEL_OUTOF10: 0

## 2023-10-12 NOTE — PROGRESS NOTES
ACUTE OCCUPATIONAL THERAPY GOALS:   (Developed with and agreed upon by patient and/or caregiver.)  1. Patient will complete lower body bathing and dressing with SBA and adaptive equipment as needed. 2. Patient will complete toileting with SBA. 3. Patient will tolerate 30 minutes of OT treatment with 1-2 rest breaks to increase activity tolerance for ADLs. 4. Patient will complete functional transfers with SBA and adaptive equipment as needed. 5. Patient will complete functional mobility for household distances with SBA and adaptive equipment as needed. 6. Patient will complete self-grooming while standing edge of sink with SBA and adaptive equipment as needed. Timeframe: 7 visits         OCCUPATIONAL THERAPY Daily Note and AM     OT Visit Days: 3   Time  OT Charge Capture  Rehab Caseload Tracker  OT Orders    Huber Laura is a 82 y.o. female   PRIMARY DIAGNOSIS: Acute renal failure (ARF) ([address])  Hypovolemic shock ([address]) [R57.1]  Volume depletion [E86.9]  Acute renal failure (ARF) (HCC) [N17.9]  Poor fluid intake [R63.8]  Acute renal failure, unspecified acute renal failure type ([address]) [N17.9]       Inpatient: Payor: [facility] MEDICARE / Plan: [employer] / Product Type: *No Product type* /     ASSESSMENT:     REHAB RECOMMENDATIONS: CURRENT LEVEL OF FUNCTION:  (Most Recently Demonstrated)   Recommendation to date pending progress:  Setting:  Home Health Therapy    Equipment:    To Be Determined Bathing:  Not Tested -   Dressing:  Not Tested   Feeding/Grooming:  Stand by Assist   Toileting:  Minimal Assist   Functional Mobility:  Contact Guard Assist x RW     ASSESSMENT:  Ms. Poli Lawson continues to present with deficits in overall strength, activity tolerance, ADL performance, and functional mobility. Resting on 3L 02; Alert and very pleasant. Completed functional transfers including bed mobility, sit <> stand, and toilet transfers with CGA.  CGA x RW for functional mobility for household

## 2023-10-13 VITALS
OXYGEN SATURATION: 99 % | HEART RATE: 79 BPM | RESPIRATION RATE: 14 BRPM | WEIGHT: 161.3 LBS | SYSTOLIC BLOOD PRESSURE: 112 MMHG | HEIGHT: 60 IN | BODY MASS INDEX: 31.67 KG/M2 | TEMPERATURE: 98.2 F | DIASTOLIC BLOOD PRESSURE: 86 MMHG

## 2023-10-13 PROBLEM — I35.0 MILD AORTIC STENOSIS: Status: ACTIVE | Noted: 2023-10-13

## 2023-10-13 PROBLEM — J96.11 CHRONIC RESPIRATORY FAILURE WITH HYPOXIA (HCC): Status: ACTIVE | Noted: 2023-10-13

## 2023-10-13 LAB
ANION GAP SERPL CALC-SCNC: 3 MMOL/L (ref 2–11)
BACTERIA SPEC CULT: NORMAL
BACTERIA SPEC CULT: NORMAL
BUN SERPL-MCNC: 20 MG/DL (ref 8–23)
CALCIUM SERPL-MCNC: 8.1 MG/DL (ref 8.3–10.4)
CHLORIDE SERPL-SCNC: 111 MMOL/L (ref 101–110)
CO2 SERPL-SCNC: 28 MMOL/L (ref 21–32)
CREAT SERPL-MCNC: 1.4 MG/DL (ref 0.6–1)
GLUCOSE BLD STRIP.AUTO-MCNC: 92 MG/DL (ref 65–100)
GLUCOSE BLD STRIP.AUTO-MCNC: 93 MG/DL (ref 65–100)
GLUCOSE SERPL-MCNC: 80 MG/DL (ref 65–100)
POTASSIUM SERPL-SCNC: 4.5 MMOL/L (ref 3.5–5.1)
POTASSIUM SERPL-SCNC: 7.4 MMOL/L (ref 3.5–5.1)
SARS-COV-2 RDRP RESP QL NAA+PROBE: NOT DETECTED
SERVICE CMNT-IMP: NORMAL
SODIUM SERPL-SCNC: 142 MMOL/L (ref 133–143)
SOURCE: NORMAL

## 2023-10-13 PROCEDURE — 82962 GLUCOSE BLOOD TEST: CPT

## 2023-10-13 PROCEDURE — 2580000003 HC RX 258: Performed by: INTERNAL MEDICINE

## 2023-10-13 PROCEDURE — 6370000000 HC RX 637 (ALT 250 FOR IP): Performed by: INTERNAL MEDICINE

## 2023-10-13 PROCEDURE — 87635 SARS-COV-2 COVID-19 AMP PRB: CPT

## 2023-10-13 PROCEDURE — 6360000002 HC RX W HCPCS: Performed by: INTERNAL MEDICINE

## 2023-10-13 PROCEDURE — 6370000000 HC RX 637 (ALT 250 FOR IP): Performed by: NURSE PRACTITIONER

## 2023-10-13 PROCEDURE — 80048 BASIC METABOLIC PNL TOTAL CA: CPT

## 2023-10-13 PROCEDURE — 84132 ASSAY OF SERUM POTASSIUM: CPT

## 2023-10-13 PROCEDURE — 6370000000 HC RX 637 (ALT 250 FOR IP): Performed by: FAMILY MEDICINE

## 2023-10-13 PROCEDURE — 36415 COLL VENOUS BLD VENIPUNCTURE: CPT

## 2023-10-13 RX ORDER — TAMSULOSIN HYDROCHLORIDE 0.4 MG/1
0.4 CAPSULE ORAL DAILY
Qty: 30 CAPSULE | Refills: 0
Start: 2023-10-13 | End: 2023-11-12

## 2023-10-13 RX ORDER — ASPIRIN 81 MG/1
81 TABLET ORAL DAILY
Qty: 90 TABLET | Refills: 0
Start: 2023-10-13

## 2023-10-13 RX ORDER — FUROSEMIDE 40 MG/1
40 TABLET ORAL DAILY PRN
Qty: 60 TABLET | Refills: 0
Start: 2023-10-13

## 2023-10-13 RX ORDER — MAGNESIUM OXIDE 400 MG/1
400 TABLET ORAL DAILY
Qty: 5 TABLET | Refills: 0
Start: 2023-10-13 | End: 2023-10-18

## 2023-10-13 RX ADMIN — CARVEDILOL 12.5 MG: 12.5 TABLET, FILM COATED ORAL at 08:52

## 2023-10-13 RX ADMIN — MAGNESIUM GLUCONATE 500 MG ORAL TABLET 400 MG: 500 TABLET ORAL at 08:52

## 2023-10-13 RX ADMIN — SODIUM CHLORIDE, PRESERVATIVE FREE 10 ML: 5 INJECTION INTRAVENOUS at 08:54

## 2023-10-13 RX ADMIN — HEPARIN SODIUM 5000 UNITS: 5000 INJECTION INTRAVENOUS; SUBCUTANEOUS at 06:06

## 2023-10-13 RX ADMIN — AMIODARONE HYDROCHLORIDE 200 MG: 200 TABLET ORAL at 08:52

## 2023-10-13 RX ADMIN — LEVOTHYROXINE SODIUM 25 MCG: 0.05 TABLET ORAL at 06:06

## 2023-10-13 NOTE — PROGRESS NOTES
Report called to [facility] post acute, SHARRON Whitaker took report, questions answered, IV removed and tele box off.  Waiting for transport

## 2023-10-13 NOTE — CARE COORDINATION
CM met with patient after IDR to discuss preference for discharge; SNF vs HH. Patient discussed with friend/HCPOA Abena Dunn by phone. Abena Dunn contacted this CM and informed patient will go to SNF at discharge. CM informed Lou Jameson who is covering [facility] [facility]. Bed is available. Transport arranged through "[employer] for 1300. Covid screen requested. CM ordered. CM informed patient of transport time. DCP: [facility], Rm. 104W. Report called to [phone] by primary nurse. [facility] 1300 transport.
CM met with patient after IDR. PT/OT recommending STR at discharge. Patient verbalizes agreement and prefers [facility] from a previous stay. Patient requested this CM to verify with Roni Lowery ([phone]) friend/HCPOA, for placement preference. CM attempted to contact Roni Lowery but LVOM. PPD read at 24 hrs. CM will initiate prior auth from [facility] after bed offer/acceptance. [facility]
CM received message from Andrea Arias that prior auth was approved for STR at [facility]. CM informed Dr Blanca Markham. Tentative dc on 10/13.    1000 CM received a call from patient's friend/HCPOA, Radha Wells, to inform CM that while she would prefer patient to go to SNF at discharge, she can stay with patient during the day to assist and then leave to go home around 1700. OT recommending home health today. CM met with patient to discuss discharge options. Patient asked to think about it today. CM will follow up to finalize discharge plan.
Patient transferred to [facility] from ICU for normal progression of care. Patient admitted to ICU for ARF, hypotension requiring pressor. CM met with patient to discuss POC/DCP. Patient is currently on 2lpm. Reports O2 home dose 2.5 lpm supplied by [facility] [employer].  Lives alone in mobile home with 5 LIA. Uses a cane to navigate entrance. Other DME: [place] [facility]. Reports she had home care, PT, SN, earlier in the year. Unsure of any particular company. Patient reports her (R) leg hasn't \"been right\" since a hip replacement. PT/OT evaluation- OT recommending home health. PT evaluation pending. Patient reports her support comes from her best friend/HCPOA, Lacey Richard. Admitting CM spoke with Lacey Richard for full assessment. CM will follow up with patient after therapy completes evaluations.
Management to discuss the discharge plan with any other family members/significant others, and if so, who? (P) Yes  Plans to Return to Present Housing: (P) Unknown at present  Other Identified Issues/Barriers to RETURNING to current housing: pending  Potential Assistance needed at discharge:  [address] [facility] vs STR/friend thinks she need placement            Potential DME:  pending  Patient expects to discharge to:  pending  Plan for transportation at discharge:  pending    Financial    Payor: [id number] / Plan: [address] / Product Type: *No Product type* /     Does insurance require precert for SNF: Yes    Potential assistance Purchasing Medications: (P) No  Meds-to-Beds request:  n/a      [id number] [place] - [address] [facility] [phone] - f [phone]  [facility] [id number]  Phone: [phone] Fax: [phone]    [facility] ([address] [place] [address]) - [address] [phone] - f [phone]  [facility]  [address]  [address] [facility] [address]  Phone: [phone] Fax: [phone]      Notes:    Factors facilitating achievement of predicted outcomes: Friend support    Barriers to discharge: pending medical treatment    Additional Case Management Notes: Chart reviewed and pt discussed in am IDR. Spoke with friend, Wellington York. Pt lives alone. Abena Dunn states it has become harder and harder to get pt to shower. Pt complains she is too short of breathe. Does have O2 at home. Cane and walker for ambulation. No current HH or rehab. PCP and insurance. Not eating or drinking. Discussed poss needs of HH vs STR/SNF. She thinks pt needs LTC placement. States she can only go every other day to assist pt. Discussed criteria with Abena Dunn. Awaiting PT/OT eval. [facility] info given to Abena Dunn for assist as well outside hospital. Aware CM to follow for d/c needs/POC. PPD for potential rehab needs.

## 2023-10-13 NOTE — PLAN OF CARE
Problem: Discharge Planning  Goal: Discharge to home or other facility with appropriate resources  Outcome: Completed     Problem: Safety - Adult  Goal: Free from fall injury  Outcome: Completed     Problem: Skin/Tissue Integrity  Goal: Absence of new skin breakdown  Description: 1. Monitor for areas of redness and/or skin breakdown  2. Assess vascular access sites hourly  3. Every 4-6 hours minimum:  Change oxygen saturation probe site  4. Every 4-6 hours:  If on nasal continuous positive airway pressure, respiratory therapy assess nares and determine need for appliance change or resting period.   Outcome: Completed     Problem: ABCDS Injury Assessment  Goal: Absence of physical injury  Outcome: Completed     Problem: Metabolic/Fluid and Electrolytes - Adult  Goal: Electrolytes maintained within normal limits  Outcome: Completed  Goal: Hemodynamic stability and optimal renal function maintained  Outcome: Completed  Goal: Glucose maintained within prescribed range  Outcome: Completed     Problem: Chronic Conditions and Co-morbidities  Goal: Patient's chronic conditions and co-morbidity symptoms are monitored and maintained or improved  Outcome: Completed

## 2023-10-13 NOTE — DISCHARGE SUMMARY
Refills: 0           CONTINUE these medications which have NOT CHANGED    Details   carvedilol (COREG) 12.5 MG tablet Take 1 tablet by mouth 2 times daily (with meals)      albuterol sulfate HFA (PROVENTIL;VENTOLIN;PROAIR) 108 (90 Base) MCG/ACT inhaler albuterol sulfate HFA 90 mcg/actuation aerosol inhaler      amiodarone (CORDARONE) 200 MG tablet Take 200 mg by mouth daily      vitamin D (CHOLECALCIFEROL) 25 MCG (1000 UT) TABS tablet Take 1,000 Units by mouth daily      DULoxetine (CYMBALTA) 60 MG extended release capsule Take 60 mg by mouth daily      folic acid (FOLVITE) 1 MG tablet Take 1 mg by mouth daily      levothyroxine (SYNTHROID) 25 MCG tablet Take 25 mcg by mouth every morning (before breakfast)      ondansetron (ZOFRAN-ODT) 4 MG disintegrating tablet Take 4 mg by mouth every 8 hours as needed      pantoprazole (PROTONIX) 40 MG tablet Take 40 mg by mouth 2 times daily (before meals)      rosuvastatin (CRESTOR) 10 MG tablet Take 1 tablet by mouth daily           STOP taking these medications       hydroCHLOROthiazide (MICROZIDE) 12.5 MG capsule Comments:   Reason for Stopping:         sacubitril-valsartan (ENTRESTO) 24-26 MG per tablet Comments:   Reason for Stopping:         metoprolol succinate (TOPROL XL) 25 MG extended release tablet Comments:   Reason for Stopping:         Metoprolol-HCTZ ER 25-12.5 MG TB24 Comments:   Reason for Stopping:               Some of the medications may be marked as \"stop taking\" by the system; but in reality pt or family reported already being off these meds; defer to outpatient/prescribing providers.     Procedures done this admission:  * No surgery found *    Consults this admission:  IP CONSULT TO INTENSIVIST  IP CONSULT TO HOSPITALIST  IP WOUND CARE NURSE CONSULT TO EVAL    Echocardiogram results:  10/08/23    ECHO (TTE) COMPLETE (PRN CONTRAST/BUBBLE/STRAIN/3D) 10/09/2023  4:04 PM (Final)    Interpretation Summary    Left Ventricle: Normal left ventricular systolic 161 lb 4.8 oz (73.2 kg). Intake/Output Summary (Last 24 hours) at 10/13/2023 1137  Last data filed at 10/13/2023 1059  Gross per 24 hour   Intake 2371.56 ml   Output 800 ml   Net 1571.56 ml         Physical Exam:    General:    Well nourished. No overt distress on 3 lit/min  Head:  Normocephalic, atraumatic  Eyes:  Sclerae appear normal.  Pupils equally round. HENT:  Nares appear normal, no drainage. Moist mucous membranes  Neck:  No restricted ROM. Trachea midline  CV:   RRR. No m/r/g. No JVD  Lungs:   CTAB. No wheezing, rhonchi, or rales. Respirations even, unlabored  Abdomen:   Soft, nontender, nondistended. Extremities: Warm and dry. Mild edema elbow region bilaterally    Skin:     No rashes. Normal coloration  Neuro:  CN II-XII grossly intact. Psych:  Normal mood and affect.     Signed:  Nani Galdamez MD

## 2023-11-24 ENCOUNTER — APPOINTMENT (OUTPATIENT)
Dept: GENERAL RADIOLOGY | Age: 83
DRG: 853 | End: 2023-11-24
Payer: MEDICARE

## 2023-11-24 ENCOUNTER — HOSPITAL ENCOUNTER (INPATIENT)
Age: 83
LOS: 2 days | DRG: 853 | End: 2023-11-27
Attending: STUDENT IN AN ORGANIZED HEALTH CARE EDUCATION/TRAINING PROGRAM | Admitting: SURGERY
Payer: MEDICARE

## 2023-11-24 DIAGNOSIS — R57.1 HYPOVOLEMIC SHOCK (HCC): Primary | ICD-10-CM

## 2023-11-24 DIAGNOSIS — K57.80 PERFORATED DIVERTICULUM: ICD-10-CM

## 2023-11-24 DIAGNOSIS — E87.8 ELECTROLYTE ABNORMALITY: ICD-10-CM

## 2023-11-24 DIAGNOSIS — N17.9 ACUTE RENAL FAILURE, UNSPECIFIED ACUTE RENAL FAILURE TYPE (HCC): ICD-10-CM

## 2023-11-24 DIAGNOSIS — K57.20 DIVERTICULITIS OF COLON WITH PERFORATION: ICD-10-CM

## 2023-11-24 PROCEDURE — 83735 ASSAY OF MAGNESIUM: CPT

## 2023-11-24 PROCEDURE — 99291 CRITICAL CARE FIRST HOUR: CPT

## 2023-11-24 PROCEDURE — 71045 X-RAY EXAM CHEST 1 VIEW: CPT

## 2023-11-24 PROCEDURE — 80053 COMPREHEN METABOLIC PANEL: CPT

## 2023-11-24 PROCEDURE — 85025 COMPLETE CBC W/AUTO DIFF WBC: CPT

## 2023-11-24 PROCEDURE — 84443 ASSAY THYROID STIM HORMONE: CPT

## 2023-11-24 PROCEDURE — 84145 PROCALCITONIN (PCT): CPT

## 2023-11-24 PROCEDURE — 83605 ASSAY OF LACTIC ACID: CPT

## 2023-11-24 PROCEDURE — 84484 ASSAY OF TROPONIN QUANT: CPT

## 2023-11-24 PROCEDURE — 83690 ASSAY OF LIPASE: CPT

## 2023-11-24 PROCEDURE — 96361 HYDRATE IV INFUSION ADD-ON: CPT

## 2023-11-24 PROCEDURE — 2580000003 HC RX 258: Performed by: STUDENT IN AN ORGANIZED HEALTH CARE EDUCATION/TRAINING PROGRAM

## 2023-11-24 RX ORDER — 0.9 % SODIUM CHLORIDE 0.9 %
1000 INTRAVENOUS SOLUTION INTRAVENOUS
Status: COMPLETED | OUTPATIENT
Start: 2023-11-25 | End: 2023-11-25

## 2023-11-24 RX ADMIN — SODIUM CHLORIDE 1000 ML: 9 INJECTION, SOLUTION INTRAVENOUS at 23:54

## 2023-11-24 ASSESSMENT — PAIN SCALES - GENERAL: PAINLEVEL_OUTOF10: 8

## 2023-11-24 ASSESSMENT — PAIN DESCRIPTION - LOCATION: LOCATION: ABDOMEN

## 2023-11-24 ASSESSMENT — PAIN - FUNCTIONAL ASSESSMENT: PAIN_FUNCTIONAL_ASSESSMENT: 0-10

## 2023-11-24 ASSESSMENT — PAIN DESCRIPTION - ORIENTATION: ORIENTATION: LOWER

## 2023-11-25 ENCOUNTER — APPOINTMENT (OUTPATIENT)
Dept: GENERAL RADIOLOGY | Age: 83
DRG: 853 | End: 2023-11-25
Payer: MEDICARE

## 2023-11-25 ENCOUNTER — APPOINTMENT (OUTPATIENT)
Dept: CT IMAGING | Age: 83
DRG: 853 | End: 2023-11-25
Payer: MEDICARE

## 2023-11-25 ENCOUNTER — ANESTHESIA EVENT (OUTPATIENT)
Dept: SURGERY | Age: 83
End: 2023-11-25
Payer: MEDICARE

## 2023-11-25 ENCOUNTER — ANESTHESIA (OUTPATIENT)
Dept: SURGERY | Age: 83
End: 2023-11-25
Payer: MEDICARE

## 2023-11-25 PROBLEM — E87.8 ELECTROLYTE ABNORMALITY: Status: ACTIVE | Noted: 2023-11-25

## 2023-11-25 PROBLEM — A41.9 SEPTIC SHOCK (HCC): Status: ACTIVE | Noted: 2019-04-22

## 2023-11-25 PROBLEM — K57.20 DIVERTICULITIS OF COLON WITH PERFORATION: Status: ACTIVE | Noted: 2023-11-25

## 2023-11-25 PROBLEM — R65.21 SEPTIC SHOCK (HCC): Status: ACTIVE | Noted: 2019-04-22

## 2023-11-25 PROBLEM — R57.1 HYPOVOLEMIC SHOCK (HCC): Status: ACTIVE | Noted: 2023-11-25

## 2023-11-25 PROBLEM — G93.40 ACUTE ENCEPHALOPATHY: Status: ACTIVE | Noted: 2023-11-25

## 2023-11-25 LAB
ALBUMIN SERPL-MCNC: 2.4 G/DL (ref 3.2–4.6)
ALBUMIN/GLOB SERPL: 0.8 (ref 0.4–1.6)
ALP SERPL-CCNC: 89 U/L (ref 50–136)
ALT SERPL-CCNC: 21 U/L (ref 12–65)
ANION GAP BLD CALC-SCNC: ABNORMAL MMOL/L
ANION GAP BLD CALC-SCNC: ABNORMAL MMOL/L
ANION GAP SERPL CALC-SCNC: 11 MMOL/L (ref 2–11)
ANION GAP SERPL CALC-SCNC: 11 MMOL/L (ref 2–11)
APPEARANCE UR: CLEAR
ARTERIAL PATENCY WRIST A: ABNORMAL
ARTERIAL PATENCY WRIST A: POSITIVE
AST SERPL-CCNC: 33 U/L (ref 15–37)
BACTERIA URNS QL MICRO: 0 /HPF
BASE EXCESS BLD CALC-SCNC: 2.1 MMOL/L
BASE EXCESS BLD CALC-SCNC: 2.7 MMOL/L
BASE EXCESS BLD CALC-SCNC: 5.7 MMOL/L
BASOPHILS # BLD: 0.1 K/UL (ref 0–0.2)
BASOPHILS # BLD: 0.1 K/UL (ref 0–0.2)
BASOPHILS NFR BLD: 1 % (ref 0–2)
BASOPHILS NFR BLD: 1 % (ref 0–2)
BDY SITE: ABNORMAL
BDY SITE: ABNORMAL
BILIRUB SERPL-MCNC: 1.3 MG/DL (ref 0.2–1.1)
BILIRUB UR QL: NEGATIVE
BUN SERPL-MCNC: 36 MG/DL (ref 8–23)
BUN SERPL-MCNC: 37 MG/DL (ref 8–23)
CA-I BLD-MCNC: 0.97 MMOL/L (ref 1.12–1.32)
CA-I BLD-MCNC: 1.02 MMOL/L (ref 1.12–1.32)
CA-I BLD-MCNC: 3.77 MG/DL (ref 4–5.2)
CALCIUM SERPL-MCNC: 6.9 MG/DL (ref 8.3–10.4)
CALCIUM SERPL-MCNC: 7.1 MG/DL (ref 8.3–10.4)
CASTS URNS QL MICRO: ABNORMAL /LPF
CHLORIDE SERPL-SCNC: 101 MMOL/L (ref 101–110)
CHLORIDE SERPL-SCNC: 96 MMOL/L (ref 101–110)
CIT FUNC FIB MAX AMP: 41.5 MM (ref 15–32)
CIT KAOLIN/HEP R-TIME: 3.8 MINS (ref 4.3–8.3)
CIT RAPIDTEG MAX AMP: 70.4 MM (ref 52–70)
CITRATED KAOLIN ANGLE: 81.4 DEG (ref 63–78)
CITRATED KAOLIN K TIME: 0.7 MINS (ref 0.8–2.1)
CITRATED KAOLIN MAX AMPLITUDE: 66.9 MM (ref 52–69)
CITRATED KAOLIN R TIME: 4.3 MINS (ref 4.6–9.1)
CO2 BLD-SCNC: 26 MMOL/L (ref 13–23)
CO2 BLD-SCNC: 29 MMOL/L (ref 13–23)
CO2 SERPL-SCNC: 25 MMOL/L (ref 21–32)
CO2 SERPL-SCNC: 28 MMOL/L (ref 21–32)
COLOR UR: ABNORMAL
CREAT SERPL-MCNC: 3.2 MG/DL (ref 0.6–1)
CREAT SERPL-MCNC: 3.5 MG/DL (ref 0.6–1)
DIFFERENTIAL METHOD BLD: ABNORMAL
DIFFERENTIAL METHOD BLD: ABNORMAL
EKG ATRIAL RATE: 49 BPM
EKG ATRIAL RATE: 58 BPM
EKG DIAGNOSIS: NORMAL
EKG DIAGNOSIS: NORMAL
EKG P AXIS: 22 DEGREES
EKG P AXIS: 73 DEGREES
EKG P-R INTERVAL: 176 MS
EKG P-R INTERVAL: 180 MS
EKG Q-T INTERVAL: 598 MS
EKG Q-T INTERVAL: 624 MS
EKG QRS DURATION: 166 MS
EKG QRS DURATION: 174 MS
EKG QTC CALCULATION (BAZETT): 564 MS
EKG QTC CALCULATION (BAZETT): 587 MS
EKG R AXIS: 64 DEGREES
EKG R AXIS: 81 DEGREES
EKG T AXIS: -33 DEGREES
EKG T AXIS: 65 DEGREES
EKG VENTRICULAR RATE: 49 BPM
EKG VENTRICULAR RATE: 58 BPM
EOSINOPHIL # BLD: 0 K/UL (ref 0–0.8)
EOSINOPHIL # BLD: 0 K/UL (ref 0–0.8)
EOSINOPHIL NFR BLD: 0 % (ref 0.5–7.8)
EOSINOPHIL NFR BLD: 0 % (ref 0.5–7.8)
ERYTHROCYTE [DISTWIDTH] IN BLOOD BY AUTOMATED COUNT: 16.2 % (ref 11.9–14.6)
ERYTHROCYTE [DISTWIDTH] IN BLOOD BY AUTOMATED COUNT: 17.1 % (ref 11.9–14.6)
FUNCT FIBRINOGEN LEVEL: 757.3 MG/DL (ref 278–581)
GAS FLOW.O2 O2 DELIVERY SYS: ABNORMAL
GAS FLOW.O2 O2 DELIVERY SYS: ABNORMAL
GLOBULIN SER CALC-MCNC: 3.2 G/DL (ref 2.8–4.5)
GLUCOSE BLD STRIP.AUTO-MCNC: 95 MG/DL (ref 65–100)
GLUCOSE SERPL-MCNC: 130 MG/DL (ref 65–100)
GLUCOSE SERPL-MCNC: 99 MG/DL (ref 65–100)
GLUCOSE UR STRIP.AUTO-MCNC: NEGATIVE MG/DL
HCO3 BLD-SCNC: 25.9 MMOL/L (ref 22–26)
HCO3 BLD-SCNC: 26.9 MMOL/L (ref 22–26)
HCO3 BLD-SCNC: 29.5 MMOL/L (ref 22–26)
HCT VFR BLD AUTO: 25.7 % (ref 35.8–46.3)
HCT VFR BLD AUTO: 26 % (ref 35.8–46.3)
HCT VFR BLD AUTO: 27 % (ref 35.8–46.3)
HCT VFR BLD AUTO: 27.2 % (ref 35.8–46.3)
HCT VFR BLD AUTO: 33.4 % (ref 35.8–46.3)
HGB BLD-MCNC: 10.5 G/DL (ref 11.7–15.4)
HGB BLD-MCNC: 8.4 G/DL (ref 11.7–15.4)
HGB BLD-MCNC: 8.4 G/DL (ref 11.7–15.4)
HGB BLD-MCNC: 8.6 G/DL (ref 11.7–15.4)
HGB BLD-MCNC: 8.7 G/DL (ref 11.7–15.4)
HGB UR QL STRIP: ABNORMAL
HISTORY CHECK: NORMAL
IMM GRANULOCYTES # BLD AUTO: 0 K/UL (ref 0–0.5)
IMM GRANULOCYTES # BLD AUTO: 0.1 K/UL (ref 0–0.5)
IMM GRANULOCYTES NFR BLD AUTO: 0 % (ref 0–5)
IMM GRANULOCYTES NFR BLD AUTO: 1 % (ref 0–5)
KETONES UR QL STRIP.AUTO: NEGATIVE MG/DL
LACTATE SERPL-SCNC: 1.5 MMOL/L (ref 0.4–2)
LACTATE SERPL-SCNC: 1.8 MMOL/L (ref 0.4–2)
LEUKOCYTE ESTERASE UR QL STRIP.AUTO: NEGATIVE
LIPASE SERPL-CCNC: 19 U/L (ref 73–393)
LYMPHOCYTES # BLD: 0.6 K/UL (ref 0.5–4.6)
LYMPHOCYTES # BLD: 0.7 K/UL (ref 0.5–4.6)
LYMPHOCYTES NFR BLD: 4 % (ref 13–44)
LYMPHOCYTES NFR BLD: 6 % (ref 13–44)
MAGNESIUM SERPL-MCNC: 2.3 MG/DL (ref 1.8–2.4)
MCH RBC QN AUTO: 28.1 PG (ref 26.1–32.9)
MCH RBC QN AUTO: 28.2 PG (ref 26.1–32.9)
MCHC RBC AUTO-ENTMCNC: 31.4 G/DL (ref 31.4–35)
MCHC RBC AUTO-ENTMCNC: 31.9 G/DL (ref 31.4–35)
MCV RBC AUTO: 88.5 FL (ref 82–102)
MCV RBC AUTO: 89.3 FL (ref 82–102)
MONOCYTES # BLD: 0.6 K/UL (ref 0.1–1.3)
MONOCYTES # BLD: 0.7 K/UL (ref 0.1–1.3)
MONOCYTES NFR BLD: 5 % (ref 4–12)
MONOCYTES NFR BLD: 5 % (ref 4–12)
MUCOUS THREADS URNS QL MICRO: ABNORMAL /LPF
NEUTS SEG # BLD: 10.5 K/UL (ref 1.7–8.2)
NEUTS SEG # BLD: 13.2 K/UL (ref 1.7–8.2)
NEUTS SEG NFR BLD: 88 % (ref 43–78)
NEUTS SEG NFR BLD: 89 % (ref 43–78)
NITRITE UR QL STRIP.AUTO: NEGATIVE
NRBC # BLD: 0 K/UL (ref 0–0.2)
NRBC # BLD: 0 K/UL (ref 0–0.2)
O2/TOTAL GAS SETTING VFR VENT: 60 %
OTHER OBSERVATIONS: ABNORMAL
PCO2 BLD: 36 MMHG (ref 35–45)
PCO2 BLD: 38.6 MMHG (ref 35–45)
PCO2 BLD: 38.9 MMHG (ref 35–45)
PEEP RESPIRATORY: 8 CMH2O
PH BLD: 7.45 (ref 7.35–7.45)
PH BLD: 7.47 (ref 7.35–7.45)
PH BLD: 7.49 (ref 7.35–7.45)
PH UR STRIP: 5.5 (ref 5–9)
PLATELET # BLD AUTO: 323 K/UL (ref 150–450)
PLATELET # BLD AUTO: 335 K/UL (ref 150–450)
PLATELET COMMENT: ADEQUATE
PLATELET COMMENT: ADEQUATE
PMV BLD AUTO: 11.8 FL (ref 9.4–12.3)
PMV BLD AUTO: 13 FL (ref 9.4–12.3)
PO2 BLD: 249 MMHG (ref 75–100)
PO2 BLD: 433 MMHG (ref 75–100)
PO2 BLD: 85 MMHG (ref 75–100)
POTASSIUM BLD-SCNC: 1.8 MMOL/L (ref 3.5–5.1)
POTASSIUM BLD-SCNC: 2.2 MMOL/L (ref 3.5–5.1)
POTASSIUM SERPL-SCNC: 2.2 MMOL/L (ref 3.5–5.1)
POTASSIUM SERPL-SCNC: 2.4 MMOL/L (ref 3.5–5.1)
POTASSIUM SERPL-SCNC: 2.6 MMOL/L (ref 3.5–5.1)
PROCALCITONIN SERPL-MCNC: 0.7 NG/ML (ref 0–0.49)
PROT SERPL-MCNC: 5.6 G/DL (ref 6.3–8.2)
PROT UR STRIP-MCNC: 30 MG/DL
RBC # BLD AUTO: 3.05 M/UL (ref 4.05–5.2)
RBC # BLD AUTO: 3.74 M/UL (ref 4.05–5.2)
RBC #/AREA URNS HPF: ABNORMAL /HPF
RBC MORPH BLD: ABNORMAL
RBC MORPH BLD: ABNORMAL
SAO2 % BLD: 100 %
SAO2 % BLD: 97 %
SAO2 % BLD: 99.9 % (ref 95–98)
SERVICE CMNT-IMP: ABNORMAL
SODIUM BLD-SCNC: 132 MMOL/L (ref 136–145)
SODIUM BLD-SCNC: 135 MMOL/L (ref 136–145)
SODIUM SERPL-SCNC: 135 MMOL/L (ref 133–143)
SODIUM SERPL-SCNC: 137 MMOL/L (ref 133–143)
SP GR UR REFRACTOMETRY: 1.01 (ref 1–1.02)
SPECIMEN SITE: ABNORMAL
SPECIMEN SITE: ABNORMAL
SPECIMEN TYPE: ABNORMAL
TROPONIN I SERPL HS-MCNC: 31.6 PG/ML (ref 0–14)
TSH W FREE THYROID IF ABNORMAL: 0.99 UIU/ML (ref 0.36–3.74)
UROBILINOGEN UR QL STRIP.AUTO: 0.2 EU/DL (ref 0.2–1)
VENTILATION MODE VENT: ABNORMAL
VT SETTING VENT: 400 ML
WBC # BLD AUTO: 11.9 K/UL (ref 4.3–11.1)
WBC # BLD AUTO: 14.7 K/UL (ref 4.3–11.1)
WBC MORPH BLD: ABNORMAL
WBC MORPH BLD: ABNORMAL
WBC URNS QL MICRO: ABNORMAL /HPF

## 2023-11-25 PROCEDURE — 74176 CT ABD & PELVIS W/O CONTRAST: CPT

## 2023-11-25 PROCEDURE — 2500000003 HC RX 250 WO HCPCS: Performed by: ANESTHESIOLOGY

## 2023-11-25 PROCEDURE — 2500000003 HC RX 250 WO HCPCS: Performed by: STUDENT IN AN ORGANIZED HEALTH CARE EDUCATION/TRAINING PROGRAM

## 2023-11-25 PROCEDURE — 93005 ELECTROCARDIOGRAM TRACING: CPT

## 2023-11-25 PROCEDURE — 0D1N0Z4 BYPASS SIGMOID COLON TO CUTANEOUS, OPEN APPROACH: ICD-10-PCS | Performed by: SURGERY

## 2023-11-25 PROCEDURE — 2000000000 HC ICU R&B

## 2023-11-25 PROCEDURE — 85018 HEMOGLOBIN: CPT

## 2023-11-25 PROCEDURE — 87186 SC STD MICRODIL/AGAR DIL: CPT

## 2023-11-25 PROCEDURE — 96366 THER/PROPH/DIAG IV INF ADDON: CPT

## 2023-11-25 PROCEDURE — 36620 INSERTION CATHETER ARTERY: CPT

## 2023-11-25 PROCEDURE — 3700000000 HC ANESTHESIA ATTENDED CARE: Performed by: SURGERY

## 2023-11-25 PROCEDURE — 96368 THER/DIAG CONCURRENT INF: CPT

## 2023-11-25 PROCEDURE — 87205 SMEAR GRAM STAIN: CPT

## 2023-11-25 PROCEDURE — 96361 HYDRATE IV INFUSION ADD-ON: CPT

## 2023-11-25 PROCEDURE — 82803 BLOOD GASES ANY COMBINATION: CPT

## 2023-11-25 PROCEDURE — 93010 ELECTROCARDIOGRAM REPORT: CPT | Performed by: INTERNAL MEDICINE

## 2023-11-25 PROCEDURE — 74018 RADEX ABDOMEN 1 VIEW: CPT

## 2023-11-25 PROCEDURE — 93005 ELECTROCARDIOGRAM TRACING: CPT | Performed by: STUDENT IN AN ORGANIZED HEALTH CARE EDUCATION/TRAINING PROGRAM

## 2023-11-25 PROCEDURE — 85384 FIBRINOGEN ACTIVITY: CPT

## 2023-11-25 PROCEDURE — 6360000002 HC RX W HCPCS: Performed by: STUDENT IN AN ORGANIZED HEALTH CARE EDUCATION/TRAINING PROGRAM

## 2023-11-25 PROCEDURE — 85014 HEMATOCRIT: CPT

## 2023-11-25 PROCEDURE — 87154 CUL TYP ID BLD PTHGN 6+ TRGT: CPT

## 2023-11-25 PROCEDURE — 3E033XZ INTRODUCTION OF VASOPRESSOR INTO PERIPHERAL VEIN, PERCUTANEOUS APPROACH: ICD-10-PCS | Performed by: STUDENT IN AN ORGANIZED HEALTH CARE EDUCATION/TRAINING PROGRAM

## 2023-11-25 PROCEDURE — 94002 VENT MGMT INPAT INIT DAY: CPT

## 2023-11-25 PROCEDURE — 99292 CRITICAL CARE ADDL 30 MIN: CPT | Performed by: INTERNAL MEDICINE

## 2023-11-25 PROCEDURE — 84132 ASSAY OF SERUM POTASSIUM: CPT

## 2023-11-25 PROCEDURE — 81001 URINALYSIS AUTO W/SCOPE: CPT

## 2023-11-25 PROCEDURE — 2720000010 HC SURG SUPPLY STERILE: Performed by: SURGERY

## 2023-11-25 PROCEDURE — 87077 CULTURE AEROBIC IDENTIFY: CPT

## 2023-11-25 PROCEDURE — 84295 ASSAY OF SERUM SODIUM: CPT

## 2023-11-25 PROCEDURE — 2500000003 HC RX 250 WO HCPCS: Performed by: INTERNAL MEDICINE

## 2023-11-25 PROCEDURE — 87040 BLOOD CULTURE FOR BACTERIA: CPT

## 2023-11-25 PROCEDURE — 6370000000 HC RX 637 (ALT 250 FOR IP): Performed by: STUDENT IN AN ORGANIZED HEALTH CARE EDUCATION/TRAINING PROGRAM

## 2023-11-25 PROCEDURE — 2580000003 HC RX 258: Performed by: ANESTHESIOLOGY

## 2023-11-25 PROCEDURE — 96365 THER/PROPH/DIAG IV INF INIT: CPT

## 2023-11-25 PROCEDURE — 2580000003 HC RX 258: Performed by: INTERNAL MEDICINE

## 2023-11-25 PROCEDURE — 86923 COMPATIBILITY TEST ELECTRIC: CPT

## 2023-11-25 PROCEDURE — 82330 ASSAY OF CALCIUM: CPT

## 2023-11-25 PROCEDURE — 6370000000 HC RX 637 (ALT 250 FOR IP)

## 2023-11-25 PROCEDURE — 44141 PARTIAL REMOVAL OF COLON: CPT | Performed by: SURGERY

## 2023-11-25 PROCEDURE — 85025 COMPLETE CBC W/AUTO DIFF WBC: CPT

## 2023-11-25 PROCEDURE — 71045 X-RAY EXAM CHEST 1 VIEW: CPT

## 2023-11-25 PROCEDURE — 6360000002 HC RX W HCPCS

## 2023-11-25 PROCEDURE — 6360000002 HC RX W HCPCS: Performed by: INTERNAL MEDICINE

## 2023-11-25 PROCEDURE — 0DT80ZZ RESECTION OF SMALL INTESTINE, OPEN APPROACH: ICD-10-PCS | Performed by: SURGERY

## 2023-11-25 PROCEDURE — 86850 RBC ANTIBODY SCREEN: CPT

## 2023-11-25 PROCEDURE — 83605 ASSAY OF LACTIC ACID: CPT

## 2023-11-25 PROCEDURE — 85347 COAGULATION TIME ACTIVATED: CPT

## 2023-11-25 PROCEDURE — 87076 CULTURE ANAEROBE IDENT EACH: CPT

## 2023-11-25 PROCEDURE — 96374 THER/PROPH/DIAG INJ IV PUSH: CPT

## 2023-11-25 PROCEDURE — 86900 BLOOD TYPING SEROLOGIC ABO: CPT

## 2023-11-25 PROCEDURE — 3700000001 HC ADD 15 MINUTES (ANESTHESIA): Performed by: SURGERY

## 2023-11-25 PROCEDURE — 96375 TX/PRO/DX INJ NEW DRUG ADDON: CPT

## 2023-11-25 PROCEDURE — 6360000002 HC RX W HCPCS: Performed by: SURGERY

## 2023-11-25 PROCEDURE — 2709999900 HC NON-CHARGEABLE SUPPLY: Performed by: SURGERY

## 2023-11-25 PROCEDURE — 36556 INSERT NON-TUNNEL CV CATH: CPT

## 2023-11-25 PROCEDURE — 86901 BLOOD TYPING SEROLOGIC RH(D): CPT

## 2023-11-25 PROCEDURE — 44139 MOBILIZATION OF COLON: CPT | Performed by: SURGERY

## 2023-11-25 PROCEDURE — 96367 TX/PROPH/DG ADDL SEQ IV INF: CPT

## 2023-11-25 PROCEDURE — 37799 UNLISTED PX VASCULAR SURGERY: CPT

## 2023-11-25 PROCEDURE — 3600000014 HC SURGERY LEVEL 4 ADDTL 15MIN: Performed by: SURGERY

## 2023-11-25 PROCEDURE — 2580000003 HC RX 258: Performed by: STUDENT IN AN ORGANIZED HEALTH CARE EDUCATION/TRAINING PROGRAM

## 2023-11-25 PROCEDURE — 99223 1ST HOSP IP/OBS HIGH 75: CPT

## 2023-11-25 PROCEDURE — 85576 BLOOD PLATELET AGGREGATION: CPT

## 2023-11-25 PROCEDURE — 6360000002 HC RX W HCPCS: Performed by: ANESTHESIOLOGY

## 2023-11-25 PROCEDURE — 3600000004 HC SURGERY LEVEL 4 BASE: Performed by: SURGERY

## 2023-11-25 PROCEDURE — 96376 TX/PRO/DX INJ SAME DRUG ADON: CPT

## 2023-11-25 PROCEDURE — 88307 TISSUE EXAM BY PATHOLOGIST: CPT

## 2023-11-25 PROCEDURE — 2580000003 HC RX 258: Performed by: SURGERY

## 2023-11-25 PROCEDURE — 82947 ASSAY GLUCOSE BLOOD QUANT: CPT

## 2023-11-25 PROCEDURE — 87070 CULTURE OTHR SPECIMN AEROBIC: CPT

## 2023-11-25 PROCEDURE — 2580000003 HC RX 258

## 2023-11-25 RX ORDER — MIDAZOLAM HYDROCHLORIDE 1 MG/ML
5 INJECTION, SOLUTION INTRAMUSCULAR; INTRAVENOUS ONCE
Status: COMPLETED | OUTPATIENT
Start: 2023-11-25 | End: 2023-11-25

## 2023-11-25 RX ORDER — ONDANSETRON 2 MG/ML
4 INJECTION INTRAMUSCULAR; INTRAVENOUS
Status: COMPLETED | OUTPATIENT
Start: 2023-11-25 | End: 2023-11-25

## 2023-11-25 RX ORDER — SODIUM CHLORIDE, SODIUM LACTATE, POTASSIUM CHLORIDE, AND CALCIUM CHLORIDE .6; .31; .03; .02 G/100ML; G/100ML; G/100ML; G/100ML
1000 INJECTION, SOLUTION INTRAVENOUS ONCE
Status: COMPLETED | OUTPATIENT
Start: 2023-11-25 | End: 2023-11-25

## 2023-11-25 RX ORDER — SODIUM CHLORIDE, SODIUM LACTATE, POTASSIUM CHLORIDE, CALCIUM CHLORIDE 600; 310; 30; 20 MG/100ML; MG/100ML; MG/100ML; MG/100ML
INJECTION, SOLUTION INTRAVENOUS CONTINUOUS PRN
Status: DISCONTINUED | OUTPATIENT
Start: 2023-11-25 | End: 2023-11-25 | Stop reason: SDUPTHER

## 2023-11-25 RX ORDER — SODIUM CHLORIDE 9 MG/ML
INJECTION, SOLUTION INTRAVENOUS PRN
Status: DISCONTINUED | OUTPATIENT
Start: 2023-11-25 | End: 2023-11-26

## 2023-11-25 RX ORDER — POTASSIUM CHLORIDE 7.45 MG/ML
10 INJECTION INTRAVENOUS ONCE
Status: DISCONTINUED | OUTPATIENT
Start: 2023-11-25 | End: 2023-11-25 | Stop reason: ALTCHOICE

## 2023-11-25 RX ORDER — SUCCINYLCHOLINE CHLORIDE 20 MG/ML
INJECTION INTRAMUSCULAR; INTRAVENOUS PRN
Status: DISCONTINUED | OUTPATIENT
Start: 2023-11-25 | End: 2023-11-25 | Stop reason: SDUPTHER

## 2023-11-25 RX ORDER — FENTANYL CITRATE-0.9 % NACL/PF 10 MCG/ML
25-200 PLASTIC BAG, INJECTION (ML) INTRAVENOUS CONTINUOUS
Status: DISCONTINUED | OUTPATIENT
Start: 2023-11-25 | End: 2023-11-27

## 2023-11-25 RX ORDER — NOREPINEPHRINE BITARTRATE 0.02 MG/ML
1-100 INJECTION, SOLUTION INTRAVENOUS CONTINUOUS
Status: DISCONTINUED | OUTPATIENT
Start: 2023-11-25 | End: 2023-11-26 | Stop reason: DRUGHIGH

## 2023-11-25 RX ORDER — CARBOXYMETHYLCELLULOSE SODIUM 10 MG/ML
1 GEL OPHTHALMIC 3 TIMES DAILY
Status: DISCONTINUED | OUTPATIENT
Start: 2023-11-25 | End: 2023-11-27

## 2023-11-25 RX ORDER — MIDAZOLAM HYDROCHLORIDE 1 MG/ML
2 INJECTION INTRAMUSCULAR; INTRAVENOUS
Status: DISCONTINUED | OUTPATIENT
Start: 2023-11-25 | End: 2023-11-27 | Stop reason: HOSPADM

## 2023-11-25 RX ORDER — POTASSIUM CHLORIDE 29.8 MG/ML
20 INJECTION INTRAVENOUS ONCE
Status: COMPLETED | OUTPATIENT
Start: 2023-11-25 | End: 2023-11-26

## 2023-11-25 RX ORDER — POTASSIUM CHLORIDE 29.8 MG/ML
20 INJECTION INTRAVENOUS PRN
Status: DISCONTINUED | OUTPATIENT
Start: 2023-11-25 | End: 2023-11-25

## 2023-11-25 RX ORDER — POTASSIUM CHLORIDE 7.45 MG/ML
10 INJECTION INTRAVENOUS
Status: COMPLETED | OUTPATIENT
Start: 2023-11-25 | End: 2023-11-25

## 2023-11-25 RX ORDER — ETOMIDATE 2 MG/ML
INJECTION INTRAVENOUS PRN
Status: DISCONTINUED | OUTPATIENT
Start: 2023-11-25 | End: 2023-11-25 | Stop reason: SDUPTHER

## 2023-11-25 RX ORDER — POTASSIUM CHLORIDE 7.45 MG/ML
10 INJECTION INTRAVENOUS PRN
Status: DISCONTINUED | OUTPATIENT
Start: 2023-11-25 | End: 2023-11-25

## 2023-11-25 RX ORDER — GLYCOPYRROLATE 0.2 MG/ML
INJECTION INTRAMUSCULAR; INTRAVENOUS PRN
Status: DISCONTINUED | OUTPATIENT
Start: 2023-11-25 | End: 2023-11-25 | Stop reason: SDUPTHER

## 2023-11-25 RX ORDER — MAGNESIUM SULFATE IN WATER 40 MG/ML
2000 INJECTION, SOLUTION INTRAVENOUS PRN
Status: DISCONTINUED | OUTPATIENT
Start: 2023-11-25 | End: 2023-11-26 | Stop reason: SDUPTHER

## 2023-11-25 RX ORDER — CALCIUM GLUCONATE 20 MG/ML
1000 INJECTION, SOLUTION INTRAVENOUS ONCE
Status: COMPLETED | OUTPATIENT
Start: 2023-11-25 | End: 2023-11-25

## 2023-11-25 RX ORDER — SODIUM CHLORIDE, SODIUM LACTATE, POTASSIUM CHLORIDE, AND CALCIUM CHLORIDE .6; .31; .03; .02 G/100ML; G/100ML; G/100ML; G/100ML
500 INJECTION, SOLUTION INTRAVENOUS ONCE
Status: COMPLETED | OUTPATIENT
Start: 2023-11-25 | End: 2023-11-25

## 2023-11-25 RX ORDER — ROCURONIUM BROMIDE 10 MG/ML
INJECTION, SOLUTION INTRAVENOUS PRN
Status: DISCONTINUED | OUTPATIENT
Start: 2023-11-25 | End: 2023-11-25 | Stop reason: SDUPTHER

## 2023-11-25 RX ORDER — MIDAZOLAM HYDROCHLORIDE 1 MG/ML
2 INJECTION INTRAMUSCULAR; INTRAVENOUS
Status: DISCONTINUED | OUTPATIENT
Start: 2023-11-25 | End: 2023-11-25

## 2023-11-25 RX ORDER — POTASSIUM CHLORIDE 20 MEQ/1
40 TABLET, EXTENDED RELEASE ORAL PRN
Status: DISCONTINUED | OUTPATIENT
Start: 2023-11-25 | End: 2023-11-25

## 2023-11-25 RX ORDER — POTASSIUM CHLORIDE 14.9 MG/ML
INJECTION INTRAVENOUS PRN
Status: DISCONTINUED | OUTPATIENT
Start: 2023-11-25 | End: 2023-11-25 | Stop reason: SDUPTHER

## 2023-11-25 RX ORDER — EPINEPHRINE 1 MG/ML(1)
AMPUL (ML) INJECTION PRN
Status: DISCONTINUED | OUTPATIENT
Start: 2023-11-25 | End: 2023-11-25 | Stop reason: SDUPTHER

## 2023-11-25 RX ORDER — FENTANYL CITRATE-0.9 % NACL/PF 10 MCG/ML
25-200 PLASTIC BAG, INJECTION (ML) INTRAVENOUS CONTINUOUS
Status: DISCONTINUED | OUTPATIENT
Start: 2023-11-25 | End: 2023-11-25 | Stop reason: SDUPTHER

## 2023-11-25 RX ORDER — HEPARIN SODIUM 5000 [USP'U]/ML
5000 INJECTION, SOLUTION INTRAVENOUS; SUBCUTANEOUS EVERY 8 HOURS SCHEDULED
Status: DISCONTINUED | OUTPATIENT
Start: 2023-11-26 | End: 2023-11-27

## 2023-11-25 RX ORDER — 0.9 % SODIUM CHLORIDE 0.9 %
1000 INTRAVENOUS SOLUTION INTRAVENOUS
Status: COMPLETED | OUTPATIENT
Start: 2023-11-25 | End: 2023-11-25

## 2023-11-25 RX ORDER — FENTANYL CITRATE-0.9 % NACL/PF 20 MCG/2ML
50 SYRINGE (ML) INTRAVENOUS EVERY 30 MIN PRN
Status: DISCONTINUED | OUTPATIENT
Start: 2023-11-25 | End: 2023-11-27

## 2023-11-25 RX ADMIN — FENTANYL CITRATE 50 MCG: 50 INJECTION INTRAMUSCULAR; INTRAVENOUS at 02:53

## 2023-11-25 RX ADMIN — SODIUM BICARBONATE 200 MEQ: 84 INJECTION, SOLUTION INTRAVENOUS at 08:40

## 2023-11-25 RX ADMIN — PHENYLEPHRINE HYDROCHLORIDE 30 MCG/MIN: 10 INJECTION INTRAVENOUS at 08:44

## 2023-11-25 RX ADMIN — EPINEPHRINE 10 MCG: 1 INJECTION, SOLUTION, CONCENTRATE INTRAVENOUS at 04:33

## 2023-11-25 RX ADMIN — VASOPRESSIN 0.03 UNITS/MIN: 0.2 INJECTION INTRAVENOUS at 20:57

## 2023-11-25 RX ADMIN — ROCURONIUM BROMIDE 45 MG: 50 INJECTION, SOLUTION INTRAVENOUS at 05:18

## 2023-11-25 RX ADMIN — SODIUM CHLORIDE, SODIUM LACTATE, POTASSIUM CHLORIDE, AND CALCIUM CHLORIDE: 600; 310; 30; 20 INJECTION, SOLUTION INTRAVENOUS at 05:40

## 2023-11-25 RX ADMIN — FENTANYL CITRATE 50 MCG/HR: 50 INJECTION INTRAVENOUS at 13:25

## 2023-11-25 RX ADMIN — NOREPINEPHRINE BITARTRATE 50 MCG/MIN: 1 INJECTION, SOLUTION, CONCENTRATE INTRAVENOUS at 16:19

## 2023-11-25 RX ADMIN — POTASSIUM CHLORIDE 20 MEQ: 400 INJECTION, SOLUTION INTRAVENOUS at 23:21

## 2023-11-25 RX ADMIN — NOREPINEPHRINE BITARTRATE 45 MCG/MIN: 1 INJECTION, SOLUTION, CONCENTRATE INTRAVENOUS at 23:39

## 2023-11-25 RX ADMIN — Medication 180 MG: at 04:27

## 2023-11-25 RX ADMIN — POTASSIUM CHLORIDE 10 MEQ: 7.46 INJECTION, SOLUTION INTRAVENOUS at 13:08

## 2023-11-25 RX ADMIN — EPINEPHRINE 10 MCG: 1 INJECTION, SOLUTION, CONCENTRATE INTRAVENOUS at 04:34

## 2023-11-25 RX ADMIN — PIPERACILLIN AND TAZOBACTAM 4500 MG: 4; .5 INJECTION, POWDER, FOR SOLUTION INTRAVENOUS at 23:24

## 2023-11-25 RX ADMIN — SODIUM CHLORIDE, SODIUM LACTATE, POTASSIUM CHLORIDE, CALCIUM CHLORIDE: 600; 310; 30; 20 INJECTION, SOLUTION INTRAVENOUS at 05:00

## 2023-11-25 RX ADMIN — PHENYLEPHRINE HYDROCHLORIDE 200 MCG/MIN: 10 INJECTION INTRAVENOUS at 14:14

## 2023-11-25 RX ADMIN — NOREPINEPHRINE BITARTRATE 30 MCG/MIN: 1 INJECTION, SOLUTION, CONCENTRATE INTRAVENOUS at 20:11

## 2023-11-25 RX ADMIN — SODIUM CHLORIDE 1000 ML: 9 INJECTION, SOLUTION INTRAVENOUS at 01:58

## 2023-11-25 RX ADMIN — NOREPINEPHRINE BITARTRATE 46 MCG/MIN: 1 INJECTION, SOLUTION, CONCENTRATE INTRAVENOUS at 21:49

## 2023-11-25 RX ADMIN — Medication 50 MCG: at 17:41

## 2023-11-25 RX ADMIN — ONDANSETRON 4 MG: 2 INJECTION INTRAMUSCULAR; INTRAVENOUS at 00:23

## 2023-11-25 RX ADMIN — SODIUM CHLORIDE, SODIUM LACTATE, POTASSIUM CHLORIDE, AND CALCIUM CHLORIDE: 600; 310; 30; 20 INJECTION, SOLUTION INTRAVENOUS at 04:14

## 2023-11-25 RX ADMIN — PIPERACILLIN AND TAZOBACTAM 4500 MG: 4; .5 INJECTION, POWDER, FOR SOLUTION INTRAVENOUS at 12:24

## 2023-11-25 RX ADMIN — VASOPRESSIN 0.02 UNITS/MIN: 0.2 INJECTION INTRAVENOUS at 22:07

## 2023-11-25 RX ADMIN — ROCURONIUM BROMIDE 5 MG: 50 INJECTION, SOLUTION INTRAVENOUS at 04:27

## 2023-11-25 RX ADMIN — CALCIUM GLUCONATE 1000 MG: 20 INJECTION, SOLUTION INTRAVENOUS at 01:53

## 2023-11-25 RX ADMIN — Medication 0.01 MCG/KG/MIN: at 04:43

## 2023-11-25 RX ADMIN — NOREPINEPHRINE BITARTRATE 46 MCG/MIN: 1 INJECTION, SOLUTION, CONCENTRATE INTRAVENOUS at 04:02

## 2023-11-25 RX ADMIN — SODIUM CHLORIDE, POTASSIUM CHLORIDE, SODIUM LACTATE AND CALCIUM CHLORIDE 1000 ML: 600; 310; 30; 20 INJECTION, SOLUTION INTRAVENOUS at 10:26

## 2023-11-25 RX ADMIN — POTASSIUM CHLORIDE 10 MEQ: 7.46 INJECTION, SOLUTION INTRAVENOUS at 10:03

## 2023-11-25 RX ADMIN — MIDAZOLAM 2 MG: 1 INJECTION INTRAMUSCULAR; INTRAVENOUS at 17:42

## 2023-11-25 RX ADMIN — VASOPRESSIN 0.03 UNITS/MIN: 0.2 INJECTION INTRAVENOUS at 06:59

## 2023-11-25 RX ADMIN — METRONIDAZOLE 500 MG: 500 INJECTION, SOLUTION INTRAVENOUS at 04:45

## 2023-11-25 RX ADMIN — POTASSIUM BICARBONATE 40 MEQ: 782 TABLET, EFFERVESCENT ORAL at 00:27

## 2023-11-25 RX ADMIN — POTASSIUM CHLORIDE 10 MEQ: 7.46 INJECTION, SOLUTION INTRAVENOUS at 00:23

## 2023-11-25 RX ADMIN — VASOPRESSIN 0.03 UNITS/MIN: 0.2 INJECTION INTRAVENOUS at 07:46

## 2023-11-25 RX ADMIN — POTASSIUM CHLORIDE 10 MEQ: 7.46 INJECTION, SOLUTION INTRAVENOUS at 01:19

## 2023-11-25 RX ADMIN — EPINEPHRINE 10 MCG: 1 INJECTION, SOLUTION, CONCENTRATE INTRAVENOUS at 04:37

## 2023-11-25 RX ADMIN — CARBOXYMETHYLCELLULOSE SODIUM 1 DROP: 10 GEL OPHTHALMIC at 22:14

## 2023-11-25 RX ADMIN — NITROGLYCERIN 0.5 INCH: 20 OINTMENT TOPICAL at 20:14

## 2023-11-25 RX ADMIN — NOREPINEPHRINE BITARTRATE 5 MCG/MIN: 1 INJECTION, SOLUTION, CONCENTRATE INTRAVENOUS at 01:36

## 2023-11-25 RX ADMIN — EPINEPHRINE 20 MCG/MIN: 1 INJECTION INTRAMUSCULAR; INTRAVENOUS; SUBCUTANEOUS at 19:12

## 2023-11-25 RX ADMIN — ETOMIDATE 22 MG: 2 INJECTION, SOLUTION INTRAVENOUS at 04:27

## 2023-11-25 RX ADMIN — EPINEPHRINE 20 MCG/MIN: 1 INJECTION INTRAMUSCULAR; INTRAVENOUS; SUBCUTANEOUS at 23:52

## 2023-11-25 RX ADMIN — GLYCOPYRROLATE 0.2 MG: 0.2 INJECTION INTRAMUSCULAR; INTRAVENOUS at 06:30

## 2023-11-25 RX ADMIN — POTASSIUM CHLORIDE 10 MEQ: 7.46 INJECTION, SOLUTION INTRAVENOUS at 11:57

## 2023-11-25 RX ADMIN — EPINEPHRINE 20 MCG/MIN: 1 INJECTION INTRAMUSCULAR; INTRAVENOUS; SUBCUTANEOUS at 09:55

## 2023-11-25 RX ADMIN — SODIUM CHLORIDE, POTASSIUM CHLORIDE, SODIUM LACTATE AND CALCIUM CHLORIDE 500 ML: 600; 310; 30; 20 INJECTION, SOLUTION INTRAVENOUS at 20:40

## 2023-11-25 RX ADMIN — EPINEPHRINE 10 MCG: 1 INJECTION, SOLUTION, CONCENTRATE INTRAVENOUS at 04:36

## 2023-11-25 RX ADMIN — PIPERACILLIN AND TAZOBACTAM 4500 MG: 4; .5 INJECTION, POWDER, FOR SOLUTION INTRAVENOUS at 00:56

## 2023-11-25 RX ADMIN — Medication 50 MCG: at 14:43

## 2023-11-25 RX ADMIN — Medication 50 MCG: at 16:51

## 2023-11-25 RX ADMIN — EPINEPHRINE 30 MCG: 1 INJECTION, SOLUTION, CONCENTRATE INTRAVENOUS at 04:43

## 2023-11-25 RX ADMIN — EPINEPHRINE 10 MCG: 1 INJECTION, SOLUTION, CONCENTRATE INTRAVENOUS at 05:03

## 2023-11-25 RX ADMIN — NOREPINEPHRINE BITARTRATE 50 MCG/MIN: 1 INJECTION, SOLUTION, CONCENTRATE INTRAVENOUS at 09:57

## 2023-11-25 RX ADMIN — POTASSIUM CHLORIDE 20 MEQ: 200 INJECTION, SOLUTION INTRAVENOUS at 05:26

## 2023-11-25 RX ADMIN — Medication 2 G: at 04:41

## 2023-11-25 RX ADMIN — EPINEPHRINE 20 MCG/MIN: 1 INJECTION INTRAMUSCULAR; INTRAVENOUS; SUBCUTANEOUS at 07:48

## 2023-11-25 RX ADMIN — EPINEPHRINE 20 MCG/MIN: 1 INJECTION INTRAMUSCULAR; INTRAVENOUS; SUBCUTANEOUS at 14:38

## 2023-11-25 RX ADMIN — MIDAZOLAM 2 MG: 1 INJECTION INTRAMUSCULAR; INTRAVENOUS at 15:40

## 2023-11-25 RX ADMIN — MIDAZOLAM 5 MG: 1 INJECTION INTRAMUSCULAR; INTRAVENOUS at 19:48

## 2023-11-25 ASSESSMENT — PULMONARY FUNCTION TESTS
PIF_VALUE: 24
PIF_VALUE: 27
PIF_VALUE: 27
PIF_VALUE: 25
PIF_VALUE: 25

## 2023-11-25 ASSESSMENT — PAIN SCALES - GENERAL
PAINLEVEL_OUTOF10: 0
PAINLEVEL_OUTOF10: 10
PAINLEVEL_OUTOF10: 0

## 2023-11-25 ASSESSMENT — LIFESTYLE VARIABLES: SMOKING_STATUS: 0

## 2023-11-25 ASSESSMENT — COPD QUESTIONNAIRES: CAT_SEVERITY: MODERATE

## 2023-11-25 ASSESSMENT — PAIN DESCRIPTION - DESCRIPTORS: DESCRIPTORS: SHARP

## 2023-11-25 ASSESSMENT — PAIN DESCRIPTION - LOCATION: LOCATION: ABDOMEN

## 2023-11-25 ASSESSMENT — ENCOUNTER SYMPTOMS
VOMITING: 1
NAUSEA: 1
CONSTIPATION: 1
ABDOMINAL PAIN: 1

## 2023-11-25 NOTE — ANESTHESIA PROCEDURE NOTES
Central Venous Line:    A central venous line was placed using ultrasound guidance and surface landmarks, in the OR for the following indication(s): central venous access and CVP monitoring. 11/25/2023 6:05 AM11/25/2023 6:20 AM    Sterility preparation included the following: hand hygiene performed prior to procedure, maximum sterile barriers used and sterile technique used to drape from head to toe. The patient was placed in Trendelenburg position. The right internal jugular vein was prepped. The site was prepped with Chloraprep. A 8.5 Fr (size), 20 (length), quad lumen was placed. During the procedure, the following specific steps were taken: target vein identified, needle advanced into vein and blood aspirated and guidewire advanced into vein. Intravenous verification was obtained by ultrasound, venous blood return, x-ray and manometry. Post insertion care included: all ports aspirated, all ports flushed easily, guidewire removed intact, Biopatch applied, line sutured in place and dressing applied. During the procedure the patient experienced: patient tolerated procedure well with no complications. Outcomes: uncomplicated  Real-time US image taken/store: yes  Anesthesia type: local.. No    Additional notes:  Informed consent was obtained and questions/concerns addressed with patient prior to procedure start  The selected vein was evaluated by ultrasound for patency and adequacy for catheter insertion and was deemed adequate. Using realtime ultrasound imaging, the vein was punctured with visualization of the needle entry. A permanent image was obtained and placed in the patient's chart.    Staffing  Performed: Anesthesiologist   Anesthesiologist: Annabel Mix MD  Performed by: Annabel Mix MD  Authorized by: Annabel Mix MD    Preanesthetic Checklist  Completed: patient identified, IV checked, risks and benefits discussed, equipment checked, pre-op evaluation, timeout

## 2023-11-25 NOTE — PROGRESS NOTES
It is with great joce we pray for your family today: \"Because you dared to believe,    Your omar has healed you. Go with peace in your heart,    And be free from your suffering! \"    Ayan Camarena,    I believe that if you would touch my body I shall be healed. Give me the omar to come boldly into your presence.     Critical access hospital   [phone]

## 2023-11-25 NOTE — ED TRIAGE NOTES
Patient arrives via ems from home. 2 days ago vertigo with standing, fall from standing 2 days ago, no thinners per patient. Patient states she also started to have nausea, vomiting, and diarrhea 2 days ago. Patient has abdominal tenderness. Patient states she is SHOB and ems was unable to obtain pulse ox. EMS bp 60/19. No iv placed by ems. Last pressure 93/46, HR 48 per ems.

## 2023-11-25 NOTE — ED NOTES
Patient's brief changed and patient was placed in a gown at this time.       Mena Chinchilla RN  11/25/23 3837

## 2023-11-25 NOTE — ANESTHESIA PROCEDURE NOTES
Arterial Line:    An arterial line was placed using ultrasound guidance and surface landmarks, in the OR for the following indication(s): continuous blood pressure monitoring and blood sampling needed. A 20 gauge (size), 1 and 3/4 inch (length), Arrow (type) catheter was placed, Seldinger technique used, into the right radial artery, secured by Tegaderm and tape. Anesthesia type: General    Events:  greater than 3 attempts and patient tolerated procedure well with no complications. Additional notes:  Potential access sites were examined with ultrasound and the acceptable patent access site was selected. The needle path and vessel access were visualized in real time using ultrasonography, an image of the wire in the vessel was recorded for the permanent record. Risks/benefits/alternatives discussed including damage to muscle, nerve, or a vascular structure as well as bleeding and infection.   11/25/2023 4:30 AM11/25/2023 4:42 AM  Anesthesiologist: Kimberley Almonte MD  Performed: Anesthesiologist   Preanesthetic Checklist  Completed: patient identified, IV checked, site marked, risks and benefits discussed, surgical/procedural consents, equipment checked, pre-op evaluation, timeout performed, anesthesia consent given, oxygen available, monitors applied/VS acknowledged, fire risk safety assessment completed and verbalized and blood product R/B/A discussed and consented

## 2023-11-25 NOTE — PERIOP NOTE
TRANSFER - OUT REPORT:    Verbal report given to Shiraz Burton on Siva Oris  being transferred to [facility] for routine progression of patient care       Report consisted of patient's Situation, Background, Assessment and   Recommendations(SBAR). Information from the following report(s) Nurse Handoff Report and Surgery Report was reviewed with the receiving nurse. Lines:   Peripheral IV 11/24/23 Right; Anterior Wrist (Active)       Peripheral IV 11/25/23 Right; Anterior Cephalic (Active)       Peripheral IV 11/25/23 Distal;Left; Anterior Cephalic (Active)       Arterial Line 11/25/23 Radial (Active)        Opportunity for questions and clarification was provided.       Patient transported with:  Monitor, O2 @ [facility], and Registered Nurse

## 2023-11-25 NOTE — PROGRESS NOTES
Patient was intubated with a number 7.5 ET Tube. Tube placement verified by auscultation, by CXR, and ETCO2 monitor. ET Tube is secured at the 22 cm lindy at the lip and on the bilateral side. Patient was intubated by OR. Breath sounds are clear. Patient is negative for subcutaneous air and chest excursion is symmetric. Trachea is midline. Patient is also negative for cyanosis and is negative for pitting edema. Patient placed on ventilator on documented settings. All alarms are set and audible. Resuscitation bag is at the head of the bed.       Ventilator Settings  Mode FIO2 Rate Tidal Volume Pressure PEEP I:E Ratio                           Peak airway pressure:   Minute ventilation:     Susan Hernandez RCP

## 2023-11-25 NOTE — H&P
GENERAL SURGERY HISTORY AND PHYSICAL    Name:  Megan Little  Date/Time of Admission: 2023 11:41 PM  CSN: [id number]  Attending Provider: Darien Aguayo DO  Room/Bed:  RR1/RR1  : 1940  82 y.o. SUBJECTIVE:    CHIEF COMPLAINT: abdominal pain    HPI: 82year-old multiple medical problems. Presented to the emergency department via with hypotension and bradycardia. Reports feeling generally unwell for the last several days. Notes abdominal pain that is worsened to the point that she cannot tolerate it. Reports nausea. Reports decreased oral intake. Reports lightheadedness. CT in the emergency department shows perforated diverticulitis with free air. She is currently requiring Levophed and vasopressin for blood pressure support. She is awake and alert complaining of pain and feeling poorly.   Past Medical History:   Diagnosis Date    Acquired hypothyroidism 2016    Breast cancer ([address])     CHF (congestive heart failure) ([address])     Chronic kidney disease (CKD), stage III (moderate) ([address])     Depression 2016    Endocrine disease     hypothyroid    Fungal dermatitis 2016    Hyperlipidemia 2016    Hypertension, essential, benign 2016    Hypokalemia 2016    Hypoxemic respiratory failure, chronic (HCC)     wears 2L O2 at home    Inflamed sebaceous cyst 2016    Iron deficiency anemia     Major depressive disorder, recurrent, moderate ([address]) 2016    Nicotine dependence, cigarettes, uncomplicated     Osteopenia 2016    Osteoporosis 2016    Paroxysmal A-fib ([address])     Radiation therapy complication 2497    Rheumatoid arthritis ([address]) 2016    Right carotid bruit 2016    TIA (transient ischemic attack) 2016    Tobacco abuse 2016      Past Surgical History:   Procedure Laterality Date    ADENOIDECTOMY      BREAST LUMPECTOMY Right     with lymph nodes    COLONOSCOPY N/A 2021    COLONOSCOPY/ 31 CVICU 102 performed by

## 2023-11-25 NOTE — ED PROVIDER NOTES
Emergency Department Provider Note       PCP: Estela Montana MD   Age: 82 y.o. Sex: female     [address]    1. Hypovolemic shock (HCC)  R57.1       2. Acute renal failure, unspecified acute renal failure type ([address])  N17.9       3. Electrolyte abnormality  E87.8       4. Perforated diverticulum  K57.80           Medical Decision Making     Complexity of Problems Addressed:  1 or more acute illnesses that pose a threat to life or bodily function. Data Reviewed and Analyzed:  I independently ordered and reviewed each unique test.  I reviewed external records: ED visit note from an outside group. I reviewed external records: provider visit note from PCP. I reviewed external records: provider visit note from outside specialist.   The patients assessment required an independent historian: EMS at bedside. The reason they were needed is important historical information not provided by the patient. I interpreted the X-rays no focal infiltrate, distended bowel loops noted. I interpreted the CT Scan no obstruction, pneumoperitoneum is noted. Discussion of management or test interpretation. 29-year-old female patient presenting to this department via EMS with unstable vital signs including suspected hypoxia, hypotension and reports of nausea vomiting and constipation. Patient been unable to tolerate food or fluids for several days now and reports reduced urinary output as well. She has been admitted to this facility recently for similar symptoms at which point she was found to be in acute renal failure. She is very tender, distended abdomen with diminished bowel sounds throughout concerning for obstruction. Orders placed for chest x-ray, labs to include sepsis markers and CT imaging of the abdomen. Significant hypokalemia noted on ABG obtained on arrival.  While ABG appears stable, patient's potassium is 1.8. EKG consistent with hyperkalemia.   Orders placed for oral and IV Breast cancer ([address]) 2008    CHF (congestive heart failure) ([address])     Chronic kidney disease (CKD), stage III (moderate) ([address])     Depression 8/18/2016    Endocrine disease     hypothyroid    Fungal dermatitis 8/18/2016    Hyperlipidemia 1/16/2016    Hypertension, essential, benign 8/18/2016    Hypokalemia 8/18/2016    Hypoxemic respiratory failure, chronic (HCC)     wears 2L O2 at home    Inflamed sebaceous cyst 8/18/2016    Iron deficiency anemia     Major depressive disorder, recurrent, moderate ([address]) 8/18/2016    Nicotine dependence, cigarettes, uncomplicated 9/58/9580    Osteopenia 8/18/2016    Osteoporosis 8/18/2016    Paroxysmal A-fib ([address])     Radiation therapy complication 7507    Rheumatoid arthritis ([address]) 1/16/2016    Right carotid bruit 1/16/2016    TIA (transient ischemic attack) 1/16/2016    Tobacco abuse 8/18/2016        Past Surgical History:   Procedure Laterality Date    ADENOIDECTOMY      BREAST LUMPECTOMY Right 2008    with lymph nodes    COLONOSCOPY N/A 12/28/2021    COLONOSCOPY/ 31 CVICU 102 performed by Maranda Camejo MD at [facility] Right 9/18/2022    HIP HEMIARTHROPLASTY RIGHT performed by Raquel Wynn MD at [address] [place] [address] BONE  5/14/2021    IR BIOPSY PERC SUPERF BONE  5/14/2021    IR BIOPSY PERC SUPERF BONE 5/14/2021 [facility]    IR NONTUNNELED VASCULAR CATHETER  5/14/2021    IR NONTUNNELED VASCULAR CATHETER  5/14/2021    IR NONTUNNELED VASCULAR CATHETER 5/14/2021 [facility]    TONSILLECTOMY          Social History     Socioeconomic History    Marital status:    Tobacco Use    Smoking status: Some Days    Smokeless tobacco: Never    Tobacco comments:     Quit smoking:  Only on pay day-1/2 pack   Substance and Sexual Activity    Alcohol use: No    Drug use: No        Previous Medications    ALBUTEROL SULFATE HFA (PROVENTIL;VENTOLIN;PROAIR) 108 (90 BASE) MCG/ACT INHALER    albuterol sulfate HFA 90 mcg/actuation aerosol

## 2023-11-25 NOTE — BRIEF OP NOTE
Brief Postoperative Note      Patient: Logan Vasquez  YOB: 1940  MRN: [id number]    Date of Procedure: 11/25/2023    Pre-Op Diagnosis Codes:     * Perforated bowel ([address]) [K63.1]    Post-Op Diagnosis:  Perforated sigmoid colon       Procedure(s):  LAPAROTOMY EXPLORATORY, BOWEL RESECTION COLOSTOMY CREATION    Surgeon(s):  Melani Gardner MD    Assistant:  * No surgical staff found *    Anesthesia: General    Estimated Blood Loss (mL): 30 mL    Complications: None    Specimens:   ID Type Source Tests Collected by Time Destination   1 : intraabdominal fluid Swab Abdomen CULTURE, WOUND Melani Gardner MD 11/25/2023 0448    A : perforated colon  Tissue Colon SURGICAL PATHOLOGY Melani Gardner MD 11/25/2023 6011        Implants:  * No implants in log *      Drains:   Closed/Suction Drain RLQ Bulb (Active)       Colostomy LLQ  (Active)       Urinary Catheter 11/25/23 2 Way (Active)       [REMOVED] External Urinary Catheter (Removed)   Site Assessment Clean,dry & intact 11/25/23 0229   Placement Initiated 11/25/23 0229   Perineal Care Yes 11/25/23 0229   Suction 40 mmgHg continuous 11/25/23 0229       Findings:  Thickened masslike area in the sigmoid colon with perforation      Electronically signed by Melani Barakat MD on 11/25/2023 at 5:58 AM INSTRUCTIONS FOR SURGERY:    1) STOP BABY ASPIRIN FOR 5 DAYS BEFORE SURGERY    2) STOP XARELTO FOR 3 DAYS BEFORE SURGERY    3) RESTART BOTH XARELTO AND ASPIRIN THE DAY AFTER SURGERY UNLESS INSTRUCTED OTHERWISE BY DR HUBER  Please have labs/testing performed before next visit.

## 2023-11-25 NOTE — OP NOTE
Operative Note      Patient: Aaron Hennessy  YOB: 1940  MRN: [id number]    Date of Procedure: 11/25/2023    Pre-Op Diagnosis Codes:     * Perforated bowel ([address]) [K63.1]    Post-Op Diagnosis:  Perforated sigmoid colon       Procedure(s):  LAPAROTOMY EXPLORATORY, BOWEL RESECTION COLOSTOMY CREATION    Surgeon(s):  Florin Ny MD    Assistant:   * No surgical staff found *    Anesthesia: General    Estimated Blood Loss (mL): 30 mL    Complications: None    Specimens:   ID Type Source Tests Collected by Time Destination   1 : intraabdominal fluid Swab Abdomen CULTURE, WOUND Florin Ny MD 11/25/2023 0448    A : perforated colon  Tissue Colon SURGICAL PATHOLOGY Florin Ny MD 11/25/2023 3116        Implants:  * No implants in log *      Drains:   Closed/Suction Drain RLQ Bulb (Active)       Colostomy LLQ  (Active)       Urinary Catheter 11/25/23 2 Way (Active)       [REMOVED] External Urinary Catheter (Removed)   Site Assessment Clean,dry & intact 11/25/23 0229   Placement Initiated 11/25/23 0229   Perineal Care Yes 11/25/23 0229   Suction 40 mmgHg continuous 11/25/23 0229       Findings: Thickened masslike area in the sigmoid colon with perforation        Detailed Description of Procedure:   Patient underwent informed consent with review of the associated risks. She was taken to the operating room and underwent general anesthesia without complications. An arterial line was placed. A Yap catheter was placed. Her abdomen was prepped and draped in a sterile fashion. The appropriate timeout was performed. Ancef and Flagyl were given as prophylaxis prior to incision. A midline incision was made through the skin and the fascia and the abdomen entered revealing pus and stool throughout the abdomen. This was suctioned from the abdomen. Bookwalter retractor was placed. A thickened masslike area was identified in the mid sigmoid colon with structures adherent to it.   There

## 2023-11-25 NOTE — ED NOTES
Carrington Gan arrived to bedside at this time to transport patient to OR. This RN will help transport. Report given to Odell Watkins RN at this time.       Britni Oquendo RN  11/25/23 1779

## 2023-11-26 LAB
ACCESSION NUMBER, LLC1M: NORMAL
ACINETOBACTER CALCOAC BAUMANNII COMPLEX BY PCR: NOT DETECTED
ANION GAP SERPL CALC-SCNC: 10 MMOL/L (ref 2–11)
B FRAGILIS DNA BLD POS QL NAA+NON-PROBE: NOT DETECTED
BASOPHILS # BLD: 0 K/UL (ref 0–0.2)
BASOPHILS NFR BLD: 0 % (ref 0–2)
BIOFIRE TEST COMMENT: NORMAL
BUN SERPL-MCNC: 36 MG/DL (ref 8–23)
C ALBICANS DNA BLD POS QL NAA+NON-PROBE: NOT DETECTED
C AURIS DNA BLD POS QL NAA+NON-PROBE: NOT DETECTED
C GATTII+NEOFOR DNA BLD POS QL NAA+N-PRB: NOT DETECTED
C GLABRATA DNA BLD POS QL NAA+NON-PROBE: NOT DETECTED
C KRUSEI DNA BLD POS QL NAA+NON-PROBE: NOT DETECTED
C PARAP DNA BLD POS QL NAA+NON-PROBE: NOT DETECTED
C TROPICLS DNA BLD POS QL NAA+NON-PROBE: NOT DETECTED
CA-I BLD-MCNC: 3.69 MG/DL (ref 4–5.2)
CALCIUM SERPL-MCNC: 6.6 MG/DL (ref 8.3–10.4)
CHLORIDE SERPL-SCNC: 108 MMOL/L (ref 101–110)
CO2 SERPL-SCNC: 27 MMOL/L (ref 21–32)
CREAT SERPL-MCNC: 3 MG/DL (ref 0.6–1)
DIFFERENTIAL METHOD BLD: ABNORMAL
E CLOAC COMP DNA BLD POS NAA+NON-PROBE: NOT DETECTED
E COLI DNA BLD POS QL NAA+NON-PROBE: NOT DETECTED
E FAECALIS DNA BLD POS QL NAA+NON-PROBE: NOT DETECTED
E FAECIUM DNA BLD POS QL NAA+NON-PROBE: NOT DETECTED
ENTEROBACTERALES DNA BLD POS NAA+N-PRB: NOT DETECTED
EOSINOPHIL # BLD: 0 K/UL (ref 0–0.8)
EOSINOPHIL NFR BLD: 0 % (ref 0.5–7.8)
ERYTHROCYTE [DISTWIDTH] IN BLOOD BY AUTOMATED COUNT: 16.6 % (ref 11.9–14.6)
GLUCOSE BLD STRIP.AUTO-MCNC: 113 MG/DL (ref 65–100)
GLUCOSE BLD STRIP.AUTO-MCNC: 148 MG/DL (ref 65–100)
GLUCOSE SERPL-MCNC: 126 MG/DL (ref 65–100)
GP B STREP DNA BLD POS QL NAA+NON-PROBE: NOT DETECTED
HAEM INFLU DNA BLD POS QL NAA+NON-PROBE: NOT DETECTED
HCT VFR BLD AUTO: 25.6 % (ref 35.8–46.3)
HGB BLD-MCNC: 8.4 G/DL (ref 11.7–15.4)
IMM GRANULOCYTES # BLD AUTO: 0.6 K/UL (ref 0–0.5)
IMM GRANULOCYTES NFR BLD AUTO: 2 % (ref 0–5)
K OXYTOCA DNA BLD POS QL NAA+NON-PROBE: NOT DETECTED
KLEBSIELLA SP DNA BLD POS QL NAA+NON-PRB: NOT DETECTED
KLEBSIELLA SP DNA BLD POS QL NAA+NON-PRB: NOT DETECTED
L MONOCYTOG DNA BLD POS QL NAA+NON-PROBE: NOT DETECTED
LYMPHOCYTES # BLD: 0.9 K/UL (ref 0.5–4.6)
LYMPHOCYTES NFR BLD: 3 % (ref 13–44)
MAGNESIUM SERPL-MCNC: 1.7 MG/DL (ref 1.8–2.4)
MCH RBC QN AUTO: 28.7 PG (ref 26.1–32.9)
MCHC RBC AUTO-ENTMCNC: 32.8 G/DL (ref 31.4–35)
MCV RBC AUTO: 87.4 FL (ref 82–102)
MONOCYTES # BLD: 1.8 K/UL (ref 0.1–1.3)
MONOCYTES NFR BLD: 6 % (ref 4–12)
N MEN DNA BLD POS QL NAA+NON-PROBE: NOT DETECTED
NEUTS SEG # BLD: 26.6 K/UL (ref 1.7–8.2)
NEUTS SEG NFR BLD: 89 % (ref 43–78)
NRBC # BLD: 0.03 K/UL (ref 0–0.2)
P AERUGINOSA DNA BLD POS NAA+NON-PROBE: NOT DETECTED
PLATELET # BLD AUTO: 253 K/UL (ref 150–450)
PLATELET COMMENT: ADEQUATE
PMV BLD AUTO: 13 FL (ref 9.4–12.3)
POTASSIUM SERPL-SCNC: 2.4 MMOL/L (ref 3.5–5.1)
POTASSIUM SERPL-SCNC: 2.6 MMOL/L (ref 3.5–5.1)
POTASSIUM SERPL-SCNC: 3 MMOL/L (ref 3.5–5.1)
POTASSIUM SERPL-SCNC: 3.3 MMOL/L (ref 3.5–5.1)
PROTEUS SP DNA BLD POS QL NAA+NON-PROBE: NOT DETECTED
RBC # BLD AUTO: 2.93 M/UL (ref 4.05–5.2)
RBC MORPH BLD: ABNORMAL
RESISTANT GENE TARGETS: NORMAL
S AUREUS DNA BLD POS QL NAA+NON-PROBE: NOT DETECTED
S AUREUS+CONS DNA BLD POS NAA+NON-PROBE: NOT DETECTED
S EPIDERMIDIS DNA BLD POS QL NAA+NON-PRB: NOT DETECTED
S LUGDUNENSIS DNA BLD POS QL NAA+NON-PRB: NOT DETECTED
S MALTOPHILIA DNA BLD POS QL NAA+NON-PRB: NOT DETECTED
S MARCESCENS DNA BLD POS NAA+NON-PROBE: NOT DETECTED
S PNEUM DNA BLD POS QL NAA+NON-PROBE: NOT DETECTED
S PYO DNA BLD POS QL NAA+NON-PROBE: NOT DETECTED
SALMONELLA DNA BLD POS QL NAA+NON-PROBE: NOT DETECTED
SERVICE CMNT-IMP: ABNORMAL
SERVICE CMNT-IMP: ABNORMAL
SODIUM SERPL-SCNC: 145 MMOL/L (ref 133–143)
STREPTOCOCCUS DNA BLD POS NAA+NON-PROBE: NOT DETECTED
WBC # BLD AUTO: 29.9 K/UL (ref 4.3–11.1)
WBC MORPH BLD: SLIGHT

## 2023-11-26 PROCEDURE — 2580000003 HC RX 258: Performed by: INTERNAL MEDICINE

## 2023-11-26 PROCEDURE — 80048 BASIC METABOLIC PNL TOTAL CA: CPT

## 2023-11-26 PROCEDURE — 2500000003 HC RX 250 WO HCPCS

## 2023-11-26 PROCEDURE — 6370000000 HC RX 637 (ALT 250 FOR IP): Performed by: NURSE PRACTITIONER

## 2023-11-26 PROCEDURE — 6360000002 HC RX W HCPCS: Performed by: INTERNAL MEDICINE

## 2023-11-26 PROCEDURE — 2500000003 HC RX 250 WO HCPCS: Performed by: INTERNAL MEDICINE

## 2023-11-26 PROCEDURE — 2580000003 HC RX 258: Performed by: SURGERY

## 2023-11-26 PROCEDURE — 82962 GLUCOSE BLOOD TEST: CPT

## 2023-11-26 PROCEDURE — 2580000003 HC RX 258

## 2023-11-26 PROCEDURE — 6360000002 HC RX W HCPCS

## 2023-11-26 PROCEDURE — 2580000003 HC RX 258: Performed by: STUDENT IN AN ORGANIZED HEALTH CARE EDUCATION/TRAINING PROGRAM

## 2023-11-26 PROCEDURE — 99291 CRITICAL CARE FIRST HOUR: CPT | Performed by: INTERNAL MEDICINE

## 2023-11-26 PROCEDURE — 84132 ASSAY OF SERUM POTASSIUM: CPT

## 2023-11-26 PROCEDURE — 2500000003 HC RX 250 WO HCPCS: Performed by: STUDENT IN AN ORGANIZED HEALTH CARE EDUCATION/TRAINING PROGRAM

## 2023-11-26 PROCEDURE — 94003 VENT MGMT INPAT SUBQ DAY: CPT

## 2023-11-26 PROCEDURE — 2000000000 HC ICU R&B

## 2023-11-26 PROCEDURE — 6360000002 HC RX W HCPCS: Performed by: SURGERY

## 2023-11-26 PROCEDURE — C1713 ANCHOR/SCREW BN/BN,TIS/BN: HCPCS

## 2023-11-26 PROCEDURE — 85025 COMPLETE CBC W/AUTO DIFF WBC: CPT

## 2023-11-26 PROCEDURE — 82330 ASSAY OF CALCIUM: CPT

## 2023-11-26 PROCEDURE — 83735 ASSAY OF MAGNESIUM: CPT

## 2023-11-26 RX ORDER — ACETAMINOPHEN 160 MG/5ML
650 SUSPENSION ORAL EVERY 6 HOURS PRN
Status: DISCONTINUED | OUTPATIENT
Start: 2023-11-26 | End: 2023-11-27

## 2023-11-26 RX ORDER — ACETAMINOPHEN 160 MG/5ML
650 SUSPENSION ORAL EVERY 6 HOURS PRN
Status: DISCONTINUED | OUTPATIENT
Start: 2023-11-26 | End: 2023-11-26

## 2023-11-26 RX ORDER — SODIUM CHLORIDE, SODIUM LACTATE, POTASSIUM CHLORIDE, AND CALCIUM CHLORIDE .6; .31; .03; .02 G/100ML; G/100ML; G/100ML; G/100ML
250 INJECTION, SOLUTION INTRAVENOUS ONCE
Status: DISCONTINUED | OUTPATIENT
Start: 2023-11-26 | End: 2023-11-27

## 2023-11-26 RX ORDER — NOREPINEPHRINE BITARTRATE 0.06 MG/ML
1-100 INJECTION, SOLUTION INTRAVENOUS CONTINUOUS
Status: DISCONTINUED | OUTPATIENT
Start: 2023-11-26 | End: 2023-11-26

## 2023-11-26 RX ORDER — NOREPINEPHRINE BITARTRATE 0.06 MG/ML
1-30 INJECTION, SOLUTION INTRAVENOUS CONTINUOUS
Status: DISCONTINUED | OUTPATIENT
Start: 2023-11-26 | End: 2023-11-27

## 2023-11-26 RX ORDER — SODIUM CHLORIDE, SODIUM LACTATE, POTASSIUM CHLORIDE, CALCIUM CHLORIDE 600; 310; 30; 20 MG/100ML; MG/100ML; MG/100ML; MG/100ML
INJECTION, SOLUTION INTRAVENOUS CONTINUOUS
Status: DISCONTINUED | OUTPATIENT
Start: 2023-11-26 | End: 2023-11-27

## 2023-11-26 RX ORDER — FLUCONAZOLE 2 MG/ML
200 INJECTION, SOLUTION INTRAVENOUS EVERY 24 HOURS
Status: DISCONTINUED | OUTPATIENT
Start: 2023-11-26 | End: 2023-11-26

## 2023-11-26 RX ORDER — POTASSIUM CHLORIDE 29.8 MG/ML
20 INJECTION INTRAVENOUS ONCE
Status: COMPLETED | OUTPATIENT
Start: 2023-11-26 | End: 2023-11-26

## 2023-11-26 RX ORDER — SODIUM CHLORIDE, SODIUM LACTATE, POTASSIUM CHLORIDE, AND CALCIUM CHLORIDE .6; .31; .03; .02 G/100ML; G/100ML; G/100ML; G/100ML
1000 INJECTION, SOLUTION INTRAVENOUS ONCE
Status: COMPLETED | OUTPATIENT
Start: 2023-11-26 | End: 2023-11-26

## 2023-11-26 RX ORDER — POTASSIUM CHLORIDE 7.45 MG/ML
10 INJECTION INTRAVENOUS PRN
Status: DISCONTINUED | OUTPATIENT
Start: 2023-11-26 | End: 2023-11-27

## 2023-11-26 RX ORDER — SODIUM CHLORIDE 9 MG/ML
INJECTION, SOLUTION INTRAVENOUS PRN
Status: ACTIVE | OUTPATIENT
Start: 2023-11-26 | End: 2023-11-27

## 2023-11-26 RX ORDER — MAGNESIUM SULFATE IN WATER 40 MG/ML
2000 INJECTION, SOLUTION INTRAVENOUS PRN
Status: DISCONTINUED | OUTPATIENT
Start: 2023-11-26 | End: 2023-11-27

## 2023-11-26 RX ORDER — POTASSIUM CHLORIDE 29.8 MG/ML
20 INJECTION INTRAVENOUS PRN
Status: DISCONTINUED | OUTPATIENT
Start: 2023-11-26 | End: 2023-11-27

## 2023-11-26 RX ADMIN — HEPARIN SODIUM 5000 UNITS: 5000 INJECTION INTRAVENOUS; SUBCUTANEOUS at 05:54

## 2023-11-26 RX ADMIN — MIDAZOLAM 2 MG: 1 INJECTION INTRAMUSCULAR; INTRAVENOUS at 18:45

## 2023-11-26 RX ADMIN — SODIUM CHLORIDE, POTASSIUM CHLORIDE, SODIUM LACTATE AND CALCIUM CHLORIDE: 600; 310; 30; 20 INJECTION, SOLUTION INTRAVENOUS at 10:14

## 2023-11-26 RX ADMIN — HYDROCORTISONE SODIUM SUCCINATE 50 MG: 100 INJECTION, POWDER, FOR SOLUTION INTRAMUSCULAR; INTRAVENOUS at 15:11

## 2023-11-26 RX ADMIN — CARBOXYMETHYLCELLULOSE SODIUM 1 DROP: 10 GEL OPHTHALMIC at 20:14

## 2023-11-26 RX ADMIN — PIPERACILLIN AND TAZOBACTAM 4500 MG: 4; .5 INJECTION, POWDER, FOR SOLUTION INTRAVENOUS at 11:39

## 2023-11-26 RX ADMIN — HYDROCORTISONE SODIUM SUCCINATE 50 MG: 100 INJECTION, POWDER, FOR SOLUTION INTRAMUSCULAR; INTRAVENOUS at 09:59

## 2023-11-26 RX ADMIN — SODIUM CHLORIDE, POTASSIUM CHLORIDE, SODIUM LACTATE AND CALCIUM CHLORIDE 1000 ML: 600; 310; 30; 20 INJECTION, SOLUTION INTRAVENOUS at 11:39

## 2023-11-26 RX ADMIN — EPINEPHRINE 16 MCG/MIN: 1 INJECTION INTRAMUSCULAR; INTRAVENOUS; SUBCUTANEOUS at 09:33

## 2023-11-26 RX ADMIN — FENTANYL CITRATE 125 MCG/HR: 50 INJECTION INTRAVENOUS at 11:50

## 2023-11-26 RX ADMIN — HEPARIN SODIUM 5000 UNITS: 5000 INJECTION INTRAVENOUS; SUBCUTANEOUS at 13:35

## 2023-11-26 RX ADMIN — CARBOXYMETHYLCELLULOSE SODIUM 1 DROP: 10 GEL OPHTHALMIC at 08:26

## 2023-11-26 RX ADMIN — ACETAMINOPHEN 650 MG: 325 SUSPENSION ORAL at 13:12

## 2023-11-26 RX ADMIN — SODIUM CHLORIDE, POTASSIUM CHLORIDE, SODIUM LACTATE AND CALCIUM CHLORIDE: 600; 310; 30; 20 INJECTION, SOLUTION INTRAVENOUS at 20:12

## 2023-11-26 RX ADMIN — NOREPINEPHRINE BITARTRATE 30 MCG/MIN: 1 INJECTION, SOLUTION, CONCENTRATE INTRAVENOUS at 09:20

## 2023-11-26 RX ADMIN — NOREPINEPHRINE BITARTRATE 30 MCG/MIN: 1 INJECTION, SOLUTION, CONCENTRATE INTRAVENOUS at 19:22

## 2023-11-26 RX ADMIN — POTASSIUM CHLORIDE 20 MEQ: 400 INJECTION, SOLUTION INTRAVENOUS at 03:09

## 2023-11-26 RX ADMIN — MIDAZOLAM 2 MG: 1 INJECTION INTRAMUSCULAR; INTRAVENOUS at 07:40

## 2023-11-26 RX ADMIN — POTASSIUM CHLORIDE 20 MEQ: 400 INJECTION, SOLUTION INTRAVENOUS at 13:20

## 2023-11-26 RX ADMIN — FENTANYL CITRATE 75 MCG/HR: 50 INJECTION INTRAVENOUS at 01:59

## 2023-11-26 RX ADMIN — MIDAZOLAM 2 MG: 1 INJECTION INTRAMUSCULAR; INTRAVENOUS at 22:54

## 2023-11-26 RX ADMIN — NOREPINEPHRINE BITARTRATE 55 MCG/MIN: 1 INJECTION, SOLUTION, CONCENTRATE INTRAVENOUS at 01:14

## 2023-11-26 RX ADMIN — VASOPRESSIN 0.03 UNITS/MIN: 0.2 INJECTION INTRAVENOUS at 13:59

## 2023-11-26 RX ADMIN — MAGNESIUM SULFATE HEPTAHYDRATE 2000 MG: 40 INJECTION, SOLUTION INTRAVENOUS at 09:58

## 2023-11-26 RX ADMIN — EPINEPHRINE 18 MCG/MIN: 1 INJECTION INTRAMUSCULAR; INTRAVENOUS; SUBCUTANEOUS at 16:20

## 2023-11-26 RX ADMIN — POTASSIUM CHLORIDE 20 MEQ: 400 INJECTION, SOLUTION INTRAVENOUS at 07:52

## 2023-11-26 RX ADMIN — NOREPINEPHRINE BITARTRATE 60 MCG/MIN: 1 INJECTION, SOLUTION, CONCENTRATE INTRAVENOUS at 03:55

## 2023-11-26 RX ADMIN — NOREPINEPHRINE BITARTRATE 60 MCG/MIN: 1 INJECTION, SOLUTION, CONCENTRATE INTRAVENOUS at 02:36

## 2023-11-26 RX ADMIN — EPINEPHRINE 20 MCG/MIN: 1 INJECTION INTRAMUSCULAR; INTRAVENOUS; SUBCUTANEOUS at 04:31

## 2023-11-26 RX ADMIN — HYDROCORTISONE SODIUM SUCCINATE 50 MG: 100 INJECTION, POWDER, FOR SOLUTION INTRAMUSCULAR; INTRAVENOUS at 22:45

## 2023-11-26 RX ADMIN — HEPARIN SODIUM 5000 UNITS: 5000 INJECTION INTRAVENOUS; SUBCUTANEOUS at 22:45

## 2023-11-26 RX ADMIN — SODIUM CHLORIDE, SODIUM LACTATE, POTASSIUM CHLORIDE, AND CALCIUM CHLORIDE 250 ML: .6; .31; .03; .02 INJECTION, SOLUTION INTRAVENOUS at 11:00

## 2023-11-26 RX ADMIN — CARBOXYMETHYLCELLULOSE SODIUM 1 DROP: 10 GEL OPHTHALMIC at 13:20

## 2023-11-26 RX ADMIN — FENTANYL CITRATE 125 MCG/HR: 50 INJECTION INTRAVENOUS at 19:12

## 2023-11-26 RX ADMIN — PIPERACILLIN AND TAZOBACTAM 4500 MG: 4; .5 INJECTION, POWDER, FOR SOLUTION INTRAVENOUS at 22:44

## 2023-11-26 RX ADMIN — SODIUM CHLORIDE: 9 INJECTION, SOLUTION INTRAVENOUS at 11:37

## 2023-11-26 ASSESSMENT — PULMONARY FUNCTION TESTS
PIF_VALUE: 26
PIF_VALUE: 25
PIF_VALUE: 25
PIF_VALUE: 21
PIF_VALUE: 27
PIF_VALUE: 25

## 2023-11-26 ASSESSMENT — PAIN SCALES - GENERAL
PAINLEVEL_OUTOF10: 0

## 2023-11-26 NOTE — PROGRESS NOTES
Ventilator check complete; patient has a #7.5 ET tube secured at the 20 at the lip. Patient is sedated. Patient is not able to follow commands. Breath sounds are diminished. Trachea is midline, negative for subcutaneous air, and chest excursion is symmetric. Patient is also negative for cyanosis and is negative for pitting edema. All alarms are set and audible. Resuscitation bag is at the head of the bed. Ventilator Settings  Mode FIO2 Rate Tidal Volume Pressure PEEP I:E Ratio     PRVC 35%  18   400       8         Peak airway pressure:     Minute ventilation:       ABG: No results for input(s): \"PH\", \"PCO2\", \"PO2\", \"HCO3\" in the last 72 hours.       Umang Kovacs RCP

## 2023-11-26 NOTE — PROGRESS NOTES
Attempted contacting US to follow-up in regards to RUE Arterial duplex ordered today. No answer at this time.

## 2023-11-26 NOTE — CONSULTS
[facility]   [address] [facility], [place] [address]  Phone: [phone]  Fax: [phone]    Date of visit: 11/26/2023    Referring physician: No referring provider defined for this encounter. Reason for referral: cool cyanotic right hand    Christiano Martinez is a 82 y.o. female sent in consultation for   Chief Complaint   Patient presents with    Abdominal Pain       HPI: : 82 y.o. female with HFpEF (54 to 60%), HTN, HLD, CKD stage III, acquired hypothyroidism, PAF, GERD, COPD, long-term tobacco use/smoking, who fell at home several days ago landing on her buttocks. She was able to ambulate afterwards. She lives alone and ambulates with a walker. Since the fall she has had no appetite and has not had a bowel movement. She been self-medicating with increased MiraLAX and Dulcolax without results. Her abdomen is now distended and painful. Workup in the emergency department revealed perforated diverticuli and she was hypotensive. She was taken to the OR for exploratory lap, bowel resection, colostomy. Postop she was noted to have a cyanotic cool right hand. She has an cy in that wrist. Reportedly no pulses obtainable in radial with signals in ulnar. Arterial line removed yesterday and reportedly radial and ulnar signals returned. I am consulted to evaluate the perfusion of the hand    ROS:    Constitutional: Negative for fever and chills. HENT: Negative for congestion and sore throat. Skin: Negative for rash and itching. Eyes: Negative for blurred vision and double vision. Respiratory: Negative for cough and shortness of breath. Cardiovascular: Negative for chest pain, palpitations, claudication and leg swelling. Gastrointestinal: Negative for nausea, vomiting and abdominal pain. Genitourinary: Negative for dysuria, hematuria and flank pain. Musculoskeletal: Negative for joint pain and falls.   Neurological: Negative for dizziness, sensory

## 2023-11-26 NOTE — INTERDISCIPLINARY ROUNDS
Multi-D Rounds/Checklist (leapfrog):  Lines: can any be removed?: None   ETT  (Active)     Closed/Suction Drain RLQ Bulb (Active)       Colostomy LLQ  (Active)       Urinary Catheter 23 2 Way (Active)     Arterial Line 23 Right Femoral (Active)       CVC Quadruple Lumen 23 Right Internal jugular (Active)     DVT Prophylaxis: Ordered  Vent: HOB elevated? Yes ;  mL/k ; Vent day 4  Nutrition Ordered/appropriate: will order TPN  Can antibiotics or other drugs be stopped? Per Primary Team Yes/No  Inpat Anti-Infectives (From admission, onward)       Start     Ordered Stop    23 2300  piperacillin-tazobactam (ZOSYN) 4,500 mg in sodium chloride 0.9 % 100 mL IVPB (mini-bag)  4,500 mg,   IntraVENous,   EVERY 12 HOURS         23 1010 --                  Consults needed:  Palliative care  A: Is pain control adequate? (has PRNs? Stop drip?) Yes  B: Sedation break and SBT? N/A  C: Is sedation choice appropriate? N/A  D: Delirium/CAM-ICU? Unable to screen  E: Mobility goals/appropriateness? Yes  F: Family update and plan? Friend is surrogate decision maker and is being updated daily by primary attending and nursing staff.     MONIQUE Riddle - CNP

## 2023-11-26 NOTE — PROGRESS NOTES
Ventilator check complete; patient has a #7.5 ET tube secured at the 22 at the lip. Patient is  sedated. Patient is not able to follow commands. Breath sounds are diminished. Trachea is midline, Negative for subcutaneous air, and chest excursion is symmetric. Patient is also Negative for cyanosis and is Negative for pitting edema. All alarms are set and audible. Resuscitation bag is  at the head of the bed. Ventilator Settings  Mode FIO2 Rate Tidal Volume Pressure PEEP I:E Ratio   AC/PRVC  40 %   18   400      8 1:2.7      Peak airway pressure:   28  Minute ventilation:   7.8    ABG: No results for input(s): \"PH\", \"PCO2\", \"PO2\", \"HCO3\" in the last 72 hours.       CRISTO Robertson

## 2023-11-27 ENCOUNTER — APPOINTMENT (OUTPATIENT)
Dept: CT IMAGING | Age: 83
DRG: 853 | End: 2023-11-27
Payer: MEDICARE

## 2023-11-27 ENCOUNTER — APPOINTMENT (OUTPATIENT)
Dept: GENERAL RADIOLOGY | Age: 83
DRG: 853 | End: 2023-11-27
Payer: MEDICARE

## 2023-11-27 ENCOUNTER — APPOINTMENT (OUTPATIENT)
Dept: ULTRASOUND IMAGING | Age: 83
DRG: 853 | End: 2023-11-27
Payer: MEDICARE

## 2023-11-27 VITALS
TEMPERATURE: 98.1 F | WEIGHT: 153.22 LBS | DIASTOLIC BLOOD PRESSURE: 49 MMHG | HEIGHT: 60 IN | OXYGEN SATURATION: 76 % | SYSTOLIC BLOOD PRESSURE: 84 MMHG | BODY MASS INDEX: 30.08 KG/M2

## 2023-11-27 PROBLEM — E88.09 HYPOALBUMINEMIA DUE TO PROTEIN-CALORIE MALNUTRITION (HCC): Status: ACTIVE | Noted: 2021-05-22

## 2023-11-27 PROBLEM — E83.42 HYPOMAGNESEMIA: Status: RESOLVED | Noted: 2023-10-10 | Resolved: 2023-11-27

## 2023-11-27 PROBLEM — R09.02 HYPOXIA: Status: RESOLVED | Noted: 2021-12-31 | Resolved: 2023-11-27

## 2023-11-27 PROBLEM — J90 PLEURAL EFFUSION: Status: RESOLVED | Noted: 2021-12-30 | Resolved: 2023-11-27

## 2023-11-27 PROBLEM — I35.0 MILD AORTIC STENOSIS: Chronic | Status: ACTIVE | Noted: 2023-10-13

## 2023-11-27 PROBLEM — N17.9 ACUTE RENAL FAILURE (HCC): Status: ACTIVE | Noted: 2023-11-27

## 2023-11-27 PROBLEM — J96.11 CHRONIC RESPIRATORY FAILURE WITH HYPOXIA (HCC): Chronic | Status: ACTIVE | Noted: 2023-10-13

## 2023-11-27 PROBLEM — N17.9 ACUTE RENAL FAILURE (ARF) (HCC): Status: RESOLVED | Noted: 2023-10-08 | Resolved: 2023-11-27

## 2023-11-27 PROBLEM — E46 HYPOALBUMINEMIA DUE TO PROTEIN-CALORIE MALNUTRITION (HCC): Status: ACTIVE | Noted: 2021-05-22

## 2023-11-27 PROBLEM — N19 ACUTE PRERENAL AZOTEMIA: Status: RESOLVED | Noted: 2023-10-10 | Resolved: 2023-11-27

## 2023-11-27 PROBLEM — R41.82 ALTERED MENTAL STATUS: Status: ACTIVE | Noted: 2023-11-27

## 2023-11-27 PROBLEM — D62 POSTOPERATIVE ANEMIA DUE TO ACUTE BLOOD LOSS: Status: RESOLVED | Noted: 2022-09-20 | Resolved: 2023-11-27

## 2023-11-27 PROBLEM — R54 FRAILTY: Status: ACTIVE | Noted: 2023-11-27

## 2023-11-27 PROBLEM — N28.1 RENAL CYST, LEFT: Chronic | Status: ACTIVE | Noted: 2021-12-30

## 2023-11-27 PROBLEM — E86.9 VOLUME DEPLETION: Status: RESOLVED | Noted: 2023-10-08 | Resolved: 2023-11-27

## 2023-11-27 PROBLEM — Z51.5 ENCOUNTER FOR PALLIATIVE CARE: Status: ACTIVE | Noted: 2023-11-27

## 2023-11-27 PROBLEM — D72.829 LEUKOCYTOSIS: Status: RESOLVED | Noted: 2021-06-14 | Resolved: 2023-11-27

## 2023-11-27 PROBLEM — E87.20 LACTIC ACIDOSIS: Status: ACTIVE | Noted: 2023-11-27

## 2023-11-27 LAB
ALBUMIN SERPL-MCNC: 1.2 G/DL (ref 3.2–4.6)
ALBUMIN/GLOB SERPL: 0.4 (ref 0.4–1.6)
ALP SERPL-CCNC: 78 U/L (ref 50–136)
ALT SERPL-CCNC: 68 U/L (ref 12–65)
AMMONIA PLAS-SCNC: 52 UMOL/L (ref 11–32)
ANION GAP SERPL CALC-SCNC: 10 MMOL/L (ref 2–11)
ARTERIAL PATENCY WRIST A: ABNORMAL
ARTERIAL PATENCY WRIST A: ABNORMAL
AST SERPL-CCNC: 344 U/L (ref 15–37)
BASE DEFICIT BLD-SCNC: 0.4 MMOL/L
BASE DEFICIT BLD-SCNC: 12 MMOL/L
BASOPHILS # BLD: 0 K/UL (ref 0–0.2)
BASOPHILS NFR BLD: 0 % (ref 0–2)
BDY SITE: ABNORMAL
BDY SITE: ABNORMAL
BILIRUB SERPL-MCNC: 0.8 MG/DL (ref 0.2–1.1)
BUN SERPL-MCNC: 39 MG/DL (ref 8–23)
CA-I BLD-MCNC: 4.18 MG/DL (ref 4–5.2)
CALCIUM SERPL-MCNC: 7.1 MG/DL (ref 8.3–10.4)
CHLORIDE SERPL-SCNC: 109 MMOL/L (ref 101–110)
CO2 SERPL-SCNC: 24 MMOL/L (ref 21–32)
CORTIS BS SERPL-MCNC: >75 UG/DL
CREAT SERPL-MCNC: 3.3 MG/DL (ref 0.6–1)
DIFFERENTIAL METHOD BLD: ABNORMAL
EOSINOPHIL # BLD: 0 K/UL (ref 0–0.8)
EOSINOPHIL NFR BLD: 0 % (ref 0.5–7.8)
ERYTHROCYTE [DISTWIDTH] IN BLOOD BY AUTOMATED COUNT: 16.9 % (ref 11.9–14.6)
GAS FLOW.O2 O2 DELIVERY SYS: ABNORMAL
GAS FLOW.O2 O2 DELIVERY SYS: ABNORMAL
GLOBULIN SER CALC-MCNC: 2.8 G/DL (ref 2.8–4.5)
GLUCOSE BLD STRIP.AUTO-MCNC: 147 MG/DL (ref 65–100)
GLUCOSE SERPL-MCNC: 139 MG/DL (ref 65–100)
HCO3 BLD-SCNC: 14.3 MMOL/L (ref 22–26)
HCO3 BLD-SCNC: 24.2 MMOL/L (ref 22–26)
HCT VFR BLD AUTO: 23.6 % (ref 35.8–46.3)
HGB BLD-MCNC: 7.7 G/DL (ref 11.7–15.4)
IMM GRANULOCYTES # BLD AUTO: 1.1 K/UL (ref 0–0.5)
IMM GRANULOCYTES NFR BLD AUTO: 4 % (ref 0–5)
LACTATE SERPL-SCNC: 10.6 MMOL/L (ref 0.4–2)
LACTATE SERPL-SCNC: 5 MMOL/L (ref 0.4–2)
LYMPHOCYTES # BLD: 1.1 K/UL (ref 0.5–4.6)
LYMPHOCYTES NFR BLD: 4 % (ref 13–44)
MAGNESIUM SERPL-MCNC: 2.4 MG/DL (ref 1.8–2.4)
MCH RBC QN AUTO: 28.4 PG (ref 26.1–32.9)
MCHC RBC AUTO-ENTMCNC: 32.6 G/DL (ref 31.4–35)
MCV RBC AUTO: 87.1 FL (ref 82–102)
MONOCYTES # BLD: 1.7 K/UL (ref 0.1–1.3)
MONOCYTES NFR BLD: 6 % (ref 4–12)
NEUTS SEG # BLD: 23.6 K/UL (ref 1.7–8.2)
NEUTS SEG NFR BLD: 86 % (ref 43–78)
NRBC # BLD: 0.02 K/UL (ref 0–0.2)
O2/TOTAL GAS SETTING VFR VENT: 30 %
O2/TOTAL GAS SETTING VFR VENT: 30 %
PCO2 BLD: 33 MMHG (ref 35–45)
PCO2 BLD: 38.4 MMHG (ref 35–45)
PEEP RESPIRATORY: 8 CMH2O
PEEP RESPIRATORY: 8 CMH2O
PH BLD: 7.24 (ref 7.35–7.45)
PH BLD: 7.41 (ref 7.35–7.45)
PHOSPHATE SERPL-MCNC: 5.4 MG/DL (ref 2.3–3.7)
PLATELET # BLD AUTO: 183 K/UL (ref 150–450)
PLATELET COMMENT: ADEQUATE
PMV BLD AUTO: 12.8 FL (ref 9.4–12.3)
PO2 BLD: 71 MMHG (ref 75–100)
PO2 BLD: 75 MMHG (ref 75–100)
POTASSIUM SERPL-SCNC: 3.2 MMOL/L (ref 3.5–5.1)
PROT SERPL-MCNC: 4 G/DL (ref 6.3–8.2)
RBC # BLD AUTO: 2.71 M/UL (ref 4.05–5.2)
RBC MORPH BLD: ABNORMAL
RESPIRATORY RATE, POC: 18 (ref 5–40)
SAO2 % BLD: 92.5 % (ref 95–98)
SAO2 % BLD: 94.3 % (ref 95–98)
SERVICE CMNT-IMP: ABNORMAL
SODIUM SERPL-SCNC: 143 MMOL/L (ref 133–143)
SPECIMEN TYPE: ABNORMAL
SPECIMEN TYPE: ABNORMAL
TRIGL SERPL-MCNC: 117 MG/DL (ref 35–150)
VENTILATION MODE VENT: ABNORMAL
VENTILATION MODE VENT: ABNORMAL
VT SETTING VENT: 400 ML
VT SETTING VENT: 400 ML
WBC # BLD AUTO: 27.5 K/UL (ref 4.3–11.1)
WBC MORPH BLD: ABNORMAL

## 2023-11-27 PROCEDURE — 82140 ASSAY OF AMMONIA: CPT

## 2023-11-27 PROCEDURE — 6360000002 HC RX W HCPCS: Performed by: SURGERY

## 2023-11-27 PROCEDURE — 70450 CT HEAD/BRAIN W/O DYE: CPT

## 2023-11-27 PROCEDURE — 6360000002 HC RX W HCPCS: Performed by: INTERNAL MEDICINE

## 2023-11-27 PROCEDURE — 82962 GLUCOSE BLOOD TEST: CPT

## 2023-11-27 PROCEDURE — 2580000003 HC RX 258: Performed by: INTERNAL MEDICINE

## 2023-11-27 PROCEDURE — 99291 CRITICAL CARE FIRST HOUR: CPT | Performed by: INTERNAL MEDICINE

## 2023-11-27 PROCEDURE — 2400000000

## 2023-11-27 PROCEDURE — 84478 ASSAY OF TRIGLYCERIDES: CPT

## 2023-11-27 PROCEDURE — 99292 CRITICAL CARE ADDL 30 MIN: CPT | Performed by: INTERNAL MEDICINE

## 2023-11-27 PROCEDURE — 6360000002 HC RX W HCPCS

## 2023-11-27 PROCEDURE — 71045 X-RAY EXAM CHEST 1 VIEW: CPT

## 2023-11-27 PROCEDURE — 2580000003 HC RX 258: Performed by: STUDENT IN AN ORGANIZED HEALTH CARE EDUCATION/TRAINING PROGRAM

## 2023-11-27 PROCEDURE — C9113 INJ PANTOPRAZOLE SODIUM, VIA: HCPCS | Performed by: NURSE PRACTITIONER

## 2023-11-27 PROCEDURE — 2500000003 HC RX 250 WO HCPCS: Performed by: INTERNAL MEDICINE

## 2023-11-27 PROCEDURE — 85025 COMPLETE CBC W/AUTO DIFF WBC: CPT

## 2023-11-27 PROCEDURE — 82330 ASSAY OF CALCIUM: CPT

## 2023-11-27 PROCEDURE — 6360000002 HC RX W HCPCS: Performed by: STUDENT IN AN ORGANIZED HEALTH CARE EDUCATION/TRAINING PROGRAM

## 2023-11-27 PROCEDURE — 6370000000 HC RX 637 (ALT 250 FOR IP): Performed by: INTERNAL MEDICINE

## 2023-11-27 PROCEDURE — 2580000003 HC RX 258: Performed by: NURSE PRACTITIONER

## 2023-11-27 PROCEDURE — 99222 1ST HOSP IP/OBS MODERATE 55: CPT | Performed by: INTERNAL MEDICINE

## 2023-11-27 PROCEDURE — A4216 STERILE WATER/SALINE, 10 ML: HCPCS | Performed by: NURSE PRACTITIONER

## 2023-11-27 PROCEDURE — 83735 ASSAY OF MAGNESIUM: CPT

## 2023-11-27 PROCEDURE — 80053 COMPREHEN METABOLIC PANEL: CPT

## 2023-11-27 PROCEDURE — 82533 TOTAL CORTISOL: CPT

## 2023-11-27 PROCEDURE — 6360000004 HC RX CONTRAST MEDICATION: Performed by: INTERNAL MEDICINE

## 2023-11-27 PROCEDURE — 95816 EEG AWAKE AND DROWSY: CPT

## 2023-11-27 PROCEDURE — C9254 INJECTION, LACOSAMIDE: HCPCS | Performed by: STUDENT IN AN ORGANIZED HEALTH CARE EDUCATION/TRAINING PROGRAM

## 2023-11-27 PROCEDURE — 82803 BLOOD GASES ANY COMBINATION: CPT

## 2023-11-27 PROCEDURE — 93931 UPPER EXTREMITY STUDY: CPT

## 2023-11-27 PROCEDURE — 94003 VENT MGMT INPAT SUBQ DAY: CPT

## 2023-11-27 PROCEDURE — 84100 ASSAY OF PHOSPHORUS: CPT

## 2023-11-27 PROCEDURE — 6360000002 HC RX W HCPCS: Performed by: EMERGENCY MEDICINE

## 2023-11-27 PROCEDURE — 83605 ASSAY OF LACTIC ACID: CPT

## 2023-11-27 PROCEDURE — 6360000002 HC RX W HCPCS: Performed by: NURSE PRACTITIONER

## 2023-11-27 PROCEDURE — 74176 CT ABD & PELVIS W/O CONTRAST: CPT

## 2023-11-27 PROCEDURE — P9041 ALBUMIN (HUMAN),5%, 50ML: HCPCS

## 2023-11-27 RX ORDER — MINERAL OIL AND WHITE PETROLATUM 150; 830 MG/G; MG/G
OINTMENT OPHTHALMIC PRN
Status: DISCONTINUED | OUTPATIENT
Start: 2023-11-27 | End: 2023-11-27 | Stop reason: HOSPADM

## 2023-11-27 RX ORDER — CALCIUM GLUCONATE 20 MG/ML
2000 INJECTION, SOLUTION INTRAVENOUS ONCE
Status: COMPLETED | OUTPATIENT
Start: 2023-11-27 | End: 2023-11-27

## 2023-11-27 RX ORDER — SODIUM CHLORIDE, SODIUM LACTATE, POTASSIUM CHLORIDE, CALCIUM CHLORIDE 600; 310; 30; 20 MG/100ML; MG/100ML; MG/100ML; MG/100ML
INJECTION, SOLUTION INTRAVENOUS CONTINUOUS
Status: ACTIVE | OUTPATIENT
Start: 2023-11-27 | End: 2023-11-27

## 2023-11-27 RX ORDER — ALBUMIN, HUMAN INJ 5% 5 %
25 SOLUTION INTRAVENOUS ONCE
Status: COMPLETED | OUTPATIENT
Start: 2023-11-27 | End: 2023-11-27

## 2023-11-27 RX ORDER — MORPHINE SULFATE 2 MG/ML
2 INJECTION, SOLUTION INTRAMUSCULAR; INTRAVENOUS
Status: DISCONTINUED | OUTPATIENT
Start: 2023-11-27 | End: 2023-11-27 | Stop reason: HOSPADM

## 2023-11-27 RX ORDER — MIDAZOLAM HYDROCHLORIDE 1 MG/ML
1-10 INJECTION, SOLUTION INTRAVENOUS CONTINUOUS
Status: DISCONTINUED | OUTPATIENT
Start: 2023-11-27 | End: 2023-11-27

## 2023-11-27 RX ORDER — MORPHINE SULFATE 4 MG/ML
4 INJECTION, SOLUTION INTRAMUSCULAR; INTRAVENOUS
Status: DISCONTINUED | OUTPATIENT
Start: 2023-11-27 | End: 2023-11-27 | Stop reason: HOSPADM

## 2023-11-27 RX ORDER — LORAZEPAM 2 MG/ML
1 INJECTION INTRAMUSCULAR EVERY 6 HOURS PRN
Status: DISCONTINUED | OUTPATIENT
Start: 2023-11-27 | End: 2023-11-27 | Stop reason: HOSPADM

## 2023-11-27 RX ADMIN — SODIUM CHLORIDE, POTASSIUM CHLORIDE, SODIUM LACTATE AND CALCIUM CHLORIDE: 600; 310; 30; 20 INJECTION, SOLUTION INTRAVENOUS at 13:17

## 2023-11-27 RX ADMIN — VASOPRESSIN 0.03 UNITS/MIN: 0.2 INJECTION INTRAVENOUS at 15:08

## 2023-11-27 RX ADMIN — HEPARIN SODIUM 5000 UNITS: 5000 INJECTION INTRAVENOUS; SUBCUTANEOUS at 05:48

## 2023-11-27 RX ADMIN — HYDROCORTISONE SODIUM SUCCINATE 50 MG: 100 INJECTION, POWDER, FOR SOLUTION INTRAMUSCULAR; INTRAVENOUS at 15:06

## 2023-11-27 RX ADMIN — PIPERACILLIN AND TAZOBACTAM 4500 MG: 4; .5 INJECTION, POWDER, FOR SOLUTION INTRAVENOUS at 10:36

## 2023-11-27 RX ADMIN — HYDROCORTISONE SODIUM SUCCINATE 50 MG: 100 INJECTION, POWDER, FOR SOLUTION INTRAMUSCULAR; INTRAVENOUS at 01:22

## 2023-11-27 RX ADMIN — SODIUM BICARBONATE: 84 INJECTION, SOLUTION INTRAVENOUS at 17:08

## 2023-11-27 RX ADMIN — FENTANYL CITRATE 125 MCG/HR: 50 INJECTION INTRAVENOUS at 03:33

## 2023-11-27 RX ADMIN — LACOSAMIDE 50 MG: 10 INJECTION INTRAVENOUS at 19:01

## 2023-11-27 RX ADMIN — VASOPRESSIN 0.02 UNITS/MIN: 0.2 INJECTION INTRAVENOUS at 01:22

## 2023-11-27 RX ADMIN — SODIUM BICARBONATE 50 MEQ: 84 INJECTION, SOLUTION INTRAVENOUS at 16:46

## 2023-11-27 RX ADMIN — MIDAZOLAM 2 MG/HR: 5 INJECTION, SOLUTION INTRAMUSCULAR; INTRAVENOUS at 17:22

## 2023-11-27 RX ADMIN — SODIUM CHLORIDE 40 MG: 9 INJECTION INTRAMUSCULAR; INTRAVENOUS; SUBCUTANEOUS at 10:29

## 2023-11-27 RX ADMIN — CALCIUM GLUCONATE 2000 MG: 20 INJECTION, SOLUTION INTRAVENOUS at 01:17

## 2023-11-27 RX ADMIN — POTASSIUM CHLORIDE: 2 INJECTION, SOLUTION, CONCENTRATE INTRAVENOUS at 17:35

## 2023-11-27 RX ADMIN — ALBUMIN (HUMAN) 25 G: 12.5 INJECTION, SOLUTION INTRAVENOUS at 17:27

## 2023-11-27 RX ADMIN — MIDAZOLAM 2 MG: 1 INJECTION INTRAMUSCULAR; INTRAVENOUS at 05:59

## 2023-11-27 RX ADMIN — CARBOXYMETHYLCELLULOSE SODIUM 1 DROP: 10 GEL OPHTHALMIC at 08:43

## 2023-11-27 RX ADMIN — FENTANYL CITRATE 125 MCG/HR: 50 INJECTION INTRAVENOUS at 12:10

## 2023-11-27 RX ADMIN — HEPARIN SODIUM 5000 UNITS: 5000 INJECTION INTRAVENOUS; SUBCUTANEOUS at 15:05

## 2023-11-27 RX ADMIN — CARBOXYMETHYLCELLULOSE SODIUM 1 DROP: 10 GEL OPHTHALMIC at 15:08

## 2023-11-27 RX ADMIN — MIDAZOLAM 2 MG: 1 INJECTION INTRAMUSCULAR; INTRAVENOUS at 01:22

## 2023-11-27 RX ADMIN — DIATRIZOATE MEGLUMINE AND DIATRIZOATE SODIUM 15 ML: 660; 100 LIQUID ORAL; RECTAL at 11:16

## 2023-11-27 RX ADMIN — SODIUM CHLORIDE, POTASSIUM CHLORIDE, SODIUM LACTATE AND CALCIUM CHLORIDE: 600; 310; 30; 20 INJECTION, SOLUTION INTRAVENOUS at 05:45

## 2023-11-27 RX ADMIN — NOREPINEPHRINE BITARTRATE 30 MCG/MIN: 1 INJECTION, SOLUTION, CONCENTRATE INTRAVENOUS at 05:46

## 2023-11-27 RX ADMIN — MINERAL OIL, PETROLATUM: 425; 568 OINTMENT OPHTHALMIC at 17:09

## 2023-11-27 RX ADMIN — HYDROCORTISONE SODIUM SUCCINATE 50 MG: 100 INJECTION, POWDER, FOR SOLUTION INTRAMUSCULAR; INTRAVENOUS at 08:43

## 2023-11-27 ASSESSMENT — PAIN SCALES - GENERAL
PAINLEVEL_OUTOF10: 0

## 2023-11-27 ASSESSMENT — PULMONARY FUNCTION TESTS
PIF_VALUE: 26
PIF_VALUE: 25
PIF_VALUE: 23
PIF_VALUE: 27

## 2023-11-27 NOTE — PROGRESS NOTES
Patient seen and examined. She has had increased pressor requirements this afternoon. Her lactate was noted to be increasing from 5-10. She is in acute renal failure with a creatinine of 3.3. Her LFTs are slightly elevated. Her white blood count remains elevated. She is not waking up. She has mottling of her skin. There is been concerned about possible intra-abdominal source for the elevated lactate and her hemodynamic instability. There was 1 loop of small bowel that was slightly thickened on CT scan but no signs of pneumatosis. I am not convinced that there is an intra-abdominal cause for her current issues however I offered surgery to her power of  after explaining the patient's overall condition. Mrs. Mittal, her power of , does not want any additional surgical intervention. She wants to move towards comfort measures and is on her way to the hospital currently to see the patient and make those arrangements. Therefore we will not proceed with any surgical exploration.

## 2023-11-27 NOTE — CONSULTS
Inadequate oral intake related to altered GI structure as evidenced by NPO or clear liquid status due to medical condition, intubation  Nutrition Interventions:   Food and/or Nutrient Delivery: Start Parenteral Nutrition, Continue NPO     Coordination of Nutrition Care: Continue to monitor while inpatient      Goals: Active Goal: Tolerate nutrition support at goal rate (within 3 days)       Nutrition Monitoring and Evaluation:      Food/Nutrient Intake Outcomes: Parenteral Nutrition Intake/Tolerance  Physical Signs/Symptoms Outcomes: Weight, Biochemical Data, GI Status, Fluid Status or Edema    Discharge Planning:     Too soon to determine    Elizabeth Paniagua, [address] [facility]

## 2023-11-27 NOTE — PROGRESS NOTES
[facility] Critical Care Note[de-identified] 11/27/2023  Wagner Hernandez  Admission Date: 11/24/2023     Length of Stay: 2 days    Background: 82 y.o. female with, CKD stage III, depression, prior smoking, RA, atrial fibrillation who was admitted with bowel perforation and underwent ex lap 11/25 with Dr. Karyn Haas and was suspected to have obstructing colon cancer . Perforated colon tissue was sent to pathology. Since arrival in the ICU, and hypotensive despite several vasopressors. Developed right hand ischemia - vascular surgery consulted. Palliative care consulted 11/27. Igor Jones Notable PMH:  has a past medical history of Acquired hypothyroidism, Breast cancer ([address]), CHF (congestive heart failure) ([address]), Chronic kidney disease (CKD), stage III (moderate) ([address]), Depression, Endocrine disease, Fungal dermatitis, Hyperlipidemia, Hypertension, essential, benign, Hypokalemia, Hypoxemic respiratory failure, chronic ([address]), Inflamed sebaceous cyst, Iron deficiency anemia, Major depressive disorder, recurrent, moderate ([address]), Nicotine dependence, cigarettes, uncomplicated, Osteopenia, Osteoporosis, Paroxysmal A-fib ([address]), Radiation therapy complication, Rheumatoid arthritis ([address]), Right carotid bruit, TIA (transient ischemic attack), and Tobacco abuse. 24 Hour events:     Started on IV fluids and steroids 11/26. Remains on 2 pressors. Was on Epi overnight but off now. Sedated with Fentanyl. Still in renal failure with decreased urine output. Review of Systems: Unable to obtain due to patient factors.      Lines: (insertion date)   ETT  (Active)     Closed/Suction Drain RLQ Bulb (Active)       NG/OG/NJ/NE Tube Nasogastric Right nostril (Active)       Colostomy LLQ  (Active)       Urinary Catheter 11/25/23 2 Way (Active)     Arterial Line 11/25/23 Right Femoral (Active)       CVC Quadruple Lumen 11/25/23 Right Internal jugular (Active)     Drips: current dose (range)  Dose (mcg/hr) Fentanyl: 125 mcg/hr  Dose Klebsiella pneumoniae group by PCR NOT DETECTED        Proteus by PCR NOT DETECTED        Salmonella species by PCR NOT DETECTED        Serratia marcescens by PCR NOT DETECTED        Haemophilus Influenzae by PCR NOT DETECTED        Neisseria meningitidis by PCR NOT DETECTED        Pseudomonas aeruginosa NOT DETECTED        Stenotrophomonas maltophilia by PCR NOT DETECTED        Candida albicans by PCR NOT DETECTED        Candida auris by PCR NOT DETECTED        Candida glabrata NOT DETECTED        Candida krusei by PCR NOT DETECTED        Candida parapsilosis by PCR NOT DETECTED        Candida tropicalis by PCR NOT DETECTED        Cryptococcus neoformans/gattii by PCR NOT DETECTED        Resistant gene targets          [employer] test comment       False positive results may rarely occur. Correlate with clinical,epidemiologic, and other laboratory findings           Comment: Please see BCID Interpretation Guide in EPIC Links                 Ventilator Settings Ideal body weight: 45.6 kg (100 lb 8.1 oz)  Adjusted ideal body weight: 55.2 kg (121 lb 9.5 oz)  Mode FIO2 Rate Tidal Volume Pressure   AC/PRVC    30 %  18 bpm     400   8     Peak airway pressure: 25   Minute ventilation: 7.4  ABG:  Recent Labs     11/25/23  0008 11/25/23  0520 11/25/23  0837   BE 5.7 2.1 2.7     Assessment and Plan:  (Medical Decision Making)   Impression: 82 y.o. female colon perforation (? Cancer, path pending). Post exp lap with resection/colostomy formation. Post op shock requiring multiple pressors. Abdominal fluid culture with GNRs. Developed right hand ischemia on 11/16. Vascular surgery consulted - duplex pending. Does not have have family but has friend who visits. Palliative care consulted 11/27. NEURO:   Sedation: Fentanyl infusion  Analgesia: as above  Paralytics: NA  CV:   Volume Status: + 11.3 liters with some edema in her hands. Small effusion on CXR.  Continue IV fluids with hypotension, renal failure  Septic shock: still

## 2023-11-27 NOTE — PROGRESS NOTES
Ventilator check complete; patient has a #7.5 ET tube secured at the 20 at the gums. Patient is  sedated. Patient is not able to follow commands. Breath sounds are diminished. Trachea is midline, Negative for subcutaneous air, and chest excursion is symmetric. Patient is also Negative for cyanosis and is Negative for pitting edema. All alarms are set and audible. Resuscitation bag is  at the head of the bed.       Ventilator Settings  Mode FIO2 Rate Tidal Volume  PEEP I:E Ratio   AC/PRVC  (S) 30 % (weaned) 18 bpm 400 mL  8 cm H20 1:2.7      Peak airway pressure: 25 cm H20  Minute ventilation: 7.4 L      Nimisha Muna, RCP

## 2023-11-27 NOTE — PROGRESS NOTES
Dr. Sony Sorenson to bedside. Pts POA called to discuss moving forward with comfort measures. POA agrees and wants to withdraw care once she is present. Confirmed with Dr. Sony Sorenson and oncoming RN.

## 2023-11-27 NOTE — PROGRESS NOTES
Patient doing worse and spoke with neurology who agreed that patient with myoclonus. EEG ordered. Now overall patient with worsening lactic acidosis. Earlier noted LA up to 5 and CT abd/pelvis done. Findings noted and told staff to contact surgery. They do not want any intervention. Started bicarb drip since Ph on ABG was down to 7.2 and still on double pressors. Now repeat LA at 10 and told again to inform surgery. Clnically no change but looks less responsive. Will repeat ABG. [facility], Chaitanya Serum,  and aware now that patient is worse and will likely not survive the night. She will come see the patient. She confirmed DNR status. When here will talk to her about comfort measures. Will sign this out to nighttime ICU covering physician shortly. Additional time spent today was 50 minutes.      Gagan Robbins MD

## 2023-11-27 NOTE — PROGRESS NOTES
This is a follow-up visit to the patient, providing a spiritual presence, emotional support and prayer. The patient appears to be resting.     Ahsan Campos, [address], MRE

## 2023-11-27 NOTE — CONSULTS
Neurology Consult Note       History:   82-year-old female admitted with perforated colon s/p exploratory laparotomy. Complicated by postoperative shock requiring multiple pressors. Subsequently developed myoclonus which was initially frequent but has since been subsiding. Neurology consulted for further input. Exam: Pertinent positives and negatives include: There is visible multifocal myoclonic jerks involving all extremities, and in the face, head, neck  No purposeful movements appreciated  Pupils are small pinpoint      Assessment and Plan:   82-year-old female admitted with perforated colon s/p exploratory laparotomy. Complicated by postoperative shock requiring multiple pressors. Neurology consulted for recurrent myoclonus. I am concerned that her myoclonus is secondary to hypoxic-ischemic injury in the setting of her profoundly low blood pressures, and which correlates with her poor exam at this time, albeit on sedation. Prognosis remains guarded. Will obtain EEG to assess background activity. In the meantime I have discussed with Dr. Nora Conroy, who is okay switching fentanyl to Versed infusion. I will add low dose lacosamide 50 mg BID in addition to this. Further recommendations to follow. Fabián Sutton DO  Neurology      Cumulative time spent today was 60 minutes which included chart review, obtaining history (from patient, family, or other providers), review of images, examining the patient, and counseling the patient and/or family on medical condition.

## 2023-11-27 NOTE — WOUND CARE
Stoma slightly retracted/ skin level, slightly dusky pink center,  mucus and serosanguinous drainage in pouch, no output. Patient intubated/ sedated and on vasopressors not appropriate for teaching at this time. Noted Bilateral hands mottled and dusky, feet pale, consistent with vasoconstriction form vasopressors, offload heels and fingers from bed will pillows. Will monitor.

## 2023-11-27 NOTE — PROGRESS NOTES
Physician Progress Note      Sujata Navarro  CSN #:                  [id number]  :                       1940  ADMIT DATE:       2023 11:41 PM  [address] [facility] DATE:  RESPONDING  PROVIDER #:        Dorothy Brittle NP        QUERY TEXT:    Type of Encephalopathy: Please provide further specificity, if known. Clinical indicators include: septic, acute, encephalopathy, infection  Options provided:  -- Anoxic/hypoxic encephalopathy  -- Metabolic encephalopathy  -- Toxic encephalopathy  -- Hepatic encephalopathy  -- Hypertensive encephalopathy  -- Other - I will add my own diagnosis  -- Disagree - Not applicable / Not valid  -- Disagree - Clinically Unable to determine / Unknown        PROVIDER RESPONSE TEXT:    Provider was unable to determine a response for this query.       Electronically signed by:  Dorothy Brittle NP 2023 1:42 PM

## 2023-11-27 NOTE — CONSULTS
Nephrology consult    Admission Date:  11/24/2023    Admission Diagnosis  Hypovolemic shock ([address]) [R57.1]  Diverticulitis of colon with perforation [K57.20]  Electrolyte abnormality [E87.8]  Acute renal failure, unspecified acute renal failure type ([address]) [N17.9]  Perforated diverticulum [K57.80]    We are asked by Dr. Li [facility]    History of Present Illness:this is a 81 y/o female with a perforated viscus that developed septic shock after a fall at home. She had an exp lap and bowel resection and colostomy on 11/25/23. She was initially on 4 pressors and is down to 2 today. I have been consulted for GENNARO with worsening azotemia and decreased urine out put. Pt seen and examined intubated and sedated.     Past Medical History:   Diagnosis Date    Acquired hypothyroidism 1/16/2016    Breast cancer ([address]) 2008    CHF (congestive heart failure) ([address])     Chronic kidney disease (CKD), stage III (moderate) ([facility])     Closed fracture of neck of right femur with delayed healing 9/17/2022    Community acquired bacterial pneumonia 4/22/2019    Depression 8/18/2016    Endocrine disease     hypothyroid    Fungal dermatitis 8/18/2016    GI bleed 12/25/2021    Hyperlipidemia 1/16/2016    Hypertension, essential, benign 8/18/2016    Hypokalemia 8/18/2016    Hypoxemic respiratory failure, chronic (HCC)     wears 2L O2 at home    Inflamed sebaceous cyst 8/18/2016    Iron deficiency anemia     Major depressive disorder, recurrent, moderate ([address]) 8/18/2016    Major depressive disorder, recurrent, moderate ([address]) 8/18/2016    Nicotine dependence, cigarettes, uncomplicated 8/19/2817    Osteopenia 8/18/2016    Osteoporosis 8/18/2016    Paroxysmal A-fib (HCC)     Pericarditis with effusion 3/23/2021    Prolonged Q-T interval on ECG 4/22/2019    Radiation therapy complication 0605    Rheumatoid arthritis ([address]) 1/16/2016    Rib fracture 5/12/2021    Right carotid bruit 1/16/2016    T12 compression fracture ([address]) 12/30/2021    TIA (transient

## 2023-11-28 NOTE — PROGRESS NOTES
DEATH NOTE    I was called to see patient for unresponsiveness. On my exam the patient did not respond to verbal or physical stimuli. Absent heart and breath sounds. Absent carotid and peripheral pulses. Pupils are fixed and dilated. Patient pronounced dead at [address] on 11/27/23 by Mike Pedroza MD. Family notified. Autopsy declined. Not an organ donation candidate.     Ashlee Welch MD  [place] license #[id number]

## 2023-11-28 NOTE — PROCEDURES
Routine EEG Report    Reason for EEG: Myoclonus    Technical Summary: This EEG was performed with a 32-channel digital EEG machine with electrodes placed according to the international 10-20 system of placement. Background:   Sample obtained during the comatose state. Background is generally symmetric with delta theta predominant frequency, but with superimposed fluctuating polymorphic delta activity over the left hemisphere. There are abrupt large amplitude spikes that occur and abundant throughout the recording, which likely correlate with her myoclonic events but unable to visualize in the video. Hyperventilation: Hyperventilation was not performed due to medical condition    Photic Stimulation: Photic stimulation was not performed due to medical condition    Sleep: None    Impression: This is a severely abnormal EEG with evidence of global cerebral dysfunction, nonspecific in etiology but may be seen in the setting of hypoxic and/or toxic/metabolic injury.        Forestine Force, DO  Neurology

## 2023-11-28 NOTE — PROGRESS NOTES
Asystole noted on the monitor at 1945. Both Hevre Jones NP and Dr. Jordan Barrera aware. Dr. Jordan Barrera at bedside. BI at bedside.

## 2023-11-28 NOTE — PROGRESS NOTES
[facility] Orders for comfort measures in place. All continuous drips stopped at this time. Respiratory at bedside for terminal extubation.

## 2023-11-29 LAB
ABO + RH BLD: NORMAL
BACTERIA SPEC CULT: ABNORMAL
BLD PROD TYP BPU: NORMAL
BLOOD BANK DISPENSE STATUS: NORMAL
BLOOD GROUP ANTIBODIES SERPL: NORMAL
BPU ID: NORMAL
CROSSMATCH RESULT: NORMAL
GRAM STN SPEC: ABNORMAL
SERVICE CMNT-IMP: ABNORMAL
SPECIMEN EXP DATE BLD: NORMAL
UNIT DIVISION: 0

## 2023-11-30 LAB
BACTERIA SPEC CULT: NORMAL
SERVICE CMNT-IMP: NORMAL

## 2023-11-30 NOTE — FLOWSHEET NOTE
Added to restraint log as being in soft wrist restraints within 24 hrs prior to death.   11/30/2023 0127

## 2023-12-01 LAB
BACTERIA SPEC CULT: ABNORMAL
BACTERIA SPEC CULT: ABNORMAL
GRAM STN SPEC: ABNORMAL
SERVICE CMNT-IMP: ABNORMAL

## 2023-12-01 NOTE — DISCHARGE SUMMARY
Discharge Summary    Date: 2023  Patient Name: Christiano Martinez    YOB: 1940     Age: 82 y.o. Admit Date: 2023  Discharge Date: 2023  Discharge Condition:    Admission Diagnosis  Hypovolemic shock ([address]) [R57.1]; Diverticulitis of colon with perforation [K57.20]; Electrolyte abnormality [E87.8]; Acute renal failure, unspecified acute renal failure type ([address]) [N17.9]; Perforated diverticulum [K57.80]      Discharge Diagnosis  Principal Problem:    Diverticulitis of colon with perforation  Active Problems:    GENNARO (acute kidney injury) ([address])    Septic shock (HCC)    Hypoalbuminemia due to protein-calorie malnutrition ([address])    Hypovolemic shock (HCC)    Electrolyte abnormality    Acute encephalopathy    Altered mental status    Frailty    Encounter for palliative care    Lactic acidosis    Acute renal failure ([address])  Resolved Problems:    * No resolved hospital problems. [facility] Stay  Narrative of Hospital Course:  Taken from the emergency department to the operating room for perforated diverticulitis. She was hemodynamically unstable. She was treated with sigmoid colectomy and colostomy. She was taken to the ICU where she continued to remain unstable requiring multiple pressors for blood pressure support. She was septic from the perforation. Discussion with the power of  led to comfort measures and the patient ultimately . Consultants:  IP CONSULT TO VASCULAR SURGERY  IP CONSULT TO NEPHROLOGY  IP CONSULT TO DIETITIAN  IP CONSULT TO NEUROLOGY  IP CONSULT TO PALLIATIVE CARE    Surgeries/procedures Performed:  Sigmoid colectomy with colostomy. Treatments:            Discharge Plan/Disposition:  Home    Hospital/Incidental Findings Requiring Follow Up:    Patient Instructions:    Diet:    Activity:  For number of days (if applicable): Other Instructions:    Provider Follow-Up:   No follow-ups on file.      Significant Diagnostic Studies:    Recent endotracheal tube which remain slightly low riding approximately 1.2 cm above the narinder, consider slight retraction. 2. Unchanged pulmonary interstitial edema. 3. Questionable small left pleural effusion. XR ABDOMEN (KUB) (SINGLE AP VIEW)    Result Date: 11/25/2023  ET tube terminates at the narinder. Recommend retraction. Enteric tube tip overlies the proximal stomach, recommend slight advancement approximately 4 cm. Right IJ approach central venous catheter tip overlies the lower SVC. Multifocal groundglass opacities are present, may reflect pulmonary edema or multifocal pneumonia. No pneumothorax or visible pleural effusion. Enlarged cardiomediastinal silhouette. Surgical clips overlie the upper abdomen. Thank you for the referral of this patient. This exam was interpreted by an American Board of Radiology certified radiologist with subspecialty fellowship training in body imaging. If there are any questions regarding this exam please  feel free to contact a radiologist directly at [phone]. Janna Sethi M.D. 11/25/2023 7:40:00 AM    XR CHEST PORTABLE    Result Date: 11/25/2023  ET tube terminates at the narinder. Recommend retraction. Enteric tube tip overlies the proximal stomach, recommend slight advancement approximately 4 cm. Right IJ approach central venous catheter tip overlies the lower SVC. Multifocal groundglass opacities are present, may reflect pulmonary edema or multifocal pneumonia. No pneumothorax or visible pleural effusion. Enlarged cardiomediastinal silhouette. Surgical clips overlie the upper abdomen. Thank you for the referral of this patient. This exam was interpreted by an American Board of Radiology certified radiologist with subspecialty fellowship training in body imaging. If there are any questions regarding this exam please  feel free to contact a radiologist directly at [phone].   Janna Sethi M.D. 11/25/2023 7:40:00 AM    CT ABDOMEN PELVIS WO CONTRAST Additional

## 2023-12-01 NOTE — PROGRESS NOTES
Physician Progress Note      Uday Hodges  CSN #:                  [id number]  :                       1940  ADMIT DATE:       2023 11:41 PM  [facility] DATE:        2023 7:45 PM  RESPONDING  PROVIDER #:        True Natarajan MD          QUERY TEXT:    Pt admitted with perforated sigmoid colon and has encephalopathy documented. If possible, please document in progress notes and discharge summary further   specificity regarding the type of encephalopathy:    The medical record reflects the following:  Risk Factors: 82 y.o. female admitted with perforated sigmoid colon, underwent   exploratory laparotomy and bowel resection with colostomy. Clinical Indicators: Per ,  progress notes \"septic shock,   hypovolemic shock, acute encephalopathy. Currently in ICU - critically ill   with multi system organ failure, Pt is sedated, intubated and on 4 pressures. \"  Treatment: sedation, vasopressors, ABX, serial labs    Thank you,  Gege Mayo RN, BSN, CDI  [email or web address]  . Options provided:  -- Metabolic encephalopathy  -- Septic encephalopathy  -- Other - I will add my own diagnosis  -- Disagree - Not applicable / Not valid  -- Disagree - Clinically unable to determine / Unknown  -- Refer to Clinical Documentation Reviewer    PROVIDER RESPONSE TEXT:    Provider is clinically unable to determine a response to this query.     Query created by: Kati Ordaz on 2023 7:45 AM      Electronically signed by:  True Natarajan MD 2023 7:57 AM

## 2023-12-06 LAB
BACTERIA SPEC CULT: ABNORMAL
GRAM STN SPEC: ABNORMAL
ORGANISM ID: NORMAL
SERVICE CMNT-IMP: ABNORMAL

## 2023-12-13 LAB
BACTERIA SPEC CULT: ABNORMAL
BACTERIA SPEC CULT: ABNORMAL
GRAM STN SPEC: ABNORMAL
ORGANISM ID: NORMAL
SERVICE CMNT-IMP: ABNORMAL

## (undated) DEVICE — INTENDED FOR TISSUE SEPARATION, AND OTHER PROCEDURES THAT REQUIRE A SHARP SURGICAL BLADE TO PUNCTURE OR CUT.: Brand: BARD-PARKER ® STAINLESS STEEL BLADES

## (undated) DEVICE — SINGLE BASIN: Brand: CARDINAL HEALTH

## (undated) DEVICE — BLOCK BITE AD 60FR W/ VELC STRP ADDRESSES MOST PT AND

## (undated) DEVICE — TOTAL 1-LAYER TRAY, LATEX FOLEY, 16FR 10: Brand: MEDLINE

## (undated) DEVICE — STERILE POLYISOPRENE POWDER-FREE SURGICAL GLOVES: Brand: PROTEXIS

## (undated) DEVICE — HOOD: Brand: FLYTE

## (undated) DEVICE — BANDAGE COBAN 6 IN WND 6INX5YD FOAM

## (undated) DEVICE — NDL PRT INJ NSAF BLNT 18GX1.5 --

## (undated) DEVICE — SUTURE FIBERWIRE SZ 2 L38IN NONABSORBABLE BLU WHT BLK AR7201

## (undated) DEVICE — KENDALL RADIOLUCENT FOAM MONITORING ELECTRODE RECTANGULAR SHAPE: Brand: KENDALL

## (undated) DEVICE — CONNECTOR TBNG OD5-7MM O2 END DISP

## (undated) DEVICE — REM POLYHESIVE ADULT PATIENT RETURN ELECTRODE: Brand: VALLEYLAB

## (undated) DEVICE — SUTURE PERMAHAND SZ 2-0 L18IN NONABSORBABLE BLK L26MM SH C012D

## (undated) DEVICE — 3M™ IOBAN™ 2 ANTIMICROBIAL INCISE DRAPE 6650EZ: Brand: IOBAN™ 2

## (undated) DEVICE — GARMENT,MEDLINE,DVT,INT,CALF,MED, GEN2: Brand: MEDLINE

## (undated) DEVICE — COLOSTOMY/ILEOSTOMY KIT,FLEXWEAR: Brand: NEW IMAGE

## (undated) DEVICE — SYR 3ML LL TIP 1/10ML GRAD --

## (undated) DEVICE — SUTURE ABSORBABLE BRAIDED 0 CT-1 8X27 IN UD VICRYL JJ41G

## (undated) DEVICE — DRAPE,HIP,W/POUCHES,STERILE: Brand: MEDLINE

## (undated) DEVICE — CANNULA NSL ORAL AD FOR CAPNOFLEX CO2 O2 AIRLFE

## (undated) DEVICE — SYR 5ML 1/5 GRAD LL NSAF LF --

## (undated) DEVICE — SPONGE LAPAROTOMY W18XL18IN WHITE STRUNG RADIOPAQUE STERILE

## (undated) DEVICE — STRYKER PERFORMANCE SERIES SAGITTAL BLADE: Brand: STRYKER PERFORMANCE SERIES

## (undated) DEVICE — ELECTRODE PT RET AD L9FT HI MOIST COND ADH HYDRGEL CORDED

## (undated) DEVICE — HANDPIECE SET WITH COAXIAL HIGH FLOW TIP AND SUCTION TUBE: Brand: INTERPULSE

## (undated) DEVICE — RESERVOIR,SUCTION,100CC,SILICONE: Brand: MEDLINE

## (undated) DEVICE — RELOAD STPL 40MM H1.5-3.5MM WIRE 0.2MM REG TISS BLU CRV

## (undated) DEVICE — APPLICATOR MEDICATED 26 CC SOLUTION HI LT ORNG CHLORAPREP

## (undated) DEVICE — SUTURE PERMAHAND SZ 3-0 L18IN NONABSORBABLE BLK L26MM SH C013D

## (undated) DEVICE — HIP HEMIARTHROPLASTY: Brand: MEDLINE INDUSTRIES, INC.

## (undated) DEVICE — KIT THORCENT 8FR L5IN POLYUR W/ 18/22/25GA NDL 3 W STPCOCK

## (undated) DEVICE — 7 DAY SILVER-COATED ANTIMICROBIAL BARRIER DRESSING: Brand: ACTICOAT 7  4" X 5"

## (undated) DEVICE — SEALER ENDOSCP NANO COAT OPN DIV CRV L JAW LIGASURE IMPACT

## (undated) DEVICE — STAPLER INT CUT LN 40MM STPL 51MM GRN CRV HD B FRM

## (undated) DEVICE — DRAIN SURG 19FR 100% SIL RADPQ RND CHN FULL FLUT

## (undated) DEVICE — SHEET,DRAPE,53X77,STERILE: Brand: MEDLINE

## (undated) DEVICE — AGENT HEMSTAT 3GM OXIDIZED REGENERATED CELOS ABSRB FOR CONT (ORDER MULTIPLES OF 5EA)

## (undated) DEVICE — FIAPC® PROBE W/ FILTER 2200 C OD 2.3MM/6.9FR; L 2.2M/7.2FT: Brand: ERBE

## (undated) DEVICE — APPLIER CLP L L13IN TI MULT RNG HNDL 20 CLP STR LIGACLP

## (undated) DEVICE — DRAPE TWL SURG 16X26IN BLU ORB04] ALLCARE INC]

## (undated) DEVICE — SUTURE VCRL SZ 3-0 L18IN ABSRB VLT L26MM SH 1/2 CIR J774D

## (undated) DEVICE — SOLUTION IRRIG 1000ML 0.9% SOD CHL USP POUR PLAS BTL

## (undated) DEVICE — NEEDLE HYPO 21GA L1.5IN INTRAMUSCULAR S STL LATCH BVL UP

## (undated) DEVICE — SUTURE ETHBND EXCEL SZ 2 L30IN NONABSORBABLE GRN L40MM V-37 MX69G

## (undated) DEVICE — SYRINGE, LUER SLIP, STERILE, 60ML: Brand: MEDLINE

## (undated) DEVICE — DRAPE IRRIG FLD WRM W44XL44IN W/ AORN STD PRTBL INTRATEMP

## (undated) DEVICE — SUTURE PDS II SZ 1 L96IN ABSRB VLT TP-1 L65MM 1/2 CIR Z880G

## (undated) DEVICE — GEL MEDC ULTRASOUND 5L -- REPLACED BY 326862

## (undated) DEVICE — SUTURE PERMAHAND SZ 2-0 L12X18IN NONABSORBABLE BLK SILK A185H

## (undated) DEVICE — SOLUTION IRRIG 3000ML 0.9% SOD CHL USP UROMATIC PLAS CONT

## (undated) DEVICE — SHEET, T, LAPAROTOMY, STERILE: Brand: MEDLINE

## (undated) DEVICE — GLOVE SURG SZ 75 L12IN FNGR THK79MIL GRN LTX FREE